# Patient Record
Sex: MALE | Race: WHITE | NOT HISPANIC OR LATINO | Employment: OTHER | URBAN - METROPOLITAN AREA
[De-identification: names, ages, dates, MRNs, and addresses within clinical notes are randomized per-mention and may not be internally consistent; named-entity substitution may affect disease eponyms.]

---

## 2017-01-02 ENCOUNTER — TELEPHONE (OUTPATIENT)
Dept: INTERNAL MEDICINE | Facility: CLINIC | Age: 82
End: 2017-01-02

## 2017-01-02 RX ORDER — PHENAZOPYRIDINE HYDROCHLORIDE 200 MG/1
200 TABLET, FILM COATED ORAL 3 TIMES DAILY PRN
Qty: 12 TABLET | Refills: 0 | Status: SHIPPED | OUTPATIENT
Start: 2017-01-02 | End: 2017-02-16

## 2017-01-02 RX ORDER — CEFDINIR 300 MG/1
300 CAPSULE ORAL 2 TIMES DAILY
Qty: 20 CAPSULE | Refills: 0 | Status: SHIPPED | OUTPATIENT
Start: 2017-01-02 | End: 2017-02-16

## 2017-01-02 NOTE — TELEPHONE ENCOUNTER
I called Mr. 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

## 2017-01-12 ENCOUNTER — OFFICE VISIT (OUTPATIENT)
Dept: ORTHOPEDIC SURGERY | Facility: CLINIC | Age: 82
End: 2017-01-12

## 2017-01-12 VITALS — HEIGHT: 72 IN | WEIGHT: 224 LBS | BODY MASS INDEX: 30.34 KG/M2 | TEMPERATURE: 98 F

## 2017-01-12 DIAGNOSIS — Z96.652 HISTORY OF TOTAL KNEE ARTHROPLASTY, LEFT: Primary | ICD-10-CM

## 2017-01-12 PROCEDURE — 73562 X-RAY EXAM OF KNEE 3: CPT | Performed by: NURSE PRACTITIONER

## 2017-01-12 PROCEDURE — 99024 POSTOP FOLLOW-UP VISIT: CPT | Performed by: NURSE PRACTITIONER

## 2017-01-12 NOTE — MR AVS SNAPSHOT
Osvaldo Lopez   1/12/2017 10:30 AM   Office Visit    Dept Phone:  [phone]   Encounter #:  [id number]    Provider:  LOU Yip   Department:  [facility]                Your Full Care Plan              Your Updated Medication List          This list is accurate as of: 1/12/17 11:41 AM.  Always use your most recent med list.                amiodarone 100 MG tablet   Commonly known as:  PACERONE   Take 1 tablet by mouth daily.       amLODIPine 5 MG tablet   Commonly known as:  NORVASC   Take 1 tablet by mouth Every Morning.       cefdinir 300 MG capsule   Commonly known as:  OMNICEF   Take 1 capsule by mouth 2 (Two) Times a Day.       hydrochlorothiazide 12.5 MG capsule   Commonly known as:  MICROZIDE   Take 1 capsule by mouth daily.       HYDROcodone-acetaminophen 7.5-325 MG per tablet   Commonly known as:  NORCO   1-2 Oral Q4H to Q6H PRN severe pain       lisinopril 40 MG tablet   Commonly known as:  PRINIVIL,ZESTRIL       metFORMIN 500 MG tablet   Commonly known as:  GLUCOPHAGE   Take 1 tablet by mouth 2 (Two) Times a Day With Meals.       metoprolol succinate XL 25 MG 24 hr tablet   Commonly known as:  TOPROL-XL   TAKE 1 TABLET DAILY       ondansetron 4 MG tablet   Commonly known as:  ZOFRAN   Take 1 tablet by mouth Every 6 (Six) Hours As Needed for nausea or vomiting.       pantoprazole 40 MG EC tablet   Commonly known as:  PROTONIX   Take 1 tablet by mouth Daily.       phenazopyridine 200 MG tablet   Commonly known as:  PYRIDIUM   Take 1 tablet by mouth 3 (Three) Times a Day As Needed for bladder spasms.       rivaroxaban 20 MG tablet   Commonly known as:  XARELTO       tamsulosin 0.4 MG capsule 24 hr capsule   Commonly known as:  FLOMAX               We Performed the Following     XR Knee 1 or 2 View Bilateral     XR Knee 1 or 2 View Left       You Were Diagnosed With        Codes Comments    History of total knee  "arthroplasty, left    -  Primary ICD-10-CM: Z96.652  ICD-9-CM: V43.65       Instructions     None    Patient Instructions History      Upcoming Appointments     Visit Type Date Time Department    FOLLOW UP 1/12/2017 10:30 AM [facility]    OFFICE VISIT 2/16/2017  9:45 AM [facility] [id number]      [facility] Signup     Our records indicate that you have an active [facility] [employer] account.    You can view your After Visit Summary by going to [employer] and logging in with your [employer] username and password.  If you don't have a [employer] username and password but a parent or guardian has access to your record, the parent or guardian should login with their own [employer] username and password and access your record to view the After Visit Summary.    If you have questions, you can email [email or web address] or call [phone] to talk to our [employer] staff.  Remember, [employer] is NOT to be used for urgent needs.  For medical emergencies, dial 911.               Other Info from Your Visit           Your Appointments     Feb 16, 2017  9:45 AM EST   Office Visit with Caio Rodríguez Jr., MD   [facility] (--)    [address] [facility] [address] [address] [phone]           Arrive 15 minutes prior to appointment.              Allergies     No Known Allergies      Reason for Visit     Left Knee - Follow-up           Vital Signs     Temperature Height Weight Body Mass Index Smoking Status       98 °F (36.7 °C) 72\" (182.9 cm) 224 lb (102 kg) 30.38 kg/m2 Former Smoker       Problems and Diagnoses Noted     History of total knee replacement    -  Primary        "

## 2017-01-12 NOTE — PROGRESS NOTES
Osvaldo Lopez : 1935 MRN: [id number] DATE: 2017    DIAGNOSIS: 8 week follow up left total knee      SUBJECTIVE:Patient returns today for 8 week follow up of left total knee replacement. Patient reports doing well with no unusual complaints. Appears to be progressing appropriately.    OBJECTIVE:   Exam:. The incision is well healed. No sign of infection. Range of motion is measured at 0 to 115. The calf is soft and nontender with a negative Homans sign. Strength is progressing and the patient is ambulating appropriately.    DIAGNOSTIC STUDIES  Xrays: 3 views of the left knee (AP, lateral, and sunrise) were ordered and reviewed for evaluation of recent knee replacement. They demonstrate a well positioned, well aligned knee replacement without complicating factors noted. In comparison with previous films there has been no change.    ASSESSMENT: 8 week status post left knee replacement.    PLAN: 1) Continue with PT exercises as prescribed   2) Follow up in 10 months    3) in addition the patient continues to have swelling bilateral lower extremities which he reports is a chronic condition.  Dr. Padron advised that he follow-up with his primary care physician and resume support stockings.    Pricila العلي, APRN  2017

## 2017-02-16 ENCOUNTER — OFFICE VISIT (OUTPATIENT)
Dept: INTERNAL MEDICINE | Facility: CLINIC | Age: 82
End: 2017-02-16

## 2017-02-16 VITALS
DIASTOLIC BLOOD PRESSURE: 76 MMHG | BODY MASS INDEX: 30.92 KG/M2 | HEART RATE: 84 BPM | SYSTOLIC BLOOD PRESSURE: 154 MMHG | TEMPERATURE: 96.1 F | RESPIRATION RATE: 16 BRPM | WEIGHT: 228 LBS

## 2017-02-16 DIAGNOSIS — N40.1 BENIGN NON-NODULAR PROSTATIC HYPERPLASIA WITH LOWER URINARY TRACT SYMPTOMS: ICD-10-CM

## 2017-02-16 DIAGNOSIS — D50.0 IRON DEFICIENCY ANEMIA DUE TO CHRONIC BLOOD LOSS: Primary | ICD-10-CM

## 2017-02-16 DIAGNOSIS — N39.0 URINARY TRACT INFECTION WITHOUT HEMATURIA, SITE UNSPECIFIED: ICD-10-CM

## 2017-02-16 PROCEDURE — 99214 OFFICE O/P EST MOD 30 MIN: CPT | Performed by: FAMILY MEDICINE

## 2017-02-16 RX ORDER — TAMSULOSIN HYDROCHLORIDE 0.4 MG/1
CAPSULE ORAL
Qty: 180 CAPSULE | Refills: 3 | Status: SHIPPED | OUTPATIENT
Start: 2017-02-16 | End: 2018-03-09 | Stop reason: SDUPTHER

## 2017-02-16 NOTE — PROGRESS NOTES
Subjective   Osvaldo Lopez is a 81 y.o. male.     Chief Complaint   Patient presents with   • Atrial Fibrillation   • Hypertension   • Hyperlipidemia   • Urinary Tract Infection         History of Present Illness   Patient is here with recheck of UTI with recovery.  He has had some BPH symptoms with nocturia at least for 5 times a night.  We discussed increasing Flomax generic.  He does not wish to go to the urologist right now.  He has lost quite strength after back surgery in left knee replacement is going to physical therapy.    Otherwise treatment of hypertension hyperlipidemia are reviewed.      The following portions of the patient's history were reviewed and updated as appropriate: allergies, current medications, past social history and problem list.    Review of Systems   Constitutional: Negative.    HENT: Negative.    Eyes: Negative.    Respiratory: Negative.    Cardiovascular: Negative.    Gastrointestinal: Negative.    Endocrine: Negative.    Genitourinary: Negative.    Musculoskeletal: Positive for back pain and gait problem.   Skin: Negative.    Allergic/Immunologic: Negative.    Hematological: Negative.    Psychiatric/Behavioral: Negative.        Objective   Vitals:    02/16/17 0945   BP: 154/76   Pulse: 84   Resp: 16   Temp: 96.1 °F (35.6 °C)     Physical Exam   Constitutional: He is oriented to person, place, and time. He appears well-developed and well-nourished.   HENT:   Head: Normocephalic and atraumatic.   Right Ear: Tympanic membrane and external ear normal.   Left Ear: Tympanic membrane and external ear normal.   Nose: Nose normal.   Mouth/Throat: Oropharynx is clear and moist.   Eyes: Conjunctivae and EOM are normal. Pupils are equal, round, and reactive to light.   Neck: Normal range of motion. Neck supple. No JVD present. No thyromegaly present.   Cardiovascular: Normal rate, normal heart sounds and intact distal pulses.  An irregularly irregular rhythm present.   Pulmonary/Chest: Effort  normal and breath sounds normal.   Abdominal: Soft. Bowel sounds are normal.   Musculoskeletal: Normal range of motion.        Legs:  Lymphadenopathy:     He has no cervical adenopathy.   Neurological: He is alert and oriented to person, place, and time. No cranial nerve deficit. He exhibits abnormal muscle tone. Gait abnormal. Coordination normal.   Skin: Skin is warm and dry. No rash noted.   Psychiatric: He has a normal mood and affect. His behavior is normal. Judgment and thought content normal.   Vitals reviewed.      Assessment/Plan   Problem List Items Addressed This Visit        Genitourinary    BPH (benign prostatic hyperplasia)    Relevant Orders    Urinalysis With / Culture If Indicated    CBC & Differential      Other Visit Diagnoses     Iron deficiency anemia due to chronic blood loss    -  Primary    Relevant Orders    Urinalysis With / Culture If Indicated    CBC & Differential    Urinary tract infection without hematuria, site unspecified        Relevant Orders    Urinalysis With / Culture If Indicated    CBC & Differential       plan: Urinalysis with culture CBC.  He's had anemia postop.  We'll see him back in 6 months with labs and give Prevnar 13.

## 2017-02-17 LAB
APPEARANCE UR: CLEAR
BACTERIA #/AREA URNS HPF: NORMAL /HPF
BASOPHILS # BLD AUTO: 0.01 10*3/MM3 (ref 0–0.2)
BASOPHILS NFR BLD AUTO: 0.2 % (ref 0–1.5)
BILIRUB UR QL STRIP: NEGATIVE
COLOR UR: YELLOW
EOSINOPHIL # BLD AUTO: 0.12 10*3/MM3 (ref 0–0.7)
EOSINOPHIL NFR BLD AUTO: 2.3 % (ref 0.3–6.2)
EPI CELLS #/AREA URNS HPF: NORMAL /HPF
ERYTHROCYTE [DISTWIDTH] IN BLOOD BY AUTOMATED COUNT: 13.8 % (ref 11.5–14.5)
GLUCOSE UR QL: NEGATIVE
HCT VFR BLD AUTO: 39.3 % (ref 40.4–52.2)
HGB BLD-MCNC: 12.4 G/DL (ref 13.7–17.6)
HGB UR QL STRIP: NEGATIVE
IMM GRANULOCYTES # BLD: 0 10*3/MM3 (ref 0–0.03)
IMM GRANULOCYTES NFR BLD: 0 % (ref 0–0.5)
KETONES UR QL STRIP: NEGATIVE
LEUKOCYTE ESTERASE UR QL STRIP: NEGATIVE
LYMPHOCYTES # BLD AUTO: 1.99 10*3/MM3 (ref 0.9–4.8)
LYMPHOCYTES NFR BLD AUTO: 38.7 % (ref 19.6–45.3)
MCH RBC QN AUTO: 29.9 PG (ref 27–32.7)
MCHC RBC AUTO-ENTMCNC: 31.6 G/DL (ref 32.6–36.4)
MCV RBC AUTO: 94.7 FL (ref 79.8–96.2)
MICRO URNS: NORMAL
MICRO URNS: NORMAL
MONOCYTES # BLD AUTO: 0.36 10*3/MM3 (ref 0.2–1.2)
MONOCYTES NFR BLD AUTO: 7 % (ref 5–12)
MUCOUS THREADS URNS QL MICRO: PRESENT /HPF
NEUTROPHILS # BLD AUTO: 2.66 10*3/MM3 (ref 1.9–8.1)
NEUTROPHILS NFR BLD AUTO: 51.8 % (ref 42.7–76)
NITRITE UR QL STRIP: NEGATIVE
PH UR STRIP: 6 [PH] (ref 5–7.5)
PLATELET # BLD AUTO: 169 10*3/MM3 (ref 140–500)
PROT UR QL STRIP: NEGATIVE
RBC # BLD AUTO: 4.15 10*6/MM3 (ref 4.6–6)
RBC #/AREA URNS HPF: NORMAL /HPF
SP GR UR: 1.02 (ref 1–1.03)
URINALYSIS REFLEX: NORMAL
UROBILINOGEN UR STRIP-MCNC: 0.2 MG/DL (ref 0.2–1)
WBC # BLD AUTO: 5.14 10*3/MM3 (ref 4.5–10.7)
WBC #/AREA URNS HPF: NORMAL /HPF

## 2017-02-21 ENCOUNTER — OFFICE VISIT (OUTPATIENT)
Dept: CARDIOLOGY | Facility: CLINIC | Age: 82
End: 2017-02-21

## 2017-02-21 VITALS
DIASTOLIC BLOOD PRESSURE: 76 MMHG | SYSTOLIC BLOOD PRESSURE: 130 MMHG | HEART RATE: 67 BPM | HEIGHT: 71 IN | WEIGHT: 228 LBS | BODY MASS INDEX: 31.92 KG/M2

## 2017-02-21 DIAGNOSIS — I10 ESSENTIAL HYPERTENSION: ICD-10-CM

## 2017-02-21 DIAGNOSIS — I48.0 PAF (PAROXYSMAL ATRIAL FIBRILLATION) (HCC): Primary | ICD-10-CM

## 2017-02-21 PROCEDURE — 99213 OFFICE O/P EST LOW 20 MIN: CPT | Performed by: INTERNAL MEDICINE

## 2017-02-21 PROCEDURE — 93000 ELECTROCARDIOGRAM COMPLETE: CPT | Performed by: INTERNAL MEDICINE

## 2017-02-28 NOTE — PROGRESS NOTES
Subjective:     Encounter Date:02/21/2017      Patient ID: Osvaldo Lopez is a 81 y.o. male.    Chief Complaint:  Atrial Fibrillation   Presents for follow-up visit. Symptoms include shortness of breath. Symptoms are negative for an AICD problem, bradycardia, chest pain, dizziness, hypertension, hypotension, syncope and tachycardia. The symptoms have been stable. Past medical history includes atrial fibrillation.   Hypertension   This is a chronic problem. The current episode started more than 1 year ago. The problem is controlled. Associated symptoms include malaise/fatigue and shortness of breath. Pertinent negatives include no anxiety or chest pain.       Patient resents today for reevaluation.  Since that seen him last he had occasional shortness breath occasional edema occasional fatigue all in all he pretty stable in nature.    Review of Systems   Constitution: Positive for malaise/fatigue.   Cardiovascular: Negative for chest pain and syncope.   Respiratory: Positive for shortness of breath and sleep disturbances due to breathing.    Musculoskeletal: Positive for joint pain and myalgias.   Neurological: Negative for dizziness.   All other systems reviewed and are negative.        ECG 12 Lead  Date/Time: 2/21/2017 4:33 PM  Performed by: ESTEBAN RICH  Authorized by: ESTEBAN RICH   Comparison: compared with previous ECG from 2/24/2016  Similar to previous ECG  Rhythm: sinus rhythm  Conduction: complete RBBB  Clinical impression: abnormal ECG               Objective:     Physical Exam   Constitutional: He is oriented to person, place, and time. He appears well-developed.   HENT:   Head: Normocephalic.   Eyes: Conjunctivae are normal.   Neck: Normal range of motion.   Cardiovascular: Normal rate, regular rhythm and normal heart sounds.    Pulmonary/Chest: Breath sounds normal.   Abdominal: Soft. Bowel sounds are normal.   Musculoskeletal: Normal range of motion. He exhibits no edema.    Neurological: He is alert and oriented to person, place, and time.   Skin: Skin is warm and dry.   Psychiatric: He has a normal mood and affect. His behavior is normal.   Vitals reviewed.      Lab Review:       Assessment:          Diagnosis Plan   1. PAF (paroxysmal atrial fibrillation)  ECG 12 Lead   2. Essential hypertension            Plan:       1. History of paroxysmal atrial fibrillation. The patient presents today for  reevaluation. He remains in a normal sinus rhythm.   2. Hypertension. His blood pressure is good.   3.  Patient with a history of diabetes.    4. Patient told to follow-up in one year    Atrial Fibrillation and Atrial Flutter  Assessment  • The patient has paroxysmal atrial fibrillation  • This is non-valvular in etiology  • The patient's CHADS2-VASc score is 3  • A [id number]-MXOh score of 2 or more is considered a high risk for a thromboembolic event  • Rivaroxaban prescribed    Plan  • Attempt to maintain sinus rhythm  • Continue rivaroxaban for antithrombotic therapy, bleeding issues discussed  • Continue amiodarone for rhythm control

## 2017-03-07 RX ORDER — LISINOPRIL 40 MG/1
40 TABLET ORAL DAILY
Qty: 90 TABLET | Refills: 1 | Status: SHIPPED | OUTPATIENT
Start: 2017-03-07 | End: 2017-08-16 | Stop reason: SDUPTHER

## 2017-03-17 ENCOUNTER — OFFICE VISIT (OUTPATIENT)
Dept: ORTHOPEDIC SURGERY | Facility: CLINIC | Age: 82
End: 2017-03-17

## 2017-03-17 VITALS — WEIGHT: 228 LBS | HEIGHT: 71 IN | BODY MASS INDEX: 31.92 KG/M2

## 2017-03-17 DIAGNOSIS — M19.90 OSTEOARTHRITIS, UNSPECIFIED OSTEOARTHRITIS TYPE, UNSPECIFIED SITE: ICD-10-CM

## 2017-03-17 DIAGNOSIS — M75.122 COMPLETE TEAR OF LEFT ROTATOR CUFF: ICD-10-CM

## 2017-03-17 DIAGNOSIS — M25.511 CHRONIC PAIN OF BOTH SHOULDERS: Primary | ICD-10-CM

## 2017-03-17 DIAGNOSIS — M75.101 TEAR OF RIGHT ROTATOR CUFF, UNSPECIFIED TEAR EXTENT: ICD-10-CM

## 2017-03-17 DIAGNOSIS — M25.512 CHRONIC PAIN OF BOTH SHOULDERS: Primary | ICD-10-CM

## 2017-03-17 DIAGNOSIS — G89.29 CHRONIC PAIN OF BOTH SHOULDERS: Primary | ICD-10-CM

## 2017-03-17 PROCEDURE — 73030 X-RAY EXAM OF SHOULDER: CPT | Performed by: ORTHOPAEDIC SURGERY

## 2017-03-17 PROCEDURE — 99214 OFFICE O/P EST MOD 30 MIN: CPT | Performed by: ORTHOPAEDIC SURGERY

## 2017-03-17 NOTE — PROGRESS NOTES
New Right Shoulder      Patient: Osvaldo Lopez        YOB: 1935    Medical Record Number: [id number]        Chief Complaints: Right Shoulder Pain   Chief Complaint   Patient presents with   • Right Shoulder - Establish Care   • Left Shoulder - Follow-up           History of Present Illness: This is a  81 y.o. male who presents with complaints of right shoulder pain.  I've seen him in the past for left shoulder pain which he was found have an ear repairable rotator cuff tear.  His right shoulder began hurting him in November 2016.  He did not have one particular event however he did have a total knee replacement and he states he has a hard time getting out of chairs and has to push up with his arms and he thinks that's what irritated his right shoulder.  His left shoulder is still bothering him but not as bad as before his symptoms in his right are severe constant stabbing burning his past medical history is mild for cardiac disease          Allergies: No Known Allergies    Medications:   Home Medications:  Current Outpatient Prescriptions on File Prior to Visit   Medication Sig   • amiodarone (PACERONE) 100 MG tablet Take 1 tablet by mouth daily. (Patient taking differently: Take 100 mg by mouth Every Morning.)   • amLODIPine (NORVASC) 5 MG tablet Take 1 tablet by mouth Every Morning.   • lisinopril (PRINIVIL,ZESTRIL) 40 MG tablet Take 1 tablet by mouth Daily.   • metFORMIN (GLUCOPHAGE) 500 MG tablet Take 1 tablet by mouth 2 (Two) Times a Day With Meals.   • metoprolol succinate XL (TOPROL-XL) 25 MG 24 hr tablet TAKE 1 TABLET DAILY   • pantoprazole (PROTONIX) 40 MG EC tablet Take 1 tablet by mouth Daily.   • rivaroxaban (XARELTO) 20 MG tablet Take 20 mg by mouth Every Morning. To stop 3 days before surgery   • tamsulosin (FLOMAX) 0.4 MG capsule 24 hr capsule Two capsules daily for prostate   • hydrochlorothiazide (MICROZIDE) 12.5 MG capsule Take 1 capsule by mouth daily. (Patient taking  differently: Take 12.5 mg by mouth Every Morning.)   • HYDROcodone-acetaminophen (NORCO) 7.5-325 MG per tablet 1-2 Oral Q4H to Q6H PRN severe pain     No current facility-administered medications on file prior to visit.      Current Medications:  Scheduled Meds:  Continuous Infusions:  No current facility-administered medications for this visit.   PRN Meds:.    Past Medical History   Diagnosis Date   • Abnormal thyroid blood test    • Aneurysm of abdominal aorta    • Arthritis    • Atrial fibrillation    • BPH (benign prostatic hyperplasia)    • Coronary artery disease    • Diabetes mellitus      type 2   • Edema    • Enlarged prostate without lower urinary tract symptoms (luts)    • Essential hypertension    • Fatigue    • History of MI (myocardial infarction) 1989   • Hyperactivity of bladder    • Hyperglycemia    • Hyperlipidemia    • Hypertension    • Left shoulder pain    • Lumbar radiculopathy 2016     wears back brace at times following back surgery   • Muscle weakness (generalized)    • Osteoarthritis    • Personal history of arthritis    • Screening       stop bang scale 5   • Torn rotator cuff      left   • Type 2 diabetes mellitus    • Urinary frequency         Past Surgical History   Procedure Laterality Date   • Gallbladder surgery  1989   • Cardiac surgery       CABGX5 (1989)   • Lumbar discectomy fusion instrumentation N/A 4/11/2016     Procedure: lumbar laminectomy L4-5 and fusion with instrumentation;  Surgeon: Ran Kline MD;  Location: [facility] OR;  Service:    • Hernia repair Left      inquinal times 3  last one in 2003   • Knee cartilage surgery Left 1970's   • Pr total knee arthroplasty Left 11/14/2016     Procedure: TOTAL KNEE ARTHROPLASTY;  Surgeon: Dontrell Arellano MD;  Location: [facility];  Service: Orthopedics        Social History     Occupational History   • Not on file.     Social History Main Topics   • Smoking status: Former Smoker     Years: 36.00     Types: Cigarettes      "Quit date: 1989   • Smokeless tobacco: Never Used      Comment: states smoked 2-3 ppd   • Alcohol use Yes      Comment: 2-3 times weekly  //  caffeine use   • Drug use: No   • Sexual activity: Defer    Social History     Social History Narrative        Family History   Problem Relation Age of Onset   • Heart failure Mother              Review of Systems: 14 point review of systems are remarkable for the pertinent positives listed in the chart by the patient the remainder are negative    Review of Systems      Physical Exam: 81 y.o. male  General Appearance:    Alert, cooperative, in no acute distress                 Vitals:    03/17/17 1046   Weight: 228 lb (103 kg)   Height: 71\" (180.3 cm)      Patient is alert and read ×3 no acute distress appears her above-listed at height weight and age.  Affect is normal respiratory rate is normal unlabored. Heart rate regular rate rhythm, sclera, dentition and hearing are normal for the purpose of this exam.    Ortho Exam Physical exam of the right shoulder reveals no overlying skin changes no lymphedema no lymphadenopathy.  Patient has active flexion 186 with mod symptoms abduction is similar external rotation is to 40 and internal rotation to the upper lumbar spine with mild symptoms.  Patient has good rotator cuff strength 4+ over 5 with isometric strength testing with pain.  Patient has a positive impingement and a positive Anglin sign.  Patient has good cervical range of motion which is full and asymptomatic no radicular symptoms.  Patient has a normal elbow exam.  Good distal pulses are present  Patient has pain with overhead activity and a positive Neer sign and a positive empty can sign  Physical exam of the left shoulder reveals no overlying skin changes no lymphedema no lymphadenopathy.  Patient has active flexion 180 with mild symptoms abduction is similar external rotation is to 50 and internal rotation to the upper lumbar spine with mild symptoms.  Patient has " good rotator cuff strength 4-4+ over 5 with isometric strength testing with pain.  Patient has a positive impingement and a positive Anglin sign.  Patient has good cervical range of motion which is full and asymptomatic no radicular symptoms.  Patient has a normal elbow exam.  Good distal pulses are presentPatient has pain with overhead activity and a positive Neer sign and a positive empty can sign      Large Joint Arthrocentesis  Date/Time: 3/20/2017 9:28 AM  Consent given by: patient  Site marked: site marked  Timeout: Immediately prior to procedure a time out was called to verify the correct patient, procedure, equipment, support staff and site/side marked as required   Supporting Documentation  Indications: pain   Procedure Details  Location: shoulder - R subacromial bursa  Preparation: Patient was prepped and draped in the usual sterile fashion  Needle size: 25 G  Approach: posterior  Medications administered: 80 mg methylPREDNISolone acetate 80 MG/ML; 4 mL bupivacaine  Patient tolerance: patient tolerated the procedure well with no immediate complications    Large Joint Arthrocentesis  Date/Time: 3/20/2017 9:29 AM  Consent given by: patient  Site marked: site marked  Timeout: Immediately prior to procedure a time out was called to verify the correct patient, procedure, equipment, support staff and site/side marked as required   Supporting Documentation  Indications: pain   Procedure Details  Location: shoulder - L subacromial bursa  Preparation: Patient was prepped and draped in the usual sterile fashion  Needle size: 25 G  Approach: posterior  Medications administered: 80 mg methylPREDNISolone acetate 80 MG/ML; 4 mL bupivacaine  Patient tolerance: patient tolerated the procedure well with no immediate complications                Radiology:   AP, Scapular Y and Axillary Lateral of the right shoulder were ordered/reviewed to evauate shoulder pain.  I see no evidence of any acute bony pathology he does have  some degenerative changes seen at his acromioclavicular joint  AP scapular Y and axillary lateral of the left shoulder were also taken to evaluate his symptoms and these were compared to previous films.  He does have some sclerosis at the insertion the cuff and some acromioclavicular arthritis but no acute bony pathology    Assessment/Plan:  Known left rotator cuff tear, I suspect a right rotator cuff tear plan is to proceed with an injection of both having get back on some strengthening if he fails to improve we will pursue other means of testing

## 2017-03-20 PROBLEM — M75.122 COMPLETE TEAR OF LEFT ROTATOR CUFF: Status: ACTIVE | Noted: 2017-03-20

## 2017-03-20 PROBLEM — M75.101 TEAR OF RIGHT ROTATOR CUFF: Status: ACTIVE | Noted: 2017-03-20

## 2017-03-20 PROCEDURE — 20610 DRAIN/INJ JOINT/BURSA W/O US: CPT | Performed by: ORTHOPAEDIC SURGERY

## 2017-03-20 RX ORDER — METHYLPREDNISOLONE ACETATE 80 MG/ML
80 INJECTION, SUSPENSION INTRA-ARTICULAR; INTRALESIONAL; INTRAMUSCULAR; SOFT TISSUE
Status: COMPLETED | OUTPATIENT
Start: 2017-03-20 | End: 2017-03-20

## 2017-03-20 RX ORDER — BUPIVACAINE HYDROCHLORIDE 5 MG/ML
4 INJECTION, SOLUTION PERINEURAL
Status: COMPLETED | OUTPATIENT
Start: 2017-03-20 | End: 2017-03-20

## 2017-03-20 RX ADMIN — BUPIVACAINE HYDROCHLORIDE 4 ML: 5 INJECTION, SOLUTION PERINEURAL at 09:28

## 2017-03-20 RX ADMIN — METHYLPREDNISOLONE ACETATE 80 MG: 80 INJECTION, SUSPENSION INTRA-ARTICULAR; INTRALESIONAL; INTRAMUSCULAR; SOFT TISSUE at 09:29

## 2017-03-20 RX ADMIN — METHYLPREDNISOLONE ACETATE 80 MG: 80 INJECTION, SUSPENSION INTRA-ARTICULAR; INTRALESIONAL; INTRAMUSCULAR; SOFT TISSUE at 09:28

## 2017-03-20 RX ADMIN — BUPIVACAINE HYDROCHLORIDE 4 ML: 5 INJECTION, SOLUTION PERINEURAL at 09:29

## 2017-04-10 ENCOUNTER — TELEPHONE (OUTPATIENT)
Dept: CARDIOLOGY | Facility: CLINIC | Age: 82
End: 2017-04-10

## 2017-04-10 RX ORDER — AMIODARONE HYDROCHLORIDE 100 MG/1
100 TABLET ORAL DAILY
Qty: 90 TABLET | Refills: 0 | Status: SHIPPED | OUTPATIENT
Start: 2017-04-10 | End: 2017-04-10 | Stop reason: SDUPTHER

## 2017-04-10 RX ORDER — AMIODARONE HYDROCHLORIDE 100 MG/1
100 TABLET ORAL DAILY
Qty: 90 TABLET | Refills: 0 | Status: SHIPPED | OUTPATIENT
Start: 2017-04-10 | End: 2017-07-11 | Stop reason: ALTCHOICE

## 2017-04-10 RX ORDER — METOPROLOL SUCCINATE 25 MG/1
TABLET, EXTENDED RELEASE ORAL
Qty: 90 TABLET | Refills: 1 | Status: SHIPPED | OUTPATIENT
Start: 2017-04-10 | End: 2017-10-26 | Stop reason: SDUPTHER

## 2017-06-13 ENCOUNTER — TELEPHONE (OUTPATIENT)
Dept: CARDIOLOGY | Facility: CLINIC | Age: 82
End: 2017-06-13

## 2017-06-13 NOTE — TELEPHONE ENCOUNTER
Patient called to report he is in Afib.  His HR is 130. He is not SOA but has edema in his lower legs. BP is 120/90.  Please call patient at [phone]./ MONIK

## 2017-06-15 ENCOUNTER — TELEPHONE (OUTPATIENT)
Dept: CARDIOLOGY | Facility: CLINIC | Age: 82
End: 2017-06-15

## 2017-06-15 RX ORDER — AMIODARONE HYDROCHLORIDE 100 MG/1
TABLET ORAL
Qty: 90 TABLET | Refills: 0 | Status: SHIPPED | OUTPATIENT
Start: 2017-06-15 | End: 2017-07-11

## 2017-06-26 DIAGNOSIS — I48.0 PAF (PAROXYSMAL ATRIAL FIBRILLATION) (HCC): Primary | ICD-10-CM

## 2017-07-11 ENCOUNTER — OFFICE VISIT (OUTPATIENT)
Dept: CARDIOLOGY | Facility: CLINIC | Age: 82
End: 2017-07-11

## 2017-07-11 ENCOUNTER — HOSPITAL ENCOUNTER (OUTPATIENT)
Dept: GENERAL RADIOLOGY | Facility: HOSPITAL | Age: 82
Discharge: HOME | End: 2017-07-11
Attending: INTERNAL MEDICINE | Admitting: INTERNAL MEDICINE

## 2017-07-11 ENCOUNTER — LAB (OUTPATIENT)
Dept: LAB | Facility: HOSPITAL | Age: 82
End: 2017-07-11

## 2017-07-11 VITALS
BODY MASS INDEX: 32.48 KG/M2 | DIASTOLIC BLOOD PRESSURE: 68 MMHG | HEIGHT: 71 IN | SYSTOLIC BLOOD PRESSURE: 150 MMHG | WEIGHT: 232 LBS | HEART RATE: 62 BPM

## 2017-07-11 DIAGNOSIS — I48.0 PAF (PAROXYSMAL ATRIAL FIBRILLATION) (HCC): ICD-10-CM

## 2017-07-11 DIAGNOSIS — I48.0 PAF (PAROXYSMAL ATRIAL FIBRILLATION) (HCC): Primary | ICD-10-CM

## 2017-07-11 LAB
ALBUMIN SERPL-MCNC: 4.3 G/DL (ref 3.5–5.2)
ALBUMIN/GLOB SERPL: 1.6 G/DL
ALP SERPL-CCNC: 86 U/L (ref 39–117)
ALT SERPL W P-5'-P-CCNC: 22 U/L (ref 1–41)
ANION GAP SERPL CALCULATED.3IONS-SCNC: 14.9 MMOL/L
AST SERPL-CCNC: 22 U/L (ref 1–40)
BILIRUB SERPL-MCNC: 0.5 MG/DL (ref 0.1–1.2)
BUN BLD-MCNC: 21 MG/DL (ref 8–23)
BUN/CREAT SERPL: 16.9 (ref 7–25)
CALCIUM SPEC-SCNC: 9.5 MG/DL (ref 8.6–10.5)
CHLORIDE SERPL-SCNC: 101 MMOL/L (ref 98–107)
CO2 SERPL-SCNC: 23.1 MMOL/L (ref 22–29)
CREAT BLD-MCNC: 1.24 MG/DL (ref 0.76–1.27)
GFR SERPL CREATININE-BSD FRML MDRD: 56 ML/MIN/1.73
GLOBULIN UR ELPH-MCNC: 2.7 GM/DL
GLUCOSE BLD-MCNC: 166 MG/DL (ref 65–99)
POTASSIUM BLD-SCNC: 4.6 MMOL/L (ref 3.5–5.2)
PROT SERPL-MCNC: 7 G/DL (ref 6–8.5)
SODIUM BLD-SCNC: 139 MMOL/L (ref 136–145)
T-UPTAKE NFR SERPL: 0.95 TBI (ref 0.8–1.3)
T4 SERPL-MCNC: 5.47 MCG/DL (ref 4.5–11.7)
TSH SERPL DL<=0.05 MIU/L-ACNC: 4.74 MIU/ML (ref 0.27–4.2)

## 2017-07-11 PROCEDURE — 84479 ASSAY OF THYROID (T3 OR T4): CPT

## 2017-07-11 PROCEDURE — 99214 OFFICE O/P EST MOD 30 MIN: CPT | Performed by: INTERNAL MEDICINE

## 2017-07-11 PROCEDURE — 93000 ELECTROCARDIOGRAM COMPLETE: CPT | Performed by: INTERNAL MEDICINE

## 2017-07-11 PROCEDURE — 71020 HC CHEST PA AND LATERAL: CPT

## 2017-07-11 PROCEDURE — 36415 COLL VENOUS BLD VENIPUNCTURE: CPT

## 2017-07-11 PROCEDURE — 84443 ASSAY THYROID STIM HORMONE: CPT

## 2017-07-11 PROCEDURE — 80053 COMPREHEN METABOLIC PANEL: CPT

## 2017-07-11 PROCEDURE — 84436 ASSAY OF TOTAL THYROXINE: CPT

## 2017-07-11 RX ORDER — AMIODARONE HYDROCHLORIDE 200 MG/1
200 TABLET ORAL DAILY
Qty: 90 TABLET | Refills: 3 | Status: SHIPPED | OUTPATIENT
Start: 2017-07-11 | End: 2018-01-24 | Stop reason: HOSPADM

## 2017-07-14 NOTE — PROGRESS NOTES
Subjective:     Encounter Date:07/11/2017      Patient ID: Osvaldo Lopez is a 81 y.o. male.    Chief Complaint:  Atrial Fibrillation   Presents for follow-up visit. Symptoms include shortness of breath. Symptoms are negative for an AICD problem, bradycardia, chest pain, dizziness, hypertension, hypotension, syncope and tachycardia. The symptoms have been stable. Past medical history includes atrial fibrillation.   Hypertension   This is a chronic problem. The current episode started more than 1 year ago. The problem is controlled. Associated symptoms include malaise/fatigue, peripheral edema and shortness of breath. Pertinent negatives include no anxiety or chest pain.       72-year-old gentleman presents today for reevaluation.  Patient has a history of hypertension as well as atrial fibrillation had been doing relatively well.  Unfortunately he went back in atrial fibrillation.  He was on very low-dose of amiodarone as I explained to him I tried minimize the dose due to significant side effects.  Patient went up on his amiodarone to 200 mg day with that he converted back to normal sinus rhythm and all in all is starting to improve.    Review of Systems   Constitution: Positive for malaise/fatigue.   Cardiovascular: Negative for chest pain and syncope.   Respiratory: Positive for shortness of breath and wheezing.    Endocrine: Positive for polyuria.   Musculoskeletal: Positive for joint pain and myalgias.   Neurological: Negative for dizziness.   All other systems reviewed and are negative.        ECG 12 Lead  Date/Time: 7/11/2017 9:19 AM  Performed by: ESTEBAN RICH  Authorized by: ESTEBAN RICH   Comparison: compared with previous ECG from 2/21/2017  Similar to previous ECG  Rhythm: sinus rhythm  Conduction: right bundle branch block               Objective:     Physical Exam   Constitutional: He is oriented to person, place, and time. He appears well-developed.   HENT:   Head: Normocephalic.    Eyes: Conjunctivae are normal.   Neck: Normal range of motion.   Cardiovascular: Normal rate, regular rhythm and normal heart sounds.    Pulmonary/Chest: Breath sounds normal.   Abdominal: Soft. Bowel sounds are normal.   Musculoskeletal: Normal range of motion. He exhibits no edema.   Neurological: He is alert and oriented to person, place, and time.   Skin: Skin is warm and dry.   Psychiatric: He has a normal mood and affect. His behavior is normal.   Vitals reviewed.      Lab Review:       Assessment:         No diagnosis found.       Plan:     1.  Paroxysmal fibrillation. Patient went into each of relation as outpatient.  He had contacted my office I went up on his amiodarone.  His QTc interval is 485 and this is in light of a right bundle branch block.  He remains in sinus rhythm with that symptoms are improving.  I think everything will resolve and time.  2.  Hypertension blood pressures a little bit on the high side  3.  Will follow follow back up in 6 months sooner if his symptoms don't continue to improve      Atrial Fibrillation and Atrial Flutter  Assessment  • The patient has paroxysmal atrial fibrillation  • This is non-valvular in etiology  • The patient's CHADS2-VASc score is 3  • A [id number] score of 2 or more is considered a high risk for a thromboembolic event  • Rivaroxaban prescribed    Plan  • Attempt to maintain sinus rhythm  • Continue rivaroxaban for antithrombotic therapy, bleeding issues discussed  • Continue amiodarone for rhythm control

## 2017-08-16 ENCOUNTER — OFFICE VISIT (OUTPATIENT)
Dept: INTERNAL MEDICINE | Facility: CLINIC | Age: 82
End: 2017-08-16

## 2017-08-16 VITALS
TEMPERATURE: 96.4 F | OXYGEN SATURATION: 96 % | BODY MASS INDEX: 31.8 KG/M2 | WEIGHT: 228 LBS | SYSTOLIC BLOOD PRESSURE: 112 MMHG | HEART RATE: 72 BPM | DIASTOLIC BLOOD PRESSURE: 64 MMHG

## 2017-08-16 DIAGNOSIS — I10 ESSENTIAL HYPERTENSION: ICD-10-CM

## 2017-08-16 DIAGNOSIS — Z23 NEED FOR PNEUMOCOCCAL VACCINE: Primary | ICD-10-CM

## 2017-08-16 DIAGNOSIS — N40.1 BENIGN NON-NODULAR PROSTATIC HYPERPLASIA WITH LOWER URINARY TRACT SYMPTOMS: ICD-10-CM

## 2017-08-16 DIAGNOSIS — R26.2 DIFFICULTY WALKING: ICD-10-CM

## 2017-08-16 DIAGNOSIS — R29.898 LEG WEAKNESS, BILATERAL: ICD-10-CM

## 2017-08-16 DIAGNOSIS — E11.9 TYPE 2 DIABETES MELLITUS WITHOUT COMPLICATION, WITHOUT LONG-TERM CURRENT USE OF INSULIN (HCC): ICD-10-CM

## 2017-08-16 DIAGNOSIS — M54.16 LUMBAR RADICULOPATHY: ICD-10-CM

## 2017-08-16 DIAGNOSIS — I48.21 PERMANENT ATRIAL FIBRILLATION (HCC): ICD-10-CM

## 2017-08-16 PROCEDURE — 99214 OFFICE O/P EST MOD 30 MIN: CPT | Performed by: FAMILY MEDICINE

## 2017-08-16 PROCEDURE — G0009 ADMIN PNEUMOCOCCAL VACCINE: HCPCS | Performed by: FAMILY MEDICINE

## 2017-08-16 PROCEDURE — 90732 PPSV23 VACC 2 YRS+ SUBQ/IM: CPT | Performed by: FAMILY MEDICINE

## 2017-08-16 RX ORDER — LISINOPRIL 40 MG/1
TABLET ORAL
Qty: 90 TABLET | Refills: 1 | Status: SHIPPED | OUTPATIENT
Start: 2017-08-16 | End: 2018-01-02 | Stop reason: SDUPTHER

## 2017-08-16 NOTE — PROGRESS NOTES
Subjective   Osvaldo Lopez is a 81 y.o. male.     Chief Complaint   Patient presents with   • Extremity Weakness   • Polyuria   • Hypertension   • Atrial Fibrillation         History of Present Illness   Patient presents with a very troubling history of somewhat progressive leg weakness involving the quadriceps and large muscles of the legs and pelvis.  He has marked reduction in quad strengthening has difficulty going up stairs unless he has to hand rails.  Otherwise he has a hard time getting out of a chair and he cannot get off the floor without help.  He is going to the [facility] try to get stronger but this is not worked.  Previous history includes lumbar back surgery last year and left knee replacement.  He did not make any progress in physical therapy.  His brother  2 or 3 years ago with inclusion body myositis.    Otherwise he's on tamsulosin had a previous bout of prostatitis but has polyuria urinating frequently at night and several times during the day.      The following portions of the patient's history were reviewed and updated as appropriate: allergies, current medications, past social history and problem list.    Review of Systems   HENT: Negative.    Eyes: Negative.    Respiratory: Negative.    Cardiovascular: Negative.    Gastrointestinal: Negative.    Endocrine: Negative.    Genitourinary: Positive for difficulty urinating and urgency.   Musculoskeletal: Positive for back pain and gait problem.   Skin: Negative.    Allergic/Immunologic: Negative.    Neurological: Positive for weakness.   Hematological: Negative.    Psychiatric/Behavioral: Negative.        Objective   Vitals:    17 1151   BP: 112/64   Pulse: 72   Temp: 96.4 °F (35.8 °C)   SpO2: 96%     Physical Exam   Constitutional: He is oriented to person, place, and time. He appears well-developed and well-nourished.   HENT:   Head: Normocephalic and atraumatic.   Right Ear: Tympanic membrane and external ear normal.   Left Ear:  Tympanic membrane and external ear normal.   Nose: Nose normal.   Mouth/Throat: Oropharynx is clear and moist.   Eyes: Conjunctivae and EOM are normal. Pupils are equal, round, and reactive to light.   Neck: Normal range of motion. Neck supple. No JVD present. No thyromegaly present.   Cardiovascular: Normal rate, regular rhythm, normal heart sounds and intact distal pulses.    Pulmonary/Chest: Effort normal and breath sounds normal.   Abdominal: Soft. Bowel sounds are normal.   Musculoskeletal: Normal range of motion.   Lymphadenopathy:     He has no cervical adenopathy.   Neurological: He is alert and oriented to person, place, and time. No cranial nerve deficit or sensory deficit. He exhibits abnormal muscle tone. Coordination abnormal.   Reflex Scores:       Patellar reflexes are 0 on the right side and 0 on the left side.       Achilles reflexes are 0 on the right side and 0 on the left side.  Decreased sensation lower extremities.  He has marked decrease and quad strength bilaterally.   Skin: Skin is warm and dry. No rash noted.   Psychiatric: He has a normal mood and affect. His behavior is normal. Judgment and thought content normal.   Vitals reviewed.      Assessment/Plan   Problem List Items Addressed This Visit        Cardiovascular and Mediastinum    Atrial fibrillation    Relevant Orders    CBC & Differential    Comprehensive Metabolic Panel    Hemoglobin A1c    Lipid Panel With / Chol / HDL Ratio    Sedimentation Rate    TSH    T4, Free    T3, Free    PSA, Total & Free    Glutamic Acid Decarboxylase    CK    Striated Muscle Antibody    Essential hypertension    Relevant Orders    CBC & Differential    Comprehensive Metabolic Panel    Hemoglobin A1c    Lipid Panel With / Chol / HDL Ratio    Sedimentation Rate    TSH    T4, Free    T3, Free    PSA, Total & Free    Glutamic Acid Decarboxylase    CK    Striated Muscle Antibody       Endocrine    Type 2 diabetes mellitus without complication    Relevant  Orders    CBC & Differential    Comprehensive Metabolic Panel    Hemoglobin A1c    Lipid Panel With / Chol / HDL Ratio    Sedimentation Rate    TSH    T4, Free    T3, Free    PSA, Total & Free    Glutamic Acid Decarboxylase    CK    Striated Muscle Antibody       Nervous and Auditory    Lumbar radiculopathy    Relevant Orders    CBC & Differential    Comprehensive Metabolic Panel    Hemoglobin A1c    Lipid Panel With / Chol / HDL Ratio    Sedimentation Rate    TSH    T4, Free    T3, Free    PSA, Total & Free    Glutamic Acid Decarboxylase    CK    Striated Muscle Antibody       Genitourinary    Benign prostatic hyperplasia    Relevant Orders    Ambulatory Referral to Urology    CBC & Differential    Comprehensive Metabolic Panel    Hemoglobin A1c    Lipid Panel With / Chol / HDL Ratio    Sedimentation Rate    TSH    T4, Free    T3, Free    PSA, Total & Free    Glutamic Acid Decarboxylase    CK    Striated Muscle Antibody       Other    Difficulty walking    Relevant Orders    Ambulatory Referral to Neurology    CBC & Differential    Comprehensive Metabolic Panel    Hemoglobin A1c    Lipid Panel With / Chol / HDL Ratio    Sedimentation Rate    TSH    T4, Free    T3, Free    PSA, Total & Free    Glutamic Acid Decarboxylase    CK    Striated Muscle Antibody      Other Visit Diagnoses     Need for pneumococcal vaccine    -  Primary    Relevant Orders    Pneumococcal polysaccharide vaccine 23-valent >= 1yo subcutaneous/IM (PPSV23)    CBC & Differential    Comprehensive Metabolic Panel    Hemoglobin A1c    Lipid Panel With / Chol / HDL Ratio    Sedimentation Rate    TSH    T4, Free    T3, Free    PSA, Total & Free    Glutamic Acid Decarboxylase    CK    Striated Muscle Antibody    Leg weakness, bilateral        Relevant Orders    Ambulatory Referral to Neurology    CBC & Differential    Comprehensive Metabolic Panel    Hemoglobin A1c    Lipid Panel With / Chol / HDL Ratio    Sedimentation Rate    TSH    T4, Free    T3,  Free    PSA, Total & Free    Glutamic Acid Decarboxylase    CK    Striated Muscle Antibody      Plan: Muscle enzyme tests plus screening lab tests.  We'll try to get an appointment as soon as possible to neurology.  He may need an EMG.  Also referral to urology for symptomatically BPH.  We'll get a PSA total and free.  Recheck in 2 months.    Otherwise given Pneumovax 23 today.

## 2017-08-18 LAB
ALBUMIN SERPL-MCNC: 4.7 G/DL (ref 3.5–5.2)
ALBUMIN/GLOB SERPL: 1.9 G/DL
ALP SERPL-CCNC: 95 U/L (ref 39–117)
ALT SERPL-CCNC: 25 U/L (ref 1–41)
AST SERPL-CCNC: 22 U/L (ref 1–40)
BASOPHILS # BLD AUTO: 0.02 10*3/MM3 (ref 0–0.2)
BASOPHILS NFR BLD AUTO: 0.4 % (ref 0–1.5)
BILIRUB SERPL-MCNC: 0.4 MG/DL (ref 0.1–1.2)
BUN SERPL-MCNC: 23 MG/DL (ref 8–23)
BUN/CREAT SERPL: 18.7 (ref 7–25)
CALCIUM SERPL-MCNC: 9.6 MG/DL (ref 8.6–10.5)
CHLORIDE SERPL-SCNC: 100 MMOL/L (ref 98–107)
CHOLEST SERPL-MCNC: 209 MG/DL (ref 0–200)
CHOLEST/HDLC SERPL: 4.35 {RATIO}
CK SERPL-CCNC: 217 U/L (ref 20–200)
CO2 SERPL-SCNC: 23.9 MMOL/L (ref 22–29)
CREAT SERPL-MCNC: 1.23 MG/DL (ref 0.76–1.27)
EOSINOPHIL # BLD AUTO: 0.07 10*3/MM3 (ref 0–0.7)
EOSINOPHIL NFR BLD AUTO: 1.4 % (ref 0.3–6.2)
ERYTHROCYTE [DISTWIDTH] IN BLOOD BY AUTOMATED COUNT: 14.4 % (ref 11.5–14.5)
ERYTHROCYTE [SEDIMENTATION RATE] IN BLOOD BY WESTERGREN METHOD: 9 MM/HR (ref 0–20)
GAD65 AB SER IA-ACNC: <5 U/ML (ref 0–5)
GLOBULIN SER CALC-MCNC: 2.5 GM/DL
GLUCOSE SERPL-MCNC: 161 MG/DL (ref 65–99)
HBA1C MFR BLD: 7.8 % (ref 4.8–5.6)
HCT VFR BLD AUTO: 43.6 % (ref 40.4–52.2)
HDLC SERPL-MCNC: 48 MG/DL (ref 40–60)
HGB BLD-MCNC: 13.9 G/DL (ref 13.7–17.6)
IMM GRANULOCYTES # BLD: 0 10*3/MM3 (ref 0–0.03)
IMM GRANULOCYTES NFR BLD: 0 % (ref 0–0.5)
LDLC SERPL CALC-MCNC: 124 MG/DL (ref 0–100)
LYMPHOCYTES # BLD AUTO: 1.75 10*3/MM3 (ref 0.9–4.8)
LYMPHOCYTES NFR BLD AUTO: 34.9 % (ref 19.6–45.3)
MCH RBC QN AUTO: 30.9 PG (ref 27–32.7)
MCHC RBC AUTO-ENTMCNC: 31.9 G/DL (ref 32.6–36.4)
MCV RBC AUTO: 96.9 FL (ref 79.8–96.2)
MONOCYTES # BLD AUTO: 0.36 10*3/MM3 (ref 0.2–1.2)
MONOCYTES NFR BLD AUTO: 7.2 % (ref 5–12)
NEUTROPHILS # BLD AUTO: 2.82 10*3/MM3 (ref 1.9–8.1)
NEUTROPHILS NFR BLD AUTO: 56.1 % (ref 42.7–76)
PLATELET # BLD AUTO: 142 10*3/MM3 (ref 140–500)
POTASSIUM SERPL-SCNC: 4.6 MMOL/L (ref 3.5–5.2)
PROT SERPL-MCNC: 7.2 G/DL (ref 6–8.5)
PSA FREE MFR SERPL: 33.6 %
PSA FREE SERPL-MCNC: 0.84 NG/ML
PSA SERPL-MCNC: 2.5 NG/ML (ref 0–4)
RBC # BLD AUTO: 4.5 10*6/MM3 (ref 4.6–6)
SODIUM SERPL-SCNC: 138 MMOL/L (ref 136–145)
STRIA MUS AB TITR SER IF: NEGATIVE {TITER}
T3FREE SERPL-MCNC: 2.1 PG/ML (ref 2–4.4)
T4 FREE SERPL-MCNC: 1.23 NG/DL (ref 0.93–1.7)
TRIGL SERPL-MCNC: 183 MG/DL (ref 0–150)
TSH SERPL DL<=0.005 MIU/L-ACNC: 4.49 MIU/ML (ref 0.27–4.2)
VLDLC SERPL CALC-MCNC: 36.6 MG/DL (ref 5–40)
WBC # BLD AUTO: 5.02 10*3/MM3 (ref 4.5–10.7)

## 2017-10-17 ENCOUNTER — OFFICE VISIT (OUTPATIENT)
Dept: INTERNAL MEDICINE | Facility: CLINIC | Age: 82
End: 2017-10-17

## 2017-10-17 VITALS
HEART RATE: 77 BPM | TEMPERATURE: 97.2 F | DIASTOLIC BLOOD PRESSURE: 72 MMHG | WEIGHT: 232 LBS | BODY MASS INDEX: 32.36 KG/M2 | OXYGEN SATURATION: 97 % | SYSTOLIC BLOOD PRESSURE: 134 MMHG

## 2017-10-17 DIAGNOSIS — R35.0 INCREASED FREQUENCY OF URINATION: ICD-10-CM

## 2017-10-17 DIAGNOSIS — E78.2 MIXED HYPERLIPIDEMIA: ICD-10-CM

## 2017-10-17 DIAGNOSIS — N40.0 BENIGN PROSTATIC HYPERPLASIA WITHOUT LOWER URINARY TRACT SYMPTOMS: ICD-10-CM

## 2017-10-17 DIAGNOSIS — E11.9 TYPE 2 DIABETES MELLITUS WITHOUT COMPLICATION, WITHOUT LONG-TERM CURRENT USE OF INSULIN (HCC): ICD-10-CM

## 2017-10-17 DIAGNOSIS — I10 ESSENTIAL HYPERTENSION: ICD-10-CM

## 2017-10-17 DIAGNOSIS — M54.16 LUMBAR RADICULOPATHY: ICD-10-CM

## 2017-10-17 DIAGNOSIS — R29.898 BILATERAL LEG WEAKNESS: ICD-10-CM

## 2017-10-17 DIAGNOSIS — I48.21 PERMANENT ATRIAL FIBRILLATION (HCC): Primary | ICD-10-CM

## 2017-10-17 PROCEDURE — 99214 OFFICE O/P EST MOD 30 MIN: CPT | Performed by: FAMILY MEDICINE

## 2017-10-17 RX ORDER — FINASTERIDE 5 MG/1
5 TABLET, FILM COATED ORAL EVERY MORNING
COMMUNITY
Start: 2017-09-14 | End: 2019-01-22

## 2017-10-17 NOTE — PROGRESS NOTES
Subjective   Osvaldo Lopez is a 82 y.o. male.     Chief Complaint   Patient presents with   • Extremity Weakness   • Hyperlipidemia   • Hypertension   • Atrial Fibrillation         History of Present Illness   Mr. Lopez returns with history of progressive leg weakness and he is pending seeing the neurologist.  Otherwise he will have a follow-up with Dr. Kohler urology he has been on mirbetrick as well as Proscar and tamsulosin.  He feels like the newest medicine mirbetrick his eczema helped a bit.  He does have symptoms of overactive bladder and according to urology does not have any inhibition in fully emptying her bladder.    Treatment of hypertension and atrial fibrillation are reviewed and continued as well as history of hyperlipidemia.  He has evidence of type 2 diabetes is well.  He is continuing on treatment for all these things.      The following portions of the patient's history were reviewed and updated as appropriate: allergies, current medications, past social history and problem list.    Review of Systems   HENT: Negative.    Eyes: Negative.    Respiratory: Negative.    Cardiovascular: Negative.    Gastrointestinal: Negative.    Endocrine: Negative.    Genitourinary: Positive for urgency.   Musculoskeletal: Negative.    Skin: Negative.    Allergic/Immunologic: Negative.    Neurological: Positive for weakness.   Hematological: Negative.    Psychiatric/Behavioral: Negative.        Objective   Vitals:    10/17/17 1333   BP: 134/72   Pulse: 77   Temp: 97.2 °F (36.2 °C)   SpO2: 97%     Physical Exam   Constitutional: He is oriented to person, place, and time. He appears well-developed and well-nourished.   HENT:   Head: Normocephalic and atraumatic.   Right Ear: Tympanic membrane and external ear normal.   Left Ear: Tympanic membrane and external ear normal.   Nose: Nose normal.   Mouth/Throat: Oropharynx is clear and moist.   Eyes: Conjunctivae and EOM are normal. Pupils are equal, round, and reactive  to light.   Neck: Normal range of motion. Neck supple. No JVD present. No thyromegaly present.   Cardiovascular: Normal rate, normal heart sounds and intact distal pulses.  An irregularly irregular rhythm present.   Pulmonary/Chest: Effort normal and breath sounds normal.   Abdominal: Soft. Bowel sounds are normal.   Musculoskeletal: Normal range of motion.   Lymphadenopathy:     He has no cervical adenopathy.   Neurological: He is alert and oriented to person, place, and time. No cranial nerve deficit. He exhibits abnormal muscle tone. Coordination normal.   Skin: Skin is warm and dry. No rash noted.   Psychiatric: He has a normal mood and affect. His behavior is normal. Judgment and thought content normal.   Vitals reviewed.      Assessment/Plan   Problem List Items Addressed This Visit        Cardiovascular and Mediastinum    Atrial fibrillation - Primary    Essential hypertension    Hyperlipidemia       Endocrine    Type 2 diabetes mellitus without complication       Nervous and Auditory    Lumbar radiculopathy       Genitourinary    Benign prostatic hyperplasia    RESOLVED: Increased frequency of urination      Other Visit Diagnoses     Bilateral leg weakness          Plan: Medications remain the same.  Is given samples of mirbetrick a follow-up with urology next week.  This is 25 mg.  We'll see him back in 3 months for Medicare wellness visit.  Follow-up with neurology in the interim.  He is already had a flu shot.

## 2017-10-27 RX ORDER — METOPROLOL SUCCINATE 25 MG/1
TABLET, EXTENDED RELEASE ORAL
Qty: 90 TABLET | Refills: 1 | Status: SHIPPED | OUTPATIENT
Start: 2017-10-27 | End: 2018-01-02 | Stop reason: SDUPTHER

## 2017-12-05 ENCOUNTER — OFFICE VISIT (OUTPATIENT)
Dept: ORTHOPEDIC SURGERY | Facility: CLINIC | Age: 82
End: 2017-12-05

## 2017-12-05 VITALS — BODY MASS INDEX: 32.76 KG/M2 | HEIGHT: 71 IN | WEIGHT: 234 LBS

## 2017-12-05 DIAGNOSIS — M48.062 SPINAL STENOSIS OF LUMBAR REGION WITH NEUROGENIC CLAUDICATION: Primary | ICD-10-CM

## 2017-12-05 DIAGNOSIS — M41.9 ACQUIRED SCOLIOSIS: ICD-10-CM

## 2017-12-05 PROCEDURE — 99215 OFFICE O/P EST HI 40 MIN: CPT | Performed by: ORTHOPAEDIC SURGERY

## 2017-12-05 RX ORDER — AMOXICILLIN 500 MG
1 CAPSULE ORAL
COMMUNITY
End: 2018-01-24 | Stop reason: HOSPADM

## 2017-12-05 RX ORDER — SODIUM CHLORIDE 0.9 % (FLUSH) 0.9 %
1-10 SYRINGE (ML) INJECTION AS NEEDED
Status: CANCELLED | OUTPATIENT
Start: 2017-12-05

## 2017-12-05 RX ORDER — SODIUM CHLORIDE, SODIUM LACTATE, POTASSIUM CHLORIDE, CALCIUM CHLORIDE 600; 310; 30; 20 MG/100ML; MG/100ML; MG/100ML; MG/100ML
100 INJECTION, SOLUTION INTRAVENOUS CONTINUOUS
Status: CANCELLED | OUTPATIENT
Start: 2017-12-05

## 2017-12-05 RX ORDER — LANOLIN ALCOHOL/MO/W.PET/CERES
1000 CREAM (GRAM) TOPICAL DAILY
COMMUNITY

## 2017-12-05 RX ORDER — CALCIUM POLYCARBOPHIL 625 MG 625 MG/1
1250 TABLET ORAL EVERY MORNING
COMMUNITY
End: 2018-01-24 | Stop reason: HOSPADM

## 2017-12-05 NOTE — PROGRESS NOTES
New patient or new problem visit    CC: Bilateral thigh weakness    HPI: He describes a several month long history of bilateral thigh weakness which has caused him to fall twice in generally to lose his balance and have great difficulty arising from a seated position there is associated pain which is moderate.  He is failed to improve with physical therapy.  He denies bowel or bladder complaints and has only occasional numbness.  No symptoms above the waist.  He has some low back pain.  In early 2016 I performed an L4-5 laminectomy and fusion with instrumentation from which she seemingly did quite well.    PFSH: See attached    ROS: See attached    PE: Constitutional: Vital signs above-noted.  Awake, alert and oriented    Psychiatric: Affect and insight do not appear grossly disturbed.    Pulmonary: Breathing is unlabored, color is good.    Skin: Warm, dry and normal turgor    Cardiac:  pedal pulses intact.  No edema.    Eyesight and hearing appear adequate for examination purposes      Musculoskeletal:  There is mild tenderness to percussion and palpation of the spine. Motion appears undisturbed.  Posture is unremarkable to coronal and sagittal inspection.    The skin about the area is well-healed.  There is no palpable or visible deformity.  There is no local spasm.       Neurologic:   Reflexes are absent in the patellae and achilles.   Motor function is 4/5 in quadriceps, EHL, and gastrocnemius bilaterally      Sensation appears symmetrically intact to light touch   .  In the bilateral lower extremities there is no evidence of atrophy.   Clonus is absent..  Gait appears undisturbed.  SLR test negative    XRAY: Plain film x-rays from before were reviewed and he has a solid fusion at L4 5 and scoliosis above apex L2-3.  MRI scan of the lumbar spine is notable for L3 4 severe stenosis, L2-3 mild to moderate changes and some left foraminal narrowing at L5-S1.  I reviewed the radiologist's report with which I agree.   There is a T8 9 thoracic herniated disc on MRI which shows some cord compression without signal change.  I reviewed the radiologist's report with which I agree.  Cervical MRI shows degenerative changes some foraminal narrowing but no cord compression.  I reviewed the radiologist's report with which I agree.    Other: n/a    Impression: He has worsening lumbar spinal stenosis at the L3 4 and the early changes at L2-3 the apex of the scoliosis.  His L4 5 laminectomy and fusion healed well.  He is falling so that add significant extra risk to the nonoperative side.    Plan:  L2-3, L3-4 repeat laminectomy fusion instrumentation, removal implants at L45 and then re-instrumentation and fusion from L2 to 5.I discussed the risks and benefits of posterior spinal fusion, including where applicable, laminectomy.  Risks include adverse anesthetic events such as death, stroke and myocardial infarction.  More specific surgical risks include infection, nonunion, hardware failure, spinal fluid leakage, nerve root injury or paralysis, visceral or vascular injury, persistent pain, deep venous thrombosis, and pulmonary embolism among others. There is a 70 to 90 % chance of success.   Alternatives have been discussed.  After careful consideration the patient wishes to proceed with surgery.  I will send notes to Stiven Rodríguez and Americo and we'll shoot to perform his procedure in early January

## 2017-12-07 PROBLEM — M48.062 SPINAL STENOSIS OF LUMBAR REGION WITH NEUROGENIC CLAUDICATION: Status: ACTIVE | Noted: 2017-12-07

## 2017-12-11 DIAGNOSIS — M48.062 SPINAL STENOSIS OF LUMBAR REGION WITH NEUROGENIC CLAUDICATION: Primary | ICD-10-CM

## 2017-12-14 ENCOUNTER — OFFICE VISIT (OUTPATIENT)
Dept: INTERNAL MEDICINE | Facility: CLINIC | Age: 82
End: 2017-12-14

## 2017-12-14 VITALS
TEMPERATURE: 98.2 F | BODY MASS INDEX: 32.08 KG/M2 | OXYGEN SATURATION: 93 % | HEART RATE: 76 BPM | SYSTOLIC BLOOD PRESSURE: 134 MMHG | WEIGHT: 230 LBS | DIASTOLIC BLOOD PRESSURE: 68 MMHG

## 2017-12-14 DIAGNOSIS — J45.21 MILD INTERMITTENT ASTHMATIC BRONCHITIS WITH ACUTE EXACERBATION: ICD-10-CM

## 2017-12-14 DIAGNOSIS — J11.1 FLU SYNDROME: Primary | ICD-10-CM

## 2017-12-14 DIAGNOSIS — M54.16 LUMBAR RADICULOPATHY: ICD-10-CM

## 2017-12-14 LAB
EXPIRATION DATE: NORMAL
FLUAV AG NPH QL: NORMAL
FLUBV AG NPH QL: NORMAL
INTERNAL CONTROL: NORMAL
Lab: NORMAL

## 2017-12-14 PROCEDURE — 87804 INFLUENZA ASSAY W/OPTIC: CPT | Performed by: FAMILY MEDICINE

## 2017-12-14 PROCEDURE — 99213 OFFICE O/P EST LOW 20 MIN: CPT | Performed by: FAMILY MEDICINE

## 2017-12-14 RX ORDER — AMOXICILLIN AND CLAVULANATE POTASSIUM 875; 125 MG/1; MG/1
1 TABLET, FILM COATED ORAL 2 TIMES DAILY
Qty: 20 TABLET | Refills: 0 | Status: SHIPPED | OUTPATIENT
Start: 2017-12-14 | End: 2018-01-02

## 2017-12-14 RX ORDER — AMOXICILLIN AND CLAVULANATE POTASSIUM 875; 125 MG/1; MG/1
1 TABLET, FILM COATED ORAL 2 TIMES DAILY
Qty: 20 TABLET | Refills: 0 | Status: SHIPPED | OUTPATIENT
Start: 2017-12-14 | End: 2017-12-14 | Stop reason: SDUPTHER

## 2017-12-14 NOTE — PROGRESS NOTES
Subjective   Osvaldo Lopez is a 82 y.o. male.     Chief Complaint   Patient presents with   • Bronchitis         History of Present Illness   Patient is here with a one-week progression of cough and wheezing with sputum production and some fever last week.  He has a history of flu shot given.  He is up-to-date on pneumonia vaccine as well as Prevnar 13.    He has had full evaluation with neurology and is now back to Dr. Montalvo for redo lumbar surgery for bilateral spinal stenosis resulting leg weakness.      The following portions of the patient's history were reviewed and updated as appropriate: allergies, current medications, past social history and problem list.    Review of Systems   HENT: Negative.    Eyes: Negative.    Respiratory: Positive for cough.    Cardiovascular: Negative.    Gastrointestinal: Negative.    Endocrine: Negative.    Genitourinary: Negative.    Musculoskeletal: Negative.    Skin: Negative.    Allergic/Immunologic: Negative.    Neurological: Positive for weakness.   Hematological: Negative.    Psychiatric/Behavioral: Negative.        Objective   Vitals:    12/14/17 1340   BP: 134/68   Pulse: 76   Temp: 98.2 °F (36.8 °C)   SpO2: 93%     Physical Exam   Constitutional: He is oriented to person, place, and time. He appears well-developed and well-nourished.   HENT:   Head: Normocephalic and atraumatic.   Right Ear: Tympanic membrane and external ear normal.   Left Ear: Tympanic membrane and external ear normal.   Nose: Nose normal.   Mouth/Throat: Posterior oropharyngeal edema and posterior oropharyngeal erythema present.   Eyes: Conjunctivae and EOM are normal. Pupils are equal, round, and reactive to light.   Neck: Normal range of motion. Neck supple. No JVD present. No thyromegaly present.   Cardiovascular: Normal rate, normal heart sounds and intact distal pulses.  An irregularly irregular rhythm present.   Pulmonary/Chest: Effort normal. He has wheezes in the right middle field, the  right lower field, the left middle field and the left lower field. He has rhonchi in the right middle field, the right lower field, the left middle field and the left lower field.           Abdominal: Soft. Bowel sounds are normal.   Musculoskeletal: Normal range of motion.   Lymphadenopathy:     He has no cervical adenopathy.   Neurological: He is alert and oriented to person, place, and time. No cranial nerve deficit. Coordination normal.   Skin: Skin is warm and dry. No rash noted.   Psychiatric: He has a normal mood and affect. His behavior is normal. Judgment and thought content normal.   Vitals reviewed.      Assessment/Plan   Problem List Items Addressed This Visit        Nervous and Auditory    Lumbar radiculopathy      Other Visit Diagnoses     Flu syndrome    -  Primary    Relevant Orders    POC Influenza A / B (Completed)    Mild intermittent asthmatic bronchitis with acute exacerbation          Augmentin 875 twice a day #20.  Macrolides or not possible because he is on amiodarone.  Return visit in about 3 months and follow-up with Dr. Montalvo for back surgery.  He'll follow-up with cardiology also.

## 2017-12-28 RX ORDER — AMLODIPINE BESYLATE 5 MG/1
TABLET ORAL
Qty: 90 TABLET | Refills: 3 | Status: SHIPPED | OUTPATIENT
Start: 2017-12-28 | End: 2018-01-02 | Stop reason: SDUPTHER

## 2018-01-02 ENCOUNTER — APPOINTMENT (OUTPATIENT)
Dept: PREADMISSION TESTING | Facility: HOSPITAL | Age: 83
End: 2018-01-02

## 2018-01-02 VITALS
HEART RATE: 63 BPM | DIASTOLIC BLOOD PRESSURE: 70 MMHG | RESPIRATION RATE: 16 BRPM | TEMPERATURE: 98 F | OXYGEN SATURATION: 97 % | WEIGHT: 235 LBS | BODY MASS INDEX: 32.9 KG/M2 | HEIGHT: 71 IN | SYSTOLIC BLOOD PRESSURE: 157 MMHG

## 2018-01-02 LAB
ANION GAP SERPL CALCULATED.3IONS-SCNC: 11.1 MMOL/L
BUN BLD-MCNC: 22 MG/DL (ref 8–23)
BUN/CREAT SERPL: 21 (ref 7–25)
CALCIUM SPEC-SCNC: 9.1 MG/DL (ref 8.6–10.5)
CHLORIDE SERPL-SCNC: 99 MMOL/L (ref 98–107)
CO2 SERPL-SCNC: 27.9 MMOL/L (ref 22–29)
CREAT BLD-MCNC: 1.05 MG/DL (ref 0.76–1.27)
DEPRECATED RDW RBC AUTO: 50 FL (ref 37–54)
ERYTHROCYTE [DISTWIDTH] IN BLOOD BY AUTOMATED COUNT: 13.8 % (ref 11.5–14.5)
GFR SERPL CREATININE-BSD FRML MDRD: 68 ML/MIN/1.73
GLUCOSE BLD-MCNC: 237 MG/DL (ref 65–99)
HCT VFR BLD AUTO: 41.7 % (ref 40.4–52.2)
HGB BLD-MCNC: 13.2 G/DL (ref 13.7–17.6)
MCH RBC QN AUTO: 31.6 PG (ref 27–32.7)
MCHC RBC AUTO-ENTMCNC: 31.7 G/DL (ref 32.6–36.4)
MCV RBC AUTO: 99.8 FL (ref 79.8–96.2)
PLATELET # BLD AUTO: 135 10*3/MM3 (ref 140–500)
PMV BLD AUTO: 10 FL (ref 6–12)
POTASSIUM BLD-SCNC: 5.1 MMOL/L (ref 3.5–5.2)
RBC # BLD AUTO: 4.18 10*6/MM3 (ref 4.6–6)
SODIUM BLD-SCNC: 138 MMOL/L (ref 136–145)
WBC NRBC COR # BLD: 4.18 10*3/MM3 (ref 4.5–10.7)

## 2018-01-02 PROCEDURE — 80048 BASIC METABOLIC PNL TOTAL CA: CPT | Performed by: ORTHOPAEDIC SURGERY

## 2018-01-02 PROCEDURE — 85027 COMPLETE CBC AUTOMATED: CPT | Performed by: ORTHOPAEDIC SURGERY

## 2018-01-02 PROCEDURE — 36415 COLL VENOUS BLD VENIPUNCTURE: CPT

## 2018-01-02 RX ORDER — LISINOPRIL 40 MG/1
40 TABLET ORAL DAILY
COMMUNITY
End: 2018-04-24 | Stop reason: SDUPTHER

## 2018-01-02 RX ORDER — AMLODIPINE BESYLATE 5 MG/1
5 TABLET ORAL EVERY MORNING
COMMUNITY
End: 2018-02-21

## 2018-01-02 RX ORDER — METOPROLOL SUCCINATE 25 MG/1
25 TABLET, EXTENDED RELEASE ORAL EVERY MORNING
COMMUNITY
End: 2019-01-22 | Stop reason: SDUPTHER

## 2018-01-02 NOTE — DISCHARGE INSTRUCTIONS
Take the following medications the morning of surgery with a small sip of water:    TAKE METOPROLOL, AMLODIPINE , AMIODARONE      BRING IN LISINOPRIL BUT DO NOT TAKE    General Instructions:  • Do not eat solid food after midnight the night before surgery.  • You may drink clear liquids day of surgery but must stop at least one hour before your hospital arrival time.  ( 1000 AM )  • It is beneficial for you to have a clear drink that contains carbohydrates the day of surgery.  We suggest  a 12 to 20 ounce bottle of G2 or Powerade Zero for diabetic patients.     Clear liquids are liquids you can see through.  Nothing red in color.     Plain water                               Sports drinks  Sodas                                   Gelatin (Jell-O)  Fruit juices without pulp such as white grape juice and apple juice  Popsicles that contain no fruit or yogurt  Tea or coffee (no cream or milk added)  Gatorade / Powerade  G2 / Powerade Zero    • Patients who avoid smoking, chewing tobacco and alcohol for 4 weeks prior to surgery have a reduced risk of post-operative complications.  Quit smoking as many days before surgery as you can.  • Do not smoke, use chewing tobacco or drink alcohol the day of surgery.   • Bring any papers given to you in the doctor’s office.  • Wear clean comfortable clothes and socks.  • Do not wear contact lenses or make-up.  Bring a case for your glasses.   • Remove all piercings.  Leave jewelry and any other valuables at home.  • The Pre-Admission Testing nurse will instruct you to bring medications if unable to obtain an accurate list in Pre-Admission Testing.   • REPORT TO MAIN SURGERY ON 1-  AT 1100         Preventing a Surgical Site Infection:  • For 2 to 3 days before surgery, avoid shaving with a razor because the razor can irritate skin and make it easier to develop an infection.  • The night prior to surgery sleep in a clean bed with clean clothing.  Do not allow pets to sleep with  you.  • Shower on the morning of surgery using a fresh bar of anti-bacterial soap (such as Dial) and clean washcloth.  Dry with a clean towel and dress in clean clothing.  • Ask your surgeon if you will be receiving antibiotics prior to surgery.  • Make sure you, your family, and all healthcare providers clean their hands with soap and water or an alcohol based hand  before caring for you or your wound.    Day of surgery:  Upon arrival, a Pre-op nurse and Anesthesiologist will review your health history, obtain vital signs, and answer questions you may have.  The only belongings needed at this time will be your home medications and if applicable your C-PAP/BI-PAP machine.  If you are staying overnight your family can leave the rest of your belongings in the car and bring them to your room later.  A Pre-op nurse will start an IV and you may receive medication in preparation for surgery, including something to help you relax.  Your family will be able to see you in the Pre-op area.  While you are in surgery your family should notify the waiting room  if they leave the waiting room area and provide a contact phone number.    Please be aware that surgery does come with discomfort.  We want to make every effort to control your discomfort so please discuss any uncontrolled symptoms with your nurse.   Your doctor will most likely have prescribed pain medications.        If you are staying overnight following surgery, you will be transported to your hospital room following the recovery period.  [facility] has all private rooms.    If you have any questions please call Pre-Admission Testing at [phone].    Deductibles and co-payments are collected on the day of service. Please be prepared to pay the required co-pay, deductible or deposit on the day of service as defined by your plan.

## 2018-01-03 ENCOUNTER — TELEPHONE (OUTPATIENT)
Dept: CARDIOLOGY | Facility: CLINIC | Age: 83
End: 2018-01-03

## 2018-01-03 NOTE — TELEPHONE ENCOUNTER
Pt left msg saying he is scheduled for back surgery 01-15-18 with Dr. Kline and wants to know if her can stop his Xarelto 7 days prior to surgery?  The last time you saw him was 07-11-17, is he approved for surgery?  If so, I'll get you the form to sign.  They haven't requested it yet but they will.    Thanks,  Emily

## 2018-01-09 RX ORDER — RIVAROXABAN 20 MG/1
TABLET, FILM COATED ORAL
Qty: 90 TABLET | Refills: 2 | Status: ON HOLD | OUTPATIENT
Start: 2018-01-09 | End: 2018-01-15 | Stop reason: SDUPTHER

## 2018-01-15 ENCOUNTER — HOSPITAL ENCOUNTER (INPATIENT)
Facility: HOSPITAL | Age: 83
LOS: 9 days | Discharge: NURSING, REHAB OR LONG-TERM CARE | End: 2018-01-24
Attending: ORTHOPAEDIC SURGERY | Admitting: ORTHOPAEDIC SURGERY

## 2018-01-15 ENCOUNTER — ANESTHESIA (OUTPATIENT)
Dept: PERIOP | Facility: HOSPITAL | Age: 83
End: 2018-01-15

## 2018-01-15 ENCOUNTER — ANESTHESIA EVENT (OUTPATIENT)
Dept: PERIOP | Facility: HOSPITAL | Age: 83
End: 2018-01-15

## 2018-01-15 ENCOUNTER — APPOINTMENT (OUTPATIENT)
Dept: GENERAL RADIOLOGY | Facility: HOSPITAL | Age: 83
End: 2018-01-15

## 2018-01-15 DIAGNOSIS — R26.2 DIFFICULTY WALKING: Primary | ICD-10-CM

## 2018-01-15 DIAGNOSIS — M48.062 SPINAL STENOSIS OF LUMBAR REGION WITH NEUROGENIC CLAUDICATION: ICD-10-CM

## 2018-01-15 LAB
ABO GROUP BLD: NORMAL
BLD GP AB SCN SERPL QL: NEGATIVE
GLUCOSE BLDC GLUCOMTR-MCNC: 158 MG/DL (ref 70–130)
HCT VFR BLD AUTO: 41.6 % (ref 40.4–52.2)
HGB BLD-MCNC: 13.3 G/DL (ref 13.7–17.6)
RH BLD: POSITIVE

## 2018-01-15 PROCEDURE — 85018 HEMOGLOBIN: CPT | Performed by: ORTHOPAEDIC SURGERY

## 2018-01-15 PROCEDURE — 25010000002 FENTANYL CITRATE (PF) 100 MCG/2ML SOLUTION: Performed by: ANESTHESIOLOGY

## 2018-01-15 PROCEDURE — 86850 RBC ANTIBODY SCREEN: CPT | Performed by: ORTHOPAEDIC SURGERY

## 2018-01-15 PROCEDURE — 76000 FLUOROSCOPY <1 HR PHYS/QHP: CPT

## 2018-01-15 PROCEDURE — 0ST20ZZ RESECTION OF LUMBAR VERTEBRAL DISC, OPEN APPROACH: ICD-10-PCS | Performed by: ORTHOPAEDIC SURGERY

## 2018-01-15 PROCEDURE — S0260 H&P FOR SURGERY: HCPCS | Performed by: ORTHOPAEDIC SURGERY

## 2018-01-15 PROCEDURE — 25010000002 PROPOFOL 10 MG/ML EMULSION: Performed by: ANESTHESIOLOGY

## 2018-01-15 PROCEDURE — 63048 LAM FACETEC &FORAMOT EA ADDL: CPT | Performed by: ORTHOPAEDIC SURGERY

## 2018-01-15 PROCEDURE — 25010000002 ONDANSETRON PER 1 MG: Performed by: ANESTHESIOLOGY

## 2018-01-15 PROCEDURE — 0SP004Z REMOVAL OF INTERNAL FIXATION DEVICE FROM LUMBAR VERTEBRAL JOINT, OPEN APPROACH: ICD-10-PCS | Performed by: ORTHOPAEDIC SURGERY

## 2018-01-15 PROCEDURE — 25010000002 MIDAZOLAM PER 1 MG: Performed by: ANESTHESIOLOGY

## 2018-01-15 PROCEDURE — 86901 BLOOD TYPING SEROLOGIC RH(D): CPT | Performed by: ORTHOPAEDIC SURGERY

## 2018-01-15 PROCEDURE — 63047 LAM FACETEC & FORAMOT LUMBAR: CPT | Performed by: ORTHOPAEDIC SURGERY

## 2018-01-15 PROCEDURE — 25010000002 HYDROMORPHONE PER 4 MG: Performed by: NURSE ANESTHETIST, CERTIFIED REGISTERED

## 2018-01-15 PROCEDURE — 25010000003 CEFAZOLIN PER 500 MG: Performed by: ORTHOPAEDIC SURGERY

## 2018-01-15 PROCEDURE — 00QT0ZZ REPAIR SPINAL MENINGES, OPEN APPROACH: ICD-10-PCS | Performed by: ORTHOPAEDIC SURGERY

## 2018-01-15 PROCEDURE — 20939 BONE MARROW ASPIR BONE GRFG: CPT | Performed by: ORTHOPAEDIC SURGERY

## 2018-01-15 PROCEDURE — C1713 ANCHOR/SCREW BN/BN,TIS/BN: HCPCS | Performed by: ORTHOPAEDIC SURGERY

## 2018-01-15 PROCEDURE — 0SG107J FUSION OF 2 OR MORE LUMBAR VERTEBRAL JOINTS WITH AUTOLOGOUS TISSUE SUBSTITUTE, POSTERIOR APPROACH, ANTERIOR COLUMN, OPEN APPROACH: ICD-10-PCS | Performed by: ORTHOPAEDIC SURGERY

## 2018-01-15 PROCEDURE — 25010000002 NEOSTIGMINE PER 0.5 MG: Performed by: ANESTHESIOLOGY

## 2018-01-15 PROCEDURE — 72100 X-RAY EXAM L-S SPINE 2/3 VWS: CPT

## 2018-01-15 PROCEDURE — 22842 INSERT SPINE FIXATION DEVICE: CPT | Performed by: ORTHOPAEDIC SURGERY

## 2018-01-15 PROCEDURE — 22614 ARTHRD PST TQ 1NTRSPC EA ADD: CPT | Performed by: ORTHOPAEDIC SURGERY

## 2018-01-15 PROCEDURE — 22612 ARTHRD PST TQ 1NTRSPC LUMBAR: CPT | Performed by: ORTHOPAEDIC SURGERY

## 2018-01-15 PROCEDURE — 82962 GLUCOSE BLOOD TEST: CPT

## 2018-01-15 PROCEDURE — 07DR3ZZ EXTRACTION OF ILIAC BONE MARROW, PERCUTANEOUS APPROACH: ICD-10-PCS | Performed by: ORTHOPAEDIC SURGERY

## 2018-01-15 PROCEDURE — 85014 HEMATOCRIT: CPT | Performed by: ORTHOPAEDIC SURGERY

## 2018-01-15 PROCEDURE — 86900 BLOOD TYPING SEROLOGIC ABO: CPT | Performed by: ORTHOPAEDIC SURGERY

## 2018-01-15 PROCEDURE — 25010000002 FENTANYL CITRATE (PF) 100 MCG/2ML SOLUTION: Performed by: NURSE ANESTHETIST, CERTIFIED REGISTERED

## 2018-01-15 DEVICE — SEALANT FIBRIN TISSEEL FZ 4ML: Type: IMPLANTABLE DEVICE | Site: SPINE LUMBAR | Status: FUNCTIONAL

## 2018-01-15 DEVICE — ROD PREBNT SPINE EXPEDIUM TI 5.5X85MM: Type: IMPLANTABLE DEVICE | Site: SPINE LUMBAR | Status: FUNCTIONAL

## 2018-01-15 DEVICE — STRIP 7800310 MASTERGRAFT STRIP 10CM
Type: IMPLANTABLE DEVICE | Site: SPINE LUMBAR | Status: FUNCTIONAL
Brand: MASTERGRAFT® STRIP

## 2018-01-15 DEVICE — SCRW EXPEDIUM PA TI 6X45MM: Type: IMPLANTABLE DEVICE | Site: SPINE LUMBAR | Status: FUNCTIONAL

## 2018-01-15 DEVICE — SCRW EXPEDIUM PA TI 7X45MM: Type: IMPLANTABLE DEVICE | Site: SPINE LUMBAR | Status: FUNCTIONAL

## 2018-01-15 DEVICE — SCRW INNR EXPEDIUM: Type: IMPLANTABLE DEVICE | Site: SPINE LUMBAR | Status: FUNCTIONAL

## 2018-01-15 RX ORDER — OXYCODONE AND ACETAMINOPHEN 7.5; 325 MG/1; MG/1
1 TABLET ORAL ONCE AS NEEDED
Status: DISCONTINUED | OUTPATIENT
Start: 2018-01-15 | End: 2018-01-15 | Stop reason: HOSPADM

## 2018-01-15 RX ORDER — LIDOCAINE HYDROCHLORIDE 10 MG/ML
0.5 INJECTION, SOLUTION EPIDURAL; INFILTRATION; INTRACAUDAL; PERINEURAL ONCE AS NEEDED
Status: DISCONTINUED | OUTPATIENT
Start: 2018-01-15 | End: 2018-01-15 | Stop reason: HOSPADM

## 2018-01-15 RX ORDER — ONDANSETRON 2 MG/ML
INJECTION INTRAMUSCULAR; INTRAVENOUS AS NEEDED
Status: DISCONTINUED | OUTPATIENT
Start: 2018-01-15 | End: 2018-01-15 | Stop reason: SURG

## 2018-01-15 RX ORDER — EPHEDRINE SULFATE 50 MG/ML
5 INJECTION, SOLUTION INTRAVENOUS ONCE AS NEEDED
Status: DISCONTINUED | OUTPATIENT
Start: 2018-01-15 | End: 2018-01-15 | Stop reason: HOSPADM

## 2018-01-15 RX ORDER — ONDANSETRON 4 MG/1
4 TABLET, ORALLY DISINTEGRATING ORAL EVERY 6 HOURS PRN
Status: DISCONTINUED | OUTPATIENT
Start: 2018-01-15 | End: 2018-01-24 | Stop reason: HOSPADM

## 2018-01-15 RX ORDER — FENTANYL CITRATE 50 UG/ML
50 INJECTION, SOLUTION INTRAMUSCULAR; INTRAVENOUS
Status: DISCONTINUED | OUTPATIENT
Start: 2018-01-15 | End: 2018-01-15 | Stop reason: HOSPADM

## 2018-01-15 RX ORDER — MIDAZOLAM HYDROCHLORIDE 1 MG/ML
1 INJECTION INTRAMUSCULAR; INTRAVENOUS
Status: DISCONTINUED | OUTPATIENT
Start: 2018-01-15 | End: 2018-01-15 | Stop reason: HOSPADM

## 2018-01-15 RX ORDER — FAMOTIDINE 10 MG/ML
20 INJECTION, SOLUTION INTRAVENOUS ONCE
Status: DISCONTINUED | OUTPATIENT
Start: 2018-01-15 | End: 2018-01-15 | Stop reason: HOSPADM

## 2018-01-15 RX ORDER — ONDANSETRON 2 MG/ML
4 INJECTION INTRAMUSCULAR; INTRAVENOUS EVERY 6 HOURS PRN
Status: DISCONTINUED | OUTPATIENT
Start: 2018-01-15 | End: 2018-01-22

## 2018-01-15 RX ORDER — EPHEDRINE SULFATE 50 MG/ML
INJECTION, SOLUTION INTRAVENOUS AS NEEDED
Status: DISCONTINUED | OUTPATIENT
Start: 2018-01-15 | End: 2018-01-15 | Stop reason: SURG

## 2018-01-15 RX ORDER — METOPROLOL SUCCINATE 25 MG/1
TABLET, EXTENDED RELEASE ORAL
Qty: 90 TABLET | Refills: 1 | Status: SHIPPED | OUTPATIENT
Start: 2018-01-15 | End: 2018-01-15 | Stop reason: SDUPTHER

## 2018-01-15 RX ORDER — BISACODYL 10 MG
10 SUPPOSITORY, RECTAL RECTAL DAILY PRN
Status: DISCONTINUED | OUTPATIENT
Start: 2018-01-15 | End: 2018-01-19

## 2018-01-15 RX ORDER — MIDAZOLAM HYDROCHLORIDE 1 MG/ML
2 INJECTION INTRAMUSCULAR; INTRAVENOUS
Status: DISCONTINUED | OUTPATIENT
Start: 2018-01-15 | End: 2018-01-15 | Stop reason: HOSPADM

## 2018-01-15 RX ORDER — SODIUM CHLORIDE, SODIUM LACTATE, POTASSIUM CHLORIDE, CALCIUM CHLORIDE 600; 310; 30; 20 MG/100ML; MG/100ML; MG/100ML; MG/100ML
100 INJECTION, SOLUTION INTRAVENOUS CONTINUOUS
Status: DISCONTINUED | OUTPATIENT
Start: 2018-01-15 | End: 2018-01-16

## 2018-01-15 RX ORDER — LIDOCAINE HYDROCHLORIDE 20 MG/ML
INJECTION, SOLUTION INFILTRATION; PERINEURAL AS NEEDED
Status: DISCONTINUED | OUTPATIENT
Start: 2018-01-15 | End: 2018-01-15 | Stop reason: SURG

## 2018-01-15 RX ORDER — OXYCODONE HYDROCHLORIDE AND ACETAMINOPHEN 5; 325 MG/1; MG/1
2 TABLET ORAL EVERY 4 HOURS PRN
Status: DISCONTINUED | OUTPATIENT
Start: 2018-01-15 | End: 2018-01-17

## 2018-01-15 RX ORDER — PROMETHAZINE HYDROCHLORIDE 25 MG/1
25 TABLET ORAL ONCE AS NEEDED
Status: DISCONTINUED | OUTPATIENT
Start: 2018-01-15 | End: 2018-01-15 | Stop reason: HOSPADM

## 2018-01-15 RX ORDER — DIPHENHYDRAMINE HYDROCHLORIDE 50 MG/ML
12.5 INJECTION INTRAMUSCULAR; INTRAVENOUS
Status: DISCONTINUED | OUTPATIENT
Start: 2018-01-15 | End: 2018-01-15 | Stop reason: HOSPADM

## 2018-01-15 RX ORDER — CYCLOBENZAPRINE HCL 10 MG
10 TABLET ORAL 3 TIMES DAILY PRN
Status: DISCONTINUED | OUTPATIENT
Start: 2018-01-15 | End: 2018-01-24 | Stop reason: HOSPADM

## 2018-01-15 RX ORDER — SODIUM CHLORIDE AND POTASSIUM CHLORIDE 150; 450 MG/100ML; MG/100ML
100 INJECTION, SOLUTION INTRAVENOUS CONTINUOUS
Status: DISCONTINUED | OUTPATIENT
Start: 2018-01-15 | End: 2018-01-17

## 2018-01-15 RX ORDER — HYDROCODONE BITARTRATE AND ACETAMINOPHEN 7.5; 325 MG/1; MG/1
1 TABLET ORAL ONCE AS NEEDED
Status: DISCONTINUED | OUTPATIENT
Start: 2018-01-15 | End: 2018-01-15 | Stop reason: HOSPADM

## 2018-01-15 RX ORDER — HYDROMORPHONE HCL IN 0.9% NACL 10 MG/50ML
PATIENT CONTROLLED ANALGESIA SYRINGE INTRAVENOUS CONTINUOUS
Status: DISCONTINUED | OUTPATIENT
Start: 2018-01-15 | End: 2018-01-17

## 2018-01-15 RX ORDER — SODIUM CHLORIDE 0.9 % (FLUSH) 0.9 %
1-10 SYRINGE (ML) INJECTION AS NEEDED
Status: DISCONTINUED | OUTPATIENT
Start: 2018-01-15 | End: 2018-01-24 | Stop reason: HOSPADM

## 2018-01-15 RX ORDER — SODIUM CHLORIDE, SODIUM LACTATE, POTASSIUM CHLORIDE, CALCIUM CHLORIDE 600; 310; 30; 20 MG/100ML; MG/100ML; MG/100ML; MG/100ML
9 INJECTION, SOLUTION INTRAVENOUS CONTINUOUS
Status: DISCONTINUED | OUTPATIENT
Start: 2018-01-15 | End: 2018-01-16

## 2018-01-15 RX ORDER — HYDROMORPHONE HCL IN 0.9% NACL 10 MG/50ML
PATIENT CONTROLLED ANALGESIA SYRINGE INTRAVENOUS
Status: COMPLETED
Start: 2018-01-15 | End: 2018-01-15

## 2018-01-15 RX ORDER — PROPOFOL 10 MG/ML
VIAL (ML) INTRAVENOUS AS NEEDED
Status: DISCONTINUED | OUTPATIENT
Start: 2018-01-15 | End: 2018-01-15 | Stop reason: SURG

## 2018-01-15 RX ORDER — HYDROMORPHONE HCL 110MG/55ML
0.5 PATIENT CONTROLLED ANALGESIA SYRINGE INTRAVENOUS
Status: DISCONTINUED | OUTPATIENT
Start: 2018-01-15 | End: 2018-01-15 | Stop reason: HOSPADM

## 2018-01-15 RX ORDER — HYDRALAZINE HYDROCHLORIDE 20 MG/ML
5 INJECTION INTRAMUSCULAR; INTRAVENOUS
Status: DISCONTINUED | OUTPATIENT
Start: 2018-01-15 | End: 2018-01-15 | Stop reason: HOSPADM

## 2018-01-15 RX ORDER — PROMETHAZINE HYDROCHLORIDE 25 MG/1
12.5 TABLET ORAL ONCE AS NEEDED
Status: DISCONTINUED | OUTPATIENT
Start: 2018-01-15 | End: 2018-01-15 | Stop reason: HOSPADM

## 2018-01-15 RX ORDER — SODIUM CHLORIDE 0.9 % (FLUSH) 0.9 %
1-10 SYRINGE (ML) INJECTION AS NEEDED
Status: DISCONTINUED | OUTPATIENT
Start: 2018-01-15 | End: 2018-01-15 | Stop reason: HOSPADM

## 2018-01-15 RX ORDER — ONDANSETRON 2 MG/ML
4 INJECTION INTRAMUSCULAR; INTRAVENOUS ONCE AS NEEDED
Status: DISCONTINUED | OUTPATIENT
Start: 2018-01-15 | End: 2018-01-15 | Stop reason: HOSPADM

## 2018-01-15 RX ORDER — PROMETHAZINE HYDROCHLORIDE 25 MG/1
25 SUPPOSITORY RECTAL ONCE AS NEEDED
Status: DISCONTINUED | OUTPATIENT
Start: 2018-01-15 | End: 2018-01-15 | Stop reason: HOSPADM

## 2018-01-15 RX ORDER — ROCURONIUM BROMIDE 10 MG/ML
INJECTION, SOLUTION INTRAVENOUS AS NEEDED
Status: DISCONTINUED | OUTPATIENT
Start: 2018-01-15 | End: 2018-01-15 | Stop reason: SURG

## 2018-01-15 RX ORDER — GLYCOPYRROLATE 0.2 MG/ML
INJECTION INTRAMUSCULAR; INTRAVENOUS AS NEEDED
Status: DISCONTINUED | OUTPATIENT
Start: 2018-01-15 | End: 2018-01-15 | Stop reason: SURG

## 2018-01-15 RX ORDER — NALOXONE HCL 0.4 MG/ML
0.2 VIAL (ML) INJECTION AS NEEDED
Status: DISCONTINUED | OUTPATIENT
Start: 2018-01-15 | End: 2018-01-15 | Stop reason: HOSPADM

## 2018-01-15 RX ORDER — PROMETHAZINE HYDROCHLORIDE 25 MG/ML
12.5 INJECTION, SOLUTION INTRAMUSCULAR; INTRAVENOUS ONCE AS NEEDED
Status: DISCONTINUED | OUTPATIENT
Start: 2018-01-15 | End: 2018-01-15 | Stop reason: HOSPADM

## 2018-01-15 RX ORDER — TEMAZEPAM 15 MG/1
15 CAPSULE ORAL NIGHTLY PRN
Status: DISCONTINUED | OUTPATIENT
Start: 2018-01-15 | End: 2018-01-24 | Stop reason: HOSPADM

## 2018-01-15 RX ORDER — LABETALOL HYDROCHLORIDE 5 MG/ML
5 INJECTION, SOLUTION INTRAVENOUS
Status: DISCONTINUED | OUTPATIENT
Start: 2018-01-15 | End: 2018-01-15 | Stop reason: HOSPADM

## 2018-01-15 RX ORDER — ONDANSETRON 4 MG/1
4 TABLET, FILM COATED ORAL EVERY 6 HOURS PRN
Status: DISCONTINUED | OUTPATIENT
Start: 2018-01-15 | End: 2018-01-24 | Stop reason: HOSPADM

## 2018-01-15 RX ORDER — FLUMAZENIL 0.1 MG/ML
0.2 INJECTION INTRAVENOUS AS NEEDED
Status: DISCONTINUED | OUTPATIENT
Start: 2018-01-15 | End: 2018-01-15 | Stop reason: HOSPADM

## 2018-01-15 RX ORDER — NALOXONE HCL 0.4 MG/ML
0.1 VIAL (ML) INJECTION
Status: DISCONTINUED | OUTPATIENT
Start: 2018-01-15 | End: 2018-01-24 | Stop reason: HOSPADM

## 2018-01-15 RX ORDER — SENNA AND DOCUSATE SODIUM 50; 8.6 MG/1; MG/1
1 TABLET, FILM COATED ORAL NIGHTLY
Status: DISCONTINUED | OUTPATIENT
Start: 2018-01-15 | End: 2018-01-24 | Stop reason: HOSPADM

## 2018-01-15 RX ADMIN — FENTANYL CITRATE 50 MCG: 50 INJECTION INTRAMUSCULAR; INTRAVENOUS at 18:45

## 2018-01-15 RX ADMIN — PROPOFOL 200 MG: 10 INJECTION, EMULSION INTRAVENOUS at 16:03

## 2018-01-15 RX ADMIN — LIDOCAINE HYDROCHLORIDE 40 MG: 20 INJECTION, SOLUTION INFILTRATION; PERINEURAL at 16:03

## 2018-01-15 RX ADMIN — FENTANYL CITRATE 50 MCG: 50 INJECTION INTRAMUSCULAR; INTRAVENOUS at 18:15

## 2018-01-15 RX ADMIN — EPHEDRINE SULFATE 10 MG: 50 INJECTION INTRAMUSCULAR; INTRAVENOUS; SUBCUTANEOUS at 19:00

## 2018-01-15 RX ADMIN — FENTANYL CITRATE 50 MCG: 50 INJECTION INTRAMUSCULAR; INTRAVENOUS at 16:25

## 2018-01-15 RX ADMIN — GLYCOPYRROLATE 0.4 MG: 0.2 INJECTION INTRAMUSCULAR; INTRAVENOUS at 19:39

## 2018-01-15 RX ADMIN — EPHEDRINE SULFATE 10 MG: 50 INJECTION INTRAMUSCULAR; INTRAVENOUS; SUBCUTANEOUS at 17:29

## 2018-01-15 RX ADMIN — EPHEDRINE SULFATE 10 MG: 50 INJECTION INTRAMUSCULAR; INTRAVENOUS; SUBCUTANEOUS at 17:15

## 2018-01-15 RX ADMIN — FENTANYL CITRATE 50 MCG: 50 INJECTION INTRAMUSCULAR; INTRAVENOUS at 21:03

## 2018-01-15 RX ADMIN — HYDROMORPHONE HYDROCHLORIDE 0.5 MG: 2 INJECTION, SOLUTION INTRAMUSCULAR; INTRAVENOUS; SUBCUTANEOUS at 20:20

## 2018-01-15 RX ADMIN — Medication: at 20:16

## 2018-01-15 RX ADMIN — FENTANYL CITRATE 50 MCG: 50 INJECTION INTRAMUSCULAR; INTRAVENOUS at 19:45

## 2018-01-15 RX ADMIN — NEOSTIGMINE METHYLSULFATE 2 MG: 1 INJECTION INTRAMUSCULAR; INTRAVENOUS; SUBCUTANEOUS at 19:39

## 2018-01-15 RX ADMIN — HYDROMORPHONE HYDROCHLORIDE 0.5 MG: 2 INJECTION, SOLUTION INTRAMUSCULAR; INTRAVENOUS; SUBCUTANEOUS at 20:37

## 2018-01-15 RX ADMIN — FENTANYL CITRATE 50 MCG: 50 INJECTION INTRAMUSCULAR; INTRAVENOUS at 17:05

## 2018-01-15 RX ADMIN — FENTANYL CITRATE 50 MCG: 50 INJECTION INTRAMUSCULAR; INTRAVENOUS at 20:36

## 2018-01-15 RX ADMIN — EPHEDRINE SULFATE 10 MG: 50 INJECTION INTRAMUSCULAR; INTRAVENOUS; SUBCUTANEOUS at 18:00

## 2018-01-15 RX ADMIN — HYDROMORPHONE HYDROCHLORIDE 0.5 MG: 2 INJECTION, SOLUTION INTRAMUSCULAR; INTRAVENOUS; SUBCUTANEOUS at 21:03

## 2018-01-15 RX ADMIN — FENTANYL CITRATE 50 MCG: 50 INJECTION INTRAMUSCULAR; INTRAVENOUS at 20:20

## 2018-01-15 RX ADMIN — SODIUM CHLORIDE, POTASSIUM CHLORIDE, SODIUM LACTATE AND CALCIUM CHLORIDE 9 ML/HR: 600; 310; 30; 20 INJECTION, SOLUTION INTRAVENOUS at 13:13

## 2018-01-15 RX ADMIN — ONDANSETRON 4 MG: 2 INJECTION INTRAMUSCULAR; INTRAVENOUS at 19:30

## 2018-01-15 RX ADMIN — SODIUM CHLORIDE, POTASSIUM CHLORIDE, SODIUM LACTATE AND CALCIUM CHLORIDE: 600; 310; 30; 20 INJECTION, SOLUTION INTRAVENOUS at 16:03

## 2018-01-15 RX ADMIN — ROCURONIUM BROMIDE 50 MG: 10 INJECTION INTRAVENOUS at 16:03

## 2018-01-15 RX ADMIN — Medication 1 MG: at 13:14

## 2018-01-15 RX ADMIN — SODIUM CHLORIDE, POTASSIUM CHLORIDE, SODIUM LACTATE AND CALCIUM CHLORIDE: 600; 310; 30; 20 INJECTION, SOLUTION INTRAVENOUS at 17:15

## 2018-01-15 RX ADMIN — Medication: at 20:21

## 2018-01-15 RX ADMIN — SODIUM CHLORIDE, POTASSIUM CHLORIDE, SODIUM LACTATE AND CALCIUM CHLORIDE 9 ML/HR: 600; 310; 30; 20 INJECTION, SOLUTION INTRAVENOUS at 20:19

## 2018-01-15 NOTE — ANESTHESIA PROCEDURE NOTES
Airway  Date/Time: 1/15/2018 4:00 PM  End Time:1/15/2018 4:02 PM  Airway not difficult    General Information and Staff    Anesthesiologist: LUKASZ SUAREZ    Indications and Patient Condition  Indications for airway management: airway protection    Preoxygenated: yes  Mask difficulty assessment: 0 - not attempted    Final Airway Details  Final airway type: endotracheal airway      Successful airway: ETT  Cuffed: yes   Successful intubation technique: direct laryngoscopy  Blade: [place]  ETT size: 7.5 mm  Cormack-Lehane Classification: grade I - full view of glottis  Placement verified by: chest auscultation and capnometry   Measured from: gums  Number of attempts at approach: 1

## 2018-01-15 NOTE — PLAN OF CARE
Problem: Patient Care Overview (Adult)  Goal: Plan of Care Review  Outcome: Ongoing (interventions implemented as appropriate)   01/15/18 1225   Coping/Psychosocial Response Interventions   Plan Of Care Reviewed With patient   Patient Care Overview   Progress no change     Goal: Adult Individualization and Mutuality  Outcome: Ongoing (interventions implemented as appropriate)   01/15/18 1225   Individualization   Patient Specific Preferences Call pt Osvaldo   Patient Specific Goals No infection after surgery.   Patient Specific Interventions Teach good hand hygiene.     Goal: Discharge Needs Assessment  Outcome: Ongoing (interventions implemented as appropriate)   01/15/18 1225   Discharge Needs Assessment   Concerns To Be Addressed denies needs/concerns at this time   Current Health   Anticipated Changes Related to Illness none   Self-Care   Equipment Currently Used at Home none       Problem: Perioperative Period (Adult)  Goal: Signs and Symptoms of Listed Potential Problems Will be Absent or Manageable (Perioperative Period)  Outcome: Ongoing (interventions implemented as appropriate)   01/15/18 1225   Perioperative Period   Problems Present (Perioperative Period) none

## 2018-01-15 NOTE — H&P
CC: Bilateral thigh weakness     HPI: He describes a several month long history of bilateral thigh weakness which has caused him to fall twice in generally to lose his balance and have great difficulty arising from a seated position there is associated pain which is moderate.  He is failed to improve with physical therapy.  He denies bowel or bladder complaints and has only occasional numbness.  No symptoms above the waist.  He has some low back pain.  In early 2016 I performed an L4-5 laminectomy and fusion with instrumentation from which she seemingly did quite well.     PFSH: See attached     ROS: See attached     PE: Constitutional: Vital signs above-noted.  Awake, alert and oriented     Psychiatric: Affect and insight do not appear grossly disturbed.     Pulmonary: Breathing is unlabored, color is good.     Skin: Warm, dry and normal turgor     Cardiac:  pedal pulses intact.  No edema.     Eyesight and hearing appear adequate for examination purposes        Musculoskeletal:  There is mild tenderness to percussion and palpation of the spine. Motion appears undisturbed.  Posture is unremarkable to coronal and sagittal inspection.    The skin about the area is well-healed.  There is no palpable or visible deformity.  There is no local spasm.       Neurologic:   Reflexes are absent in the patellae and achilles.   Motor function is 4/5 in quadriceps, EHL, and gastrocnemius bilaterally      Sensation appears symmetrically intact to light touch   .  In the bilateral lower extremities there is no evidence of atrophy.   Clonus is absent..  Gait appears undisturbed.  SLR test negative     XRAY: Plain film x-rays from before were reviewed and he has a solid fusion at L4 5 and scoliosis above apex L2-3.  MRI scan of the lumbar spine is notable for L3 4 severe stenosis, L2-3 mild to moderate changes and some left foraminal narrowing at L5-S1.  I reviewed the radiologist's report with which I agree.  There is a T8 9 thoracic  herniated disc on MRI which shows some cord compression without signal change.  I reviewed the radiologist's report with which I agree.  Cervical MRI shows degenerative changes some foraminal narrowing but no cord compression.  I reviewed the radiologist's report with which I agree.     Other: n/a     Impression: He has worsening lumbar spinal stenosis at the L3 4 and the early changes at L2-3 the apex of the scoliosis.  His L4 5 laminectomy and fusion healed well.  He is falling so that add significant extra risk to the nonoperative side.     Plan:  L2-3, L3-4 repeat laminectomy fusion instrumentation, removal implants at L45 and then re-instrumentation and fusion from L2 to 5.I discussed the risks and benefits of posterior spinal fusion, including where applicable, laminectomy.  Risks include adverse anesthetic events such as death, stroke and myocardial infarction.  More specific surgical risks include infection, nonunion, hardware failure, spinal fluid leakage, nerve root injury or paralysis, visceral or vascular injury, persistent pain, deep venous thrombosis, and pulmonary embolism among others. There is a 70 to 90 % chance of success.   Alternatives have been discussed.  After careful consideration the patient wishes to proceed with surgery.  I will send notes to Stiven Rodríguez and Americo and we'll shoot to perform his procedure in early January

## 2018-01-15 NOTE — ANESTHESIA PREPROCEDURE EVALUATION
Anesthesia Evaluation     Patient summary reviewed and Nursing notes reviewed   no history of anesthetic complications:  NPO Solid Status: > 8 hours  NPO Liquid Status: > 2 hours     Airway   Mallampati: II  TM distance: >3 FB  Neck ROM: full  no difficulty expected  Dental - normal exam     Pulmonary - normal exam   (+) a smoker Former,   Cardiovascular - normal exam  Exercise tolerance: good (4-7 METS)    ECG reviewed  Rhythm: regular  Rate: normal    (+) hypertension, valvular problems/murmurs murmur, CAD, CABG > 6 Months, dysrhythmias Atrial Fib, PVD, hyperlipidemia    ROS comment: Known stable AAA.  S/p 5v CABG 1989.    ECHO 11/2016:  · Left ventricular function is normal. Calculated EF = 56.8%. Estimated EF was in agreement with the calculated EF. Estimated EF = 57%. Normal left ventricular cavity size and wall thickness noted. Left ventricular diastolic dysfunction is noted (grade I a w/high LAP) consistent with impaired relaxation.  · The following segments are hypokinetic: basal inferolateral and mid inferolateral. All other segments are normal.  · Trace-to-mild aortic valve regurgitation is present.  · Moderate mitral annular calcification is present.  · Trace tricuspid valve regurgitation is present. Estimated right ventricular systolic pressure from tricuspid regurgitation is normal (<35 mmHg    Neuro/Psych  (+) numbness,     GI/Hepatic/Renal/Endo    (+)  diabetes mellitus type 2 well controlled,     Musculoskeletal     Abdominal  - normal exam   Substance History - negative use     OB/GYN          Other   (+) arthritis                                             Anesthesia Plan    ASA 3     general     intravenous induction   Anesthetic plan and risks discussed with patient.    Plan discussed with CRNA and attending.

## 2018-01-16 ENCOUNTER — TELEPHONE (OUTPATIENT)
Dept: ORTHOPEDIC SURGERY | Facility: CLINIC | Age: 83
End: 2018-01-16

## 2018-01-16 PROBLEM — M48.062 SPINAL STENOSIS OF LUMBAR REGION WITH NEUROGENIC CLAUDICATION: Status: ACTIVE | Noted: 2017-12-07

## 2018-01-16 LAB
GLUCOSE BLDC GLUCOMTR-MCNC: 155 MG/DL (ref 70–130)
GLUCOSE BLDC GLUCOMTR-MCNC: 174 MG/DL (ref 70–130)
GLUCOSE BLDC GLUCOMTR-MCNC: 236 MG/DL (ref 70–130)
GLUCOSE BLDC GLUCOMTR-MCNC: 255 MG/DL (ref 70–130)
HCT VFR BLD AUTO: 39.5 % (ref 40.4–52.2)
HGB BLD-MCNC: 12.7 G/DL (ref 13.7–17.6)

## 2018-01-16 PROCEDURE — 99024 POSTOP FOLLOW-UP VISIT: CPT | Performed by: ORTHOPAEDIC SURGERY

## 2018-01-16 PROCEDURE — 63710000001 INSULIN ASPART PER 5 UNITS: Performed by: HOSPITALIST

## 2018-01-16 PROCEDURE — 82962 GLUCOSE BLOOD TEST: CPT

## 2018-01-16 PROCEDURE — 25810000003 POTASSIUM CHLORIDE PER 2 MEQ: Performed by: ORTHOPAEDIC SURGERY

## 2018-01-16 PROCEDURE — 85014 HEMATOCRIT: CPT | Performed by: ORTHOPAEDIC SURGERY

## 2018-01-16 PROCEDURE — 85018 HEMOGLOBIN: CPT | Performed by: ORTHOPAEDIC SURGERY

## 2018-01-16 RX ORDER — AMIODARONE HYDROCHLORIDE 200 MG/1
100 TABLET ORAL EVERY 12 HOURS SCHEDULED
Status: DISCONTINUED | OUTPATIENT
Start: 2018-01-16 | End: 2018-01-17

## 2018-01-16 RX ORDER — METOPROLOL SUCCINATE 25 MG/1
12.5 TABLET, EXTENDED RELEASE ORAL EVERY MORNING
Status: DISCONTINUED | OUTPATIENT
Start: 2018-01-17 | End: 2018-01-21

## 2018-01-16 RX ORDER — TAMSULOSIN HYDROCHLORIDE 0.4 MG/1
0.4 CAPSULE ORAL 2 TIMES DAILY
Status: DISCONTINUED | OUTPATIENT
Start: 2018-01-16 | End: 2018-01-24 | Stop reason: HOSPADM

## 2018-01-16 RX ORDER — FINASTERIDE 5 MG/1
5 TABLET, FILM COATED ORAL EVERY MORNING
Status: DISCONTINUED | OUTPATIENT
Start: 2018-01-16 | End: 2018-01-24 | Stop reason: HOSPADM

## 2018-01-16 RX ORDER — DEXTROSE MONOHYDRATE 25 G/50ML
25 INJECTION, SOLUTION INTRAVENOUS
Status: DISCONTINUED | OUTPATIENT
Start: 2018-01-16 | End: 2018-01-24 | Stop reason: HOSPADM

## 2018-01-16 RX ORDER — NICOTINE POLACRILEX 4 MG
15 LOZENGE BUCCAL
Status: DISCONTINUED | OUTPATIENT
Start: 2018-01-16 | End: 2018-01-24 | Stop reason: HOSPADM

## 2018-01-16 RX ORDER — LISINOPRIL 40 MG/1
40 TABLET ORAL DAILY
Status: DISCONTINUED | OUTPATIENT
Start: 2018-01-16 | End: 2018-01-16

## 2018-01-16 RX ORDER — METOPROLOL SUCCINATE 25 MG/1
25 TABLET, EXTENDED RELEASE ORAL EVERY MORNING
Status: DISCONTINUED | OUTPATIENT
Start: 2018-01-16 | End: 2018-01-16

## 2018-01-16 RX ORDER — AMLODIPINE BESYLATE 5 MG/1
5 TABLET ORAL EVERY MORNING
Status: DISCONTINUED | OUTPATIENT
Start: 2018-01-16 | End: 2018-01-16

## 2018-01-16 RX ORDER — AMIODARONE HYDROCHLORIDE 200 MG/1
100 TABLET ORAL DAILY
Status: DISCONTINUED | OUTPATIENT
Start: 2018-01-16 | End: 2018-01-16

## 2018-01-16 RX ADMIN — INSULIN ASPART 2 UNITS: 100 INJECTION, SOLUTION INTRAVENOUS; SUBCUTANEOUS at 18:02

## 2018-01-16 RX ADMIN — INSULIN ASPART 6 UNITS: 100 INJECTION, SOLUTION INTRAVENOUS; SUBCUTANEOUS at 08:03

## 2018-01-16 RX ADMIN — DOCUSATE SODIUM -SENNOSIDES 1 TABLET: 50; 8.6 TABLET, COATED ORAL at 20:34

## 2018-01-16 RX ADMIN — CEFAZOLIN SODIUM 2 G: 1 INJECTION, POWDER, FOR SOLUTION INTRAMUSCULAR; INTRAVENOUS at 00:13

## 2018-01-16 RX ADMIN — CEFAZOLIN SODIUM 2 G: 1 INJECTION, POWDER, FOR SOLUTION INTRAMUSCULAR; INTRAVENOUS at 09:42

## 2018-01-16 RX ADMIN — OXYCODONE HYDROCHLORIDE AND ACETAMINOPHEN 2 TABLET: 5; 325 TABLET ORAL at 23:23

## 2018-01-16 RX ADMIN — INSULIN ASPART 2 UNITS: 100 INJECTION, SOLUTION INTRAVENOUS; SUBCUTANEOUS at 22:32

## 2018-01-16 RX ADMIN — FINASTERIDE 5 MG: 5 TABLET, FILM COATED ORAL at 09:43

## 2018-01-16 RX ADMIN — AMIODARONE HYDROCHLORIDE 100 MG: 200 TABLET ORAL at 09:43

## 2018-01-16 RX ADMIN — TAMSULOSIN HYDROCHLORIDE 0.4 MG: 0.4 CAPSULE ORAL at 09:43

## 2018-01-16 RX ADMIN — CYCLOBENZAPRINE HYDROCHLORIDE 10 MG: 10 TABLET, FILM COATED ORAL at 07:29

## 2018-01-16 RX ADMIN — METOPROLOL SUCCINATE 25 MG: 25 TABLET, FILM COATED, EXTENDED RELEASE ORAL at 09:44

## 2018-01-16 RX ADMIN — LISINOPRIL 40 MG: 40 TABLET ORAL at 09:44

## 2018-01-16 RX ADMIN — POTASSIUM CHLORIDE 100 ML/HR: 2 INJECTION, SOLUTION, CONCENTRATE INTRAVENOUS at 09:52

## 2018-01-16 RX ADMIN — CYCLOBENZAPRINE HYDROCHLORIDE 10 MG: 10 TABLET, FILM COATED ORAL at 18:02

## 2018-01-16 RX ADMIN — TAMSULOSIN HYDROCHLORIDE 0.4 MG: 0.4 CAPSULE ORAL at 20:34

## 2018-01-16 RX ADMIN — OXYCODONE HYDROCHLORIDE AND ACETAMINOPHEN 2 TABLET: 5; 325 TABLET ORAL at 11:14

## 2018-01-16 RX ADMIN — OXYCODONE HYDROCHLORIDE AND ACETAMINOPHEN 2 TABLET: 5; 325 TABLET ORAL at 19:25

## 2018-01-16 RX ADMIN — OXYCODONE HYDROCHLORIDE AND ACETAMINOPHEN 2 TABLET: 5; 325 TABLET ORAL at 03:55

## 2018-01-16 RX ADMIN — POTASSIUM CHLORIDE 100 ML/HR: 2 INJECTION, SOLUTION, CONCENTRATE INTRAVENOUS at 00:35

## 2018-01-16 RX ADMIN — INSULIN ASPART 4 UNITS: 100 INJECTION, SOLUTION INTRAVENOUS; SUBCUTANEOUS at 12:45

## 2018-01-16 RX ADMIN — AMIODARONE HYDROCHLORIDE 100 MG: 200 TABLET ORAL at 20:34

## 2018-01-16 NOTE — OP NOTE
Operative note    Pre-op diagnosis: L2-3, L3-4 spinal stenosis scoliosis and disc degeneration status post L4 5 laminectomy and fusion with instrumentation    Postoperative diagnosis: Same    Procedure: L2-3, L3-4 laminectomy medial facetectomy foraminotomy with bilateral posterior lateral fusion with AcroMed expedient segmental instrumentation with local bone graft, right iliac marrow aspiration, and Mastergraft bone graft substitute.    Surgeon: Ran Kline Jr, M.D.    Asst.: Amena Garcia    EBL: 500 cc    Anesthetic: Gen.    Condition: Satisfactory        Description of procedure: Patient was anesthetized and positioned prone.  Bony prominences were well padded.  The back was prepped and draped in sterile fashion.  Incision was made down to lumbodorsal fascia.  The muscle was stripped subperiosteally to the tips of transverse processes of L2 and L3 and the hardware at L4-5.  The hardware was exposed and then removed without event.  The surrounding fusion was rock solid..  Curettes and rongeurs removed adherent soft tissue.  Packs were placed for hemostasis.  The graft was decorticated.  A Steffee probe was inserted at the intersection of the anatomic landmarks at L2 and L3 and appropriate size screws were placed.  Screws were replaced at L4.  A flexible probe was inserted to check the integrity of the pedicle.  Contoured rods were later added, nuts were tightened and torqued.  Biplanar image intensification showed appropriate screw position and anatomic level and satisfactory posture.  I performed a laminectomy for decompression at L2-3 and L3-4.   The interspinous ligament was excised.  The upper lamina was removed completely out to within 8-10 mm of the pars intra-articularis.  A small portion of the cephalad aspect of the caudal lamina was excised with a Kerrison rongeur.  Medial facetectomies were performed bilaterally using Kerrison rongeur and osteotomes.  A high-speed fer was used as well.   Foraminotomies were accomplished with a foraminotomy rongeur.   At L2-3 the left-sided disc was herniated and this was exposed the disc was incised and removed piecemeal as the nerve root was gently retracted medially and protected.  After removal there was no far lateral compression..  This was repeated at L3 4 but no discectomy was performed..  Upon completion of the decompression I could pass a ball probe through the affected foramina, and easily retract  the nerve roots  toward the midline.  Bleeding was controlled with bipolar cautery,and Gelfoam with thrombin and/ or FloSeal hemostatic matrix.  A small dural tear was sustained on the left dorsolateral side at about the level of the L3 nerve root and extending distally about a centimeter.  This was closed with running 4-0 Nurolon and Tisseel with an apparently hermetic effect..   Bone marrow was obtained from the right iliac crest.  The marrow was applied to the master graft sponges.  Laminectomy bone, and bone from decortication of the graft bed was cleaned at the back table throughout the case and available for bone graft.  The morcellized bone was placed in the decorticated lateral gutter bilaterally.  Master graft sponges were then placed.  Hemostasis was assured.  The wound was then closed with running and interrupted #1 Vicryl for the dorsal fascia, 2-0 Vicryl subcutaneously, then 4-0 Monocryl and Dermabond for the skin.  A sterile dressing was applied.  The patient is about to be recovered.

## 2018-01-16 NOTE — PLAN OF CARE
Problem: Patient Care Overview (Adult)  Goal: Plan of Care Review  Outcome: Ongoing (interventions implemented as appropriate)   01/15/18 2027 01/15/18 2145 01/16/18 0404   Coping/Psychosocial Response Interventions   Plan Of Care Reviewed With --  patient;spouse;daughter --    Patient Care Overview   Progress no change --  --    Outcome Evaluation   Outcome Summary/Follow up Plan --  --  pt. remains A&Ox4 throughout shift. HOB flat, no ambulation, no PT until cleared by Dr. Kline. A to see in am. Pain controlled with PCA and PRN pain medications     Goal: Adult Individualization and Mutuality  Outcome: Ongoing (interventions implemented as appropriate)    Goal: Discharge Needs Assessment  Outcome: Ongoing (interventions implemented as appropriate)      Problem: Perioperative Period (Adult)  Goal: Signs and Symptoms of Listed Potential Problems Will be Absent or Manageable (Perioperative Period)  Outcome: Ongoing (interventions implemented as appropriate)

## 2018-01-16 NOTE — TELEPHONE ENCOUNTER
----- Message from Angelo Driscoll MD sent at 1/16/2018  2:14 PM EST -----  As per our conversation in the hallway this morning I would hold his anticoagulation for as long as necessary.  He remains in a sinus rhythm and has so for a prolonged period of time.  In light of the current surgeries the issues that were unforeseen clearly the risk of bleeding is high and there is a minimal benefit in the short-term from preventing a neurologic event from being off his Xarelto.  Please DL Dr. Kline that if he wants to hold up to 2 weeks I am okay with that I think this patient represents a low risk.  If I need to speak to patient directly please contact me I will be more in happy to stop by tomorrow.  ----- Message -----     From: Margo Garcia RN     Sent: 1/16/2018   9:47 AM       To: Angelo Driscoll MD    Patient underwent lumbar spinal fusion yesterday and had intraoperative dural leak that required repair.    Dr. Kline usually restarts anticoagulation 72 hours after surgery, but because of dural leak, he would prefer 96 hours before starting Xarelto to prevent epidural hematoma.  He wanted me to check with you to make sure that was ok.  Margo

## 2018-01-16 NOTE — PLAN OF CARE
Problem: Patient Care Overview (Adult)  Goal: Plan of Care Review  Outcome: Ongoing (interventions implemented as appropriate)   01/15/18 2027   Coping/Psychosocial Response Interventions   Plan Of Care Reviewed With patient   Patient Care Overview   Progress no change   Outcome Evaluation   Outcome Summary/Follow up Plan assessment as per flowsheet, vital signs stable, medicated for pain, HOB remains flat     Goal: Adult Individualization and Mutuality  Outcome: Ongoing (interventions implemented as appropriate)    Goal: Discharge Needs Assessment  Outcome: Ongoing (interventions implemented as appropriate)      Problem: Perioperative Period (Adult)  Goal: Signs and Symptoms of Listed Potential Problems Will be Absent or Manageable (Perioperative Period)  Outcome: Ongoing (interventions implemented as appropriate)

## 2018-01-16 NOTE — PROGRESS NOTES
Discharge Planning Assessment  [facility]     Patient Name: Osvaldo Lopez  MRN: [id number]  Today's Date: 1/16/2018    Admit Date: 1/15/2018          Discharge Needs Assessment       01/16/18 1714    Living Environment    Living Arrangements house    Living Environment    Provides Primary Care For no one    Quality Of Family Relationships supportive    Able to Return to Prior Living Arrangements yes    Discharge Needs Assessment    Concerns To Be Addressed basic needs concerns    Equipment Currently Used at Home grab bar;cane, straight;walker, rolling    Equipment Needed After Discharge none    Discharge Facility/Level Of Care Needs nursing facility, skilled    Discharge Disposition skilled nursing facility            Discharge Plan       01/16/18 1718    Case Management/Social Work Plan    Plan Referral to [facility]    Patient/Family In Agreement With Plan yes    Additional Comments Met with patient and dgt at bedside .  Verified facesheet info.  Patient currently on bedrest for CSF leak.  Will follow for PT eval and rehab needs.  Patient requested referral to [facility].          Discharge Placement     Facility/Agency Request Status Selected? Address Phone Number Fax Number    [facility]     [address] [address] [phone]                Demographic Summary       01/16/18 1702    Referral Information    Admission Type inpatient    Arrived From admitted as an inpatient    Referral Source admission list    Reason For Consult discharge planning    Primary Care Physician Information    Name Dr. Caio Rodríguez            Functional Status       01/16/18 1711    Functional Status Current    Ambulation 4-->completely dependent    Transferring 4-->completely dependent    Toileting 4-->completely dependent    Bathing 4-->completely dependent    Dressing 2-->assistive person    Eating 2-->assistive person    Communication  0-->understands/communicates without difficulty    Swallowing (if score 2 or more for any item, consult Rehab Services) 0-->swallows foods/liquids without difficulty    Change in Functional Status Since Onset of Current Illness/Injury yes    Functional Status Prior    Ambulation 1-->assistive equipment    Transferring 1-->assistive equipment    Toileting 1-->assistive equipment    Bathing 1-->assistive equipment    Dressing 1-->assistive equipment    Eating 0-->independent    Communication 0-->understands/communicates without difficulty    Swallowing 0-->swallows foods/liquids without difficulty    IADL    Medications independent    Meal Preparation assistive person    Housekeeping assistive person    Laundry assistive person    Shopping assistive person    Oral Care independent    Activity Tolerance    Current Activity Limitations spinal precautions    Usual Activity Tolerance good    Current Activity Tolerance fair    Cognitive/Perceptual/Developmental    Current Mental Status/Cognitive Functioning unable to assess    Recent Changes in Mental Status/Cognitive Functioning unable to assess            Psychosocial     None            Abuse/Neglect     None            Legal     None            Substance Abuse     None            Patient Forms     None          China Torres RN

## 2018-01-16 NOTE — SIGNIFICANT NOTE
01/16/18 1044   Rehab Treatment   Discipline physical therapist   Rehab Evaluation   Evaluation Not Performed unable to evaluate, medical status change  (per Dr. Kline, NO PT until further notice.pt to be kept flat in bed.pt with CSF leak.)   Recommendation   PT - Next Appointment 01/17/18  (pt on hold until further orders from Dr. Kline)

## 2018-01-16 NOTE — ANESTHESIA POSTPROCEDURE EVALUATION
Patient: Osvaldo Lopez    Procedure Summary     Date Anesthesia Start Anesthesia Stop Room / Location    01/15/18 1551 1950  [facility]       Procedure Diagnosis Surgeon Provider    L2-3, L3-4 laminectomy and fusion with instrumentation and removal of implants L4 5. (N/A Spine Lumbar) Spinal stenosis of lumbar region with neurogenic claudication  (Spinal stenosis of lumbar region with neurogenic claudication [M48.062]) MD Hany Manzo MD          Anesthesia Type: general  Last vitals  BP   149/75 (01/15/18 1237)   Temp   36.8 °C (98.2 °F) (01/15/18 1237)   Pulse   66 (01/15/18 1315)   Resp   16 (01/15/18 1315)     SpO2   93 % (01/15/18 1322)     Post Anesthesia Care and Evaluation    Patient location during evaluation: bedside  Patient participation: complete - patient participated  Level of consciousness: awake  Pain management: adequate  Airway patency: patent  Anesthetic complications: No anesthetic complications    Cardiovascular status: acceptable  Respiratory status: acceptable  Hydration status: acceptable

## 2018-01-16 NOTE — DISCHARGE PLACEMENT REQUEST
"Gabe Lopez (82 y.o. Male)     Date of Birth Social Security Number Address Home Phone MRN    1935  [address] [facility] [address] [phone] [id number]    [employer] Mar[employer] Status          Amish        Admission Date Admission Type Admitting Provider Attending Provider Department, Room/Bed    1/15/18 Elective Ran Kline MD Werner, Joseph G, MD [address], P585/1    Discharge Date Discharge Disposition Discharge Destination                      Attending Provider: Ran Kline MD     Allergies:  No Known Allergies    Isolation:  None   Infection:  None   Code Status:  FULL    Ht:  180.3 cm (71\")   Wt:  103 kg (228 lb)    Admission Cmt:  None   Principal Problem:  Spinal stenosis of lumbar region with neurogenic claudication [M48.062] More...                 Active Insurance as of 1/15/2018     Primary Coverage     Payor Plan Insurance Group Employer/Plan Group    MEDICARE MEDICARE A & B      Payor Plan Address Payor Plan Phone Number Effective From Effective To    [address] [phone] 9/1/2002     [place] [address]       Subscriber Name Subscriber Birth Date Member ID       GABE LOPEZ 1935 [id number]           Secondary Coverage     Payor Plan Insurance Group Employer/Plan Group    [facility] SUPP [id number]     Payor Plan Address Payor Plan Phone Number Effective From Effective To    [address]  12/1/2016     [place] [address]       Subscriber Name Subscriber Birth Date Member ID       GABE LOPEZ 1935 [id number]                 Emergency Contacts      (Rel.) Home Phone Work Phone Mobile Phone    Julia Lopez (Spouse) [phone] -- [phone]              "

## 2018-01-16 NOTE — PROGRESS NOTES
Orthopedic Progress Note      Patient: Osvaldo Lopez    YOB: 1935    Medical Record Number: [id number]    Attending Physician: Ran Kline MD    Date of Admission: 1/15/2018 10:56 AM    Admitting Dx:  Spinal stenosis of lumbar region with neurogenic claudication [M48.062]  Spinal stenosis of lumbar region with neurogenic claudication [M48.062]    Status Post: L2-3, L3-4 laminectomy and fusion with instrumentation and removal of implants L4 5.    Post Operative Day Number: 1    Current Problem List:   Patient Active Problem List   Diagnosis   • Midline low back pain   • Complete rotator cuff tear of left shoulder   • Difficulty walking   • Abnormal finding on thyroid function test   • Abdominal aortic aneurysm   • Atrial fibrillation   • Benign prostatic hyperplasia   • Edema   • Essential hypertension   • Fatigue   • Hyperlipidemia   • Shoulder pain   • Lumbar radiculopathy   • Muscle weakness   • Osteoarthritis   • Type 2 diabetes mellitus without complication   • Primary osteoarthritis of left knee   • OA (osteoarthritis) of knee   • Toxic encephalopathy   • Paroxysmal a-fib   • HTN (hypertension)   • Type 2 diabetes mellitus   • BPH (benign prostatic hyperplasia)   • Postoperative anemia due to acute blood loss   • Cardiac murmur   • Complete tear of left rotator cuff   • Tear of right rotator cuff   • Spinal stenosis of lumbar region with neurogenic claudication         Past Medical History:   Diagnosis Date   • Abnormal thyroid blood test    • Aneurysm of abdominal aorta    • Arthritis    • Atrial fibrillation    • BPH (benign prostatic hyperplasia)    • Bruises easily    • Bulging of thoracic intervertebral disc    • Coronary artery disease    • Diabetes mellitus     type 2   • Diverticular disease    • Edema     LOWER LEGS   • Enlarged prostate without lower urinary tract symptoms (luts)    • Essential hypertension    • Fatigue    • History of MI (myocardial infarction) 1989   •  Hyperactivity of bladder    • Hyperlipidemia    • Hypertension    • Left shoulder pain    • Lumbar radiculopathy 2016    wears back brace at times following back surgery   • Muscle weakness (generalized)    • Osteoarthritis    • Personal history of arthritis    • Screening      stop bang scale 5   • Torn rotator cuff     left   • Type 2 diabetes mellitus    • Urinary frequency        SUBJECTIVE: 82 y.o.  male. Drowsy but arouses easily.  Oriented X3    OBJECTIVE:   Vitals:    01/15/18 2115 01/15/18 2140 01/16/18 0201 01/16/18 1103   BP: 134/79 139/75 121/69 130/69   BP Location:   Left arm Left arm   Patient Position:   Lying Lying   Pulse: 81 84 95 86   Resp: 16  16 26   Temp: 97.9 °F (36.6 °C)  98.6 °F (37 °C) 99.2 °F (37.3 °C)   TempSrc: Oral  Oral Oral   SpO2: 94% 94% 94% 93%   Weight:       Height:         I/O last 3 completed shifts:  In: 2460 [P.O.:360; I.V.:2100]  Out: 1575 [Urine:775; Blood:800]    PHYSICAL EXAM:    Current Medications:  Scheduled Meds:  amiodarone 100 mg Oral Q12H   finasteride 5 mg Oral QAM   insulin aspart 0-9 Units Subcutaneous 4x Daily With Meals & Nightly   lisinopril 40 mg Oral Daily   metoprolol succinate XL 25 mg Oral QAM   sennosides-docusate sodium 1 tablet Oral Nightly   tamsulosin 0.4 mg Oral BID     PRN Meds:.bisacodyl  •  cyclobenzaprine  •  dextrose  •  dextrose  •  glucagon (human recombinant)  •  naloxone  •  ondansetron **OR** ondansetron ODT **OR** ondansetron  •  oxyCODONE-acetaminophen  •  polyethylene glycol  •  sodium chloride  •  temazepam    Diagnostic Tests:   Lab Results (last 24 hours)     Procedure Component Value Units Date/Time    POC Glucose Once [[id number]]  (Abnormal) Collected:  01/15/18 1200    Specimen:  Blood Updated:  01/15/18 1204     Glucose 158 (H) mg/dL     Narrative:       Meter: [id number] : [id number] Dl RECIO    Hemoglobin & Hematocrit, Blood [[id number]]  (Abnormal) Collected:  01/15/18 2248    Specimen:  Blood Updated:  01/15/18  2320     Hemoglobin 13.3 (L) g/dL      Hematocrit 41.6 %     Hemoglobin & Hematocrit, Blood [[id number]]  (Abnormal) Collected:  01/16/18 0519    Specimen:  Blood Updated:  01/16/18 0536     Hemoglobin 12.7 (L) g/dL      Hematocrit 39.5 (L) %     POC Glucose Once [[id number]]  (Abnormal) Collected:  01/16/18 0723    Specimen:  Blood Updated:  01/16/18 0724     Glucose 255 (H) mg/dL     Narrative:       Meter: [id number] : [id number] [employer]    POC Glucose Once [[id number]]  (Abnormal) Collected:  01/16/18 1127    Specimen:  Blood Updated:  01/16/18 1129     Glucose 236 (H) mg/dL     Narrative:       Meter: [id number] : [id number] [employer]           ASSESSMENT & PLAN:    Back dressing dry and intact.  Moving legs well.  Complaints of back pain, but no leg pain.  Plan to keep flat today.  ABD distended but soft with BS.  Not passing flatus, but denies nausea.  Encouraged him to increase po fluid intake including caffeine.  Will discuss with Dr. Driscoll regarding his Xarelto.  Would prefer to hold it for 96 hours post-op.      Date: 1/16/2018    MARTY Blas MD

## 2018-01-16 NOTE — CONSULTS
Name: Osvaldo Lopez ADMIT: 1/15/2018   : 1935  PCP: Caio Rodríguez Jr., MD    MRN: [id number] LOS: 1 days   AGE/SEX: 82 y.o. male  ROOM: P585/1     Inpatient Consult to Hospitalist  Consult performed by: ILANA FISHMAN  Consult ordered by: ANA LAURA KOROMA  Reason for consult: CAD, HTN, AFib assessment postoperatively      Date of Admit: 1/15/2018  Date of Consult: 18    Subjective   History of Present Illness  Mr. Lopez is a 82 y.o. male that has been admitted to [facility] following elective L2-3, L3-4 laminectomy and fusion with instrumentation and removal of implants L4 5. He has been admitted to a neuro floor following surgery and we were asked to see and assist with his medical problems, specifically relating to his multiple cardiac issues. At the time of my visit he denies any chest pain, SOA, nausea, vomiting or diarrhea. He does complain of expected postoperative discomfort. He has tolerated a liquid diet and some crackers. He has been in a normal state of health leading up to surgery.      Past Medical History:   Diagnosis Date   • Abnormal thyroid blood test    • Aneurysm of abdominal aorta    • Arthritis    • Atrial fibrillation    • BPH (benign prostatic hyperplasia)    • Bruises easily    • Bulging of thoracic intervertebral disc    • Coronary artery disease    • Diabetes mellitus     type 2   • Diverticular disease    • Edema     LOWER LEGS   • Enlarged prostate without lower urinary tract symptoms (luts)    • Essential hypertension    • Fatigue    • History of MI (myocardial infarction)    • Hyperactivity of bladder    • Hyperlipidemia    • Hypertension    • Left shoulder pain    • Lumbar radiculopathy 2016    wears back brace at times following back surgery   • Muscle weakness (generalized)    • Osteoarthritis    • Personal history of arthritis    • Screening      stop bang scale 5   • Torn rotator cuff     left   • Type 2 diabetes mellitus    • Urinary  frequency      Past Surgical History:   Procedure Laterality Date   • CARDIAC SURGERY      CABGX5 (1989)   • CATARACT EXTRACTION Bilateral 1997   • CYST REMOVAL      FROM BACK   • GALLBLADDER SURGERY  1989   • HERNIA REPAIR Left     inquinal times 3  last one in 2003   • KNEE CARTILAGE SURGERY Left 1970's   • LUMBAR DISCECTOMY FUSION INSTRUMENTATION N/A 4/11/2016    Procedure: lumbar laminectomy L4-5 and fusion with instrumentation;  Surgeon: Ran Kline MD;  Location: [facility] OR;  Service:    • NM TOTAL KNEE ARTHROPLASTY Left 11/14/2016    Procedure: TOTAL KNEE ARTHROPLASTY;  Surgeon: Dontrell Arellano MD;  Location: [facility] OR;  Service: Orthopedics     Family History   Problem Relation Age of Onset   • Heart failure Mother      Social History   Substance Use Topics   • Smoking status: Former Smoker     Years: 36.00     Types: Cigarettes     Quit date: 1989   • Smokeless tobacco: Never Used      Comment: states smoked 2-3 ppd   • Alcohol use Yes      Comment: 2-3 times weekly  //  caffeine use     Prescriptions Prior to Admission   Medication Sig Dispense Refill Last Dose   • amiodarone (PACERONE) 200 MG tablet Take 1 tablet by mouth Daily. (Patient taking differently: Take 100 mg by mouth Daily.) 90 tablet 3 1/15/2018 at 0800   • amLODIPine (NORVASC) 5 MG tablet Take 5 mg by mouth Every Morning.   1/15/2018 at 0800   • calcium polycarbophil (FIBERCON) 625 MG tablet Take 1,250 mg by mouth Every Morning.   1/7/2018   • finasteride (PROSCAR) 5 MG tablet Take 5 mg by mouth Every Morning.   1/14/2018 at 0800   • lisinopril (PRINIVIL,ZESTRIL) 40 MG tablet Take 40 mg by mouth Daily.   1/14/2018 at 0800   • metFORMIN (GLUCOPHAGE) 500 MG tablet Take 1 tablet by mouth 2 (Two) Times a Day With Meals. 180 tablet 3 1/14/2018 at 1800   • metoprolol succinate XL (TOPROL-XL) 25 MG 24 hr tablet Take 25 mg by mouth Every Morning.   1/15/2018 at 0800   • Multiple Vitamin (MULTI VITAMIN PO) Take 1 tablet by mouth Every  Morning.   1/7/2018 at Unknown time   • Omega-3 Fatty Acids (FISH OIL) 1200 MG capsule capsule Take 1 tablet by mouth Daily With Breakfast.   1/7/2018   • rivaroxaban (XARELTO) 20 MG tablet Take 20 mg by mouth Daily.   1/7/2018   • tamsulosin (FLOMAX) 0.4 MG capsule 24 hr capsule Two capsules daily for prostate (Patient taking differently: Take 1 capsule by mouth 2 (Two) Times a Day.) 180 capsule 3 1/14/2018 at 1800   • vitamin B-12 (CYANOCOBALAMIN) 1000 MCG tablet Take 1,000 mcg by mouth Daily.   1/7/2018     Allergies:  Review of patient's allergies indicates no known allergies.    Review of Systems   Constitutional: Negative.    HENT: Negative.    Eyes: Negative.    Respiratory: Negative.    Gastrointestinal: Negative.    Endocrine: Negative.    Genitourinary: Negative.    Musculoskeletal: Positive for back pain.   Skin: Negative.    Neurological: Negative.    Hematological: Negative.    Psychiatric/Behavioral: Negative.        Objective      Vital Signs  Temp:  [97.6 °F (36.4 °C)-98.6 °F (37 °C)] 98.6 °F (37 °C)  Heart Rate:  [66-95] 95  Resp:  [14-20] 16  BP: (110-173)/(69-86) 121/69  Body mass index is 31.8 kg/(m^2).    Physical Exam   Constitutional: He is oriented to person, place, and time. He appears well-developed and well-nourished.   HENT:   Head: Normocephalic and atraumatic.   Eyes: Conjunctivae and EOM are normal. Pupils are equal, round, and reactive to light. No scleral icterus.   Neck: Normal range of motion. Neck supple. No JVD present.   Cardiovascular: Normal rate and regular rhythm.    Murmur heard.  Pulmonary/Chest: Effort normal and breath sounds normal. No respiratory distress.   Abdominal: Soft. Bowel sounds are normal. He exhibits no distension. There is no tenderness.   Musculoskeletal: Normal range of motion. He exhibits no edema.   Neurological: He is alert and oriented to person, place, and time. No cranial nerve deficit.   Skin: Skin is warm and dry.   Psychiatric: He has a normal  mood and affect. His behavior is normal.   Vitals reviewed.      Results Review:    I reviewed the patient's new clinical results.      Assessment/Plan     Active Hospital Problems (** Indicates Principal Problem)    Diagnosis Date Noted   • **Spinal stenosis of lumbar region with neurogenic claudication [M48.062] 12/07/2017   • BPH (benign prostatic hyperplasia) [N40.0] 11/17/2016   • Paroxysmal a-fib [I48.0] 11/17/2016   • Essential hypertension [I10] 06/13/2016   • Type 2 diabetes mellitus without complication [E11.9] 06/13/2016      Resolved Hospital Problems    Diagnosis Date Noted Date Resolved   No resolved problems to display.       Mr. Lopez is a 82 y.o. male who is POD#1 L2-3, L3-4 laminectomy and fusion with instrumentation and removal of implants L4 5..    · Hold METFORMIN  · NOVOLOG - moderate dose correctional factor as needed.  · Monitor glucose QAC and QHS. For any BG less than 70 mg/dL, treat per hospital protocol  · HgbA1C and fasting BMP ordered for in the morning. Will review.    · NCS diet  · Continue TOPROL and AMIODARONE. Currently in a NSR.  · Holding parameters have been written for LISINOPRIL. Will hold NORVASC for now.   · Continue IVFs as ordered.    · Monitor renal status closely.  · Resume XARELTO when okay w/ Dr. Kline.  · SCDs have been ordered for DVT prophylaxis per ortho.  · He was encouraged to use incentive spirometer as instructed.      Thank you very much for asking [facility] to be involved in this patient's care. We will follow along with you.      Rao Marshall MD  [facility]  01/16/18  6:39 AM

## 2018-01-17 ENCOUNTER — APPOINTMENT (OUTPATIENT)
Dept: CARDIOLOGY | Facility: HOSPITAL | Age: 83
End: 2018-01-17

## 2018-01-17 ENCOUNTER — APPOINTMENT (OUTPATIENT)
Dept: GENERAL RADIOLOGY | Facility: HOSPITAL | Age: 83
End: 2018-01-17

## 2018-01-17 ENCOUNTER — TELEPHONE (OUTPATIENT)
Dept: ORTHOPEDIC SURGERY | Facility: CLINIC | Age: 83
End: 2018-01-17

## 2018-01-17 ENCOUNTER — APPOINTMENT (OUTPATIENT)
Dept: GENERAL RADIOLOGY | Facility: HOSPITAL | Age: 83
End: 2018-01-17
Attending: INTERNAL MEDICINE

## 2018-01-17 PROBLEM — R00.0 TACHYCARDIA: Status: ACTIVE | Noted: 2018-01-17

## 2018-01-17 LAB
ANION GAP SERPL CALCULATED.3IONS-SCNC: 11 MMOL/L
ANION GAP SERPL CALCULATED.3IONS-SCNC: 9.2 MMOL/L
ARTERIAL PATENCY WRIST A: POSITIVE
ATMOSPHERIC PRESS: 768.8 MMHG
BACTERIA UR QL AUTO: ABNORMAL /HPF
BASE EXCESS BLDA CALC-SCNC: 0.2 MMOL/L (ref 0–2)
BASOPHILS # BLD AUTO: 0.01 10*3/MM3 (ref 0–0.2)
BASOPHILS NFR BLD AUTO: 0.1 % (ref 0–1.5)
BDY SITE: ABNORMAL
BILIRUB UR QL STRIP: NEGATIVE
BUN BLD-MCNC: 15 MG/DL (ref 8–23)
BUN BLD-MCNC: 22 MG/DL (ref 8–23)
BUN/CREAT SERPL: 15.2 (ref 7–25)
BUN/CREAT SERPL: 17.2 (ref 7–25)
CALCIUM SPEC-SCNC: 8 MG/DL (ref 8.6–10.5)
CALCIUM SPEC-SCNC: 8.2 MG/DL (ref 8.6–10.5)
CHLORIDE SERPL-SCNC: 96 MMOL/L (ref 98–107)
CHLORIDE SERPL-SCNC: 97 MMOL/L (ref 98–107)
CLARITY UR: CLEAR
CO2 SERPL-SCNC: 23 MMOL/L (ref 22–29)
CO2 SERPL-SCNC: 26.8 MMOL/L (ref 22–29)
COLOR UR: YELLOW
CREAT BLD-MCNC: 0.99 MG/DL (ref 0.76–1.27)
CREAT BLD-MCNC: 1.28 MG/DL (ref 0.76–1.27)
D-LACTATE SERPL-SCNC: 0.9 MMOL/L (ref 0.5–2)
DEPRECATED RDW RBC AUTO: 51.1 FL (ref 37–54)
EOSINOPHIL # BLD AUTO: 0.01 10*3/MM3 (ref 0–0.7)
EOSINOPHIL NFR BLD AUTO: 0.1 % (ref 0.3–6.2)
ERYTHROCYTE [DISTWIDTH] IN BLOOD BY AUTOMATED COUNT: 14 % (ref 11.5–14.5)
GAS FLOW AIRWAY: 10 LPM
GFR SERPL CREATININE-BSD FRML MDRD: 54 ML/MIN/1.73
GFR SERPL CREATININE-BSD FRML MDRD: 73 ML/MIN/1.73
GLUCOSE BLD-MCNC: 154 MG/DL (ref 65–99)
GLUCOSE BLD-MCNC: 182 MG/DL (ref 65–99)
GLUCOSE BLDC GLUCOMTR-MCNC: 155 MG/DL (ref 70–130)
GLUCOSE BLDC GLUCOMTR-MCNC: 165 MG/DL (ref 70–130)
GLUCOSE BLDC GLUCOMTR-MCNC: 189 MG/DL (ref 70–130)
GLUCOSE BLDC GLUCOMTR-MCNC: 194 MG/DL (ref 70–130)
GLUCOSE BLDC GLUCOMTR-MCNC: 205 MG/DL (ref 70–130)
GLUCOSE UR STRIP-MCNC: NEGATIVE MG/DL
HBA1C MFR BLD: 7 % (ref 4.8–5.6)
HCO3 BLDA-SCNC: 26.4 MMOL/L (ref 22–28)
HCT VFR BLD AUTO: 34.7 % (ref 40.4–52.2)
HCT VFR BLD AUTO: 35.2 % (ref 40.4–52.2)
HGB BLD-MCNC: 11.1 G/DL (ref 13.7–17.6)
HGB BLD-MCNC: 11.3 G/DL (ref 13.7–17.6)
HGB UR QL STRIP.AUTO: ABNORMAL
HYALINE CASTS UR QL AUTO: ABNORMAL /LPF
IMM GRANULOCYTES # BLD: 0 10*3/MM3 (ref 0–0.03)
IMM GRANULOCYTES NFR BLD: 0 % (ref 0–0.5)
KETONES UR QL STRIP: NEGATIVE
LEUKOCYTE ESTERASE UR QL STRIP.AUTO: NEGATIVE
LYMPHOCYTES # BLD AUTO: 0.92 10*3/MM3 (ref 0.9–4.8)
LYMPHOCYTES NFR BLD AUTO: 11.7 % (ref 19.6–45.3)
MAGNESIUM SERPL-MCNC: 2 MG/DL (ref 1.6–2.4)
MCH RBC QN AUTO: 32.1 PG (ref 27–32.7)
MCHC RBC AUTO-ENTMCNC: 32 G/DL (ref 32.6–36.4)
MCV RBC AUTO: 100.3 FL (ref 79.8–96.2)
MODALITY: ABNORMAL
MONOCYTES # BLD AUTO: 0.85 10*3/MM3 (ref 0.2–1.2)
MONOCYTES NFR BLD AUTO: 10.8 % (ref 5–12)
NEUTROPHILS # BLD AUTO: 6.1 10*3/MM3 (ref 1.9–8.1)
NEUTROPHILS NFR BLD AUTO: 77.3 % (ref 42.7–76)
NITRITE UR QL STRIP: NEGATIVE
NT-PROBNP SERPL-MCNC: 1020 PG/ML (ref 0–1800)
PCO2 BLDA: 47.4 MM HG (ref 35–45)
PH BLDA: 7.35 PH UNITS (ref 7.35–7.45)
PH UR STRIP.AUTO: <=5 [PH] (ref 5–8)
PLATELET # BLD AUTO: 122 10*3/MM3 (ref 140–500)
PMV BLD AUTO: 9.8 FL (ref 6–12)
PO2 BLDA: 79.6 MM HG (ref 80–100)
POTASSIUM BLD-SCNC: 4.3 MMOL/L (ref 3.5–5.2)
POTASSIUM BLD-SCNC: 4.9 MMOL/L (ref 3.5–5.2)
PROCALCITONIN SERPL-MCNC: 1.76 NG/ML (ref 0.1–0.25)
PROT UR QL STRIP: ABNORMAL
RBC # BLD AUTO: 3.46 10*6/MM3 (ref 4.6–6)
RBC # UR: ABNORMAL /HPF
REF LAB TEST METHOD: ABNORMAL
SAO2 % BLDCOA: 94.9 % (ref 92–99)
SET MECH RESP RATE: 16
SODIUM BLD-SCNC: 130 MMOL/L (ref 136–145)
SODIUM BLD-SCNC: 133 MMOL/L (ref 136–145)
SP GR UR STRIP: 1.01 (ref 1–1.03)
SQUAMOUS #/AREA URNS HPF: ABNORMAL /HPF
TROPONIN T SERPL-MCNC: 0.09 NG/ML (ref 0–0.03)
UROBILINOGEN UR QL STRIP: ABNORMAL
WBC NRBC COR # BLD: 7.89 10*3/MM3 (ref 4.5–10.7)
WBC UR QL AUTO: ABNORMAL /HPF

## 2018-01-17 PROCEDURE — 71275 CT ANGIOGRAPHY CHEST: CPT

## 2018-01-17 PROCEDURE — 82962 GLUCOSE BLOOD TEST: CPT

## 2018-01-17 PROCEDURE — 87086 URINE CULTURE/COLONY COUNT: CPT | Performed by: HOSPITALIST

## 2018-01-17 PROCEDURE — 74018 RADEX ABDOMEN 1 VIEW: CPT

## 2018-01-17 PROCEDURE — 84145 PROCALCITONIN (PCT): CPT | Performed by: INTERNAL MEDICINE

## 2018-01-17 PROCEDURE — 25810000003 POTASSIUM CHLORIDE PER 2 MEQ: Performed by: ORTHOPAEDIC SURGERY

## 2018-01-17 PROCEDURE — 93005 ELECTROCARDIOGRAM TRACING: CPT | Performed by: NURSE PRACTITIONER

## 2018-01-17 PROCEDURE — 97162 PT EVAL MOD COMPLEX 30 MIN: CPT

## 2018-01-17 PROCEDURE — 83605 ASSAY OF LACTIC ACID: CPT | Performed by: INTERNAL MEDICINE

## 2018-01-17 PROCEDURE — 93010 ELECTROCARDIOGRAM REPORT: CPT | Performed by: INTERNAL MEDICINE

## 2018-01-17 PROCEDURE — 81001 URINALYSIS AUTO W/SCOPE: CPT | Performed by: HOSPITALIST

## 2018-01-17 PROCEDURE — 84484 ASSAY OF TROPONIN QUANT: CPT | Performed by: HOSPITALIST

## 2018-01-17 PROCEDURE — 63710000001 INSULIN ASPART PER 5 UNITS: Performed by: HOSPITALIST

## 2018-01-17 PROCEDURE — 0 IOPAMIDOL PER 1 ML: Performed by: ORTHOPAEDIC SURGERY

## 2018-01-17 PROCEDURE — 71045 X-RAY EXAM CHEST 1 VIEW: CPT

## 2018-01-17 PROCEDURE — 83880 ASSAY OF NATRIURETIC PEPTIDE: CPT | Performed by: NURSE PRACTITIONER

## 2018-01-17 PROCEDURE — 80048 BASIC METABOLIC PNL TOTAL CA: CPT | Performed by: INTERNAL MEDICINE

## 2018-01-17 PROCEDURE — 87040 BLOOD CULTURE FOR BACTERIA: CPT | Performed by: HOSPITALIST

## 2018-01-17 PROCEDURE — 83735 ASSAY OF MAGNESIUM: CPT | Performed by: INTERNAL MEDICINE

## 2018-01-17 PROCEDURE — 82803 BLOOD GASES ANY COMBINATION: CPT

## 2018-01-17 PROCEDURE — 85025 COMPLETE CBC W/AUTO DIFF WBC: CPT | Performed by: HOSPITALIST

## 2018-01-17 PROCEDURE — 71046 X-RAY EXAM CHEST 2 VIEWS: CPT

## 2018-01-17 PROCEDURE — 83036 HEMOGLOBIN GLYCOSYLATED A1C: CPT | Performed by: HOSPITALIST

## 2018-01-17 PROCEDURE — 80048 BASIC METABOLIC PNL TOTAL CA: CPT | Performed by: HOSPITALIST

## 2018-01-17 PROCEDURE — 25010000002 POTASSIUM CHLORIDE PER 2 MEQ OF POTASSIUM: Performed by: ORTHOPAEDIC SURGERY

## 2018-01-17 PROCEDURE — 99024 POSTOP FOLLOW-UP VISIT: CPT | Performed by: ORTHOPAEDIC SURGERY

## 2018-01-17 PROCEDURE — 99221 1ST HOSP IP/OBS SF/LOW 40: CPT | Performed by: NURSE PRACTITIONER

## 2018-01-17 PROCEDURE — 85014 HEMATOCRIT: CPT | Performed by: ORTHOPAEDIC SURGERY

## 2018-01-17 PROCEDURE — 36600 WITHDRAWAL OF ARTERIAL BLOOD: CPT

## 2018-01-17 PROCEDURE — 85018 HEMOGLOBIN: CPT | Performed by: ORTHOPAEDIC SURGERY

## 2018-01-17 RX ORDER — HYDROCODONE BITARTRATE AND ACETAMINOPHEN 5; 325 MG/1; MG/1
1 TABLET ORAL EVERY 4 HOURS PRN
Status: DISCONTINUED | OUTPATIENT
Start: 2018-01-17 | End: 2018-01-24 | Stop reason: HOSPADM

## 2018-01-17 RX ORDER — CEFTRIAXONE SODIUM 1 G/50ML
1 INJECTION, SOLUTION INTRAVENOUS EVERY 24 HOURS
Status: DISCONTINUED | OUTPATIENT
Start: 2018-01-17 | End: 2018-01-18

## 2018-01-17 RX ORDER — SODIUM CHLORIDE 9 MG/ML
125 INJECTION, SOLUTION INTRAVENOUS CONTINUOUS
Status: DISCONTINUED | OUTPATIENT
Start: 2018-01-17 | End: 2018-01-22

## 2018-01-17 RX ORDER — AMIODARONE HYDROCHLORIDE 200 MG/1
400 TABLET ORAL EVERY 12 HOURS SCHEDULED
Status: DISCONTINUED | OUTPATIENT
Start: 2018-01-17 | End: 2018-01-18

## 2018-01-17 RX ORDER — HYDROCODONE BITARTRATE AND ACETAMINOPHEN 5; 325 MG/1; MG/1
2 TABLET ORAL EVERY 4 HOURS PRN
Status: DISCONTINUED | OUTPATIENT
Start: 2018-01-17 | End: 2018-01-24 | Stop reason: HOSPADM

## 2018-01-17 RX ADMIN — FINASTERIDE 5 MG: 5 TABLET, FILM COATED ORAL at 08:53

## 2018-01-17 RX ADMIN — POTASSIUM CHLORIDE 100 ML/HR: 2 INJECTION, SOLUTION, CONCENTRATE INTRAVENOUS at 21:18

## 2018-01-17 RX ADMIN — TAMSULOSIN HYDROCHLORIDE 0.4 MG: 0.4 CAPSULE ORAL at 08:53

## 2018-01-17 RX ADMIN — OXYCODONE HYDROCHLORIDE AND ACETAMINOPHEN 2 TABLET: 5; 325 TABLET ORAL at 04:57

## 2018-01-17 RX ADMIN — AMIODARONE HYDROCHLORIDE 400 MG: 200 TABLET ORAL at 21:19

## 2018-01-17 RX ADMIN — METOPROLOL SUCCINATE 12.5 MG: 25 TABLET, FILM COATED, EXTENDED RELEASE ORAL at 08:52

## 2018-01-17 RX ADMIN — INSULIN ASPART 2 UNITS: 100 INJECTION, SOLUTION INTRAVENOUS; SUBCUTANEOUS at 12:58

## 2018-01-17 RX ADMIN — POTASSIUM CHLORIDE 100 ML/HR: 2 INJECTION, SOLUTION, CONCENTRATE INTRAVENOUS at 07:18

## 2018-01-17 RX ADMIN — INSULIN ASPART 2 UNITS: 100 INJECTION, SOLUTION INTRAVENOUS; SUBCUTANEOUS at 19:10

## 2018-01-17 RX ADMIN — CYCLOBENZAPRINE HYDROCHLORIDE 10 MG: 10 TABLET, FILM COATED ORAL at 02:30

## 2018-01-17 RX ADMIN — INSULIN ASPART 2 UNITS: 100 INJECTION, SOLUTION INTRAVENOUS; SUBCUTANEOUS at 21:30

## 2018-01-17 RX ADMIN — IOPAMIDOL 95 ML: 755 INJECTION, SOLUTION INTRAVENOUS at 13:25

## 2018-01-17 RX ADMIN — INSULIN ASPART 2 UNITS: 100 INJECTION, SOLUTION INTRAVENOUS; SUBCUTANEOUS at 08:52

## 2018-01-17 RX ADMIN — SODIUM CHLORIDE 500 ML: 9 INJECTION, SOLUTION INTRAVENOUS at 23:01

## 2018-01-17 RX ADMIN — DOCUSATE SODIUM -SENNOSIDES 1 TABLET: 50; 8.6 TABLET, COATED ORAL at 21:19

## 2018-01-17 RX ADMIN — OXYCODONE HYDROCHLORIDE AND ACETAMINOPHEN 2 TABLET: 5; 325 TABLET ORAL at 08:56

## 2018-01-17 RX ADMIN — AMIODARONE HYDROCHLORIDE 100 MG: 200 TABLET ORAL at 08:53

## 2018-01-17 RX ADMIN — SODIUM CHLORIDE 500 ML: 9 INJECTION, SOLUTION INTRAVENOUS at 07:33

## 2018-01-17 RX ADMIN — TAMSULOSIN HYDROCHLORIDE 0.4 MG: 0.4 CAPSULE ORAL at 21:19

## 2018-01-17 NOTE — PLAN OF CARE
Problem: Patient Care Overview (Adult)  Goal: Plan of Care Review  Outcome: Ongoing (interventions implemented as appropriate)   01/17/18 1005   Coping/Psychosocial Response Interventions   Plan Of Care Reviewed With patient;spouse   Outcome Evaluation   Outcome Summary/Follow up Plan Pt presents with marked decline in all mobility following spinal surgery. Pt requiring max/depx2 for bed mobility. Pt uanble to achieve standing today due to BLE marked weakness and pain. HR elevated and O2 sat decreased during attempt. Pt will benefit from cont skilled PT for progression toward least asst prior to D/c to rehab facility.   dep   01/17/18 1005   Coping/Psychosocial Response Interventions   Plan Of Care Reviewed With patient;spouse   Outcome Evaluation   Outcome Summary/Follow up Plan Pt presents with marked decline in all mobility following spinal surgery. Pt requiring max/depx2 for bed mobility. Pt uanble to achieve standing today due to BLE marked weakness and pain. HR elevated and O2 sat decreased during attempt. Pt will benefit from cont skilled PT for progression toward least asst prior to D/c to rehab facility.

## 2018-01-17 NOTE — PLAN OF CARE
Problem: Inpatient Physical Therapy  Goal: Bed Mobility Goal STG- PT  Outcome: Ongoing (interventions implemented as appropriate)   01/17/18 1005   Bed Mobility PT STG   Bed Mobility PT STG, Date Established 01/17/18   Bed Mobility PT STG, Time to Achieve 2 wks   Bed Mobility PT STG, Activity Type sidelying to sit/sit to sidelying   Bed Mobility PT STG, [place]olls Level minimum assist (75% patient effort);contact guard assist     Goal: Transfer Training Goal 1 STG- PT  Outcome: Ongoing (interventions implemented as appropriate)   01/17/18 1005   Transfer Training PT STG   Transfer Training PT STG, Date Established 01/17/18   Transfer Training PT STG, Time to Achieve 2 wks   Transfer Training PT STG, Activity Type sit to stand/stand to sit;bed to chair /chair to bed   Transfer Training PT STG, [place]olls Level minimum assist (75% patient effort)   Transfer Training PT STG, Assist Device walker, rolling

## 2018-01-17 NOTE — PROGRESS NOTES
Continued Stay Note  [facility]     Patient Name: Osvaldo Lopez  MRN: [id number]  Today's Date: 1/17/2018    Admit Date: 1/15/2018          Discharge Plan       01/17/18 1344    Case Management/Social Work Plan    [facility]    Patient/Family In Agreement With Plan yes    Additional Comments Bed confirmed for [facility] tomorrow                   Expected Discharge Date and Time     Expected Discharge Date Expected Discharge Time    Jan 18, 2018             Jose Gross RN

## 2018-01-17 NOTE — PROGRESS NOTES
Postop day 2:100.5, VSS.  Hemoglobin stable.  No headache no leg pain.  Moves legs well.  Plan to DC restrictions on position and resume physical therapy.  He may still be able to go to rehabilitation tomorrow.

## 2018-01-17 NOTE — PROGRESS NOTES
Name: Osvaldo Lopez ADMIT: 1/15/2018   : 1935  PCP: Caio Rodríguez Jr., MD    MRN: [id number] LOS: 2 days   AGE/SEX: 82 y.o. male  ROOM: [facility]     Subjective   Subjective  He says he feels okay. Denies chest pain or shortness of breath. Feels congested. Uncomfortable lying flat on his back.    Objective   Vital Signs  Temp:  [99.2 °F (37.3 °C)-100.5 °F (38.1 °C)] 100.5 °F (38.1 °C)  Heart Rate:  [82-89] 89  Resp:  [18-26] 18  BP: ()/(52-69) 95/65  SpO2:  [92 %-94 %] 94 %  on  Flow (L/min):  [2-4] 2;   O2 Device: nasal cannula  Body mass index is 31.8 kg/(m^2).    Physical Exam   Constitutional: No distress.   Cardiovascular: Regular rhythm.  Tachycardia present.    Murmur heard.  Pulmonary/Chest: Effort normal and breath sounds normal.   Abdominal: Soft. Bowel sounds are normal. He exhibits no distension. There is no tenderness.   Musculoskeletal: Normal range of motion. He exhibits no edema.   Neurological: He is alert.   Seems slightly confused.   Skin: Skin is warm and dry. He is not diaphoretic.       Results Review:       I reviewed the patient's new clinical results.    Results from last 7 days  Lab Units 18  0507 18  0519 01/15/18  2248   HEMOGLOBIN g/dL 11.3* 12.7* 13.3*     Results from last 7 days  Lab Units 18  0507   SODIUM mmol/L 133*   POTASSIUM mmol/L 4.3   CHLORIDE mmol/L 97*   CO2 mmol/L 26.8   BUN mg/dL 15   CREATININE mg/dL 0.99   GLUCOSE mg/dL 154*   Estimated Creatinine Clearance: 72.7 mL/min (by C-G formula based on Cr of 0.99).  Results from last 7 days  Lab Units 18  0507   CALCIUM mg/dL 8.0*       Lab Results   Component Value Date    HGBA1C 7.00 (H) 2018    HGBA1C 7.80 (H) 2017     Glucose   Date/Time Value Ref Range Status   2018 2053 174 (H) 70 - 130 mg/dL Final   2018 1643 155 (H) 70 - 130 mg/dL Final   2018 1127 236 (H) 70 - 130 mg/dL Final   2018 0723 255 (H) 70 - 130 mg/dL Final   01/15/2018 1200 158 (H)  70 - 130 mg/dL Final         amiodarone 100 mg Oral Q12H   finasteride 5 mg Oral QAM   insulin aspart 0-9 Units Subcutaneous 4x Daily With Meals & Nightly   metoprolol succinate XL 12.5 mg Oral QAM   sennosides-docusate sodium 1 tablet Oral Nightly   tamsulosin 0.4 mg Oral BID       HYDROmorphone HCl-NaCl     sodium chloride 0.45 % with KCl 20 mEq 100 mL/hr Last Rate: Stopped (01/16/18 1110)         Assessment/Plan   Active Hospital Problems (** Indicates Principal Problem)    Diagnosis Date Noted   • **Spinal stenosis of lumbar region with neurogenic claudication [M48.062] 12/07/2017   • Tachycardia [R00.0] 01/17/2018   • BPH (benign prostatic hyperplasia) [N40.0] 11/17/2016   • Paroxysmal a-fib [I48.0] 11/17/2016   • Essential hypertension [I10] 06/13/2016   • Type 2 diabetes mellitus without complication [E11.9] 06/13/2016      Resolved Hospital Problems    Diagnosis Date Noted Date Resolved   No resolved problems to display.       Mr. Lopez is a 82 y.o. male who is POD#2 L2-3, L3-4 laminectomy and fusion with instrumentation and removal of implants L4 5..    · Continue NOVOLOG moderate dose correctional factor as needed.  · HgbA1C is at goal.   · Continue TOPROL and AMIODARONE. Currently in a sinus tachycardia. Blood pressure is somewhat low and will give a 500 cc normal saline bolus so hopefully he can tolerate his Toprol. We will resume cardiac monitoring and watching out for atrial fibrillation.  · Will hold NORVASC and LISINOPRIL for now. BP this am 95/65.  · Monitor renal status closely.  · Resume XARELTO when okabdi w/ Dr. Kline.  · SCDs have been ordered for DVT prophylaxis per ortho.  · He was encouraged to use incentive spirometer as instructed. It does not appear he's had incentive spirometry since the time of his operation. Spoke with nurse about bringing one to his room.      Rao Marshall MD  [facility]  01/17/18  7:12 AM      ADDENDUM  Called by nursing staff for intermittent  atrial fibrillation on monitor and worsening respiratory status. Now on 8 L/m oxygen. We will obtain stat chest x-ray and ABG. He has low-grade fever. Will obtain urine culture. If spikes temperature will get blood cultures.      Rao Marshall MD  [facility]  01/17/18  10:48 AM

## 2018-01-17 NOTE — PLAN OF CARE
Problem: Patient Care Overview (Adult)  Goal: Plan of Care Review  Outcome: Ongoing (interventions implemented as appropriate)   01/17/18 1721   Coping/Psychosocial Response Interventions   Plan Of Care Reviewed With patient;spouse   Patient Care Overview   Progress declining   Outcome Evaluation   Outcome Summary/Follow up Plan patient confused today. Able to state his name and birthday but unable to say where he is or why he is here. Intermittent diapheresis and runs of tachycardia w/ afib. Low grade T of 100.3. Dr. Marshall ordered Cardiology consult and dc'd dilaudid. Dr. Montalvo's office changed pain meds to norco. Patient too somnulent this afternoon to medicate. Troponin today 0.016. Cardiology LOU Gomez saw patient today. Sent for CTA to r/o PE. Results negative. Patient tachypnic and now on 8L 02 by NC to Sat 94%. Pulmonary consulted and waiting for them to see. Continue to progress towards goals as appropriate.      Goal: Adult Individualization and Mutuality  Outcome: Ongoing (interventions implemented as appropriate)      Problem: Perioperative Period (Adult)  Goal: Signs and Symptoms of Listed Potential Problems Will be Absent or Manageable (Perioperative Period)  Outcome: Ongoing (interventions implemented as appropriate)      Problem: Fall Risk (Adult)  Goal: Identify Related Risk Factors and Signs and Symptoms  Outcome: Ongoing (interventions implemented as appropriate)    Goal: Absence of Falls  Outcome: Ongoing (interventions implemented as appropriate)      Problem: Laminectomy/Foraminotomy/Discectomy (Adult)  Goal: Signs and Symptoms of Listed Potential Problems Will be Absent or Manageable (Laminectomy/Foraminotomy/Discectomy)  Outcome: Ongoing (interventions implemented as appropriate)      Problem: Pain, Acute (Adult)  Goal: Identify Related Risk Factors and Signs and Symptoms  Outcome: Ongoing (interventions implemented as appropriate)    Goal: Acceptable Pain Control/Comfort Level  Outcome:  Ongoing (interventions implemented as appropriate)      Problem: Skin Integrity Impairment, Risk/Actual (Adult)  Goal: Identify Related Risk Factors and Signs and Symptoms  Outcome: Ongoing (interventions implemented as appropriate)    Goal: Skin Integrity/Wound Healing  Outcome: Ongoing (interventions implemented as appropriate)      Problem: Infection, Risk/Actual (Adult)  Goal: Identify Related Risk Factors and Signs and Symptoms  Outcome: Ongoing (interventions implemented as appropriate)    Goal: Infection Prevention/Resolution  Outcome: Ongoing (interventions implemented as appropriate)

## 2018-01-17 NOTE — CONSULTS
Patient Name: Osvaldo Lopez  :1935  82 y.o.    Date of Admission: 1/15/2018  Date of Consultation:  18  Encounter Provider: LOU Wesley  Place of Service: [facility] CARDIOLOGY  Referring Provider: Ran Kline MD  Patient Care Team:  Caio Rodríguez Jr., MD as PCP - General (Family Medicine)  Caio Rodríguez Jr., MD as PCP - Claims Attributed  Dontrell Arellano MD as Surgeon (Orthopedic Surgery)  Angelo Driscoll MD as Consulting Physician (Cardiology)  Darvin Alvarez MD as Consulting Physician (Urology)      Chief complaint: spinal stenosis s/p laminectomy    Reason for consultation: AF, tachycardia    History of Present Illness: Mr. Lopez is an 82 year old male, followed by Dr. Driscoll for paroxysmal atrial fibrillation on Xarelto and low dose amiodarone. He went back into afib after his dose was previously decreased, however has maintained SR on current dose. Patient also has a history of HTN, DM, and HLD. He is followed by Dr. Kline for bilateral thigh weakness causing two falls. He has spinal stenosis with prior L4-5 laminectomy. He followed up with Dr. Kline on  for worsening stenosis and repeat laminectomy was recommended. He presented on 1/15 for L2-3, L3-4 laminectomy. A small dural tear was noted on the left dorsolateral side and this was closed in surgery. He is POD # 2 and has been on complete bed rest. Yesterday, patient was noted to be more hypotensive. His antihypertensives were adjusted. Today he was noted to be more tachycardic and his oxygen requirement is increasing. He has been noted to be diaphoretic and has some increased confusion earlier which has since improved. CXR demonstrated low lung volumes but no definitive acute process. He has been running a low grade fever. Troponin was checked and is indeterminate. ProBNP is pending.     Echo 16  · Left ventricular function is normal. Calculated EF = 56.8%. Estimated EF was  in agreement with the calculated EF. Estimated EF = 57%. Normal left ventricular cavity size and wall thickness noted. Left ventricular diastolic dysfunction is noted (grade I a w/high LAP) consistent with impaired relaxation.  · The following segments are hypokinetic: basal inferolateral and mid inferolateral. All other segments are normal.  · Trace-to-mild aortic valve regurgitation is present.  · Moderate mitral annular calcification is present.  · Trace tricuspid valve regurgitation is present. Estimated right ventricular systolic pressure from tricuspid regurgitation is normal (<35 mmHg        Past Medical History:   Diagnosis Date   • Abnormal thyroid blood test    • Aneurysm of abdominal aorta    • Arthritis    • Atrial fibrillation    • BPH (benign prostatic hyperplasia)    • Bruises easily    • Bulging of thoracic intervertebral disc    • Coronary artery disease    • Diabetes mellitus     type 2   • Diverticular disease    • Edema     LOWER LEGS   • Enlarged prostate without lower urinary tract symptoms (luts)    • Essential hypertension    • Fatigue    • History of MI (myocardial infarction) 1989   • Hyperactivity of bladder    • Hyperlipidemia    • Hypertension    • Left shoulder pain    • Lumbar radiculopathy 2016    wears back brace at times following back surgery   • Muscle weakness (generalized)    • Osteoarthritis    • Personal history of arthritis    • Screening      stop bang scale 5   • Torn rotator cuff     left   • Type 2 diabetes mellitus    • Urinary frequency        Past Surgical History:   Procedure Laterality Date   • CARDIAC SURGERY      CABGX5 (1989)   • CATARACT EXTRACTION Bilateral 1997   • CYST REMOVAL      FROM BACK   • GALLBLADDER SURGERY  1989   • HERNIA REPAIR Left     inquinal times 3  last one in 2003   • KNEE CARTILAGE SURGERY Left 1970's   • LUMBAR DISCECTOMY FUSION INSTRUMENTATION N/A 4/11/2016    Procedure: lumbar laminectomy L4-5 and fusion with instrumentation;  Surgeon:  Ran Kline MD;  Location: [facility] MAIN OR;  Service:    • LUMBAR DISCECTOMY FUSION INSTRUMENTATION N/A 1/15/2018    Procedure: L2-3, L3-4 laminectomy and fusion with instrumentation and removal of implants L4 5.;  Surgeon: Ran Kline MD;  Location: [facility] MAIN OR;  Service:    • IL TOTAL KNEE ARTHROPLASTY Left 11/14/2016    Procedure: TOTAL KNEE ARTHROPLASTY;  Surgeon: Dontrell Arellano MD;  Location: [facility] OR;  Service: Orthopedics         Prior to Admission medications    Medication Sig Start Date End Date Taking? Authorizing Provider   amiodarone (PACERONE) 200 MG tablet Take 1 tablet by mouth Daily.  Patient taking differently: Take 100 mg by mouth Daily. 7/11/17  Yes Angelo Driscoll MD   amLODIPine (NORVASC) 5 MG tablet Take 5 mg by mouth Every Morning.   Yes Historical Provider, MD   calcium polycarbophil (FIBERCON) 625 MG tablet Take 1,250 mg by mouth Every Morning.   Yes Historical Provider, MD   finasteride (PROSCAR) 5 MG tablet Take 5 mg by mouth Every Morning. 9/14/17  Yes Historical Provider, MD   lisinopril (PRINIVIL,ZESTRIL) 40 MG tablet Take 40 mg by mouth Daily.   Yes Historical Provider, MD   metFORMIN (GLUCOPHAGE) 500 MG tablet Take 1 tablet by mouth 2 (Two) Times a Day With Meals. 10/31/17  Yes Caio Rodríguez Jr., MD   metoprolol succinate XL (TOPROL-XL) 25 MG 24 hr tablet Take 25 mg by mouth Every Morning.   Yes Historical Provider, MD   Multiple Vitamin (MULTI VITAMIN PO) Take 1 tablet by mouth Every Morning.   Yes Historical Provider, MD   Omega-3 Fatty Acids (FISH OIL) 1200 MG capsule capsule Take 1 tablet by mouth Daily With Breakfast.   Yes Historical Provider, MD   rivaroxaban (XARELTO) 20 MG tablet Take 20 mg by mouth Daily.   Yes Historical Provider, MD   tamsulosin (FLOMAX) 0.4 MG capsule 24 hr capsule Two capsules daily for prostate  Patient taking differently: Take 1 capsule by mouth 2 (Two) Times a Day. 2/16/17  Yes Caio Rodríguez Jr., MD   vitamin B-12 (CYANOCOBALAMIN) 1000  MCG tablet Take 1,000 mcg by mouth Daily.   Yes Historical Provider, MD       No Known Allergies    Social History     Social History   • Marital status:      Spouse name: N/A   • Number of children: N/A   • Years of education: N/A     Social History Main Topics   • Smoking status: Former Smoker     Years: 36.00     Types: Cigarettes     Quit date: 1989   • Smokeless tobacco: Never Used      Comment: states smoked 2-3 ppd   • Alcohol use Yes      Comment: 2-3 times weekly  //  caffeine use   • Drug use: No   • Sexual activity: Defer     Other Topics Concern   • None     Social History Narrative       Family History   Problem Relation Age of Onset   • Heart failure Mother        REVIEW OF SYSTEMS:   All systems reviewed.  Pertinent positives identified in HPI.  All other systems are negative.      Objective:     Vitals:    01/16/18 2130 01/17/18 0541 01/17/18 0722 01/17/18 1005   BP: 141/67 95/65 118/65 (!) 74/51   BP Location: Left arm Left arm  Left arm   Patient Position: Lying Lying  Lying   Pulse: 88 89 118 115   Resp: 18 18 18   Temp: 99.8 °F (37.7 °C) 100.5 °F (38.1 °C)  100.1 °F (37.8 °C)   TempSrc: Oral Oral  Oral   SpO2: 92% 94%  (!) 89%   Weight:       Height:         Body mass index is 31.8 kg/(m^2).    General Appearance:    Alert, cooperative, in mild acute distress, diaphoretic   Head:    Normocephalic, without obvious abnormality, atraumatic   Eyes:            Lids and lashes normal, conjunctivae and sclerae normal, no   icterus, no pallor, corneas clear, PERRLA   Ears:    Ears appear intact with no abnormalities noted   Throat:   No oral lesions, no thrush, oral mucosa moist   Neck:   No adenopathy, supple, trachea midline, no thyromegaly, no   carotid bruit, no JVD   Back:     No kyphosis present, no scoliosis present, no skin lesions, erythema or scars, no tenderness to percussion or palpation, range of motion normal   Lungs:     Clear to auscultation,respirations regular, even and  unlabored    Heart:    Irregular rhythm and normal rate, normal S1 and S2, no murmur, no gallop, no rub, no click   Chest Wall:    No abnormalities observed   Abdomen:     Normal bowel sounds, no masses, no organomegaly, soft        non-tender, non-distended, no guarding, no rebound  tenderness   Extremities:   Moves all extremities well, no edema, no cyanosis, no redness   Pulses:   Pulses palpable and equal bilaterally. Normal radial, carotid, femoral, dorsalis pedis and posterior tibial pulses bilaterally. Normal abdominal aorta   Skin:  Psychiatric:   No bleeding, bruising or rash    Alert and oriented x 3, normal mood and affect   Lab Review:       Results from last 7 days  Lab Units 01/17/18  0507   SODIUM mmol/L 133*   POTASSIUM mmol/L 4.3   CHLORIDE mmol/L 97*   CO2 mmol/L 26.8   BUN mg/dL 15   CREATININE mg/dL 0.99   CALCIUM mg/dL 8.0*   GLUCOSE mg/dL 154*       Results from last 7 days  Lab Units 01/17/18  1118   TROPONIN T ng/mL 0.086*       Results from last 7 days  Lab Units 01/17/18  1118   WBC 10*3/mm3 7.89   HEMOGLOBIN g/dL 11.1*   HEMATOCRIT % 34.7*   PLATELETS 10*3/mm3 122*                           I personally viewed and interpreted the patient's EKG/Telemetry data.        Assessment and Plan:   1. Spinal stenosis s/p laminectomy (1/15) with dural tear and subsequent bed rest.   2. Paroxysmal atrial fibrillation - on amiodarone and rivaroxaban as an outpatient  3. Acute hypoxic respiratory failure - pulm has been consulted  4. HTN- now hypotensive. Hold meds.   5. DM - on oral meds as outpatient  6. Indeterminate troponin      EKG is pending. With increasing hypoxia + bed rest + recent surgery + tachy/hypotensive I think we need to rule out PE. Will order STAT CTA of chest. Further recommendations pending results.     Cindy Gomez, APRN  01/17/18  12:51 PM

## 2018-01-17 NOTE — SIGNIFICANT NOTE
CTA of the chest negative for PE. He is now in SR. Continue oral amiodarone and low dose beta blocker. Indeterminate troponin noted. Could be due to AF. He remains hypoxic and labored. Will get an echo. Pulmonary consult pending.

## 2018-01-17 NOTE — SIGNIFICANT NOTE
01/17/18 1618   Rehab Treatment   Discipline physical therapist   Treatment Not Performed unable to treat, medical status change  (pt being seen by Cardio & Pulmonary. CTA negative for PE but workups continue. will check on pt tomorrow)   Recommendation   PT - Next Appointment 01/18/18

## 2018-01-17 NOTE — PLAN OF CARE
Problem: Patient Care Overview (Adult)  Goal: Plan of Care Review  Outcome: Ongoing (interventions implemented as appropriate)   01/16/18 1857 01/17/18 0336   Coping/Psychosocial Response Interventions   Plan Of Care Reviewed With patient --    Patient Care Overview   Progress improving --    Outcome Evaluation   Outcome Summary/Follow up Plan --  Pt. remains A&Ox4 throughout shift. HOB flat, turning q2h.Dressing CDI. Pain controlled well with PCA as well as PRN medications. PT to possibly see in AM. VSS, will monitor.     Goal: Adult Individualization and Mutuality  Outcome: Ongoing (interventions implemented as appropriate)    Goal: Discharge Needs Assessment  Outcome: Ongoing (interventions implemented as appropriate)      Problem: Perioperative Period (Adult)  Goal: Signs and Symptoms of Listed Potential Problems Will be Absent or Manageable (Perioperative Period)  Outcome: Ongoing (interventions implemented as appropriate)      Problem: Fall Risk (Adult)  Goal: Identify Related Risk Factors and Signs and Symptoms  Outcome: Ongoing (interventions implemented as appropriate)    Goal: Absence of Falls  Outcome: Ongoing (interventions implemented as appropriate)      Problem: Laminectomy/Foraminotomy/Discectomy (Adult)  Goal: Signs and Symptoms of Listed Potential Problems Will be Absent or Manageable (Laminectomy/Foraminotomy/Discectomy)  Outcome: Ongoing (interventions implemented as appropriate)      Problem: Pain, Acute (Adult)  Goal: Identify Related Risk Factors and Signs and Symptoms  Outcome: Ongoing (interventions implemented as appropriate)    Goal: Acceptable Pain Control/Comfort Level  Outcome: Ongoing (interventions implemented as appropriate)

## 2018-01-17 NOTE — PLAN OF CARE
Problem: Patient Care Overview (Adult)  Goal: Plan of Care Review  Outcome: Ongoing (interventions implemented as appropriate)   01/16/18 1577   Coping/Psychosocial Response Interventions   Plan Of Care Reviewed With patient   Patient Care Overview   Progress improving   Outcome Evaluation   Outcome Summary/Follow up Plan patient a+ox4. HOB flat. BP meds adjusted by Dr. Marshall. Dressing CDI. PCA continued and patient requesting percocet and flexeril for breakthrough pain. Continue to monitor.      Goal: Adult Individualization and Mutuality  Outcome: Ongoing (interventions implemented as appropriate)      Problem: Perioperative Period (Adult)  Goal: Signs and Symptoms of Listed Potential Problems Will be Absent or Manageable (Perioperative Period)  Outcome: Ongoing (interventions implemented as appropriate)      Problem: Fall Risk (Adult)  Goal: Identify Related Risk Factors and Signs and Symptoms  Outcome: Ongoing (interventions implemented as appropriate)    Goal: Absence of Falls  Outcome: Ongoing (interventions implemented as appropriate)      Problem: Laminectomy/Foraminotomy/Discectomy (Adult)  Goal: Signs and Symptoms of Listed Potential Problems Will be Absent or Manageable (Laminectomy/Foraminotomy/Discectomy)  Outcome: Ongoing (interventions implemented as appropriate)      Problem: Pain, Acute (Adult)  Goal: Identify Related Risk Factors and Signs and Symptoms  Outcome: Ongoing (interventions implemented as appropriate)    Goal: Acceptable Pain Control/Comfort Level  Outcome: Ongoing (interventions implemented as appropriate)

## 2018-01-17 NOTE — THERAPY EVALUATION
Acute Care - Physical Therapy Initial Evaluation  [facility]     Patient Name: Osvaldo Lopez  : 1935  MRN: [id number]  Today's Date: 2018   Onset of Illness/Injury or Date of Surgery Date: 01/15/18  Date of Referral to PT: 17  Referring Physician: Eliud      Admit Date: 1/15/2018     Visit Dx:    ICD-10-CM ICD-9-CM   1. Difficulty walking R26.2 719.7   2. Spinal stenosis of lumbar region with neurogenic claudication M48.062 724.03     Patient Active Problem List   Diagnosis   • Midline low back pain   • Complete rotator cuff tear of left shoulder   • Difficulty walking   • Abnormal finding on thyroid function test   • Abdominal aortic aneurysm   • Atrial fibrillation   • Benign prostatic hyperplasia   • Edema   • Essential hypertension   • Fatigue   • Hyperlipidemia   • Shoulder pain   • Lumbar radiculopathy   • Muscle weakness   • Osteoarthritis   • Type 2 diabetes mellitus without complication   • Primary osteoarthritis of left knee   • OA (osteoarthritis) of knee   • Toxic encephalopathy   • Paroxysmal a-fib   • HTN (hypertension)   • Type 2 diabetes mellitus   • BPH (benign prostatic hyperplasia)   • Postoperative anemia due to acute blood loss   • Cardiac murmur   • Complete tear of left rotator cuff   • Tear of right rotator cuff   • Spinal stenosis of lumbar region with neurogenic claudication   • Tachycardia     Past Medical History:   Diagnosis Date   • Abnormal thyroid blood test    • Aneurysm of abdominal aorta    • Arthritis    • Atrial fibrillation    • BPH (benign prostatic hyperplasia)    • Bruises easily    • Bulging of thoracic intervertebral disc    • Coronary artery disease    • Diabetes mellitus     type 2   • Diverticular disease    • Edema     LOWER LEGS   • Enlarged prostate without lower urinary tract symptoms (luts)    • Essential hypertension    • Fatigue    • History of MI (myocardial infarction)    • Hyperactivity of bladder    • Hyperlipidemia    •  Hypertension    • Left shoulder pain    • Lumbar radiculopathy 2016    wears back brace at times following back surgery   • Muscle weakness (generalized)    • Osteoarthritis    • Personal history of arthritis    • Screening      stop bang scale 5   • Torn rotator cuff     left   • Type 2 diabetes mellitus    • Urinary frequency      Past Surgical History:   Procedure Laterality Date   • CARDIAC SURGERY      CABGX5 (1989)   • CATARACT EXTRACTION Bilateral 1997   • CYST REMOVAL      FROM BACK   • GALLBLADDER SURGERY  1989   • HERNIA REPAIR Left     inquinal times 3  last one in 2003   • KNEE CARTILAGE SURGERY Left 1970's   • LUMBAR DISCECTOMY FUSION INSTRUMENTATION N/A 4/11/2016    Procedure: lumbar laminectomy L4-5 and fusion with instrumentation;  Surgeon: Ran Kline MD;  Location: [facility];  Service:    • LUMBAR DISCECTOMY FUSION INSTRUMENTATION N/A 1/15/2018    Procedure: L2-3, L3-4 laminectomy and fusion with instrumentation and removal of implants L4 5.;  Surgeon: Ran Kline MD;  Location: [facility];  Service:    • [place] TOTAL KNEE ARTHROPLASTY Left 11/14/2016    Procedure: TOTAL KNEE ARTHROPLASTY;  Surgeon: Dontrell Arellano MD;  Location: [facility] OR;  Service: Orthopedics          PT ASSESSMENT (last 72 hours)      PT Evaluation       01/17/18 0835 01/17/18 0409    Rehab Evaluation    Document Type evaluation  -SV     Subjective Information agree to therapy;complains of;pain  -SV     Patient Effort, Rehab Treatment adequate  -SV     Symptoms Noted During/After Treatment fatigue;increased pain;significant change in vital signs   HR elevated to 132, O2 sat 87% on 3 L  -SV     General Information    Patient Profile Review yes  -SV     Onset of Illness/Injury or Date of Surgery Date 01/15/18  -CINDY     Referring Physician Eliud  -CINDY     General Observations monitors, IV, O2, cath  -SV     Pertinent History Of Current Problem Pt with hx L/S sx and ashley TKR:: per wife pt has had progressive  quadriceps weakness, Pt admit with spinal cord narrowing s/p L2-3, L3-4 laminectomy fusion and removal of hardware at L4-5.  -SV     Precautions/Limitations fall precautions;spinal precautions  -SV     Prior Level of Function --   per wife pt indep without rwx inside home , wx in community  -SV     Equipment Currently Used at Home --   able to perform steps with ashley railings  -SV     Plans/Goals Discussed With patient  -SV     Barriers to Rehab --   weak quadriceps per wife  -SV     Living Environment    Lives With spouse  -SV     Clinical Impression    Date of Referral to PT 01/16/17  -SV     PT Diagnosis difficulty  -SV     Functional Level At Time Of Evaluation max/dep x2 sidelying to sit  -SV     Patient/Family Goals Statement amb indep  -SV     Criteria for Skilled Therapeutic Interventions Met treatment indicated  -SV     Pathology/Pathophysiology Noted (Describe Specifically for Each System) musculoskeletal  -SV     Impairments Found (describe specific impairments) gait, locomotion, and balance  -SV     Functional Limitations in Following Categories (Describe Specific Limitations) self-care;home management;community/leisure  -SV     Rehab Potential good, to achieve stated therapy goals  -SV     Predicted Duration of Therapy Intervention (days/wks) 4-6 weeks  -SV     Vital Signs    Intratreatment Heart Rate (beats/min) 122  -SV     Posttreatment Heart Rate (beats/min) 132  -SV     Pre SpO2 (%) 89  -SV     O2 Delivery Pre Treatment supplemental O2  -SV     O2 Delivery Intra Treatment supplemental O2  -SV     Post SpO2 (%) 87  -SV     O2 Delivery Post Treatment supplemental O2   RN present  -SV     Pain Assessment    Pain Assessment 0-10  -SV     Pain Score 5  -SV     Pain Location Back  -SV     Pain Orientation Lower  -SV     Pain Intervention(s) Repositioned;Ambulation/increased activity;Medication (See MAR)  -SV     Vision Assessment/Intervention    Visual Impairment other (see comments)   NT pt [place] and in  severe pain  -SV     Cognitive Assessment/Intervention    Orientation Status oriented to;person;place;time  -SV     Follows Commands/Answers Questions 50% of the time;needs cueing;needs increased time;needs repetition  -SV     Personal Safety unable/difficult to assess  -SV     ROM (Range of Motion)    General ROM --   Pt ashley elbows, wrist, hands appear wfl, shoulders NT  -SV     General ROM Detail BLE appear wfl(ap, knee rom aa/prom appear wfl  -SV     MMT (Manual Muscle Testing)    General MMT Assessment upper extremity strength deficits identified;lower extremity strength deficits identified  -SV     General MMT Assessment Detail pt qs weak, LAQ sitting right 2-/5, left 1/5   -SV     Upper Extremity    Upper Ext Manual Muscle Testing Detail shoulders NT, ellbows, wrist, hands grossly>3/5  -SV     Muscle Tone Assessment    Muscle Tone Assessment  Bilateral Lower Extremities  -JG    Bilateral Lower Extremities Muscle Tone Assessment  mildly decreased tone  -JG    Bed Mobility, Assessment/Treatment    Bed Mobility, Assistive Device bed rails  -SV     Bed Mobility, Roll Right, [place] maximum assist (25% patient effort);2 person assist required  -SV     Bed Mob, Sidelying to Sit, [place] maximum assist (25% patient effort);dependent (less than 25% patient effort);2 person assist required  -SV     Bed Mob, Sit to Sidelying, [place] maximum assist (25% patient effort);dependent (less than 25% patient effort);2 person assist required  -SV     Transfer Assessment/Treatment    Transfers, Sit-Stand [place] maximum assist (25% patient effort);dependent (less than 25% patient effort);2 person assist required  -SV     Transfer, Comment attempted x 1 and pt unable to achieve standing today  -SV     Positioning and Restraints    Pre-Treatment Position in bed  -SV     Post Treatment Position bed  -SV     In Bed side lying right;call light within reach;encouraged to call for assist;with family/caregiver;with  nsg  -SV       01/17/18 0019 01/16/18 2034    Muscle Tone Assessment    Muscle Tone Assessment Bilateral Lower Extremities  -JG Bilateral Lower Extremities  -JG    Bilateral Lower Extremities Muscle Tone Assessment mildly decreased tone  -JG mildly decreased tone  -JG      01/16/18 1714 01/16/18 1044    Rehab Evaluation    Evaluation Not Performed  unable to evaluate, medical status change   per Dr. Kline, NO PT until further notice.pt to be kept flat in bed.pt with CSF leak.  -EF    General Information    Equipment Currently Used at Home grab bar;cane, straight;walker, rolling  -DR     Living Environment    Living Arrangements house  -       01/16/18 0930 01/16/18 0423    Muscle Tone Assessment    Muscle Tone Assessment Bilateral Lower Extremities  -JS Bilateral Lower Extremities  -JG    Bilateral Lower Extremities Muscle Tone Assessment mildly decreased tone  -JS mildly decreased tone  -JG      01/16/18 0404 01/16/18 0016    Living Environment    Transportation Available car;family or friend will provide  -JG     Muscle Tone Assessment    Muscle Tone Assessment  Bilateral Lower Extremities  -JG    Bilateral Lower Extremities Muscle Tone Assessment  mildly decreased tone  -JG      01/15/18 2145 01/15/18 1247    General Information    Equipment Currently Used at Home  grab bar;cane, straight;power chair, (recliner lift)   ELEVATED COMMODE WITH RAILS  -RH    Living Environment    Lives With  spouse  -RH    Living Arrangements  house  -RH    Home Accessibility  no concerns;bed and bath on same level  -    Muscle Tone Assessment    Muscle Tone Assessment Bilateral Lower Extremities  -JG       01/15/18 1225       General Information    Equipment Currently Used at Home none  -MP       User Key  (r) = Recorded By, (t) = Taken By, (c) = Cosigned By    Initials Name Provider Type    GALEN Valverde, PT Physical Therapist     Kassy Moore, MARTY Registered Nurse    MP Amber Briggs, RN Registered Nurse     DR China Torres, RN Case Manager    SV Sabina Middleton, PT Physical Therapist    GEORGI Butler, RN Registered Nurse    KAYLEE Isidro, RN Registered Nurse          Physical Therapy Education     Title: PT OT SLP Therapies (Done)     Topic: Physical Therapy (Done)     Point: Mobility training (Done)    Learning Progress Summary    Learner Readiness Method Response Comment Documented by Status   Patient Acceptance E,TB,D NR,VU   01/17/18 1004 Done   Family Acceptance E,TB,D NR,[facility] 01/17/18 1004 Done               Point: Home exercise program (Done)    Learning Progress Summary    Learner Readiness Method Response Comment Documented by Status   Patient Acceptance E,TB,D NR,VU   01/17/18 1004 Done   Family Acceptance E,TB,D NR,VU   01/17/18 1004 Done               Point: Body mechanics (Done)    Learning Progress Summary    Learner Readiness Method Response Comment Documented by Status   Patient Acceptance E,TB,D NR,VU   01/17/18 1004 Done   Family Acceptance E,TB,D NR,VU   01/17/18 1004 Done               Point: Precautions (Done)    Learning Progress Summary    Learner Readiness Method Response Comment Documented by Status   Patient Acceptance E,TB,D NR,VU   01/17/18 1004 Done   Family Acceptance E,TB,D NR,VU   01/17/18 1004 Done                      User Key     Initials Effective Dates Name Provider Type Discipline     01/17/16 -  Sabina Middleton, PT Physical Therapist PT                PT Recommendation and Plan  Anticipated Discharge Disposition: skilled nursing facility  PT Frequency: 2 times/day  Plan of Care Review  Plan Of Care Reviewed With: patient, spouse  Outcome Summary/Follow up Plan: Pt presents with marked decline in all mobility following spinal surgery.  Pt requiring max/depx2 for bed mobility. Pt uanble to achieve standing today due to BLE marked weakness and pain. HR elevated and O2 sat decreased during attempt. Pt will benefit from cont skilled PT for progression  toward least asst prior to D/c to rehab facility.          IP PT Goals       01/17/18 1005          Bed Mobility PT STG    Bed Mobility PT STG, Date Established 01/17/18  -SV      Bed Mobility PT STG, Time to Achieve 2 wks  -SV      Bed Mobility PT STG, Activity Type sidelying to sit/sit to sidelying  -SV      Bed Mobility PT STG, [place] Level minimum assist (75% patient effort);contact guard assist  -SV      Transfer Training PT STG    Transfer Training PT STG, Date Established 01/17/18  -SV      Transfer Training PT STG, Time to Achieve 2 wks  -SV      Transfer Training PT STG, Activity Type sit to stand/stand to sit;bed to chair /chair to bed  -SV      Transfer Training PT STG, [place] Level minimum assist (75% patient effort)  -SV      Transfer Training PT STG, Assist Device walker, rolling  -SV        User Key  (r) = Recorded By, (t) = Taken By, (c) = Cosigned By    Initials Name Provider Type    CINDY Middleton PT Physical Therapist                Outcome Measures       01/17/18 1000          How much help from another person do you currently need...    Turning from your back to your side while in flat bed without using bedrails? 2  -SV      Moving from lying on back to sitting on the side of a flat bed without bedrails? 1  -SV      Moving to and from a bed to a chair (including a wheelchair)? 1  -SV      Standing up from a chair using your arms (e.g., wheelchair, bedside chair)? 1  -SV      Climbing 3-5 steps with a railing? 1  -SV      To walk in hospital room? 1  -SV      AM-PAC 6 Clicks Score 7  -SV      Functional Assessment    Outcome Measure Options AM-PAC 6 Clicks Basic Mobility (PT)  -SV        User Key  (r) = Recorded By, (t) = Taken By, (c) = Cosigned By    Initials Name Provider Type    CINDY Middleton PT Physical Therapist           Time Calculation:         PT Charges       01/17/18 1009          Time Calculation    Start Time 0840  -SV      Stop Time 0915  -SV      Time  Calculation (min) 35 min  -SV      PT Received On 01/17/18  -SV      PT - Next Appointment 01/17/18  -SV      PT Goal Re-Cert Due Date 01/31/18 -SV        User Key  (r) = Recorded By, (t) = Taken By, (c) = Cosigned By    Initials Name Provider Type    SV Sabina Middleton, PT Physical Therapist          Therapy Charges for Today     Code Description Service Date Service Provider Modifiers Qty    00486194171 HC PT EVAL MOD COMPLEXITY 2 1/17/2018 Sabina Middleton, PT GP 1    88407502678 HC PT THER SUPP EA 15 MIN 1/17/2018 Sabina Middleton, PT GP 1          PT G-Codes  Outcome Measure Options: AM-PAC 6 Clicks Basic Mobility (PT)      Sabina Middleton, PT  1/17/2018

## 2018-01-18 ENCOUNTER — APPOINTMENT (OUTPATIENT)
Dept: CT IMAGING | Facility: HOSPITAL | Age: 83
End: 2018-01-18

## 2018-01-18 ENCOUNTER — APPOINTMENT (OUTPATIENT)
Dept: CARDIOLOGY | Facility: HOSPITAL | Age: 83
End: 2018-01-18

## 2018-01-18 PROBLEM — K56.7 POSTOPERATIVE ILEUS (HCC): Status: ACTIVE | Noted: 2018-01-18

## 2018-01-18 PROBLEM — K91.89 POSTOPERATIVE ILEUS (HCC): Status: ACTIVE | Noted: 2018-01-18

## 2018-01-18 PROBLEM — J96.01 ACUTE RESPIRATORY FAILURE WITH HYPOXIA: Status: ACTIVE | Noted: 2018-01-18

## 2018-01-18 PROBLEM — E87.1 HYPONATREMIA: Status: ACTIVE | Noted: 2018-01-18

## 2018-01-18 LAB
ANION GAP SERPL CALCULATED.3IONS-SCNC: 7.8 MMOL/L
AORTIC DIMENSIONLESS INDEX: 0.5 (DI)
ARTERIAL PATENCY WRIST A: POSITIVE
ATMOSPHERIC PRESS: 758.1 MMHG
BASE EXCESS BLDA CALC-SCNC: -1.8 MMOL/L (ref 0–2)
BASOPHILS # BLD AUTO: 0 10*3/MM3 (ref 0–0.2)
BASOPHILS NFR BLD AUTO: 0 % (ref 0–1.5)
BDY SITE: ABNORMAL
BH CV ECHO MEAS - AO MAX PG: 17 MMHG
BH CV ECHO MEAS - AO MEAN PG (FULL): 7 MMHG
BH CV ECHO MEAS - AO MEAN PG: 10 MMHG
BH CV ECHO MEAS - AO ROOT AREA (BSA CORRECTED): 1.7
BH CV ECHO MEAS - AO ROOT AREA: 11.3 CM^2
BH CV ECHO MEAS - AO ROOT DIAM: 3.8 CM
BH CV ECHO MEAS - AO V2 MAX: 207 CM/SEC
BH CV ECHO MEAS - AO V2 MEAN: 148 CM/SEC
BH CV ECHO MEAS - AO V2 VTI: 30.1 CM
BH CV ECHO MEAS - AVA(I,A): 1.7 CM^2
BH CV ECHO MEAS - AVA(I,D): 1.7 CM^2
BH CV ECHO MEAS - BSA(HAYCOCK): 2.3 M^2
BH CV ECHO MEAS - BSA: 2.2 M^2
BH CV ECHO MEAS - BZI_BMI: 31.8 KILOGRAMS/M^2
BH CV ECHO MEAS - BZI_METRIC_HEIGHT: 180.3 CM
BH CV ECHO MEAS - BZI_METRIC_WEIGHT: 103.4 KG
BH CV ECHO MEAS - CONTRAST EF (2CH): 60.5 ML/M^2
BH CV ECHO MEAS - CONTRAST EF 4CH: 62.3 ML/M^2
BH CV ECHO MEAS - EDV(CUBED): 29.8 ML
BH CV ECHO MEAS - EDV(MOD-SP2): 119 ML
BH CV ECHO MEAS - EDV(MOD-SP4): 122 ML
BH CV ECHO MEAS - EDV(TEICH): 37.9 ML
BH CV ECHO MEAS - EF(CUBED): 83.5 %
BH CV ECHO MEAS - EF(MOD-SP2): 60.5 %
BH CV ECHO MEAS - EF(MOD-SP4): 62.3 %
BH CV ECHO MEAS - EF(TEICH): 77.9 %
BH CV ECHO MEAS - ESV(CUBED): 4.9 ML
BH CV ECHO MEAS - ESV(MOD-SP2): 47 ML
BH CV ECHO MEAS - ESV(MOD-SP4): 46 ML
BH CV ECHO MEAS - ESV(TEICH): 8.4 ML
BH CV ECHO MEAS - FS: 45.2 %
BH CV ECHO MEAS - IVS/LVPW: 1
BH CV ECHO MEAS - IVSD: 1.1 CM
BH CV ECHO MEAS - LAT PEAK E' VEL: 11 CM/SEC
BH CV ECHO MEAS - LV DIASTOLIC VOL/BSA (35-75): 54.7 ML/M^2
BH CV ECHO MEAS - LV MASS(C)D: 99.7 GRAMS
BH CV ECHO MEAS - LV MASS(C)DI: 44.7 GRAMS/M^2
BH CV ECHO MEAS - LV MEAN PG: 3 MMHG
BH CV ECHO MEAS - LV SYSTOLIC VOL/BSA (12-30): 20.6 ML/M^2
BH CV ECHO MEAS - LV V1 MAX: 106 CM/SEC
BH CV ECHO MEAS - LV V1 MEAN: 81.2 CM/SEC
BH CV ECHO MEAS - LV V1 VTI: 15.1 CM
BH CV ECHO MEAS - LVIDD: 3.1 CM
BH CV ECHO MEAS - LVIDS: 1.7 CM
BH CV ECHO MEAS - LVLD AP2: 8.3 CM
BH CV ECHO MEAS - LVLD AP4: 8.8 CM
BH CV ECHO MEAS - LVLS AP2: 7 CM
BH CV ECHO MEAS - LVLS AP4: 6.6 CM
BH CV ECHO MEAS - LVOT AREA (M): 3.5 CM^2
BH CV ECHO MEAS - LVOT AREA: 3.5 CM^2
BH CV ECHO MEAS - LVOT DIAM: 2.1 CM
BH CV ECHO MEAS - LVPWD: 1.1 CM
BH CV ECHO MEAS - MED PEAK E' VEL: 10 CM/SEC
BH CV ECHO MEAS - MV DEC SLOPE: 846 CM/SEC^2
BH CV ECHO MEAS - MV DEC TIME: 0.13 SEC
BH CV ECHO MEAS - MV E MAX VEL: 132 CM/SEC
BH CV ECHO MEAS - MV MAX PG: 8 MMHG
BH CV ECHO MEAS - MV MEAN PG: 3 MMHG
BH CV ECHO MEAS - MV P1/2T MAX VEL: 152 CM/SEC
BH CV ECHO MEAS - MV P1/2T: 52.6 MSEC
BH CV ECHO MEAS - MV V2 MEAN: 75.4 CM/SEC
BH CV ECHO MEAS - MV V2 VTI: 17.9 CM
BH CV ECHO MEAS - MVA P1/2T LCG: 1.4 CM^2
BH CV ECHO MEAS - MVA(P1/2T): 4.2 CM^2
BH CV ECHO MEAS - MVA(VTI): 2.9 CM^2
BH CV ECHO MEAS - PA ACC SLOPE: 1308 CM/SEC^2
BH CV ECHO MEAS - PA ACC TIME: 0.1 SEC
BH CV ECHO MEAS - PA MAX PG (FULL): 4.3 MMHG
BH CV ECHO MEAS - PA MAX PG: 7.1 MMHG
BH CV ECHO MEAS - PA PR(ACCEL): 34.5 MMHG
BH CV ECHO MEAS - PA V2 MAX: 133 CM/SEC
BH CV ECHO MEAS - PULM DIAS VEL: 40.5 CM/SEC
BH CV ECHO MEAS - PULM S/D: 1.2
BH CV ECHO MEAS - PULM SYS VEL: 49.7 CM/SEC
BH CV ECHO MEAS - RV MAX PG: 2.7 MMHG
BH CV ECHO MEAS - RV MEAN PG: 2 MMHG
BH CV ECHO MEAS - RV V1 MAX: 82.7 CM/SEC
BH CV ECHO MEAS - RV V1 MEAN: 58.7 CM/SEC
BH CV ECHO MEAS - RV V1 VTI: 11.3 CM
BH CV ECHO MEAS - SI(AO): 153.1 ML/M^2
BH CV ECHO MEAS - SI(CUBED): 11.2 ML/M^2
BH CV ECHO MEAS - SI(LVOT): 23.5 ML/M^2
BH CV ECHO MEAS - SI(MOD-SP2): 32.3 ML/M^2
BH CV ECHO MEAS - SI(MOD-SP4): 34.1 ML/M^2
BH CV ECHO MEAS - SI(TEICH): 13.2 ML/M^2
BH CV ECHO MEAS - SV(AO): 341.4 ML
BH CV ECHO MEAS - SV(CUBED): 24.9 ML
BH CV ECHO MEAS - SV(LVOT): 52.3 ML
BH CV ECHO MEAS - SV(MOD-SP2): 72 ML
BH CV ECHO MEAS - SV(MOD-SP4): 76 ML
BH CV ECHO MEAS - SV(TEICH): 29.5 ML
BH CV ECHO MEAS - TAPSE (>1.6): 0.6 CM2
BH CV ECHO MEAS - TR MAX VEL: 249 CM/SEC
BH CV VAS BP RIGHT ARM: NORMAL MMHG
BH CV XLRA - RV BASE: 3.5 CM
BH CV XLRA - TDI S': 12 CM/SEC
BUN BLD-MCNC: 22 MG/DL (ref 8–23)
BUN/CREAT SERPL: 17.6 (ref 7–25)
CALCIUM SPEC-SCNC: 7.9 MG/DL (ref 8.6–10.5)
CHLORIDE SERPL-SCNC: 95 MMOL/L (ref 98–107)
CO2 SERPL-SCNC: 25.2 MMOL/L (ref 22–29)
CREAT BLD-MCNC: 1.25 MG/DL (ref 0.76–1.27)
DEPRECATED RDW RBC AUTO: 50.2 FL (ref 37–54)
E/E' RATIO: 13
EOSINOPHIL # BLD AUTO: 0.01 10*3/MM3 (ref 0–0.7)
EOSINOPHIL NFR BLD AUTO: 0.2 % (ref 0.3–6.2)
ERYTHROCYTE [DISTWIDTH] IN BLOOD BY AUTOMATED COUNT: 13.9 % (ref 11.5–14.5)
GAS FLOW AIRWAY: 8 LPM
GFR SERPL CREATININE-BSD FRML MDRD: 56 ML/MIN/1.73
GLUCOSE BLD-MCNC: 160 MG/DL (ref 65–99)
GLUCOSE BLDC GLUCOMTR-MCNC: 156 MG/DL (ref 70–130)
GLUCOSE BLDC GLUCOMTR-MCNC: 168 MG/DL (ref 70–130)
GLUCOSE BLDC GLUCOMTR-MCNC: 170 MG/DL (ref 70–130)
GLUCOSE BLDC GLUCOMTR-MCNC: 188 MG/DL (ref 70–130)
HCO3 BLDA-SCNC: 24.5 MMOL/L (ref 22–28)
HCT VFR BLD AUTO: 32.6 % (ref 40.4–52.2)
HGB BLD-MCNC: 10.5 G/DL (ref 13.7–17.6)
IMM GRANULOCYTES # BLD: 0 10*3/MM3 (ref 0–0.03)
IMM GRANULOCYTES NFR BLD: 0 % (ref 0–0.5)
LEFT ATRIUM VOLUME INDEX: 18 ML/M2
LYMPHOCYTES # BLD AUTO: 1.23 10*3/MM3 (ref 0.9–4.8)
LYMPHOCYTES NFR BLD AUTO: 19.8 % (ref 19.6–45.3)
MAXIMAL PREDICTED HEART RATE: 140 BPM
MCH RBC QN AUTO: 32.2 PG (ref 27–32.7)
MCHC RBC AUTO-ENTMCNC: 32.2 G/DL (ref 32.6–36.4)
MCV RBC AUTO: 100 FL (ref 79.8–96.2)
MODALITY: ABNORMAL
MONOCYTES # BLD AUTO: 0.58 10*3/MM3 (ref 0.2–1.2)
MONOCYTES NFR BLD AUTO: 9.3 % (ref 5–12)
NEUTROPHILS # BLD AUTO: 4.39 10*3/MM3 (ref 1.9–8.1)
NEUTROPHILS NFR BLD AUTO: 70.7 % (ref 42.7–76)
PCO2 BLDA: 46.6 MM HG (ref 35–45)
PH BLDA: 7.33 PH UNITS (ref 7.35–7.45)
PLATELET # BLD AUTO: 113 10*3/MM3 (ref 140–500)
PMV BLD AUTO: 9.7 FL (ref 6–12)
PO2 BLDA: 90.5 MM HG (ref 80–100)
POTASSIUM BLD-SCNC: 4.3 MMOL/L (ref 3.5–5.2)
RBC # BLD AUTO: 3.26 10*6/MM3 (ref 4.6–6)
SAO2 % BLDCOA: 96.2 % (ref 92–99)
SET MECH RESP RATE: 16
SODIUM BLD-SCNC: 128 MMOL/L (ref 136–145)
STRESS TARGET HR: 119 BPM
TROPONIN T SERPL-MCNC: 0.05 NG/ML (ref 0–0.03)
WBC NRBC COR # BLD: 6.21 10*3/MM3 (ref 4.5–10.7)

## 2018-01-18 PROCEDURE — 93306 TTE W/DOPPLER COMPLETE: CPT | Performed by: INTERNAL MEDICINE

## 2018-01-18 PROCEDURE — 84484 ASSAY OF TROPONIN QUANT: CPT | Performed by: NURSE PRACTITIONER

## 2018-01-18 PROCEDURE — 80048 BASIC METABOLIC PNL TOTAL CA: CPT | Performed by: HOSPITALIST

## 2018-01-18 PROCEDURE — 74176 CT ABD & PELVIS W/O CONTRAST: CPT

## 2018-01-18 PROCEDURE — 63710000001 INSULIN ASPART PER 5 UNITS: Performed by: HOSPITALIST

## 2018-01-18 PROCEDURE — 85025 COMPLETE CBC W/AUTO DIFF WBC: CPT | Performed by: INTERNAL MEDICINE

## 2018-01-18 PROCEDURE — 94799 UNLISTED PULMONARY SVC/PX: CPT

## 2018-01-18 PROCEDURE — 25010000002 PIPERACILLIN SOD-TAZOBACTAM PER 1 G: Performed by: INTERNAL MEDICINE

## 2018-01-18 PROCEDURE — 82962 GLUCOSE BLOOD TEST: CPT

## 2018-01-18 PROCEDURE — 82803 BLOOD GASES ANY COMBINATION: CPT

## 2018-01-18 PROCEDURE — 25010000002 PERFLUTREN (DEFINITY) 8.476 MG IN SODIUM CHLORIDE 0.9 % 10 ML INJECTION: Performed by: ORTHOPAEDIC SURGERY

## 2018-01-18 PROCEDURE — 93306 TTE W/DOPPLER COMPLETE: CPT

## 2018-01-18 PROCEDURE — 99233 SBSQ HOSP IP/OBS HIGH 50: CPT | Performed by: INTERNAL MEDICINE

## 2018-01-18 PROCEDURE — 97110 THERAPEUTIC EXERCISES: CPT

## 2018-01-18 PROCEDURE — 36600 WITHDRAWAL OF ARTERIAL BLOOD: CPT

## 2018-01-18 PROCEDURE — 25010000002 CEFTRIAXONE PER 250 MG: Performed by: INTERNAL MEDICINE

## 2018-01-18 RX ORDER — AMIODARONE HYDROCHLORIDE 200 MG/1
400 TABLET ORAL EVERY 12 HOURS SCHEDULED
Status: DISCONTINUED | OUTPATIENT
Start: 2018-01-18 | End: 2018-01-20

## 2018-01-18 RX ADMIN — CEFTRIAXONE SODIUM 1 G: 1 INJECTION, SOLUTION INTRAVENOUS at 00:06

## 2018-01-18 RX ADMIN — PERFLUTREN 2 ML: 6.52 INJECTION, SUSPENSION INTRAVENOUS at 14:15

## 2018-01-18 RX ADMIN — SODIUM CHLORIDE 125 ML/HR: 9 INJECTION, SOLUTION INTRAVENOUS at 00:04

## 2018-01-18 RX ADMIN — SODIUM CHLORIDE 125 ML/HR: 9 INJECTION, SOLUTION INTRAVENOUS at 08:48

## 2018-01-18 RX ADMIN — INSULIN ASPART 2 UNITS: 100 INJECTION, SOLUTION INTRAVENOUS; SUBCUTANEOUS at 21:47

## 2018-01-18 RX ADMIN — TAMSULOSIN HYDROCHLORIDE 0.4 MG: 0.4 CAPSULE ORAL at 15:16

## 2018-01-18 RX ADMIN — FINASTERIDE 5 MG: 5 TABLET, FILM COATED ORAL at 07:31

## 2018-01-18 RX ADMIN — BISACODYL 10 MG: 10 SUPPOSITORY RECTAL at 11:49

## 2018-01-18 RX ADMIN — METOPROLOL SUCCINATE 12.5 MG: 25 TABLET, FILM COATED, EXTENDED RELEASE ORAL at 07:31

## 2018-01-18 RX ADMIN — TAMSULOSIN HYDROCHLORIDE 0.4 MG: 0.4 CAPSULE ORAL at 15:49

## 2018-01-18 RX ADMIN — DOCUSATE SODIUM -SENNOSIDES 1 TABLET: 50; 8.6 TABLET, COATED ORAL at 21:46

## 2018-01-18 RX ADMIN — POLYETHYLENE GLYCOL 3350 17 G: 17 POWDER, FOR SOLUTION ORAL at 20:01

## 2018-01-18 RX ADMIN — HYDROCODONE BITARTRATE AND ACETAMINOPHEN 2 TABLET: 5; 325 TABLET ORAL at 16:40

## 2018-01-18 RX ADMIN — TAMSULOSIN HYDROCHLORIDE 0.4 MG: 0.4 CAPSULE ORAL at 21:46

## 2018-01-18 RX ADMIN — HYDROCODONE BITARTRATE AND ACETAMINOPHEN 1 TABLET: 5; 325 TABLET ORAL at 02:29

## 2018-01-18 RX ADMIN — INSULIN ASPART 2 UNITS: 100 INJECTION, SOLUTION INTRAVENOUS; SUBCUTANEOUS at 08:58

## 2018-01-18 RX ADMIN — HYDROCODONE BITARTRATE AND ACETAMINOPHEN 2 TABLET: 5; 325 TABLET ORAL at 21:46

## 2018-01-18 RX ADMIN — CYCLOBENZAPRINE HYDROCHLORIDE 10 MG: 10 TABLET, FILM COATED ORAL at 04:17

## 2018-01-18 RX ADMIN — AMIODARONE HYDROCHLORIDE 400 MG: 200 TABLET ORAL at 15:46

## 2018-01-18 RX ADMIN — TAZOBACTAM SODIUM AND PIPERACILLIN SODIUM 3.38 G: 375; 3 INJECTION, SOLUTION INTRAVENOUS at 21:46

## 2018-01-18 RX ADMIN — HYDROCODONE BITARTRATE AND ACETAMINOPHEN 1 TABLET: 5; 325 TABLET ORAL at 06:41

## 2018-01-18 RX ADMIN — TAZOBACTAM SODIUM AND PIPERACILLIN SODIUM 3.38 G: 375; 3 INJECTION, SOLUTION INTRAVENOUS at 15:49

## 2018-01-18 NOTE — PROGRESS NOTES
Postop day 3: He is confused to place and time, but denies headache or leg pain.  He has some back discomfort and complains of abdominal bloating and a time shortness of breath.  Indeed the has rotund abdomen and has had no gas or bowel movements.  He does move the legs well.  Reviewed notes.  Multiple lab abnormalities including creatinine which is elevating and sodium which is 128.  From my standpoint we can help his ileus by mobilization and minimization of narcotics.  An NG tube will be placed for now and echocardiogram is planned this morning.  When he is discharged she will go to rehabilitation.

## 2018-01-18 NOTE — PROGRESS NOTES
Hospital Follow Up    LOS:  LOS: 3 days   Patient Name: Osvaldo Lopez  Age/Sex: 82 y.o. male  : 1935  MRN: [id number]    Day of Service: 18   Length of Stay: 3  Encounter Provider: Angelo Driscoll MD  Place of Service: [facility] CARDIOLOGY  Patient Care Team:  Caio Rodríguez Jr., MD as PCP - General (Family Medicine)  Caio Rodríguez Jr., MD as PCP - Claims Attributed  Dontrell Arellano MD as Surgeon (Orthopedic Surgery)  Angelo Driscoll MD as Consulting Physician (Cardiology)  Darvin Alvarez MD as Consulting Physician (Urology)    Subjective:     Chief Complaint: Paroxysmal atrial fibrillation.    Interval History: Status post back surgery with repair of dura.  Patient had a history of paroxysmal atrial fibrillation preop went into atrial fibrillation.  He is now developed an ileus as an NG tube in.  He went back into sinus rhythm yesterday afternoon but has reverted back to atrial fibrillation this morning.    Objective:     Objective:  Temp:  [98.2 °F (36.8 °C)-100 °F (37.8 °C)] 98.4 °F (36.9 °C)  Heart Rate:  [] 122  Resp:  [18-22] 18  BP: (104-117)/(61-71) 112/61     Intake/Output Summary (Last 24 hours) at 18 1419  Last data filed at 18 0848   Gross per 24 hour   Intake             3554 ml   Output              950 ml   Net             2604 ml     Body mass index is 31.8 kg/(m^2).  Last 3 weights    01/15/18  1237   Weight: 103 kg (228 lb)     Weight change:       Physical Exam:   General : Alert, ngt in place  Neuro: alert,cooperative and oriented  Lungs: CTAB. Normal respiratory effort and rate.  CV:: irregular rate and rhythm, normal S1 and S2, no murmurs, gallops or rubs.  ABD: Soft, nontender, non-distended. positive bowel sounds  Extr: No edema or cyanosis, moves all extremities    Lab Review:     Results from last 7 days  Lab Units 18  0533 18  6906   SODIUM mmol/L 128* 130*   POTASSIUM mmol/L 4.3 4.9   CHLORIDE mmol/L 95*  96*   CO2 mmol/L 25.2 23.0   BUN mg/dL 22 22   CREATININE mg/dL 1.25 1.28*   GLUCOSE mg/dL 160* 182*   CALCIUM mg/dL 7.9* 8.2*       Results from last 7 days  Lab Units 01/18/18  0533 01/17/18  1118   TROPONIN T ng/mL 0.050* 0.086*       Results from last 7 days  Lab Units 01/18/18  0435 01/17/18  1118   WBC 10*3/mm3 6.21 7.89   HEMOGLOBIN g/dL 10.5* 11.1*   HEMATOCRIT % 32.6* 34.7*   PLATELETS 10*3/mm3 113* 122*           Results from last 7 days  Lab Units 01/17/18  1929   MAGNESIUM mg/dL 2.0           Results from last 7 days  Lab Units 01/17/18  1118   PROBNP pg/mL 1020.0         I reviewed the patient's new clinical results.  I personally viewed and interpreted the patient's EKG  Current Medications:   Scheduled Meds:  amiodarone 150 mg Intravenous Once   finasteride 5 mg Oral QAM   insulin aspart 0-9 Units Subcutaneous 4x Daily With Meals & Nightly   metoprolol succinate XL 12.5 mg Oral QAM   piperacillin-tazobactam 3.375 g Intravenous Once   piperacillin-tazobactam 3.375 g Intravenous Q8H   sennosides-docusate sodium 1 tablet Oral Nightly   tamsulosin 0.4 mg Oral BID     Continuous Infusions:  amiodarone 1 mg/min    sodium chloride 125 mL/hr Last Rate: 125 mL/hr (01/18/18 0848)       Allergies:  Allergies   Allergen Reactions   • Percocet [Oxycodone-Acetaminophen]      Causes confusion       Assessment:     Principal Problem:    Spinal stenosis of lumbar region with neurogenic claudication  Active Problems:    Essential hypertension    Type 2 diabetes mellitus without complication    Paroxysmal a-fib    BPH (benign prostatic hyperplasia)    Tachycardia    Acute respiratory failure with hypoxia        Plan:     1.  Paroxysmal atrial fibrillation.  2.  Status post spinal surgery with dural repair.  Okay to hold off anticoagulation for 96 hours per request of surgery.  3.  Ileus now with NG tube and nothing by mouth.  Will place on IV amiodarone.  4.  CT scan negative for a PE  5.  Will follow    Angelo LOZANO  MD [place]  01/18/18  2:19 PM

## 2018-01-18 NOTE — PROGRESS NOTES
"      [facility]         Dr Urias Sayied   LOS: 3 days   Patient Care Team:  Caio Rodríguez Jr., MD as PCP - General (Family Medicine)  Caio Rodríguez Jr., MD as PCP - Claims Attributed  Dontrell Arellano MD as Surgeon (Orthopedic Surgery)  Angelo Driscoll MD as Consulting Physician (Cardiology)  Darvin Alvarez MD as Consulting Physician (Urology)    Chief Complaint: Acute hypoxemic respiratory failure in the setting off abdominal distention with secondary atelectasis status post laminectomy for spinal stenosis    Interval History: Feels less distended.  Oxygen requirements have decreased post NG tube placement.  Still remains somewhat confused.    REVIEW OF SYSTEMS:   Confused and unreliable    Ventilator/Non-Invasive Ventilation Settings     None            Vital Signs  Temp:  [98.2 °F (36.8 °C)-100 °F (37.8 °C)] 98.4 °F (36.9 °C)  Heart Rate:  [] 122  Resp:  [18-22] 18  BP: (104-117)/(61-71) 112/61    Intake/Output Summary (Last 24 hours) at 01/18/18 1345  Last data filed at 01/18/18 0848   Gross per 24 hour   Intake             3554 ml   Output              950 ml   Net             2604 ml     Flowsheet Rows         First Filed Value    Admission Height  180.3 cm (71\") Documented at 01/15/2018 1237    Admission Weight  103 kg (228 lb) Documented at 01/15/2018 1237          Physical Exam:   General Appearance:    Confused but following simple commands.     Lungs:     Diminished breath sounds.      Heart:    Regular rhythm and normal rate, normal S1 and S2, no            murmur, no gallop, no rub, no click   Chest Wall:    No abnormalities observed   Abdomen:    Mild distention.  Hyperactive bowel sounds noted.     Extremities:   Moves all extremities well, no edema, no cyanosis, no             redness     Results Review:          Results from last 7 days  Lab Units 01/18/18  0533 01/17/18  2056 01/17/18  0507   SODIUM mmol/L 128* 130* 133*   POTASSIUM mmol/L 4.3 4.9 4.3   CHLORIDE mmol/L 95* " 96* 97*   CO2 mmol/L 25.2 23.0 26.8   BUN mg/dL 22 22 15   CREATININE mg/dL 1.25 1.28* 0.99   GLUCOSE mg/dL 160* 182* 154*   CALCIUM mg/dL 7.9* 8.2* 8.0*       Results from last 7 days  Lab Units 01/18/18  0533 01/17/18  1118   TROPONIN T ng/mL 0.050* 0.086*       Results from last 7 days  Lab Units 01/18/18  0435 01/17/18  1118 01/17/18  0507   WBC 10*3/mm3 6.21 7.89  --    HEMOGLOBIN g/dL 10.5* 11.1* 11.3*   HEMATOCRIT % 32.6* 34.7* 35.2*   PLATELETS 10*3/mm3 113* 122*  --                Results from last 7 days  Lab Units 01/17/18  1929   MAGNESIUM mg/dL 2.0           Results from last 7 days  Lab Units 01/18/18  0542   PH, ARTERIAL pH units 7.328*   PO2 ART mm Hg 90.5   PCO2, ARTERIAL mm Hg 46.6*   HCO3 ART mmol/L 24.5       I reviewed the patient's new clinical results.  I personally viewed and interpreted the patient's CXR        Medication Review:     amiodarone 150 mg Intravenous Once   finasteride 5 mg Oral QAM   insulin aspart 0-9 Units Subcutaneous 4x Daily With Meals & Nightly   metoprolol succinate XL 12.5 mg Oral QAM   piperacillin-tazobactam 3.375 g Intravenous Once   piperacillin-tazobactam 3.375 g Intravenous Q8H   sennosides-docusate sodium 1 tablet Oral Nightly   tamsulosin 0.4 mg Oral BID         amiodarone 1 mg/min    sodium chloride 125 mL/hr Last Rate: 125 mL/hr (01/18/18 0848)       ASSESSMENT:   Acute hypoxemic respiratory failure  Altered mental status  Suspect atelectasis due to abdominal distention  Suspect ileus  Status post laminectomy for spinal stenosis  Paroxysmal atrial fibrillation  Diabetes mellitus  Hyponatremia    PLAN:  Respiratory failure likely multifactorial with atelectasis and questionable pneumonia.  Physical calcitonin came back high we'll empirically, with antibiotics until cultures are back.  CT abdomen reviewed colonic distention noted.  We will try an MRI and see if there is any improvement.  Possibly discontinue NG tube in the next 24 hours  Aggressive pulmonate  toilet with physical therapy  Discussed plan of care with patient's wife in orthopedics  Hyponatremia likely from IV fluids.  IV fluids have been switched to normal saline now.    Adonay Crowley MD  01/18/18  1:45 PM

## 2018-01-18 NOTE — CONSULTS
Group: [facility]         CONSULT NOTE    Patient Identification:  Osvaldo Lopez  82 y.o.  male  1935  [id number]            Requesting physician: Orthopedic    Reason for Consultation:  Worsening hypoxemic respiratory failure    CC:     History of Present Illness:  Very pleasant 82-year-old gentleman no previous history of lung problems admitted for back surgery.  Patient underwent L2-3, L3 4 laminectomy.  Postop patient with increased oxygen requirement.  Per nursing staff no significant pain medications given.  Also reported to be more confused with increased oxygen requirement.  Patient currently little confused but wakes up follow simple commands.  He denies any significant cough or purulent sputum.  He does have significant abdominal distention which according to the family has been ongoing worse in the last 24 hours.  No nausea vomiting diarrhea or aspiration was reported.  Currently on high flow oxygen 7 L.      Review of Systems  As above and limited with the patient's present confusion.  Past Medical History:  Past Medical History:   Diagnosis Date   • Abnormal thyroid blood test    • Aneurysm of abdominal aorta    • Arthritis    • Atrial fibrillation    • BPH (benign prostatic hyperplasia)    • Bruises easily    • Bulging of thoracic intervertebral disc    • Coronary artery disease    • Diabetes mellitus     type 2   • Diverticular disease    • Edema     LOWER LEGS   • Enlarged prostate without lower urinary tract symptoms (luts)    • Essential hypertension    • Fatigue    • History of MI (myocardial infarction) 1989   • Hyperactivity of bladder    • Hyperlipidemia    • Hypertension    • Left shoulder pain    • Lumbar radiculopathy 2016    wears back brace at times following back surgery   • Muscle weakness (generalized)    • Osteoarthritis    • Personal history of arthritis    • Screening      stop bang scale 5   • Torn rotator cuff     left   • Type 2 diabetes mellitus    •  Urinary frequency        Past Surgical History:  Past Surgical History:   Procedure Laterality Date   • CARDIAC SURGERY      CABGX5 (1989)   • CATARACT EXTRACTION Bilateral 1997   • CYST REMOVAL      FROM BACK   • GALLBLADDER SURGERY  1989   • HERNIA REPAIR Left     inquinal times 3  last one in 2003   • KNEE CARTILAGE SURGERY Left 1970's   • LUMBAR DISCECTOMY FUSION INSTRUMENTATION N/A 4/11/2016    Procedure: lumbar laminectomy L4-5 and fusion with instrumentation;  Surgeon: Ran Kline MD;  Location: [facility];  Service:    • LUMBAR DISCECTOMY FUSION INSTRUMENTATION N/A 1/15/2018    Procedure: L2-3, L3-4 laminectomy and fusion with instrumentation and removal of implants L4 5.;  Surgeon: Ran Kline MD;  Location: [facility] OR;  Service:    • LA TOTAL KNEE ARTHROPLASTY Left 11/14/2016    Procedure: TOTAL KNEE ARTHROPLASTY;  Surgeon: Dontrell Arellano MD;  Location: [facility];  Service: Orthopedics        Home Meds:  Prescriptions Prior to Admission   Medication Sig Dispense Refill Last Dose   • amiodarone (PACERONE) 200 MG tablet Take 1 tablet by mouth Daily. (Patient taking differently: Take 100 mg by mouth Daily.) 90 tablet 3 1/15/2018 at 0800   • amLODIPine (NORVASC) 5 MG tablet Take 5 mg by mouth Every Morning.   1/15/2018 at 0800   • calcium polycarbophil (FIBERCON) 625 MG tablet Take 1,250 mg by mouth Every Morning.   1/7/2018   • finasteride (PROSCAR) 5 MG tablet Take 5 mg by mouth Every Morning.   1/14/2018 at 0800   • lisinopril (PRINIVIL,ZESTRIL) 40 MG tablet Take 40 mg by mouth Daily.   1/14/2018 at 0800   • metFORMIN (GLUCOPHAGE) 500 MG tablet Take 1 tablet by mouth 2 (Two) Times a Day With Meals. 180 tablet 3 1/14/2018 at 1800   • metoprolol succinate XL (TOPROL-XL) 25 MG 24 hr tablet Take 25 mg by mouth Every Morning.   1/15/2018 at 0800   • Multiple Vitamin (MULTI VITAMIN PO) Take 1 tablet by mouth Every Morning.   1/7/2018 at Unknown time   • Omega-3 Fatty Acids (FISH OIL) 1200 MG  "capsule capsule Take 1 tablet by mouth Daily With Breakfast.   1/7/2018   • rivaroxaban (XARELTO) 20 MG tablet Take 20 mg by mouth Daily.   1/7/2018   • tamsulosin (FLOMAX) 0.4 MG capsule 24 hr capsule Two capsules daily for prostate (Patient taking differently: Take 1 capsule by mouth 2 (Two) Times a Day.) 180 capsule 3 1/14/2018 at 1800   • vitamin B-12 (CYANOCOBALAMIN) 1000 MCG tablet Take 1,000 mcg by mouth Daily.   1/7/2018       Allergies:  Allergies   Allergen Reactions   • Percocet [Oxycodone-Acetaminophen]      Causes confusion       Social History:   Social History     Social History   • Marital status:      Spouse name: N/A   • Number of children: N/A   • Years of education: N/A     Occupational History   • Not on file.     Social History Main Topics   • Smoking status: Former Smoker     Years: 36.00     Types: Cigarettes     Quit date: 1989   • Smokeless tobacco: Never Used      Comment: states smoked 2-3 ppd   • Alcohol use Yes      Comment: 2-3 times weekly  //  caffeine use   • Drug use: No   • Sexual activity: Defer     Other Topics Concern   • Not on file     Social History Narrative       Family History:  Family History   Problem Relation Age of Onset   • Heart failure Mother        Physical Exam:  /62 (BP Location: Left arm, Patient Position: Lying)  Pulse 101  Temp 100 °F (37.8 °C) (Oral)   Resp 19  Ht 180.3 cm (71\")  Wt 103 kg (228 lb)  SpO2 94%  BMI 31.8 kg/m2 Body mass index is 31.8 kg/(m^2). 94% 103 kg (228 lb)  Physical Exam  Elderly gentleman resting comfortably no distress no labored breathing  Neck is supple no bruit no adenopathy  Chest diminished breath sound no wheezing or rales  CVS regular rate and rhythm no murmurs or gallop tachycardic with sinus tach on the monitor  Abdomen distended and firm.  Tympanic on percussion.  No masses felt.  Diffuse generalized tenderness.  Extremities no edema the sinuses no clubbing  CNS no deficits  No hallucination and delusional " joint deformities or skin rashes    LABS:  Lab Results   Component Value Date    CALCIUM 8.0 (L) 01/17/2018     Results from last 7 days  Lab Units 01/17/18  1118 01/17/18  0507 01/16/18  0519   SODIUM mmol/L  --  133*  --    POTASSIUM mmol/L  --  4.3  --    CHLORIDE mmol/L  --  97*  --    CO2 mmol/L  --  26.8  --    BUN mg/dL  --  15  --    CREATININE mg/dL  --  0.99  --    GLUCOSE mg/dL  --  154*  --    CALCIUM mg/dL  --  8.0*  --    WBC 10*3/mm3 7.89  --   --    HEMOGLOBIN g/dL 11.1* 11.3* 12.7*   PLATELETS 10*3/mm3 122*  --   --    PROBNP pg/mL 1020.0  --   --      Lab Results   Component Value Date    CKTOTAL 217 (H) 08/16/2017    TROPONINT 0.086 (H) 01/17/2018       Results from last 7 days  Lab Units 01/17/18  1118   TROPONIN T ng/mL 0.086*               Results from last 7 days  Lab Units 01/17/18  1200   PH, ARTERIAL pH units 7.354   PCO2, ARTERIAL mm Hg 47.4*   PO2 ART mm Hg 79.6*   FLOW RATE lpm 10   MODALITY  Cannula   O2 SATURATION CALC % 94.9                 Lab Results   Component Value Date    TSH 4.490 (H) 08/16/2017     Estimated Creatinine Clearance: 72.7 mL/min (by C-G formula based on Cr of 0.99).    Results from last 7 days  Lab Units 01/17/18  1751   NITRITE UA  Negative   WBC UA /HPF 0-2   BACTERIA UA /HPF None Seen   SQUAM EPITHEL UA /HPF None Seen        Imaging: I personally visualized the images of scans/x-rays performed within last 3 days.      Assessment:  Acute hypoxemic respiratory failure  Suspect atelectasis due to abdominal distention  Suspect ileus  Status post laminectomy for spinal stenosis  Paroxysmal atrial fibrillation  Diabetes mellitus      Recommendations:  At this point I believe etiology for hypoxemic respiratory failure likely due to abdominal distention causing atelectasis.  Will check KUB and electrolytes  Decompress abdomen if okay with surgery  Wean oxygen down as tolerated  Check stat pro-calcitonin.  There is no clear evidence of pneumonia such.  His pro  calcitonin comes back higher may consider starting antibiotics.  Discussed with family in updated situation          Adonay Crowley MD  1/17/2018  7:35 PM      Much of this encounter note is an electronic transcription/translation of spoken language to printed text using Dragon Software.

## 2018-01-18 NOTE — PLAN OF CARE
Problem: Patient Care Overview (Adult)  Goal: Plan of Care Review  Outcome: Ongoing (interventions implemented as appropriate)   01/18/18 1324   Coping/Psychosocial Response Interventions   Plan Of Care Reviewed With patient   Patient Care Overview   Progress unable to show any progress toward functional goals   Outcome Evaluation   Outcome Summary/Follow up Plan pt with multiple medical issues at this time, he was very lethargic with few verbalizations, pt requ max a x 2 for bed mobility and was unable to stand with assist of 2, will follow and progress as able

## 2018-01-18 NOTE — THERAPY TREATMENT NOTE
Acute Care - Physical Therapy Treatment Note  [facility]     Patient Name: Osvaldo Lopze  : 1935  MRN: [id number]  Today's Date: 2018  Onset of Illness/Injury or Date of Surgery Date: 01/15/18  Date of Referral to PT: 17  Referring Physician: Eliud    Admit Date: 1/15/2018    Visit Dx:    ICD-10-CM ICD-9-CM   1. Difficulty walking R26.2 719.7   2. Spinal stenosis of lumbar region with neurogenic claudication M48.062 724.03     Patient Active Problem List   Diagnosis   • Midline low back pain   • Complete rotator cuff tear of left shoulder   • Difficulty walking   • Abnormal finding on thyroid function test   • Abdominal aortic aneurysm   • Atrial fibrillation   • Benign prostatic hyperplasia   • Edema   • Essential hypertension   • Fatigue   • Hyperlipidemia   • Shoulder pain   • Lumbar radiculopathy   • Muscle weakness   • Osteoarthritis   • Type 2 diabetes mellitus without complication   • Primary osteoarthritis of left knee   • OA (osteoarthritis) of knee   • Toxic encephalopathy   • Paroxysmal a-fib   • HTN (hypertension)   • Type 2 diabetes mellitus   • BPH (benign prostatic hyperplasia)   • Postoperative anemia due to acute blood loss   • Cardiac murmur   • Complete tear of left rotator cuff   • Tear of right rotator cuff   • Spinal stenosis of lumbar region with neurogenic claudication   • Tachycardia   • Acute respiratory failure with hypoxia               Adult Rehabilitation Note       18 1615 18 1318       Rehab Assessment/Intervention    Discipline physical therapist  -PC physical therapist  -PC     Document Type therapy note (daily note)  -PC therapy note (daily note)  -PC     Subjective Information agree to therapy;complains of;weakness;fatigue;pain  -PC --   lethargic, decreased responsiveness  -PC     Patient Effort, Rehab Treatment fair  -PC fair  -PC     Patient Effort, Rehab Treatment Comment pt more alert this afternoon, following commands better and engaging  in conversation  -PC      Symptoms Noted During/After Treatment fatigue;increased pain  -PC fatigue;increased pain  -PC     Symptoms Noted Comment  pt now with ileus, has ng tube, just got a suppository, and will be going for a CT soon, also still on high flow o2  -PC     Precautions/Limitations fall precautions;brace on when up  -PC fall precautions;brace on when up  -PC     Recorded by [PC] Mildred Nolan PT [PC] Mildred Nolan PT     Vital Signs    Pre SpO2 (%)  96  -PC     O2 Delivery Pre Treatment  supplemental O2  -PC     Intra SpO2 (%)  93  -PC     O2 Delivery Intra Treatment  supplemental O2  -PC     Post SpO2 (%) 91  -PC      O2 Delivery Post Treatment supplemental O2  -PC      Recorded by [PC] Mildred Nolan PT [PC] Mildred Nolan PT     Pain Assessment    Pain Assessment FLACC  -PC FLACC  -PC     Pain Score 5  -PC 6  -PC     Pain Location Back  -PC Back  -PC     Pain Orientation Lower  -PC Lower  -PC     Pain Intervention(s)  Medication (See MAR);Repositioned  -PC     Recorded by [PC] Mildred Nolan PT [PC] Mildred Nolan PT     Cognitive Assessment/Intervention    Current Cognitive/Communication Assessment impaired  -PC impaired  -PC     Orientation Status oriented to;person  -PC unable/difficult to assess  -PC     Follows Commands/Answers Questions needs cueing;needs increased time;needs repetition;able to follow single-step instructions;75% of the time  -PC needs cueing;needs increased time;needs repetition;able to follow single-step instructions;50% of the time  -PC     Personal Safety decreased awareness, need for safety;severe impairment  -PC decreased awareness, need for assist  -PC     Personal Safety Interventions fall prevention program maintained;gait belt  -PC fall prevention program maintained;gait belt  -PC     Recorded by [PC] Mildred Nolan PT [PC] Mildred Nolan PT     Bed Mobility, Assessment/Treatment    Bed Mobility, Roll Left, [place] maximum assist (25% patient effort);2  person assist required  -PC maximum assist (25% patient effort);2 person assist required  -PC     Bed Mob, Sidelying to Sit, [place] maximum assist (25% patient effort);2 person assist required  -PC maximum assist (25% patient effort);2 person assist required  -PC     Bed Mob, Sit to Sidelying, [place] maximum assist (25% patient effort);2 person assist required  -PC maximum assist (25% patient effort);2 person assist required  -PC     Recorded by [PC] Mildred Nolan PT [PC] Mildred Nolan PT     Transfer Assessment/Treatment    Transfers, Sit-Stand [place] maximum assist (25% patient effort);2 person assist required  -PC maximum assist (25% patient effort);2 person assist required  -PC     Transfers, Stand-Sit [place] maximum assist (25% patient effort);2 person assist required  -PC      Transfers, Sit-Stand-Sit, Assist Device elevated surface;rolling walker  -PC      Transfer, Comment sit to stand x 2, able to clear buttocks and almost come to full stand but knees and hips flexed  -PC 2 standing attempts, pt unable to come to stand, unable to lift buttocks off bed  -PC     Recorded by [PC] Mildred Nolan PT [PC] Mildred Nolan PT     Motor Skills/Interventions    Additional Documentation Balance Skills Training (Group)  -PC      Recorded by [PC] Mildred Nolan PT      Balance Skills Training    Sitting-Level of Assistance Minimum assistance;Moderate assistance  -PC      Sitting-Balance Support Feet supported;Left upper extremity supported;Right upper extremity supported  -PC      Sitting-Balance Activities Trunk control activities   pt leaning posterior, continual verbal/tactile cues needed  -PC      Sitting # of Minutes 10  -PC      Standing-Level of Assistance Maximum assistance;x2  -PC      Static Standing Balance Support assistive device  -PC      Recorded by [PC] Mildred Nolan PT      Positioning and Restraints    Pre-Treatment Position in bed  -PC in bed  -PC     Post Treatment  Position bed  -PC bed  -PC     In Bed supine;call light within reach;encouraged to call for assist;with family/caregiver;notified nsg  -PC supine;call light within reach;encouraged to call for assist;with family/caregiver   transport here to take pt to a STAT CT of abdomen  -PC     Recorded by [PC] Mildred Nolan, PT [PC] Mildred Nolan, PT       User Key  (r) = Recorded By, (t) = Taken By, (c) = Cosigned By    Initials Name Effective Dates    PC Mildred Nolan, PT 12/01/15 -                 IP PT Goals       01/17/18 1005          Bed Mobility PT STG    Bed Mobility PT STG, Date Established 01/17/18  -SV      Bed Mobility PT STG, Time to Achieve 2 wks  -SV      Bed Mobility PT STG, Activity Type sidelying to sit/sit to sidelying  -SV      Bed Mobility PT STG, [place] Level minimum assist (75% patient effort);contact guard assist  -SV      Transfer Training PT STG    Transfer Training PT STG, Date Established 01/17/18  -SV      Transfer Training PT STG, Time to Achieve 2 wks  -SV      Transfer Training PT STG, Activity Type sit to stand/stand to sit;bed to chair /chair to bed  -SV      Transfer Training PT STG, [place] Level minimum assist (75% patient effort)  -SV      Transfer Training PT STG, Assist Device walker, rolling  -SV        User Key  (r) = Recorded By, (t) = Taken By, (c) = Cosigned By    Initials Name Provider Type    SV Sabina Middleton, PT Physical Therapist          Physical Therapy Education     Title: PT OT SLP Therapies (Active)     Topic: Physical Therapy (Active)     Point: Mobility training (Active)    Learning Progress Summary    Learner Readiness Method Response Comment Documented by Status   Patient Acceptance E,D NR  PC 01/18/18 1324 Active    Acceptance E,TB,D NR,VU  SV 01/17/18 1004 Done   Family Acceptance E,TB,D NR,VU  SV 01/17/18 1004 Done               Point: Home exercise program (Active)    Learning Progress Summary    Learner Readiness Method Response Comment Documented  by Status   Patient Acceptance E,D NR   01/18/18 1324 Active    Acceptance E,TB,D NR,VU  SV 01/17/18 1004 Done   Family Acceptance E,TB,D NR,VU  SV 01/17/18 1004 Done               Point: Body mechanics (Active)    Learning Progress Summary    Learner Readiness Method Response Comment Documented by Status   Patient Acceptance E,D NR  PC 01/18/18 1324 Active    Acceptance E,TB,D NR,VU  SV 01/17/18 1004 Done   Family Acceptance E,TB,D NR,VU  SV 01/17/18 1004 Done               Point: Precautions (Active)    Learning Progress Summary    Learner Readiness Method Response Comment Documented by Status   Patient Acceptance E,D NR   01/18/18 1324 Active    Acceptance E,TB,D NR,VU  SV 01/17/18 1004 Done   Family Acceptance E,TB,D NR,VU  SV 01/17/18 1004 Done                      User Key     Initials Effective Dates Name Provider Type Discipline     12/01/15 -  Mildred Nolan, PT Physical Therapist PT     01/17/16 -  Sabina Middleton, PT Physical Therapist PT                    PT Recommendation and Plan  Anticipated Discharge Disposition: skilled nursing facility  PT Frequency: 2 times/day  Plan of Care Review  Plan Of Care Reviewed With: patient  Progress: progress toward functional goals is gradual  Outcome Summary/Follow up Plan: pt more alert this afternoon, following directions and engaged in therapy session.  He was still very weak, and had difficulty maintaining sitting balance on edge of bed, he stood 2 times with max a x 2 , he was able to clear buttocks but did not come to a complete stand, not safe to attempt bed to chair transfer at this time          Outcome Measures       01/18/18 1600 01/18/18 1300 01/17/18 1000    How much help from another person do you currently need...    Turning from your back to your side while in flat bed without using bedrails? 2  -PC 1  -PC 2  -SV    Moving from lying on back to sitting on the side of a flat bed without bedrails? 2  -PC 1  -PC 1  -SV    Moving to and from a  bed to a chair (including a wheelchair)? 1  -PC 1  -PC 1  -SV    Standing up from a chair using your arms (e.g., wheelchair, bedside chair)? 1  -PC 1  -PC 1  -SV    Climbing 3-5 steps with a railing? 1  -PC 1  -PC 1  -SV    To walk in hospital room? 1  -PC 1  -PC 1  -SV    AM-PAC 6 Clicks Score 8  -PC 6  -PC 7  -SV    Functional Assessment    Outcome Measure Options   AM-PAC 6 Clicks Basic Mobility (PT)  -SV      User Key  (r) = Recorded By, (t) = Taken By, (c) = Cosigned By    Initials Name Provider Type    PC Mildred Nolan, PT Physical Therapist    SV Sabina Middleton, PT Physical Therapist           Time Calculation:         PT Charges       01/18/18 1629 01/18/18 1326       Time Calculation    Start Time 1547  -PC 1149  -PC     Stop Time 1610  -PC 1202  -PC     Time Calculation (min) 23 min  -PC 13 min  -PC     PT Received On 01/18/18  -PC 01/18/18  -PC     PT - Next Appointment 01/19/18  -PC 01/18/18  -PC       User Key  (r) = Recorded By, (t) = Taken By, (c) = Cosigned By    Initials Name Provider Type    PC Mildred Nolan, PT Physical Therapist          Therapy Charges for Today     Code Description Service Date Service Provider Modifiers Qty    07969323421 HC PT THER PROC EA 15 MIN 1/18/2018 Mildred Nolan, PT GP 1    76324434242 HC PT THER SUPP EA 15 MIN 1/18/2018 Mildreddalton Nolan, PT GP 1    88001626561 HC PT THER PROC EA 15 MIN 1/18/2018 Mildred G Ovidio, PT GP 2    97420330660 HC PT THER SUPP EA 15 MIN 1/18/2018 Mildreddalton Nolan, PT GP 1          PT G-Codes  Outcome Measure Options: AM-PAC 6 Clicks Basic Mobility (PT)    Mildred Nolan, PT  1/18/2018

## 2018-01-18 NOTE — PLAN OF CARE
Problem: Patient Care Overview (Adult)  Goal: Plan of Care Review  Outcome: Ongoing (interventions implemented as appropriate)   01/18/18 0536   Coping/Psychosocial Response Interventions   Plan Of Care Reviewed With patient;daughter   Patient Care Overview   Progress improving   Outcome Evaluation   Outcome Summary/Follow up Plan vitals stable. pt expressed pain. meds given per orders. Dr Mendosa notified of critical lab value, pt status, orders, and Dr Crowley's note. orders received. blood and urine cultures are pending from earlier today. RN attempted to contact Dr Cannon regarding pt status, KUB, and possible NGT placement, but he never called back. will continue to monitor.        Problem: Fall Risk (Adult)  Goal: Identify Related Risk Factors and Signs and Symptoms  Outcome: Ongoing (interventions implemented as appropriate)    Goal: Absence of Falls  Outcome: Ongoing (interventions implemented as appropriate)      Problem: Pain, Acute (Adult)  Goal: Identify Related Risk Factors and Signs and Symptoms  Outcome: Ongoing (interventions implemented as appropriate)    Goal: Acceptable Pain Control/Comfort Level  Outcome: Ongoing (interventions implemented as appropriate)      Problem: Skin Integrity Impairment, Risk/Actual (Adult)  Goal: Identify Related Risk Factors and Signs and Symptoms  Outcome: Ongoing (interventions implemented as appropriate)    Goal: Skin Integrity/Wound Healing  Outcome: Ongoing (interventions implemented as appropriate)

## 2018-01-18 NOTE — THERAPY TREATMENT NOTE
Acute Care - Physical Therapy Treatment Note  [facility]     Patient Name: Osvaldo Lopez  : 1935  MRN: [id number]  Today's Date: 2018  Onset of Illness/Injury or Date of Surgery Date: 01/15/18  Date of Referral to PT: 17  Referring Physician: Eliud    Admit Date: 1/15/2018    Visit Dx:    ICD-10-CM ICD-9-CM   1. Difficulty walking R26.2 719.7   2. Spinal stenosis of lumbar region with neurogenic claudication M48.062 724.03     Patient Active Problem List   Diagnosis   • Midline low back pain   • Complete rotator cuff tear of left shoulder   • Difficulty walking   • Abnormal finding on thyroid function test   • Abdominal aortic aneurysm   • Atrial fibrillation   • Benign prostatic hyperplasia   • Edema   • Essential hypertension   • Fatigue   • Hyperlipidemia   • Shoulder pain   • Lumbar radiculopathy   • Muscle weakness   • Osteoarthritis   • Type 2 diabetes mellitus without complication   • Primary osteoarthritis of left knee   • OA (osteoarthritis) of knee   • Toxic encephalopathy   • Paroxysmal a-fib   • HTN (hypertension)   • Type 2 diabetes mellitus   • BPH (benign prostatic hyperplasia)   • Postoperative anemia due to acute blood loss   • Cardiac murmur   • Complete tear of left rotator cuff   • Tear of right rotator cuff   • Spinal stenosis of lumbar region with neurogenic claudication   • Tachycardia   • Acute respiratory failure with hypoxia               Adult Rehabilitation Note       18 1318          Rehab Assessment/Intervention    Discipline physical therapist  -PC      Document Type therapy note (daily note)  -PC      Subjective Information --   lethargic, decreased responsiveness  -PC      Patient Effort, Rehab Treatment fair  -PC      Symptoms Noted During/After Treatment fatigue;increased pain  -PC      Symptoms Noted Comment pt now with ileus, has ng tube, just got a suppository, and will be going for a CT soon, also still on high flow o2  -PC       Precautions/Limitations fall precautions;brace on when up  -PC      Recorded by [PC] Mildred Nolan PT      Vital Signs    Pre SpO2 (%) 96  -PC      O2 Delivery Pre Treatment supplemental O2  -PC      Intra SpO2 (%) 93  -PC      O2 Delivery Intra Treatment supplemental O2  -PC      Recorded by [PC] Mildred Nolan PT      Pain Assessment    Pain Assessment FLACC  -PC      Pain Score 6  -PC      Pain Location Back  -PC      Pain Orientation Lower  -PC      Pain Intervention(s) Medication (See MAR);Repositioned  -PC      Recorded by [PC] Mildred Nolan PT      Cognitive Assessment/Intervention    Current Cognitive/Communication Assessment impaired  -PC      Orientation Status unable/difficult to assess  -PC      Follows Commands/Answers Questions needs cueing;needs increased time;needs repetition;able to follow single-step instructions;50% of the time  -PC      Personal Safety decreased awareness, need for assist  -PC      Personal Safety Interventions fall prevention program maintained;gait belt  -PC      Recorded by [PC] Mildred Nolan PT      Bed Mobility, Assessment/Treatment    Bed Mobility, Roll Left, [place] maximum assist (25% patient effort);2 person assist required  -PC      Bed Mob, Sidelying to Sit, [place] maximum assist (25% patient effort);2 person assist required  -PC      Bed Mob, Sit to Sidelying, [place] maximum assist (25% patient effort);2 person assist required  -PC      Recorded by [PC] Mildred Nolan PT      Transfer Assessment/Treatment    Transfers, Sit-Stand [place] maximum assist (25% patient effort);2 person assist required  -PC      Transfer, Comment 2 standing attempts, pt unable to come to stand, unable to lift buttocks off bed  -PC      Recorded by [PC] Mildred Nolan PT      Positioning and Restraints    Pre-Treatment Position in bed  -PC      Post Treatment Position bed  -PC      In Bed supine;call light within reach;encouraged to call for assist;with  family/caregiver   transport here to take pt to a STAT CT of abdomen  -PC      Recorded by [PC] Mildred Nolan, PT        User Key  (r) = Recorded By, (t) = Taken By, (c) = Cosigned By    Initials Name Effective Dates    PC Mildred Nolan, PT 12/01/15 -                 IP PT Goals       01/17/18 1005          Bed Mobility PT STG    Bed Mobility PT STG, Date Established 01/17/18  -SV      Bed Mobility PT STG, Time to Achieve 2 wks  -SV      Bed Mobility PT STG, Activity Type sidelying to sit/sit to sidelying  -SV      Bed Mobility PT STG, [place] Level minimum assist (75% patient effort);contact guard assist  -SV      Transfer Training PT STG    Transfer Training PT STG, Date Established 01/17/18  -SV      Transfer Training PT STG, Time to Achieve 2 wks  -SV      Transfer Training PT STG, Activity Type sit to stand/stand to sit;bed to chair /chair to bed  -SV      Transfer Training PT STG, [place] Level minimum assist (75% patient effort)  -SV      Transfer Training PT STG, Assist Device walker, rolling  -SV        User Key  (r) = Recorded By, (t) = Taken By, (c) = Cosigned By    Initials Name Provider Type     Sabina Middleton, PT Physical Therapist          Physical Therapy Education     Title: PT OT SLP Therapies (Active)     Topic: Physical Therapy (Active)     Point: Mobility training (Active)    Learning Progress Summary    Learner Readiness Method Response Comment Documented by Status   Patient Acceptance E,D NR  PC 01/18/18 1324 Active    Acceptance E,TB,D NR,VU  SV 01/17/18 1004 Done   Family Acceptance E,TB,D NR,VU  SV 01/17/18 1004 Done               Point: Home exercise program (Active)    Learning Progress Summary    Learner Readiness Method Response Comment Documented by Status   Patient Acceptance E,D NR  PC 01/18/18 1324 Active    Acceptance E,TB,D NR,VU  SV 01/17/18 1004 Done   Family Acceptance E,TB,D NR,VU  SV 01/17/18 1004 Done               Point: Body mechanics (Active)    Learning  Progress Summary    Learner Readiness Method Response Comment Documented by Status   Patient Acceptance E,D NR   01/18/18 1324 Active    Acceptance E,TB,D NR,VU  SV 01/17/18 1004 Done   Family Acceptance E,TB,D NR,VU  SV 01/17/18 1004 Done               Point: Precautions (Active)    Learning Progress Summary    Learner Readiness Method Response Comment Documented by Status   Patient Acceptance E,D NR  PC 01/18/18 1324 Active    Acceptance E,TB,D NR,VU  SV 01/17/18 1004 Done   Family Acceptance E,TB,D NR,VU  SV 01/17/18 1004 Done                      User Key     Initials Effective Dates Name Provider Type Discipline     12/01/15 -  Mildred Nolan, PT Physical Therapist PT     01/17/16 -  Sabina Middleton, PT Physical Therapist PT                    PT Recommendation and Plan  Anticipated Discharge Disposition: skilled nursing facility  PT Frequency: 2 times/day  Plan of Care Review  Plan Of Care Reviewed With: patient  Progress: unable to show any progress toward functional goals  Outcome Summary/Follow up Plan: pt with multiple medical issues at this time, he was very lethargic with few verbalizations, pt requ max a x 2 for bed mobility and was unable to stand with assist of 2, will follow and progress as able          Outcome Measures       01/18/18 1300 01/17/18 1000       How much help from another person do you currently need...    Turning from your back to your side while in flat bed without using bedrails? 1  -PC 2  -SV     Moving from lying on back to sitting on the side of a flat bed without bedrails? 1  -PC 1  -SV     Moving to and from a bed to a chair (including a wheelchair)? 1  -PC 1  -SV     Standing up from a chair using your arms (e.g., wheelchair, bedside chair)? 1  -PC 1  -SV     Climbing 3-5 steps with a railing? 1  -PC 1  -SV     To walk in hospital room? 1  -PC 1  -SV     AM-PAC 6 Clicks Score 6  -PC 7  -SV     Functional Assessment    Outcome Measure Options  AM-PAC 6 Clicks Basic  Mobility (PT)  -SV       User Key  (r) = Recorded By, (t) = Taken By, (c) = Cosigned By    Initials Name Provider Type    PC Mildred Nolan, PT Physical Therapist    SV Sabina Middleton, PT Physical Therapist           Time Calculation:         PT Charges       01/18/18 1326          Time Calculation    Start Time 1149  -PC      Stop Time 1202  -PC      Time Calculation (min) 13 min  -PC      PT Received On 01/18/18  -PC      PT - Next Appointment 01/18/18  -PC        User Key  (r) = Recorded By, (t) = Taken By, (c) = Cosigned By    Initials Name Provider Type    PC Mildred Nolan, PT Physical Therapist          Therapy Charges for Today     Code Description Service Date Service Provider Modifiers Qty    39236589819 HC PT THER PROC EA 15 MIN 1/18/2018 Mildred Nolan, PT GP 1    90567122010 HC PT THER SUPP EA 15 MIN 1/18/2018 Mildred Nolan, PT GP 1          PT G-Codes  Outcome Measure Options: AM-PAC 6 Clicks Basic Mobility (PT)    Mildred Nolan PT  1/18/2018

## 2018-01-18 NOTE — PLAN OF CARE
Problem: Patient Care Overview (Adult)  Goal: Plan of Care Review  Outcome: Ongoing (interventions implemented as appropriate)   01/18/18 0003   Coping/Psychosocial Response Interventions   Plan Of Care Reviewed With patient   Patient Care Overview   Progress progress toward functional goals is gradual   Outcome Evaluation   Outcome Summary/Follow up Plan pt more alert this afternoon, following directions and engaged in therapy session. He was still very weak, and had difficulty maintaining sitting balance on edge of bed, he stood 2 times with max a x 2 , he was able to clear buttocks but did not come to a complete stand, not safe to attempt bed to chair transfer at this time

## 2018-01-18 NOTE — PROGRESS NOTES
Name: Osvaldo Lopez ADMIT: 1/15/2018   : 1935  PCP: Caio Rodríguez Jr., MD    MRN: [id number] LOS: 3 days   AGE/SEX: 82 y.o. male  ROOM: [facility]     Subjective   Subjective  Pleasantly confused. Ate he had a good bowel movement today. Feels better with NGT in place.    Objective   Vital Signs  Temp:  [98.2 °F (36.8 °C)-100.1 °F (37.8 °C)] 98.2 °F (36.8 °C)  Heart Rate:  [] 123  Resp:  [18-22] 20  BP: ()/(51-71) 117/71  SpO2:  [89 %-97 %] 97 %  on  Flow (L/min):  [4-8] 4;   O2 Device: nasal cannula  Body mass index is 31.8 kg/(m^2).    Physical Exam   Constitutional: No distress.   HENT:   +NGT   Cardiovascular: Regular rhythm.  Tachycardia present.    Murmur heard.  Pulmonary/Chest: Effort normal and breath sounds normal.   Abdominal: Soft. Bowel sounds are normal. He exhibits distension. There is no tenderness.   Musculoskeletal: Normal range of motion. He exhibits no edema.   Neurological: He is alert.   Seems slightly confused.   Skin: Skin is warm and dry. He is not diaphoretic.       Results Review:       I reviewed the patient's new clinical results.    Results from last 7 days  Lab Units 18  0435 18  1118 18  0507 18  0519 01/15/18  2248   WBC 10*3/mm3 6.21 7.89  --   --   --    HEMOGLOBIN g/dL 10.5* 11.1* 11.3* 12.7* 13.3*   PLATELETS 10*3/mm3 113* 122*  --   --   --        Results from last 7 days  Lab Units 18  0533 18  2056 18  0507   SODIUM mmol/L 128* 130* 133*   POTASSIUM mmol/L 4.3 4.9 4.3   CHLORIDE mmol/L 95* 96* 97*   CO2 mmol/L 25.2 23.0 26.8   BUN mg/dL 22 22 15   CREATININE mg/dL 1.25 1.28* 0.99   GLUCOSE mg/dL 160* 182* 154*   Estimated Creatinine Clearance: 57.6 mL/min (by C-G formula based on Cr of 1.25).    Results from last 7 days  Lab Units 18  0533 186 18  19218  0507   CALCIUM mg/dL 7.9* 8.2*  --  8.0*   MAGNESIUM mg/dL  --   --  2.0  --        Lab Results   Component Value Date    HGBA1C  7.00 (H) 01/17/2018    HGBA1C 7.80 (H) 08/16/2017     Glucose   Date/Time Value Ref Range Status   01/18/2018 0741 170 (H) 70 - 130 mg/dL Final   01/17/2018 2123 194 (H) 70 - 130 mg/dL Final   01/17/2018 1703 165 (H) 70 - 130 mg/dL Final   01/17/2018 1106 189 (H) 70 - 130 mg/dL Final   01/17/2018 1002 205 (H) 70 - 130 mg/dL Final   01/17/2018 0729 155 (H) 70 - 130 mg/dL Final   01/16/2018 2053 174 (H) 70 - 130 mg/dL Final   01/16/2018 1643 155 (H) 70 - 130 mg/dL Final         amiodarone 400 mg Oral Q12H   ceftriaxone 1 g Intravenous Q24H   finasteride 5 mg Oral QAM   insulin aspart 0-9 Units Subcutaneous 4x Daily With Meals & Nightly   metoprolol succinate XL 12.5 mg Oral QAM   sennosides-docusate sodium 1 tablet Oral Nightly   tamsulosin 0.4 mg Oral BID       sodium chloride 125 mL/hr Last Rate: 125 mL/hr (01/18/18 0004)         Assessment/Plan   Active Hospital Problems (** Indicates Principal Problem)    Diagnosis Date Noted   • **Spinal stenosis of lumbar region with neurogenic claudication [M48.062] 12/07/2017   • Acute respiratory failure with hypoxia [J96.01] 01/18/2018   • Postoperative ileus [K91.89, K56.7] 01/18/2018   • Hyponatremia [E87.1] 01/18/2018   • Tachycardia [R00.0] 01/17/2018   • BPH (benign prostatic hyperplasia) [N40.0] 11/17/2016   • Paroxysmal a-fib [I48.0] 11/17/2016   • Essential hypertension [I10] 06/13/2016   • Type 2 diabetes mellitus without complication [E11.9] 06/13/2016      Resolved Hospital Problems    Diagnosis Date Noted Date Resolved   No resolved problems to display.       Mr. Lopez is a 82 y.o. male who is POD#3 L2-3, L3-4 laminectomy and fusion with instrumentation and removal of implants L4 5..    · Cardiology now following for intermittent nature fibrillation. Currently in sinus tachycardia.  · NGT is in place for postoperative ileus. Felt to be contributing to his hypoxia. Pulmonology is following. Continue incentive spirometry  · Now on empiric antibiotics for possible  pneumonia.  · Continue NOVOLOG moderate dose correctional factor as needed.  · Continue TOPROL and AMIODARONE. Taking meds with intermittent clamping of NGT.  · Continue to hold NORVASC and LISINOPRIL for now.  · Monitor renal status. Cr up slightly. On IV fluids.      Rao Marshall MD  [facility]  01/18/18  8:17 AM

## 2018-01-19 ENCOUNTER — APPOINTMENT (OUTPATIENT)
Dept: GENERAL RADIOLOGY | Facility: HOSPITAL | Age: 83
End: 2018-01-19
Attending: SURGERY

## 2018-01-19 LAB
ANION GAP SERPL CALCULATED.3IONS-SCNC: 12 MMOL/L
BACTERIA SPEC AEROBE CULT: NO GROWTH
BUN BLD-MCNC: 30 MG/DL (ref 8–23)
BUN/CREAT SERPL: 24.8 (ref 7–25)
CALCIUM SPEC-SCNC: 8 MG/DL (ref 8.6–10.5)
CHLORIDE SERPL-SCNC: 97 MMOL/L (ref 98–107)
CO2 SERPL-SCNC: 21 MMOL/L (ref 22–29)
CREAT BLD-MCNC: 1.21 MG/DL (ref 0.76–1.27)
DEPRECATED RDW RBC AUTO: 49.8 FL (ref 37–54)
ERYTHROCYTE [DISTWIDTH] IN BLOOD BY AUTOMATED COUNT: 13.8 % (ref 11.5–14.5)
GFR SERPL CREATININE-BSD FRML MDRD: 58 ML/MIN/1.73
GLUCOSE BLD-MCNC: 228 MG/DL (ref 65–99)
GLUCOSE BLDC GLUCOMTR-MCNC: 190 MG/DL (ref 70–130)
GLUCOSE BLDC GLUCOMTR-MCNC: 198 MG/DL (ref 70–130)
GLUCOSE BLDC GLUCOMTR-MCNC: 223 MG/DL (ref 70–130)
GLUCOSE BLDC GLUCOMTR-MCNC: 224 MG/DL (ref 70–130)
HCT VFR BLD AUTO: 29.5 % (ref 40.4–52.2)
HGB BLD-MCNC: 9.5 G/DL (ref 13.7–17.6)
MCH RBC QN AUTO: 31.9 PG (ref 27–32.7)
MCHC RBC AUTO-ENTMCNC: 32.2 G/DL (ref 32.6–36.4)
MCV RBC AUTO: 99 FL (ref 79.8–96.2)
PLATELET # BLD AUTO: 118 10*3/MM3 (ref 140–500)
PMV BLD AUTO: 10 FL (ref 6–12)
POTASSIUM BLD-SCNC: 4.2 MMOL/L (ref 3.5–5.2)
RBC # BLD AUTO: 2.98 10*6/MM3 (ref 4.6–6)
SODIUM BLD-SCNC: 130 MMOL/L (ref 136–145)
WBC NRBC COR # BLD: 5.77 10*3/MM3 (ref 4.5–10.7)

## 2018-01-19 PROCEDURE — 99233 SBSQ HOSP IP/OBS HIGH 50: CPT | Performed by: INTERNAL MEDICINE

## 2018-01-19 PROCEDURE — 97110 THERAPEUTIC EXERCISES: CPT

## 2018-01-19 PROCEDURE — 74018 RADEX ABDOMEN 1 VIEW: CPT

## 2018-01-19 PROCEDURE — 80048 BASIC METABOLIC PNL TOTAL CA: CPT | Performed by: INTERNAL MEDICINE

## 2018-01-19 PROCEDURE — 99024 POSTOP FOLLOW-UP VISIT: CPT | Performed by: ORTHOPAEDIC SURGERY

## 2018-01-19 PROCEDURE — 82962 GLUCOSE BLOOD TEST: CPT

## 2018-01-19 PROCEDURE — 25010000002 PIPERACILLIN SOD-TAZOBACTAM PER 1 G: Performed by: INTERNAL MEDICINE

## 2018-01-19 PROCEDURE — 85027 COMPLETE CBC AUTOMATED: CPT | Performed by: INTERNAL MEDICINE

## 2018-01-19 PROCEDURE — 63710000001 INSULIN ASPART PER 5 UNITS: Performed by: HOSPITALIST

## 2018-01-19 PROCEDURE — 99221 1ST HOSP IP/OBS SF/LOW 40: CPT | Performed by: SURGERY

## 2018-01-19 RX ORDER — BISACODYL 10 MG
10 SUPPOSITORY, RECTAL RECTAL 2 TIMES DAILY
Status: DISCONTINUED | OUTPATIENT
Start: 2018-01-20 | End: 2018-01-22

## 2018-01-19 RX ADMIN — TAMSULOSIN HYDROCHLORIDE 0.4 MG: 0.4 CAPSULE ORAL at 09:30

## 2018-01-19 RX ADMIN — TAMSULOSIN HYDROCHLORIDE 0.4 MG: 0.4 CAPSULE ORAL at 21:18

## 2018-01-19 RX ADMIN — BISACODYL 10 MG: 10 SUPPOSITORY RECTAL at 18:41

## 2018-01-19 RX ADMIN — POLYETHYLENE GLYCOL 3350 17 G: 17 POWDER, FOR SOLUTION ORAL at 15:03

## 2018-01-19 RX ADMIN — DOCUSATE SODIUM -SENNOSIDES 1 TABLET: 50; 8.6 TABLET, COATED ORAL at 21:18

## 2018-01-19 RX ADMIN — TAZOBACTAM SODIUM AND PIPERACILLIN SODIUM 3.38 G: 375; 3 INJECTION, SOLUTION INTRAVENOUS at 21:18

## 2018-01-19 RX ADMIN — TAZOBACTAM SODIUM AND PIPERACILLIN SODIUM 3.38 G: 375; 3 INJECTION, SOLUTION INTRAVENOUS at 14:38

## 2018-01-19 RX ADMIN — METOPROLOL SUCCINATE 12.5 MG: 25 TABLET, FILM COATED, EXTENDED RELEASE ORAL at 09:30

## 2018-01-19 RX ADMIN — HYDROCODONE BITARTRATE AND ACETAMINOPHEN 2 TABLET: 5; 325 TABLET ORAL at 14:31

## 2018-01-19 RX ADMIN — INSULIN ASPART 2 UNITS: 100 INJECTION, SOLUTION INTRAVENOUS; SUBCUTANEOUS at 21:18

## 2018-01-19 RX ADMIN — INSULIN ASPART 4 UNITS: 100 INJECTION, SOLUTION INTRAVENOUS; SUBCUTANEOUS at 18:22

## 2018-01-19 RX ADMIN — AMIODARONE HYDROCHLORIDE 400 MG: 200 TABLET ORAL at 14:37

## 2018-01-19 RX ADMIN — INSULIN ASPART 4 UNITS: 100 INJECTION, SOLUTION INTRAVENOUS; SUBCUTANEOUS at 14:30

## 2018-01-19 RX ADMIN — INSULIN ASPART 2 UNITS: 100 INJECTION, SOLUTION INTRAVENOUS; SUBCUTANEOUS at 09:31

## 2018-01-19 RX ADMIN — HYDROCODONE BITARTRATE AND ACETAMINOPHEN 2 TABLET: 5; 325 TABLET ORAL at 22:44

## 2018-01-19 RX ADMIN — FINASTERIDE 5 MG: 5 TABLET, FILM COATED ORAL at 09:30

## 2018-01-19 RX ADMIN — TAZOBACTAM SODIUM AND PIPERACILLIN SODIUM 3.38 G: 375; 3 INJECTION, SOLUTION INTRAVENOUS at 06:43

## 2018-01-19 NOTE — PLAN OF CARE
Problem: Patient Care Overview (Adult)  Goal: Plan of Care Review  Outcome: Ongoing (interventions implemented as appropriate)   01/18/18 9901   Coping/Psychosocial Response Interventions   Plan Of Care Reviewed With patient;spouse;family   Patient Care Overview   Progress improving   Outcome Evaluation   Outcome Summary/Follow up Plan Patient more alert today and oriented to person and place. NG tube placed this morning per Dr. Crowley and cleared by Dr. Kline. Suctioned 400mL of dark, thick brownish reddish gastric contents. Administered biscodyl suppository and soap suds enema w/ limited results. Patient pulled NG tube late this afternoon and Dr. Loja said ok to D/C and that patient can continue regular diet. Cardiology consulted because patient was ordered NPO until abdomen decompressed by NG tube and Amiodarone due that was a PO med. Patient transfer to a floor where amiodarone IV could be hung was discussed and initiated per Cardio and Pulmonary Group. Dr. Crowley then ordered patient go to Stat CT Abdomen to determine if NG could be pulled and PO meds resumed w/o tx of patient. After return from testing, Dr. Crowley ordered he stay on floor and NG tube be clamped and PO meds administered. Cardiology, Surgery, Pulmonology and [facility] all notified of plans. Continue to monitor and progress towards goals as tolerated.

## 2018-01-19 NOTE — PROGRESS NOTES
Hospital Follow Up    LOS:  LOS: 4 days   Patient Name: Osvaldo Lopez  Age/Sex: 82 y.o. male  : 1935  MRN: [id number]    Day of Service: 18   Length of Stay: 4  Encounter Provider: Angelo Driscoll MD  Place of Service: [facility] CARDIOLOGY  Patient Care Team:  Caio Rodríguez Jr., MD as PCP - General (Family Medicine)  Caio Rodríguez Jr., MD as PCP - Claims Attributed  Dontrell Arellano MD as Surgeon (Orthopedic Surgery)  Angelo Driscoll MD as Consulting Physician (Cardiology)  Darvin Alvarez MD as Consulting Physician (Urology)    Subjective:     Chief Complaint: Atrial fibrillation.    Interval History: Clinically patient looks significantly better today.  He has more color to his face.  He continues to go in and out of atrial fibrillation.  I placed him back on by mouth amiodarone.  He did have an ileus that initially resolved but his abdomen is quite distended again this morning I do hear bowel sounds however and he did have a bowel movement last night.    Objective:     Objective:  Temp:  [98 °F (36.7 °C)-99.5 °F (37.5 °C)] 98.7 °F (37.1 °C)  Heart Rate:  [100-114] 105  Resp:  [18-20] 20  BP: (113-141)/(65-69) 135/69     Intake/Output Summary (Last 24 hours) at 18 1206  Last data filed at 18 2230   Gross per 24 hour   Intake                0 ml   Output              350 ml   Net             -350 ml     Body mass index is 31.8 kg/(m^2).  Last 3 weights    01/15/18  1237   Weight: 103 kg (228 lb)     Weight change:       Physical Exam:   General : Alert, cooperative, in no acute distress.  Neuro: alert,cooperative and oriented  Lungs: CTAB. Normal respiratory effort and rate.  CV:: irregular rate and rhythm, normal S1 and S2, no murmurs, gallops or rubs.  ABD: Soft, nontender, non-distended. positive bowel sounds  Extr: No edema or cyanosis, moves all extremities    Lab Review:     Results from last 7 days  Lab Units 18  0510 18  2694    SODIUM mmol/L 130* 128*   POTASSIUM mmol/L 4.2 4.3   CHLORIDE mmol/L 97* 95*   CO2 mmol/L 21.0* 25.2   BUN mg/dL 30* 22   CREATININE mg/dL 1.21 1.25   GLUCOSE mg/dL 228* 160*   CALCIUM mg/dL 8.0* 7.9*       Results from last 7 days  Lab Units 01/18/18  0533 01/17/18  1118   TROPONIN T ng/mL 0.050* 0.086*       Results from last 7 days  Lab Units 01/19/18  0510 01/18/18  0435   WBC 10*3/mm3 5.77 6.21   HEMOGLOBIN g/dL 9.5* 10.5*   HEMATOCRIT % 29.5* 32.6*   PLATELETS 10*3/mm3 118* 113*           Results from last 7 days  Lab Units 01/17/18  1929   MAGNESIUM mg/dL 2.0           Results from last 7 days  Lab Units 01/17/18  1118   PROBNP pg/mL 1020.0       Current Medications:   Scheduled Meds:  amiodarone 400 mg Oral Q12H   finasteride 5 mg Oral QAM   insulin aspart 0-9 Units Subcutaneous 4x Daily With Meals & Nightly   metoprolol succinate XL 12.5 mg Oral QAM   piperacillin-tazobactam 3.375 g Intravenous Q8H   sennosides-docusate sodium 1 tablet Oral Nightly   tamsulosin 0.4 mg Oral BID     Continuous Infusions:  sodium chloride 125 mL/hr Last Rate: 125 mL/hr (01/18/18 0848)       Allergies:  Allergies   Allergen Reactions   • Percocet [Oxycodone-Acetaminophen]      Causes confusion       Assessment:     Principal Problem:    Spinal stenosis of lumbar region with neurogenic claudication  Active Problems:    Essential hypertension    Type 2 diabetes mellitus without complication    Paroxysmal a-fib    BPH (benign prostatic hyperplasia)    Tachycardia    Acute respiratory failure with hypoxia    Postoperative ileus    Hyponatremia        Plan:   1.  Paroxysmal atrial fibrillation.  He is back in atrial fibrillation.  Discussed with orthopedic surgery today Dr. Montalvo.  Will start Lovenox.  2.  Status post back surgery with repair.  3.  Ileus  4.  We'll continue to follow.  Even though he is back in atrial fibrillation currently heart rate is relatively controlled.        Angelo Driscoll MD  01/19/18  12:06 PM

## 2018-01-19 NOTE — THERAPY TREATMENT NOTE
Acute Care - Physical Therapy Treatment Note  [facility]     Patient Name: Osvaldo Lopez  : 1935  MRN: [id number]  Today's Date: 2018  Onset of Illness/Injury or Date of Surgery Date: 01/15/18  Date of Referral to PT: 17  Referring Physician: Eliud    Admit Date: 1/15/2018    Visit Dx:    ICD-10-CM ICD-9-CM   1. Difficulty walking R26.2 719.7   2. Spinal stenosis of lumbar region with neurogenic claudication M48.062 724.03     Patient Active Problem List   Diagnosis   • Midline low back pain   • Complete rotator cuff tear of left shoulder   • Difficulty walking   • Abnormal finding on thyroid function test   • Abdominal aortic aneurysm   • Atrial fibrillation   • Benign prostatic hyperplasia   • Edema   • Essential hypertension   • Fatigue   • Hyperlipidemia   • Shoulder pain   • Lumbar radiculopathy   • Muscle weakness   • Osteoarthritis   • Type 2 diabetes mellitus without complication   • Primary osteoarthritis of left knee   • OA (osteoarthritis) of knee   • Toxic encephalopathy   • Paroxysmal a-fib   • HTN (hypertension)   • Type 2 diabetes mellitus   • BPH (benign prostatic hyperplasia)   • Postoperative anemia due to acute blood loss   • Cardiac murmur   • Complete tear of left rotator cuff   • Tear of right rotator cuff   • Spinal stenosis of lumbar region with neurogenic claudication   • Tachycardia   • Acute respiratory failure with hypoxia   • Postoperative ileus   • Hyponatremia               Adult Rehabilitation Note       18 1656 18 1005 18 1615    Rehab Assessment/Intervention    Discipline physical therapist  -PC physical therapist  -PC physical therapist  -PC    Document Type therapy note (daily note)  -PC therapy note (daily note)  -PC therapy note (daily note)  -PC    Subjective Information agree to therapy  -PC agree to therapy  -PC agree to therapy;complains of;weakness;fatigue;pain  -PC    Patient Effort, Rehab Treatment good  -PC good  -PC fair  -PC     Patient Effort, Rehab Treatment Comment   pt more alert this afternoon, following commands better and engaging in conversation  -PC    Symptoms Noted During/After Treatment fatigue  -PC fatigue  -PC fatigue;increased pain  -PC    Symptoms Noted Comment  ng is out, on 4L o2  -PC     Precautions/Limitations brace on when up;spinal precautions  -PC brace on when up;spinal precautions  -PC fall precautions;brace on when up  -PC    Recorded by [PC] Mildred Nolan PT [PC] Mildred Nolan, PT [PC] Mildred Nolan, PT    Vital Signs    Post SpO2 (%)   91  -PC    O2 Delivery Post Treatment   supplemental O2  -PC    Recorded by   [PC] Mildred Nolan, PT    Pain Assessment    Pain Assessment 0-10  -PC 0-10  -PC FLACC  -PC    Pain Score 5  -PC 5  -PC 5  -PC    Pain Location Back  -PC Back  -PC Back  -PC    Pain Orientation Lower  -PC Lower  -PC Lower  -PC    Recorded by [PC] Mildred Nolan PT [PC] Mildred Nolan PT [PC] Mildred Nolan, PT    Cognitive Assessment/Intervention    Current Cognitive/Communication Assessment functional  -PC impaired  -PC impaired  -PC    Orientation Status oriented to;person;place;situation  -PC oriented to;person;place  -PC oriented to;person  -PC    Follows Commands/Answers Questions needs cueing;able to follow single-step instructions;100% of the time  -PC needs cueing;needs increased time;able to follow single-step instructions;100% of the time  -PC needs cueing;needs increased time;needs repetition;able to follow single-step instructions;75% of the time  -PC    Personal Safety decreased insight to deficits  -PC decreased awareness, need for safety  -PC decreased awareness, need for safety;severe impairment  -PC    Personal Safety Interventions fall prevention program maintained;gait belt  -PC fall prevention program maintained;muscle strengthening facilitated;gait belt  -PC fall prevention program maintained;gait belt  -PC    Recorded by [PC] Mildred Nolan, PT [PC] Mildred Nolan PT [PC]  Mildred Nolan, PT    Bed Mobility, Assessment/Treatment    Bed Mobility, Roll Left, [place] 2 person assist required;moderate assist (50% patient effort)  -PC 2 person assist required;moderate assist (50% patient effort)  -PC maximum assist (25% patient effort);2 person assist required  -PC    Bed Mob, Sidelying to Sit, [place] 2 person assist required;moderate assist (50% patient effort)  -PC 2 person assist required;moderate assist (50% patient effort)  -PC maximum assist (25% patient effort);2 person assist required  -PC    Bed Mob, Sit to Sidelying, [place] 2 person assist required;moderate assist (50% patient effort)  -PC 2 person assist required;moderate assist (50% patient effort)  -PC maximum assist (25% patient effort);2 person assist required  -PC    Recorded by [PC] Mildred Nolan, PT [PC] Mildred Nolan, PT [PC] Mildred Nolan, PT    Transfer Assessment/Treatment    Transfers, Bed-Chair [place]  moderate assist (50% patient effort);maximum assist (25% patient effort);2 person assist required  -PC     Transfers, Sit-Stand [place] 2 person assist required;moderate assist (50% patient effort)  -PC 2 person assist required;moderate assist (50% patient effort)  -PC maximum assist (25% patient effort);2 person assist required  -PC    Transfers, Stand-Sit [place] 2 person assist required;moderate assist (50% patient effort)  -PC 2 person assist required;moderate assist (50% patient effort)  -PC maximum assist (25% patient effort);2 person assist required  -PC    Transfers, Sit-Stand-Sit, Assist Device elevated surface;rolling walker  -PC elevated surface;rolling walker  -PC elevated surface;rolling walker  -PC    Transfer, Comment sit to stand x 4 working on coming to full stand, weight shifting, pt unable to take a step  -PC stood with rwx once, able to come to stand but not take a step, then stood again without walker to pivot to chair with assist of 2  -PC sit to stand x 2,  able to clear buttocks and almost come to full stand but knees and hips flexed  -PC    Recorded by [PC] Mildred Nolan PT [PC] Mildred Nolan PT [PC] Mildred Nolan PT    Motor Skills/Interventions    Additional Documentation   Balance Skills Training (Group)  -PC    Recorded by   [PC] Mildred Nolan PT    Balance Skills Training    Sitting-Level of Assistance Minimum assistance;Moderate assistance  -PC Minimum assistance;Moderate assistance  -PC Minimum assistance;Moderate assistance  -PC    Sitting-Balance Support Feet supported;Left upper extremity supported;Right upper extremity supported  -PC Feet supported;Left upper extremity supported;Right upper extremity supported  -PC Feet supported;Left upper extremity supported;Right upper extremity supported  -PC    Sitting-Balance Activities Trunk control activities   pt leaning posterior, continual verbal/tactile cues needed  -PC Trunk control activities   pt leaning posterior, continual verbal/tactile cues needed  -PC Trunk control activities   pt leaning posterior, continual verbal/tactile cues needed  -PC    Sitting # of Minutes 8  -PC 6  -PC 10  -PC    Standing-Level of Assistance Maximum assistance;x2  -PC Maximum assistance;x2  -PC Maximum assistance;x2  -PC    Static Standing Balance Support assistive device  -PC assistive device  -PC assistive device  -PC    Recorded by [PC] Mildred Nolan PT [PC] Mildred Nolan PT [PC] Mildred Nolan PT    Therapy Exercises    Bilateral Lower Extremities 10 reps;AROM:;ankle pumps/circles;sitting;LAQ;hip flexion  -PC 10 reps;AROM:;supine;ankle pumps/circles;heel slides;SAQ  -PC     Recorded by [PC] Mildred Nolan PT [PC] Mildred Nolan PT     Positioning and Restraints    Pre-Treatment Position in bed  -PC in bed  -PC in bed  -PC    Post Treatment Position bed  -PC chair  -PC bed  -PC    In Bed supine;call light within reach;encouraged to call for assist;with family/caregiver  -PC  supine;call light within  reach;encouraged to call for assist;with family/caregiver;notified nsg  -PC    In Chair  sitting;call light within reach;encouraged to call for assist;with family/caregiver;notified nsg  -PC     Recorded by [PC] Mildred Nolan PT [PC] Mildred Nolan, PT [PC] Mildred Nolan, PT      01/18/18 1318          Rehab Assessment/Intervention    Discipline physical therapist  -PC      Document Type therapy note (daily note)  -PC      Subjective Information --   lethargic, decreased responsiveness  -PC      Patient Effort, Rehab Treatment fair  -PC      Symptoms Noted During/After Treatment fatigue;increased pain  -PC      Symptoms Noted Comment pt now with ileus, has ng tube, just got a suppository, and will be going for a CT soon, also still on high flow o2  -PC      Precautions/Limitations fall precautions;brace on when up  -PC      Recorded by [PC] Mildred Nolan PT      Vital Signs    Pre SpO2 (%) 96  -PC      O2 Delivery Pre Treatment supplemental O2  -PC      Intra SpO2 (%) 93  -PC      O2 Delivery Intra Treatment supplemental O2  -PC      Recorded by [PC] Mildred Nolan PT      Pain Assessment    Pain Assessment FLACC  -PC      Pain Score 6  -PC      Pain Location Back  -PC      Pain Orientation Lower  -PC      Pain Intervention(s) Medication (See MAR);Repositioned  -PC      Recorded by [PC] Mildred Nolan PT      Cognitive Assessment/Intervention    Current Cognitive/Communication Assessment impaired  -PC      Orientation Status unable/difficult to assess  -PC      Follows Commands/Answers Questions needs cueing;needs increased time;needs repetition;able to follow single-step instructions;50% of the time  -PC      Personal Safety decreased awareness, need for assist  -PC      Personal Safety Interventions fall prevention program maintained;gait belt  -PC      Recorded by [PC] Mildred Nolan, PT      Bed Mobility, Assessment/Treatment    Bed Mobility, Roll Left, [place] maximum assist (25% patient effort);2  person assist required  -PC      Bed Mob, Sidelying to Sit, [place] maximum assist (25% patient effort);2 person assist required  -PC      Bed Mob, Sit to Sidelying, [place] maximum assist (25% patient effort);2 person assist required  -PC      Recorded by [PC] Mildred Nolan, PT      Transfer Assessment/Treatment    Transfers, Sit-Stand [place] maximum assist (25% patient effort);2 person assist required  -PC      Transfer, Comment 2 standing attempts, pt unable to come to stand, unable to lift buttocks off bed  -PC      Recorded by [PC] Mildred Nolan, PT      Positioning and Restraints    Pre-Treatment Position in bed  -PC      Post Treatment Position bed  -PC      In Bed supine;call light within reach;encouraged to call for assist;with family/caregiver   transport here to take pt to a STAT CT of abdomen  -PC      Recorded by [PC] Mildred Nolan, PT        User Key  (r) = Recorded By, (t) = Taken By, (c) = Cosigned By    Initials Name Effective Dates    PC Mildred Nolan, PT 12/01/15 -                 IP PT Goals       01/17/18 1005          Bed Mobility PT STG    Bed Mobility PT STG, Date Established 01/17/18  -SV      Bed Mobility PT STG, Time to Achieve 2 wks  -SV      Bed Mobility PT STG, Activity Type sidelying to sit/sit to sidelying  -SV      Bed Mobility PT STG, [place] Level minimum assist (75% patient effort);contact guard assist  -SV      Transfer Training PT STG    Transfer Training PT STG, Date Established 01/17/18  -SV      Transfer Training PT STG, Time to Achieve 2 wks  -SV      Transfer Training PT STG, Activity Type sit to stand/stand to sit;bed to chair /chair to bed  -SV      Transfer Training PT STG, [place] Level minimum assist (75% patient effort)  -SV      Transfer Training PT STG, Assist Device walker, rolling  -SV        User Key  (r) = Recorded By, (t) = Taken By, (c) = Cosigned By    Initials Name Provider Type     Sabina Middleton, PT Physical Therapist           Physical Therapy Education     Title: PT OT SLP Therapies (Active)     Topic: Physical Therapy (Active)     Point: Mobility training (Active)    Learning Progress Summary    Learner Readiness Method Response Comment Documented by Status   Patient Acceptance E,D NR   01/19/18 1012 Active    Nonacceptance E NR   01/19/18 0311 Active    Acceptance E,D NR   01/18/18 1324 Active    Acceptance E,TB,D NR,VU   01/17/18 1004 Done   Family Acceptance E,TB,D NR,VU   01/17/18 1004 Done               Point: Home exercise program (Active)    Learning Progress Summary    Learner Readiness Method Response Comment Documented by Status   Patient Acceptance E,D NR   01/19/18 1012 Active    Nonacceptance E NR   01/19/18 0311 Active    Acceptance E,D NR   01/18/18 1324 Active    Acceptance E,TB,D NR,VU   01/17/18 1004 Done   Family Acceptance E,TB,D NR,VU   01/17/18 1004 Done               Point: Body mechanics (Active)    Learning Progress Summary    Learner Readiness Method Response Comment Documented by Status   Patient Acceptance E,D NR   01/19/18 1012 Active    Nonacceptance E NR   01/19/18 0311 Active    Acceptance E,D NR   01/18/18 1324 Active    Acceptance E,TB,D NR,VU   01/17/18 1004 Done   Family Acceptance E,TB,D NR,VU  SV 01/17/18 1004 Done               Point: Precautions (Active)    Learning Progress Summary    Learner Readiness Method Response Comment Documented by Status   Patient Acceptance E,D NR   01/19/18 1012 Active    Nonacceptance E NR   01/19/18 0311 Active    Acceptance E,D NR   01/18/18 1324 Active    Acceptance E,TB,D NR,VU   01/17/18 1004 Done   Family Acceptance E,TB,D NR,VU   01/17/18 1004 Done                      User Key     Initials Effective Dates Name Provider Type Discipline     12/01/15 -  Mildred Nolan, PT Physical Therapist PT     01/17/16 -  Sabina Middleton, PT Physical Therapist PT     04/28/17 -  Ewa August, RN Registered Nurse Nurse                     PT Recommendation and Plan  Anticipated Discharge Disposition: skilled nursing facility  PT Frequency: 2 times/day  Plan of Care Review  Plan Of Care Reviewed With: patient  Progress: improving  Outcome Summary/Follow up Plan: pt more alert this am, inc participation with PT, assisted with activity, able to come to full stand, and then pivot to chair, while pivoting, pt able to take a few steps to turn, requ mod/max a x 2 to perform          Outcome Measures       01/19/18 1000 01/18/18 1600 01/18/18 1300    How much help from another person do you currently need...    Turning from your back to your side while in flat bed without using bedrails? 2  -PC 2  -PC 1  -PC    Moving from lying on back to sitting on the side of a flat bed without bedrails? 2  -PC 2  -PC 1  -PC    Moving to and from a bed to a chair (including a wheelchair)? 2  -PC 1  -PC 1  -PC    Standing up from a chair using your arms (e.g., wheelchair, bedside chair)? 2  -PC 1  -PC 1  -PC    Climbing 3-5 steps with a railing? 1  -PC 1  -PC 1  -PC    To walk in hospital room? 2  -PC 1  -PC 1  -PC    AM-PAC 6 Clicks Score 11  -PC 8  -PC 6  -PC      01/17/18 1000          How much help from another person do you currently need...    Turning from your back to your side while in flat bed without using bedrails? 2  -SV      Moving from lying on back to sitting on the side of a flat bed without bedrails? 1  -SV      Moving to and from a bed to a chair (including a wheelchair)? 1  -SV      Standing up from a chair using your arms (e.g., wheelchair, bedside chair)? 1  -SV      Climbing 3-5 steps with a railing? 1  -SV      To walk in hospital room? 1  -SV      AM-PAC 6 Clicks Score 7  -SV      Functional Assessment    Outcome Measure Options AM-PAC 6 Clicks Basic Mobility (PT)  -SV        User Key  (r) = Recorded By, (t) = Taken By, (c) = Cosigned By    Initials Name Provider Type    LAVERNE Nolan, PT Physical Therapist    SV Sabina Middleton, PT  Physical Therapist           Time Calculation:         PT Charges       01/19/18 1658 01/19/18 1015       Time Calculation    Start Time 1605  -PC 0923  -PC     Stop Time 1623  -PC 0955  -PC     Time Calculation (min) 18 min  -PC 32 min  -PC     PT Received On 01/19/18  -PC 01/19/18  -PC     PT - Next Appointment 01/20/18  -PC 01/19/18  -PC       User Key  (r) = Recorded By, (t) = Taken By, (c) = Cosigned By    Initials Name Provider Type    PC Mildred Nolan, PT Physical Therapist          Therapy Charges for Today     Code Description Service Date Service Provider Modifiers Qty    78359933522 HC PT THER PROC EA 15 MIN 1/18/2018 Mildred G Ovidio, PT GP 1    22337080285 HC PT THER SUPP EA 15 MIN 1/18/2018 Mildred G [place]vidio, PT GP 1    75374280663 HC PT THER PROC EA 15 MIN 1/18/2018 Mildred G Ovidio, PT GP 2    92040535660 HC PT THER SUPP EA 15 MIN 1/18/2018 Mildred G Ovidio, PT GP 1    15150364562 HC PT THER PROC EA 15 MIN 1/19/2018 Mildred G Ovidio, PT GP 2    12802177839 HC PT THER SUPP EA 15 MIN 1/19/2018 Mildred G Ovidio, PT GP 2    44416089063 HC PT THER PROC EA 15 MIN 1/19/2018 Mildred G [place], PT GP 1    08046696937 HC PT THER SUPP EA 15 MIN 1/19/2018 Mildred G Ovidio, PT GP 1          PT G-Codes  Outcome Measure Options: AM-PAC 6 Clicks Basic Mobility (PT)    Mildred Nolan, PT  1/19/2018

## 2018-01-19 NOTE — THERAPY TREATMENT NOTE
Acute Care - Physical Therapy Treatment Note  [facility]     Patient Name: Osvaldo Lopez  : 1935  MRN: [id number]  Today's Date: 2018  Onset of Illness/Injury or Date of Surgery Date: 01/15/18  Date of Referral to PT: 17  Referring Physician: Eliud    Admit Date: 1/15/2018    Visit Dx:    ICD-10-CM ICD-9-CM   1. Difficulty walking R26.2 719.7   2. Spinal stenosis of lumbar region with neurogenic claudication M48.062 724.03     Patient Active Problem List   Diagnosis   • Midline low back pain   • Complete rotator cuff tear of left shoulder   • Difficulty walking   • Abnormal finding on thyroid function test   • Abdominal aortic aneurysm   • Atrial fibrillation   • Benign prostatic hyperplasia   • Edema   • Essential hypertension   • Fatigue   • Hyperlipidemia   • Shoulder pain   • Lumbar radiculopathy   • Muscle weakness   • Osteoarthritis   • Type 2 diabetes mellitus without complication   • Primary osteoarthritis of left knee   • OA (osteoarthritis) of knee   • Toxic encephalopathy   • Paroxysmal a-fib   • HTN (hypertension)   • Type 2 diabetes mellitus   • BPH (benign prostatic hyperplasia)   • Postoperative anemia due to acute blood loss   • Cardiac murmur   • Complete tear of left rotator cuff   • Tear of right rotator cuff   • Spinal stenosis of lumbar region with neurogenic claudication   • Tachycardia   • Acute respiratory failure with hypoxia   • Postoperative ileus   • Hyponatremia               Adult Rehabilitation Note       18 1005 18 1615 18 1318    Rehab Assessment/Intervention    Discipline physical therapist  -PC physical therapist  -PC physical therapist  -PC    Document Type therapy note (daily note)  -PC therapy note (daily note)  -PC therapy note (daily note)  -PC    Subjective Information agree to therapy  -PC agree to therapy;complains of;weakness;fatigue;pain  -PC --   lethargic, decreased responsiveness  -PC    Patient Effort, Rehab Treatment good   -PC fair  -PC fair  -PC    Patient Effort, Rehab Treatment Comment  pt more alert this afternoon, following commands better and engaging in conversation  -PC     Symptoms Noted During/After Treatment fatigue  -PC fatigue;increased pain  -PC fatigue;increased pain  -PC    Symptoms Noted Comment ng is out, on 4L o2  -PC  pt now with ileus, has ng tube, just got a suppository, and will be going for a CT soon, also still on high flow o2  -PC    Precautions/Limitations brace on when up;spinal precautions  -PC fall precautions;brace on when up  -PC fall precautions;brace on when up  -PC    Recorded by [PC] Mildred Nolan, PT [PC] Mildred Nolan, PT [PC] Mildred Nolan, PT    Vital Signs    Pre SpO2 (%)   96  -PC    O2 Delivery Pre Treatment   supplemental O2  -PC    Intra SpO2 (%)   93  -PC    O2 Delivery Intra Treatment   supplemental O2  -PC    Post SpO2 (%)  91  -PC     O2 Delivery Post Treatment  supplemental O2  -PC     Recorded by  [PC] Mildred Nolan, PT [PC] Mildred Nolan, PT    Pain Assessment    Pain Assessment 0-10  -PC FLACC  -PC FLACC  -PC    Pain Score 5  -PC 5  -PC 6  -PC    Pain Location Back  -PC Back  -PC Back  -PC    Pain Orientation Lower  -PC Lower  -PC Lower  -PC    Pain Intervention(s)   Medication (See MAR);Repositioned  -PC    Recorded by [PC] Mildred Nolan, PT [PC] Mildred Nolan, PT [PC] Mildred Nolan, PT    Cognitive Assessment/Intervention    Current Cognitive/Communication Assessment impaired  -PC impaired  -PC impaired  -PC    Orientation Status oriented to;person;place  -PC oriented to;person  -PC unable/difficult to assess  -PC    Follows Commands/Answers Questions needs cueing;needs increased time;able to follow single-step instructions;100% of the time  -PC needs cueing;needs increased time;needs repetition;able to follow single-step instructions;75% of the time  -PC needs cueing;needs increased time;needs repetition;able to follow single-step instructions;50% of the time  -PC     Personal Safety decreased awareness, need for safety  -PC decreased awareness, need for safety;severe impairment  -PC decreased awareness, need for assist  -PC    Personal Safety Interventions fall prevention program maintained;muscle strengthening facilitated;gait belt  -PC fall prevention program maintained;gait belt  -PC fall prevention program maintained;gait belt  -PC    Recorded by [PC] Mildred Nolan PT [PC] Mildred Nolan, PT [PC] Mildred Nolan, PT    Bed Mobility, Assessment/Treatment    Bed Mobility, Roll Left, [place] 2 person assist required;moderate assist (50% patient effort)  -PC maximum assist (25% patient effort);2 person assist required  -PC maximum assist (25% patient effort);2 person assist required  -PC    Bed Mob, Sidelying to Sit, [place] 2 person assist required;moderate assist (50% patient effort)  -PC maximum assist (25% patient effort);2 person assist required  -PC maximum assist (25% patient effort);2 person assist required  -PC    Bed Mob, Sit to Sidelying, [place] 2 person assist required;moderate assist (50% patient effort)  -PC maximum assist (25% patient effort);2 person assist required  -PC maximum assist (25% patient effort);2 person assist required  -PC    Recorded by [PC] Mildred Nolan, PT [PC] Mildred Nolan, PT [PC] Mildred Nolan, PT    Transfer Assessment/Treatment    Transfers, Bed-Chair [place] moderate assist (50% patient effort);maximum assist (25% patient effort);2 person assist required  -PC      Transfers, Sit-Stand [place] 2 person assist required;moderate assist (50% patient effort)  -PC maximum assist (25% patient effort);2 person assist required  -PC maximum assist (25% patient effort);2 person assist required  -PC    Transfers, Stand-Sit [place] 2 person assist required;moderate assist (50% patient effort)  -PC maximum assist (25% patient effort);2 person assist required  -PC     Transfers, Sit-Stand-Sit, Assist Device elevated  surface;rolling walker  -PC elevated surface;rolling walker  -PC     Transfer, Comment stood with rwx once, able to come to stand but not take a step, then stood again without walker to pivot to chair with assist of 2  -PC sit to stand x 2, able to clear buttocks and almost come to full stand but knees and hips flexed  -PC 2 standing attempts, pt unable to come to stand, unable to lift buttocks off bed  -PC    Recorded by [PC] Mildred Nolan, PT [PC] Mildred Nolan PT [PC] Mildred Nolan PT    Motor Skills/Interventions    Additional Documentation  Balance Skills Training (Group)  -PC     Recorded by  [PC] Mildred Nolan PT     Balance Skills Training    Sitting-Level of Assistance Minimum assistance;Moderate assistance  -PC Minimum assistance;Moderate assistance  -PC     Sitting-Balance Support Feet supported;Left upper extremity supported;Right upper extremity supported  -PC Feet supported;Left upper extremity supported;Right upper extremity supported  -PC     Sitting-Balance Activities Trunk control activities   pt leaning posterior, continual verbal/tactile cues needed  -PC Trunk control activities   pt leaning posterior, continual verbal/tactile cues needed  -PC     Sitting # of Minutes 6  -PC 10  -PC     Standing-Level of Assistance Maximum assistance;x2  -PC Maximum assistance;x2  -PC     Static Standing Balance Support assistive device  -PC assistive device  -PC     Recorded by [PC] Mildred Nolan PT [PC] Mildred Nolan PT     Therapy Exercises    Bilateral Lower Extremities 10 reps;AROM:;supine;ankle pumps/circles;heel slides;SAQ  -PC      Recorded by [PC] Mildred Nolan PT      Positioning and Restraints    Pre-Treatment Position in bed  -PC in bed  -PC in bed  -PC    Post Treatment Position chair  -PC bed  -PC bed  -PC    In Bed  supine;call light within reach;encouraged to call for assist;with family/caregiver;notified nsg  -PC supine;call light within reach;encouraged to call for assist;with  family/caregiver   transport here to take pt to a STAT CT of abdomen  -PC    In Chair sitting;call light within reach;encouraged to call for assist;with family/caregiver;notified nsg  -PC      Recorded by [PC] Mildred Nolan, PT [PC] Mildred Nolan, PT [PC] Mildred Nolan, PT      User Key  (r) = Recorded By, (t) = Taken By, (c) = Cosigned By    Initials Name Effective Dates    PC Mildred Nolan, PT 12/01/15 -                 IP PT Goals       01/17/18 1005          Bed Mobility PT STG    Bed Mobility PT STG, Date Established 01/17/18  -SV      Bed Mobility PT STG, Time to Achieve 2 wks  -SV      Bed Mobility PT STG, Activity Type sidelying to sit/sit to sidelying  -SV      Bed Mobility PT STG, [place] Level minimum assist (75% patient effort);contact guard assist  -SV      Transfer Training PT STG    Transfer Training PT STG, Date Established 01/17/18  -SV      Transfer Training PT STG, Time to Achieve 2 wks  -SV      Transfer Training PT STG, Activity Type sit to stand/stand to sit;bed to chair /chair to bed  -SV      Transfer Training PT STG, [place] Level minimum assist (75% patient effort)  -SV      Transfer Training PT STG, Assist Device walker, rolling  -SV        User Key  (r) = Recorded By, (t) = Taken By, (c) = Cosigned By    Initials Name Provider Type     Sabina Middleton, PT Physical Therapist          Physical Therapy Education     Title: PT OT SLP Therapies (Active)     Topic: Physical Therapy (Active)     Point: Mobility training (Active)    Learning Progress Summary    Learner Readiness Method Response Comment Documented by Status   Patient Acceptance E,D NR  PC 01/19/18 1012 Active    Nonacceptance E NR  JL 01/19/18 0311 Active    Acceptance E,D NR  PC 01/18/18 1324 Active    Acceptance E,TB,D NR,VU  SV 01/17/18 1004 Done   Family Acceptance E,TB,D NR,VU  SV 01/17/18 1004 Done               Point: Home exercise program (Active)    Learning Progress Summary    Learner Readiness Method  Response Comment Documented by Status   Patient Acceptance E,D NR   01/19/18 1012 Active    Nonacceptance E NR   01/19/18 0311 Active    Acceptance E,D NR   01/18/18 1324 Active    Acceptance E,TB,D NR,VU   01/17/18 1004 Done   Family Acceptance E,TB,D NR,VU   01/17/18 1004 Done               Point: Body mechanics (Active)    Learning Progress Summary    Learner Readiness Method Response Comment Documented by Status   Patient Acceptance E,D NR   01/19/18 1012 Active    Nonacceptance E NR   01/19/18 0311 Active    Acceptance E,D NR   01/18/18 1324 Active    Acceptance E,TB,D NR,VU   01/17/18 1004 Done   Family Acceptance E,TB,D NR,VU   01/17/18 1004 Done               Point: Precautions (Active)    Learning Progress Summary    Learner Readiness Method Response Comment Documented by Status   Patient Acceptance E,D NR   01/19/18 1012 Active    Nonacceptance E NR   01/19/18 0311 Active    Acceptance E,D NR   01/18/18 1324 Active    Acceptance E,TB,D NR,VU   01/17/18 1004 Done   Family Acceptance E,TB,D NR,VU   01/17/18 1004 Done                      User Key     Initials Effective Dates Name Provider Type Discipline     12/01/15 -  Mildred Nolan, PT Physical Therapist PT     01/17/16 -  Sabina Middleton, PT Physical Therapist PT     04/28/17 -  Ewa August RN Registered Nurse Nurse                    PT Recommendation and Plan  Anticipated Discharge Disposition: skilled nursing facility  PT Frequency: 2 times/day  Plan of Care Review  Plan Of Care Reviewed With: patient  Progress: improving  Outcome Summary/Follow up Plan: pt more alert this am, inc participation with PT, assisted with activity, able to come to full stand, and then pivot to chair, while pivoting, pt able to take a few steps to turn, requ mod/max a x 2 to perform          Outcome Measures       01/19/18 1000 01/18/18 1600 01/18/18 1300    How much help from another person do you currently need...    Turning from  your back to your side while in flat bed without using bedrails? 2  -PC 2  -PC 1  -PC    Moving from lying on back to sitting on the side of a flat bed without bedrails? 2  -PC 2  -PC 1  -PC    Moving to and from a bed to a chair (including a wheelchair)? 2  -PC 1  -PC 1  -PC    Standing up from a chair using your arms (e.g., wheelchair, bedside chair)? 2  -PC 1  -PC 1  -PC    Climbing 3-5 steps with a railing? 1  -PC 1  -PC 1  -PC    To walk in hospital room? 2  -PC 1  -PC 1  -PC    AM-PAC 6 Clicks Score 11  -PC 8  -PC 6  -PC      01/17/18 1000          How much help from another person do you currently need...    Turning from your back to your side while in flat bed without using bedrails? 2  -SV      Moving from lying on back to sitting on the side of a flat bed without bedrails? 1  -SV      Moving to and from a bed to a chair (including a wheelchair)? 1  -SV      Standing up from a chair using your arms (e.g., wheelchair, bedside chair)? 1  -SV      Climbing 3-5 steps with a railing? 1  -SV      To walk in hospital room? 1  -SV      AM-PAC 6 Clicks Score 7  -SV      Functional Assessment    Outcome Measure Options AM-PAC 6 Clicks Basic Mobility (PT)  -SV        User Key  (r) = Recorded By, (t) = Taken By, (c) = Cosigned By    Initials Name Provider Type    PC Mildred Nolan, PT Physical Therapist    SV Sabina Middleton, PT Physical Therapist           Time Calculation:         PT Charges       01/19/18 1015          Time Calculation    Start Time 0923  -PC      Stop Time 0955  -PC      Time Calculation (min) 32 min  -PC      PT Received On 01/19/18  -PC      PT - Next Appointment 01/19/18  -PC        User Key  (r) = Recorded By, (t) = Taken By, (c) = Cosigned By    Initials Name Provider Type    PC Mildred Nolan, PT Physical Therapist          Therapy Charges for Today     Code Description Service Date Service Provider Modifiers Qty    91048879359 HC PT THER PROC EA 15 MIN 1/18/2018 Mildred Nolan, PT GP 1     17046387725 HC PT THER SUPP EA 15 MIN 1/18/2018 Mildreddatlon Nolan, PT GP 1    05406721080 HC PT THER PROC EA 15 MIN 1/18/2018 Mildred MADYSON Nolan, PT GP 2    88902387933 HC PT THER SUPP EA 15 MIN 1/18/2018 Mildreddalton Nolan, PT GP 1    48414539552 HC PT THER PROC EA 15 MIN 1/19/2018 Mildreddalton Nolan, PT GP 2    34997227064 HC PT THER SUPP EA 15 MIN 1/19/2018 Mildreddalton Nolan, PT GP 2          PT G-Codes  Outcome Measure Options: AM-PAC 6 Clicks Basic Mobility (PT)    Mildred Nolan, PT  1/19/2018

## 2018-01-19 NOTE — PROGRESS NOTES
"      [facility]         Dr Urias Sayied   LOS: 4 days   Patient Care Team:  Caio Rodríguez Jr., MD as PCP - General (Family Medicine)  Caio Rodríguez Jr., MD as PCP - Claims Attributed  Dontrell Arellano MD as Surgeon (Orthopedic Surgery)  Angelo Driscoll MD as Consulting Physician (Cardiology)  Darvin Alvarez MD as Consulting Physician (Urology)    Chief Complaint: Acute hypoxemic respiratory failure in the setting off abdominal distention with secondary atelectasis status post laminectomy for spinal stenosis    Interval History: Sitting up in a chair.  Pulled out his NG tube last night.  Denies nausea vomiting or shortness of breath.    REVIEW OF SYSTEMS:   Shortness of breath no nausea or vomiting    Ventilator/Non-Invasive Ventilation Settings     None            Vital Signs  Temp:  [98 °F (36.7 °C)-99.5 °F (37.5 °C)] 98.7 °F (37.1 °C)  Heart Rate:  [100-114] 105  Resp:  [18-20] 20  BP: (113-141)/(65-69) 135/69    Intake/Output Summary (Last 24 hours) at 01/19/18 1145  Last data filed at 01/18/18 2230   Gross per 24 hour   Intake                0 ml   Output              350 ml   Net             -350 ml     Flowsheet Rows         First Filed Value    Admission Height  180.3 cm (71\") Documented at 01/15/2018 1237    Admission Weight  103 kg (228 lb) Documented at 01/15/2018 1237          Physical Exam:   General Appearance:    Awake alert.  No acute distress    Lungs:     Diminished breath sounds.      Heart:    Regular rhythm and normal rate, normal S1 and S2, no            murmur, no gallop, no rub, no click   Chest Wall:    No abnormalities observed   Abdomen:    Mild distention improved.  Hypoactive bowel sounds    Extremities:   Moves all extremities well, no edema, no cyanosis, no             redness     Results Review:          Results from last 7 days  Lab Units 01/19/18  0510 01/18/18  0533 01/17/18 2056   SODIUM mmol/L 130* 128* 130*   POTASSIUM mmol/L 4.2 4.3 4.9   CHLORIDE mmol/L 97* " 95* 96*   CO2 mmol/L 21.0* 25.2 23.0   BUN mg/dL 30* 22 22   CREATININE mg/dL 1.21 1.25 1.28*   GLUCOSE mg/dL 228* 160* 182*   CALCIUM mg/dL 8.0* 7.9* 8.2*       Results from last 7 days  Lab Units 01/18/18  0533 01/17/18  1118   TROPONIN T ng/mL 0.050* 0.086*       Results from last 7 days  Lab Units 01/19/18  0510 01/18/18  0435 01/17/18  1118   WBC 10*3/mm3 5.77 6.21 7.89   HEMOGLOBIN g/dL 9.5* 10.5* 11.1*   HEMATOCRIT % 29.5* 32.6* 34.7*   PLATELETS 10*3/mm3 118* 113* 122*               Results from last 7 days  Lab Units 01/17/18  1929   MAGNESIUM mg/dL 2.0           Results from last 7 days  Lab Units 01/18/18  0542   PH, ARTERIAL pH units 7.328*   PO2 ART mm Hg 90.5   PCO2, ARTERIAL mm Hg 46.6*   HCO3 ART mmol/L 24.5       I reviewed the patient's new clinical results.  I personally viewed and interpreted the patient's CXR        Medication Review:     amiodarone 400 mg Oral Q12H   finasteride 5 mg Oral QAM   insulin aspart 0-9 Units Subcutaneous 4x Daily With Meals & Nightly   metoprolol succinate XL 12.5 mg Oral QAM   piperacillin-tazobactam 3.375 g Intravenous Q8H   sennosides-docusate sodium 1 tablet Oral Nightly   tamsulosin 0.4 mg Oral BID         sodium chloride 125 mL/hr Last Rate: 125 mL/hr (01/18/18 0848)       ASSESSMENT:   Acute hypoxemic respiratory failure  Altered mental status  Suspect atelectasis due to abdominal distention  Suspect ileus  Status post laminectomy for spinal stenosis  Paroxysmal atrial fibrillation  Diabetes mellitus  Hyponatremia    PLAN:  Respiratory failure likely multifactorial with atelectasis and questionable pneumonia.  procalcitonin came back high and patient is empirically being treated with antibiotics until cultures are back.  CT abdomen reviewed colonic distention noted.  Abdominal distention clinically appears to be improving.  However if no significant improvement may consider GI or general surgery consult for colonic distention.  Aggressive pulmonate toilet with  physical therapy  Discussed plan of care with patient's wife   Hyponatremia likely from IV fluids.  IV fluids have been switched to normal saline now.  Hypernatremia is improving    Adonay Crowley MD  01/19/18  11:45 AM

## 2018-01-19 NOTE — PROGRESS NOTES
Postop day 4: Repeat laminectomy and fusion L2-3, L3-4.  He complains of some abdominal bloating which is improved, and has no nausea and vomiting.  He pulled out his IV and NG tube just a few minutes ago.  Presently he is a, fairly alert and oriented ×3, but not back to his mental baseline.  He feels like he is breathing better and part of the reason the ileus was treated with NG tube suction is because it was interfering with breathing.  He has expected postoperative back pain and moves the legs well and has minimal leg pain.  Wound is clean.  Echocardiogram performed yesterday showed ejection fraction of 65 percent and some valvular abnormalities which if I read the interpretation correctly are mild to moderate and require no immediate intervention.  It should also be noted that the abdominal CT scan showed that his thoracic and abdominal aortic aneurysms are very slightly increased in size from 2015 image.  Creatinine remains mildly elevated but today he does appear better today than yesterday.  Overall problems include the some mild postoperative confusion, postoperative ileus and he needs to go back on his anticoagulants as soon as possible, but according to Dr. Driscoll it's okay to wait even a few more days if needed.  That being the case I like to wait until tomorrow and see how he does with the NG tube out and make sure no interventions, either minor or significant for the ileus are required.  If he improves over the weekend I think he and go to rehabilitation on Monday.

## 2018-01-19 NOTE — PLAN OF CARE
Problem: Patient Care Overview (Adult)  Goal: Plan of Care Review  Outcome: Ongoing (interventions implemented as appropriate)   01/19/18 0659   Coping/Psychosocial Response Interventions   Plan Of Care Reviewed With patient   Patient Care Overview   Progress improving   Outcome Evaluation   Outcome Summary/Follow up Plan VSS. Confused during the night and restless at times. Attempts to climb OOB. Dressing dry and intact. No distress noted. Will continue to monitor.

## 2018-01-19 NOTE — PROGRESS NOTES
LOS: 4 days     Name: Osvaldo Lopez  Age/Sex: 82 y.o. male  :  1935        PCP: Caio Rodríguez Jr., MD    Subjective   No BM today no significant flattus.  Small BM yesterday.  Abdomen is more swollen compared to yesterday.  No worse pain wise.  Still too weak to get up  General: No Fever or Chills, Cardiac: No Chest Pain or Palpitations, Resp: No Cough or SOA, GI: No Nausea, Vomiting, or Diarrhea and Other: No bleeding      amiodarone 400 mg Oral Q12H   finasteride 5 mg Oral QAM   insulin aspart 0-9 Units Subcutaneous 4x Daily With Meals & Nightly   metoprolol succinate XL 12.5 mg Oral QAM   piperacillin-tazobactam 3.375 g Intravenous Q8H   sennosides-docusate sodium 1 tablet Oral Nightly   tamsulosin 0.4 mg Oral BID       sodium chloride 125 mL/hr Last Rate: 125 mL/hr (18 0848)       Objective   Vital Signs  Temp:  [98 °F (36.7 °C)-99.5 °F (37.5 °C)] 98.7 °F (37.1 °C)  Heart Rate:  [100-122] 105  Resp:  [18-20] 20  BP: (112-141)/(61-69) 135/69  Body mass index is 31.8 kg/(m^2).    Intake/Output Summary (Last 24 hours) at 18 0901  Last data filed at 18 2230   Gross per 24 hour   Intake                0 ml   Output              350 ml   Net             -350 ml       Physical Exam   Constitutional: He is oriented to person, place, and time. He appears well-developed and well-nourished.   HENT:   Head: Normocephalic and atraumatic.   Eyes: Conjunctivae and EOM are normal.   Neck: Neck supple. No JVD present.   Cardiovascular: Normal rate.    Pulmonary/Chest: Effort normal and breath sounds normal.   Abdominal: Bowel sounds are normal. He exhibits distension. There is no tenderness. There is no rebound and no guarding.   Musculoskeletal: Normal range of motion. He exhibits no edema.   Neurological: He is alert and oriented to person, place, and time.   Skin: Skin is warm and dry. No rash noted. No pallor.   Psychiatric: He has a normal mood and affect. His behavior is normal. Thought  content normal.   Vitals reviewed.        Results Review:       I reviewed the patient's new clinical results.    Results from last 7 days  Lab Units 01/19/18  0510 01/18/18  0435 01/17/18  1118 01/17/18  0507 01/16/18  0519 01/15/18  2248   WBC 10*3/mm3 5.77 6.21 7.89  --   --   --    HEMOGLOBIN g/dL 9.5* 10.5* 11.1* 11.3* 12.7* 13.3*   PLATELETS 10*3/mm3 118* 113* 122*  --   --   --      Results from last 7 days  Lab Units 01/19/18  0510 01/18/18  0533 01/17/18  2056 01/17/18  1929 01/17/18  0507   SODIUM mmol/L 130* 128* 130*  --  133*   POTASSIUM mmol/L 4.2 4.3 4.9  --  4.3   CHLORIDE mmol/L 97* 95* 96*  --  97*   CO2 mmol/L 21.0* 25.2 23.0  --  26.8   BUN mg/dL 30* 22 22  --  15   CREATININE mg/dL 1.21 1.25 1.28*  --  0.99   CALCIUM mg/dL 8.0* 7.9* 8.2*  --  8.0*   MAGNESIUM mg/dL  --   --   --  2.0  --    Estimated Creatinine Clearance: 59.5 mL/min (by C-G formula based on Cr of 1.21).    Lab Results   Component Value Date    HGBA1C 7.00 (H) 01/17/2018    HGBA1C 7.80 (H) 08/16/2017    HGBA1C 7.39 (H) 11/18/2016     Glucose   Date/Time Value Ref Range Status   01/19/2018 0655 198 (H) 70 - 130 mg/dL Final   01/18/2018 2129 188 (H) 70 - 130 mg/dL Final   01/18/2018 1736 168 (H) 70 - 130 mg/dL Final   01/18/2018 1149 156 (H) 70 - 130 mg/dL Final   01/18/2018 0741 170 (H) 70 - 130 mg/dL Final   01/17/2018 2123 194 (H) 70 - 130 mg/dL Final   01/17/2018 1703 165 (H) 70 - 130 mg/dL Final   01/17/2018 1106 189 (H) 70 - 130 mg/dL Final       Assessment/Plan   Principal Problem:    Spinal stenosis of lumbar region with neurogenic claudication  Active Problems:    Essential hypertension    Type 2 diabetes mellitus without complication    Paroxysmal a-fib    BPH (benign prostatic hyperplasia)    Tachycardia    Acute respiratory failure with hypoxia    Postoperative ileus    Hyponatremia      PLAN  - diabetes is acceptable presently allowing some hyperglycemia given ongoing post operative abdominal issues  - per wife ab is  more distended this evening than this AM.  Aguirre is non tender but very distended and tympanic.  Will consult gen surgery if ok with Dr Eliud Tarango per cardiology for AC with Afib    Disposition        Kirk Kent MD  [facility]  01/19/18  9:01 AM

## 2018-01-19 NOTE — PLAN OF CARE
Problem: Patient Care Overview (Adult)  Goal: Plan of Care Review  Outcome: Ongoing (interventions implemented as appropriate)   01/19/18 1013   Coping/Psychosocial Response Interventions   Plan Of Care Reviewed With patient   Patient Care Overview   Progress improving   Outcome Evaluation   Outcome Summary/Follow up Plan pt more alert this am, inc participation with PT, assisted with activity, able to come to full stand, and then pivot to chair, while pivoting, pt able to take a few steps to turn, requ mod/max a x 2 to perform

## 2018-01-20 LAB
ALBUMIN SERPL-MCNC: 2.8 G/DL (ref 3.5–5.2)
ALBUMIN/GLOB SERPL: 0.9 G/DL
ALP SERPL-CCNC: 90 U/L (ref 39–117)
ALT SERPL W P-5'-P-CCNC: 18 U/L (ref 1–41)
ANION GAP SERPL CALCULATED.3IONS-SCNC: 9.3 MMOL/L
AST SERPL-CCNC: 21 U/L (ref 1–40)
BASOPHILS # BLD AUTO: 0.01 10*3/MM3 (ref 0–0.2)
BASOPHILS NFR BLD AUTO: 0.2 % (ref 0–1.5)
BILIRUB SERPL-MCNC: 0.5 MG/DL (ref 0.1–1.2)
BUN BLD-MCNC: 22 MG/DL (ref 8–23)
BUN/CREAT SERPL: 21 (ref 7–25)
CALCIUM SPEC-SCNC: 8.2 MG/DL (ref 8.6–10.5)
CHLORIDE SERPL-SCNC: 98 MMOL/L (ref 98–107)
CO2 SERPL-SCNC: 25.7 MMOL/L (ref 22–29)
CREAT BLD-MCNC: 1.05 MG/DL (ref 0.76–1.27)
DEPRECATED RDW RBC AUTO: 49.6 FL (ref 37–54)
EOSINOPHIL # BLD AUTO: 0.25 10*3/MM3 (ref 0–0.7)
EOSINOPHIL NFR BLD AUTO: 5.2 % (ref 0.3–6.2)
ERYTHROCYTE [DISTWIDTH] IN BLOOD BY AUTOMATED COUNT: 13.6 % (ref 11.5–14.5)
GFR SERPL CREATININE-BSD FRML MDRD: 68 ML/MIN/1.73
GLOBULIN UR ELPH-MCNC: 3 GM/DL
GLUCOSE BLD-MCNC: 164 MG/DL (ref 65–99)
GLUCOSE BLDC GLUCOMTR-MCNC: 171 MG/DL (ref 70–130)
GLUCOSE BLDC GLUCOMTR-MCNC: 194 MG/DL (ref 70–130)
GLUCOSE BLDC GLUCOMTR-MCNC: 200 MG/DL (ref 70–130)
GLUCOSE BLDC GLUCOMTR-MCNC: 217 MG/DL (ref 70–130)
HCT VFR BLD AUTO: 28 % (ref 40.4–52.2)
HGB BLD-MCNC: 9 G/DL (ref 13.7–17.6)
IMM GRANULOCYTES # BLD: 0 10*3/MM3 (ref 0–0.03)
IMM GRANULOCYTES NFR BLD: 0 % (ref 0–0.5)
LYMPHOCYTES # BLD AUTO: 1.04 10*3/MM3 (ref 0.9–4.8)
LYMPHOCYTES NFR BLD AUTO: 21.7 % (ref 19.6–45.3)
MCH RBC QN AUTO: 31.6 PG (ref 27–32.7)
MCHC RBC AUTO-ENTMCNC: 32.1 G/DL (ref 32.6–36.4)
MCV RBC AUTO: 98.2 FL (ref 79.8–96.2)
MONOCYTES # BLD AUTO: 0.52 10*3/MM3 (ref 0.2–1.2)
MONOCYTES NFR BLD AUTO: 10.8 % (ref 5–12)
NEUTROPHILS # BLD AUTO: 2.98 10*3/MM3 (ref 1.9–8.1)
NEUTROPHILS NFR BLD AUTO: 62.1 % (ref 42.7–76)
PLATELET # BLD AUTO: 152 10*3/MM3 (ref 140–500)
PMV BLD AUTO: 9.1 FL (ref 6–12)
POTASSIUM BLD-SCNC: 4.5 MMOL/L (ref 3.5–5.2)
PROT SERPL-MCNC: 5.8 G/DL (ref 6–8.5)
RBC # BLD AUTO: 2.85 10*6/MM3 (ref 4.6–6)
SODIUM BLD-SCNC: 133 MMOL/L (ref 136–145)
WBC NRBC COR # BLD: 4.8 10*3/MM3 (ref 4.5–10.7)

## 2018-01-20 PROCEDURE — 25010000002 ENOXAPARIN PER 10 MG: Performed by: ORTHOPAEDIC SURGERY

## 2018-01-20 PROCEDURE — 94799 UNLISTED PULMONARY SVC/PX: CPT

## 2018-01-20 PROCEDURE — 63710000001 INSULIN ASPART PER 5 UNITS: Performed by: HOSPITALIST

## 2018-01-20 PROCEDURE — 99232 SBSQ HOSP IP/OBS MODERATE 35: CPT | Performed by: INTERNAL MEDICINE

## 2018-01-20 PROCEDURE — 94640 AIRWAY INHALATION TREATMENT: CPT

## 2018-01-20 PROCEDURE — 99231 SBSQ HOSP IP/OBS SF/LOW 25: CPT | Performed by: SURGERY

## 2018-01-20 PROCEDURE — 82962 GLUCOSE BLOOD TEST: CPT

## 2018-01-20 PROCEDURE — 85025 COMPLETE CBC W/AUTO DIFF WBC: CPT | Performed by: HOSPITALIST

## 2018-01-20 PROCEDURE — 25010000002 PIPERACILLIN SOD-TAZOBACTAM PER 1 G: Performed by: INTERNAL MEDICINE

## 2018-01-20 PROCEDURE — 97110 THERAPEUTIC EXERCISES: CPT | Performed by: PHYSICAL THERAPIST

## 2018-01-20 PROCEDURE — 80053 COMPREHEN METABOLIC PANEL: CPT | Performed by: HOSPITALIST

## 2018-01-20 RX ORDER — AMIODARONE HYDROCHLORIDE 200 MG/1
400 TABLET ORAL
Status: DISCONTINUED | OUTPATIENT
Start: 2018-01-21 | End: 2018-01-22

## 2018-01-20 RX ADMIN — INSULIN ASPART 4 UNITS: 100 INJECTION, SOLUTION INTRAVENOUS; SUBCUTANEOUS at 17:21

## 2018-01-20 RX ADMIN — HYDROCODONE BITARTRATE AND ACETAMINOPHEN 2 TABLET: 5; 325 TABLET ORAL at 21:28

## 2018-01-20 RX ADMIN — FINASTERIDE 5 MG: 5 TABLET, FILM COATED ORAL at 08:48

## 2018-01-20 RX ADMIN — TAZOBACTAM SODIUM AND PIPERACILLIN SODIUM 3.38 G: 375; 3 INJECTION, SOLUTION INTRAVENOUS at 06:16

## 2018-01-20 RX ADMIN — BISACODYL 10 MG: 10 SUPPOSITORY RECTAL at 21:40

## 2018-01-20 RX ADMIN — BISACODYL 10 MG: 10 SUPPOSITORY RECTAL at 08:47

## 2018-01-20 RX ADMIN — HYDROCODONE BITARTRATE AND ACETAMINOPHEN 2 TABLET: 5; 325 TABLET ORAL at 16:08

## 2018-01-20 RX ADMIN — ENOXAPARIN SODIUM 100 MG: 100 INJECTION SUBCUTANEOUS at 21:28

## 2018-01-20 RX ADMIN — METOPROLOL SUCCINATE 12.5 MG: 25 TABLET, FILM COATED, EXTENDED RELEASE ORAL at 08:49

## 2018-01-20 RX ADMIN — AMIODARONE HYDROCHLORIDE 400 MG: 200 TABLET ORAL at 06:16

## 2018-01-20 RX ADMIN — TAMSULOSIN HYDROCHLORIDE 0.4 MG: 0.4 CAPSULE ORAL at 08:49

## 2018-01-20 RX ADMIN — INSULIN ASPART 2 UNITS: 100 INJECTION, SOLUTION INTRAVENOUS; SUBCUTANEOUS at 21:39

## 2018-01-20 RX ADMIN — TAMSULOSIN HYDROCHLORIDE 0.4 MG: 0.4 CAPSULE ORAL at 21:29

## 2018-01-20 RX ADMIN — ENOXAPARIN SODIUM 100 MG: 100 INJECTION SUBCUTANEOUS at 08:48

## 2018-01-20 RX ADMIN — TAZOBACTAM SODIUM AND PIPERACILLIN SODIUM 3.38 G: 375; 3 INJECTION, SOLUTION INTRAVENOUS at 21:29

## 2018-01-20 RX ADMIN — INSULIN ASPART 4 UNITS: 100 INJECTION, SOLUTION INTRAVENOUS; SUBCUTANEOUS at 12:28

## 2018-01-20 RX ADMIN — IPRATROPIUM BROMIDE 0.5 MG: 0.5 SOLUTION RESPIRATORY (INHALATION) at 20:48

## 2018-01-20 RX ADMIN — TAZOBACTAM SODIUM AND PIPERACILLIN SODIUM 3.38 G: 375; 3 INJECTION, SOLUTION INTRAVENOUS at 14:01

## 2018-01-20 RX ADMIN — SODIUM CHLORIDE 125 ML/HR: 9 INJECTION, SOLUTION INTRAVENOUS at 04:22

## 2018-01-20 RX ADMIN — DOCUSATE SODIUM -SENNOSIDES 1 TABLET: 50; 8.6 TABLET, COATED ORAL at 21:29

## 2018-01-20 RX ADMIN — INSULIN ASPART 2 UNITS: 100 INJECTION, SOLUTION INTRAVENOUS; SUBCUTANEOUS at 08:48

## 2018-01-20 NOTE — CONSULTS
REASON FOR CONSULT:    Abdominal distention    REQUESTING PHYSICIAN:    Kirk Kent MD    HISTORY OF PRESENT ILLNESS:    Osvaldo Lopez is a 82 y.o. male who underwent an L2-L4 spine procedure involving bone grafting and instrumentation for spinal stenosis, scoliosis, and disc degeneration on 1/15/2018.  Since his surgeries developed abdominal distention.  He has gotten fairly deconditioned-- he required two physical therapists to get him back in bed from a seated position in the bedside chair.  He denies significant episodes of flatus, and has not had a bowel movement since prior to surgery.  There is no vomiting.  He had a colonoscopy several years ago at [facility].  He thinks there were some polyps removed.    Past Medical History:   Diagnosis Date   • Abnormal thyroid blood test    • Aneurysm of abdominal aorta    • Arthritis    • Atrial fibrillation    • BPH (benign prostatic hyperplasia)    • Bruises easily    • Bulging of thoracic intervertebral disc    • Coronary artery disease    • Diabetes mellitus     type 2   • Diverticular disease    • Edema     LOWER LEGS   • Enlarged prostate without lower urinary tract symptoms (luts)    • Essential hypertension    • Fatigue    • History of MI (myocardial infarction) 1989   • Hyperactivity of bladder    • Hyperlipidemia    • Hypertension    • Left shoulder pain    • Lumbar radiculopathy 2016    wears back brace at times following back surgery   • Muscle weakness (generalized)    • Osteoarthritis    • Personal history of arthritis    • Screening      stop bang scale 5   • Torn rotator cuff     left   • Type 2 diabetes mellitus    • Urinary frequency        Past Surgical History:   Procedure Laterality Date   • CARDIAC SURGERY      CABGX5 (1989)   • CATARACT EXTRACTION Bilateral 1997   • CYST REMOVAL      FROM BACK   • GALLBLADDER SURGERY  1989   • HERNIA REPAIR Left     inquinal times 3  last one in 2003   • KNEE CARTILAGE SURGERY Left 1970's   •  LUMBAR DISCECTOMY FUSION INSTRUMENTATION N/A 4/11/2016    Procedure: lumbar laminectomy L4-5 and fusion with instrumentation;  Surgeon: Ran Kline MD;  Location: [facility] OR;  Service:    • LUMBAR DISCECTOMY FUSION INSTRUMENTATION N/A 1/15/2018    Procedure: L2-3, L3-4 laminectomy and fusion with instrumentation and removal of implants L4 5.;  Surgeon: Ran Kline MD;  Location: [facility] OR;  Service:    • ME TOTAL KNEE ARTHROPLASTY Left 11/14/2016    Procedure: TOTAL KNEE ARTHROPLASTY;  Surgeon: Dontrell Arellano MD;  Location: [facility] OR;  Service: Orthopedics         Current Facility-Administered Medications:   •  amiodarone (PACERONE) tablet 400 mg, 400 mg, Oral, Q12H, Angelo Driscoll MD, 400 mg at 01/19/18 1437  •  bisacodyl (DULCOLAX) suppository 10 mg, 10 mg, Rectal, Daily PRN, Ran Kline MD, 10 mg at 01/19/18 1841  •  cyclobenzaprine (FLEXERIL) tablet 10 mg, 10 mg, Oral, TID PRN, Ran Kline MD, 10 mg at 01/18/18 0417  •  dextrose (D50W) solution 25 g, 25 g, Intravenous, Q15 Min PRN, Rao Marshall MD  •  dextrose (GLUTOSE) oral gel 15 g, 15 g, Oral, Q15 Min PRN, Rao Marshall MD  •  [START ON 1/20/2018] enoxaparin (LOVENOX) syringe 100 mg, 1 mg/kg, Subcutaneous, Q12H, Ran Kline MD  •  finasteride (PROSCAR) tablet 5 mg, 5 mg, Oral, QAM, Rao Marhsall MD, 5 mg at 01/19/18 0930  •  glucagon (human recombinant) (GLUCAGEN DIAGNOSTIC) injection 1 mg, 1 mg, Subcutaneous, Q15 Min PRN, Rao Marshall MD  •  HYDROcodone-acetaminophen (NORCO) 5-325 MG per tablet 1 tablet, 1 tablet, Oral, Q4H PRN, 1 tablet at 01/18/18 0641 **OR** HYDROcodone-acetaminophen (NORCO) 5-325 MG per tablet 2 tablet, 2 tablet, Oral, Q4H PRN, Ran Kline MD, 2 tablet at 01/19/18 1431  •  insulin aspart (novoLOG) injection 0-9 Units, 0-9 Units, Subcutaneous, 4x Daily With Meals & Nightly, Rao Marshall MD, 4 Units at 01/19/18 1822  •  metoprolol succinate XL (TOPROL-XL) 24 hr tablet 12.5 mg, 12.5 mg,  Oral, QAM, Rao Marshall MD, 12.5 mg at 01/19/18 0930  •  naloxone (NARCAN) injection 0.1 mg, 0.1 mg, Intravenous, Q5 Min PRN, Ran Kline MD  •  ondansetron (ZOFRAN) tablet 4 mg, 4 mg, Oral, Q6H PRN **OR** ondansetron ODT (ZOFRAN-ODT) disintegrating tablet 4 mg, 4 mg, Oral, Q6H PRN **OR** ondansetron (ZOFRAN) injection 4 mg, 4 mg, Intravenous, Q6H PRN, Ran Kline MD  •  piperacillin-tazobactam (ZOSYN) 3.375 g in iso-osmotic dextrose 50 ml (premix), 3.375 g, Intravenous, Q8H, Adonay Crowley MD, 3.375 g at 01/19/18 1438  •  polyethylene glycol 3350 powder (packet), 17 g, Oral, Daily PRN, Ran Kline MD, 17 g at 01/19/18 1503  •  sennosides-docusate sodium (SENOKOT-S) 8.6-50 MG tablet 1 tablet, 1 tablet, Oral, Nightly, Ran Kline MD, 1 tablet at 01/18/18 2146  •  sodium chloride 0.9 % flush 1-10 mL, 1-10 mL, Intravenous, PRN, Ran Kline MD  •  sodium chloride 0.9 % infusion, 125 mL/hr, Intravenous, Continuous, Miguelina Mendosa MD, Last Rate: 125 mL/hr at 01/18/18 0848, 125 mL/hr at 01/18/18 0848  •  tamsulosin (FLOMAX) 24 hr capsule 0.4 mg, 0.4 mg, Oral, BID, Rao Marshall MD, 0.4 mg at 01/19/18 0930  •  temazepam (RESTORIL) capsule 15 mg, 15 mg, Oral, Nightly PRN, Ran Kline MD    Allergies   Allergen Reactions   • Percocet [Oxycodone-Acetaminophen]      Causes confusion       Family History   Problem Relation Age of Onset   • Heart failure Mother        Social History     Social History   • Marital status:      Spouse name: N/A   • Number of children: N/A   • Years of education: N/A     Occupational History   • Not on file.     Social History Main Topics   • Smoking status: Former Smoker     Years: 36.00     Types: Cigarettes     Quit date: 1989   • Smokeless tobacco: Never Used      Comment: states smoked 2-3 ppd   • Alcohol use Yes      Comment: 2-3 times weekly  //  caffeine use   • Drug use: No   • Sexual activity: Defer     Other Topics Concern   • Not on file  "    Social History Narrative       Review of Systems   Constitutional: Negative for fever.   Gastrointestinal: Positive for abdominal distention. Negative for anal bleeding, blood in stool and vomiting.   Genitourinary: Negative for dysuria.   Psychiatric/Behavioral: Positive for confusion.       Objective     /67 (BP Location: Right arm, Patient Position: Lying)  Pulse 96  Temp 98.5 °F (36.9 °C) (Oral)   Resp 20  Ht 180.3 cm (71\")  Wt 103 kg (228 lb)  SpO2 94%  BMI 31.8 kg/m2    Physical Exam   Constitutional: He is oriented to person, place, and time. He appears well-developed and well-nourished. No distress.   HENT:   Head: Normocephalic and atraumatic.   Eyes: Conjunctivae are normal. No scleral icterus.   Cardiovascular: Normal rate, regular rhythm, normal heart sounds and intact distal pulses.    Pulmonary/Chest: Effort normal and breath sounds normal. No respiratory distress. He has no wheezes. He has no rales.   Abdominal: Soft. Bowel sounds are normal. He exhibits distension. He exhibits no mass. There is tenderness (minimal diffuse). There is no rebound and no guarding. No hernia.   Musculoskeletal: He exhibits no edema or deformity.   Neurological: He is alert and oriented to person, place, and time.   Skin: Skin is warm and dry.   Psychiatric: He has a normal mood and affect.   Vitals reviewed.      DIAGNOSTIC DATA:    I reviewed the images and the report of CT scan of the abdomen and pelvis performed yesterday.  It shows decompressed segments of small bowel.  Distally it shows distention of the colon with stool and gas.  I do not see evidence of an obstructing lesion in the colon.    I reviewed the images of KUB performed today.  It shows distended colon.     ASSESSMENT:    Colonic ileus following spine surgery    PLAN:    Stimulate from below with Dulcolax suppositories.  Continue IV fluids.  Encourage movement out of bed.  "

## 2018-01-20 NOTE — PLAN OF CARE
Problem: Patient Care Overview (Adult)  Goal: Plan of Care Review   01/20/18 2133   Outcome Evaluation   Outcome Summary/Follow up Plan Patient progressing with mobility, able to ambulate a few steps with rwx and min A x 2 to get from bed to chair today

## 2018-01-20 NOTE — PROGRESS NOTES
LOS: 5 days     Name: Osvaldo Lopez  Age/Sex: 82 y.o. male  :  1935        PCP: Caio Rodríguez Jr., MD    Subjective   No BM today no significant flattus.  Small BM yesterday.  Abdomen remains distended.  No worse pain wise.  Still too weak to get up  General: No Fever or Chills, Cardiac: No Chest Pain or Palpitations, Resp: No Cough or SOA, GI: No Nausea, Vomiting, or Diarrhea and Other: No bleeding      [START ON 2018] amiodarone 400 mg Oral Q24H   bisacodyl 10 mg Rectal BID   enoxaparin 1 mg/kg Subcutaneous Q12H   finasteride 5 mg Oral QAM   insulin aspart 0-9 Units Subcutaneous 4x Daily With Meals & Nightly   metoprolol succinate XL 12.5 mg Oral QAM   piperacillin-tazobactam 3.375 g Intravenous Q8H   sennosides-docusate sodium 1 tablet Oral Nightly   tamsulosin 0.4 mg Oral BID       sodium chloride 125 mL/hr Last Rate: 125 mL/hr (18 0422)       Objective   Vital Signs  Temp:  [97.9 °F (36.6 °C)-98.7 °F (37.1 °C)] 98.6 °F (37 °C)  Heart Rate:  [88-95] 93  Resp:  [18] 18  BP: (107-145)/(56-82) 141/82  Body mass index is 31.8 kg/(m^2).    Intake/Output Summary (Last 24 hours) at 18 1638  Last data filed at 18 1505   Gross per 24 hour   Intake               50 ml   Output             1565 ml   Net            -1515 ml       Physical Exam   Constitutional: He is oriented to person, place, and time. He appears well-developed and well-nourished.   HENT:   Head: Normocephalic and atraumatic.   Eyes: Conjunctivae and EOM are normal.   Neck: Neck supple. No JVD present.   Cardiovascular: Normal rate.    Pulmonary/Chest: Effort normal and breath sounds normal.   Abdominal: Bowel sounds are normal. He exhibits distension. There is no tenderness. There is no rebound and no guarding.   Musculoskeletal: Normal range of motion. He exhibits no edema.   Neurological: He is alert and oriented to person, place, and time.   Skin: Skin is warm and dry. No rash noted. No pallor.   Psychiatric: He  has a normal mood and affect. His behavior is normal. Thought content normal.   Vitals reviewed.        Results Review:       I reviewed the patient's new clinical results.    Results from last 7 days  Lab Units 01/20/18  0515 01/19/18  0510 01/18/18  0435 01/17/18  1118 01/17/18  0507 01/16/18  0519 01/15/18  2248   WBC 10*3/mm3 4.80 5.77 6.21 7.89  --   --   --    HEMOGLOBIN g/dL 9.0* 9.5* 10.5* 11.1* 11.3* 12.7* 13.3*   PLATELETS 10*3/mm3 152 118* 113* 122*  --   --   --        Results from last 7 days  Lab Units 01/20/18  0515 01/19/18  0510 01/18/18  0533 01/17/18  2056 01/17/18  1929 01/17/18  0507   SODIUM mmol/L 133* 130* 128* 130*  --  133*   POTASSIUM mmol/L 4.5 4.2 4.3 4.9  --  4.3   CHLORIDE mmol/L 98 97* 95* 96*  --  97*   CO2 mmol/L 25.7 21.0* 25.2 23.0  --  26.8   BUN mg/dL 22 30* 22 22  --  15   CREATININE mg/dL 1.05 1.21 1.25 1.28*  --  0.99   CALCIUM mg/dL 8.2* 8.0* 7.9* 8.2*  --  8.0*   MAGNESIUM mg/dL  --   --   --   --  2.0  --    Estimated Creatinine Clearance: 68.6 mL/min (by C-G formula based on Cr of 1.05).    Lab Results   Component Value Date    HGBA1C 7.00 (H) 01/17/2018    HGBA1C 7.80 (H) 08/16/2017    HGBA1C 7.39 (H) 11/18/2016     Glucose   Date/Time Value Ref Range Status   01/20/2018 1554 217 (H) 70 - 130 mg/dL Final   01/20/2018 1118 200 (H) 70 - 130 mg/dL Final   01/20/2018 0619 171 (H) 70 - 130 mg/dL Final   01/19/2018 2022 190 (H) 70 - 130 mg/dL Final   01/19/2018 1626 224 (H) 70 - 130 mg/dL Final   01/19/2018 1237 223 (H) 70 - 130 mg/dL Final   01/19/2018 0655 198 (H) 70 - 130 mg/dL Final   01/18/2018 2129 188 (H) 70 - 130 mg/dL Final       Assessment/Plan   Principal Problem:    Spinal stenosis of lumbar region with neurogenic claudication  Active Problems:    Essential hypertension    Type 2 diabetes mellitus without complication    Paroxysmal a-fib    BPH (benign prostatic hyperplasia)    Tachycardia    Acute respiratory failure with hypoxia    Postoperative ileus     Hyponatremia      PLAN  - diabetes is acceptable presently allowing some hyperglycemia given ongoing post operative abdominal issues, as he eats more will titrate meds, add metformin in AM  - Discussed with Dr Orlando this AM and plan to follow imaging and adjust bowel regiment  - Na stable  - Lovenox per cardiology for AC with Afib    Disposition        Kirk Kent MD  [facility]  01/20/18  4:38 PM

## 2018-01-20 NOTE — PROGRESS NOTES
I have seen and evaluated the patient for Dr. Kline.  His wife is at the bedside.  He reports that he is having some discomfort but the pain medicine helps.  Denies any new complaints or issues.  His operative ileus remains an issue.    He was lying in bed and I could not get a good look at his dressing due to his positioning.  Nurse tells me that the dressing was changed this morning and that the wound looks fine.  The patient is able to move both feet well with good strength with plantar flexion and dorsiflexion.  Sensation is intact in both feet.    Plan is to continue therapy and pain control.  Appreciate all the assistant provided by the medical and general surgical teams.  Discharge disposition is for him to go to rehabilitation Monday if his medical condition is improved.    Lino Mckinney MD

## 2018-01-20 NOTE — PROGRESS NOTES
[facility]           LOS: 5 days   Patient Care Team:  Caio Rodríguez Jr., MD as PCP - General (Family Medicine)  Caio Rodríguez Jr., MD as PCP - Claims Attributed  Dontrell Arellano MD as Surgeon (Orthopedic Surgery)  Angelo Driscoll MD as Consulting Physician (Cardiology)  Darvin Alvarez MD as Consulting Physician (Urology)    Chief Complaint: Acute hypoxemic respiratory failure in the setting off abdominal distention with secondary atelectasis status post laminectomy for spinal stenosis    Interval History:   Improving  Still with cough.  Expressed frustration that he isnt walking yet  Up in chair in no distress on 4l with sats at 97      Vital Signs  Temp:  [97.9 °F (36.6 °C)-98.7 °F (37.1 °C)] 98.6 °F (37 °C)  Heart Rate:  [88-95] 93  Resp:  [18] 18  BP: (107-145)/(56-82) 141/82    Intake/Output Summary (Last 24 hours) at 01/20/18 1523  Last data filed at 01/20/18 1505   Gross per 24 hour   Intake               50 ml   Output             1565 ml   Net            -1515 ml       Physical Exam:   General Appearance:    Awake alert.  No acute distress    Lungs:     Diminished breath sounds.      Heart:    Regular rhythm and normal rate, normal S1 and S2, no            murmur, no gallop, no rub, no click   Chest Wall:    No abnormalities observed   Abdomen:    Mild distention improved.  Hypoactive bowel sounds    Extremities:   Moves all extremities well, no edema, no cyanosis, no             redness     Results Review:          Results from last 7 days  Lab Units 01/20/18  0515 01/19/18  0510 01/18/18  0533   SODIUM mmol/L 133* 130* 128*   POTASSIUM mmol/L 4.5 4.2 4.3   CHLORIDE mmol/L 98 97* 95*   CO2 mmol/L 25.7 21.0* 25.2   BUN mg/dL 22 30* 22   CREATININE mg/dL 1.05 1.21 1.25   GLUCOSE mg/dL 164* 228* 160*   CALCIUM mg/dL 8.2* 8.0* 7.9*       Results from last 7 days  Lab Units 01/18/18  0533 01/17/18  1118   TROPONIN T ng/mL 0.050* 0.086*       Results from last 7 days  Lab Units  01/20/18  0515 01/19/18  0510 01/18/18  0435   WBC 10*3/mm3 4.80 5.77 6.21   HEMOGLOBIN g/dL 9.0* 9.5* 10.5*   HEMATOCRIT % 28.0* 29.5* 32.6*   PLATELETS 10*3/mm3 152 118* 113*               Results from last 7 days  Lab Units 01/17/18  1929   MAGNESIUM mg/dL 2.0           Results from last 7 days  Lab Units 01/18/18  0542   PH, ARTERIAL pH units 7.328*   PO2 ART mm Hg 90.5   PCO2, ARTERIAL mm Hg 46.6*   HCO3 ART mmol/L 24.5       I reviewed the patient's new clinical results.  I personally viewed and interpreted the patient's CXR        Medication Review:     [START ON 1/21/2018] amiodarone 400 mg Oral Q24H   bisacodyl 10 mg Rectal BID   enoxaparin 1 mg/kg Subcutaneous Q12H   finasteride 5 mg Oral QAM   insulin aspart 0-9 Units Subcutaneous 4x Daily With Meals & Nightly   metoprolol succinate XL 12.5 mg Oral QAM   piperacillin-tazobactam 3.375 g Intravenous Q8H   sennosides-docusate sodium 1 tablet Oral Nightly   tamsulosin 0.4 mg Oral BID         sodium chloride 125 mL/hr Last Rate: 125 mL/hr (01/20/18 0422)       ASSESSMENT:   Acute hypoxemic respiratory failure  Altered mental status  Suspect atelectasis due to abdominal distention  Suspect ileus  Status post laminectomy for spinal stenosis  Paroxysmal atrial fibrillation  Diabetes mellitus  Hyponatremia    PLAN:  Respiratory failure likely multifactorial with atelectasis and questionable pneumonia-cont abx  Nebs, IS, flutter   Aggressive pulmonate toilet with physical therapy  Discussed plan of care with patient's wife and pt    Hosea Choi MD  01/20/18  3:23 PM

## 2018-01-20 NOTE — PROGRESS NOTES
[facility]  Progress note: 2018    Patient Identification:  Name:Osvaldo Lopez  Age:82 y.o.  Sex: male  :  1935  MRN: [id number]           CC:  Follow-up of paroxysmal atrial fibrillation with postoperative ileus    Interval history:  Heart rate controlled.  Noted GI note with ileus.  Still taking liquids and pills.  Was able to keep them down last night.  Has had mild abdominal fullness and has had flatus but not bowel movements.  He denies any chest pain shortness of breath. He is back in sinus rhythm    Vital Signs:   Temp:  [97.9 °F (36.6 °C)-98.6 °F (37 °C)] 97.9 °F (36.6 °C)  Heart Rate:  [88-96] 88  Resp:  [18-20] 18  BP: (127-145)/(67-74) 140/74    Intake/Output Summary (Last 24 hours) at 18 0655  Last data filed at 18 0618   Gross per 24 hour   Intake                0 ml   Output              650 ml   Net             -650 ml       Physical Examination:    General Appearance No acute distress   Neck No adenopathy, supple, trachea midline, no thyromegaly, no carotid bruit, no JVD   Lungs Clear to auscultation,respirations regular, even and unlabored   Heart Regular rhythm and normal rate, normal S1 and S2, no murmur, no gallop, no rub, no click   Chest wall No abnormalities observed   Abdomen No masses, no hepatomegaly, Modest distention with minimal bowel sounds    Extremities Moves all extremities well, no edema, no cyanosis, no redness   Neurological Alert and oriented x 3     Lab Review:  Personally reviewed the labs, radiology imaging and other cardiac procedures.   Results from last 7 days  Lab Units 18  0515   SODIUM mmol/L 133*   POTASSIUM mmol/L 4.5   CHLORIDE mmol/L 98   CO2 mmol/L 25.7   BUN mg/dL 22   CREATININE mg/dL 1.05   CALCIUM mg/dL 8.2*   BILIRUBIN mg/dL 0.5   ALK PHOS U/L 90   ALT (SGPT) U/L 18   AST (SGOT) U/L 21   GLUCOSE mg/dL 164*       Results from last 7 days  Lab Units 18  0533 18  1118   TROPONIN T ng/mL 0.050* 0.086*      )  Results from last 7 days  Lab Units 01/20/18  0515 01/19/18  0510 01/18/18  0435   WBC 10*3/mm3 4.80 5.77 6.21   HEMOGLOBIN g/dL 9.0* 9.5* 10.5*   HEMATOCRIT % 28.0* 29.5* 32.6*   PLATELETS 10*3/mm3 152 118* 113*           Medication Review:   Meds reviewed  Scheduled Meds:  amiodarone 400 mg Oral Q12H   bisacodyl 10 mg Rectal BID   enoxaparin 1 mg/kg Subcutaneous Q12H   finasteride 5 mg Oral QAM   insulin aspart 0-9 Units Subcutaneous 4x Daily With Meals & Nightly   metoprolol succinate XL 12.5 mg Oral QAM   piperacillin-tazobactam 3.375 g Intravenous Q8H   sennosides-docusate sodium 1 tablet Oral Nightly   tamsulosin 0.4 mg Oral BID     Continuous Infusions:  sodium chloride 125 mL/hr Last Rate: 125 mL/hr (01/20/18 0422)     I personally viewed and interpreted the patient's EKG/Telemetry data    Assessment and Plan  1.  Paroxysmal atrial fibrillation.  Continue with the current regimen but decrease dose of amiodarone to 400 once a day for 2 days and then 200 today.  Continue with metoprolol  2.  Ileus, hopefully will have more movement today.  If unable to tolerate meds orally, we'll need to switch to IV metoprolol  3.  Status post back surgery with repair  4.  Diabetes  5.  Hypertension      Mini Russ  1/20/20186:55 AM  25min spent in reviewing records, discussion and examination of the patient and discussion with other members of the patient's medical team.     Dictated utilizing Dragon dictation

## 2018-01-20 NOTE — PLAN OF CARE
Problem: Patient Care Overview (Adult)  Goal: Plan of Care Review  Outcome: Ongoing (interventions implemented as appropriate)   01/20/18 0446   Coping/Psychosocial Response Interventions   Plan Of Care Reviewed With patient   Patient Care Overview   Progress improving   Outcome Evaluation   Outcome Summary/Follow up Plan pt A&O, VSS. Still c/o pain and constipation. Gen surg consulted with regard to possible ileus. KUB ordered but didnt show any signif . Will continue to monitor closely       Problem: Fall Risk (Adult)  Goal: Identify Related Risk Factors and Signs and Symptoms  Outcome: Ongoing (interventions implemented as appropriate)    Goal: Absence of Falls  Outcome: Ongoing (interventions implemented as appropriate)      Problem: Laminectomy/Foraminotomy/Discectomy (Adult)  Goal: Signs and Symptoms of Listed Potential Problems Will be Absent or Manageable (Laminectomy/Foraminotomy/Discectomy)  Outcome: Ongoing (interventions implemented as appropriate)      Problem: Pain, Acute (Adult)  Goal: Identify Related Risk Factors and Signs and Symptoms  Outcome: Ongoing (interventions implemented as appropriate)    Goal: Acceptable Pain Control/Comfort Level  Outcome: Ongoing (interventions implemented as appropriate)      Problem: Infection, Risk/Actual (Adult)  Goal: Identify Related Risk Factors and Signs and Symptoms  Outcome: Ongoing (interventions implemented as appropriate)    Goal: Infection Prevention/Resolution  Outcome: Ongoing (interventions implemented as appropriate)      Problem: Pressure Ulcer Risk (Rafael Scale) (Adult,Obstetrics,Pediatric)  Goal: Identify Related Risk Factors and Signs and Symptoms  Outcome: Ongoing (interventions implemented as appropriate)    Goal: Skin Integrity  Outcome: Ongoing (interventions implemented as appropriate)

## 2018-01-20 NOTE — THERAPY TREATMENT NOTE
Acute Care - Physical Therapy Initial Evaluation  [facility]     Patient Name: Osvaldo Lopez  : 1935  MRN: [id number]  Today's Date: 2018   Onset of Illness/Injury or Date of Surgery Date: 01/15/18  Date of Referral to PT: 17  Referring Physician: Eliud      Admit Date: 1/15/2018     Visit Dx:    ICD-10-CM ICD-9-CM   1. Difficulty walking R26.2 719.7   2. Spinal stenosis of lumbar region with neurogenic claudication M48.062 724.03     Patient Active Problem List   Diagnosis   • Midline low back pain   • Complete rotator cuff tear of left shoulder   • Difficulty walking   • Abnormal finding on thyroid function test   • Abdominal aortic aneurysm   • Atrial fibrillation   • Benign prostatic hyperplasia   • Edema   • Essential hypertension   • Fatigue   • Hyperlipidemia   • Shoulder pain   • Lumbar radiculopathy   • Muscle weakness   • Osteoarthritis   • Type 2 diabetes mellitus without complication   • Primary osteoarthritis of left knee   • OA (osteoarthritis) of knee   • Toxic encephalopathy   • Paroxysmal a-fib   • HTN (hypertension)   • Type 2 diabetes mellitus   • BPH (benign prostatic hyperplasia)   • Postoperative anemia due to acute blood loss   • Cardiac murmur   • Complete tear of left rotator cuff   • Tear of right rotator cuff   • Spinal stenosis of lumbar region with neurogenic claudication   • Tachycardia   • Acute respiratory failure with hypoxia   • Postoperative ileus   • Hyponatremia     Past Medical History:   Diagnosis Date   • Abnormal thyroid blood test    • Aneurysm of abdominal aorta    • Arthritis    • Atrial fibrillation    • BPH (benign prostatic hyperplasia)    • Bruises easily    • Bulging of thoracic intervertebral disc    • Coronary artery disease    • Diabetes mellitus     type 2   • Diverticular disease    • Edema     LOWER LEGS   • Enlarged prostate without lower urinary tract symptoms (luts)    • Essential hypertension    • Fatigue    • History of MI  (myocardial infarction) 1989   • Hyperactivity of bladder    • Hyperlipidemia    • Hypertension    • Left shoulder pain    • Lumbar radiculopathy 2016    wears back brace at times following back surgery   • Muscle weakness (generalized)    • Osteoarthritis    • Personal history of arthritis    • Screening      stop bang scale 5   • Torn rotator cuff     left   • Type 2 diabetes mellitus    • Urinary frequency      Past Surgical History:   Procedure Laterality Date   • CARDIAC SURGERY      CABGX5 (1989)   • CATARACT EXTRACTION Bilateral 1997   • CYST REMOVAL      FROM BACK   • GALLBLADDER SURGERY  1989   • HERNIA REPAIR Left     inquinal times 3  last one in 2003   • KNEE CARTILAGE SURGERY Left 1970's   • LUMBAR DISCECTOMY FUSION INSTRUMENTATION N/A 4/11/2016    Procedure: lumbar laminectomy L4-5 and fusion with instrumentation;  Surgeon: Ran Kline MD;  Location: [facility];  Service:    • LUMBAR DISCECTOMY FUSION INSTRUMENTATION N/A 1/15/2018    Procedure: L2-3, L3-4 laminectomy and fusion with instrumentation and removal of implants L4 5.;  Surgeon: Ran Kline MD;  Location: [facility];  Service:    • ME TOTAL KNEE ARTHROPLASTY Left 11/14/2016    Procedure: TOTAL KNEE ARTHROPLASTY;  Surgeon: Dontrell Arellano MD;  Location: [facility];  Service: Orthopedics          PT ASSESSMENT (last 72 hours)      PT Evaluation       01/20/18 1400 01/19/18 1656    Rehab Evaluation    Document Type therapy note (daily note)  -RA therapy note (daily note)  -PC    Subjective Information agree to therapy  -RA agree to therapy  -PC    Patient Effort, Rehab Treatment good  -RA good  -PC    Symptoms Noted During/After Treatment fatigue  -RA fatigue  -PC    General Information    Precautions/Limitations brace on when up  -RA brace on when up;spinal precautions  -PC    Pain Assessment    Pain Assessment  0-10  -PC    Pain Score 4  -RA 5  -PC    Post Pain Score 5  -RA     Pain Type Surgical pain  -RA     Pain Location  Back  -RA Back  -PC    Pain Orientation  Lower  -PC    Pain Intervention(s) Medication (See MAR);Repositioned  -RA     Response to Interventions tolerated  -RA     Cognitive Assessment/Intervention    Current Cognitive/Communication Assessment functional  -RA functional  -PC    Orientation Status oriented to;person;place;situation  -RA oriented to;person;place;situation  -PC    Follows Commands/Answers Questions needs cueing;able to follow single-step instructions;100% of the time  -RA needs cueing;able to follow single-step instructions;100% of the time  -PC    Personal Safety decreased insight to deficits;decreased awareness, need for safety  -RA decreased insight to deficits  -PC    Personal Safety Interventions fall prevention program maintained  -RA fall prevention program maintained;gait belt  -PC    Bed Mobility, Assessment/Treatment    Bed Mobility, Assistive Device bed rails  -RA     Bed Mobility, Roll Left, [place]  2 person assist required;moderate assist (50% patient effort)  -PC    Bed Mobility, Roll Right, [place] moderate assist (50% patient effort);2 person assist required;verbal cues required  -RA     Bed Mob, Sidelying to Sit, [place] moderate assist (50% patient effort);2 person assist required  -RA 2 person assist required;moderate assist (50% patient effort)  -PC    Bed Mob, Sit to Sidelying, [place]  2 person assist required;moderate assist (50% patient effort)  -PC    Transfer Assessment/Treatment    Transfers, Bed-Chair [place] moderate assist (50% patient effort);2 person assist required  -RA     Transfers, Sit-Stand [place] moderate assist (50% patient effort);2 person assist required  -RA 2 person assist required;moderate assist (50% patient effort)  -PC    Transfers, Stand-Sit [place] 2 person assist required;moderate assist (50% patient effort)  -RA 2 person assist required;moderate assist (50% patient effort)  -PC    Transfers, Sit-Stand-Sit, Assist  Device rolling walker;elevated surface  -RA elevated surface;rolling walker  -PC    Transfer, Comment  sit to stand x 4 working on coming to full stand, weight shifting, pt unable to take a step  -PC    Gait Assessment/Treatment    Gait, [place] Level moderate assist (50% patient effort);2 person assist required  -RA     Gait, Assistive Device rolling walker  -RA     Gait, Distance (Feet) 3  -RA     Gait, Gait [place] decreased;decreased heel strike;step length decreased;toe-to-floor clearance decreased;limb motion velocity decreased;forward flexed posture  -RA     Gait, Comment ambulated from bed to chair w/ rwx  -RA     Balance Skills Training    Sitting-Level of Assistance Close supervision  -RA Minimum assistance;Moderate assistance  -PC    Sitting-Balance Support Feet supported;Left upper extremity supported;Right upper extremity supported  -RA Feet supported;Left upper extremity supported;Right upper extremity supported  -PC    Sitting-Balance Activities Trunk control activities  -RA Trunk control activities   pt leaning posterior, continual verbal/tactile cues needed  -PC    Sitting # of Minutes 5  -RA 8  -PC    Standing-Level of Assistance Minimum assistance;x2  -RA Maximum assistance;x2  -PC    Static Standing Balance Support assistive device  -RA assistive device  -PC    Therapy Exercises    Bilateral Lower Extremities 10 reps;AROM:;ankle pumps/circles;sitting;LAQ;hip flexion  -RA 10 reps;AROM:;ankle pumps/circles;sitting;LAQ;hip flexion  -PC    Positioning and Restraints    Pre-Treatment Position in bed  -RA in bed  -PC    Post Treatment Position chair  -RA bed  -PC    In Bed  supine;call light within reach;encouraged to call for assist;with family/caregiver  -PC    In Chair sitting;call light within reach;notified nsg;encouraged to call for assist;with family/caregiver  -RA       01/19/18 1005 01/19/18 0930    Rehab Evaluation    Document Type therapy note (daily note)  -PC     Subjective  Information agree to therapy  -PC     Patient Effort, Rehab Treatment good  -PC     Symptoms Noted During/After Treatment fatigue  -PC     Symptoms Noted Comment ng is out, on 4L o2  -PC     General Information    Precautions/Limitations brace on when up;spinal precautions  -PC     Pain Assessment    Pain Assessment 0-10  -PC     Pain Score 5  -PC     Pain Location Back  -PC     Pain Orientation Lower  -PC     Cognitive Assessment/Intervention    Current Cognitive/Communication Assessment impaired  -PC     Orientation Status oriented to;person;place  -PC     Follows Commands/Answers Questions needs cueing;needs increased time;able to follow single-step instructions;100% of the time  -PC     Personal Safety decreased awareness, need for safety  -PC     Personal Safety Interventions fall prevention program maintained;muscle strengthening facilitated;gait belt  -PC     Muscle Tone Assessment    Muscle Tone Assessment  Bilateral Lower Extremities  -TB    Bilateral Lower Extremities Muscle Tone Assessment  moderately decreased tone  -TB    Bed Mobility, Assessment/Treatment    Bed Mobility, Roll Left, [place] 2 person assist required;moderate assist (50% patient effort)  -PC     Bed Mob, Sidelying to Sit, [place] 2 person assist required;moderate assist (50% patient effort)  -PC     Bed Mob, Sit to Sidelying, [place] 2 person assist required;moderate assist (50% patient effort)  -PC     Transfer Assessment/Treatment    Transfers, Bed-Chair [place] moderate assist (50% patient effort);maximum assist (25% patient effort);2 person assist required  -PC     Transfers, Sit-Stand [place] 2 person assist required;moderate assist (50% patient effort)  -PC     Transfers, Stand-Sit [place] 2 person assist required;moderate assist (50% patient effort)  -PC     Transfers, Sit-Stand-Sit, Assist Device elevated surface;rolling walker  -PC     Transfer, Comment stood with rwx once, able to come to stand but  not take a step, then stood again without walker to pivot to chair with assist of 2  -PC     Balance Skills Training    Sitting-Level of Assistance Minimum assistance;Moderate assistance  -PC     Sitting-Balance Support Feet supported;Left upper extremity supported;Right upper extremity supported  -PC     Sitting-Balance Activities Trunk control activities   pt leaning posterior, continual verbal/tactile cues needed  -PC     Sitting # of Minutes 6  -PC     Standing-Level of Assistance Maximum assistance;x2  -PC     Static Standing Balance Support assistive device  -PC     Therapy Exercises    Bilateral Lower Extremities 10 reps;AROM:;supine;ankle pumps/circles;heel slides;SAQ  -PC     Positioning and Restraints    Pre-Treatment Position in bed  -PC     Post Treatment Position chair  -PC     In Chair sitting;call light within reach;encouraged to call for assist;with family/caregiver;notified nsg  -PC       01/19/18 0400 01/19/18 0000    Muscle Tone Assessment    Muscle Tone Assessment Bilateral Lower Extremities  -JL Bilateral Lower Extremities  -JL    Bilateral Lower Extremities Muscle Tone Assessment moderately decreased tone  -JL moderately decreased tone  -JL      01/18/18 2000 01/18/18 1615    Rehab Evaluation    Document Type  therapy note (daily note)  -PC    Subjective Information  agree to therapy;complains of;weakness;fatigue;pain  -PC    Patient Effort, Rehab Treatment  fair  -PC    Patient Effort, Rehab Treatment Comment  pt more alert this afternoon, following commands better and engaging in conversation  -PC    Symptoms Noted During/After Treatment  fatigue;increased pain  -PC    General Information    Precautions/Limitations  fall precautions;brace on when up  -PC    Vital Signs    Post SpO2 (%)  91  -PC    O2 Delivery Post Treatment  supplemental O2  -PC    Pain Assessment    Pain Assessment  FLACC  -PC    Pain Score  5  -PC    Pain Location  Back  -PC    Pain Orientation  Lower  -PC    Cognitive  Assessment/Intervention    Current Cognitive/Communication Assessment  impaired  -PC    Orientation Status  oriented to;person  -PC    Follows Commands/Answers Questions  needs cueing;needs increased time;needs repetition;able to follow single-step instructions;75% of the time  -    Personal Safety  decreased awareness, need for safety;severe impairment  -PC    Personal Safety Interventions  fall prevention program maintained;gait belt  -PC    Muscle Tone Assessment    Muscle Tone Assessment Bilateral Lower Extremities  -JL     Bilateral Lower Extremities Muscle Tone Assessment moderately decreased tone  -JL     Bed Mobility, Assessment/Treatment    Bed Mobility, Roll Left, [place]  maximum assist (25% patient effort);2 person assist required  -PC    Bed Mob, Sidelying to Sit, [place]  maximum assist (25% patient effort);2 person assist required  -PC    Bed Mob, Sit to Sidelying, [place]  maximum assist (25% patient effort);2 person assist required  -PC    Transfer Assessment/Treatment    Transfers, Sit-Stand [place]  maximum assist (25% patient effort);2 person assist required  -PC    Transfers, Stand-Sit [place]  maximum assist (25% patient effort);2 person assist required  -PC    Transfers, Sit-Stand-Sit, Assist Device  elevated surface;rolling walker  -PC    Transfer, Comment  sit to stand x 2, able to clear buttocks and almost come to full stand but knees and hips flexed  -    Motor Skills/Interventions    Additional Documentation  Balance Skills Training (Group)  -    Balance Skills Training    Sitting-Level of Assistance  Minimum assistance;Moderate assistance  -    Sitting-Balance Support  Feet supported;Left upper extremity supported;Right upper extremity supported  -    Sitting-Balance Activities  Trunk control activities   pt leaning posterior, continual verbal/tactile cues needed  -PC    Sitting # of Minutes  10  -PC    Standing-Level of Assistance  Maximum  assistance;x2  -PC    Static Standing Balance Support  assistive device  -PC    Positioning and Restraints    Pre-Treatment Position  in bed  -PC    Post Treatment Position  bed  -PC    In Bed  supine;call light within reach;encouraged to call for assist;with family/caregiver;notified nsg  -PC      01/18/18 1318 01/18/18 0818    Rehab Evaluation    Document Type therapy note (daily note)  -PC     Subjective Information --   lethargic, decreased responsiveness  -PC     Patient Effort, Rehab Treatment fair  -PC     Symptoms Noted During/After Treatment fatigue;increased pain  -PC     Symptoms Noted Comment pt now with ileus, has ng tube, just got a suppository, and will be going for a CT soon, also still on high flow o2  -PC     General Information    Precautions/Limitations fall precautions;brace on when up  -PC     Vital Signs    Pre SpO2 (%) 96  -PC     O2 Delivery Pre Treatment supplemental O2  -PC     Intra SpO2 (%) 93  -PC     O2 Delivery Intra Treatment supplemental O2  -PC     Pain Assessment    Pain Assessment FLACC  -PC     Pain Score 6  -PC     Pain Location Back  -PC     Pain Orientation Lower  -PC     Pain Intervention(s) Medication (See MAR);Repositioned  -PC     Cognitive Assessment/Intervention    Current Cognitive/Communication Assessment impaired  -PC     Orientation Status unable/difficult to assess  -PC     Follows Commands/Answers Questions needs cueing;needs increased time;needs repetition;able to follow single-step instructions;50% of the time  -PC     Personal Safety decreased awareness, need for assist  -PC     Personal Safety Interventions fall prevention program maintained;gait belt  -PC     Muscle Tone Assessment    Muscle Tone Assessment  Bilateral Lower Extremities  -JS    Bilateral Lower Extremities Muscle Tone Assessment  moderately decreased tone  -JS    Bed Mobility, Assessment/Treatment    Bed Mobility, Roll Left, [place] maximum assist (25% patient effort);2 person assist  required  -PC     Bed Mob, Sidelying to Sit, [place] maximum assist (25% patient effort);2 person assist required  -PC     Bed Mob, Sit to Sidelying, [place] maximum assist (25% patient effort);2 person assist required  -PC     Transfer Assessment/Treatment    Transfers, Sit-Stand [place] maximum assist (25% patient effort);2 person assist required  -PC     Transfer, Comment 2 standing attempts, pt unable to come to stand, unable to lift buttocks off bed  -PC     Positioning and Restraints    Pre-Treatment Position in bed  -PC     Post Treatment Position bed  -PC     In Bed supine;call light within reach;encouraged to call for assist;with family/caregiver   transport here to take pt to a STAT CT of abdomen  -PC       01/17/18 1601       Muscle Tone Assessment    Muscle Tone Assessment Bilateral Lower Extremities  -JS     Bilateral Lower Extremities Muscle Tone Assessment mildly decreased tone  -JS       User Key  (r) = Recorded By, (t) = Taken By, (c) = Cosigned By    Initials Name Provider Type    MIGUEL Vo, PT Physical Therapist    PC Mildred Nolan, PT Physical Therapist    JL Ewa August, RN Registered Nurse    TB Brown Kim, RN Registered Nurse    KAYLEE Isidro, RN Registered Nurse          Physical Therapy Education     Title: PT OT SLP Therapies (Done)     Topic: Physical Therapy (Done)     Point: Mobility training (Done)    Learning Progress Summary    Learner Readiness Method Response Comment Documented by Status   Patient Acceptance E VIKTORIA  RA 01/20/18 1452 Done    Acceptance E,D NR  PC 01/19/18 1012 Active    Nonacceptance E NR  JL 01/19/18 0311 Active    Acceptance E,D NR  PC 01/18/18 1324 Active    Acceptance E,TB,D NR,VIKTORIA  SV 01/17/18 1004 Done   Family Acceptance E,TB,D NR,VU  SV 01/17/18 1004 Done               Point: Home exercise program (Done)    Learning Progress Summary    Learner Readiness Method Response Comment Documented by Status   Patient Acceptance E VIKTORIA RIVERA  01/20/18 1452 Done    Acceptance E,D NR   01/19/18 1012 Active    Nonacceptance E NR   01/19/18 0311 Active    Acceptance E,D NR   01/18/18 1324 Active    Acceptance E,TB,D NR,VU   01/17/18 1004 Done   Family Acceptance E,TB,D NR,VU  SV 01/17/18 1004 Done               Point: Body mechanics (Done)    Learning Progress Summary    Learner Readiness Method Response Comment Documented by Status   Patient Acceptance E VU  RA 01/20/18 1452 Done    Acceptance E,D NR   01/19/18 1012 Active    Nonacceptance E NR   01/19/18 0311 Active    Acceptance E,D NR   01/18/18 1324 Active    Acceptance E,TB,D NR,VU   01/17/18 1004 Done   Family Acceptance E,TB,D NR,VU   01/17/18 1004 Done               Point: Precautions (Done)    Learning Progress Summary    Learner Readiness Method Response Comment Documented by Status   Patient Acceptance E VU  RA 01/20/18 1452 Done    Acceptance E,D NR   01/19/18 1012 Active    Nonacceptance E NR   01/19/18 0311 Active    Acceptance E,D NR   01/18/18 1324 Active    Acceptance E,TB,D NR,VU   01/17/18 1004 Done   Family Acceptance E,TB,D NR,VU   01/17/18 1004 Done                      User Key     Initials Effective Dates Name Provider Type Discipline     04/06/17 -  Cathy Vo, PT Physical Therapist PT    PC 12/01/15 -  Mildred Nolan, PT Physical Therapist PT     01/17/16 -  Sabina Middleton, PT Physical Therapist PT     04/28/17 -  Ewa August RN Registered Nurse Nurse                PT Recommendation and Plan  Anticipated Discharge Disposition: skilled nursing facility  PT Frequency: 2 times/day  Plan of Care Review  Outcome Summary/Follow up Plan: Patient progressing with mobility, able to ambulate a few steps with rwx and min A x 2 to get from bed to chair today           IP PT Goals       01/17/18 1005          Bed Mobility PT STG    Bed Mobility PT STG, Date Established 01/17/18  -SV      Bed Mobility PT STG, Time to Achieve 2 wks  -SV      Bed  Mobility PT STG, Activity Type sidelying to sit/sit to sidelying  -SV      Bed Mobility PT STG, [place] minimum assist (75% patient effort);contact guard assist  -SV      Transfer Training PT STG    Transfer Training PT STG, Date Established 01/17/18  -SV      Transfer Training PT STG, Time to Achieve 2 wks  -SV      Transfer Training PT STG, Activity Type sit to stand/stand to sit;bed to chair /chair to bed  -SV      Transfer Training PT STG, [place] minimum assist (75% patient effort)  -SV      Transfer Training PT STG, Assist Device walker, rolling  -SV        User Key  (r) = Recorded By, (t) = Taken By, (c) = Cosigned By    Initials Name Provider Type    SV Sabina Middleton, PT Physical Therapist                Outcome Measures       01/20/18 1500 01/19/18 1000 01/18/18 1600    How much help from another person do you currently need...    Turning from your back to your side while in flat bed without using bedrails? 2  -RA 2  -PC 2  -PC    Moving from lying on back to sitting on the side of a flat bed without bedrails? 2  -RA 2  -PC 2  -PC    Moving to and from a bed to a chair (including a wheelchair)? 3  -RA 2  -PC 1  -PC    Standing up from a chair using your arms (e.g., wheelchair, bedside chair)? 2  -RA 2  -PC 1  -PC    Climbing 3-5 steps with a railing? 1  -RA 1  -PC 1  -PC    To walk in hospital room? 2  -RA 2  -PC 1  -PC    AM-PAC 6 Clicks Score 12  -RA 11  -PC 8  -PC      01/18/18 1300          How much help from another person do you currently need...    Turning from your back to your side while in flat bed without using bedrails? 1  -PC      Moving from lying on back to sitting on the side of a flat bed without bedrails? 1  -PC      Moving to and from a bed to a chair (including a wheelchair)? 1  -PC      Standing up from a chair using your arms (e.g., wheelchair, bedside chair)? 1  -PC      Climbing 3-5 steps with a railing? 1  -PC      To walk in hospital room? 1  -PC      AM-PAC  6 Clicks Score 6  -PC        User Key  (r) = Recorded By, (t) = Taken By, (c) = Cosigned By    Initials Name Provider Type    RA Cathy Vo PT Physical Therapist    PC Mildred Nolan PT Physical Therapist           Time Calculation:         PT Charges       01/20/18 1504          Time Calculation    Start Time 1424  -RA      Stop Time 1445  -RA      Time Calculation (min) 21 min  -RA      PT Received On 01/20/18  -RA      PT - Next Appointment 01/21/18  -RA        User Key  (r) = Recorded By, (t) = Taken By, (c) = Cosigned By    Initials Name Provider Type    MIGUEL Vo, PT Physical Therapist          Therapy Charges for Today     Code Description Service Date Service Provider Modifiers Qty    37796482095 HC PT THER PROC EA 15 MIN 1/20/2018 Cathy Vo, PT GP 1    76282860666 HC PT THER SUPP EA 15 MIN 1/20/2018 Cathy Vo, PT GP 1          PT G-Codes  Outcome Measure Options: AM-PAC 6 Clicks Basic Mobility (PT)      Cathy Vo PT  1/20/2018

## 2018-01-21 ENCOUNTER — APPOINTMENT (OUTPATIENT)
Dept: GENERAL RADIOLOGY | Facility: HOSPITAL | Age: 83
End: 2018-01-21

## 2018-01-21 LAB
ANION GAP SERPL CALCULATED.3IONS-SCNC: 8.7 MMOL/L
BASOPHILS # BLD AUTO: 0.01 10*3/MM3 (ref 0–0.2)
BASOPHILS NFR BLD AUTO: 0.2 % (ref 0–1.5)
BUN BLD-MCNC: 17 MG/DL (ref 8–23)
BUN/CREAT SERPL: 17.9 (ref 7–25)
CALCIUM SPEC-SCNC: 8.1 MG/DL (ref 8.6–10.5)
CHLORIDE SERPL-SCNC: 98 MMOL/L (ref 98–107)
CO2 SERPL-SCNC: 27.3 MMOL/L (ref 22–29)
CREAT BLD-MCNC: 0.95 MG/DL (ref 0.76–1.27)
DEPRECATED RDW RBC AUTO: 48.1 FL (ref 37–54)
EOSINOPHIL # BLD AUTO: 0.16 10*3/MM3 (ref 0–0.7)
EOSINOPHIL NFR BLD AUTO: 3.9 % (ref 0.3–6.2)
ERYTHROCYTE [DISTWIDTH] IN BLOOD BY AUTOMATED COUNT: 13.2 % (ref 11.5–14.5)
GFR SERPL CREATININE-BSD FRML MDRD: 76 ML/MIN/1.73
GLUCOSE BLD-MCNC: 146 MG/DL (ref 65–99)
GLUCOSE BLDC GLUCOMTR-MCNC: 143 MG/DL (ref 70–130)
GLUCOSE BLDC GLUCOMTR-MCNC: 164 MG/DL (ref 70–130)
GLUCOSE BLDC GLUCOMTR-MCNC: 212 MG/DL (ref 70–130)
GLUCOSE BLDC GLUCOMTR-MCNC: 224 MG/DL (ref 70–130)
HCT VFR BLD AUTO: 28.4 % (ref 40.4–52.2)
HGB BLD-MCNC: 9.1 G/DL (ref 13.7–17.6)
IMM GRANULOCYTES # BLD: 0 10*3/MM3 (ref 0–0.03)
IMM GRANULOCYTES NFR BLD: 0 % (ref 0–0.5)
LYMPHOCYTES # BLD AUTO: 0.94 10*3/MM3 (ref 0.9–4.8)
LYMPHOCYTES NFR BLD AUTO: 23 % (ref 19.6–45.3)
MCH RBC QN AUTO: 31.7 PG (ref 27–32.7)
MCHC RBC AUTO-ENTMCNC: 32 G/DL (ref 32.6–36.4)
MCV RBC AUTO: 99 FL (ref 79.8–96.2)
MONOCYTES # BLD AUTO: 0.46 10*3/MM3 (ref 0.2–1.2)
MONOCYTES NFR BLD AUTO: 11.2 % (ref 5–12)
NEUTROPHILS # BLD AUTO: 2.52 10*3/MM3 (ref 1.9–8.1)
NEUTROPHILS NFR BLD AUTO: 61.7 % (ref 42.7–76)
PLATELET # BLD AUTO: 134 10*3/MM3 (ref 140–500)
PMV BLD AUTO: 9.1 FL (ref 6–12)
POTASSIUM BLD-SCNC: 4 MMOL/L (ref 3.5–5.2)
RBC # BLD AUTO: 2.87 10*6/MM3 (ref 4.6–6)
SODIUM BLD-SCNC: 134 MMOL/L (ref 136–145)
WBC NRBC COR # BLD: 4.09 10*3/MM3 (ref 4.5–10.7)

## 2018-01-21 PROCEDURE — 99232 SBSQ HOSP IP/OBS MODERATE 35: CPT | Performed by: INTERNAL MEDICINE

## 2018-01-21 PROCEDURE — 94799 UNLISTED PULMONARY SVC/PX: CPT

## 2018-01-21 PROCEDURE — 99231 SBSQ HOSP IP/OBS SF/LOW 25: CPT | Performed by: SURGERY

## 2018-01-21 PROCEDURE — 85025 COMPLETE CBC W/AUTO DIFF WBC: CPT | Performed by: HOSPITALIST

## 2018-01-21 PROCEDURE — 93005 ELECTROCARDIOGRAM TRACING: CPT | Performed by: INTERNAL MEDICINE

## 2018-01-21 PROCEDURE — 97110 THERAPEUTIC EXERCISES: CPT | Performed by: PHYSICAL THERAPIST

## 2018-01-21 PROCEDURE — 82962 GLUCOSE BLOOD TEST: CPT

## 2018-01-21 PROCEDURE — 63710000001 INSULIN ASPART PER 5 UNITS: Performed by: HOSPITALIST

## 2018-01-21 PROCEDURE — 93010 ELECTROCARDIOGRAM REPORT: CPT | Performed by: INTERNAL MEDICINE

## 2018-01-21 PROCEDURE — 25010000002 PIPERACILLIN SOD-TAZOBACTAM PER 1 G: Performed by: INTERNAL MEDICINE

## 2018-01-21 PROCEDURE — 71046 X-RAY EXAM CHEST 2 VIEWS: CPT

## 2018-01-21 PROCEDURE — 80048 BASIC METABOLIC PNL TOTAL CA: CPT | Performed by: HOSPITALIST

## 2018-01-21 PROCEDURE — 25010000002 ENOXAPARIN PER 10 MG: Performed by: ORTHOPAEDIC SURGERY

## 2018-01-21 RX ORDER — METOPROLOL SUCCINATE 25 MG/1
25 TABLET, EXTENDED RELEASE ORAL EVERY MORNING
Status: DISCONTINUED | OUTPATIENT
Start: 2018-01-22 | End: 2018-01-24 | Stop reason: HOSPADM

## 2018-01-21 RX ADMIN — TAMSULOSIN HYDROCHLORIDE 0.4 MG: 0.4 CAPSULE ORAL at 09:09

## 2018-01-21 RX ADMIN — INSULIN ASPART 2 UNITS: 100 INJECTION, SOLUTION INTRAVENOUS; SUBCUTANEOUS at 09:10

## 2018-01-21 RX ADMIN — SODIUM CHLORIDE 125 ML/HR: 9 INJECTION, SOLUTION INTRAVENOUS at 13:56

## 2018-01-21 RX ADMIN — TAZOBACTAM SODIUM AND PIPERACILLIN SODIUM 3.38 G: 375; 3 INJECTION, SOLUTION INTRAVENOUS at 23:42

## 2018-01-21 RX ADMIN — FINASTERIDE 5 MG: 5 TABLET, FILM COATED ORAL at 09:11

## 2018-01-21 RX ADMIN — TEMAZEPAM 15 MG: 15 CAPSULE ORAL at 22:23

## 2018-01-21 RX ADMIN — INSULIN ASPART 4 UNITS: 100 INJECTION, SOLUTION INTRAVENOUS; SUBCUTANEOUS at 17:31

## 2018-01-21 RX ADMIN — METFORMIN HYDROCHLORIDE 500 MG: 500 TABLET ORAL at 09:09

## 2018-01-21 RX ADMIN — DOCUSATE SODIUM -SENNOSIDES 1 TABLET: 50; 8.6 TABLET, COATED ORAL at 22:23

## 2018-01-21 RX ADMIN — ENOXAPARIN SODIUM 100 MG: 100 INJECTION SUBCUTANEOUS at 09:12

## 2018-01-21 RX ADMIN — AMIODARONE HYDROCHLORIDE 400 MG: 200 TABLET ORAL at 09:08

## 2018-01-21 RX ADMIN — METOPROLOL SUCCINATE 25 MG: 25 TABLET, FILM COATED, EXTENDED RELEASE ORAL at 09:14

## 2018-01-21 RX ADMIN — TAMSULOSIN HYDROCHLORIDE 0.4 MG: 0.4 CAPSULE ORAL at 22:23

## 2018-01-21 RX ADMIN — ENOXAPARIN SODIUM 100 MG: 100 INJECTION SUBCUTANEOUS at 22:23

## 2018-01-21 RX ADMIN — BISACODYL 10 MG: 10 SUPPOSITORY RECTAL at 22:23

## 2018-01-21 RX ADMIN — HYDROCODONE BITARTRATE AND ACETAMINOPHEN 2 TABLET: 5; 325 TABLET ORAL at 17:57

## 2018-01-21 RX ADMIN — IPRATROPIUM BROMIDE 0.5 MG: 0.5 SOLUTION RESPIRATORY (INHALATION) at 14:58

## 2018-01-21 RX ADMIN — TAZOBACTAM SODIUM AND PIPERACILLIN SODIUM 3.38 G: 375; 3 INJECTION, SOLUTION INTRAVENOUS at 09:19

## 2018-01-21 RX ADMIN — INSULIN ASPART 4 UNITS: 100 INJECTION, SOLUTION INTRAVENOUS; SUBCUTANEOUS at 11:29

## 2018-01-21 RX ADMIN — BISACODYL 10 MG: 10 SUPPOSITORY RECTAL at 09:12

## 2018-01-21 RX ADMIN — TAZOBACTAM SODIUM AND PIPERACILLIN SODIUM 3.38 G: 375; 3 INJECTION, SOLUTION INTRAVENOUS at 13:57

## 2018-01-21 NOTE — PROGRESS NOTES
LOS: 6 days     Name: Osvaldo Lopez  Age/Sex: 82 y.o. male  :  1935        PCP: Caio Rodríguez Jr., MD    Subjective   No BM but some flatus.  Small BM yesterday.  Abdomen remains distended but improving.  No worse pain wise.  Still too weak to get up  General: No Fever or Chills, Cardiac: No Chest Pain or Palpitations, Resp: No Cough or SOA, GI: No Nausea, Vomiting, or Diarrhea and Other: No bleeding      amiodarone 400 mg Oral Q24H   bisacodyl 10 mg Rectal BID   enoxaparin 1 mg/kg Subcutaneous Q12H   finasteride 5 mg Oral QAM   insulin aspart 0-9 Units Subcutaneous 4x Daily With Meals & Nightly   ipratropium 0.5 mg Nebulization 4x Daily - RT   metFORMIN 500 mg Oral Daily With Breakfast   metoprolol succinate XL 12.5 mg Oral QAM   piperacillin-tazobactam 3.375 g Intravenous Q8H   sennosides-docusate sodium 1 tablet Oral Nightly   tamsulosin 0.4 mg Oral BID       sodium chloride 125 mL/hr Last Rate: 125 mL/hr (18 0422)       Objective   Vital Signs  Temp:  [98.4 °F (36.9 °C)-98.7 °F (37.1 °C)] 98.5 °F (36.9 °C)  Heart Rate:  [85-93] 88  Resp:  [18] 18  BP: (107-149)/(56-82) 149/78  Body mass index is 31.8 kg/(m^2).    Intake/Output Summary (Last 24 hours) at 18 0748  Last data filed at 18 0426   Gross per 24 hour   Intake              790 ml   Output             1965 ml   Net            -1175 ml       Physical Exam   Constitutional: He is oriented to person, place, and time. He appears well-developed and well-nourished.   HENT:   Head: Normocephalic and atraumatic.   Eyes: Conjunctivae and EOM are normal.   Neck: Neck supple. No JVD present.   Cardiovascular: Normal rate.    Pulmonary/Chest: Effort normal and breath sounds normal.   Abdominal: Bowel sounds are normal. He exhibits distension. There is no tenderness. There is no rebound and no guarding.   Musculoskeletal: Normal range of motion. He exhibits no edema.   Neurological: He is alert and oriented to person, place, and time.    Skin: Skin is warm and dry. No rash noted. No pallor.   Psychiatric: He has a normal mood and affect. His behavior is normal. Thought content normal.   Vitals reviewed.        Results Review:       I reviewed the patient's new clinical results.    Results from last 7 days  Lab Units 01/21/18  0502 01/20/18  0515 01/19/18  0510 01/18/18  0435 01/17/18  1118 01/17/18  0507 01/16/18  0519   WBC 10*3/mm3 4.09* 4.80 5.77 6.21 7.89  --   --    HEMOGLOBIN g/dL 9.1* 9.0* 9.5* 10.5* 11.1* 11.3* 12.7*   PLATELETS 10*3/mm3 134* 152 118* 113* 122*  --   --        Results from last 7 days  Lab Units 01/21/18  0502 01/20/18  0515 01/19/18  0510 01/18/18  0533 01/17/18  2056 01/17/18  1929 01/17/18  0507   SODIUM mmol/L 134* 133* 130* 128* 130*  --  133*   POTASSIUM mmol/L 4.0 4.5 4.2 4.3 4.9  --  4.3   CHLORIDE mmol/L 98 98 97* 95* 96*  --  97*   CO2 mmol/L 27.3 25.7 21.0* 25.2 23.0  --  26.8   BUN mg/dL 17 22 30* 22 22  --  15   CREATININE mg/dL 0.95 1.05 1.21 1.25 1.28*  --  0.99   CALCIUM mg/dL 8.1* 8.2* 8.0* 7.9* 8.2*  --  8.0*   MAGNESIUM mg/dL  --   --   --   --   --  2.0  --    Estimated Creatinine Clearance: 75.8 mL/min (by C-G formula based on Cr of 0.95).    Lab Results   Component Value Date    HGBA1C 7.00 (H) 01/17/2018    HGBA1C 7.80 (H) 08/16/2017    HGBA1C 7.39 (H) 11/18/2016     Glucose   Date/Time Value Ref Range Status   01/21/2018 0734 164 (H) 70 - 130 mg/dL Final   01/20/2018 2022 194 (H) 70 - 130 mg/dL Final   01/20/2018 1554 217 (H) 70 - 130 mg/dL Final   01/20/2018 1118 200 (H) 70 - 130 mg/dL Final   01/20/2018 0619 171 (H) 70 - 130 mg/dL Final   01/19/2018 2022 190 (H) 70 - 130 mg/dL Final   01/19/2018 1626 224 (H) 70 - 130 mg/dL Final   01/19/2018 1237 223 (H) 70 - 130 mg/dL Final       Assessment/Plan   Principal Problem:    Spinal stenosis of lumbar region with neurogenic claudication  Active Problems:    Essential hypertension    Type 2 diabetes mellitus without complication    Paroxysmal a-fib     BPH (benign prostatic hyperplasia)    Tachycardia    Acute respiratory failure with hypoxia    Postoperative ileus    Hyponatremia      PLAN  - diabetes is acceptable presently allowing some hyperglycemia given ongoing post operative abdominal issues, as he eats more will titrate meds, add metformin in AM  - Discussed with Dr Orlando this AM and plan to follow imaging and adjust bowel regiment  - Na stable  - Lovenox per cardiology for AC with Afib    Disposition        Kirk Kent MD  [facility]  01/21/18  7:48 AM

## 2018-01-21 NOTE — PLAN OF CARE
Problem: Patient Care Overview (Adult)  Goal: Plan of Care Review  Outcome: Ongoing (interventions implemented as appropriate)   01/21/18 0546   Coping/Psychosocial Response Interventions   Plan Of Care Reviewed With patient   Patient Care Overview   Progress improving   Outcome Evaluation   Outcome Summary/Follow up Plan VSS, pt A & Ox4. Participating more in care, interested in helping turn and pull up in bed. Pain well controlled with minimal narcotics. Definitely will benefit from SNF at discharge to help build strength. Will continue to monitor.       Problem: Perioperative Period (Adult)  Goal: Signs and Symptoms of Listed Potential Problems Will be Absent or Manageable (Perioperative Period)  Outcome: Ongoing (interventions implemented as appropriate)      Problem: Fall Risk (Adult)  Goal: Identify Related Risk Factors and Signs and Symptoms  Outcome: Ongoing (interventions implemented as appropriate)    Goal: Absence of Falls  Outcome: Ongoing (interventions implemented as appropriate)      Problem: Laminectomy/Foraminotomy/Discectomy (Adult)  Goal: Signs and Symptoms of Listed Potential Problems Will be Absent or Manageable (Laminectomy/Foraminotomy/Discectomy)  Outcome: Ongoing (interventions implemented as appropriate)      Problem: Pain, Acute (Adult)  Goal: Identify Related Risk Factors and Signs and Symptoms  Outcome: Ongoing (interventions implemented as appropriate)    Goal: Acceptable Pain Control/Comfort Level  Outcome: Ongoing (interventions implemented as appropriate)      Problem: Infection, Risk/Actual (Adult)  Goal: Identify Related Risk Factors and Signs and Symptoms  Outcome: Ongoing (interventions implemented as appropriate)    Goal: Infection Prevention/Resolution  Outcome: Ongoing (interventions implemented as appropriate)      Problem: Pressure Ulcer Risk (Rafael Scale) (Adult,Obstetrics,Pediatric)  Goal: Identify Related Risk Factors and Signs and Symptoms  Outcome: Ongoing  (interventions implemented as appropriate)    Goal: Skin Integrity  Outcome: Ongoing (interventions implemented as appropriate)

## 2018-01-21 NOTE — PROGRESS NOTES
Chief Complaint:    Colonic ileus    Subjective:    Feels better today. Less distended. No BM. Perhaps a little flatus.    Objective:    Vitals:    01/20/18 0919 01/20/18 1139 01/20/18 1349 01/20/18 1747   BP: 107/56  141/82 141/78   BP Location: Right arm  Right arm Right arm   Patient Position: Lying  Lying Lying   Pulse: 93  93 88   Resp: 18 18 18   Temp: 98.7 °F (37.1 °C) 98.7 °F (37.1 °C) 98.6 °F (37 °C) 98.4 °F (36.9 °C)   TempSrc: Oral Oral Oral Oral   SpO2: 95%  94% 97%   Weight:       Height:           Lungs: Clear  Heart: Regular  Abdomen: Softer. Less distended.   Extremities: Warm    Assessment:    Colonic ileus following spine surgery     Plan:     Continue stimulation from below with Dulcolax suppositories.  Continue IV fluids.  Encourage movement out of bed.

## 2018-01-21 NOTE — PROGRESS NOTES
[facility]  Progress note: 2018    Patient Identification:  Name:Osvaldo Lopez  Age:82 y.o.  Sex: male  :  1935  MRN: [id number]           CC:  Follow-up of paroxysmal atrial fibrillation with postoperative ileus    Interval history:  No chest pain or shortness of breath.  Still with abdominal pain and no bowel movement or flatus.  The pressure and heart rate elevated slightly this morning    Vital Signs:   Temp:  [98.4 °F (36.9 °C)-98.7 °F (37.1 °C)] 98.5 °F (36.9 °C)  Heart Rate:  [85-93] 88  Resp:  [18] 18  BP: (107-149)/(56-82) 149/78    Intake/Output Summary (Last 24 hours) at 18 0827  Last data filed at 18 0426   Gross per 24 hour   Intake              790 ml   Output             1965 ml   Net            -1175 ml       Physical Examination:    General Appearance No acute distress   Neck No adenopathy, supple, trachea midline, no thyromegaly, no carotid bruit, no JVD   Lungs Clear to auscultation,respirations regular, even and unlabored   Heart Regular rhythm and normal rate, normal S1 and S2, no murmur, no gallop, no rub, no click   Chest wall No abnormalities observed   Abdomen No bowel sounds, no masses, no hepatomegaly, significant distention without tenderness    Extremities Moves all extremities well, no edema, no cyanosis, no redness   Neurological Alert and oriented x 3     Lab Review:  Personally reviewed the labs, radiology imaging and other cardiac procedures.   Results from last 7 days  Lab Units 18  0502 18  0515   SODIUM mmol/L 134* 133*   POTASSIUM mmol/L 4.0 4.5   CHLORIDE mmol/L 98 98   CO2 mmol/L 27.3 25.7   BUN mg/dL 17 22   CREATININE mg/dL 0.95 1.05   CALCIUM mg/dL 8.1* 8.2*   BILIRUBIN mg/dL  --  0.5   ALK PHOS U/L  --  90   ALT (SGPT) U/L  --  18   AST (SGOT) U/L  --  21   GLUCOSE mg/dL 146* 164*       Results from last 7 days  Lab Units 18  0533 18  1118   TROPONIN T ng/mL 0.050* 0.086*     )  Results from last 7 days  Lab  Units 01/21/18  0502 01/20/18  0515 01/19/18  0510   WBC 10*3/mm3 4.09* 4.80 5.77   HEMOGLOBIN g/dL 9.1* 9.0* 9.5*   HEMATOCRIT % 28.4* 28.0* 29.5*   PLATELETS 10*3/mm3 134* 152 118*           Medication Review:   Meds reviewed  Scheduled Meds:  amiodarone 400 mg Oral Q24H   bisacodyl 10 mg Rectal BID   enoxaparin 1 mg/kg Subcutaneous Q12H   finasteride 5 mg Oral QAM   insulin aspart 0-9 Units Subcutaneous 4x Daily With Meals & Nightly   ipratropium 0.5 mg Nebulization 4x Daily - RT   metFORMIN 500 mg Oral Daily With Breakfast   metoprolol succinate XL 12.5 mg Oral QAM   piperacillin-tazobactam 3.375 g Intravenous Q8H   sennosides-docusate sodium 1 tablet Oral Nightly   tamsulosin 0.4 mg Oral BID     Continuous Infusions:  sodium chloride 125 mL/hr Last Rate: 125 mL/hr (01/20/18 0422)     I personally viewed and interpreted the patient's EKG/Telemetry data    Assessment and Plan  1.  Paroxysmal atrial fibrillation.  He can switch back to Xarelto in the next 48 hours if okay with GI surgery and neurosurgery.  Plan to decrease amiodarone to 200 mg daily tomorrow.  2.  Ileus, still quite distended  3.  Status post back surgery with repair  4.  Diabetes  5.  Hypertension, will increase metoprolol today      Mini Russ  1/21/20188:27 AM  25min spent in reviewing records, discussion and examination of the patient and discussion with other members of the patient's medical team.     Dictated utilizing Dragon dictation

## 2018-01-21 NOTE — THERAPY TREATMENT NOTE
Acute Care - Physical Therapy Treatment Note  [facility]     Patient Name: Osvaldo Lopez  : 1935  MRN: [id number]  Today's Date: 2018  Onset of Illness/Injury or Date of Surgery Date: 01/15/18  Date of Referral to PT: 17  Referring Physician: Eliud    Admit Date: 1/15/2018    Visit Dx:    ICD-10-CM ICD-9-CM   1. Difficulty walking R26.2 719.7   2. Spinal stenosis of lumbar region with neurogenic claudication M48.062 724.03     Patient Active Problem List   Diagnosis   • Midline low back pain   • Complete rotator cuff tear of left shoulder   • Difficulty walking   • Abnormal finding on thyroid function test   • Abdominal aortic aneurysm   • Atrial fibrillation   • Benign prostatic hyperplasia   • Edema   • Essential hypertension   • Fatigue   • Hyperlipidemia   • Shoulder pain   • Lumbar radiculopathy   • Muscle weakness   • Osteoarthritis   • Type 2 diabetes mellitus without complication   • Primary osteoarthritis of left knee   • OA (osteoarthritis) of knee   • Toxic encephalopathy   • Paroxysmal a-fib   • HTN (hypertension)   • Type 2 diabetes mellitus   • BPH (benign prostatic hyperplasia)   • Postoperative anemia due to acute blood loss   • Cardiac murmur   • Complete tear of left rotator cuff   • Tear of right rotator cuff   • Spinal stenosis of lumbar region with neurogenic claudication   • Tachycardia   • Acute respiratory failure with hypoxia   • Postoperative ileus   • Hyponatremia               Adult Rehabilitation Note       18 1500 18 1400 18 1656    Rehab Assessment/Intervention    Discipline physical therapist  -RA physical therapist  -RA physical therapist  -PC    Document Type therapy note (daily note)  -RA therapy note (daily note)  -RA therapy note (daily note)  -PC    Subjective Information agree to therapy;complains of;pain  -RA agree to therapy  -RA agree to therapy  -PC    Patient Effort, Rehab Treatment good  -RA good  -RA good  -PC    Symptoms Noted  During/After Treatment  fatigue  -RA fatigue  -PC    Precautions/Limitations brace on when up  -RA brace on when up  -RA brace on when up;spinal precautions  -PC    Recorded by [RA] Cathy Vo PT [RA] Cathy Vo, PT [PC] Mildred Nolan, PT    Pain Assessment    Pain Assessment 0-10  -RA  0-10  -PC    Pain Score 4  -RA 4  -RA 5  -PC    Post Pain Score 4  -RA 5  -RA     Pain Type Surgical pain  -RA Surgical pain  -RA     Pain Location Back  -RA Back  -RA Back  -PC    Pain Orientation   Lower  -PC    Pain Intervention(s) Medication (See MAR);Repositioned;Ambulation/increased activity  -RA Medication (See MAR);Repositioned  -RA     Response to Interventions tolerated  -RA tolerated  -RA     Recorded by [RA] Cathy Vo PT [RA] Cathy Vo PT [PC] Mildred Nolan, PT    Cognitive Assessment/Intervention    Current Cognitive/Communication Assessment  functional  -RA functional  -PC    Orientation Status  oriented to;person;place;situation  -RA oriented to;person;place;situation  -PC    Follows Commands/Answers Questions  needs cueing;able to follow single-step instructions;100% of the time  -RA needs cueing;able to follow single-step instructions;100% of the time  -PC    Personal Safety  decreased insight to deficits;decreased awareness, need for safety  -RA decreased insight to deficits  -PC    Personal Safety Interventions  fall prevention program maintained  -RA fall prevention program maintained;gait belt  -PC    Recorded by  [RA] Cathy Vo, PT [PC] Mildred Nolan, PT    Bed Mobility, Assessment/Treatment    Bed Mobility, Assistive Device bed rails  -RA bed rails  -RA     Bed Mobility, Roll Left, [place]   2 person assist required;moderate assist (50% patient effort)  -PC    Bed Mobility, Roll Right, [place] minimum assist (75% patient effort);moderate assist (50% patient effort);verbal cues required  -RA moderate assist (50% patient effort);2 person assist required;verbal cues  required  -RA     Bed Mob, Sidelying to Sit, [place] minimum assist (75% patient effort);moderate assist (50% patient effort)  -RA moderate assist (50% patient effort);2 person assist required  -RA 2 person assist required;moderate assist (50% patient effort)  -PC    Bed Mob, Sit to Sidelying, [place]   2 person assist required;moderate assist (50% patient effort)  -PC    Recorded by [RA] Cathy Vo, PT [RA] Cathy Vo, PT [PC] Mildred Nolan, PT    Transfer Assessment/Treatment    Transfers, Bed-Chair [place] minimum assist (75% patient effort);moderate assist (50% patient effort);1 person + 1 person to manage equipment  -RA moderate assist (50% patient effort);2 person assist required  -RA     Transfers, Sit-Stand [place] minimum assist (75% patient effort);2 person assist required  -RA moderate assist (50% patient effort);2 person assist required  -RA 2 person assist required;moderate assist (50% patient effort)  -PC    Transfers, Stand-Sit [place] minimum assist (75% patient effort);2 person assist required  -RA 2 person assist required;moderate assist (50% patient effort)  -RA 2 person assist required;moderate assist (50% patient effort)  -PC    Transfers, Sit-Stand-Sit, Assist Device rolling walker  -RA rolling walker;elevated surface  -RA elevated surface;rolling walker  -PC    Transfer, Comment   sit to stand x 4 working on coming to full stand, weight shifting, pt unable to take a step  -PC    Recorded by [RA] Cathy Vo, PT [RA] Cathy Vo, PT [PC] Mildred Nolan, PT    Gait Assessment/Treatment    Gait, [place] Level minimum assist (75% patient effort);1 person + 1 person to manage equipment  -RA moderate assist (50% patient effort);2 person assist required  -RA     Gait, Assistive Device rolling walker  -RA rolling walker  -RA     Gait, Distance (Feet) 3  -RA 3  -RA     Gait, Gait Deviations [place] decreased;decreased heel strike;forward flexed  posture;limb motion velocity decreased;step length decreased;stride length decreased  -RA [place] decreased;decreased heel strike;step length decreased;toe-to-floor clearance decreased;limb motion velocity decreased;forward flexed posture  -RA     Gait, Comment bed to chair w/ rwx  -RA ambulated from bed to chair w/ rwx  -RA     Recorded by [RA] Cathy Vo PT [RA] Cathy Vo PT     Balance Skills Training    Sitting-Level of Assistance  Close supervision  -RA Minimum assistance;Moderate assistance  -PC    Sitting-Balance Support  Feet supported;Left upper extremity supported;Right upper extremity supported  -RA Feet supported;Left upper extremity supported;Right upper extremity supported  -PC    Sitting-Balance Activities  Trunk control activities  -RA Trunk control activities   pt leaning posterior, continual verbal/tactile cues needed  -PC    Sitting # of Minutes  5  -RA 8  -PC    Standing-Level of Assistance  Minimum assistance;x2  -RA Maximum assistance;x2  -PC    Static Standing Balance Support  assistive device  -RA assistive device  -PC    Recorded by  [RA] Cathy Vo PT [PC] Mildred Nolan PT    Therapy Exercises    Bilateral Lower Extremities 10 reps;AROM:;ankle pumps/circles;sitting;LAQ;hip flexion;supine;5 reps;heel slides;hip abduction/adduction;quad sets  -RA 10 reps;AROM:;ankle pumps/circles;sitting;LAQ;hip flexion  -RA 10 reps;AROM:;ankle pumps/circles;sitting;LAQ;hip flexion  -PC    Recorded by [RA] Cathy Vo PT [RA] Cathy oV PT [PC] Mildred Nolan PT    Positioning and Restraints    Pre-Treatment Position in bed  -RA in bed  -RA in bed  -PC    Post Treatment Position chair  -RA chair  -RA bed  -PC    In Bed   supine;call light within reach;encouraged to call for assist;with family/caregiver  -PC    In Chair sitting;call light within reach;encouraged to call for assist;with family/caregiver  -RA sitting;call light within reach;notified nsg;encouraged to call for  assist;with family/caregiver  -RA     Recorded by [RA] Cathy Vo, PT [RA] Cathy Vo, PT [PC] Mildred Nolan, PT      01/19/18 1005 01/18/18 1615       Rehab Assessment/Intervention    Discipline physical therapist  -PC physical therapist  -PC     Document Type therapy note (daily note)  -PC therapy note (daily note)  -PC     Subjective Information agree to therapy  -PC agree to therapy;complains of;weakness;fatigue;pain  -PC     Patient Effort, Rehab Treatment good  -PC fair  -PC     Patient Effort, Rehab Treatment Comment  pt more alert this afternoon, following commands better and engaging in conversation  -PC     Symptoms Noted During/After Treatment fatigue  -PC fatigue;increased pain  -PC     Symptoms Noted Comment ng is out, on 4L o2  -PC      Precautions/Limitations brace on when up;spinal precautions  -PC fall precautions;brace on when up  -PC     Recorded by [PC] Mildred Nolan, PT [PC] Mildred Nolan, PT     Vital Signs    Post SpO2 (%)  91  -PC     O2 Delivery Post Treatment  supplemental O2  -PC     Recorded by  [PC] Mildred Nolan PT     Pain Assessment    Pain Assessment 0-10  -PC FLACC  -PC     Pain Score 5  -PC 5  -PC     Pain Location Back  -PC Back  -PC     Pain Orientation Lower  -PC Lower  -PC     Recorded by [PC] Mildred Nolan, PT [PC] Mildred Nolan, PT     Cognitive Assessment/Intervention    Current Cognitive/Communication Assessment impaired  -PC impaired  -PC     Orientation Status oriented to;person;place  -PC oriented to;person  -PC     Follows Commands/Answers Questions needs cueing;needs increased time;able to follow single-step instructions;100% of the time  -PC needs cueing;needs increased time;needs repetition;able to follow single-step instructions;75% of the time  -PC     Personal Safety decreased awareness, need for safety  -PC decreased awareness, need for safety;severe impairment  -PC     Personal Safety Interventions fall prevention program maintained;muscle  strengthening facilitated;gait belt  -PC fall prevention program maintained;gait belt  -PC     Recorded by [PC] Mildred Nolan PT [PC] Mildred Nolan PT     Bed Mobility, Assessment/Treatment    Bed Mobility, Roll Left, [place] 2 person assist required;moderate assist (50% patient effort)  -PC maximum assist (25% patient effort);2 person assist required  -PC     Bed Mob, Sidelying to Sit, [place] 2 person assist required;moderate assist (50% patient effort)  -PC maximum assist (25% patient effort);2 person assist required  -PC     Bed Mob, Sit to Sidelying, [place] 2 person assist required;moderate assist (50% patient effort)  -PC maximum assist (25% patient effort);2 person assist required  -PC     Recorded by [PC] Mildred Nolan, PT [PC] Mildred Nolan PT     Transfer Assessment/Treatment    Transfers, Bed-Chair [place] moderate assist (50% patient effort);maximum assist (25% patient effort);2 person assist required  -PC      Transfers, Sit-Stand [place] 2 person assist required;moderate assist (50% patient effort)  -PC maximum assist (25% patient effort);2 person assist required  -PC     Transfers, Stand-Sit [place] 2 person assist required;moderate assist (50% patient effort)  -PC maximum assist (25% patient effort);2 person assist required  -PC     Transfers, Sit-Stand-Sit, Assist Device elevated surface;rolling walker  -PC elevated surface;rolling walker  -PC     Transfer, Comment stood with rwx once, able to come to stand but not take a step, then stood again without walker to pivot to chair with assist of 2  -PC sit to stand x 2, able to clear buttocks and almost come to full stand but knees and hips flexed  -PC     Recorded by [PC] Mildred Nolan PT [PC] Mildred Nolan PT     Motor Skills/Interventions    Additional Documentation  Balance Skills Training (Group)  -PC     Recorded by  [PC] Mildred Nolan PT     Balance Skills Training    Sitting-Level of Assistance Minimum  assistance;Moderate assistance  -PC Minimum assistance;Moderate assistance  -PC     Sitting-Balance Support Feet supported;Left upper extremity supported;Right upper extremity supported  -PC Feet supported;Left upper extremity supported;Right upper extremity supported  -PC     Sitting-Balance Activities Trunk control activities   pt leaning posterior, continual verbal/tactile cues needed  -PC Trunk control activities   pt leaning posterior, continual verbal/tactile cues needed  -PC     Sitting # of Minutes 6  -PC 10  -PC     Standing-Level of Assistance Maximum assistance;x2  -PC Maximum assistance;x2  -PC     Static Standing Balance Support assistive device  -PC assistive device  -PC     Recorded by [PC] Mildred Nolan, PT [PC] Mildred Nolan PT     Therapy Exercises    Bilateral Lower Extremities 10 reps;AROM:;supine;ankle pumps/circles;heel slides;SAQ  -PC      Recorded by [PC] Mildred Nolan PT      Positioning and Restraints    Pre-Treatment Position in bed  -PC in bed  -PC     Post Treatment Position chair  -PC bed  -PC     In Bed  supine;call light within reach;encouraged to call for assist;with family/caregiver;notified nsg  -PC     In Chair sitting;call light within reach;encouraged to call for assist;with family/caregiver;notified nsg  -PC      Recorded by [PC] Mildred Nolan, PT [PC] Mildred Nolan, PT       User Key  (r) = Recorded By, (t) = Taken By, (c) = Cosigned By    Initials Name Effective Dates    MIGUEL Vo, PT 04/06/17 -     PC Mildred Nolan, PT 12/01/15 -                 IP PT Goals       01/17/18 1005          Bed Mobility PT STG    Bed Mobility PT STG, Date Established 01/17/18  -SV      Bed Mobility PT STG, Time to Achieve 2 wks  -SV      Bed Mobility PT STG, Activity Type sidelying to sit/sit to sidelying  -SV      Bed Mobility PT STG, [place] Level minimum assist (75% patient effort);contact guard assist  -SV      Transfer Training PT STG    Transfer Training PT STG, Date  Established 01/17/18  -SV      Transfer Training PT STG, Time to Achieve 2 wks  -SV      Transfer Training PT STG, Activity Type sit to stand/stand to sit;bed to chair /chair to bed  -SV      Transfer Training PT STG, [place] Level minimum assist (75% patient effort)  -SV      Transfer Training PT STG, Assist Device walker, rolling  -SV        User Key  (r) = Recorded By, (t) = Taken By, (c) = Cosigned By    Initials Name Provider Type     Sabina Middleton, PT Physical Therapist          Physical Therapy Education     Title: PT OT SLP Therapies (Done)     Topic: Physical Therapy (Done)     Point: Mobility training (Done)    Learning Progress Summary    Learner Readiness Method Response Comment Documented by Status   Patient Acceptance E,D,TB VU,NR Education on LE bed exercises supine and sitting  01/21/18 1525 Done    Acceptance E [facility] 01/20/18 1452 Done    Acceptance E,D NR   01/19/18 1012 Active    Nonacceptance E NR   01/19/18 0311 Active    Acceptance E,D NR   01/18/18 1324 Active    Acceptance E,TB,D NR,VU   01/17/18 1004 Done   Family Acceptance E,TB,D NR,VU   01/17/18 1004 Done               Point: Home exercise program (Done)    Learning Progress Summary    Learner Readiness Method Response Comment Documented by Status   Patient Acceptance E,D,TB VU,NR Education on LE bed exercises supine and sitting  01/21/18 1525 Done    Acceptance E VU   01/20/18 1452 Done    Acceptance E,D NR   01/19/18 1012 Active    Nonacceptance E NR   01/19/18 0311 Active    Acceptance E,D NR   01/18/18 1324 Active    Acceptance E,TB,D NR,VU   01/17/18 1004 Done   Family Acceptance E,TB,D NR,VU   01/17/18 1004 Done               Point: Body mechanics (Done)    Learning Progress Summary    Learner Readiness Method Response Comment Documented by Status   Patient Acceptance E,D,TB VU,NR Education on LE bed exercises supine and sitting  01/21/18 1525 Done    Acceptance E [facility] 01/20/18 1452 Done     Acceptance E,D NR   01/19/18 1012 Active    Nonacceptance E NR   01/19/18 0311 Active    Acceptance E,D NR   01/18/18 1324 Active    Acceptance E,TB,D NR,VU   01/17/18 1004 Done   Family Acceptance E,TB,D NR,VU   01/17/18 1004 Done               Point: Precautions (Done)    Learning Progress Summary    Learner Readiness Method Response Comment Documented by Status   Patient Acceptance E,D,TB VU,NR Education on LE bed exercises supine and sitting  01/21/18 1525 Done    Acceptance E VU  RA 01/20/18 1452 Done    Acceptance E,D NR   01/19/18 1012 Active    Nonacceptance E NR   01/19/18 0311 Active    Acceptance E,D NR   01/18/18 1324 Active    Acceptance E,TB,D NR,VU   01/17/18 1004 Done   Family Acceptance E,TB,D NR,VU   01/17/18 1004 Done                      User Key     Initials Effective Dates Name Provider Type Discipline     04/06/17 -  Cathy Vo, PT Physical Therapist PT     12/01/15 -  Mildred Nolan, PT Physical Therapist PT     01/17/16 -  Sabina Middleton, PT Physical Therapist PT     04/28/17 -  Ewa August RN Registered Nurse Nurse                    PT Recommendation and Plan  Anticipated Discharge Disposition: skilled nursing facility  PT Frequency: 2 times/day  Plan of Care Review  Outcome Summary/Follow up Plan: Increasing ease with transfers and gait, however, patient still apprehensive to try and do more than get from bed to chair.  Will progress mobility as patient allows/tolerates.  He indicates pain limiting with transitional movements, not as bad once up in standing.           Outcome Measures       01/21/18 1500 01/20/18 1500 01/19/18 1000    How much help from another person do you currently need...    Turning from your back to your side while in flat bed without using bedrails? 2  -RA 2  -RA 2  -PC    Moving from lying on back to sitting on the side of a flat bed without bedrails? 2  -RA 2  -RA 2  -PC    Moving to and from a bed to a chair (including a  wheelchair)? 3  -RA 3  -RA 2  -PC    Standing up from a chair using your arms (e.g., wheelchair, bedside chair)? 2  -RA 2  -RA 2  -PC    Climbing 3-5 steps with a railing? 1  -RA 1  -RA 1  -PC    To walk in hospital room? 3  -RA 2  -RA 2  -PC    AM-PAC 6 Clicks Score 13  -RA 12  -RA 11  -PC    Functional Assessment    Outcome Measure Options AM-PAC 6 Clicks Basic Mobility (PT)  -RA        01/18/18 1600          How much help from another person do you currently need...    Turning from your back to your side while in flat bed without using bedrails? 2  -PC      Moving from lying on back to sitting on the side of a flat bed without bedrails? 2  -PC      Moving to and from a bed to a chair (including a wheelchair)? 1  -PC      Standing up from a chair using your arms (e.g., wheelchair, bedside chair)? 1  -PC      Climbing 3-5 steps with a railing? 1  -PC      To walk in hospital room? 1  -PC      AM-PAC 6 Clicks Score 8  -PC        User Key  (r) = Recorded By, (t) = Taken By, (c) = Cosigned By    Initials Name Provider Type    RA Cathy Vo PT Physical Therapist    PC Mildred Nolan PT Physical Therapist           Time Calculation:         PT Charges       01/21/18 1532          Time Calculation    Start Time 1449  -RA      Stop Time 1510  -RA      Time Calculation (min) 21 min  -RA      PT Received On 01/21/18  -RA      PT - Next Appointment 01/22/18  -RA        User Key  (r) = Recorded By, (t) = Taken By, (c) = Cosigned By    Initials Name Provider Type    RA Cathy Vo PT Physical Therapist          Therapy Charges for Today     Code Description Service Date Service Provider Modifiers Qty    58343453449 HC PT THER PROC EA 15 MIN 1/20/2018 Cathy Vo, PT GP 1    50910148356 HC PT THER SUPP EA 15 MIN 1/20/2018 Cathy Vo, PT GP 1    26536018022 HC PT THER PROC EA 15 MIN 1/21/2018 Cathy Vo, PT GP 1    13087353538 HC PT THER SUPP EA 15 MIN 1/21/2018 Cathy Vo, PT GP 1          PT  G-Codes  Outcome Measure Options: AM-PAC 6 Clicks Basic Mobility (PT)    Cathy Vo, PT  1/21/2018

## 2018-01-21 NOTE — PLAN OF CARE
Problem: Patient Care Overview (Adult)  Goal: Plan of Care Review   01/21/18 1526   Outcome Evaluation   Outcome Summary/Follow up Plan Increasing ease with transfers and gait, however, patient still apprehensive to try and do more than get from bed to chair. Will progress mobility as patient allows/tolerates. He indicates pain limiting with transitional movements, not as bad once up in standing.

## 2018-01-21 NOTE — PROGRESS NOTES
"[facility] PULMONARY CARE           LOS: 6 days   Patient Care Team:  Caio Rodríguez Jr., MD as PCP - General (Family Medicine)  Caio Rodríguez Jr., MD as PCP - Claims Attributed  Dontrell Arellano MD as Surgeon (Orthopedic Surgery)  Angelo Driscoll MD as Consulting Physician (Cardiology)  Darvin Alvarez MD as Consulting Physician (Urology)    Chief Complaint: Acute hypoxemic respiratory failure in the setting off abdominal distention with secondary atelectasis status post laminectomy for spinal stenosis    Interval History:   No acute events.  Feels that he is doing \"okay\".  No real change in resp status.      Vital Signs  Temp:  [98.4 °F (36.9 °C)-99.7 °F (37.6 °C)] 99.7 °F (37.6 °C)  Heart Rate:  [84-90] 84  Resp:  [18-20] 20  BP: (136-151)/(70-93) 148/71    Intake/Output Summary (Last 24 hours) at 01/21/18 1607  Last data filed at 01/21/18 1357   Gross per 24 hour   Intake             1765 ml   Output             1750 ml   Net               15 ml       Physical Exam:   General Appearance:    Awake alert.  No acute distress    Lungs:     Diminished breath sounds at bases, poor air flow bilateral no wheeze    Heart:    Regular rhythm and normal rate, normal S1 and S2, no            murmur, no gallop, no rub, no click   Chest Wall:    Normal resp effort   Abdomen:    Mild distention improved.  Hypoactive bowel sounds    Extremities:   Minimal movement in le bilaterally no edema, no cyanosis,      Results Review:          Results from last 7 days  Lab Units 01/21/18  0502 01/20/18  0515 01/19/18  0510   SODIUM mmol/L 134* 133* 130*   POTASSIUM mmol/L 4.0 4.5 4.2   CHLORIDE mmol/L 98 98 97*   CO2 mmol/L 27.3 25.7 21.0*   BUN mg/dL 17 22 30*   CREATININE mg/dL 0.95 1.05 1.21   GLUCOSE mg/dL 146* 164* 228*   CALCIUM mg/dL 8.1* 8.2* 8.0*       Results from last 7 days  Lab Units 01/18/18  0533 01/17/18  1118   TROPONIN T ng/mL 0.050* 0.086*       Results from last 7 days  Lab Units 01/21/18  0502 01/20/18  0515 " 01/19/18  0510   WBC 10*3/mm3 4.09* 4.80 5.77   HEMOGLOBIN g/dL 9.1* 9.0* 9.5*   HEMATOCRIT % 28.4* 28.0* 29.5*   PLATELETS 10*3/mm3 134* 152 118*               Results from last 7 days  Lab Units 01/17/18  1929   MAGNESIUM mg/dL 2.0           Results from last 7 days  Lab Units 01/18/18  0542   PH, ARTERIAL pH units 7.328*   PO2 ART mm Hg 90.5   PCO2, ARTERIAL mm Hg 46.6*   HCO3 ART mmol/L 24.5       I reviewed the patient's new clinical results.  I personally viewed and interpreted the patient's CXR        Medication Review:     amiodarone 400 mg Oral Q24H   bisacodyl 10 mg Rectal BID   enoxaparin 1 mg/kg Subcutaneous Q12H   finasteride 5 mg Oral QAM   insulin aspart 0-9 Units Subcutaneous 4x Daily With Meals & Nightly   ipratropium 0.5 mg Nebulization 4x Daily - RT   metFORMIN 500 mg Oral Daily With Breakfast   [START ON 1/22/2018] metoprolol succinate XL 25 mg Oral QAM   piperacillin-tazobactam 3.375 g Intravenous Q8H   sennosides-docusate sodium 1 tablet Oral Nightly   tamsulosin 0.4 mg Oral BID         sodium chloride 125 mL/hr Last Rate: 125 mL/hr (01/21/18 1356)       ASSESSMENT:   Acute hypoxemic respiratory failure  Altered mental status  Suspect atelectasis due to abdominal distention  Suspect ileus  Status post laminectomy for spinal stenosis  Paroxysmal atrial fibrillation  Diabetes mellitus  Hyponatremia    PLAN:  Respiratory failure likely multifactorial with atelectasis and questionable pneumonia-cont abx  Nebs, IS, flutter   Aggressive pulmonate toilet with physical therapy  Discussed plan of care with patient.    Hosea Choi MD  01/21/18  4:07 PM

## 2018-01-21 NOTE — PROGRESS NOTES
I have seen and evaluated the patient for Dr. Montalvo.  Patient reports feeling more comfortable today.  He is having flatus and tolerating a diet.  Still no bowel movements but he says that his abdomen is much more comfortable today.    Vitals:    01/20/18 2026 01/20/18 2048 01/21/18 0506 01/21/18 0923   BP: 136/70  149/78 151/93   BP Location: Left arm  Left arm Left arm   Patient Position: Lying  Lying Lying   Pulse: 89 85 88 90   Resp: 18 18 18 18   Temp: 98.7 °F (37.1 °C)  98.5 °F (36.9 °C) 99.5 °F (37.5 °C)   TempSrc: Oral  Oral Oral   SpO2: 91% 95% 95% 92%   Weight:       Height:         Neurovascular exam is unchanged.  He can plantar flexion dorsally his ankle and toes with good strength bilaterally.  Intact sensation in both feet.    Appreciate medical team and general surgery team assistance.  His ileus is hopefully resolving.  He is scheduled to get a repeat KUB today, per report.  We will continue to monitor him.      Lino Mckinney MD

## 2018-01-22 LAB
BACTERIA SPEC AEROBE CULT: NORMAL
BACTERIA SPEC AEROBE CULT: NORMAL
GLUCOSE BLDC GLUCOMTR-MCNC: 132 MG/DL (ref 70–130)
GLUCOSE BLDC GLUCOMTR-MCNC: 157 MG/DL (ref 70–130)
GLUCOSE BLDC GLUCOMTR-MCNC: 182 MG/DL (ref 70–130)
GLUCOSE BLDC GLUCOMTR-MCNC: 257 MG/DL (ref 70–130)

## 2018-01-22 PROCEDURE — 25010000002 PIPERACILLIN SOD-TAZOBACTAM PER 1 G: Performed by: INTERNAL MEDICINE

## 2018-01-22 PROCEDURE — 97110 THERAPEUTIC EXERCISES: CPT

## 2018-01-22 PROCEDURE — 94799 UNLISTED PULMONARY SVC/PX: CPT

## 2018-01-22 PROCEDURE — 82962 GLUCOSE BLOOD TEST: CPT

## 2018-01-22 PROCEDURE — 25010000002 ENOXAPARIN PER 10 MG: Performed by: ORTHOPAEDIC SURGERY

## 2018-01-22 PROCEDURE — 63710000001 INSULIN ASPART PER 5 UNITS: Performed by: HOSPITALIST

## 2018-01-22 PROCEDURE — 99024 POSTOP FOLLOW-UP VISIT: CPT | Performed by: ORTHOPAEDIC SURGERY

## 2018-01-22 PROCEDURE — 99231 SBSQ HOSP IP/OBS SF/LOW 25: CPT | Performed by: SURGERY

## 2018-01-22 PROCEDURE — 99233 SBSQ HOSP IP/OBS HIGH 50: CPT | Performed by: INTERNAL MEDICINE

## 2018-01-22 RX ORDER — AMIODARONE HYDROCHLORIDE 200 MG/1
200 TABLET ORAL
Status: DISCONTINUED | OUTPATIENT
Start: 2018-01-23 | End: 2018-01-24 | Stop reason: HOSPADM

## 2018-01-22 RX ADMIN — METFORMIN HYDROCHLORIDE 500 MG: 500 TABLET ORAL at 09:15

## 2018-01-22 RX ADMIN — AMIODARONE HYDROCHLORIDE 400 MG: 200 TABLET ORAL at 09:15

## 2018-01-22 RX ADMIN — TAZOBACTAM SODIUM AND PIPERACILLIN SODIUM 3.38 G: 375; 3 INJECTION, SOLUTION INTRAVENOUS at 06:24

## 2018-01-22 RX ADMIN — TAZOBACTAM SODIUM AND PIPERACILLIN SODIUM 3.38 G: 375; 3 INJECTION, SOLUTION INTRAVENOUS at 21:26

## 2018-01-22 RX ADMIN — HYDROCODONE BITARTRATE AND ACETAMINOPHEN 2 TABLET: 5; 325 TABLET ORAL at 12:27

## 2018-01-22 RX ADMIN — TAMSULOSIN HYDROCHLORIDE 0.4 MG: 0.4 CAPSULE ORAL at 20:16

## 2018-01-22 RX ADMIN — INSULIN ASPART 6 UNITS: 100 INJECTION, SOLUTION INTRAVENOUS; SUBCUTANEOUS at 12:27

## 2018-01-22 RX ADMIN — METFORMIN HYDROCHLORIDE 500 MG: 500 TABLET ORAL at 18:16

## 2018-01-22 RX ADMIN — TAMSULOSIN HYDROCHLORIDE 0.4 MG: 0.4 CAPSULE ORAL at 09:15

## 2018-01-22 RX ADMIN — SODIUM CHLORIDE 125 ML/HR: 9 INJECTION, SOLUTION INTRAVENOUS at 05:29

## 2018-01-22 RX ADMIN — POLYETHYLENE GLYCOL 3350 17 G: 17 POWDER, FOR SOLUTION ORAL at 09:15

## 2018-01-22 RX ADMIN — HYDROCODONE BITARTRATE AND ACETAMINOPHEN 2 TABLET: 5; 325 TABLET ORAL at 21:27

## 2018-01-22 RX ADMIN — ENOXAPARIN SODIUM 100 MG: 100 INJECTION SUBCUTANEOUS at 20:16

## 2018-01-22 RX ADMIN — IPRATROPIUM BROMIDE 0.5 MG: 0.5 SOLUTION RESPIRATORY (INHALATION) at 16:43

## 2018-01-22 RX ADMIN — ENOXAPARIN SODIUM 100 MG: 100 INJECTION SUBCUTANEOUS at 09:16

## 2018-01-22 RX ADMIN — METOPROLOL SUCCINATE 25 MG: 25 TABLET, FILM COATED, EXTENDED RELEASE ORAL at 06:50

## 2018-01-22 RX ADMIN — DOCUSATE SODIUM -SENNOSIDES 1 TABLET: 50; 8.6 TABLET, COATED ORAL at 20:16

## 2018-01-22 RX ADMIN — INSULIN ASPART 2 UNITS: 100 INJECTION, SOLUTION INTRAVENOUS; SUBCUTANEOUS at 09:16

## 2018-01-22 RX ADMIN — INSULIN ASPART 2 UNITS: 100 INJECTION, SOLUTION INTRAVENOUS; SUBCUTANEOUS at 21:27

## 2018-01-22 RX ADMIN — IPRATROPIUM BROMIDE 0.5 MG: 0.5 SOLUTION RESPIRATORY (INHALATION) at 07:58

## 2018-01-22 RX ADMIN — BISACODYL 10 MG: 10 SUPPOSITORY RECTAL at 09:17

## 2018-01-22 RX ADMIN — FINASTERIDE 5 MG: 5 TABLET, FILM COATED ORAL at 06:50

## 2018-01-22 RX ADMIN — IPRATROPIUM BROMIDE 0.5 MG: 0.5 SOLUTION RESPIRATORY (INHALATION) at 21:31

## 2018-01-22 RX ADMIN — TAZOBACTAM SODIUM AND PIPERACILLIN SODIUM 3.38 G: 375; 3 INJECTION, SOLUTION INTRAVENOUS at 15:01

## 2018-01-22 NOTE — PROGRESS NOTES
Hospital Follow Up    LOS:  LOS: 7 days   Patient Name: Osvaldo Lopez  Age/Sex: 82 y.o. male  : 1935  MRN: [id number]    Day of Service: 18   Length of Stay: 7  Encounter Provider: Angelo Driscoll MD  Place of Service: [facility] CARDIOLOGY  Patient Care Team:  Caio Rodríguez Jr., MD as PCP - General (Family Medicine)  Caio Rodríguez Jr., MD as PCP - Claims Attributed  Dontrell Arellano MD as Surgeon (Orthopedic Surgery)  Angelo Driscoll MD as Consulting Physician (Cardiology)  Darvin Alvarez MD as Consulting Physician (Urology)    Subjective:     Chief Complaint: Atrial fibrillation.    Interval History: Patient looks significantly better than he did Friday.  He is in a sinus rhythm and has remained there.    Objective:     Objective:  Temp:  [98.2 °F (36.8 °C)-99.8 °F (37.7 °C)] 98.2 °F (36.8 °C)  Heart Rate:  [77-86] 78  Resp:  [16-20] 18  BP: (121-154)/(64-74) 154/74     Intake/Output Summary (Last 24 hours) at 18 1028  Last data filed at 18 0624   Gross per 24 hour   Intake             2170 ml   Output             2300 ml   Net             -130 ml     Body mass index is 31.8 kg/(m^2).  Last 3 weights    01/15/18  1237   Weight: 103 kg (228 lb)     Weight change:       Physical Exam:   General : Alert, cooperative, in no acute distress.  Neuro: alert,cooperative and oriented  Lungs: CTAB. Normal respiratory effort and rate.  CV:: Regular rate and rhythm, normal S1 and S2, no murmurs, gallops or rubs.  ABD: Soft, nontender, non-distended. positive bowel sounds  Extr: No edema or cyanosis, moves all extremities    Lab Review:     Results from last 7 days  Lab Units 18  0502 18  0515   SODIUM mmol/L 134* 133*   POTASSIUM mmol/L 4.0 4.5   CHLORIDE mmol/L 98 98   CO2 mmol/L 27.3 25.7   BUN mg/dL 17 22   CREATININE mg/dL 0.95 1.05   GLUCOSE mg/dL 146* 164*   CALCIUM mg/dL 8.1* 8.2*   AST (SGOT) U/L  --  21   ALT (SGPT) U/L  --  18        Results from last 7 days  Lab Units 01/18/18  0533 01/17/18  1118   TROPONIN T ng/mL 0.050* 0.086*       Results from last 7 days  Lab Units 01/21/18  0502 01/20/18  0515   WBC 10*3/mm3 4.09* 4.80   HEMOGLOBIN g/dL 9.1* 9.0*   HEMATOCRIT % 28.4* 28.0*   PLATELETS 10*3/mm3 134* 152           Results from last 7 days  Lab Units 01/17/18  1929   MAGNESIUM mg/dL 2.0           Results from last 7 days  Lab Units 01/17/18  1118   PROBNP pg/mL 1020.0           Current Medications:   Scheduled Meds:  amiodarone 400 mg Oral Q24H   bisacodyl 10 mg Rectal BID   enoxaparin 1 mg/kg Subcutaneous Q12H   finasteride 5 mg Oral QAM   insulin aspart 0-9 Units Subcutaneous 4x Daily With Meals & Nightly   ipratropium 0.5 mg Nebulization 4x Daily - RT   metFORMIN 500 mg Oral Daily With Breakfast   metoprolol succinate XL 25 mg Oral QAM   piperacillin-tazobactam 3.375 g Intravenous Q8H   polyethylene glycol 17 g Oral Daily   sennosides-docusate sodium 1 tablet Oral Nightly   tamsulosin 0.4 mg Oral BID     Continuous Infusions:  sodium chloride 125 mL/hr Last Rate: 125 mL/hr (01/22/18 0529)       Allergies:  Allergies   Allergen Reactions   • Percocet [Oxycodone-Acetaminophen]      Causes confusion       Assessment:     Principal Problem:    Spinal stenosis of lumbar region with neurogenic claudication  Active Problems:    Essential hypertension    Type 2 diabetes mellitus without complication    Paroxysmal a-fib    BPH (benign prostatic hyperplasia)    Tachycardia    Acute respiratory failure with hypoxia    Postoperative ileus    Hyponatremia        Plan:      1.  Paroxysmal fibrillation.  We'll decrease amiodarone to 200 today.  Transition from Lovenox to Xarelto when okay with surgery.  2.  Status post spine surgery  3.  Ileus  4.  Encourage exercise     Angelo Driscoll MD  01/22/18  10:28 AM

## 2018-01-22 NOTE — PLAN OF CARE
Problem: Patient Care Overview (Adult)  Goal: Plan of Care Review  Outcome: Ongoing (interventions implemented as appropriate)   01/22/18 0534   Coping/Psychosocial Response Interventions   Plan Of Care Reviewed With patient   Patient Care Overview   Progress improving   Outcome Evaluation   Outcome Summary/Follow up Plan Patient had small BM during shift. Some fecal matter was stuck to dressing so was changed. very little independence with care. Turn q 2. will continue to monitor.        Problem: Fall Risk (Adult)  Goal: Identify Related Risk Factors and Signs and Symptoms  Outcome: Ongoing (interventions implemented as appropriate)   01/21/18 0546   Fall Risk   Fall Risk: Related Risk Factors age-related changes;bladder function altered;fatigue/slow reaction;sensory deficits   Fall Risk: Signs and Symptoms presence of risk factors     Goal: Absence of Falls  Outcome: Ongoing (interventions implemented as appropriate)   01/22/18 0534   Fall Risk (Adult)   Absence of Falls making progress toward outcome       Problem: Laminectomy/Foraminotomy/Discectomy (Adult)  Goal: Signs and Symptoms of Listed Potential Problems Will be Absent or Manageable (Laminectomy/Foraminotomy/Discectomy)  Outcome: Ongoing (interventions implemented as appropriate)   01/21/18 0546 01/22/18 0534   Laminectomy/Foraminotomy/Discectomy   Problems Assessed (Laminectomy/Laminotomy/Discectomy) all --    Problems Present (Laminectomy/Laminotomy/Discectomy) --  constipation       Problem: Pain, Acute (Adult)  Goal: Identify Related Risk Factors and Signs and Symptoms  Outcome: Ongoing (interventions implemented as appropriate)    Goal: Acceptable Pain Control/Comfort Level  Outcome: Ongoing (interventions implemented as appropriate)      Problem: Infection, Risk/Actual (Adult)  Goal: Identify Related Risk Factors and Signs and Symptoms  Outcome: Ongoing (interventions implemented as appropriate)    Goal: Infection Prevention/Resolution  Outcome:  Ongoing (interventions implemented as appropriate)      Problem: Pressure Ulcer Risk (Rafael Scale) (Adult,Obstetrics,Pediatric)  Goal: Identify Related Risk Factors and Signs and Symptoms  Outcome: Ongoing (interventions implemented as appropriate)   01/21/18 0546   Pressure Ulcer Risk (Rafael Scale)   Related Risk Factors (Pressure Ulcer Risk (Rafael Scale)) age extremes;cognitive impairment;hospitalization prolonged;mobility impaired;medication;body weight extremes     Goal: Skin Integrity  Outcome: Ongoing (interventions implemented as appropriate)   01/22/18 0534   Pressure Ulcer Risk (Rafael Scale) (Adult,Obstetrics,Pediatric)   Skin Integrity making progress toward outcome

## 2018-01-22 NOTE — PLAN OF CARE
Problem: Patient Care Overview (Adult)  Goal: Plan of Care Review  Outcome: Ongoing (interventions implemented as appropriate)   01/22/18 1804   Coping/Psychosocial Response Interventions   Plan Of Care Reviewed With patient;spouse   Patient Care Overview   Progress improving   Outcome Evaluation   Outcome Summary/Follow up Plan Patient had a large BM today. Up with PT and walker ambulating and able to stand and pivot from chair to bed with assist x 2 with walker. Pain controlled with PO meds. continues with 2 lpm 02, and has stayed in NSR on monitor. Will continue with bowel regimine and monitor.     Goal: Adult Individualization and Mutuality   01/17/18 1721   Individualization   Patient Specific Preferences patient goes by Osvaldo   Patient Specific Goals manage pain and increase mobility   Patient Specific Interventions medicate as appropriate, monitor vitals.      Goal: Discharge Needs Assessment  Outcome: Ongoing (interventions implemented as appropriate)   01/15/18 1225 01/16/18 0404 01/16/18 1714   Discharge Needs Assessment   Concerns To Be Addressed --  --  basic needs concerns   Readmission Within The Last 30 Days --  no previous admission in last 30 days --    Discharge Facility/Level Of Care Needs --  --  nursing facility, skilled   Discharge Disposition --  --  skilled nursing facility   Current Health   Anticipated Changes Related to Illness none --  --        Problem: Perioperative Period (Adult)  Goal: Signs and Symptoms of Listed Potential Problems Will be Absent or Manageable (Perioperative Period)  Outcome: Ongoing (interventions implemented as appropriate)   01/22/18 1804   Perioperative Period   Problems Assessed (Perioperative Period) all   Problems Present (Perioperative Period) pain       Problem: Fall Risk (Adult)  Goal: Identify Related Risk Factors and Signs and Symptoms  Outcome: Ongoing (interventions implemented as appropriate)   01/22/18 1804   Fall Risk   Fall Risk: Related Risk  Factors age-related changes;bladder function altered;fatigue/slow reaction;gait/mobility problems;slipper/uneven surfaces;environment unfamiliar     Goal: Absence of Falls  Outcome: Ongoing (interventions implemented as appropriate)   01/22/18 1804   Fall Risk (Adult)   Absence of Falls making progress toward outcome       Problem: Laminectomy/Foraminotomy/Discectomy (Adult)  Goal: Signs and Symptoms of Listed Potential Problems Will be Absent or Manageable (Laminectomy/Foraminotomy/Discectomy)  Outcome: Ongoing (interventions implemented as appropriate)   01/22/18 1804   Laminectomy/Foraminotomy/Discectomy   Problems Assessed (Laminectomy/Laminotomy/Discectomy) all   Problems Present (Laminectomy/Laminotomy/Discectomy) pain;constipation       Problem: Pain, Acute (Adult)  Goal: Identify Related Risk Factors and Signs and Symptoms  Outcome: Ongoing (interventions implemented as appropriate)   01/22/18 1804   Pain, Acute   Related Risk Factors (Acute Pain) disease process;persistent pain   Signs and Symptoms (Acute Pain) alteration in muscle tone;constipation/diarrhea;fatigue/weakness;verbalization of pain descriptors     Goal: Acceptable Pain Control/Comfort Level  Outcome: Ongoing (interventions implemented as appropriate)      Problem: Infection, Risk/Actual (Adult)  Goal: Identify Related Risk Factors and Signs and Symptoms  Outcome: Ongoing (interventions implemented as appropriate)   01/21/18 0546 01/22/18 1804   Infection, Risk/Actual   Infection, Risk/Actual: Related Risk Factors age extremes;prolonged hospitalization;medication effects;sleep disturbance --    Signs and Symptoms (Infection, Risk/Actual) --  blood glucose changes;weakness     Goal: Infection Prevention/Resolution  Outcome: Ongoing (interventions implemented as appropriate)      Problem: Pressure Ulcer Risk (Rafael Scale) (Adult,Obstetrics,Pediatric)  Goal: Identify Related Risk Factors and Signs and Symptoms  Outcome: Ongoing (interventions  implemented as appropriate)    Goal: Skin Integrity  Outcome: Ongoing (interventions implemented as appropriate)

## 2018-01-22 NOTE — PROGRESS NOTES
[facility]           LOS: 7 days   Patient Care Team:  Caio Rodríguez Jr., MD as PCP - General (Family Medicine)  Caio Rodríguez Jr., MD as PCP - Claims Attributed  Dontrell Arellano MD as Surgeon (Orthopedic Surgery)  Angelo Driscoll MD as Consulting Physician (Cardiology)  Darvin Alvarez MD as Consulting Physician (Urology)    Chief Complaint: Acute hypoxemic respiratory failure in the setting off abdominal distention with secondary atelectasis status post laminectomy for spinal stenosis    Interval History:   No acute events.  No fevers no cough per patient and wife    Vital Signs  Temp:  [98 °F (36.7 °C)-99.8 °F (37.7 °C)] 98 °F (36.7 °C)  Heart Rate:  [77-86] 80  Resp:  [16-19] 17  BP: (121-162)/(64-81) 162/81    Intake/Output Summary (Last 24 hours) at 01/22/18 1604  Last data filed at 01/22/18 1318   Gross per 24 hour   Intake             1145 ml   Output             2000 ml   Net             -855 ml       Physical Exam:   General Appearance:    Awake alert.  No acute distress    Lungs:     Diminished breath sounds at bases, poor air flow bilateral no wheeze    Heart:    Regular rhythm and normal rate, normal S1 and S2, no            murmur, no gallop, no rub, no click   Chest Wall:    Normal resp effort   Abdomen:    Mild distention improved.  Hypoactive bowel sounds    Extremities:   Minimal movement in le bilaterally no edema, no cyanosis,      Results Review:          Results from last 7 days  Lab Units 01/21/18  0502 01/20/18  0515 01/19/18  0510   SODIUM mmol/L 134* 133* 130*   POTASSIUM mmol/L 4.0 4.5 4.2   CHLORIDE mmol/L 98 98 97*   CO2 mmol/L 27.3 25.7 21.0*   BUN mg/dL 17 22 30*   CREATININE mg/dL 0.95 1.05 1.21   GLUCOSE mg/dL 146* 164* 228*   CALCIUM mg/dL 8.1* 8.2* 8.0*       Results from last 7 days  Lab Units 01/18/18  0533 01/17/18  1118   TROPONIN T ng/mL 0.050* 0.086*       Results from last 7 days  Lab Units 01/21/18  0502 01/20/18  0515 01/19/18  0510   WBC 10*3/mm3  4.09* 4.80 5.77   HEMOGLOBIN g/dL 9.1* 9.0* 9.5*   HEMATOCRIT % 28.4* 28.0* 29.5*   PLATELETS 10*3/mm3 134* 152 118*               Results from last 7 days  Lab Units 01/17/18  1929   MAGNESIUM mg/dL 2.0           Results from last 7 days  Lab Units 01/18/18  0542   PH, ARTERIAL pH units 7.328*   PO2 ART mm Hg 90.5   PCO2, ARTERIAL mm Hg 46.6*   HCO3 ART mmol/L 24.5       I reviewed the patient's new clinical results.  I personally viewed and interpreted the patient's CXR        Medication Review:     [START ON 1/23/2018] amiodarone 200 mg Oral Q24H   bisacodyl 10 mg Rectal BID   enoxaparin 1 mg/kg Subcutaneous Q12H   finasteride 5 mg Oral QAM   insulin aspart 0-9 Units Subcutaneous 4x Daily With Meals & Nightly   ipratropium 0.5 mg Nebulization 4x Daily - RT   metFORMIN 500 mg Oral BID With Meals   metoprolol succinate XL 25 mg Oral QAM   piperacillin-tazobactam 3.375 g Intravenous Q8H   polyethylene glycol 17 g Oral Daily   sennosides-docusate sodium 1 tablet Oral Nightly   tamsulosin 0.4 mg Oral BID            ASSESSMENT:   Acute hypoxemic respiratory failure  Altered mental status  Suspect atelectasis due to abdominal distention  Suspect ileus  Status post laminectomy for spinal stenosis  Paroxysmal atrial fibrillation  Diabetes mellitus  Hyponatremia    PLAN:  Respiratory failure likely multifactorial with atelectasis and questionable pneumonia-complete course of abx  Nebs, IS, flutter   Aggressive pulmonate toilet with physical therapy  Discussed plan of care with patient.  Will sign off, please call with questions.    Hosea Choi MD  01/22/18  4:04 PM

## 2018-01-22 NOTE — THERAPY TREATMENT NOTE
Acute Care - Physical Therapy Treatment Note  [facility]     Patient Name: Osvaldo Lopez  : 1935  MRN: [id number]  Today's Date: 2018  Onset of Illness/Injury or Date of Surgery Date: 01/15/18  Date of Referral to PT: 17  Referring Physician: Eliud    Admit Date: 1/15/2018    Visit Dx:    ICD-10-CM ICD-9-CM   1. Difficulty walking R26.2 719.7   2. Spinal stenosis of lumbar region with neurogenic claudication M48.062 724.03     Patient Active Problem List   Diagnosis   • Midline low back pain   • Complete rotator cuff tear of left shoulder   • Difficulty walking   • Abnormal finding on thyroid function test   • Abdominal aortic aneurysm   • Atrial fibrillation   • Benign prostatic hyperplasia   • Edema   • Essential hypertension   • Fatigue   • Hyperlipidemia   • Shoulder pain   • Lumbar radiculopathy   • Muscle weakness   • Osteoarthritis   • Type 2 diabetes mellitus without complication   • Primary osteoarthritis of left knee   • OA (osteoarthritis) of knee   • Toxic encephalopathy   • Paroxysmal a-fib   • HTN (hypertension)   • Type 2 diabetes mellitus   • BPH (benign prostatic hyperplasia)   • Postoperative anemia due to acute blood loss   • Cardiac murmur   • Complete tear of left rotator cuff   • Tear of right rotator cuff   • Spinal stenosis of lumbar region with neurogenic claudication   • Tachycardia   • Acute respiratory failure with hypoxia   • Postoperative ileus   • Hyponatremia               Adult Rehabilitation Note       18 1000 18 1500 18 1400    Rehab Assessment/Intervention    Discipline physical therapy assistant  -CW physical therapist  -RA physical therapist  -RA    Document Type therapy note (daily note)  -CW therapy note (daily note)  -RA therapy note (daily note)  -RA    Subjective Information agree to therapy;complains of;fatigue;pain  -CW agree to therapy;complains of;pain  -RA agree to therapy  -RA    Patient Effort, Rehab Treatment good  -CW  good  -RA good  -RA    Symptoms Noted During/After Treatment   fatigue  -RA    Precautions/Limitations brace on when up;fall precautions  -CW brace on when up  -RA brace on when up  -RA    Recorded by [CW] Edgardo Valladares PTA [RA] Cathy Vo, PT [RA] Cathy Vo PT    Vital Signs    O2 Delivery Pre Treatment room air  -CW      Recorded by [CW] Edgardo Valladares PTA      Pain Assessment    Pain Assessment 0-10  -CW 0-10  -RA     Pain Score 3  -CW 4  -RA 4  -RA    Post Pain Score 4  -CW 4  -RA 5  -RA    Pain Type Surgical pain  -CW Surgical pain  -RA Surgical pain  -RA    Pain Location Back  -CW Back  -RA Back  -RA    Pain Intervention(s) Repositioned;Ambulation/increased activity  -CW Medication (See MAR);Repositioned;Ambulation/increased activity  -RA Medication (See MAR);Repositioned  -RA    Response to Interventions donta  -CW tolerated  -RA tolerated  -RA    Recorded by [CW] Edgardo Valladares PTA [RA] Cathy Vo, PT [RA] Cathy Vo, PT    Cognitive Assessment/Intervention    Current Cognitive/Communication Assessment functional  -CW  functional  -RA    Orientation Status oriented to;person;place;situation  -CW  oriented to;person;place;situation  -RA    Follows Commands/Answers Questions 100% of the time;able to follow single-step instructions;needs cueing  -CW  needs cueing;able to follow single-step instructions;100% of the time  -RA    Personal Safety mild impairment;decreased awareness, need for assist;decreased awareness, need for safety;decreased insight to deficits  -CW  decreased insight to deficits;decreased awareness, need for safety  -RA    Personal Safety Interventions fall prevention program maintained;gait belt;muscle strengthening facilitated;nonskid shoes/slippers when out of bed  -CW  fall prevention program maintained  -RA    Recorded by [CW] Edgardo Valladares, OWEN  [RA] Cathy Vo PT    Bed Mobility, Assessment/Treatment    Bed Mobility, Assistive Device  bed  rails  -RA bed rails  -RA    Bed Mobility, Roll Right, [place]  minimum assist (75% patient effort);moderate assist (50% patient effort);verbal cues required  -RA moderate assist (50% patient effort);2 person assist required;verbal cues required  -RA    Bed Mob, Supine to Sit, [place] minimum assist (75% patient effort);2 person assist required  -CW      Bed Mob, Sit to Supine, [place] minimum assist (75% patient effort);2 person assist required  -CW      Bed Mob, Sidelying to Sit, [place]  minimum assist (75% patient effort);moderate assist (50% patient effort)  -RA moderate assist (50% patient effort);2 person assist required  -RA    Recorded by [CW] Edgardo Valladares, PTA [RA] Cathyleon Vo, PT [RA] Cathy Vo PT    Transfer Assessment/Treatment    Transfers, Bed-Chair [place]  minimum assist (75% patient effort);moderate assist (50% patient effort);1 person + 1 person to manage equipment  -RA moderate assist (50% patient effort);2 person assist required  -RA    Transfers, Sit-Stand [place] minimum assist (75% patient effort);2 person assist required  -CW minimum assist (75% patient effort);2 person assist required  -RA moderate assist (50% patient effort);2 person assist required  -RA    Transfers, Stand-Sit [place] minimum assist (75% patient effort);2 person assist required  -CW minimum assist (75% patient effort);2 person assist required  -RA 2 person assist required;moderate assist (50% patient effort)  -RA    Transfers, Sit-Stand-Sit, Assist Device rolling walker  -CW rolling walker  -RA rolling walker;elevated surface  -RA    Recorded by [CW] Edgardo Valladares, PTA [RA] Cathy L Tavo, PT [RA] Cathy Vo, PT    Gait Assessment/Treatment    Gait, [place] Level minimum assist (75% patient effort);2 person assist required  -CW minimum assist (75% patient effort);1 person + 1 person to manage equipment  -RA moderate assist (50% patient effort);2 person  assist required  -RA    Gait, Assistive Device rolling walker  -CW rolling walker  -RA rolling walker  -RA    Gait, Distance (Feet) 40  -CW 3  -RA 3  -RA    Gait, Gait Deviations [place] decreased;step length decreased;stride length decreased  -CW [place] decreased;decreased heel strike;forward flexed posture;limb motion velocity decreased;step length decreased;stride length decreased  -RA [place] decreased;decreased heel strike;step length decreased;toe-to-floor clearance decreased;limb motion velocity decreased;forward flexed posture  -RA    Gait, Comment  bed to chair w/ rwx  -RA ambulated from bed to chair w/ rwx  -RA    Recorded by [CW] Edgardo Valladares, PTA [RA] Cathy Vo PT [RA] Cathy Vo PT    Balance Skills Training    Sitting-Level of Assistance   Close supervision  -RA    Sitting-Balance Support   Feet supported;Left upper extremity supported;Right upper extremity supported  -RA    Sitting-Balance Activities   Trunk control activities  -RA    Sitting # of Minutes   5  -RA    Standing-Level of Assistance   Minimum assistance;x2  -RA    Static Standing Balance Support   assistive device  -RA    Recorded by   [RA] Cathy Vo PT    Therapy Exercises    Bilateral Lower Extremities AROM:;10 reps;sitting;ankle pumps/circles;glut sets;hip flexion;LAQ  -CW 10 reps;AROM:;ankle pumps/circles;sitting;LAQ;hip flexion;supine;5 reps;heel slides;hip abduction/adduction;quad sets  -RA 10 reps;AROM:;ankle pumps/circles;sitting;LAQ;hip flexion  -RA    Recorded by [CW] Edgardo Valladares, PTA [RA] Cathy Vo, PT [RA] Cathy Vo PT    Positioning and Restraints    Pre-Treatment Position in bed  -CW in bed  -RA in bed  -RA    Post Treatment Position chair  -CW chair  -RA chair  -RA    In Chair notified nsg;reclined;call light within reach;encouraged to call for assist  -CW sitting;call light within reach;encouraged to call for assist;with family/caregiver  -RA sitting;call light within  reach;notified nsg;encouraged to call for assist;with family/caregiver  -RA    Recorded by [CW] Edgardo Valladares, PTA [RA] Cathy Vo, PT [RA] Cathy Vo, PT      01/19/18 5766          Rehab Assessment/Intervention    Discipline physical therapist  -PC      Document Type therapy note (daily note)  -PC      Subjective Information agree to therapy  -PC      Patient Effort, Rehab Treatment good  -PC      Symptoms Noted During/After Treatment fatigue  -PC      Precautions/Limitations brace on when up;spinal precautions  -PC      Recorded by [PC] Mildred Nolan, PT      Pain Assessment    Pain Assessment 0-10  -PC      Pain Score 5  -PC      Pain Location Back  -PC      Pain Orientation Lower  -PC      Recorded by [PC] Mildred Nolan PT      Cognitive Assessment/Intervention    Current Cognitive/Communication Assessment functional  -PC      Orientation Status oriented to;person;place;situation  -PC      Follows Commands/Answers Questions needs cueing;able to follow single-step instructions;100% of the time  -PC      Personal Safety decreased insight to deficits  -PC      Personal Safety Interventions fall prevention program maintained;gait belt  -PC      Recorded by [PC] Mildred Nolan, PT      Bed Mobility, Assessment/Treatment    Bed Mobility, Roll Left, [place] 2 person assist required;moderate assist (50% patient effort)  -PC      Bed Mob, Sidelying to Sit, [place] 2 person assist required;moderate assist (50% patient effort)  -PC      Bed Mob, Sit to Sidelying, [place] 2 person assist required;moderate assist (50% patient effort)  -PC      Recorded by [PC] Mildred Nolan, PT      Transfer Assessment/Treatment    Transfers, Sit-Stand [place] 2 person assist required;moderate assist (50% patient effort)  -PC      Transfers, Stand-Sit [place] 2 person assist required;moderate assist (50% patient effort)  -PC      Transfers, Sit-Stand-Sit, Assist Device elevated surface;rolling  walker  -PC      Transfer, Comment sit to stand x 4 working on coming to full stand, weight shifting, pt unable to take a step  -PC      Recorded by [PC] Mildred Nolan, GIOVANNA      Balance Skills Training    Sitting-Level of Assistance Minimum assistance;Moderate assistance  -PC      Sitting-Balance Support Feet supported;Left upper extremity supported;Right upper extremity supported  -PC      Sitting-Balance Activities Trunk control activities   pt leaning posterior, continual verbal/tactile cues needed  -PC      Sitting # of Minutes 8  -PC      Standing-Level of Assistance Maximum assistance;x2  -PC      Static Standing Balance Support assistive device  -PC      Recorded by [PC] Mildred Nolan, PT      Therapy Exercises    Bilateral Lower Extremities 10 reps;AROM:;ankle pumps/circles;sitting;LAQ;hip flexion  -PC      Recorded by [PC] Mildred Nolan PT      Positioning and Restraints    Pre-Treatment Position in bed  -PC      Post Treatment Position bed  -PC      In Bed supine;call light within reach;encouraged to call for assist;with family/caregiver  -PC      Recorded by [PC] Mildred Nolan PT        User Key  (r) = Recorded By, (t) = Taken By, (c) = Cosigned By    Initials Name Effective Dates    RA Cathy Vo, PT 04/06/17 -     PC Mildred Nolan, PT 12/01/15 -     CW Edgardo Valladares, PTA 12/13/16 -                 IP PT Goals       01/17/18 1005          Bed Mobility PT STG    Bed Mobility PT STG, Date Established 01/17/18  -SV      Bed Mobility PT STG, Time to Achieve 2 wks  -SV      Bed Mobility PT STG, Activity Type sidelying to sit/sit to sidelying  -SV      Bed Mobility PT STG, [place] Level minimum assist (75% patient effort);contact guard assist  -SV      Transfer Training PT STG    Transfer Training PT STG, Date Established 01/17/18  -SV      Transfer Training PT STG, Time to Achieve 2 wks  -SV      Transfer Training PT STG, Activity Type sit to stand/stand to sit;bed to chair /chair to bed   -SV      Transfer Training PT STG, [place] Level minimum assist (75% patient effort)  -SV      Transfer Training PT STG, Assist Device walker, rolling  -SV        User Key  (r) = Recorded By, (t) = Taken By, (c) = Cosigned By    Initials Name Provider Type    CINDY Middleton, PT Physical Therapist          Physical Therapy Education     Title: PT OT SLP Therapies (Done)     Topic: Physical Therapy (Done)     Point: Mobility training (Done)    Learning Progress Summary    Learner Readiness Method Response Comment Documented by Status   Patient Acceptance E,TB DU,VU   01/22/18 1035 Done    Acceptance E,D,TB VU,NR Education on LE bed exercises supine and sitting RA 01/21/18 1525 Done    Acceptance E VU   01/20/18 1452 Done    Acceptance E,D NR   01/19/18 1012 Active    Nonacceptance E NR   01/19/18 0311 Active    Acceptance E,D NR   01/18/18 1324 Active    Acceptance E,TB,D NR,VU   01/17/18 1004 Done   Family Acceptance E,TB,D NR,VU   01/17/18 1004 Done               Point: Home exercise program (Done)    Learning Progress Summary    Learner Readiness Method Response Comment Documented by Status   Patient Acceptance E,TB DU,VU   01/22/18 1035 Done    Acceptance E,D,TB VU,NR Education on LE bed exercises supine and sitting  01/21/18 1525 Done    Acceptance E VU  RA 01/20/18 1452 Done    Acceptance E,D NR   01/19/18 1012 Active    Nonacceptance E NR   01/19/18 0311 Active    Acceptance E,D NR   01/18/18 1324 Active    Acceptance E,TB,D NR,VU   01/17/18 1004 Done   Family Acceptance E,TB,D NR,VU   01/17/18 1004 Done               Point: Body mechanics (Done)    Learning Progress Summary    Learner Readiness Method Response Comment Documented by Status   Patient Acceptance E,TB DU,VU   01/22/18 1035 Done    Acceptance E,D,TB VU,NR Education on LE bed exercises supine and sitting RA 01/21/18 1525 Done    Acceptance E VU  RA 01/20/18 1452 Done    Acceptance E,D NR   01/19/18 1012  Active    Nonacceptance E NR   01/19/18 0311 Active    Acceptance E,D NR   01/18/18 1324 Active    Acceptance E,TB,D NR,VU  SV 01/17/18 1004 Done   Family Acceptance E,TB,D NR,VU  SV 01/17/18 1004 Done               Point: Precautions (Done)    Learning Progress Summary    Learner Readiness Method Response Comment Documented by Status   Patient Acceptance E,TB DU,VU   01/22/18 1035 Done    Acceptance E,D,TB VU,NR Education on LE bed exercises supine and sitting RA 01/21/18 1525 Done    Acceptance E VU  RA 01/20/18 1452 Done    Acceptance E,D NR   01/19/18 1012 Active    Nonacceptance E NR   01/19/18 0311 Active    Acceptance E,D NR   01/18/18 1324 Active    Acceptance E,TB,D NR,VU   01/17/18 1004 Done   Family Acceptance E,TB,D NR,VU   01/17/18 1004 Done                      User Key     Initials Effective Dates Name Provider Type Discipline     04/06/17 -  Cathy Vo, PT Physical Therapist PT     12/01/15 -  Mildred Nolan, PT Physical Therapist PT     01/17/16 -  Sabina Middleton, PT Physical Therapist PT     12/13/16 -  Edgardo Valladares, PTA Physical Therapy Assistant PT     04/28/17 -  Ewa August RN Registered Nurse Nurse                    PT Recommendation and Plan  Anticipated Discharge Disposition: skilled nursing facility  PT Frequency: 2 times/day  Plan of Care Review  Plan Of Care Reviewed With: patient  Progress: progress toward functional goals as expected  Outcome Summary/Follow up Plan: Pt increased with amb distance and safety with RWX          Outcome Measures       01/22/18 1000 01/21/18 1500 01/20/18 1500    How much help from another person do you currently need...    Turning from your back to your side while in flat bed without using bedrails? 3  -CW 2  -RA 2  -RA    Moving from lying on back to sitting on the side of a flat bed without bedrails? 3  -CW 2  -RA 2  -RA    Moving to and from a bed to a chair (including a wheelchair)? 3  -CW 3  -RA 3  -RA     Standing up from a chair using your arms (e.g., wheelchair, bedside chair)? 3  -CW 2  -RA 2  -RA    Climbing 3-5 steps with a railing? 1  -CW 1  -RA 1  -RA    To walk in hospital room? 3  -CW 3  -RA 2  -RA    AM-PAC 6 Clicks Score 16  -CW 13  -RA 12  -RA    Functional Assessment    Outcome Measure Options AM-PAC 6 Clicks Basic Mobility (PT)  -CW AM-PAC 6 Clicks Basic Mobility (PT)  -RA       User Key  (r) = Recorded By, (t) = Taken By, (c) = Cosigned By    Initials Name Provider Type    MIGUEL Vo, PT Physical Therapist    CW Edgardo Valladares PTA Physical Therapy Assistant           Time Calculation:         PT Charges       01/22/18 1036          Time Calculation    Start Time 1006  -CW      Stop Time 1022  -CW      Time Calculation (min) 16 min  -CW      PT Received On 01/22/18  -CW      PT - Next Appointment 01/22/18  -CW        User Key  (r) = Recorded By, (t) = Taken By, (c) = Cosigned By    Initials Name Provider Type    PIPER Valladares PTA Physical Therapy Assistant          Therapy Charges for Today     Code Description Service Date Service Provider Modifiers Qty    76616164450 HC PT THER PROC EA 15 MIN 1/22/2018 Edgardo Valladares PTA GP 1    97327129603 HC PT THER SUPP EA 15 MIN 1/22/2018 Edgardo Valladares PTA GP 1          PT G-Codes  Outcome Measure Options: AM-PAC 6 Clicks Basic Mobility (PT)    Edgardo Valladares PTA  1/22/2018

## 2018-01-22 NOTE — PLAN OF CARE
Problem: Patient Care Overview (Adult)  Goal: Plan of Care Review  Outcome: Ongoing (interventions implemented as appropriate)   01/22/18 1035   Coping/Psychosocial Response Interventions   Plan Of Care Reviewed With patient   Patient Care Overview   Progress progress toward functional goals as expected   Outcome Evaluation   Outcome Summary/Follow up Plan Pt increased with amb distance and safety with RWX

## 2018-01-22 NOTE — PROGRESS NOTES
LOS: 7 days     Name: Osvaldo Lopez  Age/Sex: 82 y.o. male  :  1935        PCP: Caio Rodríguez Jr., MD    Subjective   BM this am.  Abdomen remains distended but improving. Pain is improved.   Feels SOA, on 3.5L NC  General: No Fever or Chills, Cardiac: No Chest Pain or Palpitations,     [START ON 2018] amiodarone 200 mg Oral Q24H   bisacodyl 10 mg Rectal BID   enoxaparin 1 mg/kg Subcutaneous Q12H   finasteride 5 mg Oral QAM   insulin aspart 0-9 Units Subcutaneous 4x Daily With Meals & Nightly   ipratropium 0.5 mg Nebulization 4x Daily - RT   metFORMIN 500 mg Oral Daily With Breakfast   metoprolol succinate XL 25 mg Oral QAM   piperacillin-tazobactam 3.375 g Intravenous Q8H   polyethylene glycol 17 g Oral Daily   sennosides-docusate sodium 1 tablet Oral Nightly   tamsulosin 0.4 mg Oral BID       sodium chloride 125 mL/hr Last Rate: 125 mL/hr (18 0529)       Objective   Vital Signs  Temp:  [98 °F (36.7 °C)-99.8 °F (37.7 °C)] 98 °F (36.7 °C)  Heart Rate:  [77-86] 80  Resp:  [16-20] 17  BP: (121-162)/(64-81) 162/81  Body mass index is 31.8 kg/(m^2).    Intake/Output Summary (Last 24 hours) at 18 1420  Last data filed at 18 1318   Gross per 24 hour   Intake             1145 ml   Output             2000 ml   Net             -855 ml       Physical Exam   Constitutional: He is oriented to person, place, and time. He appears well-developed and well-nourished.   HENT:   Head: Normocephalic and atraumatic.   Eyes: Conjunctivae and EOM are normal.   Neck: Neck supple. No JVD present.   Cardiovascular: Normal rate.    Pulmonary/Chest: Effort normal and breath sounds normal.   Abdominal: Bowel sounds are normal. He exhibits distension. There is no tenderness. There is no rebound and no guarding.   Musculoskeletal: Normal range of motion. He exhibits no edema.   Neurological: He is alert and oriented to person, place, and time.   Skin: Skin is warm and dry. No rash noted. No pallor.    Psychiatric: He has a normal mood and affect. His behavior is normal. Thought content normal.   Vitals reviewed.        Results Review:       I reviewed the patient's new clinical results.    Results from last 7 days  Lab Units 01/21/18  0502 01/20/18  0515 01/19/18  0510 01/18/18  0435 01/17/18  1118 01/17/18  0507 01/16/18  0519   WBC 10*3/mm3 4.09* 4.80 5.77 6.21 7.89  --   --    HEMOGLOBIN g/dL 9.1* 9.0* 9.5* 10.5* 11.1* 11.3* 12.7*   PLATELETS 10*3/mm3 134* 152 118* 113* 122*  --   --        Results from last 7 days  Lab Units 01/21/18  0502 01/20/18  0515 01/19/18  0510 01/18/18  0533 01/17/18  2056 01/17/18  1929 01/17/18  0507   SODIUM mmol/L 134* 133* 130* 128* 130*  --  133*   POTASSIUM mmol/L 4.0 4.5 4.2 4.3 4.9  --  4.3   CHLORIDE mmol/L 98 98 97* 95* 96*  --  97*   CO2 mmol/L 27.3 25.7 21.0* 25.2 23.0  --  26.8   BUN mg/dL 17 22 30* 22 22  --  15   CREATININE mg/dL 0.95 1.05 1.21 1.25 1.28*  --  0.99   CALCIUM mg/dL 8.1* 8.2* 8.0* 7.9* 8.2*  --  8.0*   MAGNESIUM mg/dL  --   --   --   --   --  2.0  --    Estimated Creatinine Clearance: 75.8 mL/min (by C-G formula based on Cr of 0.95).    Lab Results   Component Value Date    HGBA1C 7.00 (H) 01/17/2018    HGBA1C 7.80 (H) 08/16/2017    HGBA1C 7.39 (H) 11/18/2016     Glucose   Date/Time Value Ref Range Status   01/22/2018 1100 257 (H) 70 - 130 mg/dL Final   01/22/2018 0715 157 (H) 70 - 130 mg/dL Final   01/21/2018 2136 143 (H) 70 - 130 mg/dL Final   01/21/2018 1654 212 (H) 70 - 130 mg/dL Final   01/21/2018 1105 224 (H) 70 - 130 mg/dL Final   01/21/2018 0734 164 (H) 70 - 130 mg/dL Final   01/20/2018 2022 194 (H) 70 - 130 mg/dL Final   01/20/2018 1554 217 (H) 70 - 130 mg/dL Final       Assessment/Plan   Principal Problem:    Spinal stenosis of lumbar region with neurogenic claudication  Active Problems:    Essential hypertension    Type 2 diabetes mellitus without complication    Paroxysmal a-fib    BPH (benign prostatic hyperplasia)    Tachycardia    Acute  respiratory failure with hypoxia    Postoperative ileus    Hyponatremia      PLAN  - diabetes is mostly acceptable presently allowing some hyperglycemia given ongoing post operative abdominal issues, as he eats more will titrate meds, Continue metformin but add additional dose to BID  -d/w chewing sugar free gum, has been chewing Juicy Fruit  - Post op ileus: Dr. Orlando following, mobilize, bowel regimen--BM today  -feeling SOA and is on 3.5L NC, likely combo of atelectasis PNA, on abx for PNA per pulmonology, will stop IV fluids today  - Na stable  - Lovenox per cardiology for AC with Afib, change to Xarelto when ok with surgery    Disposition        Bharat Nix MD  [facility]  01/22/18  2:20 PM

## 2018-01-22 NOTE — PROGRESS NOTES
Orthopedic Progress Note      Patient: Osvaldo Lopez    YOB: 1935    Medical Record Number: [id number]    Attending Physician: Ran Kline MD    Date of Admission: 1/15/2018 10:56 AM    Admitting Dx:  Spinal stenosis of lumbar region with neurogenic claudication [M48.062]  Spinal stenosis of lumbar region with neurogenic claudication [M48.062]    Status Post: L2-3, L3-4 laminectomy and fusion with instrumentation and removal of implants L4 5.    Post Operative Day Number: 7    Current Problem List:   Patient Active Problem List   Diagnosis   • Midline low back pain   • Complete rotator cuff tear of left shoulder   • Difficulty walking   • Abnormal finding on thyroid function test   • Abdominal aortic aneurysm   • Atrial fibrillation   • Benign prostatic hyperplasia   • Edema   • Essential hypertension   • Fatigue   • Hyperlipidemia   • Shoulder pain   • Lumbar radiculopathy   • Muscle weakness   • Osteoarthritis   • Type 2 diabetes mellitus without complication   • Primary osteoarthritis of left knee   • OA (osteoarthritis) of knee   • Toxic encephalopathy   • Paroxysmal a-fib   • HTN (hypertension)   • Type 2 diabetes mellitus   • BPH (benign prostatic hyperplasia)   • Postoperative anemia due to acute blood loss   • Cardiac murmur   • Complete tear of left rotator cuff   • Tear of right rotator cuff   • Spinal stenosis of lumbar region with neurogenic claudication   • Tachycardia   • Acute respiratory failure with hypoxia   • Postoperative ileus   • Hyponatremia         Past Medical History:   Diagnosis Date   • Abnormal thyroid blood test    • Aneurysm of abdominal aorta    • Arthritis    • Atrial fibrillation    • BPH (benign prostatic hyperplasia)    • Bruises easily    • Bulging of thoracic intervertebral disc    • Coronary artery disease    • Diabetes mellitus     type 2   • Diverticular disease    • Edema     LOWER LEGS   • Enlarged prostate without lower urinary tract symptoms  (luts)    • Essential hypertension    • Fatigue    • History of MI (myocardial infarction) 1989   • Hyperactivity of bladder    • Hyperlipidemia    • Hypertension    • Left shoulder pain    • Lumbar radiculopathy 2016    wears back brace at times following back surgery   • Muscle weakness (generalized)    • Osteoarthritis    • Personal history of arthritis    • Screening      stop bang scale 5   • Torn rotator cuff     left   • Type 2 diabetes mellitus    • Urinary frequency        SUBJECTIVE: 82 y.o.  male. Awake and alert.  Presently sitting on BSC.  Good BM    OBJECTIVE:   Vitals:    01/22/18 0643 01/22/18 0759 01/22/18 0925 01/22/18 1318   BP: 142/66  154/74 162/81   BP Location:   Right arm Right arm   Patient Position:   Lying Lying   Pulse: 77 79 78 80   Resp: 16 18 18 17   Temp: 99.2 °F (37.3 °C)  98.2 °F (36.8 °C) 98 °F (36.7 °C)   TempSrc: Oral  Oral Oral   SpO2: 95% 95% 91% 96%   Weight:       Height:         I/O last 3 completed shifts:  In: 2170 [I.V.:2020; IV Piggyback:150]  Out: 3350 [Urine:3350]    PHYSICAL EXAM:    Current Medications:  Scheduled Meds:  [START ON 1/23/2018] amiodarone 200 mg Oral Q24H   bisacodyl 10 mg Rectal BID   enoxaparin 1 mg/kg Subcutaneous Q12H   finasteride 5 mg Oral QAM   insulin aspart 0-9 Units Subcutaneous 4x Daily With Meals & Nightly   ipratropium 0.5 mg Nebulization 4x Daily - RT   metFORMIN 500 mg Oral BID With Meals   metoprolol succinate XL 25 mg Oral QAM   piperacillin-tazobactam 3.375 g Intravenous Q8H   polyethylene glycol 17 g Oral Daily   sennosides-docusate sodium 1 tablet Oral Nightly   tamsulosin 0.4 mg Oral BID     PRN Meds:.cyclobenzaprine  •  dextrose  •  dextrose  •  glucagon (human recombinant)  •  HYDROcodone-acetaminophen **OR** HYDROcodone-acetaminophen  •  naloxone  •  ondansetron **OR** ondansetron ODT **OR** [DISCONTINUED] ondansetron  •  sodium chloride  •  temazepam    Diagnostic Tests:   Lab Results (last 24 hours)     Procedure Component  Value Units Date/Time    POC Glucose Once [[id number]]  (Abnormal) Collected:  01/21/18 1654    Specimen:  Blood Updated:  01/21/18 1657     Glucose 212 (H) mg/dL     Narrative:       Meter: [id number] : [id number] Yung Tolliver RN    POC Glucose Once [[id number]]  (Abnormal) Collected:  01/21/18 2136    Specimen:  Blood Updated:  01/21/18 2143     Glucose 143 (H) mg/dL     Narrative:       Meter: [id number] : [id number] Yovani HOLCOMB    POC Glucose Once [[id number]]  (Abnormal) Collected:  01/22/18 0715    Specimen:  Blood Updated:  01/22/18 0716     Glucose 157 (H) mg/dL     Narrative:       Meter: [id number] : [id number] Marin Toussaint CNA    POC Glucose Once [[id number]]  (Abnormal) Collected:  01/22/18 1100    Specimen:  Blood Updated:  01/22/18 1103     Glucose 257 (H) mg/dL     Narrative:       Meter: [id number] : [id number] Marin Toussaint CNA    Blood Culture - Blood, [[id number]]  (Normal) Collected:  01/17/18 1118    Specimen:  Blood from Hand, Right Updated:  01/22/18 1131     Blood Culture No growth at 5 days    Blood Culture - Blood, [[id number]]  (Normal) Collected:  01/17/18 1250    Specimen:  Blood from Arm, Left Updated:  01/22/18 1346     Blood Culture No growth at 5 days           ASSESSMENT & PLAN:  Back incision intact.  Moving legs well.  ABD remains large and distended but soft.  No abdominal tenderness.  Good BM today.  Progressing slowly with PT.      OK to transition from Lovenox to Xarelto.  To rehab when medically stable.          Date: 1/22/2018    MARTY Blas MD

## 2018-01-22 NOTE — THERAPY TREATMENT NOTE
Acute Care - Physical Therapy Treatment Note  [facility]     Patient Name: Osvaldo Lopez  : 1935  MRN: [id number]  Today's Date: 2018  Onset of Illness/Injury or Date of Surgery Date: 01/15/18  Date of Referral to PT: 17  Referring Physician: Eliud    Admit Date: 1/15/2018    Visit Dx:    ICD-10-CM ICD-9-CM   1. Difficulty walking R26.2 719.7   2. Spinal stenosis of lumbar region with neurogenic claudication M48.062 724.03     Patient Active Problem List   Diagnosis   • Midline low back pain   • Complete rotator cuff tear of left shoulder   • Difficulty walking   • Abnormal finding on thyroid function test   • Abdominal aortic aneurysm   • Atrial fibrillation   • Benign prostatic hyperplasia   • Edema   • Essential hypertension   • Fatigue   • Hyperlipidemia   • Shoulder pain   • Lumbar radiculopathy   • Muscle weakness   • Osteoarthritis   • Type 2 diabetes mellitus without complication   • Primary osteoarthritis of left knee   • OA (osteoarthritis) of knee   • Toxic encephalopathy   • Paroxysmal a-fib   • HTN (hypertension)   • Type 2 diabetes mellitus   • BPH (benign prostatic hyperplasia)   • Postoperative anemia due to acute blood loss   • Cardiac murmur   • Complete tear of left rotator cuff   • Tear of right rotator cuff   • Spinal stenosis of lumbar region with neurogenic claudication   • Tachycardia   • Acute respiratory failure with hypoxia   • Postoperative ileus   • Hyponatremia               Adult Rehabilitation Note       18 1400 18 1000 18 1500    Rehab Assessment/Intervention    Discipline physical therapy assistant  -CW physical therapy assistant  -CW physical therapist  -RA    Document Type therapy note (daily note)  -CW therapy note (daily note)  -CW therapy note (daily note)  -RA    Subjective Information agree to therapy;complains of;weakness;pain  -CW agree to therapy;complains of;fatigue;pain  -CW agree to therapy;complains of;pain  -RA    Patient  Effort, Rehab Treatment good  -CW good  -CW good  -RA    Precautions/Limitations brace on when up;fall precautions;oxygen therapy device and L/min  -CW brace on when up;fall precautions  -CW brace on when up  -RA    Recorded by [CW] Edgardo Valladares PTA [CW] Edgardo Valladares PTA [RA] Cathy Vo, PT    Vital Signs    O2 Delivery Pre Treatment room air  -CW room air  -CW     Recorded by [CW] Edgardo Valladares PTA [CW] Edgardo Valladares PTA     Pain Assessment    Pain Assessment 0-10  -CW 0-10  -CW 0-10  -RA    Pain Score 3  -CW 3  -CW 4  -RA    Post Pain Score 3  -CW 4  -CW 4  -RA    Pain Type Surgical pain  -CW Surgical pain  -CW Surgical pain  -RA    Pain Location Back  -CW Back  -CW Back  -RA    Pain Intervention(s) Repositioned;Ambulation/increased activity  -CW Repositioned;Ambulation/increased activity  -CW Medication (See MAR);Repositioned;Ambulation/increased activity  -RA    Response to Interventions donta  -CW donta  -CW tolerated  -RA    Recorded by [CW] Edgardo Valladares PTA [CW] Edgardo Valladares PTA [RA] Cathy Vo, GIOVANNA    Cognitive Assessment/Intervention    Current Cognitive/Communication Assessment functional  -CW functional  -CW     Orientation Status oriented x 4  -CW oriented to;person;place;situation  -CW     Follows Commands/Answers Questions 100% of the time;able to follow single-step instructions;needs cueing  -% of the time;able to follow single-step instructions;needs cueing  -CW     Personal Safety WNL/WFL  -CW mild impairment;decreased awareness, need for assist;decreased awareness, need for safety;decreased insight to deficits  -CW     Personal Safety Interventions fall prevention program maintained;gait belt;muscle strengthening facilitated;nonskid shoes/slippers when out of bed  -CW fall prevention program maintained;gait belt;muscle strengthening facilitated;nonskid shoes/slippers when out of bed  -CW     Recorded by [CW] Edgardo Valladares PTA [CW] Edgardo STARK  OWEN Valladares     Bed Mobility, Assessment/Treatment    Bed Mobility, Assistive Device   bed rails  -RA    Bed Mobility, Roll Right, [place]   minimum assist (75% patient effort);moderate assist (50% patient effort);verbal cues required  -RA    Bed Mob, Supine to Sit, [place] minimum assist (75% patient effort);2 person assist required  -CW minimum assist (75% patient effort);2 person assist required  -CW     Bed Mob, Sit to Supine, [place] minimum assist (75% patient effort);2 person assist required  -CW minimum assist (75% patient effort);2 person assist required  -CW     Bed Mob, Sidelying to Sit, [place]   minimum assist (75% patient effort);moderate assist (50% patient effort)  -RA    Recorded by [CW] Edgardo Valladares PTA [CW] Edgardo Valladares PTA [RA] Cathy Vo, PT    Transfer Assessment/Treatment    Transfers, Bed-Chair [place]   minimum assist (75% patient effort);moderate assist (50% patient effort);1 person + 1 person to manage equipment  -RA    Transfers, Sit-Stand [place] minimum assist (75% patient effort);2 person assist required  -CW minimum assist (75% patient effort);2 person assist required  -CW minimum assist (75% patient effort);2 person assist required  -RA    Transfers, Stand-Sit [place] minimum assist (75% patient effort);2 person assist required  -CW minimum assist (75% patient effort);2 person assist required  -CW minimum assist (75% patient effort);2 person assist required  -RA    Transfers, Sit-Stand-Sit, Assist Device rolling walker  -CW rolling walker  -CW rolling walker  -RA    Recorded by [CW] Edgardo Valladares PTA [CW] Edgardo Valladares, PTA [RA] Cathy Vo, PT    Gait Assessment/Treatment    Gait, [place] Level minimum assist (75% patient effort);2 person assist required  -CW minimum assist (75% patient effort);2 person assist required  -CW minimum assist (75% patient effort);1 person + 1 person to manage equipment  -RA     Gait, Assistive Device rolling walker  -CW rolling walker  -CW rolling walker  -RA    Gait, Distance (Feet) 80  -CW 40  -CW 3  -RA    Gait, Gait Deviations [place] decreased;step length decreased;stride length decreased  -CW [place] decreased;step length decreased;stride length decreased  -CW [place] decreased;decreased heel strike;forward flexed posture;limb motion velocity decreased;step length decreased;stride length decreased  -RA    Gait, Safety Issues supplemental O2  -CW      Gait, Comment   bed to chair w/ rwx  -RA    Recorded by [CW] Edgardo Valladares PTA [CW] Edgardo Valladares PTA [RA] Cathy Vo PT    Therapy Exercises    Bilateral Lower Extremities AROM:;10 reps;sitting;ankle pumps/circles;glut sets;hip flexion;LAQ  -CW AROM:;10 reps;sitting;ankle pumps/circles;glut sets;hip flexion;LAQ  -CW 10 reps;AROM:;ankle pumps/circles;sitting;LAQ;hip flexion;supine;5 reps;heel slides;hip abduction/adduction;quad sets  -RA    Recorded by [CW] Edgardo Valladares PTA [CW] Edgardo Valladares PTA [RA] Cathy Vo PT    Positioning and Restraints    Pre-Treatment Position in bed  -CW in bed  -CW in bed  -RA    Post Treatment Position bed  -CW chair  -CW chair  -RA    In Bed notified nsg;supine;call light within reach;encouraged to call for assist;with family/caregiver  -CW      In Chair  notified nsg;reclined;call light within reach;encouraged to call for assist  -CW sitting;call light within reach;encouraged to call for assist;with family/caregiver  -RA    Recorded by [CW] Edgardo Valladares PTA [CW] Edgardo Valladares PTA [RA] Cathy Vo, PT      01/20/18 1400 01/19/18 0802       Rehab Assessment/Intervention    Discipline physical therapist  -RA physical therapist  -PC     Document Type therapy note (daily note)  -RA therapy note (daily note)  -PC     Subjective Information agree to therapy  -RA agree to therapy  -PC     Patient Effort, Rehab Treatment good  -RA good  -PC     Symptoms Noted  During/After Treatment fatigue  -RA fatigue  -PC     Precautions/Limitations brace on when up  -RA brace on when up;spinal precautions  -PC     Recorded by [RA] Cathy Vo, PT [PC] Mildred Nolan PT     Pain Assessment    Pain Assessment  0-10  -PC     Pain Score 4  -RA 5  -PC     Post Pain Score 5  -RA      Pain Type Surgical pain  -RA      Pain Location Back  -RA Back  -PC     Pain Orientation  Lower  -PC     Pain Intervention(s) Medication (See MAR);Repositioned  -RA      Response to Interventions tolerated  -RA      Recorded by [RA] Cathy Vo, PT [PC] Mildred Nolan PT     Cognitive Assessment/Intervention    Current Cognitive/Communication Assessment functional  -RA functional  -PC     Orientation Status oriented to;person;place;situation  -RA oriented to;person;place;situation  -PC     Follows Commands/Answers Questions needs cueing;able to follow single-step instructions;100% of the time  -RA needs cueing;able to follow single-step instructions;100% of the time  -PC     Personal Safety decreased insight to deficits;decreased awareness, need for safety  -RA decreased insight to deficits  -PC     Personal Safety Interventions fall prevention program maintained  -RA fall prevention program maintained;gait belt  -PC     Recorded by [RA] Cathy Vo, PT [PC] Mildred Nolan PT     Bed Mobility, Assessment/Treatment    Bed Mobility, Assistive Device bed rails  -RA      Bed Mobility, Roll Left, [place]  2 person assist required;moderate assist (50% patient effort)  -PC     Bed Mobility, Roll Right, [place] moderate assist (50% patient effort);2 person assist required;verbal cues required  -RA      Bed Mob, Sidelying to Sit, [place] moderate assist (50% patient effort);2 person assist required  -RA 2 person assist required;moderate assist (50% patient effort)  -PC     Bed Mob, Sit to Sidelying, [place]  2 person assist required;moderate assist (50% patient effort)  -PC      Recorded by [RA] Cathy Vo, PT [PC] Mildred Nolan, PT     Transfer Assessment/Treatment    Transfers, Bed-Chair [place] moderate assist (50% patient effort);2 person assist required  -RA      Transfers, Sit-Stand [place] moderate assist (50% patient effort);2 person assist required  -RA 2 person assist required;moderate assist (50% patient effort)  -PC     Transfers, Stand-Sit [place] 2 person assist required;moderate assist (50% patient effort)  -RA 2 person assist required;moderate assist (50% patient effort)  -PC     Transfers, Sit-Stand-Sit, Assist Device rolling walker;elevated surface  -RA elevated surface;rolling walker  -PC     Transfer, Comment  sit to stand x 4 working on coming to full stand, weight shifting, pt unable to take a step  -PC     Recorded by [RA] Cathy Vo, PT [PC] Mildred Nolan, PT     Gait Assessment/Treatment    Gait, [place] Level moderate assist (50% patient effort);2 person assist required  -RA      Gait, Assistive Device rolling walker  -RA      Gait, Distance (Feet) 3  -RA      Gait, Gait Deviations [place] decreased;decreased heel strike;step length decreased;toe-to-floor clearance decreased;limb motion velocity decreased;forward flexed posture  -RA      Gait, Comment ambulated from bed to chair w/ rwx  -RA      Recorded by [RA] Cathy Vo, PT      Balance Skills Training    Sitting-Level of Assistance Close supervision  -RA Minimum assistance;Moderate assistance  -PC     Sitting-Balance Support Feet supported;Left upper extremity supported;Right upper extremity supported  -RA Feet supported;Left upper extremity supported;Right upper extremity supported  -PC     Sitting-Balance Activities Trunk control activities  -RA Trunk control activities   pt leaning posterior, continual verbal/tactile cues needed  -PC     Sitting # of Minutes 5  -RA 8  -PC     Standing-Level of Assistance Minimum assistance;x2  -RA Maximum assistance;x2  -PC     Static  Standing Balance Support assistive device  -RA assistive device  -PC     Recorded by [RA] Cathy Vo PT [PC] Mildred Nolan PT     Therapy Exercises    Bilateral Lower Extremities 10 reps;AROM:;ankle pumps/circles;sitting;LAQ;hip flexion  -RA 10 reps;AROM:;ankle pumps/circles;sitting;LAQ;hip flexion  -PC     Recorded by [RA] Cathy Vo PT [PC] Mildred Nolan PT     Positioning and Restraints    Pre-Treatment Position in bed  -RA in bed  -PC     Post Treatment Position chair  -RA bed  -PC     In Bed  supine;call light within reach;encouraged to call for assist;with family/caregiver  -PC     In Chair sitting;call light within reach;notified nsg;encouraged to call for assist;with family/caregiver  -RA      Recorded by [RA] Cathy Vo, PT [PC] Mildred Nolan PT       User Key  (r) = Recorded By, (t) = Taken By, (c) = Cosigned By    Initials Name Effective Dates    RA Cathy Vo, PT 04/06/17 -     PC Mildred Nolan, PT 12/01/15 -     CW Edgardo Valladares, PTA 12/13/16 -                 IP PT Goals       01/17/18 1005          Bed Mobility PT STG    Bed Mobility PT STG, Date Established 01/17/18  -SV      Bed Mobility PT STG, Time to Achieve 2 wks  -SV      Bed Mobility PT STG, Activity Type sidelying to sit/sit to sidelying  -SV      Bed Mobility PT STG, [place] minimum assist (75% patient effort);contact guard assist  -SV      Transfer Training PT STG    Transfer Training PT STG, Date Established 01/17/18  -SV      Transfer Training PT STG, Time to Achieve 2 wks  -SV      Transfer Training PT STG, Activity Type sit to stand/stand to sit;bed to chair /chair to bed  -SV      Transfer Training PT STG, [place] minimum assist (75% patient effort)  -SV      Transfer Training PT STG, Assist Device walker, rolling  -SV        User Key  (r) = Recorded By, (t) = Taken By, (c) = Cosigned By    Initials Name Provider Type    CINDY Middleton, PT Physical Therapist          Physical  Therapy Education     Title: PT OT SLP Therapies (Done)     Topic: Physical Therapy (Done)     Point: Mobility training (Done)    Learning Progress Summary    Learner Readiness Method Response Comment Documented by Status   Patient Acceptance E,TB DU,VU   01/22/18 1035 Done    Acceptance E,D,TB VU,NR Education on LE bed exercises supine and sitting  01/21/18 1525 Done    Acceptance E VU  RA 01/20/18 1452 Done    Acceptance E,D NR   01/19/18 1012 Active    Nonacceptance E NR   01/19/18 0311 Active    Acceptance E,D NR   01/18/18 1324 Active    Acceptance E,TB,D NR,VU   01/17/18 1004 Done   Family Acceptance E,TB,D NR,VU   01/17/18 1004 Done               Point: Home exercise program (Done)    Learning Progress Summary    Learner Readiness Method Response Comment Documented by Status   Patient Acceptance E,TB DU,VU   01/22/18 1035 Done    Acceptance E,D,TB VU,NR Education on LE bed exercises supine and sitting  01/21/18 1525 Done    Acceptance E VU  RA 01/20/18 1452 Done    Acceptance E,D NR   01/19/18 1012 Active    Nonacceptance E NR   01/19/18 0311 Active    Acceptance E,D NR   01/18/18 1324 Active    Acceptance E,TB,D NR,VU   01/17/18 1004 Done   Family Acceptance E,TB,D NR,VU   01/17/18 1004 Done               Point: Body mechanics (Done)    Learning Progress Summary    Learner Readiness Method Response Comment Documented by Status   Patient Acceptance E,TB DU,VU   01/22/18 1035 Done    Acceptance E,D,TB VU,NR Education on LE bed exercises supine and sitting  01/21/18 1525 Done    Acceptance E VU  RA 01/20/18 1452 Done    Acceptance E,D NR   01/19/18 1012 Active    Nonacceptance E NR   01/19/18 0311 Active    Acceptance E,D NR   01/18/18 1324 Active    Acceptance E,TB,D NR,VU   01/17/18 1004 Done   Family Acceptance E,TB,D NR,VU   01/17/18 1004 Done               Point: Precautions (Done)    Learning Progress Summary    Learner Readiness Method Response Comment Documented  by Status   Patient Acceptance E,TB DU,VU  CW 01/22/18 1035 Done    Acceptance E,D,TB VU,NR Education on LE bed exercises supine and sitting RA 01/21/18 1525 Done    Acceptance E VU  RA 01/20/18 1452 Done    Acceptance E,D NR   01/19/18 1012 Active    Nonacceptance E NR  JL 01/19/18 0311 Active    Acceptance E,D NR  PC 01/18/18 1324 Active    Acceptance E,TB,D NR,VU  SV 01/17/18 1004 Done   Family Acceptance E,TB,D NR,VU  SV 01/17/18 1004 Done                      User Key     Initials Effective Dates Name Provider Type Discipline    RA 04/06/17 -  Cathy Vo, PT Physical Therapist PT    PC 12/01/15 -  Mildred Nolan, PT Physical Therapist PT     01/17/16 -  Sabina Middleton, PT Physical Therapist PT     12/13/16 -  Edgardo Valladares, PTA Physical Therapy Assistant PT     04/28/17 -  Ewa August RN Registered Nurse Nurse                    PT Recommendation and Plan  Anticipated Discharge Disposition: skilled nursing facility  PT Frequency: 2 times/day  Plan of Care Review  Plan Of Care Reviewed With: patient  Progress: progress toward functional goals as expected  Outcome Summary/Follow up Plan: Pt increased with amb distance and safety with RWX          Outcome Measures       01/22/18 1000 01/21/18 1500 01/20/18 1500    How much help from another person do you currently need...    Turning from your back to your side while in flat bed without using bedrails? 3  -CW 2  -RA 2  -RA    Moving from lying on back to sitting on the side of a flat bed without bedrails? 3  -CW 2  -RA 2  -RA    Moving to and from a bed to a chair (including a wheelchair)? 3  -CW 3  -RA 3  -RA    Standing up from a chair using your arms (e.g., wheelchair, bedside chair)? 3  -CW 2  -RA 2  -RA    Climbing 3-5 steps with a railing? 1  -CW 1  -RA 1  -RA    To walk in hospital room? 3  -CW 3  -RA 2  -RA    AM-PAC 6 Clicks Score 16  -CW 13  -RA 12  -RA    Functional Assessment    Outcome Measure Options AM-PAC 6 Clicks Basic  Mobility (PT)  -CW AM-PAC 6 Clicks Basic Mobility (PT)  -RA       User Key  (r) = Recorded By, (t) = Taken By, (c) = Cosigned By    Initials Name Provider Type    MIGUEL Vo, PT Physical Therapist    CW Edgardo Valladares, OWEN Physical Therapy Assistant           Time Calculation:         PT Charges       01/22/18 1408 01/22/18 1036       Time Calculation    Start Time 1351  -CW 1006  -CW     Stop Time 1408  -CW 1022  -CW     Time Calculation (min) 17 min  -CW 16 min  -CW     PT Received On 01/22/18  -CW 01/22/18  -CW     PT - Next Appointment 01/23/18  -CW 01/22/18  -CW       User Key  (r) = Recorded By, (t) = Taken By, (c) = Cosigned By    Initials Name Provider Type    CW Edgardo Valladares, PTA Physical Therapy Assistant          Therapy Charges for Today     Code Description Service Date Service Provider Modifiers Qty    61759493409 HC PT THER PROC EA 15 MIN 1/22/2018 Edgardo Valladares, PTA GP 1    10498684739 HC PT THER SUPP EA 15 MIN 1/22/2018 Edgardo Valladares, OWEN GP 1    10984563889 HC PT THER PROC EA 15 MIN 1/22/2018 Edgardo Valladares, OWEN GP 1    00270834659 HC PT THER SUPP EA 15 MIN 1/22/2018 Edgardo Valladares, OWEN GP 1          PT G-Codes  Outcome Measure Options: AM-PAC 6 Clicks Basic Mobility (PT)    Edgardo Valladares PTA  1/22/2018

## 2018-01-22 NOTE — PROGRESS NOTES
Chief Complaint:    Colonic ileus    Subjective:    Feels better today. Less distended. No BM. Passing flatus.    Objective:    Vitals:    01/21/18 0923 01/21/18 1302 01/21/18 1458 01/21/18 1755   BP: 151/93 148/71  121/64   BP Location: Left arm Left arm  Left arm   Patient Position: Lying Lying  Lying   Pulse: 90 85 84 86   Resp: 18 18 20 19   Temp: 99.5 °F (37.5 °C) 99.7 °F (37.6 °C)  99.8 °F (37.7 °C)   TempSrc: Oral Oral  Oral   SpO2: 92% 95% 94% 93%   Weight:       Height:           Lungs: Clear  Heart: Regular  Abdomen: Softer. Less distended.   Extremities: Warm    Assessment:    Colonic ileus following spine surgery     Plan:     Continue stimulation from below with Dulcolax suppositories.    Continue IV fluids.    Encourage movement out of bed.  I will change Miralax to a daily dose

## 2018-01-23 LAB
GLUCOSE BLDC GLUCOMTR-MCNC: 146 MG/DL (ref 70–130)
GLUCOSE BLDC GLUCOMTR-MCNC: 146 MG/DL (ref 70–130)
GLUCOSE BLDC GLUCOMTR-MCNC: 158 MG/DL (ref 70–130)

## 2018-01-23 PROCEDURE — 25010000002 ENOXAPARIN PER 10 MG: Performed by: ORTHOPAEDIC SURGERY

## 2018-01-23 PROCEDURE — 63710000001 INSULIN ASPART PER 5 UNITS: Performed by: HOSPITALIST

## 2018-01-23 PROCEDURE — 99024 POSTOP FOLLOW-UP VISIT: CPT | Performed by: ORTHOPAEDIC SURGERY

## 2018-01-23 PROCEDURE — 99233 SBSQ HOSP IP/OBS HIGH 50: CPT | Performed by: INTERNAL MEDICINE

## 2018-01-23 PROCEDURE — 94799 UNLISTED PULMONARY SVC/PX: CPT

## 2018-01-23 PROCEDURE — 99231 SBSQ HOSP IP/OBS SF/LOW 25: CPT | Performed by: SURGERY

## 2018-01-23 PROCEDURE — 82962 GLUCOSE BLOOD TEST: CPT

## 2018-01-23 PROCEDURE — 25010000002 PIPERACILLIN SOD-TAZOBACTAM PER 1 G: Performed by: INTERNAL MEDICINE

## 2018-01-23 PROCEDURE — 97110 THERAPEUTIC EXERCISES: CPT

## 2018-01-23 RX ADMIN — ENOXAPARIN SODIUM 100 MG: 100 INJECTION SUBCUTANEOUS at 07:44

## 2018-01-23 RX ADMIN — POLYETHYLENE GLYCOL 3350 17 G: 17 POWDER, FOR SOLUTION ORAL at 08:52

## 2018-01-23 RX ADMIN — METFORMIN HYDROCHLORIDE 500 MG: 500 TABLET ORAL at 07:39

## 2018-01-23 RX ADMIN — IPRATROPIUM BROMIDE 0.5 MG: 0.5 SOLUTION RESPIRATORY (INHALATION) at 20:20

## 2018-01-23 RX ADMIN — TAMSULOSIN HYDROCHLORIDE 0.4 MG: 0.4 CAPSULE ORAL at 07:40

## 2018-01-23 RX ADMIN — TAZOBACTAM SODIUM AND PIPERACILLIN SODIUM 3.38 G: 375; 3 INJECTION, SOLUTION INTRAVENOUS at 06:13

## 2018-01-23 RX ADMIN — METFORMIN HYDROCHLORIDE 500 MG: 500 TABLET ORAL at 18:22

## 2018-01-23 RX ADMIN — RIVAROXABAN 20 MG: 20 TABLET, FILM COATED ORAL at 18:25

## 2018-01-23 RX ADMIN — TAZOBACTAM SODIUM AND PIPERACILLIN SODIUM 3.38 G: 375; 3 INJECTION, SOLUTION INTRAVENOUS at 14:25

## 2018-01-23 RX ADMIN — HYDROCODONE BITARTRATE AND ACETAMINOPHEN 2 TABLET: 5; 325 TABLET ORAL at 16:14

## 2018-01-23 RX ADMIN — CYCLOBENZAPRINE HYDROCHLORIDE 10 MG: 10 TABLET, FILM COATED ORAL at 21:25

## 2018-01-23 RX ADMIN — AMIODARONE HYDROCHLORIDE 200 MG: 200 TABLET ORAL at 07:40

## 2018-01-23 RX ADMIN — METOPROLOL SUCCINATE 25 MG: 25 TABLET, FILM COATED, EXTENDED RELEASE ORAL at 07:40

## 2018-01-23 RX ADMIN — IPRATROPIUM BROMIDE 0.5 MG: 0.5 SOLUTION RESPIRATORY (INHALATION) at 07:35

## 2018-01-23 RX ADMIN — FINASTERIDE 5 MG: 5 TABLET, FILM COATED ORAL at 07:41

## 2018-01-23 RX ADMIN — IPRATROPIUM BROMIDE 0.5 MG: 0.5 SOLUTION RESPIRATORY (INHALATION) at 16:05

## 2018-01-23 RX ADMIN — DOCUSATE SODIUM -SENNOSIDES 1 TABLET: 50; 8.6 TABLET, COATED ORAL at 21:25

## 2018-01-23 RX ADMIN — HYDROCODONE BITARTRATE AND ACETAMINOPHEN 1 TABLET: 5; 325 TABLET ORAL at 06:16

## 2018-01-23 RX ADMIN — INSULIN ASPART 2 UNITS: 100 INJECTION, SOLUTION INTRAVENOUS; SUBCUTANEOUS at 18:22

## 2018-01-23 RX ADMIN — CYCLOBENZAPRINE HYDROCHLORIDE 10 MG: 10 TABLET, FILM COATED ORAL at 00:43

## 2018-01-23 RX ADMIN — TAMSULOSIN HYDROCHLORIDE 0.4 MG: 0.4 CAPSULE ORAL at 21:25

## 2018-01-23 NOTE — SIGNIFICANT NOTE
01/23/18 1323   Rehab Treatment   Discipline physical therapy assistant   Treatment Not Performed other (see comments)  (Pt states that he is having bleeding from suture site in back.  Does not want to do therapy this PM.  PT to check back tomorrow)   Recommendation   PT - Next Appointment 01/24/18

## 2018-01-23 NOTE — PROGRESS NOTES
Chief Complaint:    Colonic ileus    Subjective:    Had a BM. Passing flatus.    Objective:    Vitals:    01/22/18 0925 01/22/18 1318 01/22/18 1643 01/22/18 1900   BP: 154/74 162/81  149/82   BP Location: Right arm Right arm  Right arm   Patient Position: Lying Lying  Lying   Pulse: 78 80 78 86   Resp: 18 17 16 16   Temp: 98.2 °F (36.8 °C) 98 °F (36.7 °C)  97.9 °F (36.6 °C)   TempSrc: Oral Oral  Oral   SpO2: 91% 96% 95% 96%   Weight:       Height:           Lungs: Clear  Heart: Regular  Abdomen: Softer. Less distended. BS present  Extremities: Warm    Assessment:    Colonic ileus following spine surgery     Plan:     Stop Dulcolax   Encourage movement out of bed.  Continue daily miralax

## 2018-01-23 NOTE — PLAN OF CARE
Problem: Patient Care Overview (Adult)  Goal: Plan of Care Review  Outcome: Ongoing (interventions implemented as appropriate)   01/23/18 0423   Coping/Psychosocial Response Interventions   Plan Of Care Reviewed With patient   Patient Care Overview   Progress progress toward functional goals as expected   Outcome Evaluation   Outcome Summary/Follow up Plan meds per order, pain meds per request, no falls, no skin breakdown noted, skin care per protocol, see vs and labs     Goal: Adult Individualization and Mutuality  Outcome: Ongoing (interventions implemented as appropriate)    Goal: Discharge Needs Assessment  Outcome: Ongoing (interventions implemented as appropriate)      Problem: Fall Risk (Adult)  Goal: Identify Related Risk Factors and Signs and Symptoms  Outcome: Outcome(s) achieved Date Met: 01/23/18    Goal: Absence of Falls  Outcome: Ongoing (interventions implemented as appropriate)      Problem: Laminectomy/Foraminotomy/Discectomy (Adult)  Goal: Signs and Symptoms of Listed Potential Problems Will be Absent or Manageable (Laminectomy/Foraminotomy/Discectomy)  Outcome: Ongoing (interventions implemented as appropriate)      Problem: Pain, Acute (Adult)  Goal: Identify Related Risk Factors and Signs and Symptoms  Outcome: Outcome(s) achieved Date Met: 01/23/18    Goal: Acceptable Pain Control/Comfort Level  Outcome: Ongoing (interventions implemented as appropriate)      Problem: Infection, Risk/Actual (Adult)  Goal: Identify Related Risk Factors and Signs and Symptoms  Outcome: Outcome(s) achieved Date Met: 01/23/18    Goal: Infection Prevention/Resolution  Outcome: Ongoing (interventions implemented as appropriate)      Problem: Pressure Ulcer Risk (Rafael Scale) (Adult,Obstetrics,Pediatric)  Goal: Identify Related Risk Factors and Signs and Symptoms  Outcome: Outcome(s) achieved Date Met: 01/23/18    Goal: Skin Integrity  Outcome: Ongoing (interventions implemented as appropriate)

## 2018-01-23 NOTE — PROGRESS NOTES
Hospital Follow Up    LOS:  LOS: 8 days   Patient Name: Osvaldo Lopez  Age/Sex: 82 y.o. male  : 1935  MRN: [id number]    Day of Service: 18   Length of Stay: 8  Encounter Provider: Angelo Driscoll MD  Place of Service: [facility] CARDIOLOGY  Patient Care Team:  Caio Rodríguez Jr., MD as PCP - General (Family Medicine)  Caio Rodríguez Jr., MD as PCP - Claims Attributed  Dontrell Arellano MD as Surgeon (Orthopedic Surgery)  Angelo Driscoll MD as Consulting Physician (Cardiology)  Darvin Alvarez MD as Consulting Physician (Urology)    Subjective:     Chief Complaint: Paroxysmal atrial fibrillation.    Interval History: Patient continues to improve.  He is looking back to normal i.e. significantly better.    Objective:     Objective:  Temp:  [97.6 °F (36.4 °C)-98.2 °F (36.8 °C)] 97.6 °F (36.4 °C)  Heart Rate:  [78-90] 90  Resp:  [16-20] 20  BP: (137-162)/(74-85) 137/85     Intake/Output Summary (Last 24 hours) at 18 0846  Last data filed at 18 0617   Gross per 24 hour   Intake              790 ml   Output             1300 ml   Net             -510 ml     Body mass index is 31.8 kg/(m^2).  Last 3 weights    01/15/18  1237   Weight: 103 kg (228 lb)     Weight change:       Physical Exam:   General : Alert, cooperative, in no acute distress.  Neuro: alert,cooperative and oriented  Lungs: CTAB. Normal respiratory effort and rate.  CV:: Regular rate and rhythm, normal S1 and S2, no murmurs, gallops or rubs.  ABD: Soft, nontender, non-distended. positive bowel sounds  Extr: No edema or cyanosis, moves all extremities    Lab Review:     Results from last 7 days  Lab Units 18  0502 18  0515   SODIUM mmol/L 134* 133*   POTASSIUM mmol/L 4.0 4.5   CHLORIDE mmol/L 98 98   CO2 mmol/L 27.3 25.7   BUN mg/dL 17 22   CREATININE mg/dL 0.95 1.05   GLUCOSE mg/dL 146* 164*   CALCIUM mg/dL 8.1* 8.2*   AST (SGOT) U/L  --  21   ALT (SGPT) U/L  --  18       Results from  last 7 days  Lab Units 01/18/18  0533 01/17/18  1118   TROPONIN T ng/mL 0.050* 0.086*       Results from last 7 days  Lab Units 01/21/18  0502 01/20/18  0515   WBC 10*3/mm3 4.09* 4.80   HEMOGLOBIN g/dL 9.1* 9.0*   HEMATOCRIT % 28.4* 28.0*   PLATELETS 10*3/mm3 134* 152           Results from last 7 days  Lab Units 01/17/18  1929   MAGNESIUM mg/dL 2.0           Results from last 7 days  Lab Units 01/17/18  1118   PROBNP pg/mL 1020.0         I reviewed the patient's new clinical results.  I personally viewed and interpreted the patient's EKG  Current Medications:   Scheduled Meds:  amiodarone 200 mg Oral Q24H   enoxaparin 1 mg/kg Subcutaneous Q12H   finasteride 5 mg Oral QAM   insulin aspart 0-9 Units Subcutaneous 4x Daily With Meals & Nightly   ipratropium 0.5 mg Nebulization 4x Daily - RT   metFORMIN 500 mg Oral BID With Meals   metoprolol succinate XL 25 mg Oral QAM   piperacillin-tazobactam 3.375 g Intravenous Q8H   polyethylene glycol 17 g Oral Daily   sennosides-docusate sodium 1 tablet Oral Nightly   tamsulosin 0.4 mg Oral BID     Continuous Infusions:     Allergies:  Allergies   Allergen Reactions   • Percocet [Oxycodone-Acetaminophen]      Causes confusion       Assessment:     Principal Problem:    Spinal stenosis of lumbar region with neurogenic claudication  Active Problems:    Essential hypertension    Type 2 diabetes mellitus without complication    Paroxysmal a-fib    BPH (benign prostatic hyperplasia)    Tachycardia    Acute respiratory failure with hypoxia    Postoperative ileus    Hyponatremia        Plan:   1.  Status post spine surgery  2.  Paroxysmal atrial fibrillation back in sinus rhythm I'm going to resume his Xarelto tonight.  3 ileus resolved  4.  Okay to discharge when ever orthopedic surgery would like.  He has appointment in and of February with my office have him follow-up then.     Angelo Driscoll MD  01/23/18  8:46 AM

## 2018-01-23 NOTE — PLAN OF CARE
Problem: Patient Care Overview (Adult)  Goal: Plan of Care Review  Outcome: Ongoing (interventions implemented as appropriate)   01/23/18 1015   Coping/Psychosocial Response Interventions   Plan Of Care Reviewed With patient   Patient Care Overview   Progress progress towards functional goals is fair   Outcome Evaluation   Outcome Summary/Follow up Plan Pt is improving with transfer safety and I with use of RWX

## 2018-01-23 NOTE — THERAPY TREATMENT NOTE
Acute Care - Physical Therapy Treatment Note  [facility]     Patient Name: Osvaldo Lopez  : 1935  MRN: [id number]  Today's Date: 2018  Onset of Illness/Injury or Date of Surgery Date: 01/15/18  Date of Referral to PT: 17  Referring Physician: Eliud    Admit Date: 1/15/2018    Visit Dx:    ICD-10-CM ICD-9-CM   1. Difficulty walking R26.2 719.7   2. Spinal stenosis of lumbar region with neurogenic claudication M48.062 724.03     Patient Active Problem List   Diagnosis   • Midline low back pain   • Complete rotator cuff tear of left shoulder   • Difficulty walking   • Abnormal finding on thyroid function test   • Abdominal aortic aneurysm   • Atrial fibrillation   • Benign prostatic hyperplasia   • Edema   • Essential hypertension   • Fatigue   • Hyperlipidemia   • Shoulder pain   • Lumbar radiculopathy   • Muscle weakness   • Osteoarthritis   • Type 2 diabetes mellitus without complication   • Primary osteoarthritis of left knee   • OA (osteoarthritis) of knee   • Toxic encephalopathy   • Paroxysmal a-fib   • HTN (hypertension)   • Type 2 diabetes mellitus   • BPH (benign prostatic hyperplasia)   • Postoperative anemia due to acute blood loss   • Cardiac murmur   • Complete tear of left rotator cuff   • Tear of right rotator cuff   • Spinal stenosis of lumbar region with neurogenic claudication   • Tachycardia   • Acute respiratory failure with hypoxia   • Postoperative ileus   • Hyponatremia               Adult Rehabilitation Note       18 1000 18 1400 18 1000    Rehab Assessment/Intervention    Discipline physical therapy assistant  -CW physical therapy assistant  -CW physical therapy assistant  -CW    Document Type therapy note (daily note)  -CW therapy note (daily note)  -CW therapy note (daily note)  -CW    Subjective Information agree to therapy;complains of;weakness;fatigue  -CW agree to therapy;complains of;weakness;pain  -CW agree to therapy;complains  of;fatigue;pain  -CW    Patient Effort, Rehab Treatment good  -CW good  -CW good  -CW    Precautions/Limitations brace on when up;fall precautions;spinal precautions;oxygen therapy device and L/min  -CW brace on when up;fall precautions;oxygen therapy device and L/min  -CW brace on when up;fall precautions  -CW    Recorded by [CW] Edgardo Valladares PTA [CW] Edgardo Valladares PTA [CW] Edgardo Valladares PTA    Vital Signs    O2 Delivery Pre Treatment supplemental O2  -CW room air  -CW room air  -CW    O2 Delivery Intra Treatment supplemental O2  -CW      O2 Delivery Post Treatment supplemental O2  -CW      Recorded by [CW] Edgardo Valladares PTA [CW] Edgardo Valladares PTA [CW] Edgardo Valladares PTA    Pain Assessment    Pain Assessment 0-10  -CW 0-10  -CW 0-10  -CW    Pain Score 3  -CW 3  -CW 3  -CW    Post Pain Score 3  -CW 3  -CW 4  -CW    Pain Type Surgical pain  -CW Surgical pain  -CW Surgical pain  -CW    Pain Location Back  -CW Back  -CW Back  -CW    Pain Intervention(s) Repositioned;Ambulation/increased activity  -CW Repositioned;Ambulation/increased activity  -CW Repositioned;Ambulation/increased activity  -CW    Response to Interventions donta  -CW donta  -CW donta  -CW    Recorded by [CW] Edgardo Valladares, OWEN [CW] Edgardo Valladares, PTA [CW] Edgardo Valladares PTA    Cognitive Assessment/Intervention    Current Cognitive/Communication Assessment functional  -CW functional  -CW functional  -CW    Orientation Status oriented to;person;place;situation  -CW oriented x 4  -CW oriented to;person;place;situation  -CW    Follows Commands/Answers Questions 100% of the time;able to follow single-step instructions;needs cueing  -% of the time;able to follow single-step instructions;needs cueing  -% of the time;able to follow single-step instructions;needs cueing  -CW    Personal Safety WNL/WFL  -CW WNL/WFL  -CW mild impairment;decreased awareness, need for assist;decreased awareness, need for  safety;decreased insight to deficits  -CW    Personal Safety Interventions fall prevention program maintained;gait belt;muscle strengthening facilitated;nonskid shoes/slippers when out of bed  -CW fall prevention program maintained;gait belt;muscle strengthening facilitated;nonskid shoes/slippers when out of bed  -CW fall prevention program maintained;gait belt;muscle strengthening facilitated;nonskid shoes/slippers when out of bed  -CW    Recorded by [CW] Edgardo Valladares PTA [CW] Edgardo Valladares, PTA [CW] Edgardo Valladares PTA    Bed Mobility, Assessment/Treatment    Bed Mob, Supine to Sit, [place] minimum assist (75% patient effort);2 person assist required  -CW minimum assist (75% patient effort);2 person assist required  -CW minimum assist (75% patient effort);2 person assist required  -CW    Bed Mob, Sit to Supine, [place]  minimum assist (75% patient effort);2 person assist required  -CW minimum assist (75% patient effort);2 person assist required  -CW    Recorded by [CW] Edgardo Valladares, PTA [CW] Edgardo Valladares, PTA [CW] Edgardo Valladares, PTA    Transfer Assessment/Treatment    Transfers, Sit-Stand [place] minimum assist (75% patient effort);2 person assist required  -CW minimum assist (75% patient effort);2 person assist required  -CW minimum assist (75% patient effort);2 person assist required  -CW    Transfers, Stand-Sit [place] minimum assist (75% patient effort);2 person assist required  -CW minimum assist (75% patient effort);2 person assist required  -CW minimum assist (75% patient effort);2 person assist required  -CW    Transfers, Sit-Stand-Sit, Assist Device rolling walker  -CW rolling walker  -CW rolling walker  -CW    Recorded by [CW] Edgardo Valladares, PTA [CW] Edgardo Valladares, PTA [CW] Edgardo Valladares, PTA    Gait Assessment/Treatment    Gait, [place] Level minimum assist (75% patient effort);2 person assist required  -CW minimum assist (75%  patient effort);2 person assist required  -CW minimum assist (75% patient effort);2 person assist required  -CW    Gait, Assistive Device rolling walker  -CW rolling walker  -CW rolling walker  -CW    Gait, Distance (Feet) 60  -CW 80  -CW 40  -CW    Gait, Gait Deviations [place] decreased;step length decreased;stride length decreased  -CW [place] decreased;step length decreased;stride length decreased  -CW [place] decreased;step length decreased;stride length decreased  -CW    Gait, Safety Issues  supplemental O2  -CW     Recorded by [CW] Edgardo Valladares PTA [CW] Edgardo Valladares PTA [CW] Edgardo Valladares PTA    Therapy Exercises    Bilateral Lower Extremities AROM:;10 reps;sitting;ankle pumps/circles;glut sets;hip flexion;LAQ  -CW AROM:;10 reps;sitting;ankle pumps/circles;glut sets;hip flexion;LAQ  -CW AROM:;10 reps;sitting;ankle pumps/circles;glut sets;hip flexion;LAQ  -CW    Recorded by [CW] Edgardo aVlladares PTA [CW] Edgardo Valladares PTA [CW] Edgardo Valladares PTA    Positioning and Restraints    Pre-Treatment Position in bed  -CW in bed  -CW in bed  -CW    Post Treatment Position chair  -CW bed  -CW chair  -CW    In Bed  notified nsg;supine;call light within reach;encouraged to call for assist;with family/caregiver  -CW     In Chair notified nsg;sitting;call light within reach;encouraged to call for assist  -CW  notified nsg;reclined;call light within reach;encouraged to call for assist  -CW    Recorded by [CW] Edgardo Valladares PTA [CW] Edgardo Valladares PTA [CW] Edgardo Valladares PTA      01/21/18 1500 01/20/18 1400       Rehab Assessment/Intervention    Discipline physical therapist  -RA physical therapist  -RA     Document Type therapy note (daily note)  -RA therapy note (daily note)  -RA     Subjective Information agree to therapy;complains of;pain  -RA agree to therapy  -RA     Patient Effort, Rehab Treatment good  -RA good  -RA     Symptoms Noted During/After Treatment  fatigue   -RA     Precautions/Limitations brace on when up  -RA brace on when up  -RA     Recorded by [RA] Cathy Vo, PT [RA] Cathy Vo PT     Pain Assessment    Pain Assessment 0-10  -RA      Pain Score 4  -RA 4  -RA     Post Pain Score 4  -RA 5  -RA     Pain Type Surgical pain  -RA Surgical pain  -RA     Pain Location Back  -RA Back  -RA     Pain Intervention(s) Medication (See MAR);Repositioned;Ambulation/increased activity  -RA Medication (See MAR);Repositioned  -RA     Response to Interventions tolerated  -RA tolerated  -RA     Recorded by [RA] Cathy Vo, PT [RA] Cathy Vo PT     Cognitive Assessment/Intervention    Current Cognitive/Communication Assessment  functional  -RA     Orientation Status  oriented to;person;place;situation  -RA     Follows Commands/Answers Questions  needs cueing;able to follow single-step instructions;100% of the time  -RA     Personal Safety  decreased insight to deficits;decreased awareness, need for safety  -RA     Personal Safety Interventions  fall prevention program maintained  -RA     Recorded by  [RA] Cathy Vo PT     Bed Mobility, Assessment/Treatment    Bed Mobility, Assistive Device bed rails  -RA bed rails  -RA     Bed Mobility, Roll Right, [place] minimum assist (75% patient effort);moderate assist (50% patient effort);verbal cues required  -RA moderate assist (50% patient effort);2 person assist required;verbal cues required  -RA     Bed Mob, Sidelying to Sit, [place] minimum assist (75% patient effort);moderate assist (50% patient effort)  -RA moderate assist (50% patient effort);2 person assist required  -RA     Recorded by [RA] Cathy Vo, PT [RA] Cathy Vo PT     Transfer Assessment/Treatment    Transfers, Bed-Chair [place] minimum assist (75% patient effort);moderate assist (50% patient effort);1 person + 1 person to manage equipment  -RA moderate assist (50% patient effort);2 person assist required  -RA      Transfers, Sit-Stand [place] minimum assist (75% patient effort);2 person assist required  -RA moderate assist (50% patient effort);2 person assist required  -RA     Transfers, Stand-Sit [place] minimum assist (75% patient effort);2 person assist required  -RA 2 person assist required;moderate assist (50% patient effort)  -RA     Transfers, Sit-Stand-Sit, Assist Device rolling walker  -RA rolling walker;elevated surface  -RA     Recorded by [RA] Cathy Vo PT [RA] Cathy Vo PT     Gait Assessment/Treatment    Gait, [place] Level minimum assist (75% patient effort);1 person + 1 person to manage equipment  -RA moderate assist (50% patient effort);2 person assist required  -RA     Gait, Assistive Device rolling walker  -RA rolling walker  -RA     Gait, Distance (Feet) 3  -RA 3  -RA     Gait, Gait Deviations [place] decreased;decreased heel strike;forward flexed posture;limb motion velocity decreased;step length decreased;stride length decreased  -RA [place] decreased;decreased heel strike;step length decreased;toe-to-floor clearance decreased;limb motion velocity decreased;forward flexed posture  -RA     Gait, Comment bed to chair w/ rwx  -RA ambulated from bed to chair w/ rwx  -RA     Recorded by [RA] Cathy Vo PT [RA] Cathy Vo PT     Balance Skills Training    Sitting-Level of Assistance  Close supervision  -RA     Sitting-Balance Support  Feet supported;Left upper extremity supported;Right upper extremity supported  -RA     Sitting-Balance Activities  Trunk control activities  -RA     Sitting # of Minutes  5  -RA     Standing-Level of Assistance  Minimum assistance;x2  -RA     Static Standing Balance Support  assistive device  -RA     Recorded by  [RA] Cathy Vo PT     Therapy Exercises    Bilateral Lower Extremities 10 reps;AROM:;ankle pumps/circles;sitting;LAQ;hip flexion;supine;5 reps;heel slides;hip abduction/adduction;quad sets  -RA 10 reps;AROM:;ankle  pumps/circles;sitting;LAQ;hip flexion  -RA     Recorded by [RA] Cathy Vo, PT [RA] Cathy Vo PT     Positioning and Restraints    Pre-Treatment Position in bed  -RA in bed  -RA     Post Treatment Position chair  -RA chair  -RA     In Chair sitting;call light within reach;encouraged to call for assist;with family/caregiver  -RA sitting;call light within reach;notified nsg;encouraged to call for assist;with family/caregiver  -RA     Recorded by [RA] Cathy Vo, PT [RA] Cathy Vo PT       User Key  (r) = Recorded By, (t) = Taken By, (c) = Cosigned By    Initials Name Effective Dates    RA Cathy Vo, PT 04/06/17 -     CW Edgardo Valladares, PTA 12/13/16 -                 IP PT Goals       01/17/18 1005          Bed Mobility PT STG    Bed Mobility PT STG, Date Established 01/17/18  -SV      Bed Mobility PT STG, Time to Achieve 2 wks  -SV      Bed Mobility PT STG, Activity Type sidelying to sit/sit to sidelying  -SV      Bed Mobility PT STG, [place] Level minimum assist (75% patient effort);contact guard assist  -SV      Transfer Training PT STG    Transfer Training PT STG, Date Established 01/17/18  -SV      Transfer Training PT STG, Time to Achieve 2 wks  -SV      Transfer Training PT STG, Activity Type sit to stand/stand to sit;bed to chair /chair to bed  -SV      Transfer Training PT STG, [place] Level minimum assist (75% patient effort)  -SV      Transfer Training PT STG, Assist Device walker, rolling  -SV        User Key  (r) = Recorded By, (t) = Taken By, (c) = Cosigned By    Initials Name Provider Type     Sabina Middleton PT Physical Therapist          Physical Therapy Education     Title: PT OT SLP Therapies (Done)     Topic: Physical Therapy (Done)     Point: Mobility training (Done)    Learning Progress Summary    Learner Readiness Method Response Comment Documented by Status   Patient Acceptance CARLIE TOBIAS DU  CW 01/23/18 1015 Done    Acceptance CARLIE TOBIAS VU  CW 01/22/18  1035 Done    Acceptance E,D,TB VU,NR Education on LE bed exercises supine and sitting  01/21/18 1525 Done    Acceptance E VU  RA 01/20/18 1452 Done    Acceptance E,D NR   01/19/18 1012 Active    Nonacceptance E NR   01/19/18 0311 Active    Acceptance E,D NR   01/18/18 1324 Active    Acceptance E,TB,D NR,VU   01/17/18 1004 Done   Family Acceptance E,TB,D NR,VU   01/17/18 1004 Done               Point: Home exercise program (Done)    Learning Progress Summary    Learner Readiness Method Response Comment Documented by Status   Patient Acceptance E,TB VU,DU   01/23/18 1015 Done    Acceptance E,TB DU,VU   01/22/18 1035 Done    Acceptance E,D,TB VU,NR Education on LE bed exercises supine and sitting  01/21/18 1525 Done    Acceptance E VU  RA 01/20/18 1452 Done    Acceptance E,D NR   01/19/18 1012 Active    Nonacceptance E NR   01/19/18 0311 Active    Acceptance E,D NR   01/18/18 1324 Active    Acceptance E,TB,D NR,VU   01/17/18 1004 Done   Family Acceptance E,TB,D NR,VU   01/17/18 1004 Done               Point: Body mechanics (Done)    Learning Progress Summary    Learner Readiness Method Response Comment Documented by Status   Patient Acceptance E,TB VU,DU   01/23/18 1015 Done    Acceptance E,TB DU,VU   01/22/18 1035 Done    Acceptance E,D,TB VU,NR Education on LE bed exercises supine and sitting  01/21/18 1525 Done    Acceptance E VU  RA 01/20/18 1452 Done    Acceptance E,D NR   01/19/18 1012 Active    Nonacceptance E NR   01/19/18 0311 Active    Acceptance E,D NR   01/18/18 1324 Active    Acceptance E,TB,D NR,VU   01/17/18 1004 Done   Family Acceptance E,TB,D NR,VU   01/17/18 1004 Done               Point: Precautions (Done)    Learning Progress Summary    Learner Readiness Method Response Comment Documented by Status   Patient Acceptance E,TB VU,DU   01/23/18 1015 Done    Acceptance E,TB DU,VU   01/22/18 1035 Done    Acceptance E,D,TB VU,NR Education on LE bed exercises  supine and sitting RA 01/21/18 1525 Done    Acceptance E VU  RA 01/20/18 1452 Done    Acceptance E,D NR  PC 01/19/18 1012 Active    Nonacceptance E NR  JL 01/19/18 0311 Active    Acceptance E,D NR  PC 01/18/18 1324 Active    Acceptance E,TB,D NR,VU  SV 01/17/18 1004 Done   Family Acceptance E,TB,D NR,VU  SV 01/17/18 1004 Done                      User Key     Initials Effective Dates Name Provider Type Discipline     04/06/17 -  Cathy Vo, PT Physical Therapist PT    PC 12/01/15 -  Mildred Nolan, PT Physical Therapist PT     01/17/16 -  Sabina Middleton, PT Physical Therapist PT     12/13/16 -  Edgardo Valladares, OWEN Physical Therapy Assistant PT     04/28/17 -  Ewa August RN Registered Nurse Nurse                    PT Recommendation and Plan  Anticipated Discharge Disposition: skilled nursing facility  PT Frequency: 2 times/day  Plan of Care Review  Plan Of Care Reviewed With: patient  Progress: progress towards functional goals is fair  Outcome Summary/Follow up Plan: Pt is improving with transfer safety and I with use of RWX           Outcome Measures       01/23/18 1000 01/22/18 1000 01/21/18 1500    How much help from another person do you currently need...    Turning from your back to your side while in flat bed without using bedrails? 3  -CW 3  -CW 2  -RA    Moving from lying on back to sitting on the side of a flat bed without bedrails? 3  -CW 3  -CW 2  -RA    Moving to and from a bed to a chair (including a wheelchair)? 3  -CW 3  -CW 3  -RA    Standing up from a chair using your arms (e.g., wheelchair, bedside chair)? 3  -CW 3  -CW 2  -RA    Climbing 3-5 steps with a railing? 1  -CW 1  -CW 1  -RA    To walk in hospital room? 3  -CW 3  -CW 3  -RA    AM-PAC 6 Clicks Score 16  -CW 16  -CW 13  -RA    Functional Assessment    Outcome Measure Options AM-PAC 6 Clicks Basic Mobility (PT)  -CW AM-PAC 6 Clicks Basic Mobility (PT)  -CW AM-PAC 6 Clicks Basic Mobility (PT)  -RA      01/20/18 1500           How much help from another person do you currently need...    Turning from your back to your side while in flat bed without using bedrails? 2  -RA      Moving from lying on back to sitting on the side of a flat bed without bedrails? 2  -RA      Moving to and from a bed to a chair (including a wheelchair)? 3  -RA      Standing up from a chair using your arms (e.g., wheelchair, bedside chair)? 2  -RA      Climbing 3-5 steps with a railing? 1  -RA      To walk in hospital room? 2  -RA      AM-PAC 6 Clicks Score 12  -RA        User Key  (r) = Recorded By, (t) = Taken By, (c) = Cosigned By    Initials Name Provider Type    RA Cathy Vo, PT Physical Therapist    CW Edgardo Valladares PTA Physical Therapy Assistant           Time Calculation:         PT Charges       01/23/18 1016          Time Calculation    Start Time 1000  -CW      Stop Time 1016  -CW      Time Calculation (min) 16 min  -CW      PT Received On 01/23/18  -CW      PT - Next Appointment 01/23/18  -CW        User Key  (r) = Recorded By, (t) = Taken By, (c) = Cosigned By    Initials Name Provider Type    CW Edgardo Valladares PTA Physical Therapy Assistant          Therapy Charges for Today     Code Description Service Date Service Provider Modifiers Qty    52209658601 HC PT THER PROC EA 15 MIN 1/22/2018 Edgardo Valladares PTA GP 1    21189162386 HC PT THER SUPP EA 15 MIN 1/22/2018 Edgardo Valladares, PTA GP 1    09018125263 HC PT THER PROC EA 15 MIN 1/22/2018 Edgardo Valladares PTA GP 1    20123318756 HC PT THER SUPP EA 15 MIN 1/22/2018 Edgardo Valladares PTA GP 1    14854223349 HC PT THER PROC EA 15 MIN 1/23/2018 Edgardo Valladares PTA GP 1    02473434151 HC PT THER SUPP EA 15 MIN 1/23/2018 Edgardo Valladares PTA GP 1          PT G-Codes  Outcome Measure Options: AM-PAC 6 Clicks Basic Mobility (PT)    Edgardo Valladares PTA  1/23/2018

## 2018-01-23 NOTE — PROGRESS NOTES
Continued Stay Note  [facility]     Patient Name: Osvaldo Lopez  MRN: [id number]  Today's Date: 1/23/2018    Admit Date: 1/15/2018          Discharge Plan       01/23/18 1452    Case Management/Social Work Plan    Plan [facility] SN[facility]    Patient/Family In Agreement With Plan yes    Additional Comments Followed up with patient and wife and they plan DC to SNF at [facility].   Packet started and in the 5PT CCP office              Discharge Codes     None        Expected Discharge Date and Time     Expected Discharge Date Expected Discharge Time    Jan 20, 2018             Gissel Mcgregor RN

## 2018-01-23 NOTE — PROGRESS NOTES
LOS: 8 days     Name: Osvaldo Lopez  Age/Sex: 82 y.o. male  :  1935        PCP: Caio Rodríguez Jr., MD    Subjective   BM yesterday, still passing flatus.  Abdomen remains distended but improving. Pain is improved.  SOA at baseline today  General: No Fever or Chills, Cardiac: No Chest Pain or Palpitations,     amiodarone 200 mg Oral Q24H   finasteride 5 mg Oral QAM   insulin aspart 0-9 Units Subcutaneous 4x Daily With Meals & Nightly   ipratropium 0.5 mg Nebulization 4x Daily - RT   metFORMIN 500 mg Oral BID With Meals   metoprolol succinate XL 25 mg Oral QAM   piperacillin-tazobactam 3.375 g Intravenous Q8H   polyethylene glycol 17 g Oral Daily   rivaroxaban 20 mg Oral Daily With Dinner   sennosides-docusate sodium 1 tablet Oral Nightly   tamsulosin 0.4 mg Oral BID          Objective   Vital Signs  Temp:  [97.6 °F (36.4 °C)-99.1 °F (37.3 °C)] 99.1 °F (37.3 °C)  Heart Rate:  [78-90] 82  Resp:  [16-20] 16  BP: (137-174)/(75-85) 174/75  Body mass index is 31.8 kg/(m^2).    Intake/Output Summary (Last 24 hours) at 18 1514  Last data filed at 18 0617   Gross per 24 hour   Intake              790 ml   Output              900 ml   Net             -110 ml       Physical Exam   Constitutional: He is oriented to person, place, and time. He appears well-developed and well-nourished.   HENT:   Head: Normocephalic and atraumatic.   Eyes: Conjunctivae and EOM are normal.   Neck: Neck supple. No JVD present.   Cardiovascular: Normal rate.    Pulmonary/Chest: Effort normal and breath sounds normal.   Abdominal: Bowel sounds are normal. He exhibits distension. There is no tenderness. There is no rebound and no guarding.   Musculoskeletal: Normal range of motion. He exhibits no edema.   Neurological: He is alert and oriented to person, place, and time.   Skin: Skin is warm and dry. No rash noted. No pallor.   Psychiatric: He has a normal mood and affect. His behavior is normal. Thought content normal.    Vitals reviewed.        Results Review:       I reviewed the patient's new clinical results.    Results from last 7 days  Lab Units 01/21/18  0502 01/20/18  0515 01/19/18  0510 01/18/18  0435 01/17/18  1118 01/17/18  0507   WBC 10*3/mm3 4.09* 4.80 5.77 6.21 7.89  --    HEMOGLOBIN g/dL 9.1* 9.0* 9.5* 10.5* 11.1* 11.3*   PLATELETS 10*3/mm3 134* 152 118* 113* 122*  --        Results from last 7 days  Lab Units 01/21/18  0502 01/20/18  0515 01/19/18  0510 01/18/18  0533 01/17/18  2056 01/17/18  1929 01/17/18  0507   SODIUM mmol/L 134* 133* 130* 128* 130*  --  133*   POTASSIUM mmol/L 4.0 4.5 4.2 4.3 4.9  --  4.3   CHLORIDE mmol/L 98 98 97* 95* 96*  --  97*   CO2 mmol/L 27.3 25.7 21.0* 25.2 23.0  --  26.8   BUN mg/dL 17 22 30* 22 22  --  15   CREATININE mg/dL 0.95 1.05 1.21 1.25 1.28*  --  0.99   CALCIUM mg/dL 8.1* 8.2* 8.0* 7.9* 8.2*  --  8.0*   MAGNESIUM mg/dL  --   --   --   --   --  2.0  --    Estimated Creatinine Clearance: 75.8 mL/min (by C-G formula based on Cr of 0.95).    Lab Results   Component Value Date    HGBA1C 7.00 (H) 01/17/2018    HGBA1C 7.80 (H) 08/16/2017    HGBA1C 7.39 (H) 11/18/2016     Glucose   Date/Time Value Ref Range Status   01/23/2018 0717 146 (H) 70 - 130 mg/dL Final   01/22/2018 2056 182 (H) 70 - 130 mg/dL Final   01/22/2018 1628 132 (H) 70 - 130 mg/dL Final   01/22/2018 1100 257 (H) 70 - 130 mg/dL Final   01/22/2018 0715 157 (H) 70 - 130 mg/dL Final   01/21/2018 2136 143 (H) 70 - 130 mg/dL Final   01/21/2018 1654 212 (H) 70 - 130 mg/dL Final   01/21/2018 1105 224 (H) 70 - 130 mg/dL Final       Assessment/Plan   Principal Problem:    Spinal stenosis of lumbar region with neurogenic claudication  Active Problems:    Essential hypertension    Type 2 diabetes mellitus without complication    Paroxysmal a-fib    BPH (benign prostatic hyperplasia)    Tachycardia    Acute respiratory failure with hypoxia    Postoperative ileus    Hyponatremia      PLAN  - diabetes is mostly acceptable today  allowing some hyperglycemia given ongoing post operative abdominal issues, as he eats more we may need to titrate meds, Continue metformin BID  -d/w chewing sugar free gum   - Post op ileus: Dr. Orlando following, mobilize, bowel regimen--BM 1/22  -feeling SOA and is on 2L NC, likely combo of atelectasis PNA, on abx for PNA per pulmonology (last dose today),  -off IV fluids today  - Na stable  - Xarelto per cardiology for AC with Afib     Disposition    Per primary but pt is medically stable to discharge to rehab.      Bharat Nix MD  [facility]  01/23/18  3:14 PM

## 2018-01-23 NOTE — PROGRESS NOTES
Chief Complaint:    Colonic ileus    Subjective:    Had another BM. Passing flatus.    Objective:    Vitals:    01/23/18 0500 01/23/18 0735 01/23/18 0919 01/23/18 1310   BP: 137/85  163/84 174/75   BP Location: Right arm  Right arm Right arm   Patient Position: Lying  Lying Lying   Pulse: 88 90 89 82   Resp: 18 20 17 16   Temp: 97.6 °F (36.4 °C)  99 °F (37.2 °C) 99.1 °F (37.3 °C)   TempSrc: Oral  Oral Oral   SpO2: 95% 94% 93% 93%   Weight:       Height:           Lungs: Clear  Heart: Regular  Abdomen: Soft. Not distended. BS present  Extremities: Warm    Assessment:    Colonic ileus following spine surgery     Plan:     Stop Dulcolax   Encourage movement out of bed.  Continue daily miralax  Will see PRN

## 2018-01-24 VITALS
RESPIRATION RATE: 18 BRPM | WEIGHT: 228 LBS | HEART RATE: 109 BPM | OXYGEN SATURATION: 96 % | BODY MASS INDEX: 31.92 KG/M2 | TEMPERATURE: 99.1 F | DIASTOLIC BLOOD PRESSURE: 91 MMHG | HEIGHT: 71 IN | SYSTOLIC BLOOD PRESSURE: 156 MMHG

## 2018-01-24 LAB
GLUCOSE BLDC GLUCOMTR-MCNC: 144 MG/DL (ref 70–130)
GLUCOSE BLDC GLUCOMTR-MCNC: 192 MG/DL (ref 70–130)
GLUCOSE BLDC GLUCOMTR-MCNC: 238 MG/DL (ref 70–130)

## 2018-01-24 PROCEDURE — 99232 SBSQ HOSP IP/OBS MODERATE 35: CPT | Performed by: INTERNAL MEDICINE

## 2018-01-24 PROCEDURE — 94799 UNLISTED PULMONARY SVC/PX: CPT

## 2018-01-24 PROCEDURE — 99024 POSTOP FOLLOW-UP VISIT: CPT | Performed by: ORTHOPAEDIC SURGERY

## 2018-01-24 PROCEDURE — 82962 GLUCOSE BLOOD TEST: CPT

## 2018-01-24 PROCEDURE — 97110 THERAPEUTIC EXERCISES: CPT

## 2018-01-24 PROCEDURE — 63710000001 INSULIN ASPART PER 5 UNITS: Performed by: HOSPITALIST

## 2018-01-24 RX ORDER — HYDROCODONE BITARTRATE AND ACETAMINOPHEN 5; 325 MG/1; MG/1
TABLET ORAL
Qty: 60 TABLET | Refills: 0 | Status: SHIPPED | OUTPATIENT
Start: 2018-01-24 | End: 2018-01-30

## 2018-01-24 RX ORDER — SENNA AND DOCUSATE SODIUM 50; 8.6 MG/1; MG/1
1 TABLET, FILM COATED ORAL 2 TIMES DAILY
Start: 2018-01-24 | End: 2018-05-15

## 2018-01-24 RX ORDER — AMLODIPINE BESYLATE 5 MG/1
5 TABLET ORAL
Status: DISCONTINUED | OUTPATIENT
Start: 2018-01-24 | End: 2018-01-24 | Stop reason: HOSPADM

## 2018-01-24 RX ORDER — ONDANSETRON 4 MG/1
4 TABLET, FILM COATED ORAL EVERY 6 HOURS PRN
Start: 2018-01-24 | End: 2018-01-30

## 2018-01-24 RX ORDER — AMIODARONE HYDROCHLORIDE 200 MG/1
200 TABLET ORAL
Start: 2018-01-25 | End: 2018-10-01 | Stop reason: SDUPTHER

## 2018-01-24 RX ORDER — LISINOPRIL 40 MG/1
40 TABLET ORAL
Status: DISCONTINUED | OUTPATIENT
Start: 2018-01-24 | End: 2018-01-24 | Stop reason: HOSPADM

## 2018-01-24 RX ADMIN — DOCUSATE SODIUM -SENNOSIDES 1 TABLET: 50; 8.6 TABLET, COATED ORAL at 19:44

## 2018-01-24 RX ADMIN — INSULIN ASPART 4 UNITS: 100 INJECTION, SOLUTION INTRAVENOUS; SUBCUTANEOUS at 11:54

## 2018-01-24 RX ADMIN — METFORMIN HYDROCHLORIDE 500 MG: 500 TABLET ORAL at 08:14

## 2018-01-24 RX ADMIN — METOPROLOL SUCCINATE 25 MG: 25 TABLET, FILM COATED, EXTENDED RELEASE ORAL at 06:40

## 2018-01-24 RX ADMIN — AMLODIPINE BESYLATE 5 MG: 5 TABLET ORAL at 11:53

## 2018-01-24 RX ADMIN — POLYETHYLENE GLYCOL 3350 17 G: 17 POWDER, FOR SOLUTION ORAL at 08:14

## 2018-01-24 RX ADMIN — IPRATROPIUM BROMIDE 0.5 MG: 0.5 SOLUTION RESPIRATORY (INHALATION) at 20:08

## 2018-01-24 RX ADMIN — AMIODARONE HYDROCHLORIDE 200 MG: 200 TABLET ORAL at 08:14

## 2018-01-24 RX ADMIN — HYDROCODONE BITARTRATE AND ACETAMINOPHEN 2 TABLET: 5; 325 TABLET ORAL at 19:44

## 2018-01-24 RX ADMIN — HYDROCODONE BITARTRATE AND ACETAMINOPHEN 2 TABLET: 5; 325 TABLET ORAL at 06:44

## 2018-01-24 RX ADMIN — IPRATROPIUM BROMIDE 0.5 MG: 0.5 SOLUTION RESPIRATORY (INHALATION) at 17:02

## 2018-01-24 RX ADMIN — METFORMIN HYDROCHLORIDE 500 MG: 500 TABLET ORAL at 18:00

## 2018-01-24 RX ADMIN — RIVAROXABAN 20 MG: 20 TABLET, FILM COATED ORAL at 18:00

## 2018-01-24 RX ADMIN — TAMSULOSIN HYDROCHLORIDE 0.4 MG: 0.4 CAPSULE ORAL at 19:44

## 2018-01-24 RX ADMIN — INSULIN ASPART 2 UNITS: 100 INJECTION, SOLUTION INTRAVENOUS; SUBCUTANEOUS at 18:00

## 2018-01-24 RX ADMIN — LISINOPRIL 40 MG: 40 TABLET ORAL at 11:53

## 2018-01-24 RX ADMIN — FINASTERIDE 5 MG: 5 TABLET, FILM COATED ORAL at 06:40

## 2018-01-24 RX ADMIN — IPRATROPIUM BROMIDE 0.5 MG: 0.5 SOLUTION RESPIRATORY (INHALATION) at 13:34

## 2018-01-24 RX ADMIN — TAMSULOSIN HYDROCHLORIDE 0.4 MG: 0.4 CAPSULE ORAL at 08:14

## 2018-01-24 NOTE — PROGRESS NOTES
[facility]    Physicians Statement of Medical Necessity for Ambulance Transportation    It is medically necessary for:    Patient Name: Osvaldo Lopez    Insurance Information:      To be transported by ambulance:    From (if nursing facility, specify level of care: skilled, care home, etc): E    To (specify level of care if nursing facility): [facility]     Date of Service: D/C 1/24/2018    For dialysis patients state date dialysis began: N/A    Diagnosis: L2-3 L3-4 Laminectomy and fusion.    Past Medical/Surgical History:  Past Medical History:   Diagnosis Date   • Abnormal thyroid blood test    • Aneurysm of abdominal aorta    • Arthritis    • Atrial fibrillation    • BPH (benign prostatic hyperplasia)    • Bruises easily    • Bulging of thoracic intervertebral disc    • Coronary artery disease    • Diabetes mellitus     type 2   • Diverticular disease    • Edema     LOWER LEGS   • Enlarged prostate without lower urinary tract symptoms (luts)    • Essential hypertension    • Fatigue    • History of MI (myocardial infarction) 1989   • Hyperactivity of bladder    • Hyperlipidemia    • Hypertension    • Left shoulder pain    • Lumbar radiculopathy 2016    wears back brace at times following back surgery   • Muscle weakness (generalized)    • Osteoarthritis    • Personal history of arthritis    • Screening      stop bang scale 5   • Torn rotator cuff     left   • Type 2 diabetes mellitus    • Urinary frequency       Past Surgical History:   Procedure Laterality Date   • CARDIAC SURGERY      CABGX5 (1989)   • CATARACT EXTRACTION Bilateral 1997   • CYST REMOVAL      FROM BACK   • GALLBLADDER SURGERY  1989   • HERNIA REPAIR Left     inquinal times 3  last one in 2003   • KNEE CARTILAGE SURGERY Left 1970's   • LUMBAR DISCECTOMY FUSION INSTRUMENTATION N/A 4/11/2016    Procedure: lumbar laminectomy L4-5 and fusion with instrumentation;  Surgeon: Ran Kline MD;  Location: [facility] OR;   Service:    • LUMBAR DISCECTOMY FUSION INSTRUMENTATION N/A 1/15/2018    Procedure: L2-3, L3-4 laminectomy and fusion with instrumentation and removal of implants L4 5.;  Surgeon: Ran Kline MD;  Location: [facility] OR;  Service:    • [place] TOTAL KNEE ARTHROPLASTY Left 11/14/2016    Procedure: TOTAL KNEE ARTHROPLASTY;  Surgeon: Dontrell Arellano MD;  Location: [facility] OR;  Service: Orthopedics        Current Objective Medical Evidence(including physical exam finding to support reason for limitations):    Bedridden    Other:     Physician Signature:           (RN,NP,PA,CAN, Discharge Planner) Date/Time: 1/24/2018     Printed Name:  Mary Peoples RN   __________________________________    [facility]   Phone: [phone] Phone: [phone] Phone: [phone]   Fax: [phone] Fax: [phone] Fax: [phone]

## 2018-01-24 NOTE — PROGRESS NOTES
Continued Stay Note  [facility]     Patient Name: Osvaldo Lopez  MRN: [id number]  Today's Date: 1/24/2018    Admit Date: 1/15/2018          Discharge Plan       01/24/18 1540    Case Management/Social Work Plan    [facility]     Patient/Family In Agreement With Plan yes    Additional Comments Met with patient and wife at bedside.  Requested ambulance to transport to facility, informed family that [facility] [place] does not guarantee coverage of ambulance services.  Scheduled [facility], 9 PM scheduled .               Discharge Codes     None        Expected Discharge Date and Time     Expected Discharge Date Expected Discharge Time    Jan 24, 2018             Mary Peoples RN

## 2018-01-24 NOTE — PLAN OF CARE
Problem: Patient Care Overview (Adult)  Goal: Plan of Care Review  Outcome: Ongoing (interventions implemented as appropriate)   01/24/18 0413   Coping/Psychosocial Response Interventions   Plan Of Care Reviewed With patient   Patient Care Overview   Progress improving   Outcome Evaluation   Outcome Summary/Follow up Plan no falls; vital signs stable; dressing remains clean, dry and intact; flexeril given x1; will continue to monitor       Problem: Fall Risk (Adult)  Goal: Absence of Falls  Outcome: Ongoing (interventions implemented as appropriate)      Problem: Laminectomy/Foraminotomy/Discectomy (Adult)  Goal: Signs and Symptoms of Listed Potential Problems Will be Absent or Manageable (Laminectomy/Foraminotomy/Discectomy)  Outcome: Ongoing (interventions implemented as appropriate)      Problem: Pain, Acute (Adult)  Goal: Acceptable Pain Control/Comfort Level  Outcome: Ongoing (interventions implemented as appropriate)      Problem: Pressure Ulcer Risk (Rafael Scale) (Adult,Obstetrics,Pediatric)  Goal: Skin Integrity  Outcome: Ongoing (interventions implemented as appropriate)

## 2018-01-24 NOTE — PROGRESS NOTES
Hospital Follow Up    LOS:  LOS: 9 days   Patient Name: Osvaldo Lopez  Age/Sex: 82 y.o. male  : 1935  MRN: [id number]    Day of Service: 18   Length of Stay: 9  Encounter Provider: Angelo Driscoll MD  Place of Service: [facility] CARDIOLOGY  Patient Care Team:  Caio Rodríguez Jr., MD as PCP - General (Family Medicine)  Caio Rodríguez Jr., MD as PCP - Claims Attributed  Dontrell Arellano MD as Surgeon (Orthopedic Surgery)  Angelo Driscoll MD as Consulting Physician (Cardiology)  Darvin Alvarez MD as Consulting Physician (Urology)    Subjective:     Chief Complaint: Paroxysmal atrial fibrillation.    Interval History: Patient did have some bleeding from his surgical wound site.  Also following    Objective:     Objective:  Temp:  [98.5 °F (36.9 °C)-99.7 °F (37.6 °C)] 99.1 °F (37.3 °C)  Heart Rate:  [79-96] 96  Resp:  [18-20] 20  BP: (155-177)/(70-96) 160/96     Intake/Output Summary (Last 24 hours) at 18 1310  Last data filed at 18 0540   Gross per 24 hour   Intake                0 ml   Output             1275 ml   Net            -1275 ml     Body mass index is 31.8 kg/(m^2).  Last 3 weights    01/15/18  1237   Weight: 103 kg (228 lb)     Weight change:       Physical Exam:   General : Alert, cooperative, in no acute distress.  Neuro: alert,cooperative and oriented  Lungs: CTAB. Normal respiratory effort and rate.  CV:: Regular rate and rhythm, normal S1 and S2, no murmurs, gallops or rubs.  ABD: Soft, nontender, non-distended. positive bowel sounds  Extr: No edema or cyanosis, moves all extremities    Lab Review:     Results from last 7 days  Lab Units 18  0502 18  0515   SODIUM mmol/L 134* 133*   POTASSIUM mmol/L 4.0 4.5   CHLORIDE mmol/L 98 98   CO2 mmol/L 27.3 25.7   BUN mg/dL 17 22   CREATININE mg/dL 0.95 1.05   GLUCOSE mg/dL 146* 164*   CALCIUM mg/dL 8.1* 8.2*   AST (SGOT) U/L  --  21   ALT (SGPT) U/L  --  18       Results from last 7  days  Lab Units 01/18/18  0533   TROPONIN T ng/mL 0.050*       Results from last 7 days  Lab Units 01/21/18  0502 01/20/18  0515   WBC 10*3/mm3 4.09* 4.80   HEMOGLOBIN g/dL 9.1* 9.0*   HEMATOCRIT % 28.4* 28.0*   PLATELETS 10*3/mm3 134* 152           Results from last 7 days  Lab Units 01/17/18  1929   MAGNESIUM mg/dL 2.0               Current Medications:   Scheduled Meds:  amiodarone 200 mg Oral Q24H   amLODIPine 5 mg Oral Q24H   finasteride 5 mg Oral QAM   insulin aspart 0-9 Units Subcutaneous 4x Daily With Meals & Nightly   ipratropium 0.5 mg Nebulization 4x Daily - RT   lisinopril 40 mg Oral Q24H   metFORMIN 500 mg Oral BID With Meals   metoprolol succinate XL 25 mg Oral QAM   polyethylene glycol 17 g Oral Daily   rivaroxaban 20 mg Oral Daily With Dinner   sennosides-docusate sodium 1 tablet Oral Nightly   tamsulosin 0.4 mg Oral BID     Continuous Infusions:     Allergies:  Allergies   Allergen Reactions   • Percocet [Oxycodone-Acetaminophen]      Causes confusion       Assessment:     Principal Problem:    Spinal stenosis of lumbar region with neurogenic claudication  Active Problems:    Essential hypertension    Type 2 diabetes mellitus without complication    Paroxysmal a-fib    BPH (benign prostatic hyperplasia)    Tachycardia    Acute respiratory failure with hypoxia    Postoperative ileus    Hyponatremia        Plan:   1.  Status post spine surgery.  2.  Paroxysmal atrial fibrillation  3.  Blood pressures elevated will resume home medications and follow.  4.  Okay to discharge when ever from a cardiovascular standpoint.        Angelo Driscoll MD  01/24/18  1:10 PM

## 2018-01-24 NOTE — PLAN OF CARE
Problem: Patient Care Overview (Adult)  Goal: Plan of Care Review   01/24/18 1142   Outcome Evaluation   Outcome Summary/Follow up Plan Pt displayed increased ambulation this visit, though fatigued after walking. Will continue to see through acute stay to progress strength.

## 2018-01-24 NOTE — DISCHARGE SUMMARY
Orthopedic Discharge Summary      Patient: Osvaldo Lopez  YOB: 1935  Medical Record Number: [id number]    Attending Physician: Ran Kline MD  Consulting Physician(s):  MD Rohan Kovacs MD Jonathan Ray, MD Tausif Sayied, MD                                      Date of Admission: 1/15/2018 10:56 AM  Date of Discharge: 1/24/18    Discharge Diagnosis: L2-3, L3-4 laminectomy and fusion with instrumentation and removal of implants L4 5.,   Acute Blood Loss Anemia  Post-op Ileus  Toxic Metabolic Encephalopathy - resolved    Presenting Problem/History of Present Illness: Spinal stenosis of lumbar region with neurogenic claudication [M48.062]  Spinal stenosis of lumbar region with neurogenic claudication [M48.062]        Allergies:   Allergies   Allergen Reactions   • Percocet [Oxycodone-Acetaminophen]      Causes confusion       Discharge Medications   Osvaldo Lopez   Home Medication Instructions BAILEE:[id number]    Printed on:01/24/18 7658   Medication Information                      amiodarone (PACERONE) 200 MG tablet  Take 1 tablet by mouth Daily.             amLODIPine (NORVASC) 5 MG tablet  Take 5 mg by mouth Every Morning.             finasteride (PROSCAR) 5 MG tablet  Take 5 mg by mouth Every Morning.             HYDROcodone-acetaminophen (NORCO) 5-325 MG per tablet  Take 1-2 tabs po q 4 hours prn pain             ipratropium (ATROVENT) 0.02 % nebulizer solution  Take 2.5 mL by nebulization 4 (Four) Times a Day.             lisinopril (PRINIVIL,ZESTRIL) 40 MG tablet  Take 40 mg by mouth Daily.             metFORMIN (GLUCOPHAGE) 500 MG tablet  Take 1 tablet by mouth 2 (Two) Times a Day With Meals.             metoprolol succinate XL (TOPROL-XL) 25 MG 24 hr tablet  Take 25 mg by mouth Every Morning.             Multiple Vitamin  (MULTI VITAMIN PO)  Take 1 tablet by mouth Every Morning.             ondansetron (ZOFRAN) 4 MG tablet  Take 1 tablet by mouth Every 6 (Six) Hours As Needed for Nausea or Vomiting.             polyethylene glycol (MIRALAX) pack packet  Take 17 g by mouth Daily.             rivaroxaban (XARELTO) 20 MG tablet  Take 20 mg by mouth Daily.             sennosides-docusate sodium (SENOKOT-S) 8.6-50 MG tablet  Take 1 tablet by mouth 2 (Two) Times a Day.             tamsulosin (FLOMAX) 0.4 MG capsule 24 hr capsule  Two capsules daily for prostate             vitamin B-12 (CYANOCOBALAMIN) 1000 MCG tablet  Take 1,000 mcg by mouth Daily.                   Past Medical History:   Diagnosis Date   • Abnormal thyroid blood test    • Aneurysm of abdominal aorta    • Arthritis    • Atrial fibrillation    • BPH (benign prostatic hyperplasia)    • Bruises easily    • Bulging of thoracic intervertebral disc    • Coronary artery disease    • Diabetes mellitus     type 2   • Diverticular disease    • Edema     LOWER LEGS   • Enlarged prostate without lower urinary tract symptoms (luts)    • Essential hypertension    • Fatigue    • History of MI (myocardial infarction) 1989   • Hyperactivity of bladder    • Hyperlipidemia    • Hypertension    • Left shoulder pain    • Lumbar radiculopathy 2016    wears back brace at times following back surgery   • Muscle weakness (generalized)    • Osteoarthritis    • Personal history of arthritis    • Screening      stop bang scale 5   • Torn rotator cuff     left   • Type 2 diabetes mellitus    • Urinary frequency         Past Surgical History:   Procedure Laterality Date   • CARDIAC SURGERY      CABGX5 (1989)   • CATARACT EXTRACTION Bilateral 1997   • CYST REMOVAL      FROM BACK   • GALLBLADDER SURGERY  1989   • HERNIA REPAIR Left     inquinal times 3  last one in 2003   • KNEE CARTILAGE SURGERY Left 1970's   • LUMBAR DISCECTOMY FUSION INSTRUMENTATION N/A 4/11/2016    Procedure: lumbar laminectomy  L4-5 and fusion with instrumentation;  Surgeon: Ran Kline MD;  Location: [facility] OR;  Service:    • LUMBAR DISCECTOMY FUSION INSTRUMENTATION N/A 1/15/2018    Procedure: L2-3, L3-4 laminectomy and fusion with instrumentation and removal of implants L4 5.;  Surgeon: Ran Kline MD;  Location: [facility] OR;  Service:    • RI TOTAL KNEE ARTHROPLASTY Left 11/14/2016    Procedure: TOTAL KNEE ARTHROPLASTY;  Surgeon: Dontrell Arellano MD;  Location: [facility] OR;  Service: Orthopedics        Social History     Occupational History   • Not on file.     Social History Main Topics   • Smoking status: Former Smoker     Years: 36.00     Types: Cigarettes     Quit date: 1989   • Smokeless tobacco: Never Used      Comment: states smoked 2-3 ppd   • Alcohol use Yes      Comment: 2-3 times weekly  //  caffeine use   • Drug use: No   • Sexual activity: Defer    Social History     Social History Narrative        Family History   Problem Relation Age of Onset   • Heart failure Mother          Physical Exam: 82 y.o. male  General Appearance:    Alert, cooperative, in no acute distress                    Vitals:    01/24/18 0640 01/24/18 0948 01/24/18 1318 01/24/18 1334   BP: 177/84 160/96 166/81    BP Location:  Left arm Left arm    Patient Position:  Lying Sitting    Pulse: 94 96 87 87   Resp:  20 20 20   Temp:  99.1 °F (37.3 °C) 98.4 °F (36.9 °C)    TempSrc:  Oral Oral    SpO2:  90% 95% 95%   Weight:       Height:            DIAGNOSTIC TESTS:   Admission on 01/15/2018   No results displayed because visit has over 200 results.          Ct Abdomen Pelvis Without Contrast    Result Date: 1/18/2018  Narrative: CT ABDOMEN PELVIS WO CONTRAST-  INDICATIONS: Abdominal distention  TECHNIQUE: Radiation dose reduction techniques were utilized, including automated exposure control and exposure modulation based on body size. Unenhanced ABDOMEN AND PELVIS CT  COMPARISON: 06/16/2015  FINDINGS:  Gallbladder is surgically absent.   Several calcifications at the renal tavo appear predominantly vascular in location. Assessment of the renal collecting system for calcifications is limited by residual contrast material in the bilateral renal collecting system. Right renal cyst is noted. Mild bilateral perinephric fat stranding suggests chronic change, and gas is present in the urinary bladder that may be related to catheterization, correlate clinically to exclude any possibility of urinary infection.  The spleen is mildly enlarged, 13.8 cm, previously 13.4 cm.  Pancreas is fatty infiltrated.  Otherwise unremarkable unenhanced appearance of the liver, spleen, adrenal glands, pancreas, kidneys, bladder.  Much of the colon is tortuous and gas distended without any obstructive lesions identified, although some portions of the proximal sigmoid colon are not well evaluated owing to lack of distention, direct visualization could be obtained if indicated to exclude the possibility of a lesion contributing to gaseous distention of the colon. Numerous colonic diverticula are seen that do not appear inflamed. No evidence for small bowel obstruction. An NG tube extends to the left aspect of the stomach.  No free intraperitoneal gas. Minimal pelvic free fluid.  6 mm short axis lymph node between the aorta and inferior vena cava on image 101 of series 1 appears stable. Scattered small mesenteric and para-aortic lymph nodes are seen that are not significant by size criteria.  Abdominal aorta is aneurysmal, 3.6 cm on image 104 of series 1, previously measured at 3.4 cm, in the lower thoracic aorta is aneurysmal, 4.7 cm on image 24, previously 4.5 cm, suggesting slight increase. Aortic and other arterial calcifications are present.  The lung bases show small pleural effusions and atelectasis, there may be small infiltrate in the right lower lung, follow-up recommended. Coronary arterial calcifications are present. Ascending aorta is aneurysmal, 5.3 cm.   Degenerative and postsurgical changes are seen in the spine. No acute fracture is identified. Diffuse subcutaneous edema suggesting mild anasarca.        Impression:   1. Gaseous distention of much the colon, without a specific cause identified, further evaluation is suggested as described. No evidence of small bowel obstruction. Colonic diverticulosis without acute diverticulitis identified. Minimal free fluid in the pelvis. 2. No obstructive uropathy. Perinephric fat stranding and gas in the urinary bladder, correlate clinically to exclude any possibility of urinary infection. 3. Mild splenomegaly. 4. Slight increase of abdominal and thoracic aortic aneurysms. Aneurysmal ascending aorta. 5. Small bilateral pleural effusions and pulmonary atelectasis/infiltrate, follow-up recommended.  This report was finalized on 1/18/2018 12:50 PM by Dr. Vimal Mayorga MD.      Xr Chest 2 View    Result Date: 1/21/2018  Narrative: AP AND LATERAL CHEST  HISTORY: Shortness of air. On oxygen.  COMPARISON: PA and lateral chest 01/17/2018, CT angiogram chest 01/17/2018.  FINDINGS: Lung volumes are diminished and there are small to moderate bilateral pleural effusions. There is increased patchy left basilar opacity consistent with infiltrates. Heart size is enlarged and sternotomy wires, coronary markers, and mediastinal clips are present. There is pulmonary vascular engorgement without pulmonary edema. Bilateral AC joint arthritis is present.      Impression: Diminished lung volumes with cardiomegaly, vascular engorgement, and small to moderate bilateral pleural effusions. When compared to exam 4 days prior, there has developed increased patchy left basilar opacity consistent with infiltrates.  This report was finalized on 1/21/2018 3:06 PM by Dr. Heron Bliss MD.      Xr Spine Lumbar 2 Or 3 View    Result Date: 1/15/2018  Narrative: XR SPINE LUMBAR 2 OR 3 VW-  INDICATIONS: Lumbar fusion  TECHNIQUE: FLUOROSCOPIC ASSISTANCE IN  THE OPERATING ROOM.  FINDINGS:  2 intraoperative fluoroscopic spot views were obtained and recorded the PACS for review, revealing lumbar fusion hardware at L3-L5. Please see operative report for full details.  Fluoroscopy time: 3 seconds      Impression:  As described.  This report was finalized on 1/15/2018 7:12 PM by Dr. Vimal Mayorga MD.      Ct Angiogram Chest With & Without Contrast    Result Date: 1/17/2018  Narrative: CT ANGIOGRAM OF THE CHEST WITH CONTRAST INCLUDING RECONSTRUCTION IMAGES 01/17/2018  HISTORY: Chest pain. Possible pulmonary embolus.  TECHNIQUE: Following the intravenous contrast injection, CT angiography was performed through the chest.  Sagittal, coronal and 3D reconstruction images were reviewed.  FINDINGS:  There is no evidence of pulmonary embolus. Shotty mediastinal nodes are seen. There is evidence of previous CABG procedure. There are small bilateral pleural effusions with some mild bibasilar probable atelectasis.  No pneumothorax is seen.      Impression: 1. No evidence of pulmonary embolus. 2. Small bilateral pleural effusions and bilateral lower lobe atelectasis.  Radiation dose reduction techniques were utilized, including automated exposure control and exposure modulation based on body size.  This report was finalized on 1/17/2018 4:26 PM by Dr. Kingsley Galindo MD.      Xr Chest 1 View    Result Date: 1/17/2018  Narrative: PORTABLE CHEST X-RAY  HISTORY: Shortness of breath.  Portable frontal chest x-ray is provided.  FINDINGS: There are sternal wires. Lung volumes are low. The stomach is distended with air. Vascular volume is normal. The lungs appear clear. There is no pneumothorax or effusion.      Impression: There is gaseous distention of the stomach. Lung volumes are low. No cardiopulmonary abnormality is identified.  This report was finalized on 1/17/2018 11:21 AM by Dr. Hany Aldrich MD.      Fl C Arm During Surgery    Result Date: 1/15/2018  Narrative: This  procedure was auto-finalized with no dictation required.    Xr Abdomen Kub    Result Date: 1/19/2018  Narrative: KUB  HISTORY: Abdominal distention.  COMPARISON: CT abdomen and pelvis without contrast 01/18/2018, KUB 01/17/2018  FINDINGS: There is generalized gaseous distention of the colon without evidence for significant change when compared with CT one day prior.  Compared with KUB 2 days prior the amount of stool within the right colon has decreased though the degree of colonic distention with gas has progressed.  Cholecystectomy clips, median sternotomy wires, postsurgical changes of the lumbar spine and cardiac monitoring leads are evident.  This report was finalized on 1/19/2018 9:02 PM by Dr. Heron Bliss MD.      Xr Abdomen Kub    Result Date: 1/17/2018  Narrative: ABDOMEN KUB  CLINICAL HISTORY: Recent lumbar spine surgery. Evaluate for possible ileus.  3 supine views were obtained. Gas is present scattered throughout nondistended segments of small and large bowel in a nonspecific pattern. There is no definite evidence of ileus or obstruction. Multiple pedicle rods and screws are noted in the lumbar spine.  This report was finalized on 1/17/2018 8:46 PM by Dr. Bharat Albright MD.      Xr Chest Pa & Lateral    Result Date: 1/19/2018  Narrative: PA AND LATERAL CHEST X-RAY  HISTORY: possible infection; R26.2-Difficulty in walking, not elsewhere classified; M48.062-Spinal stenosis, lumbar region with neurogenic claudication  COMPARISON: 01/17/2018, 10:55 AM.  FINDINGS: PA and lateral views of the chest were obtained. The lungs are again under aerated although the degree of inspiration is slightly improved. There is bibasilar atelectasis and small pleural effusions posteriorly. Stable marked cardiomegaly. There is pulmonary vascular congestion likely accentuated by under aeration. There are dense vascular calculi.        Impression: Slight improved inspiratory effort with some improvement in bibasilar  atelectasis.  This report was finalized on 1/19/2018 5:45 AM by Josiah Ramon MD.        Hospital Course:  82 y.o. male admitted to [facility] to services of Ran Kline MD with Spinal stenosis of lumbar region with neurogenic claudication [M48.062]  Spinal stenosis of lumbar region with neurogenic claudication [M48.062] on 1/15/2018 and underwent L2-3, L3-4 laminectomy and fusion with instrumentation and removal of implants L4 5.  Per Ran Kline MD. Antibiotic and VTE prophylaxis were per [facility]IP protocols.  The patient was admitted to the floor where IV and/or oral pain medications were administered for postoperative pain.    On POD #2 he did develop Hypoxic respiratory failure possibly due abdominal distention and toxic metabolic encephalopathy. Patient was seen by Pulmonary.  Did develop PO ileus and was seen by general surgery.  He is eating well and having BM. He is back on Xarelto for Atrial Fibrillation.  He also had an episode of bleeding from the distal end of incision which has now resolved.   At discharge the incisional pain was tolerable and preop neurologic function was intact.  The dressing was dry and the wound was clean.    Condition on Discharge:  Stable    Discharge Instructions: . Patient may weight bear as tolerated unless otherwise specified. Continue HENOK hose daily (for two weeks) and ice regularly. Patient also instructed on incentive spirometer during hospitalization and encouraged to continue to use at home regularly. Patient may shower when all wound drainage has stopped. The back brace should be wornanytime when OOB.  It need not be worn to the bathroom and certainly not in the shower.     Will continue to paint the incision with Betadine and apply bulky dressing until there is no further drainage.       A detailed list of instructions specific to the operation was given to the patient at the time of discharge.    Follow up Instructions:  Follow up in the office with   Ran Kline Jr. in 2-3 weeks - patient to call the office at [phone] to schedule. Prescriptions were given for pain medication.    Follow-up Appointments  Future Appointments  Date Time Provider Department Center   2/21/2018 1:00 PM MD JUNITO ChandK [id number] None   3/23/2018 10:30 AM MD ARABELLA Marques Jr. [id number] None     Additional Instructions for the Follow-ups that You Need to Schedule     Discharge Follow-up with Specialty: Orthopedics; 2 Weeks    As directed    Specialty:  Orthopedics    Follow Up:  2 Weeks    Follow Up Details:  Return to the office to see Dr. Ran Kline           Discharge Follow-up with Specified Provider: Cardiology; 6 Weeks    As directed    To:  Cardiology    Follow Up:  6 Weeks    Follow Up Details:  Return to the office to see Dr. Angelo Driscoll                     Discharge Disposition Plan:today [facility]    Date: 1/24/2018    MARTY Blas MD

## 2018-01-24 NOTE — PLAN OF CARE
Problem: Patient Care Overview (Adult)  Goal: Plan of Care Review  Outcome: Ongoing (interventions implemented as appropriate)   01/23/18 2009   Coping/Psychosocial Response Interventions   Plan Of Care Reviewed With patient;spouse   Patient Care Overview   Progress progress towards functional goals is fair   Outcome Evaluation   Outcome Summary/Follow up Plan Pt continues to work with PT in ambulation, able to get up and transfer and walk short distances with walker. Surgical incision was bleeding earlier. Pressure dressing applied , Nurse for surgeon Margo notified and dressing replaced as ordered after bleeding had stopped. Pt pain controlled with PO meds. Will continue to monitor.     Goal: Adult Individualization and Mutuality  Outcome: Ongoing (interventions implemented as appropriate)   01/17/18 1721 01/23/18 0423   Individualization   Patient Specific Preferences patient goes by Osvaldo --    Patient Specific Goals --  pt will remain free from injury this shift   Patient Specific Interventions --  falls protocol     Goal: Discharge Needs Assessment  Outcome: Ongoing (interventions implemented as appropriate)   01/23/18 2009   Discharge Needs Assessment   Concerns To Be Addressed discharge planning concerns   Readmission Within The Last 30 Days no previous admission in last 30 days   Discharge Facility/Level Of Care Needs nursing facility, skilled   Current Discharge Risk physical impairment   Discharge Disposition still a patient   Current Health   Anticipated Changes Related to Illness none   Living Environment   Transportation Available car;family or friend will provide       Problem: Perioperative Period (Adult)  Goal: Signs and Symptoms of Listed Potential Problems Will be Absent or Manageable (Perioperative Period)  Outcome: Ongoing (interventions implemented as appropriate)   01/23/18 2009   Perioperative Period   Problems Assessed (Perioperative Period) all   Problems Present (Perioperative Period)  pain;wound complications;situational response;hemorrhage       Problem: Fall Risk (Adult)  Goal: Absence of Falls  Outcome: Ongoing (interventions implemented as appropriate)   01/23/18 2009   Fall Risk (Adult)   Absence of Falls making progress toward outcome       Problem: Laminectomy/Foraminotomy/Discectomy (Adult)  Goal: Signs and Symptoms of Listed Potential Problems Will be Absent or Manageable (Laminectomy/Foraminotomy/Discectomy)  Outcome: Ongoing (interventions implemented as appropriate)   01/23/18 2009   Laminectomy/Foraminotomy/Discectomy   Problems Assessed (Laminectomy/Laminotomy/Discectomy) all   Problems Present (Laminectomy/Laminotomy/Discectomy) pain;bleeding;situational response       Problem: Pain, Acute (Adult)  Goal: Acceptable Pain Control/Comfort Level  Outcome: Ongoing (interventions implemented as appropriate)   01/23/18 2009   Pain, Acute (Adult)   Acceptable Pain Control/Comfort Level making progress toward outcome       Problem: Infection, Risk/Actual (Adult)  Goal: Infection Prevention/Resolution  Outcome: Ongoing (interventions implemented as appropriate)   01/23/18 2009   Infection, Risk/Actual (Adult)   Infection Prevention/Resolution making progress toward outcome       Problem: Pressure Ulcer Risk (Rafael Scale) (Adult,Obstetrics,Pediatric)  Goal: Skin Integrity  Outcome: Ongoing (interventions implemented as appropriate)   01/23/18 2009   Pressure Ulcer Risk (Rafael Scale) (Adult,Obstetrics,Pediatric)   Skin Integrity making progress toward outcome

## 2018-01-24 NOTE — THERAPY TREATMENT NOTE
Acute Care - Physical Therapy Treatment Note  [facility]     Patient Name: Osvaldo Lopez  : 1935  MRN: [id number]  Today's Date: 2018  Onset of Illness/Injury or Date of Surgery Date: 01/15/18  Date of Referral to PT: 17  Referring Physician: Eliud    Admit Date: 1/15/2018    Visit Dx:    ICD-10-CM ICD-9-CM   1. Difficulty walking R26.2 719.7   2. Spinal stenosis of lumbar region with neurogenic claudication M48.062 724.03     Patient Active Problem List   Diagnosis   • Midline low back pain   • Complete rotator cuff tear of left shoulder   • Difficulty walking   • Abnormal finding on thyroid function test   • Abdominal aortic aneurysm   • Atrial fibrillation   • Benign prostatic hyperplasia   • Edema   • Essential hypertension   • Fatigue   • Hyperlipidemia   • Shoulder pain   • Lumbar radiculopathy   • Muscle weakness   • Osteoarthritis   • Type 2 diabetes mellitus without complication   • Primary osteoarthritis of left knee   • OA (osteoarthritis) of knee   • Toxic encephalopathy   • Paroxysmal a-fib   • HTN (hypertension)   • Type 2 diabetes mellitus   • BPH (benign prostatic hyperplasia)   • Postoperative anemia due to acute blood loss   • Cardiac murmur   • Complete tear of left rotator cuff   • Tear of right rotator cuff   • Spinal stenosis of lumbar region with neurogenic claudication   • Tachycardia   • Acute respiratory failure with hypoxia   • Postoperative ileus   • Hyponatremia               Adult Rehabilitation Note       18 1137 18 1000 18 1400    Rehab Assessment/Intervention    Discipline physical therapist  -JOSH LINO KH2 physical therapy assistant  -CW physical therapy assistant  -CW    Document Type therapy note (daily note)  -JOSH LINO KH2 therapy note (daily note)  -CW therapy note (daily note)  -CW    Subjective Information agree to therapy;complains of;weakness  -JOSH LINO KH2 agree to therapy;complains of;weakness;fatigue  -CW agree to therapy;complains  of;weakness;pain  -CW    Patient Effort, Rehab Treatment good  -KH,BJ,KH2 good  -CW good  -CW    Precautions/Limitations brace on when up;fall precautions;spinal precautions  -KH,BJ,KH2 brace on when up;fall precautions;spinal precautions;oxygen therapy device and L/min  -CW brace on when up;fall precautions;oxygen therapy device and L/min  -CW    Recorded by [KH,BJ,KH2] Kaya Orosco, PT (r) Kei Ibrahim, PT Student (t) Kaya Orosco, PT (c) [CW] Edgardo P Blaine, PTA [CW] Edgardo [place], PTA    Vital Signs    Pre SpO2 (%) 94  -KH,BJ,KH2      O2 Delivery Pre Treatment supplemental O2  -KH,BJ,KH2 supplemental O2  -CW room air  -CW    Intra SpO2 (%) 87  -KH,BJ,KH2      O2 Delivery Intra Treatment room air  -KH,BJ,KH2 supplemental O2  -CW     Post SpO2 (%) 92  -KH,BJ,KH2      O2 Delivery Post Treatment supplemental O2  -KH,BJ,KH2 supplemental O2  -CW     Recorded by [KH,BJ,KH2] Kaya Orosco, PT (r) Kei Ibrahim, PT Student (t) Kaya Orosco, PT (c) [CW] Edgardo P Blaine, PTA [CW] Chickasha P Blaine, PTA    Pain Assessment    Pain Assessment 0-10  -KH,BJ,KH2 0-10  -CW 0-10  -CW    Pain Score 3  -KH,BJ,KH2 3  -CW 3  -CW    Post Pain Score 3  -KH,BJ,KH2 3  -CW 3  -CW    Pain Type Surgical pain  -KH,BJ,KH2 Surgical pain  -CW Surgical pain  -CW    Pain Location Back  -KH,BJ,KH2 Back  -CW Back  -CW    Pain Intervention(s) Repositioned;Ambulation/increased activity  -KH,BJ,KH2 Repositioned;Ambulation/increased activity  -CW Repositioned;Ambulation/increased activity  -CW    Response to Interventions Tolerated tx  -KH,BJ,KH2 donta  -CW donta  -CW    Recorded by [KH,BJ,KH2] Kaya Orosco, PT (r) Kei Ibrahim, PT Student (t) Kaya Orosco, PT (c) [CW] Edgardo Valladares, PTA [CW] Edgardo Valladares, PTA    Cognitive Assessment/Intervention    Current Cognitive/Communication Assessment  functional  -CW functional  -CW    Orientation Status  oriented  to;person;place;situation  -CW oriented x 4  -CW    Follows Commands/Answers Questions  100% of the time;able to follow single-step instructions;needs cueing  -% of the time;able to follow single-step instructions;needs cueing  -CW    Personal Safety  WNL/WFL  -CW WNL/WFL  -CW    Personal Safety Interventions  fall prevention program maintained;gait belt;muscle strengthening facilitated;nonskid shoes/slippers when out of bed  -CW fall prevention program maintained;gait belt;muscle strengthening facilitated;nonskid shoes/slippers when out of bed  -CW    Recorded by  [CW] Edgardo Valladares PTA [CW] Edgardo Valladares PTA    Bed Mobility, Assessment/Treatment    Bed Mob, Supine to Sit, [place]  minimum assist (75% patient effort);2 person assist required  -CW minimum assist (75% patient effort);2 person assist required  -CW    Bed Mob, Sit to Supine, [place]   minimum assist (75% patient effort);2 person assist required  -CW    Recorded by  [CW] Edgardo Valladares PTA [CW] Edgardo Valladares PTA    Transfer Assessment/Treatment    Transfers, Sit-Stand [place] minimum assist (75% patient effort);2 person assist required  -KH,BJ,KH2 minimum assist (75% patient effort);2 person assist required  -CW minimum assist (75% patient effort);2 person assist required  -CW    Transfers, Stand-Sit [place] minimum assist (75% patient effort);2 person assist required  -KH,BJ,KH2 minimum assist (75% patient effort);2 person assist required  -CW minimum assist (75% patient effort);2 person assist required  -CW    Transfers, Sit-Stand-Sit, Assist Device rolling walker  -KH,BJ,KH2 rolling walker  -CW rolling walker  -CW    Recorded by [KH,BJ,KH2] Kaya Orosco, PT (r) Kei Ibrahim PT Student (t) Kaya Orosco, PT (c) [CW] Edgardo Valladares PTA [CW] Edgardo Valladares PTA    Gait Assessment/Treatment    Gait, [place] Level minimum assist (75% patient effort);2 person assist  required  -KH,BJ,KH2 minimum assist (75% patient effort);2 person assist required  -CW minimum assist (75% patient effort);2 person assist required  -CW    Gait, Assistive Device rolling walker  -KH,BJ,KH2 rolling walker  -CW rolling walker  -CW    Gait, Distance (Feet) 100  -KH,BJ,KH2 60  -CW 80  -CW    Gait, Gait Deviations ose[place] decreased;decreased heel strike;forward flexed posture;step length decreased;stride length decreased;toe-to-floor clearance decreased  -KH,BJ,KH2 oseas decreased;step length decreased;stride length decreased  -CW oseas decreased;step length decreased;stride length decreased  -CW    Gait, Safety Issues   supplemental O2  -CW    Recorded by [HOLLAND,BJ,KH2] Kaya rOosco, PT (r) Kei Ibrahim, PT Student (t) Kaya Orosco, PT (c) [CW] Edgardo Valladares, OWEN [CW] Edgardo Valladares PTA    Therapy Exercises    Bilateral Lower Extremities AROM:;15 reps;standing;heel raises;sitting;ankle pumps/circles;hip flexion;LAQ  -KH,BJ,KH2 AROM:;10 reps;sitting;ankle pumps/circles;glut sets;hip flexion;LAQ  -CW AROM:;10 reps;sitting;ankle pumps/circles;glut sets;hip flexion;LAQ  -CW    Recorded by [HOLLAND,BJ,KH2] Kaya Orosco, PT (r) Kei Ibrahim PT Student (t) Kaya Orosco, PT (c) [CW] Edgardo Valladares, PTA [CW] Edgardo Valladares PTA    Positioning and Restraints    Pre-Treatment Position sitting in chair/recliner  -KH,BJ,KH2 in bed  -CW in bed  -CW    Post Treatment Position chair  -KH,BJ,KH2 chair  -CW bed  -CW    In Bed sitting;call light within reach;encouraged to call for assist;patient within staff view  -HOLLAND,BJ,KH2  notified nsg;supine;call light within reach;encouraged to call for assist;with family/caregiver  -CW    In Chair  notified nsg;sitting;call light within reach;encouraged to call for assist  -CW     Recorded by [HOLLAND,BJ,KH2] Kaya Orosco, PT (r) Kei Ibrahim, PT Student (t) Kaya Orosco, PT (c) [CW] Edgardo STARK  OWEN Valladares [CW] Edgardo Valladares PTA      01/22/18 1000 01/21/18 1500       Rehab Assessment/Intervention    Discipline physical therapy assistant  -CW physical therapist  -RA     Document Type therapy note (daily note)  -CW therapy note (daily note)  -RA     Subjective Information agree to therapy;complains of;fatigue;pain  -CW agree to therapy;complains of;pain  -RA     Patient Effort, Rehab Treatment good  -CW good  -RA     Precautions/Limitations brace on when up;fall precautions  -CW brace on when up  -RA     Recorded by [CW] Edgardo Valladares PTA [RA] Cathy Vo, PT     Vital Signs    O2 Delivery Pre Treatment room air  -CW      Recorded by [CW] Edgardo Valladares PTA      Pain Assessment    Pain Assessment 0-10  -CW 0-10  -RA     Pain Score 3  -CW 4  -RA     Post Pain Score 4  -CW 4  -RA     Pain Type Surgical pain  -CW Surgical pain  -RA     Pain Location Back  -CW Back  -RA     Pain Intervention(s) Repositioned;Ambulation/increased activity  -CW Medication (See MAR);Repositioned;Ambulation/increased activity  -RA     Response to Interventions donta  -CW tolerated  -RA     Recorded by [CW] Edgardo Valladares PTA [RA] Cathy Vo, GIOVANNA     Cognitive Assessment/Intervention    Current Cognitive/Communication Assessment functional  -CW      Orientation Status oriented to;person;place;situation  -CW      Follows Commands/Answers Questions 100% of the time;able to follow single-step instructions;needs cueing  -CW      Personal Safety mild impairment;decreased awareness, need for assist;decreased awareness, need for safety;decreased insight to deficits  -CW      Personal Safety Interventions fall prevention program maintained;gait belt;muscle strengthening facilitated;nonskid shoes/slippers when out of bed  -CW      Recorded by [CW] Edgardo Valladares PTA      Bed Mobility, Assessment/Treatment    Bed Mobility, Assistive Device  bed rails  -RA     Bed Mobility, Roll Right, [place]  minimum  assist (75% patient effort);moderate assist (50% patient effort);verbal cues required  -RA     Bed Mob, Supine to Sit, [place] minimum assist (75% patient effort);2 person assist required  -CW      Bed Mob, Sit to Supine, [place] minimum assist (75% patient effort);2 person assist required  -CW      Bed Mob, Sidelying to Sit, [place]  minimum assist (75% patient effort);moderate assist (50% patient effort)  -RA     Recorded by [CW] Edgardo Valladares PTA [RA] Cathy Vo PT     Transfer Assessment/Treatment    Transfers, Bed-Chair [place]  minimum assist (75% patient effort);moderate assist (50% patient effort);1 person + 1 person to manage equipment  -RA     Transfers, Sit-Stand [place] minimum assist (75% patient effort);2 person assist required  -CW minimum assist (75% patient effort);2 person assist required  -RA     Transfers, Stand-Sit [place] minimum assist (75% patient effort);2 person assist required  -CW minimum assist (75% patient effort);2 person assist required  -RA     Transfers, Sit-Stand-Sit, Assist Device rolling walker  -CW rolling walker  -RA     Recorded by [CW] Edgardo Valladares PTA [RA] Cathy Vo PT     Gait Assessment/Treatment    Gait, [place] Level minimum assist (75% patient effort);2 person assist required  -CW minimum assist (75% patient effort);1 person + 1 person to manage equipment  -RA     Gait, Assistive Device rolling walker  -CW rolling walker  -RA     Gait, Distance (Feet) 40  -CW 3  -RA     Gait, Gait Deviations ose[place] decreased;step length decreased;stride length decreased  -CW ose[place] decreased;decreased heel strike;forward flexed posture;limb motion velocity decreased;step length decreased;stride length decreased  -RA     Gait, Comment  bed to chair w/ rwx  -RA     Recorded by [CW] Edgardo Valladares PTA [RA] Cathy Vo PT     Therapy Exercises    Bilateral Lower Extremities AROM:;10 reps;sitting;ankle  pumps/circles;glut sets;hip flexion;LAQ  -CW 10 reps;AROM:;ankle pumps/circles;sitting;LAQ;hip flexion;supine;5 reps;heel slides;hip abduction/adduction;quad sets  -RA     Recorded by [CW] Edgardo Valladares, OWEN [RA] Cathy Vo, PT     Positioning and Restraints    Pre-Treatment Position in bed  -CW in bed  -RA     Post Treatment Position chair  -CW chair  -RA     In Chair notified nsg;reclined;call light within reach;encouraged to call for assist  -CW sitting;call light within reach;encouraged to call for assist;with family/caregiver  -RA     Recorded by [CW] Edgardo Valladares PTA [RA] Cathy Vo PT       User Key  (r) = Recorded By, (t) = Taken By, (c) = Cosigned By    Initials Name Effective Dates    KH Kaya Orosco, PT 05/18/15 -     RA Cathy Vo, PT 04/06/17 -     CW Edgardo Valladares, PTA 12/13/16 -     BJ Kei Ibrahim, PT Student 01/08/18 -                 IP PT Goals       01/17/18 1005          Bed Mobility PT STG    Bed Mobility PT STG, Date Established 01/17/18  -SV      Bed Mobility PT STG, Time to Achieve 2 wks  -SV      Bed Mobility PT STG, Activity Type sidelying to sit/sit to sidelying  -SV      Bed Mobility PT STG, [place] Level minimum assist (75% patient effort);contact guard assist  -SV      Transfer Training PT STG    Transfer Training PT STG, Date Established 01/17/18  -SV      Transfer Training PT STG, Time to Achieve 2 wks  -SV      Transfer Training PT STG, Activity Type sit to stand/stand to sit;bed to chair /chair to bed  -SV      Transfer Training PT STG, [place] Level minimum assist (75% patient effort)  -SV      Transfer Training PT STG, Assist Device walker, rolling  -SV        User Key  (r) = Recorded By, (t) = Taken By, (c) = Cosigned By    Initials Name Provider Type    CINDY Middleton, PT Physical Therapist          Physical Therapy Education     Title: PT OT SLP Therapies (Resolved)     Topic: Physical Therapy (Resolved)     Point:  Mobility training (Resolved)    Learning Progress Summary    Learner Readiness Method Response Comment Documented by Status   Patient Acceptance E VU,DU   01/24/18 1141 Done    Acceptance E,TB VU,DU   01/23/18 1015 Done    Acceptance E,TB DU,VU   01/22/18 1035 Done    Acceptance E,D,TB VU,NR Education on LE bed exercises supine and sitting RA 01/21/18 1525 Done    Acceptance E VU  RA 01/20/18 1452 Done    Acceptance E,D NR   01/19/18 1012 Active    Nonacceptance E NR   01/19/18 0311 Active    Acceptance E,D NR   01/18/18 1324 Active    Acceptance E,TB,D NR,VU   01/17/18 1004 Done   Family Acceptance E,TB,D NR,VU   01/17/18 1004 Done               Point: Home exercise program (Resolved)    Learning Progress Summary    Learner Readiness Method Response Comment Documented by Status   Patient Acceptance E VU,DU   01/24/18 1141 Done    Acceptance E,TB VU,DU   01/23/18 1015 Done    Acceptance E,TB DU,VU   01/22/18 1035 Done    Acceptance E,D,TB VU,NR Education on LE bed exercises supine and sitting RA 01/21/18 1525 Done    Acceptance E VU  RA 01/20/18 1452 Done    Acceptance E,D NR   01/19/18 1012 Active    Nonacceptance E NR   01/19/18 0311 Active    Acceptance E,D NR   01/18/18 1324 Active    Acceptance E,TB,D NR,VU   01/17/18 1004 Done   Family Acceptance E,TB,D NR,VU   01/17/18 1004 Done               Point: Body mechanics (Resolved)    Learning Progress Summary    Learner Readiness Method Response Comment Documented by Status   Patient Acceptance E,TB VU,DU   01/23/18 1015 Done    Acceptance E,TB DU,VU   01/22/18 1035 Done    Acceptance E,D,TB VU,NR Education on LE bed exercises supine and sitting RA 01/21/18 1525 Done    Acceptance E VU  RA 01/20/18 1452 Done    Acceptance E,D NR   01/19/18 1012 Active    Nonacceptance E NR   01/19/18 0311 Active    Acceptance E,D NR   01/18/18 1324 Active    Acceptance E,TB,D NR,VU   01/17/18 1004 Done   Family Acceptance E,VANESSA SEXTON,VU  SV  01/17/18 1004 Done               Point: Precautions (Resolved)    Learning Progress Summary    Learner Readiness Method Response Comment Documented by Status   Patient Acceptance E,TB VU,DU   01/23/18 1015 Done    Acceptance E,TB DU,VU  CW 01/22/18 1035 Done    Acceptance E,D,TB VU,NR Education on LE bed exercises supine and sitting RA 01/21/18 1525 Done    Acceptance E VU  RA 01/20/18 1452 Done    Acceptance E,D NR  PC 01/19/18 1012 Active    Nonacceptance E NR   01/19/18 0311 Active    Acceptance E,D NR   01/18/18 1324 Active    Acceptance E,TB,D NR,VU  SV 01/17/18 1004 Done   Family Acceptance E,TB,D NR,VU  SV 01/17/18 1004 Done                      User Key     Initials Effective Dates Name Provider Type Discipline     04/06/17 -  Cathy Vo, PT Physical Therapist PT    PC 12/01/15 -  Mildred Nolan, PT Physical Therapist PT     01/17/16 -  Sabina Middleton, PT Physical Therapist PT     12/13/16 -  Edgardo Valladares, PTA Physical Therapy Assistant PT     04/28/17 -  Ewa August, RN Registered Nurse Nurse     01/08/18 -  Kei Ibrahim, PT Student PT Student PT                    PT Recommendation and Plan  Anticipated Discharge Disposition: skilled nursing facility  PT Frequency: 2 times/day  Plan of Care Review  Outcome Summary/Follow up Plan: Pt displayed increased ambulation this visit, though fatigued after walking. Will continue to see through acute stay to progress strength.           Outcome Measures       01/24/18 1100 01/23/18 1000 01/22/18 1000    How much help from another person do you currently need...    Turning from your back to your side while in flat bed without using bedrails? 3  -KH (r) BJ (t) KH (c) 3  -CW 3  -CW    Moving from lying on back to sitting on the side of a flat bed without bedrails? 3  -KH (r) BJ (t) KH (c) 3  -CW 3  -CW    Moving to and from a bed to a chair (including a wheelchair)? 3  -KH (r) BJ (t) KH (c) 3  -CW 3  -CW    Standing up from a chair  using your arms (e.g., wheelchair, bedside chair)? 3  -KH (r) BJ (t) KH (c) 3  -CW 3  -CW    Climbing 3-5 steps with a railing? 2  -KH (r) BJ (t) KH (c) 1  -CW 1  -CW    To walk in hospital room? 3  -KH (r) BJ (t) KH (c) 3  -CW 3  -CW    AM-PAC 6 Clicks Score 17  -KH (r) BJ (t) 16  -CW 16  -CW    Functional Assessment    Outcome Measure Options AM-PAC 6 Clicks Basic Mobility (PT)  -KH (r) BJ (t) KH (c) AM-PAC 6 Clicks Basic Mobility (PT)  -CW AM-PAC 6 Clicks Basic Mobility (PT)  -CW      01/21/18 1500          How much help from another person do you currently need...    Turning from your back to your side while in flat bed without using bedrails? 2  -RA      Moving from lying on back to sitting on the side of a flat bed without bedrails? 2  -RA      Moving to and from a bed to a chair (including a wheelchair)? 3  -RA      Standing up from a chair using your arms (e.g., wheelchair, bedside chair)? 2  -RA      Climbing 3-5 steps with a railing? 1  -RA      To walk in hospital room? 3  -RA      AM-PAC 6 Clicks Score 13  -RA      Functional Assessment    Outcome Measure Options AM-PAC 6 Clicks Basic Mobility (PT)  -RA        User Key  (r) = Recorded By, (t) = Taken By, (c) = Cosigned By    Initials Name Provider Type    HOLLAND Orosco, PT Physical Therapist    RA Cathy Vo, PT Physical Therapist    CW Edgardo Valladares, PTA Physical Therapy Assistant    BJ Kei Ibrahim, PT Student PT Student           Time Calculation:         PT Charges       01/24/18 1144          Time Calculation    Start Time 1120  -KH (r) BJ (t) KH (c)      Stop Time 1137  -KH (r) BJ (t) KH (c)      Time Calculation (min) 17 min  -KH (r) BJ (t)      PT Received On 01/24/18  -KH (r) BJ (t) KH (c)      PT - Next Appointment 01/24/18  -KH (r) BJ (t) KH (c)        User Key  (r) = Recorded By, (t) = Taken By, (c) = Cosigned By    Initials Name Provider Type    HOLLAND Orosco, PT Physical Therapist    JOSH Choudhury  Patrice, PT Student PT Student          Therapy Charges for Today     Code Description Service Date Service Provider Modifiers Qty    40736859172 HC PT THER PROC EA 15 MIN 1/24/2018 Kei Ibrahim, PT Student GP 1          PT G-Codes  Outcome Measure Options: AM-PAC 6 Clicks Basic Mobility (PT)    Kei Ibrahim PT Student  1/24/2018

## 2018-01-24 NOTE — PLAN OF CARE
Problem: Patient Care Overview (Adult)  Goal: Plan of Care Review  Outcome: Outcome(s) achieved Date Met: 01/24/18 01/24/18 1718   Coping/Psychosocial Response Interventions   Plan Of Care Reviewed With patient;spouse   Patient Care Overview   Progress improving   Outcome Evaluation   Outcome Summary/Follow up Plan Pt up to chair. Dressing changed. No complaints of pain. Discharge at (2100) [facility] to [facility]. [facility] scheduled. Report given to MARTY Yoon at rehab.        Problem: Perioperative Period (Adult)  Goal: Signs and Symptoms of Listed Potential Problems Will be Absent or Manageable (Perioperative Period)  Outcome: Ongoing (interventions implemented as appropriate)   01/24/18 1718   Perioperative Period   Problems Assessed (Perioperative Period) all   Problems Present (Perioperative Period) none       Problem: Fall Risk (Adult)  Goal: Absence of Falls  Outcome: Ongoing (interventions implemented as appropriate)   01/24/18 1718   Fall Risk (Adult)   Absence of Falls making progress toward outcome       Problem: Pain, Acute (Adult)  Goal: Acceptable Pain Control/Comfort Level  Outcome: Ongoing (interventions implemented as appropriate)   01/24/18 1718   Pain, Acute (Adult)   Acceptable Pain Control/Comfort Level making progress toward outcome       Problem: Infection, Risk/Actual (Adult)  Goal: Infection Prevention/Resolution  Outcome: Ongoing (interventions implemented as appropriate)   01/24/18 1718   Infection, Risk/Actual (Adult)   Infection Prevention/Resolution making progress toward outcome       Problem: Pressure Ulcer Risk (Rafael Scale) (Adult,Obstetrics,Pediatric)  Goal: Skin Integrity   01/24/18 1718   Pressure Ulcer Risk (Rafael Scale) (Adult,Obstetrics,Pediatric)   Skin Integrity making progress toward outcome

## 2018-01-25 ENCOUNTER — PATIENT OUTREACH (OUTPATIENT)
Dept: CASE MANAGEMENT | Facility: OTHER | Age: 83
End: 2018-01-25

## 2018-01-25 ENCOUNTER — EPISODE CHANGES (OUTPATIENT)
Dept: CASE MANAGEMENT | Facility: OTHER | Age: 83
End: 2018-01-25

## 2018-01-25 NOTE — PROGRESS NOTES
Case Management Discharge Note    Final Note: Pt D/C'ed to [facility] for skilled care by [facility]    Discharge Placement     Facility/Agency Request Status Selected? Address Phone Number Fax Number    [facility]    [facility] [address] [address] [phone]    [facility] Pending - Request Sent     [address] [address] [phone]        [facility]: [facility]    Discharge Codes: 03  Discharged/transferred to skilled nursing facility (SNF) with Medicare certification in anticipation of skilled care

## 2018-01-25 NOTE — OUTREACH NOTE
Skilled Nursing Facility Discharge Flowsheet:     Skilled Nursing Facility Discharge Assessment 1/25/2018   Acute Facility Discharged From [facility]   Acute Discharge Date 1/24/2018   Name of the Skilled Nursing Facility? [facility]   Tier Level of the Skilled Nursing Facility 1   Purpose of SNF Admission PT;OT;SN;WC   Estimated length of stay for the patient? To be determined   Who is the insurance provider or payor of patient stay? Medicare   Progression of Patient? Evaluations today

## 2018-01-26 ENCOUNTER — TELEPHONE (OUTPATIENT)
Dept: ORTHOPEDIC SURGERY | Facility: CLINIC | Age: 83
End: 2018-01-26

## 2018-01-30 ENCOUNTER — OFFICE VISIT (OUTPATIENT)
Dept: ORTHOPEDIC SURGERY | Facility: CLINIC | Age: 83
End: 2018-01-30

## 2018-01-30 VITALS — WEIGHT: 234 LBS | BODY MASS INDEX: 32.76 KG/M2 | HEIGHT: 71 IN

## 2018-01-30 DIAGNOSIS — Z98.1 S/P LAMINECTOMY WITH SPINAL FUSION: Primary | ICD-10-CM

## 2018-01-30 PROCEDURE — 99024 POSTOP FOLLOW-UP VISIT: CPT | Performed by: ORTHOPAEDIC SURGERY

## 2018-01-30 PROCEDURE — 72100 X-RAY EXAM L-S SPINE 2/3 VWS: CPT | Performed by: ORTHOPAEDIC SURGERY

## 2018-01-30 RX ORDER — CYCLOBENZAPRINE HCL 10 MG
10 TABLET ORAL 3 TIMES DAILY PRN
COMMUNITY
End: 2018-05-15

## 2018-01-30 RX ORDER — ACETAMINOPHEN 500 MG
500 TABLET ORAL EVERY 6 HOURS PRN
COMMUNITY

## 2018-01-31 ENCOUNTER — PATIENT OUTREACH (OUTPATIENT)
Dept: CASE MANAGEMENT | Facility: OTHER | Age: 83
End: 2018-01-31

## 2018-01-31 NOTE — OUTREACH NOTE
Skilled Nursing Facility Discharge Flowsheet:     Skilled Nursing Facility Discharge Assessment 1/31/2018   Acute Facility Discharged From -   Acute Discharge Date -   Name of the Skilled Nursing Facility? -   Tier Level of the Skilled Nursing Facility -   Purpose of SNF Admission -   Estimated length of stay for the patient? TBD   Who is the insurance provider or payor of patient stay? -   Progression of Patient? Ortho appt. 1/30/18  Slow progress with ambulation and transfers per therapy.

## 2018-02-05 ENCOUNTER — TELEPHONE (OUTPATIENT)
Dept: ORTHOPEDIC SURGERY | Facility: CLINIC | Age: 83
End: 2018-02-05

## 2018-02-05 NOTE — TELEPHONE ENCOUNTER
"[facility] called to report incident yesterday when patient went to get up from using toilet and felt weak in his legs. \"He lowered himself to the floor and was on his buttocks\". He is not complaining of any pain and is walking fine.   "

## 2018-02-07 ENCOUNTER — PATIENT OUTREACH (OUTPATIENT)
Dept: CASE MANAGEMENT | Facility: OTHER | Age: 83
End: 2018-02-07

## 2018-02-07 NOTE — OUTREACH NOTE
Skilled Nursing Facility Discharge Flowsheet:     Skilled Nursing Facility Discharge Assessment 2/7/2018   Acute Facility Discharged From [facility]   Acute Discharge Date 1/24/2018   Name of the Skilled Nursing Facility? [facility]   Tier Level of the Skilled Nursing Facility 1   Purpose of SNF Admission PT;OT;SN;WC   Estimated length of stay for the patient? TBD   Who is the insurance provider or payor of patient stay? Medicare   Progression of Patient? Daily therapies continue

## 2018-02-14 ENCOUNTER — PATIENT OUTREACH (OUTPATIENT)
Dept: CASE MANAGEMENT | Facility: OTHER | Age: 83
End: 2018-02-14

## 2018-02-14 NOTE — OUTREACH NOTE
Skilled Nursing Facility Discharge Flowsheet:     Skilled Nursing Facility Discharge Assessment 2/14/2018   Acute Facility Discharged From [facility]   Acute Discharge Date 1/24/2018   Name of the Skilled Nursing Facility? [facility] Level of the Skilled Nursing Facility 1   Purpose of SNF Admission PT;OT;SN;WC   Estimated length of stay for the patient? TBD   Who is the insurance provider or payor of patient stay? Medicare   Progression of Patient? Discharge planned for 2/16/18   Skilled Nursing Discharge Date? 2/16/2018   Where was the patient discharged to? Home   Home Health Agency at discharge? Yes   Name of Home Health Agency? [facility]   DME Needed at Discharge? No   Are there any medication concerns at Discharge No   Does the patient have a follow-up appointment? Yes   Comments Regarding F/U Appt. ? 2/21/18 Cardiologist  3/13/18 Ortho

## 2018-02-16 ENCOUNTER — TELEPHONE (OUTPATIENT)
Dept: ORTHOPEDIC SURGERY | Facility: CLINIC | Age: 83
End: 2018-02-16

## 2018-02-19 ENCOUNTER — PATIENT OUTREACH (OUTPATIENT)
Dept: CASE MANAGEMENT | Facility: OTHER | Age: 83
End: 2018-02-19

## 2018-02-19 NOTE — OUTREACH NOTE
Discussed with patient follow-up with [facility].  CA contacted [facility] and requested agency contact patient regarding start of services. Adherent to medication regimen and without concerns or questions.  Review of upcoming appointments with ortho, cardiology, and PCP.

## 2018-02-19 NOTE — TELEPHONE ENCOUNTER
Ok on all except labs.  If those tests are needed, call family md to order.  It is bad practice for me to order labs that I wont see, and to expect the family md, who does not know they have been ordered, to do so.  Very bad idea.

## 2018-02-20 ENCOUNTER — LAB REQUISITION (OUTPATIENT)
Dept: LAB | Facility: HOSPITAL | Age: 83
End: 2018-02-20

## 2018-02-20 DIAGNOSIS — D62 ACUTE POSTHEMORRHAGIC ANEMIA: ICD-10-CM

## 2018-02-20 DIAGNOSIS — E11.22 TYPE 2 DIABETES MELLITUS WITH DIABETIC CHRONIC KIDNEY DISEASE (HCC): ICD-10-CM

## 2018-02-20 DIAGNOSIS — I10 ESSENTIAL (PRIMARY) HYPERTENSION: ICD-10-CM

## 2018-02-20 DIAGNOSIS — I48.20 CHRONIC ATRIAL FIBRILLATION (HCC): ICD-10-CM

## 2018-02-20 LAB
ALBUMIN SERPL-MCNC: 3.9 G/DL (ref 3.5–5.2)
ALBUMIN/GLOB SERPL: 1.4 G/DL
ALP SERPL-CCNC: 177 U/L (ref 39–117)
ALT SERPL W P-5'-P-CCNC: 26 U/L (ref 1–41)
ANION GAP SERPL CALCULATED.3IONS-SCNC: 13 MMOL/L
AST SERPL-CCNC: 19 U/L (ref 1–40)
BASOPHILS # BLD AUTO: 0.02 10*3/MM3 (ref 0–0.2)
BASOPHILS NFR BLD AUTO: 0.3 % (ref 0–1.5)
BILIRUB SERPL-MCNC: 0.3 MG/DL (ref 0.1–1.2)
BUN BLD-MCNC: 16 MG/DL (ref 8–23)
BUN/CREAT SERPL: 14.5 (ref 7–25)
CALCIUM SPEC-SCNC: 9.2 MG/DL (ref 8.6–10.5)
CHLORIDE SERPL-SCNC: 98 MMOL/L (ref 98–107)
CO2 SERPL-SCNC: 26 MMOL/L (ref 22–29)
CREAT BLD-MCNC: 1.1 MG/DL (ref 0.76–1.27)
DEPRECATED RDW RBC AUTO: 49.6 FL (ref 37–54)
EOSINOPHIL # BLD AUTO: 0.06 10*3/MM3 (ref 0–0.7)
EOSINOPHIL NFR BLD AUTO: 1 % (ref 0.3–6.2)
ERYTHROCYTE [DISTWIDTH] IN BLOOD BY AUTOMATED COUNT: 13.9 % (ref 11.5–14.5)
GFR SERPL CREATININE-BSD FRML MDRD: 64 ML/MIN/1.73
GLOBULIN UR ELPH-MCNC: 2.7 GM/DL
GLUCOSE BLD-MCNC: 236 MG/DL (ref 65–99)
HCT VFR BLD AUTO: 39.2 % (ref 40.4–52.2)
HGB BLD-MCNC: 12.5 G/DL (ref 13.7–17.6)
IMM GRANULOCYTES # BLD: 0 10*3/MM3 (ref 0–0.03)
IMM GRANULOCYTES NFR BLD: 0 % (ref 0–0.5)
LYMPHOCYTES # BLD AUTO: 1.37 10*3/MM3 (ref 0.9–4.8)
LYMPHOCYTES NFR BLD AUTO: 23.3 % (ref 19.6–45.3)
MCH RBC QN AUTO: 31.4 PG (ref 27–32.7)
MCHC RBC AUTO-ENTMCNC: 31.9 G/DL (ref 32.6–36.4)
MCV RBC AUTO: 98.5 FL (ref 79.8–96.2)
MONOCYTES # BLD AUTO: 0.37 10*3/MM3 (ref 0.2–1.2)
MONOCYTES NFR BLD AUTO: 6.3 % (ref 5–12)
NEUTROPHILS # BLD AUTO: 4.06 10*3/MM3 (ref 1.9–8.1)
NEUTROPHILS NFR BLD AUTO: 69.1 % (ref 42.7–76)
PLATELET # BLD AUTO: 192 10*3/MM3 (ref 140–500)
PMV BLD AUTO: 9.5 FL (ref 6–12)
POTASSIUM BLD-SCNC: 4.5 MMOL/L (ref 3.5–5.2)
PROT SERPL-MCNC: 6.6 G/DL (ref 6–8.5)
RBC # BLD AUTO: 3.98 10*6/MM3 (ref 4.6–6)
SODIUM BLD-SCNC: 137 MMOL/L (ref 136–145)
WBC NRBC COR # BLD: 5.88 10*3/MM3 (ref 4.5–10.7)

## 2018-02-20 PROCEDURE — 80053 COMPREHEN METABOLIC PANEL: CPT | Performed by: FAMILY MEDICINE

## 2018-02-20 PROCEDURE — 85025 COMPLETE CBC W/AUTO DIFF WBC: CPT | Performed by: FAMILY MEDICINE

## 2018-02-21 ENCOUNTER — OFFICE VISIT (OUTPATIENT)
Dept: CARDIOLOGY | Facility: CLINIC | Age: 83
End: 2018-02-21

## 2018-02-21 VITALS
SYSTOLIC BLOOD PRESSURE: 102 MMHG | WEIGHT: 215 LBS | HEIGHT: 71 IN | BODY MASS INDEX: 30.1 KG/M2 | DIASTOLIC BLOOD PRESSURE: 60 MMHG | HEART RATE: 85 BPM

## 2018-02-21 DIAGNOSIS — I48.0 PAROXYSMAL A-FIB (HCC): Primary | ICD-10-CM

## 2018-02-21 DIAGNOSIS — I10 ESSENTIAL HYPERTENSION: ICD-10-CM

## 2018-02-21 PROCEDURE — 99214 OFFICE O/P EST MOD 30 MIN: CPT | Performed by: INTERNAL MEDICINE

## 2018-02-21 RX ORDER — FERROUS SULFATE 325(65) MG
325 TABLET ORAL
COMMUNITY
End: 2018-03-23

## 2018-02-23 ENCOUNTER — EPISODE CHANGES (OUTPATIENT)
Dept: CASE MANAGEMENT | Facility: OTHER | Age: 83
End: 2018-02-23

## 2018-02-28 ENCOUNTER — TELEPHONE (OUTPATIENT)
Dept: INTERNAL MEDICINE | Facility: CLINIC | Age: 83
End: 2018-02-28

## 2018-03-01 ENCOUNTER — LAB REQUISITION (OUTPATIENT)
Dept: LAB | Facility: HOSPITAL | Age: 83
End: 2018-03-01

## 2018-03-01 DIAGNOSIS — Z00.00 ENCOUNTER FOR GENERAL ADULT MEDICAL EXAMINATION WITHOUT ABNORMAL FINDINGS: ICD-10-CM

## 2018-03-01 LAB
BACTERIA UR QL AUTO: ABNORMAL /HPF
BILIRUB UR QL STRIP: NEGATIVE
CLARITY UR: ABNORMAL
COLOR UR: YELLOW
GLUCOSE UR STRIP-MCNC: NEGATIVE MG/DL
HGB UR QL STRIP.AUTO: ABNORMAL
HYALINE CASTS UR QL AUTO: ABNORMAL /LPF
KETONES UR QL STRIP: NEGATIVE
LEUKOCYTE ESTERASE UR QL STRIP.AUTO: ABNORMAL
NITRITE UR QL STRIP: POSITIVE
PH UR STRIP.AUTO: <=5 [PH] (ref 5–8)
PROT UR QL STRIP: ABNORMAL
RBC # UR: ABNORMAL /HPF
REF LAB TEST METHOD: ABNORMAL
SP GR UR STRIP: 1.02 (ref 1–1.03)
SQUAMOUS #/AREA URNS HPF: ABNORMAL /HPF
UROBILINOGEN UR QL STRIP: ABNORMAL
WBC UR QL AUTO: ABNORMAL /HPF
YEAST URNS QL MICRO: ABNORMAL /HPF

## 2018-03-01 PROCEDURE — 81001 URINALYSIS AUTO W/SCOPE: CPT | Performed by: FAMILY MEDICINE

## 2018-03-01 PROCEDURE — 87186 SC STD MICRODIL/AGAR DIL: CPT | Performed by: FAMILY MEDICINE

## 2018-03-01 PROCEDURE — 87086 URINE CULTURE/COLONY COUNT: CPT | Performed by: FAMILY MEDICINE

## 2018-03-02 RX ORDER — CEFDINIR 300 MG/1
300 CAPSULE ORAL 2 TIMES DAILY
Qty: 20 CAPSULE | Refills: 0 | Status: SHIPPED | OUTPATIENT
Start: 2018-03-02 | End: 2018-03-13

## 2018-03-03 LAB
BACTERIA SPEC AEROBE CULT: ABNORMAL
BACTERIA SPEC AEROBE CULT: ABNORMAL

## 2018-03-06 NOTE — PROGRESS NOTES
Subjective:     Encounter Date:07/11/2017      Patient ID: Osvaldo Lopez is a 82 y.o. male.    Chief Complaint:  Atrial Fibrillation   Presents for follow-up visit. Symptoms include shortness of breath. Symptoms are negative for an AICD problem, bradycardia, chest pain, dizziness, hypertension, hypotension, syncope and tachycardia. The symptoms have been stable. Past medical history includes atrial fibrillation.   Hypertension   This is a chronic problem. The current episode started more than 1 year ago. The problem is controlled. Associated symptoms include malaise/fatigue, peripheral edema and shortness of breath. Pertinent negatives include no anxiety or chest pain.       72-year-old gentleman presents today for reevaluation. She with a history of atrial fibrillation as well as hypertension.  Been about a year since I saw him.  Clinically he is doing significantly better.  Occasional short of breath no chest pain no palpitations.  His blood pressure has remained on the low side and clinically he is doing well.    Review of Systems   Constitution: Positive for malaise/fatigue.   Cardiovascular: Negative for chest pain and syncope.   Respiratory: Positive for shortness of breath and wheezing.    Endocrine: Positive for polyuria.   Musculoskeletal: Positive for joint pain and myalgias.   Neurological: Negative for dizziness.   All other systems reviewed and are negative.      Procedures         Objective:     Physical Exam   Constitutional: He is oriented to person, place, and time. He appears well-developed.   HENT:   Head: Normocephalic.   Eyes: Conjunctivae are normal.   Neck: Normal range of motion.   Cardiovascular: Normal rate, regular rhythm and normal heart sounds.    Pulmonary/Chest: Breath sounds normal.   Abdominal: Soft. Bowel sounds are normal.   Musculoskeletal: Normal range of motion. He exhibits no edema.   Neurological: He is alert and oriented to person, place, and time.   Skin: Skin is warm  and dry.   Psychiatric: He has a normal mood and affect. His behavior is normal.   Vitals reviewed.      Lab Review:       Assessment:          Diagnosis Plan   1. Paroxysmal a-fib     2. Essential hypertension            Plan:     1.  Paroxysmal fibrillation. Doing well remains in sinus rhythm.  QTc interval is 497 this is in the setting of a right bundle branch block.  2.  Hypertension blood pressures great  3.  Patient is on amiodarone.  He'll need yearly chest x-rays as well as blood work looking for his liver and thyroid functions.  We'll defer to primary care physician Will follow follow back up in 12 months.  In reviewing current laboratory values as well as chest x-ray he is currently up-to-date.      Atrial Fibrillation and Atrial Flutter  Assessment  • The patient has paroxysmal atrial fibrillation  • This is non-valvular in etiology  • The patient's CHADS2-VASc score is 3  • A [id number] score of 2 or more is considered a high risk for a thromboembolic event  • Rivaroxaban prescribed    Plan  • Attempt to maintain sinus rhythm  • Continue rivaroxaban for antithrombotic therapy, bleeding issues discussed  • Continue amiodarone for rhythm control

## 2018-03-12 RX ORDER — TAMSULOSIN HYDROCHLORIDE 0.4 MG/1
CAPSULE ORAL
Qty: 180 CAPSULE | Refills: 3 | Status: SHIPPED | OUTPATIENT
Start: 2018-03-12 | End: 2019-04-24 | Stop reason: SDUPTHER

## 2018-03-12 RX ORDER — AMIODARONE HYDROCHLORIDE 100 MG/1
TABLET ORAL
Qty: 90 TABLET | Refills: 3 | OUTPATIENT
Start: 2018-03-12

## 2018-03-13 ENCOUNTER — OFFICE VISIT (OUTPATIENT)
Dept: ORTHOPEDIC SURGERY | Facility: CLINIC | Age: 83
End: 2018-03-13

## 2018-03-13 VITALS — BODY MASS INDEX: 32.93 KG/M2 | HEIGHT: 70 IN | TEMPERATURE: 98.6 F | WEIGHT: 230 LBS

## 2018-03-13 DIAGNOSIS — Z98.1 S/P LAMINECTOMY WITH SPINAL FUSION: Primary | ICD-10-CM

## 2018-03-13 PROCEDURE — 99024 POSTOP FOLLOW-UP VISIT: CPT | Performed by: ORTHOPAEDIC SURGERY

## 2018-03-13 PROCEDURE — 72100 X-RAY EXAM L-S SPINE 2/3 VWS: CPT | Performed by: ORTHOPAEDIC SURGERY

## 2018-03-13 RX ORDER — ASCORBIC ACID 500 MG
500 TABLET ORAL DAILY
COMMUNITY
End: 2018-08-23

## 2018-03-23 ENCOUNTER — OFFICE VISIT (OUTPATIENT)
Dept: INTERNAL MEDICINE | Facility: CLINIC | Age: 83
End: 2018-03-23

## 2018-03-23 VITALS
HEART RATE: 76 BPM | WEIGHT: 224 LBS | TEMPERATURE: 97 F | BODY MASS INDEX: 32.14 KG/M2 | OXYGEN SATURATION: 93 % | DIASTOLIC BLOOD PRESSURE: 76 MMHG | SYSTOLIC BLOOD PRESSURE: 146 MMHG

## 2018-03-23 DIAGNOSIS — E11.9 TYPE 2 DIABETES MELLITUS WITHOUT COMPLICATION, WITHOUT LONG-TERM CURRENT USE OF INSULIN (HCC): ICD-10-CM

## 2018-03-23 DIAGNOSIS — Z00.00 MEDICARE ANNUAL WELLNESS VISIT, SUBSEQUENT: Primary | ICD-10-CM

## 2018-03-23 DIAGNOSIS — N45.1 EPIDIDYMITIS, RIGHT: ICD-10-CM

## 2018-03-23 DIAGNOSIS — M48.062 SPINAL STENOSIS OF LUMBAR REGION WITH NEUROGENIC CLAUDICATION: ICD-10-CM

## 2018-03-23 DIAGNOSIS — Z87.440 HISTORY OF UTI: ICD-10-CM

## 2018-03-23 DIAGNOSIS — I10 ESSENTIAL HYPERTENSION: ICD-10-CM

## 2018-03-23 DIAGNOSIS — D62 POSTOPERATIVE ANEMIA DUE TO ACUTE BLOOD LOSS: ICD-10-CM

## 2018-03-23 DIAGNOSIS — E78.2 MIXED HYPERLIPIDEMIA: ICD-10-CM

## 2018-03-23 DIAGNOSIS — I48.0 PAROXYSMAL A-FIB (HCC): ICD-10-CM

## 2018-03-23 PROCEDURE — G0439 PPPS, SUBSEQ VISIT: HCPCS | Performed by: FAMILY MEDICINE

## 2018-03-23 PROCEDURE — 99214 OFFICE O/P EST MOD 30 MIN: CPT | Performed by: FAMILY MEDICINE

## 2018-03-23 RX ORDER — DOXYCYCLINE HYCLATE 100 MG/1
100 CAPSULE ORAL DAILY
Qty: 10 CAPSULE | Refills: 0 | Status: SHIPPED | OUTPATIENT
Start: 2018-03-23 | End: 2018-05-18 | Stop reason: ALTCHOICE

## 2018-03-23 RX ORDER — DOXYCYCLINE HYCLATE 100 MG/1
100 CAPSULE ORAL DAILY
Qty: 10 CAPSULE | Refills: 0 | Status: SHIPPED | OUTPATIENT
Start: 2018-03-23 | End: 2018-03-23 | Stop reason: SDUPTHER

## 2018-03-23 NOTE — PROGRESS NOTES
Subjective   sOvaldo Lopez is a 82 y.o. male.     Chief Complaint   Patient presents with   • Back Pain   • Leg Pain   • Hypertension   • Hyperlipidemia   • Diabetes         History of Present Illness   Mr. Lopez returns for Medicare wellness visit and postop recovery following history of mammogram appointment laminectomy and hardware placement for severe lumbar spinal stenosis neurogenic claudication.  He is still walking regularly after finishing rehabilitation try to get his strength back.  He has had a little tenderness in the right testicle home health is followed up on this.  He appears to have some slight epididymitis.  He had a UTI treated earlier this month and appears to be of resolution.  He has had some postop anemia and a repeat labs today.  Also treatment of hypertension hyperlipidemia is reviewed.  As well as diabetes type 2.    The following portions of the patient's history were reviewed and updated as appropriate: allergies, current medications, past social history and problem list.    Review of Systems   HENT: Negative.    Eyes: Negative.    Respiratory: Negative.    Cardiovascular: Negative.    Gastrointestinal: Negative.    Endocrine: Negative.    Genitourinary: Positive for testicular pain.   Musculoskeletal: Positive for arthralgias, back pain and gait problem.   Allergic/Immunologic: Negative.    Neurological: Positive for weakness.   Psychiatric/Behavioral: Negative.        Objective   Vitals:    03/23/18 1108   BP: 146/76   Pulse: 76   Temp: 97 °F (36.1 °C)   SpO2: 93%     Physical Exam   Constitutional: He is oriented to person, place, and time. He appears well-developed.   Very pleasant gentleman who is using a rolling walker postoperatively from significant back surgery.   HENT:   Head: Normocephalic.   Right Ear: External ear normal.   Left Ear: External ear normal.   Mouth/Throat: Oropharynx is clear and moist.   Eyes: Conjunctivae are normal. Pupils are equal, round, and reactive  to light.   Neck: Normal range of motion. Neck supple.   Cardiovascular: Normal rate, regular rhythm and normal heart sounds.    No murmur heard.  Pulmonary/Chest: Effort normal and breath sounds normal.   Abdominal: Soft. Bowel sounds are normal.   Musculoskeletal:        Thoracic back: He exhibits decreased range of motion.        Lumbar back: He exhibits decreased range of motion.   Neurological: He is alert and oriented to person, place, and time. No sensory deficit. He exhibits abnormal muscle tone. Gait abnormal.   Bilateral leg weakness but with the ability to ambulate.  Some difficulty getting out of a chair.       Assessment/Plan   Problem List Items Addressed This Visit        Cardiovascular and Mediastinum    Paroxysmal a-fib    Relevant Orders    CBC & Differential    Iron and TIBC    Ferritin    Comprehensive Metabolic Panel    Hemoglobin A1c    Lipid Panel With / Chol / HDL Ratio    TSH    T4, Free    Urinalysis With / Culture If Indicated - Urine, Clean Catch    HTN (hypertension)    Relevant Orders    CBC & Differential    Iron and TIBC    Ferritin    Comprehensive Metabolic Panel    Hemoglobin A1c    Lipid Panel With / Chol / HDL Ratio    TSH    T4, Free    Urinalysis With / Culture If Indicated - Urine, Clean Catch    Hyperlipidemia    Relevant Orders    CBC & Differential    Iron and TIBC    Ferritin    Comprehensive Metabolic Panel    Hemoglobin A1c    Lipid Panel With / Chol / HDL Ratio    TSH    T4, Free    Urinalysis With / Culture If Indicated - Urine, Clean Catch       Endocrine    Type 2 diabetes mellitus without complication    Relevant Orders    CBC & Differential    Iron and TIBC    Ferritin    Comprehensive Metabolic Panel    Hemoglobin A1c    Lipid Panel With / Chol / HDL Ratio    TSH    T4, Free    Urinalysis With / Culture If Indicated - Urine, Clean Catch       Other    Spinal stenosis of lumbar region with neurogenic claudication    Relevant Orders    CBC & Differential    Iron and  TIBC    Ferritin    Comprehensive Metabolic Panel    Hemoglobin A1c    Lipid Panel With / Chol / HDL Ratio    TSH    T4, Free    Urinalysis With / Culture If Indicated - Urine, Clean Catch    Postoperative anemia due to acute blood loss    Relevant Orders    CBC & Differential    Iron and TIBC    Ferritin    Comprehensive Metabolic Panel    Hemoglobin A1c    Lipid Panel With / Chol / HDL Ratio    TSH    T4, Free    Urinalysis With / Culture If Indicated - Urine, Clean Catch      Other Visit Diagnoses     Medicare annual wellness visit, subsequent    -  Primary    Epididymitis, right        Relevant Orders    CBC & Differential    Iron and TIBC    Ferritin    Comprehensive Metabolic Panel    Hemoglobin A1c    Lipid Panel With / Chol / HDL Ratio    TSH    T4, Free    Urinalysis With / Culture If Indicated - Urine, Clean Catch    History of UTI        Relevant Medications    doxycycline (VIBRAMYCIN) 100 MG capsule    Other Relevant Orders    CBC & Differential    Iron and TIBC    Ferritin    Comprehensive Metabolic Panel    Hemoglobin A1c    Lipid Panel With / Chol / HDL Ratio    TSH    T4, Free    Urinalysis With / Culture If Indicated - Urine, Clean Catch      Plan: Screening labs.  Hold off on ferrous sulfate supplementation pending labs.  Doxycycline 100 mg daily #10 for epididymitis.  Recheck in 6 months.  Meds remain the same.  Continue exercise regimen.  Medicare wellness performed today.

## 2018-03-23 NOTE — PATIENT INSTRUCTIONS
Medicare Wellness  Personal Prevention Plan of Service     Date of Office Visit:  2018  Encounter Provider:  Caio Rodríguez Jr., MD  Place of Service:  [facility]  Patient Name: Osvaldo Lopez  :  1935    As part of the Medicare Wellness portion of your visit today, we are providing you with this personalized preventive plan of services (PPPS). This plan is based upon recommendations of the [place] Preventive Services Task Force (USPSTF) and the Advisory Committee on Immunization Practices (ACIP).    This lists the preventive care services that should be considered, and provides dates of when you are due. Items listed as completed are up-to-date and do not require any further intervention.    Health Maintenance   Topic Date Due   • DIABETIC EYE EXAM  1935   • URINE MICROALBUMIN  1935   • TDAP/TD VACCINES (1 - Tdap) 1956   • MEDICARE ANNUAL WELLNESS  2016   • ZOSTER VACCINE  2016   • DIABETIC FOOT EXAM  2017   • HEMOGLOBIN A1C  2018   • LIPID PANEL  2018   • INFLUENZA VACCINE  Completed   • PNEUMOCOCCAL VACCINES (65+ LOW/MEDIUM RISK)  Completed       No orders of the defined types were placed in this encounter.      No Follow-up on file.

## 2018-03-24 LAB
ALBUMIN SERPL-MCNC: 4.2 G/DL (ref 3.5–5.2)
ALBUMIN/GLOB SERPL: 1.7 G/DL
ALP SERPL-CCNC: 137 U/L (ref 39–117)
ALT SERPL-CCNC: 27 U/L (ref 1–41)
APPEARANCE UR: CLEAR
AST SERPL-CCNC: 26 U/L (ref 1–40)
BACTERIA #/AREA URNS HPF: ABNORMAL /HPF
BASOPHILS # BLD AUTO: 0.02 10*3/MM3 (ref 0–0.2)
BASOPHILS NFR BLD AUTO: 0.4 % (ref 0–1.5)
BILIRUB SERPL-MCNC: 0.4 MG/DL (ref 0.1–1.2)
BILIRUB UR QL STRIP: NEGATIVE
BUN SERPL-MCNC: 17 MG/DL (ref 8–23)
BUN/CREAT SERPL: 17 (ref 7–25)
CALCIUM SERPL-MCNC: 9.1 MG/DL (ref 8.6–10.5)
CHLORIDE SERPL-SCNC: 101 MMOL/L (ref 98–107)
CHOLEST SERPL-MCNC: 178 MG/DL (ref 0–200)
CHOLEST/HDLC SERPL: 3.87 {RATIO}
CO2 SERPL-SCNC: 27.3 MMOL/L (ref 22–29)
COLOR UR: YELLOW
CREAT SERPL-MCNC: 1 MG/DL (ref 0.76–1.27)
EOSINOPHIL # BLD AUTO: 0.06 10*3/MM3 (ref 0–0.7)
EOSINOPHIL NFR BLD AUTO: 1.3 % (ref 0.3–6.2)
EPI CELLS #/AREA URNS HPF: ABNORMAL /HPF
ERYTHROCYTE [DISTWIDTH] IN BLOOD BY AUTOMATED COUNT: 14.4 % (ref 11.5–14.5)
FERRITIN SERPL-MCNC: 60.07 NG/ML (ref 30–400)
GFR SERPLBLD CREATININE-BSD FMLA CKD-EPI: 72 ML/MIN/1.73
GFR SERPLBLD CREATININE-BSD FMLA CKD-EPI: 87 ML/MIN/1.73
GLOBULIN SER CALC-MCNC: 2.5 GM/DL
GLUCOSE SERPL-MCNC: 159 MG/DL (ref 65–99)
GLUCOSE UR QL: NEGATIVE
HBA1C MFR BLD: 7.15 % (ref 4.8–5.6)
HCT VFR BLD AUTO: 42.8 % (ref 40.4–52.2)
HDLC SERPL-MCNC: 46 MG/DL (ref 40–60)
HGB BLD-MCNC: 13 G/DL (ref 13.7–17.6)
HGB UR QL STRIP: NEGATIVE
IMM GRANULOCYTES # BLD: 0 10*3/MM3 (ref 0–0.03)
IMM GRANULOCYTES NFR BLD: 0 % (ref 0–0.5)
IRON SATN MFR SERPL: 12 % (ref 20–50)
IRON SERPL-MCNC: 50 MCG/DL (ref 59–158)
KETONES UR QL STRIP: NEGATIVE
LDLC SERPL CALC-MCNC: 102 MG/DL (ref 0–100)
LEUKOCYTE ESTERASE UR QL STRIP: NEGATIVE
LYMPHOCYTES # BLD AUTO: 1.25 10*3/MM3 (ref 0.9–4.8)
LYMPHOCYTES NFR BLD AUTO: 27.8 % (ref 19.6–45.3)
MCH RBC QN AUTO: 30.9 PG (ref 27–32.7)
MCHC RBC AUTO-ENTMCNC: 30.4 G/DL (ref 32.6–36.4)
MCV RBC AUTO: 101.7 FL (ref 79.8–96.2)
MICRO URNS: NORMAL
MICRO URNS: NORMAL
MONOCYTES # BLD AUTO: 0.24 10*3/MM3 (ref 0.2–1.2)
MONOCYTES NFR BLD AUTO: 5.3 % (ref 5–12)
MUCOUS THREADS URNS QL MICRO: PRESENT /HPF
NEUTROPHILS # BLD AUTO: 2.92 10*3/MM3 (ref 1.9–8.1)
NEUTROPHILS NFR BLD AUTO: 65.2 % (ref 42.7–76)
NITRITE UR QL STRIP: NEGATIVE
PH UR STRIP: 5.5 [PH] (ref 5–7.5)
PLATELET # BLD AUTO: 156 10*3/MM3 (ref 140–500)
POTASSIUM SERPL-SCNC: 4.4 MMOL/L (ref 3.5–5.2)
PROT SERPL-MCNC: 6.7 G/DL (ref 6–8.5)
PROT UR QL STRIP: NEGATIVE
RBC # BLD AUTO: 4.21 10*6/MM3 (ref 4.6–6)
RBC #/AREA URNS HPF: ABNORMAL /HPF
SODIUM SERPL-SCNC: 141 MMOL/L (ref 136–145)
SP GR UR: 1.02 (ref 1–1.03)
T4 FREE SERPL-MCNC: 1.23 NG/DL (ref 0.93–1.7)
TIBC SERPL-MCNC: 434 MCG/DL
TRIGL SERPL-MCNC: 148 MG/DL (ref 0–150)
TSH SERPL DL<=0.005 MIU/L-ACNC: 4.75 MIU/ML (ref 0.27–4.2)
UIBC SERPL-MCNC: 384 MCG/DL
URINALYSIS REFLEX: NORMAL
UROBILINOGEN UR STRIP-MCNC: 0.2 MG/DL (ref 0.2–1)
VLDLC SERPL CALC-MCNC: 29.6 MG/DL (ref 5–40)
WBC # BLD AUTO: 4.49 10*3/MM3 (ref 4.5–10.7)
WBC #/AREA URNS HPF: ABNORMAL /HPF

## 2018-03-28 ENCOUNTER — TELEPHONE (OUTPATIENT)
Dept: INTERNAL MEDICINE | Facility: CLINIC | Age: 83
End: 2018-03-28

## 2018-04-03 ENCOUNTER — OFFICE VISIT (OUTPATIENT)
Dept: INTERNAL MEDICINE | Facility: CLINIC | Age: 83
End: 2018-04-03

## 2018-04-03 VITALS
HEIGHT: 70 IN | SYSTOLIC BLOOD PRESSURE: 150 MMHG | HEART RATE: 75 BPM | WEIGHT: 224 LBS | OXYGEN SATURATION: 96 % | RESPIRATION RATE: 16 BRPM | DIASTOLIC BLOOD PRESSURE: 84 MMHG | BODY MASS INDEX: 32.07 KG/M2 | TEMPERATURE: 98 F

## 2018-04-03 DIAGNOSIS — N50.89 TESTICULAR SWELLING, RIGHT: Primary | ICD-10-CM

## 2018-04-03 PROCEDURE — 99213 OFFICE O/P EST LOW 20 MIN: CPT | Performed by: NURSE PRACTITIONER

## 2018-04-03 NOTE — PROGRESS NOTES
Subjective   Osvaldo Lopez is a 82 y.o. male.     Chief Complaint   Patient presents with   • Other     Right Testicle has been swollen for quite awhile, saw Dr. Rodríguez about two weeks ago.    • Groin Swelling         Mr. Lopez is a known patient to us here today for evaluation of right testicular swelling.  He saw Dr. rodríguez 2 weeks ago for the same condition and was placed on a 10 day course of doxycycline.  He has since finished the medicine and has not seen any improvement in his testicle.  He reports no burning during urination.  His testicle is tender but not extremely painful.  He reports no symptoms in his left testicle.  Other exacerbating or aggravating symptoms.             The following portions of the patient's history were reviewed and updated as appropriate: allergies, current medications, past social history and problem list.    Review of Systems   Constitutional: Negative.    Eyes: Negative.    Respiratory: Negative.    Cardiovascular: Negative.    Gastrointestinal: Negative.    Genitourinary: Positive for scrotal swelling and testicular pain.   Musculoskeletal: Negative.    Skin: Negative.        Objective   Vitals:    04/03/18 1210   BP: 150/84   Pulse: 75   Resp: 16   Temp: 98 °F (36.7 °C)   SpO2: 96%     Physical Exam   Constitutional: He is oriented to person, place, and time. He appears well-developed and well-nourished.   HENT:   Head: Normocephalic.   Right Ear: External ear normal.   Left Ear: External ear normal.   Nose: Nose normal.   Mouth/Throat: Oropharynx is clear and moist.   Eyes: Conjunctivae and EOM are normal. Pupils are equal, round, and reactive to light.   Neck: Normal range of motion.   Cardiovascular: Normal rate, regular rhythm and normal heart sounds.    Pulmonary/Chest: Effort normal and breath sounds normal.   Genitourinary: Right testis shows swelling and tenderness.   Musculoskeletal:   Weakness in BLE at baseline following back sx in Jan.    Neurological: He is alert  and oriented to person, place, and time.   Skin: Skin is warm and dry. Capillary refill takes more than 3 seconds.   Psychiatric: He has a normal mood and affect. His behavior is normal. Judgment and thought content normal.       Assessment/Plan   Problem List Items Addressed This Visit     Testicular swelling, right - Primary    Relevant Orders    Ambulatory Referral to Urology      Other Visit Diagnoses    None.            We'll refer to urology for further evaluation.

## 2018-04-05 ENCOUNTER — TELEPHONE (OUTPATIENT)
Dept: INTERNAL MEDICINE | Facility: CLINIC | Age: 83
End: 2018-04-05

## 2018-04-05 NOTE — TELEPHONE ENCOUNTER
Pt called and said that he got a call from his [facility] but we did not order it to go there only to the local pharmacy. Pt was advised that if received he should be sure to send it back and let them know we did not authorize the refill.

## 2018-04-24 RX ORDER — LISINOPRIL 40 MG/1
TABLET ORAL
Qty: 90 TABLET | Refills: 1 | Status: SHIPPED | OUTPATIENT
Start: 2018-04-24 | End: 2018-07-30 | Stop reason: SDUPTHER

## 2018-05-15 ENCOUNTER — OFFICE VISIT (OUTPATIENT)
Dept: ORTHOPEDIC SURGERY | Facility: CLINIC | Age: 83
End: 2018-05-15

## 2018-05-15 VITALS — TEMPERATURE: 98 F | WEIGHT: 234 LBS | HEIGHT: 71 IN | BODY MASS INDEX: 32.76 KG/M2

## 2018-05-15 DIAGNOSIS — M48.062 SPINAL STENOSIS OF LUMBAR REGION WITH NEUROGENIC CLAUDICATION: Primary | ICD-10-CM

## 2018-05-15 PROCEDURE — 99213 OFFICE O/P EST LOW 20 MIN: CPT | Performed by: ORTHOPAEDIC SURGERY

## 2018-05-15 NOTE — PROGRESS NOTES
He is 4 months out from multilevel decompression and fusion still complains of lower extremity weakness but states it's better indeed he has good strength in the ankle flexion extension knee flexion extension and hip flexion in the lower extremities.  This is states this is significantly better than preop so we are making progress.  He has an appointment with a neurologist and I will come their opinion at next visit in 2 months less get x-rays of the lumbar spine meantime I want to him to go to therapy.  He prefers the [facility] and that should be fine.  I will see him in 2 months with repeat x-rays.

## 2018-05-18 ENCOUNTER — OFFICE VISIT (OUTPATIENT)
Dept: CARDIOLOGY | Facility: CLINIC | Age: 83
End: 2018-05-18

## 2018-05-18 VITALS
HEIGHT: 71 IN | BODY MASS INDEX: 30.94 KG/M2 | SYSTOLIC BLOOD PRESSURE: 150 MMHG | DIASTOLIC BLOOD PRESSURE: 80 MMHG | WEIGHT: 221 LBS | HEART RATE: 67 BPM

## 2018-05-18 DIAGNOSIS — I10 ESSENTIAL HYPERTENSION: ICD-10-CM

## 2018-05-18 DIAGNOSIS — I48.0 PAF (PAROXYSMAL ATRIAL FIBRILLATION) (HCC): Primary | ICD-10-CM

## 2018-05-18 DIAGNOSIS — I35.0 NONRHEUMATIC AORTIC VALVE STENOSIS: ICD-10-CM

## 2018-05-18 PROCEDURE — 93000 ELECTROCARDIOGRAM COMPLETE: CPT | Performed by: INTERNAL MEDICINE

## 2018-05-18 PROCEDURE — 99214 OFFICE O/P EST MOD 30 MIN: CPT | Performed by: INTERNAL MEDICINE

## 2018-05-18 RX ORDER — AMLODIPINE BESYLATE 5 MG/1
5 TABLET ORAL DAILY
Qty: 90 TABLET | Refills: 3 | Status: SHIPPED | OUTPATIENT
Start: 2018-05-18 | End: 2019-01-22

## 2018-05-18 NOTE — PROGRESS NOTES
Subjective:     Encounter Date:07/11/2017      Patient ID: Osvaldo Lopez is a 82 y.o. male.    Chief Complaint:  Atrial Fibrillation   Presents for follow-up visit. Symptoms include shortness of breath. Symptoms are negative for an AICD problem, bradycardia, chest pain, dizziness, hypertension, hypotension, syncope and tachycardia. The symptoms have been stable. Past medical history includes atrial fibrillation.   Hypertension   This is a chronic problem. The current episode started more than 1 year ago. The problem is controlled. Associated symptoms include malaise/fatigue, peripheral edema and shortness of breath. Pertinent negatives include no anxiety or chest pain.       72-year-old gentleman presents today for reevaluation. She with a history of atrial fibrillation as well as hypertension.  Patient was recently hospitalized.  We had to cut back his blood pressure medicines but now he is doing better and his blood pressure starting to go back up.  He still short of breath hasn't changed he has a little bit of edema in his legs and his fatigue is definitely there but improving.    Review of Systems   Constitution: Positive for malaise/fatigue.   Cardiovascular: Negative for chest pain and syncope.   Respiratory: Positive for shortness of breath and wheezing.    Endocrine: Positive for polyuria.   Musculoskeletal: Positive for joint pain and myalgias.   Neurological: Negative for dizziness.   All other systems reviewed and are negative.        ECG 12 Lead  Date/Time: 5/18/2018 4:26 PM  Performed by: ESTEBAN RICH  Authorized by: ESTEBAN RICH   Comparison: compared with previous ECG from 2/21/2018  Rhythm: sinus rhythm  Conduction: complete RBBB  Clinical impression: abnormal ECG               Objective:     Physical Exam   Constitutional: He is oriented to person, place, and time. He appears well-developed.   HENT:   Head: Normocephalic.   Eyes: Conjunctivae are normal.   Neck: Normal range of  motion.   Cardiovascular: Normal rate and regular rhythm.    Murmur heard.   Harsh midsystolic murmur is present with a grade of 1/6  at the upper right sternal border radiating to the neck  Pulmonary/Chest: Breath sounds normal.   Abdominal: Soft. Bowel sounds are normal.   Musculoskeletal: Normal range of motion. He exhibits no edema.   Neurological: He is alert and oriented to person, place, and time.   Skin: Skin is warm and dry.   Psychiatric: He has a normal mood and affect. His behavior is normal.   Vitals reviewed.      Lab Review:       Assessment:          Diagnosis Plan   1. PAF (paroxysmal atrial fibrillation)  ECG 12 Lead   2. Paroxysmal a-fib     3. Essential hypertension     4. Nonrheumatic aortic valve stenosis            Plan:     1.  Paroxysmal fibrillation. Doing well remains in sinus rhythm.  QTc interval is 497 this is in the setting of a right bundle branch block.  2.  Hypertension blood pressures Were low after surgery so his Norvasc was discontinued.  They are now started just back up so I placed him back on his Norvasc 5 mg a day told to continue follows blood pressure.  3.  Patient is on amiodarone.  He'll need yearly chest x-rays as well as blood work looking for his liver and thyroid functions.  We'll defer to primary care physician Will follow follow back up in 12 months.  4.  Dizziness patient says that when he turned over in bed last night he got dizzy when he sat up from his EKG today he also developed dizzy but his blood pressure actually went up to 170/80.  Again were treating his blood pressure this could easily be in her ear he did complain of some sinus congestion.  I told her take some over-the-counter antihistamines but doesn't improve to follow-up with his primary care doctor.  5.  Mild aortic stenosis by a echocardiogram done on 1/18/18.  Valve area estimated at 1.7 cm        Atrial Fibrillation and Atrial Flutter  Assessment  • The patient has paroxysmal atrial  fibrillation  • This is non-valvular in etiology  • The patient's CHADS2-VASc score is 3  • A [id number] score of 2 or more is considered a high risk for a thromboembolic event  • Rivaroxaban prescribed    Plan  • Attempt to maintain sinus rhythm  • Continue rivaroxaban for antithrombotic therapy, bleeding issues discussed  • Continue amiodarone for rhythm control

## 2018-05-29 ENCOUNTER — OFFICE VISIT (OUTPATIENT)
Dept: INTERNAL MEDICINE | Facility: CLINIC | Age: 83
End: 2018-05-29

## 2018-05-29 VITALS
TEMPERATURE: 96.5 F | BODY MASS INDEX: 31.24 KG/M2 | HEART RATE: 69 BPM | DIASTOLIC BLOOD PRESSURE: 62 MMHG | WEIGHT: 224 LBS | SYSTOLIC BLOOD PRESSURE: 148 MMHG | OXYGEN SATURATION: 96 %

## 2018-05-29 DIAGNOSIS — M72.2 PLANTAR FASCIITIS OF LEFT FOOT: ICD-10-CM

## 2018-05-29 DIAGNOSIS — M79.672 HEEL PAIN, CHRONIC, LEFT: Primary | ICD-10-CM

## 2018-05-29 DIAGNOSIS — G89.29 HEEL PAIN, CHRONIC, LEFT: Primary | ICD-10-CM

## 2018-05-29 PROCEDURE — 99212 OFFICE O/P EST SF 10 MIN: CPT | Performed by: FAMILY MEDICINE

## 2018-05-29 NOTE — PROGRESS NOTES
Subjective   Osvaldo Lopez is a 82 y.o. male.     Chief Complaint   Patient presents with   • Foot Pain         History of Present Illness patient is been walking trying to rehabilitation from his back surgery which was extensive.  2 weeks ago he developed the sudden onset of left heel pain making it hard to walk.  He requires wearing a shoe for walking.    The following portions of the patient's history were reviewed and updated as appropriate: allergies, current medications, past social history and problem list.    Review of Systems   Constitutional: Negative.    Eyes: Negative.    Respiratory: Negative.    Cardiovascular: Negative.    Gastrointestinal: Negative.    All other systems reviewed and are negative.      Objective   Vitals:    05/29/18 1622   BP: 148/62   Pulse: 69   Temp: 96.5 °F (35.8 °C)   SpO2: 96%     Physical Exam   HENT:   Head: Normocephalic.   Right Ear: External ear normal.   Left Ear: External ear normal.   Eyes: Pupils are equal, round, and reactive to light.   Neck: Normal range of motion.   Cardiovascular: Normal rate and regular rhythm.    Murmur heard.   Systolic murmur is present with a grade of 2/6   Pulmonary/Chest: Effort normal and breath sounds normal.        Vitals reviewed.      Assessment/Plan   Problem List Items Addressed This Visit     None      Visit Diagnoses     Heel pain, chronic, left    -  Primary    Relevant Orders    Ambulatory Referral to Podiatry    Plantar fasciitis of left foot        Relevant Orders    Ambulatory Referral to Podiatry      The patient has evidence of plantar fasciitis and heel pain on the left.  We'll give him Voltaren gel pea-sized amount maximum up to 4 times a day with instructions about not exceeding this amount based on the fact that he is on Xarelto.  An appointment with podiatry.  Stretching exercises for plantar fasciitis are described.

## 2018-06-26 RX ORDER — AMIODARONE HYDROCHLORIDE 200 MG/1
TABLET ORAL
Qty: 90 TABLET | Refills: 0 | Status: SHIPPED | OUTPATIENT
Start: 2018-06-26 | End: 2018-07-18 | Stop reason: HOSPADM

## 2018-07-16 ENCOUNTER — HOSPITAL ENCOUNTER (OUTPATIENT)
Facility: HOSPITAL | Age: 83
Setting detail: OBSERVATION
Discharge: HOME | End: 2018-07-18
Attending: EMERGENCY MEDICINE | Admitting: PHYSICIAN ASSISTANT

## 2018-07-16 ENCOUNTER — APPOINTMENT (OUTPATIENT)
Dept: CT IMAGING | Facility: HOSPITAL | Age: 83
End: 2018-07-16

## 2018-07-16 ENCOUNTER — APPOINTMENT (OUTPATIENT)
Dept: GENERAL RADIOLOGY | Facility: HOSPITAL | Age: 83
End: 2018-07-16

## 2018-07-16 DIAGNOSIS — M62.81 PROXIMAL MUSCLE WEAKNESS: ICD-10-CM

## 2018-07-16 DIAGNOSIS — E11.44 CONTROLLED TYPE 2 DIABETES MELLITUS WITH DIABETIC AMYOTROPHY, WITH LONG-TERM CURRENT USE OF INSULIN (HCC): ICD-10-CM

## 2018-07-16 DIAGNOSIS — R55 SYNCOPE, UNSPECIFIED SYNCOPE TYPE: Primary | ICD-10-CM

## 2018-07-16 DIAGNOSIS — R53.1 GENERALIZED WEAKNESS: ICD-10-CM

## 2018-07-16 DIAGNOSIS — Z79.4 CONTROLLED TYPE 2 DIABETES MELLITUS WITH DIABETIC AMYOTROPHY, WITH LONG-TERM CURRENT USE OF INSULIN (HCC): ICD-10-CM

## 2018-07-16 LAB
ALBUMIN SERPL-MCNC: 4.4 G/DL (ref 3.5–5.2)
ALBUMIN/GLOB SERPL: 1.7 G/DL
ALP SERPL-CCNC: 105 U/L (ref 39–117)
ALT SERPL W P-5'-P-CCNC: 26 U/L (ref 1–41)
ANION GAP SERPL CALCULATED.3IONS-SCNC: 12.7 MMOL/L
AST SERPL-CCNC: 23 U/L (ref 1–40)
BASOPHILS # BLD AUTO: 0.01 10*3/MM3 (ref 0–0.2)
BASOPHILS NFR BLD AUTO: 0.2 % (ref 0–1.5)
BILIRUB SERPL-MCNC: 0.4 MG/DL (ref 0.1–1.2)
BILIRUB UR QL STRIP: NEGATIVE
BUN BLD-MCNC: 19 MG/DL (ref 8–23)
BUN/CREAT SERPL: 17.9 (ref 7–25)
CALCIUM SPEC-SCNC: 9.6 MG/DL (ref 8.6–10.5)
CHLORIDE SERPL-SCNC: 102 MMOL/L (ref 98–107)
CLARITY UR: CLEAR
CO2 SERPL-SCNC: 25.3 MMOL/L (ref 22–29)
COLOR UR: YELLOW
CREAT BLD-MCNC: 1.06 MG/DL (ref 0.76–1.27)
DEPRECATED RDW RBC AUTO: 51.2 FL (ref 37–54)
EOSINOPHIL # BLD AUTO: 0.04 10*3/MM3 (ref 0–0.7)
EOSINOPHIL NFR BLD AUTO: 0.7 % (ref 0.3–6.2)
ERYTHROCYTE [DISTWIDTH] IN BLOOD BY AUTOMATED COUNT: 14.7 % (ref 11.5–14.5)
GFR SERPL CREATININE-BSD FRML MDRD: 67 ML/MIN/1.73
GLOBULIN UR ELPH-MCNC: 2.6 GM/DL
GLUCOSE BLD-MCNC: 169 MG/DL (ref 65–99)
GLUCOSE BLDC GLUCOMTR-MCNC: 195 MG/DL (ref 70–130)
GLUCOSE UR STRIP-MCNC: NEGATIVE MG/DL
HBA1C MFR BLD: 7.2 % (ref 4.8–5.6)
HCT VFR BLD AUTO: 43 % (ref 40.4–52.2)
HGB BLD-MCNC: 13.8 G/DL (ref 13.7–17.6)
HGB UR QL STRIP.AUTO: NEGATIVE
HOLD SPECIMEN: NORMAL
IMM GRANULOCYTES # BLD: 0.01 10*3/MM3 (ref 0–0.03)
IMM GRANULOCYTES NFR BLD: 0.2 % (ref 0–0.5)
KETONES UR QL STRIP: NEGATIVE
LEUKOCYTE ESTERASE UR QL STRIP.AUTO: NEGATIVE
LYMPHOCYTES # BLD AUTO: 0.92 10*3/MM3 (ref 0.9–4.8)
LYMPHOCYTES NFR BLD AUTO: 17 % (ref 19.6–45.3)
MCH RBC QN AUTO: 30.7 PG (ref 27–32.7)
MCHC RBC AUTO-ENTMCNC: 32.1 G/DL (ref 32.6–36.4)
MCV RBC AUTO: 95.8 FL (ref 79.8–96.2)
MONOCYTES # BLD AUTO: 0.23 10*3/MM3 (ref 0.2–1.2)
MONOCYTES NFR BLD AUTO: 4.3 % (ref 5–12)
NEUTROPHILS # BLD AUTO: 4.21 10*3/MM3 (ref 1.9–8.1)
NEUTROPHILS NFR BLD AUTO: 77.8 % (ref 42.7–76)
NITRITE UR QL STRIP: NEGATIVE
PH UR STRIP.AUTO: <=5 [PH] (ref 5–8)
PLATELET # BLD AUTO: 124 10*3/MM3 (ref 140–500)
PMV BLD AUTO: 9.9 FL (ref 6–12)
POTASSIUM BLD-SCNC: 4.8 MMOL/L (ref 3.5–5.2)
PROT SERPL-MCNC: 7 G/DL (ref 6–8.5)
PROT UR QL STRIP: NEGATIVE
RBC # BLD AUTO: 4.49 10*6/MM3 (ref 4.6–6)
SODIUM BLD-SCNC: 140 MMOL/L (ref 136–145)
SP GR UR STRIP: 1.01 (ref 1–1.03)
TROPONIN T SERPL-MCNC: 0.06 NG/ML (ref 0–0.03)
UROBILINOGEN UR QL STRIP: NORMAL
WBC NRBC COR # BLD: 5.41 10*3/MM3 (ref 4.5–10.7)
WHOLE BLOOD HOLD SPECIMEN: NORMAL

## 2018-07-16 PROCEDURE — 25010000002 TDAP 5-2.5-18.5 LF-MCG/0.5 SUSPENSION: Performed by: EMERGENCY MEDICINE

## 2018-07-16 PROCEDURE — 90715 TDAP VACCINE 7 YRS/> IM: CPT | Performed by: EMERGENCY MEDICINE

## 2018-07-16 PROCEDURE — 99219 PR INITIAL OBSERVATION CARE/DAY 50 MINUTES: CPT | Performed by: NURSE PRACTITIONER

## 2018-07-16 PROCEDURE — 84484 ASSAY OF TROPONIN QUANT: CPT | Performed by: PHYSICIAN ASSISTANT

## 2018-07-16 PROCEDURE — 85025 COMPLETE CBC W/AUTO DIFF WBC: CPT | Performed by: PHYSICIAN ASSISTANT

## 2018-07-16 PROCEDURE — G0378 HOSPITAL OBSERVATION PER HR: HCPCS

## 2018-07-16 PROCEDURE — 70450 CT HEAD/BRAIN W/O DYE: CPT

## 2018-07-16 PROCEDURE — 81003 URINALYSIS AUTO W/O SCOPE: CPT | Performed by: EMERGENCY MEDICINE

## 2018-07-16 PROCEDURE — 83036 HEMOGLOBIN GLYCOSYLATED A1C: CPT | Performed by: INTERNAL MEDICINE

## 2018-07-16 PROCEDURE — 99284 EMERGENCY DEPT VISIT MOD MDM: CPT

## 2018-07-16 PROCEDURE — 63710000001 INSULIN ASPART PER 5 UNITS: Performed by: INTERNAL MEDICINE

## 2018-07-16 PROCEDURE — 90471 IMMUNIZATION ADMIN: CPT | Performed by: EMERGENCY MEDICINE

## 2018-07-16 PROCEDURE — 82962 GLUCOSE BLOOD TEST: CPT

## 2018-07-16 PROCEDURE — 93010 ELECTROCARDIOGRAM REPORT: CPT | Performed by: INTERNAL MEDICINE

## 2018-07-16 PROCEDURE — 93005 ELECTROCARDIOGRAM TRACING: CPT | Performed by: PHYSICIAN ASSISTANT

## 2018-07-16 PROCEDURE — 73560 X-RAY EXAM OF KNEE 1 OR 2: CPT

## 2018-07-16 PROCEDURE — 80053 COMPREHEN METABOLIC PANEL: CPT | Performed by: PHYSICIAN ASSISTANT

## 2018-07-16 PROCEDURE — 36415 COLL VENOUS BLD VENIPUNCTURE: CPT

## 2018-07-16 RX ORDER — LISINOPRIL 40 MG/1
40 TABLET ORAL DAILY
Status: DISCONTINUED | OUTPATIENT
Start: 2018-07-16 | End: 2018-07-18 | Stop reason: HOSPADM

## 2018-07-16 RX ORDER — SODIUM CHLORIDE 0.9 % (FLUSH) 0.9 %
1-10 SYRINGE (ML) INJECTION AS NEEDED
Status: DISCONTINUED | OUTPATIENT
Start: 2018-07-16 | End: 2018-07-18 | Stop reason: HOSPADM

## 2018-07-16 RX ORDER — ACETAMINOPHEN 500 MG
500 TABLET ORAL EVERY 4 HOURS PRN
Status: DISCONTINUED | OUTPATIENT
Start: 2018-07-16 | End: 2018-07-18 | Stop reason: HOSPADM

## 2018-07-16 RX ORDER — SODIUM CHLORIDE 0.9 % (FLUSH) 0.9 %
10 SYRINGE (ML) INJECTION AS NEEDED
Status: DISCONTINUED | OUTPATIENT
Start: 2018-07-16 | End: 2018-07-18 | Stop reason: HOSPADM

## 2018-07-16 RX ORDER — ONDANSETRON 2 MG/ML
4 INJECTION INTRAMUSCULAR; INTRAVENOUS EVERY 6 HOURS PRN
Status: DISCONTINUED | OUTPATIENT
Start: 2018-07-16 | End: 2018-07-18 | Stop reason: HOSPADM

## 2018-07-16 RX ORDER — METOPROLOL SUCCINATE 25 MG/1
25 TABLET, EXTENDED RELEASE ORAL EVERY MORNING
Status: DISCONTINUED | OUTPATIENT
Start: 2018-07-17 | End: 2018-07-18 | Stop reason: HOSPADM

## 2018-07-16 RX ORDER — NICOTINE POLACRILEX 4 MG
15 LOZENGE BUCCAL
Status: DISCONTINUED | OUTPATIENT
Start: 2018-07-16 | End: 2018-07-18 | Stop reason: HOSPADM

## 2018-07-16 RX ORDER — ONDANSETRON 4 MG/1
4 TABLET, ORALLY DISINTEGRATING ORAL EVERY 6 HOURS PRN
Status: DISCONTINUED | OUTPATIENT
Start: 2018-07-16 | End: 2018-07-18 | Stop reason: HOSPADM

## 2018-07-16 RX ORDER — CHOLECALCIFEROL (VITAMIN D3) 125 MCG
1000 CAPSULE ORAL DAILY
Status: DISCONTINUED | OUTPATIENT
Start: 2018-07-16 | End: 2018-07-18 | Stop reason: HOSPADM

## 2018-07-16 RX ORDER — LIDOCAINE HYDROCHLORIDE AND EPINEPHRINE 10; 10 MG/ML; UG/ML
20 INJECTION, SOLUTION INFILTRATION; PERINEURAL ONCE
Status: COMPLETED | OUTPATIENT
Start: 2018-07-16 | End: 2018-07-16

## 2018-07-16 RX ORDER — ASCORBIC ACID 500 MG
500 TABLET ORAL DAILY
Status: DISCONTINUED | OUTPATIENT
Start: 2018-07-16 | End: 2018-07-18 | Stop reason: HOSPADM

## 2018-07-16 RX ORDER — ONDANSETRON 4 MG/1
4 TABLET, FILM COATED ORAL EVERY 6 HOURS PRN
Status: DISCONTINUED | OUTPATIENT
Start: 2018-07-16 | End: 2018-07-18 | Stop reason: HOSPADM

## 2018-07-16 RX ORDER — AMLODIPINE BESYLATE 5 MG/1
5 TABLET ORAL DAILY
Status: DISCONTINUED | OUTPATIENT
Start: 2018-07-16 | End: 2018-07-18 | Stop reason: HOSPADM

## 2018-07-16 RX ORDER — FINASTERIDE 5 MG/1
5 TABLET, FILM COATED ORAL EVERY MORNING
Status: DISCONTINUED | OUTPATIENT
Start: 2018-07-17 | End: 2018-07-18 | Stop reason: HOSPADM

## 2018-07-16 RX ORDER — DEXTROSE MONOHYDRATE 25 G/50ML
25 INJECTION, SOLUTION INTRAVENOUS
Status: DISCONTINUED | OUTPATIENT
Start: 2018-07-16 | End: 2018-07-18 | Stop reason: HOSPADM

## 2018-07-16 RX ORDER — AMIODARONE HYDROCHLORIDE 200 MG/1
200 TABLET ORAL
Status: DISCONTINUED | OUTPATIENT
Start: 2018-07-16 | End: 2018-07-18 | Stop reason: HOSPADM

## 2018-07-16 RX ORDER — DIPHENOXYLATE HYDROCHLORIDE AND ATROPINE SULFATE 2.5; .025 MG/1; MG/1
1 TABLET ORAL DAILY
Status: DISCONTINUED | OUTPATIENT
Start: 2018-07-16 | End: 2018-07-18 | Stop reason: HOSPADM

## 2018-07-16 RX ORDER — TAMSULOSIN HYDROCHLORIDE 0.4 MG/1
0.8 CAPSULE ORAL DAILY
Status: DISCONTINUED | OUTPATIENT
Start: 2018-07-16 | End: 2018-07-18 | Stop reason: HOSPADM

## 2018-07-16 RX ADMIN — METFORMIN HYDROCHLORIDE 500 MG: 500 TABLET ORAL at 18:07

## 2018-07-16 RX ADMIN — RIVAROXABAN 20 MG: 20 TABLET, FILM COATED ORAL at 18:06

## 2018-07-16 RX ADMIN — INSULIN ASPART 2 UNITS: 100 INJECTION, SOLUTION INTRAVENOUS; SUBCUTANEOUS at 21:32

## 2018-07-16 RX ADMIN — LIDOCAINE HYDROCHLORIDE AND EPINEPHRINE 20 ML: 10; 10 INJECTION, SOLUTION INFILTRATION; PERINEURAL at 12:36

## 2018-07-16 RX ADMIN — ACETAMINOPHEN 500 MG: 500 TABLET, FILM COATED ORAL at 18:04

## 2018-07-16 RX ADMIN — TETANUS TOXOID, REDUCED DIPHTHERIA TOXOID AND ACELLULAR PERTUSSIS VACCINE, ADSORBED 0.5 ML: 5; 2.5; 8; 8; 2.5 SUSPENSION INTRAMUSCULAR at 12:04

## 2018-07-16 NOTE — ED PROVIDER NOTES
The TIMMY and I have discussed this patient's history, physical exam, and treatment plan. I have reviewed the documentation and personally had a face to face interaction with the patient  I affirm the documentation and agree with the treatment and plan.  The following describes my personal findings.    The pt presents s/p fall that occurred prior to arrival while the pt was stretching his feet for planar fasciitis when he fell.  The pt states he does not know why he fell nor remember the fall.  The pt states that he has problems with leg muscles a lot. The pt's wife stated that after the fall the pt was conscious but confused while on the ground. The pt takes Xarelto and his last dose was yesterday morning. The pt confirms elbow abrasions and head pain. The pt denies neck pain, abd pain, chest pain, fever, new incontinence, black or bloody stool, SOA, or palpitations. The pt states that he has had falls in the last month, he reports bc his muscles were not holding him up.        Progress:    1154 Discussed that I am worried since the pt does not completely remember the fall, that the pt should have further workup in the hospital, as well as due to his elevated troponin. The pt states that he is additionally worried about his muscles. Discussed the plan to admit the pt. The pt agrees with the plan and all questions were addressed.     1200 Placed call to [facility] for admission without answer.     1213 Ordered orthostatic vitals. .    1216 Discussed the pt with Dr. Finch ([facility]) who agrees to admit the pt, and stated that I should hold the Xarelto.     1219 Ordered initiate observation status.     Limited physical exam:  Patient is nontoxic appearing with  R posterior parietal 3cm laceration with staples in place, clean, dry, and intact.  MAYELA, pt awake, alert, oriented, moves extremities well  cspine nonTTpalp  Lungs/cardiovascular: Clear to auscultation, regular rate and rhythm without murmur, Posterior tibial pulses  intact  Abdomen: Nontender  Back/extremities:  1+ pedal edema BLE, R medial upper knee tenderness to palpation, superficial abrasion over R elbow, superficial abrasion over L elbow with good rOM, pulse sens x 4    EKG           EKG time: 1033  Rhythm/Rate: Sinus, 60s  P waves and RI: Normal  QRS, axis: RBB pattern, LVH   ST and T waves: Normal     Interpreted Contemporaneously by me, independently viewed  Unchanged compared to prior 5/18/18    Disposition: ADMISSION  ADMISSION    Discussed treatment plan and reason for admission with pt/family and admitting physician.  Pt/family voiced understanding of the plan for admission for further testing/treatment as needed.     Documentation assistance provided by mag Cabrera.  Information recorded by the scribe was done at my direction and has been verified and validated by me.                   Flory Cabrera  07/16/18 6118       Bernie Dukes MD  07/18/18 8260

## 2018-07-16 NOTE — ED PROVIDER NOTES
EMERGENCY DEPARTMENT ENCOUNTER    Room Number:  19/19  Date seen:  7/16/2018  Time seen: 10:25 AM  PCP: Caio Rodríguez Jr., MD  Historian: Patient/spouse      HPI:  Chief complaint: Fall  Context: Osvaldo Lopez is a 82 y.o. male who presents to the ED c/o fall.  Pt states he was standing with his left knee up on a step to stretch it, and then remembers waking up on the floor.  Pt has no memory of the fall.  He denies any dizziness, chest pain, or feeling faint prior to the fall.  He did hit his head on the floor.  He has been ambulating well since the fall without difficulty or dizziness.     MEDICAL RECORD REVIEW   No significant prior medical history.     ALLERGIES  Percocet [oxycodone-acetaminophen]    PAST MEDICAL HISTORY  Active Ambulatory Problems     Diagnosis Date Noted   • Midline low back pain 03/17/2016   • Complete rotator cuff tear of left shoulder 03/17/2016   • Difficulty walking 04/12/2016   • Abnormal finding on thyroid function test 06/13/2016   • Abdominal aortic aneurysm (CMS/[facility]) 06/13/2016   • Atrial fibrillation (CMS/[facility]) 06/13/2016   • Benign prostatic hyperplasia 06/13/2016   • Edema 06/13/2016   • Essential hypertension 06/13/2016   • Fatigue 06/13/2016   • Hyperlipidemia 06/13/2016   • Shoulder pain 06/13/2016   • Lumbar radiculopathy 06/13/2016   • Muscle weakness 06/13/2016   • Osteoarthritis 06/13/2016   • Type 2 diabetes mellitus without complication (CMS/[facility]) 06/13/2016   • Primary osteoarthritis of left knee 08/15/2016   • OA (osteoarthritis) of knee 11/11/2016   • Toxic encephalopathy 11/17/2016   • Paroxysmal a-fib (CMS/[facility]) 11/17/2016   • Hypertension 11/17/2016   • Type 2 diabetes mellitus (CMS/[facility]) 11/17/2016   • BPH (benign prostatic hyperplasia) 11/17/2016   • Postoperative anemia due to acute blood loss 11/17/2016   • Cardiac murmur 11/17/2016   • Complete tear of left rotator cuff 03/20/2017   • Tear of right rotator cuff 03/20/2017   • Spinal stenosis of lumbar region  with neurogenic claudication 12/07/2017   • Tachycardia 01/17/2018   • Acute respiratory failure with hypoxia (CMS/[facility]) 01/18/2018   • Postoperative ileus (CMS/[facility]) 01/18/2018   • Hyponatremia 01/18/2018   • Testicular swelling, right 04/03/2018   • Blepharoptosis 11/09/2013   • Eyebrow ptosis 11/09/2013   • Laxity of eyelid 11/09/2013   • Pseudophakia 11/09/2013   • Nonrheumatic aortic valve stenosis 05/18/2018     Resolved Ambulatory Problems     Diagnosis Date Noted   • PAF (paroxysmal atrial fibrillation) (CMS/[facility]) 02/24/2016   • Enlarged prostate 06/13/2016   • Hypertonicity of bladder 06/13/2016   • Hyperglycemia 06/13/2016   • Increased frequency of urination 06/13/2016     Past Medical History:   Diagnosis Date   • Abnormal thyroid blood test    • Aneurysm of abdominal aorta (CMS/[facility])    • Arthritis    • Atrial fibrillation (CMS/[facility])    • BPH (benign prostatic hyperplasia)    • Bruises easily    • Bulging of thoracic intervertebral disc    • Coronary artery disease    • Diabetes mellitus (CMS/[facility])    • Diverticular disease    • Edema    • Enlarged prostate without lower urinary tract symptoms (luts)    • Essential hypertension    • Fatigue    • History of MI (myocardial infarction) 1989   • Hyperactivity of bladder    • Hyperlipidemia    • Hypertension    • Joint pain    • Left shoulder pain    • Lumbar radiculopathy 2016   • Malaise and fatigue    • Muscle weakness (generalized)    • Myalgia    • Osteoarthritis    • PAF (paroxysmal atrial fibrillation) (CMS/[facility])    • Personal history of arthritis    • Polyuria    • Screening    • Torn rotator cuff    • Type 2 diabetes mellitus (CMS/[facility])    • Urinary frequency        PAST SURGICAL HISTORY  Past Surgical History:   Procedure Laterality Date   • CARDIAC SURGERY      CABGX5 (1989)   • CATARACT EXTRACTION Bilateral 1997   • CYST REMOVAL      FROM BACK   • GALLBLADDER SURGERY  1989   • HERNIA REPAIR Left     inquinal times 3  last one in 2003   • KNEE CARTILAGE  SURGERY Left 1970's   • LUMBAR DISCECTOMY FUSION INSTRUMENTATION N/A 4/11/2016    Procedure: lumbar laminectomy L4-5 and fusion with instrumentation;  Surgeon: Ran Kline MD;  Location: [facility] OR;  Service:    • LUMBAR DISCECTOMY FUSION INSTRUMENTATION N/A 1/15/2018    Procedure: L2-3, L3-4 laminectomy and fusion with instrumentation and removal of implants L4 5.;  Surgeon: Ran Kline MD;  Location: [facility] OR;  Service:    • PA TOTAL KNEE ARTHROPLASTY Left 11/14/2016    Procedure: TOTAL KNEE ARTHROPLASTY;  Surgeon: Dontrell Arellano MD;  Location: [facility] OR;  Service: Orthopedics       FAMILY HISTORY  Family History   Problem Relation Age of Onset   • Heart failure Mother        SOCIAL HISTORY  Social History     Social History   • Marital status:      Spouse name: N/A   • Number of children: N/A   • Years of education: N/A     Occupational History   • Not on file.     Social History Main Topics   • Smoking status: Former Smoker     Years: 36.00     Types: Cigarettes     Quit date: 1989   • Smokeless tobacco: Never Used      Comment: states smoked 2-3 ppd   • Alcohol use Yes      Comment: 2-3 times weekly  //  caffeine use   • Drug use: No   • Sexual activity: Defer     Other Topics Concern   • Not on file     Social History Narrative   • No narrative on file           REVIEW OF SYSTEMS  Review of Systems   Constitutional: Negative for chills and fever.   HENT: Negative for congestion.    Respiratory: Negative for chest tightness and shortness of breath.    Cardiovascular: Negative for chest pain.   Gastrointestinal: Negative for abdominal distention and abdominal pain.   Genitourinary: Negative for difficulty urinating.   Musculoskeletal: Positive for gait problem (chronic).   Skin:        Scalp lac   Neurological: Negative for dizziness, weakness and numbness.   Psychiatric/Behavioral: Negative.    All other systems reviewed and are negative.          PHYSICAL EXAM  ED Triage Vitals    Temp Heart Rate Resp BP SpO2   07/16/18 0922 07/16/18 0922 07/16/18 1017 07/16/18 1017 07/16/18 0922   96.9 °F (36.1 °C) 66 16 154/68 95 %      Temp src Heart Rate Source Patient Position BP Location FiO2 (%)   07/16/18 0922 07/16/18 0922 07/16/18 1017 07/16/18 1017 --   Tympanic Monitor Sitting Right arm      Physical Exam   Constitutional: He is oriented to person, place, and time and well-developed, well-nourished, and in no distress.   HENT:   Right sided occipital scalp laceration   Eyes: EOM are normal. Pupils are equal, round, and reactive to light.   Neck: Neck supple.   Cardiovascular: Normal rate and regular rhythm.    Pulmonary/Chest: No respiratory distress.   Abdominal: He exhibits no distension. There is no tenderness.   Musculoskeletal: Normal range of motion.   Neurological: He is alert and oriented to person, place, and time.   Skin: Skin is warm and dry.   Psychiatric: Affect and judgment normal.   Nursing note and vitals reviewed.        LAB RESULTS  Recent Results (from the past 24 hour(s))   Comprehensive Metabolic Panel    Collection Time: 07/16/18 10:30 AM   Result Value Ref Range    Glucose 169 (H) 65 - 99 mg/dL    BUN 19 8 - 23 mg/dL    Creatinine 1.06 0.76 - 1.27 mg/dL    Sodium 140 136 - 145 mmol/L    Potassium 4.8 3.5 - 5.2 mmol/L    Chloride 102 98 - 107 mmol/L    CO2 25.3 22.0 - 29.0 mmol/L    Calcium 9.6 8.6 - 10.5 mg/dL    Total Protein 7.0 6.0 - 8.5 g/dL    Albumin 4.40 3.50 - 5.20 g/dL    ALT (SGPT) 26 1 - 41 U/L    AST (SGOT) 23 1 - 40 U/L    Alkaline Phosphatase 105 39 - 117 U/L    Total Bilirubin 0.4 0.1 - 1.2 mg/dL    eGFR Non African Amer 67 >60 mL/min/1.73    Globulin 2.6 gm/dL    A/G Ratio 1.7 g/dL    BUN/Creatinine Ratio 17.9 7.0 - 25.0    Anion Gap 12.7 mmol/L   Troponin    Collection Time: 07/16/18 10:30 AM   Result Value Ref Range    Troponin T 0.061 (H) 0.000 - 0.030 ng/mL   CBC Auto Differential    Collection Time: 07/16/18 10:30 AM   Result Value Ref Range    WBC  5.41 4.50 - 10.70 10*3/mm3    RBC 4.49 (L) 4.60 - 6.00 10*6/mm3    Hemoglobin 13.8 13.7 - 17.6 g/dL    Hematocrit 43.0 40.4 - 52.2 %    MCV 95.8 79.8 - 96.2 fL    MCH 30.7 27.0 - 32.7 pg    MCHC 32.1 (L) 32.6 - 36.4 g/dL    RDW 14.7 (H) 11.5 - 14.5 %    RDW-SD 51.2 37.0 - 54.0 fl    MPV 9.9 6.0 - 12.0 fL    Platelets 124 (L) 140 - 500 10*3/mm3    Neutrophil % 77.8 (H) 42.7 - 76.0 %    Lymphocyte % 17.0 (L) 19.6 - 45.3 %    Monocyte % 4.3 (L) 5.0 - 12.0 %    Eosinophil % 0.7 0.3 - 6.2 %    Basophil % 0.2 0.0 - 1.5 %    Immature Grans % 0.2 0.0 - 0.5 %    Neutrophils, Absolute 4.21 1.90 - 8.10 10*3/mm3    Lymphocytes, Absolute 0.92 0.90 - 4.80 10*3/mm3    Monocytes, Absolute 0.23 0.20 - 1.20 10*3/mm3    Eosinophils, Absolute 0.04 0.00 - 0.70 10*3/mm3    Basophils, Absolute 0.01 0.00 - 0.20 10*3/mm3    Immature Grans, Absolute 0.01 0.00 - 0.03 10*3/mm3   Light Blue Top    Collection Time: 07/16/18 10:31 AM   Result Value Ref Range    Extra Tube hold for add-on    Gold Top - SST    Collection Time: 07/16/18 10:31 AM   Result Value Ref Range    Extra Tube Hold for add-ons.    Urinalysis With Microscopic If Indicated (No Culture) - Urine, Clean Catch    Collection Time: 07/16/18 12:49 PM   Result Value Ref Range    Color, UA Yellow Yellow, Straw    Appearance, UA Clear Clear    pH, UA <=5.0 5.0 - 8.0    Specific Gravity, UA 1.013 1.005 - 1.030    Glucose, UA Negative Negative    Ketones, UA Negative Negative    Bilirubin, UA Negative Negative    Blood, UA Negative Negative    Protein, UA Negative Negative    Leuk Esterase, UA Negative Negative    Nitrite, UA Negative Negative    Urobilinogen, UA 0.2 E.U./dL 0.2 - 1.0 E.U./dL       I ordered the above labs and reviewed the results        RADIOLOGY  XR Knee 1 or 2 View Left   Final Result   Left total knee arthroplasty. Knee joint effusion without   evidence for fracture.       This report was finalized on 7/16/2018 12:31 PM by Dr. Heron Bliss M.D.          CT Head  Without Contrast   Preliminary Result   No acute process identified.       Radiation dose reduction techniques were utilized, including automated   exposure control and exposure modulation based on body size.                    MEDICATIONS GIVEN IN ER  Medications   sodium chloride 0.9 % flush 10 mL (not administered)   lidocaine-EPINEPHrine (XYLOCAINE W/EPI) 1 %-1:549313 injection 20 mL (20 mL Injection Given by Other 7/16/18 1236)   Tdap (BOOSTRIX) injection 0.5 mL (0.5 mL Intramuscular Given 7/16/18 1204)           EKG  Interpreted by ED Physician. Please see their document for interpretation.         PROCEDURES  Laceration Repair  Date/Time: 7/16/2018 11:45 AM  Performed by: DAVID LEVY  Authorized by: OKSANA LANDON     Consent:     Consent obtained:  Verbal    Consent given by:  Patient  Anesthesia (see MAR for exact dosages):     Anesthesia method:  Local infiltration    Local anesthetic:  Lidocaine 1% WITH epi  Laceration details:     Location:  Scalp    Scalp location:  Occipital    Length (cm):  3.3  Repair type:     Repair type:  Simple  Pre-procedure details:     Preparation:  Patient was prepped and draped in usual sterile fashion  Exploration:     Hemostasis achieved with:  Epinephrine    Wound exploration: entire depth of wound probed and visualized      Contaminated: no    Treatment:     Area cleansed with:  Hibiclens and saline    Amount of cleaning:  Standard    Irrigation solution:  Sterile saline    Irrigation method:  Syringe    Visualized foreign bodies/material removed: no    Skin repair:     Repair method:  Staples    Number of staples:  6  Approximation:     Approximation:  Close  Post-procedure details:     Dressing:  Antibiotic ointment    Patient tolerance of procedure:  Tolerated well, no immediate complications              COURSE & MEDICAL DECISION MAKING  Pertinent Labs and Imaging studies that were ordered and reviewed are noted above.  Results were reviewed/discussed with the  "patient and they were also made aware of online assess.  Pt also made aware that some labs, such as cultures, will not be resulted during ER visit and follow up with PMD is necessary.         PROGRESS AND CONSULTS    Progress Notes:  1100  Care turned over to Dr Dukes, pending work-up.           Disposition vitals:  /71 (Patient Position: Standing)   Pulse 81   Temp 96.9 °F (36.1 °C) (Tympanic)   Resp 16   Ht 182.9 cm (72\")   Wt 106 kg (234 lb)   SpO2 96%   BMI 31.74 kg/m²         DIAGNOSIS  Final diagnoses:   Syncope, unspecified syncope type         DISPOSITION  Admitted      FOLLOW UP   No follow-up provider specified.        RX     Medication List      No changes were made to your prescriptions during this visit.                             NESTOR Michael  07/16/18 1311    "

## 2018-07-16 NOTE — ED NOTES
Pt c/o left knee pain and head pain after falling this AM. Pt did hit head and does not remember the fall. Pt is on Xarelto. Pt states he was exercising his left foot on the top step and the went to do the other foot but does not remember anything after that. Pt did not fall down the stairs. Pt has a laceration on the back of his head.     Adrianna Concepcion RN  07/16/18 7477

## 2018-07-16 NOTE — PLAN OF CARE
Problem: Patient Care Overview  Goal: Plan of Care Review  Outcome: Ongoing (interventions implemented as appropriate)   07/16/18 1444   OTHER   Outcome Summary patient arrived to unit in stable condition   Coping/Psychosocial   Plan of Care Reviewed With patient;spouse;daughter   Plan of Care Review   Progress no change     Goal: Interprofessional Rounds/Family Conf  Outcome: Ongoing (interventions implemented as appropriate)      Problem: Fall Risk (Adult)  Goal: Identify Related Risk Factors and Signs and Symptoms  Outcome: Outcome(s) achieved Date Met: 07/16/18    Goal: Absence of Fall  Outcome: Ongoing (interventions implemented as appropriate)      Problem: Syncope (Adult)  Goal: Identify Related Risk Factors and Signs and Symptoms  Outcome: Outcome(s) achieved Date Met: 07/16/18    Goal: Physical Safety/Health Maintenance  Outcome: Ongoing (interventions implemented as appropriate)    Goal: Optimal Emotional/Functional [place]  Outcome: Ongoing (interventions implemented as appropriate)      Problem: Pain, Acute (Adult)  Goal: Identify Related Risk Factors and Signs and Symptoms  Outcome: Outcome(s) achieved Date Met: 07/16/18    Goal: Acceptable Pain Control/Comfort Level  Outcome: Ongoing (interventions implemented as appropriate)

## 2018-07-16 NOTE — H&P
Name: Osvaldo Lopez ADMIT: 2018   : 1935  PCP: Caio Rodríguez Jr., MD    MRN: [id number] LOS: 0 days   AGE/SEX: 82 y.o. male  ROOM: [facility]/     Chief Complaint   Patient presents with   • Fall     unknown mechanism of fall - pt las lac on head and c/o knee pain       History of present illness  Mr. Lopez is a 82 y.o. male has a history of Multiple problems mainly atrial fibrillation and recurrent falls prostate hypertrophy and hypertension and diabetes mellitus and presents to [facility] complaining of fall.  This morning he woke up and was trying to do some exercises and he feels that his left leg gave out and fell.  He was previously unconscious and confused.  Patient reports that he did not had any symptoms before the fall.  He takes Xarelto for his atrial fibrillation.  There is a small right-sided occipital laceration.  The CT did not show any evidence of head bleed.  He also had a x-ray of the knee which shows small pleural effusion but no fractures.    Patient also reports that he has plantar fascitis and he was doing exercises this morning    Past Medical History:   Diagnosis Date   • Abnormal thyroid blood test    • Aneurysm of abdominal aorta (CMS/HCC)    • Arthritis    • Atrial fibrillation (CMS/HCC)    • BPH (benign prostatic hyperplasia)    • Bruises easily    • Bulging of thoracic intervertebral disc    • Coronary artery disease    • Diabetes mellitus (CMS/HCC)     type 2   • Diverticular disease    • Edema     LOWER LEGS   • Enlarged prostate without lower urinary tract symptoms (luts)    • Essential hypertension    • Fatigue    • History of MI (myocardial infarction)    • Hyperactivity of bladder    • Hyperlipidemia    • Hypertension    • Joint pain    • Left shoulder pain    • Lumbar radiculopathy 2016    wears back brace at times following back surgery   • Malaise and fatigue    • Muscle weakness (generalized)    • Myalgia    • Osteoarthritis    • PAF  (paroxysmal atrial fibrillation) (CMS/HCC)    • Personal history of arthritis    • Polyuria    • Screening      stop bang scale 5   • Torn rotator cuff     left   • Type 2 diabetes mellitus (CMS/HCC)    • Urinary frequency      Past Surgical History:   Procedure Laterality Date   • CARDIAC SURGERY      CABGX5 (1989)   • CATARACT EXTRACTION Bilateral 1997   • CYST REMOVAL      FROM BACK   • GALLBLADDER SURGERY  1989   • HERNIA REPAIR Left     inquinal times 3  last one in 2003   • KNEE CARTILAGE SURGERY Left 1970's   • LUMBAR DISCECTOMY FUSION INSTRUMENTATION N/A 4/11/2016    Procedure: lumbar laminectomy L4-5 and fusion with instrumentation;  Surgeon: Ran Kline MD;  Location: [facility];  Service:    • LUMBAR DISCECTOMY FUSION INSTRUMENTATION N/A 1/15/2018    Procedure: L2-3, L3-4 laminectomy and fusion with instrumentation and removal of implants L4 5.;  Surgeon: Ran Kline MD;  Location: [facility];  Service:    • AZ TOTAL KNEE ARTHROPLASTY Left 11/14/2016    Procedure: TOTAL KNEE ARTHROPLASTY;  Surgeon: Dontrell Arellano MD;  Location: [facility];  Service: Orthopedics     Family History   Problem Relation Age of Onset   • Heart failure Mother      Social History   Substance Use Topics   • Smoking status: Former Smoker     Years: 36.00     Types: Cigarettes     Quit date: 1989   • Smokeless tobacco: Never Used      Comment: states smoked 2-3 ppd   • Alcohol use Yes      Comment: 2-3 times monthly  //  caffeine use     Prescriptions Prior to Admission   Medication Sig Dispense Refill Last Dose   • amiodarone (PACERONE) 200 MG tablet Take 1 tablet by mouth Daily. (Patient taking differently: Take 100 mg by mouth Daily.)   7/16/2018 at Unknown time   • amLODIPine (NORVASC) 5 MG tablet Take 1 tablet by mouth Daily. 90 tablet 3 7/16/2018 at Unknown time   • finasteride (PROSCAR) 5 MG tablet Take 5 mg by mouth Every Morning.   7/16/2018 at Unknown time   • lisinopril (PRINIVIL,ZESTRIL) 40 MG tablet  TAKE 1 TABLET DAILY 90 tablet 1 7/16/2018 at Unknown time   • metFORMIN (GLUCOPHAGE) 500 MG tablet Take 1 tablet by mouth 2 (Two) Times a Day With Meals. 180 tablet 3 7/15/2018 at Unknown time   • metoprolol succinate XL (TOPROL-XL) 25 MG 24 hr tablet Take 25 mg by mouth Every Morning.   7/16/2018 at Unknown time   • Multiple Vitamin (MULTI VITAMIN PO) Take 1 tablet by mouth Every Morning.   7/16/2018 at Unknown time   • rivaroxaban (XARELTO) 20 MG tablet Take 20 mg by mouth Daily.   7/16/2018 at Unknown time   • tamsulosin (FLOMAX) 0.4 MG capsule 24 hr capsule TAKE 2 CAPSULES DAILY FOR  PROSTATE (Patient taking differently: BID) 180 capsule 3 7/15/2018 at Unknown time   • vitamin B-12 (CYANOCOBALAMIN) 1000 MCG tablet Take 1,000 mcg by mouth Daily.   7/16/2018 at Unknown time   • vitamin C (ASCORBIC ACID) 500 MG tablet Take 500 mg by mouth Daily.   7/16/2018 at Unknown time   • acetaminophen (TYLENOL) 500 MG tablet Take 500 mg by mouth Every 6 (Six) Hours As Needed for Mild Pain .   Unknown at Unknown time   • amiodarone (PACERONE) 200 MG tablet TAKE 1 TABLET DAILY 90 tablet 0 Unknown at Unknown time   • diclofenac (VOLTAREN) 1 % gel gel Pea sized amount and apply to left heel up to 4 times daily 100 g 0 Unknown at Unknown time     Allergies:    Allergies   Allergen Reactions   • Percocet [Oxycodone-Acetaminophen] Mental Status Change     Causes confusion/       Review of Systems   Constitutional: Negative for activity change, appetite change, chills, fatigue and fever.   HENT: Negative for congestion, ear discharge, mouth sores, nosebleeds, sneezing, sore throat and trouble swallowing.    Eyes: Negative for discharge, itching and visual disturbance.   Respiratory: Negative for apnea, cough, chest tightness, shortness of breath, wheezing and stridor.    Cardiovascular: Negative for chest pain, palpitations and leg swelling.   Gastrointestinal: Negative for abdominal distention, abdominal pain, blood in stool,  constipation, diarrhea, nausea and vomiting.   Endocrine: Negative for cold intolerance, heat intolerance and polydipsia.   Genitourinary: Negative for difficulty urinating and frequency.   Musculoskeletal: Negative for arthralgias, back pain, gait problem, joint swelling and neck stiffness.   Skin: Negative for color change, pallor and rash.   Neurological: Negative for dizziness, seizures, syncope, facial asymmetry, weakness, numbness and headaches.   Hematological: Negative for adenopathy. Does not bruise/bleed easily.   Psychiatric/Behavioral: Negative for agitation, behavioral problems and hallucinations.        Objective    Vital Signs  Temp:  [96.9 °F (36.1 °C)] 96.9 °F (36.1 °C)  Heart Rate:  [66-82] 81  Resp:  [16] 16  BP: (136-158)/(67-76) 149/71  SpO2:  [95 %-96 %] 96 %  on   ;   Device (Oxygen Therapy): room air  Body mass index is 31.74 kg/m².    Physical Exam   Constitutional: He is oriented to person, place, and time. He appears well-developed and well-nourished. No distress.   HENT:   Head: Normocephalic and atraumatic.   Nose: No epistaxis.   Mouth/Throat: Oropharynx is clear and moist.   Eyes: Conjunctivae and EOM are normal. Pupils are equal, round, and reactive to light.   Neck: Normal range of motion. Neck supple. No JVD present. No tracheal deviation and no edema present. No thyroid mass and no thyromegaly present.   Cardiovascular: Normal rate, regular rhythm and normal heart sounds.    No murmur heard.  Pulmonary/Chest: Breath sounds normal. He has no decreased breath sounds. He has no wheezes.   Abdominal: Soft. Normal appearance and bowel sounds are normal. He exhibits no ascites. There is no hepatosplenomegaly.   Musculoskeletal: Normal range of motion. He exhibits no edema.   Neurological: He is alert and oriented to person, place, and time.   Skin: Skin is warm, dry and intact. No rash noted. No cyanosis or erythema. No pallor. Nails show no clubbing.   Psychiatric: He has a normal mood  and affect. His behavior is normal.   Vitals reviewed.      Results Review:   I reviewed the patient's new clinical results.    Lab Results (last 24 hours)     Procedure Component Value Units Date/Time    CBC & Differential [[id number]] Collected:  07/16/18 1030    Specimen:  Blood Updated:  07/16/18 1048    Narrative:       The following orders were created for panel order CBC & Differential.  Procedure                               Abnormality         Status                     ---------                               -----------         ------                     CBC Auto Differential[[id number]]        Abnormal            Final result                 Please view results for these tests on the individual orders.    Comprehensive Metabolic Panel [[id number]]  (Abnormal) Collected:  07/16/18 1030    Specimen:  Blood Updated:  07/16/18 1105     Glucose 169 (H) mg/dL      BUN 19 mg/dL      Creatinine 1.06 mg/dL      Sodium 140 mmol/L      Potassium 4.8 mmol/L      Chloride 102 mmol/L      CO2 25.3 mmol/L      Calcium 9.6 mg/dL      Total Protein 7.0 g/dL      Albumin 4.40 g/dL      ALT (SGPT) 26 U/L      AST (SGOT) 23 U/L      Alkaline Phosphatase 105 U/L      Total Bilirubin 0.4 mg/dL      eGFR Non African Amer 67 mL/min/1.73      Globulin 2.6 gm/dL      A/G Ratio 1.7 g/dL      BUN/Creatinine Ratio 17.9     Anion Gap 12.7 mmol/L     Narrative:       The MDRD GFR formula is only valid for adults with stable renal function between ages 18 and 70.    Troponin [[id number]]  (Abnormal) Collected:  07/16/18 1030    Specimen:  Blood Updated:  07/16/18 1105     Troponin T 0.061 (H) ng/mL     Narrative:       Troponin T Reference Ranges:  Less than 0.03 ng/mL:    Negative for AMI  0.03 to 0.09 ng/mL:      Indeterminant for AMI  Greater than 0.09 ng/mL: Positive for AMI    CBC Auto Differential [[id number]]  (Abnormal) Collected:  07/16/18 1030    Specimen:  Blood Updated:  07/16/18 1048     WBC 5.41 10*3/mm3      RBC 4.49 (L)  10*6/mm3      Hemoglobin 13.8 g/dL      Hematocrit 43.0 %      MCV 95.8 fL      MCH 30.7 pg      MCHC 32.1 (L) g/dL      RDW 14.7 (H) %      RDW-SD 51.2 fl      MPV 9.9 fL      Platelets 124 (L) 10*3/mm3      Neutrophil % 77.8 (H) %      Lymphocyte % 17.0 (L) %      Monocyte % 4.3 (L) %      Eosinophil % 0.7 %      Basophil % 0.2 %      Immature Grans % 0.2 %      Neutrophils, Absolute 4.21 10*3/mm3      Lymphocytes, Absolute 0.92 10*3/mm3      Monocytes, Absolute 0.23 10*3/mm3      Eosinophils, Absolute 0.04 10*3/mm3      Basophils, Absolute 0.01 10*3/mm3      Immature Grans, Absolute 0.01 10*3/mm3     Urinalysis With Microscopic If Indicated (No Culture) - Urine, Clean Catch [[id number]]  (Normal) Collected:  07/16/18 1249    Specimen:  Urine from Urine, Clean Catch Updated:  07/16/18 1258     Color, UA Yellow     Appearance, UA Clear     pH, UA <=5.0     Specific Gravity, UA 1.013     Glucose, UA Negative     Ketones, UA Negative     Bilirubin, UA Negative     Blood, UA Negative     Protein, UA Negative     Leuk Esterase, UA Negative     Nitrite, UA Negative     Urobilinogen, UA 0.2 E.U./dL    Narrative:       Urine microscopic not indicated.                    XR Knee 1 or 2 View Left   Final Result   Left total knee arthroplasty. Knee joint effusion without   evidence for fracture.       This report was finalized on 7/16/2018 12:31 PM by Dr. Heron Bliss M.D.          CT Head Without Contrast   Preliminary Result   No acute process identified.       Radiation dose reduction techniques were utilized, including automated   exposure control and exposure modulation based on body size.                Assessment/Plan      Active Hospital Problems    Diagnosis Date Noted   • Syncope [R55] 07/16/2018   • Type 2 diabetes mellitus (CMS/HCC) [E11.9] 11/17/2016   • Atrial fibrillation (CMS/HCC) [I48.91] 06/13/2016   • Benign prostatic hyperplasia [N40.0] 06/13/2016   • Essential hypertension [I10] 06/13/2016       Resolved Hospital Problems    Diagnosis Date Noted Date Resolved   No resolved problems to display.         Mr. Lopez is a 82 y.o. male who Had a fall and has a small laceration on the scalp.  The CT of the head did not show any evidence of bleed.     He is being admitted for observation.  We will get orthostatic blood pressures.  He has multiple reasons for fall especially being on Flomax and has diabetes on also has muscle weakness.  His troponins chronically elevated and  is not new.  Patient denies nausea vomiting and symptoms of dizziness and chest pain.    He passed a bedside swallow test    Repeat labs in the morning and get a cardiology consultation.  Also monitor his orthostatic blood pressure.    Possible home on Tuesday      I discussed the patients findings and my recommendations with patient, family and nursing staff.      Saurabh Finch MD  [facility]  07/16/18

## 2018-07-16 NOTE — PROGRESS NOTES
Discharge Planning Assessment  [facility]     Patient Name: Osvaldo Lopez  MRN: [id number]  Today's Date: 7/16/2018    Admit Date: 7/16/2018          Discharge Needs Assessment     Row Name 07/16/18 1623       Living Environment    Lives With spouse    Current Living Arrangements home/apartment/condo    Primary Care Provided by self;spouse/significant other    Quality of Family Relationships supportive;helpful    Able to Return to Prior Arrangements yes       Transition Planning    Patient/Family Anticipates Transition to home with family    Transportation Anticipated family or friend will provide       Discharge Needs Assessment    Equipment Currently Used at Home walker, rolling;rollator;cane, straight;commode;bath bench            Discharge Plan     Row Name 07/16/18 8726       Plan    Plan Plan is to return home with his family upon DC.  CCP will continue to follow and assist as needed.     Plan Comments Spoke with pt, his wife, Julia and his dtr, Sisi at bedside. Facesheet and pharmacy info confirmed.  Pt lives in a house with his wife, is IADLs and can drive.  Home DME includes a rollator and rolling walker which he uses when he is out of the house, cane, BSC and shower chair.  He has had [facility] in the past and has been to [facility] for rehab earlier this year.  He has a living will on file.  Discussed DC options including HH.  Pt states he does not think he will need HH upon DC.          Destination     No service coordination in this encounter.      Durable Medical Equipment     No service coordination in this encounter.      Dialysis/Infusion     No service coordination in this encounter.      Home Medical Care     No service coordination in this encounter.      Social Care     No service coordination in this encounter.                Demographic Summary     Row Name 07/16/18 0932       General Information    Admission Type observation    Arrived From home    Referral Source admission list     Reason for Consult discharge planning    Preferred Language English            Functional Status     Row Name 07/16/18 1624       Functional Status, IADL    Medications independent    Meal Preparation assistive person    Housekeeping assistive person    Laundry assistive person    Shopping assistive person       Mental Status    General Appearance WDL WDL            Psychosocial    No documentation.           Abuse/Neglect    No documentation.           Legal    No documentation.           Substance Abuse    No documentation.           Patient Forms    No documentation.         Juliana Castaneda RN

## 2018-07-16 NOTE — CONSULTS
Patient Name: Osvaldo Lopez  :1935  82 y.o.    Date of Admission: 2018  Encounter Provider: LOU Wesley  Date of Encounter Visit: 18  Place of Service: [facility]  Referring Provider: Saurabh Finch MD  Patient Care Team:  Caio Rodríguez Jr., MD as PCP - General (Family Medicine)  Caio Rodríguez Jr., MD as PCP - Claims Attributed  Dontrell Arellano MD as Surgeon (Orthopedic Surgery)  Angelo Driscoll MD as Consulting Physician (Cardiology)  Darvin Alvarez MD as Consulting Physician (Urology)  Brittany Orosco RN as Care Coordinator (Population Health)      Chief complaint: Syncope, fall    History of Present Illness: Osvaldo Lopez is an 82 year old male, followed by Dr. Driscoll with a history of HTN and paroxysmal atrial fibrillation on Xarelto. He has had some low blood pressure in the past and amlodipine was stopped, however at his last office visit, in 2018, his blood pressure was elevated and this was restarted. He did complain of dizziness at that time (in the setting of high blood pressure) and it was felt this was due to possible inner ear trouble. He has a history of mild aortic valve stenosis that was stable on echo in January. Over the last several months, patient has fallen several times. 2 times in which he hit is head.     He presented to the emergency room today after a fall while doing exercises on his stairs. He has significant lower extremity weakness due to muscle loss and this has resulted in several falls. He states when he bends his knees to a certain point, he lose all control and ends up going down. This is what happened today. He isn't really sure if he loses consciousness. He states once he starts to go down, things get a little fuzzy. He thinks he might space out some, because he doesn't remember trying to grab for anything as he was falling. His wife heard him fall from another room and when she found him, he  seemed disoriented.     Patient did sustain a scalp laceration on the bad of his head requiring stitches. A troponin was checked and found to be indeterminate (0.06). This appears to be chronic (0.08 and 0.05 in January). U/A was negative. CT of the head was negative. Orthostatics were performed in the emergency room and were somewhat positive with a 20 point drop in systolic blood pressure, however he had no symptoms of orthostasis when these were done (Lying 158/67  75, standing 136/71  82). He was admitted for further evaluation.     Patient denies any recent cardiac symptoms of angina or heart failure. He has not felt his heart racing or palpitations. He has had no chest pain or shortness of breath. He has not had any more dizziness.      CT head on 7/16/18-  IMPRESSION:  No acute process identified.     XR knee on 7/16/18-  IMPRESSION:  Left total knee arthroplasty. Knee joint effusion without  evidence for fracture.     Previous Testing-  Echocardiogram on 1/18/18-  Interpretation Summary      · Calculated EF = 62.3%  · Left ventricular systolic function is normal.  · The left ventricular cavity is small.  · Left ventricular wall thickness is consistent with borderline concentric hypertrophy.  · There is calcification of the aortic valve.  · Mild aortic valve stenosis is present.  · Aortic valve dimensionless index is 0.5.  · Aortic valve maximum pressure gradient is 17 mmHg. Aortic valve mean pressure gradient is 10 mmHg. Aortic valve area is 1.7 cm2  · Mild mitral valve regurgitation is present      Echocardiogram on 11/17/16-  Interpretation Summary      · Left ventricular function is normal. Calculated EF = 56.8%. Estimated EF was in agreement with the calculated EF. Estimated EF = 57%. Normal left ventricular cavity size and wall thickness noted. Left ventricular diastolic dysfunction is noted (grade I a w/high LAP) consistent with impaired relaxation.  · The following segments are hypokinetic: basal  inferolateral and mid inferolateral. All other segments are normal.  · Trace-to-mild aortic valve regurgitation is present.  · Moderate mitral annular calcification is present.  · Trace tricuspid valve regurgitation is present. Estimated right ventricular systolic pressure from tricuspid regurgitation is normal (<35 mmHg      Echocardiogram on 2/10/14-           Past Medical History:   Diagnosis Date   • Abnormal thyroid blood test    • Aneurysm of abdominal aorta (CMS/HCC)    • Arthritis    • Atrial fibrillation (CMS/HCC)    • BPH (benign prostatic hyperplasia)    • Bruises easily    • Bulging of thoracic intervertebral disc    • Coronary artery disease    • Diabetes mellitus (CMS/HCC)     type 2   • Diverticular disease    • Edema     LOWER LEGS   • Enlarged prostate without lower urinary tract symptoms (luts)    • Essential hypertension    • Fatigue    • History of MI (myocardial infarction) 1989   • Hyperactivity of bladder    • Hyperlipidemia    • Hypertension    • Joint pain    • Left shoulder pain    • Lumbar radiculopathy 2016    wears back brace at times following back surgery   • Malaise and fatigue    • Muscle weakness (generalized)    • Myalgia    • Osteoarthritis    • PAF (paroxysmal atrial fibrillation) (CMS/[facility])    • Personal history of arthritis    • Polyuria    • Screening      stop bang scale 5   • Torn rotator cuff     left   • Type 2 diabetes mellitus (CMS/HCC)    • Urinary frequency        Past Surgical History:   Procedure Laterality Date   • CARDIAC SURGERY      CABGX5 (1989)   • CATARACT EXTRACTION Bilateral 1997   • CYST REMOVAL      FROM BACK   • GALLBLADDER SURGERY  1989   • HERNIA REPAIR Left     inquinal times 3  last one in 2003   • KNEE CARTILAGE SURGERY Left 1970's   • LUMBAR DISCECTOMY FUSION INSTRUMENTATION N/A 4/11/2016    Procedure: lumbar laminectomy L4-5 and fusion with instrumentation;  Surgeon: Ran Kline MD;  Location: [facility];  Service:    • LUMBAR DISCECTOMY  FUSION INSTRUMENTATION N/A 1/15/2018    Procedure: L2-3, L3-4 laminectomy and fusion with instrumentation and removal of implants L4 5.;  Surgeon: Ran Kline MD;  Location: [facility] OR;  Service:    • MS TOTAL KNEE ARTHROPLASTY Left 11/14/2016    Procedure: TOTAL KNEE ARTHROPLASTY;  Surgeon: Dontrell Arellano MD;  Location: [facility];  Service: Orthopedics         Prior to Admission medications    Medication Sig Start Date End Date Taking? Authorizing Provider   amiodarone (PACERONE) 200 MG tablet Take 1 tablet by mouth Daily.  Patient taking differently: Take 100 mg by mouth Daily. 1/25/18  Yes Ran Kline MD   amLODIPine (NORVASC) 5 MG tablet Take 1 tablet by mouth Daily. 5/18/18  Yes Angelo Driscoll MD   finasteride (PROSCAR) 5 MG tablet Take 5 mg by mouth Every Morning. 9/14/17  Yes Historical Provider, MD   lisinopril (PRINIVIL,ZESTRIL) 40 MG tablet TAKE 1 TABLET DAILY 4/24/18  Yes Caio Rodríguez Jr., MD   metFORMIN (GLUCOPHAGE) 500 MG tablet Take 1 tablet by mouth 2 (Two) Times a Day With Meals. 10/31/17  Yes Caio Rodríguez Jr., MD   metoprolol succinate XL (TOPROL-XL) 25 MG 24 hr tablet Take 25 mg by mouth Every Morning.   Yes Historical Provider, MD   Multiple Vitamin (MULTI VITAMIN PO) Take 1 tablet by mouth Every Morning.   Yes Historical Provider, MD   rivaroxaban (XARELTO) 20 MG tablet Take 20 mg by mouth Daily.   Yes Historical Provider, MD   tamsulosin (FLOMAX) 0.4 MG capsule 24 hr capsule TAKE 2 CAPSULES DAILY FOR  PROSTATE  Patient taking differently: BID 3/12/18  Yes Caio Rodríguez Jr., MD   vitamin B-12 (CYANOCOBALAMIN) 1000 MCG tablet Take 1,000 mcg by mouth Daily.   Yes Historical Provider, MD   vitamin C (ASCORBIC ACID) 500 MG tablet Take 500 mg by mouth Daily.   Yes Historical Provider, MD   acetaminophen (TYLENOL) 500 MG tablet Take 500 mg by mouth Every 6 (Six) Hours As Needed for Mild Pain .    Historical Provider, MD   amiodarone (PACERONE) 200 MG tablet TAKE 1 TABLET DAILY 6/26/18    Angelo Driscoll MD   diclofenac (VOLTAREN) 1 % gel gel Pea sized amount and apply to left heel up to 4 times daily 5/29/18   Caio Rodríguez Jr., MD       Allergies   Allergen Reactions   • Percocet [Oxycodone-Acetaminophen] Mental Status Change     Causes confusion/       Social History     Social History   • Marital status:      Social History Main Topics   • Smoking status: Former Smoker     Years: 36.00     Types: Cigarettes     Quit date: 1989   • Smokeless tobacco: Never Used      Comment: states smoked 2-3 ppd   • Alcohol use Yes      Comment: 2-3 times monthly  //  caffeine use   • Drug use: No   • Sexual activity: Defer     Other Topics Concern   • Not on file       Family History   Problem Relation Age of Onset   • Heart failure Mother        REVIEW OF SYSTEMS:   All other systems reviewed and negative.     Constitutional: He is oriented to person, place, and time. He appears well-developed. He does not appear ill.   HENT:   Head: posterior laceration with staples  Neck: No JVD present. Carotid bruit is not present. No tracheal deviation present. Cardiovascular: Normal rate, regular rhythm and normal heart sounds.    Pulses:       Posterior tibial pulses are 2+ on the right side, and 2+ on the left side.   Pulmonary/Chest: Effort normal and breath sounds normal.   Abdominal: Soft. Normal appearance and bowel sounds are normal. There is no tenderness.   Musculoskeletal: Normal range of motion.     Neurological: He is alert and oriented to person, place, and time. He has normal strength.   Skin: Skin is warm, dry and intact. No rash noted.   Psychiatric: He has a normal mood and affect. His behavior is normal. Thought content normal.   Vitals reviewed         Objective:     Vitals:    07/16/18 1241 07/16/18 1243 07/16/18 1245 07/16/18 1247   BP: 145/76 158/67 136/71 149/71   BP Location:       Patient Position:  Lying Standing Standing   Pulse: 74 74 82 81   Resp:       Temp:       TempSrc:        SpO2: 96%      Weight:       Height:         Body mass index is 31.74 kg/m².  No intake or output data in the 24 hours ending 07/16/18 1713        Lab Review:       Results from last 7 days  Lab Units 07/16/18  1030   SODIUM mmol/L 140   POTASSIUM mmol/L 4.8   CHLORIDE mmol/L 102   CO2 mmol/L 25.3   BUN mg/dL 19   CREATININE mg/dL 1.06   CALCIUM mg/dL 9.6   BILIRUBIN mg/dL 0.4   ALK PHOS U/L 105   ALT (SGPT) U/L 26   AST (SGOT) U/L 23   GLUCOSE mg/dL 169*       Results from last 7 days  Lab Units 07/16/18  1030   TROPONIN T ng/mL 0.061*       Results from last 7 days  Lab Units 07/16/18  1030   WBC 10*3/mm3 5.41   HEMOGLOBIN g/dL 13.8   HEMATOCRIT % 43.0   PLATELETS 10*3/mm3 124*                  Telemetry-       EKG on 7/16/18-      I personally viewed and interpreted the patient's EKG/Telemetry data.        Assessment and Plan:   1. Recurrent falls and questionable syncope - he has had multiple falls recently due to muscle loss in his lower extremities. It is not really clear if he loses consciousness. He has not had any light headed or dizziness. He has a RBBB at baseline. Could consider discharging with a monitor to look for high degree heart block.   He had a recent echo that was fairly unremarkable. I don't think it needs to be repeated.     2. Atrial fibrillation - paroxysmal - he is currently in SR. I discussed the safety of anticoagulation given his recent falls and head trauma. Will defer any decision making to Dr. Driscoll in the AM.     3. HTN - blood pressure here has been stable.     4. Mild aortic stenosis  - outpatient surveillance     5. Equivocal troponin - appears chronic. He has absolutely no symptoms of angina. EKG without ischemic changes.    6. DM -  Per internal medicine.     Cindy Gomez, LOU  07/16/18  5:13 PM

## 2018-07-17 PROBLEM — R55 SYNCOPE: Status: RESOLVED | Noted: 2018-07-16 | Resolved: 2018-07-17

## 2018-07-17 LAB
ANION GAP SERPL CALCULATED.3IONS-SCNC: 13.4 MMOL/L
BUN BLD-MCNC: 17 MG/DL (ref 8–23)
BUN/CREAT SERPL: 18.1 (ref 7–25)
CALCIUM SPEC-SCNC: 8.4 MG/DL (ref 8.6–10.5)
CHLORIDE SERPL-SCNC: 103 MMOL/L (ref 98–107)
CK SERPL-CCNC: 111 U/L (ref 20–200)
CO2 SERPL-SCNC: 24.6 MMOL/L (ref 22–29)
CREAT BLD-MCNC: 0.94 MG/DL (ref 0.76–1.27)
GFR SERPL CREATININE-BSD FRML MDRD: 77 ML/MIN/1.73
GLUCOSE BLD-MCNC: 123 MG/DL (ref 65–99)
GLUCOSE BLDC GLUCOMTR-MCNC: 132 MG/DL (ref 70–130)
GLUCOSE BLDC GLUCOMTR-MCNC: 137 MG/DL (ref 70–130)
GLUCOSE BLDC GLUCOMTR-MCNC: 154 MG/DL (ref 70–130)
GLUCOSE BLDC GLUCOMTR-MCNC: 174 MG/DL (ref 70–130)
POTASSIUM BLD-SCNC: 4 MMOL/L (ref 3.5–5.2)
SODIUM BLD-SCNC: 141 MMOL/L (ref 136–145)
TROPONIN T SERPL-MCNC: 0.06 NG/ML (ref 0–0.03)

## 2018-07-17 PROCEDURE — 80048 BASIC METABOLIC PNL TOTAL CA: CPT | Performed by: INTERNAL MEDICINE

## 2018-07-17 PROCEDURE — G0378 HOSPITAL OBSERVATION PER HR: HCPCS

## 2018-07-17 PROCEDURE — 99213 OFFICE O/P EST LOW 20 MIN: CPT | Performed by: INTERNAL MEDICINE

## 2018-07-17 PROCEDURE — 97162 PT EVAL MOD COMPLEX 30 MIN: CPT

## 2018-07-17 PROCEDURE — 97110 THERAPEUTIC EXERCISES: CPT

## 2018-07-17 PROCEDURE — 63710000001 INSULIN ASPART PER 5 UNITS: Performed by: INTERNAL MEDICINE

## 2018-07-17 PROCEDURE — 84484 ASSAY OF TROPONIN QUANT: CPT | Performed by: INTERNAL MEDICINE

## 2018-07-17 PROCEDURE — G8979 MOBILITY GOAL STATUS: HCPCS

## 2018-07-17 PROCEDURE — 82550 ASSAY OF CK (CPK): CPT | Performed by: NURSE PRACTITIONER

## 2018-07-17 PROCEDURE — G8978 MOBILITY CURRENT STATUS: HCPCS

## 2018-07-17 PROCEDURE — 82962 GLUCOSE BLOOD TEST: CPT

## 2018-07-17 RX ADMIN — RIVAROXABAN 20 MG: 20 TABLET, FILM COATED ORAL at 09:24

## 2018-07-17 RX ADMIN — LISINOPRIL 40 MG: 40 TABLET ORAL at 09:24

## 2018-07-17 RX ADMIN — METFORMIN HYDROCHLORIDE 500 MG: 500 TABLET ORAL at 09:24

## 2018-07-17 RX ADMIN — INSULIN ASPART 2 UNITS: 100 INJECTION, SOLUTION INTRAVENOUS; SUBCUTANEOUS at 12:47

## 2018-07-17 RX ADMIN — ACETAMINOPHEN 500 MG: 500 TABLET, FILM COATED ORAL at 03:20

## 2018-07-17 RX ADMIN — Medication 1 TABLET: at 09:24

## 2018-07-17 RX ADMIN — AMIODARONE HYDROCHLORIDE 200 MG: 200 TABLET ORAL at 09:24

## 2018-07-17 RX ADMIN — METOPROLOL SUCCINATE 25 MG: 25 TABLET, FILM COATED, EXTENDED RELEASE ORAL at 06:09

## 2018-07-17 RX ADMIN — ACETAMINOPHEN 500 MG: 500 TABLET, FILM COATED ORAL at 14:46

## 2018-07-17 RX ADMIN — METFORMIN HYDROCHLORIDE 500 MG: 500 TABLET ORAL at 17:17

## 2018-07-17 RX ADMIN — OXYCODONE HYDROCHLORIDE AND ACETAMINOPHEN 500 MG: 500 TABLET ORAL at 09:24

## 2018-07-17 RX ADMIN — Medication 1000 MCG: at 09:24

## 2018-07-17 RX ADMIN — FINASTERIDE 5 MG: 5 TABLET, FILM COATED ORAL at 06:09

## 2018-07-17 RX ADMIN — TAMSULOSIN HYDROCHLORIDE 0.8 MG: 0.4 CAPSULE ORAL at 09:24

## 2018-07-17 RX ADMIN — AMLODIPINE BESYLATE 5 MG: 5 TABLET ORAL at 09:24

## 2018-07-17 NOTE — PLAN OF CARE
Problem: Patient Care Overview  Goal: Plan of Care Review  Outcome: Ongoing (interventions implemented as appropriate)   07/17/18 3460   OTHER   Outcome Summary Pt presents with some impaired functional mobility and gait seocndary to LE weakness and decreased activity tolerance s/p frequent falls. Pt and wife heavily educated on seeking further therapy services to decreased risk of falls and to improve function. Pt may benefit from skilled PT to address strength, transfer, and gait.   Coping/Psychosocial   Plan of Care Reviewed With patient

## 2018-07-17 NOTE — THERAPY EVALUATION
Acute Care - Physical Therapy Initial Evaluation  [facility]     Patient Name: Osvaldo Lopez  : 1935  MRN: [id number]  Today's Date: 2018   Onset of Illness/Injury or Date of Surgery: 18            Admit Date: 2018    Visit Dx:     ICD-10-CM ICD-9-CM   1. Syncope, unspecified syncope type R55 780.2   2. Generalized weakness R53.1 780.79     Patient Active Problem List   Diagnosis   • Midline low back pain   • Complete rotator cuff tear of left shoulder   • Difficulty walking   • Abnormal finding on thyroid function test   • Abdominal aortic aneurysm (CMS/HCC)   • Atrial fibrillation (CMS/HCC)   • Benign prostatic hyperplasia   • Edema   • Essential hypertension   • Fatigue   • Hyperlipidemia   • Shoulder pain   • Lumbar radiculopathy   • Muscle weakness   • Osteoarthritis   • Type 2 diabetes mellitus without complication (CMS/HCC)   • Primary osteoarthritis of left knee   • OA (osteoarthritis) of knee   • Toxic encephalopathy   • Paroxysmal a-fib (CMS/HCC)   • Hypertension   • Type 2 diabetes mellitus (CMS/HCC)   • BPH (benign prostatic hyperplasia)   • Postoperative anemia due to acute blood loss   • Cardiac murmur   • Complete tear of left rotator cuff   • Tear of right rotator cuff   • Spinal stenosis of lumbar region with neurogenic claudication   • Tachycardia   • Acute respiratory failure with hypoxia (CMS/HCC)   • Postoperative ileus (CMS/HCC)   • Hyponatremia   • Testicular swelling, right   • Blepharoptosis   • Eyebrow ptosis   • Laxity of eyelid   • Pseudophakia   • Nonrheumatic aortic valve stenosis   • Syncope     Past Medical History:   Diagnosis Date   • Abnormal thyroid blood test    • Aneurysm of abdominal aorta (CMS/HCC)    • Arthritis    • Atrial fibrillation (CMS/HCC)    • BPH (benign prostatic hyperplasia)    • Bruises easily    • Bulging of thoracic intervertebral disc    • Coronary artery disease    • Diabetes mellitus (CMS/HCC)     type 2   • Diverticular disease     • Edema     LOWER LEGS   • Enlarged prostate without lower urinary tract symptoms (luts)    • Essential hypertension    • Fatigue    • History of MI (myocardial infarction) 1989   • Hyperactivity of bladder    • Hyperlipidemia    • Hypertension    • Joint pain    • Left shoulder pain    • Lumbar radiculopathy 2016    wears back brace at times following back surgery   • Malaise and fatigue    • Muscle weakness (generalized)    • Myalgia    • Osteoarthritis    • PAF (paroxysmal atrial fibrillation) (CMS/HCC)    • Personal history of arthritis    • Polyuria    • Screening      stop bang scale 5   • Torn rotator cuff     left   • Type 2 diabetes mellitus (CMS/HCC)    • Urinary frequency      Past Surgical History:   Procedure Laterality Date   • CARDIAC SURGERY      CABGX5 (1989)   • CATARACT EXTRACTION Bilateral 1997   • CYST REMOVAL      FROM BACK   • GALLBLADDER SURGERY  1989   • HERNIA REPAIR Left     inquinal times 3  last one in 2003   • KNEE CARTILAGE SURGERY Left 1970's   • LUMBAR DISCECTOMY FUSION INSTRUMENTATION N/A 4/11/2016    Procedure: lumbar laminectomy L4-5 and fusion with instrumentation;  Surgeon: Ran Kline MD;  Location: [facility];  Service:    • LUMBAR DISCECTOMY FUSION INSTRUMENTATION N/A 1/15/2018    Procedure: L2-3, L3-4 laminectomy and fusion with instrumentation and removal of implants L4 5.;  Surgeon: Ran Kline MD;  Location: [facility];  Service:    • KS TOTAL KNEE ARTHROPLASTY Left 11/14/2016    Procedure: TOTAL KNEE ARTHROPLASTY;  Surgeon: Dontrell Arellano MD;  Location: [facility];  Service: Orthopedics        PT ASSESSMENT (last 12 hours)      Physical Therapy Evaluation     Row Name 07/17/18 1256          PT Evaluation Time/Intention    Subjective Information complains of;weakness  -CH     Document Type evaluation  -CH     Mode of Treatment physical therapy  -CH     Patient Effort good  -CH     Symptoms Noted During/After Treatment none  -CH     Comment pt with  h/o back surgeries and LE weakness  -     Row Name 07/17/18 1617          General Information    Onset of Illness/Injury or Date of Surgery 07/16/18  -     Patient Observations alert;cooperative;agree to therapy  -     Patient/Family Observations pt sitting in chair, wife present  -     Prior Level of Function independent:;gait;transfer;bed mobility;ADL's   walks with walker/cane  -     Equipment Currently Used at Home cane, straight;walker, rolling  -     Pertinent History of Current Functional Problem pt admitted with multiple falls and syncopal episodes  -     Existing Precautions/Restrictions fall  -     Barriers to Rehab medically complex;previous functional deficit  -     Row Name 07/17/18 1617          Relationship/Environment    Lives With spouse  -     Row Name 07/17/18 1617          Resource/Environmental Concerns    Current Living Arrangements home/apartment/condo  -     Row Name 07/17/18 1617          Cognitive Assessment/Interventions    Additional Documentation Cognitive Assessment/Intervention (Group)  -     Row Name 07/17/18 1617          Cognitive Assessment/Intervention- PT/OT    Orientation Status (Cognition) oriented x 4  -     Follows Commands (Cognition) WFL  -     Cognitive Function (Cognitive) WFL  -     Personal Safety Interventions fall prevention program maintained;gait belt;nonskid shoes/slippers when out of bed  -     Row Name 07/17/18 1617          Bed Mobility Assessment/Treatment    Bed Mobility Assessment/Treatment supine-sit;sit-supine  -     Supine-Sit [place] (Bed Mobility) not tested  -     Sit-Supine [place] (Bed Mobility) not tested  -     Comment (Bed Mobility) sitting in chair  -     Row Name 07/17/18 1617          Transfer Assessment/Treatment    Transfer Assessment/Treatment sit-stand transfer;stand-sit transfer  -     Comment (Transfers) increased effort and use of UEs required for sit to stand  -     Sit-Stand  [place] (Transfers) verbal cues;nonverbal cues (demo/gesture);contact guard  -     Stand-Sit [place] (Transfers) verbal cues;nonverbal cues (demo/gesture);contact guard  -[facility] Name 07/17/18 1617          Sit-Stand Transfer    Assistive Device (Sit-Stand Transfers) walker, 4-wheeled  -[facility] Name 07/17/18 1617          Stand-Sit Transfer    Assistive Device (Stand-Sit Transfers) walker, 4-wheeled  -CH     Row Name 07/17/18 1617          Gait/Stairs Assessment/Training    Gait/Stairs Assessment/Training gait/ambulation independence  -     [place] Level (Gait) verbal cues;nonverbal cues (demo/gesture);contact guard  -     Assistive Device (Gait) walker, 4-wheeled  -     Distance in Feet (Gait) 150  -     Deviations/Abnormal Patterns (Gait) --   fast pace  -[facility] Name 07/17/18 1617          General ROM    GENERAL ROM COMMENTS AROM WFL for age, L knee appears to lack some extension  -CH     Row Name 07/17/18 1617          General Assessment (Manual Muscle Testing)    Comment, General Manual Muscle Testing (MMT) Assessment R LE grossly 4+/5, L LE grossly 4/5, pt reports functional weakness especially with sit to stand  -[facility] Name 07/17/18 1617          Motor Assessment/Intervention    Additional Documentation Balance (Group)  -CH     Row Name 07/17/18 1617          Balance    Balance static standing balance;dynamic standing balance  -CH     Row Name 07/17/18 1617          Static Sitting Balance    Level of [place] (Unsupported Sitting, Static Balance) --  -CH     Row Name 07/17/18 1617          Static Standing Balance    Level of [place] (Supported Standing, Static Balance) contact guard assist  -     Assistive Device Utilized (Supported Standing, Static Balance) rolling walker  -[facility] Name 07/17/18 1617          Dynamic Standing Balance    Level of [place], Reaches Outside Midline (Standing, Dynamic Balance) contact guard assist   rolling walker  -      [facility] Name 07/17/18 1617          Pain Assessment    Additional Documentation Pain Scale: Numbers Pre/Post-Treatment (Group)  -[facility] Name 07/17/18 1617          Pain Scale: Numbers Pre/Post-Treatment    Pain Scale: Numbers, Pretreatment 0/10 - no pain  -[facility] Name 07/17/18 1617          Plan of Care Review    Plan of Care Reviewed With patient  -[facility] Name 07/17/18 1617          Physical Therapy Clinical Impression    Patient/Family Goals Statement (PT Clinical Impression) to increase LE strength  -     Criteria for Skilled Interventions Met (PT Clinical Impression) treatment indicated  -     Impairments Found (describe specific impairments) gait, locomotion, and balance;muscle performance  -     Rehab Potential (PT Clinical Summary) good, to achieve stated therapy goals  -[facility] Name 07/17/18 1617          Physical Therapy Goals    Bed Mobility Goal Selection (PT) bed mobility, PT goal 1  -     Transfer Goal Selection (PT) transfer, PT goal 1  -CH     Gait Training Goal Selection (PT) gait training, PT goal 1  -[facility] Name 07/17/18 1617          Bed Mobility Goal 1 (PT)    Activity/Assistive Device (Bed Mobility Goal 1, PT) bed mobility activities, all  -CH     [place] Level/Cues Needed (Bed Mobility Goal 1, PT) supervision required  -CH     Time Frame (Bed Mobility Goal 1, PT) 1 week  -[facility] Name 07/17/18 1617          Transfer Goal 1 (PT)    Activity/Assistive Device (Transfer Goal 1, PT) transfers, all;walker, rolling  -CH     [place] Level/Cues Needed (Transfer Goal 1, PT) supervision required  -CH     Time Frame (Transfer Goal 1, PT) 1 week  -[facility] Name 07/17/18 1617          Gait Training Goal 1 (PT)    Activity/Assistive Device (Gait Training Goal 1, PT) gait (walking locomotion);walker, rolling  -CH     [place] Level (Gait Training Goal 1, PT) supervision required  -CH     Distance (Gait Goal 1, PT) 150  -CH     Time Frame (Gait Training Goal 1, PT) 1  week  -     Row Name 07/17/18 1617          Positioning and Restraints    Pre-Treatment Position sitting in chair/recliner  -     Post Treatment Position chair  -     In Chair sitting;call light within reach;encouraged to call for assist;with family/caregiver  -       User Key  (r) = Recorded By, (t) = Taken By, (c) = Cosigned By    Initials Name Provider Type     Renetta Godinez, PT Physical Therapist          Physical Therapy Education     Title: PT OT SLP Therapies (Active)     Topic: Physical Therapy (Active)     Point: Mobility training (Done)    Learning Progress Summary     Learner Status Readiness Method Response Comment Documented by    Patient Done Acceptance E,TB,D VU,NR   07/17/18 1631          Point: Body mechanics (Done)    Learning Progress Summary     Learner Status Readiness Method Response Comment Documented by    Patient Done Acceptance E,TB,D VU,NR   07/17/18 1631          Point: Precautions (Done)    Learning Progress Summary     Learner Status Readiness Method Response Comment Documented by    Patient Done Acceptance E,TB,D VU,NR   07/17/18 1631                      User Key     Initials Effective Dates Name Provider Type [facility] 04/03/18 -  Renetta Godinez, PT Physical Therapist PT                PT Recommendation and Plan  Anticipated Discharge Disposition (PT): home with OP services  Planned Therapy Interventions (PT Eval): balance training, bed mobility training, gait training, home exercise program, patient/family education, transfer training  Therapy Frequency (PT Clinical Impression): daily  Outcome Summary/Treatment Plan (PT)  Anticipated Discharge Disposition (PT): home with OP services  Plan of Care Reviewed With: patient  Outcome Summary: Pt presents with some impaired functional mobility and gait seocndary to LE weakness and decreased activity tolerance s/p frequent falls. Pt and wife heavily educated on seeking further therapy services to decreased risk  of falls and to improve function. Pt may benefit from skilled PT to address strength, transfer, and gait.          Outcome Measures     Row Name 07/17/18 1600             How much help from another person do you currently need...    Turning from your back to your side while in flat bed without using bedrails? 3  -CH      Moving from lying on back to sitting on the side of a flat bed without bedrails? 3  -CH      Moving to and from a bed to a chair (including a wheelchair)? 3  -CH      Standing up from a chair using your arms (e.g., wheelchair, bedside chair)? 3  -CH      Climbing 3-5 steps with a railing? 3  -CH      To walk in hospital room? 3  -CH      AM-PAC 6 Clicks Score 18  -CH         Functional Assessment    Outcome Measure Options AM-PAC 6 Clicks Basic Mobility (PT)  -        User Key  (r) = Recorded By, (t) = Taken By, (c) = Cosigned By    Initials Name Provider Type     Renetta Godinez, PT Physical Therapist           Time Calculation:         PT Charges     Row Name 07/17/18 1633             Time Calculation    Start Time 1548  -      Stop Time 1617  -      Time Calculation (min) 29 min  -      PT Received On 07/17/18  -      PT - Next Appointment 07/18/18  -      PT Goal Re-Cert Due Date 07/24/18  -         Time Calculation- PT    Total Timed Code Minutes- PT 23 minute(s)  -        User Key  (r) = Recorded By, (t) = Taken By, (c) = Cosigned By    Initials Name Provider Type     Renetta Godinez, PT Physical Therapist        Therapy Suggested Charges     Code   Minutes Charges    None           Therapy Charges for Today     Code Description Service Date Service Provider Modifiers Qty    52107578430 HC PT EVAL MOD COMPLEXITY 2 7/17/2018 Renetta Godinez, PT GP 1    91494899627 HC PT THER PROC EA 15 MIN 7/17/2018 Renetta Godinez, PT GP 1    92185824957 HC PT MOBILITY CURRENT 7/17/2018 Renetta Godinez, PT GP, CK 1    53090001390 HC PT MOBILITY PROJECTED 7/17/2018 Renetta RODRIGUEZ  Herbert, PT GP, CJ 1          PT G-Codes  Outcome Measure Options: AM-PAC 6 Clicks Basic Mobility (PT)  Functional Limitation: Mobility: Walking and moving around  Mobility: Walking and Moving Around Current Status (): At least 40 percent but less than 60 percent impaired, limited or restricted  Mobility: Walking and Moving Around Goal Status (): At least 20 percent but less than 40 percent impaired, limited or restricted      Renetta Godinez, PT  7/17/2018

## 2018-07-17 NOTE — PROGRESS NOTES
Hospital Follow Up    LOS:  LOS: 0 days   Patient Name: Osvaldo Lopez  Age/Sex: 82 y.o. male  : 1935  MRN: [id number]    Day of Service: 18   Length of Stay: 0  Encounter Provider: Angelo Driscoll MD  Place of Service: [facility] CARDIOLOGY  Patient Care Team:  Caio Rodríguez Jr., MD as PCP - General (Family Medicine)  Caio Rodríguez Jr., MD as PCP - Claims Attributed  Dontrell Arellano MD as Surgeon (Orthopedic Surgery)  Angelo Driscoll MD as Consulting Physician (Cardiology)  Darvin Alvarez MD as Consulting Physician (Urology)  Brittany Orosco RN as Care Coordinator (Population Health)    Subjective:     Chief Complaint: Fall/atrial fibrillation    Interval History: Patient is back to baseline.  He's not really quite sure he passed out.  He says his legs were started getting extremely weak and shaky and is usually when he falls.  He was trying to do his exercise on the staircase for his plantar fasciitis.  He knew he was on the ground but he knew he was going to fall his legs are getting weak.    Objective:     Objective:  Temp:  [98.6 °F (37 °C)-99 °F (37.2 °C)] 99 °F (37.2 °C)  Heart Rate:  [64-82] 64  Resp:  [16] 16  BP: (132-158)/(65-76) 139/65     Intake/Output Summary (Last 24 hours) at 18 1151  Last data filed at 18 0323   Gross per 24 hour   Intake                0 ml   Output             1575 ml   Net            -1575 ml     Body mass index is 29.3 kg/m².  1    18  1135 18  0500   Weight: 106 kg (234 lb) 98 kg (216 lb 0.8 oz)     Weight change:       Physical Exam:   General : Alert, cooperative, in no acute distress.  Neuro: alert,cooperative and oriented  Lungs: CTAB. Normal respiratory effort and rate.  CV:: Regular rate and rhythm, normal S1 and S2, 2/6 sys murmurs, gallops or rubs.  ABD: Soft, nontender, non-distended. positive bowel sounds  Extr: No edema or cyanosis, moves all extremities    Lab Review:     Results from  last 7 days  Lab Units 07/17/18  0457 07/16/18  1030   SODIUM mmol/L 141 140   POTASSIUM mmol/L 4.0 4.8   CHLORIDE mmol/L 103 102   CO2 mmol/L 24.6 25.3   BUN mg/dL 17 19   CREATININE mg/dL 0.94 1.06   GLUCOSE mg/dL 123* 169*   CALCIUM mg/dL 8.4* 9.6   AST (SGOT) U/L  --  23   ALT (SGPT) U/L  --  26       Results from last 7 days  Lab Units 07/17/18  0457 07/16/18  1030   CK TOTAL U/L 111  --    TROPONIN T ng/mL 0.055* 0.061*       Results from last 7 days  Lab Units 07/16/18  1030   WBC 10*3/mm3 5.41   HEMOGLOBIN g/dL 13.8   HEMATOCRIT % 43.0   PLATELETS 10*3/mm3 124*               Current Medications:   Scheduled Meds:  amiodarone 200 mg Oral Q24H   amLODIPine 5 mg Oral Daily   finasteride 5 mg Oral QAM   insulin aspart 0-7 Units Subcutaneous 4x Daily With Meals & Nightly   lisinopril 40 mg Oral Daily   metFORMIN 500 mg Oral BID With Meals   metoprolol succinate XL 25 mg Oral QAM   multivitamin 1 tablet Oral Daily   rivaroxaban 20 mg Oral Daily   tamsulosin 0.8 mg Oral Daily   vitamin B-12 1,000 mcg Oral Daily   vitamin C 500 mg Oral Daily     Continuous Infusions:     Allergies:  Allergies   Allergen Reactions   • Percocet [Oxycodone-Acetaminophen] Mental Status Change     Causes confusion/       Assessment:     Active Problems:    Atrial fibrillation (CMS/HCC)    Benign prostatic hyperplasia    Essential hypertension    Type 2 diabetes mellitus (CMS/HCC)    Syncope        Plan:   1.  Recurrent falls.  Again this does not appear to be cardiac in origin he has seen a neurologist which is believed to be a nerve innervation to his muscles with subsequent muscle wasting.  He's had multiple low back surgeries and issues associated with that.  2.  Atrial fibrillation paroxysmal in nature he remains in sinus rhythm.  I had about 25 minutes face-to-face discussion with him and his wife this morning about the risk and benefits of anticoagulation.  At this point I would not discontinue it but I think we need to consider  which the going to think about.  3.  Hypertension blood pressures good  4.  Troponin unchanged do not think clinically significant  5.  Mild aortic stenosis  6.  From a cardiovascular standpoint okay to discharge follow-up with my office in 4-6 weeks.        Angelo Driscoll MD  07/17/18  11:51 AM

## 2018-07-17 NOTE — PLAN OF CARE
Problem: Patient Care Overview  Goal: Plan of Care Review  Outcome: Ongoing (interventions implemented as appropriate)   07/17/18 6831   OTHER   Outcome Summary VSS. Complained of head pain where his staples are. PRN Tylenol given with relief. Possible d/c soon. Will continue to monitor.    Coping/Psychosocial   Plan of Care Reviewed With patient   Plan of Care Review   Progress improving       Problem: Fall Risk (Adult)  Goal: Absence of Fall  Outcome: Ongoing (interventions implemented as appropriate)      Problem: Syncope (Adult)  Goal: Physical Safety/Health Maintenance  Outcome: Ongoing (interventions implemented as appropriate)      Problem: Pain, Acute (Adult)  Goal: Acceptable Pain Control/Comfort Level  Outcome: Ongoing (interventions implemented as appropriate)

## 2018-07-17 NOTE — PLAN OF CARE
Problem: Patient Care Overview  Goal: Plan of Care Review   07/17/18 1612   OTHER   Outcome Summary PT A&Ox4. VSS. PRN tylenol. Neuro consult. Probable DC in am. Will monitor.   Coping/Psychosocial   Plan of Care Reviewed With patient   Plan of Care Review   Progress improving       Problem: Fall Risk (Adult)  Goal: Absence of Fall  Outcome: Ongoing (interventions implemented as appropriate)      Problem: Syncope (Adult)  Goal: Physical Safety/Health Maintenance  Outcome: Ongoing (interventions implemented as appropriate)    Goal: Optimal Emotional/Functional [place]  Outcome: Ongoing (interventions implemented as appropriate)      Problem: Pain, Acute (Adult)  Goal: Acceptable Pain Control/Comfort Level  Outcome: Ongoing (interventions implemented as appropriate)

## 2018-07-17 NOTE — PROGRESS NOTES
Name: Osvaldo Lopez ADMIT: 2018   : 1935  PCP: Caio Rodríguez Jr., MD    MRN: [id number] LOS: 0 days   AGE/SEX: 82 y.o. male    ROOM: [facility]/   Subjective   History of fall    Brief hospital course since admission:  Atrial fibrillation  Status post multilevel decompression and fusion for lower extremity weakness by Dr. Ran Montalvo  Diabetes mellitus      I have reviewed past medical history, social hisotory, family history, allergies.  No changes from admission note.      Review of Systems   Constitutional: Negative for fatigue and fever.   Respiratory: Negative for shortness of breath.    Cardiovascular: Negative for chest pain and leg swelling.        Objective   Vital Signs  Temp:  [98.6 °F (37 °C)-99 °F (37.2 °C)] 99 °F (37.2 °C)  Heart Rate:  [64-71] 70  Resp:  [16] 16  BP: (132-155)/(63-71) 146/63  SpO2:  [93 %-96 %] 96 %  on  Flow (L/min):  [2] 2;   Device (Oxygen Therapy): room air  Body mass index is 29.3 kg/m².    Intake/Output Summary (Last 24 hours) at 18 1836  Last data filed at 18 1422   Gross per 24 hour   Intake                0 ml   Output              825 ml   Net             -825 ml       Physical Exam   Constitutional: He is oriented to person, place, and time. He appears well-developed and well-nourished. No distress.   HENT:   Head: Normocephalic and atraumatic.   Eyes: Pupils are equal, round, and reactive to light. No scleral icterus.   Cardiovascular: Normal rate and regular rhythm.    Pulmonary/Chest: Effort normal. No respiratory distress. He has no decreased breath sounds. He has no wheezes.   Abdominal: Soft. Bowel sounds are normal. There is no hepatosplenomegaly.   Neurological: He is alert and oriented to person, place, and time.   Skin: No cyanosis. Nails show no clubbing.   Psychiatric: He has a normal mood and affect. His behavior is normal.       Results Review:      Results from last 7 days  Lab Units 18  1030   WBC 10*3/mm3 5.41   HEMOGLOBIN  g/dL 13.8   HEMATOCRIT % 43.0   PLATELETS 10*3/mm3 124*       Results from last 7 days  Lab Units 07/17/18  0457 07/16/18  1030   SODIUM mmol/L 141 140   POTASSIUM mmol/L 4.0 4.8   CHLORIDE mmol/L 103 102   CO2 mmol/L 24.6 25.3   BUN mg/dL 17 19   CREATININE mg/dL 0.94 1.06   GLUCOSE mg/dL 123* 169*   CALCIUM mg/dL 8.4* 9.6         Hemoglobin A1C:  Lab Results   Component Value Date    HGBA1C 7.20 (H) 07/16/2018     Glucose Range:  Glucose   Date/Time Value Ref Range Status   07/17/2018 1631 132 (H) 70 - 130 mg/dL Final   07/17/2018 1117 174 (H) 70 - 130 mg/dL Final   07/17/2018 0617 137 (H) 70 - 130 mg/dL Final   07/16/2018 2128 195 (H) 70 - 130 mg/dL Final         amiodarone 200 mg Oral Q24H   amLODIPine 5 mg Oral Daily   finasteride 5 mg Oral QAM   insulin aspart 0-7 Units Subcutaneous 4x Daily With Meals & Nightly   lisinopril 40 mg Oral Daily   metFORMIN 500 mg Oral BID With Meals   metoprolol succinate XL 25 mg Oral QAM   multivitamin 1 tablet Oral Daily   rivaroxaban 20 mg Oral Daily   tamsulosin 0.8 mg Oral Daily   vitamin B-12 1,000 mcg Oral Daily   vitamin C 500 mg Oral Daily      Diet Regular; Consistent Carbohydrate  Assessment/Plan       Assessment/Plan      Active Hospital Problems    Diagnosis Date Noted   • Syncope [R55] 07/16/2018   • Type 2 diabetes mellitus (CMS/[facility]) [E11.9] 11/17/2016   • Atrial fibrillation (CMS/[facility]) [I48.91] 06/13/2016   • Benign prostatic hyperplasia [N40.0] 06/13/2016   • Essential hypertension [I10] 06/13/2016      Resolved Hospital Problems    Diagnosis Date Noted Date Resolved   No resolved problems to display.       I do not think the patient had true syncope  A lot of back issues and had no damage and had seen an urologist at [facility] who referred him to Dr. Ran Montalvo who did a decompression and fusion.  I was asked Dr. Leon to see but I think patient can be discharged home tomorrow to resume his physical therapy        Saurabh Finch MD  [facility]  07/17/18

## 2018-07-18 VITALS
SYSTOLIC BLOOD PRESSURE: 95 MMHG | HEART RATE: 84 BPM | OXYGEN SATURATION: 92 % | TEMPERATURE: 98.6 F | BODY MASS INDEX: 29.26 KG/M2 | HEIGHT: 72 IN | RESPIRATION RATE: 18 BRPM | WEIGHT: 216.05 LBS | DIASTOLIC BLOOD PRESSURE: 55 MMHG

## 2018-07-18 LAB
GLUCOSE BLDC GLUCOMTR-MCNC: 138 MG/DL (ref 70–130)
GLUCOSE BLDC GLUCOMTR-MCNC: 203 MG/DL (ref 70–130)

## 2018-07-18 PROCEDURE — 63710000001 INSULIN ASPART PER 5 UNITS: Performed by: INTERNAL MEDICINE

## 2018-07-18 PROCEDURE — G0378 HOSPITAL OBSERVATION PER HR: HCPCS

## 2018-07-18 PROCEDURE — 82962 GLUCOSE BLOOD TEST: CPT

## 2018-07-18 PROCEDURE — 99203 OFFICE O/P NEW LOW 30 MIN: CPT | Performed by: PSYCHIATRY & NEUROLOGY

## 2018-07-18 RX ORDER — NITROGLYCERIN 0.4 MG/1
0.4 TABLET SUBLINGUAL
Status: DISCONTINUED | OUTPATIENT
Start: 2018-07-18 | End: 2018-07-18 | Stop reason: HOSPADM

## 2018-07-18 RX ADMIN — TAMSULOSIN HYDROCHLORIDE 0.8 MG: 0.4 CAPSULE ORAL at 08:24

## 2018-07-18 RX ADMIN — METFORMIN HYDROCHLORIDE 500 MG: 500 TABLET ORAL at 08:24

## 2018-07-18 RX ADMIN — AMIODARONE HYDROCHLORIDE 200 MG: 200 TABLET ORAL at 08:23

## 2018-07-18 RX ADMIN — INSULIN ASPART 3 UNITS: 100 INJECTION, SOLUTION INTRAVENOUS; SUBCUTANEOUS at 12:18

## 2018-07-18 RX ADMIN — FINASTERIDE 5 MG: 5 TABLET, FILM COATED ORAL at 06:41

## 2018-07-18 RX ADMIN — ACETAMINOPHEN 500 MG: 500 TABLET, FILM COATED ORAL at 10:01

## 2018-07-18 RX ADMIN — RIVAROXABAN 20 MG: 20 TABLET, FILM COATED ORAL at 08:24

## 2018-07-18 RX ADMIN — OXYCODONE HYDROCHLORIDE AND ACETAMINOPHEN 500 MG: 500 TABLET ORAL at 08:23

## 2018-07-18 RX ADMIN — METOPROLOL SUCCINATE 25 MG: 25 TABLET, FILM COATED, EXTENDED RELEASE ORAL at 06:41

## 2018-07-18 RX ADMIN — AMLODIPINE BESYLATE 5 MG: 5 TABLET ORAL at 08:23

## 2018-07-18 RX ADMIN — Medication 1000 MCG: at 08:24

## 2018-07-18 RX ADMIN — Medication 1 TABLET: at 08:24

## 2018-07-18 RX ADMIN — LISINOPRIL 40 MG: 40 TABLET ORAL at 08:23

## 2018-07-18 NOTE — PLAN OF CARE
Problem: Patient Care Overview  Goal: Plan of Care Review  Outcome: Ongoing (interventions implemented as appropriate)   07/18/18 0453   OTHER   Outcome Summary Pt's oxygen saturation level dropped to 85% during sleep. Ox @ 2LPM via NC placed. Would without s/s infection. Pt anxious to go home.    Coping/Psychosocial   Plan of Care Reviewed With patient   Plan of Care Review   Progress no change     Goal: Individualization and Mutuality  Outcome: Ongoing (interventions implemented as appropriate)      Problem: Fall Risk (Adult)  Goal: Absence of Fall  Outcome: Ongoing (interventions implemented as appropriate)      Problem: Syncope (Adult)  Goal: Physical Safety/Health Maintenance  Outcome: Ongoing (interventions implemented as appropriate)    Goal: Optimal Emotional/Functional [place]  Outcome: Ongoing (interventions implemented as appropriate)      Problem: Pain, Acute (Adult)  Goal: Acceptable Pain Control/Comfort Level  Outcome: Ongoing (interventions implemented as appropriate)

## 2018-07-18 NOTE — DISCHARGE SUMMARY
Name: Osvaldo Lopez  Age: 82 y.o.  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 (CMS/HCC) [E11.44] 2016   • Atrial fibrillation (CMS/HCC) [I48.91] 2016   • Benign prostatic hyperplasia [N40.0] 2016   • Essential hypertension [I10] 2016   • Proximal muscle weakness [M62.81] 2016      Resolved Hospital Problems    Diagnosis Date Noted Date Resolved   • Syncope [R55] 2018       SECONDARY DIAGNOSES  Past Medical History:   Diagnosis Date   • Abnormal thyroid blood test    • Aneurysm of abdominal aorta (CMS/[facility])    • Arthritis    • Atrial fibrillation (CMS/HCC)    • BPH (benign prostatic hyperplasia)    • Bruises easily    • Bulging of thoracic intervertebral disc    • Coronary artery disease    • Diabetes mellitus (CMS/[facility])     type 2   • Diverticular disease    • Edema     LOWER LEGS   • Enlarged prostate without lower urinary tract symptoms (luts)    • Essential hypertension    • Fatigue    • History of MI (myocardial infarction)    • Hyperactivity of bladder    • Hyperlipidemia    • Hypertension    • Joint pain    • Left shoulder pain    • Lumbar radiculopathy 2016    wears back brace at times following back surgery   • Malaise and fatigue    • Muscle weakness (generalized)    • Myalgia    • Osteoarthritis    • PAF (paroxysmal atrial fibrillation) (CMS/[facility])    • Personal history of arthritis    • Polyuria    • Screening      stop bang scale 5   • Torn rotator cuff     left   • Type 2 diabetes mellitus (CMS/HCC)    • Urinary frequency        CONSULTS   Hany Leon MD    Neurology  Angelo Driscoll MD.    cardiology      HOSPITAL COURSE  This is a 82-year-old male with a long-standing history of diabetes mellitus, atrial fibrillation and back problems had multiple falls recently.  This time he fell while exercising states that he left leg gave out.  He had a small laceration on his Which was sutured.  He was admitted to the hospital for further evaluation.  Cardiology saw the patient and ruled out cardiac cause of his syncope.  Because of his proximal muscle weakness and difficulty getting up from sitting position I consulted neurology to see him.  Previously he had neurological evaluation at [facility] and had a note conduction studies and underwent back surgery because of that.  However patient seems to have more of diabetic complications especially with Roxanol muscle weakness.  His A1c is 7.2 and is only on oral hypoglycemic agents.  I have increased his Glucophage to 8 and 50 mg twice a day from 500 mg and told him to continue to monitor his A1c and also referred him to outpatient diabetic clinic for diabetic education.  Both patient and wife expresses interest and will follow in the diabetic clinic.  Patient also needs to have a follow-up with his PCP for suture removal in about 5 days      PHYSICAL EXAM  Temp:  [98.5 °F (36.9 °C)-98.6 °F (37 °C)] 98.6 °F (37 °C)  Heart Rate:  [64-84] 84  Resp:  [16-20] 18  BP: ()/(55-78) 95/55  Body mass index is 29.3 kg/m².  Physical Exam  HEENT: Unremarkable, pupils are round equal and reacting to light   NECK: No lymphadenopathy, throat is clear,   RESPRATORY SYSTEM: Breath sounds are equal on both sides and are normal, no wheezes or crackles  CARDIOVASULAR SYSTEM: Heart rate is regular without murmur  ABDOMEN: Soft, no ascites, no hepatosplenomegaly.  EXTREMITIES: No cyanosis, clubbing or edema.  Weakness in the thigh muscles    CONDITION ON DISCHARGE  Stable.      DISCHARGE DISPOSITION   Home      ALLERGIES  Allergies   Allergen Reactions   • Percocet  [Oxycodone-Acetaminophen] Mental Status Change     Causes confusion/       RECENT LABS    Results from last 7 days  Lab Units 07/16/18  1030   WBC 10*3/mm3 5.41   HEMOGLOBIN g/dL 13.8   HEMATOCRIT % 43.0   PLATELETS 10*3/mm3 124*       Results from last 7 days  Lab Units 07/17/18  0457 07/16/18  1030   SODIUM mmol/L 141 140   POTASSIUM mmol/L 4.0 4.8   CHLORIDE mmol/L 103 102   CO2 mmol/L 24.6 25.3   BUN mg/dL 17 19   CREATININE mg/dL 0.94 1.06   GLUCOSE mg/dL 123* 169*   CALCIUM mg/dL 8.4* 9.6           DIET;  Diet Order   Procedures   • Diet Regular; Consistent Carbohydrate       DISCHARGE MEDICATIONS     Your medication list      CHANGE how you take these medications      Instructions Last Dose Given Next Dose Due   amiodarone 200 MG tablet  Commonly known as:  PACERONE  What changed:  · how much to take  · Another medication with the same name was removed. Continue taking this medication, and follow the directions you see here.      Take 1 tablet by mouth Daily.       metFORMIN 850 MG tablet  Commonly known as:  GLUCOPHAGE  What changed:  · medication strength  · how much to take      Take 1 tablet by mouth 2 (Two) Times a Day With Meals.       tamsulosin 0.4 MG capsule 24 hr capsule  Commonly known as:  FLOMAX  What changed:  See the new instructions.      TAKE 2 CAPSULES DAILY FOR  PROSTATE          CONTINUE taking these medications      Instructions Last Dose Given Next Dose Due   acetaminophen 500 MG tablet  Commonly known as:  TYLENOL      Take 500 mg by mouth Every 6 (Six) Hours As Needed for Mild Pain .       amLODIPine 5 MG tablet  Commonly known as:  NORVASC      Take 1 tablet by mouth Daily.       diclofenac 1 % gel gel  Commonly known as:  VOLTAREN      Pea sized amount and apply to left heel up to 4 times daily       finasteride 5 MG tablet  Commonly known as:  PROSCAR      Take 5 mg by mouth Every Morning.       lisinopril 40 MG tablet  Commonly known as:  PRINIVIL,ZESTRIL      TAKE 1 TABLET DAILY        metoprolol succinate XL 25 MG 24 hr tablet  Commonly known as:  TOPROL-XL      Take 25 mg by mouth Every Morning.       MULTI VITAMIN PO      Take 1 tablet by mouth Every Morning.       rivaroxaban 20 MG tablet  Commonly known as:  XARELTO      Take 20 mg by mouth Daily.       vitamin B-12 1000 MCG tablet  Commonly known as:  CYANOCOBALAMIN      Take 1,000 mcg by mouth Daily.       vitamin C 500 MG tablet  Commonly known as:  ASCORBIC ACID      Take 500 mg by mouth Daily.             Where to Get Your Medications      These medications were sent to [facility]/pharmacy #[id number] - [place], [place] - [address] - [phone]  - [phone]   [address] [facility] [address]    Phone:  [phone]   · metFORMIN 850 MG tablet          Future Appointments  Date Time Provider Department Center   8/14/2018 1:20 PM Ran Kline MD [facility] None   9/26/2018 12:30 PM Caio Rodríguez Jr., MD [id number] None   12/11/2018 1:40 PM Angelo Driscoll MD [id number] None     Additional Instructions for the Follow-ups that You Need to Schedule     Ambulatory Referral to Diabetic Education    As directed        Follow-up Information     Caio Rodríguez Jr., MD Follow up in 5 day(s).    Specialty:  Family Medicine  Why:  to remove sutures  Contact information:  Kaitlynn Dorman  [place] [id number] [phone]                      CODE STATUS  Code Status and Medical Interventions:   Ordered at: 07/16/18 1614     Limited Support to NOT Include:    Artificial Nutrition    Cardioversion/Defibrillation    Dialysis    Intubation    NIPPV (Non-Invasive Positive Pressure Support)    Vasopressors     Level Of Support Discussed With:    Patient     Code Status:    No CPR     Medical Interventions (Level of Support Prior to Arrest):    Limited           Saurabh Finch MD  [facility]  07/18/18      Time: greater than 35 minutes.

## 2018-07-18 NOTE — PROGRESS NOTES
Continued Stay Note  [facility]     Patient Name: Osvaldo Lopez  MRN: [id number]  Today's Date: 7/18/2018    Admit Date: 7/16/2018          Discharge Plan     Row Name 07/18/18 1130       Plan    Plan Plan is to return home with his family upon DC.  Pt denies any DC needs at this time.    Plan Comments Spoke with pt and his wife at bedside who confirm plan.              Discharge Codes    No documentation.           Juliana Castaneda RN

## 2018-07-18 NOTE — SIGNIFICANT NOTE
07/18/18 1341   Rehab Treatment   Discipline physical therapy assistant   Reason Treatment Not Performed (Pt to d/c home today)

## 2018-07-18 NOTE — PROGRESS NOTES
Case Management Discharge Note    Final Note: Pt DC'd home    Destination     No service has been selected for the patient.      Durable Medical Equipment     No service has been selected for the patient.      Dialysis/Infusion     No service has been selected for the patient.      Home Medical Care     No service has been selected for the patient.      Social Care     No service has been selected for the patient.             Final Discharge Disposition Code: 01 - home or self-care

## 2018-07-18 NOTE — CONSULTS
Patient Identification:  NAME:  Osvaldo Lopez  Age:  82 y.o.   Sex:  male   :  1935   MRN:  [id number]       Chief complaint: I had a fall, reason for consult falls    History of present illness:  This patient is an 82-year-old right-handed white male who has had hyperlipidemia hypertension diabetes for many years and has had low back surgery for leg weakness and back pain he is done very well he comes in now after he fell backwards and hit his head did not pass out he states his legs are very weak proximally in location quality difficulty getting out of a seated position without pushing up no associated symptoms no modifying factors context patient is had long-standing diabetes      Past medical history:  Past Medical History:   Diagnosis Date   • Abnormal thyroid blood test    • Aneurysm of abdominal aorta (CMS/[facility])    • Arthritis    • Atrial fibrillation (CMS/[facility])    • BPH (benign prostatic hyperplasia)    • Bruises easily    • Bulging of thoracic intervertebral disc    • Coronary artery disease    • Diabetes mellitus (CMS/[facility])     type 2   • Diverticular disease    • Edema     LOWER LEGS   • Enlarged prostate without lower urinary tract symptoms (luts)    • Essential hypertension    • Fatigue    • History of MI (myocardial infarction)    • Hyperactivity of bladder    • Hyperlipidemia    • Hypertension    • Joint pain    • Left shoulder pain    • Lumbar radiculopathy 2016    wears back brace at times following back surgery   • Malaise and fatigue    • Muscle weakness (generalized)    • Myalgia    • Osteoarthritis    • PAF (paroxysmal atrial fibrillation) (CMS/[facility])    • Personal history of arthritis    • Polyuria    • Screening      stop bang scale 5   • Torn rotator cuff     left   • Type 2 diabetes mellitus (CMS/[facility])    • Urinary frequency        Past surgical history:  Past Surgical History:   Procedure Laterality Date   • CARDIAC SURGERY      CABGX5 ()   • CATARACT EXTRACTION Bilateral  1997   • CYST REMOVAL      FROM BACK   • GALLBLADDER SURGERY  1989   • HERNIA REPAIR Left     inquinal times 3  last one in 2003   • KNEE CARTILAGE SURGERY Left 1970's   • LUMBAR DISCECTOMY FUSION INSTRUMENTATION N/A 4/11/2016    Procedure: lumbar laminectomy L4-5 and fusion with instrumentation;  Surgeon: Ran Kline MD;  Location: [facility] OR;  Service:    • LUMBAR DISCECTOMY FUSION INSTRUMENTATION N/A 1/15/2018    Procedure: L2-3, L3-4 laminectomy and fusion with instrumentation and removal of implants L4 5.;  Surgeon: Ran Kline MD;  Location: [facility] OR;  Service:    • CA TOTAL KNEE ARTHROPLASTY Left 11/14/2016    Procedure: TOTAL KNEE ARTHROPLASTY;  Surgeon: Dontrell Arellano MD;  Location: [facility] OR;  Service: Orthopedics       Allergies:  Percocet [oxycodone-acetaminophen]    Home medications:  Prescriptions Prior to Admission   Medication Sig Dispense Refill Last Dose   • amiodarone (PACERONE) 200 MG tablet Take 1 tablet by mouth Daily. (Patient taking differently: Take 100 mg by mouth Daily.)   7/16/2018 at Unknown time   • amLODIPine (NORVASC) 5 MG tablet Take 1 tablet by mouth Daily. 90 tablet 3 7/16/2018 at Unknown time   • finasteride (PROSCAR) 5 MG tablet Take 5 mg by mouth Every Morning.   7/16/2018 at Unknown time   • lisinopril (PRINIVIL,ZESTRIL) 40 MG tablet TAKE 1 TABLET DAILY 90 tablet 1 7/16/2018 at Unknown time   • metFORMIN (GLUCOPHAGE) 500 MG tablet Take 1 tablet by mouth 2 (Two) Times a Day With Meals. 180 tablet 3 7/15/2018 at Unknown time   • metoprolol succinate XL (TOPROL-XL) 25 MG 24 hr tablet Take 25 mg by mouth Every Morning.   7/16/2018 at Unknown time   • Multiple Vitamin (MULTI VITAMIN PO) Take 1 tablet by mouth Every Morning.   7/16/2018 at Unknown time   • rivaroxaban (XARELTO) 20 MG tablet Take 20 mg by mouth Daily.   7/16/2018 at Unknown time   • tamsulosin (FLOMAX) 0.4 MG capsule 24 hr capsule TAKE 2 CAPSULES DAILY FOR  PROSTATE (Patient taking differently:  BID) 180 capsule 3 7/15/2018 at Unknown time   • vitamin B-12 (CYANOCOBALAMIN) 1000 MCG tablet Take 1,000 mcg by mouth Daily.   7/16/2018 at Unknown time   • vitamin C (ASCORBIC ACID) 500 MG tablet Take 500 mg by mouth Daily.   7/16/2018 at Unknown time   • acetaminophen (TYLENOL) 500 MG tablet Take 500 mg by mouth Every 6 (Six) Hours As Needed for Mild Pain .   Unknown at Unknown time   • amiodarone (PACERONE) 200 MG tablet TAKE 1 TABLET DAILY 90 tablet 0 Unknown at Unknown time   • diclofenac (VOLTAREN) 1 % gel gel Pea sized amount and apply to left heel up to 4 times daily 100 g 0 Unknown at Unknown time        Hospital medications:    amiodarone 200 mg Oral Q24H   amLODIPine 5 mg Oral Daily   finasteride 5 mg Oral QAM   insulin aspart 0-7 Units Subcutaneous 4x Daily With Meals & Nightly   lisinopril 40 mg Oral Daily   metFORMIN 500 mg Oral BID With Meals   metoprolol succinate XL 25 mg Oral QAM   multivitamin 1 tablet Oral Daily   rivaroxaban 20 mg Oral Daily   tamsulosin 0.8 mg Oral Daily   vitamin B-12 1,000 mcg Oral Daily   vitamin C 500 mg Oral Daily        •  acetaminophen  •  dextrose  •  dextrose  •  glucagon (human recombinant)  •  nitroglycerin  •  ondansetron **OR** ondansetron ODT **OR** ondansetron  •  sodium chloride  •  Insert peripheral IV **AND** sodium chloride    Family history:  Family History   Problem Relation Age of Onset   • Heart failure Mother        Social history:  Social History   Substance Use Topics   • Smoking status: Former Smoker     Years: 36.00     Types: Cigarettes     Quit date: 1989   • Smokeless tobacco: Never Used      Comment: states smoked 2-3 ppd   • Alcohol use Yes      Comment: 2-3 times monthly  //  caffeine use       Review of systems:    He denies paresthesias or paralysis or loss of sensation no hair eyes ears nose throat skin bone joint  lymphatic hematologic or oncologic complaints no neck pain chest pain abdominal pain bowel bladder incontinence fever chills  or rash    Objective:  Vitals Ranges:   Temp:  [98.5 °F (36.9 °C)-98.6 °F (37 °C)] 98.6 °F (37 °C)  Heart Rate:  [64-84] 84  Resp:  [16-20] 20  BP: (120-146)/(63-78) 137/78      Physical Exam:  He is awake alert oriented ×3 in no distress fund of knowledge attention span and concentration recent remote memory and language is normal well-developed well-nourished in no distress pupils 1-1/2 constricting to 1 normal disc retinas visual fields extraocular movements full without nystagmus nasolabial folds palate tongue symmetrical normal hearing facial sensation head turning shoulder shrug motor 5 out of 5 distally definitely in the proximal upper extremities he might be slightly weak with slowing light muscle wasting but is significantly weak 4 out of 5 strength in the bilateral quadriceps which are severely atrophied.  No fasciculations or twitching noted anywhere reflexes absent throughout toes downgoing bilaterally sensation normal light touch face both arms and both legs coordination normal in the upper externally station and gait was not tested for fear of would let him fall he has much difficulty trying to get up from a seated position heart regular without murmur neck is supple without bruits extremities no clubbing cyanosis or edema visual acuity normal at 3 feet    Results review:   I reviewed the patient's new clinical results.    Data review:  Lab Results (last 24 hours)     Procedure Component Value Units Date/Time    POC Glucose Once [[id number]]  (Abnormal) Collected:  07/18/18 1059    Specimen:  Blood Updated:  07/18/18 1100     Glucose 203 (H) mg/dL     Narrative:       Meter: [id number] : [id number] Cathy SIERRA    POC Glucose Once [[id number]]  (Abnormal) Collected:  07/18/18 0558    Specimen:  Blood Updated:  07/18/18 0628     Glucose 138 (H) mg/dL     Narrative:       Meter: [id number] : [id number] Edward Georges NA    POC Glucose Once [[id number]]  (Abnormal) Collected:  07/17/18 2120     Specimen:  Blood Updated:  07/17/18 2124     Glucose 154 (H) mg/dL     Narrative:       Meter: [id number] : [id number] Edward SIERRA    POC Glucose Once [[id number]]  (Abnormal) Collected:  07/17/18 1631    Specimen:  Blood Updated:  07/17/18 1633     Glucose 132 (H) mg/dL     Narrative:       Meter: [id number] : [id number] Jerman Hester RN           Imaging:  Imaging Results (last 24 hours)     ** No results found for the last 24 hours. **             Assessment and Plan:     Active Problems:    Atrial fibrillation (CMS/HCC)    Benign prostatic hyperplasia    Essential hypertension    Type 2 diabetes mellitus (CMS/HCC)    This patient has proximal quadriceps muscle wasting consistent with diabetic amyotrophy, a long-standing proximal myopathy related to his long-standing diabetes.  This would cause his legs to buckle and have significant weakness getting up from a seated position or if he ever ended up on the floor would have much difficulty getting up.  This is not an acutely treatable condition.  Physical therapy and taking care going up and or downstairs is of paramount importance.  Additionally he has had lumbosacral disease at the levels that would also affect the proximal legs and I agree with the surgery that he has had and do not see evidence of current radiculopathy or any myelopathy.  I will sign off in follow-up.  Reconsult thanks      Hany Leon MD  07/18/18  12:00 PM

## 2018-07-23 ENCOUNTER — OFFICE VISIT (OUTPATIENT)
Dept: INTERNAL MEDICINE | Facility: CLINIC | Age: 83
End: 2018-07-23

## 2018-07-23 VITALS
TEMPERATURE: 97.1 F | BODY MASS INDEX: 30.52 KG/M2 | HEART RATE: 66 BPM | SYSTOLIC BLOOD PRESSURE: 132 MMHG | WEIGHT: 225 LBS | OXYGEN SATURATION: 94 % | DIASTOLIC BLOOD PRESSURE: 72 MMHG

## 2018-07-23 DIAGNOSIS — E11.44 DIABETIC AMYOTROPHY ASSOCIATED WITH TYPE 2 DIABETES MELLITUS (HCC): Primary | ICD-10-CM

## 2018-07-23 DIAGNOSIS — M48.062 SPINAL STENOSIS OF LUMBAR REGION WITH NEUROGENIC CLAUDICATION: ICD-10-CM

## 2018-07-23 DIAGNOSIS — S01.01XS LACERATION OF SCALP, SEQUELA: ICD-10-CM

## 2018-07-23 DIAGNOSIS — E11.44: ICD-10-CM

## 2018-07-23 DIAGNOSIS — I48.0 PAROXYSMAL A-FIB (HCC): ICD-10-CM

## 2018-07-23 DIAGNOSIS — S09.90XS CLOSED HEAD INJURY, SEQUELA: ICD-10-CM

## 2018-07-23 PROCEDURE — 99214 OFFICE O/P EST MOD 30 MIN: CPT | Performed by: FAMILY MEDICINE

## 2018-07-23 NOTE — PROGRESS NOTES
Subjective   Osvaldo Lopez is a 82 y.o. male.     Chief Complaint   Patient presents with   • Suture / Staple Removal   Discussion about diabetes type 2, atrial fibrillation, anticoagulation, closed head injury, chronic leg weakness.      History of Present Illness   Current outpatient and discharge medications have been reconciled for the patient.  Reviewed by: Caio Rodríguez Jr., MD  Patient fell last week with a closed head injury to the back of his head.  He had negative workup for intracranial bleeding.  He is on Xarelto on his his head twice.  He has evidence of diabetic amyotrophy has had back surgery with weakness of the legs with continuing rehabilitation.  He is able walk with a walker.  We discussed pros and cons of staying on Xarelto for A. fib.  He would rather stay on the chart also given the risk of stroke.    We discussed getting a glucometer he is given a sample glucometer and also will get him set up with diabetic education.      The following portions of the patient's history were reviewed and updated as appropriate: allergies, current medications, past social history and problem list.    Review of Systems   HENT: Negative.    Eyes: Negative.    Respiratory: Negative.    Cardiovascular: Negative.    Gastrointestinal: Negative.    Endocrine: Negative.    Genitourinary: Negative.    Musculoskeletal: Positive for gait problem.   Skin: Negative.    Allergic/Immunologic: Negative.    Neurological: Positive for weakness.   Hematological: Negative.    Psychiatric/Behavioral: Negative.        Objective   Vitals:    07/23/18 1052   BP: 132/72   Pulse: 66   Temp: 97.1 °F (36.2 °C)   SpO2: 94%     Physical Exam   Constitutional: He is oriented to person, place, and time. He appears well-developed.   HENT:   Head: Normocephalic.       Right Ear: External ear normal.   Left Ear: External ear normal.   Mouth/Throat: Oropharynx is clear and moist.   Eyes: Pupils are equal, round, and reactive to light.   Neck:  Normal range of motion.   Cardiovascular: Normal rate, regular rhythm and normal heart sounds.    Pulmonary/Chest: Effort normal and breath sounds normal.   Abdominal: Soft. Bowel sounds are normal.   Musculoskeletal: Normal range of motion.   Neurological: He is alert and oriented to person, place, and time. He displays atrophy. He exhibits abnormal muscle tone. Coordination and gait abnormal.   Skin: Skin is warm and dry.   Psychiatric: He has a normal mood and affect.   Vitals reviewed.      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  Referral to diabetic education with glucometer.  Recheck in one month.  Discussion about continuing anticoagulation for A. fib.  Precautions about head injury.

## 2018-07-30 RX ORDER — LISINOPRIL 40 MG/1
TABLET ORAL
Qty: 90 TABLET | Refills: 1 | Status: SHIPPED | OUTPATIENT
Start: 2018-07-30 | End: 2019-04-23 | Stop reason: SDUPTHER

## 2018-08-07 ENCOUNTER — HOSPITAL ENCOUNTER (OUTPATIENT)
Dept: DIABETES SERVICES | Facility: HOSPITAL | Age: 83
Setting detail: RECURRING SERIES
Discharge: HOME | End: 2018-08-07
Attending: INTERNAL MEDICINE

## 2018-08-07 ENCOUNTER — OFFICE VISIT (OUTPATIENT)
Dept: ORTHOPEDIC SURGERY | Facility: CLINIC | Age: 83
End: 2018-08-07

## 2018-08-07 VITALS — HEIGHT: 71 IN | TEMPERATURE: 98 F | BODY MASS INDEX: 31.36 KG/M2 | WEIGHT: 224 LBS

## 2018-08-07 DIAGNOSIS — M48.062 SPINAL STENOSIS OF LUMBAR REGION WITH NEUROGENIC CLAUDICATION: Primary | ICD-10-CM

## 2018-08-07 PROCEDURE — 99213 OFFICE O/P EST LOW 20 MIN: CPT | Performed by: ORTHOPAEDIC SURGERY

## 2018-08-07 PROCEDURE — G0109 DIAB MANAGE TRN IND/GROUP: HCPCS

## 2018-08-07 PROCEDURE — 72100 X-RAY EXAM L-S SPINE 2/3 VWS: CPT | Performed by: ORTHOPAEDIC SURGERY

## 2018-08-07 NOTE — PROGRESS NOTES
Doing well from a postop standpoint but worsening weakness proximally.  He saw Dr. Leon in the hospital following a recent fall, and was diagnosed with diabetic amyotrophy is continuing exercises the [facility].  He is requesting a sterile left which I think is a good idea and he'll continue to use the walker.  Two-view x-rays of lumbar spine obtained today show good position of graft and implants and appear healed compared to prior films.  Not standpoint I think is good I will see him as needed

## 2018-08-09 RX ORDER — LANCETS
1 EACH MISCELLANEOUS 2 TIMES DAILY
Qty: 100 EACH | Refills: 5 | Status: SHIPPED | OUTPATIENT
Start: 2018-08-09 | End: 2019-07-31 | Stop reason: SDUPTHER

## 2018-08-14 ENCOUNTER — HOSPITAL ENCOUNTER (OUTPATIENT)
Dept: DIABETES SERVICES | Facility: HOSPITAL | Age: 83
Setting detail: RECURRING SERIES
Discharge: HOME | End: 2018-08-14
Attending: INTERNAL MEDICINE

## 2018-08-14 PROCEDURE — G0109 DIAB MANAGE TRN IND/GROUP: HCPCS

## 2018-08-21 ENCOUNTER — HOSPITAL ENCOUNTER (OUTPATIENT)
Dept: DIABETES SERVICES | Facility: HOSPITAL | Age: 83
Setting detail: RECURRING SERIES
Discharge: HOME | End: 2018-08-21
Attending: INTERNAL MEDICINE

## 2018-08-21 PROCEDURE — G0109 DIAB MANAGE TRN IND/GROUP: HCPCS

## 2018-08-23 ENCOUNTER — OFFICE VISIT (OUTPATIENT)
Dept: CARDIOLOGY | Facility: CLINIC | Age: 83
End: 2018-08-23

## 2018-08-23 VITALS
DIASTOLIC BLOOD PRESSURE: 58 MMHG | HEART RATE: 62 BPM | WEIGHT: 221 LBS | SYSTOLIC BLOOD PRESSURE: 128 MMHG | BODY MASS INDEX: 30.94 KG/M2 | HEIGHT: 71 IN

## 2018-08-23 DIAGNOSIS — I48.0 PAF (PAROXYSMAL ATRIAL FIBRILLATION) (HCC): Primary | ICD-10-CM

## 2018-08-23 DIAGNOSIS — I10 ESSENTIAL HYPERTENSION: ICD-10-CM

## 2018-08-23 DIAGNOSIS — I45.10 RIGHT BUNDLE BRANCH BLOCK: ICD-10-CM

## 2018-08-23 DIAGNOSIS — I35.0 NONRHEUMATIC AORTIC VALVE STENOSIS: ICD-10-CM

## 2018-08-23 PROCEDURE — 93000 ELECTROCARDIOGRAM COMPLETE: CPT | Performed by: NURSE PRACTITIONER

## 2018-08-23 PROCEDURE — 99214 OFFICE O/P EST MOD 30 MIN: CPT | Performed by: NURSE PRACTITIONER

## 2018-08-23 NOTE — PROGRESS NOTES
Date of Office Visit: 2018  Encounter Provider: LOU Batista  Place of Service: [facility]  Patient Name: Osvaldo Lopez  :1935    Chief Complaint   Patient presents with   • Atrial Fibrillation   • Hypertension   • Edema   • Dizziness   • Fatigue   :     HPI: Osvaldo Lopez is a 82 y.o. male is a patient of Dr. Driscoll. I am seeing him today for the first time and have reviewed his record.     His past medical history is significant of atrial fibrillation, hypertension, hyperlipidemia, diabetes mellitus, recurrent falls, aortic stenosis, coronary artery disease, right bundle branch block, history of myocardial infarction.    Echocardiogram in 2016 several left ventricular function with an EF of 56.8%.  Diastolic dysfunction was noted as grade 1.  There were hypokinetic basal inferior lateral and inferolateral segments.  Trace to mild aortic regurgitation was present with moderate mitral annular calcification.    Follow-up echocardiogram in 2018 showed normal left ventricular systolic function with an EF 60-53%.  Left ventricle cavity was small with borderline hypertrophy.  There is calcification of the aortic valve with mild aortic valve stenosis.  Aortic valve area was 1.7 cm² the mean pressure gradient of 10 mmHg.  Mild mitral regurgitation is also present.    She was last seen in the office in May 2018 after recent hospitalization where he required lower blood pressure medication.  He had some postop hypotension after laminectomy with spinal fusion in 2018.  Blood pressure was trending back up.  He had some shortness of breath which was unchanged.  He was started back on Norvasc 5 mg.  He also complained of some dizziness and was instructed to take antihistamine and to follow with PCP if that did not help.    He was hospitalized in July after falling multiple times.  Had some small lacerations which required suturing.  Neurology  evaluated for proximal muscle weakness and diagnosed him with am.  His hypoglycemics were titrated up and he was referred to outpatient diabetic clinic for diabetic education.    Patient presents today for hospital follow-up.  He denies chest pain, shortness of breath, palpitations, near syncope, or syncope.  He does have some lightheadedness upon standing and occasional edema in the feet.  He has fatigue but is relatively unchanged.  He goes to the [facility] 3 times a week.  He is now ambulating with walker and occasionally using a cane.  He has not fallen since he has been discharged. There have been two falls this year total. He has had his posterior scalp sutures removed as well.      Allergies   Allergen Reactions   • Percocet [Oxycodone-Acetaminophen] Mental Status Change     Causes confusion/       Past Medical History:   Diagnosis Date   • Abnormal thyroid blood test    • Aneurysm of abdominal aorta (CMS/[facility])    • Arthritis    • Atrial fibrillation (CMS/[facility])    • BPH (benign prostatic hyperplasia)    • Bruises easily    • Bulging of thoracic intervertebral disc    • Coronary artery disease    • Diabetes mellitus (CMS/[facility])     type 2   • Diverticular disease    • Edema     LOWER LEGS   • Enlarged prostate without lower urinary tract symptoms (luts)    • Essential hypertension    • Fatigue    • History of MI (myocardial infarction) 1989   • Hyperactivity of bladder    • Hyperlipidemia    • Hypertension    • Joint pain    • Left shoulder pain    • Lumbar radiculopathy 2016    wears back brace at times following back surgery   • Malaise and fatigue    • Muscle weakness (generalized)    • Myalgia    • Nonrheumatic aortic valve stenosis    • Osteoarthritis    • PAF (paroxysmal atrial fibrillation) (CMS/[facility])    • Personal history of arthritis    • Polyuria    • Recurrent falls    • Screening      stop bang scale 5   • Syncope    • Torn rotator cuff     left   • Type 2 diabetes mellitus (CMS/H[facility])    • Urinary frequency   "      Past Surgical History:   Procedure Laterality Date   • CARDIAC SURGERY      CABGX5 (1989)   • CATARACT EXTRACTION Bilateral 1997   • CYST REMOVAL      FROM BACK   • GALLBLADDER SURGERY  1989   • HERNIA REPAIR Left     inquinal times 3  last one in 2003   • KNEE CARTILAGE SURGERY Left 1970's   • LUMBAR DISCECTOMY FUSION INSTRUMENTATION N/A 4/11/2016    Procedure: lumbar laminectomy L4-5 and fusion with instrumentation;  Surgeon: Ran Kline MD;  Location: [facility];  Service:    • LUMBAR DISCECTOMY FUSION INSTRUMENTATION N/A 1/15/2018    Procedure: L2-3, L3-4 laminectomy and fusion with instrumentation and removal of implants L4 5.;  Surgeon: Ran Kline MD;  Location: [facility];  Service:    • RI TOTAL KNEE ARTHROPLASTY Left 11/14/2016    Procedure: TOTAL KNEE ARTHROPLASTY;  Surgeon: Dontrell Arellano MD;  Location: [facility];  Service: Orthopedics         Family and social history reviewed.     Review of Systems   Constitution: Positive for malaise/fatigue.   Cardiovascular: Positive for leg swelling.   Musculoskeletal: Positive for falls.   Neurological: Positive for focal weakness, light-headedness and loss of balance.     All other systems were reviewed and are negative          Objective:     Vitals:    08/23/18 1020   BP: 128/58   BP Location: Left arm   Patient Position: Sitting   Pulse: 62   Weight: 100 kg (221 lb)   Height: 180.3 cm (71\")     Body mass index is 30.82 kg/m².    PHYSICAL EXAM:  Physical Exam   Constitutional: He is oriented to person, place, and time. He appears well-developed and well-nourished. No distress.   HENT:   Head: Normocephalic.   Eyes: Conjunctivae are normal.   Neck: Normal range of motion. No JVD present.   Cardiovascular: Normal rate, regular rhythm and intact distal pulses.    Murmur heard.   Systolic murmur is present with a grade of 2/6  at the upper right sternal border, upper left sternal border  Pulses:       Carotid pulses are 2+ on the right " side, and 2+ on the left side.       Radial pulses are 2+ on the right side, and 2+ on the left side.        Posterior tibial pulses are 2+ on the right side, and 2+ on the left side.   Pulmonary/Chest: Effort normal and breath sounds normal. No respiratory distress. He has no wheezes. He has no rhonchi. He has no rales. He exhibits no tenderness.   Abdominal: Soft. Bowel sounds are normal. He exhibits no distension.   Musculoskeletal: Normal range of motion. He exhibits edema (trace ankle bilateral).   Ambulating with walker for stability   Neurological: He is alert and oriented to person, place, and time.   Skin: Skin is warm, dry and intact. No rash noted. He is not diaphoretic. No cyanosis.   Psychiatric: He has a normal mood and affect. His behavior is normal. Judgment and thought content normal.         ECG 12 Lead  Date/Time: 8/23/2018 10:32 AM  Performed by: RODRIGUEZ SNEED  Authorized by: RODRIGUEZ SNEED   Comparison: compared with previous ECG from 7/16/2018  Similar to previous ECG  Rhythm: sinus rhythm  Rate: normal  BPM: 62  Conduction: right bundle branch block  Other findings: PRWP and prolonged QTc interval  Clinical impression: abnormal ECG  Comments: Nonspecific T wave abnormalities            Current Outpatient Prescriptions   Medication Sig Dispense Refill   • acetaminophen (TYLENOL) 500 MG tablet Take 500 mg by mouth Every 6 (Six) Hours As Needed for Mild Pain .     • amiodarone (PACERONE) 200 MG tablet Take 1 tablet by mouth Daily. (Patient taking differently: Take 100 mg by mouth Daily.)     • amLODIPine (NORVASC) 5 MG tablet Take 1 tablet by mouth Daily. 90 tablet 3   • diclofenac (VOLTAREN) 1 % gel gel Pea sized amount and apply to left heel up to 4 times daily 100 g 0   • finasteride (PROSCAR) 5 MG tablet Take 5 mg by mouth Every Morning.     • glucose blood test strip Twice daily 100 each 12   • lisinopril (PRINIVIL,ZESTRIL) 40 MG tablet TAKE 1 TABLET DAILY 90 tablet 1   • metFORMIN  (GLUCOPHAGE) 850 MG tablet Take 1 tablet by mouth 2 (Two) Times a Day With Meals. 180 tablet 1   • metoprolol succinate XL (TOPROL-XL) 25 MG 24 hr tablet Take 25 mg by mouth Every Morning.     • MICROLET LANCETS misc 1 each 2 (Two) Times a Day. 100 each 5   • Multiple Vitamin (MULTI VITAMIN PO) Take 1 tablet by mouth Every Morning.     • rivaroxaban (XARELTO) 20 MG tablet Take 20 mg by mouth Daily.     • tamsulosin (FLOMAX) 0.4 MG capsule 24 hr capsule TAKE 2 CAPSULES DAILY FOR  PROSTATE (Patient taking differently: BID) 180 capsule 3   • vitamin B-12 (CYANOCOBALAMIN) 1000 MCG tablet Take 1,000 mcg by mouth Daily.       No current facility-administered medications for this visit.      Assessment:       Diagnosis Plan   1. PAF (paroxysmal atrial fibrillation) (CMS/[facility])  ECG 12 Lead   2. Essential hypertension     3. Nonrheumatic aortic valve stenosis     4. Right bundle branch block          Orders Placed This Encounter   Procedures   • ECG 12 Lead     This order was created via procedure documentation         Plan:      1. Paroxysmal atrial fibrillation  Atrial Fibrillation and Atrial Flutter  Assessment  • The patient has paroxysmal atrial fibrillation  • This is non-valvular in etiology  • The patient's CHADS2-VASc score is 3  • A [id number]-MHUd score of 2 or more is considered a high risk for a thromboembolic event  • Rivaroxaban prescribed    Plan  • Attempt to maintain sinus rhythm  • Continue rivaroxaban for antithrombotic therapy, bleeding issues discussed  • Continue amiodarone for rhythm control    2. Hypertension- improved on amlodipine 5 mg at 128/58.  Continue the same  3. Aortic stenosis- mild on echo in 01/2018 Valve area 1.7 cm squared  4. Recent fall/ recurrent falls- two this year. None since discharge  5. Long term amiodarone therapy. Yearly CXR, liver and thyroid function. Last CXR 01/2018.  Normal ALTs and AST on CMP on 7/16/2018.  TSH on 3/23/2018 was 4.753 T4 was normal.  6.  Diabetes mellitus  hemoglobin A1c 7.21 month ago.    Follow up in 3-4 months with DR. rDiscoll    Patient was instructed to call the office if new symptoms develop or report to nearest ER if heart attack or stroke is suspected.        It has been a pleasure to participate in this patient's care.      Thank you,  LOU Batista      **Gretta Disclaimer:**  Much of this encounter note is an electronic transcription/translation of spoken language to printed text. The electronic translation of spoken language may permit erroneous, or at times, nonsensical words or phrases to be inadvertently transcribed. Although I have reviewed the note for such errors, some may still exist.

## 2018-09-26 ENCOUNTER — OFFICE VISIT (OUTPATIENT)
Dept: INTERNAL MEDICINE | Facility: CLINIC | Age: 83
End: 2018-09-26

## 2018-09-26 VITALS
HEART RATE: 75 BPM | SYSTOLIC BLOOD PRESSURE: 156 MMHG | OXYGEN SATURATION: 90 % | BODY MASS INDEX: 30.82 KG/M2 | DIASTOLIC BLOOD PRESSURE: 82 MMHG | TEMPERATURE: 96.7 F | WEIGHT: 221 LBS

## 2018-09-26 DIAGNOSIS — E11.44 DIABETIC AMYOTROPHY ASSOCIATED WITH TYPE 2 DIABETES MELLITUS (HCC): ICD-10-CM

## 2018-09-26 DIAGNOSIS — G62.9 NEUROPATHY: Primary | ICD-10-CM

## 2018-09-26 DIAGNOSIS — R29.898 WEAKNESS OF BOTH LOWER EXTREMITIES: ICD-10-CM

## 2018-09-26 DIAGNOSIS — Z23 NEED FOR IMMUNIZATION AGAINST INFLUENZA: ICD-10-CM

## 2018-09-26 DIAGNOSIS — R94.6 ABNORMAL FINDING ON THYROID FUNCTION TEST: ICD-10-CM

## 2018-09-26 DIAGNOSIS — D62 POSTOPERATIVE ANEMIA DUE TO ACUTE BLOOD LOSS: ICD-10-CM

## 2018-09-26 PROCEDURE — 90662 IIV NO PRSV INCREASED AG IM: CPT | Performed by: FAMILY MEDICINE

## 2018-09-26 PROCEDURE — G0008 ADMIN INFLUENZA VIRUS VAC: HCPCS | Performed by: FAMILY MEDICINE

## 2018-09-26 PROCEDURE — 99214 OFFICE O/P EST MOD 30 MIN: CPT | Performed by: FAMILY MEDICINE

## 2018-09-26 NOTE — PROGRESS NOTES
Subjective   Osvaldo Lopez is a 83 y.o. male.     Chief Complaint   Patient presents with   • Anemia   • Hypothyroidism   • Leg Pain         History of Present Illness   Patient is frustrated because of persistent hamstring and quadriceps leg weakness despite having back surgery.  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  I will send him back to the previous neurologist Dr. Verma for an opinion but also get a consultation with Dr. Hany Stuart about rehabilitation.    Review his type 2 diabetes shows that with glucometer and diabetic education is doing very well as far as blood sugars.  Recheck levels today as well as B12 folic acid methylmalonic acid.  The patient is been intolerant to statins many years ago with elevated CK.      The following portions of the patient's history were reviewed and updated as appropriate: allergies, current medications, past social history and problem list.    Review of Systems   HENT: Negative.    Eyes: Negative.    Respiratory: Negative.    Cardiovascular: Negative.    Gastrointestinal: Negative.    Endocrine: Negative.    Genitourinary: Negative.    Musculoskeletal: Positive for gait problem and myalgias.   Skin: Negative.    Allergic/Immunologic: Negative.    Neurological: Positive for weakness.   Hematological: Negative.    Psychiatric/Behavioral: Negative.        Objective   Vitals:    09/26/18 1255   BP: 156/82   Pulse: 75   Temp: 96.7 °F (35.9 °C)   SpO2: 90%     Physical Exam   Constitutional: He is oriented to person, place, and time. He appears well-developed.   HENT:   Head: Normocephalic.   Right Ear: External ear normal.   Left Ear: External ear normal.   Mouth/Throat: Oropharynx is clear and moist.   Eyes: Pupils are equal, round, and reactive to light.   Neck: Normal range of motion. Neck supple.   Cardiovascular: Normal rate, regular rhythm and normal heart sounds.    Pulmonary/Chest: Effort normal and breath sounds normal.   Abdominal: Soft. Bowel  sounds are normal.   Musculoskeletal:        Lumbar back: He exhibits decreased range of motion.   Neurological: He is alert and oriented to person, place, and time. He exhibits abnormal muscle tone.   Continue weakness of the quadriceps and hamstring muscles bilaterally of both legs with possible weakness the psoas majors as well   Psychiatric: He has a normal mood and affect.   Vitals reviewed.      Assessment/Plan   Problem List Items Addressed This Visit        Other    Postoperative anemia due to acute blood loss    Relevant Orders    Vitamin B12    CBC & Differential    Basic Metabolic Panel    Lipid Panel With / Chol / HDL Ratio    Hemoglobin A1c    Folate    Methylmalonic Acid, Serum    TSH    T4, Free    T3, Free    Abnormal finding on thyroid function test    Relevant Orders    Vitamin B12    CBC & Differential    Basic Metabolic Panel    Lipid Panel With / Chol / HDL Ratio    Hemoglobin A1c    Folate    Methylmalonic Acid, Serum    TSH    T4, Free    T3, Free      Other Visit Diagnoses     Neuropathy    -  Primary    Relevant Orders    Ambulatory Referral to Neurology    Ambulatory Referral to Physical Medicine Rehab    Vitamin B12    CBC & Differential    Basic Metabolic Panel    Lipid Panel With / Chol / HDL Ratio    Hemoglobin A1c    Folate    Methylmalonic Acid, Serum    TSH    T4, Free    T3, Free    Weakness of both lower extremities        Relevant Orders    Ambulatory Referral to Neurology    Ambulatory Referral to Physical Medicine Rehab    Vitamin B12    CBC & Differential    Basic Metabolic Panel    Lipid Panel With / Chol / HDL Ratio    Hemoglobin A1c    Folate    Methylmalonic Acid, Serum    TSH    T4, Free    T3, Free    Diabetic amyotrophy associated with type 2 diabetes mellitus (CMS/HCC)        Relevant Orders    Ambulatory Referral to Neurology    Ambulatory Referral to Physical Medicine Rehab    Vitamin B12    CBC & Differential    Basic Metabolic Panel    Lipid Panel With / Chol / HDL  Ratio    Hemoglobin A1c    Folate    Methylmalonic Acid, Serum    TSH    T4, Free    T3, Free    Need for immunization against influenza        Relevant Orders    Fluzone High Dose =>65Years      Referral to physical medicine rehabilitation doctor Sade and also neurology.  Screening labs and return visit in about 4 months.  Flu shots given today.  Discussion about getting shingles vaccine update.

## 2018-09-29 LAB
BASOPHILS # BLD AUTO: 0.01 10*3/MM3 (ref 0–0.2)
BASOPHILS NFR BLD AUTO: 0.2 % (ref 0–1.5)
BUN SERPL-MCNC: 23 MG/DL (ref 8–23)
BUN/CREAT SERPL: 20 (ref 7–25)
CALCIUM SERPL-MCNC: 9.2 MG/DL (ref 8.6–10.5)
CHLORIDE SERPL-SCNC: 105 MMOL/L (ref 98–107)
CHOLEST SERPL-MCNC: 183 MG/DL (ref 0–200)
CHOLEST/HDLC SERPL: 3.81 {RATIO}
CO2 SERPL-SCNC: 25.4 MMOL/L (ref 22–29)
CREAT SERPL-MCNC: 1.15 MG/DL (ref 0.76–1.27)
EOSINOPHIL # BLD AUTO: 0.05 10*3/MM3 (ref 0–0.7)
EOSINOPHIL NFR BLD AUTO: 1.2 % (ref 0.3–6.2)
ERYTHROCYTE [DISTWIDTH] IN BLOOD BY AUTOMATED COUNT: 14 % (ref 11.5–14.5)
FOLATE SERPL-MCNC: >20 NG/ML (ref 4.78–24.2)
GLUCOSE SERPL-MCNC: 113 MG/DL (ref 65–99)
HBA1C MFR BLD: 6.4 % (ref 4.8–5.6)
HCT VFR BLD AUTO: 38.7 % (ref 40.4–52.2)
HDLC SERPL-MCNC: 48 MG/DL (ref 40–60)
HGB BLD-MCNC: 12.3 G/DL (ref 13.7–17.6)
IMM GRANULOCYTES # BLD: 0.01 10*3/MM3 (ref 0–0.03)
IMM GRANULOCYTES NFR BLD: 0.2 % (ref 0–0.5)
LDLC SERPL CALC-MCNC: 106 MG/DL (ref 0–100)
LYMPHOCYTES # BLD AUTO: 1.4 10*3/MM3 (ref 0.9–4.8)
LYMPHOCYTES NFR BLD AUTO: 32.7 % (ref 19.6–45.3)
Lab: NORMAL
MCH RBC QN AUTO: 30.5 PG (ref 27–32.7)
MCHC RBC AUTO-ENTMCNC: 31.8 G/DL (ref 32.6–36.4)
MCV RBC AUTO: 96 FL (ref 79.8–96.2)
METHYLMALONATE SERPL-SCNC: 99 NMOL/L (ref 0–378)
MONOCYTES # BLD AUTO: 0.31 10*3/MM3 (ref 0.2–1.2)
MONOCYTES NFR BLD AUTO: 7.2 % (ref 5–12)
NEUTROPHILS # BLD AUTO: 2.51 10*3/MM3 (ref 1.9–8.1)
NEUTROPHILS NFR BLD AUTO: 58.7 % (ref 42.7–76)
PLATELET # BLD AUTO: 153 10*3/MM3 (ref 140–500)
POTASSIUM SERPL-SCNC: 4.4 MMOL/L (ref 3.5–5.2)
RBC # BLD AUTO: 4.03 10*6/MM3 (ref 4.6–6)
SODIUM SERPL-SCNC: 143 MMOL/L (ref 136–145)
T3FREE SERPL-MCNC: 2.1 PG/ML (ref 2–4.4)
T4 FREE SERPL-MCNC: 1.28 NG/DL (ref 0.93–1.7)
TRIGL SERPL-MCNC: 145 MG/DL (ref 0–150)
TSH SERPL DL<=0.005 MIU/L-ACNC: 5.79 MIU/ML (ref 0.27–4.2)
VIT B12 SERPL-MCNC: 926 PG/ML (ref 211–946)
VLDLC SERPL CALC-MCNC: 29 MG/DL (ref 5–40)
WBC # BLD AUTO: 4.28 10*3/MM3 (ref 4.5–10.7)

## 2018-10-01 RX ORDER — AMIODARONE HYDROCHLORIDE 200 MG/1
TABLET ORAL
Qty: 90 TABLET | Refills: 0 | Status: SHIPPED | OUTPATIENT
Start: 2018-10-01 | End: 2019-01-22

## 2018-10-09 ENCOUNTER — EPISODE CHANGES (OUTPATIENT)
Dept: CASE MANAGEMENT | Facility: OTHER | Age: 83
End: 2018-10-09

## 2018-10-31 ENCOUNTER — LAB (OUTPATIENT)
Dept: LAB | Facility: HOSPITAL | Age: 83
End: 2018-10-31

## 2018-10-31 DIAGNOSIS — R53.1 WEAKNESS: ICD-10-CM

## 2018-10-31 DIAGNOSIS — E55.9 VITAMIN D DEFICIENCY: ICD-10-CM

## 2018-10-31 DIAGNOSIS — R53.1 WEAKNESS: Primary | ICD-10-CM

## 2018-10-31 DIAGNOSIS — G62.9 NEUROPATHY: ICD-10-CM

## 2018-10-31 LAB — 25(OH)D3 SERPL-MCNC: 30.2 NG/ML (ref 30–100)

## 2018-10-31 PROCEDURE — 36415 COLL VENOUS BLD VENIPUNCTURE: CPT

## 2018-10-31 PROCEDURE — 82306 VITAMIN D 25 HYDROXY: CPT

## 2018-12-11 ENCOUNTER — OFFICE VISIT (OUTPATIENT)
Dept: CARDIOLOGY | Facility: CLINIC | Age: 83
End: 2018-12-11

## 2018-12-11 VITALS
WEIGHT: 218 LBS | HEIGHT: 72 IN | HEART RATE: 63 BPM | SYSTOLIC BLOOD PRESSURE: 150 MMHG | BODY MASS INDEX: 29.53 KG/M2 | DIASTOLIC BLOOD PRESSURE: 62 MMHG | RESPIRATION RATE: 18 BRPM

## 2018-12-11 DIAGNOSIS — I48.0 PAROXYSMAL ATRIAL FIBRILLATION (HCC): Primary | ICD-10-CM

## 2018-12-11 PROCEDURE — 99214 OFFICE O/P EST MOD 30 MIN: CPT | Performed by: INTERNAL MEDICINE

## 2018-12-11 RX ORDER — HYDRALAZINE HYDROCHLORIDE 25 MG/1
25 TABLET, FILM COATED ORAL 3 TIMES DAILY
Qty: 180 TABLET | Refills: 3 | Status: SHIPPED | OUTPATIENT
Start: 2018-12-11 | End: 2019-08-14 | Stop reason: SDUPTHER

## 2018-12-14 ENCOUNTER — TELEPHONE (OUTPATIENT)
Dept: CARDIOLOGY | Facility: CLINIC | Age: 83
End: 2018-12-14

## 2018-12-14 NOTE — TELEPHONE ENCOUNTER
Pt called you as you told him per the [employer] email he had sent you.........Samantha

## 2018-12-17 PROCEDURE — 93000 ELECTROCARDIOGRAM COMPLETE: CPT | Performed by: INTERNAL MEDICINE

## 2018-12-17 NOTE — PROGRESS NOTES
Subjective:     Encounter Date:12/11/18      Patient ID: Osvaldo Lopez is a 83 y.o. male.    Chief Complaint:  Atrial Fibrillation   Presents for follow-up visit. Symptoms include shortness of breath. Symptoms are negative for an AICD problem, bradycardia, chest pain, dizziness, hypertension, hypotension, syncope and tachycardia. The symptoms have been stable. Past medical history includes atrial fibrillation.   Hypertension   This is a chronic problem. The current episode started more than 1 year ago. The problem is controlled. Associated symptoms include malaise/fatigue, peripheral edema and shortness of breath. Pertinent negatives include no anxiety or chest pain.       72-year-old gentleman presents today for reevaluation. She with a history of atrial fibrillation as well as hypertension.  Patient was recently hospitalized.  We had to cut back his blood pressure medicines but now he is doing better and his blood pressure starting to go back up.  He still short of breath hasn't changed he has a little bit of edema in his legs and his fatigue is definitely there but improving.    Review of Systems   Constitution: Positive for malaise/fatigue.   Cardiovascular: Negative for chest pain and syncope.   Respiratory: Positive for shortness of breath and wheezing.    Endocrine: Positive for polyuria.   Musculoskeletal: Positive for joint pain and myalgias.   Neurological: Negative for dizziness.   All other systems reviewed and are negative.        ECG 12 Lead  Date/Time: 12/17/2018 8:28 AM  Performed by: Angelo Driscoll MD  Authorized by: Angelo Driscoll MD   Rhythm: sinus rhythm  Conduction: right bundle branch block and LAFB  Clinical impression: abnormal ECG               Objective:     Physical Exam   Constitutional: He is oriented to person, place, and time. He appears well-developed.   HENT:   Head: Normocephalic.   Eyes: Conjunctivae are normal.   Neck: Normal range of motion.   Cardiovascular:  Normal rate and regular rhythm.   Murmur heard.   Harsh midsystolic murmur is present with a grade of 1/6 at the upper right sternal border radiating to the neck.  Pulmonary/Chest: Breath sounds normal.   Abdominal: Soft. Bowel sounds are normal.   Musculoskeletal: Normal range of motion. He exhibits no edema.   Neurological: He is alert and oriented to person, place, and time.   Skin: Skin is warm and dry.   Psychiatric: He has a normal mood and affect. His behavior is normal.   Vitals reviewed.      Lab Review:       Assessment:         No diagnosis found.       Plan:     1.  Paroxysmal fibrillation. Doing well remains in sinus rhythm.  QTc interval is 529this is in the setting of a right bundle branch block.  2.  Hypertension blood pressures patient's blood pressures back on the rise.  At this point I'm going to initiate hydralazine 25 3 times a day this evening his blood pressure under control.  3.  Patient is on amiodarone.  He'll need yearly chest x-rays as well as blood work looking for his liver and thyroid functions.  We'll defer to primary care physician Will follow follow back up in 12 months.  4.  Mild aortic stenosis by a echocardiogram done on 1/18/18.  Valve area estimated at 1.7 cm  5.  Patient still has multiple complaints she still having some palpitations and lightheadedness and swelling.  We'll see how he responds to the initiation of hydralazine.  We'll reassess in 6 weeks        Atrial Fibrillation and Atrial Flutter  Assessment  • The patient has paroxysmal atrial fibrillation  • This is non-valvular in etiology  • The patient's CHADS2-VASc score is 3  • A [id number] score of 2 or more is considered a high risk for a thromboembolic event  • Rivaroxaban prescribed    Plan  • Attempt to maintain sinus rhythm  • Continue rivaroxaban for antithrombotic therapy, bleeding issues discussed  • Continue amiodarone for rhythm control

## 2019-01-02 RX ORDER — RIVAROXABAN 20 MG/1
TABLET, FILM COATED ORAL
Qty: 90 TABLET | Refills: 2 | OUTPATIENT
Start: 2019-01-02

## 2019-01-14 RX ORDER — METOPROLOL SUCCINATE 25 MG/1
TABLET, EXTENDED RELEASE ORAL
Qty: 90 TABLET | Refills: 1 | Status: SHIPPED | OUTPATIENT
Start: 2019-01-14 | End: 2019-07-03 | Stop reason: SDUPTHER

## 2019-01-22 ENCOUNTER — OFFICE VISIT (OUTPATIENT)
Dept: CARDIOLOGY | Facility: CLINIC | Age: 84
End: 2019-01-22

## 2019-01-22 VITALS
SYSTOLIC BLOOD PRESSURE: 124 MMHG | OXYGEN SATURATION: 95 % | HEIGHT: 72 IN | HEART RATE: 72 BPM | WEIGHT: 217 LBS | BODY MASS INDEX: 29.39 KG/M2 | DIASTOLIC BLOOD PRESSURE: 58 MMHG

## 2019-01-22 DIAGNOSIS — I10 ESSENTIAL HYPERTENSION: Primary | ICD-10-CM

## 2019-01-22 DIAGNOSIS — I35.0 NONRHEUMATIC AORTIC VALVE STENOSIS: ICD-10-CM

## 2019-01-22 DIAGNOSIS — I48.0 PAROXYSMAL ATRIAL FIBRILLATION (HCC): ICD-10-CM

## 2019-01-22 PROCEDURE — 99214 OFFICE O/P EST MOD 30 MIN: CPT | Performed by: NURSE PRACTITIONER

## 2019-01-22 NOTE — PROGRESS NOTES
Patient Name: Osvaldo Lopez  :1935  Age: 83 y.o.  Primary Cardiologist: Angelo Driscoll MD  Encounter Provider:  LOU Camarena      Chief Complaint:   Chief Complaint   Patient presents with   • Follow-up     6 weeks          HPI  Osvaldo Lopez is a 83 y.o. male with a history significant for atrial fibrillation, hypertension, aortic stenosis, type 2 diabetes, osteoarthritis, AAA.  Patient was seen by Dr. Driscoll on 18 where he was having difficulty with palpitations, lightheadedness, swelling and elevated blood pressure.  He was started on hydralazine 25 mg 3 times a day.  Patient presents today for reevaluation since starting hydralazine.  Patient is new to me but I have reviewed prior medical records.  Patient states that he has been taking the hydralazine 25 mg 3 times daily and states he is tolerating it well.  He brings with him a blood pressure diary.  Patient's blood pressure in the mornings has been averaging 140s/80s.  However the evenings it has been low in the 90s/60s.  Patient does state since stopping amiodarone he feels like he has more energy and more strength.  She denies any chest pain, heart palpitations, lightheadedness, swelling.    The following portions of the patient's history were reviewed and updated as appropriate: allergies, current medications, past family history, past medical history, past social history, past surgical history and problem list.    Current Outpatient Medications on File Prior to Visit   Medication Sig   • acetaminophen (TYLENOL) 500 MG tablet Take 500 mg by mouth Every 6 (Six) Hours As Needed for Mild Pain .   • diclofenac (VOLTAREN) 1 % gel gel Pea sized amount and apply to left heel up to 4 times daily   • glucose blood test strip Twice daily   • hydrALAZINE (APRESOLINE) 25 MG tablet Take 1 tablet by mouth 3 (Three) Times a Day.   • lisinopril (PRINIVIL,ZESTRIL) 40 MG tablet TAKE 1 TABLET DAILY   • metFORMIN (GLUCOPHAGE) 850 MG tablet  "Take 1 tablet by mouth 2 (Two) Times a Day With Meals.   • metoprolol succinate XL (TOPROL-XL) 25 MG 24 hr tablet TAKE 1 TABLET DAILY   • MICROLET LANCETS misc 1 each 2 (Two) Times a Day.   • Multiple Vitamin (MULTI VITAMIN PO) Take 1 tablet by mouth Every Morning.   • rivaroxaban (XARELTO) 20 MG tablet Take 1 tablet by mouth Daily.   • tamsulosin (FLOMAX) 0.4 MG capsule 24 hr capsule TAKE 2 CAPSULES DAILY FOR  PROSTATE (Patient taking differently: BID)   • vitamin B-12 (CYANOCOBALAMIN) 1000 MCG tablet Take 1,000 mcg by mouth Daily.   • [DISCONTINUED] amiodarone (PACERONE) 200 MG tablet TAKE 1 TABLET DAILY   • [DISCONTINUED] amLODIPine (NORVASC) 5 MG tablet Take 1 tablet by mouth Daily.   • [DISCONTINUED] finasteride (PROSCAR) 5 MG tablet Take 5 mg by mouth Every Morning.   • [DISCONTINUED] metoprolol succinate XL (TOPROL-XL) 25 MG 24 hr tablet Take 25 mg by mouth Every Morning.     No current facility-administered medications on file prior to visit.          Review of Systems   Constitution: Negative for malaise/fatigue.   HENT: Positive for hearing loss and sore throat.    Cardiovascular: Positive for claudication and leg swelling. Negative for chest pain.   Respiratory: Positive for cough and shortness of breath.    Endocrine: Positive for cold intolerance and polyuria.   Musculoskeletal: Positive for myalgias.   Genitourinary: Positive for decreased libido.   Neurological: Positive for excessive daytime sleepiness, numbness and paresthesias. Negative for light-headedness.   All other systems reviewed and are negative.      OBJECTIVE:   Vital Signs  Vitals:    01/22/19 1313   BP: 124/58   Pulse: 72   SpO2: 95%     Estimated body mass index is 29.43 kg/m² as calculated from the following:    Height as of this encounter: 182.9 cm (72\").    Weight as of this encounter: 98.4 kg (217 lb).    Physical Exam   Constitutional: He is oriented to person, place, and time. Vital signs are normal. He appears well-developed and " well-nourished.   Eyes: Conjunctivae are normal.   Neck: Carotid bruit is not present.   Cardiovascular: Normal rate, regular rhythm and normal heart sounds.   No murmur heard.  Pulmonary/Chest: Effort normal and breath sounds normal.   Abdominal: Normal appearance.   Musculoskeletal: Normal range of motion.   No pedal edema   Neurological: He is alert and oriented to person, place, and time. GCS eye subscore is 4. GCS verbal subscore is 5. GCS motor subscore is 6.   Skin: Skin is warm and dry.   Psychiatric: He has a normal mood and affect. His speech is normal and behavior is normal. Judgment and thought content normal. Cognition and memory are normal.       Procedures    Cardiac Procedures:  1. Echocardiogram 1/18/18: EF 62%.  Left ventricular cavity is small.  Borderline concentric LVH.  Calcification of the aortic valve.  Mild aortic valve stenosis.  Aortic valve dimensionless index is 0.5.  Aortic valve maximum pressure gradient is 17 mmHg.  Aortic valve mean pressure gradient is 10 mmHg.  Aortic valve area is 1.7 cm².  Mild mitral valve regurgitation present        ASSESSMENT:      Diagnosis Plan   1. Essential hypertension     2. Paroxysmal atrial fibrillation (CMS/[facility])     3. Nonrheumatic aortic valve stenosis           PLAN OF CARE:     1. Hypertension: Patient states that he has been tolerating hydralazine 25 mg 3 times daily.  He brings with him a blood pressure diary.  I reviewed the diary patient's blood pressure is averaging 140s to 80s in the morning however if he needs it is averaging 90s/60s.  I recommended to the patient that he hold the new dose of hydralazine and decrease to only taking the hydralazine twice daily.  He will continue to keep a blood pressure diary and will notify the office if he has any readings greater than 150s/90s.  2. Paroxysmal atrial fibrillation: Patient states since stopping amiodarone he feels like he has more strength and more energy.  He is anticoagulated with  Xarelto.  His rate is controlled.  Patient was informed to call the office if his heart rate is greater than 120s for longer than one to 2 hours.  3. Aortic valve stenosis: Mild on echocardiogram 1/18/18  4. Follow-up with Dr. Driscoll in 6 months.  Sooner with any problems or consultations.      Atrial Fibrillation and Atrial Flutter  Assessment  • The patient has paroxysmal atrial fibrillation  • The patient's CHADS2-VASc score is 4  • A [id number] score of 2 or more is considered a high risk for a thromboembolic event  • Rivaroxaban prescribed    Plan  • Attempt to maintain sinus rhythm  • Continue rivaroxaban for antithrombotic therapy, bleeding issues discussed        Thank you for allowing me to participate in the care of your patient,      Sincerely,   LOU Camarena  [facility]  01/22/19  1:28 PM    **Gretta Disclaimer:**  Much of this encounter note is an electronic transcription/translation of spoken language to printed text. The electronic translation of spoken language may permit erroneous, or at times, nonsensical words or phrases to be inadvertently transcribed. Although I have reviewed the note for such errors, some may still exist.

## 2019-01-29 ENCOUNTER — OFFICE VISIT (OUTPATIENT)
Dept: INTERNAL MEDICINE | Facility: CLINIC | Age: 84
End: 2019-01-29

## 2019-01-29 VITALS
DIASTOLIC BLOOD PRESSURE: 60 MMHG | HEART RATE: 92 BPM | TEMPERATURE: 98.3 F | BODY MASS INDEX: 28.89 KG/M2 | SYSTOLIC BLOOD PRESSURE: 132 MMHG | OXYGEN SATURATION: 94 % | WEIGHT: 213 LBS

## 2019-01-29 DIAGNOSIS — E11.44: Primary | ICD-10-CM

## 2019-01-29 DIAGNOSIS — M62.81 PROXIMAL MUSCLE WEAKNESS: ICD-10-CM

## 2019-01-29 DIAGNOSIS — I48.0 PAROXYSMAL A-FIB (HCC): ICD-10-CM

## 2019-01-29 DIAGNOSIS — M48.062 SPINAL STENOSIS OF LUMBAR REGION WITH NEUROGENIC CLAUDICATION: ICD-10-CM

## 2019-01-29 DIAGNOSIS — J96.01 ACUTE RESPIRATORY FAILURE WITH HYPOXIA (HCC): ICD-10-CM

## 2019-01-29 DIAGNOSIS — J01.10 ACUTE FRONTAL SINUSITIS, RECURRENCE NOT SPECIFIED: ICD-10-CM

## 2019-01-29 DIAGNOSIS — I10 ESSENTIAL HYPERTENSION: ICD-10-CM

## 2019-01-29 DIAGNOSIS — I71.40 ABDOMINAL AORTIC ANEURYSM (AAA) WITHOUT RUPTURE (HCC): ICD-10-CM

## 2019-01-29 PROCEDURE — 99214 OFFICE O/P EST MOD 30 MIN: CPT | Performed by: FAMILY MEDICINE

## 2019-01-29 RX ORDER — CLOTRIMAZOLE 1 G/ML
SOLUTION TOPICAL 2 TIMES DAILY
Qty: 30 ML | Refills: 3 | Status: SHIPPED | OUTPATIENT
Start: 2019-01-29 | End: 2019-01-29 | Stop reason: SDUPTHER

## 2019-01-29 RX ORDER — CLOTRIMAZOLE 1 G/ML
SOLUTION TOPICAL 2 TIMES DAILY
Qty: 30 ML | Refills: 3 | Status: SHIPPED | OUTPATIENT
Start: 2019-01-29 | End: 2019-06-04

## 2019-01-29 RX ORDER — AMOXICILLIN 500 MG/1
1000 CAPSULE ORAL 2 TIMES DAILY
Qty: 40 CAPSULE | Refills: 0 | Status: SHIPPED | OUTPATIENT
Start: 2019-01-29 | End: 2019-04-15 | Stop reason: HOSPADM

## 2019-01-29 NOTE — PROGRESS NOTES
Subjective   Osvaldo Lopez is a 83 y.o. male.     Chief Complaint   Patient presents with   • Leg Pain   • Atrial Fibrillation   • Sinusitis   Leg weakness, diabetes type 2, hypertension.      History of Present Illness   The patient is been sick for 2 to sinus pressure pain postnasal drainage and nasal congestion.  Slight cough.  No fever or chills recently.  He has had decompression laminectomy with neurogenic claudication.  He had per continue quad weakness now improved that he is off Cordarone.  He still has a fair amount of weakness is working on that.    Reviewed history of paroxysmal atrial fibrillation leg pain evidence of recent sinusitis for 2 weeks type 2 diabetes hypertension..    He has a history of respiratory failure with hypoxemia that is resolved.    Stable aortic abdominal aneurysm is reviewed.      The following portions of the patient's history were reviewed and updated as appropriate: allergies, current medications, past social history and problem list.    Review of Systems   Constitutional: Positive for fatigue.   HENT: Positive for congestion, sinus pressure and sinus pain.    Eyes: Negative.    Respiratory: Positive for cough.    Cardiovascular: Negative.    Gastrointestinal: Negative.    Endocrine: Negative.    Genitourinary: Negative.    Musculoskeletal: Negative.    Skin: Negative.    Allergic/Immunologic: Negative.    Neurological: Positive for weakness.   Hematological: Negative.    Psychiatric/Behavioral: Negative.        Objective   Vitals:    01/29/19 1146   BP: 132/60   Pulse: 92   Temp: 98.3 °F (36.8 °C)   SpO2: 94%     Physical Exam   Constitutional: He is oriented to person, place, and time. He appears well-developed and well-nourished.   HENT:   Head: Normocephalic and atraumatic.   Right Ear: Tympanic membrane and external ear normal.   Left Ear: Tympanic membrane and external ear normal.   Nose: Nose normal.   Mouth/Throat: Oropharynx is clear and moist.   Eyes: Conjunctivae  and EOM are normal. Pupils are equal, round, and reactive to light.   Neck: Normal range of motion. Neck supple. No JVD present. No thyromegaly present.   Cardiovascular: Normal rate, regular rhythm, normal heart sounds and intact distal pulses.   Pulmonary/Chest: Effort normal and breath sounds normal.   Abdominal: Soft. Bowel sounds are normal.   Musculoskeletal: Normal range of motion.        Legs:  Lymphadenopathy:     He has no cervical adenopathy.   Neurological: He is alert and oriented to person, place, and time. He displays atrophy. No cranial nerve deficit. He exhibits abnormal muscle tone. Coordination and gait abnormal.   Skin: Skin is warm and dry. No rash noted.   Psychiatric: He has a normal mood and affect. His behavior is normal. Judgment and thought content normal.   Vitals reviewed.      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  Stay off Cordarone.  Recheck in 3-4 months.  Labs at that time.

## 2019-04-14 ENCOUNTER — APPOINTMENT (OUTPATIENT)
Dept: GENERAL RADIOLOGY | Facility: HOSPITAL | Age: 84
End: 2019-04-14

## 2019-04-14 ENCOUNTER — HOSPITAL ENCOUNTER (OUTPATIENT)
Facility: HOSPITAL | Age: 84
Setting detail: OBSERVATION
Discharge: HOME | End: 2019-04-15
Attending: EMERGENCY MEDICINE | Admitting: INTERNAL MEDICINE

## 2019-04-14 ENCOUNTER — APPOINTMENT (OUTPATIENT)
Dept: CT IMAGING | Facility: HOSPITAL | Age: 84
End: 2019-04-14

## 2019-04-14 DIAGNOSIS — I48.92 ATRIAL FLUTTER WITH RAPID VENTRICULAR RESPONSE (HCC): Primary | ICD-10-CM

## 2019-04-14 DIAGNOSIS — R07.89 CHEST TIGHTNESS: ICD-10-CM

## 2019-04-14 PROBLEM — E87.1 HYPONATREMIA: Status: RESOLVED | Noted: 2018-01-18 | Resolved: 2019-04-14

## 2019-04-14 PROBLEM — K91.89 POSTOPERATIVE ILEUS (HCC): Status: RESOLVED | Noted: 2018-01-18 | Resolved: 2019-04-14

## 2019-04-14 PROBLEM — N50.89 TESTICULAR SWELLING, RIGHT: Status: RESOLVED | Noted: 2018-04-03 | Resolved: 2019-04-14

## 2019-04-14 PROBLEM — R07.2 PRECORDIAL PAIN: Status: ACTIVE | Noted: 2019-04-14

## 2019-04-14 PROBLEM — J96.01 ACUTE RESPIRATORY FAILURE WITH HYPOXIA: Status: RESOLVED | Noted: 2018-01-18 | Resolved: 2019-04-14

## 2019-04-14 PROBLEM — M79.601 RIGHT ARM PAIN: Status: ACTIVE | Noted: 2019-04-14

## 2019-04-14 PROBLEM — K56.7 POSTOPERATIVE ILEUS: Status: RESOLVED | Noted: 2018-01-18 | Resolved: 2019-04-14

## 2019-04-14 LAB
ALBUMIN SERPL-MCNC: 4.2 G/DL (ref 3.5–5.2)
ALBUMIN/GLOB SERPL: 1.6 G/DL
ALP SERPL-CCNC: 107 U/L (ref 39–117)
ALT SERPL W P-5'-P-CCNC: 20 U/L (ref 1–41)
ANION GAP SERPL CALCULATED.3IONS-SCNC: 12.5 MMOL/L
AST SERPL-CCNC: 23 U/L (ref 1–40)
BASOPHILS # BLD AUTO: 0.03 10*3/MM3 (ref 0–0.2)
BASOPHILS NFR BLD AUTO: 0.5 % (ref 0–1.5)
BILIRUB SERPL-MCNC: 0.3 MG/DL (ref 0.2–1.2)
BUN BLD-MCNC: 25 MG/DL (ref 8–23)
BUN/CREAT SERPL: 20.2 (ref 7–25)
CALCIUM SPEC-SCNC: 8.7 MG/DL (ref 8.6–10.5)
CHLORIDE SERPL-SCNC: 106 MMOL/L (ref 98–107)
CO2 SERPL-SCNC: 24.5 MMOL/L (ref 22–29)
CREAT BLD-MCNC: 1.24 MG/DL (ref 0.76–1.27)
DEPRECATED RDW RBC AUTO: 49.8 FL (ref 37–54)
EOSINOPHIL # BLD AUTO: 0.11 10*3/MM3 (ref 0–0.4)
EOSINOPHIL NFR BLD AUTO: 1.9 % (ref 0.3–6.2)
ERYTHROCYTE [DISTWIDTH] IN BLOOD BY AUTOMATED COUNT: 14.5 % (ref 12.3–15.4)
GFR SERPL CREATININE-BSD FRML MDRD: 56 ML/MIN/1.73
GLOBULIN UR ELPH-MCNC: 2.6 GM/DL
GLUCOSE BLD-MCNC: 150 MG/DL (ref 65–99)
GLUCOSE BLDC GLUCOMTR-MCNC: 154 MG/DL (ref 70–130)
HCT VFR BLD AUTO: 39.5 % (ref 37.5–51)
HGB BLD-MCNC: 12.1 G/DL (ref 13–17.7)
LIPASE SERPL-CCNC: 8 U/L (ref 13–60)
LYMPHOCYTES # BLD AUTO: 2.38 10*3/MM3 (ref 0.7–3.1)
LYMPHOCYTES NFR BLD AUTO: 40.8 % (ref 19.6–45.3)
MCH RBC QN AUTO: 29.1 PG (ref 26.6–33)
MCHC RBC AUTO-ENTMCNC: 30.6 G/DL (ref 31.5–35.7)
MCV RBC AUTO: 95 FL (ref 79–97)
MONOCYTES # BLD AUTO: 0.54 10*3/MM3 (ref 0.1–0.9)
MONOCYTES NFR BLD AUTO: 9.2 % (ref 5–12)
NEUTROPHILS # BLD AUTO: 2.76 10*3/MM3 (ref 1.4–7)
NEUTROPHILS NFR BLD AUTO: 47.3 % (ref 42.7–76)
PLATELET # BLD AUTO: 150 10*3/MM3 (ref 140–450)
PMV BLD AUTO: 9.9 FL (ref 6–12)
POTASSIUM BLD-SCNC: 4.7 MMOL/L (ref 3.5–5.2)
PROT SERPL-MCNC: 6.8 G/DL (ref 6–8.5)
RBC # BLD AUTO: 4.16 10*6/MM3 (ref 4.14–5.8)
SODIUM BLD-SCNC: 143 MMOL/L (ref 136–145)
T4 FREE SERPL-MCNC: 0.99 NG/DL (ref 0.93–1.7)
TROPONIN T SERPL-MCNC: 0.06 NG/ML (ref 0–0.03)
TSH SERPL DL<=0.05 MIU/L-ACNC: 8.55 MIU/ML (ref 0.27–4.2)
WBC NRBC COR # BLD: 5.84 10*3/MM3 (ref 3.4–10.8)

## 2019-04-14 PROCEDURE — G0378 HOSPITAL OBSERVATION PER HR: HCPCS

## 2019-04-14 PROCEDURE — 82962 GLUCOSE BLOOD TEST: CPT

## 2019-04-14 PROCEDURE — 74174 CTA ABD&PLVS W/CONTRAST: CPT

## 2019-04-14 PROCEDURE — 63710000001 INSULIN LISPRO (HUMAN) PER 5 UNITS: Performed by: INTERNAL MEDICINE

## 2019-04-14 PROCEDURE — 84443 ASSAY THYROID STIM HORMONE: CPT | Performed by: EMERGENCY MEDICINE

## 2019-04-14 PROCEDURE — 84484 ASSAY OF TROPONIN QUANT: CPT | Performed by: EMERGENCY MEDICINE

## 2019-04-14 PROCEDURE — 93010 ELECTROCARDIOGRAM REPORT: CPT | Performed by: INTERNAL MEDICINE

## 2019-04-14 PROCEDURE — 96361 HYDRATE IV INFUSION ADD-ON: CPT

## 2019-04-14 PROCEDURE — 84439 ASSAY OF FREE THYROXINE: CPT | Performed by: EMERGENCY MEDICINE

## 2019-04-14 PROCEDURE — 83690 ASSAY OF LIPASE: CPT | Performed by: EMERGENCY MEDICINE

## 2019-04-14 PROCEDURE — 71046 X-RAY EXAM CHEST 2 VIEWS: CPT

## 2019-04-14 PROCEDURE — 85025 COMPLETE CBC W/AUTO DIFF WBC: CPT | Performed by: EMERGENCY MEDICINE

## 2019-04-14 PROCEDURE — 80053 COMPREHEN METABOLIC PANEL: CPT | Performed by: EMERGENCY MEDICINE

## 2019-04-14 PROCEDURE — 99220 PR INITIAL OBSERVATION CARE/DAY 70 MINUTES: CPT | Performed by: INTERNAL MEDICINE

## 2019-04-14 PROCEDURE — 71275 CT ANGIOGRAPHY CHEST: CPT

## 2019-04-14 PROCEDURE — 96374 THER/PROPH/DIAG INJ IV PUSH: CPT

## 2019-04-14 PROCEDURE — 99284 EMERGENCY DEPT VISIT MOD MDM: CPT

## 2019-04-14 PROCEDURE — 0 IOPAMIDOL PER 1 ML: Performed by: EMERGENCY MEDICINE

## 2019-04-14 PROCEDURE — 93005 ELECTROCARDIOGRAM TRACING: CPT | Performed by: EMERGENCY MEDICINE

## 2019-04-14 RX ORDER — NICOTINE POLACRILEX 4 MG
15 LOZENGE BUCCAL
Status: DISCONTINUED | OUTPATIENT
Start: 2019-04-14 | End: 2019-04-15 | Stop reason: HOSPADM

## 2019-04-14 RX ORDER — LISINOPRIL 40 MG/1
40 TABLET ORAL DAILY
Status: DISCONTINUED | OUTPATIENT
Start: 2019-04-14 | End: 2019-04-15 | Stop reason: HOSPADM

## 2019-04-14 RX ORDER — DIPHENOXYLATE HYDROCHLORIDE AND ATROPINE SULFATE 2.5; .025 MG/1; MG/1
1 TABLET ORAL EVERY MORNING
Status: DISCONTINUED | OUTPATIENT
Start: 2019-04-15 | End: 2019-04-15 | Stop reason: HOSPADM

## 2019-04-14 RX ORDER — ACETAMINOPHEN 500 MG
500 TABLET ORAL EVERY 6 HOURS PRN
Status: DISCONTINUED | OUTPATIENT
Start: 2019-04-14 | End: 2019-04-15 | Stop reason: HOSPADM

## 2019-04-14 RX ORDER — HYDRALAZINE HYDROCHLORIDE 25 MG/1
25 TABLET, FILM COATED ORAL 3 TIMES DAILY
Status: DISCONTINUED | OUTPATIENT
Start: 2019-04-14 | End: 2019-04-14

## 2019-04-14 RX ORDER — CHOLECALCIFEROL (VITAMIN D3) 125 MCG
1000 CAPSULE ORAL DAILY
Status: DISCONTINUED | OUTPATIENT
Start: 2019-04-14 | End: 2019-04-15 | Stop reason: HOSPADM

## 2019-04-14 RX ORDER — HYDRALAZINE HYDROCHLORIDE 25 MG/1
25 TABLET, FILM COATED ORAL EVERY 12 HOURS SCHEDULED
Status: DISCONTINUED | OUTPATIENT
Start: 2019-04-14 | End: 2019-04-15 | Stop reason: HOSPADM

## 2019-04-14 RX ORDER — DILTIAZEM HYDROCHLORIDE 5 MG/ML
15 INJECTION INTRAVENOUS ONCE
Status: COMPLETED | OUTPATIENT
Start: 2019-04-14 | End: 2019-04-14

## 2019-04-14 RX ORDER — NITROGLYCERIN 0.4 MG/1
0.4 TABLET SUBLINGUAL
Status: DISCONTINUED | OUTPATIENT
Start: 2019-04-14 | End: 2019-04-15 | Stop reason: HOSPADM

## 2019-04-14 RX ORDER — TAMSULOSIN HYDROCHLORIDE 0.4 MG/1
0.4 CAPSULE ORAL NIGHTLY
Status: DISCONTINUED | OUTPATIENT
Start: 2019-04-14 | End: 2019-04-15 | Stop reason: HOSPADM

## 2019-04-14 RX ORDER — SODIUM CHLORIDE 0.9 % (FLUSH) 0.9 %
10 SYRINGE (ML) INJECTION AS NEEDED
Status: DISCONTINUED | OUTPATIENT
Start: 2019-04-14 | End: 2019-04-15 | Stop reason: HOSPADM

## 2019-04-14 RX ORDER — METOPROLOL SUCCINATE 25 MG/1
25 TABLET, EXTENDED RELEASE ORAL DAILY
Status: DISCONTINUED | OUTPATIENT
Start: 2019-04-14 | End: 2019-04-15 | Stop reason: HOSPADM

## 2019-04-14 RX ORDER — DEXTROSE MONOHYDRATE 25 G/50ML
25 INJECTION, SOLUTION INTRAVENOUS
Status: DISCONTINUED | OUTPATIENT
Start: 2019-04-14 | End: 2019-04-15 | Stop reason: HOSPADM

## 2019-04-14 RX ORDER — ACETAMINOPHEN 325 MG/1
650 TABLET ORAL EVERY 4 HOURS PRN
Status: DISCONTINUED | OUTPATIENT
Start: 2019-04-14 | End: 2019-04-15 | Stop reason: HOSPADM

## 2019-04-14 RX ORDER — SODIUM CHLORIDE 0.9 % (FLUSH) 0.9 %
3 SYRINGE (ML) INJECTION EVERY 12 HOURS SCHEDULED
Status: DISCONTINUED | OUTPATIENT
Start: 2019-04-14 | End: 2019-04-15 | Stop reason: HOSPADM

## 2019-04-14 RX ORDER — SODIUM CHLORIDE 0.9 % (FLUSH) 0.9 %
3-10 SYRINGE (ML) INJECTION AS NEEDED
Status: DISCONTINUED | OUTPATIENT
Start: 2019-04-14 | End: 2019-04-15 | Stop reason: HOSPADM

## 2019-04-14 RX ADMIN — SODIUM CHLORIDE, PRESERVATIVE FREE 3 ML: 5 INJECTION INTRAVENOUS at 19:45

## 2019-04-14 RX ADMIN — LISINOPRIL 40 MG: 40 TABLET ORAL at 21:58

## 2019-04-14 RX ADMIN — TAMSULOSIN HYDROCHLORIDE 0.4 MG: 0.4 CAPSULE ORAL at 21:56

## 2019-04-14 RX ADMIN — Medication 1000 MCG: at 21:58

## 2019-04-14 RX ADMIN — DILTIAZEM HYDROCHLORIDE 15 MG: 5 INJECTION INTRAVENOUS at 08:53

## 2019-04-14 RX ADMIN — HYDRALAZINE HYDROCHLORIDE 25 MG: 25 TABLET, FILM COATED ORAL at 21:56

## 2019-04-14 RX ADMIN — RIVAROXABAN 20 MG: 20 TABLET, FILM COATED ORAL at 21:58

## 2019-04-14 RX ADMIN — IOPAMIDOL 95 ML: 755 INJECTION, SOLUTION INTRAVENOUS at 10:08

## 2019-04-14 RX ADMIN — SODIUM CHLORIDE 1000 ML: 9 INJECTION, SOLUTION INTRAVENOUS at 08:53

## 2019-04-14 RX ADMIN — METOPROLOL SUCCINATE 25 MG: 25 TABLET, FILM COATED, EXTENDED RELEASE ORAL at 21:58

## 2019-04-14 RX ADMIN — INSULIN LISPRO 2 UNITS: 100 INJECTION, SOLUTION INTRAVENOUS; SUBCUTANEOUS at 21:56

## 2019-04-14 NOTE — PLAN OF CARE
Problem: Patient Care Overview  Goal: Plan of Care Review  Outcome: Ongoing (interventions implemented as appropriate)   04/14/19 2543   Coping/Psychosocial   Plan of Care Reviewed With patient   Plan of Care Review   Progress no change   OTHER   Outcome Summary Pt admitted from the ED with Atrial Flutter, HR 130s. No c/o's pain or SOA. Plan is for EP doctor to see tomm. NPO at midnight. Falls risk, Bed alarm in use. Will continue to monitor.      Goal: Individualization and Mutuality  Outcome: Ongoing (interventions implemented as appropriate)    Goal: Discharge Needs Assessment  Outcome: Ongoing (interventions implemented as appropriate)    Goal: Interprofessional Rounds/Family Conf  Outcome: Ongoing (interventions implemented as appropriate)      Problem: Fall Risk (Adult)  Goal: Identify Related Risk Factors and Signs and Symptoms  Outcome: Ongoing (interventions implemented as appropriate)    Goal: Absence of Fall  Outcome: Ongoing (interventions implemented as appropriate)      Problem: Arrhythmia/Dysrhythmia (Symptomatic) (Adult)  Goal: Signs and Symptoms of Listed Potential Problems Will be Absent, Minimized or Managed (Arrhythmia/Dysrhythmia)  Outcome: Ongoing (interventions implemented as appropriate)

## 2019-04-14 NOTE — H&P
Date of Hospital Visit: 19  Encounter Provider: Randy Garcia MD  Place of Service: [facility] CARDIOLOGY  Patient Name: Osvaldo Lopez  :1935  Referral Provider: ED    Chief complaint: Right arm pain and numbness    History of Present Illness:     Mr Osvaldo Lopez is a 83-year-old man who follows with Dr. Driscoll.  He has HTN, CAD s/p CABG in , paroxysmal atrial fibrillation (on rivaroxaban but had to stop amiodarone due to severe fatigue), mild AS (in 2018), and aortic aneurysms.      At his last visit in 2019, he was doing well.    He presented today with right arm pain and tachycardia.  He isn't really having chest pain.  He also has right arm numbness but that is not a new finding; he has rotator cuff tears bilaterally. He doesn't feel palpitations/tachycardia; he just noticed that his rate was fast when he checked his BP.    He was found to be in atrial flutter in the ED.  His rate did not vary with IV diltiazem.  His TSH is 8.6.  A CXR was normal.  A CTA showed no PE and stable aortic aneurysms.     He has not missed any doses of rivaroxaban in the last month.    Prior Cardiac Testing:  Echo 2018  · Calculated EF = 62.3%  · Left ventricular systolic function is normal.  · The left ventricular cavity is small.  · Left ventricular wall thickness is consistent with borderline concentric hypertrophy.  · There is calcification of the aortic valve.  · Mild aortic valve stenosis is present.  · Aortic valve dimensionless index is 0.5.  · Aortic valve maximum pressure gradient is 17 mmHg. Aortic valve mean pressure gradient is 10 mmHg. Aortic valve area is 1.7 cm2  · Mild mitral valve regurgitation is present      Past Medical History:   Diagnosis Date   • Abnormal thyroid blood test    • Aneurysm of abdominal aorta (CMS/HCC)    • Arthritis    • BPH (benign prostatic hyperplasia)    • Bruises easily    • Bulging of thoracic intervertebral disc     • Chronic edema    • Coronary artery disease    • Diverticular disease    • Enlarged prostate without lower urinary tract symptoms (luts)    • Essential hypertension    • Hyperactivity of bladder    • Hyperlipidemia    • Left shoulder pain    • Lumbar radiculopathy 2016    wears back brace at times following back surgery   • Muscle weakness (generalized)    • Nonrheumatic aortic valve stenosis     mild 1/2018    • Osteoarthritis    • PAF (paroxysmal atrial fibrillation) (CMS/[facility])    • Polyuria    • Recurrent falls    • Screening      stop bang scale 5   • Syncope    • Torn rotator cuff     left   • Type 2 diabetes mellitus (CMS/[facility])    • Urinary frequency        Past Surgical History:   Procedure Laterality Date   • CARDIAC SURGERY      CABGX5 (1989)   • CATARACT EXTRACTION Bilateral 1997   • CYST REMOVAL      FROM BACK   • GALLBLADDER SURGERY  1989   • HERNIA REPAIR Left     inquinal times 3  last one in 2003   • KNEE CARTILAGE SURGERY Left 1970's   • LUMBAR DISCECTOMY FUSION INSTRUMENTATION N/A 4/11/2016    Procedure: lumbar laminectomy L4-5 and fusion with instrumentation;  Surgeon: Ran Kline MD;  Location: [facility];  Service:    • LUMBAR DISCECTOMY FUSION INSTRUMENTATION N/A 1/15/2018    Procedure: L2-3, L3-4 laminectomy and fusion with instrumentation and removal of implants L4 5.;  Surgeon: Ran Kline MD;  Location: [facility];  Service:    • ME TOTAL KNEE ARTHROPLASTY Left 11/14/2016    Procedure: TOTAL KNEE ARTHROPLASTY;  Surgeon: Dontrell Arellano MD;  Location: [facility] OR;  Service: Orthopedics       Prior to Admission medications    Medication Sig Start Date End Date Taking? Authorizing Provider   acetaminophen (TYLENOL) 500 MG tablet Take 500 mg by mouth Every 6 (Six) Hours As Needed for Mild Pain .    Provider, MD Jessica   amoxicillin (AMOXIL) 500 MG capsule Take 2 capsules by mouth 2 (Two) Times a Day. 1/29/19   Caio Rodríguez Jr., MD   clotrimazole (LOTRIMIN) 1 % external  solution Apply  topically to the appropriate area as directed 2 (Two) Times a Day. 19   Caio Rodríguez Jr., MD   diclofenac (VOLTAREN) 1 % gel gel Pea sized amount and apply to left heel up to 4 times daily 18   Caio Rodríguez Jr., MD   glucose blood test strip Twice daily 18   Caio Rodríguez Jr., MD   hydrALAZINE (APRESOLINE) 25 MG tablet Take 1 tablet by mouth 3 (Three) Times a Day. 18   Angelo Driscoll MD   lisinopril (PRINIVIL,ZESTRIL) 40 MG tablet TAKE 1 TABLET DAILY 18   Caio Rodríguez Jr., MD   metFORMIN (GLUCOPHAGE) 850 MG tablet TAKE 1 TABLET TWICE DAILY  WITH MEALS 19   Caio Rodríguez Jr., MD   metoprolol succinate XL (TOPROL-XL) 25 MG 24 hr tablet TAKE 1 TABLET DAILY 19   Angelo Driscoll MD   MICROLET LANCETS misc 1 each 2 (Two) Times a Day. 18   Caio Rodríguez Jr., MD   Multiple Vitamin (MULTI VITAMIN PO) Take 1 tablet by mouth Every Morning.    Provider, MD Jessica   rivaroxaban (XARELTO) 20 MG tablet Take 1 tablet by mouth Daily. 19   Angelo Driscoll MD   tamsulosin (FLOMAX) 0.4 MG capsule 24 hr capsule TAKE 2 CAPSULES DAILY FOR  PROSTATE  Patient taking differently: BID 3/12/18   Caio Rodríguez Jr., MD   vitamin B-12 (CYANOCOBALAMIN) 1000 MCG tablet Take 1,000 mcg by mouth Daily.    Provider, MD Jessica       Social History     Socioeconomic History   • Marital status:      Spouse name: Not on file   • Number of children: Not on file   • Years of education: Not on file   • Highest education level: Not on file   Tobacco Use   • Smoking status: Former Smoker     Years: 36.00     Types: Cigarettes     Last attempt to quit:      Years since quittin.3   • Smokeless tobacco: Never Used   Substance and Sexual Activity   • Alcohol use: No     Frequency: Never   • Drug use: No   • Sexual activity: Defer       Family History   Problem Relation Age of Onset   • Heart failure Mother    • Diabetes Mother    • Cancer Sister    • Cancer Brother   "      Review of Systems   Musculoskeletal: Positive for arthralgias.   Neurological: Positive for numbness.   All other systems reviewed and are negative.       Objective:     Vitals:    04/14/19 0945 04/14/19 1045 04/14/19 1132 04/14/19 1315   BP:   118/70 130/92   BP Location:    Left arm   Patient Position:    Sitting   Pulse: (!) 131 (!) 131 (!) 121 (!) 133   Resp:   16 16   Temp:    97.8 °F (36.6 °C)   TempSrc:    Oral   SpO2: 95% 97% 97% 96%   Weight:    96.5 kg (212 lb 12.8 oz)   Height:    180.3 cm (71\")     Body mass index is 29.68 kg/m².  Flowsheet Rows      First Filed Value   Admission Height  180.3 cm (71\") Documented at 04/14/2019 0830   Admission Weight  96.6 kg (213 lb) Documented at 04/14/2019 0900          Physical Exam   Constitutional: He is oriented to person, place, and time.   obese   HENT:   Head: Normocephalic.   Nose: Nose normal.   Mouth/Throat: Oropharynx is clear and moist.   Eyes: Conjunctivae and EOM are normal. Pupils are equal, round, and reactive to light.   Neck: Normal range of motion.   Cannot assess JVD due to body habitus   Cardiovascular: Regular rhythm and intact distal pulses. Tachycardia present.   Murmur heard.   Systolic murmur is present with a grade of 1/6.  Pulmonary/Chest: Effort normal and breath sounds normal.   Abdominal: Soft.   Cannot feel organs or aorta   Musculoskeletal: Normal range of motion. He exhibits no edema.   Neurological: He is alert and oriented to person, place, and time. No cranial nerve deficit.   Skin: Skin is warm and dry. No erythema.   Psychiatric: He has a normal mood and affect. His behavior is normal. Judgment and thought content normal.   Vitals reviewed.              Lab Review:                Results from last 7 days   Lab Units 04/14/19  0840   SODIUM mmol/L 143   POTASSIUM mmol/L 4.7   CHLORIDE mmol/L 106   CO2 mmol/L 24.5   BUN mg/dL 25*   CREATININE mg/dL 1.24   GLUCOSE mg/dL 150*   CALCIUM mg/dL 8.7     Results from last 7 days "   Lab Units 04/14/19  0840   TROPONIN T ng/mL 0.060*     Results from last 7 days   Lab Units 04/14/19  0840   WBC 10*3/mm3 5.84   HEMOGLOBIN g/dL 12.1*   HEMATOCRIT % 39.5   PLATELETS 10*3/mm3 150                     CTA Chest and Abdomen 4/12/2019  COMPARISON: 01/18/2018, 01/17/2018     FINDINGS:  Vascular:     No aortic dissection. Ascending aorta is aneurysmal, 5.1 cm, not  significantly changed. The lower thoracic aorta is aneurysmal, 4.6 cm on  image 88 of series 1, not significantly changed. The abdominal aorta is  aneurysmal, 3.6 cm on image 209 of series 1, not significantly changed.  Aortic and other arterial calcifications are present.     Estimated 70 % narrowing at the origin of the right renal artery.  Estimated 50% narrowing at the origin of the left renal artery..  Calcifications at the origins of the celiac and superior and inferior  mesenteric arteries without high-grade stenosis (0-49% stenosis).     The left common iliac artery caliber is borderline, 2.0 cm.     No pulmonary embolism.     Chest CT:     The heart size is normal without pericardial effusion. Coronary arterial  calcification is present. A few small subcentimeter short axis  mediastinal lymph nodes are seen that are not significant by size  criteria.     The airways appear clear.     No pleural effusion or pneumothorax.     The lungs show no focal pulmonary consolidation or mass.       Abdomen pelvis CT:     The spleen is enlarged, 14.9 cm.     Right renal cyst.     The gallbladder is surgically absent.     The pancreas is thinned and fatty infiltrated. Caliber of the distal  common biliary duct is borderline prominent, 1.2 cm, but not  significantly changed.     Otherwise unremarkable appearance of the liver, spleen, adrenal glands,  pancreas, kidneys, bladder. Prostate is enlarged, 5.5 cm, suggest PSA  correlation if indicated     No bowel obstruction or abnormal bowel thickening is identified. Colonic  diverticula are seen that do  not appear inflamed. Mild colonic fecal  retention.     No free intraperitoneal gas or free fluid.     Scattered para-aortic lymph nodes are seen that do not appear  significantly changed.     Degenerative and surgical changes are seen in the spine. No acute  fracture is identified.     IMPRESSION:  1. The aorta is aneurysmal, as described. No aortic dissection.  Narrowing at the origins of the bilateral renal arteries, as described.  Scattered areas of arterial narrowing that are not hemodynamically  significant.  2. Splenomegaly. Enlarged prostate.      Xray Chest   FINDINGS: Heart size is normal. Aorta is tortuous, calcified. Sternotomy  wires are noted. Pulmonary vasculature is unremarkable. No focal  pulmonary consolidation, pleural effusion, or pneumothorax is seen, a  posterior costophrenic angle is excluded from the lateral view. No acute  osseous process.     IMPRESSION:  No focal pulmonary consolidation. Tortuous aorta. Follow-up  as clinically indicated.    EKG      Prior EKG        I personally viewed and interpreted the patient's EKG/Telemetry data    Assessment/Plan:     1.  Atrial flutter -- he has a known history of PAF but not flutter.  He's been anticoagulated.  Will plan on EP consult and DCCV in AM vs ablation at some point.  I will not try to aggressively slow his rate today as it's unlikely to be successful and as he's tolerating it well.    2.  Elevated TSH -- this is not a new problem for him.  He was on amiodarone in the past but stopped it due to fatigue.  His free T4 is still WNL.  Will defer this to his PCP as an outpatient.    3.  Right arm pain/history of CAD -- this is not his anginal equivalent.  His Tn is normal.      4.  TAA/AAA -- stable on CTA    5.  HTN -- fairly well controlled    6.  DM2 -- hold metformin due to CT dye that he received today, start SSI

## 2019-04-14 NOTE — ED PROVIDER NOTES
EMERGENCY DEPARTMENT ENCOUNTER    Room Number:  16/16  Date seen:  4/14/2019  Time seen: 8:42 AM  PCP: Caio Rodríguez Jr., MD  Historian: patient  Cardiologist: Dr. Driscoll     HPI:  Chief Complaint: radiating chest pain  A complete HPI/ROS/PMH/PSH/SH/FH are unobtainable due to: n/a  Context: Osvaldo Lopez is a 83 y.o. male with hx of a-fib who presents to the ED c/o CP that started yesterday morning and radiates into his R arm with associated numbness. Pt also c/o rapid heart palpitations that started yesterday and continued this morning. Pt reports that his HR has consistently been in the low 130s since onset of palpitations yesterday. Pt states that he has had similar CP and numbness before, but adds that his symptoms are worse than usual. Pt denies SOA, nausea, diaphoresis, and lightheadedness. Pt has hx of DM, HTN, multiple aneurysms, and CABG. Pt is currently taking xarelto for tx of his a-fib. Pt reportedly stopped taking amiodarone 3 months ago and is now taking metoprolol.     Pain Location: chest  Radiation: into R arm  Quality: radiating CP  Intensity/Severity: moderate  Duration: since yesterday  Onset quality: gradual  Timing: constant  Progression: unchanged  Aggravating Factors: none  Alleviating Factors: none  Previous Episodes: Pt states that he has had similar CP and numbness before, but adds that his symptoms are worse than usual.  Treatment before arrival:  Pt is currently taking xarelto for tx of his a-fib. Pt reportedly stopped taking amiodarone 3 months ago and is now taking metoprolol.   Associated Symptoms: R arm pain and numbness, rapid heart palpitations since yesterday    PAST MEDICAL HISTORY  Active Ambulatory Problems     Diagnosis Date Noted   • Midline low back pain 03/17/2016   • Complete rotator cuff tear of left shoulder 03/17/2016   • Difficulty walking 04/12/2016   • Abnormal finding on thyroid function test 06/13/2016   • Abdominal aortic aneurysm (CMS/HCC) 06/13/2016   •  Benign prostatic hyperplasia 06/13/2016   • Edema 06/13/2016   • Essential hypertension 06/13/2016   • Fatigue 06/13/2016   • Hyperlipidemia 06/13/2016   • Shoulder pain 06/13/2016   • Lumbar radiculopathy 06/13/2016   • Proximal muscle weakness 06/13/2016   • Primary osteoarthritis of left knee 08/15/2016   • OA (osteoarthritis) of knee 11/11/2016   • Toxic encephalopathy 11/17/2016   • Paroxysmal A-fib (CMS/[facility]) 11/17/2016   • 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 (CMS/[facility]) 11/17/2016   • BPH (benign prostatic hyperplasia) 11/17/2016   • Postoperative anemia due to acute blood loss 11/17/2016   • Cardiac murmur 11/17/2016   • Complete tear of left rotator cuff 03/20/2017   • Tear of right rotator cuff 03/20/2017   • Spinal stenosis of lumbar region with neurogenic claudication 12/07/2017   • Tachycardia 01/17/2018   • Acute respiratory failure with hypoxia (CMS/[facility]) 01/18/2018   • Postoperative ileus (CMS/[facility]) 01/18/2018   • Hyponatremia 01/18/2018   • Testicular swelling, right 04/03/2018   • Blepharoptosis 11/09/2013   • Eyebrow ptosis 11/09/2013   • Laxity of eyelid 11/09/2013   • Pseudophakia 11/09/2013   • Nonrheumatic aortic valve stenosis 05/18/2018     Resolved Ambulatory Problems     Diagnosis Date Noted   • PAF (paroxysmal atrial fibrillation) (CMS/[facility]) 02/24/2016   • Enlarged prostate 06/13/2016   • Hypertonicity of bladder 06/13/2016   • Hyperglycemia 06/13/2016   • Type 2 diabetes mellitus without complication (CMS/[facility]) 06/13/2016   • Increased frequency of urination 06/13/2016   • Syncope 07/16/2018     Past Medical History:   Diagnosis Date   • Abnormal thyroid blood test    • Aneurysm of abdominal aorta (CMS/[facility])    • Arthritis    • Atrial fibrillation (CMS/[facility])    • BPH (benign prostatic hyperplasia)    • Bruises easily    • Bulging of thoracic intervertebral disc    • Coronary artery disease    • Diabetes mellitus (CMS/[facility])    • Diverticular disease    • Edema    • Enlarged  prostate without lower urinary tract symptoms (luts)    • Essential hypertension    • Fatigue    • History of MI (myocardial infarction) 1989   • Hyperactivity of bladder    • Hyperlipidemia    • Hypertension    • Joint pain    • Left shoulder pain    • Lumbar radiculopathy 2016   • Malaise and fatigue    • Muscle weakness (generalized)    • Myalgia    • Nonrheumatic aortic valve stenosis    • Osteoarthritis    • PAF (paroxysmal atrial fibrillation) (CMS/[facility])    • Personal history of arthritis    • Polyuria    • Recurrent falls    • Screening    • Syncope    • Torn rotator cuff    • Type 2 diabetes mellitus (CMS/[facility])    • Urinary frequency          PAST SURGICAL HISTORY  Past Surgical History:   Procedure Laterality Date   • CARDIAC SURGERY      CABGX5 (1989)   • CATARACT EXTRACTION Bilateral 1997   • CYST REMOVAL      FROM BACK   • GALLBLADDER SURGERY  1989   • HERNIA REPAIR Left     inquinal times 3  last one in 2003   • KNEE CARTILAGE SURGERY Left 1970's   • LUMBAR DISCECTOMY FUSION INSTRUMENTATION N/A 4/11/2016    Procedure: lumbar laminectomy L4-5 and fusion with instrumentation;  Surgeon: Ran Kline MD;  Location: [facility];  Service:    • LUMBAR DISCECTOMY FUSION INSTRUMENTATION N/A 1/15/2018    Procedure: L2-3, L3-4 laminectomy and fusion with instrumentation and removal of implants L4 5.;  Surgeon: Ran Kline MD;  Location: [facility];  Service:    • MT TOTAL KNEE ARTHROPLASTY Left 11/14/2016    Procedure: TOTAL KNEE ARTHROPLASTY;  Surgeon: Dontrell Arellano MD;  Location: [facility];  Service: Orthopedics         FAMILY HISTORY  Family History   Problem Relation Age of Onset   • Heart failure Mother    • Diabetes Mother    • Cancer Sister    • Cancer Brother          SOCIAL HISTORY  Social History     Socioeconomic History   • Marital status:      Spouse name: Not on file   • Number of children: Not on file   • Years of education: Not on file   • Highest education level: Not on  file   Tobacco Use   • Smoking status: Former Smoker     Years: 36.00     Types: Cigarettes     Last attempt to quit:      Years since quittin.3   • Smokeless tobacco: Never Used   • Tobacco comment: states smoked 2-3 ppd   Substance and Sexual Activity   • Alcohol use: Yes     Comment: 2-3 times monthly  //  caffeine use   • Drug use: No   • Sexual activity: Defer         ALLERGIES  Percocet [oxycodone-acetaminophen]        REVIEW OF SYSTEMS  Review of Systems   Constitutional: Negative for diaphoresis and fever.   HENT: Negative for congestion.    Eyes: Negative for visual disturbance.   Respiratory: Negative for shortness of breath.    Cardiovascular: Positive for chest pain ( radiating into the R arm) and palpitations ( rapid heart palpitations).   Gastrointestinal: Negative for blood in stool, nausea and vomiting.   Endocrine: Negative for polyuria.   Genitourinary: Negative for flank pain.   Musculoskeletal: Positive for myalgias ( R arm pain). Negative for joint swelling.   Skin: Negative for wound.   Neurological: Positive for numbness ( of the R arm). Negative for seizures and light-headedness.   Hematological: Negative for adenopathy.   Psychiatric/Behavioral: Negative for sleep disturbance.            PHYSICAL EXAM  ED Triage Vitals [19 0830]   Temp Heart Rate Resp BP SpO2   97.1 °F (36.2 °C) (!) 135 16 -- 96 %      Temp src Heart Rate Source Patient Position BP Location FiO2 (%)   Tympanic Monitor -- -- --         GENERAL: no acute distress  HENT: nares patent  EYES: no scleral icterus  CV: regular rhythm, tachycardic rate  RESPIRATORY: normal effort, CTAB  ABDOMEN: soft, non-tender  MUSCULOSKELETAL: no deformity, trace BLE edema  NEURO: alert, moves all extremities, follows commands  SKIN: warm, dry    Vital signs and nursing notes reviewed.        LAB RESULTS  Recent Results (from the past 24 hour(s))   Comprehensive Metabolic Panel    Collection Time: 19  8:40 AM   Result Value  Ref Range    Glucose 150 (H) 65 - 99 mg/dL    BUN 25 (H) 8 - 23 mg/dL    Creatinine 1.24 0.76 - 1.27 mg/dL    Sodium 143 136 - 145 mmol/L    Potassium 4.7 3.5 - 5.2 mmol/L    Chloride 106 98 - 107 mmol/L    CO2 24.5 22.0 - 29.0 mmol/L    Calcium 8.7 8.6 - 10.5 mg/dL    Total Protein 6.8 6.0 - 8.5 g/dL    Albumin 4.20 3.50 - 5.20 g/dL    ALT (SGPT) 20 1 - 41 U/L    AST (SGOT) 23 1 - 40 U/L    Alkaline Phosphatase 107 39 - 117 U/L    Total Bilirubin 0.3 0.2 - 1.2 mg/dL    eGFR Non African Amer 56 (L) >60 mL/min/1.73    Globulin 2.6 gm/dL    A/G Ratio 1.6 g/dL    BUN/Creatinine Ratio 20.2 7.0 - 25.0    Anion Gap 12.5 mmol/L   Lipase    Collection Time: 04/14/19  8:40 AM   Result Value Ref Range    Lipase 8 (L) 13 - 60 U/L   Troponin    Collection Time: 04/14/19  8:40 AM   Result Value Ref Range    Troponin T 0.060 (C) 0.000 - 0.030 ng/mL   CBC Auto Differential    Collection Time: 04/14/19  8:40 AM   Result Value Ref Range    WBC 5.84 3.40 - 10.80 10*3/mm3    RBC 4.16 4.14 - 5.80 10*6/mm3    Hemoglobin 12.1 (L) 13.0 - 17.7 g/dL    Hematocrit 39.5 37.5 - 51.0 %    MCV 95.0 79.0 - 97.0 fL    MCH 29.1 26.6 - 33.0 pg    MCHC 30.6 (L) 31.5 - 35.7 g/dL    RDW 14.5 12.3 - 15.4 %    RDW-SD 49.8 37.0 - 54.0 fl    MPV 9.9 6.0 - 12.0 fL    Platelets 150 140 - 450 10*3/mm3    Neutrophil % 47.3 42.7 - 76.0 %    Lymphocyte % 40.8 19.6 - 45.3 %    Monocyte % 9.2 5.0 - 12.0 %    Eosinophil % 1.9 0.3 - 6.2 %    Basophil % 0.5 0.0 - 1.5 %    Neutrophils, Absolute 2.76 1.40 - 7.00 10*3/mm3    Lymphocytes, Absolute 2.38 0.70 - 3.10 10*3/mm3    Monocytes, Absolute 0.54 0.10 - 0.90 10*3/mm3    Eosinophils, Absolute 0.11 0.00 - 0.40 10*3/mm3    Basophils, Absolute 0.03 0.00 - 0.20 10*3/mm3   TSH    Collection Time: 04/14/19  8:40 AM   Result Value Ref Range    TSH 8.550 (H) 0.270 - 4.200 mIU/mL   T4, Free    Collection Time: 04/14/19  8:40 AM   Result Value Ref Range    Free T4 0.99 0.93 - 1.70 ng/dL       Ordered the above labs and reviewed  the results.        RADIOLOGY  Xr Chest 2 View    Result Date: 4/14/2019  XR CHEST 2 VW-  HISTORY: Male who is 83 years-old,  chest pain  TECHNIQUE: Frontal and lateral views of the chest  COMPARISON: 121/2018  FINDINGS: Heart size is normal. Aorta is tortuous, calcified. Sternotomy wires are noted. Pulmonary vasculature is unremarkable. No focal pulmonary consolidation, pleural effusion, or pneumothorax is seen, a posterior costophrenic angle is excluded from the lateral view. No acute osseous process.      No focal pulmonary consolidation. Tortuous aorta. Follow-up as clinically indicated.  This report was finalized on 4/14/2019 10:03 AM by Dr. Vimal Mayorga M.D.      Ct Angiogram Abdomen Pelvis With & Without Contrast    Result Date: 4/14/2019  CT ANGIOGRAM ABDOMEN PELVIS W WO CONTRAST-, CT ANGIOGRAM CHEST W CONTRAST-  INDICATIONS: Abdominal aortic aneurysm  Radiation dose reduction techniques were utilized, including automated exposure control and exposure modulation based on body size.  TECHNIQUE: CT ANGIOGRAPHY OF THE CHEST, ABDOMEN, PELVIS, WITH THREE-DIMENSIONAL RECONSTRUCTIONS. NASCET criteria.  COMPARISON: 01/18/2018, 01/17/2018  FINDINGS:   Vascular:  No aortic dissection. Ascending aorta is aneurysmal, 5.1 cm, not significantly changed. The lower thoracic aorta is aneurysmal, 4.6 cm on image 88 of series 1, not significantly changed. The abdominal aorta is aneurysmal, 3.6 cm on image 209 of series 1, not significantly changed. Aortic and other arterial calcifications are present.  Estimated 70 % narrowing at the origin of the right renal artery. Estimated 50% narrowing at the origin of the left renal artery.. Calcifications at the origins of the celiac and superior and inferior mesenteric arteries without high-grade stenosis (0-49% stenosis).  The left common iliac artery caliber is borderline, 2.0 cm.    No pulmonary embolism.      Chest CT:  The heart size is normal without pericardial effusion.  Coronary arterial calcification is present. A few small subcentimeter short axis mediastinal lymph nodes are seen that are not significant by size criteria.  The airways appear clear.  No pleural effusion or pneumothorax.  The lungs show no focal pulmonary consolidation or mass.    Abdomen pelvis CT:  The spleen is enlarged, 14.9 cm.  Right renal cyst.    The gallbladder is surgically absent.  The pancreas is thinned and fatty infiltrated. Caliber of the distal common biliary duct is borderline prominent, 1.2 cm, but not significantly changed.  Otherwise unremarkable appearance of the liver, spleen, adrenal glands, pancreas, kidneys, bladder. Prostate is enlarged, 5.5 cm, suggest PSA correlation if indicated  No bowel obstruction or abnormal bowel thickening is identified. Colonic diverticula are seen that do not appear inflamed. Mild colonic fecal retention.  No free intraperitoneal gas or free fluid.  Scattered para-aortic lymph nodes are seen that do not appear significantly changed.  Degenerative and surgical changes are seen in the spine. No acute fracture is identified.          1. The aorta is aneurysmal, as described. No aortic dissection. Narrowing at the origins of the bilateral renal arteries, as described. Scattered areas of arterial narrowing that are not hemodynamically significant. 2. Splenomegaly. Enlarged prostate.  Discussed by telephone with Bharat Moscoso at 1050, 04/14/2019  This report was finalized on 4/14/2019 10:51 AM by Dr. Vimal Mayorga M.D.      Ct Angiogram Chest With Contrast    Result Date: 4/14/2019  CT ANGIOGRAM ABDOMEN PELVIS W WO CONTRAST-, CT ANGIOGRAM CHEST W CONTRAST-  INDICATIONS: Abdominal aortic aneurysm  Radiation dose reduction techniques were utilized, including automated exposure control and exposure modulation based on body size.  TECHNIQUE: CT ANGIOGRAPHY OF THE CHEST, ABDOMEN, PELVIS, WITH THREE-DIMENSIONAL RECONSTRUCTIONS. NASCET criteria.  COMPARISON: 01/18/2018,  01/17/2018  FINDINGS:   Vascular:  No aortic dissection. Ascending aorta is aneurysmal, 5.1 cm, not significantly changed. The lower thoracic aorta is aneurysmal, 4.6 cm on image 88 of series 1, not significantly changed. The abdominal aorta is aneurysmal, 3.6 cm on image 209 of series 1, not significantly changed. Aortic and other arterial calcifications are present.  Estimated 70 % narrowing at the origin of the right renal artery. Estimated 50% narrowing at the origin of the left renal artery.. Calcifications at the origins of the celiac and superior and inferior mesenteric arteries without high-grade stenosis (0-49% stenosis).  The left common iliac artery caliber is borderline, 2.0 cm.    No pulmonary embolism.      Chest CT:  The heart size is normal without pericardial effusion. Coronary arterial calcification is present. A few small subcentimeter short axis mediastinal lymph nodes are seen that are not significant by size criteria.  The airways appear clear.  No pleural effusion or pneumothorax.  The lungs show no focal pulmonary consolidation or mass.    Abdomen pelvis CT:  The spleen is enlarged, 14.9 cm.  Right renal cyst.    The gallbladder is surgically absent.  The pancreas is thinned and fatty infiltrated. Caliber of the distal common biliary duct is borderline prominent, 1.2 cm, but not significantly changed.  Otherwise unremarkable appearance of the liver, spleen, adrenal glands, pancreas, kidneys, bladder. Prostate is enlarged, 5.5 cm, suggest PSA correlation if indicated  No bowel obstruction or abnormal bowel thickening is identified. Colonic diverticula are seen that do not appear inflamed. Mild colonic fecal retention.  No free intraperitoneal gas or free fluid.  Scattered para-aortic lymph nodes are seen that do not appear significantly changed.  Degenerative and surgical changes are seen in the spine. No acute fracture is identified.          1. The aorta is aneurysmal, as described. No  aortic dissection. Narrowing at the origins of the bilateral renal arteries, as described. Scattered areas of arterial narrowing that are not hemodynamically significant. 2. Splenomegaly. Enlarged prostate.  Discussed by telephone with Bharat oMscoso at 1050, 04/14/2019  This report was finalized on 4/14/2019 10:51 AM by Dr. Vimal Mayorga M.D.        Ordered the above noted radiological studies. Reviewed by me in PACS.  Discussed CTA A/P with radiologist Dr. Mayorga. Pt has a stable aneurysmal aorta, but no dissection or PE.       PROCEDURES  Procedures        EKG:           EKG time: 0835  Rhythm/Rate: sinus tachycardia versus atrial flutter with block, rate 132  P waves and ND: nl ND  QRS, axis: RBBB   ST and T waves: borderline ST elevation in lead 3, slight ST depression in leads 1, AVL, V2, V4 and V5    Interpreted Contemporaneously by me, independently viewed. EKG changed compared to previous tracing 7/16/18. Rate is faster and the ST depressions are new today.     MEDICATIONS GIVEN IN ER  Medications   sodium chloride 0.9 % flush 10 mL (not administered)   sodium chloride 0.9 % bolus 1,000 mL (0 mL Intravenous Stopped 4/14/19 1011)   diltiaZEM (CARDIZEM) injection 15 mg (15 mg Intravenous Given 4/14/19 0853)   iopamidol (ISOVUE-370) 76 % injection 100 mL (95 mL Intravenous Given by Other 4/14/19 1008)       PROGRESS AND CONSULTS     0833 EKG ordered for further evaluation.     0838 CXR, Troponin, and Labs ordered for further     0846 Cardizem ordered for tx of pt's rapid HR. IVF ordered for further tx.     0940 CTA Chest ordered for further evaluation.    1005 CTA A/P ordered for further evaluation.    1102 Call placed to [facility] [place].    1149 Rechecked pt. Pt is resting comfortably. Notified pt of CXR (stable aneurysmal aorta, but no dissection and no PE). Discussed the plan to speak with Cardiology. Pt understands and agrees with the plan, all questions answered.    1155 Received a call from Dr. Garcia  (Cardiolgist) and discussed patient's case. Dr. Garcia agrees with the plan to admit the pt.     1204 Rechecked pt. Pt is resting comfortably. Notified pt that I spoke with Cardiology and that they would like to admit the pt for further tx of pt's atrial flutter. Discussed the plan to admit the pt for further tx and evaluation. Pt and family agree with the plan and all questions were addressed.      MEDICAL DECISION MAKING    83-year-old male with history of diabetes, hypertension, CAD status post remote CABG, A. fib on Xarelto, presents today with a complaint of rapid heart rate since yesterday morning and also chest pressure and mild right arm tingling.  His EKG looks consistent with likely atrial flutter versus less likely sinus tach.  He was given a diltiazem bolus with no reduction of his heart rate.  He also did not respond to IV fluids.  He has been consistently in the 132-133 range.   troponin is mildly elevated which I suspect is related to his rapid rate for the last 24 hours.  CTA chest is negative for PE and the aorta has stable aneurysmal dilatation.  He will be admitted to cardiology for further management of his rapid heart rate, possible cardioversion.    MDM  Number of Diagnoses or Management Options  Atrial flutter with rapid ventricular response (CMS/HCC):   Chest tightness:      Amount and/or Complexity of Data Reviewed  Clinical lab tests: ordered and reviewed (Troponin - 0.60)  Tests in the radiology section of CPT®: ordered and reviewed (CTA A/P - stable aneurysmal aorta, but no dissection and no PE)  Tests in the medicine section of CPT®: ordered and reviewed (EKG - see procedure note)  Discussion of test results with the performing providers: yes (Dr. Mayorga (Radiologist))  Discuss the patient with other providers: yes (Dr. Garcia (Radiologist))  Independent visualization of images, tracings, or specimens: yes               DIAGNOSIS  Final diagnoses:   Atrial flutter with rapid ventricular  response (CMS/[facility])   Chest tightness         DISPOSITION  ADMISSION    Discussed treatment plan and reason for admission with pt/family and admitting physician.  Pt/family voiced understanding of the plan for admission for further testing/treatment as needed.       Latest Documented Vital Signs:  As of 12:44 PM  BP- 118/70 HR- (!) 121 Temp- 97.1 °F (36.2 °C) (Tympanic) O2 sat- 97%        --  Documentation assistance provided by mag Dejesus for Dr. DAXA Moscoso MD.  Information recorded by the mag was done at my direction and has been verified and validated by me.               Hemanth Dejesus  04/14/19 1243       Ran Moscoso MD  04/14/19 5160

## 2019-04-15 ENCOUNTER — APPOINTMENT (OUTPATIENT)
Dept: CARDIOLOGY | Facility: HOSPITAL | Age: 84
End: 2019-04-15

## 2019-04-15 VITALS
SYSTOLIC BLOOD PRESSURE: 117 MMHG | WEIGHT: 208.4 LBS | HEART RATE: 84 BPM | BODY MASS INDEX: 29.18 KG/M2 | RESPIRATION RATE: 18 BRPM | OXYGEN SATURATION: 95 % | TEMPERATURE: 97.6 F | HEIGHT: 71 IN | DIASTOLIC BLOOD PRESSURE: 68 MMHG

## 2019-04-15 LAB
GLUCOSE BLDC GLUCOMTR-MCNC: 137 MG/DL (ref 70–130)
GLUCOSE BLDC GLUCOMTR-MCNC: 150 MG/DL (ref 70–130)
GLUCOSE BLDC GLUCOMTR-MCNC: 184 MG/DL (ref 70–130)

## 2019-04-15 PROCEDURE — 82962 GLUCOSE BLOOD TEST: CPT

## 2019-04-15 PROCEDURE — G0378 HOSPITAL OBSERVATION PER HR: HCPCS

## 2019-04-15 PROCEDURE — 93010 ELECTROCARDIOGRAM REPORT: CPT | Performed by: INTERNAL MEDICINE

## 2019-04-15 PROCEDURE — 93005 ELECTROCARDIOGRAM TRACING: CPT | Performed by: INTERNAL MEDICINE

## 2019-04-15 PROCEDURE — 99217 PR OBSERVATION CARE DISCHARGE MANAGEMENT: CPT | Performed by: NURSE PRACTITIONER

## 2019-04-15 PROCEDURE — 92960 CARDIOVERSION ELECTRIC EXT: CPT | Performed by: INTERNAL MEDICINE

## 2019-04-15 PROCEDURE — 92960 CARDIOVERSION ELECTRIC EXT: CPT

## 2019-04-15 RX ORDER — SODIUM CHLORIDE 9 MG/ML
INJECTION, SOLUTION INTRAVENOUS
Status: COMPLETED | OUTPATIENT
Start: 2019-04-15 | End: 2019-04-15

## 2019-04-15 RX ADMIN — SODIUM CHLORIDE 25 ML/HR: 9 INJECTION, SOLUTION INTRAVENOUS at 11:36

## 2019-04-15 RX ADMIN — RIVAROXABAN 20 MG: 20 TABLET, FILM COATED ORAL at 18:26

## 2019-04-15 RX ADMIN — LISINOPRIL 40 MG: 40 TABLET ORAL at 09:52

## 2019-04-15 RX ADMIN — SODIUM CHLORIDE, PRESERVATIVE FREE 3 ML: 5 INJECTION INTRAVENOUS at 09:53

## 2019-04-15 RX ADMIN — METHOHEXITAL SODIUM 40 MG: 500 INJECTION, POWDER, LYOPHILIZED, FOR SOLUTION INTRAMUSCULAR; INTRAVENOUS; RECTAL at 11:40

## 2019-04-15 RX ADMIN — HYDRALAZINE HYDROCHLORIDE 25 MG: 25 TABLET, FILM COATED ORAL at 16:58

## 2019-04-15 RX ADMIN — Medication 1000 MCG: at 18:25

## 2019-04-15 RX ADMIN — Medication 1 TABLET: at 06:50

## 2019-04-15 RX ADMIN — METOPROLOL SUCCINATE 25 MG: 25 TABLET, FILM COATED, EXTENDED RELEASE ORAL at 09:52

## 2019-04-15 NOTE — DISCHARGE SUMMARY
DISCHARGE NOTE    Patient Name: Osvaldo Lopez  Age/Sex: 83 y.o. male  : 1935  MRN: [id number]    Date of Discharge:  4/15/2019   Date of Admit: 2019  Encounter Provider: LOU Nielsen  Place of Service: [facility] CARDIOLOGY  Patient Care Team:  Caio Rodríguez Jr., MD as PCP - General (Family Medicine)  Caio Rodríguez Jr., MD as PCP - Claims Attributed  Dontrell Arellano MD as Surgeon (Orthopedic Surgery)  Angelo Driscoll MD as Consulting Physician (Cardiology)  Darvin Alvarez MD as Consulting Physician (Urology)    Subjective:     Discharge Diagnosis:    Abnormal finding on thyroid function test    Abdominal aortic aneurysm (CMS/HCC)    Essential hypertension    Type 2 diabetes mellitus (CMS/HCC)    Nonrheumatic aortic valve stenosis    Atrial flutter with rapid ventricular response (CMS/HCC)    Right arm pain      Hospital Course:     83-year-old patient of Dr. Driscoll's with a history of hypertension, CAD, status post CABG and paroxysmal atrial fib.  He has been on anticoagulation with rivaroxaban and done well.  He was on amiodarone for his A. fib but developed problems with fatigue and it was discontinued January.  He presented to the ED on  with complaints of some right arm pain and noted that he had elevated heart rate.  He was noted to be in atrial flutter.  Dr. Talbot and I saw him and discussed treatment options which included cardioversion or ablation.  Since this is first episode of flutter we recommended going ahead and proceeding with cardioversion and if it recurs we can always consider ablation.  He underwent cardioversion earlier today which was successful and is felt stable for discharge home.  He will continue on his rivaroxaban and his current medications and will follow up with us in the office in 4 weeks.    Vital Signs  Temp:  [97.4 °F  (36.3 °C)-97.9 °F (36.6 °C)] 97.6 °F (36.4 °C)  Heart Rate:  [] 84  Resp:  [16-18] 18  BP: (104-157)/(66-87) 117/68    Intake/Output Summary (Last 24 hours) at 4/15/2019 1803  Last data filed at 4/15/2019 1144  Gross per 24 hour   Intake 75 ml   Output --   Net 75 ml       Physical Exam:    General Appearance: No acute distress, well developed and well nourished.   Eyes: Conjunctiva and lids: No erythema, swelling, or discharge. Sclera non-icteric.   HENT: Atraumatic, normocephalic. External eyes, ears, and nose normal.   Respiratory: No signs of respiratory distress. Respiration rhythm and depth normal.   Clear to auscultation. No rales, crackles, rhonchi, or wheezing auscultated.   Cardiovascular:  Jugular Venous Pressure: Normal  Heart Rate and Rhythm: Normal, Heart Sounds: Normal S1 and S2. No S3 or S4 noted.  Murmurs: No murmurs noted. No rubs, thrills, or gallops.   Arterial Pulses: Posterior tibialis and dorsalis pedis pulses normal.   Lower Extremities: No edema noted.  Gastrointestinal:  Abdomen soft, non-distended, non-tender.  Musculoskeletal: Normal movement of extremities  Skin: Warm and dry.   Psychiatric: Patient alert and oriented to person, place, and time. Speech and behavior appropriate. Normal mood and affect.    Labs:   Results from last 7 days   Lab Units 04/14/19  0840   SODIUM mmol/L 143   POTASSIUM mmol/L 4.7   CHLORIDE mmol/L 106   CO2 mmol/L 24.5   BUN mg/dL 25*   CREATININE mg/dL 1.24   GLUCOSE mg/dL 150*   CALCIUM mg/dL 8.7   AST (SGOT) U/L 23   ALT (SGPT) U/L 20     Results from last 7 days   Lab Units 04/14/19  0840   TROPONIN T ng/mL 0.060*     Results from last 7 days   Lab Units 04/14/19  0840   WBC 10*3/mm3 5.84   HEMOGLOBIN g/dL 12.1*   HEMATOCRIT % 39.5   PLATELETS 10*3/mm3 150                     Results from last 7 days   Lab Units 04/14/19  0840   TSH mIU/mL 8.550*       Discharge Diet:    Dietary Orders (From admission, onward)    Start     Ordered    04/15/19 4312   Diet Regular; Consistent Carbohydrate  Diet Effective Now     Question Answer Comment   Diet Texture / Consistency Regular    Common Modifiers Consistent Carbohydrate        04/15/19 1700            Activity at Discharge:  as tolerated    Discharge Medications     Discharge Medications      Changes to Medications      Instructions Start Date   clotrimazole 1 % external solution  Commonly known as:  LOTRIMIN  What changed:    · when to take this  · reasons to take this   Topical, 2 Times Daily      diclofenac 1 % gel gel  Commonly known as:  VOLTAREN  What changed:    · when to take this  · reasons to take this  · additional instructions   Pea sized amount and apply to left heel up to 4 times daily      tamsulosin 0.4 MG capsule 24 hr capsule  Commonly known as:  FLOMAX  What changed:  See the new instructions.   TAKE 2 CAPSULES DAILY FOR  PROSTATE         Continue These Medications      Instructions Start Date   acetaminophen 500 MG tablet  Commonly known as:  TYLENOL   500 mg, Oral, Every 6 Hours PRN      glucose blood test strip   Twice daily      hydrALAZINE 25 MG tablet  Commonly known as:  APRESOLINE   25 mg, Oral, 3 Times Daily      lisinopril 40 MG tablet  Commonly known as:  PRINIVIL,ZESTRIL   TAKE 1 TABLET DAILY      metFORMIN 850 MG tablet  Commonly known as:  GLUCOPHAGE   TAKE 1 TABLET TWICE DAILY  WITH MEALS      metoprolol succinate XL 25 MG 24 hr tablet  Commonly known as:  TOPROL-XL   TAKE 1 TABLET DAILY      MICROLET LANCETS misc   1 each, Does not apply, 2 Times Daily      MULTI VITAMIN PO   1 tablet, Oral, Every Morning      rivaroxaban 20 MG tablet  Commonly known as:  XARELTO   20 mg, Oral, Daily      vitamin B-12 1000 MCG tablet  Commonly known as:  CYANOCOBALAMIN   1,000 mcg, Oral, Daily         Stop These Medications    amoxicillin 500 MG capsule  Commonly known as:  AMOXIL            Discharge disposition: home    Follow-up Appointments  Follow-up Information     Caio Rodríguez Jr., MD Follow  up.    Specialty:  Family Medicine  Contact information:  [address]  [facility] [address]  [phone]             Denisha Tracy APRN Follow up in 4 week(s).    Specialty:  Cardiology  Why:  office will call with appt  Contact information:  [address]  [facility] [address]  [phone]                 Future Appointments   Date Time Provider Department Center   6/4/2019 11:45 AM Caio Rodríguez Jr., MD [id number] None   9/13/2019  2:20 PM Angelo Driscoll MD [id number] None         LOU Nielsen  04/15/19  6:03 PM

## 2019-04-15 NOTE — CONSULTS
Electrophysiology Hospital Consult            Patient Name: Osvaldo Lopez  Age/Sex: 83 y.o. male  : 1935  MRN: [id number]    Date of Admission: 2019  Date of Encounter Visit: 04/15/19  Encounter Provider: LOU Nielsen  Referring Provider: Randy Garcia MD  Place of Service: [facility] CARDIOLOGY  Patient Care Team:  Caio Rodríguez Jr., MD as PCP - General (Family Medicine)  Caio Rodríguez Jr., MD as PCP - Claims Attributed  Dontrell Arellano MD as Surgeon (Orthopedic Surgery)  Angelo Driscoll MD as Consulting Physician (Cardiology)  Darvin Alvarez MD as Consulting Physician (Urology)      Subjective:   EP Consultation for: Atrial flutter    Chief Complaint: Tachycardia and right arm pain    History of Present Illness:  Osvaldo Lopze is a 83 y.o. male patient of Dr. Driscoll's with a history of hypertension, CAD, status post CABG in  and paroxysmal atrial fib.  He has been on rivaroxaban for anticoagulation and was on amiodarone in the past but had severe fatigue and this was discontinued he thinks around January.  Says he was diagnosed with PAF in the early .    He noted on Saturday that and he was checking his blood pressure and heart rate as he normally does that his heart rate was elevated.  He says that he continue to check it intermittently and it remained elevated and then he developed some right arm pain so he came to the hospital on .  He was noted to be in atrial flutter with rapid rate.    It has remained rapid, EP has been ask to see regarding possible CV or ablation.    Currently feels okay.  He denies chest pain, right arm pain, shortness of breath, PND, orthopnea, dizziness or palpitations.    Past Medical History:  Past Medical History:   Diagnosis Date   • Abnormal thyroid blood test    • Aneurysm of abdominal aorta (CMS/HCC)    • Arthritis    • BPH (benign prostatic hyperplasia)    • Bruises easily    • Bulging of  thoracic intervertebral disc    • Chronic edema    • Coronary artery disease    • Diverticular disease    • Enlarged prostate without lower urinary tract symptoms (luts)    • Essential hypertension    • Hyperactivity of bladder    • Hyperlipidemia    • Left shoulder pain    • Lumbar radiculopathy 2016    wears back brace at times following back surgery   • Muscle weakness (generalized)    • Nonrheumatic aortic valve stenosis     mild 1/2018    • Osteoarthritis    • PAF (paroxysmal atrial fibrillation) (CMS/HCC)    • Polyuria    • Recurrent falls    • Screening      stop bang scale 5   • Syncope    • Torn rotator cuff     left   • Type 2 diabetes mellitus (CMS/HCC)    • Urinary frequency        Past Surgical History:   Procedure Laterality Date   • CARDIAC SURGERY      CABGX5 (1989)   • CATARACT EXTRACTION Bilateral 1997   • CYST REMOVAL      FROM BACK   • GALLBLADDER SURGERY  1989   • HERNIA REPAIR Left     inquinal times 3  last one in 2003   • KNEE CARTILAGE SURGERY Left 1970's   • LUMBAR DISCECTOMY FUSION INSTRUMENTATION N/A 4/11/2016    Procedure: lumbar laminectomy L4-5 and fusion with instrumentation;  Surgeon: Ran Kline MD;  Location: [facility];  Service:    • LUMBAR DISCECTOMY FUSION INSTRUMENTATION N/A 1/15/2018    Procedure: L2-3, L3-4 laminectomy and fusion with instrumentation and removal of implants L4 5.;  Surgeon: Ran Kline MD;  Location: [facility];  Service:    • OH TOTAL KNEE ARTHROPLASTY Left 11/14/2016    Procedure: TOTAL KNEE ARTHROPLASTY;  Surgeon: Dontrell Arellano MD;  Location: [facility];  Service: Orthopedics       Home Medications:   Medications Prior to Admission   Medication Sig Dispense Refill Last Dose   • acetaminophen (TYLENOL) 500 MG tablet Take 500 mg by mouth Every 6 (Six) Hours As Needed for Mild Pain .   Taking   • clotrimazole (LOTRIMIN) 1 % external solution Apply  topically to the appropriate area as directed 2 (Two) Times a Day. (Patient taking  differently: Apply  topically to the appropriate area as directed 2 (Two) Times a Day As Needed.) 30 mL 3    • diclofenac (VOLTAREN) 1 % gel gel Pea sized amount and apply to left heel up to 4 times daily (Patient taking differently: 4 (Four) Times a Day As Needed. Pea sized amount and apply to left heel up to 4 times daily) 100 g 0 Taking   • glucose blood test strip Twice daily 100 each 12 Taking   • hydrALAZINE (APRESOLINE) 25 MG tablet Take 1 tablet by mouth 3 (Three) Times a Day. 180 tablet 3 Taking   • lisinopril (PRINIVIL,ZESTRIL) 40 MG tablet TAKE 1 TABLET DAILY 90 tablet 1 Taking   • metFORMIN (GLUCOPHAGE) 850 MG tablet TAKE 1 TABLET TWICE DAILY  WITH MEALS 180 tablet 1    • metoprolol succinate XL (TOPROL-XL) 25 MG 24 hr tablet TAKE 1 TABLET DAILY 90 tablet 1 Taking   • MICROLET LANCETS misc 1 each 2 (Two) Times a Day. 100 each 5 Taking   • Multiple Vitamin (MULTI VITAMIN PO) Take 1 tablet by mouth Every Morning.   Taking   • rivaroxaban (XARELTO) 20 MG tablet Take 1 tablet by mouth Daily. 30 tablet 0 Taking   • tamsulosin (FLOMAX) 0.4 MG capsule 24 hr capsule TAKE 2 CAPSULES DAILY FOR  PROSTATE (Patient taking differently: No sig reported) 180 capsule 3 Taking   • vitamin B-12 (CYANOCOBALAMIN) 1000 MCG tablet Take 1,000 mcg by mouth Daily.   Taking   • amoxicillin (AMOXIL) 500 MG capsule Take 2 capsules by mouth 2 (Two) Times a Day. 40 capsule 0        Allergies:  Allergies   Allergen Reactions   • Percocet [Oxycodone-Acetaminophen] Mental Status Change     Causes confusion/       Past Social History:  Social History     Socioeconomic History   • Marital status:      Spouse name: Not on file   • Number of children: Not on file   • Years of education: Not on file   • Highest education level: Not on file   Tobacco Use   • Smoking status: Former Smoker     Years: 36.00     Types: Cigarettes     Last attempt to quit:      Years since quittin.3   • Smokeless tobacco: Never Used   Substance and  Sexual Activity   • Alcohol use: No     Frequency: Never   • Drug use: No   • Sexual activity: Defer       Past Family History:  Family History   Problem Relation Age of Onset   • Heart failure Mother    • Diabetes Mother    • Cancer Sister    • Cancer Brother        Review of Systems: All systems reviewed. Pertinent positives identified in HPI. All other systems are negative.     14 point ROS was performed and is negative except as outlined in HPI.     Objective:     Objective:  Vital Signs (last 24 hours)       04/14 0700  -  04/15 0659 04/15 0700  -  04/15 0843   Most Recent    Temp (°F) 97.1 -  99.4      97.4     97.4 (36.3)    Heart Rate (!)121 -  (!)140       (!) 140    Resp 16 -  18      18     18    /73 -  131/87      121/80     121/80    SpO2 (%) 95 -  98       97        Temp:  [97.4 °F (36.3 °C)-99.4 °F (37.4 °C)] 97.4 °F (36.3 °C)  Heart Rate:  [121-140] 140  Resp:  [16-18] 18  BP: (112-131)/(64-92) 121/80  Body mass index is 29.07 kg/m².        Physical Exam:     General Appearance: No acute distress, well developed and well nourished.   Eyes: Conjunctiva and lids: No erythema, swelling, or discharge. Sclera non-icteric.   HENT: Atraumatic, normocephalic. External eyes, ears, and nose normal.   Respiratory: No signs of respiratory distress. Respiration rhythm and depth normal.   Clear to auscultation. No rales, crackles, rhonchi, or wheezing auscultated.   Cardiovascular:  Jugular Venous Pressure: Normal  Heart Rate and Rhythm: Regular, tacky.  Heart Sounds: Normal S1 and S2.   Murmurs: No murmurs noted. No rubs, thrills, or gallops.   Arterial Pulses: Posterior tibialis and dorsalis pedis pulses normal.   Lower Extremities: No edema noted.  Gastrointestinal:  Abdomen soft, non-distended, non-tender.  Musculoskeletal: Normal movement of extremities  Skin: Warm and dry.   Psychiatric: Patient alert and oriented to person, place, and time. Speech and behavior appropriate. Normal mood and  affect.    Labs:   Lab Review:     Results from last 7 days   Lab Units 04/14/19  0840   SODIUM mmol/L 143   POTASSIUM mmol/L 4.7   CHLORIDE mmol/L 106   CO2 mmol/L 24.5   BUN mg/dL 25*   CREATININE mg/dL 1.24   GLUCOSE mg/dL 150*   CALCIUM mg/dL 8.7   AST (SGOT) U/L 23   ALT (SGPT) U/L 20     Results from last 7 days   Lab Units 04/14/19  0840   TROPONIN T ng/mL 0.060*     Results from last 7 days   Lab Units 04/14/19  0840   WBC 10*3/mm3 5.84   HEMOGLOBIN g/dL 12.1*   HEMATOCRIT % 39.5   PLATELETS 10*3/mm3 150                             Results from last 7 days   Lab Units 04/14/19  0840   TSH mIU/mL 8.550*           Imaging:     Echo 1/2018:    Interpretation Summary     · Calculated EF = 62.3%  · Left ventricular systolic function is normal.  · The left ventricular cavity is small.  · Left ventricular wall thickness is consistent with borderline concentric hypertrophy.  · There is calcification of the aortic valve.  · Mild aortic valve stenosis is present.  · Aortic valve dimensionless index is 0.5.  · Aortic valve maximum pressure gradient is 17 mmHg. Aortic valve mean pressure gradient is 10 mmHg. Aortic valve area is 1.7 cm2  · Mild mitral valve regurgitation is present          EKG:               I personally viewed and interpreted the patient's EKG/Telemetry tracings.    Assessment:       Abnormal finding on thyroid function test    Abdominal aortic aneurysm (CMS/HCC)    Essential hypertension    Type 2 diabetes mellitus (CMS/HCC)    Nonrheumatic aortic valve stenosis    Atrial flutter with rapid ventricular response (CMS/HCC)    Right arm pain        Plan:     Dr. Talbot and I saw Mr. Lopez. Persistent atrial flutter w/RVR. Discussed treatment options which include cardioversion versus ablation. Since this is his first episode of flutter, think it is reasonable to do cardioversion and see how he does, if recurs then can proceed with ablation. He has been on rivaroxaban and has not missed any doses in  the last month. He is agreeable, will plan for CV this afternoon.     Thank you for allowing me to participate in the care of Osvaldo Lopez. Feel free to contact me directly with any further questions or concerns.    LOU Nielsen  [facility]  04/15/19  8:43 AM

## 2019-04-15 NOTE — PROGRESS NOTES
Discharge Planning Assessment  [facility]     Patient Name: Osvaldo Lopez  MRN: [id number]  Today's Date: 4/15/2019    Admit Date: 4/14/2019    Discharge Needs Assessment     Row Name 04/15/19 1451       Living Environment    Lives With  spouse    Current Living Arrangements  home/apartment/condo    Primary Care Provided by  self    Provides Primary Care For  no one    Family Caregiver if Needed  spouse    Quality of Family Relationships  helpful;supportive;involved    Able to Return to Prior Arrangements  yes       Resource/Environmental Concerns    Resource/Environmental Concerns  none    Transportation Concerns  car, none       Transition Planning    Patient/Family Anticipates Transition to  home with family    Patient/Family Anticipated Services at Transition  none    Transportation Anticipated  family or friend will provide       Discharge Needs Assessment    Readmission Within the Last 30 Days  no previous admission in last 30 days    Concerns to be Addressed  no discharge needs identified    Equipment Currently Used at Home  rollator;cane, straight;shower chair;lift device;glucometer    Anticipated Changes Related to Illness  none    Equipment Needed After Discharge  none        Discharge Plan     Row Name 04/15/19 1452       Plan    Plan  Home with spouse no needs.     Patient/Family in Agreement with Plan  yes    Plan Comments  CCP role explained and face sheet verified. Emergency contact is spouse Julia Lopez [phone], she will provide transportation at discharge. Current pharmacy is [facility] on [address]. Patient has used [facility] , [facility] and [facility] in the past. DME: Rolling walker, cane, chair lift, glucometer and a shower chair. Patient has a POA/Advance Directive/Living will on file. Plan is home with spouse no needs. MARTY Flynn        Destination      No service coordination in this encounter.      Durable Medical Equipment      No service coordination in this encounter.       Dialysis/Infusion      No service coordination in this encounter.      Home Medical Care      No service coordination in this encounter.      Therapy      No service coordination in this encounter.      Community Resources      No service coordination in this encounter.          Demographic Summary     Row Name 04/15/19 1450       General Information    Admission Type  observation    Arrived From  home    Required Notices Provided  Observation Status Notice    Reason for Consult  discharge planning        Functional Status     Row Name 04/15/19 1450       Functional Status    Usual Activity Tolerance  moderate    Current Activity Tolerance  moderate       Functional Status, IADL    Medications  independent    Meal Preparation  independent    Housekeeping  independent    Laundry  independent    Shopping  independent        Psychosocial    No documentation.       Abuse/Neglect    No documentation.       Legal     Row Name 04/15/19 1450       Financial/Legal    Finance Comments  POA/Living Will/Advance Directive on file.         Substance Abuse    No documentation.       Patient Forms    No documentation.           Miguelina Spangler RN

## 2019-04-17 ENCOUNTER — TELEPHONE (OUTPATIENT)
Dept: CARDIOLOGY | Facility: CLINIC | Age: 84
End: 2019-04-17

## 2019-04-17 DIAGNOSIS — I48.0 AF (PAROXYSMAL ATRIAL FIBRILLATION) (HCC): Primary | ICD-10-CM

## 2019-04-17 NOTE — TELEPHONE ENCOUNTER
I Called patient he is converted back I reviewed that this can happen and nothing needs to be done at the current time.

## 2019-04-17 NOTE — TELEPHONE ENCOUNTER
04/17/19  8:27 AM  Osvaldo Lopez  1935  Home Phone [phone]   Mobile [phone]       Osvaldo Lopez is a patient of Dr. Driscoll, calling in post-discharge Monday, 4/15/19, saying this morning he was having some palpitations and he took his BP and HR: 92/71, 136; 93/71, 71. HR has been as high as 140 this morning. He is having no other s/s.    Current cardiac meds are:    Metoprolol succinate XL 25mg daily  Hydralazine 25mg BID (says TID, but pt taking BID)  Lisinopril 40mg daily  Rivaroxaban 20mg daily    Does he need to be seen?    Does he need a med adjustment?    Hx: AF, HTN, aortic stenosis, DM 2, AAA    Reshma Haley  Triage RN

## 2019-04-24 RX ORDER — LISINOPRIL 40 MG/1
TABLET ORAL
Qty: 90 TABLET | Refills: 1 | Status: SHIPPED | OUTPATIENT
Start: 2019-04-24 | End: 2019-09-25 | Stop reason: SDUPTHER

## 2019-04-24 RX ORDER — TAMSULOSIN HYDROCHLORIDE 0.4 MG/1
1 CAPSULE ORAL DAILY
Qty: 180 CAPSULE | Refills: 1 | Status: SHIPPED | OUTPATIENT
Start: 2019-04-24 | End: 2019-04-30 | Stop reason: SDUPTHER

## 2019-04-24 RX ORDER — RIVAROXABAN 20 MG/1
TABLET, FILM COATED ORAL
Qty: 90 TABLET | Refills: 3 | Status: SHIPPED | OUTPATIENT
Start: 2019-04-24 | End: 2019-06-04 | Stop reason: SDUPTHER

## 2019-04-30 RX ORDER — TAMSULOSIN HYDROCHLORIDE 0.4 MG/1
2 CAPSULE ORAL DAILY
Qty: 180 CAPSULE | Refills: 1 | Status: SHIPPED | OUTPATIENT
Start: 2019-04-30 | End: 2019-10-23 | Stop reason: SDUPTHER

## 2019-05-22 ENCOUNTER — TELEPHONE (OUTPATIENT)
Dept: CARDIOLOGY | Facility: CLINIC | Age: 84
End: 2019-05-22

## 2019-05-22 NOTE — TELEPHONE ENCOUNTER
----- Message from Miller Talbot MD sent at 5/21/2019  3:32 PM EDT -----  Can we get this patient a follow-up appointment to discuss ablation.  No urgency.  Next available.

## 2019-06-04 ENCOUNTER — TELEPHONE (OUTPATIENT)
Dept: CARDIOLOGY | Facility: CLINIC | Age: 84
End: 2019-06-04

## 2019-06-04 ENCOUNTER — TRANSCRIBE ORDERS (OUTPATIENT)
Dept: CARDIOLOGY | Facility: CLINIC | Age: 84
End: 2019-06-04

## 2019-06-04 ENCOUNTER — OFFICE VISIT (OUTPATIENT)
Dept: INTERNAL MEDICINE | Facility: CLINIC | Age: 84
End: 2019-06-04

## 2019-06-04 VITALS
HEART RATE: 141 BPM | WEIGHT: 216 LBS | SYSTOLIC BLOOD PRESSURE: 118 MMHG | BODY MASS INDEX: 30.13 KG/M2 | TEMPERATURE: 96.7 F | OXYGEN SATURATION: 94 % | DIASTOLIC BLOOD PRESSURE: 76 MMHG

## 2019-06-04 DIAGNOSIS — Z01.810 PREPROCEDURAL CARDIOVASCULAR EXAMINATION: ICD-10-CM

## 2019-06-04 DIAGNOSIS — E11.9 CONTROLLED TYPE 2 DIABETES MELLITUS WITHOUT COMPLICATION, WITHOUT LONG-TERM CURRENT USE OF INSULIN (HCC): ICD-10-CM

## 2019-06-04 DIAGNOSIS — I48.3 TYPICAL ATRIAL FLUTTER (HCC): Primary | ICD-10-CM

## 2019-06-04 DIAGNOSIS — I10 ESSENTIAL HYPERTENSION: ICD-10-CM

## 2019-06-04 DIAGNOSIS — I48.92 ATRIAL FLUTTER WITH RAPID VENTRICULAR RESPONSE (HCC): ICD-10-CM

## 2019-06-04 DIAGNOSIS — Z13.6 SCREENING FOR CARDIOVASCULAR CONDITION: Primary | ICD-10-CM

## 2019-06-04 DIAGNOSIS — I35.0 NONRHEUMATIC AORTIC VALVE STENOSIS: Primary | ICD-10-CM

## 2019-06-04 DIAGNOSIS — I48.3 TYPICAL ATRIAL FLUTTER (HCC): ICD-10-CM

## 2019-06-04 DIAGNOSIS — M48.062 SPINAL STENOSIS OF LUMBAR REGION WITH NEUROGENIC CLAUDICATION: ICD-10-CM

## 2019-06-04 DIAGNOSIS — R29.898 LEG WEAKNESS, BILATERAL: ICD-10-CM

## 2019-06-04 DIAGNOSIS — E08.44 DIABETIC AMYOTROPHY ASSOCIATED WITH DIABETES MELLITUS DUE TO UNDERLYING CONDITION (HCC): ICD-10-CM

## 2019-06-04 PROCEDURE — 93000 ELECTROCARDIOGRAM COMPLETE: CPT | Performed by: FAMILY MEDICINE

## 2019-06-04 PROCEDURE — 99214 OFFICE O/P EST MOD 30 MIN: CPT | Performed by: FAMILY MEDICINE

## 2019-06-04 RX ORDER — CLOTRIMAZOLE 1 G/ML
SOLUTION TOPICAL 2 TIMES DAILY PRN
Qty: 60 ML | Refills: 2 | Status: SHIPPED | OUTPATIENT
Start: 2019-06-04 | End: 2020-07-23 | Stop reason: ALTCHOICE

## 2019-06-04 NOTE — PROGRESS NOTES
Subjective   Osvaldo Lopez is a 83 y.o. male.     Chief Complaint   Patient presents with   • Palpitations   Atrial flutter with rapid ventricular rhythm hypertension, diabetes type 2, bilateral leg weakness.        History of Present Illness   Patient has developed atrial flutter with rapid ventricular rhythm since last night with a rate between 130 140+.  He is stable.  I discussed this with Dr. Mcfarland electrophysiology.  They will schedule him for another ablation this week.  They will call him at his house.  Otherwise he is on Xarelto and has not missed any doses this month.    He has persistent quad weakness despite working out and work-up with neurology at [facility].  He has had repair of his spinal stenosis with sally placement.  He has seen rehab specialist Dr. Stuart.  Despite this he still has quad weakness.  We will get another opinion from neurology Dr. Ling.  While in the hospital was seen by Dr. 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      The following portions of the patient's history were reviewed and updated as appropriate: allergies, current medications, past social history and problem list.    Review of Systems   Constitutional: Positive for fatigue.   HENT: Negative.    Eyes: Negative.    Respiratory: Negative.    Cardiovascular: Positive for palpitations. Negative for chest pain.   Gastrointestinal: Negative.    Endocrine: Negative.    Genitourinary: Negative.    Musculoskeletal: Positive for gait problem and myalgias.   Skin: Negative.    Allergic/Immunologic: Negative.    Neurological: Positive for weakness.   Hematological: Negative.    Psychiatric/Behavioral: Negative.        Objective   Vitals:    06/04/19 1302   BP: 118/76   Pulse: (!) 141   Temp: 96.7 °F (35.9 °C)   SpO2: 94%     Physical Exam   Constitutional: He is oriented to person, place, and time. He appears well-developed and well-nourished.   HENT:   Head: Normocephalic and atraumatic.   Right Ear: Tympanic  membrane and external ear normal.   Left Ear: Tympanic membrane and external ear normal.   Nose: Nose normal.   Mouth/Throat: Oropharynx is clear and moist.   Eyes: Conjunctivae and EOM are normal. Pupils are equal, round, and reactive to light.   Neck: Normal range of motion. Neck supple. No JVD present. No thyromegaly present.   Cardiovascular: Normal heart sounds and intact distal pulses. An irregularly irregular rhythm present. Tachycardia present.   Pulmonary/Chest: Effort normal and breath sounds normal.   Abdominal: Soft. Bowel sounds are normal.   Musculoskeletal:        Thoracic back: He exhibits decreased range of motion.        Lumbar back: He exhibits decreased range of motion.   Lymphadenopathy:     He has no cervical adenopathy.   Neurological: He is alert and oriented to person, place, and time. No cranial nerve deficit. He exhibits abnormal muscle tone. Coordination normal.   Bilateral quadricep weakness with ability to walk with standing.  Difficulty getting out of a chair.   Skin: Skin is warm and dry. No rash noted.   Psychiatric: He has a normal mood and affect. His behavior is normal. Judgment and thought content normal.   Vitals reviewed.      Assessment/Plan   Problem List Items Addressed This Visit        Cardiovascular and Mediastinum    Essential hypertension    Nonrheumatic aortic valve stenosis - Primary    Atrial flutter with rapid ventricular response (CMS/HCC)    Relevant Orders    ECG 12 Lead       Endocrine    Controlled type 2 diabetes mellitus without complication, without long-term current use of insulin (CMS/HCC)       Nervous and Auditory    Leg weakness, bilateral    Relevant Orders    Ambulatory Referral to Neurology    Spinal stenosis of lumbar region with neurogenic claudication      Other Visit Diagnoses     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  Follow-up with cardiology Dr. Mcfarland this week for ablation for atrial flutter rapid ventricular rhythm.  Continue Xarelto.  Recheck in about 3 months.

## 2019-06-04 NOTE — TELEPHONE ENCOUNTER
06/04/19  1:28 PM  Osvaldo Lopez  1935  Home Phone [phone]   Mobile [phone]       Osvaldo Lopez is a patient of Dr. Talbot. Dr. Rodríguez is wanting to s/w . C: [phone].    Reshma Reese RN

## 2019-06-04 NOTE — H&P (VIEW-ONLY)
Procedure     ECG 12 Lead  Date/Time: 6/4/2019 1:28 PM  Performed by: Caio Rodríguez Jr., MD  Authorized by: Caio Rodríguez Jr., MD   Comparison: compared with previous ECG from 4/15/2019  Comparison to previous ECG: ECG now with atrial flutter with rapid ventricular rhythm.  Rhythm: atrial flutter  Rate: tachycardic  Conduction: right bundle branch block  ST Segments: ST segments normal  T Waves: T waves normal  QRS axis: normal  Other: no other findings    Clinical impression: abnormal EKG  Comments: Atrial flutter with rapid ventricular rhythm.

## 2019-06-05 ENCOUNTER — ANESTHESIA EVENT (OUTPATIENT)
Dept: CARDIOLOGY | Facility: HOSPITAL | Age: 84
End: 2019-06-05

## 2019-06-05 ENCOUNTER — LAB (OUTPATIENT)
Dept: LAB | Facility: HOSPITAL | Age: 84
End: 2019-06-05

## 2019-06-05 DIAGNOSIS — I48.3 TYPICAL ATRIAL FLUTTER (HCC): ICD-10-CM

## 2019-06-05 DIAGNOSIS — Z01.810 PREPROCEDURAL CARDIOVASCULAR EXAMINATION: ICD-10-CM

## 2019-06-05 DIAGNOSIS — Z13.6 SCREENING FOR CARDIOVASCULAR CONDITION: ICD-10-CM

## 2019-06-05 LAB
ANION GAP SERPL CALCULATED.3IONS-SCNC: 13.3 MMOL/L
BASOPHILS # BLD AUTO: 0.02 10*3/MM3 (ref 0–0.2)
BASOPHILS NFR BLD AUTO: 0.4 % (ref 0–1.5)
BUN BLD-MCNC: 23 MG/DL (ref 8–23)
BUN/CREAT SERPL: 20 (ref 7–25)
CALCIUM SPEC-SCNC: 9.3 MG/DL (ref 8.6–10.5)
CHLORIDE SERPL-SCNC: 103 MMOL/L (ref 98–107)
CO2 SERPL-SCNC: 24.7 MMOL/L (ref 22–29)
CREAT BLD-MCNC: 1.15 MG/DL (ref 0.76–1.27)
DEPRECATED RDW RBC AUTO: 49.1 FL (ref 37–54)
EOSINOPHIL # BLD AUTO: 0.07 10*3/MM3 (ref 0–0.4)
EOSINOPHIL NFR BLD AUTO: 1.3 % (ref 0.3–6.2)
ERYTHROCYTE [DISTWIDTH] IN BLOOD BY AUTOMATED COUNT: 14 % (ref 12.3–15.4)
GFR SERPL CREATININE-BSD FRML MDRD: 61 ML/MIN/1.73
GLUCOSE BLD-MCNC: 141 MG/DL (ref 65–99)
HCT VFR BLD AUTO: 41.5 % (ref 37.5–51)
HGB BLD-MCNC: 12.6 G/DL (ref 13–17.7)
IMM GRANULOCYTES # BLD AUTO: 0.01 10*3/MM3 (ref 0–0.05)
IMM GRANULOCYTES NFR BLD AUTO: 0.2 % (ref 0–0.5)
LYMPHOCYTES # BLD AUTO: 1.97 10*3/MM3 (ref 0.7–3.1)
LYMPHOCYTES NFR BLD AUTO: 37.2 % (ref 19.6–45.3)
MCH RBC QN AUTO: 28.8 PG (ref 26.6–33)
MCHC RBC AUTO-ENTMCNC: 30.4 G/DL (ref 31.5–35.7)
MCV RBC AUTO: 95 FL (ref 79–97)
MONOCYTES # BLD AUTO: 0.42 10*3/MM3 (ref 0.1–0.9)
MONOCYTES NFR BLD AUTO: 7.9 % (ref 5–12)
NEUTROPHILS # BLD AUTO: 2.8 10*3/MM3 (ref 1.7–7)
NEUTROPHILS NFR BLD AUTO: 53 % (ref 42.7–76)
NRBC BLD AUTO-RTO: 0 /100 WBC (ref 0–0.2)
PLATELET # BLD AUTO: 141 10*3/MM3 (ref 140–450)
PMV BLD AUTO: 9.6 FL (ref 6–12)
POTASSIUM BLD-SCNC: 4.7 MMOL/L (ref 3.5–5.2)
RBC # BLD AUTO: 4.37 10*6/MM3 (ref 4.14–5.8)
SODIUM BLD-SCNC: 141 MMOL/L (ref 136–145)
WBC NRBC COR # BLD: 5.29 10*3/MM3 (ref 3.4–10.8)

## 2019-06-05 PROCEDURE — 85025 COMPLETE CBC W/AUTO DIFF WBC: CPT

## 2019-06-05 PROCEDURE — 36415 COLL VENOUS BLD VENIPUNCTURE: CPT

## 2019-06-05 PROCEDURE — 80048 BASIC METABOLIC PNL TOTAL CA: CPT

## 2019-06-06 ENCOUNTER — HOSPITAL ENCOUNTER (OUTPATIENT)
Facility: HOSPITAL | Age: 84
Setting detail: HOSPITAL OUTPATIENT SURGERY
Discharge: HOME | End: 2019-06-06
Attending: INTERNAL MEDICINE | Admitting: INTERNAL MEDICINE

## 2019-06-06 ENCOUNTER — ANESTHESIA (OUTPATIENT)
Dept: CARDIOLOGY | Facility: HOSPITAL | Age: 84
End: 2019-06-06

## 2019-06-06 VITALS
RESPIRATION RATE: 20 BRPM | WEIGHT: 218 LBS | OXYGEN SATURATION: 97 % | DIASTOLIC BLOOD PRESSURE: 65 MMHG | TEMPERATURE: 97.7 F | SYSTOLIC BLOOD PRESSURE: 127 MMHG | HEIGHT: 71 IN | BODY MASS INDEX: 30.52 KG/M2 | HEART RATE: 102 BPM

## 2019-06-06 DIAGNOSIS — I48.3 TYPICAL ATRIAL FLUTTER (HCC): ICD-10-CM

## 2019-06-06 LAB
GLUCOSE BLDC GLUCOMTR-MCNC: 123 MG/DL (ref 70–130)
GLUCOSE BLDC GLUCOMTR-MCNC: 123 MG/DL (ref 70–130)
GLUCOSE BLDC GLUCOMTR-MCNC: 148 MG/DL (ref 70–130)

## 2019-06-06 PROCEDURE — C1730 CATH, EP, 19 OR FEW ELECT: HCPCS | Performed by: INTERNAL MEDICINE

## 2019-06-06 PROCEDURE — 93010 ELECTROCARDIOGRAM REPORT: CPT | Performed by: INTERNAL MEDICINE

## 2019-06-06 PROCEDURE — 93621 COMP EP EVL L PAC&REC C SINS: CPT | Performed by: INTERNAL MEDICINE

## 2019-06-06 PROCEDURE — C1894 INTRO/SHEATH, NON-LASER: HCPCS | Performed by: INTERNAL MEDICINE

## 2019-06-06 PROCEDURE — 25010000002 MIDAZOLAM PER 1 MG: Performed by: ANESTHESIOLOGY

## 2019-06-06 PROCEDURE — 93653 COMPRE EP EVAL TX SVT: CPT | Performed by: INTERNAL MEDICINE

## 2019-06-06 PROCEDURE — 93005 ELECTROCARDIOGRAM TRACING: CPT | Performed by: INTERNAL MEDICINE

## 2019-06-06 PROCEDURE — C1731 CATH, EP, 20 OR MORE ELEC: HCPCS | Performed by: INTERNAL MEDICINE

## 2019-06-06 PROCEDURE — 93613 INTRACARDIAC EPHYS 3D MAPG: CPT | Performed by: INTERNAL MEDICINE

## 2019-06-06 PROCEDURE — 25010000002 ONDANSETRON PER 1 MG: Performed by: NURSE ANESTHETIST, CERTIFIED REGISTERED

## 2019-06-06 PROCEDURE — C1732 CATH, EP, DIAG/ABL, 3D/VECT: HCPCS | Performed by: INTERNAL MEDICINE

## 2019-06-06 PROCEDURE — 93662 INTRACARDIAC ECG (ICE): CPT | Performed by: INTERNAL MEDICINE

## 2019-06-06 PROCEDURE — 25010000002 PHENYLEPHRINE PER 1 ML: Performed by: NURSE ANESTHETIST, CERTIFIED REGISTERED

## 2019-06-06 PROCEDURE — C1759 CATH, INTRA ECHOCARDIOGRAPHY: HCPCS | Performed by: INTERNAL MEDICINE

## 2019-06-06 PROCEDURE — 25010000002 PROPOFOL 10 MG/ML EMULSION: Performed by: NURSE ANESTHETIST, CERTIFIED REGISTERED

## 2019-06-06 PROCEDURE — 25010000002 NEOSTIGMINE PER 0.5 MG: Performed by: NURSE ANESTHETIST, CERTIFIED REGISTERED

## 2019-06-06 PROCEDURE — 25010000002 FENTANYL CITRATE (PF) 100 MCG/2ML SOLUTION: Performed by: NURSE ANESTHETIST, CERTIFIED REGISTERED

## 2019-06-06 PROCEDURE — 82962 GLUCOSE BLOOD TEST: CPT

## 2019-06-06 RX ORDER — SODIUM CHLORIDE 0.9 % (FLUSH) 0.9 %
1-10 SYRINGE (ML) INJECTION AS NEEDED
Status: DISCONTINUED | OUTPATIENT
Start: 2019-06-06 | End: 2019-06-06 | Stop reason: HOSPADM

## 2019-06-06 RX ORDER — HYDROCODONE BITARTRATE AND ACETAMINOPHEN 7.5; 325 MG/1; MG/1
1 TABLET ORAL ONCE AS NEEDED
Status: DISCONTINUED | OUTPATIENT
Start: 2019-06-06 | End: 2019-06-06 | Stop reason: HOSPADM

## 2019-06-06 RX ORDER — ACETAMINOPHEN 160 MG/5ML
650 SOLUTION ORAL EVERY 4 HOURS PRN
Status: DISCONTINUED | OUTPATIENT
Start: 2019-06-06 | End: 2019-06-06 | Stop reason: HOSPADM

## 2019-06-06 RX ORDER — FAMOTIDINE 10 MG/ML
20 INJECTION, SOLUTION INTRAVENOUS ONCE
Status: COMPLETED | OUTPATIENT
Start: 2019-06-06 | End: 2019-06-06

## 2019-06-06 RX ORDER — GLYCOPYRROLATE 0.2 MG/ML
INJECTION INTRAMUSCULAR; INTRAVENOUS AS NEEDED
Status: DISCONTINUED | OUTPATIENT
Start: 2019-06-06 | End: 2019-06-06 | Stop reason: SURG

## 2019-06-06 RX ORDER — MIDAZOLAM HYDROCHLORIDE 1 MG/ML
2 INJECTION INTRAMUSCULAR; INTRAVENOUS
Status: DISCONTINUED | OUTPATIENT
Start: 2019-06-06 | End: 2019-06-06 | Stop reason: HOSPADM

## 2019-06-06 RX ORDER — NALOXONE HCL 0.4 MG/ML
0.2 VIAL (ML) INJECTION AS NEEDED
Status: DISCONTINUED | OUTPATIENT
Start: 2019-06-06 | End: 2019-06-06 | Stop reason: HOSPADM

## 2019-06-06 RX ORDER — LABETALOL HYDROCHLORIDE 5 MG/ML
5 INJECTION, SOLUTION INTRAVENOUS
Status: DISCONTINUED | OUTPATIENT
Start: 2019-06-06 | End: 2019-06-06 | Stop reason: HOSPADM

## 2019-06-06 RX ORDER — MIDAZOLAM HYDROCHLORIDE 1 MG/ML
1 INJECTION INTRAMUSCULAR; INTRAVENOUS
Status: DISCONTINUED | OUTPATIENT
Start: 2019-06-06 | End: 2019-06-06 | Stop reason: HOSPADM

## 2019-06-06 RX ORDER — SODIUM CHLORIDE 9 MG/ML
75 INJECTION, SOLUTION INTRAVENOUS CONTINUOUS
Status: DISCONTINUED | OUTPATIENT
Start: 2019-06-06 | End: 2019-06-06 | Stop reason: HOSPADM

## 2019-06-06 RX ORDER — FLUMAZENIL 0.1 MG/ML
0.2 INJECTION INTRAVENOUS AS NEEDED
Status: DISCONTINUED | OUTPATIENT
Start: 2019-06-06 | End: 2019-06-06 | Stop reason: HOSPADM

## 2019-06-06 RX ORDER — HYDRALAZINE HYDROCHLORIDE 20 MG/ML
5 INJECTION INTRAMUSCULAR; INTRAVENOUS
Status: DISCONTINUED | OUTPATIENT
Start: 2019-06-06 | End: 2019-06-06 | Stop reason: HOSPADM

## 2019-06-06 RX ORDER — PROPOFOL 10 MG/ML
VIAL (ML) INTRAVENOUS AS NEEDED
Status: DISCONTINUED | OUTPATIENT
Start: 2019-06-06 | End: 2019-06-06 | Stop reason: SURG

## 2019-06-06 RX ORDER — ROCURONIUM BROMIDE 10 MG/ML
INJECTION, SOLUTION INTRAVENOUS AS NEEDED
Status: DISCONTINUED | OUTPATIENT
Start: 2019-06-06 | End: 2019-06-06 | Stop reason: SURG

## 2019-06-06 RX ORDER — LIDOCAINE HYDROCHLORIDE 10 MG/ML
0.1 INJECTION, SOLUTION EPIDURAL; INFILTRATION; INTRACAUDAL; PERINEURAL ONCE AS NEEDED
Status: DISCONTINUED | OUTPATIENT
Start: 2019-06-06 | End: 2019-06-06 | Stop reason: HOSPADM

## 2019-06-06 RX ORDER — ACETAMINOPHEN 325 MG/1
650 TABLET ORAL EVERY 4 HOURS PRN
Status: DISCONTINUED | OUTPATIENT
Start: 2019-06-06 | End: 2019-06-06 | Stop reason: HOSPADM

## 2019-06-06 RX ORDER — FENTANYL CITRATE 50 UG/ML
50 INJECTION, SOLUTION INTRAMUSCULAR; INTRAVENOUS
Status: DISCONTINUED | OUTPATIENT
Start: 2019-06-06 | End: 2019-06-06 | Stop reason: HOSPADM

## 2019-06-06 RX ORDER — ACETAMINOPHEN 650 MG/1
650 SUPPOSITORY RECTAL EVERY 4 HOURS PRN
Status: DISCONTINUED | OUTPATIENT
Start: 2019-06-06 | End: 2019-06-06 | Stop reason: HOSPADM

## 2019-06-06 RX ORDER — SODIUM CHLORIDE, SODIUM LACTATE, POTASSIUM CHLORIDE, CALCIUM CHLORIDE 600; 310; 30; 20 MG/100ML; MG/100ML; MG/100ML; MG/100ML
9 INJECTION, SOLUTION INTRAVENOUS CONTINUOUS
Status: DISCONTINUED | OUTPATIENT
Start: 2019-06-06 | End: 2019-06-06 | Stop reason: HOSPADM

## 2019-06-06 RX ORDER — LIDOCAINE HYDROCHLORIDE 10 MG/ML
INJECTION, SOLUTION INFILTRATION; PERINEURAL AS NEEDED
Status: DISCONTINUED | OUTPATIENT
Start: 2019-06-06 | End: 2019-06-06 | Stop reason: HOSPADM

## 2019-06-06 RX ORDER — ONDANSETRON 2 MG/ML
INJECTION INTRAMUSCULAR; INTRAVENOUS AS NEEDED
Status: DISCONTINUED | OUTPATIENT
Start: 2019-06-06 | End: 2019-06-06 | Stop reason: SURG

## 2019-06-06 RX ORDER — EPHEDRINE SULFATE 50 MG/ML
INJECTION, SOLUTION INTRAVENOUS AS NEEDED
Status: DISCONTINUED | OUTPATIENT
Start: 2019-06-06 | End: 2019-06-06 | Stop reason: SURG

## 2019-06-06 RX ORDER — SODIUM CHLORIDE 0.9 % (FLUSH) 0.9 %
3-10 SYRINGE (ML) INJECTION AS NEEDED
Status: DISCONTINUED | OUTPATIENT
Start: 2019-06-06 | End: 2019-06-06 | Stop reason: HOSPADM

## 2019-06-06 RX ORDER — EPHEDRINE SULFATE 50 MG/ML
5 INJECTION, SOLUTION INTRAVENOUS ONCE AS NEEDED
Status: DISCONTINUED | OUTPATIENT
Start: 2019-06-06 | End: 2019-06-06 | Stop reason: HOSPADM

## 2019-06-06 RX ORDER — ACETAMINOPHEN 325 MG/1
650 TABLET ORAL ONCE AS NEEDED
Status: DISCONTINUED | OUTPATIENT
Start: 2019-06-06 | End: 2019-06-06 | Stop reason: HOSPADM

## 2019-06-06 RX ORDER — DIPHENHYDRAMINE HCL 25 MG
25 CAPSULE ORAL
Status: DISCONTINUED | OUTPATIENT
Start: 2019-06-06 | End: 2019-06-06 | Stop reason: HOSPADM

## 2019-06-06 RX ORDER — FENTANYL CITRATE 50 UG/ML
INJECTION, SOLUTION INTRAMUSCULAR; INTRAVENOUS AS NEEDED
Status: DISCONTINUED | OUTPATIENT
Start: 2019-06-06 | End: 2019-06-06 | Stop reason: SURG

## 2019-06-06 RX ORDER — SODIUM CHLORIDE 0.9 % (FLUSH) 0.9 %
3 SYRINGE (ML) INJECTION EVERY 12 HOURS SCHEDULED
Status: DISCONTINUED | OUTPATIENT
Start: 2019-06-06 | End: 2019-06-06 | Stop reason: HOSPADM

## 2019-06-06 RX ORDER — ONDANSETRON 2 MG/ML
4 INJECTION INTRAMUSCULAR; INTRAVENOUS ONCE AS NEEDED
Status: DISCONTINUED | OUTPATIENT
Start: 2019-06-06 | End: 2019-06-06 | Stop reason: HOSPADM

## 2019-06-06 RX ORDER — LIDOCAINE HYDROCHLORIDE 10 MG/ML
0.5 INJECTION, SOLUTION EPIDURAL; INFILTRATION; INTRACAUDAL; PERINEURAL ONCE AS NEEDED
Status: DISCONTINUED | OUTPATIENT
Start: 2019-06-06 | End: 2019-06-06 | Stop reason: HOSPADM

## 2019-06-06 RX ORDER — LIDOCAINE HYDROCHLORIDE 20 MG/ML
INJECTION, SOLUTION INFILTRATION; PERINEURAL AS NEEDED
Status: DISCONTINUED | OUTPATIENT
Start: 2019-06-06 | End: 2019-06-06 | Stop reason: SURG

## 2019-06-06 RX ADMIN — PROPOFOL 150 MG: 10 INJECTION, EMULSION INTRAVENOUS at 13:07

## 2019-06-06 RX ADMIN — PHENYLEPHRINE HYDROCHLORIDE 200 MCG: 10 INJECTION INTRAVENOUS at 13:21

## 2019-06-06 RX ADMIN — ONDANSETRON 4 MG: 2 INJECTION INTRAMUSCULAR; INTRAVENOUS at 14:52

## 2019-06-06 RX ADMIN — SODIUM CHLORIDE 75 ML/HR: 9 INJECTION, SOLUTION INTRAVENOUS at 10:54

## 2019-06-06 RX ADMIN — MIDAZOLAM 1 MG: 1 INJECTION INTRAMUSCULAR; INTRAVENOUS at 12:51

## 2019-06-06 RX ADMIN — PHENYLEPHRINE HYDROCHLORIDE 200 MCG: 10 INJECTION INTRAVENOUS at 13:33

## 2019-06-06 RX ADMIN — LIDOCAINE HYDROCHLORIDE 40 MG: 20 INJECTION, SOLUTION INFILTRATION; PERINEURAL at 13:07

## 2019-06-06 RX ADMIN — GLYCOPYRROLATE 0.4 MG: 0.2 INJECTION INTRAMUSCULAR; INTRAVENOUS at 14:53

## 2019-06-06 RX ADMIN — FENTANYL CITRATE 50 MCG: 50 INJECTION INTRAMUSCULAR; INTRAVENOUS at 15:00

## 2019-06-06 RX ADMIN — FENTANYL CITRATE 100 MCG: 50 INJECTION INTRAMUSCULAR; INTRAVENOUS at 13:07

## 2019-06-06 RX ADMIN — PHENYLEPHRINE HYDROCHLORIDE 200 MCG: 10 INJECTION INTRAVENOUS at 13:55

## 2019-06-06 RX ADMIN — PHENYLEPHRINE HYDROCHLORIDE 200 MCG: 10 INJECTION INTRAVENOUS at 13:17

## 2019-06-06 RX ADMIN — FAMOTIDINE 20 MG: 10 INJECTION INTRAVENOUS at 12:51

## 2019-06-06 RX ADMIN — EPHEDRINE SULFATE 10 MG: 50 INJECTION INTRAMUSCULAR; INTRAVENOUS; SUBCUTANEOUS at 13:55

## 2019-06-06 RX ADMIN — ROCURONIUM BROMIDE 40 MG: 10 INJECTION INTRAVENOUS at 13:07

## 2019-06-06 RX ADMIN — FENTANYL CITRATE 50 MCG: 50 INJECTION INTRAMUSCULAR; INTRAVENOUS at 14:17

## 2019-06-06 RX ADMIN — NEOSTIGMINE METHYLSULFATE 2 MG: 1 INJECTION INTRAMUSCULAR; INTRAVENOUS; SUBCUTANEOUS at 14:53

## 2019-06-06 RX ADMIN — EPHEDRINE SULFATE 10 MG: 50 INJECTION INTRAMUSCULAR; INTRAVENOUS; SUBCUTANEOUS at 13:25

## 2019-06-06 RX ADMIN — PHENYLEPHRINE HYDROCHLORIDE 100 MCG: 10 INJECTION INTRAVENOUS at 13:46

## 2019-06-06 NOTE — ANESTHESIA PREPROCEDURE EVALUATION
Anesthesia Evaluation     Patient summary reviewed and Nursing notes reviewed                Airway   Mallampati: III  No difficulty expected  Dental    (+) upper dentures and lower dentures    Pulmonary    (+) a smoker Former,   Cardiovascular     ECG reviewed  Rhythm: irregular    (+) hypertension, valvular problems/murmurs MR and AS, CAD, CABG, dysrhythmias Atrial Flutter, PVD, hyperlipidemia,       Neuro/Psych  (+) syncope, numbness,     GI/Hepatic/Renal/Endo    (+) obesity,   diabetes mellitus,     Musculoskeletal     Abdominal    Substance History - negative use     OB/GYN negative ob/gyn ROS         Other   (+) arthritis                     Anesthesia Plan    ASA 3     general     intravenous induction   Anesthetic plan, all risks, benefits, and alternatives have been provided, discussed and informed consent has been obtained with: patient.

## 2019-06-06 NOTE — ANESTHESIA POSTPROCEDURE EVALUATION
Patient: Osvaldo Lopez    Procedure Summary     Date:  06/06/19 Room / Location:  [facility] /  [facility] INV[facility]IVE [facility]    Anesthesia Start:  1303 Anesthesia Stop:  1518    Procedure:  Ablation atrial flutter (N/A ) Diagnosis:       Typical atrial flutter (CMS/HCC)      (atrial flutter)    Provider:  Miller Talbot MD Provider:  Jovany Beard MD    Anesthesia Type:  general ASA Status:  3          Anesthesia Type: general  Last vitals  BP   133/90 (06/06/19 1046)   Temp   36.5 °C (97.7 °F) (06/06/19 1046)   Pulse   (!) 144 (06/06/19 1046)   Resp   18 (06/06/19 1046)     SpO2   97 % (06/06/19 1046)     Post Anesthesia Care and Evaluation    Patient location during evaluation: PACU  Patient participation: complete - patient participated  Level of consciousness: awake and alert  Pain management: adequate  Airway patency: patent  Anesthetic complications: No anesthetic complications    Cardiovascular status: acceptable  Respiratory status: acceptable  Hydration status: acceptable    Comments: ---------------------------               06/06/19 1046         ---------------------------   BP:          133/90         Pulse:       (!) 144        Resp:          18           Temp:   36.5 °C (97.7 °F)   SpO2:          97%         ---------------------------

## 2019-06-06 NOTE — ANESTHESIA PROCEDURE NOTES
Airway  Urgency: elective    Date/Time: 6/6/2019 1:10 PM  Airway not difficult    General Information and Staff    Patient location during procedure: OR  Anesthesiologist: Jovany Beard MD  CRNA: Harvinder Roth CRNA    Indications and Patient Condition  Indications for airway management: airway protection    Preoxygenated: yes  Mask difficulty assessment: 1 - vent by mask    Final Airway Details  Final airway type: endotracheal airway      Successful airway: ETT  Cuffed: yes   Successful intubation technique: direct laryngoscopy  Endotracheal tube insertion site: oral  Blade: [place]  Blade size: 2  ETT size (mm): 8.0  Cormack-Lehane Classification: grade I - full view of glottis  Placement verified by: chest auscultation and capnometry   Measured from: lips  ETT to lips (cm): 22  Number of attempts at approach: 1    Additional Comments  Pre 02 100%, SIVI, DL x1, atraumatic intubation, BLBS, Positive ETC02.

## 2019-06-06 NOTE — DISCHARGE INSTRUCTIONS
[facility]  Cardiology  [address] [place] [address]  [phone]    Coronary Ablation After Care    Refer to this sheet in the next few weeks. These instructions provide you with information on caring for yourself after your procedure. Your health care provider may also give you more specific instructions. Your treatment has been planned according to current medical practices, but problems sometimes occur. Call your health care provider if you have any problems or questions after your procedure.      What to Expect After the Procedure:  After your procedure, it is typical to have the following sensations:  · Minor discomfort or tenderness and a small bump at the catheter insertion site(s). The bump(s) should usually decrease in size and tenderness within 1 to 2 weeks.  · Any bruising will usually fade within 2 to 4 weeks.  Home Care Instructions:  · Do not apply powder or lotion to the site.  · Do not take baths, swim, or use a hot tub until your health care provider approves and the site is completely healed.  · Do not bend, squat, or lift anything over 20 lb (9 kg) or as directed by your health care provider. However, we recommend lifting nothing heavier than a gallon of milk.    · You may shower 24 hours after the procedure. Remove the bandage (dressing) and gently wash the site with plain soap and water. Gently pat the site dry. You may apply a band aid daily for 2 days if desired.    · Inspect the site at least twice daily.  · Increase your fluid intake for the next 2 days.    · Limit your activity for the first 48 hours.   · Avoid strenuous activity for 1 week or as advised by your physician.    · Follow instructions about when you can drive or return to work as directed by your physician.    · Hold direct pressure over the site when you cough, sneeze, laugh or change positions.  Do this for the next 2 days.    Call Your Doctor If:  · You have drainage (other than a small amount of  blood on the dressing).  · You have chills or a fever > 101.  · You have redness, warmth, swelling (larger than a walnut), or pain at the insertion   · You develop palpitations, chest pain or shortness of breath, feel faint, or pass out.  · You develop pain, discoloration, coldness, numbness, tingling, or severe bruising in the leg that held the catheter.  · You develop bleeding from any other place, such as the bowels.  · You have heavy bleeding from the site.  If this happens, hold pressure on the site and call 911.        Make Sure You:  · Understand these instructions.  · Will watch your condition.  · Will get help right away if you are not doing well or get worse.

## 2019-06-06 NOTE — ANESTHESIA POSTPROCEDURE EVALUATION
Patient: Osvaldo Lopez    Procedure Summary     Date:  06/06/19 Room / Location:  [facility] /  [facility] INV[facility] LOCATION    Anesthesia Start:  1303 Anesthesia Stop:  1518    Procedure:  Ablation atrial flutter (N/A ) Diagnosis:       Typical atrial flutter (CMS/HCC)      (atrial flutter)    Provider:  Miller Talbot MD Provider:  Jovany Beard MD    Anesthesia Type:  general ASA Status:  3          Anesthesia Type: general  Last vitals  BP   133/90 (06/06/19 1046)   Temp   36.5 °C (97.7 °F) (06/06/19 1046)   Pulse   (!) 144 (06/06/19 1046)   Resp   18 (06/06/19 1046)     SpO2   97 % (06/06/19 1046)     Post Anesthesia Care and Evaluation    Patient location during evaluation: PACU  Patient participation: complete - patient participated  Level of consciousness: awake and alert  Pain management: adequate  Airway patency: patent  Anesthetic complications: No anesthetic complications    Cardiovascular status: acceptable  Respiratory status: acceptable  Hydration status: acceptable    Comments: ---------------------------               06/06/19 1046         ---------------------------   BP:          133/90         Pulse:       (!) 144        Resp:          18           Temp:   36.5 °C (97.7 °F)   SpO2:          97%                   The values to the left are pre op and post op values were stable after ablationb          --------------------------- Patient recovered without incident from procedure and anesthetic

## 2019-06-06 NOTE — INTERVAL H&P NOTE
H&P reviewed. The patient was examined and there are no changes to the H&P.      The patient has had recurrent atrial flutter after his recent cardioversion.  We discussed the efficacy of ablation and risk were discussed with the patient.

## 2019-07-03 RX ORDER — METOPROLOL SUCCINATE 25 MG/1
TABLET, EXTENDED RELEASE ORAL
Qty: 90 TABLET | Refills: 1 | Status: SHIPPED | OUTPATIENT
Start: 2019-07-03 | End: 2019-10-23 | Stop reason: SDUPTHER

## 2019-07-18 ENCOUNTER — OFFICE VISIT (OUTPATIENT)
Dept: CARDIOLOGY | Facility: CLINIC | Age: 84
End: 2019-07-18

## 2019-07-18 VITALS
SYSTOLIC BLOOD PRESSURE: 138 MMHG | HEIGHT: 71 IN | DIASTOLIC BLOOD PRESSURE: 72 MMHG | HEART RATE: 80 BPM | BODY MASS INDEX: 30.24 KG/M2 | WEIGHT: 216 LBS

## 2019-07-18 DIAGNOSIS — I48.92 ATRIAL FLUTTER WITH RAPID VENTRICULAR RESPONSE (HCC): Primary | ICD-10-CM

## 2019-07-18 DIAGNOSIS — I10 ESSENTIAL HYPERTENSION: ICD-10-CM

## 2019-07-18 DIAGNOSIS — E78.2 MIXED HYPERLIPIDEMIA: ICD-10-CM

## 2019-07-18 PROCEDURE — 99213 OFFICE O/P EST LOW 20 MIN: CPT | Performed by: INTERNAL MEDICINE

## 2019-07-18 PROCEDURE — 93000 ELECTROCARDIOGRAM COMPLETE: CPT | Performed by: INTERNAL MEDICINE

## 2019-07-18 NOTE — PROGRESS NOTES
Date of Office Visit: 2019  Encounter Provider: Miller Talbot MD  Place of Service: [facility] CARDIOLOGY  Patient Name: Osvaldo Lopez  :1935    Chief Complaint   Patient presents with   • Atrial Flutter     Follow up to discuss ablation   :     HPI: Osvaldo Lopez is a 83 y.o. male who presents today for follow-up from ablation for atrial flutter.  The patient reports that he is recovered well.  He is been going to the [facility] 3 times a week for approximately an hour and engaging in some light cardiovascular and weightlifting activities.  He reports no issues with his heart racing during these activities.  He reports he monitors his heart rate pretty much daily either through the [facility] or at home and has not noticed any tachycardia at all.  He is limited due to his diabetes and some musculoskeletal complaints, but overall feels improved.          Past Medical History:   Diagnosis Date   • Abnormal thyroid blood test    • Aneurysm of abdominal aorta (CMS/HCC)    • Arthritis    • BPH (benign prostatic hyperplasia)    • Bruises easily    • Bulging of thoracic intervertebral disc    • Chronic edema    • Coronary artery disease    • Diverticular disease    • Enlarged prostate without lower urinary tract symptoms (luts)    • Essential hypertension    • Hyperactivity of bladder    • Hyperlipidemia    • Left shoulder pain    • Lumbar radiculopathy 2016    wears back brace at times following back surgery   • Muscle weakness (generalized)    • Nonrheumatic aortic valve stenosis     mild 2018    • Osteoarthritis    • PAF (paroxysmal atrial fibrillation) (CMS/HCC)    • Polyuria    • Recurrent falls    • Screening      stop bang scale 5   • Syncope    • Torn rotator cuff     left   • Type 2 diabetes mellitus (CMS/HCC)    • Urinary frequency        Past Surgical History:   Procedure Laterality Date   • CARDIAC ELECTROPHYSIOLOGY PROCEDURE N/A 2019    Procedure: Ablation atrial  margot;  Surgeon: Miller Talbot MD;  Location: [facility] INVASIVE LOCATION;  Service: Cardiovascular   • CARDIAC SURGERY      CABGX5 ()   • CATARACT EXTRACTION Bilateral    • CORONARY ARTERY BYPASS GRAFT     • CYST REMOVAL      FROM BACK   • GALLBLADDER SURGERY     • HERNIA REPAIR Left     inquinal times 3  last one in    • KNEE CARTILAGE SURGERY Left 1970's   • LUMBAR DISCECTOMY FUSION INSTRUMENTATION N/A 2016    Procedure: lumbar laminectomy L4-5 and fusion with instrumentation;  Surgeon: Ran Kline MD;  Location: [facility] OR;  Service:    • LUMBAR DISCECTOMY FUSION INSTRUMENTATION N/A 1/15/2018    Procedure: L2-3, L3-4 laminectomy and fusion with instrumentation and removal of implants L4 5.;  Surgeon: Ran Kline MD;  Location: [facility] OR;  Service:    • NV TOTAL KNEE ARTHROPLASTY Left 2016    Procedure: TOTAL KNEE ARTHROPLASTY;  Surgeon: Dontrell Arellano MD;  Location: [facility] OR;  Service: Orthopedics       Social History     Socioeconomic History   • Marital status:      Spouse name: Not on file   • Number of children: Not on file   • Years of education: Not on file   • Highest education level: Not on file   Tobacco Use   • Smoking status: Former Smoker     Years: 36.00     Types: Cigarettes     Last attempt to quit:      Years since quittin.5   • Smokeless tobacco: Never Used   Substance and Sexual Activity   • Alcohol use: No     Frequency: Never   • Drug use: No   • Sexual activity: Defer       Family History   Problem Relation Age of Onset   • Heart failure Mother    • Diabetes Mother    • Cancer Sister    • Cancer Brother        Review of Systems   Constitution: Negative for weakness and malaise/fatigue.   HENT: Negative.    Eyes: Negative.    Cardiovascular: Negative for chest pain, dyspnea on exertion, leg swelling and near-syncope.   Respiratory: Negative for cough and shortness of breath.    Endocrine: Negative.     Hematologic/Lymphatic: Negative.    Skin: Negative.    Musculoskeletal: Positive for joint pain, muscle weakness and myalgias.   Gastrointestinal: Negative.    Genitourinary: Negative.    Neurological: Negative.    Psychiatric/Behavioral: Negative.    Allergic/Immunologic: Negative.        Allergies   Allergen Reactions   • Percocet [Oxycodone-Acetaminophen] Mental Status Change     Causes confusion/         Current Outpatient Medications:   •  acetaminophen (TYLENOL) 500 MG tablet, Take 500 mg by mouth Every 6 (Six) Hours As Needed for Mild Pain ., Disp: , Rfl:   •  clotrimazole (LOTRIMIN) 1 % external solution, Apply  topically to the appropriate area as directed 2 (Two) Times a Day As Needed., Disp: 60 mL, Rfl: 2  •  diclofenac (VOLTAREN) 1 % gel gel, Pea sized amount and apply to left heel up to 4 times daily, Disp: 100 g, Rfl: 0  •  glucose blood test strip, Twice daily, Disp: 100 each, Rfl: 12  •  hydrALAZINE (APRESOLINE) 25 MG tablet, Take 1 tablet by mouth 3 (Three) Times a Day., Disp: 180 tablet, Rfl: 3  •  lisinopril (PRINIVIL,ZESTRIL) 40 MG tablet, TAKE 1 TABLET DAILY, Disp: 90 tablet, Rfl: 1  •  metFORMIN (GLUCOPHAGE) 850 MG tablet, TAKE 1 TABLET TWICE DAILY  WITH MEALS, Disp: 180 tablet, Rfl: 1  •  metoprolol succinate XL (TOPROL-XL) 25 MG 24 hr tablet, TAKE 1 TABLET DAILY, Disp: 90 tablet, Rfl: 1  •  MICROLET LANCETS misc, 1 each 2 (Two) Times a Day., Disp: 100 each, Rfl: 5  •  Multiple Vitamin (MULTI VITAMIN PO), Take 1 tablet by mouth Every Morning., Disp: , Rfl:   •  rivaroxaban (XARELTO) 20 MG tablet, Take 1 tablet by mouth Daily., Disp: 30 tablet, Rfl: 0  •  tamsulosin (FLOMAX) 0.4 MG capsule 24 hr capsule, Take 2 capsules by mouth Daily., Disp: 180 capsule, Rfl: 1  •  vitamin B-12 (CYANOCOBALAMIN) 1000 MCG tablet, Take 1,000 mcg by mouth Daily., Disp: , Rfl:       Objective:     Vitals:    07/18/19 1327   BP: 138/72   BP Location: Right arm   Patient Position: Sitting   Cuff Size: Large Adult  "  Pulse: 80   Weight: 98 kg (216 lb)   Height: 180.3 cm (71\")     Body mass index is 30.13 kg/m².    PHYSICAL EXAM:    Physical Exam   Constitutional: He is oriented to person, place, and time. He appears well-nourished. No distress.   Elderly man who walks with the aid of a walker   HENT:   Head: Normocephalic.   Cardiovascular: Normal rate, regular rhythm and normal heart sounds.   Pulmonary/Chest: Effort normal.   Neurological: He is alert and oriented to person, place, and time.   Skin: Skin is warm and dry.   Psychiatric: He has a normal mood and affect. His behavior is normal. Thought content normal.           ECG 12 Lead  Date/Time: 7/18/2019 1:53 PM  Performed by: Miller Talbot MD  Authorized by: Miller Talbot MD   Comparison: compared with previous ECG from 12/11/2019  Similar to previous ECG  Rhythm: sinus rhythm  Ectopy: atrial premature contractions  Conduction: right bundle branch block    Clinical impression: abnormal EKG              Assessment:       Diagnosis Plan   1. Atrial flutter with rapid ventricular response (CMS/HCC)     2. Essential hypertension     3. Mixed hyperlipidemia            Plan:       No recurrence of tachycardia.  He is done well participating in regular activity.  We will release him to follow-up with his primary cardiologist Dr. Driscoll and Dr. Rodríguez.  He may return to follow-up if he has further symptoms.    As always, it has been a pleasure to participate in your patient's care.      Sincerely,         Miller Talbot MD  "

## 2019-08-15 RX ORDER — HYDRALAZINE HYDROCHLORIDE 25 MG/1
TABLET, FILM COATED ORAL
Qty: 180 TABLET | Refills: 3 | Status: SHIPPED | OUTPATIENT
Start: 2019-08-15 | End: 2020-06-18

## 2019-09-13 ENCOUNTER — OFFICE VISIT (OUTPATIENT)
Dept: CARDIOLOGY | Facility: CLINIC | Age: 84
End: 2019-09-13

## 2019-09-13 VITALS
DIASTOLIC BLOOD PRESSURE: 78 MMHG | WEIGHT: 219 LBS | HEIGHT: 71 IN | OXYGEN SATURATION: 98 % | HEART RATE: 69 BPM | BODY MASS INDEX: 30.66 KG/M2 | SYSTOLIC BLOOD PRESSURE: 134 MMHG

## 2019-09-13 DIAGNOSIS — I10 ESSENTIAL HYPERTENSION: ICD-10-CM

## 2019-09-13 DIAGNOSIS — I35.0 NONRHEUMATIC AORTIC VALVE STENOSIS: Primary | ICD-10-CM

## 2019-09-13 DIAGNOSIS — I48.0 PAF (PAROXYSMAL ATRIAL FIBRILLATION) (HCC): ICD-10-CM

## 2019-09-13 PROCEDURE — 99214 OFFICE O/P EST MOD 30 MIN: CPT | Performed by: INTERNAL MEDICINE

## 2019-09-13 PROCEDURE — 93000 ELECTROCARDIOGRAM COMPLETE: CPT | Performed by: INTERNAL MEDICINE

## 2019-09-13 NOTE — PROGRESS NOTES
Subjective:     Encounter Date:09/13/19      Patient ID: Osvaldo Lopez is a 83 y.o. male.    Chief Complaint:  Atrial Fibrillation   Presents for follow-up visit. Symptoms include shortness of breath. Symptoms are negative for an AICD problem, bradycardia, chest pain, dizziness, hypertension, hypotension, syncope and tachycardia. The symptoms have been stable. Past medical history includes atrial fibrillation.   Hypertension   This is a chronic problem. The current episode started more than 1 year ago. The problem is controlled. Associated symptoms include blurred vision, malaise/fatigue, peripheral edema and shortness of breath. Pertinent negatives include no anxiety or chest pain.     83-year-old gentleman who presents today for reevaluation.  Patient had atrial flutter and underwent a ablation.  He presents today for reevaluation.  He continues to have intermittent episodes of the fluttering sensation.  He said that Wednesday night he was in it for greater than 12 hours and then subsequent converted out.  He says his heart rate has been varying before it would go up to about 130 and now is going up to about 150 beats a minute.  He denies shortness of breath chest discomforts lightheadedness.  He has had some fatigue as well as some intermittent swelling in his legs.      Review of Systems   Constitution: Positive for malaise/fatigue.   Eyes: Positive for blurred vision.   Cardiovascular: Negative for chest pain and syncope.   Respiratory: Positive for shortness of breath and wheezing.    Endocrine: Positive for polyuria.   Musculoskeletal: Positive for joint pain and myalgias.   Neurological: Negative for dizziness.   All other systems reviewed and are negative.        ECG 12 Lead  Date/Time: 9/13/2019 4:40 PM  Performed by: Angelo Driscoll MD  Authorized by: Angelo Driscoll MD   Comparison: compared with previous ECG from 7/18/2019  Similar to previous ECG  Rhythm: sinus rhythm  Ectopy: atrial  premature contractions  Conduction: right bundle branch block    Clinical impression: abnormal EKG               Objective:     Physical Exam   Constitutional: He is oriented to person, place, and time. He appears well-developed.   HENT:   Head: Normocephalic.   Eyes: Conjunctivae are normal.   Neck: Normal range of motion.   Cardiovascular: Normal rate. An irregularly irregular rhythm present.   Murmur heard.   Harsh midsystolic murmur is present with a grade of 1/6 at the upper right sternal border radiating to the neck.  Pulmonary/Chest: Breath sounds normal.   Abdominal: Soft. Bowel sounds are normal.   Musculoskeletal: Normal range of motion. He exhibits no edema.   Neurological: He is alert and oriented to person, place, and time.   Skin: Skin is warm and dry.   Psychiatric: He has a normal mood and affect. His behavior is normal.   Vitals reviewed.      Lab Review:       Assessment:          Diagnosis Plan   1. Nonrheumatic aortic valve stenosis     2. PAF (paroxysmal atrial fibrillation) (CMS/HCC)     3. Essential hypertension            Plan:     1.  Atrial flutter status post ablation.  He is now having recurrent episodes what sounds like atrial fibrillation.  He did not have a follow-up with Dr. Mcfarland so I made a visit today for about a month.  I did tell him it was not unusual to have some episodes and hopefully that will calm down by the time he sees Dr. Mcfarland but if not will appreciate his input.  2.  Hypertension blood pressure was good today.  3.  Patient was on amiodarone in the past.  This has been discontinued and my concern now is he is having recurrent atrial fibrillation.  Again something to be reconsidered but will defer to the electrophysiology service.  4.  Mild aortic stenosis by a echocardiogram done on 1/18/18.  Valve area estimated at 1.7 cm.  Will consider repeating echo next year.  5.  With the exceptions of the recurrent arrhythmias clinically he is been doing well.  We will see him  back in 6 to 12 months sooner of course if anything else he is to be followed up from a general cardiology perspective.        Atrial Fibrillation and Atrial Flutter  Assessment  • The patient has paroxysmal atrial fibrillation  • This is non-valvular in etiology  • The patient's CHADS2-VASc score is 3  • A [id number] score of 2 or more is considered a high risk for a thromboembolic event  • Rivaroxaban prescribed    Plan  • Attempt to maintain sinus rhythm  • Continue rivaroxaban for antithrombotic therapy, bleeding issues discussed  • Continue amiodarone for rhythm control

## 2019-09-16 RX ORDER — PERPHENAZINE 16 MG/1
TABLET, FILM COATED ORAL
Qty: 100 EACH | Refills: 5 | Status: SHIPPED | OUTPATIENT
Start: 2019-09-16 | End: 2020-10-12

## 2019-09-23 ENCOUNTER — OFFICE VISIT (OUTPATIENT)
Dept: INTERNAL MEDICINE | Facility: CLINIC | Age: 84
End: 2019-09-23

## 2019-09-23 VITALS
WEIGHT: 221 LBS | OXYGEN SATURATION: 94 % | HEART RATE: 70 BPM | TEMPERATURE: 96 F | SYSTOLIC BLOOD PRESSURE: 142 MMHG | DIASTOLIC BLOOD PRESSURE: 70 MMHG | BODY MASS INDEX: 30.82 KG/M2

## 2019-09-23 DIAGNOSIS — L98.9 SKIN LESION OF SCALP: ICD-10-CM

## 2019-09-23 DIAGNOSIS — G56.01 CARPAL TUNNEL SYNDROME OF RIGHT WRIST: ICD-10-CM

## 2019-09-23 DIAGNOSIS — M62.81 QUADRICEPS WEAKNESS: ICD-10-CM

## 2019-09-23 DIAGNOSIS — R20.2 NUMBNESS AND TINGLING IN RIGHT HAND: ICD-10-CM

## 2019-09-23 DIAGNOSIS — E11.44 DIABETIC AMYOTROPHY ASSOCIATED WITH TYPE 2 DIABETES MELLITUS (HCC): Primary | ICD-10-CM

## 2019-09-23 DIAGNOSIS — R20.0 NUMBNESS AND TINGLING IN RIGHT HAND: ICD-10-CM

## 2019-09-23 PROCEDURE — 99214 OFFICE O/P EST MOD 30 MIN: CPT | Performed by: FAMILY MEDICINE

## 2019-09-23 NOTE — PROGRESS NOTES
Subjective   Osvaldo Lopez is a 84 y.o. male.     Chief Complaint   Patient presents with   • Diabetes   Bilateral leg weakness, quadriceps weakness, developing carpal tunnel syndrome right hand.  Skin lesion.      History of Present Illness   Patient has had a very frustrating course with now quad weakness which is not getting any better.  He has evidence of polyneuropathy and has had 2 back surgeries previously.  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  Also polyneuropathy.  I would like to try to get him another neurologic opinion and referral is been placed.    Otherwise he is developing carpal tunnel syndrome in the right wrist from having to push up out of chairs and the pressure on his right hand from ambulation with a rolling walker.  He is still trying to exercise with a quad strength is not coming back.    We reviewed prior treatment of diabetes type 2.  Also treatment of atrial fibrillation which appears stable and as he has close cardiac follow-up.  He continues on Xarelto.    He has an evolving skin lesion at the right scalp and temple.      The following portions of the patient's history were reviewed and updated as appropriate: allergies, current medications, past social history and problem list.    Review of Systems   HENT: Negative.    Eyes: Negative.    Respiratory: Negative.    Cardiovascular: Negative.    Gastrointestinal: Negative.    Endocrine: Negative.    Genitourinary: Negative.    Musculoskeletal: Positive for gait problem.   Skin: Negative.    Allergic/Immunologic: Negative.    Neurological: Positive for weakness.   Hematological: Negative.    Psychiatric/Behavioral: Negative.        Objective   Vitals:    09/23/19 1649   BP: 142/70   Pulse: 70   Temp: 96 °F (35.6 °C)   SpO2: 94%     Physical Exam   Constitutional: He is oriented to person, place, and time. He appears well-developed and well-nourished.   HENT:   Head: Normocephalic and atraumatic.       Right Ear: Tympanic  membrane and external ear normal.   Left Ear: Tympanic membrane and external ear normal.   Nose: Nose normal.   Mouth/Throat: Oropharynx is clear and moist.   Eyes: Conjunctivae and EOM are normal. Pupils are equal, round, and reactive to light.   Neck: Normal range of motion. Neck supple. No JVD present. No thyromegaly present.   Cardiovascular: Normal rate, regular rhythm, normal heart sounds and intact distal pulses.   Pulmonary/Chest: Effort normal and breath sounds normal.   Abdominal: Soft. Bowel sounds are normal.   Musculoskeletal: Normal range of motion.   Lymphadenopathy:     He has no cervical adenopathy.   Neurological: He is alert and oriented to person, place, and time. No cranial nerve deficit. He exhibits abnormal muscle tone. Coordination and gait abnormal.   Significant bilateral quad strength weakness.   Skin: Skin is warm and dry. No rash noted.   Psychiatric: He has a normal mood and affect. His behavior is normal. Judgment and thought content normal.   Vitals reviewed.      Assessment/Plan   Problem List Items Addressed This Visit     None      Visit Diagnoses     Diabetic amyotrophy associated with type 2 diabetes mellitus (CMS/[facility])    -  Primary    Relevant Orders    Ambulatory Referral to Neurology    Quadriceps weakness        Relevant Orders    Ambulatory Referral to Neurology    Numbness and tingling in right hand        Skin lesion of scalp        Relevant Orders    Ambulatory Referral to Dermatology    Carpal tunnel syndrome of right wrist        Relevant Orders    Ambulatory Referral to Hand Surgery      Plan referral to neurology referral to dermatology referral to hand surgery recheck in about 3 3 to 4 months.  Trial of alpha lipoic acid 600 mg daily for neuropathy.  Research on the use of alpha lipoic acid for diabetic neuropathy is reviewed with him and his wife.

## 2019-09-25 RX ORDER — LISINOPRIL 40 MG/1
TABLET ORAL
Qty: 90 TABLET | Refills: 1 | Status: SHIPPED | OUTPATIENT
Start: 2019-09-25 | End: 2020-01-06

## 2019-10-15 ENCOUNTER — OFFICE VISIT (OUTPATIENT)
Dept: CARDIOLOGY | Facility: CLINIC | Age: 84
End: 2019-10-15

## 2019-10-15 ENCOUNTER — LAB (OUTPATIENT)
Dept: LAB | Facility: HOSPITAL | Age: 84
End: 2019-10-15

## 2019-10-15 VITALS
WEIGHT: 221 LBS | SYSTOLIC BLOOD PRESSURE: 144 MMHG | BODY MASS INDEX: 30.94 KG/M2 | HEART RATE: 75 BPM | HEIGHT: 71 IN | DIASTOLIC BLOOD PRESSURE: 68 MMHG

## 2019-10-15 DIAGNOSIS — R94.6 ABNORMAL FINDING ON THYROID FUNCTION TEST: ICD-10-CM

## 2019-10-15 DIAGNOSIS — I48.0 AF (PAROXYSMAL ATRIAL FIBRILLATION) (HCC): ICD-10-CM

## 2019-10-15 DIAGNOSIS — I48.0 AF (PAROXYSMAL ATRIAL FIBRILLATION) (HCC): Primary | ICD-10-CM

## 2019-10-15 LAB
ALBUMIN SERPL-MCNC: 4.4 G/DL (ref 3.5–5.2)
ALBUMIN/GLOB SERPL: 1.6 G/DL
ALP SERPL-CCNC: 110 U/L (ref 39–117)
ALT SERPL W P-5'-P-CCNC: 24 U/L (ref 1–41)
ANION GAP SERPL CALCULATED.3IONS-SCNC: 11.5 MMOL/L (ref 5–15)
AST SERPL-CCNC: 23 U/L (ref 1–40)
BILIRUB SERPL-MCNC: 0.3 MG/DL (ref 0.2–1.2)
BUN BLD-MCNC: 19 MG/DL (ref 8–23)
BUN/CREAT SERPL: 16.5 (ref 7–25)
CALCIUM SPEC-SCNC: 8.9 MG/DL (ref 8.6–10.5)
CHLORIDE SERPL-SCNC: 103 MMOL/L (ref 98–107)
CO2 SERPL-SCNC: 25.5 MMOL/L (ref 22–29)
CREAT BLD-MCNC: 1.15 MG/DL (ref 0.76–1.27)
GFR SERPL CREATININE-BSD FRML MDRD: 61 ML/MIN/1.73
GLOBULIN UR ELPH-MCNC: 2.8 GM/DL
GLUCOSE BLD-MCNC: 93 MG/DL (ref 65–99)
POTASSIUM BLD-SCNC: 4.7 MMOL/L (ref 3.5–5.2)
PROT SERPL-MCNC: 7.2 G/DL (ref 6–8.5)
SODIUM BLD-SCNC: 140 MMOL/L (ref 136–145)
T4 FREE SERPL-MCNC: 1.17 NG/DL (ref 0.93–1.7)
TSH SERPL DL<=0.05 MIU/L-ACNC: 3.17 UIU/ML (ref 0.27–4.2)

## 2019-10-15 PROCEDURE — 80053 COMPREHEN METABOLIC PANEL: CPT

## 2019-10-15 PROCEDURE — 99214 OFFICE O/P EST MOD 30 MIN: CPT | Performed by: INTERNAL MEDICINE

## 2019-10-15 PROCEDURE — 84439 ASSAY OF FREE THYROXINE: CPT

## 2019-10-15 PROCEDURE — 84443 ASSAY THYROID STIM HORMONE: CPT

## 2019-10-15 PROCEDURE — 93000 ELECTROCARDIOGRAM COMPLETE: CPT | Performed by: INTERNAL MEDICINE

## 2019-10-15 PROCEDURE — 36415 COLL VENOUS BLD VENIPUNCTURE: CPT

## 2019-10-15 RX ORDER — INFLUENZA A VIRUS A/MICHIGAN/45/2015 X-275 (H1N1) ANTIGEN (FORMALDEHYDE INACTIVATED), INFLUENZA A VIRUS A/SINGAPORE/INFIMH-16-0019/2016 IVR-186 (H3N2) ANTIGEN (FORMALDEHYDE INACTIVATED), AND INFLUENZA B VIRUS B/MARYLAND/15/2016 BX-69A (A B/COLORADO/6/2017-LIKE VIRUS) ANTIGEN (FORMALDEHYDE INACTIVATED) 60; 60; 60 UG/.5ML; UG/.5ML; UG/.5ML
INJECTION, SUSPENSION INTRAMUSCULAR
Refills: 0 | COMMUNITY
Start: 2019-10-05 | End: 2020-01-23

## 2019-10-15 RX ORDER — AMIODARONE HYDROCHLORIDE 200 MG/1
200 TABLET ORAL DAILY
Qty: 90 TABLET | Refills: 6 | Status: SHIPPED | OUTPATIENT
Start: 2019-10-15 | End: 2020-09-04

## 2019-10-15 NOTE — PROGRESS NOTES
Date of Office Visit: 10/15/2019  Encounter Provider: Miller Talbot MD  Place of Service: [facility]  Patient Name: Osvaldo Lopez  :1935    Chief complaint  Follow-up of paroxysmal atrial fibrillation    HPI: Osvaldo Lopez is a 84 y.o. male who presents today for follow-up of paroxysmal atrial fibrillation.  Patient underwent ablation for typical atrial flutter on .  Patient had been observed to be previously been in typical atrial flutter, however upon induction of anesthesia, he converted to sinus rhythm.  CTI was completed without difficulty.  He also had a history of paroxysmal atrial fibrillation, for which she had previously been on amiodarone.  The amiodarone was stopped in December due to complaints of leg pain.  He says he noticed no improvement in leg pain following this.  He has a history of a slightly elevated TSH.  Patient complains of ongoing intermittent episodes of palpitations, with heart rates up to 160 bpm - 170 bpm.  The episodes occur intermittently, last up to hours, and can occur up to twice per week.        Past Medical History:   Diagnosis Date   • Abnormal thyroid blood test    • Aneurysm of abdominal aorta (CMS/HCC)    • Arthritis    • BPH (benign prostatic hyperplasia)    • Bruises easily    • Bulging of thoracic intervertebral disc    • Chronic edema    • Coronary artery disease    • Diverticular disease    • Enlarged prostate without lower urinary tract symptoms (luts)    • Essential hypertension    • Hyperactivity of bladder    • Hyperlipidemia    • Left shoulder pain    • Lumbar radiculopathy 2016    wears back brace at times following back surgery   • Muscle weakness (generalized)    • Nonrheumatic aortic valve stenosis     mild 2018    • Osteoarthritis    • PAF (paroxysmal atrial fibrillation) (CMS/HCC)    • Polyuria    • Recurrent falls    • Screening      stop bang scale 5   • Syncope    • Torn rotator cuff     left    • Type 2 diabetes mellitus (CMS/HCC)    • Urinary frequency        Past Surgical History:   Procedure Laterality Date   • CARDIAC ELECTROPHYSIOLOGY PROCEDURE N/A 2019    Procedure: Ablation atrial flutter;  Surgeon: Miller Talbot MD;  Location: [facility] INVASIVE LOCATION;  Service: Cardiovascular   • CARDIAC SURGERY      CABGX5 ()   • CATARACT EXTRACTION Bilateral    • CORONARY ARTERY BYPASS GRAFT     • CYST REMOVAL      FROM BACK   • GALLBLADDER SURGERY     • HERNIA REPAIR Left     inquinal times 3  last one in    • KNEE CARTILAGE SURGERY Left    • LUMBAR DISCECTOMY FUSION INSTRUMENTATION N/A 2016    Procedure: lumbar laminectomy L4-5 and fusion with instrumentation;  Surgeon: Ran Kline MD;  Location: [facility] OR;  Service:    • LUMBAR DISCECTOMY FUSION INSTRUMENTATION N/A 1/15/2018    Procedure: L2-3, L3-4 laminectomy and fusion with instrumentation and removal of implants L4 5.;  Surgeon: Ran Kline MD;  Location: [facility] OR;  Service:    • OH TOTAL KNEE ARTHROPLASTY Left 2016    Procedure: TOTAL KNEE ARTHROPLASTY;  Surgeon: Dontrell Arellano MD;  Location: [facility] OR;  Service: Orthopedics       Social History     Socioeconomic History   • Marital status:      Spouse name: Not on file   • Number of children: Not on file   • Years of education: Not on file   • Highest education level: Not on file   Tobacco Use   • Smoking status: Former Smoker     Years: 36.00     Types: Cigarettes     Last attempt to quit:      Years since quittin.8   • Smokeless tobacco: Never Used   Substance and Sexual Activity   • Alcohol use: No     Frequency: Never   • Drug use: No   • Sexual activity: Defer       Family History   Problem Relation Age of Onset   • Heart failure Mother    • Diabetes Mother    • Cancer Sister    • Cancer Brother        Review of Systems   Constitution: Positive for weakness and malaise/fatigue.   HENT: Negative.    Eyes:  Negative.    Cardiovascular: Positive for irregular heartbeat and palpitations. Negative for chest pain, dyspnea on exertion, leg swelling and near-syncope.   Respiratory: Negative for cough and shortness of breath.    Endocrine: Negative.    Hematologic/Lymphatic: Negative.    Skin: Negative.    Musculoskeletal: Positive for joint pain, muscle cramps and muscle weakness.   Gastrointestinal: Negative.    Genitourinary: Negative.    Psychiatric/Behavioral: Negative.    Allergic/Immunologic: Negative.        Allergies   Allergen Reactions   • Percocet [Oxycodone-Acetaminophen] Mental Status Change     Causes confusion/   • Amiodarone Other (See Comments)     Muscle problems in legs         Current Outpatient Medications:   •  acetaminophen (TYLENOL) 500 MG tablet, Take 500 mg by mouth Every 6 (Six) Hours As Needed for Mild Pain ., Disp: , Rfl:   •  Alpha-Lipoic Acid 600 MG tablet, Take 600 mg by mouth Daily. For neuropathy, Disp: 30 tablet, Rfl: 11  •  IIIMOBI MICROLET LANCETS lancets, USE TWICE A DAY, Disp: 100 each, Rfl: 5  •  clotrimazole (LOTRIMIN) 1 % external solution, Apply  topically to the appropriate area as directed 2 (Two) Times a Day As Needed., Disp: 60 mL, Rfl: 2  •  CONTOUR NEXT TEST test strip, TEST BLOOD SUGAR TWICE DAILY, Disp: 100 each, Rfl: 5  •  diclofenac (VOLTAREN) 1 % gel gel, Pea sized amount and apply to left heel up to 4 times daily, Disp: 100 g, Rfl: 0  •  hydrALAZINE (APRESOLINE) 25 MG tablet, TAKE 1 TABLET 3 TIMES A DAY (Patient taking differently: TAKE 1 TABLET 2 TIMES A DAY), Disp: 180 tablet, Rfl: 3  •  lisinopril (PRINIVIL,ZESTRIL) 40 MG tablet, TAKE 1 TABLET DAILY, Disp: 90 tablet, Rfl: 1  •  metFORMIN (GLUCOPHAGE) 850 MG tablet, TAKE 1 TABLET TWICE DAILY  WITH MEALS, Disp: 180 tablet, Rfl: 1  •  metoprolol succinate XL (TOPROL-XL) 25 MG 24 hr tablet, TAKE 1 TABLET DAILY, Disp: 90 tablet, Rfl: 1  •  Multiple Vitamin (MULTI VITAMIN PO), Take 1 tablet by mouth Every Morning., Disp: ,  "Rfl:   •  rivaroxaban (XARELTO) 20 MG tablet, Take 1 tablet by mouth Daily., Disp: 30 tablet, Rfl: 0  •  tamsulosin (FLOMAX) 0.4 MG capsule 24 hr capsule, Take 2 capsules by mouth Daily., Disp: 180 capsule, Rfl: 1  •  vitamin B-12 (CYANOCOBALAMIN) 1000 MCG tablet, Take 1,000 mcg by mouth Daily., Disp: , Rfl:   •  amiodarone (PACERONE) 200 MG tablet, Take 1 tablet by mouth Daily., Disp: 90 tablet, Rfl: 6  •  FLUZONE HIGH-DOSE 0.5 ML suspension prefilled syringe injection, TO BE ADMINISTERED BY PHARMACIST FOR IMMUNIZATION, Disp: , Rfl: 0      Objective:     Vitals:    10/15/19 1349   BP: 144/68   BP Location: Right arm   Patient Position: Sitting   Cuff Size: Large Adult   Pulse: 75   Weight: 100 kg (221 lb)   Height: 180.3 cm (71\")     Body mass index is 30.82 kg/m².    PHYSICAL EXAM:    Physical Exam   Constitutional: He is oriented to person, place, and time. He appears well-developed and well-nourished. No distress.   HENT:   Head: Normocephalic and atraumatic.   Eyes: Conjunctivae are normal. Right eye exhibits no discharge. Left eye exhibits no discharge.   Neck: Neck supple. No JVD present.   Cardiovascular: Normal rate, regular rhythm and normal heart sounds.   Pulmonary/Chest: Effort normal and breath sounds normal. No respiratory distress.   Abdominal: He exhibits no distension. There is no tenderness.   Musculoskeletal: Normal range of motion. He exhibits no edema or deformity.   Neurological: He is alert and oriented to person, place, and time. No cranial nerve deficit.   Skin: Skin is warm and dry. He is not diaphoretic.   Psychiatric: He has a normal mood and affect. His behavior is normal. Judgment and thought content normal.   Nursing note and vitals reviewed.          ECG 12 Lead  Date/Time: 10/15/2019 5:06 PM  Performed by: Miller Talbot MD  Authorized by: Miller Talbot MD   Comparison: compared with previous ECG from 7/18/2019  Similar to previous ECG  Rhythm: sinus " rhythm  Conduction: right bundle branch block    Clinical impression: abnormal EKG              Assessment:       Diagnosis Plan   1. AF (paroxysmal atrial fibrillation) (CMS/HCC)  TSH    T4, free    Comprehensive Metabolic Panel          Plan:       1.  Paroxysmal atrial fibrillation  Patient having symptoms consistent with paroxysmal atrial fibrillation.  Given his age and debility not a candidate for PVI.  We have 2 options to attempt a controlled medication, likely amiodarone, or consider pacemaker placement with AV node ablation.  I discussed both these options with the patient today.  At the current time he would like to try amiodarone, will start him on 200 mg daily.  He acknowledged the risk associated with amiodarone therapy.  We will check a TSH and comprehensive metabolic panel today.  Recent chest x-ray was unremarkable.  If symptoms still ongoing, consider AV node ablation and pacemaker.    2.  Elevated TSH  We will recheck TSH and T4 today.    As always, it has been a pleasure to participate in your patient's care.      Sincerely,         Miller Talbot MD

## 2019-10-23 RX ORDER — METOPROLOL SUCCINATE 25 MG/1
TABLET, EXTENDED RELEASE ORAL
Qty: 90 TABLET | Refills: 1 | Status: SHIPPED | OUTPATIENT
Start: 2019-10-23 | End: 2020-06-18

## 2019-10-24 RX ORDER — TAMSULOSIN HYDROCHLORIDE 0.4 MG/1
CAPSULE ORAL
Qty: 180 CAPSULE | Refills: 1 | Status: SHIPPED | OUTPATIENT
Start: 2019-10-24 | End: 2020-09-03

## 2019-12-13 ENCOUNTER — OFFICE VISIT (OUTPATIENT)
Dept: NEUROLOGY | Facility: CLINIC | Age: 84
End: 2019-12-13

## 2019-12-13 VITALS
HEART RATE: 75 BPM | SYSTOLIC BLOOD PRESSURE: 138 MMHG | DIASTOLIC BLOOD PRESSURE: 72 MMHG | OXYGEN SATURATION: 94 % | BODY MASS INDEX: 32.2 KG/M2 | WEIGHT: 230 LBS | HEIGHT: 71 IN

## 2019-12-13 DIAGNOSIS — Z74.09 IMPAIRED FUNCTIONAL MOBILITY, BALANCE, GAIT, AND ENDURANCE: Primary | ICD-10-CM

## 2019-12-13 DIAGNOSIS — E11.42 DIABETIC PERIPHERAL NEUROPATHY (HCC): ICD-10-CM

## 2019-12-13 DIAGNOSIS — M54.17 LUMBOSACRAL RADICULOPATHY: ICD-10-CM

## 2019-12-13 DIAGNOSIS — M62.569 ATROPHY OF MUSCLE OF LOWER LEG, UNSPECIFIED LATERALITY: ICD-10-CM

## 2019-12-13 PROCEDURE — 99215 OFFICE O/P EST HI 40 MIN: CPT | Performed by: PSYCHIATRY & NEUROLOGY

## 2019-12-13 NOTE — PROGRESS NOTES
Chief Complaint   Patient presents with   • Extremity Weakness   • Peripheral Neuropathy       Patient ID: Osvaldo Lopez is a 84 y.o. male.    HPI: Thank you for referring your patient to see us in the neurology clinic this afternoon.  As you may know Osvaldo is an 84-year-old gentleman with history of lower extremity weakness and peripheral neuropathy referred to us by Dr. Rodríguez.  The patient states that his symptoms have been going on for the past several years.  He states that he is noted a gradual progression of weakness in both lower extremities.  He has not had any specific numbness or tingling.  He does have a history of chronic lower back pain and spinal stenosis.  He has had 2 lumbar sacral spine surgical interventions.  Last one in 2018 for which she states rods were placed at that time.  He says that for the most part he has a lot of issues with balance and ambulation.  He has had a couple of falls.  On 1 of these falls he did hit his head suffering a severe laceration.  He has a lot of difficulties standing from a seated position.  This occurs particularly in chairs where there are no armrests.  He does note that occasionally his legs will give out on him.  He still does suffer from a lot of chronic lower back pain.  To my knowledge he is not in pain management currently.  He has been told in the past that he has significant neuropathy in his lower extremities.  He was not told as to whether this was primarily from diabetes or his lumbosacral spine issues.  He does have a history of atrial fibrillation and has taken amiodarone chronically.  He also has a history of diabetes.  No family history of similar symptoms.    The following portions of the patient's history were reviewed and updated as appropriate: allergies, current medications, past family history, past medical history, past social history, past surgical history and problem list.    Review of Systems   Constitutional: Positive for fatigue.  Negative for activity change and appetite change.   HENT: Positive for hearing loss, sinus pressure and tinnitus.    Eyes: Positive for photophobia and visual disturbance.   Respiratory: Positive for shortness of breath. Negative for chest tightness and wheezing.    Cardiovascular: Positive for leg swelling. Negative for chest pain and palpitations.   Gastrointestinal: Negative for abdominal pain, nausea and vomiting.   Endocrine: Negative for cold intolerance, heat intolerance and polydipsia.   Genitourinary: Positive for frequency.   Musculoskeletal: Positive for arthralgias, back pain and neck stiffness.   Skin: Negative for color change, rash and wound.   Allergic/Immunologic: Negative for environmental allergies, food allergies and immunocompromised state.   Neurological: Positive for dizziness and weakness. Negative for tremors, seizures, syncope, facial asymmetry, speech difficulty, light-headedness, numbness and headaches.   Hematological: Negative for adenopathy. Does not bruise/bleed easily.   Psychiatric/Behavioral: Positive for sleep disturbance. Negative for agitation, behavioral problems, confusion, decreased concentration, dysphoric mood, hallucinations, self-injury and suicidal ideas. The patient is not nervous/anxious and is not hyperactive.       I reviewed and agree with the above review of systems completed by the medical assistant.    Vitals:    12/13/19 1213   BP: 138/72   Pulse: 75   SpO2: 94%       Neurologic Exam     Mental Status   Oriented to person, place, and time.   Registration: recalls 3 of 3 objects. Follows 3 step commands.   Attention: normal. Concentration: normal.   Speech: speech is normal   Level of consciousness: alert  Knowledge: consistent with education (No deficits found.).   Normal comprehension.     Cranial Nerves     CN II   Visual fields full to confrontation.     CN III, IV, VI   Pupils are equal, round, and reactive to light.  Extraocular motions are normal.   CN  III: no CN III palsy  CN VI: no CN VI palsy  Nystagmus: none   Diplopia: none    CN V   Facial sensation intact.     CN VII   Facial expression full, symmetric.     CN VIII   CN VIII normal.     CN IX, X   CN IX normal.   CN X normal.     CN XI   CN XI normal.     CN XII   CN XII normal.     Motor Exam   Muscle bulk: decreased (He has significant atrophy in the quadriceps muscles bilaterally.)  Right arm tone: normal  Left arm tone: normal  Right leg tone: normal  Left leg tone: normal    Strength   Right neck flexion: 5/5  Left neck flexion: 5/5  Right neck extension: 5/5  Left neck extension: 5/5  Right deltoid: 5/5  Left deltoid: 5/5  Right biceps: 5/5  Left biceps: 5/5  Right triceps: 5/5  Left triceps: 5/5  Right wrist flexion: 5/5  Left wrist flexion: 5/5  Right wrist extension: 5/5  Left wrist extension: 5/5  Right interossei: 5/5  Left interossei: 5/5  Right abdominals: 5/5  Left abdominals: 5/5  Right iliopsoas: 5/5  Left iliopsoas: 5/5  Right quadriceps: 3/5  Left quadriceps: 3/5  Right hamstrin/5  Left hamstrin/5  Right glutei: 4/5  Left glutei: 4/5  Right anterior tibial: 4/5  Left anterior tibial: 4/5  Right posterior tibial: 4/5  Left posterior tibial: 4/5  Right peroneal: 4/5  Left peroneal: 4/5  Right gastroc: 4/5  Left gastroc: 4/5    Sensory Exam   Right leg light touch: decreased from ankle  Left leg light touch: decreased from ankle  Right leg vibration: decreased from ankle  Left leg vibration: decreased from ankle  Proprioception normal.   Right leg pinprick: decreased from ankle  Left leg pinprick: decreased from ankle    Gait, Coordination, and Reflexes     Gait  Gait: wide-based (He does use a walker to assist with ambulation.)    Coordination   Romberg: negative    Tremor   Resting tremor: absent  Intention tremor: absent    Reflexes   Right brachioradialis: 2+  Left brachioradialis: 2+  Right biceps: 2+  Left biceps: 2+  Right triceps: 2+  Left triceps: 2+  Right patellar: 0  Left  patellar: 0  Right achilles: 0  Left achilles: 0  Right : 2+  Left : 2+Station is normal.       Physical Exam   Constitutional: He is oriented to person, place, and time. He appears well-developed. No distress.   HENT:   Head: Normocephalic and atraumatic.   Eyes: Pupils are equal, round, and reactive to light. EOM are normal.   Neck: Normal range of motion.   Cardiovascular: Normal rate, regular rhythm and normal heart sounds.   Pulmonary/Chest: Effort normal and breath sounds normal. No respiratory distress.   Abdominal: Soft. Bowel sounds are normal. He exhibits no distension. There is no tenderness.   Musculoskeletal: He exhibits no edema or deformity.   Neurological: He is oriented to person, place, and time. He has a normal Romberg Test.   Reflex Scores:       Tricep reflexes are 2+ on the right side and 2+ on the left side.       Bicep reflexes are 2+ on the right side and 2+ on the left side.       Brachioradialis reflexes are 2+ on the right side and 2+ on the left side.       Patellar reflexes are 0 on the right side and 0 on the left side.       Achilles reflexes are 0 on the right side and 0 on the left side.  Skin: Skin is warm. No rash noted.   Psychiatric: He has a normal mood and affect. His speech is normal. Judgment normal.   Vitals reviewed.      Procedures    Assessment/Plan: I would like to schedule him for an EMG/nerve conduction study of both lower extremities.  I currently am questioning whether the majority of his issues may be related to the lumbosacral spine disease and stenosis he has had over the years causing severe lumbosacral radiculopathies however in combination with diabetic peripheral neuropathy.  The EMG will help to clarify this.  We will see him back after this evaluation.       Osvaldo was seen today for extremity weakness and peripheral neuropathy.    Diagnoses and all orders for this visit:    Impaired functional mobility, balance, gait, and endurance  -     EMG;  Future    Diabetic peripheral neuropathy (CMS/HCC)  -     EMG; Future    Lumbosacral radiculopathy  -     EMG; Future    Atrophy of muscle of lower leg, unspecified laterality  -     EMG; Future           Vimal Huynh II, MD

## 2020-01-06 RX ORDER — LISINOPRIL 40 MG/1
TABLET ORAL
Qty: 90 TABLET | Refills: 1 | Status: SHIPPED | OUTPATIENT
Start: 2020-01-06 | End: 2020-03-12 | Stop reason: SDUPTHER

## 2020-01-21 ENCOUNTER — PROCEDURE VISIT (OUTPATIENT)
Dept: NEUROLOGY | Facility: CLINIC | Age: 85
End: 2020-01-21

## 2020-01-21 VITALS — HEIGHT: 71 IN | BODY MASS INDEX: 32.2 KG/M2 | WEIGHT: 230 LBS

## 2020-01-21 DIAGNOSIS — Z74.09 IMPAIRED FUNCTIONAL MOBILITY, BALANCE, GAIT, AND ENDURANCE: ICD-10-CM

## 2020-01-21 DIAGNOSIS — M62.569 ATROPHY OF MUSCLE OF LOWER LEG, UNSPECIFIED LATERALITY: ICD-10-CM

## 2020-01-21 DIAGNOSIS — E11.42 DIABETIC PERIPHERAL NEUROPATHY (HCC): ICD-10-CM

## 2020-01-21 DIAGNOSIS — M54.17 LUMBOSACRAL RADICULOPATHY: ICD-10-CM

## 2020-01-21 PROCEDURE — 95886 MUSC TEST DONE W/N TEST COMP: CPT | Performed by: PSYCHIATRY & NEUROLOGY

## 2020-01-21 PROCEDURE — 95909 NRV CNDJ TST 5-6 STUDIES: CPT | Performed by: PSYCHIATRY & NEUROLOGY

## 2020-01-21 NOTE — PROGRESS NOTES
"EMG and Nerve Conduction Studies    I.      Instrument used: Neuromax 1002  II.     Please see data sheets for tabular summary of NCS and details on methods, temperatures and lab standards.   III.    EMG muscles tested for upper extremity studies include the deltoid, biceps, triceps, pronator teres, extensor digitorum communis, first dorsal interosseous and abductor pollicis brevis.    IV.   EMG muscles tested for lower extremity studies include the vastus lateralis, tibialis anterior, peroneus longus, medial gastrocnemius and extensor digitorum brevis.    V.    Additional muscles tested as needed.  Paraspinal muscles tested as needed.   VI.   Please see data sheets for tabular summary of EMG findings.   VII. The complete report includes the data sheets.      Indication: Peripheral neuropathy with history of multilevel lumbar operation with rods  History: 84-year-old male with diabetes who has history of a multilevel lumbar disc operation with fusion with hardware who has predominantly weakness in his legs which most notably affects strength at the knees.  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  The feet were cold therefore they were warmed prior to study.  The temperature dipped below 32 °C but temperature correction was not needed.  Needle examination was obtained on selected muscles in both legs.  Paraspinals were not obtained since he had previous surgery.    Results:  1.  Absent left sural sensory potential.  2.  Absent superficial peroneal sensory potentials bilaterally.  3.  Absent left peroneal motor potential from stimulation at proximal and distal sites recorded over the extensor digitorum brevis.  The study was " repeated recording over the tibialis anterior showing a slow conduction velocity across the fibular head at 37.5 m/s with low amplitude of 1.2 mV.  4.  Slow left tibial motor velocity at 34 m/s.  The distal latency was obtained at 60 mm due to edema at the ankle.  Very low amplitude of 0.767 mV from ankle stimulation.  5.  Needle examination of selected muscles in both legs showed widespread irritative changes including the vastus lateralis muscles tibialis anterior and medial gastrocnemius bilaterally.  There were also a few positive sharp waves in the right extensor digitorum brevis.  There was an increased number of large motor units in all muscles tested except the extensor digitorum brevis which showed no motor units.  The vastus lateralis muscles in particular showed wide polyphasics and all showed rapid firing rates and reduced interference patterns    Impression:  Abnormal study showing very severe peripheral neuropathy.  There is considerable evidence of denervation and reinnervation in the muscles of both legs including proximal muscles such as the vastus lateralis.  This could be due to proximal neuropathy although I cannot exclude superimposed multiple radiculopathies instead.  Study results were discussed with the patient.    Gus Kate M.D.              Dictated utilizing Dragon dictation.

## 2020-01-23 ENCOUNTER — OFFICE VISIT (OUTPATIENT)
Dept: INTERNAL MEDICINE | Facility: CLINIC | Age: 85
End: 2020-01-23

## 2020-01-23 VITALS
DIASTOLIC BLOOD PRESSURE: 72 MMHG | SYSTOLIC BLOOD PRESSURE: 152 MMHG | TEMPERATURE: 97.6 F | HEART RATE: 80 BPM | OXYGEN SATURATION: 88 % | BODY MASS INDEX: 32.08 KG/M2 | WEIGHT: 230 LBS

## 2020-01-23 DIAGNOSIS — E78.2 MIXED HYPERLIPIDEMIA: ICD-10-CM

## 2020-01-23 DIAGNOSIS — I48.0 AF (PAROXYSMAL ATRIAL FIBRILLATION) (HCC): ICD-10-CM

## 2020-01-23 DIAGNOSIS — E11.9 CONTROLLED TYPE 2 DIABETES MELLITUS WITHOUT COMPLICATION, WITHOUT LONG-TERM CURRENT USE OF INSULIN (HCC): ICD-10-CM

## 2020-01-23 DIAGNOSIS — I71.40 ABDOMINAL AORTIC ANEURYSM (AAA) WITHOUT RUPTURE (HCC): ICD-10-CM

## 2020-01-23 DIAGNOSIS — E11.42 DIABETIC PERIPHERAL NEUROPATHY (HCC): ICD-10-CM

## 2020-01-23 DIAGNOSIS — I10 ESSENTIAL HYPERTENSION: Primary | ICD-10-CM

## 2020-01-23 PROCEDURE — 99214 OFFICE O/P EST MOD 30 MIN: CPT | Performed by: FAMILY MEDICINE

## 2020-01-23 NOTE — PROGRESS NOTES
Subjective   Osvaldo Lopez is a 84 y.o. male.     Chief Complaint   Patient presents with   • Atrial Fibrillation   • Hyperlipidemia   • Hypertension   • Diabetes         History of Present Illness   Patient with history of atrial fibrillation hyperlipidemia hypertension diabetes type 2 with evidence of multifactorial leg weakness with what appears to be polyneuropathy on EMG.  I ordered for EMG report.  He has a follow-up with neurology Dr. Huynh as well as orthopedic back surgery.  Otherwise follow-up with electrophysiology Dr. Mcfarland.    Check of hyperlipidemia hypertension diabetes type 2 are reviewed.  A1c has not been checked in a while.  We will get A1c lipid panel and general labs CBC etc.      The following portions of the patient's history were reviewed and updated as appropriate: allergies, current medications, past social history and problem list.    Review of Systems   HENT: Negative.    Eyes: Negative.    Respiratory: Negative.    Cardiovascular: Negative.    Gastrointestinal: Negative.    Endocrine: Negative.    Genitourinary: Negative.    Musculoskeletal: Positive for gait problem.   Skin: Negative.    Allergic/Immunologic: Negative.    Neurological: Positive for weakness.   Hematological: Negative.    Psychiatric/Behavioral: Negative.        Objective   Vitals:    01/23/20 1426   BP: 152/72   Pulse: 80   Temp: 97.6 °F (36.4 °C)   SpO2: (!) 88%     Physical Exam   Constitutional: He is oriented to person, place, and time. He appears well-developed and well-nourished.   HENT:   Head: Normocephalic and atraumatic.   Right Ear: Tympanic membrane and external ear normal.   Left Ear: Tympanic membrane and external ear normal.   Nose: Nose normal.   Mouth/Throat: Oropharynx is clear and moist.   Eyes: Pupils are equal, round, and reactive to light. Conjunctivae and EOM are normal.   Neck: Normal range of motion. Neck supple. No JVD present. No thyromegaly present.   Cardiovascular: Normal rate, regular  rhythm, normal heart sounds and intact distal pulses.   Pulmonary/Chest: Effort normal and breath sounds normal.   Abdominal: Soft. Bowel sounds are normal.   Musculoskeletal: Normal range of motion.   Lymphadenopathy:     He has no cervical adenopathy.   Neurological: He is alert and oriented to person, place, and time. No cranial nerve deficit. He exhibits abnormal muscle tone. Coordination and gait abnormal.   Patient uses assistance of a rolling walker due to bilateral leg weakness with quad weakness.   Skin: Skin is warm and dry. No rash noted.   Psychiatric: He has a normal mood and affect. His behavior is normal. Judgment and thought content normal.   Vitals reviewed.      Assessment/Plan   Problem List Items Addressed This Visit        Cardiovascular and Mediastinum    Abdominal aortic aneurysm (CMS/HCC)    Relevant Orders    Comprehensive Metabolic Panel    CBC & Differential    Lipid Panel With / Chol / HDL Ratio    Hemoglobin A1c    TSH    T4, Free    Urinalysis With Microscopic If Indicated (No Culture) - Urine, Clean Catch    Essential hypertension - Primary    Relevant Orders    Comprehensive Metabolic Panel    CBC & Differential    Lipid Panel With / Chol / HDL Ratio    Hemoglobin A1c    TSH    T4, Free    Urinalysis With Microscopic If Indicated (No Culture) - Urine, Clean Catch    Hyperlipidemia    Relevant Orders    Comprehensive Metabolic Panel    CBC & Differential    Lipid Panel With / Chol / HDL Ratio    Hemoglobin A1c    TSH    T4, Free    Urinalysis With Microscopic If Indicated (No Culture) - Urine, Clean Catch       Endocrine    Diabetic peripheral neuropathy (CMS/HCC)    Relevant Orders    Comprehensive Metabolic Panel    CBC & Differential    Lipid Panel With / Chol / HDL Ratio    Hemoglobin A1c    TSH    T4, Free    Urinalysis With Microscopic If Indicated (No Culture) - Urine, Clean Catch    Controlled type 2 diabetes mellitus without complication, without long-term current use of  insulin (CMS/[facility], located within [facility])    Relevant Orders    Comprehensive Metabolic Panel    CBC & Differential    Lipid Panel With / Chol / HDL Ratio    Hemoglobin A1c    TSH    T4, Free    Urinalysis With Microscopic If Indicated (No Culture) - Urine, Clean Catch      Other Visit Diagnoses     AF (paroxysmal atrial fibrillation) (CMS/[facility], located within [facility])          Plan: Labs pending recheck in 6 months sooner if needed follow-up with orthopedic surgery follow-up with neurology.

## 2020-01-24 LAB
ALBUMIN SERPL-MCNC: 4.4 G/DL (ref 3.5–5.2)
ALBUMIN/GLOB SERPL: 2.2 G/DL
ALP SERPL-CCNC: 103 U/L (ref 39–117)
ALT SERPL-CCNC: 15 U/L (ref 1–41)
APPEARANCE UR: CLEAR
AST SERPL-CCNC: 19 U/L (ref 1–40)
BASOPHILS # BLD AUTO: 0.02 10*3/MM3 (ref 0–0.2)
BASOPHILS NFR BLD AUTO: 0.5 % (ref 0–1.5)
BILIRUB SERPL-MCNC: 0.3 MG/DL (ref 0.2–1.2)
BILIRUB UR QL STRIP: NEGATIVE
BUN SERPL-MCNC: 23 MG/DL (ref 8–23)
BUN/CREAT SERPL: 15.5 (ref 7–25)
CALCIUM SERPL-MCNC: 9.1 MG/DL (ref 8.6–10.5)
CHLORIDE SERPL-SCNC: 102 MMOL/L (ref 98–107)
CHOLEST SERPL-MCNC: 181 MG/DL (ref 0–200)
CHOLEST/HDLC SERPL: 4.02 {RATIO}
CO2 SERPL-SCNC: 26.5 MMOL/L (ref 22–29)
COLOR UR: YELLOW
CREAT SERPL-MCNC: 1.48 MG/DL (ref 0.76–1.27)
EOSINOPHIL # BLD AUTO: 0.07 10*3/MM3 (ref 0–0.4)
EOSINOPHIL NFR BLD AUTO: 1.7 % (ref 0.3–6.2)
ERYTHROCYTE [DISTWIDTH] IN BLOOD BY AUTOMATED COUNT: 13.8 % (ref 12.3–15.4)
GLOBULIN SER CALC-MCNC: 2 GM/DL
GLUCOSE SERPL-MCNC: 108 MG/DL (ref 65–99)
GLUCOSE UR QL: NEGATIVE
HBA1C MFR BLD: 6.3 % (ref 4.8–5.6)
HCT VFR BLD AUTO: 34.6 % (ref 37.5–51)
HDLC SERPL-MCNC: 45 MG/DL (ref 40–60)
HGB BLD-MCNC: 11.1 G/DL (ref 13–17.7)
HGB UR QL STRIP: NEGATIVE
IMM GRANULOCYTES # BLD AUTO: 0.01 10*3/MM3 (ref 0–0.05)
IMM GRANULOCYTES NFR BLD AUTO: 0.2 % (ref 0–0.5)
KETONES UR QL STRIP: NEGATIVE
LDLC SERPL CALC-MCNC: 95 MG/DL (ref 0–100)
LEUKOCYTE ESTERASE UR QL STRIP: NEGATIVE
LYMPHOCYTES # BLD AUTO: 1.56 10*3/MM3 (ref 0.7–3.1)
LYMPHOCYTES NFR BLD AUTO: 38.5 % (ref 19.6–45.3)
MCH RBC QN AUTO: 28.8 PG (ref 26.6–33)
MCHC RBC AUTO-ENTMCNC: 32.1 G/DL (ref 31.5–35.7)
MCV RBC AUTO: 89.9 FL (ref 79–97)
MONOCYTES # BLD AUTO: 0.42 10*3/MM3 (ref 0.1–0.9)
MONOCYTES NFR BLD AUTO: 10.4 % (ref 5–12)
NEUTROPHILS # BLD AUTO: 1.97 10*3/MM3 (ref 1.7–7)
NEUTROPHILS NFR BLD AUTO: 48.7 % (ref 42.7–76)
NITRITE UR QL STRIP: NEGATIVE
NRBC BLD AUTO-RTO: 0.2 /100 WBC (ref 0–0.2)
PH UR STRIP: 6 [PH] (ref 5–8)
PLATELET # BLD AUTO: 165 10*3/MM3 (ref 140–450)
POTASSIUM SERPL-SCNC: 4.5 MMOL/L (ref 3.5–5.2)
PROT SERPL-MCNC: 6.4 G/DL (ref 6–8.5)
PROT UR QL STRIP: NEGATIVE
RBC # BLD AUTO: 3.85 10*6/MM3 (ref 4.14–5.8)
SODIUM SERPL-SCNC: 142 MMOL/L (ref 136–145)
SP GR UR: 1.02 (ref 1–1.03)
T4 FREE SERPL-MCNC: 1.33 NG/DL (ref 0.93–1.7)
TRIGL SERPL-MCNC: 206 MG/DL (ref 0–150)
TSH SERPL DL<=0.005 MIU/L-ACNC: 4.68 UIU/ML (ref 0.27–4.2)
UROBILINOGEN UR STRIP-MCNC: NORMAL MG/DL
VLDLC SERPL CALC-MCNC: 41.2 MG/DL
WBC # BLD AUTO: 4.05 10*3/MM3 (ref 3.4–10.8)

## 2020-01-28 ENCOUNTER — OFFICE VISIT (OUTPATIENT)
Dept: CARDIOLOGY | Facility: CLINIC | Age: 85
End: 2020-01-28

## 2020-01-28 VITALS
BODY MASS INDEX: 32.2 KG/M2 | SYSTOLIC BLOOD PRESSURE: 126 MMHG | WEIGHT: 230 LBS | HEIGHT: 71 IN | HEART RATE: 69 BPM | DIASTOLIC BLOOD PRESSURE: 74 MMHG

## 2020-01-28 DIAGNOSIS — I48.0 PAF (PAROXYSMAL ATRIAL FIBRILLATION) (HCC): ICD-10-CM

## 2020-01-28 DIAGNOSIS — I48.3 TYPICAL ATRIAL FLUTTER (HCC): Primary | ICD-10-CM

## 2020-01-28 PROCEDURE — 99213 OFFICE O/P EST LOW 20 MIN: CPT | Performed by: INTERNAL MEDICINE

## 2020-01-28 PROCEDURE — 93000 ELECTROCARDIOGRAM COMPLETE: CPT | Performed by: INTERNAL MEDICINE

## 2020-02-04 ENCOUNTER — OFFICE VISIT (OUTPATIENT)
Dept: ORTHOPEDIC SURGERY | Facility: CLINIC | Age: 85
End: 2020-02-04

## 2020-02-04 VITALS — HEIGHT: 71 IN | WEIGHT: 230 LBS | BODY MASS INDEX: 32.2 KG/M2 | TEMPERATURE: 97.2 F

## 2020-02-04 DIAGNOSIS — Z98.1 S/P LUMBAR FUSION: Primary | ICD-10-CM

## 2020-02-04 PROCEDURE — 72100 X-RAY EXAM L-S SPINE 2/3 VWS: CPT | Performed by: ORTHOPAEDIC SURGERY

## 2020-02-04 PROCEDURE — 99213 OFFICE O/P EST LOW 20 MIN: CPT | Performed by: ORTHOPAEDIC SURGERY

## 2020-02-04 NOTE — PROGRESS NOTES
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  He is managed not to fall.  He uses a walker and good since.  Good but not great strength on examination of the knees today.  Good dorsiflexion strength in the feet.  X-rays of the lumbar spine show a rocksolid fusion compared to prior films.  No new fractures no other problems.  I have outfitted him with a warm-and-form corset and will see him as needed.

## 2020-02-17 ENCOUNTER — OFFICE VISIT (OUTPATIENT)
Dept: NEUROLOGY | Facility: CLINIC | Age: 85
End: 2020-02-17

## 2020-02-17 VITALS
DIASTOLIC BLOOD PRESSURE: 84 MMHG | WEIGHT: 230 LBS | SYSTOLIC BLOOD PRESSURE: 126 MMHG | OXYGEN SATURATION: 94 % | HEART RATE: 76 BPM | HEIGHT: 71 IN | BODY MASS INDEX: 32.2 KG/M2

## 2020-02-17 DIAGNOSIS — E11.42 DIABETIC PERIPHERAL NEUROPATHY (HCC): Primary | ICD-10-CM

## 2020-02-17 DIAGNOSIS — M54.17 LUMBOSACRAL RADICULOPATHY: ICD-10-CM

## 2020-02-17 DIAGNOSIS — Z74.09 IMPAIRED FUNCTIONAL MOBILITY, BALANCE, GAIT, AND ENDURANCE: ICD-10-CM

## 2020-02-17 PROCEDURE — 99214 OFFICE O/P EST MOD 30 MIN: CPT | Performed by: PSYCHIATRY & NEUROLOGY

## 2020-02-17 NOTE — PROGRESS NOTES
Chief Complaint   Patient presents with   • Peripheral Neuropathy   • Gait Problem       Patient ID: Osvaldo Lopez is a 84 y.o. male.    HPI: I had the pleasure of seeing your patient again today.  As you may know Mr. Lopez is an 84-year-old gentleman with a history of balance and gait issues.  We sent him for an EMG/nerve conduction study for further evaluation of this.  Turns out that he does have a very significant peripheral neuropathy.  He does have a history of diabetes.  Along with that the EMG/nerve conduction study showed evidence for possible multiple/multilevel radiculopathies.  He continues to have gait and balance issues.  He has a lot of difficulties standing up from a seated position.  He does not go down steps due to weakness in his legs proximally.  He does use a walker to assist with ambulation still.  He denies any significant neuropathic pain currently.  No double vision.  No difficulty swallowing.  He does have some numbness in his lower extremities.  In light of these    The following portions of the patient's history were reviewed and updated as appropriate: allergies, current medications, past family history, past medical history, past social history, past surgical history and problem list.    Review of Systems   Constitutional: Negative for activity change, appetite change and fatigue.   HENT: Negative for ear pain, facial swelling and tinnitus.    Eyes: Negative for photophobia, redness and visual disturbance.   Respiratory: Negative for chest tightness, shortness of breath and wheezing.    Cardiovascular: Negative for chest pain, palpitations and leg swelling.   Gastrointestinal: Negative for abdominal pain, nausea and vomiting.   Endocrine: Negative for cold intolerance, heat intolerance and polydipsia.   Musculoskeletal: Positive for gait problem. Negative for back pain and neck pain.   Skin: Negative for color change, rash and wound.   Allergic/Immunologic: Negative for  environmental allergies, food allergies and immunocompromised state.   Neurological: Positive for numbness. Negative for dizziness, tremors, seizures, syncope, facial asymmetry, speech difficulty, weakness, light-headedness and headaches.   Hematological: Negative for adenopathy. Does not bruise/bleed easily.   Psychiatric/Behavioral: Negative for agitation, behavioral problems, confusion, decreased concentration, dysphoric mood, hallucinations, self-injury, sleep disturbance and suicidal ideas. The patient is not nervous/anxious and is not hyperactive.       I reviewed and agree with the above review of systems completed by the medical assistant.    Vitals:    20 1303   BP: 126/84   Pulse: 76   SpO2: 94%       Neurologic Exam     Mental Status   Oriented to person, place, and time.   Concentration: normal.   Level of consciousness: alert  Knowledge: consistent with education (No deficits found.).     Cranial Nerves     CN II   Visual fields full to confrontation.     CN III, IV, VI   Pupils are equal, round, and reactive to light.  Extraocular motions are normal.   CN III: no CN III palsy  CN VI: no CN VI palsy    CN V   Facial sensation intact.     CN VII   Facial expression full, symmetric.     CN VIII   CN VIII normal.     CN IX, X   CN IX normal.   CN X normal.     CN XI   CN XI normal.     CN XII   CN XII normal.     Motor Exam     Strength   Right neck flexion: 5/5  Left neck flexion: 5/5  Right neck extension: 5/5  Left neck extension: 5/5  Right deltoid: 5/5  Left deltoid: 5/5  Right biceps: 5/5  Left biceps: 5/5  Right triceps: 5/5  Left triceps: 5/5  Right wrist flexion: 5/5  Left wrist flexion: 5/5  Right wrist extension: 5/5  Left wrist extension: 5/5  Right interossei: 5/5  Left interossei: 5/5  Right abdominals: 5/5  Left abdominals: 5/5  Right iliopsoas: 5/5  Left iliopsoas: 5/5  Right quadriceps: 5/5  Left quadriceps: 5/5  Right hamstrin/5  Left hamstrin/5  Right glutei: 5/5  Left  glutei: 5/5  Right anterior tibial: 5/5  Left anterior tibial: 5/5  Right posterior tibial: 5/5  Left posterior tibial: 5/5  Right peroneal: 5/5  Left peroneal: 5/5  Right gastroc: 5/5  Left gastroc: 5/5    Sensory Exam   Light touch normal.   Right leg vibration: decreased from knee  Left leg vibration: decreased from knee    Gait, Coordination, and Reflexes     Gait  Gait: wide-based    Coordination   Romberg: positive    Reflexes   Right brachioradialis: 2+  Left brachioradialis: 2+  Right biceps: 2+  Left biceps: 2+  Right triceps: 2+  Left triceps: 2+  Right patellar: 0  Left patellar: 0  Right achilles: 0  Left achilles: 0  Right : 2+  Left : 2+Station is normal.       Physical Exam   Constitutional: He is oriented to person, place, and time. He appears well-developed and well-nourished.   HENT:   Head: Normocephalic and atraumatic.   Eyes: Pupils are equal, round, and reactive to light. EOM are normal.   Cardiovascular: Normal rate and regular rhythm.   Pulmonary/Chest: Breath sounds normal.   Musculoskeletal: Normal range of motion.   Neurological: He is oriented to person, place, and time. He has an abnormal Romberg Test.   Reflex Scores:       Tricep reflexes are 2+ on the right side and 2+ on the left side.       Bicep reflexes are 2+ on the right side and 2+ on the left side.       Brachioradialis reflexes are 2+ on the right side and 2+ on the left side.       Patellar reflexes are 0 on the right side and 0 on the left side.       Achilles reflexes are 0 on the right side and 0 on the left side.  Skin: Skin is warm.   Vitals reviewed.      Procedures    Assessment/Plan: Results from the EMG/nerve conduction study it is very likely that he has both multilevel radiculopathies along with the peripheral neuropathy.  We talked about his gait issues and I did offer balance and gait physical therapy however he is declined that for now.  We did review the EMG results in their entirety.  He will continue  to use his walker to assist with ambulation.  We will see him back on an as-needed basis.  A total of 15 minutes was spent face-to-face with the patient today.  Of that greater than 50% of this time was spent discussing signs and symptoms of peripheral neuropathy, lumbosacral radiculopathy, plan of care and prognosis.        Osvaldo was seen today for peripheral neuropathy and gait problem.    Diagnoses and all orders for this visit:    Diabetic peripheral neuropathy (CMS/HCC)    Lumbosacral radiculopathy    Impaired functional mobility, balance, gait, and endurance           Vimal Huynh II, MD

## 2020-03-12 ENCOUNTER — TELEPHONE (OUTPATIENT)
Dept: INTERNAL MEDICINE | Facility: CLINIC | Age: 85
End: 2020-03-12

## 2020-03-12 RX ORDER — LISINOPRIL 40 MG/1
40 TABLET ORAL DAILY
Qty: 90 TABLET | Refills: 1 | Status: SHIPPED | OUTPATIENT
Start: 2020-03-12 | End: 2020-06-17

## 2020-03-12 NOTE — TELEPHONE ENCOUNTER
PATIENT CALLED TO  REQUEST A REFILL ON HIS LISINOPRIL 40 MG FOR A 90 DAY SUPPLY AND HE WOULD LIKE FOR THIS TO BE SENT TO HIS [facility] THAT IS IN HIS CHART. PLEASE ADVISE AND CALL PT BACK [phone]

## 2020-04-22 RX ORDER — RIVAROXABAN 20 MG/1
TABLET, FILM COATED ORAL
Qty: 90 TABLET | Refills: 0 | Status: SHIPPED | OUTPATIENT
Start: 2020-04-22 | End: 2020-07-16

## 2020-06-17 RX ORDER — LISINOPRIL 40 MG/1
TABLET ORAL
Qty: 90 TABLET | Refills: 1 | Status: SHIPPED | OUTPATIENT
Start: 2020-06-17 | End: 2020-11-19

## 2020-06-18 RX ORDER — METOPROLOL SUCCINATE 25 MG/1
TABLET, EXTENDED RELEASE ORAL
Qty: 90 TABLET | Refills: 3 | Status: SHIPPED | OUTPATIENT
Start: 2020-06-18 | End: 2021-09-09

## 2020-06-18 RX ORDER — HYDRALAZINE HYDROCHLORIDE 25 MG/1
25 TABLET, FILM COATED ORAL 3 TIMES DAILY
Qty: 180 TABLET | Refills: 3 | Status: SHIPPED | OUTPATIENT
Start: 2020-06-18 | End: 2020-09-25 | Stop reason: SDUPTHER

## 2020-07-16 RX ORDER — RIVAROXABAN 20 MG/1
TABLET, FILM COATED ORAL
Qty: 90 TABLET | Refills: 3 | Status: SHIPPED | OUTPATIENT
Start: 2020-07-16 | End: 2021-03-31 | Stop reason: ALTCHOICE

## 2020-07-23 ENCOUNTER — OFFICE VISIT (OUTPATIENT)
Dept: INTERNAL MEDICINE | Facility: CLINIC | Age: 85
End: 2020-07-23

## 2020-07-23 VITALS
HEART RATE: 77 BPM | BODY MASS INDEX: 30.52 KG/M2 | TEMPERATURE: 98.3 F | HEIGHT: 71 IN | DIASTOLIC BLOOD PRESSURE: 64 MMHG | OXYGEN SATURATION: 96 % | WEIGHT: 218 LBS | RESPIRATION RATE: 16 BRPM | SYSTOLIC BLOOD PRESSURE: 142 MMHG

## 2020-07-23 DIAGNOSIS — L89.321 PRESSURE INJURY OF LEFT BUTTOCK, STAGE 1: ICD-10-CM

## 2020-07-23 DIAGNOSIS — I48.0 PAF (PAROXYSMAL ATRIAL FIBRILLATION) (HCC): Primary | ICD-10-CM

## 2020-07-23 DIAGNOSIS — E11.9 CONTROLLED TYPE 2 DIABETES MELLITUS WITHOUT COMPLICATION, WITHOUT LONG-TERM CURRENT USE OF INSULIN (HCC): ICD-10-CM

## 2020-07-23 DIAGNOSIS — M62.81 MUSCLE WEAKNESS (GENERALIZED): ICD-10-CM

## 2020-07-23 DIAGNOSIS — F43.21 GRIEF: ICD-10-CM

## 2020-07-23 DIAGNOSIS — I10 ESSENTIAL HYPERTENSION: ICD-10-CM

## 2020-07-23 DIAGNOSIS — M48.062 SPINAL STENOSIS OF LUMBAR REGION WITH NEUROGENIC CLAUDICATION: ICD-10-CM

## 2020-07-23 PROCEDURE — 99214 OFFICE O/P EST MOD 30 MIN: CPT | Performed by: FAMILY MEDICINE

## 2020-07-23 RX ORDER — CLOTRIMAZOLE AND BETAMETHASONE DIPROPIONATE 10; .64 MG/G; MG/G
CREAM TOPICAL 2 TIMES DAILY
Qty: 135 G | Refills: 3 | Status: SHIPPED | OUTPATIENT
Start: 2020-07-23 | End: 2021-04-05

## 2020-07-23 NOTE — PROGRESS NOTES
Subjective   Osvaldo Lopez is a 84 y.o. male.     Chief Complaint   Patient presents with   • Follow-up     Pt presents here today for a 6 month follow up.         History of Present Illness    Very pleasant gentleman whose had a number of issues.  Unfortunately his wife  suddenly in February.  History includes paroxysmal atrial fibrillation now controlled ,Type 2 diabetes, spinal stenosis with neurogenic claudication and muscle weakness.    He has evidence of a slow healing of a stage I pressure injury to the left buttock with scab formation no drainage.      Advance Care Planning   ACP discussion was held with the patient during this visit. Patient has an advance directive in EMR which is still valid.         The following portions of the patient's history were reviewed and updated as appropriate: allergies, current medications, past social history and problem list.    Review of Systems   HENT: Negative.    Eyes: Negative.    Respiratory: Negative.    Cardiovascular: Negative.    Gastrointestinal: Negative.    Endocrine: Negative.    Genitourinary: Negative.    Musculoskeletal: Positive for gait problem.   Skin: Negative.    Allergic/Immunologic: Negative.    Neurological: Positive for weakness.   Hematological: Negative.    Psychiatric/Behavioral: Positive for dysphoric mood.       Objective   Vitals:    20 1346   BP: 142/64   Pulse: 77   Resp: 16   Temp: 98.3 °F (36.8 °C)   SpO2: 96%     Physical Exam   Constitutional: He is oriented to person, place, and time. He appears well-developed and well-nourished.   HENT:   Head: Normocephalic and atraumatic.   Right Ear: Tympanic membrane and external ear normal. Decreased hearing is noted.   Left Ear: Tympanic membrane and external ear normal. Decreased hearing is noted.   Nose: Nose normal.   Mouth/Throat: Oropharynx is clear and moist.   Eyes: Pupils are equal, round, and reactive to light. Conjunctivae and EOM are normal.   Neck: Normal range of  motion. Neck supple. No JVD present. No thyromegaly present.   Cardiovascular: Normal rate, regular rhythm, normal heart sounds and intact distal pulses.   Pulmonary/Chest: Effort normal and breath sounds normal.   Abdominal: Soft. Bowel sounds are normal.   Musculoskeletal:        Lumbar back: He exhibits decreased range of motion.   Lymphadenopathy:     He has no cervical adenopathy.   Neurological: He is alert and oriented to person, place, and time. No cranial nerve deficit. Coordination normal.   Skin: Skin is warm and dry. No rash noted.        Psychiatric: He has a normal mood and affect. His behavior is normal. Judgment and thought content normal.   Vitals reviewed.      Assessment/Plan   Problem List Items Addressed This Visit        Cardiovascular and Mediastinum    Essential hypertension    Relevant Orders    CBC & Differential    Comprehensive Metabolic Panel    TSH    T4, Free    Hemoglobin A1c    Lipid Panel With / Chol / HDL Ratio    Vitamin B12    Urinalysis With Microscopic If Indicated (No Culture) - Urine, Clean Catch    RESOLVED: PAF (paroxysmal atrial fibrillation) (CMS/[facility]) - Primary    Relevant Orders    CBC & Differential    Comprehensive Metabolic Panel    TSH    T4, Free    Hemoglobin A1c    Lipid Panel With / Chol / HDL Ratio    Vitamin B12    Urinalysis With Microscopic If Indicated (No Culture) - Urine, Clean Catch       Endocrine    Controlled type 2 diabetes mellitus without complication, without long-term current use of insulin (CMS/[facility])    Relevant Orders    CBC & Differential    Comprehensive Metabolic Panel    TSH    T4, Free    Hemoglobin A1c    Lipid Panel With / Chol / HDL Ratio    Vitamin B12    Urinalysis With Microscopic If Indicated (No Culture) - Urine, Clean Catch       Nervous and Auditory    Spinal stenosis of lumbar region with neurogenic claudication       Musculoskeletal and Integument    Muscle weakness (generalized)    Pressure injury of left buttock, stage 1     Relevant Medications    clotrimazole-betamethasone (Lotrisone) 1-0.05 % cream    silver sulfadiazine (Silvadene) 1 % cream      Other Visit Diagnoses     Grief          Plan: Discussion about current sadness.  Labs pending return in 6 months for Medicare wellness refill Lotrisone cream twice daily tinea type rash    Silvadene cream applied twice daily to scabbed area left buttock.  If no better left to get follow-up with wound care.

## 2020-07-24 LAB
ALBUMIN SERPL-MCNC: 4.5 G/DL (ref 3.5–5.2)
ALBUMIN/GLOB SERPL: 2 G/DL
ALP SERPL-CCNC: 79 U/L (ref 39–117)
ALT SERPL-CCNC: 14 U/L (ref 1–41)
APPEARANCE UR: CLEAR
AST SERPL-CCNC: 17 U/L (ref 1–40)
BASOPHILS # BLD AUTO: 0.02 10*3/MM3 (ref 0–0.2)
BASOPHILS NFR BLD AUTO: 0.4 % (ref 0–1.5)
BILIRUB SERPL-MCNC: 0.2 MG/DL (ref 0–1.2)
BILIRUB UR QL STRIP: NEGATIVE
BUN SERPL-MCNC: 34 MG/DL (ref 8–23)
BUN/CREAT SERPL: 20.7 (ref 7–25)
CALCIUM SERPL-MCNC: 8.9 MG/DL (ref 8.6–10.5)
CHLORIDE SERPL-SCNC: 105 MMOL/L (ref 98–107)
CHOLEST SERPL-MCNC: 179 MG/DL (ref 0–200)
CHOLEST/HDLC SERPL: 3.65 {RATIO}
CO2 SERPL-SCNC: 25.1 MMOL/L (ref 22–29)
COLOR UR: YELLOW
CREAT SERPL-MCNC: 1.64 MG/DL (ref 0.76–1.27)
EOSINOPHIL # BLD AUTO: 0.1 10*3/MM3 (ref 0–0.4)
EOSINOPHIL NFR BLD AUTO: 2.1 % (ref 0.3–6.2)
ERYTHROCYTE [DISTWIDTH] IN BLOOD BY AUTOMATED COUNT: 15.4 % (ref 12.3–15.4)
GLOBULIN SER CALC-MCNC: 2.2 GM/DL
GLUCOSE SERPL-MCNC: 184 MG/DL (ref 65–99)
GLUCOSE UR QL: NEGATIVE
HBA1C MFR BLD: 6.3 % (ref 4.8–5.6)
HCT VFR BLD AUTO: 31.4 % (ref 37.5–51)
HDLC SERPL-MCNC: 49 MG/DL (ref 40–60)
HGB BLD-MCNC: 10 G/DL (ref 13–17.7)
HGB UR QL STRIP: NEGATIVE
IMM GRANULOCYTES # BLD AUTO: 0.01 10*3/MM3 (ref 0–0.05)
IMM GRANULOCYTES NFR BLD AUTO: 0.2 % (ref 0–0.5)
KETONES UR QL STRIP: ABNORMAL
LDLC SERPL CALC-MCNC: 100 MG/DL (ref 0–100)
LEUKOCYTE ESTERASE UR QL STRIP: NEGATIVE
LYMPHOCYTES # BLD AUTO: 1.18 10*3/MM3 (ref 0.7–3.1)
LYMPHOCYTES NFR BLD AUTO: 24.6 % (ref 19.6–45.3)
MCH RBC QN AUTO: 26.6 PG (ref 26.6–33)
MCHC RBC AUTO-ENTMCNC: 31.8 G/DL (ref 31.5–35.7)
MCV RBC AUTO: 83.5 FL (ref 79–97)
MONOCYTES # BLD AUTO: 0.39 10*3/MM3 (ref 0.1–0.9)
MONOCYTES NFR BLD AUTO: 8.1 % (ref 5–12)
NEUTROPHILS # BLD AUTO: 3.1 10*3/MM3 (ref 1.7–7)
NEUTROPHILS NFR BLD AUTO: 64.6 % (ref 42.7–76)
NITRITE UR QL STRIP: NEGATIVE
NRBC BLD AUTO-RTO: 0 /100 WBC (ref 0–0.2)
PH UR STRIP: 5.5 [PH] (ref 5–8)
PLATELET # BLD AUTO: 183 10*3/MM3 (ref 140–450)
POTASSIUM SERPL-SCNC: 5.1 MMOL/L (ref 3.5–5.2)
PROT SERPL-MCNC: 6.7 G/DL (ref 6–8.5)
PROT UR QL STRIP: NEGATIVE
RBC # BLD AUTO: 3.76 10*6/MM3 (ref 4.14–5.8)
SODIUM SERPL-SCNC: 141 MMOL/L (ref 136–145)
SP GR UR: ABNORMAL (ref 1–1.03)
T4 FREE SERPL-MCNC: 1.25 NG/DL (ref 0.93–1.7)
TRIGL SERPL-MCNC: 148 MG/DL (ref 0–150)
TSH SERPL DL<=0.005 MIU/L-ACNC: 2.94 UIU/ML (ref 0.27–4.2)
UROBILINOGEN UR STRIP-MCNC: ABNORMAL MG/DL
VIT B12 SERPL-MCNC: 453 PG/ML (ref 211–946)
VLDLC SERPL CALC-MCNC: 29.6 MG/DL
WBC # BLD AUTO: 4.8 10*3/MM3 (ref 3.4–10.8)

## 2020-09-03 RX ORDER — TAMSULOSIN HYDROCHLORIDE 0.4 MG/1
CAPSULE ORAL
Qty: 180 CAPSULE | Refills: 1 | Status: SHIPPED | OUTPATIENT
Start: 2020-09-03 | End: 2021-06-10

## 2020-09-04 RX ORDER — AMIODARONE HYDROCHLORIDE 200 MG/1
TABLET ORAL
Qty: 90 TABLET | Refills: 3 | Status: SHIPPED | OUTPATIENT
Start: 2020-09-04 | End: 2021-06-18 | Stop reason: SDUPTHER

## 2020-09-25 ENCOUNTER — TELEPHONE (OUTPATIENT)
Dept: INTERNAL MEDICINE | Facility: CLINIC | Age: 85
End: 2020-09-25

## 2020-09-25 RX ORDER — HYDRALAZINE HYDROCHLORIDE 25 MG/1
25 TABLET, FILM COATED ORAL 3 TIMES DAILY
Qty: 180 TABLET | Refills: 3 | Status: SHIPPED | OUTPATIENT
Start: 2020-09-25 | End: 2021-09-08 | Stop reason: SDUPTHER

## 2020-09-25 NOTE — TELEPHONE ENCOUNTER
Caller: Osvaldo Lopez    Relationship: Self    Best call back number: [phone]    Medication needed:   Requested Prescriptions     Pending Prescriptions Disp Refills   • hydrALAZINE (APRESOLINE) 25 MG tablet 180 tablet 3     Sig: Take 1 tablet by mouth 3 (Three) Times a Day.       What is the patient's preferred pharmacy: [facility] [place] [place] - [address] AT [address]AL TO [facility] - [phone] [facility] [phone]

## 2020-10-06 ENCOUNTER — OFFICE VISIT (OUTPATIENT)
Dept: CARDIOLOGY | Facility: CLINIC | Age: 85
End: 2020-10-06

## 2020-10-06 VITALS
SYSTOLIC BLOOD PRESSURE: 158 MMHG | WEIGHT: 222 LBS | BODY MASS INDEX: 31.08 KG/M2 | HEIGHT: 71 IN | OXYGEN SATURATION: 98 % | HEART RATE: 67 BPM | DIASTOLIC BLOOD PRESSURE: 64 MMHG

## 2020-10-06 DIAGNOSIS — I10 ESSENTIAL HYPERTENSION: ICD-10-CM

## 2020-10-06 DIAGNOSIS — I35.0 NONRHEUMATIC AORTIC VALVE STENOSIS: Primary | ICD-10-CM

## 2020-10-06 DIAGNOSIS — I71.40 ABDOMINAL AORTIC ANEURYSM (AAA) WITHOUT RUPTURE (HCC): ICD-10-CM

## 2020-10-06 PROCEDURE — 99214 OFFICE O/P EST MOD 30 MIN: CPT | Performed by: INTERNAL MEDICINE

## 2020-10-06 NOTE — PROGRESS NOTES
Subjective:     Encounter Date:18      Patient ID: Osvaldo Lopez is a 85 y.o. male.    Chief Complaint:  Atrial Fibrillation  Presents for follow-up visit. Symptoms include shortness of breath. Symptoms are negative for an AICD problem, bradycardia, chest pain, dizziness, hypertension, hypotension, syncope and tachycardia. The symptoms have been stable. Past medical history includes atrial fibrillation.   Hypertension  This is a chronic problem. The current episode started more than 1 year ago. The problem is controlled. Associated symptoms include malaise/fatigue, peripheral edema and shortness of breath. Pertinent negatives include no anxiety or chest pain.       85-year-old gentleman who presents today for reevaluation.  Patient was feeling sad his wife of 57 years  in February of this year and he continues to recover from that.  He denies any types of chest pain shortness of breath lightheadedness swelling he has had the fatigue but some of this is obviously stress and grief.  Blood pressure little bit elevated today which is unusual for him.      Review of Systems   Constitution: Positive for malaise/fatigue.   Cardiovascular: Negative for chest pain and syncope.   Respiratory: Positive for shortness of breath and wheezing.    Endocrine: Positive for polyuria.   Musculoskeletal: Positive for joint pain and myalgias.   Neurological: Negative for dizziness.   All other systems reviewed and are negative.      Procedures         Objective:     Physical Exam   Constitutional: He is oriented to person, place, and time. He appears well-developed.   HENT:   Head: Normocephalic.   Eyes: Conjunctivae are normal.   Neck: Normal range of motion.   Cardiovascular: Normal rate and regular rhythm.   Murmur heard.   Harsh midsystolic murmur is present with a grade of 1/6 at the upper right sternal border radiating to the neck.  Pulmonary/Chest: Breath sounds normal.   Abdominal: Soft. Bowel sounds are normal.    Musculoskeletal: Normal range of motion.         General: No edema.   Neurological: He is alert and oriented to person, place, and time.   Skin: Skin is warm and dry.   Psychiatric: He has a normal mood and affect. His behavior is normal.   Vitals reviewed.      Lab Review:       Assessment:          Diagnosis Plan   1. Nonrheumatic aortic valve stenosis  Adult Transthoracic Echo Complete W/ Cont if Necessary Per Protocol    Ultrasound Abdominal Aorta Limited CAR   2. 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 Essential hypertension            Plan:    1.  Paroxysmal fibrillation. Doing well remains in sinus rhythm.   2.  Hypertension blood pressures elevated today which is unusual.  I told patient that he needs start checking his blood pressure because is elevated today.  Patient to report back in several weeks with that.  3.  Patient is on amiodarone.  He'll need yearly chest x-rays as well as blood work looking for his liver and thyroid functions.  We'll defer to primary care physician Will follow follow back up in 12 months.  4.  Mild aortic stenosis by a echocardiogram done on 1/18/18.  Valve area estimated at 1.7 cm.  Will repeat echo to reassess his is almost been 3 years.  5.  History of aortic abdominal aneurysm.  Going to recheck his ultrasound it has been many years since his been checked.        Atrial Fibrillation and Atrial Flutter  Assessment  • The patient has paroxysmal atrial fibrillation  • This is non-valvular in etiology  • The patient's CHADS2-VASc score is 3  • A [id number] score of 2 or more is considered a high risk for a thromboembolic event  • Rivaroxaban prescribed    Plan  • Attempt to maintain sinus rhythm  • Continue rivaroxaban for antithrombotic therapy, bleeding issues discussed  • Continue amiodarone for rhythm control

## 2020-10-12 RX ORDER — PERPHENAZINE 16 MG/1
TABLET, FILM COATED ORAL
Qty: 100 EACH | Refills: 5 | Status: SHIPPED | OUTPATIENT
Start: 2020-10-12 | End: 2020-10-20 | Stop reason: SDUPTHER

## 2020-10-20 ENCOUNTER — APPOINTMENT (OUTPATIENT)
Dept: CARDIOLOGY | Facility: HOSPITAL | Age: 85
End: 2020-10-20

## 2020-10-20 RX ORDER — PERPHENAZINE 16 MG/1
1 TABLET, FILM COATED ORAL 2 TIMES DAILY
Qty: 100 EACH | Refills: 5 | Status: SHIPPED | OUTPATIENT
Start: 2020-10-20 | End: 2020-11-03 | Stop reason: SDUPTHER

## 2020-10-30 ENCOUNTER — HOSPITAL ENCOUNTER (OUTPATIENT)
Dept: CARDIOLOGY | Facility: HOSPITAL | Age: 85
Discharge: HOME | End: 2020-10-30

## 2020-10-30 VITALS
DIASTOLIC BLOOD PRESSURE: 64 MMHG | WEIGHT: 222 LBS | SYSTOLIC BLOOD PRESSURE: 130 MMHG | BODY MASS INDEX: 31.78 KG/M2 | HEART RATE: 73 BPM | HEIGHT: 70 IN

## 2020-10-30 DIAGNOSIS — I35.0 NONRHEUMATIC AORTIC VALVE STENOSIS: ICD-10-CM

## 2020-10-30 DIAGNOSIS — I71.40 ABDOMINAL AORTIC ANEURYSM (AAA) WITHOUT RUPTURE (HCC): ICD-10-CM

## 2020-10-30 LAB
ABDOMINAL DIST AORTA AP: 3.3 CM
ABDOMINAL DIST AORTA TRANS: 3.1 CM
ABDOMINAL LT COM ILIAC AP: 1.4 CM
ABDOMINAL LT COM ILIAC TRANS: 1.2 CM
ABDOMINAL MID AORTA AP: 2.9 CM
ABDOMINAL MID AORTA TRANS: 2.8 CM
ABDOMINAL PROX AORTA AP: 2.1 CM
ABDOMINAL PROX AORTA TRANS: 2.6 CM
ABDOMINAL RT COM ILIAC AP: 1.2 CM
ABDOMINAL RT COM ILIAC TRANS: 1.3 CM
AORTIC ARCH: 2 CM
AORTIC DIMENSIONLESS INDEX: 0.6 (DI)
ASCENDING AORTA: 4 CM
BH CV ECHO MEAS - ACS: 2 CM
BH CV ECHO MEAS - AO ARCH DIAM (PROXIMAL TRANS.): 2 CM
BH CV ECHO MEAS - AO MAX PG (FULL): 13 MMHG
BH CV ECHO MEAS - AO MAX PG: 18.2 MMHG
BH CV ECHO MEAS - AO MEAN PG (FULL): 5.2 MMHG
BH CV ECHO MEAS - AO MEAN PG: 8.3 MMHG
BH CV ECHO MEAS - AO ROOT AREA (BSA CORRECTED): 1.8
BH CV ECHO MEAS - AO ROOT AREA: 12.3 CM^2
BH CV ECHO MEAS - AO ROOT DIAM: 4 CM
BH CV ECHO MEAS - AO V2 MAX: 213.3 CM/SEC
BH CV ECHO MEAS - AO V2 MEAN: 135.1 CM/SEC
BH CV ECHO MEAS - AO V2 VTI: 47 CM
BH CV ECHO MEAS - ASC AORTA: 4 CM
BH CV ECHO MEAS - AVA(I,A): 1.9 CM^2
BH CV ECHO MEAS - AVA(I,D): 1.9 CM^2
BH CV ECHO MEAS - AVA(V,A): 1.9 CM^2
BH CV ECHO MEAS - AVA(V,D): 1.9 CM^2
BH CV ECHO MEAS - BSA(HAYCOCK): 2.3 M^2
BH CV ECHO MEAS - BSA: 2.2 M^2
BH CV ECHO MEAS - BZI_BMI: 30.7 KILOGRAMS/M^2
BH CV ECHO MEAS - BZI_METRIC_HEIGHT: 180.3 CM
BH CV ECHO MEAS - BZI_METRIC_WEIGHT: 99.8 KG
BH CV ECHO MEAS - EDV(MOD-SP2): 143 ML
BH CV ECHO MEAS - EDV(MOD-SP4): 150 ML
BH CV ECHO MEAS - EDV(TEICH): 96.9 ML
BH CV ECHO MEAS - EF(CUBED): 61.6 %
BH CV ECHO MEAS - EF(MOD-BP): 62.1 %
BH CV ECHO MEAS - EF(MOD-SP2): 62.2 %
BH CV ECHO MEAS - EF(MOD-SP4): 62.7 %
BH CV ECHO MEAS - EF(TEICH): 53.2 %
BH CV ECHO MEAS - ESV(MOD-SP2): 54 ML
BH CV ECHO MEAS - ESV(MOD-SP4): 56 ML
BH CV ECHO MEAS - ESV(TEICH): 45.4 ML
BH CV ECHO MEAS - FS: 27.3 %
BH CV ECHO MEAS - IVS/LVPW: 1.5
BH CV ECHO MEAS - IVSD: 2 CM
BH CV ECHO MEAS - LAT PEAK E' VEL: 9.5 CM/SEC
BH CV ECHO MEAS - LV DIASTOLIC VOL/BSA (35-75): 68.3 ML/M^2
BH CV ECHO MEAS - LV MASS(C)D: 316.6 GRAMS
BH CV ECHO MEAS - LV MASS(C)DI: 144.2 GRAMS/M^2
BH CV ECHO MEAS - LV MAX PG: 5.2 MMHG
BH CV ECHO MEAS - LV MEAN PG: 3.1 MMHG
BH CV ECHO MEAS - LV SYSTOLIC VOL/BSA (12-30): 25.5 ML/M^2
BH CV ECHO MEAS - LV V1 MAX: 114.4 CM/SEC
BH CV ECHO MEAS - LV V1 MEAN: 84 CM/SEC
BH CV ECHO MEAS - LV V1 VTI: 26 CM
BH CV ECHO MEAS - LVIDD: 4.6 CM
BH CV ECHO MEAS - LVIDS: 3.3 CM
BH CV ECHO MEAS - LVLD AP2: 9.2 CM
BH CV ECHO MEAS - LVLD AP4: 8.4 CM
BH CV ECHO MEAS - LVLS AP2: 7.3 CM
BH CV ECHO MEAS - LVLS AP4: 6.5 CM
BH CV ECHO MEAS - LVOT AREA (M): 3.5 CM^2
BH CV ECHO MEAS - LVOT AREA: 3.5 CM^2
BH CV ECHO MEAS - LVOT DIAM: 2.1 CM
BH CV ECHO MEAS - LVPWD: 1.3 CM
BH CV ECHO MEAS - MED PEAK E' VEL: 8.1 CM/SEC
BH CV ECHO MEAS - MR MAX PG: 37.6 MMHG
BH CV ECHO MEAS - MR MAX VEL: 306.7 CM/SEC
BH CV ECHO MEAS - MV A DUR: 0.12 SEC
BH CV ECHO MEAS - MV A MAX VEL: 125.1 CM/SEC
BH CV ECHO MEAS - MV DEC SLOPE: 532.8 CM/SEC^2
BH CV ECHO MEAS - MV DEC TIME: 0.17 SEC
BH CV ECHO MEAS - MV E MAX VEL: 102 CM/SEC
BH CV ECHO MEAS - MV E/A: 0.82
BH CV ECHO MEAS - MV MAX PG: 6.8 MMHG
BH CV ECHO MEAS - MV MEAN PG: 3.2 MMHG
BH CV ECHO MEAS - MV P1/2T MAX VEL: 116.7 CM/SEC
BH CV ECHO MEAS - MV P1/2T: 64.2 MSEC
BH CV ECHO MEAS - MV V2 MAX: 130.3 CM/SEC
BH CV ECHO MEAS - MV V2 MEAN: 84 CM/SEC
BH CV ECHO MEAS - MV V2 VTI: 40.6 CM
BH CV ECHO MEAS - MVA P1/2T LCG: 1.9 CM^2
BH CV ECHO MEAS - MVA(P1/2T): 3.4 CM^2
BH CV ECHO MEAS - MVA(VTI): 2.2 CM^2
BH CV ECHO MEAS - PA ACC TIME: 0.09 SEC
BH CV ECHO MEAS - PA MAX PG (FULL): 1.5 MMHG
BH CV ECHO MEAS - PA MAX PG: 6.4 MMHG
BH CV ECHO MEAS - PA PR(ACCEL): 37.4 MMHG
BH CV ECHO MEAS - PA V2 MAX: 126.2 CM/SEC
BH CV ECHO MEAS - PULM A REVS DUR: 0.1 SEC
BH CV ECHO MEAS - PULM A REVS VEL: 19.1 CM/SEC
BH CV ECHO MEAS - PULM DIAS VEL: 54.5 CM/SEC
BH CV ECHO MEAS - PULM S/D: 0.7
BH CV ECHO MEAS - PULM SYS VEL: 38.1 CM/SEC
BH CV ECHO MEAS - PVA(V,A): 2.8 CM^2
BH CV ECHO MEAS - PVA(V,D): 2.8 CM^2
BH CV ECHO MEAS - QP/QS: 0.68
BH CV ECHO MEAS - RV MAX PG: 4.9 MMHG
BH CV ECHO MEAS - RV MEAN PG: 2.1 MMHG
BH CV ECHO MEAS - RV V1 MAX: 110.1 CM/SEC
BH CV ECHO MEAS - RV V1 MEAN: 68.7 CM/SEC
BH CV ECHO MEAS - RV V1 VTI: 18.9 CM
BH CV ECHO MEAS - RVOT AREA: 3.3 CM^2
BH CV ECHO MEAS - RVOT DIAM: 2 CM
BH CV ECHO MEAS - SI(AO): 263.1 ML/M^2
BH CV ECHO MEAS - SI(CUBED): 27.2 ML/M^2
BH CV ECHO MEAS - SI(LVOT): 41 ML/M^2
BH CV ECHO MEAS - SI(MOD-SP2): 40.5 ML/M^2
BH CV ECHO MEAS - SI(MOD-SP4): 42.8 ML/M^2
BH CV ECHO MEAS - SI(TEICH): 23.5 ML/M^2
BH CV ECHO MEAS - SV(AO): 577.8 ML
BH CV ECHO MEAS - SV(CUBED): 59.6 ML
BH CV ECHO MEAS - SV(LVOT): 90 ML
BH CV ECHO MEAS - SV(MOD-SP2): 89 ML
BH CV ECHO MEAS - SV(MOD-SP4): 94 ML
BH CV ECHO MEAS - SV(RVOT): 61.7 ML
BH CV ECHO MEAS - SV(TEICH): 51.6 ML
BH CV ECHO MEAS - TAPSE (>1.6): 1.7 CM
BH CV ECHO MEAS - TR MAX VEL: 295.9 CM/SEC
BH CV ECHO MEASUREMENTS AVERAGE E/E' RATIO: 11.59
BH CV VAS SMA 1ST PP TIME: 15 MIN
BH CV VAS SMA 2ND PP TIME: 30 MIN
BH CV VAS SMA 3RD PP TIME: 45 MIN
BH CV XLRA - RV BASE: 3.5 CM
BH CV XLRA - RV LENGTH: 6.7 CM
BH CV XLRA - RV MID: 2.8 CM
BH CV XLRA - TDI S': 9.8 CM/SEC
LEFT ATRIUM VOLUME INDEX: 43 ML/M2
LV EF 2D ECHO EST: 65 %
MAXIMAL PREDICTED HEART RATE: 137 BPM
SINUS: 4.2 CM
STJ: 3.6 CM
STRESS TARGET HR: 116 BPM

## 2020-10-30 PROCEDURE — 93356 MYOCRD STRAIN IMG SPCKL TRCK: CPT | Performed by: INTERNAL MEDICINE

## 2020-10-30 PROCEDURE — 93306 TTE W/DOPPLER COMPLETE: CPT

## 2020-10-30 PROCEDURE — 93979 VASCULAR STUDY: CPT

## 2020-10-30 PROCEDURE — 93306 TTE W/DOPPLER COMPLETE: CPT | Performed by: INTERNAL MEDICINE

## 2020-10-30 PROCEDURE — 93979 VASCULAR STUDY: CPT | Performed by: INTERNAL MEDICINE

## 2020-10-30 PROCEDURE — 93356 MYOCRD STRAIN IMG SPCKL TRCK: CPT

## 2020-11-03 RX ORDER — PERPHENAZINE 16 MG/1
1 TABLET, FILM COATED ORAL 2 TIMES DAILY
Qty: 100 EACH | Refills: 5 | Status: SHIPPED | OUTPATIENT
Start: 2020-11-03 | End: 2020-11-11 | Stop reason: SDUPTHER

## 2020-11-03 NOTE — TELEPHONE ENCOUNTER
Caller: Osvaldo Lopez    Relationship: Self    Best call back number: [phone]    Medication needed:   Requested Prescriptions     Pending Prescriptions Disp Refills   • glucose blood (Contour Next Test) test strip 100 each 5     Si each by Other route 2 (Two) Times a Day. test blood sugar       When do you need the refill by: W/IN A WEEK    What details did the patient provide when requesting the medication: PT NEEDS THIS CHANGED TO ONCE PER DAY, AS INSURANCE WILL NOT COVER TWICE PER DAY UNLESS PT IS ON INSULIN    Does the patient have less than a 3 day supply:  [] Yes  [x] No    What is the patient's preferred pharmacy: [facility] #[id number] - [place], [place] - [address] - [phone] [facility] [phone] FX

## 2020-11-10 ENCOUNTER — TELEPHONE (OUTPATIENT)
Dept: INTERNAL MEDICINE | Facility: CLINIC | Age: 85
End: 2020-11-10

## 2020-11-10 NOTE — TELEPHONE ENCOUNTER
PT CALLED STATING THERE ARE STILL ISSUES WITH THE PRESCRIPTION FOR    glucose blood (Contour Next Test) test strip     WITH THE FREQUENCY AND THE DIAGNOSTIC CODE.    HE IS REQUESTING THAT WE CALL THE PHARMACY TO FIND OUT EXACTLY WHAT NEEDS TO BE DONE TO FIX THIS, AS HE HAS BEEN TRYING TO GET THIS STRAIGHTENED FOR 3 WEEKS.    [facility]/pharmacy #[id number] - [place], [place] - [address] - [phone]  - [phone]  [phone]    PLEASE CALL ASAP.    CALLBACK NUMBER: [phone]

## 2020-11-11 RX ORDER — PERPHENAZINE 16 MG/1
1 TABLET, FILM COATED ORAL DAILY
Qty: 50 EACH | Refills: 5 | Status: SHIPPED | OUTPATIENT
Start: 2020-11-11

## 2020-11-11 NOTE — TELEPHONE ENCOUNTER
Pt was told by pharmacy insurance only covers once a day testing unless he takes insulin which he does not  New Rx sent to Dr Rodríguez to be approved for once daily  Pt advised

## 2020-11-12 ENCOUNTER — CLINICAL SUPPORT (OUTPATIENT)
Dept: CARDIOLOGY | Facility: CLINIC | Age: 85
End: 2020-11-12

## 2020-11-12 DIAGNOSIS — I10 ESSENTIAL HYPERTENSION: Primary | ICD-10-CM

## 2020-11-12 NOTE — PROGRESS NOTES
I would have him increase his hydralazine to 50 mg three times a day follow his blood pressure and report back in a month

## 2020-11-12 NOTE — PROGRESS NOTES
Pt was in the office today to check his blood pressure machine against a manual blood pressure check.    Manual: Left arm-130/64  Machine: Left arm-131/65    Pt states that he is concerned about the readings that he has been getting at home. He states that they are running a little bit higher with an average of 149/69

## 2020-11-19 RX ORDER — LISINOPRIL 40 MG/1
TABLET ORAL
Qty: 90 TABLET | Refills: 1 | Status: SHIPPED | OUTPATIENT
Start: 2020-11-19 | End: 2021-02-12 | Stop reason: HOSPADM

## 2021-01-26 ENCOUNTER — OFFICE VISIT (OUTPATIENT)
Dept: INTERNAL MEDICINE | Facility: CLINIC | Age: 86
End: 2021-01-26

## 2021-01-26 VITALS
RESPIRATION RATE: 17 BRPM | TEMPERATURE: 98.4 F | HEIGHT: 70 IN | WEIGHT: 228 LBS | DIASTOLIC BLOOD PRESSURE: 71 MMHG | BODY MASS INDEX: 32.64 KG/M2 | OXYGEN SATURATION: 97 % | SYSTOLIC BLOOD PRESSURE: 148 MMHG | HEART RATE: 75 BPM

## 2021-01-26 DIAGNOSIS — G89.29 CHRONIC LOW BACK PAIN WITH SCIATICA, SCIATICA LATERALITY UNSPECIFIED, UNSPECIFIED BACK PAIN LATERALITY: ICD-10-CM

## 2021-01-26 DIAGNOSIS — M54.16 LUMBAR RADICULOPATHY: ICD-10-CM

## 2021-01-26 DIAGNOSIS — M54.40 CHRONIC LOW BACK PAIN WITH SCIATICA, SCIATICA LATERALITY UNSPECIFIED, UNSPECIFIED BACK PAIN LATERALITY: ICD-10-CM

## 2021-01-26 DIAGNOSIS — L89.301 PRESSURE INJURY OF BUTTOCK, STAGE 1, UNSPECIFIED LATERALITY: ICD-10-CM

## 2021-01-26 DIAGNOSIS — R94.6 ABNORMAL FINDING ON THYROID FUNCTION TEST: ICD-10-CM

## 2021-01-26 DIAGNOSIS — G61.82 MULTIFOCAL MOTOR NEUROPATHY (HCC): ICD-10-CM

## 2021-01-26 DIAGNOSIS — E11.42 DIABETIC PERIPHERAL NEUROPATHY (HCC): ICD-10-CM

## 2021-01-26 DIAGNOSIS — I10 ESSENTIAL HYPERTENSION: ICD-10-CM

## 2021-01-26 DIAGNOSIS — I48.0 PAF (PAROXYSMAL ATRIAL FIBRILLATION) (HCC): Primary | ICD-10-CM

## 2021-01-26 DIAGNOSIS — E11.8 DM (DIABETES MELLITUS), TYPE 2 WITH COMPLICATIONS (HCC): ICD-10-CM

## 2021-01-26 DIAGNOSIS — M48.062 SPINAL STENOSIS OF LUMBAR REGION WITH NEUROGENIC CLAUDICATION: ICD-10-CM

## 2021-01-26 LAB — ERYTHROCYTE [SEDIMENTATION RATE] IN BLOOD: 10 MM/HR (ref 0–20)

## 2021-01-26 PROCEDURE — 36415 COLL VENOUS BLD VENIPUNCTURE: CPT | Performed by: FAMILY MEDICINE

## 2021-01-26 PROCEDURE — 85651 RBC SED RATE NONAUTOMATED: CPT | Performed by: FAMILY MEDICINE

## 2021-01-26 PROCEDURE — 99214 OFFICE O/P EST MOD 30 MIN: CPT | Performed by: FAMILY MEDICINE

## 2021-01-26 RX ORDER — TRAMADOL HYDROCHLORIDE 50 MG/1
50 TABLET ORAL EVERY 6 HOURS PRN
Qty: 30 TABLET | Refills: 1 | Status: SHIPPED | OUTPATIENT
Start: 2021-01-26 | End: 2021-02-12 | Stop reason: HOSPADM

## 2021-01-26 NOTE — PROGRESS NOTES
"Chief Complaint  Follow-up, Hypertension, and Diabetes    Subjective          Osvaldo Lopez presents to [facility] for   This is a complex case with progressive multifactorial neuropathies including bilateral neuropathy of the legs relating to prior back surgery and back problems also associated diabetic amyotrophy and diabetic neuropathy.  There may also be an idiopathic component of his neuropathy as he is getting progressive neuropathy of the upper extremities now.  Prior neurologic work-up was reviewed including EMG.  Medications are also reviewed and he is on hydralazine which has a low outside possibility of causing neuritis.  I discussed the about getting labs including VAMSI panel and he will see Dr. Mcfarland electrophysiology this Friday.  Will consider discontinuing hydralazine if that seems to have any causative effect on his neuropathy.  Will await results of VAMSI panel as well.    Otherwise he has stage I intermittent decubitus of the buttocks with clotrimazole betamethasone cream as needed.  We will try to switch to a silver impregnated gel if we can get that from the pharmacy.    We discussed pain control from his back and will give him a trial of tramadol 80 mg every 6 as needed #31 refill and see him back in 6 to 8 weeks.    Labs will also include routine labs including A1c CMP CBC.  He has a a lot of palpitations with exertion.    Hypertension    Diabetes        Objective   Vital Signs:   /71 (BP Location: Right arm, Patient Position: Sitting, Cuff Size: Adult)   Pulse 75   Temp 98.4 °F (36.9 °C)   Resp 17   Ht 177.8 cm (70\")   Wt 103 kg (228 lb)   SpO2 97%   BMI 32.71 kg/m²     Physical Exam  Vitals signs reviewed.   Constitutional:       Appearance: He is well-developed.   HENT:      Head: Normocephalic and atraumatic.      Right Ear: Tympanic membrane and external ear normal.      Left Ear: Tympanic membrane and external ear normal.   Eyes:      " Conjunctiva/sclera: Conjunctivae normal.      Pupils: Pupils are equal, round, and reactive to light.   Neck:      Musculoskeletal: Normal range of motion and neck supple.      Thyroid: No thyromegaly.      Vascular: No JVD.   Cardiovascular:      Rate and Rhythm: Normal rate and regular rhythm. Frequent extrasystoles are present.     Heart sounds: Normal heart sounds.   Pulmonary:      Effort: Pulmonary effort is normal.      Breath sounds: Normal breath sounds.   Abdominal:      General: Bowel sounds are normal.      Palpations: Abdomen is soft.   Musculoskeletal: Normal range of motion.   Lymphadenopathy:      Cervical: No cervical adenopathy.   Skin:     General: Skin is warm and dry.      Findings: No rash.          Neurological:      Mental Status: He is alert and oriented to person, place, and time.      Cranial Nerves: No cranial nerve deficit.      Motor: Weakness, atrophy and abnormal muscle tone present.      Coordination: Coordination normal.      Comments: Weakness atrophy of the legs reduced strength in the arms.   Psychiatric:         Behavior: Behavior normal.         Thought Content: Thought content normal.         Judgment: Judgment normal.        Result Review :                 Assessment and Plan    Problem List Items Addressed This Visit        Cardiac and Vasculature    Essential hypertension    Relevant Orders    Folate    CBC & Differential    Comprehensive Metabolic Panel    Vitamin B12    Hemoglobin A1c    Lipid Panel With / Chol / HDL Ratio    TSH    T4, Free    Urinalysis With Microscopic If Indicated (No Culture) - Urine, Clean Catch    Sedimentation Rate    C-reactive Protein    VAMSI Comprehensive Panel    RESOLVED: PAF (paroxysmal atrial fibrillation) (CMS/HCC) - Primary    Relevant Orders    Folate    CBC & Differential    Comprehensive Metabolic Panel    Vitamin B12    Hemoglobin A1c    Lipid Panel With / Chol / HDL Ratio    TSH    T4, Free    Urinalysis With Microscopic If Indicated  (No Culture) - Urine, Clean Catch    Sedimentation Rate    C-reactive Protein    VAMSI Comprehensive Panel       Endocrine and Metabolic    DM (diabetes mellitus), type 2 with complications (CMS/HCC)    Relevant Orders    Folate    CBC & Differential    Comprehensive Metabolic Panel    Vitamin B12    Hemoglobin A1c    Lipid Panel With / Chol / HDL Ratio    TSH    T4, Free    Urinalysis With Microscopic If Indicated (No Culture) - Urine, Clean Catch    Sedimentation Rate    C-reactive Protein    VAMSI Comprehensive Panel    Abnormal finding on thyroid function test    Relevant Orders    Folate    CBC & Differential    Comprehensive Metabolic Panel    Vitamin B12    Hemoglobin A1c    Lipid Panel With / Chol / HDL Ratio    TSH    T4, Free    Urinalysis With Microscopic If Indicated (No Culture) - Urine, Clean Catch    Sedimentation Rate    C-reactive Protein    VAMSI Comprehensive Panel       Musculoskeletal and Injuries    Chronic low back pain with sciatica    Relevant Medications    traMADol (ULTRAM) 50 MG tablet    Pressure injury of buttock, stage 1    Relevant Medications    Silver Hydrogel gel       Neuro    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CMS/HCC)    Relevant Medications    traMADol (ULTRAM) 50 MG tablet    Other Relevant Orders    Folate    CBC & Differential    Comprehensive Metabolic Panel    Vitamin B12    Hemoglobin A1c    Lipid Panel With / Chol / HDL Ratio    TSH    T4, Free    Urinalysis With Microscopic If Indicated (No Culture) - Urine, Clean Catch    Sedimentation Rate    C-reactive Protein    VAMSI Comprehensive Panel    Diabetic peripheral neuropathy (CMS/HCC)    Relevant Orders    Folate    CBC & Differential    Comprehensive Metabolic Panel    Vitamin B12    Hemoglobin A1c    Lipid Panel With / Chol / HDL Ratio    TSH    T4, Free    Urinalysis With Microscopic If Indicated (No Culture) - Urine, Clean Catch    Sedimentation Rate    C-reactive Protein    VAMSI Comprehensive Panel    Spinal stenosis of lumbar  region with neurogenic claudication    Overview     Added automatically from request for surgery [id number]         Lumbar radiculopathy      Plan: Labs pending check VAMSI panel along with other labs.  Follow-up with electrophysiology on Friday.  Consider discontinuing hydralazine if there is any possibility of neuritis related to hydralazine.  Return visit in 6 to 8 weeks.  Try to get silver impregnated gel use twice daily for stage 0-1 decubitus    Follow Up   Return in about 2 months (around 3/26/2021) for Recheck, Medicare Wellness.  Patient was given instructions and counseling regarding his condition or for health maintenance advice. Please see specific information pulled into the AVS if appropriate.

## 2021-01-27 ENCOUNTER — DOCUMENTATION (OUTPATIENT)
Dept: INTERNAL MEDICINE | Facility: CLINIC | Age: 86
End: 2021-01-27

## 2021-01-27 DIAGNOSIS — D50.0 BLOOD LOSS ANEMIA: Primary | ICD-10-CM

## 2021-01-27 LAB
ALBUMIN SERPL-MCNC: 4.2 G/DL (ref 3.5–5.2)
ALBUMIN/GLOB SERPL: 2 G/DL
ALP SERPL-CCNC: 77 U/L (ref 39–117)
ALT SERPL-CCNC: 21 U/L (ref 1–41)
APPEARANCE UR: CLEAR
AST SERPL-CCNC: 23 U/L (ref 1–40)
BASOPHILS # BLD AUTO: ABNORMAL 10*3/UL
BASOPHILS # BLD MANUAL: 0.04 10*3/MM3 (ref 0–0.2)
BASOPHILS NFR BLD MANUAL: 1 % (ref 0–1.5)
BILIRUB SERPL-MCNC: 0.3 MG/DL (ref 0–1.2)
BILIRUB UR QL STRIP: NEGATIVE
BUN SERPL-MCNC: 23 MG/DL (ref 8–23)
BUN/CREAT SERPL: 20 (ref 7–25)
CALCIUM SERPL-MCNC: 8.9 MG/DL (ref 8.6–10.5)
CENTROMERE B AB SER-ACNC: <0.2 AI (ref 0–0.9)
CHLORIDE SERPL-SCNC: 104 MMOL/L (ref 98–107)
CHOLEST SERPL-MCNC: 145 MG/DL (ref 0–200)
CHOLEST/HDLC SERPL: 2.79 {RATIO}
CHROMATIN AB SERPL-ACNC: <0.2 AI (ref 0–0.9)
CO2 SERPL-SCNC: 24.3 MMOL/L (ref 22–29)
COLOR UR: ABNORMAL
CREAT SERPL-MCNC: 1.15 MG/DL (ref 0.76–1.27)
CRP SERPL-MCNC: <0.3 MG/DL (ref 0–0.5)
DIFFERENTIAL COMMENT: ABNORMAL
DSDNA AB SER-ACNC: <1 IU/ML (ref 0–9)
ENA JO1 AB SER-ACNC: <0.2 AI (ref 0–0.9)
ENA RNP AB SER-ACNC: 0.5 AI (ref 0–0.9)
ENA SCL70 AB SER-ACNC: <0.2 AI (ref 0–0.9)
ENA SM AB SER-ACNC: <0.2 AI (ref 0–0.9)
ENA SS-A AB SER-ACNC: <0.2 AI (ref 0–0.9)
ENA SS-B AB SER-ACNC: <0.2 AI (ref 0–0.9)
EOSINOPHIL # BLD AUTO: ABNORMAL 10*3/UL
EOSINOPHIL NFR BLD AUTO: ABNORMAL %
ERYTHROCYTE [DISTWIDTH] IN BLOOD BY AUTOMATED COUNT: 15.8 % (ref 12.3–15.4)
FOLATE SERPL-MCNC: >20 NG/ML (ref 4.78–24.2)
GLOBULIN SER CALC-MCNC: 2.1 GM/DL
GLUCOSE SERPL-MCNC: 110 MG/DL (ref 65–99)
GLUCOSE UR QL: NEGATIVE
HBA1C MFR BLD: 5.8 % (ref 4.8–5.6)
HCT VFR BLD AUTO: 23.7 % (ref 37.5–51)
HDLC SERPL-MCNC: 52 MG/DL (ref 40–60)
HGB BLD-MCNC: 6.9 G/DL (ref 13–17.7)
HGB UR QL STRIP: NEGATIVE
KETONES UR QL STRIP: NEGATIVE
LDLC SERPL CALC-MCNC: 72 MG/DL (ref 0–100)
LEUKOCYTE ESTERASE UR QL STRIP: NEGATIVE
LYMPHOCYTES # BLD AUTO: ABNORMAL 10*3/UL
LYMPHOCYTES # BLD MANUAL: 0.67 10*3/MM3 (ref 0.7–3.1)
LYMPHOCYTES NFR BLD AUTO: ABNORMAL %
LYMPHOCYTES NFR BLD MANUAL: 16.2 % (ref 19.6–45.3)
Lab: NORMAL
MCH RBC QN AUTO: 21.4 PG (ref 26.6–33)
MCHC RBC AUTO-ENTMCNC: 29.1 G/DL (ref 31.5–35.7)
MCV RBC AUTO: 73.4 FL (ref 79–97)
MONOCYTES # BLD MANUAL: 0.08 10*3/MM3 (ref 0.1–0.9)
MONOCYTES NFR BLD AUTO: ABNORMAL %
MONOCYTES NFR BLD MANUAL: 2 % (ref 5–12)
NEUTROPHILS # BLD MANUAL: 3.35 10*3/MM3 (ref 1.7–7)
NEUTROPHILS NFR BLD AUTO: ABNORMAL %
NEUTROPHILS NFR BLD MANUAL: 80.8 % (ref 42.7–76)
NITRITE UR QL STRIP: NEGATIVE
PH UR STRIP: 6 [PH] (ref 5–8)
PLATELET # BLD AUTO: 155 10*3/MM3 (ref 140–450)
PLATELET BLD QL SMEAR: ABNORMAL
POTASSIUM SERPL-SCNC: 4.9 MMOL/L (ref 3.5–5.2)
PROT SERPL-MCNC: 6.3 G/DL (ref 6–8.5)
PROT UR QL STRIP: NEGATIVE
RBC # BLD AUTO: 3.23 10*6/MM3 (ref 4.14–5.8)
RBC MORPH BLD: ABNORMAL
SODIUM SERPL-SCNC: 137 MMOL/L (ref 136–145)
SP GR UR: 1.02 (ref 1–1.03)
T4 FREE SERPL-MCNC: 1.44 NG/DL (ref 0.93–1.7)
TRIGL SERPL-MCNC: 120 MG/DL (ref 0–150)
TSH SERPL DL<=0.005 MIU/L-ACNC: 4.71 UIU/ML (ref 0.27–4.2)
UROBILINOGEN UR STRIP-MCNC: ABNORMAL MG/DL
VIT B12 SERPL-MCNC: 385 PG/ML (ref 211–946)
VLDLC SERPL CALC-MCNC: 21 MG/DL (ref 5–40)
WBC # BLD AUTO: 4.15 10*3/MM3 (ref 3.4–10.8)

## 2021-01-27 NOTE — PROGRESS NOTES
I called the patient about his hemoglobin of 6.8 from lab yesterday.  He did not wish to go to emergency room and was feeling fairly good.  Uncertain if he has any symptoms go the emergency room otherwise he will come by tomorrow for repeat CBC and iron studies and will get urgent referral to hematology.  He will see electrophysiology tomorrow.  If he has any questions I will be on call tonight and will call me.

## 2021-01-28 ENCOUNTER — PATIENT MESSAGE (OUTPATIENT)
Dept: INTERNAL MEDICINE | Facility: CLINIC | Age: 86
End: 2021-01-28

## 2021-01-28 ENCOUNTER — OFFICE VISIT (OUTPATIENT)
Dept: CARDIOLOGY | Facility: CLINIC | Age: 86
End: 2021-01-28

## 2021-01-28 VITALS
HEIGHT: 72 IN | SYSTOLIC BLOOD PRESSURE: 136 MMHG | HEART RATE: 73 BPM | WEIGHT: 224 LBS | DIASTOLIC BLOOD PRESSURE: 64 MMHG | BODY MASS INDEX: 30.34 KG/M2

## 2021-01-28 DIAGNOSIS — D64.9 ANEMIA, UNSPECIFIED TYPE: ICD-10-CM

## 2021-01-28 DIAGNOSIS — I48.3 TYPICAL ATRIAL FLUTTER (HCC): Primary | ICD-10-CM

## 2021-01-28 LAB
BASOPHILS # BLD AUTO: 0.02 10*3/MM3 (ref 0–0.2)
BASOPHILS NFR BLD AUTO: 0.4 % (ref 0–1.5)
DEPRECATED RDW RBC AUTO: 45.5 FL (ref 37–54)
EOSINOPHIL # BLD AUTO: 0.03 10*3/MM3 (ref 0–0.4)
EOSINOPHIL NFR BLD AUTO: 0.6 % (ref 0.3–6.2)
ERYTHROCYTE [DISTWIDTH] IN BLOOD BY AUTOMATED COUNT: 17.2 % (ref 12.3–15.4)
HCT VFR BLD AUTO: 24.2 % (ref 37.5–51)
HGB BLD-MCNC: 6.8 G/DL (ref 13–17.7)
LYMPHOCYTES # BLD AUTO: 1.02 10*3/MM3 (ref 0.7–3.1)
LYMPHOCYTES NFR BLD AUTO: 21 % (ref 19.6–45.3)
MCH RBC QN AUTO: 21.3 PG (ref 26.6–33)
MCHC RBC AUTO-ENTMCNC: 28.1 G/DL (ref 31.5–35.7)
MCV RBC AUTO: 75.6 FL (ref 79–97)
MONOCYTES # BLD AUTO: 0.31 10*3/MM3 (ref 0.1–0.9)
MONOCYTES NFR BLD AUTO: 6.4 % (ref 5–12)
NEUTROPHILS NFR BLD AUTO: 3.48 10*3/MM3 (ref 1.7–7)
NEUTROPHILS NFR BLD AUTO: 71.6 % (ref 42.7–76)
PLATELET # BLD AUTO: 138 10*3/MM3 (ref 140–450)
PMV BLD AUTO: 8.6 FL (ref 6–12)
RBC # BLD AUTO: 3.2 10*6/MM3 (ref 4.14–5.8)
WBC # BLD AUTO: 4.86 10*3/MM3 (ref 3.4–10.8)

## 2021-01-28 PROCEDURE — 99214 OFFICE O/P EST MOD 30 MIN: CPT | Performed by: INTERNAL MEDICINE

## 2021-01-28 PROCEDURE — 93000 ELECTROCARDIOGRAM COMPLETE: CPT | Performed by: INTERNAL MEDICINE

## 2021-01-28 PROCEDURE — 85025 COMPLETE CBC W/AUTO DIFF WBC: CPT | Performed by: FAMILY MEDICINE

## 2021-01-28 NOTE — TELEPHONE ENCOUNTER
From: Osvaldo Lopez  To: Caio Rodríguez Jr., MD  Sent: 1/28/2021 8:33 AM EST  Subject: Visit Follow-Up Question    My daughter, Sisi and I noticed that I have been scheduled for an appointment with Dr Ramirez. What is this for?    Sisi Sanchez, my daughter, is my Power of  and my Medical Power of .

## 2021-01-28 NOTE — PROGRESS NOTES
Date of Office Visit: 2021  Encounter Provider: Miller Talbot MD  Place of Service: [facility] CARDIOLOGY  Patient Name: Osvaldo Lopez  :1935    Chief Complaint   Patient presents with   • Atrial Fibrillation     1 year follow up    • Atrial Flutter   :     HPI: Osvaldo Lopez is a 85 y.o. male who presents today for atrial fibrillation.    He reports that he is done well with atrial fibrillation, no tachycardia or palpitations.  According to his medication list, he is no longer on amiodarone    He reports increased fatigue and dyspnea with exertion.  He had labs yesterday which showed a hemoglobin of 6.8, and was advised to go to the emergency room but has not done so yet.    He reports back pain as well.     Past Medical History:   Diagnosis Date   • Abnormal thyroid blood test    • Aneurysm of abdominal aorta (CMS/HCC)    • Arthritis    • Bleeding disorder (CMS/HCC)    • BPH (benign prostatic hyperplasia)    • Bruises easily    • Bulging of thoracic intervertebral disc    • Chronic edema    • Coronary artery disease    • Diverticular disease    • Enlarged prostate without lower urinary tract symptoms (luts)    • Essential hypertension    • Heart attack (CMS/HCC)    • HL (hearing loss)    • Hyperactivity of bladder    • Hyperlipidemia    • Left shoulder pain    • Lumbar radiculopathy 2016    wears back brace at times following back surgery   • Muscle weakness (generalized)    • Nonrheumatic aortic valve stenosis     mild 2018    • Osteoarthritis    • PAF (paroxysmal atrial fibrillation) (CMS/HCC)    • Polyuria    • Recurrent falls    • Screening      stop bang scale 5   • Syncope    • Torn rotator cuff     left   • Type 2 diabetes mellitus (CMS/HCC)    • Urinary frequency        Past Surgical History:   Procedure Laterality Date   • CARDIAC ELECTROPHYSIOLOGY PROCEDURE N/A 2019    Procedure: Ablation atrial flutter;  Surgeon: Miller Talbot MD;   Location: [facility] INVASIVE LOCATION;  Service: Cardiovascular   • CARDIAC SURGERY      CABGX5 ()   • CARDIOVERSION  04/15/2019    Dr. Miller Talbot   • CATARACT EXTRACTION Bilateral    • CORONARY ARTERY BYPASS GRAFT     • CYST REMOVAL      FROM BACK   • GALLBLADDER SURGERY     • HERNIA REPAIR Left     inquinal times 3  last one in    • KNEE CARTILAGE SURGERY Left 's   • LUMBAR DISCECTOMY FUSION INSTRUMENTATION N/A 2016    Procedure: lumbar laminectomy L4-5 and fusion with instrumentation;  Surgeon: Ran Kline MD;  Location: [facility] OR;  Service:    • LUMBAR DISCECTOMY FUSION INSTRUMENTATION N/A 1/15/2018    Procedure: L2-3, L3-4 laminectomy and fusion with instrumentation and removal of implants L4 5.;  Surgeon: Ran Kline MD;  Location: [facility] OR;  Service:    • SC TOTAL KNEE ARTHROPLASTY Left 2016    Procedure: TOTAL KNEE ARTHROPLASTY;  Surgeon: Dontrell Arellano MD;  Location: [facility] OR;  Service: Orthopedics       Social History     Socioeconomic History   • Marital status:      Spouse name: Not on file   • Number of children: Not on file   • Years of education: Not on file   • Highest education level: Not on file   Tobacco Use   • Smoking status: Former Smoker     Packs/day: 2.00     Years: 36.00     Pack years: 72.00     Types: Cigarettes     Quit date:      Years since quittin.0   • Smokeless tobacco: Never Used   Substance and Sexual Activity   • Alcohol use: No     Frequency: Never     Comment: CAFFEINE COFFEE DAILY    • Drug use: No   • Sexual activity: Defer       Family History   Problem Relation Age of Onset   • Heart failure Mother    • Diabetes Mother    • Cancer Sister    • Diabetes Sister    • Cancer Brother    • Diabetes Brother    • Cancer Father        Review of Systems   Constitution: Positive for malaise/fatigue.   HENT: Negative.    Eyes: Negative.    Cardiovascular: Positive for dyspnea on exertion. Negative for chest  pain, leg swelling and near-syncope.   Respiratory: Positive for shortness of breath. Negative for cough.    Endocrine: Negative.    Hematologic/Lymphatic: Negative.    Skin: Negative.    Musculoskeletal: Negative.    Gastrointestinal: Negative.    Genitourinary: Negative.    Neurological: Negative.  Negative for weakness.   Psychiatric/Behavioral: Negative.    Allergic/Immunologic: Negative.        Allergies   Allergen Reactions   • Amiodarone Other (See Comments)     Muscle problems in legs   • Percocet [Oxycodone-Acetaminophen] Mental Status Change     Causes confusion/         Current Outpatient Medications:   •  acetaminophen (TYLENOL) 500 MG tablet, Take 500 mg by mouth Every 6 (Six) Hours As Needed for Mild Pain ., Disp: , Rfl:   •  Alpha-Lipoic Acid 600 MG tablet, Take 600 mg by mouth Daily. For neuropathy, Disp: 30 tablet, Rfl: 11  •  Subblime MICROLET LANCETS lancets, USE TWICE A DAY, Disp: 100 each, Rfl: 5  •  clotrimazole-betamethasone (Lotrisone) 1-0.05 % cream, Apply  topically to the appropriate area as directed 2 (Two) Times a Day., Disp: 135 g, Rfl: 3  •  glucose blood (Contour Next Test) test strip, 1 each by Other route Daily. test blood sugar, Disp: 50 each, Rfl: 5  •  hydrALAZINE (APRESOLINE) 25 MG tablet, Take 1 tablet by mouth 3 (Three) Times a Day., Disp: 180 tablet, Rfl: 3  •  lisinopril (PRINIVIL,ZESTRIL) 40 MG tablet, TAKE 1 TABLET DAILY, Disp: 90 tablet, Rfl: 1  •  metFORMIN (GLUCOPHAGE) 850 MG tablet, TAKE 1 TABLET TWICE DAILY  WITH MEALS, Disp: 180 tablet, Rfl: 1  •  metoprolol succinate XL (TOPROL-XL) 25 MG 24 hr tablet, TAKE 1 TABLET DAILY, Disp: 90 tablet, Rfl: 3  •  Multiple Vitamin (MULTI VITAMIN PO), Take 1 tablet by mouth Every Morning., Disp: , Rfl:   •  mupirocin (BACTROBAN) 2 % ointment, APPLY TWO TIMES PER DAY TO THE BIOPSY SITES., Disp: , Rfl: 3  •  Silver Hydrogel gel, Apply 1 application topically 2 (two) times a day., Disp: 45 mL, Rfl: 5  •  silver sulfadiazine (Silvadene) 1  "% cream, Apply  topically to the appropriate area as directed 2 (Two) Times a Day., Disp: 85 g, Rfl: 3  •  tamsulosin (FLOMAX) 0.4 MG capsule 24 hr capsule, TAKE 2 CAPSULES DAILY, Disp: 180 capsule, Rfl: 1  •  traMADol (ULTRAM) 50 MG tablet, Take 1 tablet by mouth Every 6 (Six) Hours As Needed for Moderate Pain  or Severe Pain ., Disp: 30 tablet, Rfl: 1  •  vitamin B-12 (CYANOCOBALAMIN) 1000 MCG tablet, Take 1,000 mcg by mouth Daily., Disp: , Rfl:   •  XARELTO 20 MG tablet, TAKE 1 TABLET DAILY, Disp: 90 tablet, Rfl: 3  •  amiodarone (PACERONE) 200 MG tablet, TAKE 1 TABLET DAILY, Disp: 90 tablet, Rfl: 3      Objective:     Vitals:    01/28/21 1213   BP: 136/64   Pulse: 73   Weight: 102 kg (224 lb)   Height: 182.9 cm (72\")     Body mass index is 30.38 kg/m².    PHYSICAL EXAM:    Vitals signs and nursing note reviewed.   Constitutional:       Appearance: Normal and healthy appearance.   HENT:      Head: Normocephalic and atraumatic.   Cardiovascular:      Normal rate. Regular rhythm.      Murmurs: There is no murmur.   Skin:     General: Skin is warm.   Neurological:      General: No focal deficit present.      Mental Status: Alert, oriented to person, place, and time and oriented to person, place and time.   Psychiatric:         Attention and Perception: Attention and perception normal.         Mood and Affect: Mood and affect normal.         Behavior: Behavior is cooperative.             ECG 12 Lead    Date/Time: 1/28/2021 6:36 PM  Performed by: Miller Talbot MD  Authorized by: Miller Talbot MD   Comparison: compared with previous ECG from 1/8/2020  Rhythm: sinus rhythm        I reviewed Dr. Rodríguez's note from 1/26.    I reviewed his comprehensive metabolic panel from 1/26, as well as a CBC from 26 which showed severe anemia.      Assessment:       Diagnosis Plan   1. Typical atrial flutter (CMS/HCC)     2. Anemia, unspecified type            Plan:       The patient's daughter was in attendance in clinic " today.  I discussed Dr. Tenorio recommendation that he present to the emergency room.  The daughter was not aware of this.  She asked for my opinion.  The patient has a new anemia, which is worsened considerably over the past year.  I am concerned he has occult GI bleeding.  We have not documented any atrial fibrillation, I recommended that he stop Xarelto, until source of bleeding identified.  I recommended that he consider admission to the emergency room, he was going to have CBC checked this afternoon, and follow-up with Dr. Rodríguez.    As always, it has been a pleasure to participate in your patient's care.      Sincerely,         Miller Talbot MD

## 2021-01-29 ENCOUNTER — TELEPHONE (OUTPATIENT)
Dept: INTERNAL MEDICINE | Facility: CLINIC | Age: 86
End: 2021-01-29

## 2021-01-29 ENCOUNTER — APPOINTMENT (OUTPATIENT)
Dept: GENERAL RADIOLOGY | Facility: HOSPITAL | Age: 86
End: 2021-01-29

## 2021-01-29 ENCOUNTER — HOSPITAL ENCOUNTER (INPATIENT)
Facility: HOSPITAL | Age: 86
LOS: 12 days | Discharge: NURSING, REHAB OR LONG-TERM CARE | End: 2021-02-12
Attending: EMERGENCY MEDICINE | Admitting: INTERNAL MEDICINE

## 2021-01-29 ENCOUNTER — DOCUMENTATION (OUTPATIENT)
Dept: INTERNAL MEDICINE | Facility: CLINIC | Age: 86
End: 2021-01-29

## 2021-01-29 DIAGNOSIS — R53.83 OTHER FATIGUE: ICD-10-CM

## 2021-01-29 DIAGNOSIS — D64.9 SYMPTOMATIC ANEMIA: Primary | ICD-10-CM

## 2021-01-29 DIAGNOSIS — R77.8 ELEVATED TROPONIN: ICD-10-CM

## 2021-01-29 DIAGNOSIS — K63.89 COLONIC MASS: ICD-10-CM

## 2021-01-29 PROBLEM — N17.9 AKI (ACUTE KIDNEY INJURY) (HCC): Status: ACTIVE | Noted: 2021-01-29

## 2021-01-29 PROBLEM — Z79.01 CHRONIC ANTICOAGULATION: Status: ACTIVE | Noted: 2021-01-29

## 2021-01-29 PROBLEM — N18.9 CKD (CHRONIC KIDNEY DISEASE): Status: ACTIVE | Noted: 2021-01-29

## 2021-01-29 PROBLEM — D50.9 IRON DEFICIENCY ANEMIA: Status: ACTIVE | Noted: 2021-01-29

## 2021-01-29 LAB
ABO GROUP BLD: NORMAL
ALBUMIN SERPL-MCNC: 4 G/DL (ref 3.5–5.2)
ALBUMIN/GLOB SERPL: 1.8 G/DL
ALP SERPL-CCNC: 71 U/L (ref 39–117)
ALT SERPL W P-5'-P-CCNC: 18 U/L (ref 1–41)
ANION GAP SERPL CALCULATED.3IONS-SCNC: 12.3 MMOL/L (ref 5–15)
ANISOCYTOSIS BLD QL: ABNORMAL
AST SERPL-CCNC: 17 U/L (ref 1–40)
BASOPHILS # BLD MANUAL: 0.07 10*3/MM3 (ref 0–0.2)
BASOPHILS NFR BLD AUTO: 2 % (ref 0–1.5)
BILIRUB SERPL-MCNC: 0.2 MG/DL (ref 0–1.2)
BLD GP AB SCN SERPL QL: NEGATIVE
BUN SERPL-MCNC: 23 MG/DL (ref 8–23)
BUN/CREAT SERPL: 17.3 (ref 7–25)
CALCIUM SPEC-SCNC: 8.8 MG/DL (ref 8.6–10.5)
CHLORIDE SERPL-SCNC: 106 MMOL/L (ref 98–107)
CO2 SERPL-SCNC: 20.7 MMOL/L (ref 22–29)
CREAT SERPL-MCNC: 1.33 MG/DL (ref 0.76–1.27)
DEPRECATED RDW RBC AUTO: 42.3 FL (ref 37–54)
EOSINOPHIL # BLD MANUAL: 0.04 10*3/MM3 (ref 0–0.4)
EOSINOPHIL NFR BLD MANUAL: 1 % (ref 0.3–6.2)
ERYTHROCYTE [DISTWIDTH] IN BLOOD BY AUTOMATED COUNT: 15.9 % (ref 12.3–15.4)
FERRITIN SERPL-MCNC: 11.7 NG/ML (ref 30–400)
GFR SERPL CREATININE-BSD FRML MDRD: 51 ML/MIN/1.73
GLOBULIN UR ELPH-MCNC: 2.2 GM/DL
GLUCOSE BLDC GLUCOMTR-MCNC: 103 MG/DL (ref 70–130)
GLUCOSE SERPL-MCNC: 102 MG/DL (ref 65–99)
HCT VFR BLD AUTO: 22.3 % (ref 37.5–51)
HGB BLD-MCNC: 6.1 G/DL (ref 13–17.7)
HYPOCHROMIA BLD QL: ABNORMAL
IRON SATN MFR SERPL: 3 % (ref 20–50)
IRON SERPL-MCNC: 16 MCG/DL (ref 59–158)
LYMPHOCYTES # BLD MANUAL: 1.02 10*3/MM3 (ref 0.7–3.1)
LYMPHOCYTES NFR BLD MANUAL: 28 % (ref 19.6–45.3)
LYMPHOCYTES NFR BLD MANUAL: 4 % (ref 5–12)
MCH RBC QN AUTO: 20.2 PG (ref 26.6–33)
MCHC RBC AUTO-ENTMCNC: 27.4 G/DL (ref 31.5–35.7)
MCV RBC AUTO: 73.8 FL (ref 79–97)
MICROCYTES BLD QL: ABNORMAL
MONOCYTES # BLD AUTO: 0.15 10*3/MM3 (ref 0.1–0.9)
NEUTROPHILS # BLD AUTO: 2.36 10*3/MM3 (ref 1.7–7)
NEUTROPHILS NFR BLD MANUAL: 65 % (ref 42.7–76)
NT-PROBNP SERPL-MCNC: 436.2 PG/ML (ref 0–1800)
PLAT MORPH BLD: NORMAL
PLATELET # BLD AUTO: 129 10*3/MM3 (ref 140–450)
PMV BLD AUTO: 9.5 FL (ref 6–12)
POTASSIUM SERPL-SCNC: 4.7 MMOL/L (ref 3.5–5.2)
PROT SERPL-MCNC: 6.2 G/DL (ref 6–8.5)
QT INTERVAL: 485 MS
RBC # BLD AUTO: 3.02 10*6/MM3 (ref 4.14–5.8)
RH BLD: POSITIVE
SARS-COV-2 ORF1AB RESP QL NAA+PROBE: NOT DETECTED
SODIUM SERPL-SCNC: 139 MMOL/L (ref 136–145)
T&S EXPIRATION DATE: NORMAL
TIBC SERPL-MCNC: 541 MCG/DL
TROPONIN T SERPL-MCNC: 0.06 NG/ML (ref 0–0.03)
TSH SERPL DL<=0.05 MIU/L-ACNC: 3.62 UIU/ML (ref 0.27–4.2)
UIBC SERPL-MCNC: 525 MCG/DL (ref 112–346)
WBC # BLD AUTO: 3.63 10*3/MM3 (ref 3.4–10.8)
WBC MORPH BLD: NORMAL

## 2021-01-29 PROCEDURE — 36430 TRANSFUSION BLD/BLD COMPNT: CPT

## 2021-01-29 PROCEDURE — 82962 GLUCOSE BLOOD TEST: CPT

## 2021-01-29 PROCEDURE — 99284 EMERGENCY DEPT VISIT MOD MDM: CPT

## 2021-01-29 PROCEDURE — P9016 RBC LEUKOCYTES REDUCED: HCPCS

## 2021-01-29 PROCEDURE — 86900 BLOOD TYPING SEROLOGIC ABO: CPT | Performed by: PHYSICIAN ASSISTANT

## 2021-01-29 PROCEDURE — 84484 ASSAY OF TROPONIN QUANT: CPT | Performed by: PHYSICIAN ASSISTANT

## 2021-01-29 PROCEDURE — G0378 HOSPITAL OBSERVATION PER HR: HCPCS

## 2021-01-29 PROCEDURE — 86923 COMPATIBILITY TEST ELECTRIC: CPT

## 2021-01-29 PROCEDURE — 86901 BLOOD TYPING SEROLOGIC RH(D): CPT | Performed by: PHYSICIAN ASSISTANT

## 2021-01-29 PROCEDURE — 93005 ELECTROCARDIOGRAM TRACING: CPT | Performed by: PHYSICIAN ASSISTANT

## 2021-01-29 PROCEDURE — 83880 ASSAY OF NATRIURETIC PEPTIDE: CPT | Performed by: PHYSICIAN ASSISTANT

## 2021-01-29 PROCEDURE — 86900 BLOOD TYPING SEROLOGIC ABO: CPT

## 2021-01-29 PROCEDURE — 85025 COMPLETE CBC W/AUTO DIFF WBC: CPT | Performed by: PHYSICIAN ASSISTANT

## 2021-01-29 PROCEDURE — 86850 RBC ANTIBODY SCREEN: CPT | Performed by: PHYSICIAN ASSISTANT

## 2021-01-29 PROCEDURE — U0004 COV-19 TEST NON-CDC HGH THRU: HCPCS | Performed by: PHYSICIAN ASSISTANT

## 2021-01-29 PROCEDURE — 71045 X-RAY EXAM CHEST 1 VIEW: CPT

## 2021-01-29 PROCEDURE — 80053 COMPREHEN METABOLIC PANEL: CPT | Performed by: PHYSICIAN ASSISTANT

## 2021-01-29 PROCEDURE — 84443 ASSAY THYROID STIM HORMONE: CPT | Performed by: PHYSICIAN ASSISTANT

## 2021-01-29 PROCEDURE — 93010 ELECTROCARDIOGRAM REPORT: CPT | Performed by: INTERNAL MEDICINE

## 2021-01-29 PROCEDURE — 85007 BL SMEAR W/DIFF WBC COUNT: CPT | Performed by: PHYSICIAN ASSISTANT

## 2021-01-29 RX ORDER — LISINOPRIL 40 MG/1
40 TABLET ORAL DAILY
Status: DISCONTINUED | OUTPATIENT
Start: 2021-01-29 | End: 2021-02-04

## 2021-01-29 RX ORDER — METOPROLOL SUCCINATE 25 MG/1
25 TABLET, EXTENDED RELEASE ORAL DAILY
Status: DISCONTINUED | OUTPATIENT
Start: 2021-01-29 | End: 2021-02-04

## 2021-01-29 RX ORDER — TRAMADOL HYDROCHLORIDE 50 MG/1
50 TABLET ORAL EVERY 6 HOURS PRN
Status: DISCONTINUED | OUTPATIENT
Start: 2021-01-29 | End: 2021-02-03

## 2021-01-29 RX ORDER — HYDRALAZINE HYDROCHLORIDE 25 MG/1
25 TABLET, FILM COATED ORAL 3 TIMES DAILY
Status: DISCONTINUED | OUTPATIENT
Start: 2021-01-29 | End: 2021-01-31

## 2021-01-29 RX ORDER — CHOLECALCIFEROL (VITAMIN D3) 125 MCG
1000 CAPSULE ORAL DAILY
Status: DISCONTINUED | OUTPATIENT
Start: 2021-01-29 | End: 2021-02-04

## 2021-01-29 RX ORDER — INSULIN LISPRO 100 [IU]/ML
0-9 INJECTION, SOLUTION INTRAVENOUS; SUBCUTANEOUS
Status: DISCONTINUED | OUTPATIENT
Start: 2021-01-30 | End: 2021-02-12 | Stop reason: HOSPADM

## 2021-01-29 RX ORDER — NITROGLYCERIN 0.4 MG/1
0.4 TABLET SUBLINGUAL
Status: DISCONTINUED | OUTPATIENT
Start: 2021-01-29 | End: 2021-02-12 | Stop reason: HOSPADM

## 2021-01-29 RX ORDER — FAMOTIDINE 10 MG/ML
20 INJECTION, SOLUTION INTRAVENOUS EVERY 12 HOURS SCHEDULED
Status: DISCONTINUED | OUTPATIENT
Start: 2021-01-29 | End: 2021-02-12 | Stop reason: HOSPADM

## 2021-01-29 RX ORDER — NICOTINE POLACRILEX 4 MG
15 LOZENGE BUCCAL
Status: DISCONTINUED | OUTPATIENT
Start: 2021-01-29 | End: 2021-02-12 | Stop reason: HOSPADM

## 2021-01-29 RX ORDER — TAMSULOSIN HYDROCHLORIDE 0.4 MG/1
0.8 CAPSULE ORAL DAILY
Status: DISCONTINUED | OUTPATIENT
Start: 2021-01-29 | End: 2021-02-04

## 2021-01-29 RX ORDER — AMIODARONE HYDROCHLORIDE 200 MG/1
200 TABLET ORAL DAILY
Status: DISCONTINUED | OUTPATIENT
Start: 2021-01-29 | End: 2021-02-12 | Stop reason: HOSPADM

## 2021-01-29 RX ORDER — ONDANSETRON 2 MG/ML
4 INJECTION INTRAMUSCULAR; INTRAVENOUS EVERY 6 HOURS PRN
Status: DISCONTINUED | OUTPATIENT
Start: 2021-01-29 | End: 2021-02-04

## 2021-01-29 RX ORDER — SODIUM CHLORIDE 0.9 % (FLUSH) 0.9 %
10 SYRINGE (ML) INJECTION EVERY 12 HOURS SCHEDULED
Status: DISCONTINUED | OUTPATIENT
Start: 2021-01-29 | End: 2021-02-04

## 2021-01-29 RX ORDER — SODIUM CHLORIDE 0.9 % (FLUSH) 0.9 %
10 SYRINGE (ML) INJECTION AS NEEDED
Status: DISCONTINUED | OUTPATIENT
Start: 2021-01-29 | End: 2021-02-04

## 2021-01-29 RX ORDER — DEXTROSE MONOHYDRATE 25 G/50ML
25 INJECTION, SOLUTION INTRAVENOUS
Status: DISCONTINUED | OUTPATIENT
Start: 2021-01-29 | End: 2021-02-12 | Stop reason: HOSPADM

## 2021-01-29 RX ADMIN — TAMSULOSIN HYDROCHLORIDE 0.8 MG: 0.4 CAPSULE ORAL at 21:33

## 2021-01-29 RX ADMIN — HYDRALAZINE HYDROCHLORIDE 25 MG: 25 TABLET, FILM COATED ORAL at 21:33

## 2021-01-29 RX ADMIN — SODIUM CHLORIDE, PRESERVATIVE FREE 10 ML: 5 INJECTION INTRAVENOUS at 21:33

## 2021-01-29 RX ADMIN — FAMOTIDINE 20 MG: 10 INJECTION INTRAVENOUS at 21:33

## 2021-01-29 NOTE — TELEPHONE ENCOUNTER
PT IS CALLING IN TO GET HIS LAB RESULTS FROM YESTERDAY.  SAYS THAT HIS HEMOGLOBIN WAS LOW.    PT CALL BACK  [phone]

## 2021-01-29 NOTE — PROGRESS NOTES
Talk to the patient and also the patient's daughter Sisi Sanchez.  He still has a lot of shortness of breath with exertion.  Iron studies show microcytic hypochromic iron deficient anemia with hemoglobin 6.8 hematocrit 24.2.  Given his degree of symptoms have advised that he be taken to the emergency room for further evaluation and possible admission.  His daughter is agreeable to take him there.

## 2021-01-29 NOTE — TELEPHONE ENCOUNTER
I talked to the patient and his daughter and she took him to the emergency room based on low hemoglobin.  I instructed her to take him to the emergency room.

## 2021-01-30 LAB
BH BB BLOOD EXPIRATION DATE: NORMAL
BH BB BLOOD EXPIRATION DATE: NORMAL
BH BB BLOOD TYPE BARCODE: 6200
BH BB BLOOD TYPE BARCODE: 6200
BH BB DISPENSE STATUS: NORMAL
BH BB DISPENSE STATUS: NORMAL
BH BB PRODUCT CODE: NORMAL
BH BB PRODUCT CODE: NORMAL
BH BB UNIT NUMBER: NORMAL
BH BB UNIT NUMBER: NORMAL
CROSSMATCH INTERPRETATION: NORMAL
CROSSMATCH INTERPRETATION: NORMAL
GLUCOSE BLDC GLUCOMTR-MCNC: 112 MG/DL (ref 70–130)
GLUCOSE BLDC GLUCOMTR-MCNC: 115 MG/DL (ref 70–130)
GLUCOSE BLDC GLUCOMTR-MCNC: 119 MG/DL (ref 70–130)
GLUCOSE BLDC GLUCOMTR-MCNC: 139 MG/DL (ref 70–130)
HCT VFR BLD AUTO: 25.6 % (ref 37.5–51)
HCT VFR BLD AUTO: 26.7 % (ref 37.5–51)
HEMOCCULT STL QL: POSITIVE
HGB BLD-MCNC: 7.6 G/DL (ref 13–17.7)
HGB BLD-MCNC: 7.8 G/DL (ref 13–17.7)
TROPONIN T SERPL-MCNC: 0.06 NG/ML (ref 0–0.03)
UNIT  ABO: NORMAL
UNIT  ABO: NORMAL
UNIT  RH: NORMAL
UNIT  RH: NORMAL

## 2021-01-30 PROCEDURE — 82962 GLUCOSE BLOOD TEST: CPT

## 2021-01-30 PROCEDURE — 99214 OFFICE O/P EST MOD 30 MIN: CPT | Performed by: INTERNAL MEDICINE

## 2021-01-30 PROCEDURE — G0378 HOSPITAL OBSERVATION PER HR: HCPCS

## 2021-01-30 PROCEDURE — 97110 THERAPEUTIC EXERCISES: CPT

## 2021-01-30 PROCEDURE — 85014 HEMATOCRIT: CPT | Performed by: INTERNAL MEDICINE

## 2021-01-30 PROCEDURE — 82272 OCCULT BLD FECES 1-3 TESTS: CPT | Performed by: INTERNAL MEDICINE

## 2021-01-30 PROCEDURE — 85018 HEMOGLOBIN: CPT | Performed by: INTERNAL MEDICINE

## 2021-01-30 PROCEDURE — 97161 PT EVAL LOW COMPLEX 20 MIN: CPT

## 2021-01-30 PROCEDURE — 84484 ASSAY OF TROPONIN QUANT: CPT | Performed by: INTERNAL MEDICINE

## 2021-01-30 PROCEDURE — 99204 OFFICE O/P NEW MOD 45 MIN: CPT | Performed by: INTERNAL MEDICINE

## 2021-01-30 RX ORDER — ATORVASTATIN CALCIUM 20 MG/1
20 TABLET, FILM COATED ORAL NIGHTLY
Status: CANCELLED | OUTPATIENT
Start: 2021-01-30

## 2021-01-30 RX ADMIN — METOPROLOL SUCCINATE 25 MG: 25 TABLET, EXTENDED RELEASE ORAL at 10:07

## 2021-01-30 RX ADMIN — SODIUM CHLORIDE, PRESERVATIVE FREE 10 ML: 5 INJECTION INTRAVENOUS at 10:08

## 2021-01-30 RX ADMIN — HYDRALAZINE HYDROCHLORIDE 25 MG: 25 TABLET, FILM COATED ORAL at 21:54

## 2021-01-30 RX ADMIN — HYDRALAZINE HYDROCHLORIDE 25 MG: 25 TABLET, FILM COATED ORAL at 10:07

## 2021-01-30 RX ADMIN — HYDRALAZINE HYDROCHLORIDE 25 MG: 25 TABLET, FILM COATED ORAL at 16:48

## 2021-01-30 RX ADMIN — LISINOPRIL 40 MG: 40 TABLET ORAL at 10:06

## 2021-01-30 RX ADMIN — Medication 1000 MCG: at 10:07

## 2021-01-30 RX ADMIN — TAMSULOSIN HYDROCHLORIDE 0.8 MG: 0.4 CAPSULE ORAL at 10:07

## 2021-01-30 RX ADMIN — POLYETHYLENE GLYCOL 3350, SODIUM SULFATE ANHYDROUS, SODIUM BICARBONATE, SODIUM CHLORIDE, POTASSIUM CHLORIDE 2000 ML: 236; 22.74; 6.74; 5.86; 2.97 POWDER, FOR SOLUTION ORAL at 18:35

## 2021-01-30 RX ADMIN — SODIUM CHLORIDE, PRESERVATIVE FREE 10 ML: 5 INJECTION INTRAVENOUS at 21:54

## 2021-01-30 RX ADMIN — FAMOTIDINE 20 MG: 10 INJECTION INTRAVENOUS at 10:07

## 2021-01-30 RX ADMIN — AMIODARONE HYDROCHLORIDE 200 MG: 200 TABLET ORAL at 10:07

## 2021-01-30 RX ADMIN — FAMOTIDINE 20 MG: 10 INJECTION INTRAVENOUS at 21:54

## 2021-01-31 ENCOUNTER — ANESTHESIA (OUTPATIENT)
Dept: GASTROENTEROLOGY | Facility: HOSPITAL | Age: 86
End: 2021-01-31

## 2021-01-31 ENCOUNTER — ANESTHESIA EVENT (OUTPATIENT)
Dept: GASTROENTEROLOGY | Facility: HOSPITAL | Age: 86
End: 2021-01-31

## 2021-01-31 LAB
ALBUMIN SERPL-MCNC: 4.3 G/DL (ref 3.5–5.2)
ALBUMIN/GLOB SERPL: 2.3 G/DL
ALP SERPL-CCNC: 80 U/L (ref 39–117)
ALT SERPL W P-5'-P-CCNC: 20 U/L (ref 1–41)
ANION GAP SERPL CALCULATED.3IONS-SCNC: 12.7 MMOL/L (ref 5–15)
AST SERPL-CCNC: 24 U/L (ref 1–40)
BASOPHILS # BLD AUTO: 0.03 10*3/MM3 (ref 0–0.2)
BASOPHILS NFR BLD AUTO: 0.9 % (ref 0–1.5)
BILIRUB SERPL-MCNC: 0.5 MG/DL (ref 0–1.2)
BUN SERPL-MCNC: 14 MG/DL (ref 8–23)
BUN/CREAT SERPL: 12.7 (ref 7–25)
CALCIUM SPEC-SCNC: 8.9 MG/DL (ref 8.6–10.5)
CHLORIDE SERPL-SCNC: 108 MMOL/L (ref 98–107)
CO2 SERPL-SCNC: 22.3 MMOL/L (ref 22–29)
CREAT SERPL-MCNC: 1.1 MG/DL (ref 0.76–1.27)
DEPRECATED RDW RBC AUTO: 48.6 FL (ref 37–54)
EOSINOPHIL # BLD AUTO: 0.07 10*3/MM3 (ref 0–0.4)
EOSINOPHIL NFR BLD AUTO: 2.2 % (ref 0.3–6.2)
ERYTHROCYTE [DISTWIDTH] IN BLOOD BY AUTOMATED COUNT: 17.7 % (ref 12.3–15.4)
GFR SERPL CREATININE-BSD FRML MDRD: 64 ML/MIN/1.73
GLOBULIN UR ELPH-MCNC: 1.9 GM/DL
GLUCOSE BLDC GLUCOMTR-MCNC: 128 MG/DL (ref 70–130)
GLUCOSE BLDC GLUCOMTR-MCNC: 150 MG/DL (ref 70–130)
GLUCOSE BLDC GLUCOMTR-MCNC: 153 MG/DL (ref 70–130)
GLUCOSE BLDC GLUCOMTR-MCNC: 166 MG/DL (ref 70–130)
GLUCOSE SERPL-MCNC: 113 MG/DL (ref 65–99)
HCT VFR BLD AUTO: 27 % (ref 37.5–51)
HCT VFR BLD AUTO: 27.4 % (ref 37.5–51)
HCT VFR BLD AUTO: 30.1 % (ref 37.5–51)
HGB BLD-MCNC: 7.9 G/DL (ref 13–17.7)
HGB BLD-MCNC: 8 G/DL (ref 13–17.7)
HGB BLD-MCNC: 8.7 G/DL (ref 13–17.7)
IMM GRANULOCYTES # BLD AUTO: 0.01 10*3/MM3 (ref 0–0.05)
IMM GRANULOCYTES NFR BLD AUTO: 0.3 % (ref 0–0.5)
INR PPP: 1.11 (ref 0.9–1.1)
LYMPHOCYTES # BLD AUTO: 0.77 10*3/MM3 (ref 0.7–3.1)
LYMPHOCYTES NFR BLD AUTO: 24.1 % (ref 19.6–45.3)
MCH RBC QN AUTO: 22.4 PG (ref 26.6–33)
MCHC RBC AUTO-ENTMCNC: 28.9 G/DL (ref 31.5–35.7)
MCV RBC AUTO: 77.6 FL (ref 79–97)
MONOCYTES # BLD AUTO: 0.32 10*3/MM3 (ref 0.1–0.9)
MONOCYTES NFR BLD AUTO: 10 % (ref 5–12)
NEUTROPHILS NFR BLD AUTO: 2 10*3/MM3 (ref 1.7–7)
NEUTROPHILS NFR BLD AUTO: 62.5 % (ref 42.7–76)
NRBC BLD AUTO-RTO: 0 /100 WBC (ref 0–0.2)
PLATELET # BLD AUTO: 137 10*3/MM3 (ref 140–450)
PMV BLD AUTO: 9.6 FL (ref 6–12)
POTASSIUM SERPL-SCNC: 4.3 MMOL/L (ref 3.5–5.2)
PROT SERPL-MCNC: 6.2 G/DL (ref 6–8.5)
PROTHROMBIN TIME: 14.1 SECONDS (ref 11.7–14.2)
RBC # BLD AUTO: 3.88 10*6/MM3 (ref 4.14–5.8)
SODIUM SERPL-SCNC: 143 MMOL/L (ref 136–145)
WBC # BLD AUTO: 3.2 10*3/MM3 (ref 3.4–10.8)

## 2021-01-31 PROCEDURE — 63710000001 INSULIN LISPRO (HUMAN) PER 5 UNITS: Performed by: INTERNAL MEDICINE

## 2021-01-31 PROCEDURE — 0DB68ZX EXCISION OF STOMACH, VIA NATURAL OR ARTIFICIAL OPENING ENDOSCOPIC, DIAGNOSTIC: ICD-10-PCS | Performed by: INTERNAL MEDICINE

## 2021-01-31 PROCEDURE — 45385 COLONOSCOPY W/LESION REMOVAL: CPT | Performed by: INTERNAL MEDICINE

## 2021-01-31 PROCEDURE — 85018 HEMOGLOBIN: CPT | Performed by: INTERNAL MEDICINE

## 2021-01-31 PROCEDURE — 82962 GLUCOSE BLOOD TEST: CPT

## 2021-01-31 PROCEDURE — 45380 COLONOSCOPY AND BIOPSY: CPT | Performed by: INTERNAL MEDICINE

## 2021-01-31 PROCEDURE — 88305 TISSUE EXAM BY PATHOLOGIST: CPT | Performed by: INTERNAL MEDICINE

## 2021-01-31 PROCEDURE — 45381 COLONOSCOPY SUBMUCOUS NJX: CPT | Performed by: INTERNAL MEDICINE

## 2021-01-31 PROCEDURE — 0DBL8ZX EXCISION OF TRANSVERSE COLON, VIA NATURAL OR ARTIFICIAL OPENING ENDOSCOPIC, DIAGNOSTIC: ICD-10-PCS | Performed by: INTERNAL MEDICINE

## 2021-01-31 PROCEDURE — 25010000002 PROPOFOL 10 MG/ML EMULSION: Performed by: ANESTHESIOLOGY

## 2021-01-31 PROCEDURE — 43239 EGD BIOPSY SINGLE/MULTIPLE: CPT | Performed by: INTERNAL MEDICINE

## 2021-01-31 PROCEDURE — 85610 PROTHROMBIN TIME: CPT | Performed by: INTERNAL MEDICINE

## 2021-01-31 PROCEDURE — 85025 COMPLETE CBC W/AUTO DIFF WBC: CPT | Performed by: INTERNAL MEDICINE

## 2021-01-31 PROCEDURE — 99232 SBSQ HOSP IP/OBS MODERATE 35: CPT | Performed by: INTERNAL MEDICINE

## 2021-01-31 PROCEDURE — 0DB98ZX EXCISION OF DUODENUM, VIA NATURAL OR ARTIFICIAL OPENING ENDOSCOPIC, DIAGNOSTIC: ICD-10-PCS | Performed by: INTERNAL MEDICINE

## 2021-01-31 PROCEDURE — 25010000002 ENOXAPARIN PER 10 MG: Performed by: INTERNAL MEDICINE

## 2021-01-31 PROCEDURE — 0DBH8ZZ EXCISION OF CECUM, VIA NATURAL OR ARTIFICIAL OPENING ENDOSCOPIC: ICD-10-PCS | Performed by: INTERNAL MEDICINE

## 2021-01-31 PROCEDURE — 80053 COMPREHEN METABOLIC PANEL: CPT | Performed by: INTERNAL MEDICINE

## 2021-01-31 PROCEDURE — 0DBK8ZZ EXCISION OF ASCENDING COLON, VIA NATURAL OR ARTIFICIAL OPENING ENDOSCOPIC: ICD-10-PCS | Performed by: INTERNAL MEDICINE

## 2021-01-31 PROCEDURE — 85014 HEMATOCRIT: CPT | Performed by: INTERNAL MEDICINE

## 2021-01-31 PROCEDURE — 97110 THERAPEUTIC EXERCISES: CPT

## 2021-01-31 DEVICE — WORKING LENGTH 235CM, WORKING CHANNEL 2.8MM
Type: IMPLANTABLE DEVICE | Site: ASCENDING COLON | Status: FUNCTIONAL
Brand: RESOLUTION 360 CLIP

## 2021-01-31 RX ORDER — LIDOCAINE HYDROCHLORIDE 20 MG/ML
INJECTION, SOLUTION INFILTRATION; PERINEURAL AS NEEDED
Status: DISCONTINUED | OUTPATIENT
Start: 2021-01-31 | End: 2021-01-31 | Stop reason: SURG

## 2021-01-31 RX ORDER — SODIUM CHLORIDE 9 MG/ML
INJECTION, SOLUTION INTRAVENOUS CONTINUOUS PRN
Status: DISCONTINUED | OUTPATIENT
Start: 2021-01-31 | End: 2021-01-31 | Stop reason: SURG

## 2021-01-31 RX ORDER — PROPOFOL 10 MG/ML
VIAL (ML) INTRAVENOUS AS NEEDED
Status: DISCONTINUED | OUTPATIENT
Start: 2021-01-31 | End: 2021-01-31 | Stop reason: SURG

## 2021-01-31 RX ORDER — AMLODIPINE BESYLATE 10 MG/1
10 TABLET ORAL
Status: DISCONTINUED | OUTPATIENT
Start: 2021-01-31 | End: 2021-02-04

## 2021-01-31 RX ORDER — SODIUM CHLORIDE 9 MG/ML
50 INJECTION, SOLUTION INTRAVENOUS ONCE
Status: COMPLETED | OUTPATIENT
Start: 2021-01-31 | End: 2021-01-31

## 2021-01-31 RX ADMIN — METOPROLOL SUCCINATE 25 MG: 25 TABLET, EXTENDED RELEASE ORAL at 08:00

## 2021-01-31 RX ADMIN — TAMSULOSIN HYDROCHLORIDE 0.8 MG: 0.4 CAPSULE ORAL at 08:00

## 2021-01-31 RX ADMIN — FAMOTIDINE 20 MG: 10 INJECTION INTRAVENOUS at 08:00

## 2021-01-31 RX ADMIN — SODIUM CHLORIDE: 9 INJECTION, SOLUTION INTRAVENOUS at 10:30

## 2021-01-31 RX ADMIN — HYDRALAZINE HYDROCHLORIDE 25 MG: 25 TABLET, FILM COATED ORAL at 08:00

## 2021-01-31 RX ADMIN — LISINOPRIL 40 MG: 40 TABLET ORAL at 08:00

## 2021-01-31 RX ADMIN — SODIUM CHLORIDE 50 ML/HR: 9 INJECTION, SOLUTION INTRAVENOUS at 09:44

## 2021-01-31 RX ADMIN — INSULIN LISPRO 2 UNITS: 100 INJECTION, SOLUTION INTRAVENOUS; SUBCUTANEOUS at 12:48

## 2021-01-31 RX ADMIN — Medication 1000 MCG: at 08:00

## 2021-01-31 RX ADMIN — AMIODARONE HYDROCHLORIDE 200 MG: 200 TABLET ORAL at 08:00

## 2021-01-31 RX ADMIN — FAMOTIDINE 20 MG: 10 INJECTION INTRAVENOUS at 20:27

## 2021-01-31 RX ADMIN — SODIUM CHLORIDE, PRESERVATIVE FREE 10 ML: 5 INJECTION INTRAVENOUS at 08:00

## 2021-01-31 RX ADMIN — ENOXAPARIN SODIUM 100 MG: 100 INJECTION SUBCUTANEOUS at 16:40

## 2021-01-31 RX ADMIN — PROPOFOL 450 MG: 10 INJECTION, EMULSION INTRAVENOUS at 10:32

## 2021-01-31 RX ADMIN — LIDOCAINE HYDROCHLORIDE 100 MG: 20 INJECTION, SOLUTION INFILTRATION; PERINEURAL at 10:32

## 2021-01-31 RX ADMIN — AMLODIPINE BESYLATE 10 MG: 10 TABLET ORAL at 18:33

## 2021-01-31 RX ADMIN — SODIUM CHLORIDE, PRESERVATIVE FREE 10 ML: 5 INJECTION INTRAVENOUS at 20:27

## 2021-01-31 RX ADMIN — INSULIN LISPRO 2 UNITS: 100 INJECTION, SOLUTION INTRAVENOUS; SUBCUTANEOUS at 16:40

## 2021-01-31 RX ADMIN — POLYETHYLENE GLYCOL 3350, SODIUM SULFATE ANHYDROUS, SODIUM BICARBONATE, SODIUM CHLORIDE, POTASSIUM CHLORIDE 2000 ML: 236; 22.74; 6.74; 5.86; 2.97 POWDER, FOR SOLUTION ORAL at 05:56

## 2021-01-31 NOTE — ANESTHESIA POSTPROCEDURE EVALUATION
"Patient: Osvaldo Lopez    Procedure Summary     Date: 01/31/21 Room / Location:  [facility] / [facility] ENDOSCOPY    Anesthesia Start: 1029 Anesthesia Stop: 1118    Procedures:       ESOPHAGOGASTRODUODENOSCOPY WITH BIOPSIES (N/A Esophagus)      COLONOSCOPY TO CECUM  WITH ORISE INJECTION, BIOPSIES, COLD BIOPSY POLYPECTOMY, COLD AND HOT SNARE POLYPECTOMIES, TATTOO, AND CLIP X3 (N/A ) Diagnosis:       Symptomatic anemia      (Symptomatic anemia [D64.9])    Surgeon: Jey Barrios MD Provider: Hany Lindsay MD    Anesthesia Type: MAC ASA Status: 3          Anesthesia Type: MAC    Vitals  Vitals Value Taken Time   /59 01/31/21 1138   Temp     Pulse 66 01/31/21 1138   Resp 16 01/31/21 1138   SpO2 95 % 01/31/21 1138           Post Anesthesia Care and Evaluation    Patient location during evaluation: bedside  Patient participation: complete - patient participated  Level of consciousness: awake and alert  Pain management: adequate  Airway patency: patent  Anesthetic complications: No anesthetic complications    Cardiovascular status: acceptable  Respiratory status: acceptable  Hydration status: acceptable    Comments: /59   Pulse 66   Temp 36.6 °C (97.9 °F) (Oral)   Resp 16   Ht 182.9 cm (72\")   Wt 103 kg (226 lb 6.4 oz)   SpO2 95%   BMI 30.71 kg/m²       "

## 2021-01-31 NOTE — ANESTHESIA PREPROCEDURE EVALUATION
Anesthesia Evaluation     Patient summary reviewed and Nursing notes reviewed                Airway   Mallampati: III  No difficulty expected  Dental    (+) upper dentures and lower dentures    Pulmonary    (+) a smoker Former,   Cardiovascular     ECG reviewed  Rhythm: irregular    (+) hypertension, valvular problems/murmurs MR and AS, CAD, CABG, dysrhythmias Atrial Flutter, PVD, hyperlipidemia,       Neuro/Psych  (+) syncope, numbness,     GI/Hepatic/Renal/Endo    (+) obesity,   diabetes mellitus,     Musculoskeletal     Abdominal    Substance History - negative use     OB/GYN negative ob/gyn ROS         Other   arthritis,                        Anesthesia Plan    ASA 3     MAC     intravenous induction     Anesthetic plan, all risks, benefits, and alternatives have been provided, discussed and informed consent has been obtained with: patient.

## 2021-02-01 ENCOUNTER — PREP FOR SURGERY (OUTPATIENT)
Dept: OTHER | Facility: HOSPITAL | Age: 86
End: 2021-02-01

## 2021-02-01 DIAGNOSIS — K63.89 COLONIC MASS: Primary | ICD-10-CM

## 2021-02-01 LAB
ALBUMIN SERPL-MCNC: 3.9 G/DL (ref 3.5–5.2)
ALBUMIN/GLOB SERPL: 2.4 G/DL
ALP SERPL-CCNC: 74 U/L (ref 39–117)
ALT SERPL W P-5'-P-CCNC: 17 U/L (ref 1–41)
ANION GAP SERPL CALCULATED.3IONS-SCNC: 9.4 MMOL/L (ref 5–15)
AST SERPL-CCNC: 20 U/L (ref 1–40)
BASOPHILS # BLD AUTO: 0.01 10*3/MM3 (ref 0–0.2)
BASOPHILS NFR BLD AUTO: 0.3 % (ref 0–1.5)
BILIRUB SERPL-MCNC: 0.4 MG/DL (ref 0–1.2)
BUN SERPL-MCNC: 10 MG/DL (ref 8–23)
BUN/CREAT SERPL: 8.2 (ref 7–25)
CALCIUM SPEC-SCNC: 8.5 MG/DL (ref 8.6–10.5)
CEA SERPL-MCNC: 1.28 NG/ML
CHLORIDE SERPL-SCNC: 108 MMOL/L (ref 98–107)
CO2 SERPL-SCNC: 25.6 MMOL/L (ref 22–29)
CREAT SERPL-MCNC: 1.22 MG/DL (ref 0.76–1.27)
DEPRECATED RDW RBC AUTO: 48.1 FL (ref 37–54)
EOSINOPHIL # BLD AUTO: 0.07 10*3/MM3 (ref 0–0.4)
EOSINOPHIL NFR BLD AUTO: 1.8 % (ref 0.3–6.2)
ERYTHROCYTE [DISTWIDTH] IN BLOOD BY AUTOMATED COUNT: 17.5 % (ref 12.3–15.4)
GFR SERPL CREATININE-BSD FRML MDRD: 57 ML/MIN/1.73
GLOBULIN UR ELPH-MCNC: 1.6 GM/DL
GLUCOSE BLDC GLUCOMTR-MCNC: 119 MG/DL (ref 70–130)
GLUCOSE BLDC GLUCOMTR-MCNC: 119 MG/DL (ref 70–130)
GLUCOSE BLDC GLUCOMTR-MCNC: 139 MG/DL (ref 70–130)
GLUCOSE BLDC GLUCOMTR-MCNC: 145 MG/DL (ref 70–130)
GLUCOSE SERPL-MCNC: 102 MG/DL (ref 65–99)
HCT VFR BLD AUTO: 26.2 % (ref 37.5–51)
HCT VFR BLD AUTO: 26.9 % (ref 37.5–51)
HGB BLD-MCNC: 7.5 G/DL (ref 13–17.7)
HGB BLD-MCNC: 7.5 G/DL (ref 13–17.7)
HGB BLD-MCNC: 7.9 G/DL (ref 13–17.7)
HGB BLD-MCNC: 8 G/DL (ref 13–17.7)
IMM GRANULOCYTES # BLD AUTO: 0.01 10*3/MM3 (ref 0–0.05)
IMM GRANULOCYTES NFR BLD AUTO: 0.3 % (ref 0–0.5)
INR PPP: 1.17 (ref 0.9–1.1)
LYMPHOCYTES # BLD AUTO: 0.9 10*3/MM3 (ref 0.7–3.1)
LYMPHOCYTES NFR BLD AUTO: 23 % (ref 19.6–45.3)
MCH RBC QN AUTO: 21.9 PG (ref 26.6–33)
MCHC RBC AUTO-ENTMCNC: 28.6 G/DL (ref 31.5–35.7)
MCV RBC AUTO: 76.6 FL (ref 79–97)
MONOCYTES # BLD AUTO: 0.36 10*3/MM3 (ref 0.1–0.9)
MONOCYTES NFR BLD AUTO: 9.2 % (ref 5–12)
NEUTROPHILS NFR BLD AUTO: 2.56 10*3/MM3 (ref 1.7–7)
NEUTROPHILS NFR BLD AUTO: 65.4 % (ref 42.7–76)
NRBC BLD AUTO-RTO: 0 /100 WBC (ref 0–0.2)
PLATELET # BLD AUTO: 122 10*3/MM3 (ref 140–450)
PMV BLD AUTO: 9.9 FL (ref 6–12)
POTASSIUM SERPL-SCNC: 4 MMOL/L (ref 3.5–5.2)
PROT SERPL-MCNC: 5.5 G/DL (ref 6–8.5)
PROTHROMBIN TIME: 14.7 SECONDS (ref 11.7–14.2)
RBC # BLD AUTO: 3.42 10*6/MM3 (ref 4.14–5.8)
SODIUM SERPL-SCNC: 143 MMOL/L (ref 136–145)
WBC # BLD AUTO: 3.91 10*3/MM3 (ref 3.4–10.8)

## 2021-02-01 PROCEDURE — 82378 CARCINOEMBRYONIC ANTIGEN: CPT | Performed by: INTERNAL MEDICINE

## 2021-02-01 PROCEDURE — 85018 HEMOGLOBIN: CPT | Performed by: INTERNAL MEDICINE

## 2021-02-01 PROCEDURE — 99231 SBSQ HOSP IP/OBS SF/LOW 25: CPT | Performed by: INTERNAL MEDICINE

## 2021-02-01 PROCEDURE — 99222 1ST HOSP IP/OBS MODERATE 55: CPT | Performed by: COLON & RECTAL SURGERY

## 2021-02-01 PROCEDURE — 85610 PROTHROMBIN TIME: CPT | Performed by: INTERNAL MEDICINE

## 2021-02-01 PROCEDURE — 82962 GLUCOSE BLOOD TEST: CPT

## 2021-02-01 PROCEDURE — 97110 THERAPEUTIC EXERCISES: CPT

## 2021-02-01 PROCEDURE — 80053 COMPREHEN METABOLIC PANEL: CPT | Performed by: INTERNAL MEDICINE

## 2021-02-01 PROCEDURE — 85014 HEMATOCRIT: CPT | Performed by: INTERNAL MEDICINE

## 2021-02-01 PROCEDURE — 25010000002 ENOXAPARIN PER 10 MG: Performed by: INTERNAL MEDICINE

## 2021-02-01 PROCEDURE — 99232 SBSQ HOSP IP/OBS MODERATE 35: CPT | Performed by: NURSE PRACTITIONER

## 2021-02-01 PROCEDURE — 85025 COMPLETE CBC W/AUTO DIFF WBC: CPT | Performed by: INTERNAL MEDICINE

## 2021-02-01 RX ORDER — ACETAMINOPHEN 500 MG
1000 TABLET ORAL EVERY 6 HOURS
Status: CANCELLED | OUTPATIENT
Start: 2021-02-03

## 2021-02-01 RX ORDER — CHLORHEXIDINE GLUCONATE 4 G/100ML
SOLUTION TOPICAL 2 TIMES DAILY
Qty: 236 ML | Refills: 0 | Status: SHIPPED | OUTPATIENT
Start: 2021-02-01 | End: 2021-02-03

## 2021-02-01 RX ORDER — SCOLOPAMINE TRANSDERMAL SYSTEM 1 MG/1
1 PATCH, EXTENDED RELEASE TRANSDERMAL CONTINUOUS
Status: CANCELLED | OUTPATIENT
Start: 2021-02-03 | End: 2021-02-06

## 2021-02-01 RX ORDER — CEFAZOLIN SODIUM 2 G/100ML
2 INJECTION, SOLUTION INTRAVENOUS ONCE
Status: CANCELLED | OUTPATIENT
Start: 2021-02-03 | End: 2021-02-01

## 2021-02-01 RX ORDER — ALVIMOPAN 12 MG/1
12 CAPSULE ORAL ONCE
Status: CANCELLED | OUTPATIENT
Start: 2021-02-03 | End: 2021-02-01

## 2021-02-01 RX ORDER — GABAPENTIN 300 MG/1
600 CAPSULE ORAL 3 TIMES DAILY
Status: CANCELLED | OUTPATIENT
Start: 2021-02-03

## 2021-02-01 RX ADMIN — METOPROLOL SUCCINATE 25 MG: 25 TABLET, EXTENDED RELEASE ORAL at 09:46

## 2021-02-01 RX ADMIN — FAMOTIDINE 20 MG: 10 INJECTION INTRAVENOUS at 21:18

## 2021-02-01 RX ADMIN — LISINOPRIL 40 MG: 40 TABLET ORAL at 09:45

## 2021-02-01 RX ADMIN — TAMSULOSIN HYDROCHLORIDE 0.8 MG: 0.4 CAPSULE ORAL at 09:42

## 2021-02-01 RX ADMIN — ENOXAPARIN SODIUM 100 MG: 100 INJECTION SUBCUTANEOUS at 05:09

## 2021-02-01 RX ADMIN — AMLODIPINE BESYLATE 10 MG: 10 TABLET ORAL at 09:43

## 2021-02-01 RX ADMIN — SODIUM CHLORIDE, PRESERVATIVE FREE 10 ML: 5 INJECTION INTRAVENOUS at 21:18

## 2021-02-01 RX ADMIN — Medication 1000 MCG: at 09:43

## 2021-02-01 RX ADMIN — AMIODARONE HYDROCHLORIDE 200 MG: 200 TABLET ORAL at 09:45

## 2021-02-01 RX ADMIN — FAMOTIDINE 20 MG: 10 INJECTION INTRAVENOUS at 09:41

## 2021-02-01 RX ADMIN — SODIUM CHLORIDE, PRESERVATIVE FREE 10 ML: 5 INJECTION INTRAVENOUS at 09:43

## 2021-02-01 NOTE — PROGRESS NOTES
[facility], [place], operated by [facility]  Inpatient Progress Note    Reason for Follow Up: Anemia    Subjective     Interval History:   Colonoscopy yesterday with large flat cecal polyp noted as well as unresectable transverse colon polyp which failed to lift with orise.  Patient denies any overnight issues.  He has not had a bowel movement.  He has no current abdominal pain.he reports that sometimes prior to a bowel movement he has left-sided pain.  Colonoscopy results discussed with he and his daughter.    Current Facility-Administered Medications:   •  amiodarone (PACERONE) tablet 200 mg, 200 mg, Oral, Daily, Jey Barrios MD, 200 mg at 02/01/21 0945  •  amLODIPine (NORVASC) tablet 10 mg, 10 mg, Oral, Q24H, Jose Becerra MD, 10 mg at 02/01/21 0943  •  dextrose (D50W) 25 g/ 50mL Intravenous Solution 25 g, 25 g, Intravenous, Q15 Min PRN, Jey Barrios MD  •  dextrose (GLUTOSE) oral gel 15 g, 15 g, Oral, Q15 Min PRN, Jey Barrios MD  •  enoxaparin (LOVENOX) syringe 100 mg, 1 mg/kg, Subcutaneous, Q12H, Kodak Santana MD, 100 mg at 02/01/21 0509  •  famotidine (PEPCID) injection 20 mg, 20 mg, Intravenous, Q12H, Jey Barrios MD, 20 mg at 02/01/21 0941  •  glucagon (human recombinant) (GLUCAGEN DIAGNOSTIC) injection 1 mg, 1 mg, Subcutaneous, Q15 Min PRN, Jey Barrios MD  •  insulin lispro (humaLOG, ADMELOG) injection 0-9 Units, 0-9 Units, Subcutaneous, TID AC, Jey Barrios MD, 2 Units at 01/31/21 1640  •  lisinopril (PRINIVIL,ZESTRIL) tablet 40 mg, 40 mg, Oral, Daily, Jey Barrios MD, 40 mg at 02/01/21 0945  •  metoprolol succinate XL (TOPROL-XL) 24 hr tablet 25 mg, 25 mg, Oral, Daily, Jey Barrios MD, 25 mg at 02/01/21 0946  •  nitroglycerin (NITROSTAT) SL tablet 0.4 mg, 0.4 mg, Sublingual, Q5 Min PRN, Jey Barrios MD  •  ondansetron (ZOFRAN) injection 4 mg, 4 mg, Intravenous, Q6H PRN, Jey Barrios MD  •  sodium chloride 0.9 % flush 10 mL, 10 mL, Intravenous, Q12H, Denis  Jey CALIXTO MD, 10 mL at 02/01/21 0943  •  sodium chloride 0.9 % flush 10 mL, 10 mL, Intravenous, PRN, Jey Barrios MD  •  tamsulosin (FLOMAX) 24 hr capsule 0.8 mg, 0.8 mg, Oral, Daily, Jey Barrios MD, 0.8 mg at 02/01/21 0942  •  traMADol (ULTRAM) tablet 50 mg, 50 mg, Oral, Q6H PRN, Jey Barrios MD  •  vitamin B-12 (CYANOCOBALAMIN) tablet 1,000 mcg, 1,000 mcg, Oral, Daily, Jey Barrios MD, 1,000 mcg at 02/01/21 0943  Review of Systems:    Negative for fevers or chills, negative for abdominal pain or nausea    Objective     Vital Signs  Temp:  [97.5 °F (36.4 °C)-98.1 °F (36.7 °C)] 98 °F (36.7 °C)  Heart Rate:  [65-71] 69  Resp:  [16] 16  BP: (120-168)/(59-74) 168/74  Body mass index is 30.71 kg/m².    Intake/Output Summary (Last 24 hours) at 2/1/2021 0947  Last data filed at 1/31/2021 1839  Gross per 24 hour   Intake 1120 ml   Output 275 ml   Net 845 ml     No intake/output data recorded.     Physical Exam:   General: patient awake, alert and cooperative   Eyes: Normal lids and lashes, no scleral icterus   Neck: supple, normal ROM   Skin: warm and dry, not jaundiced   Pulm:  regular and unlabored   Abdomen: soft, nontender, nondistended    Psychiatric: Normal mood and behavior; memory intact     Results Review:     I reviewed the patient's new clinical results.    Results from last 7 days   Lab Units 02/01/21  0452 02/01/21  0008 01/31/21  1622 01/31/21  0754  01/29/21  1601   WBC 10*3/mm3 3.91  --   --  3.20*  --  3.63   HEMOGLOBIN g/dL 7.5*  7.5* 7.9* 8.0* 8.7*   < > 6.1*   HEMATOCRIT % 26.2*  26.2* 26.2* 27.4* 30.1*   < > 22.3*   PLATELETS 10*3/mm3 122*  --   --  137*  --  129*    < > = values in this interval not displayed.     Results from last 7 days   Lab Units 02/01/21  0452 01/31/21  0754 01/29/21  1601   SODIUM mmol/L 143 143 139   POTASSIUM mmol/L 4.0 4.3 4.7   CHLORIDE mmol/L 108* 108* 106   CO2 mmol/L 25.6 22.3 20.7*   BUN mg/dL 10 14 23   CREATININE mg/dL 1.22 1.10 1.33*   CALCIUM mg/dL  8.5* 8.9 8.8   BILIRUBIN mg/dL 0.4 0.5 0.2   ALK PHOS U/L 74 80 71   ALT (SGPT) U/L 17 20 18   AST (SGOT) U/L 20 24 17   GLUCOSE mg/dL 102* 113* 102*     Results from last 7 days   Lab Units 02/01/21  0452 01/31/21  0754   INR  1.17* 1.11*     Lab Results   Lab Value Date/Time    LIPASE 8 (L) 04/14/2019 0840       Radiology:  XR Chest 1 View   Final Result   No focal pulmonary consolidation. Borderline heart size.   Tortuous aorta. Follow-up as clinically indicated.       This report was finalized on 1/29/2021 4:10 PM by Dr. Vimal Mayorga M.D.              Assessment/Plan     Patient Active Problem List   Diagnosis   • Complete rotator cuff tear of left shoulder   • Abnormal finding on thyroid function test   • Abdominal aortic aneurysm (CMS/HCC)   • Benign prostatic hyperplasia   • Essential hypertension   • Hyperlipidemia   • Shoulder pain   • Lumbar radiculopathy   • DM (diabetes mellitus), type 2 with complications (CMS/HCC)   • OA (osteoarthritis) of knee   • Tear of right rotator cuff   • Spinal stenosis of lumbar region with neurogenic claudication   • Eyebrow ptosis   • Pseudophakia   • Nonrheumatic aortic valve stenosis   • Atrial flutter with rapid ventricular response (CMS/HCC)   • Right arm pain   • Leg weakness, bilateral   • Typical atrial flutter (CMS/HCC)   • Diabetic peripheral neuropathy (CMS/HCC)   • Muscle weakness (generalized)   • Pressure injury of left buttock, stage 1   • Chronic low back pain with sciatica   • 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CMS/HCC)   • Pressure injury of buttock, stage 1   • Anemia   • Symptomatic anemia   • Iron deficiency anemia   • Chronic anticoagulation   • CKD (chronic kidney disease)   • CRISTOBAL (acute kidney injury) (CMS/HCC)       Assessment:  1. Microcytic anemia secondary to iron deficiency  2. A. fib on Xarelto  3. Diabetes  4. Acute on chronic kidney injury  5. Thrombocytopenia      Plan:  · Colonoscopy findings reviewed with patient and his daughter-Path is  pending  · CEA was normal  · Surgery has been consulted to see the patient regarding colonoscopy findings  · Continue to follow H&H-transfuse as needed    I discussed the patients findings and my recommendations with patient and family.    Aditi Cerrato MD

## 2021-02-01 NOTE — PROGRESS NOTES
Discharge Planning Assessment  [facility]     Patient Name: Osvaldo Lopez  MRN: [id number]  Today's Date: 2/1/2021    Admit Date: 1/29/2021    Discharge Needs Assessment     Row Name 02/01/21 1539       Living Environment    Lives With  alone    Current Living Arrangements  home/apartment/condo    Primary Care Provided by  self    Provides Primary Care For  no one    Family Caregiver if Needed  child(nabila), adult    Family Caregiver Names  daughter, Sisi Sanchez, [phone]    Quality of Family Relationships  helpful;involved;supportive    Able to Return to Prior Arrangements  yes       Resource/Environmental Concerns    Resource/Environmental Concerns  none       Transition Planning    Patient/Family Anticipates Transition to  home    Patient/Family Anticipated Services at Transition  none    Transportation Anticipated  family or friend will provide       Discharge Needs Assessment    Equipment Currently Used at Home  walker, rolling;rollator;bath bench    Concerns to be Addressed  discharge planning    Provided Post Acute Provider List?  Refused    Refused Provider List Comment  Declines HH    Discharge Coordination/Progress  Home        Discharge Plan     Row Name 02/01/21 1547       Plan    Plan  Home    Patient/Family in Agreement with Plan  yes    Plan Comments  IMM letter checked. CCP met with pt at bedside to verify information and discuss d/c planning. Pt resides alone in a two level home equipped with stair lift between levels, and uses rollator or wheeled walker for mobility. Pt's bathroom is equipped with walk-in shower/bath bench. Pt unable to recall HH agency used in the past, and declines HH referral at this time. Pt has been to [facility] for past sub-acute rehab. Pt uses [facility] pharmacy [facility]. Pt uncertain of needs pending treatment course. Pt states his daughter will transport him home at d/c. CCP to follow to assist should needs arise. Erinn Santos LCSW        Continued Care  and Services - Admitted Since 1/29/2021    Coordination has not been started for this encounter.         Demographic Summary     Row Name 02/01/21 1538       General Information    Admission Type  inpatient    Arrived From  home    Required Notices Provided  Important Message from Medicare    Referral Source  admission list    Reason for Consult  discharge planning    Preferred Language  English        Functional Status     Row Name 02/01/21 1538       Functional Status    Usual Activity Tolerance  good    Current Activity Tolerance  good       Functional Status, IADL    Medications  independent    Meal Preparation  independent    Housekeeping  independent    Laundry  independent    Shopping  independent       Mental Status Summary    Recent Changes in Mental Status/Cognitive Functioning  no changes        Psychosocial    No documentation.       Abuse/Neglect    No documentation.       Legal    No documentation.       Substance Abuse    No documentation.       Patient Forms    No documentation.           Katie Santos LCSW

## 2021-02-01 NOTE — CONSULTS
Osvaldo Lopez is a 85 y.o. male who is seen as a consult at the request of Dr. Santana for Colonic mass    HPI:  Patient was not admitted with anemia.  His hemoglobin was 6.8.  He has been transfused.  He is on chronic anticoagulation for a fib /a flutter.  This is on hold because of anemia and a newly found colonic mass.  Patient was having increasing fatigue and had labs checked at his primary.  This is where he was found to be anemic.    Patient has issues with mobility and has a difficult time getting up from sitting.  He states this is from his back surgeries.    Past Medical History:   Diagnosis Date   • Abnormal thyroid blood test    • Aneurysm of abdominal aorta (CMS/HCC)    • Arthritis    • Bleeding disorder (CMS/HCC)    • BPH (benign prostatic hyperplasia)    • Bruises easily    • Bulging of thoracic intervertebral disc    • Chronic edema    • Coronary artery disease    • Diverticular disease    • Enlarged prostate without lower urinary tract symptoms (luts)    • Essential hypertension    • Heart attack (CMS/HCC)    • HL (hearing loss)    • Hyperactivity of bladder    • Hyperlipidemia    • Left shoulder pain    • Lumbar radiculopathy 2016    wears back brace at times following back surgery   • Muscle weakness (generalized)    • Nonrheumatic aortic valve stenosis     mild 1/2018    • Osteoarthritis    • PAF (paroxysmal atrial fibrillation) (CMS/HCC)    • Polyuria    • Recurrent falls    • Screening      stop bang scale 5   • Syncope    • Torn rotator cuff     left   • Type 2 diabetes mellitus (CMS/HCC)    • Urinary frequency        Past Surgical History:   Procedure Laterality Date   • CARDIAC ELECTROPHYSIOLOGY PROCEDURE N/A 6/6/2019    Procedure: Ablation atrial flutter;  Surgeon: Miller Talbot MD;  Location: [facility] INVASIVE LOCATION;  Service: Cardiovascular   • CARDIAC SURGERY      CABGX5 (1989)   • CARDIOVERSION  04/15/2019    Dr. Miller Talbot   • CATARACT EXTRACTION Bilateral 1997   •  COLONOSCOPY N/A 1/31/2021    Procedure: COLONOSCOPY TO CECUM  WITH ORISE INJECTION, BIOPSIES, COLD BIOPSY POLYPECTOMY, COLD AND HOT SNARE POLYPECTOMIES, TATTOO, AND CLIP X3;  Surgeon: Jey Barrios MD;  Location: [facility] ENDOSCOPY;  Service: Gastroenterology;  Laterality: N/A;  PRE- IRON DEFICIENCY ANEMIA  POST- COLON MASS, COLON POLYPS, DIVERTICULOSIS, HEMORRHOIDS   • CORONARY ARTERY BYPASS GRAFT     • CYST REMOVAL      FROM BACK   • ENDOSCOPY N/A 1/31/2021    Procedure: ESOPHAGOGASTRODUODENOSCOPY WITH BIOPSIES;  Surgeon: Jey Barrios MD;  Location: [facility] ENDOSCOPY;  Service: Gastroenterology;  Laterality: N/A;  PRE- IRON DEFICIENCY ANEMIA  POST- NORMAL   • GALLBLADDER SURGERY  1989   • HERNIA REPAIR Left     inquinal times 3  last one in 2003   • KNEE CARTILAGE SURGERY Left 1970's   • LUMBAR DISCECTOMY FUSION INSTRUMENTATION N/A 4/11/2016    Procedure: lumbar laminectomy L4-5 and fusion with instrumentation;  Surgeon: Ran Kline MD;  Location: [facility] OR;  Service:    • LUMBAR DISCECTOMY FUSION INSTRUMENTATION N/A 1/15/2018    Procedure: L2-3, L3-4 laminectomy and fusion with instrumentation and removal of implants L4 5.;  Surgeon: Ran Kline MD;  Location: [facility] OR;  Service:    • TN TOTAL KNEE ARTHROPLASTY Left 11/14/2016    Procedure: TOTAL KNEE ARTHROPLASTY;  Surgeon: Dontrell Arellano MD;  Location: [facility] OR;  Service: Orthopedics       Social History:   reports that he quit smoking about 32 years ago. His smoking use included cigarettes. He has a 72.00 pack-year smoking history. He has never used smokeless tobacco. He reports current alcohol use. He reports that he does not use drugs.      Marriage status:     Family History   Problem Relation Age of Onset   • Heart failure Mother    • Diabetes Mother    • Cancer Sister    • Diabetes Sister    • Cancer Brother    • Diabetes Brother    • Cancer Father        No current facility-administered medications on file prior to  encounter.      Current Outpatient Medications on File Prior to Encounter   Medication Sig Dispense Refill   • acetaminophen (TYLENOL) 500 MG tablet Take 500 mg by mouth Every 6 (Six) Hours As Needed for Mild Pain .     • Alpha-Lipoic Acid 600 MG tablet Take 600 mg by mouth Daily. For neuropathy 30 tablet 11   • amiodarone (PACERONE) 200 MG tablet TAKE 1 TABLET DAILY 90 tablet 3   • KYRA MICROLET LANCETS lancets USE TWICE A  each 5   • clotrimazole-betamethasone (Lotrisone) 1-0.05 % cream Apply  topically to the appropriate area as directed 2 (Two) Times a Day. 135 g 3   • glucose blood (Contour Next Test) test strip 1 each by Other route Daily. test blood sugar 50 each 5   • hydrALAZINE (APRESOLINE) 25 MG tablet Take 1 tablet by mouth 3 (Three) Times a Day. 180 tablet 3   • lisinopril (PRINIVIL,ZESTRIL) 40 MG tablet TAKE 1 TABLET DAILY 90 tablet 1   • metFORMIN (GLUCOPHAGE) 850 MG tablet TAKE 1 TABLET TWICE DAILY  WITH MEALS 180 tablet 1   • metoprolol succinate XL (TOPROL-XL) 25 MG 24 hr tablet TAKE 1 TABLET DAILY 90 tablet 3   • Multiple Vitamin (MULTI VITAMIN PO) Take 1 tablet by mouth Every Morning.     • silver sulfadiazine (Silvadene) 1 % cream Apply  topically to the appropriate area as directed 2 (Two) Times a Day. 85 g 3   • tamsulosin (FLOMAX) 0.4 MG capsule 24 hr capsule TAKE 2 CAPSULES DAILY 180 capsule 1   • traMADol (ULTRAM) 50 MG tablet Take 1 tablet by mouth Every 6 (Six) Hours As Needed for Moderate Pain  or Severe Pain . 30 tablet 1   • vitamin B-12 (CYANOCOBALAMIN) 1000 MCG tablet Take 1,000 mcg by mouth Daily.     • XARELTO 20 MG tablet TAKE 1 TABLET DAILY 90 tablet 3   • mupirocin (BACTROBAN) 2 % ointment APPLY TWO TIMES PER DAY TO THE BIOPSY SITES.  3   • Silver Hydrogel gel Apply 1 application topically 2 (two) times a day. 45 mL 5       Current Facility-Administered Medications:   •  amiodarone (PACERONE) tablet 200 mg, 200 mg, Oral, Daily, Jey Barrios MD, 200 mg at 02/01/21  0945  •  amLODIPine (NORVASC) tablet 10 mg, 10 mg, Oral, Q24H, Jose Becerra MD, 10 mg at 02/01/21 0943  •  dextrose (D50W) 25 g/ 50mL Intravenous Solution 25 g, 25 g, Intravenous, Q15 Min PRN, Jey Barrios MD  •  dextrose (GLUTOSE) oral gel 15 g, 15 g, Oral, Q15 Min PRN, Jey Brarios MD  •  famotidine (PEPCID) injection 20 mg, 20 mg, Intravenous, Q12H, Jey Barrios MD, 20 mg at 02/01/21 0941  •  glucagon (human recombinant) (GLUCAGEN DIAGNOSTIC) injection 1 mg, 1 mg, Subcutaneous, Q15 Min PRN, Jey Barrios MD  •  insulin lispro (humaLOG, ADMELOG) injection 0-9 Units, 0-9 Units, Subcutaneous, TID AC, Jey Barrios MD, 2 Units at 01/31/21 1640  •  lisinopril (PRINIVIL,ZESTRIL) tablet 40 mg, 40 mg, Oral, Daily, Jey Barrios MD, 40 mg at 02/01/21 0945  •  metoprolol succinate XL (TOPROL-XL) 24 hr tablet 25 mg, 25 mg, Oral, Daily, Jey Barrios MD, 25 mg at 02/01/21 0946  •  nitroglycerin (NITROSTAT) SL tablet 0.4 mg, 0.4 mg, Sublingual, Q5 Min PRN, Jey Barrios MD  •  ondansetron (ZOFRAN) injection 4 mg, 4 mg, Intravenous, Q6H PRN, Jey Barrios MD  •  sodium chloride 0.9 % flush 10 mL, 10 mL, Intravenous, Q12H, Jey Barrios MD, 10 mL at 02/01/21 0943  •  sodium chloride 0.9 % flush 10 mL, 10 mL, Intravenous, PRN, Jey Barrios MD  •  tamsulosin (FLOMAX) 24 hr capsule 0.8 mg, 0.8 mg, Oral, Daily, Jey Barrios MD, 0.8 mg at 02/01/21 0942  •  traMADol (ULTRAM) tablet 50 mg, 50 mg, Oral, Q6H PRN, Jey Barrios MD  •  vitamin B-12 (CYANOCOBALAMIN) tablet 1,000 mcg, 1,000 mcg, Oral, Daily, Jey Barrios MD, 1,000 mcg at 02/01/21 0943    Allergy  Amiodarone and Percocet [oxycodone-acetaminophen]    Review of Systems   Constitution: Negative for decreased appetite and weight gain.   HENT: Negative for congestion, hearing loss and hoarse voice.    Eyes: Negative for blurred vision, discharge and visual disturbance.   Cardiovascular: Negative for chest pain, cyanosis and  leg swelling.   Respiratory: Negative for cough, shortness of breath, sleep disturbances due to breathing and snoring.    Endocrine: Negative for cold intolerance and heat intolerance.   Hematologic/Lymphatic: Bruises/bleeds easily.   Skin: Negative for itching, poor wound healing and skin cancer.   Musculoskeletal: Positive for back pain and joint pain. Negative for arthritis and joint swelling.   Gastrointestinal: Negative for abdominal pain, change in bowel habit, bowel incontinence and constipation.   Genitourinary: Negative for bladder incontinence, dysuria and hematuria.   Neurological: Positive for light-headedness and weakness. Negative for brief paralysis, excessive daytime sleepiness, dizziness, focal weakness and headaches.   Psychiatric/Behavioral: Negative for altered mental status and hallucinations. The patient does not have insomnia.    Allergic/Immunologic: Negative for HIV exposure and persistent infections.       Vitals:    02/01/21 1317   BP: 128/57   Pulse: 74   Resp: 16   Temp: 98 °F (36.7 °C)   SpO2:      Body mass index is 30.71 kg/m².    Physical Exam  Exam conducted with a chaperone present.   Constitutional:       General: He is not in acute distress.     Appearance: He is well-developed.   HENT:      Head: Normocephalic and atraumatic.      Nose: Nose normal.   Eyes:      Conjunctiva/sclera: Conjunctivae normal.      Pupils: Pupils are equal, round, and reactive to light.   Neck:      Musculoskeletal: Normal range of motion.      Trachea: No tracheal deviation.   Pulmonary:      Effort: Pulmonary effort is normal. No respiratory distress.      Breath sounds: Normal breath sounds.   Abdominal:      General: Bowel sounds are normal. There is no distension.      Palpations: Abdomen is soft.   Musculoskeletal: Normal range of motion.         General: No deformity.   Skin:     General: Skin is warm and dry.   Neurological:      Mental Status: He is alert and oriented to person, place, and  time.      Cranial Nerves: No cranial nerve deficit.      Coordination: Coordination normal.      Gait: Gait normal.   Psychiatric:         Behavior: Behavior normal.         Judgment: Judgment normal.         Review of Medical Record:  I reviewed colonoscopy report and images.  Shows a hepatic flexure mass and ascending colon polyps that are unable to be removed because of how large they are.  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  Awaiting pathology   Cardiac risk assessment  Discussed potential surgery with patient and daughter.  Need further above information to make final determination.

## 2021-02-01 NOTE — PLAN OF CARE
Goal Outcome Evaluation:  Plan of Care Reviewed With: patient, daughter  Progress: improving  Outcome Summary: Pt doing well this morning and agreeable to PT. He has no complaints at this time. Pt appears to be improving with mobility. He ambulated approx 375 ft with his rollator without difficulty. Pt is ambulating on room air with O2 sats at 96% after activity. Pt plans home at [place]. Encouraged pt to continue ambulating w [facility] [place] several times a day. Acute PT no longer indicated at this time. Will sign off.  Patient was intermittently wearing a face mask during this therapy encounter. Therapist used appropriate personal protective equipment including eye protection, mask, and gloves.  Mask used was standard procedure mask. Appropriate PPE was worn during the entire therapy session. Hand hygiene was completed before and after therapy session. Patient is not in enhanced droplet precautions.

## 2021-02-01 NOTE — PROGRESS NOTES
Name: Osvaldo Lopez ADMIT: 2021   : 1935  PCP: Caio Rodríguez Jr., MD    MRN: [id number] LOS: 1 days   AGE/SEX: 85 y.o. male  ROOM: [facility]     Subjective   Subjective   No events overnight.  Denies episodes of hematochezia.  Holding AC in setting of GI bleed. Oozing mass found on C-scope    Review of Systems   Constitutional: Positive for fatigue. Negative for chills and fever.   Respiratory: Negative for cough and shortness of breath.    Cardiovascular: Negative for chest pain and palpitations.   Gastrointestinal: Negative for abdominal pain and blood in stool.        Objective   Objective   Vital Signs  Temp:  [97.8 °F (36.6 °C)-98.1 °F (36.7 °C)] 98 °F (36.7 °C)  Heart Rate:  [65-74] 74  Resp:  [16] 16  BP: (128-168)/(57-74) 128/57  SpO2:  [93 %-98 %] 98 %  on   ;   Device (Oxygen Therapy): room air  Body mass index is 30.71 kg/m².  Physical Exam  Constitutional:       General: He is not in acute distress.     Appearance: Normal appearance.   Eyes:      Extraocular Movements: Extraocular movements intact.      Pupils: Pupils are equal, round, and reactive to light.   Cardiovascular:      Rate and Rhythm: Normal rate and regular rhythm.   Pulmonary:      Effort: Pulmonary effort is normal.      Breath sounds: Normal breath sounds.   Abdominal:      General: There is no distension.      Palpations: Abdomen is soft.      Tenderness: There is no abdominal tenderness.   Neurological:      Mental Status: He is alert.         Results Review     I reviewed the patient's new clinical results.  Results from last 7 days   Lab Units 21  0452 21  0008 21  1622 21  0754  21  1601 21  1322   WBC 10*3/mm3 3.91  --   --  3.20*  --  3.63 4.86   HEMOGLOBIN g/dL 7.5*  7.5* 7.9* 8.0* 8.7*   < > 6.1* 6.8*   PLATELETS 10*3/mm3 122*  --   --  137*  --  129* 138*    < > = values in this interval not displayed.     Results from last 7 days   Lab Units 21  0452 21  0753  01/29/21  1601 01/26/21  1445   SODIUM mmol/L 143 143 139 137   POTASSIUM mmol/L 4.0 4.3 4.7 4.9   CHLORIDE mmol/L 108* 108* 106 104   TOTAL CO2 mmol/L  --   --   --  24.3   CO2 mmol/L 25.6 22.3 20.7*  --    BUN mg/dL 10 14 23 23   CREATININE mg/dL 1.22 1.10 1.33* 1.15   GLUCOSE mg/dL 102* 113* 102*  --    Estimated Creatinine Clearance: 57 mL/min (by C-G formula based on SCr of 1.22 mg/dL).  Results from last 7 days   Lab Units 02/01/21  0452 01/31/21  0754 01/29/21  1601 01/26/21  1445   ALBUMIN g/dL 3.90 4.30 4.00 4.20   BILIRUBIN mg/dL 0.4 0.5 0.2 0.3   ALK PHOS U/L 74 80 71 77   AST (SGOT) U/L 20 24 17 23   ALT (SGPT) U/L 17 20 18 21     Results from last 7 days   Lab Units 02/01/21  0452 01/31/21  0754 01/29/21  1601 01/26/21  1445   CALCIUM mg/dL 8.5* 8.9 8.8 8.9   ALBUMIN g/dL 3.90 4.30 4.00 4.20       COVID19   Date Value Ref Range Status   01/29/2021 Not Detected Not Detected - Ref. Range Final     Glucose   Date/Time Value Ref Range Status   02/01/2021 1104 139 (H) 70 - 130 mg/dL Final   02/01/2021 0617 119 70 - 130 mg/dL Final   01/31/2021 1954 166 (H) 70 - 130 mg/dL Final   01/31/2021 1615 153 (H) 70 - 130 mg/dL Final   01/31/2021 1236 150 (H) 70 - 130 mg/dL Final   01/31/2021 0611 128 70 - 130 mg/dL Final   01/30/2021 2043 139 (H) 70 - 130 mg/dL Final       XR Chest 1 View  Narrative: XR CHEST 1 VW-     HISTORY: Male who is 85 years-old,  short of breath     TECHNIQUE: Frontal view of the chest     COMPARISON: 04/14/2019     FINDINGS: The heart size is borderline. Aorta is tortuous, calcified.  Pulmonary vasculature is unremarkable. Sternotomy wires are seen. No  focal pulmonary consolidation, pleural effusion, or pneumothorax. No  acute osseous process.     Impression: No focal pulmonary consolidation. Borderline heart size.  Tortuous aorta. Follow-up as clinically indicated.     This report was finalized on 1/29/2021 4:10 PM by Dr. Vimal Mayorga M.D.       Scheduled Medications  amiodarone, 200  mg, Oral, Daily  amLODIPine, 10 mg, Oral, Q24H  enoxaparin, 1 mg/kg, Subcutaneous, Q12H  famotidine, 20 mg, Intravenous, Q12H  insulin lispro, 0-9 Units, Subcutaneous, TID AC  lisinopril, 40 mg, Oral, Daily  metoprolol succinate XL, 25 mg, Oral, Daily  sodium chloride, 10 mL, Intravenous, Q12H  tamsulosin, 0.8 mg, Oral, Daily  vitamin B-12, 1,000 mcg, Oral, Daily    Infusions   Diet  Diet Clear Liquid       Assessment/Plan     Active Hospital Problems    Diagnosis  POA   • **Symptomatic anemia [D64.9]  Yes   • Iron deficiency anemia [D50.9]  Unknown   • Chronic anticoagulation [Z79.01]  Not Applicable   • CKD (chronic kidney disease) [N18.9]  Unknown   • CRISTOBAL (acute kidney injury) (CMS/HCC) [N17.9]  Unknown   • 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       85 y.o. male admitted with Symptomatic anemia.  Iron deficiency anemia  Colonoscopy showed a proximal colon mass as well as a cecal polyp.  The proximal colon mass was oozing blood.  Colorectal surgery has been consulted, appreciate recommendations    Typical atrial flutter status post ablation on chronic anticoagulation therapy.    Currently in sinus rhythm  Xarelto is currently being held.  Not any pharmacological anticoagulation at this time secondary to actively using proximal colon mass.    Currently on amiodarone and metoprolol, continue per cardiology.    Troponin elevation  Patient has a history of chronic troponin elevation, no chest pain, EKG without any significant changes.    CKD 2  Monitor renal function    Type 2 diabetes  Sliding scale    Hypertension  Continue amlodipine 10, lisinopril 40-creatinine appears to be at baseline.          · SCDs for DVT prophylaxis.  · Full code.  · Discussed with  patient  · Anticipate discharge TBD.  timing yet to be determined.      Michael Austin MD  [facility]  02/01/21  13:39 EST    Patient was wearing facemask when I entered the room and throughout our encounter.  I wore protective equipment throughout this patient encounter including a face mask, gloves and protective eyewear.  Hand hygiene was performed before donning protective equipment and after removal when leaving the room.

## 2021-02-01 NOTE — PLAN OF CARE
Problem: Adult Inpatient Plan of Care  Goal: Patient-Specific Goal (Individualized)  2/1/2021 0521 by Hiral Cash, RN  Outcome: Ongoing, Progressing  Flowsheets (Taken 2/1/2021 0521)  Anxieties, Fears or Concerns: biopsy results.     Problem: Adult Inpatient Plan of Care  Goal: Absence of Hospital-Acquired Illness or Injury  2/1/2021 0521 by Hiral Cash, RN  Outcome: Ongoing, Progressing   Goal Outcome Evaluation:

## 2021-02-01 NOTE — PLAN OF CARE
Goal Outcome Evaluation:   VSS. Surgery scheduled Wednesday with Dr. Napoles. In in chair most of day. Monitor labs and vital signs.

## 2021-02-01 NOTE — THERAPY DISCHARGE NOTE
Patient Name: Osvaldo Lopez  : 1935    MRN: [id number]                              Today's Date: 2021       Admit Date: 2021    Visit Dx:     ICD-10-CM ICD-9-CM   1. Symptomatic anemia  D64.9 285.9   2. Other fatigue  R53.83 780.79   3. Elevated troponin  R77.8 790.6     Patient Active Problem List   Diagnosis   • Complete rotator cuff tear of left shoulder   • Abnormal finding on thyroid function test   • Abdominal aortic aneurysm (CMS/HCC)   • Benign prostatic hyperplasia   • Essential hypertension   • Hyperlipidemia   • Shoulder pain   • Lumbar radiculopathy   • DM (diabetes mellitus), type 2 with complications (CMS/HCC)   • OA (osteoarthritis) of knee   • Tear of right rotator cuff   • Spinal stenosis of lumbar region with neurogenic claudication   • Eyebrow ptosis   • Pseudophakia   • Nonrheumatic aortic valve stenosis   • Atrial flutter with rapid ventricular response (CMS/HCC)   • Right arm pain   • Leg weakness, bilateral   • Typical atrial flutter (CMS/HCC)   • Diabetic peripheral neuropathy (CMS/HCC)   • Muscle weakness (generalized)   • Pressure injury of left buttock, stage 1   • Chronic low back pain with sciatica   • 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CMS/HCC)   • Pressure injury of buttock, stage 1   • Anemia   • Symptomatic anemia   • Iron deficiency anemia   • Chronic anticoagulation   • CKD (chronic kidney disease)   • CRISTOBAL (acute kidney injury) (CMS/HCC)     Past Medical History:   Diagnosis Date   • Abnormal thyroid blood test    • Aneurysm of abdominal aorta (CMS/HCC)    • Arthritis    • Bleeding disorder (CMS/HCC)    • BPH (benign prostatic hyperplasia)    • Bruises easily    • Bulging of thoracic intervertebral disc    • Chronic edema    • Coronary artery disease    • Diverticular disease    • Enlarged prostate without lower urinary tract symptoms (luts)    • Essential hypertension    • Heart attack (CMS/HCC)    • HL (hearing loss)    • Hyperactivity of bladder    •  Hyperlipidemia    • Left shoulder pain    • Lumbar radiculopathy 2016    wears back brace at times following back surgery   • Muscle weakness (generalized)    • Nonrheumatic aortic valve stenosis     mild 1/2018    • Osteoarthritis    • PAF (paroxysmal atrial fibrillation) (CMS/HCC)    • Polyuria    • Recurrent falls    • Screening      stop bang scale 5   • Syncope    • Torn rotator cuff     left   • Type 2 diabetes mellitus (CMS/HCC)    • Urinary frequency      Past Surgical History:   Procedure Laterality Date   • CARDIAC ELECTROPHYSIOLOGY PROCEDURE N/A 6/6/2019    Procedure: Ablation atrial flutter;  Surgeon: Miller Talbot MD;  Location: [facility] CATH INVASIVE LOCATION;  Service: Cardiovascular   • CARDIAC SURGERY      CABGX5 (1989)   • CARDIOVERSION  04/15/2019    Dr. Miller Talbot   • CATARACT EXTRACTION Bilateral 1997   • COLONOSCOPY N/A 1/31/2021    Procedure: COLONOSCOPY TO CECUM  WITH ORISE INJECTION, BIOPSIES, COLD BIOPSY POLYPECTOMY, COLD AND HOT SNARE POLYPECTOMIES, TATTOO, AND CLIP X3;  Surgeon: Jey Barrios MD;  Location: [facility] ENDOSCOPY;  Service: Gastroenterology;  Laterality: N/A;  PRE- IRON DEFICIENCY ANEMIA  POST- COLON MASS, COLON POLYPS, DIVERTICULOSIS, HEMORRHOIDS   • CORONARY ARTERY BYPASS GRAFT     • CYST REMOVAL      FROM BACK   • ENDOSCOPY N/A 1/31/2021    Procedure: ESOPHAGOGASTRODUODENOSCOPY WITH BIOPSIES;  Surgeon: Jey Barrios MD;  Location: [facility] ENDOSCOPY;  Service: Gastroenterology;  Laterality: N/A;  PRE- IRON DEFICIENCY ANEMIA  POST- NORMAL   • GALLBLADDER SURGERY  1989   • HERNIA REPAIR Left     inquinal times 3  last one in 2003   • KNEE CARTILAGE SURGERY Left 1970's   • LUMBAR DISCECTOMY FUSION INSTRUMENTATION N/A 4/11/2016    Procedure: lumbar laminectomy L4-5 and fusion with instrumentation;  Surgeon: Ran Kline MD;  Location: [facility] OR;  Service:    • LUMBAR DISCECTOMY FUSION INSTRUMENTATION N/A 1/15/2018    Procedure: L2-3, L3-4 laminectomy and  fusion with instrumentation and removal of implants L4 5.;  Surgeon: Ran Kline MD;  Location: [facility];  Service:    • WI TOTAL KNEE ARTHROPLASTY Left 11/14/2016    Procedure: TOTAL KNEE ARTHROPLASTY;  Surgeon: Dontrell Arellano MD;  Location: [facility];  Service: Orthopedics     General Information     Row Name 02/01/21 0905          Physical Therapy Time and Intention    Document Type  discharge treatment;therapy note (daily note)  -EJ     Mode of Treatment  physical therapy  -EJ       User Key  (r) = Recorded By, (t) = Taken By, (c) = Cosigned By    Initials Name Provider Type    EJ Debra Crabtree, PT Physical Therapist        Mobility     Row Name 02/01/21 0907          Bed Mobility    All Activities, [place] (Bed Mobility)  supervision  -EJ     Assistive Device (Bed Mobility)  head of bed elevated;bed rails  -EJ     Row Name 02/01/21 0907          Bed-Chair Transfer    Assistive Device (Bed-Chair Transfers)  walker, 4-wheeled  -EJ     Row Name 02/01/21 0907          Sit-Stand Transfer    Sit-Stand [place] (Transfers)  standby assist  -EJ     Assistive Device (Sit-Stand Transfers)  walker, 4-wheeled  -EJ     Row Name 02/01/21 0907          Gait/Stairs (Locomotion)    [place] Level (Gait)  standby assist;supervision  -EJ     Assistive Device (Gait)  walker, 4-wheeled  -EJ     Distance in Feet (Gait)  375  -EJ     Bilateral Gait Deviations  forward flexed posture  -EJ     Comment (Gait/Stairs)  no LOB or unsteadiness noted  -EJ       User Key  (r) = Recorded By, (t) = Taken By, (c) = Cosigned By    Initials Name Provider Type    EJ Debra Crabtree, PT Physical Therapist        Obj/Interventions    No documentation.       Goals/Plan    No documentation.       Clinical Impression     Row Name 02/01/21 0909          Pain Scale: Numbers Pre/Post-Treatment    Pretreatment Pain Rating  0/10 - no pain  -EJ     Posttreatment Pain Rating  0/10 - no pain  -EJ     Row Name 02/01/21 0909           Plan of Care Review    Plan of Care Reviewed With  patient;daughter  -EJ     Progress  improving  -EJ     Outcome Summary  Pt doing well this morning and agreeable to PT. He has no complaints at this time. Pt appears to be improving with mobility. He ambulated approx 375 ft with his rollator without difficulty. Pt is ambulating on room air with O2 sats at 96% after activity. Pt plans home at [place]. Encouraged pt to continue ambulating w ns staff several times a day. Acute PT no longer indicated at this time. Will sign off.  -EJ     Row Name 02/01/21 0909          Positioning and Restraints    Pre-Treatment Position  in bed  -EJ     Post Treatment Position  chair  -EJ     In Chair  notified nsg;sitting;call light within reach;encouraged to call for assist;exit alarm on;with family/caregiver  -EJ       User Key  (r) = Recorded By, (t) = Taken By, (c) = Cosigned By    Initials Name Provider Type    Debra Chirinos, PT Physical Therapist        Outcome Measures     Row Name 02/01/21 0911          How much help from another person do you currently need...    Turning from your back to your side while in flat bed without using bedrails?  4  -EJ     Moving from lying on back to sitting on the side of a flat bed without bedrails?  4  -EJ     Moving to and from a bed to a chair (including a wheelchair)?  3  -EJ     Standing up from a chair using your arms (e.g., wheelchair, bedside chair)?  3  -EJ     Climbing 3-5 steps with a railing?  3  -EJ     To walk in hospital room?  3  -EJ     AM-PAC 6 Clicks Score (PT)  20  -EJ       User Key  (r) = Recorded By, (t) = Taken By, (c) = Cosigned By    Initials Name Provider Type    Debra Chirinos, PT Physical Therapist        Physical Therapy Education                 Title: PT OT SLP Therapies (Resolved)     Topic: Physical Therapy (Resolved)     Point: Mobility training (Resolved)     Learning Progress Summary           Patient Acceptance, E,TB, VU,DU by PIYUSH at  1/31/2021 1338    Acceptance, E,TB,D, VU,DU by LB at 1/30/2021 0955                   Point: Home exercise program (Resolved)     Learning Progress Summary           Patient Acceptance, E,TB, VU,DU by LB at 1/31/2021 1338    Acceptance, E,TB,D, VU,DU by LB at 1/30/2021 0955                   Point: Body mechanics (Resolved)     Learning Progress Summary           Patient Acceptance, E,TB, VU,DU by LB at 1/31/2021 1338    Acceptance, E,TB,D, VU,DU by LB at 1/30/2021 0955                   Point: Precautions (Resolved)     Learning Progress Summary           Patient Acceptance, E,TB, VU,DU by LB at 1/31/2021 1338    Acceptance, E,TB,D, VU,DU by LB at 1/30/2021 0955                               User Key     Initials Effective Dates Name Provider Type Discipline     08/09/20 -  Aditi Chaudhary, PT Physical Therapist PT              PT Recommendation and Plan     Plan of Care Reviewed With: patient, daughter  Progress: improving  Outcome Summary: Pt doing well this morning and agreeable to PT. He has no complaints at this time. Pt appears to be improving with mobility. He ambulated approx 375 ft with his rollator without difficulty. Pt is ambulating on room air with O2 sats at 96% after activity. Pt plans home at [place]. Encouraged pt to continue ambulating w [facility] staff several times a day. Acute PT no longer indicated at this time. Will sign off.     Time Calculation:   PT Charges     Row Name 02/01/21 0912             Time Calculation    Start Time  0840  -EJ      Stop Time  0858  -EJ      Time Calculation (min)  18 min  -EJ      PT Received On  02/01/21  -EJ        User Key  (r) = Recorded By, (t) = Taken By, (c) = Cosigned By    Initials Name Provider Type    EJ Debra Crabtree, PT Physical Therapist        Therapy Charges for Today     Code Description Service Date Service Provider Modifiers Qty    70192336343 HC PT THER PROC EA 15 MIN 2/1/2021 Debra Crabtree, PT GP 1          PT G-Codes  Outcome Measure Options:  AM-PAC 6 Clicks Basic Mobility (PT)  AM-PAC 6 Clicks Score (PT): 20    PT Discharge Summary  Reason for Discharge: Independent, At baseline function    Debra Crabtree, PT  2/1/2021

## 2021-02-01 NOTE — PROGRESS NOTES
"    Patient Name: Osvaldo Lopez  :1935  85 y.o.      Patient Care Team:  Caio Rodríguez Jr., MD as PCP - General (Family Medicine)  Dontrell Arellano MD as Surgeon (Orthopedic Surgery)  Angelo Driscoll MD as Consulting Physician (Cardiology)  Darvin Alvarez MD as Consulting Physician (Urology)  Caio Rodríguez Jr., MD as Referring Physician (Family Medicine)    Chief Complaint: follow up pAF, anemia, colon mass    Interval History: remains in SR. Hgb 7.5       Objective   Vital Signs  Temp:  [97.8 °F (36.6 °C)-98.1 °F (36.7 °C)] 98 °F (36.7 °C)  Heart Rate:  [65-74] 74  Resp:  [16] 16  BP: (128-168)/(57-74) 128/57    Intake/Output Summary (Last 24 hours) at 2021 1452  Last data filed at 2021 1839  Gross per 24 hour   Intake 240 ml   Output 275 ml   Net -35 ml     Flowsheet Rows      First Filed Value   Admission Height  182.9 cm (72\") Documented at 2021 1934   Admission Weight  100 kg (220 lb 7.4 oz) Documented at 2021 1626          Physical Exam:   General Appearance:    Alert, cooperative, in no acute distress   Lungs:     Clear to auscultation.  Normal respiratory effort and rate.      Heart:    Regular rhythm and normal rate, normal S1 and S2, no murmurs, gallops or rubs.     Chest Wall:    No abnormalities observed   Abdomen:     Soft, nontender, positive bowel sounds.     Extremities:   no cyanosis, clubbing or edema.  No marked joint deformities.  Adequate musculoskeletal strength.       Results Review:    Results from last 7 days   Lab Units 21  0452   SODIUM mmol/L 143   POTASSIUM mmol/L 4.0   CHLORIDE mmol/L 108*   CO2 mmol/L 25.6   BUN mg/dL 10   CREATININE mg/dL 1.22   GLUCOSE mg/dL 102*   CALCIUM mg/dL 8.5*     Results from last 7 days   Lab Units 21  0344 21  1601   TROPONIN T ng/mL 0.056* 0.058*     Results from last 7 days   Lab Units 21  0452   WBC 10*3/mm3 3.91   HEMOGLOBIN g/dL 7.5*  7.5*   HEMATOCRIT % 26.2*  26.2*   PLATELETS 10*3/mm3 " 122*     Results from last 7 days   Lab Units 02/01/21  0452 01/31/21  0754   INR  1.17* 1.11*         Results from last 7 days   Lab Units 01/26/21  1445   TRIGLYCERIDES mg/dL 120   HDL CHOL mg/dL 52   LDL CHOL mg/dL 72               Medication Review:   amiodarone, 200 mg, Oral, Daily  amLODIPine, 10 mg, Oral, Q24H  famotidine, 20 mg, Intravenous, Q12H  insulin lispro, 0-9 Units, Subcutaneous, TID AC  lisinopril, 40 mg, Oral, Daily  metoprolol succinate XL, 25 mg, Oral, Daily  sodium chloride, 10 mL, Intravenous, Q12H  tamsulosin, 0.8 mg, Oral, Daily  vitamin B-12, 1,000 mcg, Oral, Daily            Assessment/Plan      1.  Paroxysmal atrial fibrillation-history of atrial flutter ablation in June 2019-currently in sinus rhythm. He follows with Dr. Talbot.   -Anticoagulation held because of severe anemia.  -Continue amiodarone and beta-blocker.  Dr. Talbot mentioned he was not on amiodarone as outpatient. Will continue for now but needs to be reconciled prior to dc.  2.  Severe symptomatic anemia with hemoglobin of 6.8 while on chronic anticoagulation  -Colonoscopy concerning for proximal colon cancer.  Surgery consult is pending.   - Patient states surgery planned for Wednesday. Overall he is a moderate risk for cardiovascular events in the perioperative period for nonvascular surgeries under general anesthesia.  Stress testing would not .  3.  Coronary artery disease status post CABG x5 in 1989   -Stable  -Patient started on statin.  4.  Hypertension-intermittent episodes of high BP. Currently stable.   5.  Chronic anticoagulation-held because of bleeding   6.  Diabetes with neuropathy and leg pain  7.  Elevated troponin-patient known to have chronically elevated troponin since 2018  -No significant EKG changes  -Last echocardiogram was done in October 2020 which was unremarkable  8.  CKD stage III    Surgery planned for Wednesday. He is moderate, non modifiable risk. No further cardiac testing  recommended.   Will see post op. Call in the interim for urgent cardiac needs.        LOU Wesley  [facility]  02/01/21  14:52 EST

## 2021-02-02 ENCOUNTER — TELEPHONE (OUTPATIENT)
Dept: ONCOLOGY | Facility: CLINIC | Age: 86
End: 2021-02-02

## 2021-02-02 ENCOUNTER — APPOINTMENT (OUTPATIENT)
Dept: CT IMAGING | Facility: HOSPITAL | Age: 86
End: 2021-02-02

## 2021-02-02 PROBLEM — K63.89 COLONIC MASS: Status: ACTIVE | Noted: 2021-01-29

## 2021-02-02 LAB
ABO GROUP BLD: NORMAL
ANION GAP SERPL CALCULATED.3IONS-SCNC: 6.5 MMOL/L (ref 5–15)
BLD GP AB SCN SERPL QL: NEGATIVE
BUN SERPL-MCNC: 9 MG/DL (ref 8–23)
BUN/CREAT SERPL: 7 (ref 7–25)
CALCIUM SPEC-SCNC: 8.7 MG/DL (ref 8.6–10.5)
CHLORIDE SERPL-SCNC: 108 MMOL/L (ref 98–107)
CO2 SERPL-SCNC: 28.5 MMOL/L (ref 22–29)
CREAT SERPL-MCNC: 1.29 MG/DL (ref 0.76–1.27)
GFR SERPL CREATININE-BSD FRML MDRD: 53 ML/MIN/1.73
GLUCOSE BLDC GLUCOMTR-MCNC: 126 MG/DL (ref 70–130)
GLUCOSE BLDC GLUCOMTR-MCNC: 129 MG/DL (ref 70–130)
GLUCOSE BLDC GLUCOMTR-MCNC: 136 MG/DL (ref 70–130)
GLUCOSE BLDC GLUCOMTR-MCNC: 140 MG/DL (ref 70–130)
GLUCOSE SERPL-MCNC: 113 MG/DL (ref 65–99)
HCT VFR BLD AUTO: 26.1 % (ref 37.5–51)
HCT VFR BLD AUTO: 28.1 % (ref 37.5–51)
HCT VFR BLD AUTO: 28.2 % (ref 37.5–51)
HGB BLD-MCNC: 7.5 G/DL (ref 13–17.7)
HGB BLD-MCNC: 8 G/DL (ref 13–17.7)
HGB BLD-MCNC: 8 G/DL (ref 13–17.7)
LAB AP CASE REPORT: NORMAL
LAB AP DIAGNOSIS COMMENT: NORMAL
PATH REPORT.FINAL DX SPEC: NORMAL
PATH REPORT.GROSS SPEC: NORMAL
POTASSIUM SERPL-SCNC: 4.1 MMOL/L (ref 3.5–5.2)
RH BLD: POSITIVE
SARS-COV-2 RNA RESP QL NAA+PROBE: NOT DETECTED
SODIUM SERPL-SCNC: 143 MMOL/L (ref 136–145)
T&S EXPIRATION DATE: NORMAL

## 2021-02-02 PROCEDURE — 85018 HEMOGLOBIN: CPT | Performed by: INTERNAL MEDICINE

## 2021-02-02 PROCEDURE — 85014 HEMATOCRIT: CPT | Performed by: INTERNAL MEDICINE

## 2021-02-02 PROCEDURE — 99232 SBSQ HOSP IP/OBS MODERATE 35: CPT | Performed by: INTERNAL MEDICINE

## 2021-02-02 PROCEDURE — 25010000002 IOPAMIDOL 61 % SOLUTION: Performed by: STUDENT IN AN ORGANIZED HEALTH CARE EDUCATION/TRAINING PROGRAM

## 2021-02-02 PROCEDURE — 86923 COMPATIBILITY TEST ELECTRIC: CPT

## 2021-02-02 PROCEDURE — 99232 SBSQ HOSP IP/OBS MODERATE 35: CPT | Performed by: COLON & RECTAL SURGERY

## 2021-02-02 PROCEDURE — 74177 CT ABD & PELVIS W/CONTRAST: CPT

## 2021-02-02 PROCEDURE — 86901 BLOOD TYPING SEROLOGIC RH(D): CPT | Performed by: COLON & RECTAL SURGERY

## 2021-02-02 PROCEDURE — 82962 GLUCOSE BLOOD TEST: CPT

## 2021-02-02 PROCEDURE — 0 DIATRIZOATE MEGLUMINE & SODIUM PER 1 ML: Performed by: COLON & RECTAL SURGERY

## 2021-02-02 PROCEDURE — 80048 BASIC METABOLIC PNL TOTAL CA: CPT | Performed by: STUDENT IN AN ORGANIZED HEALTH CARE EDUCATION/TRAINING PROGRAM

## 2021-02-02 PROCEDURE — 86850 RBC ANTIBODY SCREEN: CPT | Performed by: COLON & RECTAL SURGERY

## 2021-02-02 PROCEDURE — U0003 INFECTIOUS AGENT DETECTION BY NUCLEIC ACID (DNA OR RNA); SEVERE ACUTE RESPIRATORY SYNDROME CORONAVIRUS 2 (SARS-COV-2) (CORONAVIRUS DISEASE [COVID-19]), AMPLIFIED PROBE TECHNIQUE, MAKING USE OF HIGH THROUGHPUT TECHNOLOGIES AS DESCRIBED BY CMS-2020-01-R: HCPCS | Performed by: COLON & RECTAL SURGERY

## 2021-02-02 PROCEDURE — 86900 BLOOD TYPING SEROLOGIC ABO: CPT | Performed by: COLON & RECTAL SURGERY

## 2021-02-02 RX ADMIN — SODIUM CHLORIDE, PRESERVATIVE FREE 10 ML: 5 INJECTION INTRAVENOUS at 21:13

## 2021-02-02 RX ADMIN — FAMOTIDINE 20 MG: 10 INJECTION INTRAVENOUS at 09:38

## 2021-02-02 RX ADMIN — AMIODARONE HYDROCHLORIDE 200 MG: 200 TABLET ORAL at 09:33

## 2021-02-02 RX ADMIN — DIATRIZOATE MEGLUMINE AND DIATRIZOATE SODIUM 30 ML: 600; 100 SOLUTION ORAL; RECTAL at 07:51

## 2021-02-02 RX ADMIN — FAMOTIDINE 20 MG: 10 INJECTION INTRAVENOUS at 21:13

## 2021-02-02 RX ADMIN — Medication 1000 MCG: at 09:33

## 2021-02-02 RX ADMIN — LISINOPRIL 40 MG: 40 TABLET ORAL at 09:33

## 2021-02-02 RX ADMIN — METOPROLOL SUCCINATE 25 MG: 25 TABLET, EXTENDED RELEASE ORAL at 09:33

## 2021-02-02 RX ADMIN — TAMSULOSIN HYDROCHLORIDE 0.8 MG: 0.4 CAPSULE ORAL at 09:33

## 2021-02-02 RX ADMIN — SODIUM CHLORIDE, PRESERVATIVE FREE 10 ML: 5 INJECTION INTRAVENOUS at 09:34

## 2021-02-02 RX ADMIN — AMLODIPINE BESYLATE 10 MG: 10 TABLET ORAL at 09:33

## 2021-02-02 RX ADMIN — IOPAMIDOL 85 ML: 612 INJECTION, SOLUTION INTRAVENOUS at 08:55

## 2021-02-02 NOTE — TELEPHONE ENCOUNTER
Caller: GABE CORONADO    Relationship to patient: SELF    Best call back number: [phone]     PT IS CURRENTLY IN THE HOSPITAL AND NEEDS TO CANCEL HIS NEW PT APPT ON 02/05. PT WILL CALL TO RESCHEDULE AT A LATER DATE

## 2021-02-02 NOTE — PROGRESS NOTES
Name: Osvaldo Lopez ADMIT: 2021   : 1935  PCP: Caio Rodríguez Jr., MD    MRN: [id number] LOS: 2 days   AGE/SEX: 85 y.o. male  ROOM: [facility]     Subjective   Subjective   No acute events overnight.  Denied episodes of hematochezia.    Review of Systems   Constitutional: Negative.    Respiratory: Negative.    Cardiovascular: Negative.    Gastrointestinal: Negative.         Objective   Objective   Vital Signs  Temp:  [97.9 °F (36.6 °C)-98.2 °F (36.8 °C)] 98.2 °F (36.8 °C)  Heart Rate:  [66-69] 67  Resp:  [16-18] 18  BP: (121-177)/(64-75) 150/66     on   ;   Device (Oxygen Therapy): room air  Body mass index is 30.71 kg/m².  Physical Exam  Constitutional:       Appearance: Normal appearance.   Cardiovascular:      Rate and Rhythm: Normal rate and regular rhythm.   Pulmonary:      Effort: Pulmonary effort is normal.      Breath sounds: Normal breath sounds.   Abdominal:      General: There is no distension.      Palpations: Abdomen is soft.      Tenderness: There is no abdominal tenderness.   Neurological:      General: No focal deficit present.      Mental Status: He is alert and oriented to person, place, and time.         Results Review     I reviewed the patient's new clinical results.  Results from last 7 days   Lab Units 21  0747 21  0000 21  1607 21  0452  21  0754  21  1601 21  1322   WBC 10*3/mm3  --   --   --  3.91  --  3.20*  --  3.63 4.86   HEMOGLOBIN g/dL 7.5* 8.0* 8.0* 7.5*  7.5*   < > 8.7*   < > 6.1* 6.8*   PLATELETS 10*3/mm3  --   --   --  122*  --  137*  --  129* 138*    < > = values in this interval not displayed.     Results from last 7 days   Lab Units 21  0747 21  0452 21  0754 21  1601   SODIUM mmol/L 143 143 143 139   POTASSIUM mmol/L 4.1 4.0 4.3 4.7   CHLORIDE mmol/L 108* 108* 108* 106   CO2 mmol/L 28.5 25.6 22.3 20.7*   BUN mg/dL 9 10 14 23   CREATININE mg/dL 1.29* 1.22 1.10 1.33*   GLUCOSE mg/dL 113* 102* 113*  102*   Estimated Creatinine Clearance: 53.9 mL/min (A) (by C-G formula based on SCr of 1.29 mg/dL (H)).  Results from last 7 days   Lab Units 02/01/21  0452 01/31/21  0754 01/29/21  1601 01/26/21  1445   ALBUMIN g/dL 3.90 4.30 4.00 4.20   BILIRUBIN mg/dL 0.4 0.5 0.2 0.3   ALK PHOS U/L 74 80 71 77   AST (SGOT) U/L 20 24 17 23   ALT (SGPT) U/L 17 20 18 21     Results from last 7 days   Lab Units 02/02/21  0747 02/01/21  0452 01/31/21  0754 01/29/21  1601 01/26/21  1445   CALCIUM mg/dL 8.7 8.5* 8.9 8.8 8.9   ALBUMIN g/dL  --  3.90 4.30 4.00 4.20       COVID19   Date Value Ref Range Status   01/29/2021 Not Detected Not Detected - Ref. Range Final     Glucose   Date/Time Value Ref Range Status   02/02/2021 1121 129 70 - 130 mg/dL Final   02/02/2021 0632 126 70 - 130 mg/dL Final   02/01/2021 2003 145 (H) 70 - 130 mg/dL Final   02/01/2021 1626 119 70 - 130 mg/dL Final   02/01/2021 1104 139 (H) 70 - 130 mg/dL Final   02/01/2021 0617 119 70 - 130 mg/dL Final   01/31/2021 1954 166 (H) 70 - 130 mg/dL Final       CT Abdomen Pelvis With Contrast  Narrative: CT ABDOMEN AND PELVIS WITH IV CONTRAST     HISTORY: Colon mass in the hepatic flexure, anemia, abdominal pain     TECHNIQUE: Radiation dose reduction techniques were utilized, including  automated exposure control and exposure modulation based on body size.   3 mm images were obtained through the abdomen and pelvis after the  administration of IV contrast.      COMPARISON: 04/14/2019     FINDINGS:      ABDOMEN:  Mild fibrotic changes in the lung bases. Stable dilation of the thoracic  aorta. The liver is unremarkable. Cholecystectomy. Spleen is  unremarkable. Bilateral renal cysts. Adrenal glands are unremarkable.  Stable multiple pancreatic cysts/cystic lesions with the largest located  in the region of the pancreatic head measuring 1.9 cm. Stable mildly  dilated infrarenal abdominal aorta. No ascites or lymphadenopathy.     PELVIS:  Prostatomegaly. The bladder is  unremarkable. No free fluid. Sigmoid  diverticulosis without evidence of diverticulitis. There is an area of  narrowing in the hepatic flexure of the colon best seen on coronal  images 39 through 48 may reflect the mass found on colonoscopy. Bone  windows show postsurgical changes of the lumbar spine.     Impression: Area of narrowing within the hepatic flexure of the colon may reflect  the mass found on colonoscopy. There is no convincing evidence of  metastatic disease. Additional findings as described.     Radiation dose reduction techniques were utilized, including automated  exposure control and exposure modulation based on body size.     This report was finalized on 2/2/2021 10:35 AM by Dr. Harvinder Kate M.D.       Scheduled Medications  amiodarone, 200 mg, Oral, Daily  amLODIPine, 10 mg, Oral, Q24H  famotidine, 20 mg, Intravenous, Q12H  insulin lispro, 0-9 Units, Subcutaneous, TID AC  lisinopril, 40 mg, Oral, Daily  metoprolol succinate XL, 25 mg, Oral, Daily  sodium chloride, 10 mL, Intravenous, Q12H  tamsulosin, 0.8 mg, Oral, Daily  vitamin B-12, 1,000 mcg, Oral, Daily    Infusions   Diet  Diet Clear Liquid       Assessment/Plan     Active Hospital Problems    Diagnosis  POA   • **Symptomatic anemia [D64.9]  Yes   • Iron deficiency anemia [D50.9]  Unknown   • Chronic anticoagulation [Z79.01]  Not Applicable   • CKD (chronic kidney disease) [N18.9]  Unknown   • CRISTOBAL (acute kidney injury) (CMS/HCC) [N17.9]  Unknown   • Colonic mass [K63.89]  Unknown   • 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       85 y.o. male admitted with Symptomatic anemia.    Iron deficiency anemia  Colonoscopy showed a proximal colon mass as well as a cecal polyp.  The proximal colon mass  was oozing blood.  Colorectal surgery has been consulted, patient is to undergo partial colectomy tomorrow.     Typical atrial flutter status post ablation on chronic anticoagulation therapy.    Currently in sinus rhythm  Xarelto is currently being held.  Not any pharmacological anticoagulation at this time secondary to actively oozing proximal colon mass.    Currently on amiodarone and metoprolol, continue per cardiology.     Troponin elevation  Patient has a history of chronic troponin elevation, no chest pain, EKG without any significant changes.     CKD 3  Monitor renal function     Type 2 diabetes  Sliding scale     Hypertension  Continue amlodipine 10, lisinopril 40-creatinine appears to be at baseline.  · SCDs for DVT prophylaxis.  · Full code.  · Discussed with patient.  · Anticipate discharge TBD.  when cleared by consultants.      Michael Austin MD  [facility]  02/02/21  14:11 EST    Patient was wearing facemask when I entered the room and throughout our encounter.  I wore protective equipment throughout this patient encounter including a face mask, gloves and protective eyewear.  Hand hygiene was performed before donning protective equipment and after removal when leaving the room.

## 2021-02-02 NOTE — PROGRESS NOTES
Progress Note    S: Patient doing well  No bleeding  On clear liquids    O:  Temp:  [97.9 °F (36.6 °C)-98.2 °F (36.8 °C)] 98.2 °F (36.8 °C)  Heart Rate:  [66-69] 67  Resp:  [16-18] 18  BP: (121-177)/(64-75) 150/66      Intake/Output Summary (Last 24 hours) at 2/2/2021 1831  Last data filed at 2/2/2021 1255  Gross per 24 hour   Intake 340 ml   Output 700 ml   Net -360 ml       Abd:   soft, non-distended      Results from last 7 days   Lab Units 02/02/21  1541  02/01/21  0452   WBC 10*3/mm3  --   --  3.91   HEMOGLOBIN g/dL 8.0*   < > 7.5*  7.5*   HEMATOCRIT % 28.1*   < > 26.2*  26.2*   PLATELETS 10*3/mm3  --   --  122*    < > = values in this interval not displayed.     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 I described with patient typical surgical time, postop recovery including pain management, and restrictions. I discussed with patient risks, benefits, and alternatives.  The patient had opportunity to ask questions.  I answered all questions.  Patient understands and wishes to proceed with procedure.

## 2021-02-02 NOTE — PROGRESS NOTES
[facility]  Inpatient Progress Note    Reason for Follow Up:  Anemia, colon mass    Subjective     Interval History:   No overnight events-procedure this morning.    Current Facility-Administered Medications:   •  amiodarone (PACERONE) tablet 200 mg, 200 mg, Oral, Daily, Jey Barrios MD, 200 mg at 02/01/21 0945  •  amLODIPine (NORVASC) tablet 10 mg, 10 mg, Oral, Q24H, Jose Becerra MD, 10 mg at 02/01/21 0943  •  dextrose (D50W) 25 g/ 50mL Intravenous Solution 25 g, 25 g, Intravenous, Q15 Min PRN, Jey Barrios MD  •  dextrose (GLUTOSE) oral gel 15 g, 15 g, Oral, Q15 Min PRN, Jey Barrios MD  •  famotidine (PEPCID) injection 20 mg, 20 mg, Intravenous, Q12H, Jey Barrios MD, 20 mg at 02/01/21 2118  •  glucagon (human recombinant) (GLUCAGEN DIAGNOSTIC) injection 1 mg, 1 mg, Subcutaneous, Q15 Min PRN, Jey Barrios MD  •  insulin lispro (humaLOG, ADMELOG) injection 0-9 Units, 0-9 Units, Subcutaneous, TID AC, Jey Barrios MD, 2 Units at 01/31/21 1640  •  lisinopril (PRINIVIL,ZESTRIL) tablet 40 mg, 40 mg, Oral, Daily, Jey Barrios MD, 40 mg at 02/01/21 0945  •  metoprolol succinate XL (TOPROL-XL) 24 hr tablet 25 mg, 25 mg, Oral, Daily, Jey Barrios MD, 25 mg at 02/01/21 0946  •  nitroglycerin (NITROSTAT) SL tablet 0.4 mg, 0.4 mg, Sublingual, Q5 Min PRN, Jey Barrios MD  •  ondansetron (ZOFRAN) injection 4 mg, 4 mg, Intravenous, Q6H PRN, Jey Barrios MD  •  sodium chloride 0.9 % flush 10 mL, 10 mL, Intravenous, Q12H, Jey Barrios MD, 10 mL at 02/01/21 2118  •  sodium chloride 0.9 % flush 10 mL, 10 mL, Intravenous, PRN, Jey Barrios MD  •  tamsulosin (FLOMAX) 24 hr capsule 0.8 mg, 0.8 mg, Oral, Daily, Jey Barrios MD, 0.8 mg at 02/01/21 0942  •  traMADol (ULTRAM) tablet 50 mg, 50 mg, Oral, Q6H PRN, Jey Barrios MD  •  vitamin B-12 (CYANOCOBALAMIN) tablet 1,000 mcg, 1,000 mcg, Oral, Daily, Jey Barrios MD, 1,000 mcg at 02/01/21  0943  Review of Systems:    Negative for fevers or chills, negative for nausea or vomiting    Objective     Vital Signs  Temp:  [97.9 °F (36.6 °C)-98.2 °F (36.8 °C)] 98.2 °F (36.8 °C)  Heart Rate:  [66-74] 69  Resp:  [16-18] 18  BP: (121-177)/(57-75) 177/75  Body mass index is 30.71 kg/m².  No intake or output data in the 24 hours ending 02/02/21 0837  No intake/output data recorded.     Physical Exam:   General: patient awake, alert and cooperative   Eyes: Normal lids and lashes, no scleral icterus   Neck: supple, normal ROM   Skin: warm and dry, not jaundiced   Pulm:   regular and unlabored   Abdomen: soft, nontender, nondistended    Psychiatric: Normal mood and behavior; memory intact     Results Review:     I reviewed the patient's new clinical results.    Results from last 7 days   Lab Units 02/02/21  0747 02/02/21  0000 02/01/21  1607 02/01/21  0452 01/31/21  0754 01/29/21  1601   WBC 10*3/mm3  --   --   --  3.91  --  3.20*  --  3.63   HEMOGLOBIN g/dL 7.5* 8.0* 8.0* 7.5*  7.5*   < > 8.7*   < > 6.1*   HEMATOCRIT % 26.1* 28.2* 26.9* 26.2*  26.2*   < > 30.1*   < > 22.3*   PLATELETS 10*3/mm3  --   --   --  122*  --  137*  --  129*    < > = values in this interval not displayed.     Results from last 7 days   Lab Units 02/01/21  0452 01/31/21  0754 01/29/21  1601   SODIUM mmol/L 143 143 139   POTASSIUM mmol/L 4.0 4.3 4.7   CHLORIDE mmol/L 108* 108* 106   CO2 mmol/L 25.6 22.3 20.7*   BUN mg/dL 10 14 23   CREATININE mg/dL 1.22 1.10 1.33*   CALCIUM mg/dL 8.5* 8.9 8.8   BILIRUBIN mg/dL 0.4 0.5 0.2   ALK PHOS U/L 74 80 71   ALT (SGPT) U/L 17 20 18   AST (SGOT) U/L 20 24 17   GLUCOSE mg/dL 102* 113* 102*     Results from last 7 days   Lab Units 02/01/21  0452 01/31/21  0754   INR  1.17* 1.11*     Lab Results   Lab Value Date/Time    LIPASE 8 (L) 04/14/2019 0840       Radiology:  XR Chest 1 View   Final Result   No focal pulmonary consolidation. Borderline heart size.   Tortuous aorta. Follow-up as clinically  indicated.       This report was finalized on 1/29/2021 4:10 PM by Dr. Vimal Mayorga M.D.          CT Abdomen Pelvis With Contrast    (Results Pending)       Assessment/Plan     Patient Active Problem List   Diagnosis   • Complete rotator cuff tear of left shoulder   • Abnormal finding on thyroid function test   • Abdominal aortic aneurysm (CMS/HCC)   • Benign prostatic hyperplasia   • Essential hypertension   • Hyperlipidemia   • Shoulder pain   • Lumbar radiculopathy   • DM (diabetes mellitus), type 2 with complications (CMS/HCC)   • OA (osteoarthritis) of knee   • Tear of right rotator cuff   • Spinal stenosis of lumbar region with neurogenic claudication   • Eyebrow ptosis   • Pseudophakia   • Nonrheumatic aortic valve stenosis   • Atrial flutter with rapid ventricular response (CMS/HCC)   • Right arm pain   • Leg weakness, bilateral   • Typical atrial flutter (CMS/HCC)   • Diabetic peripheral neuropathy (CMS/HCC)   • Muscle weakness (generalized)   • Pressure injury of left buttock, stage 1   • Chronic low back pain with sciatica   • 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CMS/HCC)   • Pressure injury of buttock, stage 1   • Anemia   • Symptomatic anemia   • Iron deficiency anemia   • Chronic anticoagulation   • CKD (chronic kidney disease)   • CRISTOBAL (acute kidney injury) (CMS/HCC)   • Colonic mass       Assessment:  1. Microcytic anemia secondary to iron deficiency-likely due to #2  2. Endoscopically unresectable polyp and colon mass found on colonoscopy  3. A. fib on Xarelto-currently on hold  4. Diabetes  5. Acute on chronic kidney injury  6. Thrombocytopenia      Plan:  · Colorectal recommendations noted  · Pathology from colonoscopy is still pending  · CT scan this morning  · Hemoglobin relatively stable-continue to follow and transfuse if needed    I discussed the patients findings and my recommendations with patient.    Aditi Cerrato MD

## 2021-02-02 NOTE — PLAN OF CARE
Goal Outcome Evaluation:  Plan of Care Reviewed With: patient, daughter  Progress: no change  Outcome Summary: covid test pending -plan for surgery tomorrow-NPO after midnight-meds with sips-hibicens shower this pm-pt cooperative with care and verbalizes understanding of plan of care-monitoring VS , Labs

## 2021-02-03 ENCOUNTER — ANESTHESIA (OUTPATIENT)
Dept: PERIOP | Facility: HOSPITAL | Age: 86
End: 2021-02-03

## 2021-02-03 ENCOUNTER — ANESTHESIA EVENT (OUTPATIENT)
Dept: PERIOP | Facility: HOSPITAL | Age: 86
End: 2021-02-03

## 2021-02-03 ENCOUNTER — TELEPHONE (OUTPATIENT)
Dept: GASTROENTEROLOGY | Facility: CLINIC | Age: 86
End: 2021-02-03

## 2021-02-03 LAB
ANION GAP SERPL CALCULATED.3IONS-SCNC: 8.7 MMOL/L (ref 5–15)
ARTERIAL PATENCY WRIST A: POSITIVE
ATMOSPHERIC PRESS: 754.2 MMHG
BASE EXCESS BLDA CALC-SCNC: -2.2 MMOL/L (ref 0–2)
BASOPHILS # BLD AUTO: 0.03 10*3/MM3 (ref 0–0.2)
BASOPHILS NFR BLD AUTO: 0.9 % (ref 0–1.5)
BDY SITE: ABNORMAL
BUN SERPL-MCNC: 8 MG/DL (ref 8–23)
BUN/CREAT SERPL: 6.8 (ref 7–25)
CALCIUM SPEC-SCNC: 8.5 MG/DL (ref 8.6–10.5)
CHLORIDE SERPL-SCNC: 108 MMOL/L (ref 98–107)
CO2 SERPL-SCNC: 26.3 MMOL/L (ref 22–29)
CREAT SERPL-MCNC: 1.17 MG/DL (ref 0.76–1.27)
DEPRECATED RDW RBC AUTO: 48.7 FL (ref 37–54)
EOSINOPHIL # BLD AUTO: 0.09 10*3/MM3 (ref 0–0.4)
EOSINOPHIL NFR BLD AUTO: 2.7 % (ref 0.3–6.2)
ERYTHROCYTE [DISTWIDTH] IN BLOOD BY AUTOMATED COUNT: 17.9 % (ref 12.3–15.4)
GFR SERPL CREATININE-BSD FRML MDRD: 60 ML/MIN/1.73
GLUCOSE BLDC GLUCOMTR-MCNC: 127 MG/DL (ref 70–130)
GLUCOSE BLDC GLUCOMTR-MCNC: 128 MG/DL (ref 70–130)
GLUCOSE BLDC GLUCOMTR-MCNC: 135 MG/DL (ref 70–130)
GLUCOSE BLDC GLUCOMTR-MCNC: 235 MG/DL (ref 70–130)
GLUCOSE SERPL-MCNC: 111 MG/DL (ref 65–99)
HCO3 BLDA-SCNC: 24.7 MMOL/L (ref 22–28)
HCT VFR BLD AUTO: 25.7 % (ref 37.5–51)
HGB BLD-MCNC: 7.6 G/DL (ref 13–17.7)
IMM GRANULOCYTES # BLD AUTO: 0.01 10*3/MM3 (ref 0–0.05)
IMM GRANULOCYTES NFR BLD AUTO: 0.3 % (ref 0–0.5)
INHALED O2 CONCENTRATION: 40 %
LYMPHOCYTES # BLD AUTO: 0.87 10*3/MM3 (ref 0.7–3.1)
LYMPHOCYTES NFR BLD AUTO: 26.1 % (ref 19.6–45.3)
MAGNESIUM SERPL-MCNC: 2.2 MG/DL (ref 1.6–2.4)
MCH RBC QN AUTO: 22.5 PG (ref 26.6–33)
MCHC RBC AUTO-ENTMCNC: 29.6 G/DL (ref 31.5–35.7)
MCV RBC AUTO: 76 FL (ref 79–97)
MODALITY: ABNORMAL
MONOCYTES # BLD AUTO: 0.29 10*3/MM3 (ref 0.1–0.9)
MONOCYTES NFR BLD AUTO: 8.7 % (ref 5–12)
NEUTROPHILS NFR BLD AUTO: 2.04 10*3/MM3 (ref 1.7–7)
NEUTROPHILS NFR BLD AUTO: 61.3 % (ref 42.7–76)
NRBC BLD AUTO-RTO: 0 /100 WBC (ref 0–0.2)
O2 A-A PPRESDIFF RESPIRATORY: 0.3 MMHG
PCO2 BLDA: 51.7 MM HG (ref 35–45)
PEEP RESPIRATORY: 5 CM[H2O]
PH BLDA: 7.29 PH UNITS (ref 7.35–7.45)
PHOSPHATE SERPL-MCNC: 3.6 MG/DL (ref 2.5–4.5)
PLATELET # BLD AUTO: 122 10*3/MM3 (ref 140–450)
PMV BLD AUTO: 9.7 FL (ref 6–12)
PO2 BLDA: 72.4 MM HG (ref 80–100)
POTASSIUM SERPL-SCNC: 3.9 MMOL/L (ref 3.5–5.2)
PSV: 8 CMH2O
RBC # BLD AUTO: 3.38 10*6/MM3 (ref 4.14–5.8)
SAO2 % BLDCOA: 92 % (ref 92–99)
SODIUM SERPL-SCNC: 143 MMOL/L (ref 136–145)
TOTAL RATE: 19 BREATHS/MINUTE
VENTILATOR MODE: ABNORMAL
WBC # BLD AUTO: 3.33 10*3/MM3 (ref 3.4–10.8)

## 2021-02-03 PROCEDURE — 25010000003 CEFAZOLIN IN DEXTROSE 2-4 GM/100ML-% SOLUTION: Performed by: COLON & RECTAL SURGERY

## 2021-02-03 PROCEDURE — 25010000003 BUPIVACAINE LIPOSOME 1.3 % SUSPENSION: Performed by: ANESTHESIOLOGY

## 2021-02-03 PROCEDURE — 25010000002 LIDOCAINE PER 10 MG: Performed by: NURSE ANESTHETIST, CERTIFIED REGISTERED

## 2021-02-03 PROCEDURE — P9016 RBC LEUKOCYTES REDUCED: HCPCS

## 2021-02-03 PROCEDURE — 94002 VENT MGMT INPAT INIT DAY: CPT

## 2021-02-03 PROCEDURE — 25010000002 NEOSTIGMINE PER 0.5 MG: Performed by: ANESTHESIOLOGY

## 2021-02-03 PROCEDURE — 83735 ASSAY OF MAGNESIUM: CPT | Performed by: STUDENT IN AN ORGANIZED HEALTH CARE EDUCATION/TRAINING PROGRAM

## 2021-02-03 PROCEDURE — 81301 MICROSATELLITE INSTABILITY: CPT

## 2021-02-03 PROCEDURE — 84100 ASSAY OF PHOSPHORUS: CPT | Performed by: STUDENT IN AN ORGANIZED HEALTH CARE EDUCATION/TRAINING PROGRAM

## 2021-02-03 PROCEDURE — 25010000002 PHENYLEPHRINE PER 1 ML: Performed by: NURSE ANESTHETIST, CERTIFIED REGISTERED

## 2021-02-03 PROCEDURE — 94799 UNLISTED PULMONARY SVC/PX: CPT

## 2021-02-03 PROCEDURE — 0DTF4ZZ RESECTION OF RIGHT LARGE INTESTINE, PERCUTANEOUS ENDOSCOPIC APPROACH: ICD-10-PCS | Performed by: COLON & RECTAL SURGERY

## 2021-02-03 PROCEDURE — 88342 IMHCHEM/IMCYTCHM 1ST ANTB: CPT

## 2021-02-03 PROCEDURE — 80048 BASIC METABOLIC PNL TOTAL CA: CPT | Performed by: STUDENT IN AN ORGANIZED HEALTH CARE EDUCATION/TRAINING PROGRAM

## 2021-02-03 PROCEDURE — 0DNU4ZZ RELEASE OMENTUM, PERCUTANEOUS ENDOSCOPIC APPROACH: ICD-10-PCS | Performed by: COLON & RECTAL SURGERY

## 2021-02-03 PROCEDURE — 85025 COMPLETE CBC W/AUTO DIFF WBC: CPT | Performed by: INTERNAL MEDICINE

## 2021-02-03 PROCEDURE — 99232 SBSQ HOSP IP/OBS MODERATE 35: CPT | Performed by: INTERNAL MEDICINE

## 2021-02-03 PROCEDURE — 88341 IMHCHEM/IMCYTCHM EA ADD ANTB: CPT

## 2021-02-03 PROCEDURE — 88309 TISSUE EXAM BY PATHOLOGIST: CPT | Performed by: COLON & RECTAL SURGERY

## 2021-02-03 PROCEDURE — 8E0W4CZ ROBOTIC ASSISTED PROCEDURE OF TRUNK REGION, PERCUTANEOUS ENDOSCOPIC APPROACH: ICD-10-PCS | Performed by: COLON & RECTAL SURGERY

## 2021-02-03 PROCEDURE — 82803 BLOOD GASES ANY COMBINATION: CPT

## 2021-02-03 PROCEDURE — C9290 INJ, BUPIVACAINE LIPOSOME: HCPCS | Performed by: ANESTHESIOLOGY

## 2021-02-03 PROCEDURE — 81210 BRAF GENE: CPT

## 2021-02-03 PROCEDURE — 88381 MICRODISSECTION MANUAL: CPT

## 2021-02-03 PROCEDURE — 25010000002 DEXAMETHASONE PER 1 MG: Performed by: NURSE ANESTHETIST, CERTIFIED REGISTERED

## 2021-02-03 PROCEDURE — 36600 WITHDRAWAL OF ARTERIAL BLOOD: CPT

## 2021-02-03 PROCEDURE — 44204 LAPARO PARTIAL COLECTOMY: CPT | Performed by: COLON & RECTAL SURGERY

## 2021-02-03 PROCEDURE — 86900 BLOOD TYPING SEROLOGIC ABO: CPT

## 2021-02-03 PROCEDURE — 82962 GLUCOSE BLOOD TEST: CPT

## 2021-02-03 PROCEDURE — 36430 TRANSFUSION BLD/BLD COMPNT: CPT

## 2021-02-03 PROCEDURE — 25010000002 ONDANSETRON PER 1 MG: Performed by: ANESTHESIOLOGY

## 2021-02-03 PROCEDURE — 25010000002 MAGNESIUM SULFATE PER 500 MG OF MAGNESIUM: Performed by: NURSE ANESTHETIST, CERTIFIED REGISTERED

## 2021-02-03 PROCEDURE — 25010000002 PROPOFOL 10 MG/ML EMULSION: Performed by: NURSE ANESTHETIST, CERTIFIED REGISTERED

## 2021-02-03 DEVICE — STAPLER 60 RELOAD BLUE
Type: IMPLANTABLE DEVICE | Site: ASCENDING COLON | Status: FUNCTIONAL
Brand: SUREFORM

## 2021-02-03 DEVICE — DEV WND/CLS CONTRL TISS STRATAFIX SPIRAL PD SH 2-0 1/2 15CM: Type: IMPLANTABLE DEVICE | Site: ASCENDING COLON | Status: FUNCTIONAL

## 2021-02-03 DEVICE — KNOTLESS TISSUE CONTROL DEVICE, VIOLET UNIDIRECTIONAL (ANTIBACTERIAL) SYNTHETIC ABSORBABLE DEVICE
Type: IMPLANTABLE DEVICE | Site: ASCENDING COLON | Status: FUNCTIONAL
Brand: STRATAFIX

## 2021-02-03 DEVICE — SEALANT WND FIBRIN TISSEEL PREFIL/SYR/PRIMAFZ 10ML: Type: IMPLANTABLE DEVICE | Site: ASCENDING COLON | Status: FUNCTIONAL

## 2021-02-03 RX ORDER — SODIUM CHLORIDE, SODIUM LACTATE, POTASSIUM CHLORIDE, CALCIUM CHLORIDE 600; 310; 30; 20 MG/100ML; MG/100ML; MG/100ML; MG/100ML
9 INJECTION, SOLUTION INTRAVENOUS CONTINUOUS
Status: DISCONTINUED | OUTPATIENT
Start: 2021-02-03 | End: 2021-02-04

## 2021-02-03 RX ORDER — BUPIVACAINE HYDROCHLORIDE 2.5 MG/ML
INJECTION, SOLUTION EPIDURAL; INFILTRATION; INTRACAUDAL
Status: COMPLETED | OUTPATIENT
Start: 2021-02-03 | End: 2021-02-03

## 2021-02-03 RX ORDER — NALOXONE HCL 0.4 MG/ML
0.4 VIAL (ML) INJECTION
Status: DISCONTINUED | OUTPATIENT
Start: 2021-02-03 | End: 2021-02-04 | Stop reason: HOSPADM

## 2021-02-03 RX ORDER — GABAPENTIN 300 MG/1
600 CAPSULE ORAL 3 TIMES DAILY
Status: DISCONTINUED | OUTPATIENT
Start: 2021-02-03 | End: 2021-02-03 | Stop reason: HOSPADM

## 2021-02-03 RX ORDER — GLYCOPYRROLATE 0.2 MG/ML
INJECTION INTRAMUSCULAR; INTRAVENOUS AS NEEDED
Status: DISCONTINUED | OUTPATIENT
Start: 2021-02-03 | End: 2021-02-03 | Stop reason: SURG

## 2021-02-03 RX ORDER — ONDANSETRON 2 MG/ML
INJECTION INTRAMUSCULAR; INTRAVENOUS AS NEEDED
Status: DISCONTINUED | OUTPATIENT
Start: 2021-02-03 | End: 2021-02-03 | Stop reason: SURG

## 2021-02-03 RX ORDER — BUPIVACAINE HYDROCHLORIDE AND EPINEPHRINE 2.5; 5 MG/ML; UG/ML
INJECTION, SOLUTION INFILTRATION; PERINEURAL AS NEEDED
Status: DISCONTINUED | OUTPATIENT
Start: 2021-02-03 | End: 2021-02-03 | Stop reason: HOSPADM

## 2021-02-03 RX ORDER — LIDOCAINE HYDROCHLORIDE 20 MG/ML
INJECTION, SOLUTION INFILTRATION; PERINEURAL AS NEEDED
Status: DISCONTINUED | OUTPATIENT
Start: 2021-02-03 | End: 2021-02-03 | Stop reason: SURG

## 2021-02-03 RX ORDER — EPHEDRINE SULFATE 50 MG/ML
INJECTION, SOLUTION INTRAVENOUS AS NEEDED
Status: DISCONTINUED | OUTPATIENT
Start: 2021-02-03 | End: 2021-02-03 | Stop reason: SURG

## 2021-02-03 RX ORDER — MAGNESIUM SULFATE HEPTAHYDRATE 500 MG/ML
INJECTION, SOLUTION INTRAMUSCULAR; INTRAVENOUS AS NEEDED
Status: DISCONTINUED | OUTPATIENT
Start: 2021-02-03 | End: 2021-02-03 | Stop reason: SURG

## 2021-02-03 RX ORDER — SODIUM CHLORIDE 9 MG/ML
INJECTION, SOLUTION INTRAVENOUS CONTINUOUS PRN
Status: DISCONTINUED | OUTPATIENT
Start: 2021-02-03 | End: 2021-02-03 | Stop reason: SURG

## 2021-02-03 RX ORDER — CEFAZOLIN SODIUM 2 G/100ML
2 INJECTION, SOLUTION INTRAVENOUS ONCE
Status: COMPLETED | OUTPATIENT
Start: 2021-02-03 | End: 2021-02-03

## 2021-02-03 RX ORDER — SODIUM CHLORIDE 9 MG/ML
INJECTION, SOLUTION INTRAVENOUS AS NEEDED
Status: DISCONTINUED | OUTPATIENT
Start: 2021-02-03 | End: 2021-02-03 | Stop reason: HOSPADM

## 2021-02-03 RX ORDER — FENTANYL CITRATE 50 UG/ML
50 INJECTION, SOLUTION INTRAMUSCULAR; INTRAVENOUS
Status: DISCONTINUED | OUTPATIENT
Start: 2021-02-03 | End: 2021-02-03 | Stop reason: HOSPADM

## 2021-02-03 RX ORDER — KETAMINE HYDROCHLORIDE 50 MG/ML
INJECTION, SOLUTION, CONCENTRATE INTRAMUSCULAR; INTRAVENOUS AS NEEDED
Status: DISCONTINUED | OUTPATIENT
Start: 2021-02-03 | End: 2021-02-03 | Stop reason: SURG

## 2021-02-03 RX ORDER — ALVIMOPAN 12 MG/1
12 CAPSULE ORAL ONCE
Status: COMPLETED | OUTPATIENT
Start: 2021-02-03 | End: 2021-02-03

## 2021-02-03 RX ORDER — MIDAZOLAM HYDROCHLORIDE 1 MG/ML
1 INJECTION INTRAMUSCULAR; INTRAVENOUS
Status: DISCONTINUED | OUTPATIENT
Start: 2021-02-03 | End: 2021-02-03 | Stop reason: HOSPADM

## 2021-02-03 RX ORDER — LIDOCAINE HYDROCHLORIDE ANHYDROUS AND DEXTROSE MONOHYDRATE 5; 400 G/100ML; MG/100ML
INJECTION, SOLUTION INTRAVENOUS CONTINUOUS PRN
Status: DISCONTINUED | OUTPATIENT
Start: 2021-02-03 | End: 2021-02-03 | Stop reason: SURG

## 2021-02-03 RX ORDER — HYDROMORPHONE HYDROCHLORIDE 1 MG/ML
0.25 INJECTION, SOLUTION INTRAMUSCULAR; INTRAVENOUS; SUBCUTANEOUS
Status: DISCONTINUED | OUTPATIENT
Start: 2021-02-03 | End: 2021-02-04 | Stop reason: HOSPADM

## 2021-02-03 RX ORDER — MIDAZOLAM HYDROCHLORIDE 1 MG/ML
0.5 INJECTION INTRAMUSCULAR; INTRAVENOUS
Status: DISCONTINUED | OUTPATIENT
Start: 2021-02-03 | End: 2021-02-03 | Stop reason: HOSPADM

## 2021-02-03 RX ORDER — ACETAMINOPHEN 500 MG
1000 TABLET ORAL EVERY 6 HOURS
Status: DISCONTINUED | OUTPATIENT
Start: 2021-02-03 | End: 2021-02-03 | Stop reason: HOSPADM

## 2021-02-03 RX ORDER — LIDOCAINE HYDROCHLORIDE 10 MG/ML
0.5 INJECTION, SOLUTION EPIDURAL; INFILTRATION; INTRACAUDAL; PERINEURAL ONCE AS NEEDED
Status: DISCONTINUED | OUTPATIENT
Start: 2021-02-03 | End: 2021-02-03 | Stop reason: HOSPADM

## 2021-02-03 RX ORDER — PROPOFOL 10 MG/ML
VIAL (ML) INTRAVENOUS AS NEEDED
Status: DISCONTINUED | OUTPATIENT
Start: 2021-02-03 | End: 2021-02-03 | Stop reason: SURG

## 2021-02-03 RX ORDER — FAMOTIDINE 10 MG/ML
20 INJECTION, SOLUTION INTRAVENOUS ONCE
Status: COMPLETED | OUTPATIENT
Start: 2021-02-03 | End: 2021-02-03

## 2021-02-03 RX ORDER — DEXAMETHASONE SODIUM PHOSPHATE 4 MG/ML
INJECTION, SOLUTION INTRA-ARTICULAR; INTRALESIONAL; INTRAMUSCULAR; INTRAVENOUS; SOFT TISSUE AS NEEDED
Status: DISCONTINUED | OUTPATIENT
Start: 2021-02-03 | End: 2021-02-03 | Stop reason: SURG

## 2021-02-03 RX ORDER — SCOLOPAMINE TRANSDERMAL SYSTEM 1 MG/1
1 PATCH, EXTENDED RELEASE TRANSDERMAL CONTINUOUS
Status: ACTIVE | OUTPATIENT
Start: 2021-02-03 | End: 2021-02-06

## 2021-02-03 RX ORDER — INDOCYANINE GREEN AND WATER 25 MG
KIT INJECTION AS NEEDED
Status: DISCONTINUED | OUTPATIENT
Start: 2021-02-03 | End: 2021-02-03 | Stop reason: SURG

## 2021-02-03 RX ORDER — ROCURONIUM BROMIDE 10 MG/ML
INJECTION, SOLUTION INTRAVENOUS AS NEEDED
Status: DISCONTINUED | OUTPATIENT
Start: 2021-02-03 | End: 2021-02-03 | Stop reason: SURG

## 2021-02-03 RX ORDER — SODIUM CHLORIDE 0.9 % (FLUSH) 0.9 %
3 SYRINGE (ML) INJECTION EVERY 12 HOURS SCHEDULED
Status: DISCONTINUED | OUTPATIENT
Start: 2021-02-03 | End: 2021-02-03 | Stop reason: HOSPADM

## 2021-02-03 RX ORDER — ACETAMINOPHEN 10 MG/ML
1000 INJECTION, SOLUTION INTRAVENOUS ONCE
Status: COMPLETED | OUTPATIENT
Start: 2021-02-03 | End: 2021-02-03

## 2021-02-03 RX ORDER — SODIUM CHLORIDE, SODIUM LACTATE, POTASSIUM CHLORIDE, CALCIUM CHLORIDE 600; 310; 30; 20 MG/100ML; MG/100ML; MG/100ML; MG/100ML
50 INJECTION, SOLUTION INTRAVENOUS CONTINUOUS
Status: DISCONTINUED | OUTPATIENT
Start: 2021-02-03 | End: 2021-02-04

## 2021-02-03 RX ORDER — SODIUM CHLORIDE 0.9 % (FLUSH) 0.9 %
3-10 SYRINGE (ML) INJECTION AS NEEDED
Status: DISCONTINUED | OUTPATIENT
Start: 2021-02-03 | End: 2021-02-03 | Stop reason: HOSPADM

## 2021-02-03 RX ADMIN — PROPOFOL 110 MG: 10 INJECTION, EMULSION INTRAVENOUS at 16:13

## 2021-02-03 RX ADMIN — PHENYLEPHRINE HYDROCHLORIDE 100 MCG: 10 INJECTION INTRAVENOUS at 16:44

## 2021-02-03 RX ADMIN — PHENYLEPHRINE HYDROCHLORIDE 100 MCG: 10 INJECTION INTRAVENOUS at 17:48

## 2021-02-03 RX ADMIN — TAMSULOSIN HYDROCHLORIDE 0.8 MG: 0.4 CAPSULE ORAL at 09:53

## 2021-02-03 RX ADMIN — GLYCOPYRROLATE 0.5 MG: 0.2 INJECTION INTRAMUSCULAR; INTRAVENOUS at 20:14

## 2021-02-03 RX ADMIN — INDOCYANINE GREEN AND WATER 7.5 MG: KIT at 19:09

## 2021-02-03 RX ADMIN — ROCURONIUM BROMIDE 50 MG: 10 INJECTION INTRAVENOUS at 16:13

## 2021-02-03 RX ADMIN — PHENYLEPHRINE HYDROCHLORIDE 150 MCG: 10 INJECTION INTRAVENOUS at 18:55

## 2021-02-03 RX ADMIN — FAMOTIDINE 20 MG: 10 INJECTION INTRAVENOUS at 15:17

## 2021-02-03 RX ADMIN — PHENYLEPHRINE HYDROCHLORIDE 100 MCG: 10 INJECTION INTRAVENOUS at 18:07

## 2021-02-03 RX ADMIN — LISINOPRIL 40 MG: 40 TABLET ORAL at 09:53

## 2021-02-03 RX ADMIN — ROCURONIUM BROMIDE 10 MG: 10 INJECTION INTRAVENOUS at 17:10

## 2021-02-03 RX ADMIN — PHENYLEPHRINE HYDROCHLORIDE 0.5 MCG/KG/MIN: 10 INJECTION, SOLUTION INTRAMUSCULAR; INTRAVENOUS; SUBCUTANEOUS at 18:37

## 2021-02-03 RX ADMIN — SUGAMMADEX 200 MG: 100 INJECTION, SOLUTION INTRAVENOUS at 20:30

## 2021-02-03 RX ADMIN — BUPIVACAINE 20 ML: 13.3 INJECTION, SUSPENSION, LIPOSOMAL INFILTRATION at 16:25

## 2021-02-03 RX ADMIN — KETAMINE HYDROCHLORIDE 10 MG: 50 INJECTION, SOLUTION INTRAMUSCULAR; INTRAVENOUS at 19:20

## 2021-02-03 RX ADMIN — INDOCYANINE GREEN AND WATER 7.5 MG: KIT at 18:24

## 2021-02-03 RX ADMIN — SODIUM CHLORIDE: 9 INJECTION, SOLUTION INTRAVENOUS at 18:21

## 2021-02-03 RX ADMIN — PHENYLEPHRINE HYDROCHLORIDE 100 MCG: 10 INJECTION INTRAVENOUS at 17:10

## 2021-02-03 RX ADMIN — ACETAMINOPHEN 1000 MG: 500 TABLET, FILM COATED ORAL at 14:42

## 2021-02-03 RX ADMIN — PHENYLEPHRINE HYDROCHLORIDE 150 MCG: 10 INJECTION INTRAVENOUS at 19:10

## 2021-02-03 RX ADMIN — PHENYLEPHRINE HYDROCHLORIDE 100 MCG: 10 INJECTION INTRAVENOUS at 18:03

## 2021-02-03 RX ADMIN — PHENYLEPHRINE HYDROCHLORIDE 150 MCG: 10 INJECTION INTRAVENOUS at 18:32

## 2021-02-03 RX ADMIN — PHENYLEPHRINE HYDROCHLORIDE 100 MCG: 10 INJECTION INTRAVENOUS at 19:22

## 2021-02-03 RX ADMIN — KETAMINE HYDROCHLORIDE 20 MG: 50 INJECTION, SOLUTION INTRAMUSCULAR; INTRAVENOUS at 16:37

## 2021-02-03 RX ADMIN — GABAPENTIN 600 MG: 300 CAPSULE ORAL at 14:43

## 2021-02-03 RX ADMIN — SODIUM CHLORIDE, POTASSIUM CHLORIDE, SODIUM LACTATE AND CALCIUM CHLORIDE: 600; 310; 30; 20 INJECTION, SOLUTION INTRAVENOUS at 16:05

## 2021-02-03 RX ADMIN — EPHEDRINE SULFATE 15 MG: 50 INJECTION INTRAVENOUS at 16:37

## 2021-02-03 RX ADMIN — PHENYLEPHRINE HYDROCHLORIDE 100 MCG: 10 INJECTION INTRAVENOUS at 17:20

## 2021-02-03 RX ADMIN — ACETAMINOPHEN 1000 MG: 10 INJECTION, SOLUTION INTRAVENOUS at 22:19

## 2021-02-03 RX ADMIN — EPHEDRINE SULFATE 10 MG: 50 INJECTION INTRAVENOUS at 18:07

## 2021-02-03 RX ADMIN — INDOCYANINE GREEN AND WATER 10 MG: KIT at 19:19

## 2021-02-03 RX ADMIN — METOPROLOL SUCCINATE 25 MG: 25 TABLET, EXTENDED RELEASE ORAL at 09:53

## 2021-02-03 RX ADMIN — FAMOTIDINE 20 MG: 10 INJECTION INTRAVENOUS at 09:53

## 2021-02-03 RX ADMIN — EPHEDRINE SULFATE 5 MG: 50 INJECTION INTRAVENOUS at 17:48

## 2021-02-03 RX ADMIN — KETAMINE HYDROCHLORIDE 10 MG: 50 INJECTION, SOLUTION INTRAMUSCULAR; INTRAVENOUS at 17:29

## 2021-02-03 RX ADMIN — ROCURONIUM BROMIDE 5 MG: 10 INJECTION INTRAVENOUS at 17:56

## 2021-02-03 RX ADMIN — NEOSTIGMINE METHYLSULFATE 3 MG: 1 INJECTION INTRAMUSCULAR; INTRAVENOUS; SUBCUTANEOUS at 20:14

## 2021-02-03 RX ADMIN — AMLODIPINE BESYLATE 10 MG: 10 TABLET ORAL at 09:53

## 2021-02-03 RX ADMIN — AMIODARONE HYDROCHLORIDE 200 MG: 200 TABLET ORAL at 09:53

## 2021-02-03 RX ADMIN — SCOPALAMINE 1 PATCH: 1 PATCH, EXTENDED RELEASE TRANSDERMAL at 14:41

## 2021-02-03 RX ADMIN — LIDOCAINE HYDROCHLORIDE 80 MG: 20 INJECTION, SOLUTION INFILTRATION; PERINEURAL at 16:13

## 2021-02-03 RX ADMIN — ALVIMOPAN 12 MG: 12 CAPSULE ORAL at 14:43

## 2021-02-03 RX ADMIN — EPHEDRINE SULFATE 10 MG: 50 INJECTION INTRAVENOUS at 18:50

## 2021-02-03 RX ADMIN — Medication 1000 MCG: at 09:52

## 2021-02-03 RX ADMIN — METRONIDAZOLE 500 MG: 500 INJECTION, SOLUTION INTRAVENOUS at 16:00

## 2021-02-03 RX ADMIN — SODIUM CHLORIDE, POTASSIUM CHLORIDE, SODIUM LACTATE AND CALCIUM CHLORIDE 50 ML/HR: 600; 310; 30; 20 INJECTION, SOLUTION INTRAVENOUS at 23:02

## 2021-02-03 RX ADMIN — MAGNESIUM SULFATE HEPTAHYDRATE 2 G: 500 INJECTION, SOLUTION INTRAMUSCULAR; INTRAVENOUS at 16:25

## 2021-02-03 RX ADMIN — BUPIVACAINE HYDROCHLORIDE 20 ML: 2.5 INJECTION, SOLUTION EPIDURAL; INFILTRATION; INTRACAUDAL; PERINEURAL at 16:25

## 2021-02-03 RX ADMIN — SODIUM CHLORIDE, PRESERVATIVE FREE 10 ML: 5 INJECTION INTRAVENOUS at 09:55

## 2021-02-03 RX ADMIN — PHENYLEPHRINE HYDROCHLORIDE 100 MCG: 10 INJECTION INTRAVENOUS at 18:13

## 2021-02-03 RX ADMIN — DEXAMETHASONE SODIUM PHOSPHATE 10 MG: 4 INJECTION INTRA-ARTICULAR; INTRALESIONAL; INTRAMUSCULAR; INTRAVENOUS; SOFT TISSUE at 16:39

## 2021-02-03 RX ADMIN — EPHEDRINE SULFATE 10 MG: 50 INJECTION INTRAVENOUS at 16:31

## 2021-02-03 RX ADMIN — ROCURONIUM BROMIDE 5 MG: 10 INJECTION INTRAVENOUS at 18:44

## 2021-02-03 RX ADMIN — LIDOCAINE HYDROCHLORIDE ANHYDROUS AND DEXTROSE MONOHYDRATE 2 MG/MIN: .4; 5 INJECTION, SOLUTION INTRAVENOUS at 16:20

## 2021-02-03 RX ADMIN — KETAMINE HYDROCHLORIDE 10 MG: 50 INJECTION, SOLUTION INTRAMUSCULAR; INTRAVENOUS at 18:26

## 2021-02-03 RX ADMIN — PHENYLEPHRINE HYDROCHLORIDE 100 MCG: 10 INJECTION INTRAVENOUS at 17:41

## 2021-02-03 RX ADMIN — ONDANSETRON HYDROCHLORIDE 4 MG: 2 SOLUTION INTRAMUSCULAR; INTRAVENOUS at 20:14

## 2021-02-03 RX ADMIN — CEFAZOLIN SODIUM 2 G: 2 INJECTION, SOLUTION INTRAVENOUS at 15:59

## 2021-02-03 NOTE — TELEPHONE ENCOUNTER
----- Message from Chantal Bustos MD sent at 2/3/2021 10:53 AM EST -----  Hosp f/u with Maria C 8-10 weeks, [facility]

## 2021-02-03 NOTE — ANESTHESIA PREPROCEDURE EVALUATION
Anesthesia Evaluation     Patient summary reviewed and Nursing notes reviewed                Airway   Mallampati: III  No difficulty expected  Dental    (+) upper dentures and lower dentures    Pulmonary    (+) a smoker Former,   Cardiovascular     ECG reviewed  Rhythm: irregular    (+) hypertension, valvular problems/murmurs MR and AS, past MI , CAD, CABG, dysrhythmias Atrial Flutter, PVD, hyperlipidemia,       Neuro/Psych  (+) syncope, numbness,     GI/Hepatic/Renal/Endo    (+) obesity,   renal disease CRI, diabetes mellitus,     Musculoskeletal     Abdominal    Substance History - negative use     OB/GYN negative ob/gyn ROS         Other   arthritis,                        Anesthesia Plan    ASA 4     general with block   (I discussed with the patient the relevant risks of general anesthesia including, but not limited to, nausea, vomiting, disorientation, post-op delirium, nerve injury, oral/dental injury, awareness, stroke, and death.  I also reviewed any clinically relevant lab and imaging results.)  intravenous induction     Anesthetic plan, all risks, benefits, and alternatives have been provided, discussed and informed consent has been obtained with: patient.    Plan discussed with CRNA.

## 2021-02-03 NOTE — PROGRESS NOTES
Name: Osvaldo Lopez ADMIT: 2021   : 1935  PCP: Caio Rodríguez Jr., MD    MRN: [id number] LOS: 3 days   AGE/SEX: 85 y.o. male  ROOM: [facility]     Subjective   Subjective   NAEO. No further bleeding.     Review of Systems   Constitutional: Negative for chills and fever.   Respiratory: Negative for chest tightness and shortness of breath.    Cardiovascular: Negative for chest pain and palpitations.   Gastrointestinal: Negative for abdominal pain and constipation.        Objective   Objective   Vital Signs  Temp:  [97.4 °F (36.3 °C)-98.2 °F (36.8 °C)] 98 °F (36.7 °C)  Heart Rate:  [60-68] 68  Resp:  [18] 18  BP: (123-156)/(60-68) 153/68     on   ;   Device (Oxygen Therapy): room air  Body mass index is 30.71 kg/m².  Physical Exam  Constitutional:       Appearance: Normal appearance.   HENT:      Head: Normocephalic and atraumatic.   Cardiovascular:      Rate and Rhythm: Normal rate and regular rhythm.   Pulmonary:      Effort: Pulmonary effort is normal.      Breath sounds: Normal breath sounds.   Abdominal:      General: There is no distension.      Palpations: Abdomen is soft.      Tenderness: There is no abdominal tenderness.   Neurological:      Mental Status: He is alert.         Results Review     I reviewed the patient's new clinical results.  Results from last 7 days   Lab Units 21  0502 21  1541 21  0747 21  0000  21  0452  21  0754  21  1601   WBC 10*3/mm3 3.33*  --   --   --   --  3.91  --  3.20*  --  3.63   HEMOGLOBIN g/dL 7.6* 8.0* 7.5* 8.0*   < > 7.5*  7.5*   < > 8.7*   < > 6.1*   PLATELETS 10*3/mm3 122*  --   --   --   --  122*  --  137*  --  129*    < > = values in this interval not displayed.     Results from last 7 days   Lab Units 21  0502 21  0747 21  0452 21  0754   SODIUM mmol/L 143 143 143 143   POTASSIUM mmol/L 3.9 4.1 4.0 4.3   CHLORIDE mmol/L 108* 108* 108* 108*   CO2 mmol/L 26.3 28.5 25.6 22.3   BUN mg/dL 8 9 10  14   CREATININE mg/dL 1.17 1.29* 1.22 1.10   GLUCOSE mg/dL 111* 113* 102* 113*   Estimated Creatinine Clearance: 59.4 mL/min (by C-G formula based on SCr of 1.17 mg/dL).  Results from last 7 days   Lab Units 02/01/21  0452 01/31/21  0754 01/29/21  1601   ALBUMIN g/dL 3.90 4.30 4.00   BILIRUBIN mg/dL 0.4 0.5 0.2   ALK PHOS U/L 74 80 71   AST (SGOT) U/L 20 24 17   ALT (SGPT) U/L 17 20 18     Results from last 7 days   Lab Units 02/03/21  0502 02/02/21  0747 02/01/21  0452 01/31/21  0754 01/29/21  1601   CALCIUM mg/dL 8.5* 8.7 8.5* 8.9 8.8   ALBUMIN g/dL  --   --  3.90 4.30 4.00   MAGNESIUM mg/dL 2.2  --   --   --   --    PHOSPHORUS mg/dL 3.6  --   --   --   --        COVID19   Date Value Ref Range Status   02/02/2021 Not Detected Not Detected - Ref. Range Final   01/29/2021 Not Detected Not Detected - Ref. Range Final     Glucose   Date/Time Value Ref Range Status   02/03/2021 1130 135 (H) 70 - 130 mg/dL Final   02/03/2021 0550 127 70 - 130 mg/dL Final   02/02/2021 2035 140 (H) 70 - 130 mg/dL Final   02/02/2021 1630 136 (H) 70 - 130 mg/dL Final   02/02/2021 1121 129 70 - 130 mg/dL Final   02/02/2021 0632 126 70 - 130 mg/dL Final   02/01/2021 2003 145 (H) 70 - 130 mg/dL Final       CT Abdomen Pelvis With Contrast  Narrative: CT ABDOMEN AND PELVIS WITH IV CONTRAST     HISTORY: Colon mass in the hepatic flexure, anemia, abdominal pain     TECHNIQUE: Radiation dose reduction techniques were utilized, including  automated exposure control and exposure modulation based on body size.   3 mm images were obtained through the abdomen and pelvis after the  administration of IV contrast.      COMPARISON: 04/14/2019     FINDINGS:      ABDOMEN:  Mild fibrotic changes in the lung bases. Stable dilation of the thoracic  aorta. The liver is unremarkable. Cholecystectomy. Spleen is  unremarkable. Bilateral renal cysts. Adrenal glands are unremarkable.  Stable multiple pancreatic cysts/cystic lesions with the largest located  in the  region of the pancreatic head measuring 1.9 cm. Stable mildly  dilated infrarenal abdominal aorta. No ascites or lymphadenopathy.     PELVIS:  Prostatomegaly. The bladder is unremarkable. No free fluid. Sigmoid  diverticulosis without evidence of diverticulitis. There is an area of  narrowing in the hepatic flexure of the colon best seen on coronal  images 39 through 48 may reflect the mass found on colonoscopy. Bone  windows show postsurgical changes of the lumbar spine.     Impression: Area of narrowing within the hepatic flexure of the colon may reflect  the mass found on colonoscopy. There is no convincing evidence of  metastatic disease. Additional findings as described.     Radiation dose reduction techniques were utilized, including automated  exposure control and exposure modulation based on body size.     This report was finalized on 2/2/2021 10:35 AM by Dr. Harvinder Kate M.D.       Scheduled Medications  amiodarone, 200 mg, Oral, Daily  amLODIPine, 10 mg, Oral, Q24H  famotidine, 20 mg, Intravenous, Q12H  insulin lispro, 0-9 Units, Subcutaneous, TID AC  lisinopril, 40 mg, Oral, Daily  metoprolol succinate XL, 25 mg, Oral, Daily  sodium chloride, 10 mL, Intravenous, Q12H  tamsulosin, 0.8 mg, Oral, Daily  vitamin B-12, 1,000 mcg, Oral, Daily    Infusions   Diet  NPO Diet NPO Except: Sips With Meds       Assessment/Plan     Active Hospital Problems    Diagnosis  POA   • **Symptomatic anemia [D64.9]  Yes   • Iron deficiency anemia [D50.9]  Unknown   • Chronic anticoagulation [Z79.01]  Not Applicable   • CKD (chronic kidney disease) [N18.9]  Unknown   • CRISTOBAL (acute kidney injury) (CMS/HCC) [N17.9]  Unknown   • Colonic mass [K63.89]  Unknown   • Chronic low back pain with sciatica [M54.40, G89.29]  Yes   • Typical atrial flutter (CMS/HCC) [I48.3]  Yes   • DM (diabetes mellitus), type 2 with complications (CMS/HCC) [E11.8]  Yes   • Essential hypertension [I10]  Yes      Resolved Hospital Problems   No resolved  problems to display.       85 y.o. male admitted with Symptomatic anemia.  Iron deficiency anemia secondary to invasive adenocarcinoma of the colon  Colonoscopy showed a proximal colon mass as well as a cecal polyp.  Pathology showed invasive adenocarcinoma the proximal colon mass was oozing blood.  Colorectal surgery has been consulted, colectomy today.    Typical atrial flutter s/p ablation on chronic anticoagulation therapy.    Currently in sinus rhythm  Xarelto is currently being held.  Not any pharmacological anticoagulation at this time secondary to actively oozing proximal colon mass.    Currently on amiodarone and metoprolol, continue per cardiology.     Troponin elevation  Patient has a history of chronic troponin elevation, no chest pain, EKG without any significant changes.     CKD 3  Monitor renal function     Type 2 diabetes  Sliding scale     Hypertension  Continue amlodipine 10, lisinopril 40-creatinine appears to be at baseline.    · SCDs for DVT prophylaxis.  · Full code.  · Discussed with patient.  · Anticipate discharge TBD.  when cleared by consultants.    Michael Austin MD  [facility]  02/03/21  14:03 EST    Patient was wearing facemask when I entered the room and throughout our encounter.  I wore protective equipment throughout this patient encounter including a face mask, gloves and protective eyewear.  Hand hygiene was performed before donning protective equipment and after removal when leaving the room.

## 2021-02-03 NOTE — PROGRESS NOTES
[facility]  Inpatient Progress Note    Reason for Follow Up: Anemia, colon mass    Subjective     Interval History:   Hemoglobin low but stable.  No further bleeding.  No bowel movements.  Discussed pathology results showing invasive adenocarcinoma within a tubulovillous adenoma.    Current Facility-Administered Medications:   •  amiodarone (PACERONE) tablet 200 mg, 200 mg, Oral, Daily, Jey Barrios MD, 200 mg at 02/03/21 0953  •  amLODIPine (NORVASC) tablet 10 mg, 10 mg, Oral, Q24H, Jose Becerra MD, 10 mg at 02/03/21 0953  •  dextrose (D50W) 25 g/ 50mL Intravenous Solution 25 g, 25 g, Intravenous, Q15 Min PRN, Jey Barrios MD  •  dextrose (GLUTOSE) oral gel 15 g, 15 g, Oral, Q15 Min PRN, Jey Barrios MD  •  famotidine (PEPCID) injection 20 mg, 20 mg, Intravenous, Q12H, Jey Barrios MD, 20 mg at 02/03/21 0953  •  glucagon (human recombinant) (GLUCAGEN DIAGNOSTIC) injection 1 mg, 1 mg, Subcutaneous, Q15 Min PRN, Jey Barrios MD  •  insulin lispro (humaLOG, ADMELOG) injection 0-9 Units, 0-9 Units, Subcutaneous, TID AC, Jey Barrios MD, 2 Units at 01/31/21 1640  •  lisinopril (PRINIVIL,ZESTRIL) tablet 40 mg, 40 mg, Oral, Daily, Jey Barrios MD, 40 mg at 02/03/21 0953  •  metoprolol succinate XL (TOPROL-XL) 24 hr tablet 25 mg, 25 mg, Oral, Daily, Jey Barrios MD, 25 mg at 02/03/21 0953  •  nitroglycerin (NITROSTAT) SL tablet 0.4 mg, 0.4 mg, Sublingual, Q5 Min PRN, Jey Barrios MD  •  ondansetron (ZOFRAN) injection 4 mg, 4 mg, Intravenous, Q6H PRN, Jey Barrios MD  •  sodium chloride 0.9 % flush 10 mL, 10 mL, Intravenous, Q12H, Jey Barrios MD, 10 mL at 02/03/21 0955  •  sodium chloride 0.9 % flush 10 mL, 10 mL, Intravenous, PRN, Jey Barrios MD  •  tamsulosin (FLOMAX) 24 hr capsule 0.8 mg, 0.8 mg, Oral, Daily, Jey Barrios MD, 0.8 mg at 02/03/21 0953  •  vitamin B-12 (CYANOCOBALAMIN) tablet 1,000 mcg, 1,000 mcg, Oral, Daily, Denis,  Jey CALIXTO MD, 1,000 mcg at 02/03/21 0987  Review of Systems:     The following systems were reviewed and negative: Constitution, pulmonary, cardiac, gastrointestinal, genitourinary    Objective     Vital Signs  Temp:  [97.4 °F (36.3 °C)-98.2 °F (36.8 °C)] 98.2 °F (36.8 °C)  Heart Rate:  [60-68] 67  Resp:  [18] 18  BP: (123-156)/(60-66) 156/61  Body mass index is 30.71 kg/m².    Intake/Output Summary (Last 24 hours) at 2/3/2021 1048  Last data filed at 2/3/2021 0921  Gross per 24 hour   Intake 920 ml   Output 1050 ml   Net -130 ml     I/O this shift:  In: 0   Out: 300 [Urine:300]     Physical Exam:   General: patient awake, alert and cooperative   Eyes: Normal lids and lashes, no scleral icterus   Neck: supple, normal ROM   Skin: warm and dry, not jaundiced   Cardiovascular: regular rhythm and rate, no murmurs auscultated   Pulm: clear to auscultation bilaterally, regular and unlabored   Abdomen: soft, obese, nontender, nondistended; normal bowel sounds   Rectal: deferred   Extremities: no rash or edema   Psychiatric: Normal mood and behavior; memory intact     Results Review:     I reviewed the patient's new clinical results.    Results from last 7 days   Lab Units 02/03/21  0502 02/02/21  1541 02/02/21  0747  02/01/21  0452  01/31/21  0754   WBC 10*3/mm3 3.33*  --   --   --  3.91  --  3.20*   HEMOGLOBIN g/dL 7.6* 8.0* 7.5*   < > 7.5*  7.5*   < > 8.7*   HEMATOCRIT % 25.7* 28.1* 26.1*   < > 26.2*  26.2*   < > 30.1*   PLATELETS 10*3/mm3 122*  --   --   --  122*  --  137*    < > = values in this interval not displayed.     Results from last 7 days   Lab Units 02/03/21  0502 02/02/21  0747 02/01/21  0452 01/31/21  0754 01/29/21  1601   SODIUM mmol/L 143 143 143 143 139   POTASSIUM mmol/L 3.9 4.1 4.0 4.3 4.7   CHLORIDE mmol/L 108* 108* 108* 108* 106   CO2 mmol/L 26.3 28.5 25.6 22.3 20.7*   BUN mg/dL 8 9 10 14 23   CREATININE mg/dL 1.17 1.29* 1.22 1.10 1.33*   CALCIUM mg/dL 8.5* 8.7 8.5* 8.9 8.8   BILIRUBIN mg/dL  --    --  0.4 0.5 0.2   ALK PHOS U/L  --   --  74 80 71   ALT (SGPT) U/L  --   --  17 20 18   AST (SGOT) U/L  --   --  20 24 17   GLUCOSE mg/dL 111* 113* 102* 113* 102*     Results from last 7 days   Lab Units 02/01/21  0452 01/31/21  0754   INR  1.17* 1.11*     Lab Results   Lab Value Date/Time    LIPASE 8 (L) 04/14/2019 0840       Radiology:  CT Abdomen Pelvis With Contrast   Final Result   Area of narrowing within the hepatic flexure of the colon may reflect   the mass found on colonoscopy. There is no convincing evidence of   metastatic disease. Additional findings as described.       Radiation dose reduction techniques were utilized, including automated   exposure control and exposure modulation based on body size.       This report was finalized on 2/2/2021 10:35 AM by Dr. Harvinder Kate M.D.          XR Chest 1 View   Final Result   No focal pulmonary consolidation. Borderline heart size.   Tortuous aorta. Follow-up as clinically indicated.       This report was finalized on 1/29/2021 4:10 PM by Dr. Vimal Mayorga M.D.              Assessment/Plan     Patient Active Problem List   Diagnosis   • Complete rotator cuff tear of left shoulder   • Abnormal finding on thyroid function test   • Abdominal aortic aneurysm (CMS/HCC)   • Benign prostatic hyperplasia   • Essential hypertension   • Hyperlipidemia   • Shoulder pain   • Lumbar radiculopathy   • DM (diabetes mellitus), type 2 with complications (CMS/HCC)   • OA (osteoarthritis) of knee   • Tear of right rotator cuff   • Spinal stenosis of lumbar region with neurogenic claudication   • Eyebrow ptosis   • Pseudophakia   • Nonrheumatic aortic valve stenosis   • Atrial flutter with rapid ventricular response (CMS/HCC)   • Right arm pain   • Leg weakness, bilateral   • Typical atrial flutter (CMS/HCC)   • Diabetic peripheral neuropathy (CMS/HCC)   • Muscle weakness (generalized)   • Pressure injury of left buttock, stage 1   • Chronic low back pain with  sciatica   • 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CMS/HCC)   • Pressure injury of buttock, stage 1   • Anemia   • Symptomatic anemia   • Iron deficiency anemia   • Chronic anticoagulation   • CKD (chronic kidney disease)   • CRISTOBAL (acute kidney injury) (CMS/HCC)   • Colonic mass       Impression  1.  Iron deficiency anemia: Remains low but stable, secondary to colon mass    2.  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  A. fib on Xarelto: held    4.  Thrombocytopenia    5.  Pancreatic cysts: Identified on CT scan, reportedly stable to prior imaging.  He is not a candidate for surgery so no further surveillance is recommended    Plan  Plans for right hemicolectomy with Dr. Napoles    Message sent to my office to arrange for outpatient follow-up    GI will sign off but remain available as needed in this patient's care.  Thank you.      .    I discussed the patients findings and my recommendations with patient.    All necessary PPE, including face mask and eye protection, were worn during this encounter.  Hand sanitization was performed both before and after the patient interaction.    Over 25 minutes was spent reviewing the patient's chart and records, in discussion with the patient, in examination of the patient, and in discussion with members of the patient's medical team.    Chantal Bustos MD

## 2021-02-03 NOTE — PLAN OF CARE
Pt maintaining or progressing on all goals.  Goal Outcome Evaluation:        Problem: Adult Inpatient Plan of Care  Goal: Patient-Specific Goal (Individualized)  Outcome: Ongoing, Progressing  Goal: Absence of Hospital-Acquired Illness or Injury  Outcome: Ongoing, Progressing  Goal: Optimal Comfort and Wellbeing  Outcome: Ongoing, Progressing  Goal: Readiness for Transition of Care  Outcome: Ongoing, Progressing  Goal: Plan of Care Review  Outcome: Ongoing, Progressing     Problem: Anemia  Goal: Anemia Symptom Improvement  Outcome: Ongoing, Progressing     Problem: Fall Injury Risk  Goal: Absence of Fall and Fall-Related Injury  Outcome: Ongoing, Progressing

## 2021-02-03 NOTE — PLAN OF CARE
Goal Outcome Evaluation:  Plan of Care Reviewed With: patient  Progress: improving  Outcome Summary: CL then NPO for OR, VSS, up to BSC with standby asst, safety maintained

## 2021-02-03 NOTE — ANESTHESIA PROCEDURE NOTES
Airway  Urgency: elective    Date/Time: 2/3/2021 4:17 PM  Airway not difficult    General Information and Staff    Patient location during procedure: OR  CRNA: Kecia Downey CRNA    Indications and Patient Condition  Indications for airway management: airway protection    Preoxygenated: yes  Mask difficulty assessment: 2 - vent by mask + OA or adjuvant +/- NMBA    Final Airway Details  Final airway type: endotracheal airway      Successful airway: ETT  Cuffed: yes   Successful intubation technique: direct laryngoscopy  Endotracheal tube insertion site: oral  Blade: Ezio  Blade size: 4  ETT size (mm): 7.5  Cormack-Lehane Classification: grade I - full view of glottis  Placement verified by: chest auscultation and capnometry   Measured from: lips  ETT/EBT  to lips (cm): 22  Number of attempts at approach: 1  Assessment: lips, teeth, and gum same as pre-op and atraumatic intubation

## 2021-02-04 ENCOUNTER — TELEPHONE (OUTPATIENT)
Dept: SURGERY | Facility: CLINIC | Age: 86
End: 2021-02-04

## 2021-02-04 LAB
ANION GAP SERPL CALCULATED.3IONS-SCNC: 10.7 MMOL/L (ref 5–15)
ANION GAP SERPL CALCULATED.3IONS-SCNC: 10.9 MMOL/L (ref 5–15)
ANISOCYTOSIS BLD QL: ABNORMAL
APTT PPP: 31.6 SECONDS (ref 22.7–35.4)
BH BB BLOOD EXPIRATION DATE: NORMAL
BH BB BLOOD EXPIRATION DATE: NORMAL
BH BB BLOOD TYPE BARCODE: 6200
BH BB BLOOD TYPE BARCODE: 6200
BH BB DISPENSE STATUS: NORMAL
BH BB DISPENSE STATUS: NORMAL
BH BB PRODUCT CODE: NORMAL
BH BB PRODUCT CODE: NORMAL
BH BB UNIT NUMBER: NORMAL
BH BB UNIT NUMBER: NORMAL
BUN SERPL-MCNC: 12 MG/DL (ref 8–23)
BUN SERPL-MCNC: 16 MG/DL (ref 8–23)
BUN/CREAT SERPL: 11.3 (ref 7–25)
BUN/CREAT SERPL: 11.8 (ref 7–25)
CALCIUM SPEC-SCNC: 7.7 MG/DL (ref 8.6–10.5)
CALCIUM SPEC-SCNC: 8.3 MG/DL (ref 8.6–10.5)
CHLORIDE SERPL-SCNC: 106 MMOL/L (ref 98–107)
CHLORIDE SERPL-SCNC: 107 MMOL/L (ref 98–107)
CO2 SERPL-SCNC: 23.1 MMOL/L (ref 22–29)
CO2 SERPL-SCNC: 24.3 MMOL/L (ref 22–29)
CREAT SERPL-MCNC: 1.06 MG/DL (ref 0.76–1.27)
CREAT SERPL-MCNC: 1.36 MG/DL (ref 0.76–1.27)
CROSSMATCH INTERPRETATION: NORMAL
CROSSMATCH INTERPRETATION: NORMAL
DEPRECATED RDW RBC AUTO: 52.9 FL (ref 37–54)
ERYTHROCYTE [DISTWIDTH] IN BLOOD BY AUTOMATED COUNT: 18.8 % (ref 12.3–15.4)
GFR SERPL CREATININE-BSD FRML MDRD: 50 ML/MIN/1.73
GFR SERPL CREATININE-BSD FRML MDRD: 67 ML/MIN/1.73
GLUCOSE BLDC GLUCOMTR-MCNC: 116 MG/DL (ref 70–130)
GLUCOSE BLDC GLUCOMTR-MCNC: 133 MG/DL (ref 70–130)
GLUCOSE BLDC GLUCOMTR-MCNC: 183 MG/DL (ref 70–130)
GLUCOSE BLDC GLUCOMTR-MCNC: 198 MG/DL (ref 70–130)
GLUCOSE BLDC GLUCOMTR-MCNC: 199 MG/DL (ref 70–130)
GLUCOSE BLDC GLUCOMTR-MCNC: 235 MG/DL (ref 70–130)
GLUCOSE SERPL-MCNC: 133 MG/DL (ref 65–99)
GLUCOSE SERPL-MCNC: 204 MG/DL (ref 65–99)
HCT VFR BLD AUTO: 31.6 % (ref 37.5–51)
HGB BLD-MCNC: 9.5 G/DL (ref 13–17.7)
INR PPP: 1.13 (ref 0.9–1.1)
LYMPHOCYTES # BLD MANUAL: 0.07 10*3/MM3 (ref 0.7–3.1)
LYMPHOCYTES NFR BLD MANUAL: 2 % (ref 19.6–45.3)
LYMPHOCYTES NFR BLD MANUAL: 2 % (ref 5–12)
MAGNESIUM SERPL-MCNC: 2.2 MG/DL (ref 1.6–2.4)
MCH RBC QN AUTO: 23.8 PG (ref 26.6–33)
MCHC RBC AUTO-ENTMCNC: 30.1 G/DL (ref 31.5–35.7)
MCV RBC AUTO: 79 FL (ref 79–97)
MICROCYTES BLD QL: ABNORMAL
MONOCYTES # BLD AUTO: 0.07 10*3/MM3 (ref 0.1–0.9)
NEUTROPHILS # BLD AUTO: 3.53 10*3/MM3 (ref 1.7–7)
NEUTROPHILS NFR BLD MANUAL: 95.9 % (ref 42.7–76)
PLAT MORPH BLD: NORMAL
PLATELET # BLD AUTO: 119 10*3/MM3 (ref 140–450)
PMV BLD AUTO: 9.9 FL (ref 6–12)
POTASSIUM SERPL-SCNC: 4 MMOL/L (ref 3.5–5.2)
POTASSIUM SERPL-SCNC: 4.2 MMOL/L (ref 3.5–5.2)
PROTHROMBIN TIME: 14.3 SECONDS (ref 11.7–14.2)
RBC # BLD AUTO: 4 10*6/MM3 (ref 4.14–5.8)
SODIUM SERPL-SCNC: 141 MMOL/L (ref 136–145)
SODIUM SERPL-SCNC: 141 MMOL/L (ref 136–145)
UNIT  ABO: NORMAL
UNIT  ABO: NORMAL
UNIT  RH: NORMAL
UNIT  RH: NORMAL
WBC # BLD AUTO: 3.68 10*3/MM3 (ref 3.4–10.8)
WBC MORPH BLD: NORMAL

## 2021-02-04 PROCEDURE — 94003 VENT MGMT INPAT SUBQ DAY: CPT

## 2021-02-04 PROCEDURE — 83735 ASSAY OF MAGNESIUM: CPT | Performed by: COLON & RECTAL SURGERY

## 2021-02-04 PROCEDURE — 80048 BASIC METABOLIC PNL TOTAL CA: CPT | Performed by: COLON & RECTAL SURGERY

## 2021-02-04 PROCEDURE — 94799 UNLISTED PULMONARY SVC/PX: CPT

## 2021-02-04 PROCEDURE — 85730 THROMBOPLASTIN TIME PARTIAL: CPT | Performed by: COLON & RECTAL SURGERY

## 2021-02-04 PROCEDURE — 85025 COMPLETE CBC W/AUTO DIFF WBC: CPT | Performed by: COLON & RECTAL SURGERY

## 2021-02-04 PROCEDURE — 99232 SBSQ HOSP IP/OBS MODERATE 35: CPT | Performed by: INTERNAL MEDICINE

## 2021-02-04 PROCEDURE — 80048 BASIC METABOLIC PNL TOTAL CA: CPT | Performed by: INTERNAL MEDICINE

## 2021-02-04 PROCEDURE — 63710000001 INSULIN LISPRO (HUMAN) PER 5 UNITS: Performed by: COLON & RECTAL SURGERY

## 2021-02-04 PROCEDURE — 25010000002 PROPOFOL 10 MG/ML EMULSION

## 2021-02-04 PROCEDURE — 25010000002 ALBUMIN HUMAN 5% PER 50 ML: Performed by: COLON & RECTAL SURGERY

## 2021-02-04 PROCEDURE — 82962 GLUCOSE BLOOD TEST: CPT

## 2021-02-04 PROCEDURE — P9041 ALBUMIN (HUMAN),5%, 50ML: HCPCS | Performed by: COLON & RECTAL SURGERY

## 2021-02-04 PROCEDURE — 25010000002 PROPOFOL 10 MG/ML EMULSION: Performed by: INTERNAL MEDICINE

## 2021-02-04 PROCEDURE — 85610 PROTHROMBIN TIME: CPT | Performed by: COLON & RECTAL SURGERY

## 2021-02-04 PROCEDURE — 85007 BL SMEAR W/DIFF WBC COUNT: CPT | Performed by: COLON & RECTAL SURGERY

## 2021-02-04 RX ORDER — PROPOFOL 10 MG/ML
VIAL (ML) INTRAVENOUS
Status: COMPLETED
Start: 2021-02-04 | End: 2021-02-04

## 2021-02-04 RX ORDER — HYDROMORPHONE HYDROCHLORIDE 1 MG/ML
0.25 INJECTION, SOLUTION INTRAMUSCULAR; INTRAVENOUS; SUBCUTANEOUS
Status: DISCONTINUED | OUTPATIENT
Start: 2021-02-04 | End: 2021-02-12 | Stop reason: HOSPADM

## 2021-02-04 RX ORDER — SODIUM CHLORIDE 9 MG/ML
50 INJECTION, SOLUTION INTRAVENOUS CONTINUOUS
Status: DISCONTINUED | OUTPATIENT
Start: 2021-02-04 | End: 2021-02-06

## 2021-02-04 RX ORDER — ALVIMOPAN 12 MG/1
12 CAPSULE ORAL 2 TIMES DAILY
Status: DISCONTINUED | OUTPATIENT
Start: 2021-02-04 | End: 2021-02-07

## 2021-02-04 RX ORDER — NALOXONE HCL 0.4 MG/ML
0.4 VIAL (ML) INJECTION
Status: DISCONTINUED | OUTPATIENT
Start: 2021-02-04 | End: 2021-02-12 | Stop reason: HOSPADM

## 2021-02-04 RX ORDER — ACETAMINOPHEN 500 MG
1000 TABLET ORAL EVERY 6 HOURS
Status: DISCONTINUED | OUTPATIENT
Start: 2021-02-04 | End: 2021-02-04

## 2021-02-04 RX ORDER — CELECOXIB 200 MG/1
200 CAPSULE ORAL EVERY 12 HOURS SCHEDULED
Status: DISCONTINUED | OUTPATIENT
Start: 2021-02-04 | End: 2021-02-04

## 2021-02-04 RX ORDER — GABAPENTIN 100 MG/1
200 CAPSULE ORAL 2 TIMES DAILY
Status: DISCONTINUED | OUTPATIENT
Start: 2021-02-04 | End: 2021-02-04

## 2021-02-04 RX ORDER — ALBUMIN, HUMAN INJ 5% 5 %
500 SOLUTION INTRAVENOUS ONCE
Status: COMPLETED | OUTPATIENT
Start: 2021-02-04 | End: 2021-02-04

## 2021-02-04 RX ORDER — ONDANSETRON 2 MG/ML
4 INJECTION INTRAMUSCULAR; INTRAVENOUS EVERY 6 HOURS PRN
Status: DISCONTINUED | OUTPATIENT
Start: 2021-02-04 | End: 2021-02-12 | Stop reason: HOSPADM

## 2021-02-04 RX ORDER — ACETAMINOPHEN 10 MG/ML
1000 INJECTION, SOLUTION INTRAVENOUS EVERY 6 HOURS
Status: DISCONTINUED | OUTPATIENT
Start: 2021-02-04 | End: 2021-02-07

## 2021-02-04 RX ADMIN — ALBUMIN HUMAN 500 ML: 0.05 INJECTION, SOLUTION INTRAVENOUS at 12:07

## 2021-02-04 RX ADMIN — ALVIMOPAN 12 MG: 12 CAPSULE ORAL at 14:09

## 2021-02-04 RX ADMIN — PROPOFOL 20 MCG/KG/MIN: 10 INJECTION, EMULSION INTRAVENOUS at 01:15

## 2021-02-04 RX ADMIN — CELECOXIB 200 MG: 200 CAPSULE ORAL at 14:07

## 2021-02-04 RX ADMIN — ALVIMOPAN 12 MG: 12 CAPSULE ORAL at 20:30

## 2021-02-04 RX ADMIN — INSULIN LISPRO 2 UNITS: 100 INJECTION, SOLUTION INTRAVENOUS; SUBCUTANEOUS at 06:58

## 2021-02-04 RX ADMIN — METOROPROLOL TARTRATE 5 MG: 5 INJECTION, SOLUTION INTRAVENOUS at 03:42

## 2021-02-04 RX ADMIN — PROPOFOL 20 MCG/KG/MIN: 10 INJECTION, EMULSION INTRAVENOUS at 06:58

## 2021-02-04 RX ADMIN — ACETAMINOPHEN 1000 MG: 10 INJECTION, SOLUTION INTRAVENOUS at 09:57

## 2021-02-04 RX ADMIN — SODIUM CHLORIDE 75 ML/HR: 9 INJECTION, SOLUTION INTRAVENOUS at 07:58

## 2021-02-04 RX ADMIN — FAMOTIDINE 20 MG: 10 INJECTION INTRAVENOUS at 20:30

## 2021-02-04 RX ADMIN — INSULIN LISPRO 2 UNITS: 100 INJECTION, SOLUTION INTRAVENOUS; SUBCUTANEOUS at 13:00

## 2021-02-04 RX ADMIN — ACETAMINOPHEN 1000 MG: 10 INJECTION, SOLUTION INTRAVENOUS at 15:28

## 2021-02-04 RX ADMIN — FAMOTIDINE 20 MG: 10 INJECTION INTRAVENOUS at 08:18

## 2021-02-04 RX ADMIN — SODIUM CHLORIDE, POTASSIUM CHLORIDE, SODIUM LACTATE AND CALCIUM CHLORIDE 500 ML: 600; 310; 30; 20 INJECTION, SOLUTION INTRAVENOUS at 15:27

## 2021-02-04 RX ADMIN — ACETAMINOPHEN 1000 MG: 10 INJECTION, SOLUTION INTRAVENOUS at 22:50

## 2021-02-04 NOTE — PROGRESS NOTES
"Patient Name: Osvaldo Lopez  :1935  85 y.o.      Patient Care Team:  Caio Rodríguez Jr., MD as PCP - General (Family Medicine)  Dontrell Arellano MD as Surgeon (Orthopedic Surgery)  Angelo Driscoll MD as Consulting Physician (Cardiology)  Darvin Alvarez MD as Consulting Physician (Urology)  Caio Rodríguez Jr., MD as Referring Physician (Family Medicine)    Interval History:   Extubated.    Subjective:  Following for history of atrial arrhythmia and coronary artery disease    Objective   Vital Signs  Temp:  [97.5 °F (36.4 °C)-98.1 °F (36.7 °C)] 98.1 °F (36.7 °C)  Heart Rate:  [56-72] 72  Resp:  [14-24] 21  BP: ()/(43-77) 108/59  FiO2 (%):  [30 %-40 %] 30 %    Intake/Output Summary (Last 24 hours) at 2021 1138  Last data filed at 2021 0957  Gross per 24 hour   Intake 2413 ml   Output 580 ml   Net 1833 ml     Flowsheet Rows      First Filed Value   Admission Height  182.9 cm (72\") Documented at 2021 1934   Admission Weight  100 kg (220 lb 7.4 oz) Documented at 2021 1626          Physical Exam:   General Appearance:   Awake.  Groggy.   Lungs:     Clear to auscultation.  Normal respiratory effort and rate.      Heart:    Regular rhythm and normal rate, normal S1 and S2, no murmurs, gallops or rubs.     Chest Wall:    No abnormalities observed   Abdomen:    Postoperative.     Extremities:   no cyanosis, clubbing or edema.  No marked joint deformities.       Results Review:    Results from last 7 days   Lab Units 21  0512   SODIUM mmol/L 141   POTASSIUM mmol/L 4.0   CHLORIDE mmol/L 107   CO2 mmol/L 23.1   BUN mg/dL 12   CREATININE mg/dL 1.06   GLUCOSE mg/dL 204*   CALCIUM mg/dL 7.7*     Results from last 7 days   Lab Units 21  0344 21  1601   TROPONIN T ng/mL 0.056* 0.058*     Results from last 7 days   Lab Units 21  0512   WBC 10*3/mm3 3.68   HEMOGLOBIN g/dL 9.5*   HEMATOCRIT % 31.6*   PLATELETS 10*3/mm3 119*     Results from last 7 days   Lab Units " 02/04/21  0512 02/01/21  0452 01/31/21  0754   INR  1.13* 1.17* 1.11*   APTT seconds 31.6  --   --          Results from last 7 days   Lab Units 02/04/21  0512   MAGNESIUM mg/dL 2.2             Medication Review:   acetaminophen, 1,000 mg, Intravenous, Q6H  albumin human, 500 mL, Intravenous, Once  alvimopan, 12 mg, Oral, BID  amiodarone, 200 mg, Oral, Daily  celecoxib, 200 mg, Oral, Q12H  famotidine, 20 mg, Intravenous, Q12H  gabapentin, 200 mg, Oral, BID  insulin lispro, 0-9 Units, Subcutaneous, TID AC  metoprolol tartrate, 5 mg, Intravenous, Q6H         Scopolamine, 1 patch  sodium chloride, 75 mL/hr, Last Rate: 75 mL/hr (02/04/21 0758)        Assessment/Plan     1.  Paroxysmal atrial fibrillation.  History of atrial flutter status post ablation in June 2019.  Continue amiodarone.  2.  Anemia.  Secondary to #8  3.  Coronary artery disease status post CABG x5 in 1989.  4.  Hypertension  5.  Diabetes with neuropathy  6.  Chronically elevated troponin.  7.  Chronic kidney disease stage III.  8.  Adenocarcinoma of the colon.  Status post laparoscopic hemicolectomy on February 3.  9.  Acute respiratory failure.  In PACU he was somnolent and they could not extubate.  He was transferred to the ICU.  10.  Hypertension.  Now with hypotension.  I agree with stopping amlodipine and lisinopril for now.  IV metoprolol has also been discontinued.  Monitor blood pressure and add back medications as his blood pressure improves    Ewa Whaley MD, [facility]  02/04/21  11:38 EST

## 2021-02-04 NOTE — ANESTHESIA PROCEDURE NOTES
Peripheral Block      Patient location during procedure: OR  Start time: 2/3/2021 4:17 PM  Stop time: 2/3/2021 4:25 PM  Reason for block: at surgeon's request and post-op pain management  Performed by  Anesthesiologist: Nelli Cox MD  Preanesthetic Checklist  Completed: patient identified, site marked, surgical consent, pre-op evaluation, timeout performed, IV checked, risks and benefits discussed and monitors and equipment checked  Prep:  Pt Position: supine  Sterile barriers:cap, gloves, mask and washed/disinfected hands  Prep: ChloraPrep  Patient monitoring: blood pressure monitoring, continuous pulse oximetry and EKG  Procedure  Sedation:yes  Performed under: general  Guidance:ultrasound guided  ULTRASOUND INTERPRETATION. Using ultrasound guidance a 20 G gauge needle was placed in close proximity to the nerve, at which point, under ultrasound guidance anesthetic was injected in the area of the nerve and spread of the anesthesia was seen on ultrasound in close proximity thereto.  There were no abnormalities seen on ultrasound; a digital image was taken; and the patient tolerated the procedure with no complications.   Laterality:Bilateral  Block Type:TAP  Injection Technique:single-shot  Needle Type:echogenic  Needle Gauge:20 G      Medications Used: bupivacaine PF (MARCAINE) 0.25 % injection, 20 mL  bupivacaine liposome (EXPAREL) 1.3 % injection, 20 mL  Med admintered at 2/3/2021 4:25 PM      Post Assessment  Injection Assessment: negative aspiration for heme  Complications:no

## 2021-02-04 NOTE — PERIOPERATIVE NURSING NOTE
Bedside report received. Patient is s/p colectomy by Jalyn. Currently orally intubated on vent on Pressure Support with spontaneous respirations 16-18,respirations even and unlabored. Somewhat sedated, slow to respond to stimuli. Awaiting abgs and patient a bit more awake before attempting to extubate

## 2021-02-04 NOTE — ANESTHESIA POSTPROCEDURE EVALUATION
Patient: Osvaldo Lopez    Procedure Summary     Date: 02/03/21 Room / Location: [facility] OR 08 / [facility] MAIN OR    Anesthesia Start: 1606 Anesthesia Stop: 2059    Procedure: LAPAROSCOPIC EXTENDED  RIGHT COLECTOMY WITH DAVINCI ROBOT (N/A Abdomen) Diagnosis:       Colonic mass      (Colonic mass [K63.89])    Surgeon: Xavi Napoles MD Provider: Reshma Smith MD    Anesthesia Type: general with block ASA Status: 4          Anesthesia Type: general with block    Vitals  Vitals Value Taken Time   BP 95/46 02/03/21 2300   Temp 36.4 °C (97.6 °F) 02/03/21 2300   Pulse 63 02/03/21 2313   Resp 18 02/03/21 2300   SpO2 97 % 02/03/21 2313   Vitals shown include unvalidated device data.        Post Anesthesia Care and Evaluation    Patient location during evaluation: PACU  Patient participation: waiting for patient participation  Level of consciousness: lethargic  Pain score: 0  Pain management: adequate  Airway patency: patent  Anesthetic complications: No anesthetic complications  PONV Status: controlled  Cardiovascular status: acceptable  Respiratory status: intubated and ventilator  Hydration status: acceptable

## 2021-02-04 NOTE — TELEPHONE ENCOUNTER
Nurse Ewa [phone] would like for you to call her regarding pt has low urine output.    Thank you.

## 2021-02-04 NOTE — CONSULTS
Referring Provider: Dr. Austin  Reason for Consultation: ICU care    Patient Care Team:  Caio Rodríguez Jr., MD as PCP - General (Family Medicine)  Dontrell Arellano MD as Surgeon (Orthopedic Surgery)  Angelo Driscoll MD as Consulting Physician (Cardiology)  Darvin Alvarez MD as Consulting Physician (Urology)  Caio Rodríguez Jr., MD as Referring Physician (Family Medicine)    Chief complaint:   Postop management    History of present illness:    Subjective   This is an 85-year-old male patient who was admitted on 1/29/2021, referred by his PCP due to fatigue and profound anemia with hemoglobin of 6.8 on recent blood work-up on 1/26/2021.  Hemoccult blood in stool was positive.    He ultimately underwent colonoscopy on 1/31/2021 with finding of multiple polyps and mass in the proximal transverse colon.  Biopsies of the transverse colon mass were consistent with invasive adenocarcinoma and biopsies of the rest of the colon polyp in the cecum and the rest of the colon were consistent with tubular adenomas..  No metastasis noted on CT abdomen and pelvis.    He underwent laparoscopic hemicolectomy on 2/3/2021.  He was transferred to PACU postop.  He was noted to be significantly somnolent and lethargic and could not come off the ventilator and therefore he was transferred to the unit for weaning.    Reportedly, he received a unit of blood in OR and another unit in PACU.  Operative report is not available at the present time.    On my evaluation, patient is on AC VC.  He is not overbreathing the ventilator.  He is sedated with propofol 20 mics/KG/minute.  He opens his eyes and follows simple commands when sedation was held briefly but he was noted to be lethargic by ICU staff.    Labs reviewed:    Review of Systems  Cannot obtain.  Patient is on the ventilator.    History  Past Medical History:   Diagnosis Date   • Abnormal thyroid blood test    • Aneurysm of abdominal aorta (CMS/HCC)    • Arthritis    •  Bleeding disorder (CMS/HCC)    • BPH (benign prostatic hyperplasia)    • Bruises easily    • Bulging of thoracic intervertebral disc    • Chronic edema    • Coronary artery disease    • Diverticular disease    • Enlarged prostate without lower urinary tract symptoms (luts)    • Essential hypertension    • Heart attack (CMS/HCC)    • HL (hearing loss)    • Hyperactivity of bladder    • Hyperlipidemia    • Left shoulder pain    • Lumbar radiculopathy 2016    wears back brace at times following back surgery   • Muscle weakness (generalized)    • Nonrheumatic aortic valve stenosis     mild 1/2018    • Osteoarthritis    • PAF (paroxysmal atrial fibrillation) (CMS/HCC)    • Polyuria    • Recurrent falls    • Screening      stop bang scale 5   • Syncope    • Torn rotator cuff     left   • Type 2 diabetes mellitus (CMS/HCC)    • Urinary frequency    ,   Past Surgical History:   Procedure Laterality Date   • CARDIAC ELECTROPHYSIOLOGY PROCEDURE N/A 6/6/2019    Procedure: Ablation atrial flutter;  Surgeon: Miller Talbot MD;  Location: [facility] CATH INVASIVE LOCATION;  Service: Cardiovascular   • CARDIAC SURGERY      CABGX5 (1989)   • CARDIOVERSION  04/15/2019    Dr. Miller Talbot   • CATARACT EXTRACTION Bilateral 1997   • COLONOSCOPY N/A 1/31/2021    Procedure: COLONOSCOPY TO CECUM  WITH ORISE INJECTION, BIOPSIES, COLD BIOPSY POLYPECTOMY, COLD AND HOT SNARE POLYPECTOMIES, TATTOO, AND CLIP X3;  Surgeon: Jey Barrios MD;  Location: [facility] ENDOSCOPY;  Service: Gastroenterology;  Laterality: N/A;  PRE- IRON DEFICIENCY ANEMIA  POST- COLON MASS, COLON POLYPS, DIVERTICULOSIS, HEMORRHOIDS   • CORONARY ARTERY BYPASS GRAFT     • CYST REMOVAL      FROM BACK   • ENDOSCOPY N/A 1/31/2021    Procedure: ESOPHAGOGASTRODUODENOSCOPY WITH BIOPSIES;  Surgeon: Jey Barrios MD;  Location: [facility] ENDOSCOPY;  Service: Gastroenterology;  Laterality: N/A;  PRE- IRON DEFICIENCY ANEMIA  POST- NORMAL   • GALLBLADDER SURGERY  1989   • HERNIA  REPAIR Left     inquinal times 3  last one in    • KNEE CARTILAGE SURGERY Left 1970's   • LUMBAR DISCECTOMY FUSION INSTRUMENTATION N/A 2016    Procedure: lumbar laminectomy L4-5 and fusion with instrumentation;  Surgeon: Ran Kline MD;  Location: [facility];  Service:    • LUMBAR DISCECTOMY FUSION INSTRUMENTATION N/A 1/15/2018    Procedure: L2-3, L3-4 laminectomy and fusion with instrumentation and removal of implants L4 5.;  Surgeon: Ran Kline MD;  Location: [facility] OR;  Service:    • OK TOTAL KNEE ARTHROPLASTY Left 2016    Procedure: TOTAL KNEE ARTHROPLASTY;  Surgeon: Dontrell Arellano MD;  Location: [facility] OR;  Service: Orthopedics   ,   Family History   Problem Relation Age of Onset   • Heart failure Mother    • Diabetes Mother    • Cancer Sister    • Diabetes Sister    • Cancer Brother    • Diabetes Brother    • Cancer Father    ,   Social History     Tobacco Use   • Smoking status: Former Smoker     Packs/day: 2.00     Years: 36.00     Pack years: 72.00     Types: Cigarettes     Quit date:      Years since quittin.1   • Smokeless tobacco: Never Used   Substance Use Topics   • Alcohol use: Yes     Frequency: Never     Comment: 1 shot of whiskey in the morning and 1 shot of whiskey in the evening   • Drug use: No     ,   Medications Prior to Admission   Medication Sig Dispense Refill Last Dose   • acetaminophen (TYLENOL) 500 MG tablet Take 500 mg by mouth Every 6 (Six) Hours As Needed for Mild Pain .      • Alpha-Lipoic Acid 600 MG tablet Take 600 mg by mouth Daily. For neuropathy 30 tablet 11    • amiodarone (PACERONE) 200 MG tablet TAKE 1 TABLET DAILY 90 tablet 3    • KYRA MICROLET LANCETS lancets USE TWICE A  each 5    • clotrimazole-betamethasone (Lotrisone) 1-0.05 % cream Apply  topically to the appropriate area as directed 2 (Two) Times a Day. 135 g 3    • glucose blood (Contour Next Test) test strip 1 each by Other route Daily. test blood sugar 50 each  5    • hydrALAZINE (APRESOLINE) 25 MG tablet Take 1 tablet by mouth 3 (Three) Times a Day. 180 tablet 3    • lisinopril (PRINIVIL,ZESTRIL) 40 MG tablet TAKE 1 TABLET DAILY 90 tablet 1    • metFORMIN (GLUCOPHAGE) 850 MG tablet TAKE 1 TABLET TWICE DAILY  WITH MEALS 180 tablet 1    • metoprolol succinate XL (TOPROL-XL) 25 MG 24 hr tablet TAKE 1 TABLET DAILY 90 tablet 3    • Multiple Vitamin (MULTI VITAMIN PO) Take 1 tablet by mouth Every Morning.      • silver sulfadiazine (Silvadene) 1 % cream Apply  topically to the appropriate area as directed 2 (Two) Times a Day. 85 g 3    • tamsulosin (FLOMAX) 0.4 MG capsule 24 hr capsule TAKE 2 CAPSULES DAILY 180 capsule 1    • traMADol (ULTRAM) 50 MG tablet Take 1 tablet by mouth Every 6 (Six) Hours As Needed for Moderate Pain  or Severe Pain . 30 tablet 1    • vitamin B-12 (CYANOCOBALAMIN) 1000 MCG tablet Take 1,000 mcg by mouth Daily.      • XARELTO 20 MG tablet TAKE 1 TABLET DAILY 90 tablet 3    • mupirocin (BACTROBAN) 2 % ointment APPLY TWO TIMES PER DAY TO THE BIOPSY SITES.  3    • Silver Hydrogel gel Apply 1 application topically 2 (two) times a day. 45 mL 5    , Scheduled Meds:  acetaminophen, 1,000 mg, Oral, Q6H  alvimopan, 12 mg, Oral, BID  amiodarone, 200 mg, Oral, Daily  celecoxib, 200 mg, Oral, Q12H  famotidine, 20 mg, Intravenous, Q12H  gabapentin, 200 mg, Oral, BID  insulin lispro, 0-9 Units, Subcutaneous, TID AC  metoprolol tartrate, 5 mg, Intravenous, Q6H     and Allergies:  Amiodarone and Percocet [oxycodone-acetaminophen]    Objective     Vital Signs   Temp:  [97.5 °F (36.4 °C)-98.2 °F (36.8 °C)] 97.6 °F (36.4 °C)  Heart Rate:  [61-68] 63  Resp:  [14-24] 16  BP: ()/(43-77) 105/51  FiO2 (%):  [30 %-40 %] 30 %    Physical Exam:  Constitutional: Elderly white male patient, on mechanical ventilation, not in acute distress.  Eyes: Injected conjunctiva. Pupils equal reactive to light.  ENMT: ET tube noted.  External ears normal.  Moist tongue.  Neck: Large.  Trachea midline. No thyromegaly  Heart: RRR, no murmur  Lungs: Good and equal air entry bilaterally.    No crackles or wheezing.  Non labored breathing.   Abdomen: Obese. Soft. No tenderness or dullness.  Small abdominal incisions noted.  Extremities: No cyanosis, clubbing or pitting edema.  Warm extremities and well-perfused.  Neuro: Sedated on the ventilator.  Currently not following commands but slightly withdraw to painful stimuli while on propofol 20 mics/KG/min.  Psych: Cannot assess.  Patient is on the ventilator.  Integumentary: No rash.  Normal skin turgor  Lymphatic: No palpable cervical or supraclavicular lymph nodes.      Diagnostic imaging:  I personally and independently reviewed the following images:   CT abdomen 2/2/2021: Narrowing of the hepatic flexure of the colon.  Lung portion showed clear lung fields.        Assessment   1. Postop management of current evaluation  2. Invasive adenocarcinoma of the transverse colon s/p hemicolectomy 2/3/2021  3. Multiple colonic tubular adenoma  4. Atrial flutter s/p ablation, on Xarelto  5. Iron deficiency anemia  6. Pancreatic cyst identified on CT: Not a candidate for surgery per GI    Pertinent medical problems:  · CKD stage III  · Type II DM  · HTN  · Elevated troponin, chronic and stable    Recommendations:    · Mechanical ventilation management: Settings adjusted.  AC VC, RR 14, , FiO2 40% and PEEP 5.  · Sedation with propofol.  Titrate for RASS 0--1.  · Sedation vacation in the morning and weaning trial.  Believe can be extubated but may need longer time off sedation to wake up, perhaps due to his age.  · Dilaudid every 3 hours as needed for pain  · Follow-up hemoglobin  · Continue his amiodarone and metoprolol for a flutter  · Pepcid prophylaxis  · Maintenance IV fluid while n.p.o.  · SCD.  Consider pharmacological prophylaxis for DVT when cleared by surgery.          Discussed with ICU staff.    Gui Sahu MD  02/04/21  03:36 EST    Time:  Critical care 41 min

## 2021-02-04 NOTE — SIGNIFICANT NOTE
Patient daughter Sisi updated at length on patient status, very sleepy post op, currently remains on vent but he is breathing on his own. Also discussed abnormal abg results, awaiting evaluation by Pulmonary medicine to assess how to proceed. Questions encouraged   Pt seen and examined, agree with above.    1. Sigmoid diverticulitis s/p extended L hemicolectomy and abdominal closure with ileostomy, POD2:  - S/p extubation yesterday, doing well  - Refused BiPAP overnight  - Multimodal pain control  - Continue home po methadone dose  - Will need to contact methadone clinic tomorrow to verify dose  - OOBTC, PT  - Finish 4 day course of Zosyn for peritonitis (11/29-12/3)  - Awaiting ostomy function  - If not able to start enteral nutrition by Tuesday, will start TPN    2. COPD:  - On Breo Ellipta at home, here on duonebs and budesonide    3. Mild hypotension and oliguria this morning:  - POC US as bedside with mild hypovolemia  - Albumin bolus given    4. Hypophosphatemic:  - Repleted, will recheck in AM    - IV access: patient had extremely difficult IV access, so has L IJ TLC, will leave in place for now. If needs TPN, will rewire new line.

## 2021-02-04 NOTE — PROGRESS NOTES
Progress Note    Pod 1      S: negative flatus,  negative BM. negative ambulating. Pain controlled with minimal meds. extubated    O:  Temp:  [97.5 °F (36.4 °C)-98.1 °F (36.7 °C)] 98.1 °F (36.7 °C)  Heart Rate:  [56-72] 72  Resp:  [14-24] 21  BP: ()/(43-77) 108/59  FiO2 (%):  [30 %-40 %] 30 %      Intake/Output Summary (Last 24 hours) at 2/4/2021 1208  Last data filed at 2/4/2021 0957  Gross per 24 hour   Intake 2413 ml   Output 580 ml   Net 1833 ml       Abd:   soft, non distended  Incision: clean, dry      Results from last 7 days   Lab Units 02/04/21  0512   WBC 10*3/mm3 3.68   HEMOGLOBIN g/dL 9.5*   HEMATOCRIT % 31.6*   PLATELETS 10*3/mm3 119*     Results from last 7 days   Lab Units 02/04/21  0512 02/03/21  0502   SODIUM mmol/L 141 143   POTASSIUM mmol/L 4.0 3.9   CHLORIDE mmol/L 107 108*   CO2 mmol/L 23.1 26.3   BUN mg/dL 12 8   CREATININE mg/dL 1.06 1.17   GLUCOSE mg/dL 204* 111*   CALCIUM mg/dL 7.7* 8.5*   PHOSPHORUS mg/dL  --  3.6       Results from last 7 days   Lab Units 02/04/21  0512   MAGNESIUM mg/dL 2.2         A/P: 85 y.o. male with s/p lap/robot right colon  Keep npo till more awake  Bolus for low uop and bp

## 2021-02-04 NOTE — PERIOPERATIVE NURSING NOTE
Abg results on pressure support as resulted. Dr Del Rio of anesthesia informed. Verbal order received for Pulmonary medicine consult, place patient on AC with set rate on vent

## 2021-02-04 NOTE — PLAN OF CARE
Goal Outcome Evaluation:         Pt arrived to ICU from surgery on vent. Currently on 20 of prop and 50 of lactated ringers. Pt came to unit with 1 unit of PRBC running; finished at 0145. Pt following commands. VSS. Pt daughter updated.

## 2021-02-04 NOTE — PLAN OF CARE
Goal Outcome Evaluation:        Patient remains in ICU post hemicolectomy w/ Dr. Napoles yesterday. Came up from PACU last night d/t inability to wean from ventilator post-op. Propofol weaned off start of shift. Extubated around 0930 this AM. Now on 3L n/c w/ sats low 90s. Other vitals stable. Pretty confused from anesthesia drugs - knows name/year, but requiring frequent reorientation. Aneuric since 1200 today - Dr. Canseco and Dr. Napoles aware. Bolused w/ Albumin and 500mL LR. IVF rate increased. Recheck BMP okay. No flatus yet. Passed nursing bedside swallow study. Tolerated small amounts of water/ice chips. Bottom very red, frequent turns. Daughter to bedside, updated on plan of care. Continue to monitor carefully in unit.

## 2021-02-04 NOTE — PERIOPERATIVE NURSING NOTE
Dr Napoles returned page. Updated her on patient status including patient admitted to PACU orally intubated on vent, abg results on pressure support settings, now on AC 14 on vent, anesthesia request for pulmonary medicine consult to evaluate patient on vent. Discussed patient still very sleepy, slow to respond to stimuli, current uop approximately 100cc, SBP in the 90s. Order received to give x1 unit PRBC

## 2021-02-04 NOTE — PROGRESS NOTES
Continued Stay Note  [facility]     Patient Name: Osvaldo Lopez  MRN: [id number]  Today's Date: 2/4/2021    Admit Date: 1/29/2021    Discharge Plan     Row Name 02/04/21 1455       Plan    Plan  Home    Plan Comments  CCP following for discharge needs Pt is off vent after surgery  No immediate needs        Discharge Codes    No documentation.       Expected Discharge Date and Time     Expected Discharge Date Expected Discharge Time    Feb 4, 2021             Geno Rodriguez RN

## 2021-02-05 ENCOUNTER — APPOINTMENT (OUTPATIENT)
Dept: OTHER | Facility: HOSPITAL | Age: 86
End: 2021-02-05

## 2021-02-05 LAB
ALBUMIN SERPL-MCNC: 3.3 G/DL (ref 3.5–5.2)
ALBUMIN/GLOB SERPL: 1.7 G/DL
ALP SERPL-CCNC: 70 U/L (ref 39–117)
ALT SERPL W P-5'-P-CCNC: 10 U/L (ref 1–41)
ANION GAP SERPL CALCULATED.3IONS-SCNC: 10.3 MMOL/L (ref 5–15)
AST SERPL-CCNC: 18 U/L (ref 1–40)
BILIRUB SERPL-MCNC: 0.5 MG/DL (ref 0–1.2)
BUN SERPL-MCNC: 19 MG/DL (ref 8–23)
BUN/CREAT SERPL: 13.6 (ref 7–25)
CALCIUM SPEC-SCNC: 8 MG/DL (ref 8.6–10.5)
CHLORIDE SERPL-SCNC: 108 MMOL/L (ref 98–107)
CO2 SERPL-SCNC: 22.7 MMOL/L (ref 22–29)
CREAT SERPL-MCNC: 1.4 MG/DL (ref 0.76–1.27)
DEPRECATED RDW RBC AUTO: 54.5 FL (ref 37–54)
ERYTHROCYTE [DISTWIDTH] IN BLOOD BY AUTOMATED COUNT: 19.3 % (ref 12.3–15.4)
GFR SERPL CREATININE-BSD FRML MDRD: 48 ML/MIN/1.73
GLOBULIN UR ELPH-MCNC: 1.9 GM/DL
GLUCOSE BLDC GLUCOMTR-MCNC: 105 MG/DL (ref 70–130)
GLUCOSE BLDC GLUCOMTR-MCNC: 115 MG/DL (ref 70–130)
GLUCOSE BLDC GLUCOMTR-MCNC: 126 MG/DL (ref 70–130)
GLUCOSE BLDC GLUCOMTR-MCNC: 97 MG/DL (ref 70–130)
GLUCOSE SERPL-MCNC: 111 MG/DL (ref 65–99)
HCT VFR BLD AUTO: 29.5 % (ref 37.5–51)
HGB BLD-MCNC: 9 G/DL (ref 13–17.7)
LYMPHOCYTES # BLD MANUAL: 0.65 10*3/MM3 (ref 0.7–3.1)
LYMPHOCYTES NFR BLD MANUAL: 12 % (ref 19.6–45.3)
LYMPHOCYTES NFR BLD MANUAL: 2 % (ref 5–12)
MCH RBC QN AUTO: 24 PG (ref 26.6–33)
MCHC RBC AUTO-ENTMCNC: 30.5 G/DL (ref 31.5–35.7)
MCV RBC AUTO: 78.7 FL (ref 79–97)
MONOCYTES # BLD AUTO: 0.11 10*3/MM3 (ref 0.1–0.9)
NEUTROPHILS # BLD AUTO: 4.65 10*3/MM3 (ref 1.7–7)
NEUTROPHILS NFR BLD MANUAL: 86 % (ref 42.7–76)
NRBC BLD AUTO-RTO: 0 /100 WBC (ref 0–0.2)
PLAT MORPH BLD: NORMAL
PLATELET # BLD AUTO: 107 10*3/MM3 (ref 140–450)
PMV BLD AUTO: 9.6 FL (ref 6–12)
POIKILOCYTOSIS BLD QL SMEAR: ABNORMAL
POTASSIUM SERPL-SCNC: 3.9 MMOL/L (ref 3.5–5.2)
PROT SERPL-MCNC: 5.2 G/DL (ref 6–8.5)
RBC # BLD AUTO: 3.75 10*6/MM3 (ref 4.14–5.8)
SODIUM SERPL-SCNC: 141 MMOL/L (ref 136–145)
WBC # BLD AUTO: 5.41 10*3/MM3 (ref 3.4–10.8)
WBC MORPH BLD: NORMAL

## 2021-02-05 PROCEDURE — 25010000002 FUROSEMIDE PER 20 MG: Performed by: INTERNAL MEDICINE

## 2021-02-05 PROCEDURE — 97110 THERAPEUTIC EXERCISES: CPT | Performed by: PHYSICAL THERAPIST

## 2021-02-05 PROCEDURE — 93005 ELECTROCARDIOGRAM TRACING: CPT | Performed by: INTERNAL MEDICINE

## 2021-02-05 PROCEDURE — 93005 ELECTROCARDIOGRAM TRACING: CPT | Performed by: STUDENT IN AN ORGANIZED HEALTH CARE EDUCATION/TRAINING PROGRAM

## 2021-02-05 PROCEDURE — 93010 ELECTROCARDIOGRAM REPORT: CPT | Performed by: INTERNAL MEDICINE

## 2021-02-05 PROCEDURE — 80053 COMPREHEN METABOLIC PANEL: CPT | Performed by: INTERNAL MEDICINE

## 2021-02-05 PROCEDURE — 82962 GLUCOSE BLOOD TEST: CPT

## 2021-02-05 PROCEDURE — 85007 BL SMEAR W/DIFF WBC COUNT: CPT | Performed by: INTERNAL MEDICINE

## 2021-02-05 PROCEDURE — 99232 SBSQ HOSP IP/OBS MODERATE 35: CPT | Performed by: INTERNAL MEDICINE

## 2021-02-05 PROCEDURE — 85025 COMPLETE CBC W/AUTO DIFF WBC: CPT | Performed by: INTERNAL MEDICINE

## 2021-02-05 RX ORDER — FUROSEMIDE 10 MG/ML
20 INJECTION INTRAMUSCULAR; INTRAVENOUS ONCE
Status: COMPLETED | OUTPATIENT
Start: 2021-02-05 | End: 2021-02-05

## 2021-02-05 RX ORDER — METOPROLOL SUCCINATE 25 MG/1
25 TABLET, EXTENDED RELEASE ORAL
Status: DISCONTINUED | OUTPATIENT
Start: 2021-02-05 | End: 2021-02-12 | Stop reason: HOSPADM

## 2021-02-05 RX ADMIN — SODIUM CHLORIDE 125 ML/HR: 9 INJECTION, SOLUTION INTRAVENOUS at 19:56

## 2021-02-05 RX ADMIN — ACETAMINOPHEN 1000 MG: 10 INJECTION, SOLUTION INTRAVENOUS at 03:46

## 2021-02-05 RX ADMIN — ACETAMINOPHEN 1000 MG: 10 INJECTION, SOLUTION INTRAVENOUS at 17:20

## 2021-02-05 RX ADMIN — AMIODARONE HYDROCHLORIDE 200 MG: 200 TABLET ORAL at 15:52

## 2021-02-05 RX ADMIN — ALVIMOPAN 12 MG: 12 CAPSULE ORAL at 21:02

## 2021-02-05 RX ADMIN — SODIUM CHLORIDE 125 ML/HR: 9 INJECTION, SOLUTION INTRAVENOUS at 03:52

## 2021-02-05 RX ADMIN — SODIUM CHLORIDE 125 ML/HR: 9 INJECTION, SOLUTION INTRAVENOUS at 11:46

## 2021-02-05 RX ADMIN — SODIUM CHLORIDE 125 ML/HR: 9 INJECTION, SOLUTION INTRAVENOUS at 06:08

## 2021-02-05 RX ADMIN — SODIUM CHLORIDE 1000 ML: 9 INJECTION, SOLUTION INTRAVENOUS at 04:09

## 2021-02-05 RX ADMIN — METOPROLOL SUCCINATE 25 MG: 25 TABLET, EXTENDED RELEASE ORAL at 13:31

## 2021-02-05 RX ADMIN — ACETAMINOPHEN 1000 MG: 10 INJECTION, SOLUTION INTRAVENOUS at 21:03

## 2021-02-05 RX ADMIN — FUROSEMIDE 20 MG: 10 INJECTION, SOLUTION INTRAMUSCULAR; INTRAVENOUS at 09:06

## 2021-02-05 RX ADMIN — ALVIMOPAN 12 MG: 12 CAPSULE ORAL at 09:06

## 2021-02-05 RX ADMIN — ACETAMINOPHEN 1000 MG: 10 INJECTION, SOLUTION INTRAVENOUS at 09:58

## 2021-02-05 RX ADMIN — FAMOTIDINE 20 MG: 10 INJECTION INTRAVENOUS at 09:06

## 2021-02-05 RX ADMIN — FAMOTIDINE 20 MG: 10 INJECTION INTRAVENOUS at 21:03

## 2021-02-05 NOTE — THERAPY RE-EVALUATION
Patient Name: Osvaldo Lopez  : 1935    MRN: [id number]                              Today's Date: 2021       Admit Date: 2021    Visit Dx:     ICD-10-CM ICD-9-CM   1. Symptomatic anemia  D64.9 285.9   2. Other fatigue  R53.83 780.79   3. Elevated troponin  R77.8 790.6   4. Colonic mass  K63.89 569.89     Patient Active Problem List   Diagnosis   • Complete rotator cuff tear of left shoulder   • Abnormal finding on thyroid function test   • Abdominal aortic aneurysm (CMS/HCC)   • Benign prostatic hyperplasia   • Essential hypertension   • Hyperlipidemia   • Shoulder pain   • Lumbar radiculopathy   • DM (diabetes mellitus), type 2 with complications (CMS/HCC)   • OA (osteoarthritis) of knee   • Tear of right rotator cuff   • Spinal stenosis of lumbar region with neurogenic claudication   • Eyebrow ptosis   • Pseudophakia   • Nonrheumatic aortic valve stenosis   • Atrial flutter with rapid ventricular response (CMS/HCC)   • Right arm pain   • Leg weakness, bilateral   • Typical atrial flutter (CMS/HCC)   • Diabetic peripheral neuropathy (CMS/HCC)   • Muscle weakness (generalized)   • Pressure injury of left buttock, stage 1   • Chronic low back pain with sciatica   • 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CMS/HCC)   • Pressure injury of buttock, stage 1   • Anemia   • Symptomatic anemia   • Iron deficiency anemia   • Chronic anticoagulation   • CKD (chronic kidney disease)   • CRISTOBAL (acute kidney injury) (CMS/HCC)   • Colonic mass     Past Medical History:   Diagnosis Date   • Abnormal thyroid blood test    • Aneurysm of abdominal aorta (CMS/HCC)    • Arthritis    • Bleeding disorder (CMS/HCC)    • BPH (benign prostatic hyperplasia)    • Bruises easily    • Bulging of thoracic intervertebral disc    • Chronic edema    • Coronary artery disease    • Diverticular disease    • Enlarged prostate without lower urinary tract symptoms (luts)    • Essential hypertension    • Heart attack (CMS/HCC)    • HL (hearing  loss)    • Hyperactivity of bladder    • Hyperlipidemia    • Left shoulder pain    • Lumbar radiculopathy 2016    wears back brace at times following back surgery   • Muscle weakness (generalized)    • Nonrheumatic aortic valve stenosis     mild 1/2018    • Osteoarthritis    • PAF (paroxysmal atrial fibrillation) (CMS/HCC)    • Polyuria    • Recurrent falls    • Screening      stop bang scale 5   • Syncope    • Torn rotator cuff     left   • Type 2 diabetes mellitus (CMS/HCC)    • Urinary frequency      Past Surgical History:   Procedure Laterality Date   • CARDIAC ELECTROPHYSIOLOGY PROCEDURE N/A 6/6/2019    Procedure: Ablation atrial flutter;  Surgeon: Miller Talbot MD;  Location: [facility] CATH INVASIVE LOCATION;  Service: Cardiovascular   • CARDIAC SURGERY      CABGX5 (1989)   • CARDIOVERSION  04/15/2019    Dr. Miller Talbot   • CATARACT EXTRACTION Bilateral 1997   • COLON RESECTION N/A 2/3/2021    Procedure: LAPAROSCOPIC EXTENDED  RIGHT COLECTOMY WITH DAVINCI ROBOT;  Surgeon: Xavi Napoles MD;  Location: [facility] MAIN OR;  Service: DaVinci;  Laterality: N/A;   • COLONOSCOPY N/A 1/31/2021    Procedure: COLONOSCOPY TO CECUM  WITH ORISE INJECTION, BIOPSIES, COLD BIOPSY POLYPECTOMY, COLD AND HOT SNARE POLYPECTOMIES, TATTOO, AND CLIP X3;  Surgeon: Jey Barrios MD;  Location: [facility] ENDOSCOPY;  Service: Gastroenterology;  Laterality: N/A;  PRE- IRON DEFICIENCY ANEMIA  POST- COLON MASS, COLON POLYPS, DIVERTICULOSIS, HEMORRHOIDS   • CORONARY ARTERY BYPASS GRAFT     • CYST REMOVAL      FROM BACK   • ENDOSCOPY N/A 1/31/2021    Procedure: ESOPHAGOGASTRODUODENOSCOPY WITH BIOPSIES;  Surgeon: Jey Barrios MD;  Location: [facility] ENDOSCOPY;  Service: Gastroenterology;  Laterality: N/A;  PRE- IRON DEFICIENCY ANEMIA  POST- NORMAL   • GALLBLADDER SURGERY  1989   • HERNIA REPAIR Left     inquinal times 3  last one in 2003   • KNEE CARTILAGE SURGERY Left 1970's   • LUMBAR DISCECTOMY FUSION INSTRUMENTATION N/A 4/11/2016     Procedure: lumbar laminectomy L4-5 and fusion with instrumentation;  Surgeon: Ran Kline MD;  Location: [facility] OR;  Service:    • LUMBAR DISCECTOMY FUSION INSTRUMENTATION N/A 1/15/2018    Procedure: L2-3, L3-4 laminectomy and fusion with instrumentation and removal of implants L4 5.;  Surgeon: Ran Kline MD;  Location: [facility] MAIN OR;  Service:    • AL TOTAL KNEE ARTHROPLASTY Left 11/14/2016    Procedure: TOTAL KNEE ARTHROPLASTY;  Surgeon: Dontrell Arellano MD;  Location: [facility] OR;  Service: Orthopedics     General Information     [facility] Name 02/05/21 1023          Physical Therapy Time and Intention    Document Type  re-evaluation  -     Mode of Treatment  physical therapy  -[facility] Name 02/05/21 1023          General Information    Prior Level of Function  independent:  -     Existing Precautions/Restrictions  fall  -[facility] Name 02/05/21 1023          Living Environment    Lives With  alone  -[facility] Name 02/05/21 1023          Home Main Entrance    Number of Stairs, Main Entrance  one  -[facility] Name 02/05/21 1023          Cognition    Orientation Status (Cognition)  oriented x 4  -[facility] Name 02/05/21 1023          Safety Issues, Functional Mobility    Impairments Affecting Function (Mobility)  balance;strength;endurance/activity tolerance;pain;cognition  -       User Key  (r) = Recorded By, (t) = Taken By, (c) = Cosigned By    Initials Name Provider Type     Kaya Orosco, PT Physical Therapist        Mobility     [facility] Name 02/05/21 1024          Bed Mobility    Bed Mobility  supine-sit  -     Supine-Sit [place] (Bed Mobility)  moderate assist (50% patient effort)  -     Assistive Device (Bed Mobility)  head of bed elevated;bed rails  -[facility] Name 02/05/21 1024          Transfers    Comment (Transfers)  pt stood mod 2 with Rwx. squat pivot to the chair with no AD on second stand. Unable to full extend hips and knees  -[facility] Name 02/05/21 1024           Bed-Chair Transfer    Bed-Chair [place] (Transfers)  maximum assist (25% patient effort);2 person assist  -[facility] Name 02/05/21 1024          Sit-Stand Transfer    Sit-Stand [place] (Transfers)  moderate assist (50% patient effort);2 person assist  -     Assistive Device (Sit-Stand Transfers)  walker, front-wheeled  -       User Key  (r) = Recorded By, (t) = Taken By, (c) = Cosigned By    Initials Name Provider Type    Kaya Triana PT Physical Therapist        Obj/Interventions     [facility] Name 02/05/21 1029          Range of Motion Comprehensive    Comment, General Range of Motion  BLE WFL  -[facility] Name 02/05/21 1029          Strength Comprehensive (MMT)    Comment, General Manual Muscle Testing (MMT) Assessment  [facility] 3-/5  -[facility] Name 02/05/21 1029          Motor Skills    Therapeutic Exercise  -- BLE AP, LAQ, seated marching 2x5 reps  -[facility] Name 02/05/21 1029          Balance    Balance Assessment  sitting static balance;sitting dynamic balance;standing static balance;standing dynamic balance  -     Static Sitting Balance  mild impairment  -     Dynamic Sitting Balance  mild impairment  -     Static Standing Balance  moderate impairment  -     Dynamic Standing Balance  moderate impairment  -       User Key  (r) = Recorded By, (t) = Taken By, (c) = Cosigned By    Initials Name Provider Type    Kaya Triana, PT Physical Therapist        Goals/Plan     [facility] Name 02/05/21 1035          Bed Mobility Goal 1 (PT)    Activity/Assistive Device (Bed Mobility Goal 1, PT)  bed mobility activities, all  -     [place] Level/Cues Needed (Bed Mobility Goal 1, PT)  contact guard assist  -     Time Frame (Bed Mobility Goal 1, PT)  1 week  -[facility] Name 02/05/21 1035          Transfer Goal 1 (PT)    Activity/Assistive Device (Transfer Goal 1, PT)  transfers, all;walker, rolling  -     [place] Level/Cues Needed (Transfer Goal 1, PT)   minimum assist (75% or more patient effort);2 person assist  -KH     Time Frame (Transfer Goal 1, PT)  1 week  -     Row Name 02/05/21 1035          Gait Training Goal 1 (PT)    Activity/Assistive Device (Gait Training Goal 1, PT)  gait (walking locomotion)  -     [place] Level (Gait Training Goal 1, PT)  minimum assist (75% or more patient effort);2 person assist  -KH     Distance (Gait Training Goal 1, PT)  15  -KH     Time Frame (Gait Training Goal 1, PT)  1 week  -     Row Name 02/05/21 1035          Patient Education Goal (PT)    Activity (Patient Education Goal, PT)  HEP  -     [place]/Cues/Accuracy (Memory Goal 2, PT)  demonstrates adequately  -KH     Time Frame (Patient Education Goal, PT)  1 week  -       User Key  (r) = Recorded By, (t) = Taken By, (c) = Cosigned By    Initials Name Provider Type    Kaya Triana, PT Physical Therapist        Clinical Impression     Row Name 02/05/21 1030          Pain    Additional Documentation  Pain Scale: FACES Pre/Post-Treatment (Group)  -[facility] Name 02/05/21 1030          Pain Scale: FACES Pre/Post-Treatment    Pain: FACES Scale, Pretreatment  2-->hurts little bit  -     Posttreatment Pain Rating  4-->hurts little more  -     Pain Location  abdomen  -[facility] Name 02/05/21 1030          Plan of Care Review    Plan of Care Reviewed With  patient;daughter  -     Outcome Summary  Pt was reevaluated in ICU. He is now s/p hemicolectomy and was sent to ICU on the vent with with acute respiratory failure. He is off the vent and on O2. He is slightly confused, but wants to get up OOB. Pt presents with BLE weakness (R slightly weaker than L), decreased endurance, impaired balance and inability to ambulate. Pt would benefit from PT to address these impairments and work towards the outlined goals.  -     Row Name 02/05/21 1030          Therapy Assessment/Plan (PT)    Patient/Family Therapy Goals Statement (PT)  Pt is unsure at this  time  -     Rehab Potential (PT)  good, to achieve stated therapy goals  -     Criteria for Skilled Interventions Met (PT)  meets criteria  -     Row Name 02/05/21 1030          Positioning and Restraints    Pre-Treatment Position  in bed  -     Post Treatment Position  chair  -     In Chair  notified nsg;reclined;call light within reach;encouraged to call for assist;exit alarm on  -       User Key  (r) = Recorded By, (t) = Taken By, (c) = Cosigned By    Initials Name Provider Type    Kaya Triana, PT Physical Therapist        Outcome Measures     Row Name 02/05/21 1036          How much help from another person do you currently need...    Turning from your back to your side while in flat bed without using bedrails?  3  -KH     Moving from lying on back to sitting on the side of a flat bed without bedrails?  2  -KH     Moving to and from a bed to a chair (including a wheelchair)?  2  -KH     Standing up from a chair using your arms (e.g., wheelchair, bedside chair)?  2  -KH     Climbing 3-5 steps with a railing?  1  -KH     To walk in hospital room?  1  -KH     AM-PAC 6 Clicks Score (PT)  11  -     Row Name 02/05/21 1036          Functional Assessment    Outcome Measure Options  AM-PAC 6 Clicks Basic Mobility (PT)  -       User Key  (r) = Recorded By, (t) = Taken By, (c) = Cosigned By    Initials Name Provider Type    Kaya Triana, PT Physical Therapist        Physical Therapy Education                 Title: PT OT SLP Therapies (Done)     Topic: Physical Therapy (Done)     Point: Mobility training (Done)     Learning Progress Summary           Patient Acceptance, E, VU,NR by  at 2/5/2021 1037    Acceptance, E,TB, VU,DU by PIYUSH at 1/31/2021 1338    Acceptance, E,TB,D, VU,DU by LB at 1/30/2021 0955                   Point: Home exercise program (Done)     Learning Progress Summary           Patient Acceptance, E, VU,NR by  at 2/5/2021 1037    Acceptance, E,TB, VU,DU by LB  at 1/31/2021 1338    Acceptance, E,TB,D, VU,DU by LB at 1/30/2021 0955                   Point: Body mechanics (Done)     Learning Progress Summary           Patient Acceptance, E, VU,NR by  at 2/5/2021 1037    Acceptance, E,TB, VU,DU by LB at 1/31/2021 1338    Acceptance, E,TB,D, VU,DU by LB at 1/30/2021 0955                   Point: Precautions (Done)     Learning Progress Summary           Patient Acceptance, E, VU,NR by  at 2/5/2021 1037    Acceptance, E,TB, VU,DU by LB at 1/31/2021 1338    Acceptance, E,TB,D, VU,DU by LB at 1/30/2021 0955                               User Key     Initials Effective Dates Name Provider Type Discipline     02/05/19 -  Kaya Orosco, PT Physical Therapist PT    PIYUSH 08/09/20 -  Aditi Chaudhary, PT Physical Therapist PT              PT Recommendation and Plan  Planned Therapy Interventions (PT): balance training, bed mobility training, gait training, home exercise program, patient/family education, strengthening, transfer training  Plan of Care Reviewed With: patient, daughter  Outcome Summary: Pt was reevaluated in ICU. He is now s/p hemicolectomy and was sent to ICU on the vent with with acute respiratory failure. He is off the vent and on O2. He is slightly confused, but wants to get up OOB. Pt presents with BLE weakness (R slightly weaker than L), decreased endurance, impaired balance and inability to ambulate. Pt would benefit from PT to address these impairments and work towards the outlined goals.     Time Calculation:   PT Charges     Row Name 02/05/21 1037             Time Calculation    Start Time  0840  -      Stop Time  0903  -      Time Calculation (min)  23 min  -      PT Received On  02/05/21  -      PT - Next Appointment  02/06/21  -      PT Goal Re-Cert Due Date  02/13/21  -         Time Calculation- PT    Total Timed Code Minutes- PT  23 minute(s)  -        User Key  (r) = Recorded By, (t) = Taken By, (c) = Cosigned By    Initials Name  Provider Type     Kaya Orosco, PT Physical Therapist        Therapy Charges for Today     Code Description Service Date Service Provider Modifiers Qty    54942065608 HC PT THER PROC EA 15 MIN 2/5/2021 Kaya Orosco, PT GP 2          PT G-Codes  Outcome Measure Options: AM-PAC 6 Clicks Basic Mobility (PT)  AM-PAC 6 Clicks Score (PT): 11    Kaya Orosco, PT  2/5/2021

## 2021-02-05 NOTE — PROGRESS NOTES
"Adult Nutrition  Assessment/PES    Patient Name:  Osvadlo Lopez  YOB: 1935  MRN: [id number]  Admit Date:  1/29/2021    Assessment Date:  2/5/2021    Comments:  Nutrition assessment complete due to prolonged NPO/Clear liquid status.  S/P hemicolectomy for invasive colon cancer. Off the vent since yesterday.  Still awaiting a BM to initiate a diet. Will cont to follow clinical course, nutrition needs    Reason for Assessment     Row Name 02/05/21 1409          Reason for Assessment    Reason For Assessment  identified at risk by screening criteria     Diagnosis  gastrointestinal disease;cancer diagnosis/related complications Anemia, invasive Colon Ca, S/P hemicolectomy on 2/3/2021,  posat opresp failure, -extubated 2/4 Hx DM, CKD         Nutrition/Diet History     Row Name 02/05/21 1412          Nutrition/Diet History    Factors Affecting Nutritional Intake  altered gastrointestinal function;impaired cognitive status/motor control         Anthropometrics     Row Name 02/05/21 1412          Anthropometrics    Height  182.9 cm (72\")        Admit Weight    Admit Weight  102 kg (225 lb 15.5 oz)        Ideal Body Weight (IBW)    Ideal Body Weight (IBW) (kg)  82.07     % of Ideal Body Weight Assessment  -- 125% IBW        Body Mass Index (BMI)    BMI Assessment  BMI 30-34.9: obesity grade I BMI 30.6         Labs/Tests/Procedures/Meds     Row Name 02/05/21 1413          Labs/Procedures/Meds    Lab Results Reviewed  reviewed, pertinent     Lab Results Comments  Cr, h/h        Diagnostic Tests/Procedures    Diagnostic Test/Procedure Reviewed  reviewed, pertinent        Medications    Pertinent Medications Reviewed  reviewed, pertinent     Pertinent Medications Comments  pepcid, humalog, scopalamine, ivf         Physical Findings     Row Name 02/05/21 1414          Physical Findings    Overall Physical Appearance  overweight     Oral/Mouth Cavity  edentulous     Skin  other (see comments);surgical incision " B=15, MASD, incision         Nutrition Prescription Ordered     Row Name 02/05/21 1415          Nutrition Prescription PO    Current PO Diet  NPO         Evaluation of Received Nutrient/Fluid Intake     Row Name 02/05/21 1415          Fluid Intake Evaluation    IV Fluid (mL)  3000         Problem/Interventions:  Problem 1     Row Name 02/05/21 1415          Nutrition Diagnoses Problem 1    Problem 1  Altered GI Function     Etiology (related to)  Medical Diagnosis     Gastrointestinal  Hemicolectomy     Oncology  Colon cancer         Intervention Goal     Row Name 02/05/21 1416          Intervention Goal    General  Maintain nutrition;Reduce/improve symptoms;Improved nutrition related lab(s);Meet nutritional needs for age/condition;Disease management/therapy     PO  Initiate feeding;Tolerate PO     Weight  Maintain weight         Nutrition Intervention     Row Name 02/05/21 1416          Nutrition Intervention    RD/Tech Action  Follow Tx progress;Await begin PO;Care plan reviewd           Education/Evaluation     Row Name 02/05/21 1416          Monitor/Evaluation    Monitor  Per protocol;Skin status;Symptoms;I&O;PO intake;Pertinent labs;Weight           Electronically signed by:  Erin Falk RD  02/05/21 14:16 EST

## 2021-02-05 NOTE — PLAN OF CARE
Goal Outcome Evaluation:  Plan of Care Reviewed With: patient, daughter     Outcome Summary: Pt was reevaluated in ICU. He is now s/p hemicolectomy and was sent to ICU on the vent with with acute respiratory failure. He is off the vent and on O2. He is slightly confused, but wants to get up OOB. Pt presents with BLE weakness (R slightly weaker than L), decreased endurance, impaired balance and inability to ambulate. Pt would benefit from PT to address these impairments and work towards the outlined goals.  Patient was intermittently wearing a face mask during this therapy encounter. Therapist used appropriate personal protective equipment including eye protection, mask, and gloves.  Mask used was standard procedure mask. Appropriate PPE was worn during the entire therapy session. Hand hygiene was completed before and after therapy session. Patient is not in enhanced droplet precautions.

## 2021-02-05 NOTE — PLAN OF CARE
Goal Outcome Evaluation:   Pt remains in ICU is alert and oriented to questions but remains very confused and trying to get up and go home frequently, is easily reoriented. 5cc UOP this shift MD aware 1L bolus ordered. See am labs.

## 2021-02-05 NOTE — PROGRESS NOTES
Clinical Pharmacy Services: Medication History    Osvaldo Lopez is a 85 y.o. male presenting to [facility] for Elevated troponin [R77.8]  Symptomatic anemia [D64.9]  Other fatigue [R53.83]  Symptomatic anemia [D64.9]    He  has a past medical history of Abnormal thyroid blood test, Aneurysm of abdominal aorta (CMS/HCC), Arthritis, Bleeding disorder (CMS/HCC), BPH (benign prostatic hyperplasia), Bruises easily, Bulging of thoracic intervertebral disc, Chronic edema, Coronary artery disease, Diverticular disease, Enlarged prostate without lower urinary tract symptoms (luts), Essential hypertension, Heart attack (CMS/HCC), HL (hearing loss), Hyperactivity of bladder, Hyperlipidemia, Left shoulder pain, Lumbar radiculopathy (2016), Muscle weakness (generalized), Nonrheumatic aortic valve stenosis, Osteoarthritis, PAF (paroxysmal atrial fibrillation) (CMS/HCC), Polyuria, Recurrent falls, Screening, Syncope, Torn rotator cuff, Type 2 diabetes mellitus (CMS/[facility]), and Urinary frequency.    Allergies as of 01/29/2021 - Reviewed 01/29/2021   Allergen Reaction Noted   • Amiodarone Other (See Comments) 10/15/2019   • Percocet [oxycodone-acetaminophen] Mental Status Change 01/17/2018     Medication information was obtained from: Pharmacies   Pharmacy and Phone Number:   [facility] mail order: [phone]  [facility] #[id number]: [phone]    Prior to Admission Medications     Prescriptions Last Dose Informant Patient Reported? Taking?    acetaminophen (TYLENOL) 500 MG tablet  Self Yes Yes    Take 500 mg by mouth Every 6 (Six) Hours As Needed for Mild Pain .    amiodarone (PACERONE) 200 MG tablet  Pharmacy No Yes    TAKE 1 TABLET DAILY    Patient taking differently:  Last filled by [facility] mail order on 11/20/20 for 90 day supply    KYRA MICROLET LANCETS lancets   No Yes    USE TWICE A DAY    glucose blood (Contour Next Test) test strip   No Yes    1 each by Other route Daily. test blood sugar    hydrALAZINE (APRESOLINE) 25 MG  tablet  Pharmacy No Yes    Take 1 tablet by mouth 3 (Three) Times a Day.    Patient taking differently:  Take 25 mg by mouth 3 (Three) Times a Day. Last filled by [facility] mail order on 11/14/20 for 60 day supply.    lisinopril (PRINIVIL,ZESTRIL) 40 MG tablet  Pharmacy No Yes    TAKE 1 TABLET DAILY    Patient taking differently:  Take 40 mg by mouth Daily. Last filled by [facility] mail order on 11/14/20 for a 90 day supply.    metFORMIN (GLUCOPHAGE) 850 MG tablet  Pharmacy No Yes    TAKE 1 TABLET TWICE DAILY  WITH MEALS    Patient taking differently:  Last filled by [facility] mail order on 11/16/20 for a 90 day supply.    metoprolol succinate XL (TOPROL-XL) 25 MG 24 hr tablet  Pharmacy No Yes    TAKE 1 TABLET DAILY    Patient taking differently:  Last filled by [facility] mail order on 11/19/20 for a 90 day supply.    Multiple Vitamin (MULTI VITAMIN PO)  Self Yes Yes    Take 1 tablet by mouth Every Morning.    silver sulfadiazine (Silvadene) 1 % cream  Pharmacy No Yes    Apply  topically to the appropriate area as directed 2 (Two) Times a Day.    Patient taking differently:  Apply 1 application topically to the appropriate area as directed 2 (Two) Times a Day. Filled at [facility] #[id number].    tamsulosin (FLOMAX) 0.4 MG capsule 24 hr capsule  Pharmacy No Yes    TAKE 2 CAPSULES DAILY    Patient taking differently:  Last filled by [facility] mail order on 11/20/20 for a 90 day supply.    traMADol (ULTRAM) 50 MG tablet  Pharmacy No Yes    Take 1 tablet by mouth Every 6 (Six) Hours As Needed for Moderate Pain  or Severe Pain .    Patient taking differently:  Take 50 mg by mouth Every 6 (Six) Hours As Needed for Moderate Pain  or Severe Pain . Last filled by [facility] #[id number] on 01/26/21.    vitamin B-12 (CYANOCOBALAMIN) 1000 MCG tablet  Self Yes Yes    Take 1,000 mcg by mouth Daily.    XARELTO 20 MG tablet   No Yes    TAKE 1 TABLET DAILY    Patient taking differently:  Last filled by [facility] mail order on 01/23/21 for a 90 day supply.    Alpha-Lipoic Acid 600 MG tablet    No No    Take 600 mg by mouth Daily. For neuropathy    clotrimazole-betamethasone (Lotrisone) 1-0.05 % cream   No No    Apply  topically to the appropriate area as directed 2 (Two) Times a Day.    mupirocin (BACTROBAN) 2 % ointment   Yes No    APPLY TWO TIMES PER DAY TO THE BIOPSY SITES.        Medication notes:   - Confirmed most prescriptions meds and fill dates, most filled at mail order pharmacy. Neither pharmacy had record of alpha-lipoic acid, Bactroban or Lotrisone.   - Was not able to confirm OTC meds (Tylenol, vitamin B-12 tabs, MVI tabs).  - Both [facility] pharmacies did not have allergy history on profile.     This medication list is complete to the best of my knowledge as of 2/5/2021    Please call if questions.    Gordon Napoles, PharmD, RYAN, BCPS  02/05/21 14:55 EST

## 2021-02-05 NOTE — PROGRESS NOTES
PROGRESS NOTE  Patient Name: Osvaldo Lopez  Age/Sex: 85 y.o. 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  Renal function is showing some mild increase in the creatinine that seems to be plateauing.  Urine output is still very low, patient has a Chavis catheter, his blood pressure did drop transiently but has been sustained in the normal range most of the time.       REVIEW OF SYSTEMS:   CONSTITUTIONAL: no fever or chills  CARDIOVASCULAR: No chest pain, chest pressure or chest discomfort. No palpitations or edema.   RESPIRATORY: No shortness of breath, cough or sputum.   GASTROINTESTINAL: No anorexia, nausea, vomiting or diarrhea.  Abdominal pain is reasonably controlled given his previous surgery.   HEMATOLOGIC: No bleeding or bruising.     Ventilator/Non-Invasive Ventilation Settings (From admission, onward)   Patient is currently on room air      Vital Signs  Temp:  [97.6 °F (36.4 °C)-99.4 °F (37.4 °C)] 97.6 °F (36.4 °C)  Heart Rate:  [62-97] 88  Resp:  [16] 16  BP: ()/(44-96) 116/63  FiO2 (%):  [30 %] 30 %  SpO2:  [89 %-94 %] 92 %  on  Flow (L/min):  [2-4] 3 Device (Oxygen Therapy): nasal cannula    Intake/Output Summary  "(Last 24 hours) at 2/5/2021 0753  Last data filed at 2/5/2021 0352  Gross per 24 hour   Intake 3851 ml   Output 107 ml   Net 3744 ml     Flowsheet Rows      First Filed Value   Admission Height  182.9 cm (72\") Documented at 01/29/2021 1934   Admission Weight  100 kg (220 lb 7.4 oz) Documented at 01/29/2021 1626        Body mass index is 30.65 kg/m².      01/29/21  1934 02/04/21  0100 02/05/21  0352   Weight: 103 kg (226 lb 6.4 oz) 102 kg (225 lb 5 oz) 102 kg (225 lb 15.5 oz)       Physical Exam:  GEN:  No acute distress, alert, cooperative, well developed, on room air  EYES:   Sclerae clear. No icterus. PERRL. Normal EOM  ENT:   External ears/nose normal, no oral lesions, no thrush, mucous membranes moist  NECK:  Supple, midline trachea, patient does have noticeable JVD on the right side mainly  LUNGS: Normal chest on inspection, CTAB, no wheezes. No rhonchi. No crackles. Respirations regular, even and unlabored.   CV:  Regular rhythm and rate. Normal S1/S2. No murmurs, gallops, or rubs noted.  ABD:  Soft, minimal tenderness, surgical incision are clean.  Audible bowel sounds. No guarding  EXT:  Moves all extremities well. No cyanosis. No redness.  Trace lower extremities edema  Skin: Dry, intact, no bleeding    Results Review:    Results From Last 14 Days   Lab Units 01/28/21  0122 01/26/21  1445   FERRITIN ng/mL 11.70*  --    SED RATE mm/hr  --  10   TRIGLYCERIDES mg/dL  --  120     Results from last 7 days   Lab Units 02/05/21  0356 02/04/21  1624 02/04/21  0512 02/03/21  0502 02/02/21  0747 02/01/21  0452 01/31/21  0754 01/29/21  1601   SODIUM mmol/L 141 141 141 143 143 143 143 139   POTASSIUM mmol/L 3.9 4.2 4.0 3.9 4.1 4.0 4.3 4.7   CHLORIDE mmol/L 108* 106 107 108* 108* 108* 108* 106   CO2 mmol/L 22.7 24.3 23.1 26.3 28.5 25.6 22.3 20.7*   BUN mg/dL 19 16 12 8 9 10 14 23   CREATININE mg/dL 1.40* 1.36* 1.06 1.17 1.29* 1.22 1.10 1.33*   CALCIUM mg/dL 8.0* 8.3* 7.7* 8.5* 8.7 8.5* 8.9 8.8   AST (SGOT) U/L 18  --   " --   --   --  20 24 17   ALT (SGPT) U/L 10  --   --   --   --  17 20 18   ANION GAP mmol/L 10.3 10.7 10.9 8.7 6.5 9.4 12.7 12.3   ALBUMIN g/dL 3.30*  --   --   --   --  3.90 4.30 4.00     Results from last 7 days   Lab Units 01/30/21  0344 01/29/21  1601   TROPONIN T ng/mL 0.056* 0.058*     Results from last 7 days   Lab Units 01/29/21  1601   TSH uIU/mL 3.620     Results from last 7 days   Lab Units 01/29/21  1601   PROBNP pg/mL 436.2     Results from last 7 days   Lab Units 02/05/21  0356 02/04/21  0512 02/03/21  0502 02/02/21  1541 02/02/21  0747 02/02/21  0000 02/01/21  1607 02/01/21  0452  01/31/21  0754  01/29/21  1601   WBC 10*3/mm3 5.41 3.68 3.33*  --   --   --   --  3.91  --  3.20*  --  3.63   HEMOGLOBIN g/dL 9.0* 9.5* 7.6* 8.0* 7.5* 8.0* 8.0* 7.5*  7.5*   < > 8.7*   < > 6.1*   HEMATOCRIT % 29.5* 31.6* 25.7* 28.1* 26.1* 28.2* 26.9* 26.2*  26.2*   < > 30.1*   < > 22.3*   PLATELETS 10*3/mm3 107* 119* 122*  --   --   --   --  122*  --  137*  --  129*   MCV fL 78.7* 79.0 76.0*  --   --   --   --  76.6*  --  77.6*  --  73.8*   NEUTROPHIL % %  --   --  61.3  --   --   --   --  65.4  --  62.5  --   --    LYMPHOCYTE % %  --   --  26.1  --   --   --   --  23.0  --  24.1  --   --    MONOCYTES % %  --   --  8.7  --   --   --   --  9.2  --  10.0  --   --    EOSINOPHIL % %  --   --  2.7  --   --   --   --  1.8  --  2.2  --   --    BASOPHIL % %  --   --  0.9  --   --   --   --  0.3  --  0.9  --   --    IMM GRAN % %  --   --  0.3  --   --   --   --  0.3  --  0.3  --   --     < > = values in this interval not displayed.     Results from last 7 days   Lab Units 02/04/21  0512 02/01/21  0452 01/31/21  0754   INR  1.13* 1.17* 1.11*   APTT seconds 31.6  --   --      Results from last 7 days   Lab Units 02/04/21 0512 02/03/21  0502   MAGNESIUM mg/dL 2.2 2.2           Invalid input(s): LDLCALC  Results from last 7 days   Lab Units 02/03/21  2140   PH, ARTERIAL pH units 7.287*   PCO2, ARTERIAL mm Hg 51.7*   PO2 ART mm Hg 72.4*    HCO3 ART mmol/L 24.7         Glucose   Date/Time Value Ref Range Status   02/05/2021 0720 115 70 - 130 mg/dL Final   02/04/2021 1934 116 70 - 130 mg/dL Final   02/04/2021 1648 133 (H) 70 - 130 mg/dL Final   02/04/2021 1147 183 (H) 70 - 130 mg/dL Final   02/04/2021 0740 199 (H) 70 - 130 mg/dL Final   02/04/2021 0600 198 (H) 70 - 130 mg/dL Final   02/04/2021 0009 235 (H) 70 - 130 mg/dL Final   02/03/2021 2147 235 (H) 70 - 130 mg/dL Final                 Results from last 7 days   Lab Units 02/02/21  1450   COVID19  Not Detected               Imaging:   Imaging Results (All)      I reviewed the patient's new clinical results.  I personally viewed and interpreted the patient's imaging results:        Medication Review:   acetaminophen, 1,000 mg, Intravenous, Q6H  alvimopan, 12 mg, Oral, BID  amiodarone, 200 mg, Oral, Daily  famotidine, 20 mg, Intravenous, Q12H  insulin lispro, 0-9 Units, Subcutaneous, TID AC  metoprolol tartrate, 5 mg, Intravenous, Q6H        Scopolamine, 1 patch  sodium chloride, 125 mL/hr, Last Rate: 125 mL/hr (02/05/21 0608)        ASSESSMENT:   1. 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 Postop hypotension, responded to fluid resuscitation  3. Postop respiratory failure, liberated from the ventilator on 2/4/2021  4. Paroxysmal atrial fibrillation and history of atrial flutter, status post ablation was on anticoagulation preoperatively  5. Chronic kidney disease type III  6. Type 2 diabetes mellitus  7. Essential hypertension  8. Chronically elevated troponin, no suspected acute MI history of coronary artery disease status post CABG x5 in 1989    PLAN:  Despite the significant oliguria the patient has no significant increase in the waste product over the last 24 hours, he is on IV fluid and had crystalloids and colloids and support of the blood pressure and has been maintained in a decent range throughout.  We will continue with IV fluid to maintain  adequate blood pressure will give him just a 1 low-dose of Lasix IV and follow-up on his urine output.  Continue with the DVT mechanical prophylaxis and with the stress ulcer prophylaxis  Patient is hemodynamically stable at this point and he should be cleared to transfer out of the ICU from the pulmonary standpoint awaiting further clearance from the surgical team.  Continue to follow-up on the renal function  Consider consulting internal medicine team for further follow-up on his hemodynamic issues, given the lack of any critical or respiratory issues patient would not be followed after transfer out of the ICU    Discussed with patient    Disposition: Stepdown unit if okay with the other consultants    Oc Canseco MD  02/05/21  07:53 EST          Dictated utilizing Dragon dictation

## 2021-02-05 NOTE — ADDENDUM NOTE
Addendum  created 02/04/21 2031 by Nelli Cox MD    Clinical Note Signed, Diagnosis association updated, Intraprocedure Blocks edited

## 2021-02-05 NOTE — PROGRESS NOTES
Name: Osvaldo Lopez ADMIT: 2021   : 1935  PCP: Caio Rodríguez Jr., MD    MRN: [id number] LOS: 5 days   AGE/SEX: 85 y.o. male  ROOM: [facility]     Subjective   Subjective   Patient underwent laparoscopic and colectomy on February 3.  He is being transferred to the ICU today.  During the interview, he was pleasant, and conversive.  He endorsed some abdominal pain, but denied any difficulty breathing, or chest discomfort.    Review of Systems   Constitutional: Positive for fatigue. Negative for fever.   Respiratory: Negative for cough and shortness of breath.    Cardiovascular: Negative for chest pain and palpitations.   Gastrointestinal: Positive for abdominal distention and constipation. Negative for abdominal pain.   Genitourinary: Positive for decreased urine volume.        Objective   Objective   Vital Signs  Temp:  [97.6 °F (36.4 °C)-99.4 °F (37.4 °C)] 98.1 °F (36.7 °C)  Heart Rate:  [] 99  BP: ()/(44-96) 129/69  SpO2:  [89 %-94 %] 91 %  on  Flow (L/min):  [3-5] 5;   Device (Oxygen Therapy): nasal cannula  Body mass index is 30.65 kg/m².  Physical Exam  Constitutional:       Appearance: Normal appearance.   HENT:      Head: Normocephalic and atraumatic.   Pulmonary:      Effort: No respiratory distress.      Comments: Diminished lung sounds bilaterally  Abdominal:      Comments: Laparoscopic incision sites noted to be well-healing.  Hypoactive bowel sounds   Musculoskeletal:         General: No swelling or tenderness.   Skin:     General: Skin is dry.      Coloration: Skin is pale.   Neurological:      Mental Status: He is alert and oriented to person, place, and time.         Results Review     I reviewed the patient's new clinical results.  Results from last 7 days   Lab Units 21  0356 21  0512 21  0502 21  1541  21  0452   WBC 10*3/mm3 5.41 3.68 3.33*  --   --  3.91   HEMOGLOBIN g/dL 9.0* 9.5* 7.6* 8.0*   < > 7.5*  7.5*   PLATELETS 10*3/mm3 107* 119*  122*  --   --  122*    < > = values in this interval not displayed.     Results from last 7 days   Lab Units 02/05/21  0356 02/04/21  1624 02/04/21  0512 02/03/21  0502   SODIUM mmol/L 141 141 141 143   POTASSIUM mmol/L 3.9 4.2 4.0 3.9   CHLORIDE mmol/L 108* 106 107 108*   CO2 mmol/L 22.7 24.3 23.1 26.3   BUN mg/dL 19 16 12 8   CREATININE mg/dL 1.40* 1.36* 1.06 1.17   GLUCOSE mg/dL 111* 133* 204* 111*   Estimated Creatinine Clearance: 49.6 mL/min (A) (by C-G formula based on SCr of 1.4 mg/dL (H)).  Results from last 7 days   Lab Units 02/05/21  0356 02/01/21 0452 01/31/21  0754   ALBUMIN g/dL 3.30* 3.90 4.30   BILIRUBIN mg/dL 0.5 0.4 0.5   ALK PHOS U/L 70 74 80   AST (SGOT) U/L 18 20 24   ALT (SGPT) U/L 10 17 20     Results from last 7 days   Lab Units 02/05/21  0356 02/04/21  1624 02/04/21  0512 02/03/21  0502 02/01/21 0452 01/31/21  0754   CALCIUM mg/dL 8.0* 8.3* 7.7* 8.5*   < > 8.5* 8.9   ALBUMIN g/dL 3.30*  --   --   --   --  3.90 4.30   MAGNESIUM mg/dL  --   --  2.2 2.2  --   --   --    PHOSPHORUS mg/dL  --   --   --  3.6  --   --   --     < > = values in this interval not displayed.       COVID19   Date Value Ref Range Status   02/02/2021 Not Detected Not Detected - Ref. Range Final   01/29/2021 Not Detected Not Detected - Ref. Range Final     Glucose   Date/Time Value Ref Range Status   02/05/2021 1541 105 70 - 130 mg/dL Final   02/05/2021 1136 126 70 - 130 mg/dL Final   02/05/2021 0720 115 70 - 130 mg/dL Final   02/04/2021 1934 116 70 - 130 mg/dL Final   02/04/2021 1648 133 (H) 70 - 130 mg/dL Final   02/04/2021 1147 183 (H) 70 - 130 mg/dL Final   02/04/2021 0740 199 (H) 70 - 130 mg/dL Final       CT Abdomen Pelvis With Contrast  Narrative: CT ABDOMEN AND PELVIS WITH IV CONTRAST     HISTORY: Colon mass in the hepatic flexure, anemia, abdominal pain     TECHNIQUE: Radiation dose reduction techniques were utilized, including  automated exposure control and exposure modulation based on body size.   3 mm  images were obtained through the abdomen and pelvis after the  administration of IV contrast.      COMPARISON: 04/14/2019     FINDINGS:      ABDOMEN:  Mild fibrotic changes in the lung bases. Stable dilation of the thoracic  aorta. The liver is unremarkable. Cholecystectomy. Spleen is  unremarkable. Bilateral renal cysts. Adrenal glands are unremarkable.  Stable multiple pancreatic cysts/cystic lesions with the largest located  in the region of the pancreatic head measuring 1.9 cm. Stable mildly  dilated infrarenal abdominal aorta. No ascites or lymphadenopathy.     PELVIS:  Prostatomegaly. The bladder is unremarkable. No free fluid. Sigmoid  diverticulosis without evidence of diverticulitis. There is an area of  narrowing in the hepatic flexure of the colon best seen on coronal  images 39 through 48 may reflect the mass found on colonoscopy. Bone  windows show postsurgical changes of the lumbar spine.     Impression: Area of narrowing within the hepatic flexure of the colon may reflect  the mass found on colonoscopy. There is no convincing evidence of  metastatic disease. Additional findings as described.     Radiation dose reduction techniques were utilized, including automated  exposure control and exposure modulation based on body size.     This report was finalized on 2/2/2021 10:35 AM by Dr. Harvinder Kate M.D.       Scheduled Medications  acetaminophen, 1,000 mg, Intravenous, Q6H  alvimopan, 12 mg, Oral, BID  amiodarone, 200 mg, Oral, Daily  famotidine, 20 mg, Intravenous, Q12H  insulin lispro, 0-9 Units, Subcutaneous, TID AC  metoprolol succinate XL, 25 mg, Oral, Q24H    Infusions  Scopolamine, 1 patch  sodium chloride, 125 mL/hr, Last Rate: 125 mL/hr (02/05/21 1146)    Diet  NPO Diet       Assessment/Plan     Active Hospital Problems    Diagnosis  POA   • **Symptomatic anemia [D64.9]  Yes   • Iron deficiency anemia [D50.9]  Unknown   • Chronic anticoagulation [Z79.01]  Not Applicable   • CKD (chronic  kidney disease) [N18.9]  Unknown   • CRISTOBAL (acute kidney injury) (CMS/HCC) [N17.9]  Unknown   • Colonic mass [K63.89]  Unknown   • 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       85 y.o. male admitted with Symptomatic anemia.    Adenocarcinoma of the colon  Status post laparoscopic hemicolectomy on February 3, was in the ICU postoperatively for difficulty weaning off the ventilator.  Is currently extubated, with appropriate oxygen saturations on 2 L via nasal cannula.  Okay to transfer to the floor.    Oliguric renal failure    Intake/Output Summary (Last 24 hours) at 2/5/2021 1741  Last data filed at 2/5/2021 1400  Gross per 24 hour   Intake 2873 ml   Output 405 ml   Net 2468 ml   Patient is currently receiving normal saline at 125 cc an hour, will continue for now.  Will monitor overnight for improvement.     Postoperative hypotension  Amlodipine, lisinopril, metoprolol were held yesterday metoprolol 25 was added back on his medication list on 5 February.  Resume medications as blood pressure allows.    Paroxysmal atrial fibrillation  Currently on metoprolol XL as well as amiodarone.  Apparently patient was on amiodarone prior to admission.  Only to reconcile this medication at discharge.  Currently not anticoagulated in the postoperative setting.  Was previously on Xarelto.  Will resume once okay by surgery.      · SCDs for DVT prophylaxis.  · Full code.  · Discussed with patient.  · Anticipate discharge TBD.  when cleared by consultants.      Michael Austin MD  [facility]  02/05/21  16:54 EST    Patient was wearing facemask when I entered the room and throughout our encounter.  I wore protective equipment throughout this patient encounter including a  face mask, gloves and protective eyewear.  Hand hygiene was performed before donning protective equipment and after removal when leaving the room.

## 2021-02-05 NOTE — PROGRESS NOTES
Progress Note    Pod 2      S: negative flatus,  negative BM. negative ambulating. Pain controlled with iv tylenol    O:  Temp:  [97.6 °F (36.4 °C)-99.4 °F (37.4 °C)] 98.1 °F (36.7 °C)  Heart Rate:  [] 99  BP: ()/(44-96) 129/69      Intake/Output Summary (Last 24 hours) at 2/5/2021 1708  Last data filed at 2/5/2021 1400  Gross per 24 hour   Intake 2873 ml   Output 405 ml   Net 2468 ml       Abd:   soft, non distended  Incision: clean, dry      Results from last 7 days   Lab Units 02/05/21  0356   WBC 10*3/mm3 5.41   HEMOGLOBIN g/dL 9.0*   HEMATOCRIT % 29.5*   PLATELETS 10*3/mm3 107*     Results from last 7 days   Lab Units 02/05/21  0356  02/03/21  0502   SODIUM mmol/L 141   < > 143   POTASSIUM mmol/L 3.9   < > 3.9   CHLORIDE mmol/L 108*   < > 108*   CO2 mmol/L 22.7   < > 26.3   BUN mg/dL 19   < > 8   CREATININE mg/dL 1.40*   < > 1.17   GLUCOSE mg/dL 111*   < > 111*   CALCIUM mg/dL 8.0*   < > 8.5*   PHOSPHORUS mg/dL  --   --  3.6    < > = values in this interval not displayed.       Results from last 7 days   Lab Units 02/04/21  0512   MAGNESIUM mg/dL 2.2         A/P: 85 y.o. male with robotic right colon  Ok to transfer out to   Tylenol for pain  Start clears

## 2021-02-05 NOTE — PLAN OF CARE
Goal Outcome Evaluation:    Pt transferring out of ICU this evening.  Very confused, delirious. Not a great candidate for incentive spirometer teaching at this time.  Deep breathing encouraged.  Reoriented frquently.  VSS.  PO Metoprolol restarted; Amiodarone continued.  There was some confusion because the medication is listed on the home med list as well as the allergies list.  Pharmacy followed up with pt's regular pharmacy.  Pt refilled Amiodarone prescription recently.  Discussed with patient and daughter at bedside; okay to continue taking here at hospital.  No change in lap sites.  No BM.  Okay to start liquid diet per surgery.  Lasix started; UOP improved.  SCDs on.  Daughter updated at bedside twice this shift.  She is concerned about her father becoming more confused today compared to yesterday.  Trending labs; Hgb stable.  Pt got up to chair with PT assist x 2.  Very unstable.  When patient became disoriented (adamant he was at his home) and wanting to move, RN began moving patient from recliner to bed with help.  Pt not strong enough to help; ended up using 5 staff members to get patient back to bed.  Pt very fatigued with drop in oxygen saturation during activity.  Bedrest for the remainder of shift; focused on leg exercises in bed.

## 2021-02-05 NOTE — PROGRESS NOTES
"Patient Name: Osvaldo Lopez  :1935  85 y.o.      Patient Care Team:  Caio Rodríguez Jr., MD as PCP - General (Family Medicine)  Dontrell Arellano MD as Surgeon (Orthopedic Surgery)  Angelo Driscoll MD as Consulting Physician (Cardiology)  Darvin Alvarez MD as Consulting Physician (Urology)  Caio Rodríguez Jr., MD as Referring Physician (Family Medicine)    Interval History:   Still confused.  Blood pressure is improving.    Subjective:  Following for paroxysmal atrial fibrillation    Objective   Vital Signs  Temp:  [97.6 °F (36.4 °C)-99.4 °F (37.4 °C)] 97.6 °F (36.4 °C)  Heart Rate:  [71-97] 96  Resp:  [16] 16  BP: ()/(44-96) 122/61    Intake/Output Summary (Last 24 hours) at 2021 1020  Last data filed at 2021 0352  Gross per 24 hour   Intake 3751 ml   Output 32 ml   Net 3719 ml     Flowsheet Rows      First Filed Value   Admission Height  182.9 cm (72\") Documented at 2021 1934   Admission Weight  100 kg (220 lb 7.4 oz) Documented at 2021 1626          Physical Exam:   General Appearance:   Confused and mumbling.  Hard to understand.   Lungs:     Clear to auscultation.  Normal respiratory effort and rate.      Heart:    Regular rhythm and normal rate, normal S1 and S2, no murmurs, gallops or rubs.     Chest Wall:    No abnormalities observed   Abdomen:    Postoperative.   Extremities:   no cyanosis, clubbing or edema.  No marked joint deformities.      Results Review:    Results from last 7 days   Lab Units 21  0356   SODIUM mmol/L 141   POTASSIUM mmol/L 3.9   CHLORIDE mmol/L 108*   CO2 mmol/L 22.7   BUN mg/dL 19   CREATININE mg/dL 1.40*   GLUCOSE mg/dL 111*   CALCIUM mg/dL 8.0*     Results from last 7 days   Lab Units 21  0344 21  1601   TROPONIN T ng/mL 0.056* 0.058*     Results from last 7 days   Lab Units 21  0356   WBC 10*3/mm3 5.41   HEMOGLOBIN g/dL 9.0*   HEMATOCRIT % 29.5*   PLATELETS 10*3/mm3 107*     Results from last 7 days   Lab Units " 02/04/21  0512 02/01/21  0452 01/31/21  0754   INR  1.13* 1.17* 1.11*   APTT seconds 31.6  --   --          Results from last 7 days   Lab Units 02/04/21  0512   MAGNESIUM mg/dL 2.2             Medication Review:   acetaminophen, 1,000 mg, Intravenous, Q6H  alvimopan, 12 mg, Oral, BID  amiodarone, 200 mg, Oral, Daily  famotidine, 20 mg, Intravenous, Q12H  insulin lispro, 0-9 Units, Subcutaneous, TID AC  metoprolol tartrate, 5 mg, Intravenous, Q6H         Scopolamine, 1 patch  sodium chloride, 125 mL/hr, Last Rate: 125 mL/hr (02/05/21 0608)        Assessment/Plan     1.  Paroxysmal atrial fibrillation.  History of atrial flutter status post ablation in June 2019.  Continue amiodarone.  2.  Anemia.  Secondary to #8  3.  Coronary artery disease status post CABG x5 in 1989.  4.  Hypertension  5.  Diabetes with neuropathy  6.  Chronically elevated troponin.  7.  Chronic kidney disease stage III.  8.  Adenocarcinoma of the colon.  Status post laparoscopic hemicolectomy on February 3.  9.  Acute respiratory failure.  In PACU he was somnolent and they could not extubate.  He was transferred to the ICU.  He was extubated yesterday.  10.  Hypertension.  Had hypotension yesterday but blood pressure is improving.  His amlodipine, lisinopril and metoprolol were all held.  I would like to add him back onto Toprol-XL 25 mg a day.  The other medications can be added back as his blood pressure increases.    -Please call if he has any other cardiac issues.  -Okay to transfer to the floor from my standpoint.    Ewa Whaley MD, [facility]  02/05/21  10:20 EST

## 2021-02-06 ENCOUNTER — APPOINTMENT (OUTPATIENT)
Dept: GENERAL RADIOLOGY | Facility: HOSPITAL | Age: 86
End: 2021-02-06

## 2021-02-06 LAB
ANION GAP SERPL CALCULATED.3IONS-SCNC: 9.5 MMOL/L (ref 5–15)
BUN SERPL-MCNC: 25 MG/DL (ref 8–23)
BUN/CREAT SERPL: 19.2 (ref 7–25)
CALCIUM SPEC-SCNC: 7.9 MG/DL (ref 8.6–10.5)
CHLORIDE SERPL-SCNC: 112 MMOL/L (ref 98–107)
CO2 SERPL-SCNC: 21.5 MMOL/L (ref 22–29)
CREAT SERPL-MCNC: 1.3 MG/DL (ref 0.76–1.27)
DEPRECATED RDW RBC AUTO: 53.1 FL (ref 37–54)
ERYTHROCYTE [DISTWIDTH] IN BLOOD BY AUTOMATED COUNT: 18.8 % (ref 12.3–15.4)
GFR SERPL CREATININE-BSD FRML MDRD: 53 ML/MIN/1.73
GLUCOSE BLDC GLUCOMTR-MCNC: 105 MG/DL (ref 70–130)
GLUCOSE BLDC GLUCOMTR-MCNC: 109 MG/DL (ref 70–130)
GLUCOSE BLDC GLUCOMTR-MCNC: 121 MG/DL (ref 70–130)
GLUCOSE BLDC GLUCOMTR-MCNC: 139 MG/DL (ref 70–130)
GLUCOSE SERPL-MCNC: 83 MG/DL (ref 65–99)
HCT VFR BLD AUTO: 28.6 % (ref 37.5–51)
HGB BLD-MCNC: 8.8 G/DL (ref 13–17.7)
MCH RBC QN AUTO: 24.3 PG (ref 26.6–33)
MCHC RBC AUTO-ENTMCNC: 30.8 G/DL (ref 31.5–35.7)
MCV RBC AUTO: 79 FL (ref 79–97)
NT-PROBNP SERPL-MCNC: 2483 PG/ML (ref 0–1800)
PLATELET # BLD AUTO: 121 10*3/MM3 (ref 140–450)
PMV BLD AUTO: 9.4 FL (ref 6–12)
POTASSIUM SERPL-SCNC: 3.7 MMOL/L (ref 3.5–5.2)
QT INTERVAL: 382 MS
RBC # BLD AUTO: 3.62 10*6/MM3 (ref 4.14–5.8)
SODIUM SERPL-SCNC: 143 MMOL/L (ref 136–145)
WBC # BLD AUTO: 6.85 10*3/MM3 (ref 3.4–10.8)

## 2021-02-06 PROCEDURE — 82962 GLUCOSE BLOOD TEST: CPT

## 2021-02-06 PROCEDURE — 85027 COMPLETE CBC AUTOMATED: CPT | Performed by: STUDENT IN AN ORGANIZED HEALTH CARE EDUCATION/TRAINING PROGRAM

## 2021-02-06 PROCEDURE — 97530 THERAPEUTIC ACTIVITIES: CPT

## 2021-02-06 PROCEDURE — 93010 ELECTROCARDIOGRAM REPORT: CPT | Performed by: INTERNAL MEDICINE

## 2021-02-06 PROCEDURE — 25010000002 HYDROMORPHONE PER 4 MG: Performed by: COLON & RECTAL SURGERY

## 2021-02-06 PROCEDURE — 25010000002 FUROSEMIDE PER 20 MG: Performed by: COLON & RECTAL SURGERY

## 2021-02-06 PROCEDURE — 83880 ASSAY OF NATRIURETIC PEPTIDE: CPT | Performed by: STUDENT IN AN ORGANIZED HEALTH CARE EDUCATION/TRAINING PROGRAM

## 2021-02-06 PROCEDURE — 80048 BASIC METABOLIC PNL TOTAL CA: CPT | Performed by: INTERNAL MEDICINE

## 2021-02-06 PROCEDURE — 71045 X-RAY EXAM CHEST 1 VIEW: CPT

## 2021-02-06 RX ORDER — TAMSULOSIN HYDROCHLORIDE 0.4 MG/1
0.8 CAPSULE ORAL DAILY
Status: DISCONTINUED | OUTPATIENT
Start: 2021-02-06 | End: 2021-02-12 | Stop reason: HOSPADM

## 2021-02-06 RX ORDER — FUROSEMIDE 10 MG/ML
20 INJECTION INTRAMUSCULAR; INTRAVENOUS ONCE
Status: COMPLETED | OUTPATIENT
Start: 2021-02-06 | End: 2021-02-06

## 2021-02-06 RX ADMIN — ACETAMINOPHEN 1000 MG: 10 INJECTION, SOLUTION INTRAVENOUS at 17:27

## 2021-02-06 RX ADMIN — HYDROMORPHONE HYDROCHLORIDE 0.25 MG: 1 INJECTION, SOLUTION INTRAMUSCULAR; INTRAVENOUS; SUBCUTANEOUS at 15:12

## 2021-02-06 RX ADMIN — METOPROLOL SUCCINATE 25 MG: 25 TABLET, EXTENDED RELEASE ORAL at 08:00

## 2021-02-06 RX ADMIN — ACETAMINOPHEN 1000 MG: 10 INJECTION, SOLUTION INTRAVENOUS at 22:11

## 2021-02-06 RX ADMIN — ALVIMOPAN 12 MG: 12 CAPSULE ORAL at 20:25

## 2021-02-06 RX ADMIN — ACETAMINOPHEN 1000 MG: 10 INJECTION, SOLUTION INTRAVENOUS at 04:04

## 2021-02-06 RX ADMIN — ALVIMOPAN 12 MG: 12 CAPSULE ORAL at 08:00

## 2021-02-06 RX ADMIN — FAMOTIDINE 20 MG: 10 INJECTION INTRAVENOUS at 20:26

## 2021-02-06 RX ADMIN — FUROSEMIDE 20 MG: 10 INJECTION, SOLUTION INTRAMUSCULAR; INTRAVENOUS at 09:43

## 2021-02-06 RX ADMIN — TAMSULOSIN HYDROCHLORIDE 0.8 MG: 0.4 CAPSULE ORAL at 15:11

## 2021-02-06 RX ADMIN — ACETAMINOPHEN 1000 MG: 10 INJECTION, SOLUTION INTRAVENOUS at 10:05

## 2021-02-06 RX ADMIN — FAMOTIDINE 20 MG: 10 INJECTION INTRAVENOUS at 08:00

## 2021-02-06 RX ADMIN — AMIODARONE HYDROCHLORIDE 200 MG: 200 TABLET ORAL at 08:00

## 2021-02-06 NOTE — PLAN OF CARE
Goal Outcome Evaluation:   Patient still confused. Sitter per Dr. Napoles so we could DC the restraints. Lasix X1 dose for increased work of breathing this AM, DC's fluids. Weaned O2 as tolerated back to 4L NC.

## 2021-02-06 NOTE — THERAPY TREATMENT NOTE
Patient Name: Osvaldo Lopez  : 1935    MRN: [id number]                              Today's Date: 2021       Admit Date: 2021    Visit Dx:     ICD-10-CM ICD-9-CM   1. Symptomatic anemia  D64.9 285.9   2. Other fatigue  R53.83 780.79   3. Elevated troponin  R77.8 790.6   4. Colonic mass  K63.89 569.89     Patient Active Problem List   Diagnosis   • Complete rotator cuff tear of left shoulder   • Abnormal finding on thyroid function test   • Abdominal aortic aneurysm (CMS/HCC)   • Benign prostatic hyperplasia   • Essential hypertension   • Hyperlipidemia   • Shoulder pain   • Lumbar radiculopathy   • DM (diabetes mellitus), type 2 with complications (CMS/HCC)   • OA (osteoarthritis) of knee   • Tear of right rotator cuff   • Spinal stenosis of lumbar region with neurogenic claudication   • Eyebrow ptosis   • Pseudophakia   • Nonrheumatic aortic valve stenosis   • Atrial flutter with rapid ventricular response (CMS/HCC)   • Right arm pain   • Leg weakness, bilateral   • Typical atrial flutter (CMS/HCC)   • Diabetic peripheral neuropathy (CMS/HCC)   • Muscle weakness (generalized)   • Pressure injury of left buttock, stage 1   • Chronic low back pain with sciatica   • 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CMS/HCC)   • Pressure injury of buttock, stage 1   • Anemia   • Symptomatic anemia   • Iron deficiency anemia   • Chronic anticoagulation   • CKD (chronic kidney disease)   • CRISTOBAL (acute kidney injury) (CMS/HCC)   • Colonic mass     Past Medical History:   Diagnosis Date   • Abnormal thyroid blood test    • Aneurysm of abdominal aorta (CMS/HCC)    • Arthritis    • Bleeding disorder (CMS/HCC)    • BPH (benign prostatic hyperplasia)    • Bruises easily    • Bulging of thoracic intervertebral disc    • Chronic edema    • Coronary artery disease    • Diverticular disease    • Enlarged prostate without lower urinary tract symptoms (luts)    • Essential hypertension    • Heart attack (CMS/HCC)    • HL (hearing  loss)    • Hyperactivity of bladder    • Hyperlipidemia    • Left shoulder pain    • Lumbar radiculopathy 2016    wears back brace at times following back surgery   • Muscle weakness (generalized)    • Nonrheumatic aortic valve stenosis     mild 1/2018    • Osteoarthritis    • PAF (paroxysmal atrial fibrillation) (CMS/HCC)    • Polyuria    • Recurrent falls    • Screening      stop bang scale 5   • Syncope    • Torn rotator cuff     left   • Type 2 diabetes mellitus (CMS/HCC)    • Urinary frequency      Past Surgical History:   Procedure Laterality Date   • CARDIAC ELECTROPHYSIOLOGY PROCEDURE N/A 6/6/2019    Procedure: Ablation atrial flutter;  Surgeon: Miller Talbot MD;  Location: [facility] CATH INVASIVE LOCATION;  Service: Cardiovascular   • CARDIAC SURGERY      CABGX5 (1989)   • CARDIOVERSION  04/15/2019    Dr. Miller Talbot   • CATARACT EXTRACTION Bilateral 1997   • COLON RESECTION N/A 2/3/2021    Procedure: LAPAROSCOPIC EXTENDED  RIGHT COLECTOMY WITH DAVINCI ROBOT;  Surgeon: Xavi Napoles MD;  Location: [facility] MAIN OR;  Service: DaVinci;  Laterality: N/A;   • COLONOSCOPY N/A 1/31/2021    Procedure: COLONOSCOPY TO CECUM  WITH ORISE INJECTION, BIOPSIES, COLD BIOPSY POLYPECTOMY, COLD AND HOT SNARE POLYPECTOMIES, TATTOO, AND CLIP X3;  Surgeon: Jey Barrios MD;  Location: [facility] ENDOSCOPY;  Service: Gastroenterology;  Laterality: N/A;  PRE- IRON DEFICIENCY ANEMIA  POST- COLON MASS, COLON POLYPS, DIVERTICULOSIS, HEMORRHOIDS   • CORONARY ARTERY BYPASS GRAFT     • CYST REMOVAL      FROM BACK   • ENDOSCOPY N/A 1/31/2021    Procedure: ESOPHAGOGASTRODUODENOSCOPY WITH BIOPSIES;  Surgeon: Jey Barrios MD;  Location: [facility] ENDOSCOPY;  Service: Gastroenterology;  Laterality: N/A;  PRE- IRON DEFICIENCY ANEMIA  POST- NORMAL   • GALLBLADDER SURGERY  1989   • HERNIA REPAIR Left     inquinal times 3  last one in 2003   • KNEE CARTILAGE SURGERY Left 1970's   • LUMBAR DISCECTOMY FUSION INSTRUMENTATION N/A 4/11/2016     Procedure: lumbar laminectomy L4-5 and fusion with instrumentation;  Surgeon: Ran Kline MD;  Location: [facility] OR;  Service:    • LUMBAR DISCECTOMY FUSION INSTRUMENTATION N/A 1/15/2018    Procedure: L2-3, L3-4 laminectomy and fusion with instrumentation and removal of implants L4 5.;  Surgeon: Ran Kline MD;  Location: [facility] MAIN OR;  Service:    • OR TOTAL KNEE ARTHROPLASTY Left 11/14/2016    Procedure: TOTAL KNEE ARTHROPLASTY;  Surgeon: Dontrell Arellano MD;  Location: [facility] OR;  Service: Orthopedics     General Information     Row Name 02/06/21 1037          Physical Therapy Time and Intention    Document Type  therapy note (daily note)  -AP     Mode of Treatment  physical therapy  -AP     Row Name 02/06/21 1037          General Information    Patient Profile Reviewed  yes  -AP     Existing Precautions/Restrictions  fall  -AP     Row Name 02/06/21 1037          Cognition    Orientation Status (Cognition)  oriented x 4  -AP     Row Name 02/06/21 1037          Safety Issues, Functional Mobility    Impairments Affecting Function (Mobility)  balance;strength;endurance/activity tolerance;pain;cognition  -AP       User Key  (r) = Recorded By, (t) = Taken By, (c) = Cosigned By    Initials Name Provider Type    AP Karli Montgomery, PT Physical Therapist        Mobility     Row Name 02/06/21 1037          Bed Mobility    Bed Mobility  supine-sit  -AP     Supine-Sit [place] (Bed Mobility)  moderate assist (50% patient effort);2 person assist  -AP     Assistive Device (Bed Mobility)  head of bed elevated;bed rails  -AP     Row Name 02/06/21 1037          Sit-Stand Transfer    Sit-Stand [place] (Transfers)  moderate assist (50% patient effort);2 person assist  -AP     Assistive Device (Sit-Stand Transfers)  walker, front-wheeled  -AP     Row Name 02/06/21 1037          Gait/Stairs (Locomotion)    [place] Level (Gait)  unable to assess pt became very tachycardic in standing this visit, not  feelign well and returned to lying in bed  -AP     Comment (Gait/Stairs)  STS completed x 3 at bedside and pt was exhibiting some SOA, stating he was getting tired, upon third stand pt became tachycardic in low 140's and was returned to supine position  -AP       User Key  (r) = Recorded By, (t) = Taken By, (c) = Cosigned By    Initials Name Provider Type    Karli Hays PT Physical Therapist        Obj/Interventions     Row Name 02/06/21 1043          Motor Skills    Therapeutic Exercise  other (see comments) BLE william, JAMAL, LAQ, SLR in supine, all x 10  -AP     Row Name 02/06/21 1043          Balance    Balance Assessment  sitting static balance;sitting dynamic balance;standing static balance;standing dynamic balance  -AP     Static Sitting Balance  mild impairment;supported;sitting, edge of bed  -AP     Dynamic Sitting Balance  mild impairment;supported;sitting, edge of bed  -AP     Static Standing Balance  moderate impairment;supported;standing  -AP     Dynamic Standing Balance  moderate impairment;supported;standing  -AP       User Key  (r) = Recorded By, (t) = Taken By, (c) = Cosigned By    Initials Name Provider Type    Karli Hays PT Physical Therapist        Goals/Plan    No documentation.       Clinical Impression     Row Name 02/06/21 1044          Pain    Additional Documentation  Pain Scale: Numbers Pre/Post-Treatment (Group)  -AP     Row Name 02/06/21 1044          Pain Scale: Numbers Pre/Post-Treatment    Pretreatment Pain Rating  0/10 - no pain  -AP     Posttreatment Pain Rating  0/10 - no pain  -AP     Pain Intervention(s)  Ambulation/increased activity;Repositioned  -AP     Row Name 02/06/21 1044          Plan of Care Review    Plan of Care Reviewed With  patient  -AP     Progress  no change  -AP     Outcome Summary  Pt having more fatigue this morning than in previous therapy sessions, was agreeable to getting up and OOB and is motivated to do so, but upon 3rd standing attempt he  became tachycardic in the low 140's breifly, for 2 small periods, reporting some fatigue and requesting to lie back down. Requiring same levels of assist for bed mobilty and transfers into standing as previous visit, will continue to benefit from skilled PT to further address defcits in mobility, balance, strength, activity tolerance, and transfer skills. Anticipate d/c to SNF.  -AP     Row Name 02/06/21 1044          Vital Signs    Intratreatment Heart Rate (beats/min)  141  -AP     Posttreatment Heart Rate (beats/min)  107  -AP     Row Name 02/06/21 1044          Positioning and Restraints    Pre-Treatment Position  in bed  -AP     Post Treatment Position  bed  -AP     In Bed  notified nsg;call light within reach;encouraged to call for assist;exit alarm on;fowlers  -AP       User Key  (r) = Recorded By, (t) = Taken By, (c) = Cosigned By    Initials Name Provider Type    Karli Hays PT Physical Therapist        Outcome Measures     Row Name 02/06/21 1047          How much help from another person do you currently need...    Turning from your back to your side while in flat bed without using bedrails?  3  -AP     Moving from lying on back to sitting on the side of a flat bed without bedrails?  2  -AP     Moving to and from a bed to a chair (including a wheelchair)?  2  -AP     Standing up from a chair using your arms (e.g., wheelchair, bedside chair)?  2  -AP     Climbing 3-5 steps with a railing?  1  -AP     To walk in hospital room?  1  -AP     AM-PAC 6 Clicks Score (PT)  11  -AP     Row Name 02/06/21 1047          Functional Assessment    Outcome Measure Options  AM-PAC 6 Clicks Basic Mobility (PT)  -AP       User Key  (r) = Recorded By, (t) = Taken By, (c) = Cosigned By    Initials Name Provider Type    Karli Hays PT Physical Therapist        Physical Therapy Education                 Title: PT OT SLP Therapies (Done)     Topic: Physical Therapy (Done)     Point: Mobility training (Done)      Learning Progress Summary           Patient Acceptance, E, VU,NR by AP at 2/6/2021 1048    Acceptance, E, VU,NR by KH at 2/5/2021 1037    Acceptance, E,TB, VU,DU by LB at 1/31/2021 1338    Acceptance, E,TB,D, VU,DU by LB at 1/30/2021 0955                   Point: Home exercise program (Done)     Learning Progress Summary           Patient Acceptance, E, VU,NR by AP at 2/6/2021 1048    Acceptance, E, VU,NR by KH at 2/5/2021 1037    Acceptance, E,TB, VU,DU by LB at 1/31/2021 1338    Acceptance, E,TB,D, VU,DU by LB at 1/30/2021 0955                   Point: Body mechanics (Done)     Learning Progress Summary           Patient Acceptance, E, VU,NR by AP at 2/6/2021 1048    Acceptance, E, VU,NR by  at 2/5/2021 1037    Acceptance, E,TB, VU,DU by LB at 1/31/2021 1338    Acceptance, E,TB,D, VU,DU by LB at 1/30/2021 0955                   Point: Precautions (Done)     Learning Progress Summary           Patient Acceptance, E, VU,NR by AP at 2/6/2021 1048    Acceptance, E, VU,NR by  at 2/5/2021 1037    Acceptance, E,TB, VU,DU by LB at 1/31/2021 1338    Acceptance, E,TB,D, VU,DU by LB at 1/30/2021 0955                               User Key     Initials Effective Dates Name Provider Type Discipline     02/05/19 -  Kaya Orosco, PT Physical Therapist PT    LB 08/09/20 -  Aditi Chaudhary, PT Physical Therapist PT    AP 09/24/20 -  Karli Montgomery, PT Physical Therapist PT              PT Recommendation and Plan     Plan of Care Reviewed With: patient  Progress: no change  Outcome Summary: Pt having more fatigue this morning than in previous therapy sessions, was agreeable to getting up and OOB and is motivated to do so, but upon 3rd standing attempt he became tachycardic in the low 140's breifly, for 2 small periods, reporting some fatigue and requesting to lie back down. Requiring same levels of assist for bed mobilty and transfers into standing as previous visit, will continue to benefit from skilled PT to further  address defcits in mobility, balance, strength, activity tolerance, and transfer skills. Anticipate d/c to SNF.     Time Calculation:   PT Charges     Row Name 02/06/21 1048             Time Calculation    Start Time  0838  -AP      Stop Time  0851  -AP      Time Calculation (min)  13 min  -AP      PT Received On  02/06/21  -AP      PT - Next Appointment  02/07/21  -AP        User Key  (r) = Recorded By, (t) = Taken By, (c) = Cosigned By    Initials Name Provider Type    AP Karli Montgomery PT Physical Therapist        Therapy Charges for Today     Code Description Service Date Service Provider Modifiers Qty    56986703768  PT THERAPEUTIC ACT EA 15 MIN 2/6/2021 Karli Montgomery, PT GP 1    25230642976 HC PT THER SUPP EA 15 MIN 2/6/2021 Karli Montgomery, PT GP 1          PT G-Codes  Outcome Measure Options: AM-PAC 6 Clicks Basic Mobility (PT)  AM-PAC 6 Clicks Score (PT): 11    Karli Montgomery PT  2/6/2021

## 2021-02-06 NOTE — PLAN OF CARE
Problem: Restraint, Nonbehavioral (Nonviolent)  Goal: Discontinuation Criteria Achieved  Outcome: Ongoing, Progressing  Intervention: Implement Least-restrictive Safety Strategies  Recent Flowsheet Documentation  Taken 2/5/2021 2112 by Fredo Pinto RN  Diversional Activities: television   Goal Outcome Evaluation:     Progress: declining  Outcome Summary: Pt was a transfer from ICU last night. Pt has been severely confused, was combative last night, kicking at nursing staff and unable to keep pt from climbing out of bed. Bilateral wrist restraints placed. Remains on 3-4L NC. IVF's continuing. C/o some pain to abdomen. Continuing IV Tylenol for ERAS. Chavis cath in place. Turned q2 hrs in bed. HR tachycardic and sinus arrhythmia per EKG. Other VSS. Continuing to monitor.

## 2021-02-06 NOTE — PROGRESS NOTES
Progress Note    Pod 3    S: negative flatus,  negative BM. negative ambulating. Pain controlled with tylenol  Having confusion and lashing out at times with staff physically  O:  Temp:  [97.1 °F (36.2 °C)-98.2 °F (36.8 °C)] 97.9 °F (36.6 °C)  Heart Rate:  [] 91  Resp:  [22-24] 24  BP: (105-150)/(52-98) 136/74      Intake/Output Summary (Last 24 hours) at 2/6/2021 1255  Last data filed at 2/6/2021 0415  Gross per 24 hour   Intake 4432 ml   Output 1250 ml   Net 3182 ml       Abd:   soft, sl distended  Incision: clean, dry      Results from last 7 days   Lab Units 02/06/21  0659   WBC 10*3/mm3 6.85   HEMOGLOBIN g/dL 8.8*   HEMATOCRIT % 28.6*   PLATELETS 10*3/mm3 121*     Results from last 7 days   Lab Units 02/06/21  0659  02/03/21  0502   SODIUM mmol/L 143   < > 143   POTASSIUM mmol/L 3.7   < > 3.9   CHLORIDE mmol/L 112*   < > 108*   CO2 mmol/L 21.5*   < > 26.3   BUN mg/dL 25*   < > 8   CREATININE mg/dL 1.30*   < > 1.17   GLUCOSE mg/dL 83   < > 111*   CALCIUM mg/dL 7.9*   < > 8.5*   PHOSPHORUS mg/dL  --   --  3.6    < > = values in this interval not displayed.       Results from last 7 days   Lab Units 02/04/21  0512   MAGNESIUM mg/dL 2.2         A/P: 85 y.o. male s/p lap right colon    Advance diet  Gave patient Lasix for increase in oxygen requirement and more labored breathing and hep-locked  Patient may not absorb p.o. since he is so recent postop intestinal surgery  Given his confusion it seems like the more he is tied down with restraints the more he is violent.  I have ordered a sitter.

## 2021-02-06 NOTE — PROGRESS NOTES
Name: Osvaldo Lopez ADMIT: 2021   : 1935  PCP: Caio Rodríguez Jr., MD    MRN: [id number] LOS: 6 days   AGE/SEX: 85 y.o. male  ROOM: [facility]     Subjective   Subjective      Patient very confused and combative overnight.  Kicking at nursing staff and trying to climb out of bed.  Placed in bilateral wrist restraints.  Since then, he appears to have mellowed out and is no longer in restraints.  He is mostly oriented today, but does not remember his hospital course.  He has no complaints.       Objective   Objective   Vital Signs  Temp:  [97.1 °F (36.2 °C)-98.2 °F (36.8 °C)] 97.9 °F (36.6 °C)  Heart Rate:  [] 91  Resp:  [22-24] 24  BP: (105-150)/(52-98) 136/74  SpO2:  [90 %-94 %] 93 %  on  Flow (L/min):  [3-5] 5;   Device (Oxygen Therapy): nasal cannula  Body mass index is 30.65 kg/m².  Physical Exam  Constitutional:       Appearance: Normal appearance.   HENT:      Head: Normocephalic and atraumatic.   Cardiovascular:      Rate and Rhythm: Normal rate and regular rhythm.      Heart sounds: Normal heart sounds.   Pulmonary:      Effort: Pulmonary effort is normal. No respiratory distress.      Breath sounds: Normal breath sounds.      Comments: Diminished lung sounds bilaterally  Abdominal:      General: Bowel sounds are normal. There is distension.      Tenderness: There is abdominal tenderness. There is no guarding.      Comments: Laparoscopic incision sites noted to be well-healing.  Hypoactive bowel sounds   Musculoskeletal:         General: No swelling or tenderness.   Skin:     General: Skin is dry.      Coloration: Skin is pale.   Neurological:      Mental Status: He is alert and oriented to person, place, and time.   Psychiatric:         Mood and Affect: Mood normal.         Behavior: Behavior normal.         Results Review     I reviewed the patient's new clinical results.  Results from last 7 days   Lab Units 21  0659 21  0356 21  0512 21  0502   WBC 10*3/mm3 6.85  5.41 3.68 3.33*   HEMOGLOBIN g/dL 8.8* 9.0* 9.5* 7.6*   PLATELETS 10*3/mm3 121* 107* 119* 122*     Results from last 7 days   Lab Units 02/06/21  0659 02/05/21  0356 02/04/21  1624 02/04/21  0512   SODIUM mmol/L 143 141 141 141   POTASSIUM mmol/L 3.7 3.9 4.2 4.0   CHLORIDE mmol/L 112* 108* 106 107   CO2 mmol/L 21.5* 22.7 24.3 23.1   BUN mg/dL 25* 19 16 12   CREATININE mg/dL 1.30* 1.40* 1.36* 1.06   GLUCOSE mg/dL 83 111* 133* 204*   Estimated Creatinine Clearance: 53.5 mL/min (A) (by C-G formula based on SCr of 1.3 mg/dL (H)).  Results from last 7 days   Lab Units 02/05/21  0356 02/01/21  0452 01/31/21  0754   ALBUMIN g/dL 3.30* 3.90 4.30   BILIRUBIN mg/dL 0.5 0.4 0.5   ALK PHOS U/L 70 74 80   AST (SGOT) U/L 18 20 24   ALT (SGPT) U/L 10 17 20     Results from last 7 days   Lab Units 02/06/21  0659 02/05/21  0356 02/04/21  1624 02/04/21  0512 02/03/21  0502  02/01/21  0452 01/31/21  0754   CALCIUM mg/dL 7.9* 8.0* 8.3* 7.7* 8.5*   < > 8.5* 8.9   ALBUMIN g/dL  --  3.30*  --   --   --   --  3.90 4.30   MAGNESIUM mg/dL  --   --   --  2.2 2.2  --   --   --    PHOSPHORUS mg/dL  --   --   --   --  3.6  --   --   --     < > = values in this interval not displayed.       COVID19   Date Value Ref Range Status   02/02/2021 Not Detected Not Detected - Ref. Range Final   01/29/2021 Not Detected Not Detected - Ref. Range Final     Glucose   Date/Time Value Ref Range Status   02/06/2021 1157 109 70 - 130 mg/dL Final   02/06/2021 0553 105 70 - 130 mg/dL Final   02/05/2021 2050 97 70 - 130 mg/dL Final   02/05/2021 1541 105 70 - 130 mg/dL Final   02/05/2021 1136 126 70 - 130 mg/dL Final   02/05/2021 0720 115 70 - 130 mg/dL Final   02/04/2021 1934 116 70 - 130 mg/dL Final       CT Abdomen Pelvis With Contrast  Narrative: CT ABDOMEN AND PELVIS WITH IV CONTRAST     HISTORY: Colon mass in the hepatic flexure, anemia, abdominal pain     TECHNIQUE: Radiation dose reduction techniques were utilized, including  automated exposure control and  exposure modulation based on body size.   3 mm images were obtained through the abdomen and pelvis after the  administration of IV contrast.      COMPARISON: 04/14/2019     FINDINGS:      ABDOMEN:  Mild fibrotic changes in the lung bases. Stable dilation of the thoracic  aorta. The liver is unremarkable. Cholecystectomy. Spleen is  unremarkable. Bilateral renal cysts. Adrenal glands are unremarkable.  Stable multiple pancreatic cysts/cystic lesions with the largest located  in the region of the pancreatic head measuring 1.9 cm. Stable mildly  dilated infrarenal abdominal aorta. No ascites or lymphadenopathy.     PELVIS:  Prostatomegaly. The bladder is unremarkable. No free fluid. Sigmoid  diverticulosis without evidence of diverticulitis. There is an area of  narrowing in the hepatic flexure of the colon best seen on coronal  images 39 through 48 may reflect the mass found on colonoscopy. Bone  windows show postsurgical changes of the lumbar spine.     Impression: Area of narrowing within the hepatic flexure of the colon may reflect  the mass found on colonoscopy. There is no convincing evidence of  metastatic disease. Additional findings as described.     Radiation dose reduction techniques were utilized, including automated  exposure control and exposure modulation based on body size.     This report was finalized on 2/2/2021 10:35 AM by Dr. Harvinder Kate M.D.       Scheduled Medications  acetaminophen, 1,000 mg, Intravenous, Q6H  alvimopan, 12 mg, Oral, BID  amiodarone, 200 mg, Oral, Daily  famotidine, 20 mg, Intravenous, Q12H  insulin lispro, 0-9 Units, Subcutaneous, TID AC  metoprolol succinate XL, 25 mg, Oral, Q24H    Infusions  Scopolamine, 1 patch    Diet  Diet Regular       Assessment/Plan     Active Hospital Problems    Diagnosis  POA   • **Symptomatic anemia [D64.9]  Yes   • Iron deficiency anemia [D50.9]  Unknown   • Chronic anticoagulation [Z79.01]  Not Applicable   • CKD (chronic kidney disease)  [N18.9]  Unknown   • CRISTOBAL (acute kidney injury) (CMS/HCC) [N17.9]  Unknown   • Colonic mass [K63.89]  Unknown   • 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       85 y.o. male admitted with Symptomatic anemia.    Adenocarcinoma of the colon  Status post laparoscopic hemicolectomy on February 3, was in the ICU postoperatively for difficulty weaning off the ventilator. Advancing diet as tolerated. Appreciate surgery assistance.    Acute renal failure  Initially appeared to be oliguric, but now patient is having 1200 cc out since yesterday.  He has a urinary catheter in.  His creatinine is improved from 1.4-1.3.  Baseline appears to be around 1.  He received some Lasix yesterday as well as today, and I am going to repeat a chest x-ray and check a BNP today to see if he has got any additional fluid on his lungs given that his oxygen requirements increased.  He appears euvolemic on exam.    Postoperative hypotension/essential hypertension  This appears resolved. Amlodipine, hydralazine and lisinopril are being held as patient's BP controlled without.     Paroxysmal atrial fibrillation  Currently on metoprolol XL as well as amiodarone. Currently not anticoagulated in the postoperative setting.  Was previously on Xarelto.  Will resume once okay by surgery.    BPH  Restart tamsulosin    DM  Metformin held      · SCDs for DVT prophylaxis.  · Full code.  · Discussed with patient.  · Anticipate discharge anticipate d/c to snf when cleared by consultants      Clark Silva MD  [facility]  02/06/21  12:37 EST    Patient was wearing facemask when I entered the room and throughout our encounter.  I wore protective equipment throughout this patient encounter including  a face mask, gloves and protective eyewear.  Hand hygiene was performed before donning protective equipment and after removal when leaving the room.

## 2021-02-07 ENCOUNTER — APPOINTMENT (OUTPATIENT)
Dept: CARDIOLOGY | Facility: HOSPITAL | Age: 86
End: 2021-02-07

## 2021-02-07 LAB
ANION GAP SERPL CALCULATED.3IONS-SCNC: 13.4 MMOL/L (ref 5–15)
AORTIC ARCH: 2.7 CM
AORTIC DIMENSIONLESS INDEX: 0.6 (DI)
ASCENDING AORTA: 4.1 CM
BH CV ECHO MEAS - ACS: 1.5 CM
BH CV ECHO MEAS - AO ACC TIME: 0.1 SEC
BH CV ECHO MEAS - AO MAX PG (FULL): 14.9 MMHG
BH CV ECHO MEAS - AO MAX PG: 18.3 MMHG
BH CV ECHO MEAS - AO MEAN PG (FULL): 6 MMHG
BH CV ECHO MEAS - AO MEAN PG: 8 MMHG
BH CV ECHO MEAS - AO ROOT AREA (BSA CORRECTED): 1.8
BH CV ECHO MEAS - AO ROOT AREA: 12.6 CM^2
BH CV ECHO MEAS - AO ROOT DIAM: 4 CM
BH CV ECHO MEAS - AO V2 MAX: 214 CM/SEC
BH CV ECHO MEAS - AO V2 MEAN: 128 CM/SEC
BH CV ECHO MEAS - AO V2 VTI: 37.7 CM
BH CV ECHO MEAS - ASC AORTA: 4.1 CM
BH CV ECHO MEAS - AVA(I,A): 1.7 CM^2
BH CV ECHO MEAS - AVA(I,D): 1.7 CM^2
BH CV ECHO MEAS - AVA(V,A): 1.6 CM^2
BH CV ECHO MEAS - AVA(V,D): 1.6 CM^2
BH CV ECHO MEAS - BSA(HAYCOCK): 2.4 M^2
BH CV ECHO MEAS - BSA: 2.3 M^2
BH CV ECHO MEAS - BZI_BMI: 32.3 KILOGRAMS/M^2
BH CV ECHO MEAS - BZI_METRIC_HEIGHT: 182 CM
BH CV ECHO MEAS - BZI_METRIC_WEIGHT: 107 KG
BH CV ECHO MEAS - CONTRAST EF (2CH): 70.2 CM2
BH CV ECHO MEAS - CONTRAST EF 4CH: 64.6 CM2
BH CV ECHO MEAS - EDV(CUBED): 79.5 ML
BH CV ECHO MEAS - EDV(MOD-SP2): 131 ML
BH CV ECHO MEAS - EDV(MOD-SP4): 164 ML
BH CV ECHO MEAS - EDV(TEICH): 83.1 ML
BH CV ECHO MEAS - EF(CUBED): 66 %
BH CV ECHO MEAS - EF(MOD-BP): 68.9 %
BH CV ECHO MEAS - EF(TEICH): 57.9 %
BH CV ECHO MEAS - ESV(CUBED): 27 ML
BH CV ECHO MEAS - ESV(MOD-SP2): 39 ML
BH CV ECHO MEAS - ESV(MOD-SP4): 58 ML
BH CV ECHO MEAS - ESV(TEICH): 35 ML
BH CV ECHO MEAS - FS: 30.2 %
BH CV ECHO MEAS - IVS/LVPW: 1.2
BH CV ECHO MEAS - IVSD: 1.3 CM
BH CV ECHO MEAS - LAT PEAK E' VEL: 10 CM/SEC
BH CV ECHO MEAS - LV DIASTOLIC VOL/BSA (35-75): 72 ML/M^2
BH CV ECHO MEAS - LV MASS(C)D: 184.7 GRAMS
BH CV ECHO MEAS - LV MASS(C)DI: 81.1 GRAMS/M^2
BH CV ECHO MEAS - LV MAX PG: 3.4 MMHG
BH CV ECHO MEAS - LV MEAN PG: 2 MMHG
BH CV ECHO MEAS - LV SYSTOLIC VOL/BSA (12-30): 25.5 ML/M^2
BH CV ECHO MEAS - LV V1 MAX: 92.4 CM/SEC
BH CV ECHO MEAS - LV V1 MEAN: 60.1 CM/SEC
BH CV ECHO MEAS - LV V1 VTI: 16.7 CM
BH CV ECHO MEAS - LVIDD: 4.3 CM
BH CV ECHO MEAS - LVIDS: 3 CM
BH CV ECHO MEAS - LVLD AP2: 8.7 CM
BH CV ECHO MEAS - LVLD AP4: 9.4 CM
BH CV ECHO MEAS - LVLS AP2: 7.5 CM
BH CV ECHO MEAS - LVLS AP4: 7.4 CM
BH CV ECHO MEAS - LVOT AREA (M): 3.8 CM^2
BH CV ECHO MEAS - LVOT AREA: 3.8 CM^2
BH CV ECHO MEAS - LVOT DIAM: 2.2 CM
BH CV ECHO MEAS - LVPWD: 1.1 CM
BH CV ECHO MEAS - MED PEAK E' VEL: 7.1 CM/SEC
BH CV ECHO MEAS - MV A DUR: 0.12 SEC
BH CV ECHO MEAS - MV A MAX VEL: 117 CM/SEC
BH CV ECHO MEAS - MV DEC SLOPE: 333 CM/SEC^2
BH CV ECHO MEAS - MV DEC TIME: 246 SEC
BH CV ECHO MEAS - MV E MAX VEL: 86.2 CM/SEC
BH CV ECHO MEAS - MV E/A: 0.74
BH CV ECHO MEAS - MV MAX PG: 5.9 MMHG
BH CV ECHO MEAS - MV MEAN PG: 2 MMHG
BH CV ECHO MEAS - MV P1/2T MAX VEL: 102 CM/SEC
BH CV ECHO MEAS - MV P1/2T: 89.7 MSEC
BH CV ECHO MEAS - MV V2 MAX: 121 CM/SEC
BH CV ECHO MEAS - MV V2 MEAN: 72.6 CM/SEC
BH CV ECHO MEAS - MV V2 VTI: 32.5 CM
BH CV ECHO MEAS - MVA P1/2T LCG: 2.2 CM^2
BH CV ECHO MEAS - MVA(P1/2T): 2.5 CM^2
BH CV ECHO MEAS - MVA(VTI): 2 CM^2
BH CV ECHO MEAS - PA ACC TIME: 0.11 SEC
BH CV ECHO MEAS - PA MAX PG (FULL): 3.2 MMHG
BH CV ECHO MEAS - PA MAX PG: 3.5 MMHG
BH CV ECHO MEAS - PA PR(ACCEL): 27.7 MMHG
BH CV ECHO MEAS - PA V2 MAX: 93.7 CM/SEC
BH CV ECHO MEAS - PULM DIAS VEL: 50.2 CM/SEC
BH CV ECHO MEAS - PULM S/D: 0.98
BH CV ECHO MEAS - PULM SYS VEL: 49 CM/SEC
BH CV ECHO MEAS - RAP SYSTOLE: 8 MMHG
BH CV ECHO MEAS - RV MAX PG: 0.36 MMHG
BH CV ECHO MEAS - RV MEAN PG: 0 MMHG
BH CV ECHO MEAS - RV V1 MAX: 30 CM/SEC
BH CV ECHO MEAS - RV V1 MEAN: 20.3 CM/SEC
BH CV ECHO MEAS - RV V1 VTI: 3.2 CM
BH CV ECHO MEAS - RVSP: 34.2 MMHG
BH CV ECHO MEAS - SI(AO): 208 ML/M^2
BH CV ECHO MEAS - SI(CUBED): 23.1 ML/M^2
BH CV ECHO MEAS - SI(LVOT): 27.9 ML/M^2
BH CV ECHO MEAS - SI(MOD-SP2): 40.4 ML/M^2
BH CV ECHO MEAS - SI(MOD-SP4): 46.5 ML/M^2
BH CV ECHO MEAS - SI(TEICH): 21.1 ML/M^2
BH CV ECHO MEAS - SV(AO): 473.8 ML
BH CV ECHO MEAS - SV(CUBED): 52.5 ML
BH CV ECHO MEAS - SV(LVOT): 63.5 ML
BH CV ECHO MEAS - SV(MOD-SP2): 92 ML
BH CV ECHO MEAS - SV(MOD-SP4): 106 ML
BH CV ECHO MEAS - SV(TEICH): 48.1 ML
BH CV ECHO MEAS - TAPSE (>1.6): 1.1 CM
BH CV ECHO MEAS - TR MAX VEL: 256 CM/SEC
BH CV ECHO MEASUREMENTS AVERAGE E/E' RATIO: 10.08
BH CV XLRA - RV BASE: 4 CM
BH CV XLRA - RV LENGTH: 7.8 CM
BH CV XLRA - RV MID: 3.4 CM
BH CV XLRA - TDI S': 8.8 CM/SEC
BUN SERPL-MCNC: 34 MG/DL (ref 8–23)
BUN/CREAT SERPL: 25.6 (ref 7–25)
CALCIUM SPEC-SCNC: 8.4 MG/DL (ref 8.6–10.5)
CHLORIDE SERPL-SCNC: 107 MMOL/L (ref 98–107)
CO2 SERPL-SCNC: 20.6 MMOL/L (ref 22–29)
CREAT SERPL-MCNC: 1.33 MG/DL (ref 0.76–1.27)
DEPRECATED RDW RBC AUTO: 53 FL (ref 37–54)
ERYTHROCYTE [DISTWIDTH] IN BLOOD BY AUTOMATED COUNT: 18.8 % (ref 12.3–15.4)
GFR SERPL CREATININE-BSD FRML MDRD: 51 ML/MIN/1.73
GLUCOSE BLDC GLUCOMTR-MCNC: 110 MG/DL (ref 70–130)
GLUCOSE BLDC GLUCOMTR-MCNC: 133 MG/DL (ref 70–130)
GLUCOSE BLDC GLUCOMTR-MCNC: 134 MG/DL (ref 70–130)
GLUCOSE BLDC GLUCOMTR-MCNC: 173 MG/DL (ref 70–130)
GLUCOSE SERPL-MCNC: 121 MG/DL (ref 65–99)
HCT VFR BLD AUTO: 31.8 % (ref 37.5–51)
HGB BLD-MCNC: 9.3 G/DL (ref 13–17.7)
LEFT ATRIUM VOLUME INDEX: 24.1 ML/M2
LV EF 2D ECHO EST: 68 %
MCH RBC QN AUTO: 23 PG (ref 26.6–33)
MCHC RBC AUTO-ENTMCNC: 29.2 G/DL (ref 31.5–35.7)
MCV RBC AUTO: 78.7 FL (ref 79–97)
PLATELET # BLD AUTO: 142 10*3/MM3 (ref 140–450)
PMV BLD AUTO: 9.4 FL (ref 6–12)
POTASSIUM SERPL-SCNC: 4 MMOL/L (ref 3.5–5.2)
RBC # BLD AUTO: 4.04 10*6/MM3 (ref 4.14–5.8)
SINUS: 3.7 CM
SODIUM SERPL-SCNC: 141 MMOL/L (ref 136–145)
STJ: 3.4 CM
WBC # BLD AUTO: 6.93 10*3/MM3 (ref 3.4–10.8)

## 2021-02-07 PROCEDURE — 97530 THERAPEUTIC ACTIVITIES: CPT

## 2021-02-07 PROCEDURE — 25010000002 PERFLUTREN (DEFINITY) 8.476 MG IN SODIUM CHLORIDE (PF) 0.9 % 10 ML INJECTION: Performed by: STUDENT IN AN ORGANIZED HEALTH CARE EDUCATION/TRAINING PROGRAM

## 2021-02-07 PROCEDURE — 25010000002 HYDROMORPHONE PER 4 MG: Performed by: COLON & RECTAL SURGERY

## 2021-02-07 PROCEDURE — 93306 TTE W/DOPPLER COMPLETE: CPT | Performed by: INTERNAL MEDICINE

## 2021-02-07 PROCEDURE — 85027 COMPLETE CBC AUTOMATED: CPT | Performed by: STUDENT IN AN ORGANIZED HEALTH CARE EDUCATION/TRAINING PROGRAM

## 2021-02-07 PROCEDURE — 93306 TTE W/DOPPLER COMPLETE: CPT

## 2021-02-07 PROCEDURE — 82962 GLUCOSE BLOOD TEST: CPT

## 2021-02-07 PROCEDURE — 63710000001 INSULIN LISPRO (HUMAN) PER 5 UNITS: Performed by: COLON & RECTAL SURGERY

## 2021-02-07 PROCEDURE — 97110 THERAPEUTIC EXERCISES: CPT

## 2021-02-07 PROCEDURE — 80048 BASIC METABOLIC PNL TOTAL CA: CPT | Performed by: STUDENT IN AN ORGANIZED HEALTH CARE EDUCATION/TRAINING PROGRAM

## 2021-02-07 PROCEDURE — 25010000002 FUROSEMIDE PER 20 MG: Performed by: STUDENT IN AN ORGANIZED HEALTH CARE EDUCATION/TRAINING PROGRAM

## 2021-02-07 RX ORDER — FUROSEMIDE 10 MG/ML
40 INJECTION INTRAMUSCULAR; INTRAVENOUS ONCE
Status: COMPLETED | OUTPATIENT
Start: 2021-02-07 | End: 2021-02-07

## 2021-02-07 RX ORDER — ACETAMINOPHEN 325 MG/1
650 TABLET ORAL EVERY 6 HOURS
Status: DISCONTINUED | OUTPATIENT
Start: 2021-02-07 | End: 2021-02-12 | Stop reason: HOSPADM

## 2021-02-07 RX ADMIN — ACETAMINOPHEN 1000 MG: 10 INJECTION, SOLUTION INTRAVENOUS at 09:47

## 2021-02-07 RX ADMIN — ALVIMOPAN 12 MG: 12 CAPSULE ORAL at 09:44

## 2021-02-07 RX ADMIN — AMIODARONE HYDROCHLORIDE 200 MG: 200 TABLET ORAL at 09:44

## 2021-02-07 RX ADMIN — INSULIN LISPRO 2 UNITS: 100 INJECTION, SOLUTION INTRAVENOUS; SUBCUTANEOUS at 17:47

## 2021-02-07 RX ADMIN — ACETAMINOPHEN 650 MG: 325 TABLET, FILM COATED ORAL at 17:47

## 2021-02-07 RX ADMIN — FAMOTIDINE 20 MG: 10 INJECTION INTRAVENOUS at 09:44

## 2021-02-07 RX ADMIN — FAMOTIDINE 20 MG: 10 INJECTION INTRAVENOUS at 20:44

## 2021-02-07 RX ADMIN — METOPROLOL SUCCINATE 25 MG: 25 TABLET, EXTENDED RELEASE ORAL at 09:44

## 2021-02-07 RX ADMIN — HYDROMORPHONE HYDROCHLORIDE 0.25 MG: 1 INJECTION, SOLUTION INTRAMUSCULAR; INTRAVENOUS; SUBCUTANEOUS at 06:55

## 2021-02-07 RX ADMIN — HYDROMORPHONE HYDROCHLORIDE 0.25 MG: 1 INJECTION, SOLUTION INTRAMUSCULAR; INTRAVENOUS; SUBCUTANEOUS at 21:14

## 2021-02-07 RX ADMIN — PERFLUTREN 3 ML: 6.52 INJECTION, SUSPENSION INTRAVENOUS at 20:30

## 2021-02-07 RX ADMIN — ACETAMINOPHEN 1000 MG: 10 INJECTION, SOLUTION INTRAVENOUS at 04:09

## 2021-02-07 RX ADMIN — TAMSULOSIN HYDROCHLORIDE 0.8 MG: 0.4 CAPSULE ORAL at 09:44

## 2021-02-07 RX ADMIN — FUROSEMIDE 40 MG: 10 INJECTION, SOLUTION INTRAMUSCULAR; INTRAVENOUS at 11:59

## 2021-02-07 NOTE — OP NOTE
02/07/21    Surgeon: Xavi Napoles MD    Surgical  Assistant: Harvinder Giles PA     Preoperative diagnosis: Colonic mass [K63.89]    Post-Op Diagnosis Codes:     * Colonic mass [K63.89]    Procedure: Robotic assisted extensive lysis of adhesions for 90 minutes and extended right hemicolectomy  Estimated Blood Loss: 50 mL    Specimens:   Specimens     ID Source Type Tests Collected By Collected At [facility]?      A Large Intestine, Right / Ascending Colon Tissue · TISSUE PATHOLOGY EXAM   Xavi Napoles MD 2/3/21 1956 No     Description: EXTENDED RIGHT COLON         Order Name Source Comment Collection Info Order Time   COVID PRE-OP / PRE-PROCEDURE SCREENING ORDER (NO ISOLATION) Nasopharynx   2/2/2021  6:32 PM     Please select your location:   [facility]          COVID Screening Order:   EMERGENT/URGENT:  LUIS IN-HOUSE CEPHEID, NP SWAB IN TRANSPORT MEDIA, 8-12 HR TAT [[id number]]          Previously tested for COVID-19?   Yes          Employed in healthcare setting?   Unknown          Symptomatic for COVID-19 as defined by CDC?   Unknown          Hospitalized for COVID-19?   No          Admitted to ICU for COVID-19?   No          Resident in a congregate (group) care setting?   Unknown        PREPARE RBC Other   2/3/2021  4:56 PM     Indication for Transfusion   Intraop/Postop Blood Loss          When to Transfuse?   Hold        PREPARE RBC Other   2/3/2021 11:05 PM     Indication for Transfusion   Other - Comment Required          Other indication   mildly hypotensive, decreased UOP, known anemia          When to Transfuse?   Today        TISSUE PATHOLOGY EXAM Large Intestine, Right / Ascending Colon  Collected By: Xavi Napoles MD 2/3/2021  7:57 PM       Indication:  Osvaldo Lopez is a 85 y.o. male who comes in with large polyps in the cecum and ascending colon with a hepatic flexure colon cancer.  Patient understands risks, benefits,and alternatives wishes to proceed.      Procedure Details:  This  patient was brought to the operating room.  SCDs were placed.  Antibiotics were infused.  After general anesthesia was achieved, tap block was done by the anesthesiologist.  The patient was then prepped and draped in the sterile fashion.  Then, 0.25% Marcaine with epinephrine was used at the trocar sites.  The patient had previous open cholecystectomy.  We went in the left upper quadrant and made a stab incision and carried it down to the fascia with a Veress needle and then insufflated up to 15 mmHg.  I placed four trocars at an angle from left upper quadrant to suprapubic approximately 8-9 cm apart and they were 8 mm trocars and then traded the right upper quadrant one for a 12 and then docked the robot and placed all of the instruments in under direct visualization.  I started taking down the adhesions in the right upper quadrant of the omentum off the abdominal wall using sharp dissection and cautery.  I was able to get the omentum off and then put it above the liver.  I lifted the transverse colon up and there was tattoo in the proximal transverse colon.  I needed to get at least 5-7 cm from this known cancer.  I started taking the omentum off the transverse colon.  Using tedious dissection getting the adhesions off the gallbladder fossa, I used electrocautery and the vessel sealer and all this extra dissection took 90 minutes.  Once that was completed, at the white line of Toldt, at the appendix, I started taking the colon off of its attachments and started making the midline structure.  I found the duodenum and started taking the mesentery using the vessel sealer.  It was along the ileocolic on the right.  I tried to preserve the middle and was able to mobilize the base of the cecum and the terminal ileum to make along midline structures.  Once I had given the ICG, I used a 60 blue load to go across the terminal ileum, about 7 cm from the base of the cecum and then used two loads to go across the transverse  colon, about 7-10 cm from the tattoo, which put this in the distal descending colon, making this an extended right hemicolectomy.  I then put the specimen to the side.  I had to mobilize the transverse colon a little bit more and the terminal ileum to bring them to come together in order to do a peristaltic side to side anastomosis.  I made an enterotomy in the transverse colon and in the ileum and then used a 60 blue load stapler to make the common channel and then closed the enterotomy in two layers with the StrataFix 2-0 PDS first layer as a Tinel, second layer as Lembert.  I gave the patient another dose of ICG.  The small intestine area was not as well perfused as the small intestine, but it was green, and after giving it an extra 2-3 minutes to let the ICG come through, it appeared well perfused.  I put 10 of Tisseel on the staple lines, undocked the robot, and made a larger incision in the left upper quadrant and carried it down to the fascia, put an Hayden wound retractor to protect the abdominal wall.  I then brought the specimen out.  I then closed the fascia in two layers with #1 PDS.  I did the posterior fascia and anterior fascia.  I then irrigated up the soft tissue to close the skin and all the trocars with 4-0 Monocryl.  I used glue as dressing.  All instruments, lap counts, needle counts were correct.  The patient was stable throughout the entire case.  The patient was taken to recovery.

## 2021-02-07 NOTE — PLAN OF CARE
Goal Outcome Evaluation:  VSS. Removed reyes. Changed IV tylenol to PO. Worked with PT. Will continue to monitor.

## 2021-02-07 NOTE — THERAPY TREATMENT NOTE
Patient Name: Osvaldo Lopez  : 1935    MRN: [id number]                              Today's Date: 2021       Admit Date: 2021    Visit Dx:     ICD-10-CM ICD-9-CM   1. Symptomatic anemia  D64.9 285.9   2. Other fatigue  R53.83 780.79   3. Elevated troponin  R77.8 790.6   4. Colonic mass  K63.89 569.89     Patient Active Problem List   Diagnosis   • Complete rotator cuff tear of left shoulder   • Abnormal finding on thyroid function test   • Abdominal aortic aneurysm (CMS/HCC)   • Benign prostatic hyperplasia   • Essential hypertension   • Hyperlipidemia   • Shoulder pain   • Lumbar radiculopathy   • DM (diabetes mellitus), type 2 with complications (CMS/HCC)   • OA (osteoarthritis) of knee   • Tear of right rotator cuff   • Spinal stenosis of lumbar region with neurogenic claudication   • Eyebrow ptosis   • Pseudophakia   • Nonrheumatic aortic valve stenosis   • Atrial flutter with rapid ventricular response (CMS/HCC)   • Right arm pain   • Leg weakness, bilateral   • Typical atrial flutter (CMS/HCC)   • Diabetic peripheral neuropathy (CMS/HCC)   • Muscle weakness (generalized)   • Pressure injury of left buttock, stage 1   • Chronic low back pain with sciatica   • 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CMS/HCC)   • Pressure injury of buttock, stage 1   • Anemia   • Symptomatic anemia   • Iron deficiency anemia   • Chronic anticoagulation   • CKD (chronic kidney disease)   • CRISTOBAL (acute kidney injury) (CMS/HCC)   • Colonic mass     Past Medical History:   Diagnosis Date   • Abnormal thyroid blood test    • Aneurysm of abdominal aorta (CMS/HCC)    • Arthritis    • Bleeding disorder (CMS/HCC)    • BPH (benign prostatic hyperplasia)    • Bruises easily    • Bulging of thoracic intervertebral disc    • Chronic edema    • Coronary artery disease    • Diverticular disease    • Enlarged prostate without lower urinary tract symptoms (luts)    • Essential hypertension    • Heart attack (CMS/HCC)    • HL (hearing  loss)    • Hyperactivity of bladder    • Hyperlipidemia    • Left shoulder pain    • Lumbar radiculopathy 2016    wears back brace at times following back surgery   • Muscle weakness (generalized)    • Nonrheumatic aortic valve stenosis     mild 1/2018    • Osteoarthritis    • PAF (paroxysmal atrial fibrillation) (CMS/HCC)    • Polyuria    • Recurrent falls    • Screening      stop bang scale 5   • Syncope    • Torn rotator cuff     left   • Type 2 diabetes mellitus (CMS/HCC)    • Urinary frequency      Past Surgical History:   Procedure Laterality Date   • CARDIAC ELECTROPHYSIOLOGY PROCEDURE N/A 6/6/2019    Procedure: Ablation atrial flutter;  Surgeon: Miller Talobt MD;  Location: [facility] CATH INVASIVE LOCATION;  Service: Cardiovascular   • CARDIAC SURGERY      CABGX5 (1989)   • CARDIOVERSION  04/15/2019    Dr. Miller Talbot   • CATARACT EXTRACTION Bilateral 1997   • COLON RESECTION N/A 2/3/2021    Procedure: LAPAROSCOPIC EXTENDED  RIGHT COLECTOMY WITH DAVINCI ROBOT;  Surgeon: Xavi Napoles MD;  Location: [facility] MAIN OR;  Service: DaVinci;  Laterality: N/A;   • COLONOSCOPY N/A 1/31/2021    Procedure: COLONOSCOPY TO CECUM  WITH ORISE INJECTION, BIOPSIES, COLD BIOPSY POLYPECTOMY, COLD AND HOT SNARE POLYPECTOMIES, TATTOO, AND CLIP X3;  Surgeon: Jey Barrios MD;  Location: [facility] ENDOSCOPY;  Service: Gastroenterology;  Laterality: N/A;  PRE- IRON DEFICIENCY ANEMIA  POST- COLON MASS, COLON POLYPS, DIVERTICULOSIS, HEMORRHOIDS   • CORONARY ARTERY BYPASS GRAFT     • CYST REMOVAL      FROM BACK   • ENDOSCOPY N/A 1/31/2021    Procedure: ESOPHAGOGASTRODUODENOSCOPY WITH BIOPSIES;  Surgeon: Jey Barrios MD;  Location: [facility] ENDOSCOPY;  Service: Gastroenterology;  Laterality: N/A;  PRE- IRON DEFICIENCY ANEMIA  POST- NORMAL   • GALLBLADDER SURGERY  1989   • HERNIA REPAIR Left     inquinal times 3  last one in 2003   • KNEE CARTILAGE SURGERY Left 1970's   • LUMBAR DISCECTOMY FUSION INSTRUMENTATION N/A 4/11/2016     Procedure: lumbar laminectomy L4-5 and fusion with instrumentation;  Surgeon: Ran Kline MD;  Location: [facility] OR;  Service:    • LUMBAR DISCECTOMY FUSION INSTRUMENTATION N/A 1/15/2018    Procedure: L2-3, L3-4 laminectomy and fusion with instrumentation and removal of implants L4 5.;  Surgeon: Ran Kline MD;  Location: [facility] MAIN OR;  Service:    • MO TOTAL KNEE ARTHROPLASTY Left 11/14/2016    Procedure: TOTAL KNEE ARTHROPLASTY;  Surgeon: Dontrell Arellano MD;  Location: [facility] OR;  Service: Orthopedics     General Information     Row Name 02/07/21 1211          Physical Therapy Time and Intention    Document Type  therapy note (daily note)  -AP     Mode of Treatment  physical therapy  -AP     Row Name 02/07/21 1211          General Information    Patient Profile Reviewed  yes  -AP     Existing Precautions/Restrictions  fall  -AP     Row Name 02/07/21 1211          Cognition    Orientation Status (Cognition)  oriented x 4  -AP     Row Name 02/07/21 1211          Safety Issues, Functional Mobility    Impairments Affecting Function (Mobility)  balance;strength;endurance/activity tolerance;pain;cognition  -AP       User Key  (r) = Recorded By, (t) = Taken By, (c) = Cosigned By    Initials Name Provider Type    AP Karli Montgomery PT Physical Therapist        Mobility     Row Name 02/07/21 1212          Bed Mobility    Bed Mobility  supine-sit;rolling left;rolling right  -AP     Rolling Left [place] (Bed Mobility)  minimum assist (75% patient effort);1 person assist  -AP     Rolling Right [place] (Bed Mobility)  minimum assist (75% patient effort);1 person assist  -AP     Supine-Sit [place] (Bed Mobility)  minimum assist (75% patient effort);2 person assist  -AP     Assistive Device (Bed Mobility)  head of bed elevated;bed rails  -AP     Comment (Bed Mobility)  able to contribute much more effort today, reaching and pulling with BUE to complete rolling and other bed mob skills  -AP      Row Name 02/07/21 1212          Transfers    Comment (Transfers)  6 x STS trials successful on total of 3, having more difficulty with standing at beginning of todays treatment but improving greatly after more trials and vc/demo for shifting weight forward and raising hand rails for pt to use to push up from. Bed height slightly elevated to accomodate for pts height  -AP     Row Name 02/07/21 1212          Bed-Chair Transfer    Bed-Chair [place] (Transfers)  unable to assess  -AP     Row Name 02/07/21 1212          Sit-Stand Transfer    Sit-Stand [place] (Transfers)  moderate assist (50% patient effort);2 person assist  -AP     Assistive Device (Sit-Stand Transfers)  walker, front-wheeled  -AP     Row Name 02/07/21 1212          Gait/Stairs (Locomotion)    [place] Level (Gait)  minimum assist (75% patient effort);2 person assist  -AP     Assistive Device (Gait)  walker, 4-wheeled  -AP     Distance in Feet (Gait)  4 steps at EOB to reposition, needing assist moving walker and tactile cueing for weight shift.  -AP     Bilateral Gait Deviations  forward flexed posture;weight shift ability decreased  -AP     Comment (Gait/Stairs)  Improved tolerance to activity including gait this visit, still having difficulty advancing limbs in any direction. Mod A x 2 to mobilize 4 steps at bedside.  -AP       User Key  (r) = Recorded By, (t) = Taken By, (c) = Cosigned By    Initials Name Provider Type    AP Karli Montgomery PT Physical Therapist        Obj/Interventions     Row Name 02/07/21 1221          Motor Skills    Therapeutic Exercise  other (see comments) seated LAQ, AP, weight shifting, seated marchNEVILLE gomez in bed 5-15 each  -AP     Row Name 02/07/21 1221          Balance    Balance Assessment  sitting static balance;sitting dynamic balance;standing static balance;standing dynamic balance  -AP     Static Sitting Balance  mild impairment;sitting, edge of bed  -AP     Dynamic Sitting Balance  mild  impairment;sitting, edge of bed  -AP     Static Standing Balance  mild impairment;supported;standing  -AP     Dynamic Standing Balance  moderate impairment;supported;standing  -AP     Comment, Balance  slight improvement in standing balance this visit, able to maintain center of gravity and was appropriate for attempting gait today. Becomes fatigued very quickly however  -AP       User Key  (r) = Recorded By, (t) = Taken By, (c) = Cosigned By    Initials Name Provider Type    AP Karli Montgomery, PT Physical Therapist        Goals/Plan    No documentation.       Clinical Impression     Row Name 02/07/21 1223          Pain    Additional Documentation  Pain Scale: Numbers Pre/Post-Treatment (Group)  -AP     Row Name 02/07/21 1223          Pain Scale: Numbers Pre/Post-Treatment    Pretreatment Pain Rating  0/10 - no pain  -AP     Posttreatment Pain Rating  0/10 - no pain  -AP     Pain Intervention(s)  Ambulation/increased activity;Repositioned  -AP     Row Name 02/07/21 1223          Plan of Care Review    Plan of Care Reviewed With  patient  -AP     Progress  improving  -AP     Outcome Summary  Pt demonstrated slight improvement in static standing balance and activity tolerance this visit, as well as independence with functional transfers- completing 3 successful STS transfers with mod A x 2 and mobilizing 4 side steps at bedside with min A x 2 and use of rolling walker. He tolerated longer bout of therapy today and was more engaged in each activity, completing BLE therex at bedside with slightly improved balance and more endurance from BLE. He will continue to benefit from skilled PT to further address remaining deficits in balance, mobiltiy, BLE strength, and safety with ambulation. Anticipate d/c to SNF.  -AP     Row Name 02/07/21 1223          Positioning and Restraints    Pre-Treatment Position  in bed  -AP     Post Treatment Position  bed  -AP     In Bed  notified nsg;call light within reach;encouraged to call for  assist;exit alarm on;fowevita semi steve  -AP       User Key  (r) = Recorded By, (t) = Taken By, (c) = Cosigned By    Initials Name Provider Type    Karli Hays PT Physical Therapist        Outcome Measures     Row Name 02/07/21 1226          How much help from another person do you currently need...    Turning from your back to your side while in flat bed without using bedrails?  3  -AP     Moving from lying on back to sitting on the side of a flat bed without bedrails?  2  -AP     Moving to and from a bed to a chair (including a wheelchair)?  2  -AP     Standing up from a chair using your arms (e.g., wheelchair, bedside chair)?  2  -AP     Climbing 3-5 steps with a railing?  1  -AP     To walk in hospital room?  2  -AP     AM-PAC 6 Clicks Score (PT)  12  -AP       User Key  (r) = Recorded By, (t) = Taken By, (c) = Cosigned By    Initials Name Provider Type    Karli Hays PT Physical Therapist        Physical Therapy Education                 Title: PT OT SLP Therapies (Done)     Topic: Physical Therapy (Done)     Point: Mobility training (Done)     Learning Progress Summary           Patient Acceptance, E, VU by JAMAL at 2/7/2021 1226    Acceptance, E, VU,NR by JAMAL at 2/6/2021 1048    Acceptance, E, VU,NR by  at 2/5/2021 1037    Acceptance, E,TB, VU,DU by LB at 1/31/2021 1338    Acceptance, E,TB,D, VU,DU by LB at 1/30/2021 0955                   Point: Home exercise program (Done)     Learning Progress Summary           Patient Acceptance, E, VU by JAMAL at 2/7/2021 1226    Acceptance, E, VU,NR by AP at 2/6/2021 1048    Acceptance, E, VU,NR by HOLLAND at 2/5/2021 1037    Acceptance, E,TB, VU,DU by PIYUSH at 1/31/2021 1338    Acceptance, E,TB,D, VU,DU by LB at 1/30/2021 0955                   Point: Body mechanics (Done)     Learning Progress Summary           Patient Acceptance, E, VU by AP at 2/7/2021 1226    Acceptance, E, VU,NR by AP at 2/6/2021 1048    Acceptance, E, VU,NR by HOLLAND at 2/5/2021 1037    Acceptance,  E,TB, VU,DU by LB at 1/31/2021 1338    Acceptance, E,TB,D, VU,DU by LB at 1/30/2021 0955                   Point: Precautions (Done)     Learning Progress Summary           Patient Acceptance, E, VU by AP at 2/7/2021 1226    Acceptance, E, VU,NR by AP at 2/6/2021 1048    Acceptance, E, VU,NR by KH at 2/5/2021 1037    Acceptance, E,TB, VU,DU by LB at 1/31/2021 1338    Acceptance, E,TB,D, VU,DU by LB at 1/30/2021 0955                               User Key     Initials Effective Dates Name Provider Type Discipline     02/05/19 -  Kaya Orosco, PT Physical Therapist PT    LB 08/09/20 -  Aditi Chaudhary, PT Physical Therapist PT    AP 09/24/20 -  Karli Montgomery PT Physical Therapist PT              PT Recommendation and Plan     Plan of Care Reviewed With: patient  Progress: improving  Outcome Summary: Pt demonstrated slight improvement in static standing balance and activity tolerance this visit, as well as independence with functional transfers- completing 3 successful STS transfers with mod A x 2 and mobilizing 4 side steps at bedside with min A x 2 and use of rolling walker. He tolerated longer bout of therapy today and was more engaged in each activity, completing BLE therex at bedside with slightly improved balance and more endurance from BLE. He will continue to benefit from skilled PT to further address remaining deficits in balance, mobiltiy, BLE strength, and safety with ambulation. Anticipate d/c to SNF.     Time Calculation:   PT Charges     Row Name 02/07/21 1227             Time Calculation    Start Time  0835  -AP      Stop Time  0900  -AP      Time Calculation (min)  25 min  -AP      PT Received On  02/07/21  -AP      PT - Next Appointment  02/08/21  -AP        User Key  (r) = Recorded By, (t) = Taken By, (c) = Cosigned By    Initials Name Provider Type    AP Karli Montgomery PT Physical Therapist        Therapy Charges for Today     Code Description Service Date Service Provider Modifiers Qty     30035851037 HC PT THERAPEUTIC ACT EA 15 MIN 2/6/2021 Montgomery, Karli, PT GP 1    10077058076 HC PT THER SUPP EA 15 MIN 2/6/2021 Montgomery, Karli, PT GP 1    18370761148 HC PT THER PROC EA 15 MIN 2/7/2021 Montgomery, Karli, PT GP 1    94227453238 HC PT THERAPEUTIC ACT EA 15 MIN 2/7/2021 Montgomery, Karli, PT GP 1    71689252928 HC PT THER SUPP EA 15 MIN 2/7/2021 Montgomery, Karli, PT GP 1          PT G-Codes  Outcome Measure Options: AM-PAC 6 Clicks Basic Mobility (PT)  AM-PAC 6 Clicks Score (PT): 12    Karli Montgomery, PT  2/7/2021

## 2021-02-07 NOTE — PROGRESS NOTES
Progress Note    Pod 4      S: positive flatus,  positive BM.Stood with PT. much more clear and thought.  Having some sundowning episodes    O:  Temp:  [96.3 °F (35.7 °C)-98.7 °F (37.1 °C)] 98.7 °F (37.1 °C)  Heart Rate:  [83-91] 83  Resp:  [18-24] 20  BP: (136-163)/(62-82) 147/62      Intake/Output Summary (Last 24 hours) at 2/7/2021 1034  Last data filed at 2/7/2021 1006  Gross per 24 hour   Intake 600 ml   Output 1545 ml   Net -945 ml       Abd:   soft, sl distended  Incision: clean, small amount of hematoma drainage from wound        A/P: 85 y.o. male with s/p robotic right colectomy  Change to p.o. Tylenol  CARLOS Chavis  Looking towards discharge to SNF in a few days.

## 2021-02-07 NOTE — PROGRESS NOTES
Name: Osvaldo Lopez ADMIT: 2021   : 1935  PCP: Caio Rodríguez Jr., MD    MRN: [id number] LOS: 7 days   AGE/SEX: 85 y.o. male  ROOM: [facility]     Subjective   Subjective      Difficulty less confused today.  Out of restraints.  Answering questions appropriately and is oriented.  Patient's wife is at bedside.  Case discussed with Dr. Napoles.       Objective   Objective   Vital Signs  Temp:  [96.3 °F (35.7 °C)-98.7 °F (37.1 °C)] 98.7 °F (37.1 °C)  Heart Rate:  [83-88] 83  Resp:  [18-24] 20  BP: (144-163)/(62-82) 147/62  SpO2:  [95 %-98 %] 98 %  on  Flow (L/min):  [3-5] 3;   Device (Oxygen Therapy): nasal cannula  Body mass index is 30.65 kg/m².  Physical Exam  Constitutional:       Appearance: Normal appearance.   HENT:      Head: Normocephalic and atraumatic.   Neck:      Comments: JVD about 60% up the neck  Cardiovascular:      Rate and Rhythm: Normal rate and regular rhythm.      Heart sounds: Normal heart sounds.   Pulmonary:      Effort: Pulmonary effort is normal. No respiratory distress.      Breath sounds: Rales present.      Comments: Diminished lung sounds bilaterally  Abdominal:      General: Bowel sounds are normal. There is distension.      Tenderness: There is abdominal tenderness. There is no guarding.      Comments: Laparoscopic incision sites noted to be well-healing.  Hypoactive bowel sounds   Musculoskeletal:         General: No swelling or tenderness.   Skin:     General: Skin is dry.      Coloration: Skin is pale.   Neurological:      Mental Status: He is alert and oriented to person, place, and time.   Psychiatric:         Mood and Affect: Mood normal.         Behavior: Behavior normal.         Results Review     I reviewed the patient's new clinical results.  Results from last 7 days   Lab Units 21  0659 21  0356 21  0512 21  0502   WBC 10*3/mm3 6.85 5.41 3.68 3.33*   HEMOGLOBIN g/dL 8.8* 9.0* 9.5* 7.6*   PLATELETS 10*3/mm3 121* 107* 119* 122*     Results  from last 7 days   Lab Units 02/06/21  0659 02/05/21  0356 02/04/21  1624 02/04/21  0512   SODIUM mmol/L 143 141 141 141   POTASSIUM mmol/L 3.7 3.9 4.2 4.0   CHLORIDE mmol/L 112* 108* 106 107   CO2 mmol/L 21.5* 22.7 24.3 23.1   BUN mg/dL 25* 19 16 12   CREATININE mg/dL 1.30* 1.40* 1.36* 1.06   GLUCOSE mg/dL 83 111* 133* 204*   Estimated Creatinine Clearance: 53.5 mL/min (A) (by C-G formula based on SCr of 1.3 mg/dL (H)).  Results from last 7 days   Lab Units 02/05/21  0356 02/01/21  0452   ALBUMIN g/dL 3.30* 3.90   BILIRUBIN mg/dL 0.5 0.4   ALK PHOS U/L 70 74   AST (SGOT) U/L 18 20   ALT (SGPT) U/L 10 17     Results from last 7 days   Lab Units 02/06/21  0659 02/05/21  0356 02/04/21  1624 02/04/21  0512 02/03/21  0502  02/01/21  0452   CALCIUM mg/dL 7.9* 8.0* 8.3* 7.7* 8.5*   < > 8.5*   ALBUMIN g/dL  --  3.30*  --   --   --   --  3.90   MAGNESIUM mg/dL  --   --   --  2.2 2.2  --   --    PHOSPHORUS mg/dL  --   --   --   --  3.6  --   --     < > = values in this interval not displayed.       COVID19   Date Value Ref Range Status   02/02/2021 Not Detected Not Detected - Ref. Range Final   01/29/2021 Not Detected Not Detected - Ref. Range Final     Glucose   Date/Time Value Ref Range Status   02/07/2021 0609 110 70 - 130 mg/dL Final   02/06/2021 2014 121 70 - 130 mg/dL Final   02/06/2021 1652 139 (H) 70 - 130 mg/dL Final   02/06/2021 1157 109 70 - 130 mg/dL Final   02/06/2021 0553 105 70 - 130 mg/dL Final   02/05/2021 2050 97 70 - 130 mg/dL Final   02/05/2021 1541 105 70 - 130 mg/dL Final       XR Chest 1 View  Narrative: XR CHEST 1 VW-  2/6/2021     HISTORY: Hypoxia. Evaluate for pulmonary edema.     Heart size is mildly enlarged. Lungs are underinflated with no focal  infiltrates. Moderate amount of air is seen in the stomach. Sternotomy  wires are seen.     No pneumothorax is seen.     There is no evidence of pulmonary edema.     Impression: Mild cardiomegaly.  2. Underinflation of the lungs.  3. No evidence of acute  infiltrate or pulmonary edema  4. Moderate gaseous distention of the stomach.     This report was finalized on 2/6/2021 4:03 PM by Dr. Kingsley Galindo M.D.       Scheduled Medications  acetaminophen, 1,000 mg, Intravenous, Q6H  alvimopan, 12 mg, Oral, BID  amiodarone, 200 mg, Oral, Daily  famotidine, 20 mg, Intravenous, Q12H  furosemide, 40 mg, Intravenous, Once  insulin lispro, 0-9 Units, Subcutaneous, TID AC  metoprolol succinate XL, 25 mg, Oral, Q24H  tamsulosin, 0.8 mg, Oral, Daily    Infusions   Diet  Diet Regular       Assessment/Plan     Active Hospital Problems    Diagnosis  POA   • **Symptomatic anemia [D64.9]  Yes   • Iron deficiency anemia [D50.9]  Unknown   • Chronic anticoagulation [Z79.01]  Not Applicable   • CKD (chronic kidney disease) [N18.9]  Unknown   • CRISTOBAL (acute kidney injury) (CMS/HCC) [N17.9]  Unknown   • Colonic mass [K63.89]  Unknown   • 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       85 y.o. male admitted with Symptomatic anemia.    Adenocarcinoma of the colon  Status post laparoscopic hemicolectomy on February 3, was in the ICU postoperatively for difficulty weaning off the ventilator. Advancing diet as tolerated. Appreciate surgery assistance.    Acute renal failure  Labs are pending this morning, but I think this is probably cardiorenal since he has the elevated JVD, crackles, elevated BNP.  However, no edema was seen on chest x-ray.  We will plan to continue spot diuresing for his hypoxia, and I expect this to improve.    Acute diastolic heart failure with hypoxic respiratory failure  As above, has some JVD and crackles as well as elevated BNP.  Chest x-ray did not show pulmonary edema, but I think he is probably got clinical heart failure.   We will go ahead and repeat an echo.  Last echo was in October and was normal.  Spot diuretics with IV Lasix.  Plan to remove Chavis catheter 6 hours after next dose of Lasix.    Postoperative hypotension/essential hypertension  This appears resolved. Amlodipine, hydralazine and lisinopril are being held as patient's BP controlled without.     Paroxysmal atrial fibrillation  Currently on metoprolol XL as well as amiodarone. Currently not anticoagulated in the postoperative setting.  Was previously on Xarelto.  Will resume once okay by surgery.    BPH  Tamsulosin    DM  Metformin held      · SCDs for DVT prophylaxis.  · Full code.  · Discussed with patient.  · Anticipate discharge anticipate d/c to snf when cleared by consultants and off oxygen      Clark Silva MD  [facility]  02/07/21  11:09 EST    Patient was wearing facemask when I entered the room and throughout our encounter.  I wore protective equipment throughout this patient encounter including a face mask, gloves and protective eyewear.  Hand hygiene was performed before donning protective equipment and after removal when leaving the room.

## 2021-02-07 NOTE — PLAN OF CARE
Goal Outcome Evaluation:  Plan of Care Reviewed With: patient  Progress: improving  Outcome Summary: Pt demonstrated slight improvement in static standing balance and activity tolerance this visit, as well as independence with functional transfers- completing 3 successful STS transfers with mod A x 2 and mobilizing 4 side steps at bedside with min A x 2 and use of rolling walker. He tolerated longer bout of therapy today and was more engaged in each activity, completing BLE therex at bedside with slightly improved balance and more endurance from BLE. He will continue to benefit from skilled PT to further address remaining deficits in balance, mobiltiy, BLE strength, and safety with ambulation. Anticipate d/c to SNF.  Patient was intermittently wearing a face mask during this therapy encounter. Therapist used appropriate personal protective equipment including eye protection, mask, and gloves.  Mask used was standard procedure mask. Appropriate PPE was worn during the entire therapy session. Hand hygiene was completed before and after therapy session. Patient is not in enhanced droplet precautions.

## 2021-02-08 LAB
ANION GAP SERPL CALCULATED.3IONS-SCNC: 9.9 MMOL/L (ref 5–15)
BUN SERPL-MCNC: 30 MG/DL (ref 8–23)
BUN/CREAT SERPL: 25.9 (ref 7–25)
CALCIUM SPEC-SCNC: 8.5 MG/DL (ref 8.6–10.5)
CHLORIDE SERPL-SCNC: 106 MMOL/L (ref 98–107)
CO2 SERPL-SCNC: 26.1 MMOL/L (ref 22–29)
CREAT SERPL-MCNC: 1.16 MG/DL (ref 0.76–1.27)
DEPRECATED RDW RBC AUTO: 53.2 FL (ref 37–54)
ERYTHROCYTE [DISTWIDTH] IN BLOOD BY AUTOMATED COUNT: 19 % (ref 12.3–15.4)
GFR SERPL CREATININE-BSD FRML MDRD: 60 ML/MIN/1.73
GLUCOSE BLDC GLUCOMTR-MCNC: 151 MG/DL (ref 70–130)
GLUCOSE BLDC GLUCOMTR-MCNC: 155 MG/DL (ref 70–130)
GLUCOSE BLDC GLUCOMTR-MCNC: 161 MG/DL (ref 70–130)
GLUCOSE BLDC GLUCOMTR-MCNC: 162 MG/DL (ref 70–130)
GLUCOSE BLDC GLUCOMTR-MCNC: 168 MG/DL (ref 70–130)
GLUCOSE SERPL-MCNC: 157 MG/DL (ref 65–99)
HCT VFR BLD AUTO: 31.7 % (ref 37.5–51)
HGB BLD-MCNC: 9.7 G/DL (ref 13–17.7)
MCH RBC QN AUTO: 24.1 PG (ref 26.6–33)
MCHC RBC AUTO-ENTMCNC: 30.6 G/DL (ref 31.5–35.7)
MCV RBC AUTO: 78.9 FL (ref 79–97)
PLATELET # BLD AUTO: 140 10*3/MM3 (ref 140–450)
PMV BLD AUTO: 9.3 FL (ref 6–12)
POTASSIUM SERPL-SCNC: 3.7 MMOL/L (ref 3.5–5.2)
QT INTERVAL: 431 MS
RBC # BLD AUTO: 4.02 10*6/MM3 (ref 4.14–5.8)
SODIUM SERPL-SCNC: 142 MMOL/L (ref 136–145)
WBC # BLD AUTO: 6.43 10*3/MM3 (ref 3.4–10.8)

## 2021-02-08 PROCEDURE — 94799 UNLISTED PULMONARY SVC/PX: CPT

## 2021-02-08 PROCEDURE — 80048 BASIC METABOLIC PNL TOTAL CA: CPT | Performed by: STUDENT IN AN ORGANIZED HEALTH CARE EDUCATION/TRAINING PROGRAM

## 2021-02-08 PROCEDURE — 82962 GLUCOSE BLOOD TEST: CPT

## 2021-02-08 PROCEDURE — 63710000001 INSULIN LISPRO (HUMAN) PER 5 UNITS: Performed by: COLON & RECTAL SURGERY

## 2021-02-08 PROCEDURE — 85027 COMPLETE CBC AUTOMATED: CPT | Performed by: STUDENT IN AN ORGANIZED HEALTH CARE EDUCATION/TRAINING PROGRAM

## 2021-02-08 PROCEDURE — 25010000002 FUROSEMIDE PER 20 MG: Performed by: STUDENT IN AN ORGANIZED HEALTH CARE EDUCATION/TRAINING PROGRAM

## 2021-02-08 PROCEDURE — 97530 THERAPEUTIC ACTIVITIES: CPT

## 2021-02-08 PROCEDURE — 25010000002 HYDROMORPHONE PER 4 MG: Performed by: COLON & RECTAL SURGERY

## 2021-02-08 RX ORDER — FUROSEMIDE 10 MG/ML
40 INJECTION INTRAMUSCULAR; INTRAVENOUS ONCE
Status: COMPLETED | OUTPATIENT
Start: 2021-02-08 | End: 2021-02-08

## 2021-02-08 RX ADMIN — ACETAMINOPHEN 650 MG: 325 TABLET, FILM COATED ORAL at 12:42

## 2021-02-08 RX ADMIN — INSULIN LISPRO 2 UNITS: 100 INJECTION, SOLUTION INTRAVENOUS; SUBCUTANEOUS at 17:43

## 2021-02-08 RX ADMIN — ACETAMINOPHEN 650 MG: 325 TABLET, FILM COATED ORAL at 22:32

## 2021-02-08 RX ADMIN — METOPROLOL SUCCINATE 25 MG: 25 TABLET, EXTENDED RELEASE ORAL at 08:48

## 2021-02-08 RX ADMIN — FAMOTIDINE 20 MG: 10 INJECTION INTRAVENOUS at 08:47

## 2021-02-08 RX ADMIN — INSULIN LISPRO 2 UNITS: 100 INJECTION, SOLUTION INTRAVENOUS; SUBCUTANEOUS at 08:48

## 2021-02-08 RX ADMIN — TAMSULOSIN HYDROCHLORIDE 0.8 MG: 0.4 CAPSULE ORAL at 08:48

## 2021-02-08 RX ADMIN — ACETAMINOPHEN 650 MG: 325 TABLET, FILM COATED ORAL at 01:29

## 2021-02-08 RX ADMIN — AMIODARONE HYDROCHLORIDE 200 MG: 200 TABLET ORAL at 08:48

## 2021-02-08 RX ADMIN — HYDROMORPHONE HYDROCHLORIDE 0.25 MG: 1 INJECTION, SOLUTION INTRAMUSCULAR; INTRAVENOUS; SUBCUTANEOUS at 17:43

## 2021-02-08 RX ADMIN — INSULIN LISPRO 2 UNITS: 100 INJECTION, SOLUTION INTRAVENOUS; SUBCUTANEOUS at 12:42

## 2021-02-08 RX ADMIN — FAMOTIDINE 20 MG: 10 INJECTION INTRAVENOUS at 22:32

## 2021-02-08 RX ADMIN — ACETAMINOPHEN 650 MG: 325 TABLET, FILM COATED ORAL at 05:45

## 2021-02-08 RX ADMIN — FUROSEMIDE 40 MG: 10 INJECTION, SOLUTION INTRAMUSCULAR; INTRAVENOUS at 12:42

## 2021-02-08 NOTE — PLAN OF CARE
Goal Outcome Evaluation:         Pt already uic today. Pt modmax asst sit to stand to rwx today. Once vertical pt able to bear weight BLE with flexed knees and flexed posture at around one minute. 4 periods on his feet today: static standing to rwx with weight shifting right left, MIP, 2 steps forward and back, 3-4 small steps bed to chair all with cga/minx2 once on his feet. STS from recliner today which is a lower surface than the bed. Pt performed seated ex prior to standing and was encouraged to perform qs while supine as he has hx of TKA. Anticipate need for rehab or snf rehab.

## 2021-02-08 NOTE — NURSING NOTE
Bladder scan to check PVR= 118mL urine, pt asymptomatic, voiding frequently in small increments, see I/Os.

## 2021-02-08 NOTE — PROGRESS NOTES
Name: Osvaldo Lopez ADMIT: 2021   : 1935  PCP: Caio Rodríguez Jr., MD    MRN: [id number] LOS: 8 days   AGE/SEX: 85 y.o. male  ROOM: [facility]     Subjective   Subjective      Awake and oriented. Pt is concerned that he isn't progressing. I reassured him he is. He's down to 1L O2 and it looks like he had 2L out yesterday with a single dose of lasix. Creatinine is improved too. He's worried about weakness and asks to get up to the chair       Objective   Objective   Vital Signs  Temp:  [98.2 °F (36.8 °C)-98.8 °F (37.1 °C)] 98.8 °F (37.1 °C)  Heart Rate:  [] 86  Resp:  [20] 20  BP: (127-160)/(72-87) 153/74  SpO2:  [95 %-99 %] 95 %  on  Flow (L/min):  [3] 3;   Device (Oxygen Therapy): nasal cannula  Body mass index is 30.79 kg/m².  Physical Exam  Constitutional:       Appearance: Normal appearance.   HENT:      Head: Normocephalic and atraumatic.   Neck:      Comments: JVD about 50% up the neck  Cardiovascular:      Rate and Rhythm: Normal rate and regular rhythm.      Heart sounds: Normal heart sounds.   Pulmonary:      Effort: Pulmonary effort is normal. No respiratory distress.      Breath sounds: Rales present.      Comments: Diminished lung sounds bilaterally  Abdominal:      General: Bowel sounds are normal. There is distension.      Tenderness: There is no guarding.      Comments: Laparoscopic incision sites noted to be well-healing.  Hypoactive bowel sounds   Musculoskeletal:         General: No swelling or tenderness.   Skin:     General: Skin is warm and dry.   Neurological:      Mental Status: He is alert and oriented to person, place, and time.   Psychiatric:         Mood and Affect: Mood normal.         Behavior: Behavior normal.         Results Review     I reviewed the patient's new clinical results.  Results from last 7 days   Lab Units 21  0547 21  1116 21  0659 21  0356   WBC 10*3/mm3 6.43 6.93 6.85 5.41   HEMOGLOBIN g/dL 9.7* 9.3* 8.8* 9.0*   PLATELETS  10*3/mm3 140 142 121* 107*     Results from last 7 days   Lab Units 02/08/21  0547 02/07/21  1116 02/06/21  0659 02/05/21  0356   SODIUM mmol/L 142 141 143 141   POTASSIUM mmol/L 3.7 4.0 3.7 3.9   CHLORIDE mmol/L 106 107 112* 108*   CO2 mmol/L 26.1 20.6* 21.5* 22.7   BUN mg/dL 30* 34* 25* 19   CREATININE mg/dL 1.16 1.33* 1.30* 1.40*   GLUCOSE mg/dL 157* 121* 83 111*   Estimated Creatinine Clearance: 59.3 mL/min (by C-G formula based on SCr of 1.16 mg/dL).  Results from last 7 days   Lab Units 02/05/21  0356   ALBUMIN g/dL 3.30*   BILIRUBIN mg/dL 0.5   ALK PHOS U/L 70   AST (SGOT) U/L 18   ALT (SGPT) U/L 10     Results from last 7 days   Lab Units 02/08/21  0547 02/07/21  1116 02/06/21  0659 02/05/21  0356  02/04/21  0512 02/03/21  0502   CALCIUM mg/dL 8.5* 8.4* 7.9* 8.0*   < > 7.7* 8.5*   ALBUMIN g/dL  --   --   --  3.30*  --   --   --    MAGNESIUM mg/dL  --   --   --   --   --  2.2 2.2   PHOSPHORUS mg/dL  --   --   --   --   --   --  3.6    < > = values in this interval not displayed.       COVID19   Date Value Ref Range Status   02/02/2021 Not Detected Not Detected - Ref. Range Final   01/29/2021 Not Detected Not Detected - Ref. Range Final     Glucose   Date/Time Value Ref Range Status   02/08/2021 0609 161 (H) 70 - 130 mg/dL Final   02/08/2021 0039 151 (H) 70 - 130 mg/dL Final   02/07/2021 2011 134 (H) 70 - 130 mg/dL Final   02/07/2021 1738 173 (H) 70 - 130 mg/dL Final   02/07/2021 1200 133 (H) 70 - 130 mg/dL Final   02/07/2021 0609 110 70 - 130 mg/dL Final   02/06/2021 2014 121 70 - 130 mg/dL Final       Adult Transthoracic Echo Complete W/ Cont if Necessary Per Protocol  · Left ventricular wall thickness is consistent with mild concentric   hypertrophy.  · Estimated left ventricular EF = 68% Left ventricular systolic function   is normal.  · Left ventricular diastolic function was normal.  · Estimated right ventricular systolic pressure from tricuspid   regurgitation is normal (<35 mmHg).  · Mild dilation of  the aortic root is present.       Scheduled Medications  acetaminophen, 650 mg, Oral, Q6H  amiodarone, 200 mg, Oral, Daily  famotidine, 20 mg, Intravenous, Q12H  furosemide, 40 mg, Intravenous, Once  insulin lispro, 0-9 Units, Subcutaneous, TID AC  metoprolol succinate XL, 25 mg, Oral, Q24H  tamsulosin, 0.8 mg, Oral, Daily    Infusions   Diet  Diet Regular       Assessment/Plan     Active Hospital Problems    Diagnosis  POA   • **Symptomatic anemia [D64.9]  Yes   • Iron deficiency anemia [D50.9]  Unknown   • Chronic anticoagulation [Z79.01]  Not Applicable   • CKD (chronic kidney disease) [N18.9]  Unknown   • CRISTOBAL (acute kidney injury) (CMS/HCC) [N17.9]  Unknown   • Colonic mass [K63.89]  Unknown   • 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       85 y.o. male admitted with Symptomatic anemia.    Adenocarcinoma of the colon  Status post laparoscopic hemicolectomy on February 3, was in the ICU postoperatively for difficulty weaning off the ventilator. Advancing diet as tolerated. Appreciate surgery assistance.    Acute renal failure  Improving with diuretics    Acute diastolic heart failure with hypoxic respiratory failure  Echo largely normal, but he's coming off oxygen and creatinine is improving with diuretics, so I will administer another dose of iv lasix today.    Postoperative hypotension/essential hypertension  This appears resolved. Amlodipine, hydralazine and lisinopril are being held as patient is adequately BP controlled without.     Paroxysmal atrial fibrillation  Currently on metoprolol XL as well as amiodarone. Currently not anticoagulated in the postoperative setting.  Was previously on Xarelto.  Will resume once okay by  surgery.    BPH  Tamsulosin    DM  Metformin held      · SCDs for DVT prophylaxis.  · Full code.  · Discussed with patient.  · Anticipate discharge anticipate d/c to snf when cleared by consultants and off oxygen      Clark Silva MD  [facility]  02/08/21  10:49 EST    Patient was wearing facemask when I entered the room and throughout our encounter.  I wore protective equipment throughout this patient encounter including a face mask, gloves and protective eyewear.  Hand hygiene was performed before donning protective equipment and after removal when leaving the room.

## 2021-02-08 NOTE — THERAPY TREATMENT NOTE
Patient Name: Osvaldo Lopez  : 1935    MRN: [id number]                              Today's Date: 2021       Admit Date: 2021    Visit Dx:     ICD-10-CM ICD-9-CM   1. Symptomatic anemia  D64.9 285.9   2. Other fatigue  R53.83 780.79   3. Elevated troponin  R77.8 790.6   4. Colonic mass  K63.89 569.89     Patient Active Problem List   Diagnosis   • Complete rotator cuff tear of left shoulder   • Abnormal finding on thyroid function test   • Abdominal aortic aneurysm (CMS/HCC)   • Benign prostatic hyperplasia   • Essential hypertension   • Hyperlipidemia   • Shoulder pain   • Lumbar radiculopathy   • DM (diabetes mellitus), type 2 with complications (CMS/HCC)   • OA (osteoarthritis) of knee   • Tear of right rotator cuff   • Spinal stenosis of lumbar region with neurogenic claudication   • Eyebrow ptosis   • Pseudophakia   • Nonrheumatic aortic valve stenosis   • Atrial flutter with rapid ventricular response (CMS/HCC)   • Right arm pain   • Leg weakness, bilateral   • Typical atrial flutter (CMS/HCC)   • Diabetic peripheral neuropathy (CMS/HCC)   • Muscle weakness (generalized)   • Pressure injury of left buttock, stage 1   • Chronic low back pain with sciatica   • 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CMS/HCC)   • Pressure injury of buttock, stage 1   • Anemia   • Symptomatic anemia   • Iron deficiency anemia   • Chronic anticoagulation   • CKD (chronic kidney disease)   • CRISTOBAL (acute kidney injury) (CMS/HCC)   • Colonic mass     Past Medical History:   Diagnosis Date   • Abnormal thyroid blood test    • Aneurysm of abdominal aorta (CMS/HCC)    • Arthritis    • Bleeding disorder (CMS/HCC)    • BPH (benign prostatic hyperplasia)    • Bruises easily    • Bulging of thoracic intervertebral disc    • Chronic edema    • Coronary artery disease    • Diverticular disease    • Enlarged prostate without lower urinary tract symptoms (luts)    • Essential hypertension    • Heart attack (CMS/HCC)    • HL (hearing  loss)    • Hyperactivity of bladder    • Hyperlipidemia    • Left shoulder pain    • Lumbar radiculopathy 2016    wears back brace at times following back surgery   • Muscle weakness (generalized)    • Nonrheumatic aortic valve stenosis     mild 1/2018    • Osteoarthritis    • PAF (paroxysmal atrial fibrillation) (CMS/HCC)    • Polyuria    • Recurrent falls    • Screening      stop bang scale 5   • Syncope    • Torn rotator cuff     left   • Type 2 diabetes mellitus (CMS/HCC)    • Urinary frequency      Past Surgical History:   Procedure Laterality Date   • CARDIAC ELECTROPHYSIOLOGY PROCEDURE N/A 6/6/2019    Procedure: Ablation atrial flutter;  Surgeon: Miller Talbot MD;  Location: [facility] CATH INVASIVE LOCATION;  Service: Cardiovascular   • CARDIAC SURGERY      CABGX5 (1989)   • CARDIOVERSION  04/15/2019    Dr. Miller Talbot   • CATARACT EXTRACTION Bilateral 1997   • COLON RESECTION N/A 2/3/2021    Procedure: LAPAROSCOPIC EXTENDED  RIGHT COLECTOMY WITH DAVINCI ROBOT;  Surgeon: Xavi Napoles MD;  Location: [facility] MAIN OR;  Service: DaVinci;  Laterality: N/A;   • COLONOSCOPY N/A 1/31/2021    Procedure: COLONOSCOPY TO CECUM  WITH ORISE INJECTION, BIOPSIES, COLD BIOPSY POLYPECTOMY, COLD AND HOT SNARE POLYPECTOMIES, TATTOO, AND CLIP X3;  Surgeon: Jey Barrios MD;  Location: [facility] ENDOSCOPY;  Service: Gastroenterology;  Laterality: N/A;  PRE- IRON DEFICIENCY ANEMIA  POST- COLON MASS, COLON POLYPS, DIVERTICULOSIS, HEMORRHOIDS   • CORONARY ARTERY BYPASS GRAFT     • CYST REMOVAL      FROM BACK   • ENDOSCOPY N/A 1/31/2021    Procedure: ESOPHAGOGASTRODUODENOSCOPY WITH BIOPSIES;  Surgeon: Jey Barrios MD;  Location: [facility] ENDOSCOPY;  Service: Gastroenterology;  Laterality: N/A;  PRE- IRON DEFICIENCY ANEMIA  POST- NORMAL   • GALLBLADDER SURGERY  1989   • HERNIA REPAIR Left     inquinal times 3  last one in 2003   • KNEE CARTILAGE SURGERY Left 1970's   • LUMBAR DISCECTOMY FUSION INSTRUMENTATION N/A 4/11/2016     Procedure: lumbar laminectomy L4-5 and fusion with instrumentation;  Surgeon: Ran Kline MD;  Location: [facility] OR;  Service:    • LUMBAR DISCECTOMY FUSION INSTRUMENTATION N/A 1/15/2018    Procedure: L2-3, L3-4 laminectomy and fusion with instrumentation and removal of implants L4 5.;  Surgeon: Ran Kline MD;  Location: [facility] OR;  Service:    • [place] TOTAL KNEE ARTHROPLASTY Left 11/14/2016    Procedure: TOTAL KNEE ARTHROPLASTY;  Surgeon: Dontrell Arellano MD;  Location: [facility] OR;  Service: Orthopedics     General Information     Row Name 02/08/21 1430          Physical Therapy Time and Intention    Document Type  therapy note (daily note)  -SV     Mode of Treatment  individual therapy;physical therapy  -SV     Row Name 02/08/21 1430          General Information    Patient Profile Reviewed  yes  -SV       User Key  (r) = Recorded By, (t) = Taken By, (c) = Cosigned By    Initials Name Provider Type    SV Sabina Middleton, PT Physical Therapist        Mobility     Row Name 02/08/21 1511          Bed Mobility    Bed Mobility  sit-supine  -SV     Sit-Supine [place] (Bed Mobility)  moderate assist (50% patient effort);maximum assist (25% patient effort);2 person assist  -SV     Row Name 02/08/21 1511          Sit-Stand Transfer    Sit-Stand [place] (Transfers)  moderate assist (50% patient effort);maximum assist (25% patient effort);2 person assist  -SV     Assistive Device (Sit-Stand Transfers)  walker, front-wheeled  -SV     Row Name 02/08/21 1511          Gait/Stairs (Locomotion)    [place] Level (Gait)  minimum assist (75% patient effort);moderate assist (50% patient effort);2 person assist  -SV     Assistive Device (Gait)  walker, front-wheeled  -SV     Distance in Feet (Gait)  2 steps forward and 2 steps back, seated rest, bed to chair 3' with flexed posture and shuffle steps  -SV     Comment (Gait/Stairs)  pt stood with rwx: static standing with flexed posture/knees, weight  shift left/right, MIP all for approx 1 minute each cga/min x2  -SV       User Key  (r) = Recorded By, (t) = Taken By, (c) = Cosigned By    Initials Name Provider Type    Sabina Reece, PT Physical Therapist        Obj/Interventions     Row Name 02/08/21 1513          Motor Skills    Therapeutic Exercise  -- laq , df, PF all 10 reps seated prior to amb  -SV     Row Name 02/08/21 1513          Balance    Comment, Balance  cga/min x2 standing with rwx flexed posture/knees  -SV       User Key  (r) = Recorded By, (t) = Taken By, (c) = Cosigned By    Initials Name Provider Type    Sabina Reece, PT Physical Therapist        Goals/Plan    No documentation.       Clinical Impression     [facility] Name 02/08/21 1514          Pain    Additional Documentation  Pain Scale: FACES Pre/Post-Treatment (Group)  -SV     Row Name 02/08/21 1514          Pain Scale: Numbers Pre/Post-Treatment    Pain Intervention(s)  Repositioned  -SV     Row Name 02/08/21 1514          Pain Scale: FACES Pre/Post-Treatment    Pain: FACES Scale, Pretreatment  2-->hurts little bit  -SV     Posttreatment Pain Rating  2-->hurts little bit  -SV     Row Name 02/08/21 1514          Vital Signs    Pre SpO2 (%)  99  -SV     O2 Delivery Pre Treatment  supplemental O2  -SV     Intra SpO2 (%)  95  -SV     O2 Delivery Intra Treatment  supplemental O2  -SV     Post SpO2 (%)  95  -SV     O2 Delivery Post Treatment  supplemental O2  -SV     Pre Patient Position  Sitting  -SV     Intra Patient Position  Standing  -SV     Post Patient Position  Supine  -SV     Row Name 02/08/21 1514          Positioning and Restraints    Pre-Treatment Position  sitting in chair/recliner  -SV     Post Treatment Position  bed  -SV     In Bed  fowlers;notified nsg;call light within reach;encouraged to call for assist;exit alarm on;with nsg  -SV       User Key  (r) = Recorded By, (t) = Taken By, (c) = Cosigned By    Initials Name Provider Type    Sabina Reece, PT Physical  Therapist        Outcome Measures     Row Name 02/08/21 1515          How much help from another person do you currently need...    Turning from your back to your side while in flat bed without using bedrails?  2  -SV     Moving from lying on back to sitting on the side of a flat bed without bedrails?  2  -SV     Moving to and from a bed to a chair (including a wheelchair)?  2  -SV     Standing up from a chair using your arms (e.g., wheelchair, bedside chair)?  2  -SV     Climbing 3-5 steps with a railing?  1  -SV     To walk in hospital room?  1  -SV     AM-PAC 6 Clicks Score (PT)  10  -SV     Row Name 02/08/21 1515          Functional Assessment    Outcome Measure Options  AM-PAC 6 Clicks Basic Mobility (PT)  -SV       User Key  (r) = Recorded By, (t) = Taken By, (c) = Cosigned By    Initials Name Provider Type    Sabina Reece, PT Physical Therapist        Physical Therapy Education                 Title: PT OT SLP Therapies (Done)     Topic: Physical Therapy (Done)     Point: Mobility training (Done)     Learning Progress Summary           Patient Acceptance, E,TB,D, VU,NR by SV at 2/8/2021 1516    Acceptance, E,TB,D, VU,NR by SV at 2/8/2021 1515    Acceptance, E, VU by AP at 2/7/2021 1226    Acceptance, E, VU,NR by AP at 2/6/2021 1048    Acceptance, E, VU,NR by  at 2/5/2021 1037    Acceptance, E,TB, VU,DU by LB at 1/31/2021 1338    Acceptance, E,TB,D, VU,DU by LB at 1/30/2021 0955                   Point: Home exercise program (Done)     Learning Progress Summary           Patient Acceptance, E,TB,D, VU,NR by SV at 2/8/2021 1516    Acceptance, E,TB,D, VU,NR by SV at 2/8/2021 1515    Acceptance, E, VU by AP at 2/7/2021 1226    Acceptance, E, VU,NR by AP at 2/6/2021 1048    Acceptance, E, VU,NR by KH at 2/5/2021 1037    Acceptance, E,TB, VU,DU by LB at 1/31/2021 1338    Acceptance, E,TB,D, VU,DU by LB at 1/30/2021 0955                   Point: Body mechanics (Done)     Learning Progress Summary            Patient Acceptance, E,TB,D, VU,NR by SV at 2/8/2021 1516    Acceptance, E,TB,D, VU,NR by SV at 2/8/2021 1515    Acceptance, E, VU by AP at 2/7/2021 1226    Acceptance, E, VU,NR by AP at 2/6/2021 1048    Acceptance, E, VU,NR by KH at 2/5/2021 1037    Acceptance, E,TB, VU,DU by LB at 1/31/2021 1338    Acceptance, E,TB,D, VU,DU by LB at 1/30/2021 0955                   Point: Precautions (Done)     Learning Progress Summary           Patient Acceptance, E,TB,D, VU,NR by SV at 2/8/2021 1516    Acceptance, E,TB,D, VU,NR by SV at 2/8/2021 1515    Acceptance, E, VU by AP at 2/7/2021 1226    Acceptance, E, VU,NR by AP at 2/6/2021 1048    Acceptance, E, VU,NR by KH at 2/5/2021 1037    Acceptance, E,TB, VU,DU by LB at 1/31/2021 1338    Acceptance, E,TB,D, VU,DU by LB at 1/30/2021 0955                               User Key     Initials Effective Dates Name Provider Type [facility] 02/05/19 -  Kaya Orosco, PT Physical Therapist PT    SV 04/03/18 -  Sabina Middleton, PT Physical Therapist PT    LB 08/09/20 -  Aditi Chaudhary, PT Physical Therapist PT    AP 09/24/20 -  Karli Montgomery, PT Physical Therapist PT              PT Recommendation and Plan           Time Calculation:   PT Charges     Row Name 02/08/21 1521             Time Calculation    Start Time  1430  -      Stop Time  1500  -SV      Time Calculation (min)  30 min  -      PT Received On  02/08/21  -      PT - Next Appointment  02/09/21  -        User Key  (r) = Recorded By, (t) = Taken By, (c) = Cosigned By    Initials Name Provider Type     Sabina Middleton, PT Physical Therapist        Therapy Charges for Today     Code Description Service Date Service Provider Modifiers Qty    24965466839 HC PT THERAPEUTIC ACT EA 15 MIN 2/8/2021 Sabina Middleton, PT GP 2    12916600159 HC PT THER SUPP EA 15 MIN 2/8/2021 Sabina Middleton, PT GP 2          PT G-Codes  Outcome Measure Options: AM-PAC 6 Clicks Basic Mobility (PT)  AM-PAC 6 Clicks Score  (PT): 10    Sabina Middleton, PT  2/8/2021

## 2021-02-08 NOTE — PROGRESS NOTES
Continued Stay Note  [facility]     Patient Name: Osvaldo Lopez  MRN: [id number]  Today's Date: 2/8/2021    Admit Date: 1/29/2021    Discharge Plan     Row Name 02/08/21 0297       Plan    Plan  Home with 24/7 caregivers and HH vs SNF. Daughter to call with choices 2/9    Patient/Family in Agreement with Plan  yes    Plan Comments  Called and spoke to daughter, Sisi Sanchez, [phone] about D/C plan. Daughter states she wants to take pt home and get PT at home. Informed pt took 2 steps forward and back, 3-4 small steps back today with PT. Daughter states pt will not do well in rehab as he will get depressed. Discussed that pt would require 24/7 care at home. Daughter stated she would discuss D/C plan with family and pt tonight and let me know her decision 2/8. Byron Stockton RN-BC        Discharge Codes    No documentation.       Expected Discharge Date and Time     Expected Discharge Date Expected Discharge Time    Feb 4, 2021             Byron Stockton RN

## 2021-02-08 NOTE — PLAN OF CARE
Goal Outcome Evaluation:  Plan of Care Reviewed With: patient  Progress: improving  Outcome Summary: VSS. Pain mostly controlled with ERAS, one dose of Dilaudid given for breakthrough pain. No n/v, tolerating diet. Pt more alert and oriented, pleasant and complaint with care. UOP 995mL plus x2 unmeasurable occurrences. No distress noted, continue to monitor.

## 2021-02-09 LAB
ANION GAP SERPL CALCULATED.3IONS-SCNC: 9.7 MMOL/L (ref 5–15)
BUN SERPL-MCNC: 27 MG/DL (ref 8–23)
BUN/CREAT SERPL: 25.7 (ref 7–25)
CALCIUM SPEC-SCNC: 8.7 MG/DL (ref 8.6–10.5)
CHLORIDE SERPL-SCNC: 102 MMOL/L (ref 98–107)
CO2 SERPL-SCNC: 29.3 MMOL/L (ref 22–29)
CREAT SERPL-MCNC: 1.05 MG/DL (ref 0.76–1.27)
DEPRECATED RDW RBC AUTO: 55.6 FL (ref 37–54)
ERYTHROCYTE [DISTWIDTH] IN BLOOD BY AUTOMATED COUNT: 19.4 % (ref 12.3–15.4)
GFR SERPL CREATININE-BSD FRML MDRD: 67 ML/MIN/1.73
GLUCOSE BLDC GLUCOMTR-MCNC: 150 MG/DL (ref 70–130)
GLUCOSE BLDC GLUCOMTR-MCNC: 163 MG/DL (ref 70–130)
GLUCOSE BLDC GLUCOMTR-MCNC: 170 MG/DL (ref 70–130)
GLUCOSE BLDC GLUCOMTR-MCNC: 207 MG/DL (ref 70–130)
GLUCOSE SERPL-MCNC: 170 MG/DL (ref 65–99)
HCT VFR BLD AUTO: 32.1 % (ref 37.5–51)
HGB BLD-MCNC: 9.4 G/DL (ref 13–17.7)
MAGNESIUM SERPL-MCNC: 1.7 MG/DL (ref 1.6–2.4)
MCH RBC QN AUTO: 23.4 PG (ref 26.6–33)
MCHC RBC AUTO-ENTMCNC: 29.3 G/DL (ref 31.5–35.7)
MCV RBC AUTO: 80 FL (ref 79–97)
PLATELET # BLD AUTO: 130 10*3/MM3 (ref 140–450)
PMV BLD AUTO: 9.6 FL (ref 6–12)
POTASSIUM SERPL-SCNC: 3.3 MMOL/L (ref 3.5–5.2)
RBC # BLD AUTO: 4.01 10*6/MM3 (ref 4.14–5.8)
SODIUM SERPL-SCNC: 141 MMOL/L (ref 136–145)
WBC # BLD AUTO: 6.8 10*3/MM3 (ref 3.4–10.8)

## 2021-02-09 PROCEDURE — 97535 SELF CARE MNGMENT TRAINING: CPT

## 2021-02-09 PROCEDURE — 63710000001 INSULIN LISPRO (HUMAN) PER 5 UNITS: Performed by: COLON & RECTAL SURGERY

## 2021-02-09 PROCEDURE — 83735 ASSAY OF MAGNESIUM: CPT | Performed by: STUDENT IN AN ORGANIZED HEALTH CARE EDUCATION/TRAINING PROGRAM

## 2021-02-09 PROCEDURE — 82962 GLUCOSE BLOOD TEST: CPT

## 2021-02-09 PROCEDURE — 85027 COMPLETE CBC AUTOMATED: CPT | Performed by: STUDENT IN AN ORGANIZED HEALTH CARE EDUCATION/TRAINING PROGRAM

## 2021-02-09 PROCEDURE — 94799 UNLISTED PULMONARY SVC/PX: CPT

## 2021-02-09 PROCEDURE — 25010000002 FUROSEMIDE PER 20 MG: Performed by: STUDENT IN AN ORGANIZED HEALTH CARE EDUCATION/TRAINING PROGRAM

## 2021-02-09 PROCEDURE — 97110 THERAPEUTIC EXERCISES: CPT

## 2021-02-09 PROCEDURE — 97166 OT EVAL MOD COMPLEX 45 MIN: CPT

## 2021-02-09 PROCEDURE — 25010000002 MAGNESIUM SULFATE 2 GM/50ML SOLUTION: Performed by: STUDENT IN AN ORGANIZED HEALTH CARE EDUCATION/TRAINING PROGRAM

## 2021-02-09 PROCEDURE — 80048 BASIC METABOLIC PNL TOTAL CA: CPT | Performed by: STUDENT IN AN ORGANIZED HEALTH CARE EDUCATION/TRAINING PROGRAM

## 2021-02-09 RX ORDER — POTASSIUM CHLORIDE 750 MG/1
40 TABLET, FILM COATED, EXTENDED RELEASE ORAL AS NEEDED
Status: DISCONTINUED | OUTPATIENT
Start: 2021-02-09 | End: 2021-02-12 | Stop reason: HOSPADM

## 2021-02-09 RX ORDER — POTASSIUM CHLORIDE 7.45 MG/ML
10 INJECTION INTRAVENOUS
Status: DISCONTINUED | OUTPATIENT
Start: 2021-02-09 | End: 2021-02-12 | Stop reason: HOSPADM

## 2021-02-09 RX ORDER — MAGNESIUM SULFATE HEPTAHYDRATE 40 MG/ML
2 INJECTION, SOLUTION INTRAVENOUS ONCE
Status: COMPLETED | OUTPATIENT
Start: 2021-02-09 | End: 2021-02-09

## 2021-02-09 RX ORDER — LISINOPRIL 20 MG/1
20 TABLET ORAL
Status: DISCONTINUED | OUTPATIENT
Start: 2021-02-09 | End: 2021-02-12 | Stop reason: HOSPADM

## 2021-02-09 RX ORDER — POTASSIUM CHLORIDE 1.5 G/1.77G
40 POWDER, FOR SOLUTION ORAL AS NEEDED
Status: DISCONTINUED | OUTPATIENT
Start: 2021-02-09 | End: 2021-02-12 | Stop reason: HOSPADM

## 2021-02-09 RX ORDER — FUROSEMIDE 10 MG/ML
40 INJECTION INTRAMUSCULAR; INTRAVENOUS ONCE
Status: COMPLETED | OUTPATIENT
Start: 2021-02-09 | End: 2021-02-09

## 2021-02-09 RX ADMIN — AMIODARONE HYDROCHLORIDE 200 MG: 200 TABLET ORAL at 08:11

## 2021-02-09 RX ADMIN — FUROSEMIDE 40 MG: 10 INJECTION, SOLUTION INTRAMUSCULAR; INTRAVENOUS at 08:11

## 2021-02-09 RX ADMIN — ACETAMINOPHEN 650 MG: 325 TABLET, FILM COATED ORAL at 18:17

## 2021-02-09 RX ADMIN — LISINOPRIL 20 MG: 20 TABLET ORAL at 18:18

## 2021-02-09 RX ADMIN — INSULIN LISPRO 2 UNITS: 100 INJECTION, SOLUTION INTRAVENOUS; SUBCUTANEOUS at 12:18

## 2021-02-09 RX ADMIN — INSULIN LISPRO 4 UNITS: 100 INJECTION, SOLUTION INTRAVENOUS; SUBCUTANEOUS at 08:11

## 2021-02-09 RX ADMIN — RIVAROXABAN 20 MG: 20 TABLET, FILM COATED ORAL at 18:18

## 2021-02-09 RX ADMIN — POTASSIUM CHLORIDE 40 MEQ: 750 TABLET, EXTENDED RELEASE ORAL at 12:19

## 2021-02-09 RX ADMIN — ACETAMINOPHEN 650 MG: 325 TABLET, FILM COATED ORAL at 06:28

## 2021-02-09 RX ADMIN — FAMOTIDINE 20 MG: 10 INJECTION INTRAVENOUS at 08:11

## 2021-02-09 RX ADMIN — POTASSIUM CHLORIDE 40 MEQ: 750 TABLET, EXTENDED RELEASE ORAL at 08:10

## 2021-02-09 RX ADMIN — FAMOTIDINE 20 MG: 10 INJECTION INTRAVENOUS at 20:40

## 2021-02-09 RX ADMIN — MAGNESIUM SULFATE HEPTAHYDRATE 2 G: 2 INJECTION, SOLUTION INTRAVENOUS at 08:11

## 2021-02-09 RX ADMIN — METOPROLOL SUCCINATE 25 MG: 25 TABLET, EXTENDED RELEASE ORAL at 08:11

## 2021-02-09 RX ADMIN — ACETAMINOPHEN 650 MG: 325 TABLET, FILM COATED ORAL at 12:19

## 2021-02-09 RX ADMIN — TAMSULOSIN HYDROCHLORIDE 0.8 MG: 0.4 CAPSULE ORAL at 08:11

## 2021-02-09 RX ADMIN — INSULIN LISPRO 2 UNITS: 100 INJECTION, SOLUTION INTRAVENOUS; SUBCUTANEOUS at 18:17

## 2021-02-09 NOTE — PLAN OF CARE
Goal Outcome Evaluation:  Plan of Care Reviewed With: patient     Outcome Summary: Pt is an 85 y.o. male admitted with progressive weakness and increasing fatigue over the course of last few months. Pt lives alone and is independent with ADLs at baseline. Upon assessment pt requires mod assist of two for sit to stand, takes 2 steps away from the bed with min to mod assist of 2. Pt noted with impaired activity tolerance, impaired self care skills, impaired functional transfers. Recommend skilled OT services to increase safety and independence with performance of ADLs.  Patient was intermittently wearing a face mask during this therapy encounter. Therapist used appropriate personal protective equipment including mask, gloves, and eye protection.  Mask used was standard procedure mask. Appropriate PPE was worn during the entire therapy session. Hand hygiene was completed before and after therapy session. Patient is not in enhanced droplet precautions.

## 2021-02-09 NOTE — PROGRESS NOTES
[facility] Acute Inpt Rehab Note     Received consult request for acute inpt rehab.  Eval in progress.  Unfortunately the rehab unit is at capacity and does not anticipate any available beds until next week.      At this time, family in agreement for evaluation at [facility].  Per ABRAHAM Matthews, [facility] anticipates available bed in 1-2 days and family in agreement with this.     We will sign off at this time.  Thank you for the referral.    Maria G Mendosa RN  Rehab Admission Nurse   [phone]

## 2021-02-09 NOTE — PROGRESS NOTES
Continued Stay Note  [facility]     Patient Name: Osvaldo Lopez  MRN: [id number]  Today's Date: 2/9/2021    Admit Date: 1/29/2021    Discharge Plan     Row Name 02/09/21 0951       Plan    Plan  2/9 Home with 24/7 caregivers and HH vs Acute Rehab (referrals pending) vs SNF.     Patient/Family in Agreement with Plan  yes    Plan Comments  Received call from daughter, Sisi Sanchez, [phone], stating she would like referrals to [facility] as [facility] are allowing visitation. Spoke to [facility] and they are unable to accept due to having no bed availability. Called [facility] and she will review case. Discussed with daughter that she needs to have a back up plan as pt may not be able to go to acute rehab. Byron Stockton RN-BC        Discharge Codes    No documentation.       Expected Discharge Date and Time     Expected Discharge Date Expected Discharge Time    Feb 4, 2021             Byron Stockton RN

## 2021-02-09 NOTE — PROGRESS NOTES
Name: Osvaldo Lopez ADMIT: 2021   : 1935  PCP: Caio Rodríguez Jr., MD    MRN: [id number] LOS: 9 days   AGE/SEX: 85 y.o. male  ROOM: [facility]     Subjective   Subjective      Doing well today. Still on 2L O2. Sitting comfortably in the chair. No complaints. Has had a bowel movement.       Objective   Objective   Vital Signs  Temp:  [97.7 °F (36.5 °C)-99.3 °F (37.4 °C)] 99.3 °F (37.4 °C)  Heart Rate:  [81-93] 88  Resp:  [20] 20  BP: (130-156)/(68-80) 152/70  SpO2:  [94 %-97 %] 96 %  on  Flow (L/min):  [2] 2;   Device (Oxygen Therapy): nasal cannula  Body mass index is 30.79 kg/m².  Physical Exam  Constitutional:       Appearance: Normal appearance.   HENT:      Head: Normocephalic and atraumatic.   Neck:      Comments: No jvd  Cardiovascular:      Rate and Rhythm: Normal rate and regular rhythm.      Heart sounds: Normal heart sounds.   Pulmonary:      Effort: Pulmonary effort is normal. No respiratory distress.      Breath sounds: Rales present.      Comments: Diminished lung sounds bilaterally  Abdominal:      General: Bowel sounds are normal.      Palpations: Abdomen is soft.      Comments: Laparoscopic incision sites noted to be well-healing.  Hypoactive bowel sounds   Musculoskeletal:         General: No swelling or tenderness.   Skin:     General: Skin is warm and dry.   Neurological:      Mental Status: He is alert and oriented to person, place, and time.   Psychiatric:         Mood and Affect: Mood normal.         Behavior: Behavior normal.         Results Review     I reviewed the patient's new clinical results.  Results from last 7 days   Lab Units 21  04321  0547 21  1116 21  0659   WBC 10*3/mm3 6.80 6.43 6.93 6.85   HEMOGLOBIN g/dL 9.4* 9.7* 9.3* 8.8*   PLATELETS 10*3/mm3 130* 140 142 121*     Results from last 7 days   Lab Units 21  0437 21  0547 21  1116 21  0659   SODIUM mmol/L 141 142 141 143   POTASSIUM mmol/L 3.3* 3.7 4.0 3.7    CHLORIDE mmol/L 102 106 107 112*   CO2 mmol/L 29.3* 26.1 20.6* 21.5*   BUN mg/dL 27* 30* 34* 25*   CREATININE mg/dL 1.05 1.16 1.33* 1.30*   GLUCOSE mg/dL 170* 157* 121* 83   Estimated Creatinine Clearance: 65.5 mL/min (by C-G formula based on SCr of 1.05 mg/dL).  Results from last 7 days   Lab Units 02/05/21  0356   ALBUMIN g/dL 3.30*   BILIRUBIN mg/dL 0.5   ALK PHOS U/L 70   AST (SGOT) U/L 18   ALT (SGPT) U/L 10     Results from last 7 days   Lab Units 02/09/21  0437 02/08/21  0547 02/07/21  1116 02/06/21  0659 02/05/21  0356  02/04/21  0512 02/03/21  0502   CALCIUM mg/dL 8.7 8.5* 8.4* 7.9* 8.0*   < > 7.7* 8.5*   ALBUMIN g/dL  --   --   --   --  3.30*  --   --   --    MAGNESIUM mg/dL 1.7  --   --   --   --   --  2.2 2.2   PHOSPHORUS mg/dL  --   --   --   --   --   --   --  3.6    < > = values in this interval not displayed.       COVID19   Date Value Ref Range Status   02/02/2021 Not Detected Not Detected - Ref. Range Final   01/29/2021 Not Detected Not Detected - Ref. Range Final     Glucose   Date/Time Value Ref Range Status   02/09/2021 1109 170 (H) 70 - 130 mg/dL Final   02/09/2021 0614 207 (H) 70 - 130 mg/dL Final   02/08/2021 2034 155 (H) 70 - 130 mg/dL Final   02/08/2021 1654 162 (H) 70 - 130 mg/dL Final   02/08/2021 1129 168 (H) 70 - 130 mg/dL Final   02/08/2021 0609 161 (H) 70 - 130 mg/dL Final   02/08/2021 0039 151 (H) 70 - 130 mg/dL Final       Adult Transthoracic Echo Complete W/ Cont if Necessary Per Protocol  · Left ventricular wall thickness is consistent with mild concentric   hypertrophy.  · Estimated left ventricular EF = 68% Left ventricular systolic function   is normal.  · Left ventricular diastolic function was normal.  · Estimated right ventricular systolic pressure from tricuspid   regurgitation is normal (<35 mmHg).  · Mild dilation of the aortic root is present.       Scheduled Medications  acetaminophen, 650 mg, Oral, Q6H  amiodarone, 200 mg, Oral, Daily  famotidine, 20 mg, Intravenous,  Q12H  insulin lispro, 0-9 Units, Subcutaneous, TID AC  metoprolol succinate XL, 25 mg, Oral, Q24H  tamsulosin, 0.8 mg, Oral, Daily    Infusions   Diet  Diet Regular       Assessment/Plan     Active Hospital Problems    Diagnosis  POA   • **Symptomatic anemia [D64.9]  Yes   • Iron deficiency anemia [D50.9]  Unknown   • Chronic anticoagulation [Z79.01]  Not Applicable   • CKD (chronic kidney disease) [N18.9]  Unknown   • CRISTOBAL (acute kidney injury) (CMS/HCC) [N17.9]  Unknown   • Colonic mass [K63.89]  Unknown   • 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       85 y.o. male admitted with Symptomatic anemia.    Adenocarcinoma of the colon  Status post laparoscopic hemicolectomy on February 3, was in the ICU postoperatively for difficulty weaning off the ventilator. This is resolved.     Acute renal failure  Improving with diuretics    Acute diastolic heart failure with hypoxic respiratory failure  Giving another dose of lasix today to try to get him off oxygen    Postoperative hypotension/essential hypertension  This appears resolved. Restart lisinopril at half home dose today. Amlodipine and hydralazine are held    Paroxysmal atrial fibrillation  Currently on metoprolol XL as well as amiodarone. Currently not anticoagulated in the postoperative setting.  Was previously on Xarelto.  Resume today.    BPH  Tamsulosin    DM  Metformin held      · SCDs for DVT prophylaxis.  · Full code.  · Discussed with patient.  · Anticipate discharge anticipate d/c to snf either Wednesday or Thursday      Clark Silva MD  [facility]  02/09/21  14:17 EST    Patient was wearing facemask when I entered the room and throughout our encounter.  I wore protective equipment  throughout this patient encounter including a face mask, gloves and protective eyewear.  Hand hygiene was performed before donning protective equipment and after removal when leaving the room.

## 2021-02-09 NOTE — PROGRESS NOTES
Continued Stay Note  [facility]     Patient Name: Osvaldo Lopez  MRN: [id number]  Today's Date: 2/9/2021    Admit Date: 1/29/2021    Discharge Plan     Row Name 02/09/21 1324       Plan    Plan  [facility] (Anticipate bed opening on Weds or Thurs) vs Home with 24/7 caregivers and HH.    Patient/Family in Agreement with Plan  yes    Plan Comments  [facility] called stating pt looked good for acute rehab but does not anticipate a bed opening until Weds or Thurs. Stated she would call tomorrow after 1400 when she finds out bed situation. Called daughter, Sisi Sanchez, [phone] and informed. Byron Stockton RN-BC        Discharge Codes    No documentation.       Expected Discharge Date and Time     Expected Discharge Date Expected Discharge Time    Feb 4, 2021             Byron Stockton RN

## 2021-02-09 NOTE — PLAN OF CARE
Goal Outcome Evaluation:  Plan of Care Reviewed With: patient  Progress: improving  Outcome Summary: VSS, O2 titrated during sleep 1-2L to keep sats >90%, no SOA reported, IS at beside and pt encouraged to use. Pain controlled with ERAS. Pt has had x5 medium to large incontinent episodes. With assist x2 up to chair this am, stand and pivot, pt still very weak but eager to work on mobility, safety maintained. Pt continues with improved orientation. No distress noted, continue to monitor.

## 2021-02-09 NOTE — THERAPY TREATMENT NOTE
Patient Name: Osvaldo Lopez  : 1935    MRN: [id number]                              Today's Date: 2021       Admit Date: 2021    Visit Dx:     ICD-10-CM ICD-9-CM   1. Symptomatic anemia  D64.9 285.9   2. Other fatigue  R53.83 780.79   3. Elevated troponin  R77.8 790.6   4. Colonic mass  K63.89 569.89     Patient Active Problem List   Diagnosis   • Complete rotator cuff tear of left shoulder   • Abnormal finding on thyroid function test   • Abdominal aortic aneurysm (CMS/HCC)   • Benign prostatic hyperplasia   • Essential hypertension   • Hyperlipidemia   • Shoulder pain   • Lumbar radiculopathy   • DM (diabetes mellitus), type 2 with complications (CMS/HCC)   • OA (osteoarthritis) of knee   • Tear of right rotator cuff   • Spinal stenosis of lumbar region with neurogenic claudication   • Eyebrow ptosis   • Pseudophakia   • Nonrheumatic aortic valve stenosis   • Atrial flutter with rapid ventricular response (CMS/HCC)   • Right arm pain   • Leg weakness, bilateral   • Typical atrial flutter (CMS/HCC)   • Diabetic peripheral neuropathy (CMS/HCC)   • Muscle weakness (generalized)   • Pressure injury of left buttock, stage 1   • Chronic low back pain with sciatica   • 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CMS/HCC)   • Pressure injury of buttock, stage 1   • Anemia   • Symptomatic anemia   • Iron deficiency anemia   • Chronic anticoagulation   • CKD (chronic kidney disease)   • CRISTOBAL (acute kidney injury) (CMS/HCC)   • Colonic mass     Past Medical History:   Diagnosis Date   • Abnormal thyroid blood test    • Aneurysm of abdominal aorta (CMS/HCC)    • Arthritis    • Bleeding disorder (CMS/HCC)    • BPH (benign prostatic hyperplasia)    • Bruises easily    • Bulging of thoracic intervertebral disc    • Chronic edema    • Coronary artery disease    • Diverticular disease    • Enlarged prostate without lower urinary tract symptoms (luts)    • Essential hypertension    • Heart attack (CMS/HCC)    • HL (hearing  loss)    • Hyperactivity of bladder    • Hyperlipidemia    • Left shoulder pain    • Lumbar radiculopathy 2016    wears back brace at times following back surgery   • Muscle weakness (generalized)    • Nonrheumatic aortic valve stenosis     mild 1/2018    • Osteoarthritis    • PAF (paroxysmal atrial fibrillation) (CMS/HCC)    • Polyuria    • Recurrent falls    • Screening      stop bang scale 5   • Syncope    • Torn rotator cuff     left   • Type 2 diabetes mellitus (CMS/HCC)    • Urinary frequency      Past Surgical History:   Procedure Laterality Date   • CARDIAC ELECTROPHYSIOLOGY PROCEDURE N/A 6/6/2019    Procedure: Ablation atrial flutter;  Surgeon: Miller Talbot MD;  Location: [facility] CATH INVASIVE LOCATION;  Service: Cardiovascular   • CARDIAC SURGERY      CABGX5 (1989)   • CARDIOVERSION  04/15/2019    Dr. Miller Talbot   • CATARACT EXTRACTION Bilateral 1997   • COLON RESECTION N/A 2/3/2021    Procedure: LAPAROSCOPIC EXTENDED  RIGHT COLECTOMY WITH DAVINCI ROBOT;  Surgeon: Xavi Napoles MD;  Location: [facility] MAIN OR;  Service: DaVinci;  Laterality: N/A;   • COLONOSCOPY N/A 1/31/2021    Procedure: COLONOSCOPY TO CECUM  WITH ORISE INJECTION, BIOPSIES, COLD BIOPSY POLYPECTOMY, COLD AND HOT SNARE POLYPECTOMIES, TATTOO, AND CLIP X3;  Surgeon: Jey Barrios MD;  Location: [facility] ENDOSCOPY;  Service: Gastroenterology;  Laterality: N/A;  PRE- IRON DEFICIENCY ANEMIA  POST- COLON MASS, COLON POLYPS, DIVERTICULOSIS, HEMORRHOIDS   • CORONARY ARTERY BYPASS GRAFT     • CYST REMOVAL      FROM BACK   • ENDOSCOPY N/A 1/31/2021    Procedure: ESOPHAGOGASTRODUODENOSCOPY WITH BIOPSIES;  Surgeon: Jey Barrios MD;  Location: [facility] ENDOSCOPY;  Service: Gastroenterology;  Laterality: N/A;  PRE- IRON DEFICIENCY ANEMIA  POST- NORMAL   • GALLBLADDER SURGERY  1989   • HERNIA REPAIR Left     inquinal times 3  last one in 2003   • KNEE CARTILAGE SURGERY Left 1970's   • LUMBAR DISCECTOMY FUSION INSTRUMENTATION N/A 4/11/2016     Procedure: lumbar laminectomy L4-5 and fusion with instrumentation;  Surgeon: Ran Kline MD;  Location: [facility];  Service:    • LUMBAR DISCECTOMY FUSION INSTRUMENTATION N/A 1/15/2018    Procedure: L2-3, L3-4 laminectomy and fusion with instrumentation and removal of implants L4 5.;  Surgeon: Ran Kline MD;  Location: [facility] OR;  Service:    • ND TOTAL KNEE ARTHROPLASTY Left 11/14/2016    Procedure: TOTAL KNEE ARTHROPLASTY;  Surgeon: Dontrell Arellano MD;  Location: [facility] OR;  Service: Orthopedics     General Information     Row Name 02/09/21 1236          Physical Therapy Time and Intention    Document Type  therapy note (daily note)  -     Mode of Treatment  physical therapy  -[facility] Name 02/09/21 1236          General Information    Existing Precautions/Restrictions  fall  -       User Key  (r) = Recorded By, (t) = Taken By, (c) = Cosigned By    Initials Name Provider Type     Debra Crabtree, PT Physical Therapist        Mobility     Row Name 02/09/21 1236          Bed Mobility    Supine-Sit [place] (Bed Mobility)  not tested  -     Sit-Supine [place] (Bed Mobility)  verbal cues;moderate assist (50% patient effort);2 person assist  -     Assistive Device (Bed Mobility)  bed rails;head of bed elevated  -[facility] Name 02/09/21 1236          Bed-Chair Transfer    Bed-Chair [place] (Transfers)  verbal cues;nonverbal cues (demo/gesture);moderate assist (50% patient effort);2 person assist;minimum assist (75% patient effort)  -     Assistive Device (Bed-Chair Transfers)  walker, 4-wheeled  -     Row Name 02/09/21 1236          Sit-Stand Transfer    Sit-Stand [place] (Transfers)  verbal cues;nonverbal cues (demo/gesture);moderate assist (50% patient effort);2 person assist;minimum assist (75% patient effort)  -[facility] Name 02/09/21 1236          Gait/Stairs (Locomotion)    [place] Level (Gait)  verbal cues;nonverbal cues  (demo/gesture);minimum assist (75% patient effort);2 person assist  -EJ     Assistive Device (Gait)  walker, 4-wheeled  -EJ     Distance in Feet (Gait)  3  -EJ     Deviations/Abnormal Patterns (Gait)  oseas decreased;weight shifting decreased;stride length decreased  -EJ     Bilateral Gait Deviations  forward flexed posture;heel strike decreased;weight shift ability decreased  -EJ     Comment (Gait/Stairs)  cues for upright posture  -EJ       User Key  (r) = Recorded By, (t) = Taken By, (c) = Cosigned By    Initials Name Provider Type    Debra Chirinos, PT Physical Therapist        Obj/Interventions     Row Name 02/09/21 1239          Motor Skills    Therapeutic Exercise  -- seated BLE ther ex: AP, LAQ, and MIP x 10 reps  -EJ       User Key  (r) = Recorded By, (t) = Taken By, (c) = Cosigned By    Initials Name Provider Type    Debra Chirinos PT Physical Therapist        Goals/Plan    No documentation.       Clinical Impression     Row Name 02/09/21 1240          Pain Scale: Numbers Pre/Post-Treatment    Pretreatment Pain Rating  0/10 - no pain  -EJ     Posttreatment Pain Rating  0/10 - no pain  -EJ     Row Name 02/09/21 1240          Plan of Care Review    Progress  improving  -EJ     Outcome Summary  Pt agreeable to PT this am. He is eager to get back to bed after sitting up in chair for most of the morning. Pt seems to be moving with greater ease today and requiring less assist with mobiltiy. He was able to stand up from chair with min/mod A x 2 and then ambulate several steps over to bed with min A x 2 using his rollator. Pt also tolerated BLE exercises while sitting EOB. Will continue to progress activity as tolerated.  -EJ     Row Name 02/09/21 1240          Positioning and Restraints    Pre-Treatment Position  sitting in chair/recliner  -EJ     Post Treatment Position  bed  -EJ     In Bed  notified nsg;supine;call light within reach;encouraged to call for assist;exit alarm on  -EJ       User  Key  (r) = Recorded By, (t) = Taken By, (c) = Cosigned By    Initials Name Provider Type    Debra Chirinos, PT Physical Therapist        Outcome Measures     Row Name 02/09/21 1242          How much help from another person do you currently need...    Turning from your back to your side while in flat bed without using bedrails?  3  -EJ     Moving from lying on back to sitting on the side of a flat bed without bedrails?  2  -EJ     Moving to and from a bed to a chair (including a wheelchair)?  3  -EJ     Standing up from a chair using your arms (e.g., wheelchair, bedside chair)?  2  -EJ     Climbing 3-5 steps with a railing?  1  -EJ     To walk in hospital room?  2  -EJ     AM-PAC 6 Clicks Score (PT)  13  -EJ       User Key  (r) = Recorded By, (t) = Taken By, (c) = Cosigned By    Initials Name Provider Type    Debra Chirinos, PT Physical Therapist        Physical Therapy Education                 Title: PT OT SLP Therapies (Done)     Topic: Physical Therapy (Done)     Point: Mobility training (Done)     Learning Progress Summary           Patient Acceptance, E,TB,D, VU,NR by SCOTT at 2/9/2021 1242    Acceptance, E,TB,D, VU,NR by SV at 2/8/2021 1516    Acceptance, E,TB,D, VU,NR by SV at 2/8/2021 1515    Acceptance, E, VU by AP at 2/7/2021 1226    Acceptance, E, VU,NR by AP at 2/6/2021 1048    Acceptance, E, VU,NR by KH at 2/5/2021 1037    Acceptance, E,TB, VU,DU by LB at 1/31/2021 1338    Acceptance, E,TB,D, VU,DU by LB at 1/30/2021 0955                   Point: Home exercise program (Done)     Learning Progress Summary           Patient Acceptance, E,TB,D, VU,NR by SCOTT at 2/9/2021 1242    Acceptance, E,TB,D, VU,NR by SV at 2/8/2021 1516    Acceptance, E,TB,D, VU,NR by SV at 2/8/2021 1515    Acceptance, E, VU by AP at 2/7/2021 1226    Acceptance, E, VU,NR by AP at 2/6/2021 1048    Acceptance, E, VU,NR by HOLLAND at 2/5/2021 1037    Acceptance, E,TB, VU,DU by LB at 1/31/2021 1338    Acceptance, E,TB,D, VU,DU by LB  at 1/30/2021 0955                   Point: Body mechanics (Done)     Learning Progress Summary           Patient Acceptance, E,TB,D, VU,NR by EJ at 2/9/2021 1242    Acceptance, E,TB,D, VU,NR by SV at 2/8/2021 1516    Acceptance, E,TB,D, VU,NR by SV at 2/8/2021 1515    Acceptance, E, VU by AP at 2/7/2021 1226    Acceptance, E, VU,NR by AP at 2/6/2021 1048    Acceptance, E, VU,NR by KH at 2/5/2021 1037    Acceptance, E,TB, VU,DU by LB at 1/31/2021 1338    Acceptance, E,TB,D, VU,DU by LB at 1/30/2021 0955                   Point: Precautions (Done)     Learning Progress Summary           Patient Acceptance, E,TB,D, VU,NR by EJ at 2/9/2021 1242    Acceptance, E,TB,D, VU,NR by SV at 2/8/2021 1516    Acceptance, E,TB,D, VU,NR by SV at 2/8/2021 1515    Acceptance, E, VU by AP at 2/7/2021 1226    Acceptance, E, VU,NR by AP at 2/6/2021 1048    Acceptance, E, VU,NR by KH at 2/5/2021 1037    Acceptance, E,TB, VU,DU by LB at 1/31/2021 1338    Acceptance, E,TB,D, VU,DU by LB at 1/30/2021 0955                               User Key     Initials Effective Dates Name Provider Type Discipline     02/05/19 -  Kaya Orosco, PT Physical Therapist PT    EJ 04/03/18 -  Debra Crabtree, PT Physical Therapist PT    SV 04/03/18 -  Sabina Middleton, PT Physical Therapist PT    LB 08/09/20 -  Aditi Chaudhary, PT Physical Therapist PT    AP 09/24/20 -  Karli Montgomery, PT Physical Therapist PT              PT Recommendation and Plan     Plan of Care Reviewed With: patient, daughter  Progress: improving  Outcome Summary: Pt agreeable to PT this am. He is eager to get back to bed after sitting up in chair for most of the morning. Pt seems to be moving with greater ease today and requiring less assist with mobiltiy. He was able to stand up from chair with min/mod A x 2 and then ambulate several steps over to bed with min A x 2 using his rollator. Pt also tolerated BLE exercises while sitting EOB. Will continue to progress activity as  tolerated.     Time Calculation:   PT Charges     Row Name 02/09/21 1243             Time Calculation    Start Time  1130  -EJ      Stop Time  1145  -EJ      Time Calculation (min)  15 min  -EJ      PT Received On  02/09/21  -EJ      PT - Next Appointment  02/10/21  -EJ        User Key  (r) = Recorded By, (t) = Taken By, (c) = Cosigned By    Initials Name Provider Type    EJ Debar Crabtree, PT Physical Therapist        Therapy Charges for Today     Code Description Service Date Service Provider Modifiers Qty    44209047981 HC PT THER PROC EA 15 MIN 2/9/2021 Debra Crabtree, PT GP 1    13788294984 HC PT THER SUPP EA 15 MIN 2/9/2021 Debra Crabtree, PT GP 1          PT G-Codes  Outcome Measure Options: AM-PAC 6 Clicks Basic Mobility (PT)  AM-PAC 6 Clicks Score (PT): 13    Debra Crabtree, PT  2/9/2021

## 2021-02-09 NOTE — PLAN OF CARE
Goal Outcome Evaluation:  Plan of Care Reviewed With: patient, daughter  Progress: improving  Outcome Summary: Pt agreeable to PT this am. He is eager to get back to bed after sitting up in chair for most of the morning. Pt seems to be moving with greater ease today and requiring less assist with mobiltiy. He was able to stand up from chair with min/mod A x 2 and then ambulate several steps over to bed with min A x 2 using his rollator. Pt also tolerated BLE exercises while sitting EOB. Will continue to progress activity as tolerated.  Patient was intermittently wearing a face mask during this therapy encounter. Therapist used appropriate personal protective equipment including eye protection, mask, and gloves.  Mask used was standard procedure mask. Appropriate PPE was worn during the entire therapy session. Hand hygiene was completed before and after therapy session. Patient is not in enhanced droplet precautions.

## 2021-02-09 NOTE — PLAN OF CARE
Goal Outcome Evaluation:  Plan of Care Reviewed With: patient  Progress: improving  Outcome Summary: Pt VSS, c/o abdo pain x1, controlled with dose of prn pain med, up to chair for part of shift, able to work with PT, weaned to 1L of O2, IS use encouraged, daughter at bedside for part of shift, IV lasix given x1, will continue to monitor.

## 2021-02-09 NOTE — PROGRESS NOTES
Progress Note    Pod 6      S: positive flatus,  positive BM. Min ambulating. Pain controlled with po    O:  Temp:  [97.7 °F (36.5 °C)-99.3 °F (37.4 °C)] 99.3 °F (37.4 °C)  Heart Rate:  [81-93] 88  Resp:  [20] 20  BP: (130-156)/(68-80) 152/70      Intake/Output Summary (Last 24 hours) at 2/9/2021 1509  Last data filed at 2/9/2021 1339  Gross per 24 hour   Intake 720 ml   Output 450 ml   Net 270 ml       Abd:   soft, non distended  Incision: clean, hematoma drainage from lateral edge.  Pressure dressing placed      Results from last 7 days   Lab Units 02/09/21  0437   WBC 10*3/mm3 6.80   HEMOGLOBIN g/dL 9.4*   HEMATOCRIT % 32.1*   PLATELETS 10*3/mm3 130*     Results from last 7 days   Lab Units 02/09/21  0437  02/03/21  0502   SODIUM mmol/L 141   < > 143   POTASSIUM mmol/L 3.3*   < > 3.9   CHLORIDE mmol/L 102   < > 108*   CO2 mmol/L 29.3*   < > 26.3   BUN mg/dL 27*   < > 8   CREATININE mg/dL 1.05   < > 1.17   GLUCOSE mg/dL 170*   < > 111*   CALCIUM mg/dL 8.7   < > 8.5*   PHOSPHORUS mg/dL  --   --  3.6    < > = values in this interval not displayed.       Results from last 7 days   Lab Units 02/09/21  0437   MAGNESIUM mg/dL 1.7         A/P: 85 y.o. male status post robotic right colectomy   Tolerating a regular diet  Okay to DC from my standpoint

## 2021-02-09 NOTE — THERAPY EVALUATION
Patient Name: Osvaldo Lopez  : 1935    MRN: [id number]                              Today's Date: 2021       Admit Date: 2021    Visit Dx:     ICD-10-CM ICD-9-CM   1. Symptomatic anemia  D64.9 285.9   2. Other fatigue  R53.83 780.79   3. Elevated troponin  R77.8 790.6   4. Colonic mass  K63.89 569.89     Patient Active Problem List   Diagnosis   • Complete rotator cuff tear of left shoulder   • Abnormal finding on thyroid function test   • Abdominal aortic aneurysm (CMS/HCC)   • Benign prostatic hyperplasia   • Essential hypertension   • Hyperlipidemia   • Shoulder pain   • Lumbar radiculopathy   • DM (diabetes mellitus), type 2 with complications (CMS/HCC)   • OA (osteoarthritis) of knee   • Tear of right rotator cuff   • Spinal stenosis of lumbar region with neurogenic claudication   • Eyebrow ptosis   • Pseudophakia   • Nonrheumatic aortic valve stenosis   • Atrial flutter with rapid ventricular response (CMS/HCC)   • Right arm pain   • Leg weakness, bilateral   • Typical atrial flutter (CMS/HCC)   • Diabetic peripheral neuropathy (CMS/HCC)   • Muscle weakness (generalized)   • Pressure injury of left buttock, stage 1   • Chronic low back pain with sciatica   • 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CMS/HCC)   • Pressure injury of buttock, stage 1   • Anemia   • Symptomatic anemia   • Iron deficiency anemia   • Chronic anticoagulation   • CKD (chronic kidney disease)   • CRISTOBAL (acute kidney injury) (CMS/HCC)   • Colonic mass     Past Medical History:   Diagnosis Date   • Abnormal thyroid blood test    • Aneurysm of abdominal aorta (CMS/HCC)    • Arthritis    • Bleeding disorder (CMS/HCC)    • BPH (benign prostatic hyperplasia)    • Bruises easily    • Bulging of thoracic intervertebral disc    • Chronic edema    • Coronary artery disease    • Diverticular disease    • Enlarged prostate without lower urinary tract symptoms (luts)    • Essential hypertension    • Heart attack (CMS/HCC)    • HL (hearing  loss)    • Hyperactivity of bladder    • Hyperlipidemia    • Left shoulder pain    • Lumbar radiculopathy 2016    wears back brace at times following back surgery   • Muscle weakness (generalized)    • Nonrheumatic aortic valve stenosis     mild 1/2018    • Osteoarthritis    • PAF (paroxysmal atrial fibrillation) (CMS/HCC)    • Polyuria    • Recurrent falls    • Screening      stop bang scale 5   • Syncope    • Torn rotator cuff     left   • Type 2 diabetes mellitus (CMS/HCC)    • Urinary frequency      Past Surgical History:   Procedure Laterality Date   • CARDIAC ELECTROPHYSIOLOGY PROCEDURE N/A 6/6/2019    Procedure: Ablation atrial flutter;  Surgeon: Miller Talbot MD;  Location: [facility] CATH INVASIVE LOCATION;  Service: Cardiovascular   • CARDIAC SURGERY      CABGX5 (1989)   • CARDIOVERSION  04/15/2019    Dr. Miller Talbot   • CATARACT EXTRACTION Bilateral 1997   • COLON RESECTION N/A 2/3/2021    Procedure: LAPAROSCOPIC EXTENDED  RIGHT COLECTOMY WITH DAVINCI ROBOT;  Surgeon: Xavi Napoles MD;  Location: [facility] MAIN OR;  Service: DaVinci;  Laterality: N/A;   • COLONOSCOPY N/A 1/31/2021    Procedure: COLONOSCOPY TO CECUM  WITH ORISE INJECTION, BIOPSIES, COLD BIOPSY POLYPECTOMY, COLD AND HOT SNARE POLYPECTOMIES, TATTOO, AND CLIP X3;  Surgeon: Jey Barrios MD;  Location: [facility] ENDOSCOPY;  Service: Gastroenterology;  Laterality: N/A;  PRE- IRON DEFICIENCY ANEMIA  POST- COLON MASS, COLON POLYPS, DIVERTICULOSIS, HEMORRHOIDS   • CORONARY ARTERY BYPASS GRAFT     • CYST REMOVAL      FROM BACK   • ENDOSCOPY N/A 1/31/2021    Procedure: ESOPHAGOGASTRODUODENOSCOPY WITH BIOPSIES;  Surgeon: Jey Barrios MD;  Location: [facility] ENDOSCOPY;  Service: Gastroenterology;  Laterality: N/A;  PRE- IRON DEFICIENCY ANEMIA  POST- NORMAL   • GALLBLADDER SURGERY  1989   • HERNIA REPAIR Left     inquinal times 3  last one in 2003   • KNEE CARTILAGE SURGERY Left 1970's   • LUMBAR DISCECTOMY FUSION INSTRUMENTATION N/A 4/11/2016     Procedure: lumbar laminectomy L4-5 and fusion with instrumentation;  Surgeon: Ran Kline MD;  Location: [facility] OR;  Service:    • LUMBAR DISCECTOMY FUSION INSTRUMENTATION N/A 1/15/2018    Procedure: L2-3, L3-4 laminectomy and fusion with instrumentation and removal of implants L4 5.;  Surgeon: Ran Kline MD;  Location: [facility] MAIN OR;  Service:    • DC TOTAL KNEE ARTHROPLASTY Left 11/14/2016    Procedure: TOTAL KNEE ARTHROPLASTY;  Surgeon: Dontrell Arellano MD;  Location: [facility] OR;  Service: Orthopedics     General Information     Row Name 02/09/21 1259          OT Time and Intention    Document Type  evaluation  -MR     Mode of Treatment  physical therapy;occupational therapy  -     Row Name 02/09/21 1259          General Information    Patient Profile Reviewed  yes  -MR     Prior Level of Function  independent:;ADL's  -MR     Existing Precautions/Restrictions  fall  -MR     Row Name 02/09/21 1259          Living Environment    Lives With  alone  -     Row Name 02/09/21 1259          Cognition    Orientation Status (Cognition)  oriented to;person;place;situation  -     Row Name 02/09/21 1259          Safety Issues, Functional Mobility    Safety Issues Affecting Function (Mobility)  insight into deficits/self-awareness;judgment  -MR     Impairments Affecting Function (Mobility)  balance;strength;endurance/activity tolerance;pain;cognition  -MR       User Key  (r) = Recorded By, (t) = Taken By, (c) = Cosigned By    Initials Name Provider Type    Julia Vincent, OT Occupational Therapist          Mobility/ADL's     Row Name 02/09/21 1302          Bed Mobility    Bed Mobility  supine-sit;sit-supine  -MR     Supine-Sit [place] (Bed Mobility)  minimum assist (75% patient effort);verbal cues  -MR     Sit-Supine [place] (Bed Mobility)  moderate assist (50% patient effort);2 person assist;verbal cues  -MR     Row Name 02/09/21 1302          Transfers    Transfers  sit-stand  transfer  -MR     Sit-Stand [place] (Transfers)  moderate assist (50% patient effort);2 person assist;verbal cues  -MR     Row Name 02/09/21 1302          Sit-Stand Transfer    Assistive Device (Sit-Stand Transfers)  walker, 4-wheeled  -MR     Row Name 02/09/21 1302          Functional Mobility    Functional Mobility- Ind. Level  minimum assist (75% patient effort);moderate assist (50% patient effort);2 person assist required pt takes two steps away from and back to the bed  -MR     Functional Mobility- Device  rollator  -MR     Row Name 02/09/21 1302          Activities of Daily Living    BADL Assessment/Intervention  grooming;lower body dressing  -MR     Row Name 02/09/21 1302          Grooming Assessment/Training    [place] Level (Grooming)  grooming skills;set up;supervision;verbal cues  -MR     Row Name 02/09/21 1302          Lower Body Dressing Assessment/Training    [place] Level (Lower Body Dressing)  lower body dressing skills;doff;don;socks;maximum assist (25% patient effort)  -MR     Position (Lower Body Dressing)  edge of bed sitting  -MR       User Key  (r) = Recorded By, (t) = Taken By, (c) = Cosigned By    Initials Name Provider Type    Julia Vincent, OT Occupational Therapist        Obj/Interventions     Row Name 02/09/21 1306          Range of Motion Comprehensive    Comment, General Range of Motion  AROM with bilateral shoulders limited by approx. 50%; Bilateral elbows, wrists and hands WFL  -     Row Name 02/09/21 1306          Strength Comprehensive (MMT)    Comment, General Manual Muscle Testing (MMT) Assessment  BUE >/= 3/5  -MR     Row Name 02/09/21 1306          Shoulder (Therapeutic Exercise)    Shoulder (Therapeutic Exercise)  AROM (active range of motion)  -MR     Shoulder AROM (Therapeutic Exercise)  bilateral;flexion;horizontal aBduction/aDduction;scapular protraction;scapular retraction;sitting  -MR     Row Name 02/09/21 1306          Elbow/Forearm (Therapeutic  Exercise)    Elbow/Forearm (Therapeutic Exercise)  AROM (active range of motion)  -MR     Elbow/Forearm AROM (Therapeutic Exercise)  bilateral;flexion;extension;supination;pronation;sitting  -MR     Row Name 02/09/21 1306          Hand (Therapeutic Exercise)    Hand (Therapeutic Exercise)  AROM (active range of motion)  -MR     Hand AROM/AAROM (Therapeutic Exercise)  bilateral;finger flexion;finger extension  -MR     Row Name 02/09/21 1306          Balance    Balance Assessment  sitting static balance  -MR     Static Sitting Balance  -- supervision sitting EOB  -MR     Static Standing Balance  mild impairment;supported;standing  -MR     Row Name 02/09/21 1306          Therapeutic Exercise    Therapeutic Exercise  shoulder;elbow/forearm;hand  -MR       User Key  (r) = Recorded By, (t) = Taken By, (c) = Cosigned By    Initials Name Provider Type    MR Coto Mei, OT Occupational Therapist        Goals/Plan     Row Name 02/09/21 1517          Transfer Goal 1 (OT)    Activity/Assistive Device (Transfer Goal 1, OT)  toilet;bed-to-chair/chair-to-bed  -MR     [place] Level/Cues Needed (Transfer Goal 1, OT)  minimum assist (75% or more patient effort)  -MR     Time Frame (Transfer Goal 1, OT)  long term goal (LTG);by discharge  -     Row Name 02/09/21 1517          Dressing Goal 1 (OT)    Activity/Device (Dressing Goal 1, OT)  dressing skills, all  -MR     [place]/Cues Needed (Dressing Goal 1, OT)  minimum assist (75% or more patient effort);moderate assist (50-74% patient effort)  -MR     Time Frame (Dressing Goal 1, OT)  long term goal (LTG);by discharge  -     Row Name 02/09/21 1517          Toileting Goal 1 (OT)    Activity/Device (Toileting Goal 1, OT)  toileting skills, all  -MR     [place] Level/Cues Needed (Toileting Goal 1, OT)  minimum assist (75% or more patient effort)  -MR     Time Frame (Toileting Goal 1, OT)  long term goal (LTG);by discharge  -     Row Name 02/09/21 1517           Therapy Assessment/Plan (OT)    Planned Therapy Interventions (OT)  activity tolerance training;BADL retraining;functional balance retraining;occupation/activity based interventions;patient/caregiver education/training;ROM/therapeutic exercise;strengthening exercise;transfer/mobility retraining  -MR       User Key  (r) = Recorded By, (t) = Taken By, (c) = Cosigned By    Initials Name Provider Type    Julia Vincent, OT Occupational Therapist        Clinical Impression     Row Name 02/09/21 4975          Pain Scale: FACES Pre/Post-Treatment    Pain: FACES Scale, Pretreatment  0-->no hurt  -MR     Posttreatment Pain Rating  0-->no hurt  -MR     Row Name 02/09/21 1303          Plan of Care Review    Plan of Care Reviewed With  patient  -MR     Outcome Summary  Pt is an 85 y.o. male admitted with progressive weakness and increasing fatigue over the course of last few months. Pt lives alone and is independent with ADLs at baseline. Upon assessment pt requires mod assist of two for sit to stand, takes 2 steps away from the bed with min to mod assist of 2. Pt noted with impaired activity tolerance, impaired self care skills, impaired functional transfers. Recommend skilled OT services to increase safety and independence with performance of ADLs.  -MR     Row Name 02/09/21 3314          Therapy Assessment/Plan (OT)    Rehab Potential (OT)  good, to achieve stated therapy goals  -MR     Criteria for Skilled Therapeutic Interventions Met (OT)  yes;meets criteria;skilled treatment is necessary  -MR     Therapy Frequency (OT)  5 times/wk  -MR     Row Name 02/09/21 1300          Therapy Plan Review/Discharge Plan (OT)    Anticipated Discharge Disposition (OT)  skilled nursing facility;inpatient rehabilitation facility  -MR     Row Name 02/09/21 1251          Positioning and Restraints    Pre-Treatment Position  in bed  -MR     Post Treatment Position  bed  -MR     In Bed  supine;notified nsg;call light within  reach;encouraged to call for assist;exit alarm on  -MR       User Key  (r) = Recorded By, (t) = Taken By, (c) = Cosigned By    Initials Name Provider Type    Julia Vincent, OT Occupational Therapist        Outcome Measures     Row Name 02/09/21 1519          How much help from another is currently needed...    Putting on and taking off regular lower body clothing?  2  -MR     Bathing (including washing, rinsing, and drying)  2  -MR     Toileting (which includes using toilet bed pan or urinal)  2  -MR     Putting on and taking off regular upper body clothing  2  -MR     Taking care of personal grooming (such as brushing teeth)  3  -MR     Eating meals  3  -MR     AM-PAC 6 Clicks Score (OT)  14  -MR     Row Name 02/09/21 1519          Functional Assessment    Outcome Measure Options  AM-PAC 6 Clicks Daily Activity (OT)  -MR       User Key  (r) = Recorded By, (t) = Taken By, (c) = Cosigned By    Initials Name Provider Type    Julia Vincetn, OT Occupational Therapist        Occupational Therapy Education                 Title: PT OT SLP Therapies (In Progress)     Topic: Occupational Therapy (In Progress)     Point: ADL training (Done)     Description:   Instruct learner(s) on proper safety adaptation and remediation techniques during self care or transfers.   Instruct in proper use of assistive devices.              Learning Progress Summary           Patient Acceptance, E,TB, VU by MR at 2/9/2021 1519    Comment: Educated on role of OT; OT POC.                   Point: Home exercise program (Not Started)     Description:   Instruct learner(s) on appropriate technique for monitoring, assisting and/or progressing therapeutic exercises/activities.              Learner Progress:  Not documented in this visit.          Point: Precautions (Not Started)     Description:   Instruct learner(s) on prescribed precautions during self-care and functional transfers.              Learner Progress:  Not documented in  this visit.          Point: Body mechanics (Not Started)     Description:   Instruct learner(s) on proper positioning and spine alignment during self-care, functional mobility activities and/or exercises.              Learner Progress:  Not documented in this visit.                      User Key     Initials Effective Dates Name Provider Type Discipline    MR 06/22/16 -  SallyJulia puckettMei, OT Occupational Therapist OT              OT Recommendation and Plan  Planned Therapy Interventions (OT): activity tolerance training, BADL retraining, functional balance retraining, occupation/activity based interventions, patient/caregiver education/training, ROM/therapeutic exercise, strengthening exercise, transfer/mobility retraining  Therapy Frequency (OT): 5 times/wk  Plan of Care Review  Plan of Care Reviewed With: patient  Outcome Summary: Pt is an 85 y.o. male admitted with progressive weakness and increasing fatigue over the course of last few months. Pt lives alone and is independent with ADLs at baseline. Upon assessment pt requires mod assist of two for sit to stand, takes 2 steps away from the bed with min to mod assist of 2. Pt noted with impaired activity tolerance, impaired self care skills, impaired functional transfers. Recommend skilled OT services to increase safety and independence with performance of ADLs.     Time Calculation:   Time Calculation- OT     Row Name 02/09/21 1520             Time Calculation- OT    OT Start Time  1240  -MR      OT Stop Time  1310  -MR      OT Time Calculation (min)  30 min  -MR      Total Timed Code Minutes- OT  23 minute(s)  -MR      OT Received On  02/09/21  -MR      OT - Next Appointment  02/10/21  -MR      OT Goal Re-Cert Due Date  02/23/21  -        User Key  (r) = Recorded By, (t) = Taken By, (c) = Cosigned By    Initials Name Provider Type    Julia Vincent Frances, OT Occupational Therapist        Therapy Charges for Today     Code Description Service Date Service  Provider Modifiers Qty    24365382066 HC OT EVAL MOD COMPLEXITY 2 2/9/2021 Julia Coto, OT GO 1    39091599515 HC OT THER PROC EA 15 MIN 2/9/2021 Julia Coto OT GO 1    25443352589 HC OT SELF CARE/MGMT/TRAIN EA 15 MIN 2/9/2021 Julia Coto OT GO 1               Julia Coto OT  2/9/2021

## 2021-02-09 NOTE — DISCHARGE PLACEMENT REQUEST
"Gabe Lopez (85 y.o. Male)     Date of Birth Social Security Number Address Home Phone MRN    1935  [id number] [facility] [address] [phone] [id number]    Catholic Marital Status          Muslim        Admission Date Admission Type Admitting Provider Attending Provider Department, Room/Bed    1/29/21 Emergency Kodak Santana MD Snyder, Perry, MD [address], [address]    Discharge Date Discharge Disposition Discharge Destination                       Attending Provider: Clark Silva MD    Allergies: Amiodarone, Percocet [Oxycodone-acetaminophen]    Isolation: None   Infection: None   Code Status: CPR    Ht: 182 cm (71.65\")   Wt: 102 kg (224 lb 13.9 oz)    Admission Cmt: None   Principal Problem: Symptomatic anemia [D64.9]                 Active Insurance as of 1/29/2021     Primary Coverage     Payor Plan Insurance Group Employer/Plan Group    MEDICARE MEDICARE A & B      Payor Plan Address Payor Plan Phone Number Payor Plan Fax Number Effective Dates    [address] [phone]  9/1/2002 - None Entered    [facility] [id number]       Subscriber Name Subscriber Birth Date Member ID       GABE LOPEZ 1935 [id number]           Secondary Coverage     Payor Plan Insurance Group Employer/Plan Group    [facility] SUPP [id number]     Payor Plan Address Payor Plan Phone Number Payor Plan Fax Number Effective Dates    [address]   12/1/2016 - None Entered    [facility] [id number]       Subscriber Name Subscriber Birth Date Member ID       GABE LOPEZ 1935 [id number]                 Emergency Contacts      (Rel.) Home Phone Work Phone Mobile Phone    Sisi Sanchez (Daughter) -- [phone]              "

## 2021-02-09 NOTE — PROGRESS NOTES
Pod 5  Nauseated with sips  Af vss  Abd soft  Ostomy ppp    A/p  Stay clears  Keep [place] bc non ambulatory

## 2021-02-10 LAB
ANION GAP SERPL CALCULATED.3IONS-SCNC: 7.2 MMOL/L (ref 5–15)
BUN SERPL-MCNC: 22 MG/DL (ref 8–23)
BUN/CREAT SERPL: 22.7 (ref 7–25)
CALCIUM SPEC-SCNC: 8.4 MG/DL (ref 8.6–10.5)
CHLORIDE SERPL-SCNC: 102 MMOL/L (ref 98–107)
CO2 SERPL-SCNC: 31.8 MMOL/L (ref 22–29)
CREAT SERPL-MCNC: 0.97 MG/DL (ref 0.76–1.27)
DEPRECATED RDW RBC AUTO: 54.7 FL (ref 37–54)
ERYTHROCYTE [DISTWIDTH] IN BLOOD BY AUTOMATED COUNT: 19.6 % (ref 12.3–15.4)
GFR SERPL CREATININE-BSD FRML MDRD: 74 ML/MIN/1.73
GLUCOSE BLDC GLUCOMTR-MCNC: 153 MG/DL (ref 70–130)
GLUCOSE BLDC GLUCOMTR-MCNC: 154 MG/DL (ref 70–130)
GLUCOSE BLDC GLUCOMTR-MCNC: 172 MG/DL (ref 70–130)
GLUCOSE BLDC GLUCOMTR-MCNC: 189 MG/DL (ref 70–130)
GLUCOSE SERPL-MCNC: 156 MG/DL (ref 65–99)
HCT VFR BLD AUTO: 30.5 % (ref 37.5–51)
HGB BLD-MCNC: 9.2 G/DL (ref 13–17.7)
MCH RBC QN AUTO: 23.8 PG (ref 26.6–33)
MCHC RBC AUTO-ENTMCNC: 30.2 G/DL (ref 31.5–35.7)
MCV RBC AUTO: 79 FL (ref 79–97)
NT-PROBNP SERPL-MCNC: 3345 PG/ML (ref 0–1800)
PLATELET # BLD AUTO: 138 10*3/MM3 (ref 140–450)
PMV BLD AUTO: 9.3 FL (ref 6–12)
POTASSIUM SERPL-SCNC: 3.6 MMOL/L (ref 3.5–5.2)
RBC # BLD AUTO: 3.86 10*6/MM3 (ref 4.14–5.8)
SODIUM SERPL-SCNC: 141 MMOL/L (ref 136–145)
WBC # BLD AUTO: 6.93 10*3/MM3 (ref 3.4–10.8)

## 2021-02-10 PROCEDURE — 85027 COMPLETE CBC AUTOMATED: CPT | Performed by: STUDENT IN AN ORGANIZED HEALTH CARE EDUCATION/TRAINING PROGRAM

## 2021-02-10 PROCEDURE — 97110 THERAPEUTIC EXERCISES: CPT

## 2021-02-10 PROCEDURE — 83880 ASSAY OF NATRIURETIC PEPTIDE: CPT | Performed by: STUDENT IN AN ORGANIZED HEALTH CARE EDUCATION/TRAINING PROGRAM

## 2021-02-10 PROCEDURE — 63710000001 INSULIN LISPRO (HUMAN) PER 5 UNITS: Performed by: COLON & RECTAL SURGERY

## 2021-02-10 PROCEDURE — 25010000002 FUROSEMIDE PER 20 MG: Performed by: STUDENT IN AN ORGANIZED HEALTH CARE EDUCATION/TRAINING PROGRAM

## 2021-02-10 PROCEDURE — 94799 UNLISTED PULMONARY SVC/PX: CPT

## 2021-02-10 PROCEDURE — 82962 GLUCOSE BLOOD TEST: CPT

## 2021-02-10 PROCEDURE — 80048 BASIC METABOLIC PNL TOTAL CA: CPT | Performed by: STUDENT IN AN ORGANIZED HEALTH CARE EDUCATION/TRAINING PROGRAM

## 2021-02-10 RX ORDER — HYDRALAZINE HYDROCHLORIDE 25 MG/1
25 TABLET, FILM COATED ORAL 3 TIMES DAILY
Status: DISCONTINUED | OUTPATIENT
Start: 2021-02-10 | End: 2021-02-12 | Stop reason: HOSPADM

## 2021-02-10 RX ORDER — FUROSEMIDE 10 MG/ML
40 INJECTION INTRAMUSCULAR; INTRAVENOUS EVERY 12 HOURS
Status: DISCONTINUED | OUTPATIENT
Start: 2021-02-10 | End: 2021-02-11

## 2021-02-10 RX ADMIN — INSULIN LISPRO 2 UNITS: 100 INJECTION, SOLUTION INTRAVENOUS; SUBCUTANEOUS at 12:21

## 2021-02-10 RX ADMIN — HYDRALAZINE HYDROCHLORIDE 25 MG: 25 TABLET, FILM COATED ORAL at 16:25

## 2021-02-10 RX ADMIN — AMIODARONE HYDROCHLORIDE 200 MG: 200 TABLET ORAL at 08:36

## 2021-02-10 RX ADMIN — TAMSULOSIN HYDROCHLORIDE 0.8 MG: 0.4 CAPSULE ORAL at 08:36

## 2021-02-10 RX ADMIN — FAMOTIDINE 20 MG: 10 INJECTION INTRAVENOUS at 21:52

## 2021-02-10 RX ADMIN — INSULIN LISPRO 2 UNITS: 100 INJECTION, SOLUTION INTRAVENOUS; SUBCUTANEOUS at 08:37

## 2021-02-10 RX ADMIN — ACETAMINOPHEN 650 MG: 325 TABLET, FILM COATED ORAL at 23:35

## 2021-02-10 RX ADMIN — LISINOPRIL 20 MG: 20 TABLET ORAL at 08:36

## 2021-02-10 RX ADMIN — RIVAROXABAN 20 MG: 20 TABLET, FILM COATED ORAL at 08:36

## 2021-02-10 RX ADMIN — METOPROLOL SUCCINATE 25 MG: 25 TABLET, EXTENDED RELEASE ORAL at 08:36

## 2021-02-10 RX ADMIN — FUROSEMIDE 40 MG: 10 INJECTION, SOLUTION INTRAMUSCULAR; INTRAVENOUS at 11:11

## 2021-02-10 RX ADMIN — FAMOTIDINE 20 MG: 10 INJECTION INTRAVENOUS at 11:18

## 2021-02-10 RX ADMIN — INSULIN LISPRO 2 UNITS: 100 INJECTION, SOLUTION INTRAVENOUS; SUBCUTANEOUS at 18:04

## 2021-02-10 RX ADMIN — HYDRALAZINE HYDROCHLORIDE 25 MG: 25 TABLET, FILM COATED ORAL at 21:52

## 2021-02-10 RX ADMIN — FUROSEMIDE 40 MG: 10 INJECTION, SOLUTION INTRAMUSCULAR; INTRAVENOUS at 23:35

## 2021-02-10 RX ADMIN — ACETAMINOPHEN 650 MG: 325 TABLET, FILM COATED ORAL at 18:04

## 2021-02-10 RX ADMIN — ACETAMINOPHEN 650 MG: 325 TABLET, FILM COATED ORAL at 06:10

## 2021-02-10 RX ADMIN — ACETAMINOPHEN 650 MG: 325 TABLET, FILM COATED ORAL at 12:21

## 2021-02-10 RX ADMIN — ACETAMINOPHEN 650 MG: 325 TABLET, FILM COATED ORAL at 01:53

## 2021-02-10 RX ADMIN — HYDRALAZINE HYDROCHLORIDE 25 MG: 25 TABLET, FILM COATED ORAL at 12:41

## 2021-02-10 NOTE — THERAPY TREATMENT NOTE
Patient Name: Osvaldo Lopez  : 1935    MRN: [id number]                              Today's Date: 2/10/2021       Admit Date: 2021    Visit Dx:     ICD-10-CM ICD-9-CM   1. Symptomatic anemia  D64.9 285.9   2. Other fatigue  R53.83 780.79   3. Elevated troponin  R77.8 790.6   4. Colonic mass  K63.89 569.89     Patient Active Problem List   Diagnosis   • Complete rotator cuff tear of left shoulder   • Abnormal finding on thyroid function test   • Abdominal aortic aneurysm (CMS/HCC)   • Benign prostatic hyperplasia   • Essential hypertension   • Hyperlipidemia   • Shoulder pain   • Lumbar radiculopathy   • DM (diabetes mellitus), type 2 with complications (CMS/HCC)   • OA (osteoarthritis) of knee   • Tear of right rotator cuff   • Spinal stenosis of lumbar region with neurogenic claudication   • Eyebrow ptosis   • Pseudophakia   • Nonrheumatic aortic valve stenosis   • Atrial flutter with rapid ventricular response (CMS/HCC)   • Right arm pain   • Leg weakness, bilateral   • Typical atrial flutter (CMS/HCC)   • Diabetic peripheral neuropathy (CMS/HCC)   • Muscle weakness (generalized)   • Pressure injury of left buttock, stage 1   • Chronic low back pain with sciatica   • 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CMS/HCC)   • Pressure injury of buttock, stage 1   • Anemia   • Symptomatic anemia   • Iron deficiency anemia   • Chronic anticoagulation   • CKD (chronic kidney disease)   • CRISTOBAL (acute kidney injury) (CMS/HCC)   • Colonic mass     Past Medical History:   Diagnosis Date   • Abnormal thyroid blood test    • Aneurysm of abdominal aorta (CMS/HCC)    • Arthritis    • Bleeding disorder (CMS/HCC)    • BPH (benign prostatic hyperplasia)    • Bruises easily    • Bulging of thoracic intervertebral disc    • Chronic edema    • Coronary artery disease    • Diverticular disease    • Enlarged prostate without lower urinary tract symptoms (luts)    • Essential hypertension    • Heart attack (CMS/HCC)    • HL (hearing  loss)    • Hyperactivity of bladder    • Hyperlipidemia    • Left shoulder pain    • Lumbar radiculopathy 2016    wears back brace at times following back surgery   • Muscle weakness (generalized)    • Nonrheumatic aortic valve stenosis     mild 1/2018    • Osteoarthritis    • PAF (paroxysmal atrial fibrillation) (CMS/HCC)    • Polyuria    • Recurrent falls    • Screening      stop bang scale 5   • Syncope    • Torn rotator cuff     left   • Type 2 diabetes mellitus (CMS/HCC)    • Urinary frequency      Past Surgical History:   Procedure Laterality Date   • CARDIAC ELECTROPHYSIOLOGY PROCEDURE N/A 6/6/2019    Procedure: Ablation atrial flutter;  Surgeon: Miller Talbot MD;  Location: [facility] CATH INVASIVE LOCATION;  Service: Cardiovascular   • CARDIAC SURGERY      CABGX5 (1989)   • CARDIOVERSION  04/15/2019    Dr. Miller Talbot   • CATARACT EXTRACTION Bilateral 1997   • COLON RESECTION N/A 2/3/2021    Procedure: LAPAROSCOPIC EXTENDED  RIGHT COLECTOMY WITH DAVINCI ROBOT;  Surgeon: Xavi Napoles MD;  Location: [facility] MAIN OR;  Service: DaVinci;  Laterality: N/A;   • COLONOSCOPY N/A 1/31/2021    Procedure: COLONOSCOPY TO CECUM  WITH ORISE INJECTION, BIOPSIES, COLD BIOPSY POLYPECTOMY, COLD AND HOT SNARE POLYPECTOMIES, TATTOO, AND CLIP X3;  Surgeon: Jey Barrios MD;  Location: [facility] ENDOSCOPY;  Service: Gastroenterology;  Laterality: N/A;  PRE- IRON DEFICIENCY ANEMIA  POST- COLON MASS, COLON POLYPS, DIVERTICULOSIS, HEMORRHOIDS   • CORONARY ARTERY BYPASS GRAFT     • CYST REMOVAL      FROM BACK   • ENDOSCOPY N/A 1/31/2021    Procedure: ESOPHAGOGASTRODUODENOSCOPY WITH BIOPSIES;  Surgeon: Jey Barrios MD;  Location: [facility] ENDOSCOPY;  Service: Gastroenterology;  Laterality: N/A;  PRE- IRON DEFICIENCY ANEMIA  POST- NORMAL   • GALLBLADDER SURGERY  1989   • HERNIA REPAIR Left     inquinal times 3  last one in 2003   • KNEE CARTILAGE SURGERY Left 1970's   • LUMBAR DISCECTOMY FUSION INSTRUMENTATION N/A 4/11/2016     Procedure: lumbar laminectomy L4-5 and fusion with instrumentation;  Surgeon: Ran Kline MD;  Location: [facility] OR;  Service:    • LUMBAR DISCECTOMY FUSION INSTRUMENTATION N/A 1/15/2018    Procedure: L2-3, L3-4 laminectomy and fusion with instrumentation and removal of implants L4 5.;  Surgeon: Ran Kline MD;  Location: [facility] MAIN OR;  Service:    • MA TOTAL KNEE ARTHROPLASTY Left 11/14/2016    Procedure: TOTAL KNEE ARTHROPLASTY;  Surgeon: Dontrell Arellano MD;  Location: [facility] OR;  Service: Orthopedics     General Information     Row Name 02/10/21 1519          Physical Therapy Time and Intention    Document Type  therapy note (daily note)  -     Mode of Treatment  physical therapy  -     Row Name 02/10/21 1519          General Information    Existing Precautions/Restrictions  fall  -       User Key  (r) = Recorded By, (t) = Taken By, (c) = Cosigned By    Initials Name Provider Type    EJ Debra Crabtree, PT Physical Therapist        Mobility     Row Name 02/10/21 1520          Bed Mobility    Supine-Sit [place] (Bed Mobility)  verbal cues;minimum assist (75% patient effort);2 person assist  -EJ     Sit-Supine [place] (Bed Mobility)  not tested  -EJ     Assistive Device (Bed Mobility)  bed rails;head of bed elevated  -     Row Name 02/10/21 1520          Bed-Chair Transfer    Bed-Chair [place] (Transfers)  verbal cues;nonverbal cues (demo/gesture);minimum assist (75% patient effort);2 person assist  -EJ     Row Name 02/10/21 1520          Sit-Stand Transfer    Sit-Stand [place] (Transfers)  verbal cues;nonverbal cues (demo/gesture);minimum assist (75% patient effort);2 person assist  -EJ     Assistive Device (Sit-Stand Transfers)  walker, 4-wheeled  -EJ     Row Name 02/10/21 1520          Gait/Stairs (Locomotion)    [place] Level (Gait)  verbal cues;nonverbal cues (demo/gesture);minimum assist (75% patient effort);2 person assist  -EJ     Assistive Device  (Gait)  walker, 4-wheeled  -EJ     Distance in Feet (Gait)  4  -EJ     Deviations/Abnormal Patterns (Gait)  oseas decreased;weight shifting decreased;stride length decreased  -EJ     Bilateral Gait Deviations  forward flexed posture;heel strike decreased;weight shift ability decreased  -EJ     Comment (Gait/Stairs)  cues for upright posture  -EJ       User Key  (r) = Recorded By, (t) = Taken By, (c) = Cosigned By    Initials Name Provider Type    Debra Chirinos, PT Physical Therapist        Obj/Interventions    No documentation.       Goals/Plan    No documentation.       Clinical Impression     Row Name 02/10/21 1523          Pain Scale: Numbers Pre/Post-Treatment    Pretreatment Pain Rating  0/10 - no pain  -EJ     Posttreatment Pain Rating  0/10 - no pain  -EJ     Row Name 02/10/21 1523          Plan of Care Review    Plan of Care Reviewed With  patient  -EJ     Progress  improving  -EJ     Outcome Summary  Pt seen for PT this afternoon. He is agreeable to get up into the chair, but declines further ambulation. Pt seems to be moving with greater ease today, requiring min A with bed mobility and transfers. He did take several steps in room to assist with transfer. Will continue to progress as tolerated.  -EJ     Row Name 02/10/21 1523          Positioning and Restraints    Post Treatment Position  chair  -EJ     In Chair  notified nsg;reclined;call light within reach;encouraged to call for assist;exit alarm on  -EJ       User Key  (r) = Recorded By, (t) = Taken By, (c) = Cosigned By    Initials Name Provider Type    Debra Chirinos, PT Physical Therapist        Outcome Measures     Row Name 02/10/21 1530          How much help from another person do you currently need...    Turning from your back to your side while in flat bed without using bedrails?  3  -EJ     Moving from lying on back to sitting on the side of a flat bed without bedrails?  3  -EJ     Moving to and from a bed to a chair (including  a wheelchair)?  3  -EJ     Standing up from a chair using your arms (e.g., wheelchair, bedside chair)?  3  -EJ     Climbing 3-5 steps with a railing?  1  -EJ     To walk in hospital room?  2  -EJ     AM-PAC 6 Clicks Score (PT)  15  -EJ       User Key  (r) = Recorded By, (t) = Taken By, (c) = Cosigned By    Initials Name Provider Type    Debra Chirinos, PT Physical Therapist        Physical Therapy Education                 Title: PT OT SLP Therapies (In Progress)     Topic: Physical Therapy (Done)     Point: Mobility training (Done)     Learning Progress Summary           Patient Acceptance, E,TB,D, VU,NR by SCOTT at 2/10/2021 1531    Acceptance, E,TB,D, VU,NR by EJ at 2/9/2021 1242    Acceptance, E,TB,D, VU,NR by SV at 2/8/2021 1516    Acceptance, E,TB,D, VU,NR by SV at 2/8/2021 1515    Acceptance, E, VU by AP at 2/7/2021 1226    Acceptance, E, VU,NR by AP at 2/6/2021 1048    Acceptance, E, VU,NR by KH at 2/5/2021 1037    Acceptance, E,TB, VU,DU by LB at 1/31/2021 1338    Acceptance, E,TB,D, VU,DU by LB at 1/30/2021 0955                   Point: Home exercise program (Done)     Learning Progress Summary           Patient Acceptance, E,TB,D, VU,NR by SCOTT at 2/9/2021 1242    Acceptance, E,TB,D, VU,NR by SV at 2/8/2021 1516    Acceptance, E,TB,D, VU,NR by SV at 2/8/2021 1515    Acceptance, E, VU by AP at 2/7/2021 1226    Acceptance, E, VU,NR by AP at 2/6/2021 1048    Acceptance, E, VU,NR by KH at 2/5/2021 1037    Acceptance, E,TB, VU,DU by LB at 1/31/2021 1338    Acceptance, E,TB,D, VU,DU by LB at 1/30/2021 0955                   Point: Body mechanics (Done)     Learning Progress Summary           Patient Acceptance, E,TB,D, VU,NR by EJ at 2/9/2021 1242    Acceptance, E,TB,D, VU,NR by SV at 2/8/2021 1516    Acceptance, E,TB,D, VU,NR by SV at 2/8/2021 1515    Acceptance, E, VU by AP at 2/7/2021 1226    Acceptance, E, VU,NR by AP at 2/6/2021 1048    Acceptance, E, VU,NR by KH at 2/5/2021 1037    Acceptance, E,TB,  VU,DU by LB at 1/31/2021 1338    Acceptance, E,TB,D, VU,DU by LB at 1/30/2021 0955                   Point: Precautions (Done)     Learning Progress Summary           Patient Acceptance, E,TB,D, VU,NR by EJ at 2/9/2021 1242    Acceptance, E,TB,D, VU,NR by SV at 2/8/2021 1516    Acceptance, E,TB,D, VU,NR by SV at 2/8/2021 1515    Acceptance, E, VU by AP at 2/7/2021 1226    Acceptance, E, VU,NR by AP at 2/6/2021 1048    Acceptance, E, VU,NR by KH at 2/5/2021 1037    Acceptance, E,TB, VU,DU by LB at 1/31/2021 1338    Acceptance, E,TB,D, VU,DU by LB at 1/30/2021 0955                               User Key     Initials Effective Dates Name Provider Type Discipline     02/05/19 -  Kaya Orosco, PT Physical Therapist PT    EJ 04/03/18 -  Debra Crabtree, PT Physical Therapist PT    SV 04/03/18 -  Sabina Middleton, PT Physical Therapist PT    LB 08/09/20 -  Aditi Chaudhary, PT Physical Therapist PT    AP 09/24/20 -  Karli Montgomery, PT Physical Therapist PT              PT Recommendation and Plan     Plan of Care Reviewed With: patient  Progress: improving  Outcome Summary: Pt seen for PT this afternoon. He is agreeable to get up into the chair, but declines further ambulation. Pt seems to be moving with greater ease today, requiring min A with bed mobility and transfers. He did take several steps in room to assist with transfer. Will continue to progress as tolerated.     Time Calculation:   PT Charges     Row Name 02/10/21 1532             Time Calculation    Start Time  1436  -EJ      Stop Time  1453  -EJ      Time Calculation (min)  17 min  -EJ      PT Received On  02/10/21  -EJ      PT - Next Appointment  02/11/21  -EJ        User Key  (r) = Recorded By, (t) = Taken By, (c) = Cosigned By    Initials Name Provider Type    EJ Debra Crabtree, PT Physical Therapist        Therapy Charges for Today     Code Description Service Date Service Provider Modifiers Qty    71344747757 HC PT THER PROC EA 15 MIN  2/9/2021 Debra Crabtree, PT GP 1    05217625895 HC PT THER SUPP EA 15 MIN 2/9/2021 Debra Crabtree, PT GP 1    57940607890 HC PT THER PROC EA 15 MIN 2/10/2021 Debra Crabtree, PT GP 1    86697040281 HC PT THER SUPP EA 15 MIN 2/10/2021 Debra Crabtree, PT GP 1          PT G-Codes  Outcome Measure Options: AM-PAC 6 Clicks Daily Activity (OT)  AM-PAC 6 Clicks Score (PT): 15  AM-PAC 6 Clicks Score (OT): 14    Debra Crabtree, PT  2/10/2021     [place] Counseling:  I discussed with the patient the risks of imiquimod including but not limited to erythema, scaling, itching, weeping, crusting, and pain.  Patient understands that the inflammatory response to imiquimod is variable from person to person and was educated regarded proper titration schedule.  If flu-like symptoms develop, patient knows to discontinue the medication and contact us.

## 2021-02-10 NOTE — PLAN OF CARE
Problem: Adult Inpatient Plan of Care  Goal: Plan of Care Review  Outcome: Ongoing, Progressing  Flowsheets (Taken 2/10/2021 8658)  Progress: improving  Plan of Care Reviewed With: patient  Outcome Summary: VSS. Got up to chair with PT. Attempted to wean O2 throughout day but frequently had to put back on 1L due to O2 dropping. Lasix given. Possible d/c to [facility].

## 2021-02-10 NOTE — PLAN OF CARE
Goal Outcome Evaluation:  Plan of Care Reviewed With: patient  Progress: improving  Outcome Summary: Pt seen for PT this afternoon. He is agreeable to get up into the chair, but declines further ambulation. Pt seems to be moving with greater ease today, requiring min A with bed mobility and transfers. He did take several steps in room to assist with transfer. Will continue to progress as tolerated.  Patient was intermittently wearing a face mask during this therapy encounter. Therapist used appropriate personal protective equipment including eye protection, mask, and gloves.  Mask used was standard procedure mask. Appropriate PPE was worn during the entire therapy session. Hand hygiene was completed before and after therapy session. Patient is not in enhanced droplet precautions.

## 2021-02-10 NOTE — PLAN OF CARE
Goal Outcome Evaluation:      Patient's vitals WNL, patient has rested throughout the night with no complaints of pain or nausea, patient's lap sites are CDI,no other concerns at this time,RN and staff will continue to monitor care.

## 2021-02-10 NOTE — PROGRESS NOTES
Name: Osvaldo Lopez ADMIT: 2021   : 1935  PCP: Caio Rodríguez Jr., MD    MRN: [id number] LOS: 10 days   AGE/SEX: 85 y.o. male  ROOM: E4/1     Subjective   Subjective      Says he does not feel quite as well as yesterday, but is unable to be specific, and overall feels okay.  Denies any shortness of breath, but remains on 2 L.  Removed oxygen while he was in the room, and he got down to around 89%.  He endorses a cough, but no fever sweats chills.       Objective   Objective   Vital Signs  Temp:  [98.1 °F (36.7 °C)-99.3 °F (37.4 °C)] 98.7 °F (37.1 °C)  Heart Rate:  [86-93] 90  Resp:  [18-20] 18  BP: (149-157)/(70-89) 149/79  SpO2:  [92 %-96 %] 92 %  on  Flow (L/min):  [1-2] 1;   Device (Oxygen Therapy): room air  Body mass index is 30.79 kg/m².  Physical Exam  Constitutional:       Appearance: Normal appearance.   HENT:      Head: Normocephalic and atraumatic.   Neck:      Comments: No jvd  Cardiovascular:      Rate and Rhythm: Normal rate and regular rhythm.      Heart sounds: Normal heart sounds.   Pulmonary:      Effort: Pulmonary effort is normal. No respiratory distress.      Breath sounds: Rales present.      Comments: Crackles in right lower lung base predominantly  Abdominal:      General: Bowel sounds are normal.      Palpations: Abdomen is soft.      Comments: Laparoscopic incision sites noted to be well-healing.  Hypoactive bowel sounds   Musculoskeletal:         General: No swelling or tenderness.   Skin:     General: Skin is warm and dry.   Neurological:      Mental Status: He is alert and oriented to person, place, and time.   Psychiatric:         Mood and Affect: Mood normal.         Behavior: Behavior normal.         Results Review     I reviewed the patient's new clinical results.  Results from last 7 days   Lab Units 02/10/21  0528 21  0437 21  0547 21  1116   WBC 10*3/mm3 6.93 6.80 6.43 6.93   HEMOGLOBIN g/dL 9.2* 9.4* 9.7* 9.3*   PLATELETS 10*3/mm3 138* 130*  140 142     Results from last 7 days   Lab Units 02/10/21  0528 02/09/21  0437 02/08/21  0547 02/07/21  1116   SODIUM mmol/L 141 141 142 141   POTASSIUM mmol/L 3.6 3.3* 3.7 4.0   CHLORIDE mmol/L 102 102 106 107   CO2 mmol/L 31.8* 29.3* 26.1 20.6*   BUN mg/dL 22 27* 30* 34*   CREATININE mg/dL 0.97 1.05 1.16 1.33*   GLUCOSE mg/dL 156* 170* 157* 121*   Estimated Creatinine Clearance: 70.9 mL/min (by C-G formula based on SCr of 0.97 mg/dL).  Results from last 7 days   Lab Units 02/05/21  0356   ALBUMIN g/dL 3.30*   BILIRUBIN mg/dL 0.5   ALK PHOS U/L 70   AST (SGOT) U/L 18   ALT (SGPT) U/L 10     Results from last 7 days   Lab Units 02/10/21  0528 02/09/21  0437 02/08/21  0547 02/07/21  1116  02/05/21  0356  02/04/21  0512   CALCIUM mg/dL 8.4* 8.7 8.5* 8.4*   < > 8.0*   < > 7.7*   ALBUMIN g/dL  --   --   --   --   --  3.30*  --   --    MAGNESIUM mg/dL  --  1.7  --   --   --   --   --  2.2    < > = values in this interval not displayed.       COVID19   Date Value Ref Range Status   02/02/2021 Not Detected Not Detected - Ref. Range Final   01/29/2021 Not Detected Not Detected - Ref. Range Final     Glucose   Date/Time Value Ref Range Status   02/10/2021 0555 154 (H) 70 - 130 mg/dL Final   02/09/2021 2021 150 (H) 70 - 130 mg/dL Final   02/09/2021 1609 163 (H) 70 - 130 mg/dL Final   02/09/2021 1109 170 (H) 70 - 130 mg/dL Final   02/09/2021 0614 207 (H) 70 - 130 mg/dL Final   02/08/2021 2034 155 (H) 70 - 130 mg/dL Final   02/08/2021 1654 162 (H) 70 - 130 mg/dL Final       Adult Transthoracic Echo Complete W/ Cont if Necessary Per Protocol  · Left ventricular wall thickness is consistent with mild concentric   hypertrophy.  · Estimated left ventricular EF = 68% Left ventricular systolic function   is normal.  · Left ventricular diastolic function was normal.  · Estimated right ventricular systolic pressure from tricuspid   regurgitation is normal (<35 mmHg).  · Mild dilation of the aortic root is present.       Scheduled  Medications  acetaminophen, 650 mg, Oral, Q6H  amiodarone, 200 mg, Oral, Daily  famotidine, 20 mg, Intravenous, Q12H  furosemide, 40 mg, Intravenous, Q12H  insulin lispro, 0-9 Units, Subcutaneous, TID AC  lisinopril, 20 mg, Oral, Q24H  metoprolol succinate XL, 25 mg, Oral, Q24H  rivaroxaban, 20 mg, Oral, Daily  tamsulosin, 0.8 mg, Oral, Daily    Infusions   Diet  Diet Regular       Assessment/Plan     Active Hospital Problems    Diagnosis  POA   • **Symptomatic anemia [D64.9]  Yes   • Iron deficiency anemia [D50.9]  Unknown   • Chronic anticoagulation [Z79.01]  Not Applicable   • CKD (chronic kidney disease) [N18.9]  Unknown   • CRISTOBAL (acute kidney injury) (CMS/HCC) [N17.9]  Unknown   • Colonic mass [K63.89]  Unknown   • 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       85 y.o. male admitted with Symptomatic anemia.    Acute diastolic heart failure with hypoxic respiratory failure  Not sure why he is not off oxygen yet.  Still has crackles in his right lower lobe.  His chest x-ray a few days ago was not impressive, but his BNP was elevated and he had some JVD which is now gone.  Oxygen requirements been coming down with diuretics until yesterday.  I am going to recheck a BNP today and get a little bit more aggressive with the IV diuretics since his kidney function continues to improve.  If possible would like to get him off oxygen before is able to go home.  Strict I's and O's, daily weights.    Adenocarcinoma of the colon  Status post laparoscopic hemicolectomy on February 3, was in the ICU postoperatively for difficulty weaning off the ventilator. This is resolved.     Acute renal failure  Improving with diuretics    Postoperative hypotension/essential hypertension  This  appears resolved.  On a half dose of his home lisinopril.  Restart home hydralazine to assist with diuresis.    Paroxysmal atrial fibrillation  Currently on metoprolol XL as well as amiodarone and Xarelto for stroke prophylaxis    BPH  Tamsulosin    DM  Metformin held      · SCDs for DVT prophylaxis.  · Full code.  · Discussed with patient.  · Anticipate discharge anticipate d/c to SNF hopefully tomorrow.      Clark Silva MD  [facility]  02/10/21  10:51 EST    Patient was wearing facemask when I entered the room and throughout our encounter.  I wore protective equipment throughout this patient encounter including a face mask, gloves and protective eyewear.  Hand hygiene was performed before donning protective equipment and after removal when leaving the room.

## 2021-02-11 LAB
ANION GAP SERPL CALCULATED.3IONS-SCNC: 9.5 MMOL/L (ref 5–15)
BUN SERPL-MCNC: 22 MG/DL (ref 8–23)
BUN/CREAT SERPL: 21.4 (ref 7–25)
CALCIUM SPEC-SCNC: 8.9 MG/DL (ref 8.6–10.5)
CHLORIDE SERPL-SCNC: 97 MMOL/L (ref 98–107)
CO2 SERPL-SCNC: 35.5 MMOL/L (ref 22–29)
CREAT SERPL-MCNC: 1.03 MG/DL (ref 0.76–1.27)
DEPRECATED RDW RBC AUTO: 52.8 FL (ref 37–54)
ERYTHROCYTE [DISTWIDTH] IN BLOOD BY AUTOMATED COUNT: 19.4 % (ref 12.3–15.4)
GFR SERPL CREATININE-BSD FRML MDRD: 69 ML/MIN/1.73
GLUCOSE BLDC GLUCOMTR-MCNC: 151 MG/DL (ref 70–130)
GLUCOSE BLDC GLUCOMTR-MCNC: 156 MG/DL (ref 70–130)
GLUCOSE BLDC GLUCOMTR-MCNC: 169 MG/DL (ref 70–130)
GLUCOSE BLDC GLUCOMTR-MCNC: 222 MG/DL (ref 70–130)
GLUCOSE SERPL-MCNC: 159 MG/DL (ref 65–99)
HCT VFR BLD AUTO: 32 % (ref 37.5–51)
HGB BLD-MCNC: 9.7 G/DL (ref 13–17.7)
LAB AP CASE REPORT: NORMAL
LAB AP DIAGNOSIS COMMENT: NORMAL
LAB AP SYNOPTIC CHECKLIST: NORMAL
Lab: NORMAL
Lab: NORMAL
MCH RBC QN AUTO: 23.8 PG (ref 26.6–33)
MCHC RBC AUTO-ENTMCNC: 30.3 G/DL (ref 31.5–35.7)
MCV RBC AUTO: 78.4 FL (ref 79–97)
PATH REPORT.ADDENDUM SPEC: NORMAL
PATH REPORT.FINAL DX SPEC: NORMAL
PATH REPORT.GROSS SPEC: NORMAL
PLATELET # BLD AUTO: 177 10*3/MM3 (ref 140–450)
PMV BLD AUTO: 9.2 FL (ref 6–12)
POTASSIUM SERPL-SCNC: 3.4 MMOL/L (ref 3.5–5.2)
POTASSIUM SERPL-SCNC: 3.9 MMOL/L (ref 3.5–5.2)
RBC # BLD AUTO: 4.08 10*6/MM3 (ref 4.14–5.8)
SODIUM SERPL-SCNC: 142 MMOL/L (ref 136–145)
WBC # BLD AUTO: 8.74 10*3/MM3 (ref 3.4–10.8)

## 2021-02-11 PROCEDURE — 63710000001 INSULIN LISPRO (HUMAN) PER 5 UNITS: Performed by: COLON & RECTAL SURGERY

## 2021-02-11 PROCEDURE — 80048 BASIC METABOLIC PNL TOTAL CA: CPT | Performed by: STUDENT IN AN ORGANIZED HEALTH CARE EDUCATION/TRAINING PROGRAM

## 2021-02-11 PROCEDURE — 82962 GLUCOSE BLOOD TEST: CPT

## 2021-02-11 PROCEDURE — 99024 POSTOP FOLLOW-UP VISIT: CPT | Performed by: SURGERY

## 2021-02-11 PROCEDURE — 97110 THERAPEUTIC EXERCISES: CPT

## 2021-02-11 PROCEDURE — 84132 ASSAY OF SERUM POTASSIUM: CPT | Performed by: STUDENT IN AN ORGANIZED HEALTH CARE EDUCATION/TRAINING PROGRAM

## 2021-02-11 PROCEDURE — 85027 COMPLETE CBC AUTOMATED: CPT | Performed by: STUDENT IN AN ORGANIZED HEALTH CARE EDUCATION/TRAINING PROGRAM

## 2021-02-11 PROCEDURE — 97535 SELF CARE MNGMENT TRAINING: CPT

## 2021-02-11 RX ORDER — AMOXICILLIN AND CLAVULANATE POTASSIUM 875; 125 MG/1; MG/1
1 TABLET, FILM COATED ORAL EVERY 12 HOURS SCHEDULED
Status: DISCONTINUED | OUTPATIENT
Start: 2021-02-11 | End: 2021-02-12 | Stop reason: HOSPADM

## 2021-02-11 RX ADMIN — ACETAMINOPHEN 650 MG: 325 TABLET, FILM COATED ORAL at 12:20

## 2021-02-11 RX ADMIN — FAMOTIDINE 20 MG: 10 INJECTION INTRAVENOUS at 09:06

## 2021-02-11 RX ADMIN — FAMOTIDINE 20 MG: 10 INJECTION INTRAVENOUS at 21:48

## 2021-02-11 RX ADMIN — HYDRALAZINE HYDROCHLORIDE 25 MG: 25 TABLET, FILM COATED ORAL at 15:34

## 2021-02-11 RX ADMIN — LISINOPRIL 20 MG: 20 TABLET ORAL at 09:06

## 2021-02-11 RX ADMIN — TAMSULOSIN HYDROCHLORIDE 0.8 MG: 0.4 CAPSULE ORAL at 09:06

## 2021-02-11 RX ADMIN — AMIODARONE HYDROCHLORIDE 200 MG: 200 TABLET ORAL at 09:05

## 2021-02-11 RX ADMIN — POTASSIUM CHLORIDE 40 MEQ: 750 TABLET, EXTENDED RELEASE ORAL at 09:09

## 2021-02-11 RX ADMIN — RIVAROXABAN 20 MG: 20 TABLET, FILM COATED ORAL at 09:06

## 2021-02-11 RX ADMIN — HYDRALAZINE HYDROCHLORIDE 25 MG: 25 TABLET, FILM COATED ORAL at 21:48

## 2021-02-11 RX ADMIN — ACETAMINOPHEN 650 MG: 325 TABLET, FILM COATED ORAL at 17:05

## 2021-02-11 RX ADMIN — INSULIN LISPRO 4 UNITS: 100 INJECTION, SOLUTION INTRAVENOUS; SUBCUTANEOUS at 12:20

## 2021-02-11 RX ADMIN — ACETAMINOPHEN 650 MG: 325 TABLET, FILM COATED ORAL at 00:38

## 2021-02-11 RX ADMIN — METOPROLOL SUCCINATE 25 MG: 25 TABLET, EXTENDED RELEASE ORAL at 09:06

## 2021-02-11 RX ADMIN — AMOXICILLIN AND CLAVULANATE POTASSIUM 1 TABLET: 875; 125 TABLET, FILM COATED ORAL at 12:20

## 2021-02-11 RX ADMIN — HYDRALAZINE HYDROCHLORIDE 25 MG: 25 TABLET, FILM COATED ORAL at 09:06

## 2021-02-11 RX ADMIN — POTASSIUM CHLORIDE 40 MEQ: 750 TABLET, EXTENDED RELEASE ORAL at 17:05

## 2021-02-11 RX ADMIN — AMOXICILLIN AND CLAVULANATE POTASSIUM 1 TABLET: 875; 125 TABLET, FILM COATED ORAL at 21:48

## 2021-02-11 RX ADMIN — INSULIN LISPRO 2 UNITS: 100 INJECTION, SOLUTION INTRAVENOUS; SUBCUTANEOUS at 17:06

## 2021-02-11 RX ADMIN — ACETAMINOPHEN 650 MG: 325 TABLET, FILM COATED ORAL at 06:04

## 2021-02-11 RX ADMIN — INSULIN LISPRO 2 UNITS: 100 INJECTION, SOLUTION INTRAVENOUS; SUBCUTANEOUS at 09:05

## 2021-02-11 NOTE — PROGRESS NOTES
Continued Stay Note  [facility]     Patient Name: Osvaldo Lopez  MRN: [id number]  Today's Date: 2/11/2021    Admit Date: 1/29/2021    Discharge Plan     Row Name 02/11/21 1120       Plan    Plan  [facility] once medically ready and bed available.    Patient/Family in Agreement with Plan  yes    Plan Comments  Incision with drainage. Surgery opened incision.  Now getting dressing changes to wound TID. Called [facility] and notified pt would not be ready for D/C today but possibly tomorrow. Called daughter, Sisi Sanchez, and updated on D/C plan.  Byron Stockton RN-BC        Discharge Codes    No documentation.       Expected Discharge Date and Time     Expected Discharge Date Expected Discharge Time    Feb 4, 2021             Byron Stockton RN

## 2021-02-11 NOTE — PLAN OF CARE
Goal Outcome Evaluation:  Plan of Care Reviewed With: patient  Progress: improving  Outcome Summary: Pt doing well this afternoon and agreeable to PT. Pt demonstrating significant improvement with mobility today. He was able to ambulate approx 60 ft with his rollator and CGA/min A. No LOB noted. Pt with no complaints and demonstrating good progress with mobility.  Patient was intermittently wearing a face mask during this therapy encounter. Therapist used appropriate personal protective equipment including eye protection, mask, and gloves.  Mask used was standard procedure mask. Appropriate PPE was worn during the entire therapy session. Hand hygiene was completed before and after therapy session. Patient is not in enhanced droplet precautions.

## 2021-02-11 NOTE — THERAPY TREATMENT NOTE
Acute Care - Occupational Therapy Treatment Note  [facility]     Patient Name: Osvaldo Lopez  : 1935  MRN: [id number]  Today's Date: 2021             Admit Date: 2021       ICD-10-CM ICD-9-CM   1. Symptomatic anemia  D64.9 285.9   2. Other fatigue  R53.83 780.79   3. Elevated troponin  R77.8 790.6   4. Colonic mass  K63.89 569.89     Patient Active Problem List   Diagnosis   • Complete rotator cuff tear of left shoulder   • Abnormal finding on thyroid function test   • Abdominal aortic aneurysm (CMS/HCC)   • Benign prostatic hyperplasia   • Essential hypertension   • Hyperlipidemia   • Shoulder pain   • Lumbar radiculopathy   • DM (diabetes mellitus), type 2 with complications (CMS/HCC)   • OA (osteoarthritis) of knee   • Tear of right rotator cuff   • Spinal stenosis of lumbar region with neurogenic claudication   • Eyebrow ptosis   • Pseudophakia   • Nonrheumatic aortic valve stenosis   • Atrial flutter with rapid ventricular response (CMS/HCC)   • Right arm pain   • Leg weakness, bilateral   • Typical atrial flutter (CMS/HCC)   • Diabetic peripheral neuropathy (CMS/HCC)   • Muscle weakness (generalized)   • Pressure injury of left buttock, stage 1   • Chronic low back pain with sciatica   • 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CMS/HCC)   • Pressure injury of buttock, stage 1   • Anemia   • Symptomatic anemia   • Iron deficiency anemia   • Chronic anticoagulation   • CKD (chronic kidney disease)   • CRISTOBAL (acute kidney injury) (CMS/HCC)   • Colonic mass     Past Medical History:   Diagnosis Date   • Abnormal thyroid blood test    • Aneurysm of abdominal aorta (CMS/HCC)    • Arthritis    • Bleeding disorder (CMS/HCC)    • BPH (benign prostatic hyperplasia)    • Bruises easily    • Bulging of thoracic intervertebral disc    • Chronic edema    • Coronary artery disease    • Diverticular disease    • Enlarged prostate without lower urinary tract symptoms (luts)    • Essential hypertension    • Heart  attack (CMS/HCC)    • HL (hearing loss)    • Hyperactivity of bladder    • Hyperlipidemia    • Left shoulder pain    • Lumbar radiculopathy 2016    wears back brace at times following back surgery   • Muscle weakness (generalized)    • Nonrheumatic aortic valve stenosis     mild 1/2018    • Osteoarthritis    • PAF (paroxysmal atrial fibrillation) (CMS/HCC)    • Polyuria    • Recurrent falls    • Screening      stop bang scale 5   • Syncope    • Torn rotator cuff     left   • Type 2 diabetes mellitus (CMS/HCC)    • Urinary frequency      Past Surgical History:   Procedure Laterality Date   • CARDIAC ELECTROPHYSIOLOGY PROCEDURE N/A 6/6/2019    Procedure: Ablation atrial flutter;  Surgeon: Miller Talbot MD;  Location: [facility] CATH INVASIVE LOCATION;  Service: Cardiovascular   • CARDIAC SURGERY      CABGX5 (1989)   • CARDIOVERSION  04/15/2019    Dr. Miller Talbot   • CATARACT EXTRACTION Bilateral 1997   • COLON RESECTION N/A 2/3/2021    Procedure: LAPAROSCOPIC EXTENDED  RIGHT COLECTOMY WITH DAVINCI ROBOT;  Surgeon: Xavi Napoles MD;  Location: [facility] MAIN OR;  Service: DaVinci;  Laterality: N/A;   • COLONOSCOPY N/A 1/31/2021    Procedure: COLONOSCOPY TO CECUM  WITH ORISE INJECTION, BIOPSIES, COLD BIOPSY POLYPECTOMY, COLD AND HOT SNARE POLYPECTOMIES, TATTOO, AND CLIP X3;  Surgeon: Jey Barrios MD;  Location: [facility] ENDOSCOPY;  Service: Gastroenterology;  Laterality: N/A;  PRE- IRON DEFICIENCY ANEMIA  POST- COLON MASS, COLON POLYPS, DIVERTICULOSIS, HEMORRHOIDS   • CORONARY ARTERY BYPASS GRAFT     • CYST REMOVAL      FROM BACK   • ENDOSCOPY N/A 1/31/2021    Procedure: ESOPHAGOGASTRODUODENOSCOPY WITH BIOPSIES;  Surgeon: Jey Barrios MD;  Location: [facility] ENDOSCOPY;  Service: Gastroenterology;  Laterality: N/A;  PRE- IRON DEFICIENCY ANEMIA  POST- NORMAL   • GALLBLADDER SURGERY  1989   • HERNIA REPAIR Left     inquinal times 3  last one in 2003   • KNEE CARTILAGE SURGERY Left 1970's   • LUMBAR DISCECTOMY  FUSION INSTRUMENTATION N/A 4/11/2016    Procedure: lumbar laminectomy L4-5 and fusion with instrumentation;  Surgeon: Ran Kline MD;  Location: [facility] OR;  Service:    • LUMBAR DISCECTOMY FUSION INSTRUMENTATION N/A 1/15/2018    Procedure: L2-3, L3-4 laminectomy and fusion with instrumentation and removal of implants L4 5.;  Surgeon: Ran Kline MD;  Location: [facility] OR;  Service:    • ME TOTAL KNEE ARTHROPLASTY Left 11/14/2016    Procedure: TOTAL KNEE ARTHROPLASTY;  Surgeon: Dontrell Arellano MD;  Location: [facility];  Service: Orthopedics            OT ASSESSMENT FLOWSHEET (last 12 hours)      OT Evaluation and Treatment     Row Name 02/11/21 1100                   OT Time and Intention    Subjective Information  no complaints  -MR        Document Type  therapy note (daily note)  -MR        Mode of Treatment  occupational therapy  -MR        Patient Effort  good  -MR           General Information    Patient Profile Reviewed  yes  -MR        Patient/Family/Caregiver Comments/Observations  pt supine in bed, no acute distress  -MR        Existing Precautions/Restrictions  fall  -MR           Cognition    Affect/Mental Status (Cognitive)  WFL  -MR        Follows Commands (Cognition)  follows one-step commands;verbal cues/prompting required  -MR           Pain Scale: Numbers Pre/Post-Treatment    Pretreatment Pain Rating  0/10 - no pain  -MR        Posttreatment Pain Rating  0/10 - no pain  -MR           Bed Mobility    Bed Mobility  supine-sit  -MR        Supine-Sit [place] (Bed Mobility)  verbal cues;minimum assist (75% patient effort)  -MR        Assistive Device (Bed Mobility)  bed rails;head of bed elevated  -MR           Functional Mobility    Functional Mobility- Ind. Level  minimum assist (75% patient effort);2 person assist required;verbal cues required  -MR        Functional Mobility- Device  rollator  -MR        Functional Mobility- Comment  pt takes 4-5 steps from bed to chair  -MR            Transfer Assessment/Treatment    Transfers  sit-stand transfer;bed-chair transfer  -MR           Transfers    Bed-Chair [place] (Transfers)  minimum assist (75% patient effort);2 person assist;verbal cues  -MR        Assistive Device (Bed-Chair Transfers)  walker, 4-wheeled  -MR        Sit-Stand [place] (Transfers)  minimum assist (75% patient effort);2 person assist;verbal cues  -MR           Sit-Stand Transfer    Assistive Device (Sit-Stand Transfers)  walker, 4-wheeled  -MR           Safety Issues, Functional Mobility    Safety Issues Affecting Function (Mobility)  insight into deficits/self-awareness  -MR        Impairments Affecting Function (Mobility)  balance;strength;endurance/activity tolerance  -MR           Motor Skills    Motor Skills  therapeutic exercise  -MR        Therapeutic Exercise  shoulder;elbow/forearm;hand  -MR           Shoulder (Therapeutic Exercise)    Shoulder (Therapeutic Exercise)  AROM (active range of motion)  -MR        Shoulder AROM (Therapeutic Exercise)  bilateral;flexion;horizontal aBduction/aDduction;scapular protraction;scapular retraction;sitting  -MR           Elbow/Forearm (Therapeutic Exercise)    Elbow/Forearm (Therapeutic Exercise)  AROM (active range of motion)  -MR        Elbow/Forearm AROM (Therapeutic Exercise)  bilateral;flexion;extension;supination;pronation;sitting  -MR           Hand (Therapeutic Exercise)    Hand (Therapeutic Exercise)  AROM (active range of motion)  -MR        Hand AROM/AAROM (Therapeutic Exercise)  bilateral;AROM (active range of motion);finger flexion;finger extension  -MR           Balance    Balance Assessment  sitting static balance  -MR        Static Sitting Balance  WFL;sitting, edge of bed  -MR        Static Standing Balance  mild impairment;supported;standing  -MR           Activities of Daily Living    BADL Assessment/Intervention  lower body dressing;grooming  -MR           Lower Body Dressing Assessment/Training     [place] Level (Lower Body Dressing)  lower body dressing skills;don;socks;moderate assist (50% patient effort);verbal cues  -MR        Position (Lower Body Dressing)  edge of bed sitting  -MR           Grooming Assessment/Training    [place] Level (Grooming)  grooming skills;set up;supervision;verbal cues  -MR        Position (Grooming)  supported sitting  -MR           BADL Safety/Performance    Impairments, BADL Safety/Performance  balance;endurance/activity tolerance;strength  -MR           Wound 02/03/21 1641 abdomen Incision    Wound - Properties Group Placement Date: 02/03/21  - Placement Time: 1641 -AH Location: abdomen  -AH Primary Wound Type: Incision  -AH Additional Comments: Lap  sites x  -AH    Retired Wound - Properties Group Date first assessed: 02/03/21  - Time first assessed: 1641 -AH Location: abdomen  -AH Primary Wound Type: Incision  -AH Additional Comments: Lap  sites x  -AH       Wound 02/04/21 gluteal MASD (Moisture associated skin damage)    Wound - Properties Group Placement Date: 02/04/21  - Location: gluteal  -AGUS Primary Wound Type: MASD  -AGUS    Retired Wound - Properties Group Date first assessed: 02/04/21  - Location: gluteal  -AGUS Primary Wound Type: MASD  -AGUS       Plan of Care Review    Plan of Care Reviewed With  patient  -MR        Outcome Summary  Pt demos good participation with OT this date. Pt is able to transfer from bed to chair with min assist of 2. Continue OT to increase safety and independence with performance of ADLs.  -MR           Vital Signs    O2 Delivery Pre Treatment  supplemental O2  -MR        O2 Delivery Intra Treatment  supplemental O2  -MR        Post SpO2 (%)  95  -MR        O2 Delivery Post Treatment  supplemental O2  -MR        Post Patient Position  Sitting  -MR           Positioning and Restraints    Pre-Treatment Position  in bed  -MR        Post Treatment Position  chair  -MR        In Chair  reclined;notified nsg;call light within  reach;encouraged to call for assist;exit alarm on  -MR          User Key  (r) = Recorded By, (t) = Taken By, (c) = Cosigned By    Initials Name Effective Dates    [facility] Pitt RN 06/16/16 -     Lilian Bolaños RN 12/07/17 -     Julia Vincent, OT 06/22/16 -          Occupational Therapy Education                 Title: PT OT SLP Therapies (In Progress)     Topic: Occupational Therapy (In Progress)     Point: ADL training (Done)     Description:   Instruct learner(s) on proper safety adaptation and remediation techniques during self care or transfers.   Instruct in proper use of assistive devices.              Learning Progress Summary           Patient Acceptance, E,TB, VU by MR at 2/9/2021 6596    Comment: Educated on role of OT; OT POC.                   Point: Home exercise program (Not Started)     Description:   Instruct learner(s) on appropriate technique for monitoring, assisting and/or progressing therapeutic exercises/activities.              Learner Progress:  Not documented in this visit.          Point: Precautions (Not Started)     Description:   Instruct learner(s) on prescribed precautions during self-care and functional transfers.              Learner Progress:  Not documented in this visit.          Point: Body mechanics (Not Started)     Description:   Instruct learner(s) on proper positioning and spine alignment during self-care, functional mobility activities and/or exercises.              Learner Progress:  Not documented in this visit.                      User Key     Initials Effective Dates Name Provider Type Discipline    MR 06/22/16 -  Julia Coto, OT Occupational Therapist OT                  OT Recommendation and Plan  Planned Therapy Interventions (OT): activity tolerance training, BADL retraining, functional balance retraining, occupation/activity based interventions, patient/caregiver education/training, ROM/therapeutic exercise, strengthening exercise,  transfer/mobility retraining  Therapy Frequency (OT): 5 times/wk  Plan of Care Review  Plan of Care Reviewed With: patient  Outcome Summary: Pt demos good participation with OT this date. Pt is able to transfer from bed to chair with min assist of 2. Continue OT to increase safety and independence with performance of ADLs.  Plan of Care Reviewed With: patient  Outcome Summary: Pt demos good participation with OT this date. Pt is able to transfer from bed to chair with min assist of 2. Continue OT to increase safety and independence with performance of ADLs.    Outcome Measures     Row Name 02/11/21 1200             How much help from another is currently needed...    Putting on and taking off regular lower body clothing?  2  -MR      Bathing (including washing, rinsing, and drying)  2  -MR      Toileting (which includes using toilet bed pan or urinal)  2  -MR      Putting on and taking off regular upper body clothing  3  -MR      Taking care of personal grooming (such as brushing teeth)  3  -MR      Eating meals  3  -MR      AM-PAC 6 Clicks Score (OT)  15  -MR        User Key  (r) = Recorded By, (t) = Taken By, (c) = Cosigned By    Initials Name Provider Type    Julia Vincent, OT Occupational Therapist          Time Calculation:   Time Calculation- OT     Row Name 02/11/21 1214             Time Calculation- OT    OT Start Time  1047  -MR      OT Stop Time  1115  -MR      OT Time Calculation (min)  28 min  -MR      Total Timed Code Minutes- OT  28 minute(s)  -MR      OT Received On  02/11/21  -MR      OT - Next Appointment  02/12/21  -MR        User Key  (r) = Recorded By, (t) = Taken By, (c) = Cosigned By    Initials Name Provider Type    Julia Vincent Julia OT Occupational Therapist        Therapy Charges for Today     Code Description Service Date Service Provider Modifiers Qty    [id number]  OT THER PROC EA 15 MIN 2/11/2021 Julia CotoMei, OT GO 1    [id number]  OT SELF CARE/MGMT/TRAIN EA 15  MIN 2/11/2021 Nnamdi, Julia Dumont, OT GO 1               Julia Coto, OT  2/11/2021

## 2021-02-11 NOTE — PLAN OF CARE
Goal Outcome Evaluation:  Plan of Care Reviewed With: patient     Outcome Summary: Pt demos good participation with OT this date. Pt is able to transfer from bed to chair with min assist of 2. Continue OT to increase safety and independence with performance of ADLs.  Therapist used appropriate personal protective equipment including mask, gloves, and eye protection.  Mask used was standard procedure mask. Appropriate PPE was worn during the entire therapy session. Hand hygiene was completed before and after therapy session. Patient is not in enhanced droplet precautions.

## 2021-02-11 NOTE — PROGRESS NOTES
Continued Stay Note  [facility]     Patient Name: Osvaldo Lopez  MRN: [id number]  Today's Date: 2/11/2021    Admit Date: 1/29/2021    Discharge Plan     Row Name 02/11/21 1606       Plan    Plan  [facility] when cleared by surgery. Bed available tomorrow 2/12.    Patient/Family in Agreement with Plan  yes    Plan Comments  Received call from [facility] and they have a bed for pt available tomorrow if able to D/C. Will follow up in the AM after surgery sees. Byron Stockton RN-BC        Discharge Codes    No documentation.       Expected Discharge Date and Time     Expected Discharge Date Expected Discharge Time    Feb 4, 2021             Byron Stockton RN

## 2021-02-11 NOTE — THERAPY TREATMENT NOTE
Patient Name: Osvaldo Lopez  : 1935    MRN: [id number]                              Today's Date: 2021       Admit Date: 2021    Visit Dx:     ICD-10-CM ICD-9-CM   1. Symptomatic anemia  D64.9 285.9   2. Other fatigue  R53.83 780.79   3. Elevated troponin  R77.8 790.6   4. Colonic mass  K63.89 569.89     Patient Active Problem List   Diagnosis   • Complete rotator cuff tear of left shoulder   • Abnormal finding on thyroid function test   • Abdominal aortic aneurysm (CMS/HCC)   • Benign prostatic hyperplasia   • Essential hypertension   • Hyperlipidemia   • Shoulder pain   • Lumbar radiculopathy   • DM (diabetes mellitus), type 2 with complications (CMS/HCC)   • OA (osteoarthritis) of knee   • Tear of right rotator cuff   • Spinal stenosis of lumbar region with neurogenic claudication   • Eyebrow ptosis   • Pseudophakia   • Nonrheumatic aortic valve stenosis   • Atrial flutter with rapid ventricular response (CMS/HCC)   • Right arm pain   • Leg weakness, bilateral   • Typical atrial flutter (CMS/HCC)   • Diabetic peripheral neuropathy (CMS/HCC)   • Muscle weakness (generalized)   • Pressure injury of left buttock, stage 1   • Chronic low back pain with sciatica   • 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CMS/HCC)   • Pressure injury of buttock, stage 1   • Anemia   • Symptomatic anemia   • Iron deficiency anemia   • Chronic anticoagulation   • CKD (chronic kidney disease)   • CRISTOBAL (acute kidney injury) (CMS/HCC)   • Colonic mass     Past Medical History:   Diagnosis Date   • Abnormal thyroid blood test    • Aneurysm of abdominal aorta (CMS/HCC)    • Arthritis    • Bleeding disorder (CMS/HCC)    • BPH (benign prostatic hyperplasia)    • Bruises easily    • Bulging of thoracic intervertebral disc    • Chronic edema    • Coronary artery disease    • Diverticular disease    • Enlarged prostate without lower urinary tract symptoms (luts)    • Essential hypertension    • Heart attack (CMS/HCC)    • HL (hearing  loss)    • Hyperactivity of bladder    • Hyperlipidemia    • Left shoulder pain    • Lumbar radiculopathy 2016    wears back brace at times following back surgery   • Muscle weakness (generalized)    • Nonrheumatic aortic valve stenosis     mild 1/2018    • Osteoarthritis    • PAF (paroxysmal atrial fibrillation) (CMS/HCC)    • Polyuria    • Recurrent falls    • Screening      stop bang scale 5   • Syncope    • Torn rotator cuff     left   • Type 2 diabetes mellitus (CMS/HCC)    • Urinary frequency      Past Surgical History:   Procedure Laterality Date   • CARDIAC ELECTROPHYSIOLOGY PROCEDURE N/A 6/6/2019    Procedure: Ablation atrial flutter;  Surgeon: Miller Talbot MD;  Location: [facility] CATH INVASIVE LOCATION;  Service: Cardiovascular   • CARDIAC SURGERY      CABGX5 (1989)   • CARDIOVERSION  04/15/2019    Dr. Miller Talbot   • CATARACT EXTRACTION Bilateral 1997   • COLON RESECTION N/A 2/3/2021    Procedure: LAPAROSCOPIC EXTENDED  RIGHT COLECTOMY WITH DAVINCI ROBOT;  Surgeon: Xavi Napoles MD;  Location: [facility] MAIN OR;  Service: DaVinci;  Laterality: N/A;   • COLONOSCOPY N/A 1/31/2021    Procedure: COLONOSCOPY TO CECUM  WITH ORISE INJECTION, BIOPSIES, COLD BIOPSY POLYPECTOMY, COLD AND HOT SNARE POLYPECTOMIES, TATTOO, AND CLIP X3;  Surgeon: Jey Barrios MD;  Location: [facility] ENDOSCOPY;  Service: Gastroenterology;  Laterality: N/A;  PRE- IRON DEFICIENCY ANEMIA  POST- COLON MASS, COLON POLYPS, DIVERTICULOSIS, HEMORRHOIDS   • CORONARY ARTERY BYPASS GRAFT     • CYST REMOVAL      FROM BACK   • ENDOSCOPY N/A 1/31/2021    Procedure: ESOPHAGOGASTRODUODENOSCOPY WITH BIOPSIES;  Surgeon: Jey Barrios MD;  Location: [facility] ENDOSCOPY;  Service: Gastroenterology;  Laterality: N/A;  PRE- IRON DEFICIENCY ANEMIA  POST- NORMAL   • GALLBLADDER SURGERY  1989   • HERNIA REPAIR Left     inquinal times 3  last one in 2003   • KNEE CARTILAGE SURGERY Left 1970's   • LUMBAR DISCECTOMY FUSION INSTRUMENTATION N/A 4/11/2016     Procedure: lumbar laminectomy L4-5 and fusion with instrumentation;  Surgeon: Ran Kline MD;  Location: [facility] OR;  Service:    • LUMBAR DISCECTOMY FUSION INSTRUMENTATION N/A 1/15/2018    Procedure: L2-3, L3-4 laminectomy and fusion with instrumentation and removal of implants L4 5.;  Surgeon: Ran Kline MD;  Location: [facility] OR;  Service:    • MT TOTAL KNEE ARTHROPLASTY Left 11/14/2016    Procedure: TOTAL KNEE ARTHROPLASTY;  Surgeon: Dontrell Arellano MD;  Location: [facility] OR;  Service: Orthopedics     General Information     Row Name 02/11/21 1520          Physical Therapy Time and Intention    Document Type  therapy note (daily note)  -EJ     Mode of Treatment  physical therapy  -     Row Name 02/11/21 1520          General Information    Existing Precautions/Restrictions  fall  -EJ       User Key  (r) = Recorded By, (t) = Taken By, (c) = Cosigned By    Initials Name Provider Type    EJ Debra Crabtree, PT Physical Therapist        Mobility     Row Name 02/11/21 1520          Bed Mobility    Supine-Sit [place] (Bed Mobility)  verbal cues;minimum assist (75% patient effort)  -EJ     Sit-Supine [place] (Bed Mobility)  verbal cues;minimum assist (75% patient effort)  -EJ     Assistive Device (Bed Mobility)  bed rails;head of bed elevated  -EJ     Row Name 02/11/21 1520          Sit-Stand Transfer    Sit-Stand [place] (Transfers)  minimum assist (75% patient effort);2 person assist;verbal cues;moderate assist (50% patient effort)  -EJ     Assistive Device (Sit-Stand Transfers)  walker, 4-wheeled  -EJ     Row Name 02/11/21 1520          Gait/Stairs (Locomotion)    [place] Level (Gait)  verbal cues;nonverbal cues (demo/gesture);contact guard;minimum assist (75% patient effort);2 person assist  -EJ     Assistive Device (Gait)  walker, 4-wheeled  -EJ     Distance in Feet (Gait)  60  -EJ     Deviations/Abnormal Patterns (Gait)  oseas decreased;stride length decreased   -EJ     Bilateral Gait Deviations  forward flexed posture;heel strike decreased  -EJ       User Key  (r) = Recorded By, (t) = Taken By, (c) = Cosigned By    Initials Name Provider Type    Debra Chirinos, PT Physical Therapist        Obj/Interventions    No documentation.       Goals/Plan    No documentation.       Clinical Impression     Row Name 02/11/21 1521          Pain Scale: Numbers Pre/Post-Treatment    Pretreatment Pain Rating  0/10 - no pain  -EJ     Posttreatment Pain Rating  0/10 - no pain  -EJ     Row Name 02/11/21 1521          Plan of Care Review    Plan of Care Reviewed With  patient  -EJ     Progress  improving  -EJ     Outcome Summary  Pt doing well this afternoon and agreeable to PT. Pt demonstrating significant improvement with mobility today. He was able to ambulate approx 60 ft with his rollator and CGA/min A. No LOB noted. Pt with no complaints and demonstrating good progress with mobility.  -EJ     Row Name 02/11/21 1521          Positioning and Restraints    Pre-Treatment Position  in bed  -EJ     Post Treatment Position  bed  -EJ     In Bed  notified nsg;supine;call light within reach;encouraged to call for assist;exit alarm on  -EJ       User Key  (r) = Recorded By, (t) = Taken By, (c) = Cosigned By    Initials Name Provider Type    Debra Chirinos, PT Physical Therapist        Outcome Measures     Row Name 02/11/21 1522          How much help from another person do you currently need...    Turning from your back to your side while in flat bed without using bedrails?  3  -EJ     Moving from lying on back to sitting on the side of a flat bed without bedrails?  3  -EJ     Moving to and from a bed to a chair (including a wheelchair)?  3  -EJ     Standing up from a chair using your arms (e.g., wheelchair, bedside chair)?  3  -EJ     Climbing 3-5 steps with a railing?  2  -EJ     To walk in hospital room?  3  -EJ     AM-PAC 6 Clicks Score (PT)  17  -EJ       User Key  (r) = Recorded  By, (t) = Taken By, (c) = Cosigned By    Initials Name Provider Type    Debra Chirinos, PT Physical Therapist        Physical Therapy Education                 Title: PT OT SLP Therapies (In Progress)     Topic: Physical Therapy (Done)     Point: Mobility training (Done)     Learning Progress Summary           Patient Acceptance, E,TB,D, VU,NR by SCOTT at 2/11/2021 1523    Acceptance, E,TB,D, VU,NR by SCOTT at 2/10/2021 1531    Acceptance, E,TB,D, VU,NR by EJ at 2/9/2021 1242    Acceptance, E,TB,D, VU,NR by SV at 2/8/2021 1516    Acceptance, E,TB,D, VU,NR by SV at 2/8/2021 1515    Acceptance, E, VU by AP at 2/7/2021 1226    Acceptance, E, VU,NR by AP at 2/6/2021 1048    Acceptance, E, VU,NR by KH at 2/5/2021 1037    Acceptance, E,TB, VU,DU by LB at 1/31/2021 1338    Acceptance, E,TB,D, VU,DU by LB at 1/30/2021 0955                   Point: Home exercise program (Done)     Learning Progress Summary           Patient Acceptance, E,TB,D, VU,NR by SCOTT at 2/9/2021 1242    Acceptance, E,TB,D, VU,NR by SV at 2/8/2021 1516    Acceptance, E,TB,D, VU,NR by SV at 2/8/2021 1515    Acceptance, E, VU by AP at 2/7/2021 1226    Acceptance, E, VU,NR by AP at 2/6/2021 1048    Acceptance, E, VU,NR by KH at 2/5/2021 1037    Acceptance, E,TB, VU,DU by LB at 1/31/2021 1338    Acceptance, E,TB,D, VU,DU by LB at 1/30/2021 0955                   Point: Body mechanics (Done)     Learning Progress Summary           Patient Acceptance, E,TB,D, VU,NR by SCOTT at 2/9/2021 1242    Acceptance, E,TB,D, VU,NR by SV at 2/8/2021 1516    Acceptance, E,TB,D, VU,NR by SV at 2/8/2021 1515    Acceptance, E, VU by AP at 2/7/2021 1226    Acceptance, E, VU,NR by AP at 2/6/2021 1048    Acceptance, E, VU,NR by KH at 2/5/2021 1037    Acceptance, E,TB, VU,DU by LB at 1/31/2021 1338    Acceptance, E,TB,D, VU,DU by LB at 1/30/2021 0955                   Point: Precautions (Done)     Learning Progress Summary           Patient Acceptance, E,TB,D, VU,NR by EJ at  2/9/2021 1242    Acceptance, E,TB,D, VU,NR by SV at 2/8/2021 1516    Acceptance, E,TB,D, VU,NR by SV at 2/8/2021 1515    Acceptance, E, VU by AP at 2/7/2021 1226    Acceptance, E, VU,NR by AP at 2/6/2021 1048    Acceptance, E, VU,NR by KH at 2/5/2021 1037    Acceptance, E,TB, VU,DU by LB at 1/31/2021 1338    Acceptance, E,TB,D, VU,DU by LB at 1/30/2021 0955                               User Key     Initials Effective Dates Name Provider Type Discipline     02/05/19 -  Kaya Orosco, PT Physical Therapist PT    EJ 04/03/18 -  Debra Crabtree, PT Physical Therapist PT    SV 04/03/18 -  Sabina Middleton, PT Physical Therapist PT    LB 08/09/20 -  Aditi Chaudhary, PT Physical Therapist PT    AP 09/24/20 -  Karli Montgomery, PT Physical Therapist PT              PT Recommendation and Plan     Plan of Care Reviewed With: patient  Progress: improving  Outcome Summary: Pt doing well this afternoon and agreeable to PT. Pt demonstrating significant improvement with mobility today. He was able to ambulate approx 60 ft with his rollator and CGA/min A. No LOB noted. Pt with no complaints and demonstrating good progress with mobility.     Time Calculation:   PT Charges     Row Name 02/11/21 1523             Time Calculation    Start Time  1505  -EJ      Stop Time  1520  -EJ      Time Calculation (min)  15 min  -EJ      PT Received On  02/11/21  -EJ      PT - Next Appointment  02/12/21  -EJ        User Key  (r) = Recorded By, (t) = Taken By, (c) = Cosigned By    Initials Name Provider Type    EJ Debra Crabtree, PT Physical Therapist        Therapy Charges for Today     Code Description Service Date Service Provider Modifiers Qty    47372039429 HC PT THER PROC EA 15 MIN 2/10/2021 Debra Crabtree, PT GP 1    99009520146 HC PT THER SUPP EA 15 MIN 2/10/2021 Debra Crabtree, PT GP 1    12740790946 HC PT THER PROC EA 15 MIN 2/11/2021 Debra Crabtree, PT GP 1    51821449939 HC PT THER SUPP EA 15 MIN 2/11/2021  Debra Crabtree, PT GP 1          PT G-Codes  Outcome Measure Options: AM-PAC 6 Clicks Daily Activity (OT)  AM-PAC 6 Clicks Score (PT): 17  AM-PAC 6 Clicks Score (OT): 15    Debra Crabtree, PT  2/11/2021

## 2021-02-11 NOTE — PROGRESS NOTES
Name: Osvaldo Lopez ADMIT: 2021   : 1935  PCP: Caio Rodríguez Jr., MD    MRN: [id number] LOS: 11 days   AGE/SEX: 85 y.o. male  ROOM: [facility]     Subjective   Subjective      Feels okay today but appears to have developed a skin infection at his trocar site. Off oxygen while I was in the room. Tachycardic this morning, but hadn't yet received his metoprolol. Resolved now.       Objective   Objective   Vital Signs  Temp:  [98.1 °F (36.7 °C)-99.2 °F (37.3 °C)] 98.1 °F (36.7 °C)  Heart Rate:  [] 79  Resp:  [16] 16  BP: (105-137)/(64-92) 105/70  SpO2:  [93 %-94 %] 94 %  on  Flow (L/min):  [2] 2;   Device (Oxygen Therapy): nasal cannula  Body mass index is 30.79 kg/m².  Physical Exam  Constitutional:       Appearance: Normal appearance.   HENT:      Head: Normocephalic and atraumatic.   Neck:      Comments: No jvd  Cardiovascular:      Rate and Rhythm: Normal rate and regular rhythm.      Heart sounds: Normal heart sounds.   Pulmonary:      Effort: Pulmonary effort is normal. No respiratory distress.      Breath sounds: Rales present.      Comments: Crackles in right lower lung base predominantly  Abdominal:      General: Bowel sounds are normal.      Palpations: Abdomen is soft.      Comments: Laparoscopic incision sites with the left most with surrounding erythema, packed with gauze    Musculoskeletal:         General: No swelling or tenderness.   Skin:     General: Skin is warm and dry.   Neurological:      Mental Status: He is alert and oriented to person, place, and time.   Psychiatric:         Mood and Affect: Mood normal.         Behavior: Behavior normal.         Results Review     I reviewed the patient's new clinical results.  Results from last 7 days   Lab Units 21  0706 02/10/21  0528 21  0437 21  0547   WBC 10*3/mm3 8.74 6.93 6.80 6.43   HEMOGLOBIN g/dL 9.7* 9.2* 9.4* 9.7*   PLATELETS 10*3/mm3 177 138* 130* 140     Results from last 7 days   Lab Units 21  0706  02/10/21  0528 02/09/21  0437 02/08/21  0547   SODIUM mmol/L 142 141 141 142   POTASSIUM mmol/L 3.4* 3.6 3.3* 3.7   CHLORIDE mmol/L 97* 102 102 106   CO2 mmol/L 35.5* 31.8* 29.3* 26.1   BUN mg/dL 22 22 27* 30*   CREATININE mg/dL 1.03 0.97 1.05 1.16   GLUCOSE mg/dL 159* 156* 170* 157*   Estimated Creatinine Clearance: 66.8 mL/min (by C-G formula based on SCr of 1.03 mg/dL).  Results from last 7 days   Lab Units 02/05/21  0356   ALBUMIN g/dL 3.30*   BILIRUBIN mg/dL 0.5   ALK PHOS U/L 70   AST (SGOT) U/L 18   ALT (SGPT) U/L 10     Results from last 7 days   Lab Units 02/11/21  0706 02/10/21  0528 02/09/21  0437 02/08/21  0547  02/05/21  0356   CALCIUM mg/dL 8.9 8.4* 8.7 8.5*   < > 8.0*   ALBUMIN g/dL  --   --   --   --   --  3.30*   MAGNESIUM mg/dL  --   --  1.7  --   --   --     < > = values in this interval not displayed.       COVID19   Date Value Ref Range Status   02/02/2021 Not Detected Not Detected - Ref. Range Final   01/29/2021 Not Detected Not Detected - Ref. Range Final     Glucose   Date/Time Value Ref Range Status   02/11/2021 1142 222 (H) 70 - 130 mg/dL Final   02/11/2021 0541 169 (H) 70 - 130 mg/dL Final   02/10/2021 2110 153 (H) 70 - 130 mg/dL Final   02/10/2021 1624 189 (H) 70 - 130 mg/dL Final   02/10/2021 1055 172 (H) 70 - 130 mg/dL Final   02/10/2021 0555 154 (H) 70 - 130 mg/dL Final   02/09/2021 2021 150 (H) 70 - 130 mg/dL Final       Adult Transthoracic Echo Complete W/ Cont if Necessary Per Protocol  · Left ventricular wall thickness is consistent with mild concentric   hypertrophy.  · Estimated left ventricular EF = 68% Left ventricular systolic function   is normal.  · Left ventricular diastolic function was normal.  · Estimated right ventricular systolic pressure from tricuspid   regurgitation is normal (<35 mmHg).  · Mild dilation of the aortic root is present.       Scheduled Medications  acetaminophen, 650 mg, Oral, Q6H  amiodarone, 200 mg, Oral, Daily  amoxicillin-clavulanate, 1 tablet,  Oral, Q12H  famotidine, 20 mg, Intravenous, Q12H  hydrALAZINE, 25 mg, Oral, TID  insulin lispro, 0-9 Units, Subcutaneous, TID AC  lisinopril, 20 mg, Oral, Q24H  metoprolol succinate XL, 25 mg, Oral, Q24H  rivaroxaban, 20 mg, Oral, Daily  tamsulosin, 0.8 mg, Oral, Daily    Infusions   Diet  Diet Regular       Assessment/Plan     Active Hospital Problems    Diagnosis  POA   • **Symptomatic anemia [D64.9]  Yes   • Iron deficiency anemia [D50.9]  Unknown   • Chronic anticoagulation [Z79.01]  Not Applicable   • CKD (chronic kidney disease) [N18.9]  Unknown   • CRISTOBAL (acute kidney injury) (CMS/HCC) [N17.9]  Unknown   • Colonic mass [K63.89]  Unknown   • 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       85 y.o. male admitted with Symptomatic anemia.    SSTI of trocar site  Packed by Dr. Gaitan. Wet to dry dressing changes. Start augmentin.    Acute diastolic heart failure with hypoxic respiratory failure  I think this is resolved, but he probably would benefit from walking oxymetry should be leave tomorrow.    Adenocarcinoma of the colon  Status post laparoscopic hemicolectomy on February 3, was in the ICU postoperatively for difficulty weaning off the ventilator. This is resolved.     Acute renal failure  Improved with diuretics    Postoperative hypotension/essential hypertension  This appears resolved.  On a half dose of his home lisinopril.  Restart home hydralazine to assist with diuresis.    Paroxysmal atrial fibrillation  Currently on metoprolol XL as well as amiodarone and Xarelto for stroke prophylaxis    BPH  Tamsulosin    DM  Metformin held    Hypokalemia  replaced      · SCDs for DVT prophylaxis.  · Full code.  · Discussed with patient.  · Anticipate discharge when cleared  by surgery will go to rehab      Clark Silva MD  [facility]  02/11/21  14:11 EST    Patient was wearing facemask when I entered the room and throughout our encounter.  I wore protective equipment throughout this patient encounter including a face mask, gloves and protective eyewear.  Hand hygiene was performed before donning protective equipment and after removal when leaving the room.

## 2021-02-11 NOTE — PROGRESS NOTES
Postoperative day 8 s/p robotic assisted laparoscopic extended right hemicolectomy lysis of adhesions    S: Patient complaining of abdominal bloating this morning.  Has been passing gas and having bowel movements.  Started having drainage from his left lower abdominal wound.    O:   Vitals:    02/10/21 2340 02/11/21 0700 02/11/21 0905 02/11/21 1300   BP: 137/92 127/69 124/71 105/70   BP Location: Left arm Left arm  Left arm   Patient Position: Lying Lying  Lying   Pulse: 109 (!) 133 101 79   Resp: 16 16  16   Temp: 99.2 °F (37.3 °C) 98.7 °F (37.1 °C)  98.1 °F (36.7 °C)   TempSrc: Oral Oral  Oral   SpO2: 94% 94% 93% 94%   Weight:       Height:         Alert, in no distress, chronically ill-appearing  Breathing comfortable  Slightly tachycardic  Abdomen soft, tender over the left mid abdominal incision.  There is redness.  Drainage.  The wound was open bedside and purulent drainage was completely evacuated.  The fascia was intact.  The wound was packed.  All incisions clean dry and intact    White blood cell count 9.7, hemoglobin 9.7  Potassium 3.4, otherwise stable labs    Assessment and plan    Postoperative day 8 status post robotic assisted laparoscopic extended right hemicolectomy lysis of adhesions.  Patient with wound infection that was treated by opening the wound and packing.  Patient will need to have at least 5 days of p.o. antibiotics covering gram-positive and gram-negative bacteria.  He will need to start performing wet-to-dry dressing changes with saline and gauze at least twice a day.  I think he to stay 1 more in the hospital for dressing changes.  We will reassess his wound tomorrow and if looking good then he will be able to go to rehab.

## 2021-02-11 NOTE — PLAN OF CARE
Problem: Adult Inpatient Plan of Care  Goal: Patient-Specific Goal (Individualized)  Outcome: Ongoing, Progressing     Problem: Adult Inpatient Plan of Care  Goal: Plan of Care Review  Outcome: Ongoing, Progressing  Flowsheets (Taken 2/11/2021 3245)  Progress: improving  Plan of Care Reviewed With: patient  Outcome Summary: Pt vitals stable. Up to chair and ambulating with PT. PO abx ordered today for incision infection. Drsg changes TID w/d normal saline. Will continue to monitor   Goal Outcome Evaluation:  Plan of Care Reviewed With: patient  Progress: improving  Outcome Summary: Pt vitals stable. Up to chair and ambulating with PT. PO abx ordered today for incision infection. Drsg changes TID w/d normal saline. Will continue to monitor

## 2021-02-11 NOTE — PLAN OF CARE
Goal Outcome Evaluation:      Patient's vitals WNL, patient has been incontinent this evening but has voided several times, patient has rested with minimal complaints of pain, no nausea reported, patient has had a intermittent productive cough, RN and staff will continue to monitor patient.

## 2021-02-12 VITALS
WEIGHT: 224.87 LBS | BODY MASS INDEX: 30.46 KG/M2 | TEMPERATURE: 98 F | SYSTOLIC BLOOD PRESSURE: 130 MMHG | HEIGHT: 72 IN | OXYGEN SATURATION: 90 % | RESPIRATION RATE: 16 BRPM | DIASTOLIC BLOOD PRESSURE: 63 MMHG | HEART RATE: 87 BPM

## 2021-02-12 LAB
ANION GAP SERPL CALCULATED.3IONS-SCNC: 5 MMOL/L (ref 5–15)
BUN SERPL-MCNC: 23 MG/DL (ref 8–23)
BUN/CREAT SERPL: 21.5 (ref 7–25)
CALCIUM SPEC-SCNC: 8.6 MG/DL (ref 8.6–10.5)
CHLORIDE SERPL-SCNC: 100 MMOL/L (ref 98–107)
CO2 SERPL-SCNC: 37 MMOL/L (ref 22–29)
CREAT SERPL-MCNC: 1.07 MG/DL (ref 0.76–1.27)
DEPRECATED RDW RBC AUTO: 55.7 FL (ref 37–54)
ERYTHROCYTE [DISTWIDTH] IN BLOOD BY AUTOMATED COUNT: 19.6 % (ref 12.3–15.4)
GFR SERPL CREATININE-BSD FRML MDRD: 66 ML/MIN/1.73
GLUCOSE BLDC GLUCOMTR-MCNC: 168 MG/DL (ref 70–130)
GLUCOSE BLDC GLUCOMTR-MCNC: 185 MG/DL (ref 70–130)
GLUCOSE BLDC GLUCOMTR-MCNC: 185 MG/DL (ref 70–130)
GLUCOSE SERPL-MCNC: 160 MG/DL (ref 65–99)
HCT VFR BLD AUTO: 32.2 % (ref 37.5–51)
HGB BLD-MCNC: 9.5 G/DL (ref 13–17.7)
MCH RBC QN AUTO: 23.7 PG (ref 26.6–33)
MCHC RBC AUTO-ENTMCNC: 29.5 G/DL (ref 31.5–35.7)
MCV RBC AUTO: 80.3 FL (ref 79–97)
PLATELET # BLD AUTO: 209 10*3/MM3 (ref 140–450)
PMV BLD AUTO: 9.7 FL (ref 6–12)
POTASSIUM SERPL-SCNC: 4 MMOL/L (ref 3.5–5.2)
RBC # BLD AUTO: 4.01 10*6/MM3 (ref 4.14–5.8)
SODIUM SERPL-SCNC: 142 MMOL/L (ref 136–145)
WBC # BLD AUTO: 9.15 10*3/MM3 (ref 3.4–10.8)

## 2021-02-12 PROCEDURE — 85027 COMPLETE CBC AUTOMATED: CPT | Performed by: STUDENT IN AN ORGANIZED HEALTH CARE EDUCATION/TRAINING PROGRAM

## 2021-02-12 PROCEDURE — 99024 POSTOP FOLLOW-UP VISIT: CPT | Performed by: SURGERY

## 2021-02-12 PROCEDURE — 82962 GLUCOSE BLOOD TEST: CPT

## 2021-02-12 PROCEDURE — 0JD80ZZ EXTRACTION OF ABDOMEN SUBCUTANEOUS TISSUE AND FASCIA, OPEN APPROACH: ICD-10-PCS | Performed by: SURGERY

## 2021-02-12 PROCEDURE — 63710000001 INSULIN LISPRO (HUMAN) PER 5 UNITS: Performed by: COLON & RECTAL SURGERY

## 2021-02-12 PROCEDURE — 80048 BASIC METABOLIC PNL TOTAL CA: CPT | Performed by: STUDENT IN AN ORGANIZED HEALTH CARE EDUCATION/TRAINING PROGRAM

## 2021-02-12 RX ORDER — LISINOPRIL 20 MG/1
20 TABLET ORAL
Start: 2021-02-13 | End: 2021-06-22 | Stop reason: SDUPTHER

## 2021-02-12 RX ORDER — AMOXICILLIN AND CLAVULANATE POTASSIUM 875; 125 MG/1; MG/1
1 TABLET, FILM COATED ORAL EVERY 12 HOURS SCHEDULED
Qty: 7 TABLET | Refills: 0 | Status: SHIPPED | OUTPATIENT
Start: 2021-02-12 | End: 2021-02-16

## 2021-02-12 RX ADMIN — METOPROLOL SUCCINATE 25 MG: 25 TABLET, EXTENDED RELEASE ORAL at 08:40

## 2021-02-12 RX ADMIN — ACETAMINOPHEN 650 MG: 325 TABLET, FILM COATED ORAL at 12:54

## 2021-02-12 RX ADMIN — INSULIN LISPRO 2 UNITS: 100 INJECTION, SOLUTION INTRAVENOUS; SUBCUTANEOUS at 08:41

## 2021-02-12 RX ADMIN — HYDRALAZINE HYDROCHLORIDE 25 MG: 25 TABLET, FILM COATED ORAL at 17:35

## 2021-02-12 RX ADMIN — ACETAMINOPHEN 650 MG: 325 TABLET, FILM COATED ORAL at 00:41

## 2021-02-12 RX ADMIN — HYDRALAZINE HYDROCHLORIDE 25 MG: 25 TABLET, FILM COATED ORAL at 08:41

## 2021-02-12 RX ADMIN — FAMOTIDINE 20 MG: 10 INJECTION INTRAVENOUS at 08:53

## 2021-02-12 RX ADMIN — INSULIN LISPRO 2 UNITS: 100 INJECTION, SOLUTION INTRAVENOUS; SUBCUTANEOUS at 12:54

## 2021-02-12 RX ADMIN — TAMSULOSIN HYDROCHLORIDE 0.8 MG: 0.4 CAPSULE ORAL at 08:40

## 2021-02-12 RX ADMIN — LISINOPRIL 20 MG: 20 TABLET ORAL at 08:41

## 2021-02-12 RX ADMIN — AMOXICILLIN AND CLAVULANATE POTASSIUM 1 TABLET: 875; 125 TABLET, FILM COATED ORAL at 08:41

## 2021-02-12 RX ADMIN — INSULIN LISPRO 2 UNITS: 100 INJECTION, SOLUTION INTRAVENOUS; SUBCUTANEOUS at 17:35

## 2021-02-12 RX ADMIN — AMIODARONE HYDROCHLORIDE 200 MG: 200 TABLET ORAL at 08:40

## 2021-02-12 RX ADMIN — RIVAROXABAN 20 MG: 20 TABLET, FILM COATED ORAL at 08:41

## 2021-02-12 NOTE — DISCHARGE SUMMARY
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        Hospital Course     Mr. Lopez is a 85 y.o. male with a history of a fibrillation (xarelto), anemia, hypertension and CKD who presented to [facility] on 2021 with increasing fatigue and weakness over the past couple of months.  Primary care provider sent him to the emergency room after hemoglobin came back at 6.8.  Patient has been on chronic anticoagulant therapy with Xarelto for underlying atrial fibrillation.  Gastroenterology saw in consultation and performed a colonoscopy which revealed diverticulosis, polyps, and a colonic mass.  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  He had a difficult time coming off the ventilator after the procedure.  He was taken to the ICU.  After being extubated, he was hypoxic with some volume overload. Cardiology was consulted and tailored diuretics. He has slowly weaned off oxygen.  An echocardiogram was performed that was normal. He developed a postoperative wound infection in which was opened at the bedside, drained  and packed per surgery. They recommend wet to dry dressing changes with saline and gauze 3 times a day. He will also be sent on Augmentin to complete a 5 day course for soft skin and tissue infection at the trocar site. Labs and vitals have remained stable and he will be discharged to [facility] later today. He should follow up with his PCP in 1-2 weeks and Dr. Napoles in one week for wound check.      Day of Discharge     Subjective:  no new complaints or events overnight. having bowel movements, tolerating diet.     Review of Systems   Constitutional: Negative for chills and fever.   HENT: Negative for congestion and sore throat.    Eyes: Negative for discharge and itching.   Respiratory: Negative for chest tightness and shortness of breath.    Cardiovascular: Negative for chest pain and palpitations.   Gastrointestinal: Negative for abdominal distention, abdominal pain and nausea.   Endocrine: Negative for cold intolerance and heat intolerance.   Genitourinary: Negative for difficulty urinating and dysuria.   Musculoskeletal: Negative for back pain and gait problem.   Skin: Positive for wound. Negative for color change.   Allergic/Immunologic: Negative for environmental allergies and food allergies.   Neurological: Negative for dizziness and headaches.   Hematological: Negative for adenopathy. Does not bruise/bleed easily.   Psychiatric/Behavioral: Negative for agitation and behavioral problems.       Physical Exam:  Temp:  [97.5 °F (36.4 °C)-98.3 °F (36.8 °C)] 98 °F (36.7 °C)  Heart Rate:  [79-87] 87  Resp:  [16-20] 16  BP: (123-147)/(58-71) 130/63  Body mass index is 30.79 kg/m².  Physical Exam  Vitals signs and nursing note reviewed.   Constitutional:       Appearance: He is ill-appearing (chronically).      Comments: elderly, frail   HENT:      Head: Normocephalic and atraumatic.   Eyes:      Extraocular Movements: Extraocular movements intact.      Conjunctiva/sclera: Conjunctivae normal.   Neck:       Musculoskeletal: Normal range of motion and neck supple.   Cardiovascular:      Rate and Rhythm: Normal rate and regular rhythm.   Pulmonary:      Effort: Pulmonary effort is normal. No respiratory distress.      Breath sounds: Normal breath sounds.   Abdominal:      General: There is no distension.      Tenderness: There is abdominal tenderness (mild).      Comments: hypoactive bowel sounds   Musculoskeletal: Normal range of motion.         General: No swelling.   Skin:     General: Skin is warm and dry.      Comments: LLQ abdominal surgical wound - dressing in place   Neurological:      Mental Status: He is alert and oriented to person, place, and time. Mental status is at baseline.   Psychiatric:         Mood and Affect: Mood normal.         Behavior: Behavior normal.         Consultants     Consult Orders (all) (From admission, onward)     Start     Ordered    02/09/21 0946  Inpatient Rehab Admission Consult  Once     Provider:  (Not yet assigned)    02/09/21 0946    02/06/21 1251  Inpatient Case Management  Consult  Once     Provider:  (Not yet assigned)    02/06/21 1250    02/03/21 2143  Inpatient Pulmonology Consult  Once     Specialty:  Pulmonary Disease  Provider:  Lino Choi MD    02/03/21 2151    02/03/21 1436  Inpatient Anesthesiology Consult  Once,   Status:  Canceled     Comments: Peripheral Nerve Block   Specialty:  Anesthesiology  Provider:  (Not yet assigned)    02/03/21 1435    02/01/21 0000  Inpatient Colorectal Surgery Consult  Once     Specialty:  Colon and Rectal Surgery  Provider:  Xavi Napoles MD    01/31/21 1301    01/29/21 1955  Inpatient Case Management  Consult  Once     Provider:  (Not yet assigned)    01/29/21 1954 01/29/21 1955  Inpatient Spiritual Care Consult  Once     Provider:  (Not yet assigned)    01/29/21 1954 01/29/21 1932  Inpatient Cardiology Consult  Once,   Status:  Canceled     Specialty:  Cardiology  Provider:  Miller Talbot  MD Meir    01/29/21 1931 01/29/21 1932  Inpatient Gastroenterology Consult  Once     Specialty:  Gastroenterology  Provider:  Aditi Cerrato MD    01/29/21 1931 01/29/21 1714  [facility] (on-call MD unless specified) Details  Once     Specialty:  Hospitalist  Provider:  (Not yet assigned)    01/29/21 1713              Procedures     Imaging Results (All)     Procedure Component Value Units Date/Time    XR Chest 1 View [[id number]] Collected: 02/06/21 1602     Updated: 02/06/21 1606    Narrative:      XR CHEST 1 VW-  2/6/2021     HISTORY: Hypoxia. Evaluate for pulmonary edema.     Heart size is mildly enlarged. Lungs are underinflated with no focal  infiltrates. Moderate amount of air is seen in the stomach. Sternotomy  wires are seen.     No pneumothorax is seen.     There is no evidence of pulmonary edema.       Impression:      Mild cardiomegaly.  2. Underinflation of the lungs.  3. No evidence of acute infiltrate or pulmonary edema  4. Moderate gaseous distention of the stomach.     This report was finalized on 2/6/2021 4:03 PM by Dr. Kingsley Galindo M.D.       CT Abdomen Pelvis With Contrast [[id number]] Collected: 02/02/21 1026     Updated: 02/02/21 1038    Narrative:      CT ABDOMEN AND PELVIS WITH IV CONTRAST     HISTORY: Colon mass in the hepatic flexure, anemia, abdominal pain     TECHNIQUE: Radiation dose reduction techniques were utilized, including  automated exposure control and exposure modulation based on body size.   3 mm images were obtained through the abdomen and pelvis after the  administration of IV contrast.      COMPARISON: 04/14/2019     FINDINGS:      ABDOMEN:  Mild fibrotic changes in the lung bases. Stable dilation of the thoracic  aorta. The liver is unremarkable. Cholecystectomy. Spleen is  unremarkable. Bilateral renal cysts. Adrenal glands are unremarkable.  Stable multiple pancreatic cysts/cystic lesions with the largest located  in the region of the pancreatic head measuring  1.9 cm. Stable mildly  dilated infrarenal abdominal aorta. No ascites or lymphadenopathy.     PELVIS:  Prostatomegaly. The bladder is unremarkable. No free fluid. Sigmoid  diverticulosis without evidence of diverticulitis. There is an area of  narrowing in the hepatic flexure of the colon best seen on coronal  images 39 through 48 may reflect the mass found on colonoscopy. Bone  windows show postsurgical changes of the lumbar spine.       Impression:      Area of narrowing within the hepatic flexure of the colon may reflect  the mass found on colonoscopy. There is no convincing evidence of  metastatic disease. Additional findings as described.     Radiation dose reduction techniques were utilized, including automated  exposure control and exposure modulation based on body size.     This report was finalized on 2/2/2021 10:35 AM by Dr. Harvinder Kate M.D.       XR Chest 1 View [[id number]] Collected: 01/29/21 1607     Updated: 01/29/21 1613    Narrative:      XR CHEST 1 VW-     HISTORY: Male who is 85 years-old,  short of breath     TECHNIQUE: Frontal view of the chest     COMPARISON: 04/14/2019     FINDINGS: The heart size is borderline. Aorta is tortuous, calcified.  Pulmonary vasculature is unremarkable. Sternotomy wires are seen. No  focal pulmonary consolidation, pleural effusion, or pneumothorax. No  acute osseous process.       Impression:      No focal pulmonary consolidation. Borderline heart size.  Tortuous aorta. Follow-up as clinically indicated.     This report was finalized on 1/29/2021 4:10 PM by Dr. Vimal Mayorga M.D.             Pertinent Labs     Results from last 7 days   Lab Units 02/12/21  0542 02/11/21  0706 02/10/21  0528 02/09/21  0437   WBC 10*3/mm3 9.15 8.74 6.93 6.80   HEMOGLOBIN g/dL 9.5* 9.7* 9.2* 9.4*   PLATELETS 10*3/mm3 209 177 138* 130*     Results from last 7 days   Lab Units 02/12/21  0542 02/11/21 2111 02/11/21  0706 02/10/21  0528 02/09/21  0437   SODIUM mmol/L 142  --   142 141 141   POTASSIUM mmol/L 4.0 3.9 3.4* 3.6 3.3*   CHLORIDE mmol/L 100  --  97* 102 102   CO2 mmol/L 37.0*  --  35.5* 31.8* 29.3*   BUN mg/dL 23  --  22 22 27*   CREATININE mg/dL 1.07  --  1.03 0.97 1.05   GLUCOSE mg/dL 160*  --  159* 156* 170*   Estimated Creatinine Clearance: 64.3 mL/min (by C-G formula based on SCr of 1.07 mg/dL).    Results from last 7 days   Lab Units 02/12/21  0542 02/11/21  0706 02/10/21  0528 02/09/21  0437   CALCIUM mg/dL 8.6 8.9 8.4* 8.7   MAGNESIUM mg/dL  --   --   --  1.7       Results from last 7 days   Lab Units 02/10/21  0528 02/06/21  0659   PROBNP pg/mL 3,345.0* 2,483.0*           Invalid input(s): LDLCALC        Test Results Pending at Discharge       Discharge Details        Discharge Medications      New Medications      Instructions Start Date   amoxicillin-clavulanate 875-125 MG per tablet  Commonly known as: AUGMENTIN   1 tablet, Oral, Every 12 Hours Scheduled         Changes to Medications      Instructions Start Date   amiodarone 200 MG tablet  Commonly known as: PACERONE  What changed:   · how much to take  · how to take this  · when to take this  · additional instructions   TAKE 1 TABLET DAILY      hydrALAZINE 25 MG tablet  Commonly known as: APRESOLINE  What changed: additional instructions   25 mg, Oral, 3 Times Daily      lisinopril 20 MG tablet  Commonly known as: PRINIVILZESTRIL  What changed:   · medication strength  · how much to take  · when to take this   20 mg, Oral, Every 24 Hours Scheduled   Start Date: February 13, 2021     metFORMIN 850 MG tablet  Commonly known as: GLUCOPHAGE  What changed:   · how much to take  · how to take this  · when to take this  · additional instructions   TAKE 1 TABLET TWICE DAILY  WITH MEALS      metoprolol succinate XL 25 MG 24 hr tablet  Commonly known as: TOPROL-XL  What changed:   · how much to take  · how to take this  · when to take this  · additional instructions   TAKE 1 TABLET DAILY      silver sulfadiazine 1 %  cream  Commonly known as: Silvadene  What changed:   · how much to take  · additional instructions   Topical, 2 Times Daily      tamsulosin 0.4 MG capsule 24 hr capsule  Commonly known as: FLOMAX  What changed:   · how much to take  · how to take this  · when to take this  · additional instructions   TAKE 2 CAPSULES DAILY      Xarelto 20 MG tablet  Generic drug: rivaroxaban  What changed:   · how much to take  · how to take this  · when to take this  · additional instructions   TAKE 1 TABLET DAILY         Continue These Medications      Instructions Start Date   acetaminophen 500 MG tablet  Commonly known as: TYLENOL   500 mg, Oral, Every 6 Hours PRN      Alpha-Lipoic Acid 600 MG tablet   600 mg, Oral, Daily, For neuropathy      Arlyn Microlet Lancets lancets   USE TWICE A DAY      clotrimazole-betamethasone 1-0.05 % cream  Commonly known as: Lotrisone   Topical, 2 Times Daily      Contour Next Test test strip  Generic drug: glucose blood   1 each, Other, Daily, test blood sugar      multivitamin tablet tablet  Commonly known as: THERAGRAN   1 tablet, Oral, Every Morning      mupirocin 2 % ointment  Commonly known as: BACTROBAN   APPLY TWO TIMES PER DAY TO THE BIOPSY SITES.      vitamin B-12 1000 MCG tablet  Commonly known as: CYANOCOBALAMIN   1,000 mcg, Oral, Daily         Stop These Medications    traMADol 50 MG tablet  Commonly known as: ULTRAM            Allergies   Allergen Reactions   • Amiodarone Other (See Comments)     Muscle problems in legs   • Percocet [Oxycodone-Acetaminophen] Mental Status Change     Causes confusion/         Discharge Disposition:  Skilled Nursing Facility (DC - External)    Discharge Diet:  Diet Order   Procedures   • Diet Regular       Discharge Activity:   Activity Instructions     Activity as Tolerated            CODE STATUS:    Code Status and Medical Interventions:   Ordered at: 01/29/21 7243     Code Status:    CPR     Medical Interventions (Level of Support Prior to Arrest):     Full       Future Appointments   Date Time Provider Department Center   2/22/2021 10:30 AM Xavi Napoles MD [facility]  [facility] None   3/30/2021  1:30 PM Caio Rodríguez Jr., MD [facility]   8/5/2021  1:30 PM Miller Talbot MD [facility] None   10/8/2021  2:00 PM Angelo Driscoll MD [facility] None     Additional Instructions for the Follow-ups that You Need to Schedule     Discharge Follow-up with PCP   As directed       Currently Documented PCP:    Caio Rodríguez Jr., MD    PCP Phone Number:    [phone]     Follow Up Details: 1-2 weeks         Discharge Follow-up with Specialty: Dr. Napoles; 1 Week   As directed      Specialty: Dr. Napoles    Follow Up: 1 Week         Discharge Follow-up with Specified Provider: Dr. Gaitan   As directed      To: Dr. Gaitan    Follow Up Details: call to schedule            Contact information for follow-up providers     Caio Rodríguez Jr., MD .    Specialty: Family Medicine  Why: 1-2 weeks  Contact information:  [address] [address]  [phone]                   Contact information for after-discharge care     Destination     [facility] .    Service: Inpatient Rehabilitation  Contact information:  [address] [facility] [address] [phone]                             Additional Instructions for the Follow-ups that You Need to Schedule     Discharge Follow-up with PCP   As directed       Currently Documented PCP:    Caio Rodríguez Jr., MD    PCP Phone Number:    [phone]     Follow Up Details: 1-2 weeks         Discharge Follow-up with Specialty: Dr. Napoles; 1 Week   As directed      Specialty: Dr. Napoles    Follow Up: 1 Week         Discharge Follow-up with Specified Provider: Dr. Gaitan   As directed      To: Dr. Gaitan    Follow Up Details: call to schedule           Time Spent on Discharge:  Greater than 30 minutes      LOU Andrew  [facility]  02/12/21  12:14  EST

## 2021-02-12 NOTE — PROGRESS NOTES
Case Management Discharge Note      Final Note: [facility] via [facility] w/c van 1700    Provided Post Acute Provider List?: Refused  Refused Provider List Comment: Declines HH    Selected Continued Care - Admitted Since 1/29/2021     Destination Coordination complete    Service Provider Selected Services Address Phone Fax Patient Preferred    [facility] [place]  Inpatient Rehabilitation [address] [facility] [id number] [phone] -- --          Durable Medical Equipment    No services have been selected for the patient.              Dialysis/Infusion    No services have been selected for the patient.              Home Medical Care    No services have been selected for the patient.              Therapy    No services have been selected for the patient.              Community Resources    No services have been selected for the patient.                  Transportation Services  W/C Van: [facility]    Final Discharge Disposition Code: 62 - inpatient rehab facility

## 2021-02-12 NOTE — PLAN OF CARE
Goal Outcome Evaluation:   Patient awaiting dc to [facility] pending transportation to arrive at approx 1700.

## 2021-02-12 NOTE — PROGRESS NOTES
Continued Stay Note  [facility]     Patient Name: Osvaldo Lopez  MRN: [id number]  Today's Date: 2/12/2021    Admit Date: 1/29/2021    Discharge Plan     Row Name 02/12/21 0953       Plan    Plan  [facility] via [facility] w/c van 5:00 PM    Patient/Family in Agreement with Plan  yes    Plan Comments  Per E[facility], [facility] can accept pt today no earlier than 5:30 PM (RM: 1002; Report: [phone]; Fax: [phone]). [facility] updated pt and, per his request, his daughter (Sisi Sanchez, [phone]) via telephone who are both agreeable. Pt stil requiring supplemental O2. CCP scheduled [facility] w/c van with O2 at 5:00 PM (ID: [id number], latest available time, Ewa agreeable). Packet on pt chart. Erinn Santos LCSW        Discharge Codes    No documentation.       Expected Discharge Date and Time     Expected Discharge Date Expected Discharge Time    Feb 12, 2021             Katie Santos LCSW

## 2021-02-12 NOTE — PROGRESS NOTES
Postoperative day 9 s/p robotic assisted laparoscopic extended right hemicolectomy lysis of adhesions    S: No events overnight.  Having bowel movements and tolerating regular diet.  Wound has some fibrinous exudate at the base.    O:   Vitals:    02/11/21 1534 02/11/21 1955 02/11/21 2323 02/12/21 0735   BP: 133/62 123/58 129/71 147/68   BP Location:  Left arm Left arm Left arm   Patient Position:  Lying Lying Lying   Pulse: 79 84 80 82   Resp:  18 20 18   Temp:  97.5 °F (36.4 °C) 97.8 °F (36.6 °C) 98.3 °F (36.8 °C)   TempSrc:  Oral Oral Oral   SpO2:  95% 95% 94%   Weight:       Height:         Alert, in no distress, chronically ill-appearing  Breathing comfortable  Regular rate rhythm  Abdomen soft, nontender, nondistended, there is open wound in the left lower quadrant with some fibrinous exudate at the base.  This was debrided sharply.  I replaced the dressing  All incisions clean dry and intact     White blood cell count 9.1  Hemoglobin 9.5  Platelets 219    Postoperative day 9 s/p robotic assisted laparoscopic extended right hemicolectomy lysis of adhesions complicated by wound infection that has been opened up drained and packed.    -I recommend wet-to-dry dressing changes with saline and gauze 3 times a day  -Continue antibiotics for 5 days  -Follow-up with Dr. Napoles in 1 week for wound check  -Okay to discharge from my standpoint

## 2021-02-12 NOTE — PLAN OF CARE
Goal Outcome Evaluation:      Patient's vitals WNL, patient has been resting this evening with no complaints of pain, RN did change patients dressing at 0300, patient refused morning tylenol, RN and staff will continue to monitor patient.

## 2021-02-18 ENCOUNTER — PATIENT OUTREACH (OUTPATIENT)
Dept: CASE MANAGEMENT | Facility: OTHER | Age: 86
End: 2021-02-18

## 2021-02-18 ENCOUNTER — EPISODE CHANGES (OUTPATIENT)
Dept: CASE MANAGEMENT | Facility: OTHER | Age: 86
End: 2021-02-18

## 2021-02-18 NOTE — OUTREACH NOTE
Rm 1002 Inpatient at [facility]    Brittany Orosco RN  Ambulatory     2/18/2021, 13:04 EST

## 2021-02-23 ENCOUNTER — PATIENT OUTREACH (OUTPATIENT)
Dept: CASE MANAGEMENT | Facility: OTHER | Age: 86
End: 2021-02-23

## 2021-02-23 NOTE — OUTREACH NOTE
Patient Outreach Note    [facility] inpatient, Rm 1002, [facility].    Brittany Orosco RN  Ambulatory     2/23/2021, 13:16 EST

## 2021-03-04 ENCOUNTER — PATIENT OUTREACH (OUTPATIENT)
Dept: CASE MANAGEMENT | Facility: OTHER | Age: 86
End: 2021-03-04

## 2021-03-04 NOTE — OUTREACH NOTE
Care Coordination Note    Remains inpatient in  1002, [facility].    Brittany Orosco RN  Ambulatory     3/4/2021, 16:22 EST

## 2021-03-10 ENCOUNTER — PATIENT OUTREACH (OUTPATIENT)
Dept: CASE MANAGEMENT | Facility: OTHER | Age: 86
End: 2021-03-10

## 2021-03-10 NOTE — OUTREACH NOTE
Care Coordination Note    Contacted [facility] and reports patient has been discharged but unable to state date of discharge.     Brittany Orosco RN  Ambulatory     3/10/2021, 11:34 EST

## 2021-03-24 ENCOUNTER — PATIENT OUTREACH (OUTPATIENT)
Dept: CASE MANAGEMENT | Facility: OTHER | Age: 86
End: 2021-03-24

## 2021-03-24 NOTE — OUTREACH NOTE
Care Coordination Note    In basket message related to unsuccessful attempts to contact patient.     Brittany Orosco RN  Ambulatory     3/24/2021, 10:34 EDT

## 2021-03-30 ENCOUNTER — TELEPHONE (OUTPATIENT)
Dept: CARDIOLOGY | Facility: CLINIC | Age: 86
End: 2021-03-30

## 2021-03-30 ENCOUNTER — TELEPHONE (OUTPATIENT)
Dept: SURGERY | Facility: CLINIC | Age: 86
End: 2021-03-30

## 2021-03-30 ENCOUNTER — TELEPHONE (OUTPATIENT)
Dept: INTERNAL MEDICINE | Facility: CLINIC | Age: 86
End: 2021-03-30

## 2021-03-30 NOTE — TELEPHONE ENCOUNTER
PATIENT'S DAUGHTER CALLED STATING THAT THE PATIENT JUST GOT OUT OF REHAB AND HAS SOME QUESTIONS REGARDING MEDICATION THAT HE HAS BEEN PUT ON.    metFORMIN (GLUCOPHAGE) 850 MG tablet    PLEASE ADVISE  [phone]

## 2021-03-30 NOTE — TELEPHONE ENCOUNTER
Patient's daughter called asking for a post-op appointment for her father he had surgery on 02/03/2021 and just finished rehab.  She would like for you to call her to see if you can make a sooner appt instead of me, my 1st available for post op is 05/07 and 1st available for follow up is 05/17, she states her father can't wait that long that he has something sticking out of where he had surgery.    Please call daughter Sisi Sanchez [phone].    Thank you.

## 2021-03-30 NOTE — TELEPHONE ENCOUNTER
Sisi, pt's daughter, left msg asking what MG of Xarelto her dad is supposed to be on?  He had been on Xarelto 20 MG daily.      Pt was in the hospital  from 01/29/21 - 02/12/21 with severe anemia and found to have colon cancer.    Medication list at discharge reflects pt was sent home on Xarelto 20 MG.  The daughter said pt just got out of rehab & somewhere along the line he was changed to Xarelto 15 MG.  Which should he be on?    Sisi # [phone]    Thanks,  Emily

## 2021-03-31 NOTE — TELEPHONE ENCOUNTER
In that case, prefer a 1-2 week follow up and yes, remian on it until seen again here. Please update med list

## 2021-03-31 NOTE — TELEPHONE ENCOUNTER
Reviewed up to date labs. Creatinine was 1.76. recalculated Creatinine clearance and now 45.7 so yes he should stay on Xarelto 15 for worsening renal function.     I recommend he follow-up in 6 to 8 weeks with TERRY with repeat renal function and reassessment.

## 2021-03-31 NOTE — TELEPHONE ENCOUNTER
I calculated his creatinine clearance using last renal function from February as well as a weight of 224 pounds.  Creatinine clearance was 75.17.  Providing that remains above 50 then the appropriate doses Xarelto 20 mg daily.  Please contact the facility to see why it was decreased as well.

## 2021-03-31 NOTE — TELEPHONE ENCOUNTER
Please see Karli's note and schedule a hospital f/u appt  in 1-2 wks with Xiomy.       Please call pt's daughter, Sisi, to make the appt.  Her number is [phone].    Thanks,  Emily    *I gave Sisi the msg about remaining on the lasix until appt here

## 2021-03-31 NOTE — TELEPHONE ENCOUNTER
I spoke with the daughter and she said the Xarelto 20 MG was decreased to 15 MG because of his Creatinine level.  I had [facility] fax the d/c summary and lab reports.  The last Creatinine level was on 03-06-21.  I have placed these in your box for review.    Thank you,  Emily

## 2021-04-02 ENCOUNTER — TELEPHONE (OUTPATIENT)
Dept: INTERNAL MEDICINE | Facility: CLINIC | Age: 86
End: 2021-04-02

## 2021-04-02 NOTE — TELEPHONE ENCOUNTER
LIZETH WITH [facility] CALLED IN REQUESTING ORDERS FOR PHYSICAL THERAPY 1 TIME A WEEK FOR 6 WEEKS.    BEST CALL BACK # [phone]

## 2021-04-02 NOTE — TELEPHONE ENCOUNTER
BERT WITH [facility] CALLED FOR OCCUPATIONAL THERAPY FOR 1 TIME A WEEK FOR 6 WEEKS    NEEDS VERBAL ORDERS.  CAN LEAVE A MESSAGE [phone]

## 2021-04-05 ENCOUNTER — OFFICE VISIT (OUTPATIENT)
Dept: INTERNAL MEDICINE | Facility: CLINIC | Age: 86
End: 2021-04-05

## 2021-04-05 ENCOUNTER — OFFICE VISIT (OUTPATIENT)
Dept: SURGERY | Facility: CLINIC | Age: 86
End: 2021-04-05

## 2021-04-05 VITALS
OXYGEN SATURATION: 94 % | HEART RATE: 84 BPM | BODY MASS INDEX: 31.55 KG/M2 | DIASTOLIC BLOOD PRESSURE: 56 MMHG | WEIGHT: 213 LBS | HEIGHT: 69 IN | SYSTOLIC BLOOD PRESSURE: 104 MMHG | TEMPERATURE: 97.7 F

## 2021-04-05 VITALS
SYSTOLIC BLOOD PRESSURE: 102 MMHG | TEMPERATURE: 97.6 F | HEART RATE: 94 BPM | WEIGHT: 213 LBS | HEIGHT: 70 IN | OXYGEN SATURATION: 97 % | DIASTOLIC BLOOD PRESSURE: 40 MMHG | BODY MASS INDEX: 30.49 KG/M2

## 2021-04-05 DIAGNOSIS — D50.0 BLOOD LOSS ANEMIA: Primary | ICD-10-CM

## 2021-04-05 DIAGNOSIS — R94.6 ABNORMAL FINDING ON THYROID FUNCTION TEST: ICD-10-CM

## 2021-04-05 DIAGNOSIS — E08.44 DIABETIC AMYOTROPHY ASSOCIATED WITH DIABETES MELLITUS DUE TO UNDERLYING CONDITION (HCC): ICD-10-CM

## 2021-04-05 DIAGNOSIS — C18.3 MALIGNANT NEOPLASM OF HEPATIC FLEXURE (HCC): Primary | ICD-10-CM

## 2021-04-05 DIAGNOSIS — M54.2 NECK PAIN: ICD-10-CM

## 2021-04-05 DIAGNOSIS — Z85.038 HISTORY OF COLON CANCER, STAGE I: ICD-10-CM

## 2021-04-05 DIAGNOSIS — I10 ESSENTIAL HYPERTENSION: ICD-10-CM

## 2021-04-05 DIAGNOSIS — E11.8 DM (DIABETES MELLITUS), TYPE 2 WITH COMPLICATIONS (HCC): ICD-10-CM

## 2021-04-05 PROCEDURE — 99024 POSTOP FOLLOW-UP VISIT: CPT | Performed by: COLON & RECTAL SURGERY

## 2021-04-05 PROCEDURE — 99214 OFFICE O/P EST MOD 30 MIN: CPT | Performed by: FAMILY MEDICINE

## 2021-04-05 RX ORDER — ASPIRIN 81 MG/1
81 TABLET ORAL
COMMUNITY
Start: 2021-03-09 | End: 2021-04-05

## 2021-04-05 RX ORDER — FUROSEMIDE 40 MG/1
40 TABLET ORAL DAILY
COMMUNITY
Start: 2021-03-30 | End: 2021-04-16

## 2021-04-05 RX ORDER — LINAGLIPTIN 5 MG/1
TABLET, FILM COATED ORAL
COMMUNITY
Start: 2021-03-30 | End: 2021-04-16 | Stop reason: SDUPTHER

## 2021-04-05 RX ORDER — DOCUSATE SODIUM 100 MG/1
100 CAPSULE, LIQUID FILLED ORAL 2 TIMES DAILY PRN
Qty: 60 CAPSULE | Refills: 5 | Status: SHIPPED | OUTPATIENT
Start: 2021-04-05 | End: 2022-06-07

## 2021-04-05 RX ORDER — LIDOCAINE 40 MG/G
CREAM TOPICAL
COMMUNITY
Start: 2021-03-04

## 2021-04-05 RX ORDER — INSULIN LISPRO 100 [IU]/ML
INJECTION, SOLUTION INTRAVENOUS; SUBCUTANEOUS
COMMUNITY
Start: 2021-03-08 | End: 2021-04-05

## 2021-04-05 NOTE — PROGRESS NOTES
"Osvaldo Lopez is a 85 y.o. male in for follow up of Malignant neoplasm of hepatic flexure (CMS/HCC)    Recently got out of skilled nursing   Had a fall at rehab and has set him back  Dressing changes were done and wound is healed up  Recently noticed a stitch poking out of incision    /40 (BP Location: Left arm, Patient Position: Sitting, Cuff Size: Small Adult)   Pulse 94   Temp 97.6 °F (36.4 °C)   Ht 177.8 cm (70\")   Wt 96.6 kg (213 lb)   SpO2 97%   BMI 30.56 kg/m²   Body mass index is 30.56 kg/m².      PE:  Physical Exam  Constitutional:       General: He is not in acute distress.     Appearance: He is well-developed.   HENT:      Head: Normocephalic and atraumatic.   Abdominal:      General: There is no distension.      Palpations: Abdomen is soft.      Comments: Left lower quadrant incision PDS suture at skin level.  Suture was clipped  Incisions are well-healed   Musculoskeletal:         General: Normal range of motion.   Neurological:      Mental Status: He is alert.   Psychiatric:         Thought Content: Thought content normal.         Assessment:   1. Malignant neoplasm of hepatic flexure (CMS/HCC)    Status post robotic right T1N0  Plan:  Patient to continue physical therapy for strength  Patient to eat what he feels like he wants.  Follow-up 2 months        "

## 2021-04-05 NOTE — PROGRESS NOTES
"Chief Complaint  Hospital Follow Up Visit    Subjective          Osvaldo Lopez presents to [facility]  Pleasant gentleman who was admitted to the hospital with symptomatic anemia found to have T1N0 colon cancer with extended right colectomy.  Subsequently went to [facility] rehab based on difficult mobility with history of neuropathy of both legs and also history of protracted weakness with spinal stenosis of both legs.  He was in [facility] and did fall striking the left knee with a contusion which extended his stay and subsequent went to [facility] for further rehab was discharged 6 days ago.  He is on chronic Xarelto 15 mg daily.  He does describe chronic neck pain with DJD of the cervical spine we will get him some topical compounding cream in the neck given that he cannot really take anything else is a sedating nature and no oral nonsteroidal anti-inflammatories.    Patient needs labs in regards to diabetes type 2 was taken off Metformin and started Tradjenta and labs will be drawn today.  He is on insulin previously is now on insulin at this time.      Objective   Vital Signs:   /56 (BP Location: Left arm, Patient Position: Sitting, Cuff Size: Adult)   Pulse 84   Temp 97.7 °F (36.5 °C) (Tympanic)   Ht 175.3 cm (69\")   Wt 96.6 kg (213 lb)   SpO2 94%   BMI 31.45 kg/m²     Physical Exam  Vitals reviewed.   Constitutional:       Appearance: He is well-developed.   HENT:      Head: Normocephalic and atraumatic.      Right Ear: Tympanic membrane and external ear normal.      Left Ear: Tympanic membrane and external ear normal.   Eyes:      Conjunctiva/sclera: Conjunctivae normal.      Pupils: Pupils are equal, round, and reactive to light.   Neck:      Thyroid: No thyromegaly.      Vascular: No JVD.   Cardiovascular:      Rate and Rhythm: Normal rate and regular rhythm.      Heart sounds: Normal heart sounds.   Pulmonary:      Effort: Pulmonary effort is " normal.      Breath sounds: Normal breath sounds.   Abdominal:      General: Bowel sounds are normal.      Palpations: Abdomen is soft.   Musculoskeletal:      Cervical back: Neck supple. Muscular tenderness present. Decreased range of motion.      Lumbar back: Decreased range of motion.   Lymphadenopathy:      Cervical: No cervical adenopathy.   Skin:     General: Skin is warm and dry.      Findings: No rash.   Neurological:      Mental Status: He is alert and oriented to person, place, and time.      Cranial Nerves: No cranial nerve deficit.      Motor: Weakness present.      Coordination: Coordination abnormal.      Gait: Gait abnormal.      Comments: Patient is able ambulate slowly with a rolling walker.   Psychiatric:         Behavior: Behavior normal.         Thought Content: Thought content normal.         Judgment: Judgment normal.        Result Review :                 Assessment and Plan    Diagnoses and all orders for this visit:    1. Blood loss anemia (Primary)  Comments:  May use prenatal vitamins or children's vitamin with iron  Orders:  -     Comprehensive Metabolic Panel  -     CBC & Differential  -     Lipid Panel With / Chol / HDL Ratio  -     Hemoglobin A1c  -     TSH  -     T4, Free  -     T3, Free  -     Urinalysis With Microscopic If Indicated (No Culture) - Urine, Clean Catch  -     Iron and TIBC  -     Ferritin    2. Essential hypertension  Comments:  Continue current meds  Orders:  -     Comprehensive Metabolic Panel  -     CBC & Differential  -     Lipid Panel With / Chol / HDL Ratio  -     Hemoglobin A1c  -     TSH  -     T4, Free  -     T3, Free  -     Urinalysis With Microscopic If Indicated (No Culture) - Urine, Clean Catch  -     Iron and TIBC  -     Ferritin    3. Abnormal finding on thyroid function test  Comments:  Thyroid function pending  Orders:  -     Comprehensive Metabolic Panel  -     CBC & Differential  -     Lipid Panel With / Chol / HDL Ratio  -     Hemoglobin A1c  -      TSH  -     T4, Free  -     T3, Free  -     Urinalysis With Microscopic If Indicated (No Culture) - Urine, Clean Catch  -     Iron and TIBC  -     Ferritin    4. DM (diabetes mellitus), type 2 with complications (CMS/[facility])  Comments:  A1c general labs also serum glucose random.  Consider restarting Metformin depending on kidney function.  Orders:  -     Comprehensive Metabolic Panel  -     CBC & Differential  -     Lipid Panel With / Chol / HDL Ratio  -     Hemoglobin A1c  -     TSH  -     T4, Free  -     T3, Free  -     Urinalysis With Microscopic If Indicated (No Culture) - Urine, Clean Catch  -     Iron and TIBC  -     Ferritin    5. 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  Orders:  -     Comprehensive Metabolic Panel  -     CBC & Differential  -     Lipid Panel With / Chol / HDL Ratio  -     Hemoglobin A1c  -     TSH  -     T4, Free  -     T3, Free  -     Urinalysis With Microscopic If Indicated (No Culture) - Urine, Clean Catch  -     Iron and TIBC  -     Ferritin    6. History of colon cancer, stage I  Comments:  T1N0 requiring no chemo or radiation    7. Neck pain  Comments:  Prescription for compounded cream to his pharmacy.    Other orders  -     docusate sodium (Colace) 100 MG capsule; Take 1 capsule by mouth 2 (Two) Times a Day As Needed for Constipation.  Dispense: 60 capsule; Refill: 5        Follow Up   Return in about 3 months (around 7/5/2021) for Recheck.  Patient was given instructions and counseling regarding his condition or for health maintenance advice. Please see specific information pulled into the AVS if appropriate.

## 2021-04-06 LAB
ALBUMIN SERPL-MCNC: 3.6 G/DL (ref 3.6–4.6)
ALBUMIN/GLOB SERPL: 1.4 {RATIO} (ref 1.2–2.2)
ALP SERPL-CCNC: 156 IU/L (ref 39–117)
ALT SERPL-CCNC: 18 IU/L (ref 0–44)
APPEARANCE UR: CLEAR
AST SERPL-CCNC: 25 IU/L (ref 0–40)
BACTERIA #/AREA URNS HPF: ABNORMAL /[HPF]
BASOPHILS # BLD AUTO: 0 X10E3/UL (ref 0–0.2)
BASOPHILS NFR BLD AUTO: 0 %
BILIRUB SERPL-MCNC: 0.4 MG/DL (ref 0–1.2)
BILIRUB UR QL STRIP: NEGATIVE
BUN SERPL-MCNC: 36 MG/DL (ref 8–27)
BUN/CREAT SERPL: 21 (ref 10–24)
CALCIUM SERPL-MCNC: 8.7 MG/DL (ref 8.6–10.2)
CASTS URNS MICRO: ABNORMAL
CASTS URNS QL MICRO: PRESENT /LPF
CHLORIDE SERPL-SCNC: 103 MMOL/L (ref 96–106)
CHOLEST SERPL-MCNC: 154 MG/DL (ref 100–199)
CHOLEST/HDLC SERPL: 3.3 RATIO (ref 0–5)
CO2 SERPL-SCNC: 24 MMOL/L (ref 20–29)
COLOR UR: YELLOW
CREAT SERPL-MCNC: 1.72 MG/DL (ref 0.76–1.27)
EOSINOPHIL # BLD AUTO: 0 X10E3/UL (ref 0–0.4)
EOSINOPHIL NFR BLD AUTO: 0 %
EPI CELLS #/AREA URNS HPF: ABNORMAL /HPF (ref 0–10)
ERYTHROCYTE [DISTWIDTH] IN BLOOD BY AUTOMATED COUNT: 21.5 % (ref 11.6–15.4)
FERRITIN SERPL-MCNC: 132 NG/ML (ref 30–400)
GLOBULIN SER CALC-MCNC: 2.6 G/DL (ref 1.5–4.5)
GLUCOSE SERPL-MCNC: 110 MG/DL (ref 65–99)
GLUCOSE UR QL: NEGATIVE
HBA1C MFR BLD: 6 % (ref 4.8–5.6)
HCT VFR BLD AUTO: 32.9 % (ref 37.5–51)
HDLC SERPL-MCNC: 46 MG/DL
HGB BLD-MCNC: 10.4 G/DL (ref 13–17.7)
HGB UR QL STRIP: NEGATIVE
IMM GRANULOCYTES # BLD AUTO: 0 X10E3/UL (ref 0–0.1)
IMM GRANULOCYTES NFR BLD AUTO: 0 %
IRON SATN MFR SERPL: 5 % (ref 15–55)
IRON SERPL-MCNC: 15 UG/DL (ref 38–169)
KETONES UR QL STRIP: NEGATIVE
LDLC SERPL CALC-MCNC: 79 MG/DL (ref 0–99)
LEUKOCYTE ESTERASE UR QL STRIP: ABNORMAL
LYMPHOCYTES # BLD AUTO: 2.4 X10E3/UL (ref 0.7–3.1)
LYMPHOCYTES NFR BLD AUTO: 22 %
MCH RBC QN AUTO: 27.9 PG (ref 26.6–33)
MCHC RBC AUTO-ENTMCNC: 31.6 G/DL (ref 31.5–35.7)
MCV RBC AUTO: 88 FL (ref 79–97)
MICRO URNS: ABNORMAL
MONOCYTES # BLD AUTO: 0.9 X10E3/UL (ref 0.1–0.9)
MONOCYTES NFR BLD AUTO: 8 %
MUCOUS THREADS URNS QL MICRO: PRESENT
NEUTROPHILS # BLD AUTO: 7.5 X10E3/UL (ref 1.4–7)
NEUTROPHILS NFR BLD AUTO: 70 %
NITRITE UR QL STRIP: POSITIVE
PH UR STRIP: 5.5 [PH] (ref 5–7.5)
PLATELET # BLD AUTO: 158 X10E3/UL (ref 150–450)
POTASSIUM SERPL-SCNC: 4.7 MMOL/L (ref 3.5–5.2)
PROT SERPL-MCNC: 6.2 G/DL (ref 6–8.5)
PROT UR QL STRIP: NEGATIVE
RBC # BLD AUTO: 3.73 X10E6/UL (ref 4.14–5.8)
RBC #/AREA URNS HPF: ABNORMAL /HPF (ref 0–2)
SODIUM SERPL-SCNC: 139 MMOL/L (ref 134–144)
SP GR UR: 1.01 (ref 1–1.03)
T3FREE SERPL-MCNC: 1.6 PG/ML (ref 2–4.4)
T4 FREE SERPL-MCNC: 0.93 NG/DL (ref 0.82–1.77)
TIBC SERPL-MCNC: 302 UG/DL (ref 250–450)
TRIGL SERPL-MCNC: 170 MG/DL (ref 0–149)
TSH SERPL DL<=0.005 MIU/L-ACNC: 4.64 UIU/ML (ref 0.45–4.5)
UIBC SERPL-MCNC: 287 UG/DL (ref 111–343)
UROBILINOGEN UR STRIP-MCNC: 0.2 MG/DL (ref 0.2–1)
VLDLC SERPL CALC-MCNC: 29 MG/DL (ref 5–40)
WBC # BLD AUTO: 10.9 X10E3/UL (ref 3.4–10.8)
WBC #/AREA URNS HPF: ABNORMAL /HPF (ref 0–5)

## 2021-04-07 ENCOUNTER — TELEPHONE (OUTPATIENT)
Dept: INTERNAL MEDICINE | Facility: CLINIC | Age: 86
End: 2021-04-07

## 2021-04-07 NOTE — TELEPHONE ENCOUNTER
Pt's Daughter would like you to review his labs and advise on his iron, creatinine and thyroid levels that were abnormal    Please review and advise

## 2021-04-14 NOTE — TELEPHONE ENCOUNTER
Caller: Osvaldo Lopez    Relationship: Self    Best call back number: [phone]    What is the best time to reach you:ASAP    Who are you requesting to speak with (clinical staff, provider,  specific staff member):       What was the call regarding: TEST RESULTS    Do you require a callback: YES

## 2021-04-14 NOTE — TELEPHONE ENCOUNTER
Caller: Osvaldo Lopez    Relationship: Self    Best call back number: [phone]    What test was performed: ROUTINE BLOOD WORK     When was the test performed: 4/5

## 2021-04-15 ENCOUNTER — TELEPHONE (OUTPATIENT)
Dept: INTERNAL MEDICINE | Facility: CLINIC | Age: 86
End: 2021-04-15

## 2021-04-15 NOTE — TELEPHONE ENCOUNTER
Message sent to Dr berrios to please advise patients lab results, this encounter is a duplicate request

## 2021-04-15 NOTE — TELEPHONE ENCOUNTER
PATIENT CALLED IN REGARDS TO LAB RESULTS  DONE ON 4/5/21  PLEASE BALTA [phone]    HE HAS CALLED SEVERAL TIMES ABOUT THIS.

## 2021-04-16 ENCOUNTER — OFFICE VISIT (OUTPATIENT)
Dept: CARDIOLOGY | Facility: CLINIC | Age: 86
End: 2021-04-16

## 2021-04-16 ENCOUNTER — TELEPHONE (OUTPATIENT)
Dept: INTERNAL MEDICINE | Facility: CLINIC | Age: 86
End: 2021-04-16

## 2021-04-16 VITALS
HEART RATE: 67 BPM | OXYGEN SATURATION: 98 % | HEIGHT: 70 IN | BODY MASS INDEX: 30.21 KG/M2 | DIASTOLIC BLOOD PRESSURE: 64 MMHG | WEIGHT: 211 LBS | SYSTOLIC BLOOD PRESSURE: 130 MMHG

## 2021-04-16 DIAGNOSIS — I48.0 AF (PAROXYSMAL ATRIAL FIBRILLATION) (HCC): ICD-10-CM

## 2021-04-16 DIAGNOSIS — E78.2 MIXED HYPERLIPIDEMIA: ICD-10-CM

## 2021-04-16 DIAGNOSIS — I35.0 NONRHEUMATIC AORTIC VALVE STENOSIS: ICD-10-CM

## 2021-04-16 DIAGNOSIS — R79.89 ELEVATED SERUM CREATININE: ICD-10-CM

## 2021-04-16 DIAGNOSIS — I10 ESSENTIAL HYPERTENSION: Primary | ICD-10-CM

## 2021-04-16 DIAGNOSIS — D50.0 BLOOD LOSS ANEMIA: Primary | ICD-10-CM

## 2021-04-16 PROCEDURE — 99214 OFFICE O/P EST MOD 30 MIN: CPT | Performed by: NURSE PRACTITIONER

## 2021-04-16 RX ORDER — LINAGLIPTIN 5 MG/1
5 TABLET, FILM COATED ORAL DAILY
Qty: 90 TABLET | Refills: 1 | Status: SHIPPED | OUTPATIENT
Start: 2021-04-16 | End: 2021-09-28

## 2021-04-16 RX ORDER — FUROSEMIDE 20 MG/1
20 TABLET ORAL DAILY
Qty: 90 TABLET | Refills: 3 | Status: SHIPPED | OUTPATIENT
Start: 2021-04-16 | End: 2021-04-30 | Stop reason: SDUPTHER

## 2021-04-16 RX ORDER — FERROUS SULFATE 325(65) MG
325 TABLET ORAL
COMMUNITY

## 2021-04-16 NOTE — PROGRESS NOTES
"    CARDIOLOGY        Patient Name: Osvaldo Lopez  :1935  Age: 85 y.o.  Primary Cardiologist: Angelo Driscoll MD and Miller Talbot MD  Encounter Provider:  LOU Camarena    Date of Service: 21          CHIEF COMPLAINT / REASON FOR OFFICE VISIT     Hypertension (1-2 wk f/u)      HISTORY OF PRESENT ILLNESS       HPI  Osvaldo Lopez is a 85 y.o. male who presents today for hospital reevaluation.     Pt has a  history significant for PAF, hypertension, aortic valve stenosis, history of AAA.    Review of medical records reveals patient hospitalized -2021 with symptomatic anemia. Patient was patient was found to be anemic with hemoglobin of 6.8. GI performed colonoscopy which revealed diverticulosis, polyps and a colon mass. Patient underwent hemicolectomy for invasive adenocarcinoma of the transverse colon. He had difficult time coming off ventilator after the procedure and was taken to the ICU. After being extubated he was hypoxic with volume overload. Cardiology services adjusted his diuretics. Echocardiogram performed was unchanged from prior.    The following portions of the patient's history were reviewed and updated as appropriate: allergies, current medications, past family history, past medical history, past social history, past surgical history and problem list.      VITAL SIGNS     Visit Vitals  /64 (BP Location: Left arm, Patient Position: Sitting, Cuff Size: Large Adult)   Pulse 67   Ht 177.8 cm (70\")   Wt 95.7 kg (211 lb)   SpO2 98%   BMI 30.28 kg/m²         Wt Readings from Last 3 Encounters:   21 95.7 kg (211 lb)   21 96.6 kg (213 lb)   21 96.6 kg (213 lb)     Body mass index is 30.28 kg/m².      REVIEW OF SYSTEMS   Review of Systems   Constitutional: Negative for malaise/fatigue.   Cardiovascular: Negative for chest pain and leg swelling.   Respiratory: Negative for shortness of breath.    Neurological: Negative for light-headedness.   All " other systems reviewed and are negative.          PHYSICAL EXAMINATION     Constitutional:       Appearance: Normal appearance. Well-developed.   Eyes:      Conjunctiva/sclera: Conjunctivae normal.   Neck:      Vascular: No carotid bruit.   Pulmonary:      Effort: Pulmonary effort is normal.      Breath sounds: Normal breath sounds.   Cardiovascular:      Normal rate. Regular rhythm. Normal S1. Normal S2.      Murmurs: There is no murmur.      No gallop. No click. No rub.   Edema:     Ankle: bilateral trace edema of the ankle.     Feet: bilateral trace edema of the feet.  Musculoskeletal: Normal range of motion.      Comments: No pedal edema Skin:     General: Skin is warm and dry.   Neurological:      Mental Status: Alert and oriented to person, place, and time.      GCS: GCS eye subscore is 4. GCS verbal subscore is 5. GCS motor subscore is 6.   Psychiatric:         Speech: Speech normal.         Behavior: Behavior normal.         Thought Content: Thought content normal.         Judgment: Judgment normal.           REVIEWED DATA     Procedures    Cardiac Procedures:  1. Echocardiogram 1/18/2018. LVEF 62%. LV cavity is small. LV wall thickness consistent with borderline LVH. Calcification of aortic valve. Mild aortic valve stenosis. Mild mitral valve regurgitation.  2. Cardioversion 4/15/2019. Post cardioversion patient displayed sinus rhythm.  3. ZIO monitor 5/21/2019. Palpitations correlated with episodes of atrial fibrillation. Predominant rhythm was sinus. PVCs occurred rarely.  4. Atrial flutter ablation 6/6/2019. Successful atrial flutter ablation.  5. Echocardiogram 10/30/2020. LVEF 65%. Trace mitral valve regurgitation. Mild dilation of aortic valve root.  6. Echocardiogram 2/7/2021. LVEF 68%. Diastolic function is normal. Mild dilation of aortic root.    Lipid Panel    Lipid Panel 7/23/20 1/26/21 4/5/21   Total Cholesterol 179 145 154   Triglycerides 148 120 170 (A)   HDL Cholesterol 49 52 46   VLDL  Cholesterol 29.6 21 29   LDL Cholesterol  100 72 79   (A) Abnormal value       Comments are available for some flowsheets but are not being displayed.               ASSESSMENT & PLAN      Diagnosis Plan   1. Essential hypertension  Basic Metabolic Panel   2. AF (paroxysmal atrial fibrillation) (CMS/[facility])     3. Nonrheumatic aortic valve stenosis     4. Mixed hyperlipidemia           SUMMARY/DISCUSSION  1. Hypertension.  Blood pressure very well controlled today at 130/64.  Patient will continue hydralazine 25 mg twice per day, lisinopril 20 mg/day, metoprolol succinate 25 mg/day.  2. PAF.  Patient's rate is currently controlled with amiodarone 200 mg/day.  His Xarelto is now at renal dosing 15 mg/day.  I assured patient that this is the appropriate dose for him.  3. Aortic valve stenosis.  No stenosis was present on echocardiogram February 2021.  4. Hyperlipidemia.  LDL 79.  Continue statin therapy.  5. Patient was placed on diuretics during hospitalization for signs consistent with volume overload.  He reports that he is urinating very frequently and concerned that he is urinating too much.  He has some very mild lower extremity edema on exam today.  Recommended decreasing furosemide to 20 mg/day, from 40 mg/day.  He was notified that if he has increased swelling or shortness of breath to go back to the 40 mg/day dosing.  He will get an outpatient BMP in 1 to 2 weeks to reassess his renal function.  He also has questions about his diabetic regimen as it was adjusted during the hospital stay and I will defer these to his primary care physician.  He will follow-up with Dr. Driscoll in 2 months.  Sooner for problems or complications.        MEDICATIONS         Discharge Medications          Accurate as of April 16, 2021 10:54 AM. If you have any questions, ask your nurse or doctor.            Changes to Medications      Instructions Start Date   amiodarone 200 MG tablet  Commonly known as: PACERONE  What changed:    · how much to take  · how to take this  · when to take this  · additional instructions   TAKE 1 TABLET DAILY      furosemide 20 MG tablet  Commonly known as: LASIX  What changed:   · medication strength  · how much to take  Changed by: Aditi Martínez APRN   20 mg, Oral, Daily      hydrALAZINE 25 MG tablet  Commonly known as: APRESOLINE  What changed:   · when to take this  · additional instructions   25 mg, Oral, 3 Times Daily      metoprolol succinate XL 25 MG 24 hr tablet  Commonly known as: TOPROL-XL  What changed:   · how much to take  · how to take this  · when to take this  · additional instructions   TAKE 1 TABLET DAILY      tamsulosin 0.4 MG capsule 24 hr capsule  Commonly known as: FLOMAX  What changed:   · how much to take  · how to take this  · when to take this  · additional instructions   TAKE 2 CAPSULES DAILY         Continue These Medications      Instructions Start Date   acetaminophen 500 MG tablet  Commonly known as: TYLENOL   500 mg, Oral, Every 6 Hours PRN      Alpha-Lipoic Acid 600 MG tablet   600 mg, Oral, Daily, For neuropathy      Arlyn Microlet Lancets lancets   USE TWICE A DAY      Contour Next Test test strip  Generic drug: glucose blood   1 each, Other, Daily, test blood sugar      docusate sodium 100 MG capsule  Commonly known as: Colace   100 mg, Oral, 2 Times Daily PRN      ferrous sulfate 325 (65 FE) MG tablet   325 mg, Oral, 2 times daily      lidocaine 4 % cream  Commonly known as: LMX   No dose, route, or frequency recorded.      lisinopril 20 MG tablet  Commonly known as: PRINIVIL,ZESTRIL   20 mg, Oral, Every 24 Hours Scheduled      multivitamin tablet tablet  Commonly known as: THERAGRAN   1 tablet, Oral, Every Morning      mupirocin 2 % ointment  Commonly known as: BACTROBAN   APPLY TWO TIMES PER DAY TO THE BIOPSY SITES.      rivaroxaban 15 MG tablet  Commonly known as: XARELTO   15 mg, Oral, Daily      Tradjenta 5 MG tablet tablet  Generic drug: linagliptin   1 TAB ORAL  DAILY,INSTR START IF BGMS PERSISTENTLY   180 MG/DL      vitamin B-12 1000 MCG tablet  Commonly known as: CYANOCOBALAMIN   1,000 mcg, Oral, Daily                 **Dragon Disclaimer:   Much of this encounter note is an electronic transcription/translation of spoken language to printed text. The electronic translation of spoken language may permit erroneous, or at times, nonsensical words or phrases to be inadvertently transcribed. Although I have reviewed the note for such errors, some may still exist.

## 2021-04-20 ENCOUNTER — TELEPHONE (OUTPATIENT)
Dept: INTERNAL MEDICINE | Facility: CLINIC | Age: 86
End: 2021-04-20

## 2021-04-22 LAB
BASOPHILS # BLD AUTO: 0.03 10*3/MM3 (ref 0–0.2)
BASOPHILS NFR BLD AUTO: 0.5 % (ref 0–1.5)
BUN SERPL-MCNC: 21 MG/DL (ref 8–23)
BUN/CREAT SERPL: 13.7 (ref 7–25)
CALCIUM SERPL-MCNC: 9 MG/DL (ref 8.6–10.5)
CHLORIDE SERPL-SCNC: 102 MMOL/L (ref 98–107)
CO2 SERPL-SCNC: 29.3 MMOL/L (ref 22–29)
CREAT SERPL-MCNC: 1.53 MG/DL (ref 0.76–1.27)
EOSINOPHIL # BLD AUTO: 0.05 10*3/MM3 (ref 0–0.4)
EOSINOPHIL NFR BLD AUTO: 0.8 % (ref 0.3–6.2)
ERYTHROCYTE [DISTWIDTH] IN BLOOD BY AUTOMATED COUNT: 18.8 % (ref 12.3–15.4)
GLUCOSE SERPL-MCNC: 115 MG/DL (ref 65–99)
HCT VFR BLD AUTO: 33 % (ref 37.5–51)
HGB BLD-MCNC: 10.3 G/DL (ref 13–17.7)
IMM GRANULOCYTES # BLD AUTO: 0.03 10*3/MM3 (ref 0–0.05)
IMM GRANULOCYTES NFR BLD AUTO: 0.5 % (ref 0–0.5)
LYMPHOCYTES # BLD AUTO: 1.81 10*3/MM3 (ref 0.7–3.1)
LYMPHOCYTES NFR BLD AUTO: 28.1 % (ref 19.6–45.3)
MCH RBC QN AUTO: 28.5 PG (ref 26.6–33)
MCHC RBC AUTO-ENTMCNC: 31.2 G/DL (ref 31.5–35.7)
MCV RBC AUTO: 91.2 FL (ref 79–97)
MONOCYTES # BLD AUTO: 0.48 10*3/MM3 (ref 0.1–0.9)
MONOCYTES NFR BLD AUTO: 7.4 % (ref 5–12)
NEUTROPHILS # BLD AUTO: 4.05 10*3/MM3 (ref 1.7–7)
NEUTROPHILS NFR BLD AUTO: 62.7 % (ref 42.7–76)
NRBC BLD AUTO-RTO: 0 /100 WBC (ref 0–0.2)
PLATELET # BLD AUTO: 191 10*3/MM3 (ref 140–450)
POTASSIUM SERPL-SCNC: 4.2 MMOL/L (ref 3.5–5.2)
RBC # BLD AUTO: 3.62 10*6/MM3 (ref 4.14–5.8)
SODIUM SERPL-SCNC: 140 MMOL/L (ref 136–145)
WBC # BLD AUTO: 6.45 10*3/MM3 (ref 3.4–10.8)

## 2021-04-30 ENCOUNTER — TELEPHONE (OUTPATIENT)
Dept: INTERNAL MEDICINE | Facility: CLINIC | Age: 86
End: 2021-04-30

## 2021-04-30 DIAGNOSIS — I10 ESSENTIAL HYPERTENSION: Primary | ICD-10-CM

## 2021-04-30 NOTE — TELEPHONE ENCOUNTER
Caller: GABE CORONADO    Relationship: SELF    Best call back number: [phone]     Medication needed:   Requested Prescriptions     Pending Prescriptions Disp Refills   • furosemide (LASIX) 20 MG tablet 90 tablet 3     Sig: Take 1 tablet by mouth Daily.       When do you need the refill by: 05/06/21    What additional details did the patient provide       than a 3 day supply:  [] Yes  [x] No    What is the patient's preferred pharmacy: [facility] - [place], [place] - [address] AT [address]AL TO [facility] - [phone]  - [phone] FX

## 2021-05-03 RX ORDER — FUROSEMIDE 20 MG/1
20 TABLET ORAL DAILY
Qty: 90 TABLET | Refills: 3 | Status: SHIPPED | OUTPATIENT
Start: 2021-05-03 | End: 2021-09-15 | Stop reason: DRUGHIGH

## 2021-05-10 DIAGNOSIS — R79.89 ELEVATED SERUM CREATININE: Primary | ICD-10-CM

## 2021-05-17 ENCOUNTER — OFFICE VISIT (OUTPATIENT)
Dept: SURGERY | Facility: CLINIC | Age: 86
End: 2021-05-17

## 2021-05-17 DIAGNOSIS — C18.3 MALIGNANT NEOPLASM OF HEPATIC FLEXURE (HCC): Primary | ICD-10-CM

## 2021-05-17 PROCEDURE — 99441 PR PHYS/QHP TELEPHONE EVALUATION 5-10 MIN: CPT | Performed by: COLON & RECTAL SURGERY

## 2021-05-17 NOTE — PROGRESS NOTES
You have chosen to receive care through a telephone visit. Do you consent to use a telephone visit for your medical care today? Yes    Robotic right hemicolectomy for T1N0 hepatic flexure colon cancer energy getting slowly better.  Done with PT and skilled nurse  At [facility] 3x /wk = bike 30 mins , + weights   Fiber -none bm regular   to see Dr. Castillo cr 1.5 does not have an appointment yet.  But has number to call.  Patient states his medications have been changed around a good bit because of this.    Discussed with patient colonoscopy at 1 year.  Because a T1N0 does not require CAT scan or surveillance labs.  Time with patient 10 minutes

## 2021-06-10 DIAGNOSIS — N40.0 BENIGN PROSTATIC HYPERPLASIA, UNSPECIFIED WHETHER LOWER URINARY TRACT SYMPTOMS PRESENT: Primary | ICD-10-CM

## 2021-06-10 RX ORDER — TAMSULOSIN HYDROCHLORIDE 0.4 MG/1
CAPSULE ORAL
Qty: 180 CAPSULE | Refills: 1 | Status: SHIPPED | OUTPATIENT
Start: 2021-06-10 | End: 2021-10-12

## 2021-06-18 RX ORDER — AMIODARONE HYDROCHLORIDE 200 MG/1
200 TABLET ORAL DAILY
Qty: 90 TABLET | Refills: 3 | Status: SHIPPED | OUTPATIENT
Start: 2021-06-18 | End: 2022-08-30

## 2021-06-21 ENCOUNTER — OFFICE VISIT (OUTPATIENT)
Dept: CARDIOLOGY | Facility: CLINIC | Age: 86
End: 2021-06-21

## 2021-06-21 VITALS
BODY MASS INDEX: 30.49 KG/M2 | SYSTOLIC BLOOD PRESSURE: 130 MMHG | OXYGEN SATURATION: 98 % | DIASTOLIC BLOOD PRESSURE: 68 MMHG | HEIGHT: 70 IN | HEART RATE: 68 BPM | WEIGHT: 213 LBS

## 2021-06-21 DIAGNOSIS — I35.0 NONRHEUMATIC AORTIC VALVE STENOSIS: Primary | ICD-10-CM

## 2021-06-21 DIAGNOSIS — I48.3 TYPICAL ATRIAL FLUTTER (HCC): ICD-10-CM

## 2021-06-21 DIAGNOSIS — I10 ESSENTIAL HYPERTENSION: ICD-10-CM

## 2021-06-21 PROCEDURE — 99214 OFFICE O/P EST MOD 30 MIN: CPT | Performed by: INTERNAL MEDICINE

## 2021-06-22 NOTE — TELEPHONE ENCOUNTER
Caller: Osvaldo Lopez    Relationship: Self    Best call back number: [phone] (M)    Medication needed:   Requested Prescriptions     Pending Prescriptions Disp Refills   • lisinopril (PRINIVIL,ZESTRIL) 20 MG tablet       Sig: Take 1 tablet by mouth Daily.       When do you need the refill by: ASAP    What additional details did the patient provide when requesting the medication: PATIENT WAS IN THE HOSPITAL AND MEDICATION DOSAGE WAS CHANGED TO 20MG FROM 40MG AND NEEDS A NEW PRESCRIPTION SENT TO PHARMACY FOR A 90 DAY SUPPLY, PLEASE ADVISE PATIENT WHEN SENT TO PHARMACY    Does the patient have less than a 3 day supply:  [x] Yes  [] No    What is the patient's preferred pharmacy: [facility] [place], [place] - [address] AT [address]AL TO [facility] - [phone]  - [phone] FX

## 2021-06-24 RX ORDER — LISINOPRIL 20 MG/1
20 TABLET ORAL
Qty: 90 TABLET | Refills: 1 | Status: SHIPPED | OUTPATIENT
Start: 2021-06-24 | End: 2021-09-15

## 2021-06-24 NOTE — TELEPHONE ENCOUNTER
PATIENT CALLED IN REQUESTING THE STATUS ON THE RE-FILL FOR THIS MEDICATION.    PLEASE CALL BACK TO ADVISE    BEST CALL BACK # [phone]

## 2021-07-20 ENCOUNTER — OFFICE VISIT (OUTPATIENT)
Dept: INTERNAL MEDICINE | Facility: CLINIC | Age: 86
End: 2021-07-20

## 2021-07-20 VITALS
WEIGHT: 213 LBS | HEIGHT: 70 IN | HEART RATE: 70 BPM | OXYGEN SATURATION: 94 % | SYSTOLIC BLOOD PRESSURE: 132 MMHG | BODY MASS INDEX: 30.49 KG/M2 | TEMPERATURE: 99.3 F | DIASTOLIC BLOOD PRESSURE: 60 MMHG

## 2021-07-20 DIAGNOSIS — I10 ESSENTIAL HYPERTENSION: ICD-10-CM

## 2021-07-20 DIAGNOSIS — E78.2 MIXED HYPERLIPIDEMIA: ICD-10-CM

## 2021-07-20 DIAGNOSIS — G61.82 MULTIFOCAL MOTOR NEUROPATHY (HCC): ICD-10-CM

## 2021-07-20 DIAGNOSIS — N40.0 BENIGN PROSTATIC HYPERPLASIA, UNSPECIFIED WHETHER LOWER URINARY TRACT SYMPTOMS PRESENT: ICD-10-CM

## 2021-07-20 DIAGNOSIS — T14.8XXA SKIN WOUND FROM SURGICAL INCISION: ICD-10-CM

## 2021-07-20 DIAGNOSIS — I48.0 PAF (PAROXYSMAL ATRIAL FIBRILLATION) (HCC): ICD-10-CM

## 2021-07-20 DIAGNOSIS — E11.8 DM (DIABETES MELLITUS), TYPE 2 WITH COMPLICATIONS (HCC): ICD-10-CM

## 2021-07-20 DIAGNOSIS — R79.89 ELEVATED SERUM CREATININE: Primary | ICD-10-CM

## 2021-07-20 PROCEDURE — 99214 OFFICE O/P EST MOD 30 MIN: CPT | Performed by: FAMILY MEDICINE

## 2021-07-20 RX ORDER — FINASTERIDE 5 MG/1
5 TABLET, FILM COATED ORAL DAILY
Qty: 90 TABLET | Refills: 3 | Status: SHIPPED | OUTPATIENT
Start: 2021-07-20 | End: 2022-04-12

## 2021-07-20 NOTE — PROGRESS NOTES
"Chief Complaint  Hypertension (3 mo follow up ), Atrial Fibrillation, and Diabetes    Subjective          Osvaldo Lopez presents to [facility]  Complex series of problems in a patient status post colon cancer surgery earlier this year.  He has some incisional drainage has started over the past week or greater which does not have any surrounding erythema in the incision appears to be granulation tissue in relation to previous sutures subcutaneously.    Otherwise he has evidence of prostate enlargement on prior CT and increased frequency of urination.  Nephrology note from Dr. Castillo reviewed with various etiologies of possible rising creatinine.  He has prostate enlargement does not wish to see urology is on tamsulosin 0.4 at bedtime.  We will add Proscar 5 mg daily has been counseled on pros and cons of Proscar.    Otherwise recheck A1c in reference to diabetes treatment and creatinine and BUN we rechecked as well.    A culture is taken from the incision.      Objective   Vital Signs:   /60   Pulse 70   Temp 99.3 °F (37.4 °C)   Ht 177.8 cm (70\")   Wt 96.6 kg (213 lb)   SpO2 94%   BMI 30.56 kg/m²     Physical Exam  Vitals reviewed.   Constitutional:       Appearance: He is well-developed.   HENT:      Head: Normocephalic and atraumatic.      Right Ear: Tympanic membrane and external ear normal.      Left Ear: Tympanic membrane and external ear normal.      Nose: Nose normal.   Eyes:      Conjunctiva/sclera: Conjunctivae normal.      Pupils: Pupils are equal, round, and reactive to light.   Neck:      Thyroid: No thyromegaly.      Vascular: No JVD.   Cardiovascular:      Rate and Rhythm: Normal rate and regular rhythm.      Heart sounds: Normal heart sounds.   Pulmonary:      Effort: Pulmonary effort is normal.      Breath sounds: Normal breath sounds.   Abdominal:      General: Bowel sounds are normal.      Palpations: Abdomen is soft.       Musculoskeletal:         " General: Normal range of motion.      Cervical back: Normal range of motion and neck supple.   Lymphadenopathy:      Cervical: No cervical adenopathy.   Skin:     General: Skin is warm and dry.      Findings: No rash.   Neurological:      Mental Status: He is alert and oriented to person, place, and time.      Cranial Nerves: No cranial nerve deficit.      Motor: Weakness present.      Coordination: Coordination normal.      Gait: Gait abnormal.   Psychiatric:         Behavior: Behavior normal.         Thought Content: Thought content normal.         Judgment: Judgment normal.        Result Review :   The following data was reviewed by: Caio Rodríguez Jr., MD on 07/20/2021:  Common labs    Common Labsle 2/12/21 2/12/21 4/5/21 4/5/21 4/5/21 4/5/21 4/21/21 4/21/21    0542 0542 1554 1554 1554 1554 1404 1404   Glucose  160 (A)         Glucose   110 (A)    115 (A)    BUN  23 36 (A)    21    Creatinine  1.07 1.72 (A)    1.53 (A)    eGFR Non  Am  66 36 (A)    44 (A)    eGFR  Am   42 (A)    53 (A)    Sodium  142 139    140    Potassium  4.0 4.7    4.2    Chloride  100 103    102    Calcium  8.6 8.7    9.0    Total Protein   6.2        Albumin   3.6        Total Bilirubin   0.4        Alkaline Phosphatase   156 (A)        AST (SGOT)   25        ALT (SGPT)   18        WBC 9.15   10.9 (A)    6.45   Hemoglobin 9.5 (A)   10.4 (A)    10.3 (A)   Hematocrit 32.2 (A)   32.9 (A)    33.0 (A)   Platelets 209   158    191   Total Cholesterol     154      Triglycerides     170 (A)      HDL Cholesterol     46      LDL Cholesterol      79      Hemoglobin A1C      6.0 (A)     (A) Abnormal value       Comments are available for some flowsheets but are not being displayed.                     Assessment and Plan    Diagnoses and all orders for this visit:    1. Elevated serum creatinine (Primary)  Comments:  Labs today follow-up with Dr. Castillo next month  Orders:  -     CBC & Differential  -     Basic Metabolic Panel  -      Hemoglobin A1c    2. Benign prostatic hyperplasia, unspecified whether lower urinary tract symptoms present  Comments:  He does not wish to see urology yet.  Continue tamsulosin 0.4 at bedtime add Proscar/finasteride 5 mg daily.  Counseled on Proscar.  Orders:  -     CBC & Differential  -     Basic Metabolic Panel  -     Hemoglobin A1c    3. DM (diabetes mellitus), type 2 with complications (CMS/[facility])  Comments:  Tradjenta 5 mg daily  Orders:  -     CBC & Differential  -     Basic Metabolic Panel  -     Hemoglobin A1c    4. PAF (paroxysmal atrial fibrillation) (CMS/[facility])  Comments:  He is on Xarelto.  Rate is controlled.    5. Mixed hyperlipidemia  -     Lipid Panel With / Chol / HDL Ratio    6. Essential hypertension    7. Skin wound from surgical incision  Comments:  Bactroban ointment 3 times daily cultures been taken.  Referral to Dr. Napoles for wound check.  Orders:  -     Anaerobic & Aerobic Culture ([facility] Only) - Swab, Abdomen, Lower  -     Ambulatory Referral to Colorectal Surgery    8. 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CMS/[facility])  Comments:  Bilateral weakness no better    Other orders  -     finasteride (Proscar) 5 MG tablet; Take 1 tablet by mouth Daily. For prostate  Dispense: 90 tablet; Refill: 3  -     mupirocin (Bactroban) 2 % ointment; Apply  topically to the appropriate area as directed 3 (Three) Times a Day.  Dispense: 30 g; Refill: 1        Follow Up   Return in about 3 months (around 10/20/2021) for Recheck.  Patient was given instructions and counseling regarding his condition or for health maintenance advice. Please see specific information pulled into the AVS if appropriate.

## 2021-07-21 LAB
BASOPHILS # BLD AUTO: 0.03 10*3/MM3 (ref 0–0.2)
BASOPHILS NFR BLD AUTO: 0.4 % (ref 0–1.5)
BUN SERPL-MCNC: 29 MG/DL (ref 8–23)
BUN/CREAT SERPL: 16.1 (ref 7–25)
CALCIUM SERPL-MCNC: 9.1 MG/DL (ref 8.6–10.5)
CHLORIDE SERPL-SCNC: 103 MMOL/L (ref 98–107)
CHOLEST SERPL-MCNC: 170 MG/DL (ref 0–200)
CHOLEST/HDLC SERPL: 3.62 {RATIO}
CO2 SERPL-SCNC: 27.1 MMOL/L (ref 22–29)
CREAT SERPL-MCNC: 1.8 MG/DL (ref 0.76–1.27)
EOSINOPHIL # BLD AUTO: 0.05 10*3/MM3 (ref 0–0.4)
EOSINOPHIL NFR BLD AUTO: 0.7 % (ref 0.3–6.2)
ERYTHROCYTE [DISTWIDTH] IN BLOOD BY AUTOMATED COUNT: 13.3 % (ref 12.3–15.4)
GLUCOSE SERPL-MCNC: 98 MG/DL (ref 65–99)
HBA1C MFR BLD: 6.4 % (ref 4.8–5.6)
HCT VFR BLD AUTO: 40.6 % (ref 37.5–51)
HDLC SERPL-MCNC: 47 MG/DL (ref 40–60)
HGB BLD-MCNC: 13.1 G/DL (ref 13–17.7)
IMM GRANULOCYTES # BLD AUTO: 0.02 10*3/MM3 (ref 0–0.05)
IMM GRANULOCYTES NFR BLD AUTO: 0.3 % (ref 0–0.5)
LDLC SERPL CALC-MCNC: 96 MG/DL (ref 0–100)
LYMPHOCYTES # BLD AUTO: 1.59 10*3/MM3 (ref 0.7–3.1)
LYMPHOCYTES NFR BLD AUTO: 21.3 % (ref 19.6–45.3)
MCH RBC QN AUTO: 31.8 PG (ref 26.6–33)
MCHC RBC AUTO-ENTMCNC: 32.3 G/DL (ref 31.5–35.7)
MCV RBC AUTO: 98.5 FL (ref 79–97)
MONOCYTES # BLD AUTO: 0.46 10*3/MM3 (ref 0.1–0.9)
MONOCYTES NFR BLD AUTO: 6.2 % (ref 5–12)
NEUTROPHILS # BLD AUTO: 5.32 10*3/MM3 (ref 1.7–7)
NEUTROPHILS NFR BLD AUTO: 71.1 % (ref 42.7–76)
NRBC BLD AUTO-RTO: 0 /100 WBC (ref 0–0.2)
PLATELET # BLD AUTO: 147 10*3/MM3 (ref 140–450)
POTASSIUM SERPL-SCNC: 5.4 MMOL/L (ref 3.5–5.2)
RBC # BLD AUTO: 4.12 10*6/MM3 (ref 4.14–5.8)
SODIUM SERPL-SCNC: 138 MMOL/L (ref 136–145)
TRIGL SERPL-MCNC: 153 MG/DL (ref 0–150)
VLDLC SERPL CALC-MCNC: 27 MG/DL (ref 5–40)
WBC # BLD AUTO: 7.47 10*3/MM3 (ref 3.4–10.8)

## 2021-07-23 ENCOUNTER — OFFICE VISIT (OUTPATIENT)
Dept: SURGERY | Facility: CLINIC | Age: 86
End: 2021-07-23

## 2021-07-23 VITALS
OXYGEN SATURATION: 95 % | TEMPERATURE: 97.2 F | BODY MASS INDEX: 30.81 KG/M2 | HEIGHT: 70 IN | HEART RATE: 65 BPM | SYSTOLIC BLOOD PRESSURE: 140 MMHG | WEIGHT: 215.2 LBS | DIASTOLIC BLOOD PRESSURE: 58 MMHG

## 2021-07-23 DIAGNOSIS — L92.9 HYPERGRANULATION: Primary | ICD-10-CM

## 2021-07-23 PROCEDURE — 11042 DBRDMT SUBQ TIS 1ST 20SQCM/<: CPT | Performed by: COLON & RECTAL SURGERY

## 2021-07-23 PROCEDURE — 99212 OFFICE O/P EST SF 10 MIN: CPT | Performed by: COLON & RECTAL SURGERY

## 2021-07-23 NOTE — PROGRESS NOTES
"Osvaldo Lopez is a 85 y.o. male in for follow up of Hypergranulation  Area on abd bleeding.  No purulence  No pain  Not very active b/c legs tire easy  Good appetite      /58 (BP Location: Left arm, Patient Position: Sitting, Cuff Size: Adult)   Pulse 65   Temp 97.2 °F (36.2 °C) (Temporal)   Ht 177.8 cm (70\")   Wt 97.6 kg (215 lb 3.2 oz)   SpO2 95%   BMI 30.88 kg/m²   Body mass index is 30.88 kg/m².      PE:  Physical Exam  Constitutional:       Appearance: He is well-developed.   HENT:      Head: Normocephalic and atraumatic.   Eyes:      Pupils: Pupils are equal, round, and reactive to light.   Cardiovascular:      Rate and Rhythm: Regular rhythm.   Pulmonary:      Effort: Pulmonary effort is normal.   Abdominal:      General: There is no distension.      Palpations: Abdomen is soft.      Comments:   Hypergranulation tissue at incision where pds suture spit.    Musculoskeletal:         General: Normal range of motion.   Skin:     General: Skin is warm and dry.   Neurological:      Mental Status: He is alert and oriented to person, place, and time.   Psychiatric:         Thought Content: Thought content normal.         Judgment: Judgment normal.         Assessment:   1. Hypergranulation         Plan:  Open and debrided area  Pack daily  F/u 1 wk    Procedure: incision and debridement of hypergranulation tissue of abd incision      Patient gave informed consent verbally.  Patient was prepped with Betadine.  1% lidocaine with epinephrine was used as a local infiltration. I incised around the hypergranulation tissue 3 cm in length.  I debrided the granulation back to healthy tissue      Patient tolerated well.              "

## 2021-07-26 LAB
BACTERIA SPEC AEROBE CULT: ABNORMAL
BACTERIA SPEC ANAEROBE CULT: ABNORMAL
BACTERIA SPEC CULT: ABNORMAL
BACTERIA SPEC CULT: ABNORMAL
OTHER ANTIBIOTIC SUSC ISLT: ABNORMAL

## 2021-07-30 ENCOUNTER — OFFICE VISIT (OUTPATIENT)
Dept: SURGERY | Facility: CLINIC | Age: 86
End: 2021-07-30

## 2021-07-30 VITALS
SYSTOLIC BLOOD PRESSURE: 130 MMHG | DIASTOLIC BLOOD PRESSURE: 58 MMHG | TEMPERATURE: 97.2 F | HEART RATE: 67 BPM | OXYGEN SATURATION: 95 % | WEIGHT: 218.4 LBS | BODY MASS INDEX: 31.26 KG/M2 | HEIGHT: 70 IN

## 2021-07-30 DIAGNOSIS — L92.9 HYPERGRANULATION: Primary | ICD-10-CM

## 2021-07-30 PROCEDURE — 99212 OFFICE O/P EST SF 10 MIN: CPT | Performed by: COLON & RECTAL SURGERY

## 2021-07-30 NOTE — PROGRESS NOTES
"Osvaldo Lopez is a 85 y.o. male in for follow up of Hypergranulation    S/P Right hemicolectomy 02/03/2021  Pt packing and applying antibiotic ointment to wound.     /58 (BP Location: Left arm, Patient Position: Sitting, Cuff Size: Adult)   Pulse 67   Temp 97.2 °F (36.2 °C) (Temporal)   Ht 177.8 cm (70\")   Wt 99.1 kg (218 lb 6.4 oz)   SpO2 95%   BMI 31.34 kg/m²   Body mass index is 31.34 kg/m².      PE:  Physical Exam  Constitutional:       General: He is not in acute distress.     Appearance: He is well-developed.   HENT:      Head: Normocephalic and atraumatic.   Abdominal:      General: There is no distension.      Palpations: Abdomen is soft.      Comments: LLQ wound 2cm x 0.5cm   Musculoskeletal:         General: Normal range of motion.   Neurological:      Mental Status: He is alert.   Psychiatric:         Thought Content: Thought content normal.         Assessment:   1. Hypergranulation    - Improving    Plan:  - Silver nitrate applied to wound to promote healing  - Pt instructed to keep wound covered with bandage x1-2 weeks  - Follow up In 2 weeks for revaluation       Scribed for Xavi Napoles MD by Vivi Auguste PA-C 7/30/2021  09:17 EDT    This patient was evaluated by me, recommendations made, documentation reviewed, edited, and revised by me, Xavi Napoles MD        "

## 2021-08-05 ENCOUNTER — OFFICE VISIT (OUTPATIENT)
Dept: CARDIOLOGY | Facility: CLINIC | Age: 86
End: 2021-08-05

## 2021-08-05 VITALS
BODY MASS INDEX: 31.21 KG/M2 | HEIGHT: 70 IN | DIASTOLIC BLOOD PRESSURE: 74 MMHG | HEART RATE: 70 BPM | SYSTOLIC BLOOD PRESSURE: 130 MMHG | WEIGHT: 218 LBS

## 2021-08-05 DIAGNOSIS — I48.0 PAF (PAROXYSMAL ATRIAL FIBRILLATION) (HCC): ICD-10-CM

## 2021-08-05 DIAGNOSIS — I48.3 TYPICAL ATRIAL FLUTTER (HCC): ICD-10-CM

## 2021-08-05 DIAGNOSIS — I48.0 AF (PAROXYSMAL ATRIAL FIBRILLATION) (HCC): Primary | ICD-10-CM

## 2021-08-05 PROCEDURE — 93000 ELECTROCARDIOGRAM COMPLETE: CPT | Performed by: INTERNAL MEDICINE

## 2021-08-05 PROCEDURE — 99214 OFFICE O/P EST MOD 30 MIN: CPT | Performed by: INTERNAL MEDICINE

## 2021-08-05 NOTE — PROGRESS NOTES
Date of Office Visit: 2021  Encounter Provider: Miller Talbot MD  Place of Service: [facility] CARDIOLOGY  Patient Name: Osvaldo Lopez  :1935    Chief Complaint   Patient presents with   • Atrial Flutter     6 month f/u   :     HPI: Osvaldo Lopez is a 85 y.o. male who presents today for follow-up of atrial fibrillation.    Patient has had a difficult spring.  He had surgery for colon cancer, and is having a little bit of difficulty with wound healing although this seems to be improving.    He has not had any atrial fibrillation episodes.  He is on Xarelto.  He reports some nuisance bleeding.    He asked about his candidacy for watchman device.          Past Medical History:   Diagnosis Date   • Abdominal aortic aneurysm (AAA) without rupture (CMS/[facility])    • Abdominal aortic ectasia (CMS/HCC) 2015   • Abnormal EKG 10/25/2016    2019   • Abnormal thyroid blood test    • Actinic keratosis     FOLLOWED BY DR. MANPREET VILLARREAL   • Anemia 2021   • Arthritis    • Asthma     MILD, INTERMITTENT   • Atherosclerotic heart disease    • Atrial flutter with rapid ventricular response (CMS/HCC) 2019    ADMITTED TO [facility]   • Atrial premature depolarization    • Atrophy of muscle of lower leg    • BPH (benign prostatic hyperplasia)     FOLLOWED BY DR. TERRA JONES   • Bruises easily    • Bulging of thoracic intervertebral disc    • Carpal tunnel syndrome, right 2019    FOLLOWED BY DR. NEW SANCHEZ   • Cataract     BILATERAL, S/P EXTRACTION WITH IOL IMPLANTS   • Chronic anticoagulation 2021   • Chronic edema    • Chronic low back pain with sciatica 2021   • Chronic pain    • CKD (chronic kidney disease) 2021   • Closed head injury 2018    WITH LACERATION TO SCALP, D/T SYNCOPE   • Colon cancer (CMS/HCC) 2021    INVASIVE ADENOCARCINOMA FROM TUBULOVILLOUS ADENOMA IN TRANSVERSE, S/P COLON RESECTION, FOLLOWED BY DR. 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 TERRA JONES   • Epididymitis, right 03/2018   • Essential hypertension    • Eyebrow ptosis 11/9/2013   • Hallux valgus, acquired, bilateral 01/2019   • Heart attack (CMS/[facility]) 09/1989    S/P CABG, 5 VESSEL   • Heart murmur    • History of blood transfusion    • HL (hearing loss)    • Hyperactivity of bladder     FOLLOWED BY DR. TERRA JONES   • HyperCKemia 11/2017   • Hyperkalemia 08/2016   • Hyperlipidemia     MIXED   • Hyperreflexia 11/2017    BILATERAL   • Hyphema 05/2015   • IBS (irritable bowel syndrome)    • Impaired functional mobility, balance, gait, and endurance    • Impingement syndrome of left shoulder 10/2015   • Infection associated with cystostomy catheter (CMS/[facility]) 12/2016   • Ischemic colitis (CMS/[facility])    • Lumbar radiculopathy 2016    wears back brace at times following back surgery   • Lumbar spondylosis 04/2016   • Lumbosacral neuritis 05/2015   • Lumbosacral radiculitis 05/2015   • Macular puckering of retina, left 10/2013   • 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CMS/[facility]) 1/26/2021   • Muscle weakness (generalized)    • Myopathy 11/2017   • Nonrheumatic aortic valve stenosis     mild 1/2018    • Osteoarthritis     MULTIPLE SITES   • Other intervertebral disc disorders, lumbosacral region    • PAF (paroxysmal atrial fibrillation) (CMS/[facility])    • Plantar fascial fibromatosis 06/2018   • Post laminectomy syndrome    • Pressure injury of left buttock, stage 1 7/23/2020   • Prostatitis    • Protein S deficiency (CMS/[facility])     FOLLOWED BY DR. VIANEY GIORDANO   • Pseudophakia 11/9/2013   • RBBB (right bundle branch block)    • Recurrent falls    • Respiratory failure with hypoxia (CMS/[facility]) 01/2019   • SCC (squamous cell carcinoma) 10/16/2019    RIGHT FOREHEAD, LEFT TEMPLE, RIGHT SCALP, FOLLOWED BY DR. MANPREET GLASS  [facility]   • Scoliosis    • Spinal stenosis of lumbar region with neurogenic claudication 12/07/2017   • Syncope and collapse 07/16/2018    ADMITTED TO [facility]   • Torn rotator cuff 03/2017    BILATERAL, COMPLETE   • Type 2 diabetes mellitus (CMS/HCC)     IDDM   • Urinary frequency     FOLLOWED BY DR. TERRA JONES   • Vitamin D deficiency    • Vitreous hemorrhage of left eye (CMS/HCC)        Past Surgical History:   Procedure Laterality Date   • APPENDECTOMY N/A    • BROW LIFT Bilateral 11/12/2013    DR. OCTAVIA CEDILLO AT [facility]   • CARDIAC CATHETERIZATION Left 10/2008    LEFT CAROTID ARTERY STENOSIS 50-69%   • CARDIAC ELECTROPHYSIOLOGY PROCEDURE N/A 6/6/2019    Procedure: Ablation atrial flutter;  Surgeon: Matthew Talbot MD;  Location:  [facility];  Service: Cardiovascular   • CATARACT EXTRACTION Left 01/22/1998    DR. LAYLA BARNES AT [facility]   • CATARACT EXTRACTION Right 03/2001    DR. LAYLA BARNES   • CHOLECYSTECTOMY N/A 08/24/1989    DR. FAUZIA PELAEZ AT [facility]   • COLON RESECTION N/A 2/3/2021    Procedure: LAPAROSCOPIC EXTENDED  RIGHT COLECTOMY WITH DAVINCI ROBOT;  Surgeon: Xavi Napoles MD;  Location: [facility] MAIN OR;  Service: DaVinci;  Laterality: N/A;   • COLONOSCOPY N/A 1/31/2021    20-25 MM POLYP IN CECUM, PATH: TUBULAR ADENOMA, 2 TUBULAR ADENOMA POLYPS IN ASCENDING, A FUNGATING AND SUBMUCOSAL ONONOBSTRUCTING MEDIUM MASS IN PROXIMAL TRANSVERSE, PATH: INVASIVE ADENOCARCINOMA ARISING IN A TUBULOVILOOUS ADENOMA, AREA TATTOOED, SCATTERED DIVERTICULA IN SIGMOID AND DESCENDING, LARGE INTERNAL HEMORRHOIDS, DR. JACOB ALICEA AT [facility]    • COLONOSCOPY N/A 02/02/2010    DIVERTICULOSIS, DR. SAL SOLANO AT [facility]   • CORONARY ARTERY BYPASS GRAFT N/A 09/1989    5 VESSEL, DR. HANKS   • CYST REMOVAL      FROM BACK   • ENDOSCOPY N/A 1/31/2021    ENTIRE EGD WNL, PATH: MILD REACTIVE GASTROPATHY, DR. JACOB ALICEA AT [facility]   • INGUINAL HERNIA REPAIR Left 09/27/2007    DR. MATTHEW RUSH AT [facility]   •  INGUINAL HERNIA REPAIR Left 2007   • INGUINAL HERNIA REPAIR Left    • KNEE ARTHROSCOPY W/ PARTIAL MEDIAL MENISCECTOMY Left     DR. SINGH   • LUMBAR DISCECTOMY FUSION INSTRUMENTATION N/A 2016    Procedure: lumbar laminectomy L4-5 and fusion with instrumentation;  Surgeon: Ran Kline MD;  Location: [facility];  Service:    • LUMBAR DISCECTOMY FUSION INSTRUMENTATION N/A 1/15/2018    Procedure: L2-3, L3-4 laminectomy and fusion with instrumentation and removal of implants L4 5.;  Surgeon: Ran Kline MD;  Location: [facility];  Service:    • LUMBAR EPIDURAL INJECTION N/A 2015    DR. MATTHEW DOMINGUEZ AT [facility]   • LUMBAR EPIDURAL INJECTION N/A 10/07/2015    DR. ITZEL [facility] AT [facility]   • LUMBAR EPIDURAL INJECTION N/A 2015    DR. ITZEL [facility] AT [facility]   • NM TOTAL KNEE ARTHROPLASTY Left 2016    Procedure: TOTAL KNEE ARTHROPLASTY;  Surgeon: Dontrell Arellano MD;  Location: [facility];  Service: Orthopedics   • SKIN BIOPSY Bilateral 10/16/2019    RIGHT LATERAL FOREHEAD:SCC, LEFT TEMPLE:SCC, RIGHT PARIETAL SCALP:SCC, DR. MANPREET VILLARREAL       Social History     Socioeconomic History   • Marital status:      Spouse name: Not on file   • Number of children: Not on file   • Years of education: Not on file   • Highest education level: Not on file   Tobacco Use   • Smoking status: Former Smoker     Packs/day: 3.00     Years: 45.00     Pack years: 135.00     Types: Cigarettes     Quit date: 1989     Years since quittin.7   • Smokeless tobacco: Never Used   Vaping Use   • Vaping Use: Never used   Substance and Sexual Activity   • Alcohol use: Yes     Comment: 1 shot of whiskey in the morning and 1 shot of whiskey in the evening   • Drug use: No   • Sexual activity: Not Currently       Family History   Problem Relation Age of Onset   • Heart failure Mother    • Diabetes Mother    • Heart disease Mother    • Cancer Sister    • Diabetes Sister    • Cancer Brother     • Diabetes Brother    • Other Brother         INCLUSION BODY NYOSITIS   • Cancer Father        Review of Systems   Constitutional: Negative.   Cardiovascular: Negative.    Respiratory: Negative.    Gastrointestinal: Negative.        Allergies   Allergen Reactions   • Amiodarone Myalgia     Muscle problems in legs   • Percocet [Oxycodone-Acetaminophen] Mental Status Change and Confusion     Causes confusion/         Current Outpatient Medications:   •  acetaminophen (TYLENOL) 500 MG tablet, Take 500 mg by mouth Every 6 (Six) Hours As Needed for Mild Pain ., Disp: , Rfl:   •  Alpha-Lipoic Acid 600 MG tablet, Take 600 mg by mouth Daily. For neuropathy, Disp: 30 tablet, Rfl: 11  •  amiodarone (PACERONE) 200 MG tablet, Take 1 tablet by mouth Daily., Disp: 90 tablet, Rfl: 3  •  KYRA MICROLET LANCETS lancets, USE TWICE A DAY, Disp: 100 each, Rfl: 5  •  docusate sodium (Colace) 100 MG capsule, Take 1 capsule by mouth 2 (Two) Times a Day As Needed for Constipation., Disp: 60 capsule, Rfl: 5  •  ferrous sulfate 325 (65 FE) MG tablet, Take 325 mg by mouth 2 (two) times a day., Disp: , Rfl:   •  finasteride (Proscar) 5 MG tablet, Take 1 tablet by mouth Daily. For prostate, Disp: 90 tablet, Rfl: 3  •  furosemide (LASIX) 20 MG tablet, Take 1 tablet by mouth Daily., Disp: 90 tablet, Rfl: 3  •  glucose blood (Contour Next Test) test strip, 1 each by Other route Daily. test blood sugar, Disp: 50 each, Rfl: 5  •  hydrALAZINE (APRESOLINE) 25 MG tablet, Take 1 tablet by mouth 3 (Three) Times a Day., Disp: 180 tablet, Rfl: 3  •  lidocaine (LMX) 4 % cream, , Disp: , Rfl:   •  lisinopril (PRINIVIL,ZESTRIL) 20 MG tablet, Take 1 tablet by mouth Daily., Disp: 90 tablet, Rfl: 1  •  metoprolol succinate XL (TOPROL-XL) 25 MG 24 hr tablet, TAKE 1 TABLET DAILY (Patient taking differently: Take 25 mg by mouth Daily.), Disp: 90 tablet, Rfl: 3  •  Multiple Vitamin (MULTI VITAMIN PO), Take 1 tablet by mouth Every Morning., Disp: , Rfl:   •   "mupirocin (Bactroban) 2 % ointment, Apply  topically to the appropriate area as directed 3 (Three) Times a Day., Disp: 30 g, Rfl: 1  •  rivaroxaban (XARELTO) 15 MG tablet, Take 1 tablet by mouth Daily., Disp: 90 tablet, Rfl: 3  •  tamsulosin (FLOMAX) 0.4 MG capsule 24 hr capsule, TAKE 2 CAPSULES DAILY, Disp: 180 capsule, Rfl: 1  •  Tradjenta 5 MG tablet tablet, Take 1 tablet by mouth Daily., Disp: 90 tablet, Rfl: 1  •  vitamin B-12 (CYANOCOBALAMIN) 1000 MCG tablet, Take 1,000 mcg by mouth Daily., Disp: , Rfl:       Objective:     Vitals:    08/05/21 1316   BP: 130/74   Pulse: 70   Weight: 98.9 kg (218 lb)   Height: 177.8 cm (70\")     Body mass index is 31.28 kg/m².    PHYSICAL EXAM:    Vitals and nursing note reviewed.   Constitutional:       Appearance: Healthy appearance.   Cardiovascular:      Normal rate. Regular rhythm.   Edema:     Peripheral edema absent.   Neurological:      General: No focal deficit present.      Mental Status: Alert, oriented to person, place, and time and oriented to person, place and time.   Psychiatric:         Attention and Perception: Attention and perception normal.             ECG 12 Lead    Date/Time: 8/5/2021 6:56 PM  Performed by: Miller Talbot MD  Authorized by: Miller Tlabot MD   Comparison: compared with previous ECG   Rhythm: sinus rhythm  Conduction: right bundle branch block and 1st degree AV block              Assessment:       Diagnosis Plan   1. AF (paroxysmal atrial fibrillation) (CMS/HCC)     2. Typical atrial flutter (CMS/HCC)     3. PAF (paroxysmal atrial fibrillation) (CMS/HCC)            Plan:       No recurrence of symptomatic atrial flutter or atrial fibrillation.  He is on a rhythm control strategy with amiodarone.  He had a slightly elevated TSH, and low T3.  Recheck at next visit in 6 months.  Continue on xarelto.  No indication for watchman device.     As always, it has been a pleasure to participate in your patient's care.      Sincerely, "         Miller Talbot MD

## 2021-08-16 ENCOUNTER — OFFICE VISIT (OUTPATIENT)
Dept: SURGERY | Facility: CLINIC | Age: 86
End: 2021-08-16

## 2021-08-16 VITALS
HEART RATE: 68 BPM | SYSTOLIC BLOOD PRESSURE: 140 MMHG | HEIGHT: 70 IN | WEIGHT: 218.5 LBS | TEMPERATURE: 97.9 F | OXYGEN SATURATION: 96 % | BODY MASS INDEX: 31.28 KG/M2 | DIASTOLIC BLOOD PRESSURE: 82 MMHG

## 2021-08-16 DIAGNOSIS — C18.3 MALIGNANT NEOPLASM OF HEPATIC FLEXURE (HCC): Primary | ICD-10-CM

## 2021-08-16 PROCEDURE — 99212 OFFICE O/P EST SF 10 MIN: CPT | Performed by: COLON & RECTAL SURGERY

## 2021-08-16 NOTE — PROGRESS NOTES
"Osvaldo Lopez is a 85 y.o. male in for follow up of Malignant neoplasm of hepatic flexure (CMS/HCC)    S/p Robotic right hemicolectomy (02/03/2021) for T1N0 hepatic flexure colon cancer   Small amount of drainage from LLQ wound  Small blister on lateral edge of wound that swells up    /82 (BP Location: Left arm, Patient Position: Sitting, Cuff Size: Small Adult)   Pulse 68   Temp 97.9 °F (36.6 °C)   Ht 177.8 cm (70\")   Wt 99.1 kg (218 lb 8 oz)   SpO2 96%   BMI 31.35 kg/m²   Body mass index is 31.35 kg/m².      PE:  Physical Exam  Constitutional:       General: He is not in acute distress.     Appearance: He is well-developed.   HENT:      Head: Normocephalic and atraumatic.   Abdominal:      General: There is no distension.      Palpations: Abdomen is soft.      Comments: Scar noted to LLQ with small cavity noted on lateral edge. Skin edges opened. Silver nitrate and bandage applied.      Musculoskeletal:         General: Normal range of motion.   Neurological:      Mental Status: He is alert.   Psychiatric:         Thought Content: Thought content normal.       Assessment:   1. Malignant neoplasm of hepatic flexure (CMS/HCC)    -S/p Robotic right hemicolectomy (02/03/2021)    Plan:  - Follow up prn for any new or worsening symptoms       Scribed for Xavi Napoles MD by Vivi Auguste PA-C 8/16/2021  13:18 EDT   This patient was evaluated by me, recommendations made, documentation reviewed, edited, and revised by me, Xavi Napoles MD          "

## 2021-08-16 NOTE — PROGRESS NOTES
"Osvaldo Lopez is a 85 y.o. male in for follow up of Malignant neoplasm of hepatic flexure (CMS/HCC)    S/p Robotic right hemicolectomy (02/03/2021) for T1N0 hepatic flexure colon cancer   Small amount of drainage from LLQ wound  Small blister on lateral edge of wound that swells up    /82 (BP Location: Left arm, Patient Position: Sitting, Cuff Size: Small Adult)   Pulse 68   Temp 97.9 °F (36.6 °C)   Ht 177.8 cm (70\")   Wt 99.1 kg (218 lb 8 oz)   SpO2 96%   BMI 31.35 kg/m²   Body mass index is 31.35 kg/m².      PE:  Physical Exam  Constitutional:       General: He is not in acute distress.     Appearance: He is well-developed.   HENT:      Head: Normocephalic and atraumatic.   Abdominal:      General: There is no distension.      Palpations: Abdomen is soft.      Comments: Scar noted to LLQ with small cavity noted on lateral edge. Skin edges opened. Silver nitrate and bandage applied.      Musculoskeletal:         General: Normal range of motion.   Neurological:      Mental Status: He is alert.   Psychiatric:         Thought Content: Thought content normal.       Assessment:   1. Malignant neoplasm of hepatic flexure (CMS/HCC)    -S/p Robotic right hemicolectomy (02/03/2021)    Plan:  - Follow up prn for any new or worsening symptoms       Scribed for Xavi Napoles MD by Vivi Auguste PA-C 8/16/2021  13:18 EDT     "

## 2021-09-04 ENCOUNTER — APPOINTMENT (OUTPATIENT)
Dept: GENERAL RADIOLOGY | Facility: HOSPITAL | Age: 86
End: 2021-09-04

## 2021-09-04 ENCOUNTER — HOSPITAL ENCOUNTER (EMERGENCY)
Facility: HOSPITAL | Age: 86
Discharge: HOME | End: 2021-09-04
Attending: EMERGENCY MEDICINE | Admitting: EMERGENCY MEDICINE

## 2021-09-04 VITALS
OXYGEN SATURATION: 92 % | HEART RATE: 120 BPM | TEMPERATURE: 96.9 F | DIASTOLIC BLOOD PRESSURE: 86 MMHG | SYSTOLIC BLOOD PRESSURE: 120 MMHG | RESPIRATION RATE: 16 BRPM

## 2021-09-04 DIAGNOSIS — S93.401A SPRAIN OF RIGHT ANKLE, UNSPECIFIED LIGAMENT, INITIAL ENCOUNTER: Primary | ICD-10-CM

## 2021-09-04 PROCEDURE — 73610 X-RAY EXAM OF ANKLE: CPT

## 2021-09-04 PROCEDURE — 99282 EMERGENCY DEPT VISIT SF MDM: CPT

## 2021-09-04 NOTE — ED PROVIDER NOTES
EMERGENCY DEPARTMENT ENCOUNTER    Room Number:  09/09  Date of encounter:  9/4/2021  PCP: Caio Rodríguez Jr., MD  Historian: Patient and spouse      HPI:  Chief Complaint: Fall, right ankle pain  A complete HPI/ROS/PMH/PSH/SH/FH are unobtainable due to: N/A    Context: Osvaldo Lopez is a 85 y.o. male who presents to the ED c/o right ankle pain after a fall.  He states he was going down one step and fell.  His left lower extremity is his weak side, he states his left leg went out subsequently injuring his right ankle.  He thinks this was an inversion type injury.  He was able to make his way to a lift chair and stand up and take about 5 steps to recliner.  He does have pain with ambulation as well as associated swelling of the ankle.  No knee or hip tenderness.  He did strike his head but did not lose consciousness.  He states he has been in his usual state of health the past 2 days has not had any fevers, chills, cough, nausea, vomiting or diarrhea.      The patient was placed in a mask in triage, hand hygiene was performed before and after my interaction with the patient.  I wore a mask, safety glasses and gloves during my entire interaction with the patient.    PAST MEDICAL HISTORY  Active Ambulatory Problems     Diagnosis Date Noted   • Complete rotator cuff tear of left shoulder 03/17/2016   • Abnormal finding on thyroid function test 06/13/2016   • Abdominal aortic aneurysm (CMS/HCC) 06/13/2016   • Benign prostatic hyperplasia 06/13/2016   • Essential hypertension 06/13/2016   • Hyperlipidemia 06/13/2016   • Shoulder pain 06/13/2016   • Lumbar radiculopathy 06/13/2016   • DM (diabetes mellitus), type 2 with complications (CMS/[facility]) 06/13/2016   • OA (osteoarthritis) of knee 11/11/2016   • Tear of right rotator cuff 03/20/2017   • Spinal stenosis of lumbar region with neurogenic claudication 12/07/2017   • Eyebrow ptosis 11/09/2013   • Pseudophakia 11/09/2013   • Nonrheumatic aortic valve stenosis 05/18/2018    • Atrial flutter with rapid ventricular response (CMS/[facility]) 04/14/2019   • Right arm pain 04/14/2019   • Leg weakness, bilateral 06/04/2019   • Typical atrial flutter (CMS/[facility]) 06/04/2019   • Diabetic peripheral neuropathy (CMS/[facility]) 01/23/2020   • Muscle weakness (generalized) 07/23/2020   • Pressure injury of left buttock, stage 1 07/23/2020   • Chronic low back pain with sciatica 01/26/2021   • 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   • Pressure injury of buttock, stage 1 01/26/2021   • Anemia 01/28/2021   • Symptomatic anemia 01/29/2021   • Iron deficiency anemia 01/29/2021   • Chronic anticoagulation 01/29/2021   • CKD (chronic kidney disease) 01/29/2021   • CRISTOBAL (acute kidney injury) (CMS/[facility]) 01/29/2021   • Colonic mass 01/29/2021     Resolved Ambulatory Problems     Diagnosis Date Noted   • PAF (paroxysmal atrial fibrillation) (CMS/[facility]) 02/24/2016   • Enlarged prostate 06/13/2016   • Hypertonicity of bladder 06/13/2016   • Hyperglycemia 06/13/2016   • Increased frequency of urination 06/13/2016   • Toxic encephalopathy 11/17/2016   • Type 2 diabetes mellitus (CMS/[facility]) 11/17/2016   • Postoperative anemia due to acute blood loss 11/17/2016   • Cardiac murmur 11/17/2016   • Hyponatremia 01/18/2018   • Testicular swelling, right 04/03/2018   • Blepharoptosis 11/09/2013   • Syncope 07/16/2018     Past Medical History:   Diagnosis Date   • Abdominal aortic aneurysm (AAA) without rupture (CMS/[facility])    • Abdominal aortic ectasia (CMS/[facility]) 06/2015   • Abnormal EKG 10/25/2016   • Abnormal thyroid blood test    • Actinic keratosis    • Arthritis    • Asthma    • Atherosclerotic heart disease    • Atrial premature depolarization    • Atrophy of muscle of lower leg    • BPH (benign prostatic hyperplasia)    • Bruises easily    • Bulging of thoracic intervertebral disc    • Carpal tunnel syndrome, right 12/2019   • Cataract    • Chronic edema    • Chronic pain    • Closed head injury 07/2018   • Colon cancer  (CMS/[facility]) 01/31/2021   • Constipation due to opioid therapy    • Coronary artery disease    • DDD (degenerative disc disease), thoracic    • 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[facility])    • Diverticulosis    • Epididymitis, right 03/2018   • Hallux valgus, acquired, bilateral 01/2019   • Heart attack (CMS/[facility]) 09/1989   • Heart murmur    • History of blood transfusion    • HL (hearing loss)    • Hyperactivity of bladder    • HyperCKemia 11/2017   • Hyperkalemia 08/2016   • Hyperreflexia 11/2017   • Hyphema 05/2015   • IBS (irritable bowel syndrome)    • Impaired functional mobility, balance, gait, and endurance    • Impingement syndrome of left shoulder 10/2015   • Infection associated with cystostomy catheter (CMS/[facility]) 12/2016   • Ischemic colitis (CMS/[facility])    • Lumbar spondylosis 04/2016   • Lumbosacral neuritis 05/2015   • Lumbosacral radiculitis 05/2015   • Macular puckering of retina, left 10/2013   • Myopathy 11/2017   • Osteoarthritis    • Other intervertebral disc disorders, lumbosacral region    • Plantar fascial fibromatosis 06/2018   • Post laminectomy syndrome    • Prostatitis    • Protein S deficiency (CMS/[facility])    • RBBB (right bundle branch block)    • Recurrent falls    • Respiratory failure with hypoxia (CMS/[facility]) 01/2019   • SCC (squamous cell carcinoma) 10/16/2019   • Scoliosis    • Syncope and collapse 07/16/2018   • Torn rotator cuff 03/2017   • Urinary frequency    • Vitamin D deficiency    • Vitreous hemorrhage of left eye (CMS/[facility])          PAST SURGICAL HISTORY  Past Surgical History:   Procedure Laterality Date   • APPENDECTOMY N/A    • BROW LIFT Bilateral 11/12/2013    DR. OCTAVIA CEDILLO AT [place]   • CARDIAC CATHETERIZATION Left 10/2008    LEFT CAROTID ARTERY STENOSIS 50-69%   • CARDIAC ELECTROPHYSIOLOGY PROCEDURE N/A 6/6/2019    Procedure: Ablation atrial flutter;  Surgeon: Miller Talbot MD;  Location: [facility] INVASIVE LOCATION;  Service: Cardiovascular   • CATARACT EXTRACTION Left  01/22/1998    DR. LAYLA BARNES AT [place]   • CATARACT EXTRACTION Right 03/2001    DR. LAYLA BARNES   • CHOLECYSTECTOMY N/A 08/24/1989    DR. FAUZIA PELAEZ AT [place]   • COLON RESECTION N/A 2/3/2021    Procedure: LAPAROSCOPIC EXTENDED  RIGHT COLECTOMY WITH DAVINCI ROBOT;  Surgeon: Xavi Napoles MD;  Location: [facility] MAIN OR;  Service: [facility];  Laterality: N/A;   • COLONOSCOPY N/A 1/31/2021    20-25 MM POLYP IN CECUM, PATH: TUBULAR ADENOMA, 2 TUBULAR ADENOMA POLYPS IN ASCENDING, A FUNGATING AND SUBMUCOSAL ONONOBSTRUCTING MEDIUM MASS IN PROXIMAL TRANSVERSE, PATH: INVASIVE ADENOCARCINOMA ARISING IN A TUBULOVILOOUS ADENOMA, AREA TATTOOED, SCATTERED DIVERTICULA IN SIGMOID AND DESCENDING, LARGE INTERNAL HEMORRHOIDS, DR. JACOB ALICEA AT [facility]    • COLONOSCOPY N/A 02/02/2010    DIVERTICULOSIS, DR. SAL SOLANO AT [place]   • CORONARY ARTERY BYPASS GRAFT N/A 09/1989    5 VESSEL, DR. HANKS   • CYST REMOVAL      FROM BACK   • ENDOSCOPY N/A 1/31/2021    ENTIRE EGD WNL, PATH: MILD REACTIVE GASTROPATHY, DR. JACOB ALICEA AT [facility]   • INGUINAL HERNIA REPAIR Left 09/27/2007    DR. MATTHEW RUSH AT [place]   • INGUINAL HERNIA REPAIR Left 09/07/2007   • INGUINAL HERNIA REPAIR Left 2003   • KNEE ARTHROSCOPY W/ PARTIAL MEDIAL MENISCECTOMY Left 1962    DR. SINGH   • LUMBAR DISCECTOMY FUSION INSTRUMENTATION N/A 4/11/2016    Procedure: lumbar laminectomy L4-5 and fusion with instrumentation;  Surgeon: Ran Kline MD;  Location: [facility] MAIN OR;  Service:    • LUMBAR DISCECTOMY FUSION INSTRUMENTATION N/A 1/15/2018    Procedure: L2-3, L3-4 laminectomy and fusion with instrumentation and removal of implants L4 5.;  Surgeon: Ran Kline MD;  Location: [facility] MAIN OR;  Service:    • LUMBAR EPIDURAL INJECTION N/A 09/23/2015    DR. MATTHEW DOMINGUEZ AT [facility]   • LUMBAR EPIDURAL INJECTION N/A 10/07/2015    DR. ITZEL LUIS AT [facility]   • LUMBAR EPIDURAL INJECTION N/A 11/19/2015    DR. ITZLE LUIS AT [facility]   • ND TOTAL KNEE ARTHROPLASTY  Left 2016    Procedure: TOTAL KNEE ARTHROPLASTY;  Surgeon: Dontrell Arellano MD;  Location: [facility] OR;  Service: Orthopedics   • SKIN BIOPSY Bilateral 10/16/2019    RIGHT LATERAL FOREHEAD:SCC, LEFT TEMPLE:SCC, RIGHT PARIETAL SCALP:SCC, DR. MANPREET VILLARREAL         FAMILY HISTORY  Family History   Problem Relation Age of Onset   • Heart failure Mother    • Diabetes Mother    • Heart disease Mother    • Cancer Sister    • Diabetes Sister    • Cancer Brother    • Diabetes Brother    • Other Brother         INCLUSION BODY NYOSITIS   • Cancer Father          SOCIAL HISTORY  Social History     Socioeconomic History   • Marital status:      Spouse name: Not on file   • Number of children: Not on file   • Years of education: Not on file   • Highest education level: Not on file   Tobacco Use   • Smoking status: Former Smoker     Packs/day: 3.00     Years: 45.00     Pack years: 135.00     Types: Cigarettes     Quit date: 1989     Years since quittin.8   • Smokeless tobacco: Never Used   Vaping Use   • Vaping Use: Never used   Substance and Sexual Activity   • Alcohol use: Yes     Comment: 1 shot of whiskey in the morning and 1 shot of whiskey in the evening   • Drug use: No   • Sexual activity: Not Currently         ALLERGIES  Amiodarone and Percocet [oxycodone-acetaminophen]        REVIEW OF SYSTEMS  Review of Systems   Constitutional: Negative for chills and fever.   Respiratory: Negative for cough and shortness of breath.    Cardiovascular: Negative for chest pain.   Gastrointestinal: Negative for abdominal pain, diarrhea, nausea and vomiting.   Musculoskeletal: Positive for gait problem and joint swelling.        All systems reviewed and negative except for those discussed in HPI.       PHYSICAL EXAM    I have reviewed the triage vital signs and nursing notes.    ED Triage Vitals [21 1053]   Temp Heart Rate Resp BP SpO2   96.9 °F (36.1 °C) 77 16 -- 92 %      Temp src Heart Rate Source Patient  Position BP Location FiO2 (%)   Tympanic Monitor -- -- --       Physical Exam   Constitutional: Pt. is oriented to person, place, and time and well-developed, well-nourished, and in no distress.   HENT: Normocephalic and atraumatic. Oropharynx moist/nonerythematous.  Neck: Normal range of motion. Neck supple. No JVD present.  No midline cervical spine tenderness to palpation.  Cardiovascular: Normal rate, regular rhythm and normal heart sounds. Exam reveals no gallop and no friction rub.   No murmur heard.  Pulmonary/Chest: Effort normal and breath sounds normal. No stridor. No respiratory distress. No wheezes, no rales.   Musculoskeletal: Right lower extremity-there is swelling about the medial lateral malleoli.  No deformities felt.  There is no fibular head tenderness to palpation.  Dorsalis pedis and posterior tibial pulses are easily palpable.  The midfoot is stable.  Capillary refill is brisk.  There are no skin changes.  Remaining extremities exhibit normal range of motion and are without edema, tenderness or deformity.   Neurological: Pt. is alert and oriented to person, place, and time.  He has no focal neurologic deficits  Skin: Skin is warm and dry. No rash noted. Pt. is not diaphoretic. No erythema.   Psychiatric: Mood, affect and judgment normal.  He is pleasant and cooperative.  Nursing note and vitals reviewed.        LAB RESULTS  No results found for this or any previous visit (from the past 24 hour(s)).    Ordered the above labs and independently reviewed the results.        RADIOLOGY  XR Ankle 3+ View Right    Result Date: 9/4/2021  RIGHT ANKLE X-RAYS  CLINICAL HISTORY: Patient fell. Ankle pain  3 views of the right ankle demonstrate a tiny plantar calcaneal heel spur and mild soft tissue swelling surrounding the ankle joint and are otherwise unremarkable. There is no evidence of acute fracture or subluxation.  This report was finalized on 9/4/2021 11:35 AM by Dr. Bharat Albright M.D.        I  ordered the above noted radiological studies. Reviewed by me and discussed with radiologist.  See dictation for official radiology interpretation.      PROCEDURES    Procedures      MEDICATIONS GIVEN IN ER    Medications - No data to display      PROGRESS, DATA ANALYSIS, CONSULTS, AND MEDICAL DECISION MAKING    Any/all labs have been independently reviewed by me.  Any/all radiology studies have been reviewed by me and discussed with radiologist dictating the report.   EKG's independently viewed and interpreted by me.  Discussion below represents my analysis of pertinent findings related to patient's condition, differential diagnosis, treatment plan and final disposition.      ED Course as of Sep 04 1144   Sat Sep 04, 2021   1142 Ankle x-ray shows a heel spur but no fracture.  See dictated report for official interpretation.    [WC]      ED Course User Index  [WC] Saad Valerio MD       AS OF 11:44 EDT VITALS:    BP - 120/86  HR - 120  TEMP - 96.9 °F (36.1 °C) (Tympanic)  02 SATS - 92%        DIAGNOSIS  Final diagnoses:   Sprain of right ankle, unspecified ligament, initial encounter         DISPOSITION  Discharged           Saad Valerio MD  09/04/21 1144

## 2021-09-04 NOTE — ED TRIAGE NOTES
Tripped and fell this am.  C/o right ankle pain    Patient was placed in face mask during first look triage.  Patient was wearing a face mask throughout encounter.  I wore personal protective equipment throughout the encounter.  Hand hygiene was performed before and after patient encounter.

## 2021-09-08 RX ORDER — HYDRALAZINE HYDROCHLORIDE 25 MG/1
25 TABLET, FILM COATED ORAL 3 TIMES DAILY
Qty: 180 TABLET | Refills: 3 | Status: SHIPPED | OUTPATIENT
Start: 2021-09-08 | End: 2022-02-17 | Stop reason: ALTCHOICE

## 2021-09-08 NOTE — TELEPHONE ENCOUNTER
"PATIENT STATED THAT [facility] HAS BEEN CONTACTING THE OFFICE REGARDING THIS RX.     HE IS NEEDING REFILLS BUT ALSO NEEDING \"ADDITIONAL INFORMATION\" PER PHARMACY    PLEASE ADVISE      Osvaldo Lopez (Self) [phone] (H)       MEDICATION:     hydrALAZINE (APRESOLINE) 25 MG tablet    PHARMACY   [facility] - [place], [place] - [address] AT [address] [facility] - [phone] [facility] [phone]   [phone]  "

## 2021-09-09 RX ORDER — METOPROLOL SUCCINATE 25 MG/1
TABLET, EXTENDED RELEASE ORAL
Qty: 90 TABLET | Refills: 3 | Status: SHIPPED | OUTPATIENT
Start: 2021-09-09 | End: 2022-08-08

## 2021-09-10 DIAGNOSIS — G61.82 MULTIFOCAL MOTOR NEUROPATHY (HCC): ICD-10-CM

## 2021-09-10 DIAGNOSIS — M54.40 CHRONIC LOW BACK PAIN WITH SCIATICA, SCIATICA LATERALITY UNSPECIFIED, UNSPECIFIED BACK PAIN LATERALITY: ICD-10-CM

## 2021-09-10 DIAGNOSIS — G89.29 CHRONIC LOW BACK PAIN WITH SCIATICA, SCIATICA LATERALITY UNSPECIFIED, UNSPECIFIED BACK PAIN LATERALITY: ICD-10-CM

## 2021-09-10 RX ORDER — TRAMADOL HYDROCHLORIDE 50 MG/1
50 TABLET ORAL EVERY 6 HOURS PRN
Qty: 30 TABLET | Refills: 2 | Status: SHIPPED | OUTPATIENT
Start: 2021-09-10 | End: 2022-03-09

## 2021-09-13 ENCOUNTER — PATIENT MESSAGE (OUTPATIENT)
Dept: INTERNAL MEDICINE | Facility: CLINIC | Age: 86
End: 2021-09-13

## 2021-09-13 NOTE — TELEPHONE ENCOUNTER
From: Osvaldo Lopez  To: Caio Rodríguez Jr., MD  Sent: 9/13/2021 4:16 PM EDT  Subject: Non-Urgent Medical Question    Dr Rodríguez, dad was in the [facility] ED on the morning of 9/4 following a fall for evaluation of a swollen ankle. They did xrays and said it was not broken. He is still having a lot of pain on the lateral side of his ankle even when it is just sitting on the floor. He said when he first gets up to walk he is in a whole lot of pain that does ease a little after moving. His ankle is still really swollen. We are trying to prop it up as much as possible and applying ice intermittently. Is this normal or should we have another xray taken?

## 2021-09-15 ENCOUNTER — OFFICE VISIT (OUTPATIENT)
Dept: INTERNAL MEDICINE | Facility: CLINIC | Age: 86
End: 2021-09-15

## 2021-09-15 VITALS
SYSTOLIC BLOOD PRESSURE: 124 MMHG | DIASTOLIC BLOOD PRESSURE: 76 MMHG | BODY MASS INDEX: 32.07 KG/M2 | WEIGHT: 224 LBS | TEMPERATURE: 98.4 F | HEIGHT: 70 IN | HEART RATE: 73 BPM | OXYGEN SATURATION: 98 %

## 2021-09-15 DIAGNOSIS — S93.601D SPRAIN OF RIGHT FOOT, SUBSEQUENT ENCOUNTER: Primary | ICD-10-CM

## 2021-09-15 PROBLEM — G62.89 AXONAL NEUROPATHY: Status: ACTIVE | Noted: 2019-01-02

## 2021-09-15 PROBLEM — R26.89 IMPAIRMENT OF BALANCE: Status: ACTIVE | Noted: 2018-10-31

## 2021-09-15 PROCEDURE — 99213 OFFICE O/P EST LOW 20 MIN: CPT | Performed by: NURSE PRACTITIONER

## 2021-09-15 RX ORDER — FUROSEMIDE 40 MG/1
40 TABLET ORAL EVERY OTHER DAY
COMMUNITY
Start: 2021-08-20 | End: 2022-06-07 | Stop reason: DRUGHIGH

## 2021-09-15 RX ORDER — LISINOPRIL 20 MG/1
TABLET ORAL
Qty: 90 TABLET | Refills: 1 | Status: SHIPPED | OUTPATIENT
Start: 2021-09-15 | End: 2022-04-12

## 2021-09-15 NOTE — PROGRESS NOTES
"Chief Complaint  Lower Extremity Issue (ankle pain and swelling)    Subjective          Osvaldo Lopez presents to [facility]  History of Present Illness    Patient is a pleasant 85-year-old male who typically sees Dr. Rodríguez in the office.  Patient is new to me and is here due to a fall he had on September 4 of 2021 at that time he went to [facility] ER and was diagnosed with sprain of the right ankle.  Patient states he feels he is getting a tad better but still feels he should be much better than what he is.  Patient is taking tramadol and Tylenol routinely for his pain.  He is here today with his daughter who brought him and he is in a wheelchair at this time.  Patient gave me verbal consent to speak about his care in front of his daughter today.  He is wearing a ankle brace at this time.    Objective   Vital Signs:   /76 (BP Location: Left arm, Patient Position: Sitting, Cuff Size: Adult)   Pulse 73   Temp 98.4 °F (36.9 °C)   Ht 177.8 cm (70\")   Wt 102 kg (224 lb)   SpO2 98%   BMI 32.14 kg/m²     Physical Exam  Vitals and nursing note reviewed.   Constitutional:       Appearance: Normal appearance.   HENT:      Head: Normocephalic.      Nose: Nose normal.      Mouth/Throat:      Mouth: Mucous membranes are moist.   Eyes:      Pupils: Pupils are equal, round, and reactive to light.   Cardiovascular:      Rate and Rhythm: Normal rate and regular rhythm.      Pulses: Normal pulses.      Heart sounds: Normal heart sounds.   Pulmonary:      Effort: Pulmonary effort is normal. No respiratory distress.      Breath sounds: Normal breath sounds. No stridor. No wheezing, rhonchi or rales.   Chest:      Chest wall: No tenderness.   Musculoskeletal:         General: Swelling, tenderness and signs of injury present.      Cervical back: Normal range of motion.      Comments: Tender on the lateral and medial ankle aspects and top of foot. Toes are purple.    Skin:     Capillary Refill: " Capillary refill takes less than 2 seconds.      Findings: Bruising present.      Comments: Patient has bruising on right foot/toes due to an injury on September 4, 2021.   Neurological:      Mental Status: He is alert and oriented to person, place, and time.   Psychiatric:         Behavior: Behavior normal.        Result Review :            ED with Saad Valerio MD (09/04/2021)       Assessment and Plan    Diagnoses and all orders for this visit:    1. Sprain of right foot, subsequent encounter (Primary)  -     Ambulatory Referral to Orthopedic Surgery    An urgent referral was placed to orthopedic surgery regarding his right foot sprain and pain.  Patient is to go ahead and continue wearing his foot brace, rolling walker/wheelchair as needed, tramadol and Tylenol as needed for pain management.  If he has any change in signs and symptoms he is to contact the office immediately.    Follow Up   Return if symptoms worsen or fail to improve.  Patient was given instructions and counseling regarding his condition or for health maintenance advice. Please see specific information pulled into the AVS if appropriate.

## 2021-09-15 NOTE — PATIENT INSTRUCTIONS
Foot Sprain    A foot sprain is an injury to one of the ligaments in the feet. Ligaments are strong tissues that connect bones to each other. The ligament can be stretched too much. In some cases, it may tear. A tear can be either partial or complete. The severity of the sprain depends on how much of the ligament was damaged or torn.  What are the causes?  This condition is usually caused by suddenly twisting or pivoting your foot.  What increases the risk?  You are more likely to develop this condition if:  · You play a sport, such as basketball or football.  · You exercise or play a sport without first warming up your muscles.  · You start a new workout or sport.  · You suddenly increase how long or hard you exercise or play a sport.  · You have injured your foot or ankle before.  What are the signs or symptoms?  Symptoms of this condition start soon after an injury and include:  · Pain, especially in the arch of your foot.  · Bruising.  · Swelling.  · Being unable to walk or use your foot to support body weight.  How is this diagnosed?  This condition is diagnosed with a medical history and physical exam. You may also have imaging tests, such as:  · X-rays to check for broken bones (fractures).  · An MRI to see if the ligament is torn.  How is this treated?  Treatment for this condition depends on the severity of the sprain. Mild sprains and major sprains can be treated with:  · Rest, ice, pressure (compression), and elevation (RICE). Elevation means raising your injured foot.  · Keeping your foot in a fixed position (immobilization) for a period of time. This is done if your ligament is overstretched or partially torn. Your health care provider will apply a bandage, splint, or walking boot to keep your foot from moving until it heals.  · Using crutches or a scooter for a few weeks to avoid bearing weight on your foot while it is healing.  · Physical therapy exercises to improve movement and strength in your  foot.  Major sprains may also be treated with:  · Surgery. This is done if your ligament is fully torn and a procedure is needed to reconnect it to the bone.  · A cast or splint. This will be needed after surgery. A cast or splint will need to stay on your foot while it heals.  Follow these instructions at home:  If you have a bandage, splint, or boot:  · Wear it as told by your health care provider. Remove it only as told by your health care provider.  · Loosen it if your toes tingle, become numb, or turn cold and blue.  · Keep it clean and dry.  If you have a cast:  · Do not put pressure on any part of the cast until it is fully hardened. This may take several hours.  · Do not stick anything inside the cast to scratch your skin. Doing that increases your risk for infection.  · Check the skin around the cast every day. Tell your health care provider about any concerns.  · You may put lotion on dry skin around the edges of the cast. Do not put lotion on the skin underneath the cast.  · Keep it clean and dry.  Bathing  · Do not take baths, swim, or use a hot tub until your health care provider approves. Ask your health care provider if you may take showers. You may only be allowed to take sponge baths.  · If the bandage, splint, boot, or cast is not waterproof:  ? Do not let it get wet.  ? Cover it with a watertight covering when you take a bath or shower.  Managing pain, stiffness, and swelling    · If directed, put ice on the injured area. To do this:  ? If you have a removable bandage, splint, or boot, remove it as told by your health care provider.  ? Put ice in a plastic bag.  ? Place a towel between your skin and the bag, or between your cast and the bag.  ? Leave the ice on for 20 minutes, 2-3 times per day.  ? Remove the ice if your skin turns bright red. This is very important. If you cannot feel pain, heat, or cold, you have a greater risk of damage to the area.  · Move your toes often to reduce stiffness  and swelling.  · Elevate the injured area above the level of your heart while you are sitting or lying down.    Activity  · Do not use the injured foot to support your body weight until your health care provider says that you can. Use crutches or a scooter as told by your health care provider.  · Ask your health care provider what activities are safe for you. Do exercises as told by your health care provider.  · Gradually increase how much and how far you walk until your health care provider says it is safe to return to full activity.  Driving  · Ask your health care provider if the medicine prescribed to you requires you to avoid driving or using machinery.  · Ask your health care provider when it is safe to drive if you have a bandage, splint, boot, or cast on your foot.  General instructions  · Take over-the-counter and prescription medicines only as told by your health care provider.  · When you can walk without pain, wear supportive shoes that have stiff soles. Do not wear flip-flops. Do not walk barefoot.  · Keep all follow-up visits. This is important.  Contact a health care provider if:  · Medicine does not help your pain.  · Your bruising or swelling gets worse or does not get better with treatment.  · Your splint, boot, or cast is damaged.  Get help right away if:  · You develop severe numbness or tingling in your foot.  · Your foot turns blue, white, or gray, and it feels cold.  Summary  · A foot sprain is an injury to one of the ligaments in the feet. Ligaments are strong tissues that connect bones to each other.  · You may need a bandage, splint, boot, or cast to support your foot while it heals. Sometimes, surgery may be needed.  · You may need physical therapy exercises to improve movement and strength in your foot.  This information is not intended to replace advice given to you by your health care provider. Make sure you discuss any questions you have with your health care provider.  Document  Revised: 04/09/2021 Document Reviewed: 04/09/2021  Elsevier Patient Education © 2021 [employer]

## 2021-09-16 ENCOUNTER — OFFICE VISIT (OUTPATIENT)
Dept: ORTHOPEDIC SURGERY | Facility: CLINIC | Age: 86
End: 2021-09-16

## 2021-09-16 VITALS — HEIGHT: 70 IN | TEMPERATURE: 97.3 F | WEIGHT: 224 LBS | BODY MASS INDEX: 32.07 KG/M2

## 2021-09-16 DIAGNOSIS — R52 PAIN: Primary | ICD-10-CM

## 2021-09-16 DIAGNOSIS — S93.401A MODERATE RIGHT ANKLE SPRAIN, INITIAL ENCOUNTER: ICD-10-CM

## 2021-09-16 PROCEDURE — 73610 X-RAY EXAM OF ANKLE: CPT | Performed by: NURSE PRACTITIONER

## 2021-09-16 PROCEDURE — 73620 X-RAY EXAM OF FOOT: CPT | Performed by: NURSE PRACTITIONER

## 2021-09-16 PROCEDURE — 99213 OFFICE O/P EST LOW 20 MIN: CPT | Performed by: NURSE PRACTITIONER

## 2021-09-16 NOTE — PROGRESS NOTES
Patient Name: Osvaldo Lopez   YOB: 1935  Referring Primary Care Physician: Caio Rodríguez Jr., MD  BMI: Body mass index is 32.14 kg/m².    Chief Complaint:    Chief Complaint   Patient presents with   • Right Foot - Initial Evaluation        HPI: Fall at home 9-4-21 walking out to garage and fell and twisted his right ankle. He is on a blood thinner and lives alone. Daughter is with pt and staying at night with him. Denies head injury. Pt went to ER initially and is here for follow up. He has not been elevating and is in a boot walking on it.   Has followed with [facility] in the past.     Osvaldo Lopez is a 86 y.o. male who presents today for evaluation of   Chief Complaint   Patient presents with   • Right Foot - Initial Evaluation       This problem is new to this examiner.     Subjective   Medications:   Home Medications:  Current Outpatient Medications on File Prior to Visit   Medication Sig   • acetaminophen (TYLENOL) 500 MG tablet Take 500 mg by mouth Every 6 (Six) Hours As Needed for Mild Pain .   • Alpha-Lipoic Acid 600 MG tablet Take 600 mg by mouth Daily. For neuropathy   • amiodarone (PACERONE) 200 MG tablet Take 1 tablet by mouth Daily.   • KYRA MICROLET LANCETS lancets USE TWICE A DAY   • docusate sodium (Colace) 100 MG capsule Take 1 capsule by mouth 2 (Two) Times a Day As Needed for Constipation.   • ferrous sulfate 325 (65 FE) MG tablet Take 325 mg by mouth 2 (two) times a day.   • finasteride (Proscar) 5 MG tablet Take 1 tablet by mouth Daily. For prostate   • furosemide (LASIX) 40 MG tablet    • glucose blood (Contour Next Test) test strip 1 each by Other route Daily. test blood sugar   • hydrALAZINE (APRESOLINE) 25 MG tablet Take 1 tablet by mouth 3 (Three) Times a Day.   • lidocaine (LMX) 4 % cream    • lisinopril (PRINIVIL,ZESTRIL) 20 MG tablet TAKE 1 TABLET DAILY   • metoprolol succinate XL (TOPROL-XL) 25 MG 24 hr tablet TAKE 1 TABLET DAILY   • Multiple Vitamin (MULTI VITAMIN PO)  Take 1 tablet by mouth Every Morning.   • mupirocin (Bactroban) 2 % ointment Apply  topically to the appropriate area as directed 3 (Three) Times a Day.   • rivaroxaban (XARELTO) 15 MG tablet Take 1 tablet by mouth Daily.   • tamsulosin (FLOMAX) 0.4 MG capsule 24 hr capsule TAKE 2 CAPSULES DAILY   • Tradjenta 5 MG tablet tablet Take 1 tablet by mouth Daily.   • traMADol (ULTRAM) 50 MG tablet TAKE 1 TABLET BY MOUTH EVERY 6 (SIX) HOURS AS NEEDED FOR MODERATE PAIN OR SEVERE PAIN .   • vitamin B-12 (CYANOCOBALAMIN) 1000 MCG tablet Take 1,000 mcg by mouth Daily.     No current facility-administered medications on file prior to visit.     Current Medications:  Scheduled Meds:  Continuous Infusions:No current facility-administered medications for this visit.    PRN Meds:.    I have reviewed the patient's medical history in detail and updated the computerized patient record.  Review and summarization of old records includes:    Past Medical History:   Diagnosis Date   • Abdominal aortic aneurysm (AAA) without rupture (CMS/[facility])    • Abdominal aortic ectasia (CMS/HCC) 06/2015   • Abnormal EKG 10/25/2016    04/2019   • Abnormal thyroid blood test    • Actinic keratosis     FOLLOWED BY DR. MANPREET VILLARREAL   • Anemia 1/28/2021   • Arthritis    • Asthma     MILD, INTERMITTENT   • Atherosclerotic heart disease    • Atrial flutter with rapid ventricular response (CMS/HCC) 04/14/2019    ADMITTED TO [facility]   • Atrial premature depolarization    • Atrophy of muscle of lower leg    • BPH (benign prostatic hyperplasia)     FOLLOWED BY DR. TERRA JONES   • Bruises easily    • Bulging of thoracic intervertebral disc    • Carpal tunnel syndrome, right 12/2019    FOLLOWED BY DR. NEW SANCHEZ   • Cataract     BILATERAL, S/P EXTRACTION WITH IOL IMPLANTS   • Chronic anticoagulation 1/29/2021   • Chronic edema    • Chronic low back pain with sciatica 1/26/2021   • Chronic pain    • CKD (chronic kidney disease) 1/29/2021   • Closed head injury  07/2018    WITH LACERATION TO SCALP, D/T SYNCOPE   • Colon cancer (CMS/HCC) 01/31/2021    INVASIVE ADENOCARCINOMA FROM TUBULOVILLOUS ADENOMA IN TRANSVERSE, S/P COLON RESECTION, FOLLOWED BY DR. MARGARITA COX   • Constipation due to opioid therapy    • Coronary artery disease    • DDD (degenerative disc disease), thoracic    • 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HCC)    • Diverticulosis    • Enlarged prostate     FOLLOWED BY DR. TERRA JONES   • Epididymitis, right 03/2018   • Essential hypertension    • Eyebrow ptosis 11/9/2013   • Hallux valgus, acquired, bilateral 01/2019   • Heart attack (CMS/HCC) 09/1989    S/P CABG, 5 VESSEL   • Heart murmur    • History of blood transfusion    • HL (hearing loss)    • Hyperactivity of bladder     FOLLOWED BY DR. TERRA JONES   • HyperCKemia 11/2017   • Hyperkalemia 08/2016   • Hyperlipidemia     MIXED   • Hyperreflexia 11/2017    BILATERAL   • Hyphema 05/2015   • IBS (irritable bowel syndrome)    • Impaired functional mobility, balance, gait, and endurance    • Impingement syndrome of left shoulder 10/2015   • Infection associated with cystostomy catheter (CMS/HCC) 12/2016   • Ischemic colitis (CMS/[facility])    • Lumbar radiculopathy 2016    wears back brace at times following back surgery   • Lumbar spondylosis 04/2016   • Lumbosacral neuritis 05/2015   • Lumbosacral radiculitis 05/2015   • Macular puckering of retina, left 10/2013   • 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 VIANEY GIORDANO   • Pseudophakia 11/9/2013   • RBBB  (right bundle branch block)    • Recurrent falls    • Respiratory failure with hypoxia (CMS/HCC) 01/2019   • SCC (squamous cell carcinoma) 10/16/2019    RIGHT FOREHEAD, LEFT TEMPLE, RIGHT SCALP, FOLLOWED BY DR. MANPREET VILLARREAL   • Scoliosis    • Spinal stenosis of lumbar region with neurogenic claudication 12/07/2017   • Syncope and collapse 07/16/2018    ADMITTED TO [facility]   • Torn rotator cuff 03/2017    BILATERAL, COMPLETE   • Type 2 diabetes mellitus (CMS/HCC)     IDDM   • Urinary frequency     FOLLOWED BY DR. TERRA JONES   • Vitamin D deficiency    • Vitreous hemorrhage of left eye (CMS/HCC)         Past Surgical History:   Procedure Laterality Date   • APPENDECTOMY N/A    • BROW LIFT Bilateral 11/12/2013    DR. OCTAVIA CEDILLO AT [place]   • CARDIAC CATHETERIZATION Left 10/2008    LEFT CAROTID ARTERY STENOSIS 50-69%   • CARDIAC ELECTROPHYSIOLOGY PROCEDURE N/A 6/6/2019    Procedure: Ablation atrial flutter;  Surgeon: Miller Talbot MD;  Location: [facility] INVASIVE LOCATION;  Service: Cardiovascular   • CATARACT EXTRACTION Left 01/22/1998    DR. LAYLA BARNES AT [place]   • CATARACT EXTRACTION Right 03/2001    DR. LAYLA BARNES   • CHOLECYSTECTOMY N/A 08/24/1989    DR. FAUZIA PELAEZ AT [place]   • COLON RESECTION N/A 2/3/2021    Procedure: LAPAROSCOPIC EXTENDED  RIGHT COLECTOMY WITH DAVINCI ROBOT;  Surgeon: Xavi Napoles MD;  Location: [facility] OR;  Service: DaVinci;  Laterality: N/A;   • COLONOSCOPY N/A 1/31/2021    20-25 MM POLYP IN CECUM, PATH: TUBULAR ADENOMA, 2 TUBULAR ADENOMA POLYPS IN ASCENDING, A FUNGATING AND SUBMUCOSAL ONONOBSTRUCTING MEDIUM MASS IN PROXIMAL TRANSVERSE, PATH: INVASIVE ADENOCARCINOMA ARISING IN A TUBULOVILOOUS ADENOMA, AREA TATTOOED, SCATTERED DIVERTICULA IN SIGMOID AND DESCENDING, LARGE INTERNAL HEMORRHOIDS, DR. JACOB ALICEA AT [facility]    • COLONOSCOPY N/A 02/02/2010    DIVERTICULOSIS, DR. SAL SOLANO AT [place]   • CORONARY ARTERY BYPASS GRAFT N/A 09/1989    5 VESSEL,   [facility]   • CYST REMOVAL      FROM BACK   • ENDOSCOPY N/A 2021    ENTIRE EGD WNL, PATH: MILD REACTIVE GASTROPATHY, DR. JACOB ALICEA AT [facility]   • INGUINAL HERNIA REPAIR Left 2007    DR. MATTHEW RUSH AT [place]   • INGUINAL HERNIA REPAIR Left 2007   • INGUINAL HERNIA REPAIR Left    • KNEE ARTHROSCOPY W/ PARTIAL MEDIAL MENISCECTOMY Left     DR. SINGH   • LUMBAR DISCECTOMY FUSION INSTRUMENTATION N/A 2016    Procedure: lumbar laminectomy L4-5 and fusion with instrumentation;  Surgeon: Ran Kline MD;  Location: [facility];  Service:    • LUMBAR DISCECTOMY FUSION INSTRUMENTATION N/A 1/15/2018    Procedure: L2-3, L3-4 laminectomy and fusion with instrumentation and removal of implants L4 5.;  Surgeon: Ran Kline MD;  Location: [facility];  Service:    • LUMBAR EPIDURAL INJECTION N/A 2015    DR. MATTHEW DOMINGUEZ AT [facility]   • LUMBAR EPIDURAL INJECTION N/A 10/07/2015    DR. ITZEL LUIS AT [facility]   • LUMBAR EPIDURAL INJECTION N/A 2015    DR. ITZEL LUIS AT [facility]   • OH TOTAL KNEE ARTHROPLASTY Left 2016    Procedure: TOTAL KNEE ARTHROPLASTY;  Surgeon: Dontrell Arellano MD;  Location: [facility];  Service: Orthopedics   • SKIN BIOPSY Bilateral 10/16/2019    RIGHT LATERAL FOREHEAD:SCC, LEFT TEMPLE:SCC, RIGHT PARIETAL SCALP:SCC, DR. MANPREET VILLARREAL        Social History     Occupational History   • Not on file   Tobacco Use   • Smoking status: Former Smoker     Packs/day: 3.00     Years: 45.00     Pack years: 135.00     Types: Cigarettes     Quit date: 1989     Years since quittin.9   • Smokeless tobacco: Never Used   Vaping Use   • Vaping Use: Never used   Substance and Sexual Activity   • Alcohol use: Yes     Comment: 1 shot of whiskey in the morning and 1 shot of whiskey in the evening   • Drug use: No   • Sexual activity: Not Currently      Social History     Social History Narrative   • Not on file        Family History   Problem Relation Age of Onset  "  • Heart failure Mother    • Diabetes Mother    • Heart disease Mother    • Cancer Sister    • Diabetes Sister    • Cancer Brother    • Diabetes Brother    • Other Brother         INCLUSION BODY NYOSITIS   • Cancer Father        ROS: 14 point review of systems was performed and all other systems were reviewed and are negative except for documented findings in HPI and today's encounter.     Allergies:   Allergies   Allergen Reactions   • Amiodarone Myalgia     Muscle problems in legs   • Percocet [Oxycodone-Acetaminophen] Mental Status Change and Confusion     Causes confusion/     Constitutional:  Denies fever, shaking or chills   Eyes:  Denies change in visual acuity   HENT:  Denies nasal congestion or sore throat   Respiratory:  Denies cough or shortness of breath   Cardiovascular:  Denies chest pain or severe LE edema   GI:  Denies abdominal pain, nausea, vomiting, bloody stools or diarrhea   Musculoskeletal:  Numbness, tingling, pain, or loss of motor function only as noted above in history of present illness.  : Denies painful urination or hematuria  Integument:  Denies rash, lesion or ulceration   Neurologic:  Denies headache or focal weakness  Endocrine:  Denies lymphadenopathy  Psych:  Denies confusion or change in mental status   Hem:  Denies active bleeding    OBJECTIVE:  Physical Exam:   Temp 97.3 °F (36.3 °C) (Temporal)   Ht 177.8 cm (70\")   Wt 102 kg (224 lb)   BMI 32.14 kg/m²     General Appearance:    Alert, cooperative, in no acute distress                  Eyes: conjunctiva clear  ENT: external ears and nose atraumatic  CV: no peripheral edema  Resp: normal respiratory effort  Skin: no rashes or wounds; normal turgor  Psych: mood and affect appropriate  Lymph: no nodes appreciated  Neuro: gross sensation intact  Vascular:  Palpable peripheral pulse in noted extremity  Musculoskeletal Extremities: Right lower extremity-there is swelling about the medial lateral malleoli with ecchymosis. Base of " 5th MT nttp.  No deformities felt.  There is no fibular head tenderness to palpation.  Dorsalis pedis and posterior tibial pulses are easily palpable.  The midfoot is stable with 0.5cm blister to base of 5th metatarsal that blanches and has no drainage.  Capillary refill is brisk.  There are no skin changes.  Remaining extremities exhibit normal range of motion and are without edema, tenderness or deformity. Calf is soft and nttp.    Radiology:   Right ankle 3 views done for pain and no comparison films no acute fracture   Right foot 2 views done for pain no acute fracture    RIGHT ANKLE X-RAYS     CLINICAL HISTORY: Patient fell. Ankle pain     3 views of the right ankle demonstrate a tiny plantar calcaneal heel  spur and mild soft tissue swelling surrounding the ankle joint and are  otherwise unremarkable. There is no evidence of acute fracture or  subluxation.     This report was finalized on 9/4/2021 11:35 AM by Dr. Bharat Albright M.D.    Assessment:     ICD-10-CM ICD-9-CM   1. Pain  R52 780.96   2. Moderate right ankle sprain, initial encounter  S93.401A 845.00        Procedures   Tall cam walking boot - cleaned and dressed the area to foot avised strict elevation and RICE - pt has swelling and edema from xarelto and needs to elevate.      Plan: The diagnosis(es), natural history, pathophysiology and treatment for diagnosis(es) were discussed. Opportunity given and questions answered.  Biomechanics of pertinent body areas discussed.  When appropriate, the use of ambulatory aids discussed.  EXERCISES:  Advice on benefits of, and types of regular/moderate exercise pertaining to orthopedic diagnosis(es).  MEDICATIONS:  The risks, benefits, warnings,side effects and alternatives of medications discussed.  Inflammation/pain control; with cold, heat, elevation and/or liniments discussed as appropriate  MEDICAL RECORDS reviewed from other provider(s) for past and current medical history pertinent to this  complaint.      9/22/2021    Much of this encounter note is an electronic transcription/translation of spoken language to printed text. The electronic translation of spoken language may permit erroneous, or at times, nonsensical words or phrases to be inadvertently transcribed; Although I have reviewed the note for such errors, some may still exist

## 2021-09-24 ENCOUNTER — TELEPHONE (OUTPATIENT)
Dept: ORTHOPEDIC SURGERY | Facility: CLINIC | Age: 86
End: 2021-09-24

## 2021-09-24 NOTE — TELEPHONE ENCOUNTER
Patient's daughter states that patient is to have an xray done at sports medicine, would like to know exactly what to do, patient's daughter was tranferred from the hub. Could someone please call Ms Sanchez upon CIM's return to clarify.       Per hub

## 2021-09-24 NOTE — TELEPHONE ENCOUNTER
Provider: TOY SMITH   Caller: DAUGHTER   Relationship to Patient: DAUGHTER     Phone Number: [phone]  Reason for Call: QUESTION ABOUT REPEAT XRAY ORDERS - BLISTER ON FOOT IS RED AND HARD    HUB ATTEMPTED WARM TRANSFER TO CLINICAL - SPOKE WITH BERTHA @ [facility] TRANSFERRED CALL - NO ANSWER @ [facility]

## 2021-09-27 NOTE — TELEPHONE ENCOUNTER
Spoke with pt's daughter and she is not clear on what they should do next, the AVS says to follow up with sports medicine for an xray.  She called sports medicine but they would not schedule an appt because there was no referral. She also the wound is still present and is not any better.  I informed her that CIM is not in the office tomorrow and that I will call her back Wed morning and she voiced understanding.

## 2021-09-28 RX ORDER — LINAGLIPTIN 5 MG/1
TABLET, FILM COATED ORAL
Qty: 90 TABLET | Refills: 1 | Status: SHIPPED | OUTPATIENT
Start: 2021-09-28 | End: 2022-04-12

## 2021-09-30 ENCOUNTER — OFFICE VISIT (OUTPATIENT)
Dept: ORTHOPEDIC SURGERY | Facility: CLINIC | Age: 86
End: 2021-09-30

## 2021-09-30 VITALS — HEIGHT: 70 IN | BODY MASS INDEX: 31.5 KG/M2 | WEIGHT: 220 LBS

## 2021-09-30 DIAGNOSIS — E11.8 DM (DIABETES MELLITUS), TYPE 2 WITH COMPLICATIONS (HCC): ICD-10-CM

## 2021-09-30 DIAGNOSIS — L89.896 PRESSURE INJURY OF DEEP TISSUE OF RIGHT FOOT: Primary | ICD-10-CM

## 2021-09-30 DIAGNOSIS — M48.062 SPINAL STENOSIS OF LUMBAR REGION WITH NEUROGENIC CLAUDICATION: ICD-10-CM

## 2021-09-30 DIAGNOSIS — S93.491D SPRAIN OF ANTERIOR TALOFIBULAR LIGAMENT OF RIGHT ANKLE, SUBSEQUENT ENCOUNTER: ICD-10-CM

## 2021-09-30 DIAGNOSIS — R29.898 LEG WEAKNESS, BILATERAL: ICD-10-CM

## 2021-09-30 DIAGNOSIS — E11.42 DIABETIC PERIPHERAL NEUROPATHY (HCC): ICD-10-CM

## 2021-09-30 DIAGNOSIS — R26.89 IMPAIRMENT OF BALANCE: ICD-10-CM

## 2021-09-30 PROCEDURE — 99214 OFFICE O/P EST MOD 30 MIN: CPT | Performed by: NURSE PRACTITIONER

## 2021-09-30 NOTE — PROGRESS NOTES
Patient Name: Osvaldo Lopez   YOB: 1935  Referring Primary Care Physician: Caio Rodríguez Jr., MD  BMI: Body mass index is 31.57 kg/m².    Chief Complaint:    Chief Complaint   Patient presents with   • Right Ankle - Follow-up        HPI: 4 weeks out from initial fall and has been in the boot - pt  Has been using a walker. Swelling and pain is improved to ankle.  Upon initial exam he had a small area to his foot that has expanded and will have to follow-up with wound clinic he has a history of type 2 diabetes and had a pressure ulcer on his buttocks recently.  Patient has to ambulate with a walker exclusively and lives alone at home.  Daughter is here with him today.  He initially had a lot of swelling in the foot and ankle that is gone down from resting it and has been elevating it he is on Xarelto.    Osvaldo Lopez is a 86 y.o. male who presents today for evaluation of   Chief Complaint   Patient presents with   • Right Ankle - Follow-up         Subjective   Medications:   Home Medications:  Current Outpatient Medications on File Prior to Visit   Medication Sig   • acetaminophen (TYLENOL) 500 MG tablet Take 500 mg by mouth Every 6 (Six) Hours As Needed for Mild Pain .   • Alpha-Lipoic Acid 600 MG tablet Take 600 mg by mouth Daily. For neuropathy   • amiodarone (PACERONE) 200 MG tablet Take 1 tablet by mouth Daily.   • KYRA MICROLET LANCETS lancets USE TWICE A DAY   • docusate sodium (Colace) 100 MG capsule Take 1 capsule by mouth 2 (Two) Times a Day As Needed for Constipation.   • ferrous sulfate 325 (65 FE) MG tablet Take 325 mg by mouth 2 (two) times a day.   • finasteride (Proscar) 5 MG tablet Take 1 tablet by mouth Daily. For prostate   • furosemide (LASIX) 40 MG tablet    • glucose blood (Contour Next Test) test strip 1 each by Other route Daily. test blood sugar   • hydrALAZINE (APRESOLINE) 25 MG tablet Take 1 tablet by mouth 3 (Three) Times a Day.   • lidocaine (LMX) 4 % cream    •  lisinopril (PRINIVIL,ZESTRIL) 20 MG tablet TAKE 1 TABLET DAILY   • metoprolol succinate XL (TOPROL-XL) 25 MG 24 hr tablet TAKE 1 TABLET DAILY   • Multiple Vitamin (MULTI VITAMIN PO) Take 1 tablet by mouth Every Morning.   • mupirocin (Bactroban) 2 % ointment Apply  topically to the appropriate area as directed 3 (Three) Times a Day.   • rivaroxaban (XARELTO) 15 MG tablet Take 1 tablet by mouth Daily.   • tamsulosin (FLOMAX) 0.4 MG capsule 24 hr capsule TAKE 2 CAPSULES DAILY   • Tradjenta 5 MG tablet tablet TAKE 1 TABLET DAILY   • traMADol (ULTRAM) 50 MG tablet TAKE 1 TABLET BY MOUTH EVERY 6 (SIX) HOURS AS NEEDED FOR MODERATE PAIN OR SEVERE PAIN .   • vitamin B-12 (CYANOCOBALAMIN) 1000 MCG tablet Take 1,000 mcg by mouth Daily.     No current facility-administered medications on file prior to visit.     Current Medications:  Scheduled Meds:  Continuous Infusions:No current facility-administered medications for this visit.    PRN Meds:.    I have reviewed the patient's medical history in detail and updated the computerized patient record.  Review and summarization of old records includes:    Past Medical History:   Diagnosis Date   • Abdominal aortic aneurysm (AAA) without rupture (CMS/HCC)    • Abdominal aortic ectasia (CMS/HCC) 06/2015   • Abnormal EKG 10/25/2016    04/2019   • Abnormal thyroid blood test    • Actinic keratosis     FOLLOWED BY DR. MANPREET VILLARREAL   • Anemia 1/28/2021   • Arthritis    • Asthma     MILD, INTERMITTENT   • Atherosclerotic heart disease    • Atrial flutter with rapid ventricular response (CMS/HCC) 04/14/2019    ADMITTED TO [facility]   • Atrial premature depolarization    • Atrophy of muscle of lower leg    • BPH (benign prostatic hyperplasia)     FOLLOWED BY DR. TERRA JONES   • Bruises easily    • Bulging of thoracic intervertebral disc    • Carpal tunnel syndrome, right 12/2019    FOLLOWED BY DR. NEW SANCHEZ   • Cataract     BILATERAL, S/P EXTRACTION WITH IOL IMPLANTS   • Chronic  anticoagulation 1/29/2021   • Chronic edema    • Chronic low back pain with sciatica 1/26/2021   • Chronic pain    • CKD (chronic kidney disease) 1/29/2021   • Closed head injury 07/2018    WITH LACERATION TO SCALP, D/T SYNCOPE   • Colon cancer (CMS/HCC) 01/31/2021    INVASIVE ADENOCARCINOMA FROM TUBULOVILLOUS ADENOMA IN TRANSVERSE, S/P COLON RESECTION, FOLLOWED BY DR. MARGARITA COX   • Constipation due to opioid therapy    • Coronary artery disease    • DDD (degenerative disc disease), thoracic    • 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HCC)    • Diverticulosis    • Enlarged prostate     FOLLOWED BY DR. TERRA JONES   • Epididymitis, right 03/2018   • Essential hypertension    • Eyebrow ptosis 11/9/2013   • Hallux valgus, acquired, bilateral 01/2019   • Heart attack (CMS/HCC) 09/1989    S/P CABG, 5 VESSEL   • Heart murmur    • History of blood transfusion    • HL (hearing loss)    • Hyperactivity of bladder     FOLLOWED BY DR. TERRA JONES   • HyperCKemia 11/2017   • Hyperkalemia 08/2016   • Hyperlipidemia     MIXED   • Hyperreflexia 11/2017    BILATERAL   • Hyphema 05/2015   • IBS (irritable bowel syndrome)    • Impaired functional mobility, balance, gait, and endurance    • Impingement syndrome of left shoulder 10/2015   • Infection associated with cystostomy catheter (CMS/HCC) 12/2016   • Ischemic colitis (CMS/HCC)    • Lumbar radiculopathy 2016    wears back brace at times following back surgery   • Lumbar spondylosis 04/2016   • Lumbosacral neuritis 05/2015   • Lumbosacral radiculitis 05/2015   • Macular puckering of retina, left 10/2013   • 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 VIANEY GIORDANO   • Pseudophakia 11/9/2013   • RBBB (right bundle branch block)    • Recurrent falls    • Respiratory failure with hypoxia (CMS/HCC) 01/2019   • SCC (squamous cell carcinoma) 10/16/2019    RIGHT FOREHEAD, LEFT TEMPLE, RIGHT SCALP, FOLLOWED BY DR. MANPREET VILLARREAL   • Scoliosis    • Spinal stenosis of lumbar region with neurogenic claudication 12/07/2017   • Syncope and collapse 07/16/2018    ADMITTED TO [facility]   • Torn rotator cuff 03/2017    BILATERAL, COMPLETE   • Type 2 diabetes mellitus (CMS/[facility])     IDDM   • Urinary frequency     FOLLOWED BY DR. TERRA JONES   • Vitamin D deficiency    • Vitreous hemorrhage of left eye (CMS/[facility])         Past Surgical History:   Procedure Laterality Date   • APPENDECTOMY N/A    • BROW LIFT Bilateral 11/12/2013    DR. OCTAVIA CEDILLO AT [place]   • CARDIAC CATHETERIZATION Left 10/2008    LEFT CAROTID ARTERY STENOSIS 50-69%   • CARDIAC ELECTROPHYSIOLOGY PROCEDURE N/A 6/6/2019    Procedure: Ablation atrial flutter;  Surgeon: Miller Talbot MD;  Location: [facility] INVASIVE LOCATION;  Service: Cardiovascular   • CATARACT EXTRACTION Left 01/22/1998    DR. LAYLA BARNES AT [place]   • CATARACT EXTRACTION Right 03/2001    DR. LAYLA BARNES   • CHOLECYSTECTOMY N/A 08/24/1989    DR. FAUZIA PELAEZ AT [place]   • COLON RESECTION N/A 2/3/2021    Procedure: LAPAROSCOPIC EXTENDED  RIGHT COLECTOMY WITH DAVINCI ROBOT;  Surgeon: Xavi Napoles MD;  Location: [facility] OR;  Service: DaVinci;  Laterality: N/A;   • COLONOSCOPY N/A 1/31/2021    20-25 MM POLYP IN CECUM, PATH: TUBULAR ADENOMA, 2 TUBULAR ADENOMA POLYPS IN ASCENDING, A FUNGATING AND SUBMUCOSAL ONONOBSTRUCTING MEDIUM MASS IN PROXIMAL TRANSVERSE, PATH: INVASIVE ADENOCARCINOMA ARISING IN A TUBULOVILOOUS ADENOMA, AREA TATTOOED, SCATTERED DIVERTICULA IN SIGMOID AND DESCENDING, LARGE INTERNAL  HEMORRHOIDS, DR. JACOB ALICEA AT [facility]    • COLONOSCOPY N/A 2010    DIVERTICULOSIS, DR. SAL SOLANO AT [place]   • CORONARY ARTERY BYPASS GRAFT N/A 1989    5 VESSEL, DR. HANKS   • CYST REMOVAL      FROM BACK   • ENDOSCOPY N/A 2021    ENTIRE EGD WNL, PATH: MILD REACTIVE GASTROPATHY, DR. JACOB ALICEA AT [facility]   • INGUINAL HERNIA REPAIR Left 2007    DR. MATTHEW RUSH AT [place]   • INGUINAL HERNIA REPAIR Left 2007   • INGUINAL HERNIA REPAIR Left    • KNEE ARTHROSCOPY W/ PARTIAL MEDIAL MENISCECTOMY Left     DR. SINGH   • LUMBAR DISCECTOMY FUSION INSTRUMENTATION N/A 2016    Procedure: lumbar laminectomy L4-5 and fusion with instrumentation;  Surgeon: Ran Kline MD;  Location: [facility];  Service:    • LUMBAR DISCECTOMY FUSION INSTRUMENTATION N/A 1/15/2018    Procedure: L2-3, L3-4 laminectomy and fusion with instrumentation and removal of implants L4 5.;  Surgeon: Ran Kline MD;  Location: [facility];  Service:    • LUMBAR EPIDURAL INJECTION N/A 2015    DR. MATTHEW DOMINGUEZ AT [facility]   • LUMBAR EPIDURAL INJECTION N/A 10/07/2015    DR. ITZEL LUIS AT [facility]   • LUMBAR EPIDURAL INJECTION N/A 2015    DR. ITZEL LUIS AT [facility]   • DC TOTAL KNEE ARTHROPLASTY Left 2016    Procedure: TOTAL KNEE ARTHROPLASTY;  Surgeon: Dontrell Arellano MD;  Location: [facility];  Service: Orthopedics   • SKIN BIOPSY Bilateral 10/16/2019    RIGHT LATERAL FOREHEAD:SCC, LEFT TEMPLE:SCC, RIGHT PARIETAL SCALP:SCC, DR. MANPREET VILLARREAL        Social History     Occupational History   • Not on file   Tobacco Use   • Smoking status: Former Smoker     Packs/day: 3.00     Years: 45.00     Pack years: 135.00     Types: Cigarettes     Quit date: 1989     Years since quittin.9   • Smokeless tobacco: Never Used   Vaping Use   • Vaping Use: Never used   Substance and Sexual Activity   • Alcohol use: Yes     Comment: 1 shot of whiskey in the morning and 1 shot of whiskey in  "the evening   • Drug use: No   • Sexual activity: Not Currently      Social History     Social History Narrative   • Not on file        Family History   Problem Relation Age of Onset   • Heart failure Mother    • Diabetes Mother    • Heart disease Mother    • Cancer Sister    • Diabetes Sister    • Cancer Brother    • Diabetes Brother    • Other Brother         INCLUSION BODY NYOSITIS   • Cancer Father        ROS: 14 point review of systems was performed and all other systems were reviewed and are negative except for documented findings in HPI and today's encounter.     Allergies:   Allergies   Allergen Reactions   • Amiodarone Myalgia     Muscle problems in legs   • Percocet [Oxycodone-Acetaminophen] Mental Status Change and Confusion     Causes confusion/     Constitutional:  Denies fever, shaking or chills   Eyes:  Denies change in visual acuity   HENT:  Denies nasal congestion or sore throat   Respiratory:  Denies cough or shortness of breath   Cardiovascular:  Denies chest pain or severe LE edema   GI:  Denies abdominal pain, nausea, vomiting, bloody stools or diarrhea   Musculoskeletal:  Numbness, tingling, pain, or loss of motor function only as noted above in history of present illness.  : Denies painful urination or hematuria  Integument:  Denies rash, lesion or ulceration   Neurologic:  Denies headache or focal weakness  Endocrine:  Denies lymphadenopathy  Psych:  Denies confusion or change in mental status   Hem:  Denies active bleeding    OBJECTIVE:  Physical Exam: 86 y.o. male  Wt Readings from Last 3 Encounters:   09/30/21 99.8 kg (220 lb)   09/16/21 102 kg (224 lb)   09/15/21 102 kg (224 lb)     Ht Readings from Last 1 Encounters:   09/30/21 177.8 cm (70\")     Body mass index is 31.57 kg/m².  There were no vitals filed for this visit.  Vital signs reviewed.     General Appearance:    Alert, cooperative, in no acute distress                  Eyes: conjunctiva clear  ENT: external ears and nose " atraumatic  CV: no peripheral edema  Resp: normal respiratory effort  Skin: no rashes or wounds; normal turgor  Psych: mood and affect appropriate  Lymph: no nodes appreciated  Neuro: gross sensation intact  Vascular:  Palpable peripheral pulse in noted extremity  Musculoskeletal Extremities: Calf is soft skin is warm dry and intact he has a 0.5 cm area to the right base of fifth metatarsal area that has developed into a pressure ulcer it blanches there is no eschar there is no signs of infection ankle has overall improved swelling is gone down he has some residual ecchymosis to the toes overall weakness in the ankle    Radiology:   Reviewed from 9/16/2021 no acute fracture  Narrative & Impression   RIGHT ANKLE X-RAYS     CLINICAL HISTORY: Patient fell. Ankle pain     3 views of the right ankle demonstrate a tiny plantar calcaneal heel  spur and mild soft tissue swelling surrounding the ankle joint and are  otherwise unremarkable. There is no evidence of acute fracture or  subluxation.     This report was finalized on 9/4/2021 11:35 AM by Dr. Bharat Albright M.D.              Assessment:     ICD-10-CM ICD-9-CM   1. Pressure injury of deep tissue of right foot  L89.896 707.09     707.20   2. DM (diabetes mellitus), type 2 with complications (CMS/[facility])  E11.8 250.90   3. Leg weakness, bilateral  R29.898 729.89   4. Diabetic peripheral neuropathy (CMS/[facility])  E11.42 250.60     357.2   5. Sprain of anterior talofibular ligament of right ankle, subsequent encounter  S93.491D V58.89     845.09   6. Spinal stenosis of lumbar region with neurogenic claudication  M48.062 724.03   7. Impairment of balance  R26.89 781.2        MDM/Plan:   The diagnosis(es), natural history, pathophysiology and treatment for diagnosis(es) were discussed. Opportunity given and questions answered.  Biomechanics of pertinent body areas discussed.  When appropriate, the use of ambulatory aids discussed.    The diagnosis(es), natural history,  pathophysiology and treatment for diagnosis(es) were discussed. Opportunity given and questions answered.  Biomechanics of pertinent body areas discussed.  When appropriate, the use of ambulatory aids discussed.  EXERCISES:  Advice on benefits of, and types of regular/moderate exercise pertaining to orthopedic diagnosis(es).  DIABETES:  Diabetic counseling and how it affects the diagnosis and treatment  Inflammation/pain control; with cold, heat, elevation and/or liniments discussed as appropriate  PT referral.  HOME EXERCISE/PT program encouraged  SPECIALTY REFERRAL  MEDICAL RECORDS reviewed from other provider(s) for past and current medical history pertinent to this complaint.    Area to the foot was cleaned with Betadine and Hibiclens and a dressing was applied with the central area kept open with the circumferential padding and Kerlix and Ace wrap applied a postop shoe with padding was applied to give room for the ulcer to he will referral to wound management and referral to home health skilled nursing and physical therapy for some deconditioning and help at home  9/30/2021    Much of this encounter note is an electronic transcription/translation of spoken language to printed text. The electronic translation of spoken language may permit erroneous, or at times, nonsensical words or phrases to be inadvertently transcribed; Although I have reviewed the note for such errors, some may still exist

## 2021-09-30 NOTE — PATIENT INSTRUCTIONS
Preventing Pressure Injuries    A pressure injury, sometimes called a bedsore or a pressure ulcer, is an injury to the skin and underlying tissue caused by pressure. A pressure injury can happen when your skin presses against a surface, such as a mattress or wheelchair seat, for too long. The pressure on the blood vessels causes reduced blood flow to your skin. This can eventually cause the skin tissue to die and break down into a wound.  Pressure injuries usually develop:  · Over bony parts of the body, such as the tailbone, shoulders, elbows, hips, and heels.  · Under medical devices, such as respiratory equipment, stockings, tubes, and splints.  How can this condition affect me?  Pressure injuries are caused by a lack of blood supply to an area of skin. These injuries begin as a reddened area on the skin and can become an open sore. They can result from intense pressure over a short period of time or from less pressure over a long period of time.  Pressure injuries can vary in severity. They can cause pain, muscle damage, and infection.  What can increase my risk?  This condition is more likely to develop in people who:  · Are in the hospital or an extended care facility.  · Are bedridden or in a wheelchair.  · Have an injury or disease that keeps them from:  ? Moving normally.  ? Feeling pain or pressure.  ? Communicating if they feel pain or pressure.  · Have a condition that:  ? Makes them sleepy or less alert.  ? Causes poor blood flow.  · Need to wear a medical device.  · Have poor control of their bladder or bowel functions (incontinence).  · Have poor nutrition (malnutrition).  · Have had this condition before.  · Are of certain ethnicities. People of , , or  descent are at higher risk compared to other ethnic groups.  What actions can I take to prevent pressure injuries?  Reducing and redistributing pressure  · Do not lie or sit in one position for a long time. Move or change  position:  ? Every hour when out of bed in a chair.  ? Every two hours when in bed.  ? As often as told by your health care provider.  · Use pillows, wedges, or cushions to redistribute pressure. Ask your health care provider to recommend a mattress, cushions, or pads for you.  · Use medical devices that do not rub your skin. Tell your health care provider if one of your medical devices is causing pain or irritation.  Skin care  If you are in the hospital, your health care providers:  · Will inspect your skin, including areas under or around medical devices, at least twice a day.  · May recommend that you use certain types of bedding to help prevent pressure injuries. These may include a pad, mattress, or chair cushion that is filled with gel, air, water, or foam.  · Will evaluate your nutrition and consult a dietitian if needed.  · Will inspect and change any wound dressings regularly.  · May help you move into different positions every few hours.  · Will adjust any medical devices and braces as needed to limit pressure on your skin.  · Will keep your skin clean and dry.  · May use gentle cleansers and skin protectants if you are incontinent.  · Will moisturize any dry skin.  In general, at home:  · Keep your skin clean and dry. Gently pat your skin dry.  · Do not rub or massage bony areas of your skin.  · Moisturize dry skin.  · Use gentle cleansers and skin protectants routinely if you are incontinent.  · Check your skin at least once a day for any changes in color and for any new blisters or sores. Make sure to check under and around any medical devices and between skin folds. Have a caregiver do this for you if you are not able.    Lifestyle  · Be as active as you can every day. Ask your health care provider to suggest safe exercises or activities.  · Do not abuse drugs or alcohol.  · Do not use any products that contain nicotine or tobacco, such as cigarettes, e-cigarettes, and chewing tobacco. If you need  help quitting, ask your health care provider.  General instructions    · Take over-the-counter and prescription medicines only as told by your health care provider.  · Work with your health care provider to manage any chronic health conditions.  · Eat a healthy diet that includes protein, vitamins, and minerals. Ask your health care provider what types of food you should eat.  · Drink enough fluid to keep your urine pale yellow.  · Keep all follow-up visits as told by your health care provider. This is important.    Contact a health care provider if you:  · Feel or see any changes in your skin.  Summary  · A pressure injury, sometimes called a bedsore or a pressure ulcer, is an injury to the skin and underlying tissue caused by pressure.  · Do not lie or sit in one position for a long time.  · Check your skin at least once a day for any changes in color and for any new blisters or sores.  · Make sure to check under and around any medical devices and between skin folds. Have a caregiver do this for you if you are not able.  · Eat a healthy diet that includes protein, vitamins, and minerals. Ask your health care provider what types of food you should eat.  This information is not intended to replace advice given to you by your health care provider. Make sure you discuss any questions you have with your health care provider.  Document Revised: 04/10/2020 Document Reviewed: 09/10/2019  [employer] Patient Education © 2021 [employer]  Ankle Sprain    An ankle sprain is a stretch or tear in one of the tough tissues (ligaments) that connect the bones in your ankle. An ankle sprain can happen when the ankle rolls outward (inversion sprain) or inward (eversion sprain).  What are the causes?  This condition is caused by rolling or twisting the ankle.  What increases the risk?  You are more likely to develop this condition if you play sports.  What are the signs or symptoms?  Symptoms of this condition include:  · Pain in your  ankle.  · Swelling.  · Bruising. This may happen right after you sprain your ankle or 1-2 days later.  · Trouble standing or walking.  How is this diagnosed?  This condition is diagnosed with:  · A physical exam. During the exam, your doctor will press on certain parts of your foot and ankle and try to move them in certain ways.  · X-ray imaging. These may be taken to see how bad the sprain is and to check for broken bones.  How is this treated?  This condition may be treated with:  · A brace or splint. This is used to keep the ankle from moving until it heals.  · An elastic bandage. This is used to support the ankle.  · Crutches.  · Pain medicine.  · Surgery. This may be needed if the sprain is very bad.  · Physical therapy. This may help to improve movement in the ankle.  Follow these instructions at home:  If you have a brace or a splint:  · Wear the brace or splint as told by your doctor. Remove it only as told by your doctor.  · Loosen the brace or splint if your toes:  ? Tingle.  ? Lose feeling (become numb).  ? Turn cold and blue.  · Keep the brace or splint clean.  · If the brace or splint is not waterproof:  ? Do not let it get wet.  ? Cover it with a watertight covering when you take a bath or a shower.  If you have an elastic bandage (dressing):  · Remove it to shower or bathe.  · Try not to move your ankle much, but wiggle your toes from time to time. This helps to prevent swelling.  · Adjust the dressing if it feels too tight.  · Loosen the dressing if your foot:  ? Loses feeling.  ? Tingles.  ? Becomes cold and blue.  Managing pain, stiffness, and swelling    · Take over-the-counter and prescription medicines only as told by doctor.  · For 2-3 days, keep your ankle raised (elevated) above the level of your heart.  · If told, put ice on the injured area:  ? If you have a removable brace or splint, remove it as told by your doctor.  ? Put ice in a plastic bag.  ? Place a towel between your skin and the  bag.  ? Leave the ice on for 20 minutes, 2-3 times a day.    General instructions  · Rest your ankle.  · Do not use your injured leg to support your body weight until your doctor says that you can. Use crutches as told by your doctor.  · Do not use any products that contain nicotine or tobacco, such as cigarettes, e-cigarettes, and chewing tobacco. If you need help quitting, ask your doctor.  · Keep all follow-up visits as told by your doctor.  Contact a doctor if:  · Your bruises or swelling are quickly getting worse.  · Your pain does not get better after you take medicine.  Get help right away if:  · You cannot feel your toes or foot.  · Your foot or toes look blue.  · You have very bad pain that gets worse.  Summary  · An ankle sprain is a stretch or tear in one of the tough tissues (ligaments) that connect the bones in your ankle.  · This condition is caused by rolling or twisting the ankle.  · Symptoms include pain, swelling, bruising, and trouble walking.  · To help with pain and swelling, put ice on the injured ankle, raise your ankle above the level of your heart, and use an elastic bandage. Also, rest as told by your doctor.  · Keep all follow-up visits as told by your doctor. This is important.  This information is not intended to replace advice given to you by your health care provider. Make sure you discuss any questions you have with your health care provider.  Document Revised: 05/14/2019 Document Reviewed: 05/14/2019  [employer] Patient Education © 2021 [employer]

## 2021-10-01 ENCOUNTER — HOME HEALTH ADMISSION (OUTPATIENT)
Dept: HOME HEALTH SERVICES | Facility: HOME HEALTHCARE | Age: 86
End: 2021-10-01

## 2021-10-07 ENCOUNTER — TELEPHONE (OUTPATIENT)
Dept: ORTHOPEDIC SURGERY | Facility: CLINIC | Age: 86
End: 2021-10-07

## 2021-10-07 NOTE — TELEPHONE ENCOUNTER
Spoke with Sisi and she was given the contact info for [facility]/Dr Bañuelos and she will call to make the appt.  I also told her I would have someone call her regarding home health.

## 2021-10-07 NOTE — TELEPHONE ENCOUNTER
Caller: Sisi Sanchez    Relationship: Emergency Contact    Best call back number: [phone]    What is the best time to reach you: ANYTIME AFTER 1:30PM    Who are you requesting to speak with (clinical staff, provider,  specific staff member): CLINICAL STAFF OR PROVIDER    Do you know the name of the person who called: SISI    What was the call regarding: SHE IS CALLING TO CHECK ON THE PATIENTS HOME HEALTH ASSISTANCE I DO SHOW A REFERRAL WAS SENT BUT PER THE REFERRAL NOTES THE PATIENT WASN'T ACCEPTED DUE TO A HIGH VOLUME OF PATIENTS AT THAT FACILITY     Do you require a callback: YES

## 2021-10-07 NOTE — TELEPHONE ENCOUNTER
Have spoken with the patient's caregiver.  She has been doing some wound care to his Achilles tendon pressure ulcer.  They have an appointment to see wound care at [facility], [place] on October 20.  Unfortunately [facility] is unable accept the patient.  Will have to try other home health agencies.  I did discuss with the caregiver that I have some concerns that insurance may not cover just wound care.  Will let her know once I get everything scheduled

## 2021-10-12 DIAGNOSIS — N40.0 BENIGN PROSTATIC HYPERPLASIA, UNSPECIFIED WHETHER LOWER URINARY TRACT SYMPTOMS PRESENT: ICD-10-CM

## 2021-10-12 RX ORDER — TAMSULOSIN HYDROCHLORIDE 0.4 MG/1
CAPSULE ORAL
Qty: 180 CAPSULE | Refills: 1 | Status: SHIPPED | OUTPATIENT
Start: 2021-10-12 | End: 2022-04-12

## 2021-10-18 ENCOUNTER — OFFICE VISIT (OUTPATIENT)
Dept: ORTHOPEDIC SURGERY | Facility: CLINIC | Age: 86
End: 2021-10-18

## 2021-10-18 VITALS — BODY MASS INDEX: 31.21 KG/M2 | HEIGHT: 70 IN | TEMPERATURE: 96.2 F | WEIGHT: 218 LBS

## 2021-10-18 DIAGNOSIS — L89.891 PRESSURE INJURY OF RIGHT FOOT, STAGE 1: ICD-10-CM

## 2021-10-18 DIAGNOSIS — S93.491S SPRAIN OF ANTERIOR TALOFIBULAR LIGAMENT OF RIGHT ANKLE, SEQUELA: Primary | ICD-10-CM

## 2021-10-18 PROCEDURE — 99213 OFFICE O/P EST LOW 20 MIN: CPT | Performed by: NURSE PRACTITIONER

## 2021-10-18 NOTE — PROGRESS NOTES
Patient Name: Osvaldo Lopez   YOB: 1935  Referring Primary Care Physician: Caio Rodríguez Jr., MD  BMI: Body mass index is 31.28 kg/m².    Chief Complaint:    Chief Complaint   Patient presents with   • Right Ankle - Follow-up        HPI: 6 weeks out from initial injury here for follow up from initial injury on 9-4-21 for ankle sprain and developed a wound to base of fifth MT.  He has home health that is following his wound and is going to wound care tom. Pt denies pain or fever. He is in the cast shoe and has dressing intact. Daughter at bedside. Pt is ambulating with a walker at home and is keeping his foot elevated in a recliner.     Osvaldo Lopez is a 86 y.o. male who presents today for evaluation of   Chief Complaint   Patient presents with   • Right Ankle - Follow-up         Subjective   Medications:   Home Medications:  Current Outpatient Medications on File Prior to Visit   Medication Sig   • acetaminophen (TYLENOL) 500 MG tablet Take 500 mg by mouth Every 6 (Six) Hours As Needed for Mild Pain .   • Alpha-Lipoic Acid 600 MG tablet Take 600 mg by mouth Daily. For neuropathy   • amiodarone (PACERONE) 200 MG tablet Take 1 tablet by mouth Daily.   • KYRA MICROLET LANCETS lancets USE TWICE A DAY   • docusate sodium (Colace) 100 MG capsule Take 1 capsule by mouth 2 (Two) Times a Day As Needed for Constipation.   • ferrous sulfate 325 (65 FE) MG tablet Take 325 mg by mouth 2 (two) times a day.   • finasteride (Proscar) 5 MG tablet Take 1 tablet by mouth Daily. For prostate   • furosemide (LASIX) 40 MG tablet    • glucose blood (Contour Next Test) test strip 1 each by Other route Daily. test blood sugar   • hydrALAZINE (APRESOLINE) 25 MG tablet Take 1 tablet by mouth 3 (Three) Times a Day.   • lidocaine (LMX) 4 % cream    • lisinopril (PRINIVIL,ZESTRIL) 20 MG tablet TAKE 1 TABLET DAILY   • metoprolol succinate XL (TOPROL-XL) 25 MG 24 hr tablet TAKE 1 TABLET DAILY   • Multiple Vitamin (MULTI  VITAMIN PO) Take 1 tablet by mouth Every Morning.   • mupirocin (Bactroban) 2 % ointment Apply  topically to the appropriate area as directed 3 (Three) Times a Day.   • rivaroxaban (XARELTO) 15 MG tablet Take 1 tablet by mouth Daily.   • tamsulosin (FLOMAX) 0.4 MG capsule 24 hr capsule TAKE 2 CAPSULES DAILY   • Tradjenta 5 MG tablet tablet TAKE 1 TABLET DAILY   • traMADol (ULTRAM) 50 MG tablet TAKE 1 TABLET BY MOUTH EVERY 6 (SIX) HOURS AS NEEDED FOR MODERATE PAIN OR SEVERE PAIN .   • vitamin B-12 (CYANOCOBALAMIN) 1000 MCG tablet Take 1,000 mcg by mouth Daily.     No current facility-administered medications on file prior to visit.     Current Medications:  Scheduled Meds:  Continuous Infusions:No current facility-administered medications for this visit.    PRN Meds:.    I have reviewed the patient's medical history in detail and updated the computerized patient record.  Review and summarization of old records includes:    Past Medical History:   Diagnosis Date   • Abdominal aortic aneurysm (AAA) without rupture (HCC)    • Abdominal aortic ectasia (HCC) 06/2015   • Abnormal EKG 10/25/2016    04/2019   • Abnormal thyroid blood test    • Actinic keratosis     FOLLOWED BY DR. MANPREET VILLARREAL   • Anemia 1/28/2021   • Arthritis    • Asthma     MILD, INTERMITTENT   • Atherosclerotic heart disease    • Atrial flutter with rapid ventricular response (HCC) 04/14/2019    ADMITTED TO [facility]   • Atrial premature depolarization    • Atrophy of muscle of lower leg    • BPH (benign prostatic hyperplasia)     FOLLOWED BY DR. TERAR JONES   • Bruises easily    • Bulging of thoracic intervertebral disc    • Carpal tunnel syndrome, right 12/2019    FOLLOWED BY DR. NEW SANCHEZ   • Cataract     BILATERAL, S/P EXTRACTION WITH IOL IMPLANTS   • Chronic anticoagulation 1/29/2021   • Chronic edema    • Chronic low back pain with sciatica 1/26/2021   • Chronic pain    • CKD (chronic kidney disease) 1/29/2021   • Closed head injury 07/2018     WITH LACERATION TO SCALP, D/T SYNCOPE   • Colon cancer ([facility]) 01/31/2021    INVASIVE ADENOCARCINOMA FROM TUBULOVILLOUS ADENOMA IN TRANSVERSE, S/P COLON RESECTION, FOLLOWED BY DR. 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 TERRA JONES   • Epididymitis, right 03/2018   • Essential hypertension    • Eyebrow ptosis 11/9/2013   • Hallux valgus, acquired, bilateral 01/2019   • Heart attack ([facility]) 09/1989    S/P CABG, 5 VESSEL   • Heart murmur    • History of blood transfusion    • HL (hearing loss)    • Hyperactivity of bladder     FOLLOWED BY DR. TERRA JONES   • HyperCKemia 11/2017   • Hyperkalemia 08/2016   • Hyperlipidemia     MIXED   • Hyperreflexia 11/2017    BILATERAL   • Hyphema 05/2015   • IBS (irritable bowel syndrome)    • Impaired functional mobility, balance, gait, and endurance    • Impingement syndrome of left shoulder 10/2015   • Infection associated with cystostomy catheter ([facility]) 12/2016   • Ischemic colitis ([facility])    • Lumbar radiculopathy 2016    wears back brace at times following back surgery   • Lumbar spondylosis 04/2016   • Lumbosacral neuritis 05/2015   • Lumbosacral radiculitis 05/2015   • Macular puckering of retina, left 10/2013   • 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 VIANEY GIORDANO   • Pseudophakia 11/9/2013   • RBBB (right bundle branch block)    • Recurrent  falls    • Respiratory failure with hypoxia (HCC) 01/2019   • SCC (squamous cell carcinoma) 10/16/2019    RIGHT FOREHEAD, LEFT TEMPLE, RIGHT SCALP, FOLLOWED BY DR. MANPREET VILLARREAL   • Scoliosis    • Spinal stenosis of lumbar region with neurogenic claudication 12/07/2017   • Syncope and collapse 07/16/2018    ADMITTED TO [facility]   • Torn rotator cuff 03/2017    BILATERAL, COMPLETE   • Type 2 diabetes mellitus (HCC)     IDDM   • Urinary frequency     FOLLOWED BY DR. TERRA JONES   • Vitamin D deficiency    • Vitreous hemorrhage of left eye (HCC)         Past Surgical History:   Procedure Laterality Date   • APPENDECTOMY N/A    • BROW LIFT Bilateral 11/12/2013    DR. OCTAVIA CEDILLO AT [place]   • CARDIAC CATHETERIZATION Left 10/2008    LEFT CAROTID ARTERY STENOSIS 50-69%   • CARDIAC ELECTROPHYSIOLOGY PROCEDURE N/A 6/6/2019    Procedure: Ablation atrial flutter;  Surgeon: Miller Talbot MD;  Location: [facility] INVASIVE LOCATION;  Service: Cardiovascular   • CATARACT EXTRACTION Left 01/22/1998    DR. LAYLA BARNES AT [place]   • CATARACT EXTRACTION Right 03/2001    DR. LAYLA BARNES   • CHOLECYSTECTOMY N/A 08/24/1989    DR. FAUZIA PELAEZ AT [place]   • COLON RESECTION N/A 2/3/2021    Procedure: LAPAROSCOPIC EXTENDED  RIGHT COLECTOMY WITH DAVINCI ROBOT;  Surgeon: Xavi Napoles MD;  Location: [facility] OR;  Service: DaVinc;  Laterality: N/A;   • COLONOSCOPY N/A 1/31/2021    20-25 MM POLYP IN CECUM, PATH: TUBULAR ADENOMA, 2 TUBULAR ADENOMA POLYPS IN ASCENDING, A FUNGATING AND SUBMUCOSAL ONONOBSTRUCTING MEDIUM MASS IN PROXIMAL TRANSVERSE, PATH: INVASIVE ADENOCARCINOMA ARISING IN A TUBULOVILOOUS ADENOMA, AREA TATTOOED, SCATTERED DIVERTICULA IN SIGMOID AND DESCENDING, LARGE INTERNAL HEMORRHOIDS, DR. JACOB ALICEA AT [facility]    • COLONOSCOPY N/A 02/02/2010    DIVERTICULOSIS, DR. SAL SOLANO AT [place]   • CORONARY ARTERY BYPASS GRAFT N/A 09/1989    5 VESSEL, DR. HANKS   • CYST REMOVAL      FROM BACK   • ENDOSCOPY  N/A 2021    ENTIRE EGD WNL, PATH: MILD REACTIVE GASTROPATHY, DR. JACOB ALICEA AT [facility]   • INGUINAL HERNIA REPAIR Left 2007    DR. MATTHEW RUSH AT [place]   • INGUINAL HERNIA REPAIR Left 2007   • INGUINAL HERNIA REPAIR Left    • KNEE ARTHROSCOPY W/ PARTIAL MEDIAL MENISCECTOMY Left     DR. SINGH   • LUMBAR DISCECTOMY FUSION INSTRUMENTATION N/A 2016    Procedure: lumbar laminectomy L4-5 and fusion with instrumentation;  Surgeon: Ran Kline MD;  Location: [facility];  Service:    • LUMBAR DISCECTOMY FUSION INSTRUMENTATION N/A 1/15/2018    Procedure: L2-3, L3-4 laminectomy and fusion with instrumentation and removal of implants L4 5.;  Surgeon: Ran Kline MD;  Location: [facility];  Service:    • LUMBAR EPIDURAL INJECTION N/A 2015    DR. MATTHEW DOMINGUEZ AT [facility]   • LUMBAR EPIDURAL INJECTION N/A 10/07/2015    DR. ITZEL LUIS AT [facility]   • LUMBAR EPIDURAL INJECTION N/A 2015    DR. ITZEL LUIS AT [facility]   • IN TOTAL KNEE ARTHROPLASTY Left 2016    Procedure: TOTAL KNEE ARTHROPLASTY;  Surgeon: Dontrell Arellano MD;  Location: [facility];  Service: Orthopedics   • SKIN BIOPSY Bilateral 10/16/2019    RIGHT LATERAL FOREHEAD:SCC, LEFT TEMPLE:SCC, RIGHT PARIETAL SCALP:SCC, DR. MANPREET VILLARREAL        Social History     Occupational History   • Not on file   Tobacco Use   • Smoking status: Former Smoker     Packs/day: 3.00     Years: 45.00     Pack years: 135.00     Types: Cigarettes     Quit date: 1989     Years since quittin.9   • Smokeless tobacco: Never Used   Vaping Use   • Vaping Use: Never used   Substance and Sexual Activity   • Alcohol use: Yes     Comment: 1 shot of whiskey in the morning and 1 shot of whiskey in the evening   • Drug use: No   • Sexual activity: Not Currently      Social History     Social History Narrative   • Not on file        Family History   Problem Relation Age of Onset   • Heart failure Mother    • Diabetes Mother    •  "Heart disease Mother    • Cancer Sister    • Diabetes Sister    • Cancer Brother    • Diabetes Brother    • Other Brother         INCLUSION BODY NYOSITIS   • Cancer Father        ROS: 14 point review of systems was performed and all other systems were reviewed and are negative except for documented findings in HPI and today's encounter.     Allergies:   Allergies   Allergen Reactions   • Amiodarone Myalgia     Muscle problems in legs   • Percocet [Oxycodone-Acetaminophen] Mental Status Change and Confusion     Causes confusion/     Constitutional:  Denies fever, shaking or chills   Eyes:  Denies change in visual acuity   HENT:  Denies nasal congestion or sore throat   Respiratory:  Denies cough or shortness of breath   Cardiovascular:  Denies chest pain or severe LE edema   GI:  Denies abdominal pain, nausea, vomiting, bloody stools or diarrhea   Musculoskeletal:  Numbness, tingling, pain, or loss of motor function only as noted above in history of present illness.  : Denies painful urination or hematuria  Integument:  Denies rash, lesion or ulceration   Neurologic:  Denies headache or focal weakness  Endocrine:  Denies lymphadenopathy  Psych:  Denies confusion or change in mental status   Hem:  Denies active bleeding    OBJECTIVE:  Physical Exam: 86 y.o. male  Wt Readings from Last 3 Encounters:   10/18/21 98.9 kg (218 lb)   09/30/21 99.8 kg (220 lb)   09/16/21 102 kg (224 lb)     Ht Readings from Last 1 Encounters:   10/18/21 177.8 cm (70\")     Body mass index is 31.28 kg/m².  Vitals:    10/18/21 1603   Temp: 96.2 °F (35.7 °C)     Vital signs reviewed.     General Appearance:    Alert, cooperative, in no acute distress                  Eyes: conjunctiva clear  ENT: external ears and nose atraumatic  CV: no peripheral edema  Resp: normal respiratory effort  Skin: no rashes or wounds; normal turgor  Psych: mood and affect appropriate  Lymph: no nodes appreciated  Neuro: gross sensation intact  Vascular:  Palpable " peripheral pulse in noted extremity  Musculoskeletal Extremities: Calf is soft NTTP, skin is warm dry intact he is continues with a 0.5 cm pressure ulcer to the base of fifth metatarsal that has eschar there is no erythema there is no drainage tenderness has improved around the area blanches capillary refills intact to his toes ankle has stability neurovascular intact with good pulses movement and sensation    Radiology:   Not done today         Assessment:     ICD-10-CM ICD-9-CM   1. Sprain of anterior talofibular ligament of right ankle, sequela  S93.491S 905.7     845.09   2. Pressure injury of right foot, stage 1  L89.891 707.09     707.21        MDM/Plan:   The diagnosis(es), natural history, pathophysiology and treatment for diagnosis(es) were discussed. Opportunity given and questions answered.  Biomechanics of pertinent body areas discussed.  When appropriate, the use of ambulatory aids discussed.    The diagnosis(es), natural history, pathophysiology and treatment for diagnosis(es) were discussed. Opportunity given and questions answered.  Biomechanics of pertinent body areas discussed.  When appropriate, the use of ambulatory aids discussed.  EXERCISES:  Advice on benefits of, and types of regular/moderate exercise pertaining to orthopedic diagnosis(es).  MEDICATIONS:  The risks, benefits, warnings,side effects and alternatives of medications discussed.  Inflammation/pain control; with cold, heat, elevation and/or liniments discussed as appropriate  SPECIALTY REFERRAL  MEDICAL RECORDS reviewed from other provider(s) for past and current medical history pertinent to this complaint.        10/20/2021    Much of this encounter note is an electronic transcription/translation of spoken language to printed text. The electronic translation of spoken language may permit erroneous, or at times, nonsensical words or phrases to be inadvertently transcribed; Although I have reviewed the note for such errors, some may  still exist

## 2021-10-20 ENCOUNTER — OFFICE VISIT (OUTPATIENT)
Dept: WOUND CARE | Facility: HOSPITAL | Age: 86
End: 2021-10-20

## 2021-10-20 ENCOUNTER — TRANSCRIBE ORDERS (OUTPATIENT)
Dept: ADMINISTRATIVE | Facility: HOSPITAL | Age: 86
End: 2021-10-20

## 2021-10-20 DIAGNOSIS — E11.59 TYPE 2 DIABETES MELLITUS WITH OTHER CIRCULATORY COMPLICATIONS (HCC): Primary | ICD-10-CM

## 2021-10-20 PROCEDURE — G0463 HOSPITAL OUTPT CLINIC VISIT: HCPCS

## 2021-10-27 ENCOUNTER — LAB REQUISITION (OUTPATIENT)
Dept: LAB | Facility: HOSPITAL | Age: 86
End: 2021-10-27

## 2021-10-27 ENCOUNTER — TELEPHONE (OUTPATIENT)
Dept: INTERNAL MEDICINE | Facility: CLINIC | Age: 86
End: 2021-10-27

## 2021-10-27 ENCOUNTER — OFFICE VISIT (OUTPATIENT)
Dept: WOUND CARE | Facility: HOSPITAL | Age: 86
End: 2021-10-27

## 2021-10-27 DIAGNOSIS — L97.518 NON-PRESSURE CHRONIC ULCER OF OTHER PART OF RIGHT FOOT WITH OTHER SPECIFIED SEVERITY (HCC): ICD-10-CM

## 2021-10-27 PROCEDURE — G0463 HOSPITAL OUTPT CLINIC VISIT: HCPCS

## 2021-10-27 PROCEDURE — 87070 CULTURE OTHR SPECIMN AEROBIC: CPT | Performed by: NURSE PRACTITIONER

## 2021-10-27 PROCEDURE — 87075 CULTR BACTERIA EXCEPT BLOOD: CPT | Performed by: NURSE PRACTITIONER

## 2021-10-27 PROCEDURE — 87205 SMEAR GRAM STAIN: CPT | Performed by: NURSE PRACTITIONER

## 2021-10-27 NOTE — TELEPHONE ENCOUNTER
Argelia with [employer] called and they are requesting a Left AFO brace for a foot drop  it needs to be faxed to [facility] Fax# [phone]  Order signed and faxed

## 2021-10-28 ENCOUNTER — HOSPITAL ENCOUNTER (OUTPATIENT)
Dept: CARDIOLOGY | Facility: HOSPITAL | Age: 86
Discharge: HOME | End: 2021-10-28
Admitting: SURGERY

## 2021-10-28 DIAGNOSIS — E11.59 TYPE 2 DIABETES MELLITUS WITH OTHER CIRCULATORY COMPLICATIONS (HCC): ICD-10-CM

## 2021-10-28 LAB
BH CV LOWER ARTERIAL LEFT ABI RATIO: 1.07
BH CV LOWER ARTERIAL LEFT DORSALIS PEDIS SYS MAX: 130 MMHG
BH CV LOWER ARTERIAL LEFT GREAT TOE SYS MAX: 82 MMHG
BH CV LOWER ARTERIAL LEFT POST TIBIAL SYS MAX: 137 MMHG
BH CV LOWER ARTERIAL LEFT TBI RATIO: 0.64
BH CV LOWER ARTERIAL RIGHT ABI RATIO: 1.13
BH CV LOWER ARTERIAL RIGHT DORSALIS PEDIS SYS MAX: 144 MMHG
BH CV LOWER ARTERIAL RIGHT GREAT TOE SYS MAX: 92 MMHG
BH CV LOWER ARTERIAL RIGHT POST TIBIAL SYS MAX: 115 MMHG
BH CV LOWER ARTERIAL RIGHT TBI RATIO: 0.72
MAXIMAL PREDICTED HEART RATE: 136 BPM
STRESS TARGET HR: 116 BPM
UPPER ARTERIAL LEFT ARM BRACHIAL SYS MAX: 124 MMHG
UPPER ARTERIAL RIGHT ARM BRACHIAL SYS MAX: 128 MMHG

## 2021-10-28 PROCEDURE — 93922 UPR/L XTREMITY ART 2 LEVELS: CPT

## 2021-10-28 PROCEDURE — 93923 UPR/LXTR ART STDY 3+ LVLS: CPT

## 2021-10-30 LAB
BACTERIA SPEC AEROBE CULT: NORMAL
GRAM STN SPEC: NORMAL

## 2021-11-01 ENCOUNTER — TELEPHONE (OUTPATIENT)
Dept: INTERNAL MEDICINE | Facility: CLINIC | Age: 86
End: 2021-11-01

## 2021-11-01 LAB — BACTERIA SPEC ANAEROBE CULT: NORMAL

## 2021-11-01 NOTE — TELEPHONE ENCOUNTER
Caller: ELIGIO    Relationship: [facility]    Best call back number: [phone]    What was the call regarding: PER [facility], THE PATIENT NEEDS AN ORDER FOR A TALL ROLLATOR TO BE SENT TO [facility].    Do you require a callback: YES, TO CONFIRM THE ORDER.

## 2021-11-05 ENCOUNTER — TELEPHONE (OUTPATIENT)
Dept: INTERNAL MEDICINE | Facility: CLINIC | Age: 86
End: 2021-11-05

## 2021-11-10 ENCOUNTER — OFFICE VISIT (OUTPATIENT)
Dept: WOUND CARE | Facility: HOSPITAL | Age: 86
End: 2021-11-10

## 2021-11-22 ENCOUNTER — TELEPHONE (OUTPATIENT)
Dept: ORTHOPEDIC SURGERY | Facility: CLINIC | Age: 86
End: 2021-11-22

## 2021-11-24 ENCOUNTER — OFFICE VISIT (OUTPATIENT)
Dept: WOUND CARE | Facility: HOSPITAL | Age: 86
End: 2021-11-24

## 2021-11-24 ENCOUNTER — OFFICE VISIT (OUTPATIENT)
Dept: INTERNAL MEDICINE | Facility: CLINIC | Age: 86
End: 2021-11-24

## 2021-11-24 ENCOUNTER — LAB REQUISITION (OUTPATIENT)
Dept: LAB | Facility: HOSPITAL | Age: 86
End: 2021-11-24

## 2021-11-24 VITALS
HEIGHT: 70 IN | SYSTOLIC BLOOD PRESSURE: 134 MMHG | DIASTOLIC BLOOD PRESSURE: 62 MMHG | WEIGHT: 222 LBS | TEMPERATURE: 98.1 F | BODY MASS INDEX: 31.78 KG/M2

## 2021-11-24 DIAGNOSIS — I48.0 PAF (PAROXYSMAL ATRIAL FIBRILLATION) (HCC): Primary | ICD-10-CM

## 2021-11-24 DIAGNOSIS — E11.8 DM (DIABETES MELLITUS), TYPE 2 WITH COMPLICATIONS (HCC): ICD-10-CM

## 2021-11-24 DIAGNOSIS — L89.899 PRESSURE INJURY OF SKIN OF RIGHT FOOT, UNSPECIFIED INJURY STAGE: ICD-10-CM

## 2021-11-24 DIAGNOSIS — M96.1 POST LAMINECTOMY SYNDROME: ICD-10-CM

## 2021-11-24 DIAGNOSIS — E78.2 MIXED HYPERLIPIDEMIA: ICD-10-CM

## 2021-11-24 DIAGNOSIS — D50.0 IRON DEFICIENCY ANEMIA DUE TO CHRONIC BLOOD LOSS: ICD-10-CM

## 2021-11-24 DIAGNOSIS — L97.518 NON-PRESSURE CHRONIC ULCER OF OTHER PART OF RIGHT FOOT WITH OTHER SPECIFIED SEVERITY: ICD-10-CM

## 2021-11-24 DIAGNOSIS — Z85.038 HISTORY OF COLON CANCER: ICD-10-CM

## 2021-11-24 DIAGNOSIS — M54.16 LUMBAR RADICULOPATHY: ICD-10-CM

## 2021-11-24 PROCEDURE — 87186 SC STD MICRODIL/AGAR DIL: CPT | Performed by: SURGERY

## 2021-11-24 PROCEDURE — 87070 CULTURE OTHR SPECIMN AEROBIC: CPT | Performed by: SURGERY

## 2021-11-24 PROCEDURE — 99214 OFFICE O/P EST MOD 30 MIN: CPT | Performed by: FAMILY MEDICINE

## 2021-11-24 PROCEDURE — 87075 CULTR BACTERIA EXCEPT BLOOD: CPT | Performed by: SURGERY

## 2021-11-24 PROCEDURE — 87077 CULTURE AEROBIC IDENTIFY: CPT | Performed by: SURGERY

## 2021-11-24 PROCEDURE — 87205 SMEAR GRAM STAIN: CPT | Performed by: SURGERY

## 2021-11-24 NOTE — PROGRESS NOTES
"Chief Complaint  Diabetes (follow up), Hyperlipidemia, Hypertension, and Extremity Weakness (also feeling weaker)    Subjective          Osvaldo Lopez presents to [facility]  Patient has had chronic weakness with gait requiring a rolling walker and has tried his best to stay active with exercise for muscular strengthening.  He has post laminectomy syndrome and chronic weakness of both legs.  He has developed a pressure ulcer at the arch of the right foot which is being treated by wound care at this time.  He is accompanied by his daughter in the time of visit.    We reviewed prior colon resection for a large polyp with evidence of colon cancer inside the polyp.  He had a pretty uneventful recovery from the surgery but did have anemia.  He is currently taking iron.  We will get labs today and see if his anemia resolved he does not require the iron.  Otherwise he has chronic kidney disease which will be rechecked as well as diabetes type 2 and he is also on chronic Xarelto for history of atrial flutter and A. fib.  Active management of hypertension aortic valve stenosis hyperlipidemia reviewed as well as type 2 diabetes and chronic kidney disease.  Leg weakness is an ongoing problem as well as lumbar radiculopathy.      Objective   Vital Signs:   /62   Temp 98.1 °F (36.7 °C)   Ht 177.8 cm (70\")   Wt 101 kg (222 lb)   BMI 31.85 kg/m²     Physical Exam  Vitals reviewed.   Constitutional:       Appearance: He is well-developed.   HENT:      Head: Normocephalic and atraumatic.      Right Ear: Tympanic membrane and external ear normal.      Left Ear: Tympanic membrane and external ear normal.      Nose: Nose normal.   Eyes:      Conjunctiva/sclera: Conjunctivae normal.      Pupils: Pupils are equal, round, and reactive to light.   Neck:      Thyroid: No thyromegaly.      Vascular: No JVD.   Cardiovascular:      Rate and Rhythm: Normal rate and regular rhythm.      Heart sounds: " Murmur heard.    Systolic murmur is present with a grade of 2/6.      Pulmonary:      Effort: Pulmonary effort is normal.      Breath sounds: Normal breath sounds.   Abdominal:      General: Bowel sounds are normal.      Palpations: Abdomen is soft.   Musculoskeletal:         General: Normal range of motion.      Cervical back: Normal range of motion and neck supple.        Feet:    Lymphadenopathy:      Cervical: No cervical adenopathy.   Skin:     General: Skin is warm and dry.      Findings: No rash.   Neurological:      Mental Status: He is alert and oriented to person, place, and time.      Cranial Nerves: No cranial nerve deficit.      Motor: Weakness present.      Coordination: Coordination normal.      Comments: Chronic bilateral leg weakness   Psychiatric:         Behavior: Behavior normal.         Thought Content: Thought content normal.         Judgment: Judgment normal.        Result Review :                 Assessment and Plan    Diagnoses and all orders for this visit:    1. PAF (paroxysmal atrial fibrillation) (HCC) (Primary)  Comments:  Amiodarone 200 mg daily, metoprolol XL 25 mg daily, Xarelto 15 mg daily  Orders:  -     CBC & Differential  -     Comprehensive Metabolic Panel  -     Hemoglobin A1c  -     Folate  -     Vitamin B12  -     Lipid Panel With / Chol / HDL Ratio  -     TSH  -     T4, Free  -     Urinalysis With Microscopic If Indicated (No Culture) - Urine, Clean Catch  -     Iron and TIBC  -     Ferritin    2. Mixed hyperlipidemia  Comments:  Check lipid panel  Orders:  -     CBC & Differential  -     Comprehensive Metabolic Panel  -     Hemoglobin A1c  -     Folate  -     Vitamin B12  -     Lipid Panel With / Chol / HDL Ratio  -     TSH  -     T4, Free  -     Urinalysis With Microscopic If Indicated (No Culture) - Urine, Clean Catch  -     Iron and TIBC  -     Ferritin    3. Post laminectomy syndrome  Comments:  This is causing chronic leg weakness  Orders:  -     CBC & Differential  -      Comprehensive Metabolic Panel  -     Hemoglobin A1c  -     Folate  -     Vitamin B12  -     Lipid Panel With / Chol / HDL Ratio  -     TSH  -     T4, Free  -     Urinalysis With Microscopic If Indicated (No Culture) - Urine, Clean Catch  -     Iron and TIBC  -     Ferritin    4. History of colon cancer  Comments:  History reviewed with good recovery  Orders:  -     CBC & Differential  -     Comprehensive Metabolic Panel  -     Hemoglobin A1c  -     Folate  -     Vitamin B12  -     Lipid Panel With / Chol / HDL Ratio  -     TSH  -     T4, Free  -     Urinalysis With Microscopic If Indicated (No Culture) - Urine, Clean Catch  -     Iron and TIBC  -     Ferritin    5. Lumbar radiculopathy  -     CBC & Differential  -     Comprehensive Metabolic Panel  -     Hemoglobin A1c  -     Folate  -     Vitamin B12  -     Lipid Panel With / Chol / HDL Ratio  -     TSH  -     T4, Free  -     Urinalysis With Microscopic If Indicated (No Culture) - Urine, Clean Catch  -     Iron and TIBC  -     Ferritin    6. Pressure injury of skin of right foot, unspecified injury stage  Comments:  Follow-up with wound care  Orders:  -     CBC & Differential  -     Comprehensive Metabolic Panel  -     Hemoglobin A1c  -     Folate  -     Vitamin B12  -     Lipid Panel With / Chol / HDL Ratio  -     TSH  -     T4, Free  -     Urinalysis With Microscopic If Indicated (No Culture) - Urine, Clean Catch  -     Iron and TIBC  -     Ferritin    7. Iron deficiency anemia due to chronic blood loss  Comments:  Check CBC and iron studies  Orders:  -     CBC & Differential  -     Comprehensive Metabolic Panel  -     Hemoglobin A1c  -     Folate  -     Vitamin B12  -     Lipid Panel With / Chol / HDL Ratio  -     TSH  -     T4, Free  -     Urinalysis With Microscopic If Indicated (No Culture) - Urine, Clean Catch  -     Iron and TIBC  -     Ferritin    8. DM (diabetes mellitus), type 2 with complications (HCC)  Comments:  Check A1c/Tradjenta 5 mg  daily  Orders:  -     CBC & Differential  -     Comprehensive Metabolic Panel  -     Hemoglobin A1c  -     Folate  -     Vitamin B12  -     Lipid Panel With / Chol / HDL Ratio  -     TSH  -     T4, Free  -     Urinalysis With Microscopic If Indicated (No Culture) - Urine, Clean Catch  -     Iron and TIBC  -     Ferritin        Follow Up   Return in about 6 months (around 5/24/2022) for Medicare Wellness.  Patient was given instructions and counseling regarding his condition or for health maintenance advice. Please see specific information pulled into the AVS if appropriate.

## 2021-11-25 LAB
ALBUMIN SERPL-MCNC: 4.2 G/DL (ref 3.6–4.6)
ALBUMIN/GLOB SERPL: 1.6 {RATIO} (ref 1.2–2.2)
ALP SERPL-CCNC: 122 IU/L (ref 44–121)
ALT SERPL-CCNC: 35 IU/L (ref 0–44)
APPEARANCE UR: CLEAR
AST SERPL-CCNC: 27 IU/L (ref 0–40)
BACTERIA #/AREA URNS HPF: ABNORMAL /[HPF]
BASOPHILS # BLD AUTO: 0 X10E3/UL (ref 0–0.2)
BASOPHILS NFR BLD AUTO: 0 %
BILIRUB SERPL-MCNC: 0.3 MG/DL (ref 0–1.2)
BILIRUB UR QL STRIP: NEGATIVE
BUN SERPL-MCNC: 42 MG/DL (ref 8–27)
BUN/CREAT SERPL: 19 (ref 10–24)
CALCIUM SERPL-MCNC: 9 MG/DL (ref 8.6–10.2)
CASTS URNS QL MICRO: ABNORMAL /LPF
CHLORIDE SERPL-SCNC: 104 MMOL/L (ref 96–106)
CHOLEST SERPL-MCNC: 202 MG/DL (ref 100–199)
CHOLEST/HDLC SERPL: 4.8 RATIO (ref 0–5)
CO2 SERPL-SCNC: 20 MMOL/L (ref 20–29)
COLOR UR: YELLOW
CREAT SERPL-MCNC: 2.25 MG/DL (ref 0.76–1.27)
EOSINOPHIL # BLD AUTO: 0.1 X10E3/UL (ref 0–0.4)
EOSINOPHIL NFR BLD AUTO: 2 %
EPI CELLS #/AREA URNS HPF: ABNORMAL /HPF (ref 0–10)
ERYTHROCYTE [DISTWIDTH] IN BLOOD BY AUTOMATED COUNT: 13 % (ref 11.6–15.4)
FERRITIN SERPL-MCNC: 163 NG/ML (ref 30–400)
FOLATE SERPL-MCNC: >20 NG/ML
GLOBULIN SER CALC-MCNC: 2.6 G/DL (ref 1.5–4.5)
GLUCOSE SERPL-MCNC: 135 MG/DL (ref 65–99)
GLUCOSE UR QL: NEGATIVE
HBA1C MFR BLD: 6.5 % (ref 4.8–5.6)
HCT VFR BLD AUTO: 37.7 % (ref 37.5–51)
HDLC SERPL-MCNC: 42 MG/DL
HGB BLD-MCNC: 12.6 G/DL (ref 13–17.7)
HGB UR QL STRIP: NEGATIVE
IMM GRANULOCYTES # BLD AUTO: 0 X10E3/UL (ref 0–0.1)
IMM GRANULOCYTES NFR BLD AUTO: 0 %
IRON SATN MFR SERPL: 31 % (ref 15–55)
IRON SERPL-MCNC: 89 UG/DL (ref 38–169)
KETONES UR QL STRIP: NEGATIVE
LDLC SERPL CALC-MCNC: 102 MG/DL (ref 0–99)
LEUKOCYTE ESTERASE UR QL STRIP: ABNORMAL
LYMPHOCYTES # BLD AUTO: 1.3 X10E3/UL (ref 0.7–3.1)
LYMPHOCYTES NFR BLD AUTO: 25 %
MCH RBC QN AUTO: 33.5 PG (ref 26.6–33)
MCHC RBC AUTO-ENTMCNC: 33.4 G/DL (ref 31.5–35.7)
MCV RBC AUTO: 100 FL (ref 79–97)
MICRO URNS: ABNORMAL
MONOCYTES # BLD AUTO: 0.3 X10E3/UL (ref 0.1–0.9)
MONOCYTES NFR BLD AUTO: 7 %
NEUTROPHILS # BLD AUTO: 3.4 X10E3/UL (ref 1.4–7)
NEUTROPHILS NFR BLD AUTO: 66 %
NITRITE UR QL STRIP: POSITIVE
PH UR STRIP: 5.5 [PH] (ref 5–7.5)
PLATELET # BLD AUTO: 143 X10E3/UL (ref 150–450)
POTASSIUM SERPL-SCNC: 5.8 MMOL/L (ref 3.5–5.2)
PROT SERPL-MCNC: 6.8 G/DL (ref 6–8.5)
PROT UR QL STRIP: NEGATIVE
RBC # BLD AUTO: 3.76 X10E6/UL (ref 4.14–5.8)
RBC #/AREA URNS HPF: ABNORMAL /HPF (ref 0–2)
SODIUM SERPL-SCNC: 139 MMOL/L (ref 134–144)
SP GR UR: 1.01 (ref 1–1.03)
T4 FREE SERPL-MCNC: 0.74 NG/DL (ref 0.82–1.77)
TIBC SERPL-MCNC: 289 UG/DL (ref 250–450)
TRIGL SERPL-MCNC: 345 MG/DL (ref 0–149)
TSH SERPL DL<=0.005 MIU/L-ACNC: 11.2 UIU/ML (ref 0.45–4.5)
UIBC SERPL-MCNC: 200 UG/DL (ref 111–343)
UROBILINOGEN UR STRIP-MCNC: 0.2 MG/DL (ref 0.2–1)
VIT B12 SERPL-MCNC: 714 PG/ML (ref 232–1245)
VLDLC SERPL CALC-MCNC: 58 MG/DL (ref 5–40)
WBC # BLD AUTO: 5.1 X10E3/UL (ref 3.4–10.8)
WBC #/AREA URNS HPF: ABNORMAL /HPF (ref 0–5)

## 2021-11-27 LAB
BACTERIA SPEC AEROBE CULT: ABNORMAL
BACTERIA SPEC AEROBE CULT: ABNORMAL
GRAM STN SPEC: ABNORMAL

## 2021-11-29 LAB — BACTERIA SPEC ANAEROBE CULT: NORMAL

## 2021-12-01 ENCOUNTER — OFFICE VISIT (OUTPATIENT)
Dept: WOUND CARE | Facility: HOSPITAL | Age: 86
End: 2021-12-01

## 2021-12-01 PROCEDURE — G0463 HOSPITAL OUTPT CLINIC VISIT: HCPCS

## 2021-12-15 ENCOUNTER — OFFICE VISIT (OUTPATIENT)
Dept: WOUND CARE | Facility: HOSPITAL | Age: 86
End: 2021-12-15

## 2021-12-28 DIAGNOSIS — R79.89 ELEVATED SERUM CREATININE: Primary | ICD-10-CM

## 2021-12-29 ENCOUNTER — OFFICE VISIT (OUTPATIENT)
Dept: WOUND CARE | Facility: HOSPITAL | Age: 86
End: 2021-12-29

## 2021-12-29 PROCEDURE — G0463 HOSPITAL OUTPT CLINIC VISIT: HCPCS

## 2022-01-12 ENCOUNTER — OFFICE VISIT (OUTPATIENT)
Dept: WOUND CARE | Facility: HOSPITAL | Age: 87
End: 2022-01-12

## 2022-01-12 PROCEDURE — G0463 HOSPITAL OUTPT CLINIC VISIT: HCPCS

## 2022-01-20 NOTE — PLAN OF CARE
Goal Outcome Evaluation:  Plan of Care Reviewed With: patient  Progress: no change  Outcome Summary: Pt having more fatigue this morning than in previous therapy sessions, was agreeable to getting up and OOB and is motivated to do so, but upon 3rd standing attempt he became tachycardic in the low 140's breifly, for 2 small periods, reporting some fatigue and requesting to lie back down. Requiring same levels of assist for bed mobilty and transfers into standing as previous visit, will continue to benefit from skilled PT to further address defcits in mobility, balance, strength, activity tolerance, and transfer skills. Anticipate d/c to SNF.  Patient was intermittently wearing a face mask during this therapy encounter. Therapist used appropriate personal protective equipment including eye protection, mask, and gloves.  Mask used was standard procedure mask. Appropriate PPE was worn during the entire therapy session. Hand hygiene was completed before and after therapy session. Patient is not in enhanced droplet precautions.      Impression: Presbyopia: H52.4-Synergeyes +11.00 Fit notes Lens evaluated today was: Synergeyes A bc: 7.7, SC: 8.7, power 10.5 marta:14.5, Plan: +11.00 OD: movement on blink, centered, edge lift @ 9 o'clock.

+10.50 OD lens notes: edge lift to it, *Need to exchange +11.00 for a +10.50

## 2022-01-26 ENCOUNTER — OFFICE VISIT (OUTPATIENT)
Dept: WOUND CARE | Facility: HOSPITAL | Age: 87
End: 2022-01-26

## 2022-01-26 PROCEDURE — G0463 HOSPITAL OUTPT CLINIC VISIT: HCPCS

## 2022-02-16 ENCOUNTER — OFFICE VISIT (OUTPATIENT)
Dept: WOUND CARE | Facility: HOSPITAL | Age: 87
End: 2022-02-16

## 2022-02-16 PROCEDURE — G0463 HOSPITAL OUTPT CLINIC VISIT: HCPCS

## 2022-02-17 ENCOUNTER — OFFICE VISIT (OUTPATIENT)
Dept: CARDIOLOGY | Facility: CLINIC | Age: 87
End: 2022-02-17

## 2022-02-17 VITALS
BODY MASS INDEX: 33.36 KG/M2 | HEIGHT: 70 IN | HEART RATE: 73 BPM | WEIGHT: 233 LBS | SYSTOLIC BLOOD PRESSURE: 150 MMHG | DIASTOLIC BLOOD PRESSURE: 78 MMHG

## 2022-02-17 DIAGNOSIS — I25.10 CORONARY ARTERIOSCLEROSIS: ICD-10-CM

## 2022-02-17 DIAGNOSIS — I10 ESSENTIAL HYPERTENSION: ICD-10-CM

## 2022-02-17 DIAGNOSIS — I48.92 ATRIAL FLUTTER WITH RAPID VENTRICULAR RESPONSE: ICD-10-CM

## 2022-02-17 DIAGNOSIS — I48.0 AF (PAROXYSMAL ATRIAL FIBRILLATION): Primary | ICD-10-CM

## 2022-02-17 PROCEDURE — 99213 OFFICE O/P EST LOW 20 MIN: CPT | Performed by: INTERNAL MEDICINE

## 2022-02-17 PROCEDURE — 93000 ELECTROCARDIOGRAM COMPLETE: CPT | Performed by: INTERNAL MEDICINE

## 2022-02-28 ENCOUNTER — DOCUMENTATION (OUTPATIENT)
Dept: SURGERY | Facility: CLINIC | Age: 87
End: 2022-02-28

## 2022-02-28 PROBLEM — N18.30 STAGE 3 CHRONIC KIDNEY DISEASE: Status: ACTIVE | Noted: 2021-12-16

## 2022-02-28 PROBLEM — Z85.038 HISTORY OF MALIGNANT NEOPLASM OF COLON: Status: ACTIVE | Noted: 2021-12-16

## 2022-02-28 PROBLEM — N40.0 PROSTATISM: Status: ACTIVE | Noted: 2021-12-16

## 2022-02-28 PROBLEM — R60.9 EDEMA: Status: ACTIVE | Noted: 2021-12-16

## 2022-02-28 PROBLEM — I25.10 CORONARY ARTERIOSCLEROSIS: Status: ACTIVE | Noted: 2021-12-16

## 2022-02-28 NOTE — PROGRESS NOTES
1 yr cy per , last done 01/31/2021.    03/01/2022, colonoscopy recall letter mailed to patient. Thank you

## 2022-03-02 ENCOUNTER — OFFICE VISIT (OUTPATIENT)
Dept: WOUND CARE | Facility: HOSPITAL | Age: 87
End: 2022-03-02

## 2022-03-02 PROCEDURE — G0463 HOSPITAL OUTPT CLINIC VISIT: HCPCS

## 2022-03-06 DIAGNOSIS — G89.29 CHRONIC LOW BACK PAIN WITH SCIATICA, SCIATICA LATERALITY UNSPECIFIED, UNSPECIFIED BACK PAIN LATERALITY: ICD-10-CM

## 2022-03-06 DIAGNOSIS — M54.40 CHRONIC LOW BACK PAIN WITH SCIATICA, SCIATICA LATERALITY UNSPECIFIED, UNSPECIFIED BACK PAIN LATERALITY: ICD-10-CM

## 2022-03-06 DIAGNOSIS — G61.82 MULTIFOCAL MOTOR NEUROPATHY: ICD-10-CM

## 2022-03-09 RX ORDER — TRAMADOL HYDROCHLORIDE 50 MG/1
50 TABLET ORAL EVERY 6 HOURS PRN
Qty: 30 TABLET | Refills: 2 | Status: SHIPPED | OUTPATIENT
Start: 2022-03-09 | End: 2022-07-19

## 2022-03-21 ENCOUNTER — TRANSCRIBE ORDERS (OUTPATIENT)
Dept: ADMINISTRATIVE | Facility: HOSPITAL | Age: 87
End: 2022-03-21

## 2022-03-21 DIAGNOSIS — R09.89 CAROTID BRUIT, UNSPECIFIED LATERALITY: Primary | ICD-10-CM

## 2022-03-29 ENCOUNTER — APPOINTMENT (OUTPATIENT)
Dept: CARDIOLOGY | Facility: HOSPITAL | Age: 87
End: 2022-03-29

## 2022-04-05 ENCOUNTER — HOSPITAL ENCOUNTER (OUTPATIENT)
Dept: CARDIOLOGY | Facility: HOSPITAL | Age: 87
Discharge: HOME | End: 2022-04-05
Admitting: NURSE PRACTITIONER

## 2022-04-05 DIAGNOSIS — R09.89 CAROTID BRUIT, UNSPECIFIED LATERALITY: ICD-10-CM

## 2022-04-05 LAB
BH CV XLRA MEAS LEFT DIST CCA EDV: 14.1 CM/SEC
BH CV XLRA MEAS LEFT DIST CCA PSV: 92.6 CM/SEC
BH CV XLRA MEAS LEFT DIST ICA EDV: -21.1 CM/SEC
BH CV XLRA MEAS LEFT DIST ICA PSV: -93.8 CM/SEC
BH CV XLRA MEAS LEFT ICA/CCA RATIO: -1.57
BH CV XLRA MEAS LEFT MID ICA EDV: -17.7 CM/SEC
BH CV XLRA MEAS LEFT MID ICA PSV: -145 CM/SEC
BH CV XLRA MEAS LEFT PROX CCA EDV: 12.3 CM/SEC
BH CV XLRA MEAS LEFT PROX CCA PSV: 79.2 CM/SEC
BH CV XLRA MEAS LEFT PROX ECA EDV: -8.1 CM/SEC
BH CV XLRA MEAS LEFT PROX ECA PSV: -178 CM/SEC
BH CV XLRA MEAS LEFT PROX ICA EDV: 16.1 CM/SEC
BH CV XLRA MEAS LEFT PROX ICA PSV: 95.4 CM/SEC
BH CV XLRA MEAS LEFT PROX SCLA PSV: 300 CM/SEC
BH CV XLRA MEAS LEFT VERTEBRAL A EDV: 7.8 CM/SEC
BH CV XLRA MEAS LEFT VERTEBRAL A PSV: 42.6 CM/SEC
BH CV XLRA MEAS RIGHT DIST CCA EDV: 12.9 CM/SEC
BH CV XLRA MEAS RIGHT DIST CCA PSV: 83.8 CM/SEC
BH CV XLRA MEAS RIGHT DIST ICA EDV: -7.9 CM/SEC
BH CV XLRA MEAS RIGHT DIST ICA PSV: -39.7 CM/SEC
BH CV XLRA MEAS RIGHT ICA/CCA RATIO: 0.98
BH CV XLRA MEAS RIGHT MID ICA EDV: -15.2 CM/SEC
BH CV XLRA MEAS RIGHT MID ICA PSV: -82.4 CM/SEC
BH CV XLRA MEAS RIGHT PROX CCA EDV: -10.6 CM/SEC
BH CV XLRA MEAS RIGHT PROX CCA PSV: -83.3 CM/SEC
BH CV XLRA MEAS RIGHT PROX ECA EDV: 3.9 CM/SEC
BH CV XLRA MEAS RIGHT PROX ECA PSV: 151 CM/SEC
BH CV XLRA MEAS RIGHT PROX ICA EDV: -14.1 CM/SEC
BH CV XLRA MEAS RIGHT PROX ICA PSV: -78.6 CM/SEC
BH CV XLRA MEAS RIGHT PROX SCLA PSV: 103 CM/SEC
BH CV XLRA MEAS RIGHT VERTEBRAL A PSV: 43.1 CM/SEC
LEFT ARM BP: NORMAL MMHG
MAXIMAL PREDICTED HEART RATE: 136 BPM
RIGHT ARM BP: NORMAL MMHG
STRESS TARGET HR: 116 BPM

## 2022-04-05 PROCEDURE — 93880 EXTRACRANIAL BILAT STUDY: CPT

## 2022-04-12 DIAGNOSIS — I10 ESSENTIAL HYPERTENSION: Primary | ICD-10-CM

## 2022-04-12 DIAGNOSIS — E11.8 DM (DIABETES MELLITUS), TYPE 2 WITH COMPLICATIONS: ICD-10-CM

## 2022-04-12 DIAGNOSIS — N40.0 BENIGN PROSTATIC HYPERPLASIA, UNSPECIFIED WHETHER LOWER URINARY TRACT SYMPTOMS PRESENT: ICD-10-CM

## 2022-04-12 RX ORDER — TAMSULOSIN HYDROCHLORIDE 0.4 MG/1
CAPSULE ORAL
Qty: 180 CAPSULE | Refills: 3 | Status: SHIPPED | OUTPATIENT
Start: 2022-04-12 | End: 2022-12-31 | Stop reason: HOSPADM

## 2022-04-12 RX ORDER — FINASTERIDE 5 MG/1
TABLET, FILM COATED ORAL
Qty: 90 TABLET | Refills: 3 | Status: SHIPPED | OUTPATIENT
Start: 2022-04-12

## 2022-04-12 RX ORDER — LINAGLIPTIN 5 MG/1
TABLET, FILM COATED ORAL
Qty: 90 TABLET | Refills: 3 | Status: SHIPPED | OUTPATIENT
Start: 2022-04-12

## 2022-04-12 RX ORDER — LISINOPRIL 20 MG/1
TABLET ORAL
Qty: 90 TABLET | Refills: 3 | Status: SHIPPED | OUTPATIENT
Start: 2022-04-12 | End: 2022-12-31 | Stop reason: HOSPADM

## 2022-04-13 RX ORDER — RIVAROXABAN 15 MG/1
TABLET, FILM COATED ORAL
Qty: 90 TABLET | Refills: 3 | Status: SHIPPED | OUTPATIENT
Start: 2022-04-13

## 2022-05-05 ENCOUNTER — PREP FOR SURGERY (OUTPATIENT)
Dept: OTHER | Facility: HOSPITAL | Age: 87
End: 2022-05-05

## 2022-05-05 DIAGNOSIS — Z85.038 HISTORY OF COLON CANCER: Primary | ICD-10-CM

## 2022-06-07 ENCOUNTER — OFFICE VISIT (OUTPATIENT)
Dept: INTERNAL MEDICINE | Facility: CLINIC | Age: 87
End: 2022-06-07

## 2022-06-07 ENCOUNTER — HOSPITAL ENCOUNTER (OUTPATIENT)
Dept: CARDIOLOGY | Facility: HOSPITAL | Age: 87
Discharge: HOME | End: 2022-06-07
Admitting: FAMILY MEDICINE

## 2022-06-07 VITALS
HEART RATE: 78 BPM | SYSTOLIC BLOOD PRESSURE: 146 MMHG | BODY MASS INDEX: 34.29 KG/M2 | TEMPERATURE: 97.5 F | WEIGHT: 239 LBS | DIASTOLIC BLOOD PRESSURE: 72 MMHG | OXYGEN SATURATION: 93 %

## 2022-06-07 DIAGNOSIS — M79.89 RIGHT LEG SWELLING: ICD-10-CM

## 2022-06-07 DIAGNOSIS — R29.898 LEG WEAKNESS, BILATERAL: ICD-10-CM

## 2022-06-07 DIAGNOSIS — E11.42 DIABETIC PERIPHERAL NEUROPATHY: ICD-10-CM

## 2022-06-07 DIAGNOSIS — L84 PRE-ULCERATIVE CALLUSES: ICD-10-CM

## 2022-06-07 DIAGNOSIS — M96.1 POST LAMINECTOMY SYNDROME: ICD-10-CM

## 2022-06-07 DIAGNOSIS — Z00.00 MEDICARE ANNUAL WELLNESS VISIT, SUBSEQUENT: Primary | ICD-10-CM

## 2022-06-07 DIAGNOSIS — I35.0 NONRHEUMATIC AORTIC VALVE STENOSIS: ICD-10-CM

## 2022-06-07 DIAGNOSIS — N18.32 STAGE 3B CHRONIC KIDNEY DISEASE: ICD-10-CM

## 2022-06-07 DIAGNOSIS — E55.9 HYPOVITAMINOSIS D: ICD-10-CM

## 2022-06-07 DIAGNOSIS — D50.0 IRON DEFICIENCY ANEMIA DUE TO CHRONIC BLOOD LOSS: ICD-10-CM

## 2022-06-07 DIAGNOSIS — I10 ESSENTIAL HYPERTENSION: ICD-10-CM

## 2022-06-07 DIAGNOSIS — E11.8 DM (DIABETES MELLITUS), TYPE 2 WITH COMPLICATIONS: ICD-10-CM

## 2022-06-07 LAB
BH CV LOW VAS LEFT POPLITEAL SPONT: 1
BH CV LOW VAS RIGHT POPLITEAL SPONT: 1
BH CV LOWER VASCULAR LEFT COMMON FEMORAL AUGMENT: NORMAL
BH CV LOWER VASCULAR LEFT COMMON FEMORAL COMPETENT: NORMAL
BH CV LOWER VASCULAR LEFT COMMON FEMORAL COMPRESS: NORMAL
BH CV LOWER VASCULAR LEFT COMMON FEMORAL PHASIC: NORMAL
BH CV LOWER VASCULAR LEFT COMMON FEMORAL SPONT: NORMAL
BH CV LOWER VASCULAR LEFT DISTAL FEMORAL COMPRESS: NORMAL
BH CV LOWER VASCULAR LEFT GASTRONEMIUS COMPRESS: NORMAL
BH CV LOWER VASCULAR LEFT GREATER SAPH BK COMPRESS: NORMAL
BH CV LOWER VASCULAR LEFT LESSER SAPH COMPRESS: NORMAL
BH CV LOWER VASCULAR LEFT MID FEMORAL AUGMENT: NORMAL
BH CV LOWER VASCULAR LEFT MID FEMORAL COMPETENT: NORMAL
BH CV LOWER VASCULAR LEFT MID FEMORAL COMPRESS: NORMAL
BH CV LOWER VASCULAR LEFT MID FEMORAL PHASIC: NORMAL
BH CV LOWER VASCULAR LEFT MID FEMORAL SPONT: NORMAL
BH CV LOWER VASCULAR LEFT PERONEAL COMPRESS: NORMAL
BH CV LOWER VASCULAR LEFT POPLITEAL AUGMENT: NORMAL
BH CV LOWER VASCULAR LEFT POPLITEAL COMPETENT: NORMAL
BH CV LOWER VASCULAR LEFT POPLITEAL COMPRESS: NORMAL
BH CV LOWER VASCULAR LEFT POPLITEAL PHASIC: NORMAL
BH CV LOWER VASCULAR LEFT POPLITEAL SPONT: NORMAL
BH CV LOWER VASCULAR LEFT POSTERIOR TIBIAL COMPRESS: NORMAL
BH CV LOWER VASCULAR LEFT PROFUNDA FEMORAL COMPRESS: NORMAL
BH CV LOWER VASCULAR LEFT PROXIMAL FEMORAL COMPRESS: NORMAL
BH CV LOWER VASCULAR LEFT SAPHENOFEMORAL JUNCTION COMPRESS: NORMAL
BH CV LOWER VASCULAR RIGHT COMMON FEMORAL AUGMENT: NORMAL
BH CV LOWER VASCULAR RIGHT COMMON FEMORAL COMPETENT: NORMAL
BH CV LOWER VASCULAR RIGHT COMMON FEMORAL COMPRESS: NORMAL
BH CV LOWER VASCULAR RIGHT COMMON FEMORAL PHASIC: NORMAL
BH CV LOWER VASCULAR RIGHT COMMON FEMORAL SPONT: NORMAL
BH CV LOWER VASCULAR RIGHT DISTAL FEMORAL COMPRESS: NORMAL
BH CV LOWER VASCULAR RIGHT GASTRONEMIUS COMPRESS: NORMAL
BH CV LOWER VASCULAR RIGHT GREATER SAPH BK COMPRESS: NORMAL
BH CV LOWER VASCULAR RIGHT LESSER SAPH COMPRESS: NORMAL
BH CV LOWER VASCULAR RIGHT MID FEMORAL AUGMENT: NORMAL
BH CV LOWER VASCULAR RIGHT MID FEMORAL COMPETENT: NORMAL
BH CV LOWER VASCULAR RIGHT MID FEMORAL COMPRESS: NORMAL
BH CV LOWER VASCULAR RIGHT MID FEMORAL PHASIC: NORMAL
BH CV LOWER VASCULAR RIGHT MID FEMORAL SPONT: NORMAL
BH CV LOWER VASCULAR RIGHT PERONEAL COMPRESS: NORMAL
BH CV LOWER VASCULAR RIGHT POPLITEAL AUGMENT: NORMAL
BH CV LOWER VASCULAR RIGHT POPLITEAL COMPETENT: NORMAL
BH CV LOWER VASCULAR RIGHT POPLITEAL COMPRESS: NORMAL
BH CV LOWER VASCULAR RIGHT POPLITEAL PHASIC: NORMAL
BH CV LOWER VASCULAR RIGHT POPLITEAL SPONT: NORMAL
BH CV LOWER VASCULAR RIGHT POSTERIOR TIBIAL COMPRESS: NORMAL
BH CV LOWER VASCULAR RIGHT PROFUNDA FEMORAL COMPRESS: NORMAL
BH CV LOWER VASCULAR RIGHT PROXIMAL FEMORAL COMPRESS: NORMAL
BH CV LOWER VASCULAR RIGHT SAPHENOFEMORAL JUNCTION COMPRESS: NORMAL
MAXIMAL PREDICTED HEART RATE: 136 BPM
STRESS TARGET HR: 116 BPM

## 2022-06-07 PROCEDURE — 1170F FXNL STATUS ASSESSED: CPT | Performed by: FAMILY MEDICINE

## 2022-06-07 PROCEDURE — 1125F AMNT PAIN NOTED PAIN PRSNT: CPT | Performed by: FAMILY MEDICINE

## 2022-06-07 PROCEDURE — 1159F MED LIST DOCD IN RCRD: CPT | Performed by: FAMILY MEDICINE

## 2022-06-07 PROCEDURE — 93970 EXTREMITY STUDY: CPT

## 2022-06-07 PROCEDURE — G0439 PPPS, SUBSEQ VISIT: HCPCS | Performed by: FAMILY MEDICINE

## 2022-06-07 RX ORDER — FUROSEMIDE 20 MG/1
40 TABLET ORAL DAILY
COMMUNITY
Start: 2022-04-12 | End: 2022-08-30

## 2022-06-07 NOTE — PROGRESS NOTES
"Chief Complaint  Medicare Wellness-subsequent    Subjective        Osvaldo Lopez presents to [facility]  Patient is seen accompanied by his daughter.  He has multiple issues.  He has shortness of air on exertion and continued bilateral leg weakness relating to diabetic neuropathy and also post back surgical neuritis with lumbar surgery x2.  Uses a rolling walker and is able ambulate around his residence.  He has been to see cardiology in a couple weeks.  We will get labs in regards to shortness of air on exertion talked to Dr. Castillo nephrology and we can increase Lasix back up to 40 mg daily if need be for continued edema particularly in the right leg.  Stat deep venous duplex today did not show any evidence of DVT and he continues on anticoagulation.  He has chronic kidney disease as well.      Objective   Vital Signs:  /72 (BP Location: Left arm, Patient Position: Sitting, Cuff Size: Adult)   Pulse 78   Temp 97.5 °F (36.4 °C) (Tympanic)   Wt 108 kg (239 lb)   SpO2 93%   BMI 34.29 kg/m²   Estimated body mass index is 34.29 kg/m² as calculated from the following:    Height as of 2/17/22: 177.8 cm (70\").    Weight as of this encounter: 108 kg (239 lb).           Physical Exam  Vitals reviewed.   Constitutional:       Appearance: He is well-developed.   HENT:      Head: Normocephalic and atraumatic.      Right Ear: Tympanic membrane and external ear normal.      Left Ear: Tympanic membrane and external ear normal.   Eyes:      Conjunctiva/sclera: Conjunctivae normal.      Pupils: Pupils are equal, round, and reactive to light.   Neck:      Thyroid: No thyromegaly.      Vascular: No JVD.   Cardiovascular:      Rate and Rhythm: Normal rate and regular rhythm.      Heart sounds: Normal heart sounds.   Pulmonary:      Effort: Pulmonary effort is normal.      Breath sounds: Normal breath sounds.   Abdominal:      General: Bowel sounds are normal.      Palpations: Abdomen is " soft.   Musculoskeletal:      Cervical back: Normal range of motion and neck supple.      Right foot: Decreased range of motion.      Left foot: Decreased range of motion.        Legs:    Feet:      Right foot:      Skin integrity: Callus present.      Left foot:      Skin integrity: Callus present.   Lymphadenopathy:      Cervical: No cervical adenopathy.   Skin:     General: Skin is warm and dry.      Findings: No rash.   Neurological:      Mental Status: He is alert and oriented to person, place, and time.      Cranial Nerves: No cranial nerve deficit.      Motor: Weakness and abnormal muscle tone present.      Coordination: Coordination abnormal.      Gait: Gait abnormal.   Psychiatric:         Behavior: Behavior normal.         Thought Content: Thought content normal.         Judgment: Judgment normal.        Result Review :                Assessment and Plan   Diagnoses and all orders for this visit:    1. Medicare annual wellness visit, subsequent (Primary)    2. Right leg swelling  -     Cancel: Duplex Venous Lower Extremity - Bilateral CAR; Future  -     Duplex Venous Lower Extremity - Bilateral CAR; Future  -     Iron and TIBC  -     Ferritin  -     Urinalysis With Microscopic If Indicated (No Culture) - Urine, Clean Catch  -     TSH  -     T4, Free  -     T3, Free  -     Lipid Panel With / Chol / HDL Ratio  -     CBC & Differential  -     Comprehensive Metabolic Panel  -     Hemoglobin A1c  -     Vitamin B12  -     Folate  -     Vitamin D 25 Hydroxy    3. Essential hypertension  Assessment & Plan:  Hypertension is improving with treatment.  Continue current treatment regimen.  Blood pressure will be reassessed at the next regular appointment.  Lisinopril 20 mg daily metoprolol XL 25 mg daily    Orders:  -     Iron and TIBC  -     Ferritin  -     Urinalysis With Microscopic If Indicated (No Culture) - Urine, Clean Catch  -     TSH  -     T4, Free  -     T3, Free  -     Lipid Panel With / Chol / HDL  Ratio  -     CBC & Differential  -     Comprehensive Metabolic Panel  -     Hemoglobin A1c  -     Vitamin B12  -     Folate  -     Vitamin D 25 Hydroxy    4. Nonrheumatic aortic valve stenosis  Assessment & Plan:  No change on exam    Orders:  -     Iron and TIBC  -     Ferritin  -     Urinalysis With Microscopic If Indicated (No Culture) - Urine, Clean Catch  -     TSH  -     T4, Free  -     T3, Free  -     Lipid Panel With / Chol / HDL Ratio  -     CBC & Differential  -     Comprehensive Metabolic Panel  -     Hemoglobin A1c  -     Vitamin B12  -     Folate  -     Vitamin D 25 Hydroxy    5. Iron deficiency anemia due to chronic blood loss  Assessment & Plan:  Recheck CBC and iron studies    Orders:  -     Iron and TIBC  -     Ferritin  -     Urinalysis With Microscopic If Indicated (No Culture) - Urine, Clean Catch  -     TSH  -     T4, Free  -     T3, Free  -     Lipid Panel With / Chol / HDL Ratio  -     CBC & Differential  -     Comprehensive Metabolic Panel  -     Hemoglobin A1c  -     Vitamin B12  -     Folate  -     Vitamin D 25 Hydroxy    6. Leg weakness, bilateral  Assessment & Plan:  Ongoing problem    Orders:  -     Iron and TIBC  -     Ferritin  -     Urinalysis With Microscopic If Indicated (No Culture) - Urine, Clean Catch  -     TSH  -     T4, Free  -     T3, Free  -     Lipid Panel With / Chol / HDL Ratio  -     CBC & Differential  -     Comprehensive Metabolic Panel  -     Hemoglobin A1c  -     Vitamin B12  -     Folate  -     Vitamin D 25 Hydroxy    7. Post laminectomy syndrome  -     Iron and TIBC  -     Ferritin  -     Urinalysis With Microscopic If Indicated (No Culture) - Urine, Clean Catch  -     TSH  -     T4, Free  -     T3, Free  -     Lipid Panel With / Chol / HDL Ratio  -     CBC & Differential  -     Comprehensive Metabolic Panel  -     Hemoglobin A1c  -     Vitamin B12  -     Folate  -     Vitamin D 25 Hydroxy    8. Diabetic peripheral neuropathy (HCC)  -     Iron and TIBC  -      Ferritin  -     Urinalysis With Microscopic If Indicated (No Culture) - Urine, Clean Catch  -     TSH  -     T4, Free  -     T3, Free  -     Lipid Panel With / Chol / HDL Ratio  -     CBC & Differential  -     Comprehensive Metabolic Panel  -     Hemoglobin A1c  -     Vitamin B12  -     Folate  -     Vitamin D 25 Hydroxy    9. DM (diabetes mellitus), type 2 with complications (HCC)  -     Iron and TIBC  -     Ferritin  -     Urinalysis With Microscopic If Indicated (No Culture) - Urine, Clean Catch  -     TSH  -     T4, Free  -     T3, Free  -     Lipid Panel With / Chol / HDL Ratio  -     CBC & Differential  -     Comprehensive Metabolic Panel  -     Hemoglobin A1c  -     Vitamin B12  -     Folate  -     Vitamin D 25 Hydroxy    10. Hypovitaminosis D   -     Vitamin D 25 Hydroxy    11. Stage 3b chronic kidney disease (HCC)  Assessment & Plan:  Labs today and follow-up with nephrology.  I talked with Dr. Castillo.  Consider increasing furosemide to 40 mg daily for the next couple weeks with repeat BMP in couple weeks if need be.  He has a follow-up with cardiology as well.             Follow Up   Return in about 6 months (around 12/7/2022) for Recheck.  Patient was given instructions and counseling regarding his condition or for health maintenance advice. Please see specific information pulled into the AVS if appropriate.

## 2022-06-07 NOTE — ASSESSMENT & PLAN NOTE
Labs today and follow-up with nephrology.  I talked with Dr. Castillo.  Consider increasing furosemide to 40 mg daily for the next couple weeks with repeat BMP in couple weeks if need be.  He has a follow-up with cardiology as well.

## 2022-06-07 NOTE — ASSESSMENT & PLAN NOTE
Hypertension is improving with treatment.  Continue current treatment regimen.  Blood pressure will be reassessed at the next regular appointment.  Lisinopril 20 mg daily metoprolol XL 25 mg daily

## 2022-06-07 NOTE — PROGRESS NOTES
The ABCs of the Annual Wellness Visit  Subsequent Medicare Wellness Visit    Chief Complaint   Patient presents with   • Medicare Wellness-subsequent      Subjective    History of Present Illness:  Osvaldo Lopez is a 86 y.o. male who presents for a Subsequent Medicare Wellness Visit.    The following portions of the patient's history were reviewed and   updated as appropriate: allergies, current medications, past family history, past medical history, past social history, past surgical history and problem list.    Compared to one year ago, the patient feels his physical   health is worse.    Compared to one year ago, the patient feels his mental   health is the same.    Recent Hospitalizations:  He was not admitted to the hospital during the last year.       Current Medical Providers:  Patient Care Team:  Caio Rodríguez MD as PCP - General (Family Medicine)  Dontrell Arellano MD as Surgeon (Orthopedic Surgery)  Angelo Driscoll MD as Consulting Physician (Cardiology)  Darvin Alvarez MD as Consulting Physician (Urology)  Caio Rodríguez MD as Referring Physician (Family Medicine)    Outpatient Medications Prior to Visit   Medication Sig Dispense Refill   • acetaminophen (TYLENOL) 500 MG tablet Take 500 mg by mouth Every 6 (Six) Hours As Needed for Mild Pain .     • Alpha-Lipoic Acid 600 MG tablet Take 600 mg by mouth Daily. For neuropathy 30 tablet 11   • amiodarone (PACERONE) 200 MG tablet Take 1 tablet by mouth Daily. 90 tablet 3   • KYRA MICROLET LANCETS lancets USE TWICE A  each 5   • ferrous sulfate 325 (65 FE) MG tablet Take 325 mg by mouth Daily With Breakfast.     • finasteride (PROSCAR) 5 MG tablet TAKE 1 TABLET DAILY FOR    PROSTATE 90 tablet 3   • furosemide (LASIX) 20 MG tablet Daily.     • glucose blood (Contour Next Test) test strip 1 each by Other route Daily. test blood sugar 50 each 5   • lidocaine (LMX) 4 % cream      • lisinopril (PRINIVIL,ZESTRIL) 20 MG tablet TAKE 1 TABLET DAILY 90  tablet 3   • metoprolol succinate XL (TOPROL-XL) 25 MG 24 hr tablet TAKE 1 TABLET DAILY 90 tablet 3   • Multiple Vitamin (MULTI VITAMIN PO) Take 1 tablet by mouth Every Morning.     • mupirocin (Bactroban) 2 % ointment Apply  topically to the appropriate area as directed 3 (Three) Times a Day. 30 g 1   • tamsulosin (FLOMAX) 0.4 MG capsule 24 hr capsule TAKE 2 CAPSULES DAILY 180 capsule 3   • Tradjenta 5 MG tablet tablet TAKE 1 TABLET DAILY 90 tablet 3   • traMADol (ULTRAM) 50 MG tablet TAKE 1 TABLET BY MOUTH EVERY 6 (SIX) HOURS AS NEEDED FOR MODERATE PAIN OR SEVERE PAIN . 30 tablet 2   • vitamin B-12 (CYANOCOBALAMIN) 1000 MCG tablet Take 1,000 mcg by mouth Daily.     • Xarelto 15 MG tablet TAKE 1 TABLET DAILY 90 tablet 3   • docusate sodium (Colace) 100 MG capsule Take 1 capsule by mouth 2 (Two) Times a Day As Needed for Constipation. 60 capsule 5   • furosemide (LASIX) 40 MG tablet Take 40 mg by mouth Every Other Day.       No facility-administered medications prior to visit.       Opioid medication/s are on active medication list.  and I have evaluated his active treatment plan and pain score trends (see table).  Vitals:    06/07/22 1214   PainSc:   6     I have reviewed the chart for potential of high risk medication and harmful drug interactions in the elderly.            Aspirin is not on active medication list.  Aspirin use is contraindicated for this patient due to: current use of Xarelto.  .    Patient Active Problem List   Diagnosis   • Complete rotator cuff tear of left shoulder   • Abnormal finding on thyroid function test   • Abdominal aortic aneurysm (HCC)   • Benign prostatic hyperplasia   • Essential hypertension   • Hyperlipidemia   • Shoulder pain   • Lumbar radiculopathy   • DM (diabetes mellitus), type 2 with complications ([facility])   • OA (osteoarthritis) of knee   • Tear of right rotator cuff   • Spinal stenosis of lumbar region with neurogenic claudication   • Eyebrow ptosis   • Pseudophakia   •  "Nonrheumatic aortic valve stenosis   • Atrial flutter with rapid ventricular response (HCC)   • Right arm pain   • Leg weakness, bilateral   • Typical atrial flutter (HCC)   • Diabetic peripheral neuropathy (HCC)   • Muscle weakness (generalized)   • Pressure injury of left buttock, stage 1   • Chronic low back pain with sciatica   • 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HCC)   • Pressure injury of buttock, stage 1   • Anemia   • Symptomatic anemia   • Iron deficiency anemia   • Chronic anticoagulation   • CKD (chronic kidney disease)   • CRISTOBAL (acute kidney injury) (HCC)   • Colonic mass   • Axonal neuropathy   • Impairment of balance   • Post laminectomy syndrome   • Coronary arteriosclerosis   • Edema   • History of malignant neoplasm of colon   • Prostatism   • Stage 3 chronic kidney disease (HCC)     Advance Care Planning  Advance Directive is on file.  ACP discussion was held with the patient during this visit. Patient has an advance directive in EMR which is still valid.           Objective    Vitals:    22 1214   BP: 146/72   BP Location: Left arm   Patient Position: Sitting   Cuff Size: Adult   Pulse: 78   Temp: 97.5 °F (36.4 °C)   TempSrc: Tympanic   SpO2: 93%   Weight: 108 kg (239 lb)   PainSc:   6     Estimated body mass index is 34.29 kg/m² as calculated from the following:    Height as of 22: 177.8 cm (70\").    Weight as of this encounter: 108 kg (239 lb).    BMI is >= 30 and <35. (Class 1 Obesity). The following options were offered after discussion;: none (medical contraindication)      Does the patient have evidence of cognitive impairment? No    Physical Exam            HEALTH RISK ASSESSMENT    Smoking Status:  Social History     Tobacco Use   Smoking Status Former Smoker   • Packs/day: 3.00   • Years: 45.00   • Pack years: 135.00   • Types: Cigarettes   • Quit date: 1989   • Years since quittin.6   Smokeless Tobacco Never Used     Alcohol Consumption:  Social History     Substance and " Sexual Activity   Alcohol Use Yes    Comment: 1 shot of whiskey in the morning and 1 shot of whiskey in the evening     Fall Risk Screen:    JASON Fall Risk Assessment was completed, and patient is at HIGH risk for falls. Assessment completed on:6/7/2022    Depression Screening:  PHQ-2/PHQ-9 Depression Screening 6/7/2022   Retired Total Score -   Little Interest or Pleasure in Doing Things 0-->not at all   Feeling Down, Depressed or Hopeless 0-->not at all   PHQ-9: Brief Depression Severity Measure Score 0       Health Habits and Functional and Cognitive Screening:  Functional & Cognitive Status 6/7/2022   Do you have difficulty preparing food and eating? No   Do you have difficulty bathing yourself, getting dressed or grooming yourself? No   Do you have difficulty using the toilet? No   Do you have difficulty moving around from place to place? Yes   Do you have trouble with steps or getting out of a bed or a chair? Yes   Current Diet Well Balanced Diet   Dental Exam Up to date   Eye Exam Up to date   Exercise (times per week) 0 times per week   Current Exercises Include No Regular Exercise   Current Exercise Activities Include -   Do you need help using the phone?  No   Are you deaf or do you have serious difficulty hearing?  Yes   Do you need help with transportation? No   Do you need help shopping? No   Do you need help preparing meals?  No   Do you need help with housework?  No   Do you need help with laundry? No   Do you need help taking your medications? No   Do you need help managing money? No   Do you ever drive or ride in a car without wearing a seat belt? No   Have you felt unusual stress, anger or loneliness in the last month? No   Who do you live with? Alone   If you need help, do you have trouble finding someone available to you? No   Have you been bothered in the last four weeks by sexual problems? No   Do you have difficulty concentrating, remembering or making decisions? No       Age-appropriate  Screening Schedule:  Refer to the list below for future screening recommendations based on patient's age, sex and/or medical conditions. Orders for these recommended tests are listed in the plan section. The patient has been provided with a written plan.    Health Maintenance   Topic Date Due   • URINE MICROALBUMIN  Never done   • HEMOGLOBIN A1C  05/24/2022   • INFLUENZA VACCINE  08/01/2022   • DIABETIC EYE EXAM  08/31/2022   • LIPID PANEL  11/24/2022   • TDAP/TD VACCINES (2 - Td or Tdap) 07/16/2028   • ZOSTER VACCINE  Completed              Assessment & Plan   CMS Preventative Services Quick Reference  Risk Factors Identified During Encounter  Cardiovascular Disease  Fall Risk-High or Moderate  Obesity/Overweight   The above risks/problems have been discussed with the patient.  Follow up actions/plans if indicated are seen below in the Assessment/Plan Section.  Pertinent information has been shared with the patient in the After Visit Summary.    There are no diagnoses linked to this encounter.    Follow Up:   No follow-ups on file.     An After Visit Summary and PPPS were made available to the patient.

## 2022-06-08 LAB
25(OH)D3+25(OH)D2 SERPL-MCNC: 23.9 NG/ML (ref 30–100)
ALBUMIN SERPL-MCNC: 4.5 G/DL (ref 3.6–4.6)
ALBUMIN/GLOB SERPL: 1.8 {RATIO} (ref 1.2–2.2)
ALP SERPL-CCNC: 130 IU/L (ref 44–121)
ALT SERPL-CCNC: 26 IU/L (ref 0–44)
APPEARANCE UR: CLEAR
AST SERPL-CCNC: 27 IU/L (ref 0–40)
BACTERIA #/AREA URNS HPF: NORMAL /[HPF]
BASOPHILS # BLD AUTO: 0 X10E3/UL (ref 0–0.2)
BASOPHILS NFR BLD AUTO: 0 %
BILIRUB SERPL-MCNC: 0.4 MG/DL (ref 0–1.2)
BILIRUB UR QL STRIP: NEGATIVE
BUN SERPL-MCNC: 28 MG/DL (ref 8–27)
BUN/CREAT SERPL: 15 (ref 10–24)
CALCIUM SERPL-MCNC: 9.2 MG/DL (ref 8.6–10.2)
CASTS URNS QL MICRO: NORMAL /LPF
CHLORIDE SERPL-SCNC: 103 MMOL/L (ref 96–106)
CHOLEST SERPL-MCNC: 221 MG/DL (ref 100–199)
CHOLEST/HDLC SERPL: 4.7 RATIO (ref 0–5)
CO2 SERPL-SCNC: 21 MMOL/L (ref 20–29)
COLOR UR: YELLOW
CREAT SERPL-MCNC: 1.89 MG/DL (ref 0.76–1.27)
EGFRCR SERPLBLD CKD-EPI 2021: 35 ML/MIN/1.73
EOSINOPHIL # BLD AUTO: 0 X10E3/UL (ref 0–0.4)
EOSINOPHIL NFR BLD AUTO: 1 %
EPI CELLS #/AREA URNS HPF: NORMAL /HPF (ref 0–10)
ERYTHROCYTE [DISTWIDTH] IN BLOOD BY AUTOMATED COUNT: 14 % (ref 11.6–15.4)
FERRITIN SERPL-MCNC: 115 NG/ML (ref 30–400)
FOLATE SERPL-MCNC: >20 NG/ML
GLOBULIN SER CALC-MCNC: 2.5 G/DL (ref 1.5–4.5)
GLUCOSE SERPL-MCNC: 163 MG/DL (ref 65–99)
GLUCOSE UR QL STRIP: NEGATIVE
HBA1C MFR BLD: 7.3 % (ref 4.8–5.6)
HCT VFR BLD AUTO: 41.1 % (ref 37.5–51)
HDLC SERPL-MCNC: 47 MG/DL
HGB BLD-MCNC: 13.6 G/DL (ref 13–17.7)
HGB UR QL STRIP: NEGATIVE
IMM GRANULOCYTES # BLD AUTO: 0 X10E3/UL (ref 0–0.1)
IMM GRANULOCYTES NFR BLD AUTO: 0 %
IRON SATN MFR SERPL: 29 % (ref 15–55)
IRON SERPL-MCNC: 87 UG/DL (ref 38–169)
KETONES UR QL STRIP: ABNORMAL
LDLC SERPL CALC-MCNC: 123 MG/DL (ref 0–99)
LEUKOCYTE ESTERASE UR QL STRIP: NEGATIVE
LYMPHOCYTES # BLD AUTO: 1.2 X10E3/UL (ref 0.7–3.1)
LYMPHOCYTES NFR BLD AUTO: 21 %
MCH RBC QN AUTO: 34.8 PG (ref 26.6–33)
MCHC RBC AUTO-ENTMCNC: 33.1 G/DL (ref 31.5–35.7)
MCV RBC AUTO: 105 FL (ref 79–97)
MICRO URNS: ABNORMAL
MONOCYTES # BLD AUTO: 0.3 X10E3/UL (ref 0.1–0.9)
MONOCYTES NFR BLD AUTO: 5 %
NEUTROPHILS # BLD AUTO: 3.9 X10E3/UL (ref 1.4–7)
NEUTROPHILS NFR BLD AUTO: 73 %
NITRITE UR QL STRIP: NEGATIVE
PH UR STRIP: 5.5 [PH] (ref 5–7.5)
PLATELET # BLD AUTO: 130 X10E3/UL (ref 150–450)
POTASSIUM SERPL-SCNC: 5.4 MMOL/L (ref 3.5–5.2)
PROT SERPL-MCNC: 7 G/DL (ref 6–8.5)
PROT UR QL STRIP: ABNORMAL
RBC # BLD AUTO: 3.91 X10E6/UL (ref 4.14–5.8)
RBC #/AREA URNS HPF: NORMAL /HPF (ref 0–2)
SODIUM SERPL-SCNC: 140 MMOL/L (ref 134–144)
SP GR UR STRIP: 1.03 (ref 1–1.03)
T3FREE SERPL-MCNC: 3.2 PG/ML (ref 2–4.4)
T4 FREE SERPL-MCNC: 0.67 NG/DL (ref 0.82–1.77)
TIBC SERPL-MCNC: 300 UG/DL (ref 250–450)
TRIGL SERPL-MCNC: 291 MG/DL (ref 0–149)
TSH SERPL DL<=0.005 MIU/L-ACNC: 13.8 UIU/ML (ref 0.45–4.5)
UIBC SERPL-MCNC: 213 UG/DL (ref 111–343)
UROBILINOGEN UR STRIP-MCNC: 0.2 MG/DL (ref 0.2–1)
VIT B12 SERPL-MCNC: 935 PG/ML (ref 232–1245)
VLDLC SERPL CALC-MCNC: 51 MG/DL (ref 5–40)
WBC # BLD AUTO: 5.4 X10E3/UL (ref 3.4–10.8)
WBC #/AREA URNS HPF: NORMAL /HPF (ref 0–5)

## 2022-06-09 RX ORDER — LEVOTHYROXINE SODIUM 0.03 MG/1
25 TABLET ORAL DAILY
Qty: 30 TABLET | Refills: 1 | Status: SHIPPED | OUTPATIENT
Start: 2022-06-09 | End: 2022-08-02

## 2022-06-09 NOTE — PROGRESS NOTES
Thyroid function is off.  Possibly related to amiodarone.  I am sending a thyroid supplement to his pharmacy.  We will follow the thyroid test on next visit.  Otherwise Dr. Castillo nephrology would like for him to double his Lasix to 40 mg daily but he needs to get labs within the next 2 weeks after doing that.  That would be a BMP.

## 2022-06-22 ENCOUNTER — OFFICE VISIT (OUTPATIENT)
Dept: CARDIOLOGY | Facility: CLINIC | Age: 87
End: 2022-06-22

## 2022-06-22 VITALS
WEIGHT: 235 LBS | DIASTOLIC BLOOD PRESSURE: 80 MMHG | BODY MASS INDEX: 32.9 KG/M2 | HEIGHT: 71 IN | SYSTOLIC BLOOD PRESSURE: 136 MMHG | OXYGEN SATURATION: 95 % | HEART RATE: 75 BPM

## 2022-06-22 DIAGNOSIS — I10 ESSENTIAL HYPERTENSION: ICD-10-CM

## 2022-06-22 DIAGNOSIS — R06.09 DOE (DYSPNEA ON EXERTION): ICD-10-CM

## 2022-06-22 DIAGNOSIS — I48.0 AF (PAROXYSMAL ATRIAL FIBRILLATION): Primary | ICD-10-CM

## 2022-06-22 DIAGNOSIS — I25.10 CORONARY ARTERIOSCLEROSIS: ICD-10-CM

## 2022-06-22 DIAGNOSIS — E11.59 TYPE 2 DIABETES MELLITUS WITH OTHER CIRCULATORY COMPLICATIONS: ICD-10-CM

## 2022-06-22 PROCEDURE — 99214 OFFICE O/P EST MOD 30 MIN: CPT | Performed by: NURSE PRACTITIONER

## 2022-06-22 NOTE — PROGRESS NOTES
"    CARDIOLOGY        Patient Name: Osvaldo Lopez  :1935  Age: 86 y.o.  Primary Cardiologist: Angelo Driscoll MD and Miller Talbot MD  Encounter Provider:  LOU Camarena    Date of Service: 21          CHIEF COMPLAINT / REASON FOR OFFICE VISIT     Atrial Fibrillation (1 yr f/u)      HISTORY OF PRESENT ILLNESS       Atrial Fibrillation  Symptoms are negative for chest pain and shortness of breath. Past medical history includes atrial fibrillation.     Osvaldo Lopez is a 86 y.o. male who presents today for annual evaluation    Pt has a  history significant for PAF, hypertension, aortic valve stenosis, history of AAA, hypothyroidism.    Patient reports that for the past several months he has noted increased dyspnea with exertion.  He states that minimal exertional activities cause dyspnea.  He adds that when he is short of breath, his HR elevates quickly.  He is limited with exercise.  He adds that he has had frequent falls, he is ambulating with a walker.  He has noted some increased swelling to the extremities.  His PCP has done a dopper for DVT, which was negative.  His Lasix dose has been increased by PCP and right foot remains with swelling. He does note increased congestion in the head when he sleeps and he often moves to his recliner after a few hours of sleep.     The following portions of the patient's history were reviewed and updated as appropriate: allergies, current medications, past family history, past medical history, past social history, past surgical history and problem list.      VITAL SIGNS     Visit Vitals  /80 (BP Location: Left arm, Patient Position: Sitting, Cuff Size: Adult)   Pulse 75   Ht 180.3 cm (71\")   Wt 107 kg (235 lb)   SpO2 95%   BMI 32.78 kg/m²         Wt Readings from Last 3 Encounters:   22 107 kg (235 lb)   22 108 kg (239 lb)   22 106 kg (233 lb)     Body mass index is 32.78 kg/m².      REVIEW OF SYSTEMS   Review of Systems "   Constitutional: Positive for weight gain. Negative for malaise/fatigue.   Cardiovascular: Positive for leg swelling. Negative for chest pain.   Respiratory: Negative for shortness of breath.    Musculoskeletal: Positive for joint swelling and myalgias.   Gastrointestinal: Positive for diarrhea.   Neurological: Negative for light-headedness.   All other systems reviewed and are negative.          PHYSICAL EXAMINATION     Constitutional:       Appearance: Normal appearance. Well-developed.   Eyes:      Conjunctiva/sclera: Conjunctivae normal.   Neck:      Vascular: No carotid bruit.   Pulmonary:      Effort: Pulmonary effort is normal.      Breath sounds: Normal breath sounds.   Cardiovascular:      Normal rate. Regular rhythm. Normal S1. Normal S2.      Murmurs: There is no murmur.      No gallop. No click. No rub.   Edema:     Peripheral edema absent.   Musculoskeletal: Normal range of motion.      Comments: No pedal edema Skin:     General: Skin is warm and dry.   Neurological:      Mental Status: Alert and oriented to person, place, and time.      GCS: GCS eye subscore is 4. GCS verbal subscore is 5. GCS motor subscore is 6.   Psychiatric:         Speech: Speech normal.         Behavior: Behavior normal.         Thought Content: Thought content normal.         Judgment: Judgment normal.           REVIEWED DATA     Procedures    Cardiac Procedures:  1. Echocardiogram 1/18/2018. LVEF 62%. LV cavity is small. LV wall thickness consistent with borderline LVH. Calcification of aortic valve. Mild aortic valve stenosis. Mild mitral valve regurgitation.  2. Cardioversion 4/15/2019. Post cardioversion patient displayed sinus rhythm.  3. ZIO monitor 5/21/2019. Palpitations correlated with episodes of atrial fibrillation. Predominant rhythm was sinus. PVCs occurred rarely.  4. Atrial flutter ablation 6/6/2019. Successful atrial flutter ablation.  5. Echocardiogram 10/30/2020. LVEF 65%. Trace mitral valve regurgitation.  Mild dilation of aortic valve root.  6. Echocardiogram 2/7/2021. LVEF 68%. Diastolic function is normal. Mild dilation of aortic root.    Lipid Panel    Lipid Panel 7/20/21 11/24/21 6/7/22   Total Cholesterol 170 202 (A) 221 (A)   Triglycerides 153 (A) 345 (A) 291 (A)   HDL Cholesterol 47 42 47   VLDL Cholesterol 27 58 (A) 51 (A)   LDL Cholesterol  96 102 (A) 123 (A)   (A) Abnormal value       Comments are available for some flowsheets but are not being displayed.               ASSESSMENT & PLAN      Diagnosis Plan   1. AF (paroxysmal atrial fibrillation) ([facility])  Adult Transthoracic Echo Complete W/ Cont if Necessary Per Protocol    Holter Monitor - 24 Hour   2. Essential hypertension     3. Coronary arteriosclerosis     4. Type 2 diabetes mellitus with other circulatory complications ([facility])     5. GRIJALVA (dyspnea on exertion)  Adult Transthoracic Echo Complete W/ Cont if Necessary Per Protocol    Holter Monitor - 24 Hour         SUMMARY/DISCUSSION  1. PAF.  Patient with history of A. fib.  Heart rate currently controlled.  Reports that when he exerts himself he feels that his heart rate elevates.  He also gets short of breath with very minimal exertion.  I have recommended an echocardiogram and a 24-hour Holter to further assess.  Patient will also continue amiodarone 200 mg/day, metoprolol succinate 25 mg/day.  He is anticoagulated with Xarelto.  2. Hypertension.  Blood pressure adequately controlled in clinic at 136/80.  Continue furosemide 40 mg/day, lisinopril 20 mg/day, metoprolol succinate 25 mg/day.  3. Type 2 diabetes.  Per PCP.  4. GRIJALVA.  Patient reports shortness of breath and elevated heart rate with very minimal exertion.  He also has congestion in his head after laying flat for several hours and often moves to a recliner.  Patient also notes some lower extremity edema that has not been improved with increased diuretics.  His PCP has done a Doppler study which was negative for DVT.  I am concerned that  patient could have a potential for heart failure or cardiomyopathy.  We will get echocardiogram to further assess.  As noted above we will also get 24-hour monitor.  5. Follow-up to be arranged after above-mentioned testing.        MEDICATIONS         Discharge Medications          Accurate as of June 22, 2022  9:57 AM. If you have any questions, ask your nurse or doctor.            Continue These Medications      Instructions Start Date   acetaminophen 500 MG tablet  Commonly known as: TYLENOL   500 mg, Oral, Every 6 Hours PRN      Alpha-Lipoic Acid 600 MG tablet   600 mg, Oral, Daily, For neuropathy      amiodarone 200 MG tablet  Commonly known as: PACERONE   200 mg, Oral, Daily      Arlyn Microlet Lancets lancets   USE TWICE A DAY      Contour Next Test test strip  Generic drug: glucose blood   1 each, Other, Daily, test blood sugar      ferrous sulfate 325 (65 FE) MG tablet   325 mg, Oral, Daily With Breakfast      finasteride 5 MG tablet  Commonly known as: PROSCAR   TAKE 1 TABLET DAILY FOR    PROSTATE      furosemide 20 MG tablet  Commonly known as: LASIX   40 mg, Oral, Daily      levothyroxine 25 MCG tablet  Commonly known as: SYNTHROID, LEVOTHROID   25 mcg, Oral, Daily      lidocaine 4 % cream  Commonly known as: LMX   No dose, route, or frequency recorded.      lisinopril 20 MG tablet  Commonly known as: PRINIVIL,ZESTRIL   TAKE 1 TABLET DAILY      metoprolol succinate XL 25 MG 24 hr tablet  Commonly known as: TOPROL-XL   TAKE 1 TABLET DAILY      multivitamin tablet tablet  Commonly known as: THERAGRAN   1 tablet, Oral, Every Morning      mupirocin 2 % ointment  Commonly known as: Bactroban   Topical, 3 Times Daily      tamsulosin 0.4 MG capsule 24 hr capsule  Commonly known as: FLOMAX   TAKE 2 CAPSULES DAILY      Tradjenta 5 MG tablet tablet  Generic drug: linagliptin   TAKE 1 TABLET DAILY      traMADol 50 MG tablet  Commonly known as: ULTRAM   50 mg, Oral, Every 6 Hours PRN      vitamin B-12 1000 MCG  tablet  Commonly known as: CYANOCOBALAMIN   1,000 mcg, Oral, Daily      Xarelto 15 MG tablet  Generic drug: rivaroxaban   TAKE 1 TABLET DAILY                 **Dragon Disclaimer:   Much of this encounter note is an electronic transcription/translation of spoken language to printed text. The electronic translation of spoken language may permit erroneous, or at times, nonsensical words or phrases to be inadvertently transcribed. Although I have reviewed the note for such errors, some may still exist.

## 2022-07-14 ENCOUNTER — TELEPHONE (OUTPATIENT)
Dept: INTERNAL MEDICINE | Facility: CLINIC | Age: 87
End: 2022-07-14

## 2022-07-14 NOTE — TELEPHONE ENCOUNTER
Caller: Osvaldo Lopez    Relationship: Self    Best call back number: [phone]    What form or medical record are you requesting: APPROVAL FOR MEDICAL SHOES    Who is requesting this form or medical record from you: [facility] [address] [place] [address]    How would you like to receive the form or medical records (pick-up, mail, fax): FAX   If fax, what is the fax number:[phone]    Timeframe paperwork needed: ASAP    Additional notes: PATIENT STATES THE [facility] HAS REQUESTED APPROVAL FOR MEDICAL SHOES 3 TIMES SINCE MARCH 2022 AND HAVE NEVER HEARD BACK. PATIENT IS REQUESTING AN APPROVAL BE SENT TO THE CENTER SO HE CAN GET HIS MEDICAL SHOES.

## 2022-07-14 NOTE — TELEPHONE ENCOUNTER
I signed the form on March 13th from [facility] March 13th and faxed March 14th? What else do we need to do?  I talked to MR. Lopez.

## 2022-07-15 NOTE — TELEPHONE ENCOUNTER
Need to do an addendum to his last office note-notes must include DM Foot exam, DM foot health care instructions and diabetic shoe referral  (Fax is in your basket)

## 2022-07-19 DIAGNOSIS — G61.82 MULTIFOCAL MOTOR NEUROPATHY: ICD-10-CM

## 2022-07-19 DIAGNOSIS — G89.29 CHRONIC LOW BACK PAIN WITH SCIATICA, SCIATICA LATERALITY UNSPECIFIED, UNSPECIFIED BACK PAIN LATERALITY: ICD-10-CM

## 2022-07-19 DIAGNOSIS — M54.40 CHRONIC LOW BACK PAIN WITH SCIATICA, SCIATICA LATERALITY UNSPECIFIED, UNSPECIFIED BACK PAIN LATERALITY: ICD-10-CM

## 2022-07-19 RX ORDER — TRAMADOL HYDROCHLORIDE 50 MG/1
50 TABLET ORAL EVERY 6 HOURS PRN
Qty: 30 TABLET | Refills: 2 | Status: SHIPPED | OUTPATIENT
Start: 2022-07-19 | End: 2022-11-14

## 2022-07-25 ENCOUNTER — HOSPITAL ENCOUNTER (OUTPATIENT)
Dept: CARDIOLOGY | Facility: HOSPITAL | Age: 87
Discharge: HOME | End: 2022-07-25
Admitting: NURSE PRACTITIONER

## 2022-07-25 VITALS
BODY MASS INDEX: 32.9 KG/M2 | HEIGHT: 71 IN | DIASTOLIC BLOOD PRESSURE: 70 MMHG | SYSTOLIC BLOOD PRESSURE: 180 MMHG | HEART RATE: 55 BPM | WEIGHT: 235 LBS

## 2022-07-25 DIAGNOSIS — R06.09 DOE (DYSPNEA ON EXERTION): ICD-10-CM

## 2022-07-25 DIAGNOSIS — I48.0 AF (PAROXYSMAL ATRIAL FIBRILLATION): ICD-10-CM

## 2022-07-25 LAB
AORTIC ARCH: 2.8 CM
ASCENDING AORTA: 3.8 CM
BH CV ECHO MEAS - ACS: 1.56 CM
BH CV ECHO MEAS - AI P1/2T: 454.8 MSEC
BH CV ECHO MEAS - AO MAX PG: 19.8 MMHG
BH CV ECHO MEAS - AO MEAN PG: 10.2 MMHG
BH CV ECHO MEAS - AO ROOT DIAM: 4.1 CM
BH CV ECHO MEAS - AO V2 MAX: 222.7 CM/SEC
BH CV ECHO MEAS - AO V2 VTI: 53.9 CM
BH CV ECHO MEAS - AVA(I,D): 2.14 CM2
BH CV ECHO MEAS - EDV(CUBED): 39.6 ML
BH CV ECHO MEAS - EDV(MOD-SP2): 163 ML
BH CV ECHO MEAS - EDV(MOD-SP4): 154 ML
BH CV ECHO MEAS - EF(MOD-BP): 66.2 %
BH CV ECHO MEAS - EF(MOD-SP2): 65.6 %
BH CV ECHO MEAS - EF(MOD-SP4): 65.6 %
BH CV ECHO MEAS - ESV(CUBED): 9.9 ML
BH CV ECHO MEAS - ESV(MOD-SP2): 56 ML
BH CV ECHO MEAS - ESV(MOD-SP4): 53 ML
BH CV ECHO MEAS - FS: 37.1 %
BH CV ECHO MEAS - IVS/LVPW: 1.06 CM
BH CV ECHO MEAS - IVSD: 1.11 CM
BH CV ECHO MEAS - LAT PEAK E' VEL: 10.9 CM/SEC
BH CV ECHO MEAS - LV DIASTOLIC VOL/BSA (35-75): 68.2 CM2
BH CV ECHO MEAS - LV MASS(C)D: 111.6 GRAMS
BH CV ECHO MEAS - LV MAX PG: 8.1 MMHG
BH CV ECHO MEAS - LV MEAN PG: 4.3 MMHG
BH CV ECHO MEAS - LV SYSTOLIC VOL/BSA (12-30): 23.5 CM2
BH CV ECHO MEAS - LV V1 MAX: 142.2 CM/SEC
BH CV ECHO MEAS - LV V1 VTI: 38.2 CM
BH CV ECHO MEAS - LVIDD: 3.4 CM
BH CV ECHO MEAS - LVIDS: 2.14 CM
BH CV ECHO MEAS - LVOT AREA: 3 CM2
BH CV ECHO MEAS - LVOT DIAM: 1.96 CM
BH CV ECHO MEAS - LVPWD: 1.05 CM
BH CV ECHO MEAS - MED PEAK E' VEL: 8.7 CM/SEC
BH CV ECHO MEAS - MR MAX PG: 37 MMHG
BH CV ECHO MEAS - MR MAX VEL: 304.1 CM/SEC
BH CV ECHO MEAS - MV A DUR: 0.15 SEC
BH CV ECHO MEAS - MV A MAX VEL: 138.9 CM/SEC
BH CV ECHO MEAS - MV DEC SLOPE: 396.4 CM/SEC2
BH CV ECHO MEAS - MV DEC TIME: 0.27 MSEC
BH CV ECHO MEAS - MV E MAX VEL: 109 CM/SEC
BH CV ECHO MEAS - MV E/A: 0.78
BH CV ECHO MEAS - MV MAX PG: 6.2 MMHG
BH CV ECHO MEAS - MV MEAN PG: 3 MMHG
BH CV ECHO MEAS - MV P1/2T: 87.2 MSEC
BH CV ECHO MEAS - MV V2 VTI: 40.5 CM
BH CV ECHO MEAS - MVA(P1/2T): 2.5 CM2
BH CV ECHO MEAS - MVA(VTI): 2.8 CM2
BH CV ECHO MEAS - PA ACC TIME: 0.11 SEC
BH CV ECHO MEAS - PA PR(ACCEL): 27.9 MMHG
BH CV ECHO MEAS - PA V2 MAX: 127.6 CM/SEC
BH CV ECHO MEAS - PULM A REVS DUR: 0.15 SEC
BH CV ECHO MEAS - PULM A REVS VEL: 31.8 CM/SEC
BH CV ECHO MEAS - PULM DIAS VEL: 50.3 CM/SEC
BH CV ECHO MEAS - PULM S/D: 0.89
BH CV ECHO MEAS - PULM SYS VEL: 44.9 CM/SEC
BH CV ECHO MEAS - QP/QS: 1.14
BH CV ECHO MEAS - RAP SYSTOLE: 3 MMHG
BH CV ECHO MEAS - RV MAX PG: 4.6 MMHG
BH CV ECHO MEAS - RV V1 MAX: 106.7 CM/SEC
BH CV ECHO MEAS - RV V1 VTI: 22.2 CM
BH CV ECHO MEAS - RVOT DIAM: 2.7 CM
BH CV ECHO MEAS - RVSP: 22.1 MMHG
BH CV ECHO MEAS - SI(MOD-SP2): 47.4 ML/M2
BH CV ECHO MEAS - SI(MOD-SP4): 44.7 ML/M2
BH CV ECHO MEAS - SV(LVOT): 115.1 ML
BH CV ECHO MEAS - SV(MOD-SP2): 107 ML
BH CV ECHO MEAS - SV(MOD-SP4): 101 ML
BH CV ECHO MEAS - SV(RVOT): 130.7 ML
BH CV ECHO MEAS - TAPSE (>1.6): 1.29 CM
BH CV ECHO MEAS - TR MAX PG: 19.1 MMHG
BH CV ECHO MEAS - TR MAX VEL: 218.5 CM/SEC
BH CV ECHO MEASUREMENTS AVERAGE E/E' RATIO: 11.12
BH CV XLRA - RV BASE: 3.8 CM
BH CV XLRA - RV LENGTH: 8.1 CM
BH CV XLRA - RV MID: 3.1 CM
BH CV XLRA - TDI S': 13.1 CM/SEC
LEFT ATRIUM VOLUME INDEX: 29.5 ML/M2
MAXIMAL PREDICTED HEART RATE: 136 BPM
SINUS: 3.8 CM
STJ: 3.8 CM
STRESS TARGET HR: 116 BPM

## 2022-07-25 PROCEDURE — 93306 TTE W/DOPPLER COMPLETE: CPT

## 2022-07-25 PROCEDURE — 25010000002 PERFLUTREN (DEFINITY) 8.476 MG IN SODIUM CHLORIDE (PF) 0.9 % 10 ML INJECTION: Performed by: NURSE PRACTITIONER

## 2022-07-25 PROCEDURE — 93306 TTE W/DOPPLER COMPLETE: CPT | Performed by: INTERNAL MEDICINE

## 2022-07-25 RX ADMIN — PERFLUTREN 3 ML: 6.52 INJECTION, SUSPENSION INTRAVENOUS at 12:33

## 2022-08-02 RX ORDER — LEVOTHYROXINE SODIUM 0.03 MG/1
TABLET ORAL
Qty: 30 TABLET | Refills: 1 | Status: SHIPPED | OUTPATIENT
Start: 2022-08-02 | End: 2022-09-16 | Stop reason: SDUPTHER

## 2022-08-08 RX ORDER — METOPROLOL SUCCINATE 25 MG/1
TABLET, EXTENDED RELEASE ORAL
Qty: 90 TABLET | Refills: 3 | Status: SHIPPED | OUTPATIENT
Start: 2022-08-08 | End: 2022-12-31 | Stop reason: HOSPADM

## 2022-08-12 RX ORDER — HYDRALAZINE HYDROCHLORIDE 25 MG/1
TABLET, FILM COATED ORAL
Qty: 180 TABLET | Refills: 3 | OUTPATIENT
Start: 2022-08-12

## 2022-08-15 ENCOUNTER — ANESTHESIA EVENT (OUTPATIENT)
Dept: GASTROENTEROLOGY | Facility: HOSPITAL | Age: 87
End: 2022-08-15

## 2022-08-15 ENCOUNTER — ANESTHESIA (OUTPATIENT)
Dept: GASTROENTEROLOGY | Facility: HOSPITAL | Age: 87
End: 2022-08-15

## 2022-08-15 ENCOUNTER — HOSPITAL ENCOUNTER (OUTPATIENT)
Facility: HOSPITAL | Age: 87
Setting detail: HOSPITAL OUTPATIENT SURGERY
Discharge: HOME | End: 2022-08-15
Attending: COLON & RECTAL SURGERY | Admitting: COLON & RECTAL SURGERY

## 2022-08-15 VITALS
WEIGHT: 231 LBS | RESPIRATION RATE: 22 BRPM | SYSTOLIC BLOOD PRESSURE: 136 MMHG | TEMPERATURE: 97.8 F | HEIGHT: 70 IN | DIASTOLIC BLOOD PRESSURE: 71 MMHG | BODY MASS INDEX: 33.07 KG/M2 | OXYGEN SATURATION: 93 % | HEART RATE: 65 BPM

## 2022-08-15 DIAGNOSIS — Z85.038 HISTORY OF COLON CANCER: ICD-10-CM

## 2022-08-15 LAB — GLUCOSE BLDC GLUCOMTR-MCNC: 147 MG/DL (ref 70–130)

## 2022-08-15 PROCEDURE — 88305 TISSUE EXAM BY PATHOLOGIST: CPT | Performed by: COLON & RECTAL SURGERY

## 2022-08-15 PROCEDURE — 25010000002 PROPOFOL 10 MG/ML EMULSION: Performed by: ANESTHESIOLOGY

## 2022-08-15 PROCEDURE — 45380 COLONOSCOPY AND BIOPSY: CPT | Performed by: COLON & RECTAL SURGERY

## 2022-08-15 PROCEDURE — 82962 GLUCOSE BLOOD TEST: CPT

## 2022-08-15 RX ORDER — SODIUM CHLORIDE 0.9 % (FLUSH) 0.9 %
10 SYRINGE (ML) INJECTION AS NEEDED
Status: DISCONTINUED | OUTPATIENT
Start: 2022-08-15 | End: 2022-08-15 | Stop reason: HOSPADM

## 2022-08-15 RX ORDER — PROPOFOL 10 MG/ML
VIAL (ML) INTRAVENOUS CONTINUOUS PRN
Status: DISCONTINUED | OUTPATIENT
Start: 2022-08-15 | End: 2022-08-15 | Stop reason: SURG

## 2022-08-15 RX ORDER — SODIUM CHLORIDE 0.9 % (FLUSH) 0.9 %
10 SYRINGE (ML) INJECTION EVERY 12 HOURS SCHEDULED
Status: DISCONTINUED | OUTPATIENT
Start: 2022-08-15 | End: 2022-08-15 | Stop reason: HOSPADM

## 2022-08-15 RX ORDER — SODIUM CHLORIDE, SODIUM LACTATE, POTASSIUM CHLORIDE, CALCIUM CHLORIDE 600; 310; 30; 20 MG/100ML; MG/100ML; MG/100ML; MG/100ML
30 INJECTION, SOLUTION INTRAVENOUS CONTINUOUS PRN
Status: DISCONTINUED | OUTPATIENT
Start: 2022-08-15 | End: 2022-08-15 | Stop reason: HOSPADM

## 2022-08-15 RX ORDER — LIDOCAINE HYDROCHLORIDE 20 MG/ML
INJECTION, SOLUTION INFILTRATION; PERINEURAL AS NEEDED
Status: DISCONTINUED | OUTPATIENT
Start: 2022-08-15 | End: 2022-08-15 | Stop reason: SURG

## 2022-08-15 RX ADMIN — SODIUM CHLORIDE, POTASSIUM CHLORIDE, SODIUM LACTATE AND CALCIUM CHLORIDE 30 ML/HR: 600; 310; 30; 20 INJECTION, SOLUTION INTRAVENOUS at 10:44

## 2022-08-15 RX ADMIN — LIDOCAINE HYDROCHLORIDE 60 MG: 20 INJECTION, SOLUTION INFILTRATION; PERINEURAL at 10:59

## 2022-08-15 RX ADMIN — PROPOFOL 200 MCG/KG/MIN: 10 INJECTION, EMULSION INTRAVENOUS at 10:59

## 2022-08-15 NOTE — H&P
Osvaldo Lopez is a 86 y.o. male  who is referred by Margarita Napoles MD for a colonoscopy. He   has an indications: previous colon cancer.     He denies any change in bowel function, melena, or hematochezia.    Past Medical History:   Diagnosis Date   • Abdominal aortic aneurysm (AAA) without rupture ([facility])    • Abdominal aortic ectasia ([facility]) 06/2015   • Abnormal EKG 10/25/2016    04/2019   • Abnormal thyroid blood test    • Actinic keratosis     FOLLOWED BY DR. MANPREET VILLARREAL   • Anemia 1/28/2021   • Arthritis    • Asthma     MILD, INTERMITTENT   • Atherosclerotic heart disease    • Atrial flutter with rapid ventricular response ([facility]) 04/14/2019    ADMITTED TO [facility]   • Atrial premature depolarization    • Atrophy of muscle of lower leg    • BPH (benign prostatic hyperplasia)     FOLLOWED BY DR. TERRA JONES   • Bruises easily    • Bulging of thoracic intervertebral disc    • Carpal tunnel syndrome, right 12/2019    FOLLOWED BY DR. NEW SANCHEZ   • Cataract     BILATERAL, S/P EXTRACTION WITH IOL IMPLANTS   • Chronic anticoagulation 1/29/2021   • Chronic edema    • Chronic low back pain with sciatica 1/26/2021   • Chronic pain    • CKD (chronic kidney disease) 1/29/2021   • Closed head injury 07/2018    WITH LACERATION TO SCALP, D/T SYNCOPE   • Colon cancer ([facility]) 01/31/2021    INVASIVE ADENOCARCINOMA FROM TUBULOVILLOUS ADENOMA IN TRANSVERSE, S/P COLON RESECTION, FOLLOWED BY DR. 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 TERRA JONES   • Epididymitis, right 03/2018   • Essential hypertension    • Eyebrow ptosis 11/9/2013   • Hallux valgus, acquired, bilateral 01/2019   • Heart attack ([facility]) 09/1989    S/P CABG, 5 VESSEL   • Heart murmur    • History of blood transfusion    • HL (hearing loss)    • Hyperactivity of bladder     FOLLOWED  BY DR. TERRA JONES   • HyperCKemia 11/2017   • Hyperkalemia 08/2016   • Hyperlipidemia     MIXED   • Hyperreflexia 11/2017    BILATERAL   • Hyphema 05/2015   • IBS (irritable bowel syndrome)    • Impaired functional mobility, balance, gait, and endurance    • Impingement syndrome of left shoulder 10/2015   • Infection associated with cystostomy catheter ([facility]) 12/2016   • Ischemic colitis ([facility])    • Lumbar radiculopathy 2016    wears back brace at times following back surgery   • Lumbar spondylosis 04/2016   • Lumbosacral neuritis 05/2015   • Lumbosacral radiculitis 05/2015   • Macular puckering of retina, left 10/2013   • 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 VIANEY GIORDANO   • Pseudophakia 11/9/2013   • RBBB (right bundle branch block)    • Recurrent falls    • Respiratory failure with hypoxia ([facility]) 01/2019   • SCC (squamous cell carcinoma) 10/16/2019    RIGHT FOREHEAD, LEFT TEMPLE, RIGHT SCALP, FOLLOWED BY DR. MANPREET VILLARREAL   • Scoliosis    • Spinal stenosis of lumbar region with neurogenic claudication 12/07/2017   • Syncope and collapse 07/16/2018    ADMITTED TO [facility]   • Torn rotator cuff 03/2017    BILATERAL, COMPLETE   • Type 2 diabetes mellitus ([facility])     IDDM   • Urinary frequency     FOLLOWED BY DR. TERRA JONES   • Vitamin D deficiency    • Vitreous hemorrhage of left eye ([facility])        Past Surgical History:   Procedure Laterality Date   • APPENDECTOMY N/A    • BROW LIFT Bilateral 11/12/2013    DR. OCTAVIA CEDILLO AT [place]   • CARDIAC  CATHETERIZATION Left 10/2008    LEFT CAROTID ARTERY STENOSIS 50-69%   • CARDIAC ELECTROPHYSIOLOGY PROCEDURE N/A 6/6/2019    Procedure: Ablation atrial flutter;  Surgeon: Matthew Talbot MD;  Location: [facility] INVASIVE LOCATION;  Service: Cardiovascular   • CATARACT EXTRACTION Left 01/22/1998    DR. LAYLA BARNES AT [facility]   • CATARACT EXTRACTION Right 03/2001    DR. LAYLA BARNES   • CHOLECYSTECTOMY N/A 08/24/1989    DR. FAUZIA PELAEZ AT [facility]   • COLON RESECTION N/A 2/3/2021    Procedure: LAPAROSCOPIC EXTENDED  RIGHT COLECTOMY WITH DAVINCI ROBOT;  Surgeon: Xavi Napoles MD;  Location: [facility] OR;  Service: DaVinci;  Laterality: N/A;   • COLONOSCOPY N/A 1/31/2021    20-25 MM POLYP IN CECUM, PATH: TUBULAR ADENOMA, 2 TUBULAR ADENOMA POLYPS IN ASCENDING, A FUNGATING AND SUBMUCOSAL ONONOBSTRUCTING MEDIUM MASS IN PROXIMAL TRANSVERSE, PATH: INVASIVE ADENOCARCINOMA ARISING IN A TUBULOVILOOUS ADENOMA, AREA TATTOOED, SCATTERED DIVERTICULA IN SIGMOID AND DESCENDING, LARGE INTERNAL HEMORRHOIDS, DR. JACOB ALICEA AT [facility]    • COLONOSCOPY N/A 02/02/2010    DIVERTICULOSIS, DR. SAL SOLANO AT [facility]   • CORONARY ARTERY BYPASS GRAFT N/A 09/1989    5 VESSEL, DR. HANKS   • CYST REMOVAL      FROM BACK   • ENDOSCOPY N/A 1/31/2021    ENTIRE EGD WNL, PATH: MILD REACTIVE GASTROPATHY, DR. JACOB ALICEA AT [facility]   • INGUINAL HERNIA REPAIR Left 09/27/2007    DR. MATTHEW RUSH AT [facility]   • INGUINAL HERNIA REPAIR Left 09/07/2007   • INGUINAL HERNIA REPAIR Left 2003   • KNEE ARTHROSCOPY W/ PARTIAL MEDIAL MENISCECTOMY Left 1962    DR. SINGH   • LUMBAR DISCECTOMY FUSION INSTRUMENTATION N/A 4/11/2016    Procedure: lumbar laminectomy L4-5 and fusion with instrumentation;  Surgeon: Ran Kline MD;  Location: [facility] OR;  Service:    • LUMBAR DISCECTOMY FUSION INSTRUMENTATION N/A 1/15/2018    Procedure: L2-3, L3-4 laminectomy and fusion with instrumentation and removal of implants L4 5.;  Surgeon: Ran Kline MD;   Location: [facility] MAIN OR;  Service:    • LUMBAR EPIDURAL INJECTION N/A 09/23/2015    DR. MATTHEW DOMINGUEZ AT [facility]   • LUMBAR EPIDURAL INJECTION N/A 10/07/2015    DR. ITZEL LUIS AT [facility]   • LUMBAR EPIDURAL INJECTION N/A 11/19/2015    DR. ITZEL LUIS AT [facility]   • WI TOTAL KNEE ARTHROPLASTY Left 11/14/2016    Procedure: TOTAL KNEE ARTHROPLASTY;  Surgeon: Dontrell Arellano MD;  Location: [facility] MAIN OR;  Service: Orthopedics   • SKIN BIOPSY Bilateral 10/16/2019    RIGHT LATERAL FOREHEAD:SCC, LEFT TEMPLE:SCC, RIGHT PARIETAL SCALP:SCC, DR. MANPREET VILLARREAL       Medications Prior to Admission   Medication Sig Dispense Refill Last Dose   • acetaminophen (TYLENOL) 500 MG tablet Take 500 mg by mouth Every 6 (Six) Hours As Needed for Mild Pain .   Past Week at Unknown time   • amiodarone (PACERONE) 200 MG tablet Take 1 tablet by mouth Daily. 90 tablet 3 8/15/2022 at Unknown time   • ferrous sulfate 325 (65 FE) MG tablet Take 325 mg by mouth Daily With Breakfast.   8/14/2022 at Unknown time   • finasteride (PROSCAR) 5 MG tablet TAKE 1 TABLET DAILY FOR    PROSTATE 90 tablet 3 8/15/2022 at Unknown time   • furosemide (LASIX) 20 MG tablet Take 40 mg by mouth Daily.   8/15/2022 at Unknown time   • levothyroxine (SYNTHROID, LEVOTHROID) 25 MCG tablet TAKE 1 TABLET DAILY 30 tablet 1 8/15/2022 at Unknown time   • lisinopril (PRINIVIL,ZESTRIL) 20 MG tablet TAKE 1 TABLET DAILY 90 tablet 3 8/15/2022 at Unknown time   • metoprolol succinate XL (TOPROL-XL) 25 MG 24 hr tablet TAKE 1 TABLET DAILY 90 tablet 3 8/15/2022 at Unknown time   • Multiple Vitamin (MULTI VITAMIN PO) Take 1 tablet by mouth Every Morning.   8/14/2022 at Unknown time   • tamsulosin (FLOMAX) 0.4 MG capsule 24 hr capsule TAKE 2 CAPSULES DAILY 180 capsule 3 8/14/2022 at Unknown time   • Tradjenta 5 MG tablet tablet TAKE 1 TABLET DAILY 90 tablet 3 8/15/2022 at Unknown time   • traMADol (ULTRAM) 50 MG tablet TAKE 1 TABLET BY MOUTH EVERY 6 (SIX) HOURS AS NEEDED FOR MODERATE PAIN  OR SEVERE PAIN 30 tablet 2 Past Week at Unknown time   • vitamin B-12 (CYANOCOBALAMIN) 1000 MCG tablet Take 1,000 mcg by mouth Daily.   2022 at Unknown time   • Xarelto 15 MG tablet TAKE 1 TABLET DAILY 90 tablet 3 2022   • Alpha-Lipoic Acid 600 MG tablet Take 600 mg by mouth Daily. For neuropathy 30 tablet 11    • KYRA MICROLET LANCETS lancets USE TWICE A  each 5    • glucose blood (Contour Next Test) test strip 1 each by Other route Daily. test blood sugar 50 each 5    • lidocaine (LMX) 4 % cream       • mupirocin (Bactroban) 2 % ointment Apply  topically to the appropriate area as directed 3 (Three) Times a Day. 30 g 1        Allergies   Allergen Reactions   • Amiodarone Myalgia     Muscle problems in legs   • Percocet [Oxycodone-Acetaminophen] Mental Status Change and Confusion     Causes confusion/       Family History   Problem Relation Age of Onset   • Heart failure Mother    • Diabetes Mother    • Heart disease Mother    • Cancer Sister    • Diabetes Sister    • Cancer Brother    • Diabetes Brother    • Other Brother         INCLUSION BODY NYOSITIS   • Cancer Father        Social History     Socioeconomic History   • Marital status:    Tobacco Use   • Smoking status: Former Smoker     Packs/day: 3.00     Years: 45.00     Pack years: 135.00     Types: Cigarettes     Quit date: 1989     Years since quittin.8   • Smokeless tobacco: Never Used   Vaping Use   • Vaping Use: Never used   Substance and Sexual Activity   • Alcohol use: Yes     Comment: 1 shot of whiskey in the morning and 1 shot of whiskey in the evening   • Drug use: Never   • Sexual activity: Not Currently       Review of Systems   Gastrointestinal: Negative for abdominal pain, nausea and vomiting.   All other systems reviewed and are negative.      Vitals:    08/15/22 1122   BP: (!) 84/50   Pulse: 63   Resp: 21   SpO2: 96%         Physical Exam      Assessment & Plan      indications: previous colon cancer          I recommend colonoscopy.  I described risks, benefits of the procedure with the patient including but not limited to bleeding, infection, possibility of perforation and possible polypectomy. All of the patient's questions were answered and they would like to proceed with the above recommendations.

## 2022-08-15 NOTE — ANESTHESIA PREPROCEDURE EVALUATION
Anesthesia Evaluation     Patient summary reviewed and Nursing notes reviewed                Airway   Mallampati: I  TM distance: >3 FB  Neck ROM: full  No difficulty expected  Dental - normal exam     Pulmonary - normal exam   (+) asthma,  Cardiovascular - normal exam    (+) hypertension, valvular problems/murmurs AS, past MI , CAD, CABG, dysrhythmias Atrial Flutter, Paroxysmal Atrial Fib, hyperlipidemia,       Neuro/Psych  (+) syncope, numbness,    GI/Hepatic/Renal/Endo    (+)   renal disease, diabetes mellitus,     Musculoskeletal     Abdominal  - normal exam    Bowel sounds: normal.   Substance History - negative use     OB/GYN negative ob/gyn ROS         Other   arthritis,    history of cancer                    Anesthesia Plan    ASA 3     MAC       Anesthetic plan, risks, benefits, and alternatives have been provided, discussed and informed consent has been obtained with: patient.        CODE STATUS:

## 2022-08-16 LAB
LAB AP CASE REPORT: NORMAL
PATH REPORT.FINAL DX SPEC: NORMAL
PATH REPORT.GROSS SPEC: NORMAL

## 2022-08-30 RX ORDER — FUROSEMIDE 20 MG/1
TABLET ORAL
Qty: 90 TABLET | Refills: 3 | Status: SHIPPED | OUTPATIENT
Start: 2022-08-30 | End: 2022-10-06

## 2022-08-30 RX ORDER — AMIODARONE HYDROCHLORIDE 200 MG/1
TABLET ORAL
Qty: 90 TABLET | Refills: 1 | Status: SHIPPED | OUTPATIENT
Start: 2022-08-30

## 2022-09-01 ENCOUNTER — OFFICE VISIT (OUTPATIENT)
Dept: CARDIOLOGY | Facility: CLINIC | Age: 87
End: 2022-09-01

## 2022-09-01 ENCOUNTER — HOSPITAL ENCOUNTER (OUTPATIENT)
Dept: GENERAL RADIOLOGY | Facility: HOSPITAL | Age: 87
Discharge: HOME | End: 2022-09-01

## 2022-09-01 VITALS
WEIGHT: 239 LBS | HEART RATE: 88 BPM | BODY MASS INDEX: 34.22 KG/M2 | DIASTOLIC BLOOD PRESSURE: 82 MMHG | HEIGHT: 70 IN | SYSTOLIC BLOOD PRESSURE: 144 MMHG

## 2022-09-01 DIAGNOSIS — I10 ESSENTIAL HYPERTENSION: ICD-10-CM

## 2022-09-01 DIAGNOSIS — I35.0 NONRHEUMATIC AORTIC VALVE STENOSIS: ICD-10-CM

## 2022-09-01 DIAGNOSIS — I48.92 ATRIAL FLUTTER WITH RAPID VENTRICULAR RESPONSE: ICD-10-CM

## 2022-09-01 DIAGNOSIS — R06.02 SHORTNESS OF BREATH: ICD-10-CM

## 2022-09-01 DIAGNOSIS — I48.3 TYPICAL ATRIAL FLUTTER: Primary | ICD-10-CM

## 2022-09-01 PROCEDURE — 93000 ELECTROCARDIOGRAM COMPLETE: CPT

## 2022-09-01 PROCEDURE — 71046 X-RAY EXAM CHEST 2 VIEWS: CPT

## 2022-09-01 PROCEDURE — 99214 OFFICE O/P EST MOD 30 MIN: CPT

## 2022-09-01 NOTE — PROGRESS NOTES
Date of Office Visit: 2022  Encounter Provider: LOU Peñaloza  Place of Service: [facility] CARDIOLOGY  Patient Name: Osvaldo Lopez  :1935    Chief Complaint   Patient presents with   • paroxysmal AFIB     6 month f/u    • Atrial Flutter w/RVR          • Hypertension   :     HPI: Osvaldo Lopez is a 86 y.o. male who is followed by Dr. Driscoll and Dr. Talbot.  He has a history of aortic valve stenosis, CKD, AAA, hypothyroidism, and paroxsymal atrial fibrillation---- well controlled on amiodarone.     He last saw Dr. Talbot in February.  He was doing well at that time.  His liver function and thyroid function were within normal limits. He did not think he was having any episodes of AF. Advised to follow up in 6 months.     He saw Xiomy Martínez  and was having dyspnea with exertion.  Echo was ordered which was unremarkable, LVEF was 65-70%.  24 hour monitor showed no evidence of AF. 5.9% burden of PACs. He was advised to follow up in 6 months.                                 He presents today for follow up appt.     He says that he has been doing okay over the past 6 months.     Had carotid dopplers in  that showed mild bilateral stenosis.     From an AF standpoint he is doing well.  He thinks that he has occasional episodes.  He notices them more when he bends over.  Associated with shortness of breath.  He rests a few minutes and the palpitations get better.     He denies any chest pain, dizziness, or syncope    Nephrology decreased his lasix back in . His swelling got worse so he is now back on 40mg daily. 1+ edema bilaterally.     He is not very active. Uses a walker to get around.       Past Medical History:   Diagnosis Date   • Abdominal aortic aneurysm (AAA) without rupture (HCC)    • Abdominal aortic ectasia (HCC) 2015   • Abnormal EKG 10/25/2016    2019   • Abnormal thyroid blood test    • Actinic keratosis     FOLLOWED BY DR. MANPREET GLASS  PHIL   • Anemia 01/28/2021   • Arthritis    • Asthma     MILD, INTERMITTENT   • Atherosclerotic heart disease    • Atrial flutter with rapid ventricular response ([facility]) 04/14/2019    ADMITTED TO [facility]   • Atrial premature depolarization    • Atrophy of muscle of lower leg    • BPH (benign prostatic hyperplasia)     FOLLOWED BY DR. TERRA JONES   • Bruises easily    • Bulging of thoracic intervertebral disc    • Carpal tunnel syndrome, right 12/2019    FOLLOWED BY DR. NEW SANCHEZ   • Cataract     BILATERAL, S/P EXTRACTION WITH IOL IMPLANTS   • Chronic anticoagulation 01/29/2021   • Chronic edema    • Chronic low back pain with sciatica 01/26/2021   • Chronic pain    • CKD (chronic kidney disease) 01/29/2021   • Closed head injury 07/2018    WITH LACERATION TO SCALP, D/T SYNCOPE   • Colon cancer ([facility]) 01/31/2021    INVASIVE ADENOCARCINOMA FROM TUBULOVILLOUS ADENOMA IN TRANSVERSE, S/P COLON RESECTION, FOLLOWED BY DR. MARGARITA COX   • Colon polyps     FOLLOWED BY DR. 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 TERRA JONES   • Epididymitis, right 03/2018   • Essential hypertension    • Eyebrow ptosis 11/09/2013   • Hallux valgus, acquired, bilateral 01/2019   • Heart attack ([facility]) 09/1989    S/P CABG, 5 VESSEL   • Heart murmur    • History of blood transfusion    • HL (hearing loss)    • Hyperactivity of bladder     FOLLOWED BY DR. TERRA JONES   • HyperCKemia 11/2017   • Hyperkalemia 08/2016   • Hyperlipidemia     MIXED   • Hyperreflexia 11/2017    BILATERAL   • Hyphema 05/2015   • IBS (irritable bowel syndrome)    • Impaired functional mobility, balance, gait, and endurance    • Impingement syndrome of left shoulder 10/2015   • Infection associated with cystostomy catheter ([facility]) 12/2016   • Ischemic colitis ([facility])    • Lumbar  radiculopathy 2016    wears back brace at times following back surgery   • Lumbar spondylosis 04/2016   • Lumbosacral neuritis 05/2015   • Lumbosacral radiculitis 05/2015   • Macular puckering of retina, left 10/2013   • 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 VIANEY GIORDANO   • Pseudophakia 11/09/2013   • RBBB (right bundle branch block)    • Recurrent falls    • Respiratory failure with hypoxia ([facility]) 01/2019   • SCC (squamous cell carcinoma) 10/16/2019    RIGHT FOREHEAD, LEFT TEMPLE, RIGHT SCALP, FOLLOWED BY DR. MANPREET VILLARREAL   • Scoliosis    • Spinal stenosis of lumbar region with neurogenic claudication 12/07/2017   • Syncope and collapse 07/16/2018    ADMITTED TO [facility]   • Torn rotator cuff 03/2017    BILATERAL, COMPLETE   • Type 2 diabetes mellitus ([facility])     IDDM   • Urinary frequency     FOLLOWED BY DR. TERRA JONES   • Vitamin D deficiency    • Vitreous hemorrhage of left eye ([facility])        Past Surgical History:   Procedure Laterality Date   • APPENDECTOMY N/A    • BROW LIFT Bilateral 11/12/2013    DR. OCTAVIA CEDILLO AT [facility]   • CARDIAC CATHETERIZATION Left 10/2008    LEFT CAROTID ARTERY STENOSIS 50-69%   • CARDIAC ELECTROPHYSIOLOGY PROCEDURE N/A 06/06/2019    Procedure: Ablation atrial flutter;  Surgeon: Miller Talbot MD;  Location: [facility] INVASIVE LOCATION;  Service: Cardiovascular   • CATARACT EXTRACTION Left 01/22/1998    DR. LAYLA BARNES AT [facility]   • CATARACT EXTRACTION Right 03/2001    DR. LAYLA BARNES   • CHOLECYSTECTOMY N/A  08/24/1989    DR. FAUZIA PELAEZ AT [facility]   • COLON RESECTION N/A 02/03/2021    Procedure: LAPAROSCOPIC EXTENDED  RIGHT COLECTOMY WITH DAVINCI ROBOT;  Surgeon: Margarita Napoles MD;  Location: [facility] MAIN OR;  Service: DaVinci;  Laterality: N/A;   • COLONOSCOPY N/A 01/31/2021    20-25 MM POLYP IN CECUM, PATH: TUBULAR ADENOMA, 2 TUBULAR ADENOMA POLYPS IN ASCENDING, A FUNGATING AND SUBMUCOSAL ONONOBSTRUCTING MEDIUM MASS IN PROXIMAL TRANSVERSE, PATH: INVASIVE ADENOCARCINOMA ARISING IN A TUBULOVILOOUS ADENOMA, AREA TATTOOED, SCATTERED DIVERTICULA IN SIGMOID AND DESCENDING, LARGE INTERNAL HEMORRHOIDS, DR. JACOB ALICEA AT [facility]    • COLONOSCOPY N/A 02/02/2010    DIVERTICULOSIS, DR. SAL SLOANO AT [facility]   • COLONOSCOPY N/A 08/15/2022    5 MM TUBULAR ADENOMA POLYP IN SIGMOID, 2 TUBULAR ADENOMA POLYPS IN DESCENDING, MULTIPLE DIVERTICULA IN SIGMOID, RESCOPE IN 2 YRS, DR. MARGARITA NAPOLES AT [facility]   • CORONARY ARTERY BYPASS GRAFT N/A 09/1989    5 VESSEL, DR. HANKS   • CYST REMOVAL      FROM BACK   • ENDOSCOPY N/A 01/31/2021    ENTIRE EGD WNL, PATH: MILD REACTIVE GASTROPATHY, DR. JACOB ALICEA AT [facility]   • INGUINAL HERNIA REPAIR Left 09/27/2007    DR. MATTHEW RUSH AT [facility]   • INGUINAL HERNIA REPAIR Left 09/07/2007   • INGUINAL HERNIA REPAIR Left 2003   • KNEE ARTHROSCOPY W/ PARTIAL MEDIAL MENISCECTOMY Left 1962    DR. SINGH   • LUMBAR DISCECTOMY FUSION INSTRUMENTATION N/A 04/11/2016    Procedure: lumbar laminectomy L4-5 and fusion with instrumentation;  Surgeon: Ran Kline MD;  Location: [facility] OR;  Service:    • LUMBAR DISCECTOMY FUSION INSTRUMENTATION N/A 01/15/2018    Procedure: L2-3, L3-4 laminectomy and fusion with instrumentation and removal of implants L4 5.;  Surgeon: Ran Kline MD;  Location: [facility] MAIN OR;  Service:    • LUMBAR EPIDURAL INJECTION N/A 09/23/2015    DR. MATTHEW DOMINGUEZ AT [facility]   • LUMBAR EPIDURAL INJECTION N/A 10/07/2015    DR. ITZEL LUIS AT [facility]   • LUMBAR EPIDURAL INJECTION N/A  2015    DR. ITZEL LUIS AT [facility]   • MI TOTAL KNEE ARTHROPLASTY Left 2016    Procedure: TOTAL KNEE ARTHROPLASTY;  Surgeon: Dontrell Arellano MD;  Location: [facility];  Service: Orthopedics   • SKIN BIOPSY Bilateral 10/16/2019    RIGHT LATERAL FOREHEAD:SCC, LEFT TEMPLE:SCC, RIGHT PARIETAL SCALP:SCC, DR. MANPREET VILLARREAL       Social History     Socioeconomic History   • Marital status:    Tobacco Use   • Smoking status: Former Smoker     Packs/day: 3.00     Years: 45.00     Pack years: 135.00     Types: Cigarettes     Quit date: 1989     Years since quittin.8   • Smokeless tobacco: Never Used   Vaping Use   • Vaping Use: Never used   Substance and Sexual Activity   • Alcohol use: Yes     Comment: 1 shot of whiskey in the morning and 1 shot of whiskey in the evening   • Drug use: Never   • Sexual activity: Not Currently       Family History   Problem Relation Age of Onset   • Heart failure Mother    • Diabetes Mother    • Heart disease Mother    • Cancer Sister    • Diabetes Sister    • Cancer Brother    • Diabetes Brother    • Other Brother         INCLUSION BODY NYOSITIS   • Cancer Father        Review of Systems   Constitutional: Negative for chills, fever and malaise/fatigue.   Cardiovascular: Positive for irregular heartbeat and palpitations. Negative for chest pain, dyspnea on exertion, leg swelling, near-syncope, orthopnea, paroxysmal nocturnal dyspnea and syncope.   Respiratory: Negative for cough and shortness of breath.    Musculoskeletal: Negative for joint pain, joint swelling and myalgias.   Gastrointestinal: Negative for abdominal pain, diarrhea, melena, nausea and vomiting.   Genitourinary: Negative for frequency and hematuria.   Neurological: Negative for light-headedness, numbness, paresthesias and seizures.   Allergic/Immunologic: Negative.    All other systems reviewed and are negative.      Allergies   Allergen Reactions   • Amiodarone Myalgia     Muscle problems in  legs   • Percocet [Oxycodone-Acetaminophen] Mental Status Change and Confusion     Causes confusion/         Current Outpatient Medications:   •  acetaminophen (TYLENOL) 500 MG tablet, Take 500 mg by mouth Every 6 (Six) Hours As Needed for Mild Pain ., Disp: , Rfl:   •  Alpha-Lipoic Acid 600 MG tablet, Take 600 mg by mouth Daily. For neuropathy, Disp: 30 tablet, Rfl: 11  •  amiodarone (PACERONE) 200 MG tablet, TAKE 1 TABLET DAILY., Disp: 90 tablet, Rfl: 1  •  Repair Report MICROLET LANCETS lancets, USE TWICE A DAY, Disp: 100 each, Rfl: 5  •  ferrous sulfate 325 (65 FE) MG tablet, Take 325 mg by mouth Daily With Breakfast., Disp: , Rfl:   •  finasteride (PROSCAR) 5 MG tablet, TAKE 1 TABLET DAILY FOR    PROSTATE, Disp: 90 tablet, Rfl: 3  •  furosemide (LASIX) 20 MG tablet, TAKE 1 TABLET DAILY (Patient taking differently: Take 40 mg by mouth Daily.), Disp: 90 tablet, Rfl: 3  •  glucose blood (Contour Next Test) test strip, 1 each by Other route Daily. test blood sugar, Disp: 50 each, Rfl: 5  •  levothyroxine (SYNTHROID, LEVOTHROID) 25 MCG tablet, TAKE 1 TABLET DAILY, Disp: 30 tablet, Rfl: 1  •  lidocaine (LMX) 4 % cream, , Disp: , Rfl:   •  lisinopril (PRINIVIL,ZESTRIL) 20 MG tablet, TAKE 1 TABLET DAILY, Disp: 90 tablet, Rfl: 3  •  metoprolol succinate XL (TOPROL-XL) 25 MG 24 hr tablet, TAKE 1 TABLET DAILY, Disp: 90 tablet, Rfl: 3  •  Multiple Vitamin (MULTI VITAMIN PO), Take 1 tablet by mouth Every Morning., Disp: , Rfl:   •  mupirocin (Bactroban) 2 % ointment, Apply  topically to the appropriate area as directed 3 (Three) Times a Day., Disp: 30 g, Rfl: 1  •  tamsulosin (FLOMAX) 0.4 MG capsule 24 hr capsule, TAKE 2 CAPSULES DAILY, Disp: 180 capsule, Rfl: 3  •  Tradjenta 5 MG tablet tablet, TAKE 1 TABLET DAILY, Disp: 90 tablet, Rfl: 3  •  traMADol (ULTRAM) 50 MG tablet, TAKE 1 TABLET BY MOUTH EVERY 6 (SIX) HOURS AS NEEDED FOR MODERATE PAIN OR SEVERE PAIN, Disp: 30 tablet, Rfl: 2  •  vitamin B-12 (CYANOCOBALAMIN) 1000 MCG  "tablet, Take 1,000 mcg by mouth Daily., Disp: , Rfl:   •  Xarelto 15 MG tablet, TAKE 1 TABLET DAILY, Disp: 90 tablet, Rfl: 3      Objective:     Vitals:    09/01/22 1329   BP: 144/82   Pulse: 88   Weight: 108 kg (239 lb)   Height: 177.8 cm (70\")     Body mass index is 34.29 kg/m².    PHYSICAL EXAM:    Vitals Reviewed.   General Appearance: No acute distress, well developed and well nourished.   Eyes: Conjunctiva and lids: No erythema, swelling, or discharge. Sclera non-icteric.   HENT: Atraumatic, normocephalic. External eyes, ears, and nose normal.   Respiratory: No signs of respiratory distress. Respiration rhythm and depth normal.   Clear to auscultation. No rales, crackles, rhonchi, or wheezing auscultated.   Cardiovascular:  Heart Rate and Rhythm: Normal, Heart Sounds: Normal S1 and S2. No S3 or S4 noted.  Gastrointestinal:  Abdomen soft, non-distended, non-tender.   Musculoskeletal: Normal movement of extremities  Skin: Warm and dry.   Psychiatric: Patient alert and oriented to person, place, and time. Speech and behavior appropriate. Normal mood and affect.       ECG 12 Lead    Date/Time: 9/1/2022 2:03 PM  Performed by: Perez Harmon APRN  Authorized by: Perez Harmon APRN   Comparison: compared with previous ECG   Similar to previous ECG  Rhythm: sinus rhythm  BPM: 88                Assessment:       Diagnosis Plan   1. Typical atrial flutter (HCC)     2. Atrial flutter with rapid ventricular response (HCC)     3. Nonrheumatic aortic valve stenosis     4. Shortness of breath  XR Chest 2 View    TSH   5. Essential hypertension  Comprehensive Metabolic Panel    TSH          Plan:   1-2. Typical atrial flutter---well controlled on amiodarone-- he thinks that he has occasional episodes that he notices more with activity,  He rests and it gets better.  He is currently on amiodarone and apixaban.      I did offer a longer term monitor but he did not wish to do that today.     3. Aortic valve stenosis-- present " on echo last month, appears unchanged. Normal LV function and EF.  1+ BLE on exam. On lasix 40mg daily.    4. Shortness of breath-- has gotten better since June. Unclear etiology. Echo and monitor were normal.    5. HTN-- well controlled.       I asked him to have amiodarone surveillance today--- chest xray, CMP, TSH, and amio level.          He will follow up in 6 months or sooner pending labs.             As always, it has been a pleasure to participate in your patient's care.      Sincerely,         LOU Peñaloza

## 2022-09-07 ENCOUNTER — TELEPHONE (OUTPATIENT)
Dept: CARDIOLOGY | Facility: CLINIC | Age: 87
End: 2022-09-07

## 2022-09-07 NOTE — TELEPHONE ENCOUNTER
----- Message from LOU Bryant sent at 9/6/2022  6:23 PM EDT -----  Normal chest xray, continue amiodarone and keep follow up with KP  ----- Message -----  From: Dolores Rad Results [place] In  Sent: 9/1/2022   3:17 PM EDT  To: LOU Bryant

## 2022-09-09 PROBLEM — L84 PRE-ULCERATIVE CALLUSES: Status: ACTIVE | Noted: 2022-09-09

## 2022-09-16 RX ORDER — LEVOTHYROXINE SODIUM 0.03 MG/1
25 TABLET ORAL DAILY
Qty: 90 TABLET | Refills: 1 | Status: SHIPPED | OUTPATIENT
Start: 2022-09-16 | End: 2023-01-02

## 2022-09-16 NOTE — TELEPHONE ENCOUNTER
Caller: Osvaldo Lopez    Relationship: Self    Best call back number:     Requested Prescriptions:      levothyroxine (SYNTHROID, LEVOTHROID) 25 MCG tablet      Pharmacy where request should be sent: [facility] - [place], [place] - [address] AT PORTAL TO REGISTER[facility] - [phone]  - [phone] FX     Additional details provided by patient: *  Does the patient have less than a 3 day supply:  [] Yes  [x] No    Quang Ricardo Rep   09/16/22 11:41 EDT

## 2022-09-26 ENCOUNTER — HOSPITAL ENCOUNTER (OUTPATIENT)
Dept: GENERAL RADIOLOGY | Facility: HOSPITAL | Age: 87
Discharge: HOME | End: 2022-09-26
Admitting: FAMILY MEDICINE

## 2022-09-26 ENCOUNTER — TELEPHONE (OUTPATIENT)
Dept: INTERNAL MEDICINE | Facility: CLINIC | Age: 87
End: 2022-09-26

## 2022-09-26 DIAGNOSIS — M25.561 ACUTE PAIN OF RIGHT KNEE: ICD-10-CM

## 2022-09-26 DIAGNOSIS — M25.561 ACUTE PAIN OF RIGHT KNEE: Primary | ICD-10-CM

## 2022-09-26 PROCEDURE — 73560 X-RAY EXAM OF KNEE 1 OR 2: CPT

## 2022-09-26 NOTE — TELEPHONE ENCOUNTER
Caller: Sisi Sanchez    Relationship: Emergency Contact    Best call back number: [phone]    What was the call regarding: PATIENT HAS FALLEN TWICE IN THE LAST THREE WEEKS. THIS LAST FALL, HE HURT HIS GOOD KNEE (RIGHT KNEE). HE HAS TROUBLE GETTING UP AND IT IS PAINFUL. PLEASE ADVISE IF DR MOTT WOULD LIKE TO SEE HIM OR SEND HIM FOR MRI/XRAY    Do you require a callback: YES

## 2022-09-27 ENCOUNTER — OFFICE VISIT (OUTPATIENT)
Dept: INTERNAL MEDICINE | Facility: CLINIC | Age: 87
End: 2022-09-27

## 2022-09-27 VITALS
TEMPERATURE: 97.7 F | OXYGEN SATURATION: 93 % | BODY MASS INDEX: 34.01 KG/M2 | HEART RATE: 72 BPM | WEIGHT: 237 LBS | DIASTOLIC BLOOD PRESSURE: 72 MMHG | SYSTOLIC BLOOD PRESSURE: 154 MMHG

## 2022-09-27 DIAGNOSIS — E11.8 DM (DIABETES MELLITUS), TYPE 2 WITH COMPLICATIONS: ICD-10-CM

## 2022-09-27 DIAGNOSIS — M25.561 ACUTE PAIN OF RIGHT KNEE: Primary | ICD-10-CM

## 2022-09-27 DIAGNOSIS — M48.062 SPINAL STENOSIS OF LUMBAR REGION WITH NEUROGENIC CLAUDICATION: ICD-10-CM

## 2022-09-27 DIAGNOSIS — M17.11 PRIMARY OSTEOARTHRITIS OF RIGHT KNEE: ICD-10-CM

## 2022-09-27 DIAGNOSIS — Z23 NEED FOR INFLUENZA VACCINATION: ICD-10-CM

## 2022-09-27 DIAGNOSIS — M54.16 LUMBAR RADICULOPATHY: ICD-10-CM

## 2022-09-27 DIAGNOSIS — R29.898 BILATERAL LEG WEAKNESS: ICD-10-CM

## 2022-09-27 PROCEDURE — 90662 IIV NO PRSV INCREASED AG IM: CPT | Performed by: FAMILY MEDICINE

## 2022-09-27 PROCEDURE — G0008 ADMIN INFLUENZA VIRUS VAC: HCPCS | Performed by: FAMILY MEDICINE

## 2022-09-27 PROCEDURE — 99214 OFFICE O/P EST MOD 30 MIN: CPT | Performed by: FAMILY MEDICINE

## 2022-10-06 RX ORDER — FUROSEMIDE 40 MG/1
TABLET ORAL
Qty: 90 TABLET | Refills: 3 | Status: ON HOLD | OUTPATIENT
Start: 2022-10-06 | End: 2022-12-31 | Stop reason: SDUPTHER

## 2022-10-06 RX ORDER — HYDRALAZINE HYDROCHLORIDE 25 MG/1
TABLET, FILM COATED ORAL
Qty: 180 TABLET | Refills: 3 | OUTPATIENT
Start: 2022-10-06

## 2022-10-06 NOTE — TELEPHONE ENCOUNTER
Failed protocol    Next OV-03/02/23-KP  Last OV-09/01/22-CR, 06/22/22-TERRY    Please advise        Zurdo

## 2022-11-14 DIAGNOSIS — M54.40 CHRONIC LOW BACK PAIN WITH SCIATICA, SCIATICA LATERALITY UNSPECIFIED, UNSPECIFIED BACK PAIN LATERALITY: ICD-10-CM

## 2022-11-14 DIAGNOSIS — G89.29 CHRONIC LOW BACK PAIN WITH SCIATICA, SCIATICA LATERALITY UNSPECIFIED, UNSPECIFIED BACK PAIN LATERALITY: ICD-10-CM

## 2022-11-14 DIAGNOSIS — G61.82 MULTIFOCAL MOTOR NEUROPATHY: ICD-10-CM

## 2022-11-14 RX ORDER — TRAMADOL HYDROCHLORIDE 50 MG/1
50 TABLET ORAL EVERY 6 HOURS PRN
Qty: 30 TABLET | Refills: 1 | Status: ON HOLD | OUTPATIENT
Start: 2022-11-14 | End: 2022-12-31 | Stop reason: SDUPTHER

## 2022-12-08 ENCOUNTER — OFFICE VISIT (OUTPATIENT)
Dept: INTERNAL MEDICINE | Facility: CLINIC | Age: 87
End: 2022-12-08

## 2022-12-08 VITALS
WEIGHT: 240 LBS | DIASTOLIC BLOOD PRESSURE: 74 MMHG | TEMPERATURE: 98 F | BODY MASS INDEX: 34.44 KG/M2 | OXYGEN SATURATION: 91 % | HEART RATE: 78 BPM | SYSTOLIC BLOOD PRESSURE: 162 MMHG

## 2022-12-08 DIAGNOSIS — E78.2 MIXED HYPERLIPIDEMIA: ICD-10-CM

## 2022-12-08 DIAGNOSIS — E11.8 DM (DIABETES MELLITUS), TYPE 2 WITH COMPLICATIONS: ICD-10-CM

## 2022-12-08 DIAGNOSIS — M54.16 LUMBAR RADICULOPATHY: ICD-10-CM

## 2022-12-08 DIAGNOSIS — E11.42 DIABETIC PERIPHERAL NEUROPATHY: ICD-10-CM

## 2022-12-08 DIAGNOSIS — G61.82 MULTIFOCAL MOTOR NEUROPATHY: ICD-10-CM

## 2022-12-08 DIAGNOSIS — I48.0 PAROXYSMAL ATRIAL FIBRILLATION: ICD-10-CM

## 2022-12-08 DIAGNOSIS — S30.1XXA HEMATOMA OF LEFT FLANK, INITIAL ENCOUNTER: ICD-10-CM

## 2022-12-08 DIAGNOSIS — I10 ESSENTIAL HYPERTENSION: Primary | ICD-10-CM

## 2022-12-08 DIAGNOSIS — D50.0 IRON DEFICIENCY ANEMIA DUE TO CHRONIC BLOOD LOSS: ICD-10-CM

## 2022-12-08 DIAGNOSIS — N18.32 STAGE 3B CHRONIC KIDNEY DISEASE: ICD-10-CM

## 2022-12-08 DIAGNOSIS — L98.9 SKIN LESION OF FACE: ICD-10-CM

## 2022-12-08 PROCEDURE — 99214 OFFICE O/P EST MOD 30 MIN: CPT | Performed by: FAMILY MEDICINE

## 2022-12-08 NOTE — PROGRESS NOTES
"Chief Complaint  Hyperlipidemia, Hypertension, Diabetes, and Chronic Kidney Disease    Subjective        Osvaldo Lopez presents to [facility] PRIMARY [facility]ARE  History of Present Illness  Mr. Lopez had a fall Sunday before Thanksgiving with a large hematoma at the level 12th rib.  Left flank with ecchymosis in the lower back area.  Appears to be slowly healing.    Skin lesions are present with changes over the left axillary area of the face which looks like some evolving pigmented skin and also dark seborrheic keratosis at the upper back.    Leg weakness is continued with prior history of spinal stenosis and back surgeries but perhaps a little better.    Active management diabetes type 2 chronic kidney disease hypertension hyperlipidemia are reviewed with also pending labs.  Hyperlipidemia  This is a chronic problem. The current episode started more than 1 year ago. The problem is controlled.   Hypertension    Diabetes  He presents for his follow-up diabetic visit. He has type 2 diabetes mellitus. Associated symptoms include foot paresthesias. Symptoms are stable.   Chronic Kidney Disease  This is a chronic problem. The current episode started more than 1 year ago. The problem has been unchanged.       Objective   Vital Signs:  /74 (BP Location: Left arm, Patient Position: Sitting, Cuff Size: Adult)   Pulse 78   Temp 98 °F (36.7 °C) (Tympanic)   Wt 109 kg (240 lb)   SpO2 91%   BMI 34.44 kg/m²   Estimated body mass index is 34.44 kg/m² as calculated from the following:    Height as of 9/1/22: 177.8 cm (70\").    Weight as of this encounter: 109 kg (240 lb).          Physical Exam  Vitals reviewed.   Constitutional:       Appearance: He is well-developed.   HENT:      Head: Normocephalic and atraumatic.      Right Ear: Tympanic membrane and external ear normal.      Left Ear: Tympanic membrane and external ear normal.      Nose: Nose normal.   Eyes:      Conjunctiva/sclera: Conjunctivae " normal.      Pupils: Pupils are equal, round, and reactive to light.   Neck:      Thyroid: No thyromegaly.      Vascular: No JVD.   Cardiovascular:      Rate and Rhythm: Normal rate and regular rhythm.      Heart sounds: Murmur heard.    Systolic murmur is present with a grade of 2/6.  Pulmonary:      Effort: Pulmonary effort is normal.      Breath sounds: Normal breath sounds.   Abdominal:      General: Bowel sounds are normal.      Palpations: Abdomen is soft.   Musculoskeletal:      Cervical back: Normal range of motion and neck supple.      Lumbar back: Decreased range of motion.        Back:    Lymphadenopathy:      Cervical: No cervical adenopathy.   Skin:     General: Skin is warm and dry.      Findings: No rash.   Neurological:      Mental Status: He is alert and oriented to person, place, and time.      Cranial Nerves: No cranial nerve deficit.      Motor: Weakness present.      Coordination: Coordination abnormal.      Gait: Gait abnormal.      Comments: Continue weakness of both legs with some improvement   Psychiatric:         Behavior: Behavior normal.         Thought Content: Thought content normal.         Judgment: Judgment normal.        Result Review :  The following data was reviewed by: Caio Rodríguez MD on 12/08/2022:  Common labs    Common Labs 6/7/22 6/7/22 6/7/22 6/7/22 6/22/22    1311 1311 1311 1311    Glucose   163 (A)  171 (A)   BUN   28 (A)  36 (A)   Creatinine   1.89 (A)  2.21 (A)   Sodium   140  139   Potassium   5.4 (A)  5.0   Chloride   103  100   Calcium   9.2  9.1   Total Protein   7.0     Albumin   4.5     Total Bilirubin   0.4     Alkaline Phosphatase   130 (A)     AST (SGOT)   27     ALT (SGPT)   26     WBC  5.4      Hemoglobin  13.6      Hematocrit  41.1      Platelets  130 (A)      Total Cholesterol 221 (A)       Triglycerides 291 (A)       HDL Cholesterol 47       LDL Cholesterol  123 (A)       Hemoglobin A1C    7.3 (A)    (A) Abnormal value       Comments are available for some  flowsheets but are not being displayed.                     Assessment and Plan   Diagnoses and all orders for this visit:    1. Essential hypertension (Primary)  Comments:  Lisinopril 20 mg daily metoprolol XL 25 mg daily  Orders:  -     Urinalysis With Microscopic If Indicated (No Culture) - Urine, Clean Catch  -     TSH  -     T4, Free  -     T3, Free  -     Lipid Panel With / Chol / HDL Ratio  -     CBC & Differential  -     Comprehensive Metabolic Panel  -     Hemoglobin A1c  -     Vitamin B12  -     Folate  -     Phosphorus    2. Mixed hyperlipidemia  Comments:  Recheck lipid panel  Orders:  -     Urinalysis With Microscopic If Indicated (No Culture) - Urine, Clean Catch  -     TSH  -     T4, Free  -     T3, Free  -     Lipid Panel With / Chol / HDL Ratio  -     CBC & Differential  -     Comprehensive Metabolic Panel  -     Hemoglobin A1c  -     Vitamin B12  -     Folate  -     Phosphorus    3. Skin lesion of face  Comments:  Referral to dermatology  Orders:  -     Ambulatory Referral to Dermatology    4. Hematoma of left flank, initial encounter  Comments:  Appears to be improving without further treatment  Orders:  -     Urinalysis With Microscopic If Indicated (No Culture) - Urine, Clean Catch  -     TSH  -     T4, Free  -     T3, Free  -     Lipid Panel With / Chol / HDL Ratio  -     CBC & Differential  -     Comprehensive Metabolic Panel  -     Hemoglobin A1c  -     Vitamin B12  -     Folate  -     Phosphorus    5. Stage 3b chronic kidney disease (HCC)  Comments:  Labs today follow-up with nephrology avoid nonsteroidal anti-inflammatories  Orders:  -     Urinalysis With Microscopic If Indicated (No Culture) - Urine, Clean Catch  -     TSH  -     T4, Free  -     T3, Free  -     Lipid Panel With / Chol / HDL Ratio  -     CBC & Differential  -     Comprehensive Metabolic Panel  -     Hemoglobin A1c  -     Vitamin B12  -     Folate  -     Phosphorus  -     Microalbumin / Creatinine Urine Ratio - Urine, Clean  Catch    6. Iron deficiency anemia due to chronic blood loss  -     Urinalysis With Microscopic If Indicated (No Culture) - Urine, Clean Catch  -     TSH  -     T4, Free  -     T3, Free  -     Lipid Panel With / Chol / HDL Ratio  -     CBC & Differential  -     Comprehensive Metabolic Panel  -     Hemoglobin A1c  -     Vitamin B12  -     Folate  -     Phosphorus    7. Lumbar radiculopathy  -     Urinalysis With Microscopic If Indicated (No Culture) - Urine, Clean Catch  -     TSH  -     T4, Free  -     T3, Free  -     Lipid Panel With / Chol / HDL Ratio  -     CBC & Differential  -     Comprehensive Metabolic Panel  -     Hemoglobin A1c  -     Vitamin B12  -     Folate  -     Phosphorus    8. 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HCC)  Comments:  Use a rolling walker for ambulation tramadol 50 mg every 6 as needed pain  Orders:  -     Urinalysis With Microscopic If Indicated (No Culture) - Urine, Clean Catch  -     TSH  -     T4, Free  -     T3, Free  -     Lipid Panel With / Chol / HDL Ratio  -     CBC & Differential  -     Comprehensive Metabolic Panel  -     Hemoglobin A1c  -     Vitamin B12  -     Folate  -     Phosphorus    9. Diabetic peripheral neuropathy (HCC)  -     Urinalysis With Microscopic If Indicated (No Culture) - Urine, Clean Catch  -     TSH  -     T4, Free  -     T3, Free  -     Lipid Panel With / Chol / HDL Ratio  -     CBC & Differential  -     Comprehensive Metabolic Panel  -     Hemoglobin A1c  -     Vitamin B12  -     Folate  -     Phosphorus    10. DM (diabetes mellitus), type 2 with complications (HCC)  Comments:  Tradjenta 5 mg daily  Orders:  -     Microalbumin / Creatinine Urine Ratio - Urine, Clean Catch    11. Paroxysmal atrial fibrillation (HCC)  Comments:  Xarelto 15 mg daily amiodarone 200 mg daily             Follow Up   Return in about 6 months (around 6/8/2023) for Recheck.  Patient was given instructions and counseling regarding his condition or for health maintenance advice. Please see  specific information pulled into the AVS if appropriate.

## 2022-12-09 LAB
ALBUMIN SERPL-MCNC: 4.5 G/DL (ref 3.5–5.2)
ALBUMIN/CREAT UR: 9 MG/G CREAT (ref 0–29)
ALBUMIN/GLOB SERPL: 2.3 G/DL
ALP SERPL-CCNC: 142 U/L (ref 39–117)
ALT SERPL-CCNC: 30 U/L (ref 1–41)
APPEARANCE UR: CLEAR
AST SERPL-CCNC: 32 U/L (ref 1–40)
BASOPHILS # BLD AUTO: 0.04 10*3/MM3 (ref 0–0.2)
BASOPHILS NFR BLD AUTO: 0.6 % (ref 0–1.5)
BILIRUB SERPL-MCNC: 0.7 MG/DL (ref 0–1.2)
BILIRUB UR QL STRIP: NEGATIVE
BUN SERPL-MCNC: 30 MG/DL (ref 8–23)
BUN/CREAT SERPL: 15.3 (ref 7–25)
CALCIUM SERPL-MCNC: 9.3 MG/DL (ref 8.6–10.5)
CHLORIDE SERPL-SCNC: 100 MMOL/L (ref 98–107)
CHOLEST SERPL-MCNC: 221 MG/DL (ref 0–200)
CHOLEST/HDLC SERPL: 5.02 {RATIO}
CO2 SERPL-SCNC: 27 MMOL/L (ref 22–29)
COLOR UR: YELLOW
CREAT SERPL-MCNC: 1.96 MG/DL (ref 0.76–1.27)
CREAT UR-MCNC: 130.9 MG/DL
EGFRCR SERPLBLD CKD-EPI 2021: 32.9 ML/MIN/1.73
EOSINOPHIL # BLD AUTO: 0.07 10*3/MM3 (ref 0–0.4)
EOSINOPHIL NFR BLD AUTO: 1.1 % (ref 0.3–6.2)
ERYTHROCYTE [DISTWIDTH] IN BLOOD BY AUTOMATED COUNT: 13.6 % (ref 12.3–15.4)
FOLATE SERPL-MCNC: >20 NG/ML (ref 4.78–24.2)
GLOBULIN SER CALC-MCNC: 2 GM/DL
GLUCOSE SERPL-MCNC: 156 MG/DL (ref 65–99)
GLUCOSE UR QL STRIP: NEGATIVE
HBA1C MFR BLD: 7.2 % (ref 4.8–5.6)
HCT VFR BLD AUTO: 37.1 % (ref 37.5–51)
HDLC SERPL-MCNC: 44 MG/DL (ref 40–60)
HGB BLD-MCNC: 12.3 G/DL (ref 13–17.7)
HGB UR QL STRIP: NEGATIVE
IMM GRANULOCYTES # BLD AUTO: 0.03 10*3/MM3 (ref 0–0.05)
IMM GRANULOCYTES NFR BLD AUTO: 0.5 % (ref 0–0.5)
KETONES UR QL STRIP: NEGATIVE
LDLC SERPL CALC-MCNC: 122 MG/DL (ref 0–100)
LEUKOCYTE ESTERASE UR QL STRIP: NEGATIVE
LYMPHOCYTES # BLD AUTO: 1.39 10*3/MM3 (ref 0.7–3.1)
LYMPHOCYTES NFR BLD AUTO: 22.5 % (ref 19.6–45.3)
MCH RBC QN AUTO: 34.6 PG (ref 26.6–33)
MCHC RBC AUTO-ENTMCNC: 33.2 G/DL (ref 31.5–35.7)
MCV RBC AUTO: 104.2 FL (ref 79–97)
MICROALBUMIN UR-MCNC: 12.2 UG/ML
MONOCYTES # BLD AUTO: 0.35 10*3/MM3 (ref 0.1–0.9)
MONOCYTES NFR BLD AUTO: 5.7 % (ref 5–12)
NEUTROPHILS # BLD AUTO: 4.31 10*3/MM3 (ref 1.7–7)
NEUTROPHILS NFR BLD AUTO: 69.6 % (ref 42.7–76)
NITRITE UR QL STRIP: NEGATIVE
NRBC BLD AUTO-RTO: 0 /100 WBC (ref 0–0.2)
PH UR STRIP: 5.5 [PH] (ref 5–8)
PHOSPHATE SERPL-MCNC: 3.3 MG/DL (ref 2.5–4.5)
PLATELET # BLD AUTO: 146 10*3/MM3 (ref 140–450)
POTASSIUM SERPL-SCNC: 5.2 MMOL/L (ref 3.5–5.2)
PROT SERPL-MCNC: 6.5 G/DL (ref 6–8.5)
PROT UR QL STRIP: ABNORMAL
RBC # BLD AUTO: 3.56 10*6/MM3 (ref 4.14–5.8)
SODIUM SERPL-SCNC: 138 MMOL/L (ref 136–145)
SP GR UR STRIP: 1.02 (ref 1–1.03)
T3FREE SERPL-MCNC: 2.9 PG/ML (ref 2–4.4)
T4 FREE SERPL-MCNC: 0.94 NG/DL (ref 0.93–1.7)
TRIGL SERPL-MCNC: 310 MG/DL (ref 0–150)
TSH SERPL DL<=0.005 MIU/L-ACNC: 10.2 UIU/ML (ref 0.27–4.2)
UROBILINOGEN UR STRIP-MCNC: ABNORMAL MG/DL
VIT B12 SERPL-MCNC: 1502 PG/ML (ref 211–946)
VLDLC SERPL CALC-MCNC: 55 MG/DL (ref 5–40)
WBC # BLD AUTO: 6.19 10*3/MM3 (ref 3.4–10.8)

## 2022-12-27 ENCOUNTER — APPOINTMENT (OUTPATIENT)
Dept: CT IMAGING | Facility: HOSPITAL | Age: 87
DRG: 64 | End: 2022-12-27
Payer: MEDICARE

## 2022-12-27 ENCOUNTER — HOSPITAL ENCOUNTER (INPATIENT)
Facility: HOSPITAL | Age: 87
LOS: 4 days | Discharge: NURSING, REHAB OR LONG-TERM CARE | DRG: 64 | End: 2022-12-31
Attending: EMERGENCY MEDICINE | Admitting: HOSPITALIST
Payer: MEDICARE

## 2022-12-27 ENCOUNTER — APPOINTMENT (OUTPATIENT)
Dept: GENERAL RADIOLOGY | Facility: HOSPITAL | Age: 87
DRG: 64 | End: 2022-12-27
Payer: MEDICARE

## 2022-12-27 DIAGNOSIS — R29.6 RECURRENT FALLS: Primary | ICD-10-CM

## 2022-12-27 DIAGNOSIS — S89.92XA INJURY OF LEFT LOWER EXTREMITY, INITIAL ENCOUNTER: ICD-10-CM

## 2022-12-27 DIAGNOSIS — S50.312A ABRASION OF LEFT ELBOW, INITIAL ENCOUNTER: ICD-10-CM

## 2022-12-27 DIAGNOSIS — D68.9 COAGULOPATHY: ICD-10-CM

## 2022-12-27 DIAGNOSIS — M54.40 CHRONIC LOW BACK PAIN WITH SCIATICA, SCIATICA LATERALITY UNSPECIFIED, UNSPECIFIED BACK PAIN LATERALITY: ICD-10-CM

## 2022-12-27 DIAGNOSIS — Z09 FOLLOW-UP EXAM: ICD-10-CM

## 2022-12-27 DIAGNOSIS — E13.49 OTHER DIABETIC NEUROLOGICAL COMPLICATION ASSOCIATED WITH OTHER SPECIFIED DIABETES MELLITUS: ICD-10-CM

## 2022-12-27 DIAGNOSIS — G61.82 MULTIFOCAL MOTOR NEUROPATHY: ICD-10-CM

## 2022-12-27 DIAGNOSIS — S09.90XA INJURY OF HEAD, INITIAL ENCOUNTER: ICD-10-CM

## 2022-12-27 DIAGNOSIS — N18.9 CHRONIC RENAL FAILURE, UNSPECIFIED CKD STAGE: ICD-10-CM

## 2022-12-27 DIAGNOSIS — G89.29 CHRONIC LOW BACK PAIN WITH SCIATICA, SCIATICA LATERALITY UNSPECIFIED, UNSPECIFIED BACK PAIN LATERALITY: ICD-10-CM

## 2022-12-27 DIAGNOSIS — R77.8 TROPONIN LEVEL ELEVATED: ICD-10-CM

## 2022-12-27 PROBLEM — R79.89 ELEVATED TROPONIN: Status: ACTIVE | Noted: 2022-12-27

## 2022-12-27 PROBLEM — R53.1 GENERALIZED WEAKNESS: Status: ACTIVE | Noted: 2022-12-27

## 2022-12-27 PROBLEM — E66.9 OBESITY (BMI 30-39.9): Status: ACTIVE | Noted: 2022-12-27

## 2022-12-27 LAB
ALBUMIN SERPL-MCNC: 3.8 G/DL (ref 3.5–5.2)
ALBUMIN/GLOB SERPL: 1.5 G/DL
ALP SERPL-CCNC: 122 U/L (ref 39–117)
ALT SERPL W P-5'-P-CCNC: 41 U/L (ref 1–41)
ANION GAP SERPL CALCULATED.3IONS-SCNC: 8.7 MMOL/L (ref 5–15)
AST SERPL-CCNC: 35 U/L (ref 1–40)
BILIRUB SERPL-MCNC: 0.5 MG/DL (ref 0–1.2)
BILIRUB UR QL STRIP: NEGATIVE
BUN SERPL-MCNC: 41 MG/DL (ref 8–23)
BUN/CREAT SERPL: 19.9 (ref 7–25)
CALCIUM SPEC-SCNC: 9 MG/DL (ref 8.6–10.5)
CHLORIDE SERPL-SCNC: 103 MMOL/L (ref 98–107)
CK SERPL-CCNC: 218 U/L (ref 20–200)
CLARITY UR: CLEAR
CO2 SERPL-SCNC: 26.3 MMOL/L (ref 22–29)
COLOR UR: YELLOW
CREAT SERPL-MCNC: 2.06 MG/DL (ref 0.76–1.27)
DEPRECATED RDW RBC AUTO: 51.1 FL (ref 37–54)
EGFRCR SERPLBLD CKD-EPI 2021: 31 ML/MIN/1.73
ERYTHROCYTE [DISTWIDTH] IN BLOOD BY AUTOMATED COUNT: 13.2 % (ref 12.3–15.4)
GLOBULIN UR ELPH-MCNC: 2.6 GM/DL
GLUCOSE SERPL-MCNC: 146 MG/DL (ref 65–99)
GLUCOSE UR STRIP-MCNC: NEGATIVE MG/DL
HCT VFR BLD AUTO: 36.6 % (ref 37.5–51)
HGB BLD-MCNC: 12.3 G/DL (ref 13–17.7)
HGB UR QL STRIP.AUTO: NEGATIVE
INR PPP: 2 (ref 0.9–1.1)
KETONES UR QL STRIP: ABNORMAL
LEUKOCYTE ESTERASE UR QL STRIP.AUTO: NEGATIVE
LYMPHOCYTES # BLD MANUAL: 0.95 10*3/MM3 (ref 0.7–3.1)
LYMPHOCYTES NFR BLD MANUAL: 4 % (ref 5–12)
MAGNESIUM SERPL-MCNC: 2.4 MG/DL (ref 1.6–2.4)
MCH RBC QN AUTO: 35.3 PG (ref 26.6–33)
MCHC RBC AUTO-ENTMCNC: 33.6 G/DL (ref 31.5–35.7)
MCV RBC AUTO: 105.2 FL (ref 79–97)
MONOCYTES # BLD: 0.22 10*3/MM3 (ref 0.1–0.9)
NEUTROPHILS # BLD AUTO: 4.4 10*3/MM3 (ref 1.7–7)
NEUTROPHILS NFR BLD MANUAL: 79 % (ref 42.7–76)
NITRITE UR QL STRIP: NEGATIVE
PH UR STRIP.AUTO: <=5 [PH] (ref 5–8)
PLAT MORPH BLD: NORMAL
PLATELET # BLD AUTO: 89 10*3/MM3 (ref 140–450)
PMV BLD AUTO: 9.9 FL (ref 6–12)
POTASSIUM SERPL-SCNC: 4.8 MMOL/L (ref 3.5–5.2)
PROT SERPL-MCNC: 6.4 G/DL (ref 6–8.5)
PROT UR QL STRIP: ABNORMAL
PROTHROMBIN TIME: 23 SECONDS (ref 11.7–14.2)
RBC # BLD AUTO: 3.48 10*6/MM3 (ref 4.14–5.8)
RBC MORPH BLD: NORMAL
SODIUM SERPL-SCNC: 138 MMOL/L (ref 136–145)
SP GR UR STRIP: 1.02 (ref 1–1.03)
TROPONIN T SERPL-MCNC: 0.23 NG/ML (ref 0–0.03)
UROBILINOGEN UR QL STRIP: ABNORMAL
VARIANT LYMPHS NFR BLD MANUAL: 17 % (ref 19.6–45.3)
WBC MORPH BLD: NORMAL
WBC NRBC COR # BLD: 5.57 10*3/MM3 (ref 3.4–10.8)

## 2022-12-27 PROCEDURE — 25010000002 TETANUS-DIPHTH-ACELL PERTUSSIS 5-2.5-18.5 LF-MCG/0.5 SUSPENSION PREFILLED SYRINGE: Performed by: EMERGENCY MEDICINE

## 2022-12-27 PROCEDURE — 0202U NFCT DS 22 TRGT SARS-COV-2: CPT | Performed by: HOSPITALIST

## 2022-12-27 PROCEDURE — 70450 CT HEAD/BRAIN W/O DYE: CPT

## 2022-12-27 PROCEDURE — 85610 PROTHROMBIN TIME: CPT | Performed by: EMERGENCY MEDICINE

## 2022-12-27 PROCEDURE — 80053 COMPREHEN METABOLIC PANEL: CPT | Performed by: EMERGENCY MEDICINE

## 2022-12-27 PROCEDURE — 84484 ASSAY OF TROPONIN QUANT: CPT | Performed by: EMERGENCY MEDICINE

## 2022-12-27 PROCEDURE — 73552 X-RAY EXAM OF FEMUR 2/>: CPT

## 2022-12-27 PROCEDURE — 81003 URINALYSIS AUTO W/O SCOPE: CPT | Performed by: EMERGENCY MEDICINE

## 2022-12-27 PROCEDURE — 82550 ASSAY OF CK (CPK): CPT | Performed by: EMERGENCY MEDICINE

## 2022-12-27 PROCEDURE — 93005 ELECTROCARDIOGRAM TRACING: CPT | Performed by: EMERGENCY MEDICINE

## 2022-12-27 PROCEDURE — 74176 CT ABD & PELVIS W/O CONTRAST: CPT

## 2022-12-27 PROCEDURE — 84443 ASSAY THYROID STIM HORMONE: CPT | Performed by: HOSPITALIST

## 2022-12-27 PROCEDURE — 99285 EMERGENCY DEPT VISIT HI MDM: CPT

## 2022-12-27 PROCEDURE — 85007 BL SMEAR W/DIFF WBC COUNT: CPT | Performed by: EMERGENCY MEDICINE

## 2022-12-27 PROCEDURE — 90471 IMMUNIZATION ADMIN: CPT | Performed by: EMERGENCY MEDICINE

## 2022-12-27 PROCEDURE — 72125 CT NECK SPINE W/O DYE: CPT

## 2022-12-27 PROCEDURE — 85025 COMPLETE CBC W/AUTO DIFF WBC: CPT | Performed by: EMERGENCY MEDICINE

## 2022-12-27 PROCEDURE — 93010 ELECTROCARDIOGRAM REPORT: CPT | Performed by: STUDENT IN AN ORGANIZED HEALTH CARE EDUCATION/TRAINING PROGRAM

## 2022-12-27 PROCEDURE — 83735 ASSAY OF MAGNESIUM: CPT | Performed by: EMERGENCY MEDICINE

## 2022-12-27 PROCEDURE — 90715 TDAP VACCINE 7 YRS/> IM: CPT | Performed by: EMERGENCY MEDICINE

## 2022-12-27 RX ORDER — ONDANSETRON 4 MG/1
4 TABLET, FILM COATED ORAL EVERY 6 HOURS PRN
Status: DISCONTINUED | OUTPATIENT
Start: 2022-12-27 | End: 2022-12-31 | Stop reason: HOSPADM

## 2022-12-27 RX ORDER — INSULIN LISPRO 100 [IU]/ML
0-9 INJECTION, SOLUTION INTRAVENOUS; SUBCUTANEOUS
Status: DISCONTINUED | OUTPATIENT
Start: 2022-12-28 | End: 2022-12-31 | Stop reason: HOSPADM

## 2022-12-27 RX ORDER — METOPROLOL SUCCINATE 25 MG/1
25 TABLET, EXTENDED RELEASE ORAL DAILY
Status: CANCELLED | OUTPATIENT
Start: 2022-12-28

## 2022-12-27 RX ORDER — UREA 10 %
3 LOTION (ML) TOPICAL NIGHTLY PRN
Status: DISCONTINUED | OUTPATIENT
Start: 2022-12-27 | End: 2022-12-31 | Stop reason: HOSPADM

## 2022-12-27 RX ORDER — CHOLECALCIFEROL (VITAMIN D3) 125 MCG
1000 CAPSULE ORAL DAILY
Status: CANCELLED | OUTPATIENT
Start: 2022-12-28

## 2022-12-27 RX ORDER — LEVOTHYROXINE SODIUM 0.03 MG/1
25 TABLET ORAL DAILY
Status: CANCELLED | OUTPATIENT
Start: 2022-12-28

## 2022-12-27 RX ORDER — SODIUM CHLORIDE 0.9 % (FLUSH) 0.9 %
10 SYRINGE (ML) INJECTION AS NEEDED
Status: DISCONTINUED | OUTPATIENT
Start: 2022-12-27 | End: 2022-12-31 | Stop reason: HOSPADM

## 2022-12-27 RX ORDER — DEXTROSE MONOHYDRATE 25 G/50ML
25 INJECTION, SOLUTION INTRAVENOUS
Status: DISCONTINUED | OUTPATIENT
Start: 2022-12-27 | End: 2022-12-31 | Stop reason: HOSPADM

## 2022-12-27 RX ORDER — AMIODARONE HYDROCHLORIDE 200 MG/1
200 TABLET ORAL DAILY
Status: CANCELLED | OUTPATIENT
Start: 2022-12-28

## 2022-12-27 RX ORDER — FERROUS SULFATE 325(65) MG
325 TABLET ORAL
Status: CANCELLED | OUTPATIENT
Start: 2022-12-28

## 2022-12-27 RX ORDER — ALUMINA, MAGNESIA, AND SIMETHICONE 2400; 2400; 240 MG/30ML; MG/30ML; MG/30ML
15 SUSPENSION ORAL EVERY 6 HOURS PRN
Status: DISCONTINUED | OUTPATIENT
Start: 2022-12-27 | End: 2022-12-31 | Stop reason: HOSPADM

## 2022-12-27 RX ORDER — FINASTERIDE 5 MG/1
5 TABLET, FILM COATED ORAL DAILY
Status: CANCELLED | OUTPATIENT
Start: 2022-12-28

## 2022-12-27 RX ORDER — ACETAMINOPHEN 325 MG/1
650 TABLET ORAL EVERY 4 HOURS PRN
Status: DISCONTINUED | OUTPATIENT
Start: 2022-12-27 | End: 2022-12-31 | Stop reason: HOSPADM

## 2022-12-27 RX ORDER — NICOTINE POLACRILEX 4 MG
15 LOZENGE BUCCAL
Status: DISCONTINUED | OUTPATIENT
Start: 2022-12-27 | End: 2022-12-31 | Stop reason: HOSPADM

## 2022-12-27 RX ORDER — TAMSULOSIN HYDROCHLORIDE 0.4 MG/1
0.8 CAPSULE ORAL DAILY
Status: CANCELLED | OUTPATIENT
Start: 2022-12-28

## 2022-12-27 RX ORDER — ACETAMINOPHEN 500 MG
1000 TABLET ORAL ONCE
Status: COMPLETED | OUTPATIENT
Start: 2022-12-27 | End: 2022-12-27

## 2022-12-27 RX ORDER — ONDANSETRON 2 MG/ML
4 INJECTION INTRAMUSCULAR; INTRAVENOUS EVERY 6 HOURS PRN
Status: DISCONTINUED | OUTPATIENT
Start: 2022-12-27 | End: 2022-12-31 | Stop reason: HOSPADM

## 2022-12-27 RX ORDER — NITROGLYCERIN 0.4 MG/1
0.4 TABLET SUBLINGUAL
Status: DISCONTINUED | OUTPATIENT
Start: 2022-12-27 | End: 2022-12-31 | Stop reason: HOSPADM

## 2022-12-27 RX ADMIN — TETANUS TOXOID, REDUCED DIPHTHERIA TOXOID AND ACELLULAR PERTUSSIS VACCINE, ADSORBED 0.5 ML: 5; 2.5; 8; 8; 2.5 SUSPENSION INTRAMUSCULAR at 17:17

## 2022-12-27 RX ADMIN — ACETAMINOPHEN 1000 MG: 500 TABLET, FILM COATED ORAL at 18:25

## 2022-12-27 NOTE — ED NOTES
Patient from home via ems; patient had mechanical fall due to bilateral leg weakness, c/o right hip pain. No obvious deformity or shortening/rotation. Patient is unsure if he hit his head. Denies loc, patient is on blood thinners.

## 2022-12-27 NOTE — ED PROVIDER NOTES
EMERGENCY DEPARTMENT ENCOUNTER    Room Number:  S522/1  Date of encounter:  12/27/2022  PCP: Caio Rodríguez MD  Historian: Patient and daughter      HPI:  Chief Complaint: Falling and fall today  A complete HPI/ROS/PMH/PSH/SH/FH are unobtainable due to: Not applicable  Context: Osvaldo Lopez is a 87 y.o. male who presents to the ED c/o patient fell today at about noon.  Unable to get up and contacted his daughter.  Daughter then found him on the floor.  EMS was called and he was transported here.  Patient has had a history of recent falls over the past 2 months.  He has had several falls.  He feels as if his legs just go out.  He was with his Rollator and his legs just gave out and he went straight to the floor.  He thinks he might of hit his head but is uncertain.  Denies any headache.  Has some mild pain in his left thigh.  He believes that is from an injury that he had from the fall.  Denies any chest pain or shortness of breath abdominal pain or any new back pain.  He has a history of polyneuropathy and muscular weakness as well as sensory changes (please see medical history reviewed below).  He currently lives alone.  It was not a prolonged downtime before his daughter arrived as he contacted her soon after the fall.  He has been compliant with all his medicines and that includes Xarelto for atrial fibrillation        Previous Episodes: Yes with history of falling.  Current Symptoms: Only complains of some mild pain in his left leg.  Does complain of some generalized weakness that is more prominent in his lower extremities.  This is something that he is experienced before.    MEDICAL HISTORY REVIEWED  I reviewed notes from Dr. Tenorio evaluation December 2022.  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  He does have atrial fibrillation as well as diabetes  mellitus.  In looking at labs from June 7 as well as June 22 has chronic renal impairment.  His creatinine generally is in the upper 1 to low 2 region.      PAST MEDICAL HISTORY  Active Ambulatory Problems     Diagnosis Date Noted   • Complete rotator cuff tear of left shoulder 03/17/2016   • Abnormal finding on thyroid function test 06/13/2016   • Abdominal aortic aneurysm 06/13/2016   • Benign prostatic hyperplasia 06/13/2016   • Essential hypertension 06/13/2016   • Hyperlipidemia 06/13/2016   • Shoulder pain 06/13/2016   • Lumbar radiculopathy 06/13/2016   • Type 2 diabetes mellitus, without long-term current use of insulin (HCC) 06/13/2016   • OA (osteoarthritis) of knee 11/11/2016   • Tear of right rotator cuff 03/20/2017   • Spinal stenosis of lumbar region with neurogenic claudication 12/07/2017   • Eyebrow ptosis 11/09/2013   • Pseudophakia 11/09/2013   • Nonrheumatic aortic valve stenosis 05/18/2018   • Atrial flutter with rapid ventricular response (HCC) 04/14/2019   • Right arm pain 04/14/2019   • Leg weakness, bilateral 06/04/2019   • Typical atrial flutter (HCC) 06/04/2019   • Diabetic peripheral neuropathy (HCC) 01/23/2020   • Muscle weakness (generalized) 07/23/2020   • Pressure injury of left buttock, stage 1 07/23/2020   • Chronic low back pain with sciatica 01/26/2021   • 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HCC) 01/26/2021   • Pressure injury of buttock, stage 1 01/26/2021   • Anemia 01/28/2021   • Symptomatic anemia 01/29/2021   • Iron deficiency anemia 01/29/2021   • Chronic anticoagulation 01/29/2021   • CKD (chronic kidney disease) 01/29/2021   • CRISTOBAL (acute kidney injury) ([facility]) 01/29/2021   • Colonic mass 01/29/2021   • Axonal neuropathy 01/02/2019   • Impairment of balance 10/31/2018   • Post laminectomy syndrome    • Coronary arteriosclerosis 12/16/2021   • Edema 12/16/2021   • History of malignant neoplasm of colon 12/16/2021   • Prostatism 12/16/2021   • Stage 3b chronic kidney disease (HCC)  12/16/2021   • Pre-ulcerative calluses 09/09/2022     Resolved Ambulatory Problems     Diagnosis Date Noted   • PAF (paroxysmal atrial fibrillation) ([facility]) 02/24/2016   • Enlarged prostate 06/13/2016   • Hypertonicity of bladder 06/13/2016   • Hyperglycemia 06/13/2016   • Increased frequency of urination 06/13/2016   • Toxic encephalopathy 11/17/2016   • Type 2 diabetes mellitus ([facility]) 11/17/2016   • Postoperative anemia due to acute blood loss 11/17/2016   • Cardiac murmur 11/17/2016   • Hyponatremia 01/18/2018   • Testicular swelling, right 04/03/2018   • Blepharoptosis 11/09/2013   • Syncope 07/16/2018     Past Medical History:   Diagnosis Date   • Abdominal aortic aneurysm (AAA) without rupture    • Abdominal aortic ectasia ([facility]) 06/2015   • Abnormal EKG 10/25/2016   • Abnormal thyroid blood test    • Actinic keratosis    • Arthritis    • Asthma    • Atherosclerotic heart disease    • Atrial premature depolarization    • Atrophy of muscle of lower leg    • BPH (benign prostatic hyperplasia)    • Bruises easily    • Bulging of thoracic intervertebral disc    • Carpal tunnel syndrome, right 12/2019   • Cataract    • Chronic edema    • Chronic pain    • Closed head injury 07/2018   • Colon cancer ([facility]) 01/31/2021   • Colon polyps    • Constipation due to opioid therapy    • Coronary artery disease    • DDD (degenerative disc disease), thoracic    • 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[facility])    • Diverticulosis    • Epididymitis, right 03/2018   • Hallux valgus, acquired, bilateral 01/2019   • Heart attack ([facility]) 09/1989   • Heart murmur    • History of blood transfusion    • HL (hearing loss)    • Hyperactivity of bladder    • HyperCKemia 11/2017   • Hyperkalemia 08/2016   • Hyperreflexia 11/2017   • Hyphema 05/2015   • IBS (irritable bowel syndrome)    • Impaired functional mobility, balance, gait, and endurance    • Impingement syndrome of left shoulder 10/2015   • Infection associated with cystostomy catheter ([facility]) 12/2016   •  Ischemic colitis (HCC)    • Lumbar spondylosis 04/2016   • Lumbosacral neuritis 05/2015   • Lumbosacral radiculitis 05/2015   • Macular puckering of retina, left 10/2013   • Myopathy 11/2017   • Osteoarthritis    • Other intervertebral disc disorders, lumbosacral region    • Plantar fascial fibromatosis 06/2018   • Prostatitis    • Protein S deficiency (HCC)    • RBBB (right bundle branch block)    • Recurrent falls    • Respiratory failure with hypoxia (HCC) 01/2019   • SCC (squamous cell carcinoma) 10/16/2019   • Scoliosis    • Syncope and collapse 07/16/2018   • Torn rotator cuff 03/2017   • Urinary frequency    • Vitamin D deficiency    • Vitreous hemorrhage of left eye (HCC)          PAST SURGICAL HISTORY  Past Surgical History:   Procedure Laterality Date   • APPENDECTOMY N/A    • BROW LIFT Bilateral 11/12/2013    DR. OCTAVIA CEDILLO AT [place]   • CARDIAC CATHETERIZATION Left 10/2008    LEFT CAROTID ARTERY STENOSIS 50-69%   • CARDIAC ELECTROPHYSIOLOGY PROCEDURE N/A 06/06/2019    Procedure: Ablation atrial flutter;  Surgeon: Miller Talbot MD;  Location: [facility] INVASIVE LOCATION;  Service: Cardiovascular   • CATARACT EXTRACTION Left 01/22/1998    DR. LAYLA BARNES AT [place]   • CATARACT EXTRACTION Right 03/2001    DR. LAYLA BARNES   • CHOLECYSTECTOMY N/A 08/24/1989    DR. FAUZIA PELAEZ AT [place]   • COLON RESECTION N/A 02/03/2021    Procedure: LAPAROSCOPIC EXTENDED  RIGHT COLECTOMY WITH DAVINCI ROBOT;  Surgeon: Xavi Napoles MD;  Location: [facility] OR;  Service: DaVinci;  Laterality: N/A;   • COLONOSCOPY N/A 01/31/2021    20-25 MM POLYP IN CECUM, PATH: TUBULAR ADENOMA, 2 TUBULAR ADENOMA POLYPS IN ASCENDING, A FUNGATING AND SUBMUCOSAL ONONOBSTRUCTING MEDIUM MASS IN PROXIMAL TRANSVERSE, PATH: INVASIVE ADENOCARCINOMA ARISING IN A TUBULOVILOOUS ADENOMA, AREA TATTOOED, SCATTERED DIVERTICULA IN SIGMOID AND DESCENDING, LARGE INTERNAL HEMORRHOIDS, DR. JACOB ALICEA AT [facility]    • COLONOSCOPY N/A 02/02/2010     DIVERTICULOSIS, DR. SAL SOLANO AT [place]   • COLONOSCOPY N/A 08/15/2022    5 MM TUBULAR ADENOMA POLYP IN SIGMOID, 2 TUBULAR ADENOMA POLYPS IN DESCENDING, MULTIPLE DIVERTICULA IN SIGMOID, RESCOPE IN 2 YRS, DR. MARGARITA COX AT [facility]   • CORONARY ARTERY BYPASS GRAFT N/A 09/1989    5 VESSEL, DR. HANKS   • CYST REMOVAL      FROM BACK   • ENDOSCOPY N/A 01/31/2021    ENTIRE EGD WNL, PATH: MILD REACTIVE GASTROPATHY, DR. JACOB ALICEA AT [facility]   • INGUINAL HERNIA REPAIR Left 09/27/2007    DR. MATTHEW RUSH AT [place]   • INGUINAL HERNIA REPAIR Left 09/07/2007   • INGUINAL HERNIA REPAIR Left 2003   • KNEE ARTHROSCOPY W/ PARTIAL MEDIAL MENISCECTOMY Left 1962    DR. SINGH   • LUMBAR DISCECTOMY FUSION INSTRUMENTATION N/A 04/11/2016    Procedure: lumbar laminectomy L4-5 and fusion with instrumentation;  Surgeon: Ran Kline MD;  Location: [facility] OR;  Service:    • LUMBAR DISCECTOMY FUSION INSTRUMENTATION N/A 01/15/2018    Procedure: L2-3, L3-4 laminectomy and fusion with instrumentation and removal of implants L4 5.;  Surgeon: Ran Kline MD;  Location: [facility] OR;  Service:    • LUMBAR EPIDURAL INJECTION N/A 09/23/2015    DR. MATTHEW DOMINGUEZ AT [facility]   • LUMBAR EPIDURAL INJECTION N/A 10/07/2015    DR. ITZEL LUIS AT [facility]   • LUMBAR EPIDURAL INJECTION N/A 11/19/2015    DR. ITZEL LUIS AT [facility]   • TX TOTAL KNEE ARTHROPLASTY Left 11/14/2016    Procedure: TOTAL KNEE ARTHROPLASTY;  Surgeon: Dontrell Arellano MD;  Location: [facility] OR;  Service: Orthopedics   • SKIN BIOPSY Bilateral 10/16/2019    RIGHT LATERAL FOREHEAD:SCC, LEFT TEMPLE:SCC, RIGHT PARIETAL SCALP:SCC, DR. MANPREET VILLARREAL         FAMILY HISTORY  Family History   Problem Relation Age of Onset   • Heart failure Mother    • Diabetes Mother    • Heart disease Mother    • Cancer Sister    • Diabetes Sister    • Cancer Brother    • Diabetes Brother    • Other Brother         INCLUSION BODY NYOSITIS   • Cancer Father          SOCIAL HISTORY  Social  History     Socioeconomic History   • Marital status:    Tobacco Use   • Smoking status: Former     Packs/day: 3.00     Years: 45.00     Pack years: 135.00     Types: Cigarettes     Quit date: 1989     Years since quittin.1   • Smokeless tobacco: Never   Vaping Use   • Vaping Use: Never used   Substance and Sexual Activity   • Alcohol use: Yes     Comment: 1 shot of whiskey in the morning and 1 shot of whiskey in the evening   • Drug use: Never   • Sexual activity: Not Currently         ALLERGIES  Amiodarone and Percocet [oxycodone-acetaminophen]        REVIEW OF SYSTEMS  Review of Systems     All systems reviewed and negative except for those discussed in HPI.       PHYSICAL EXAM    I have reviewed the triage vital signs and nursing notes.    ED Triage Vitals   Temp Heart Rate Resp BP SpO2   22 1347 22 1347 22 1347 22 1347 22 1347   98.1 °F (36.7 °C) 76 18 130/63 95 %      Temp src Heart Rate Source Patient Position BP Location FiO2 (%)   -- 22 1603 22 1603 22 1603 --    Monitor Lying Right arm        GENERAL: Elderly male that is chronically ill and frail.  No acute distress.Vital signs on my initial evaluation unremarkable  HENT: nares patent  Head to his occipital scalp has a mild abrasion and contusion./neck/ face are symmetric without gross deformity, signs of trauma, or swelling  EYES: no scleral icterus, no conjunctival pallor.  NECK: Supple, no meningismus.  No signs of trauma.  No focal midline pain.  No pain with movement of his cervical spine.  CV: regular rhythm, regular rate with intact distal pulses.  RESPIRATORY: normal effort and no respiratory distress.  Normal inspection to his chest.  No pain with palpation to his chest.  No subcutaneous air.  ABDOMEN: soft and nontender.  Obese.  No pain with rock to his pelvis.  MUSCULOSKELETAL: Patient has an AFO to his left lower extremity.  He has chronic muscle wasting.  He has weakness in  both lower extremities.  He is able to raise his heel off the bed in both lower extremities.  He does not have any pain with external and internal rotation to hips bilaterally.  Does have some discomfort in the midportion of his femur on the left and mildly proximal portion of his femur.  No bony deformity.  NEURO: alert and appropriate, moves all extremities, follows commands.  Alert and oriented x3.  Has generalized weakness most prominent to his bilateral lower extremities.  SKIN: warm, dry    Vital signs and nursing notes reviewed.        LAB RESULTS  Recent Results (from the past 24 hour(s))   ECG 12 Lead Other; Falling    Collection Time: 12/27/22  4:58 PM   Result Value Ref Range    QT Interval 486 ms   Urinalysis With Microscopic If Indicated (No Culture) - Urine, Clean Catch    Collection Time: 12/27/22  5:11 PM    Specimen: Urine, Clean Catch   Result Value Ref Range    Color, UA Yellow Yellow, Straw    Appearance, UA Clear Clear    pH, UA <=5.0 5.0 - 8.0    Specific Gravity, UA 1.022 1.005 - 1.030    Glucose, UA Negative Negative    Ketones, UA Trace (A) Negative    Bilirubin, UA Negative Negative    Blood, UA Negative Negative    Protein, UA Trace (A) Negative    Leuk Esterase, UA Negative Negative    Nitrite, UA Negative Negative    Urobilinogen, UA 0.2 E.U./dL 0.2 - 1.0 E.U./dL   Comprehensive Metabolic Panel    Collection Time: 12/27/22  5:17 PM    Specimen: Blood   Result Value Ref Range    Glucose 146 (H) 65 - 99 mg/dL    BUN 41 (H) 8 - 23 mg/dL    Creatinine 2.06 (H) 0.76 - 1.27 mg/dL    Sodium 138 136 - 145 mmol/L    Potassium 4.8 3.5 - 5.2 mmol/L    Chloride 103 98 - 107 mmol/L    CO2 26.3 22.0 - 29.0 mmol/L    Calcium 9.0 8.6 - 10.5 mg/dL    Total Protein 6.4 6.0 - 8.5 g/dL    Albumin 3.8 3.5 - 5.2 g/dL    ALT (SGPT) 41 1 - 41 U/L    AST (SGOT) 35 1 - 40 U/L    Alkaline Phosphatase 122 (H) 39 - 117 U/L    Total Bilirubin 0.5 0.0 - 1.2 mg/dL    Globulin 2.6 gm/dL    A/G Ratio 1.5 g/dL     BUN/Creatinine Ratio 19.9 7.0 - 25.0    Anion Gap 8.7 5.0 - 15.0 mmol/L    eGFR 31.0 (L) >60.0 mL/min/1.73   Protime-INR    Collection Time: 12/27/22  5:17 PM    Specimen: Blood   Result Value Ref Range    Protime 23.0 (H) 11.7 - 14.2 Seconds    INR 2.00 (H) 0.90 - 1.10   Troponin    Collection Time: 12/27/22  5:17 PM    Specimen: Blood   Result Value Ref Range    Troponin T 0.229 (C) 0.000 - 0.030 ng/mL   Magnesium    Collection Time: 12/27/22  5:17 PM    Specimen: Blood   Result Value Ref Range    Magnesium 2.4 1.6 - 2.4 mg/dL   CK    Collection Time: 12/27/22  5:17 PM    Specimen: Blood   Result Value Ref Range    Creatine Kinase 218 (H) 20 - 200 U/L   CBC Auto Differential    Collection Time: 12/27/22  5:17 PM    Specimen: Blood   Result Value Ref Range    WBC 5.57 3.40 - 10.80 10*3/mm3    RBC 3.48 (L) 4.14 - 5.80 10*6/mm3    Hemoglobin 12.3 (L) 13.0 - 17.7 g/dL    Hematocrit 36.6 (L) 37.5 - 51.0 %    .2 (H) 79.0 - 97.0 fL    MCH 35.3 (H) 26.6 - 33.0 pg    MCHC 33.6 31.5 - 35.7 g/dL    RDW 13.2 12.3 - 15.4 %    RDW-SD 51.1 37.0 - 54.0 fl    MPV 9.9 6.0 - 12.0 fL    Platelets 89 (L) 140 - 450 10*3/mm3   Manual Differential    Collection Time: 12/27/22  5:17 PM    Specimen: Blood   Result Value Ref Range    Neutrophil % 79.0 (H) 42.7 - 76.0 %    Lymphocyte % 17.0 (L) 19.6 - 45.3 %    Monocyte % 4.0 (L) 5.0 - 12.0 %    Neutrophils Absolute 4.40 1.70 - 7.00 10*3/mm3    Lymphocytes Absolute 0.95 0.70 - 3.10 10*3/mm3    Monocytes Absolute 0.22 0.10 - 0.90 10*3/mm3    RBC Morphology Normal Normal    WBC Morphology Normal Normal    Platelet Morphology Normal Normal       Ordered the above labs and independently reviewed the results.        RADIOLOGY  CT Abdomen Pelvis Without Contrast    Result Date: 12/27/2022  CT ABDOMEN PELVIS WO CONTRAST-  INDICATIONS: Trauma  TECHNIQUE: Radiation dose reduction techniques were utilized, including automated exposure control and exposure modulation based on body size.  Unenhanced ABDOMEN AND PELVIS CT  COMPARISON: 02/02/2021  FINDINGS:  Assessment for solid organ injury is significantly limited without intravenous contrast material.  Right renal cysts are present. Calcifications at the renal tavo appear to be arterial in location. No hydronephrosis.  Gallbladder is surgically absent.  The pancreas is thinned and fatty infiltrated. A chronic stable cystic focus is seen at the junction of the pancreatic tail and body, 2.2 cm on axial image 48.  The spleen is enlarged, 14.7 cm, stable. Minimal perisplenic ascites is seen  Otherwise unremarkable appearance of the liver, spleen, adrenal glands, pancreas, kidneys, bladder.  No bowel obstruction or abnormal bowel thickening is identified. Right hemicolectomy change is noted. Numerous colonic diverticula are seen that do not appear inflamed.  No free intraperitoneal gas or free fluid.  Scattered small mesenteric and para-aortic lymph nodes are seen that are not significant by size criteria.  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 Left common iliac artery is borderline aneurysmal, 2.0 cm, axial image 121. Right common iliac artery is aneurysmal, 2.1 cm, same image.. Aortic and other arterial calcifications are present.  The lung bases show mild atelectasis. Ascending aorta is only partly included, but aneurysmal at the level imaged, 5.1 cm, stable. The distal descending thoracic aorta is aneurysmal, 4.4 cm on coronal image 113, stable.  Degenerative and surgical changes are seen in the spine. Motion artifact limits assessment of the bones, especially at the level of the lower pelvis, for example sagittal image 92, or sagittal image 31, or sagittal image 166; if there is clinical suspicion for fractures, pelvic CT with be suggested, with sedation if necessary.        1. Colonic diverticulosis. No acute inflammatory process of bowel is identified. 2. No hydronephrosis or ureteral stones.  This report was finalized on  12/27/2022 6:25 PM by Dr. Vimal Mayorga M.D.      XR Femur 2 View Left    Result Date: 12/27/2022  LEFT FEMUR  HISTORY: Pain.  FINDINGS: 4 views of the left femur show no evidence of fracture or dislocation. Severe vascular calcification and a left knee prosthesis is noted.  This report was finalized on 12/27/2022 8:11 PM by Dr. Kirk Matias M.D.      CT Head Without Contrast, CT Cervical Spine Without Contrast    Result Date: 12/27/2022  CT HEAD AND CERVICAL SPINE WITHOUT CONTRAST  HISTORY: Fall, hit back of head, headache, neck pain.  COMPARISON: CT head 07/16/2018  CT HEAD WITHOUT CONTRAST:  FINDINGS: The brain and ventricles are symmetrical. There is no evidence of fracture, intracranial hemorrhage, hydrocephalus or of abnormal extra-axial fluid. Mild-to-moderate vascular calcification is noted. No focal area of decreased attenuation to suggest acute infarction or contusion is appreciated.      There is no evidence of fracture or of intracranial hemorrhage.   CT EXAMINATION OF THE CERVICAL SPINE WITHOUT CONTRAST:  FINDINGS: There is mild reversal of the normal cervical lordosis. There is moderate loss of disc height at C5-6 and C6-7. There is a grade 1 anterolisthesis of C3 upon C4 and C4 upon C5. There is solid fusion of the facets to the left at C2-3. There is no evidence of fracture. Extensive vascular calcification involving the proximal aspects of the great vessels are noted.  C2-3: A small central disc bulge or protrusion is appreciated.  C3-4: Moderate facet degenerative disease is present on the right. There is moderate foraminal stenosis on the right secondary to facet and uncovertebral degenerative disease.  C4-5: Moderate-to-severe facet degenerative disease is present on the left. There is moderate-to-severe foraminal stenosis on the left secondary to facet hypertrophy and uncovertebral degenerative disease.  C5-6: A broad-based disc osteophyte complex is present which results in mild canal  stenosis. There is mild and moderate foraminal stenosis on the left and right respectively secondary to loss of disc height and uncovertebral degenerative disease.  C6-7: A mild central disc osteophyte complex is present with no evidence of herniation. Mild and moderate foraminal stenosis is present on the left and right respectively secondary to the loss of disc height, facet hypertrophy and uncovertebral degenerative disease.  C7-T1: Moderate-to-severe facet degenerative disease is present on the left.  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 Extensive vascular calcification involving the great vessels proximally are appreciated. Moderate-to-severe vascular calcification involving the carotid bifurcations are also appreciated bilaterally, more prominent on the left.      Radiation dose reduction techniques were utilized, including automated exposure control and exposure modulation based on body size.  This report was finalized on 12/27/2022 8:14 PM by Dr. Kirk aMtias M.D.        I ordered the above noted radiological studies. Reviewed by me and discussed with radiologist.  See dictation for official radiology interpretation.      PROCEDURES    Procedures      MEDICATIONS GIVEN IN ER    Medications   sodium chloride 0.9 % flush 10 mL (has no administration in time range)   sodium chloride 0.9 % flush 10 mL (has no administration in time range)   nitroglycerin (NITROSTAT) SL tablet 0.4 mg (has no administration in time range)   acetaminophen (TYLENOL) tablet 650 mg (has no administration in time range)   ondansetron (ZOFRAN) tablet 4 mg (has no administration in time range)     Or   ondansetron (ZOFRAN) injection 4 mg (has no administration in time range)   melatonin tablet 3 mg (has no administration in time range)   aluminum-magnesium hydroxide-simethicone (MAALOX MAX) 400-400-40 MG/5ML suspension 15 mL (has no  administration in time range)   dextrose (GLUTOSE) oral gel 15 g (has no administration in time range)   dextrose (D50W) (25 g/50 mL) IV injection 25 g (has no administration in time range)   glucagon (human recombinant) (GLUCAGEN DIAGNOSTIC) injection 1 mg (has no administration in time range)   insulin lispro (ADMELOG) injection 0-9 Units (has no administration in time range)   Tetanus-Diphth-Acell Pertussis (BOOSTRIX) injection 0.5 mL (0.5 mL Intramuscular Given 12/27/22 1717)   acetaminophen (TYLENOL) tablet 1,000 mg (1,000 mg Oral Given 12/27/22 1825)         PROGRESS, DATA ANALYSIS, CONSULTS, AND MEDICAL DECISION MAKING    This is a gentleman who has a history of falling over the past couple months.  Has a history of muscular and sensory neuropathy likely contributing factors to his falls.  He is on Xarelto for atrial fibrillation has been compliant with his Xarelto.  He has a contusion to his scalp.  We will get a CT his head and cervical spine.  We will get a check lab work and an x-ray of his left femur.  I will also check a CT scan of his abdomen pelvis without contrast to look for any other bony abnormality.  He has chronic back pain which she states he does not feel like his got any new injury there.  Has some pain in the proximal left femur and left hip.  But his exam is reassuring and likely consistent with a fracture.  He is on Xarelto again so I think a CT scan is a better test on this gentleman.  I informed him and the daughter that he will likely need to be admitted.  I think that this gentleman might benefit from placement in rehab to get some PT and possible OT to see if his strength can improve.  I explained this to the patient as well as the daughter.  All questions answered.  They agree with this plan.  I do not anticipate this gentleman has had any stroke.  This is something that he is experienced in the past.  He does not have any focal neurologic signs.  He is also on Xarelto.  He is not a  thrombolytic candidate.    We are currently under a pandemic from the COVID19 infection.  The patient presented to the emergency department by ambulance or personal vehicle. I followed the current protocols required by Infection Control at [facility] Thomas in my evaluation and treatment of the patient. The patient was wearing a face mask during my evaluation and throughout my encounter. During my whole encounter with this patient I used appropriate personal protective equipment.  This equipment consisted of eye protection, facemask, gown, and gloves.  I applied this equipment before entering the room.      All labs have been independently reviewed by me.  All radiology studies have been reviewed by me and discussed with radiologist dictating the report.   EKG's independently viewed and interpreted by me.  Discussion below represents my analysis of pertinent findings related to patient's condition, differential diagnosis, treatment plan and final disposition.      ED Course as of 12/27/22 2319 Tue Dec 27, 2022   1723 EKG reading  EKG was done at 4:58 PM  I reviewed the EKG at 5:02 PM  It is a rate of 73 it is sinus rhythm there is some intraventricular conduction delay with appearance of a right bundle branch block and patient has LVH.  There are some nonspecific ST and T wave changes.  I do not see any definitive acute injury pattern  I compared to the previous EKG that was done on February 6, 2021 and it looks fairly similar. [MM]   1850 Troponin T(!!): 0.229  Very well could be related to the chronic renal failure.  He has previous troponins that are elevated.  This is a little higher than his previous troponins.  I do not see any acute EKG changes.  Denies any chest pain or shortness of breath. [MM]   1850 Creatinine(!): 2.06  Chronic renal failure.  No significant change. [MM]   1851 INR(!): 2.00  Patient is anticoagulated with Xarelto. [MM]   1851 Hemoglobin(!): 12.3  Chronic anemia [MM]   1852 CT  scan of the abdomen pelvis shows no obvious bony fracture.  Has some colonic diverticulosis but no acute diverticulitis.  Essentially is unremarkable.  I reviewed the radiologist report.  Please see complete dictated report from radiologist [MM]   1852 CT scan of the head report was reviewed.  No evidence of acute fracture or intracranial hemorrhage.  CT scan of the cervical spine was reviewed.  Multilevel degenerative disc disease.  No fracture or dislocation..  Please see complete dictated report from radiologist. [MM]   1900 I looked at the x-ray of the left femur I also reviewed the radiologist report.  There is no acute fracture appreciated. [MM]   1905 I have reevaluated this patient.  Seem to be resting comfortably.  I talked with the patient as well as the daughter at length about the results of the test.  Informed her about the mild elevation of the troponin.  He has no chest pain or shortness of breath.  No new acute EKG changes.  We will continue to track the trick trend the troponin.  I will order for repeat troponin now.  They agree with possible placement for rehab.  This gentleman cannot go home and live alone he is at significant risk for falls.  Currently his pain is well controlled.  All questions answered. [MM]   1957 I discussed the case with Dr. Bertrand who is on for A.  He agrees to admit the patient to the hospital.  All questions answered. [MM]      ED Course User Index  [MM] Titi Carlisle MD       AS OF 23:19 EST VITALS:    BP - 160/80  HR - 77  TEMP - 98.4 °F (36.9 °C) (Oral)  02 SATS - 95%        DIAGNOSIS  Final diagnoses:   Recurrent falls   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HCC)   Injury of head, initial encounter   Injury of left lower extremity, initial encounter   Abrasion of left elbow, initial encounter   Troponin level elevated   Chronic renal failure, unspecified CKD stage   Coagulopathy (HCC): Xarelto induced   Other diabetic neurological complication associated with other  specified diabetes mellitus (HCC)         DISPOSITION  I have reviewed the test results with my patient and explained the current treatment plan.  I answered all of the patient's questions.  The patient will be admitted to monitor bed at this time.  The patient is not hypotensive and is tolerating their current disease condition well enough for a monitored bed at this time.  The patient's current condition does not require intensive care treatment at this time.            DICTATED UTILIZING DRAGON DICTATION    Note Disclaimer: At [facility], we believe that sharing information builds trust and better relationships. You are receiving this note because you recently visited [facility]. It is possible you will see health information before a provider has talked with you about it. This kind of information can be easy to misunderstand. To help you fully understand what it means for your health, we urge you to discuss this note with your provider.     Titi Carlisle MD  12/27/22 2575

## 2022-12-28 ENCOUNTER — APPOINTMENT (OUTPATIENT)
Dept: OTHER | Facility: HOSPITAL | Age: 87
DRG: 64 | End: 2022-12-28
Payer: MEDICARE

## 2022-12-28 ENCOUNTER — APPOINTMENT (OUTPATIENT)
Dept: CARDIOLOGY | Facility: HOSPITAL | Age: 87
DRG: 64 | End: 2022-12-28
Payer: MEDICARE

## 2022-12-28 ENCOUNTER — APPOINTMENT (OUTPATIENT)
Dept: MRI IMAGING | Facility: HOSPITAL | Age: 87
DRG: 64 | End: 2022-12-28
Payer: MEDICARE

## 2022-12-28 PROBLEM — D69.6 THROMBOCYTOPENIA, UNSPECIFIED (HCC): Status: ACTIVE | Noted: 2022-12-28

## 2022-12-28 PROBLEM — S06.5XAA SUBDURAL HEMATOMA, ACUTE (HCC): Status: ACTIVE | Noted: 2022-12-28

## 2022-12-28 PROBLEM — I63.9 ACUTE CVA (CEREBROVASCULAR ACCIDENT): Status: ACTIVE | Noted: 2022-12-28

## 2022-12-28 LAB
ALBUMIN SERPL-MCNC: 3.4 G/DL (ref 3.5–5.2)
ALBUMIN/GLOB SERPL: 1.4 G/DL
ALP SERPL-CCNC: 106 U/L (ref 39–117)
ALT SERPL W P-5'-P-CCNC: 32 U/L (ref 1–41)
ANION GAP SERPL CALCULATED.3IONS-SCNC: 9.6 MMOL/L (ref 5–15)
AST SERPL-CCNC: 27 U/L (ref 1–40)
B PARAPERT DNA SPEC QL NAA+PROBE: NOT DETECTED
B PERT DNA SPEC QL NAA+PROBE: NOT DETECTED
BH CV ECHO MEAS - EDV(MOD-SP2): 157 ML
BH CV ECHO MEAS - EDV(MOD-SP4): 142 ML
BH CV ECHO MEAS - EF(MOD-BP): 60 %
BH CV ECHO MEAS - EF(MOD-SP2): 55.4 %
BH CV ECHO MEAS - EF(MOD-SP4): 65.5 %
BH CV ECHO MEAS - ESV(MOD-SP2): 70 ML
BH CV ECHO MEAS - ESV(MOD-SP4): 49 ML
BH CV ECHO MEAS - LV DIASTOLIC VOL/BSA (35-75): 63 CM2
BH CV ECHO MEAS - LV SYSTOLIC VOL/BSA (12-30): 21.7 CM2
BH CV ECHO MEAS - SI(MOD-SP2): 38.6 ML/M2
BH CV ECHO MEAS - SI(MOD-SP4): 41.2 ML/M2
BH CV ECHO MEAS - SV(MOD-SP2): 87 ML
BH CV ECHO MEAS - SV(MOD-SP4): 93 ML
BILIRUB SERPL-MCNC: 0.4 MG/DL (ref 0–1.2)
BUN SERPL-MCNC: 36 MG/DL (ref 8–23)
BUN/CREAT SERPL: 19.5 (ref 7–25)
C PNEUM DNA NPH QL NAA+NON-PROBE: NOT DETECTED
CALCIUM SPEC-SCNC: 8.3 MG/DL (ref 8.6–10.5)
CHLORIDE SERPL-SCNC: 105 MMOL/L (ref 98–107)
CO2 SERPL-SCNC: 25.4 MMOL/L (ref 22–29)
CREAT SERPL-MCNC: 1.85 MG/DL (ref 0.76–1.27)
DEPRECATED RDW RBC AUTO: 49.7 FL (ref 37–54)
EGFRCR SERPLBLD CKD-EPI 2021: 35.3 ML/MIN/1.73
ERYTHROCYTE [DISTWIDTH] IN BLOOD BY AUTOMATED COUNT: 13 % (ref 12.3–15.4)
FLUAV SUBTYP SPEC NAA+PROBE: NOT DETECTED
FLUBV RNA ISLT QL NAA+PROBE: NOT DETECTED
FOLATE SERPL-MCNC: >20 NG/ML (ref 4.78–24.2)
GLOBULIN UR ELPH-MCNC: 2.5 GM/DL
GLUCOSE BLDC GLUCOMTR-MCNC: 134 MG/DL (ref 70–130)
GLUCOSE BLDC GLUCOMTR-MCNC: 142 MG/DL (ref 70–130)
GLUCOSE BLDC GLUCOMTR-MCNC: 168 MG/DL (ref 70–130)
GLUCOSE BLDC GLUCOMTR-MCNC: 220 MG/DL (ref 70–130)
GLUCOSE SERPL-MCNC: 145 MG/DL (ref 65–99)
HADV DNA SPEC NAA+PROBE: NOT DETECTED
HBA1C MFR BLD: 6.9 % (ref 4.8–5.6)
HCOV 229E RNA SPEC QL NAA+PROBE: NOT DETECTED
HCOV HKU1 RNA SPEC QL NAA+PROBE: NOT DETECTED
HCOV NL63 RNA SPEC QL NAA+PROBE: NOT DETECTED
HCOV OC43 RNA SPEC QL NAA+PROBE: NOT DETECTED
HCT VFR BLD AUTO: 34.3 % (ref 37.5–51)
HGB BLD-MCNC: 11.5 G/DL (ref 13–17.7)
HMPV RNA NPH QL NAA+NON-PROBE: NOT DETECTED
HPIV1 RNA ISLT QL NAA+PROBE: NOT DETECTED
HPIV2 RNA SPEC QL NAA+PROBE: NOT DETECTED
HPIV3 RNA NPH QL NAA+PROBE: NOT DETECTED
HPIV4 P GENE NPH QL NAA+PROBE: NOT DETECTED
INR PPP: 1.33 (ref 0.9–1.1)
M PNEUMO IGG SER IA-ACNC: NOT DETECTED
MAXIMAL PREDICTED HEART RATE: 135 BPM
MCH RBC QN AUTO: 35 PG (ref 26.6–33)
MCHC RBC AUTO-ENTMCNC: 33.5 G/DL (ref 31.5–35.7)
MCV RBC AUTO: 104.3 FL (ref 79–97)
PLATELET # BLD AUTO: 79 10*3/MM3 (ref 140–450)
PMV BLD AUTO: 9.5 FL (ref 6–12)
POTASSIUM SERPL-SCNC: 4.4 MMOL/L (ref 3.5–5.2)
PROT SERPL-MCNC: 5.9 G/DL (ref 6–8.5)
PROTHROMBIN TIME: 16.7 SECONDS (ref 11.7–14.2)
QT INTERVAL: 486 MS
RBC # BLD AUTO: 3.29 10*6/MM3 (ref 4.14–5.8)
RHINOVIRUS RNA SPEC NAA+PROBE: NOT DETECTED
RSV RNA NPH QL NAA+NON-PROBE: NOT DETECTED
SARS-COV-2 RNA NPH QL NAA+NON-PROBE: NOT DETECTED
SODIUM SERPL-SCNC: 140 MMOL/L (ref 136–145)
STRESS TARGET HR: 115 BPM
TROPONIN T SERPL-MCNC: 0.21 NG/ML (ref 0–0.03)
TSH SERPL DL<=0.05 MIU/L-ACNC: 4.46 UIU/ML (ref 0.27–4.2)
VIT B12 BLD-MCNC: 1020 PG/ML (ref 211–946)
WBC NRBC COR # BLD: 5.14 10*3/MM3 (ref 3.4–10.8)

## 2022-12-28 PROCEDURE — 25010000002 PERFLUTREN (DEFINITY) 8.476 MG IN SODIUM CHLORIDE (PF) 0.9 % 10 ML INJECTION: Performed by: INTERNAL MEDICINE

## 2022-12-28 PROCEDURE — 70551 MRI BRAIN STEM W/O DYE: CPT

## 2022-12-28 PROCEDURE — 83521 IG LIGHT CHAINS FREE EACH: CPT | Performed by: INTERNAL MEDICINE

## 2022-12-28 PROCEDURE — 99222 1ST HOSP IP/OBS MODERATE 55: CPT | Performed by: PSYCHIATRY & NEUROLOGY

## 2022-12-28 PROCEDURE — 82962 GLUCOSE BLOOD TEST: CPT

## 2022-12-28 PROCEDURE — 36415 COLL VENOUS BLD VENIPUNCTURE: CPT | Performed by: HOSPITALIST

## 2022-12-28 PROCEDURE — 72141 MRI NECK SPINE W/O DYE: CPT

## 2022-12-28 PROCEDURE — 85027 COMPLETE CBC AUTOMATED: CPT | Performed by: NURSE PRACTITIONER

## 2022-12-28 PROCEDURE — 85610 PROTHROMBIN TIME: CPT | Performed by: NURSE PRACTITIONER

## 2022-12-28 PROCEDURE — 83036 HEMOGLOBIN GLYCOSYLATED A1C: CPT | Performed by: NURSE PRACTITIONER

## 2022-12-28 PROCEDURE — 63710000001 INSULIN LISPRO (HUMAN) PER 5 UNITS: Performed by: NURSE PRACTITIONER

## 2022-12-28 PROCEDURE — 93308 TTE F-UP OR LMTD: CPT

## 2022-12-28 PROCEDURE — 82784 ASSAY IGA/IGD/IGG/IGM EACH: CPT | Performed by: INTERNAL MEDICINE

## 2022-12-28 PROCEDURE — 82746 ASSAY OF FOLIC ACID SERUM: CPT | Performed by: HOSPITALIST

## 2022-12-28 PROCEDURE — 99222 1ST HOSP IP/OBS MODERATE 55: CPT | Performed by: INTERNAL MEDICINE

## 2022-12-28 PROCEDURE — 99222 1ST HOSP IP/OBS MODERATE 55: CPT

## 2022-12-28 PROCEDURE — 93308 TTE F-UP OR LMTD: CPT | Performed by: INTERNAL MEDICINE

## 2022-12-28 PROCEDURE — 82607 VITAMIN B-12: CPT | Performed by: HOSPITALIST

## 2022-12-28 PROCEDURE — 97162 PT EVAL MOD COMPLEX 30 MIN: CPT

## 2022-12-28 PROCEDURE — 80053 COMPREHEN METABOLIC PANEL: CPT | Performed by: NURSE PRACTITIONER

## 2022-12-28 PROCEDURE — 97116 GAIT TRAINING THERAPY: CPT

## 2022-12-28 PROCEDURE — 72148 MRI LUMBAR SPINE W/O DYE: CPT

## 2022-12-28 PROCEDURE — 86334 IMMUNOFIX E-PHORESIS SERUM: CPT | Performed by: INTERNAL MEDICINE

## 2022-12-28 PROCEDURE — 84484 ASSAY OF TROPONIN QUANT: CPT | Performed by: NURSE PRACTITIONER

## 2022-12-28 RX ORDER — AMIODARONE HYDROCHLORIDE 200 MG/1
200 TABLET ORAL DAILY
Status: DISCONTINUED | OUTPATIENT
Start: 2022-12-28 | End: 2022-12-31 | Stop reason: HOSPADM

## 2022-12-28 RX ORDER — METOPROLOL SUCCINATE 25 MG/1
25 TABLET, EXTENDED RELEASE ORAL DAILY
Status: DISCONTINUED | OUTPATIENT
Start: 2022-12-28 | End: 2022-12-30

## 2022-12-28 RX ORDER — TRAMADOL HYDROCHLORIDE 50 MG/1
50 TABLET ORAL EVERY 6 HOURS PRN
Status: DISCONTINUED | OUTPATIENT
Start: 2022-12-28 | End: 2022-12-31 | Stop reason: HOSPADM

## 2022-12-28 RX ORDER — CHOLECALCIFEROL (VITAMIN D3) 125 MCG
1000 CAPSULE ORAL DAILY
Status: DISCONTINUED | OUTPATIENT
Start: 2022-12-28 | End: 2022-12-31 | Stop reason: HOSPADM

## 2022-12-28 RX ORDER — DIPHENOXYLATE HYDROCHLORIDE AND ATROPINE SULFATE 2.5; .025 MG/1; MG/1
1 TABLET ORAL EVERY MORNING
Status: DISCONTINUED | OUTPATIENT
Start: 2022-12-28 | End: 2022-12-31 | Stop reason: HOSPADM

## 2022-12-28 RX ORDER — FINASTERIDE 5 MG/1
5 TABLET, FILM COATED ORAL DAILY
Status: DISCONTINUED | OUTPATIENT
Start: 2022-12-28 | End: 2022-12-31 | Stop reason: HOSPADM

## 2022-12-28 RX ORDER — TAMSULOSIN HYDROCHLORIDE 0.4 MG/1
0.8 CAPSULE ORAL DAILY
Status: DISCONTINUED | OUTPATIENT
Start: 2022-12-28 | End: 2022-12-30

## 2022-12-28 RX ORDER — LEVOTHYROXINE SODIUM 0.03 MG/1
25 TABLET ORAL
Status: DISCONTINUED | OUTPATIENT
Start: 2022-12-28 | End: 2022-12-31 | Stop reason: HOSPADM

## 2022-12-28 RX ADMIN — ANORECTAL OINTMENT 1 APPLICATION: 15.7; .44; 24; 20.6 OINTMENT TOPICAL at 12:14

## 2022-12-28 RX ADMIN — INSULIN LISPRO 2 UNITS: 100 INJECTION, SOLUTION INTRAVENOUS; SUBCUTANEOUS at 12:14

## 2022-12-28 RX ADMIN — Medication 1000 MCG: at 09:59

## 2022-12-28 RX ADMIN — FINASTERIDE 5 MG: 5 TABLET, FILM COATED ORAL at 10:01

## 2022-12-28 RX ADMIN — TRAMADOL HYDROCHLORIDE 50 MG: 50 TABLET, COATED ORAL at 09:58

## 2022-12-28 RX ADMIN — PERFLUTREN 2 ML: 6.52 INJECTION, SUSPENSION INTRAVENOUS at 15:50

## 2022-12-28 RX ADMIN — Medication 1 TABLET: at 05:39

## 2022-12-28 RX ADMIN — AMIODARONE HYDROCHLORIDE 200 MG: 200 TABLET ORAL at 09:59

## 2022-12-28 RX ADMIN — METOPROLOL SUCCINATE 25 MG: 25 TABLET, EXTENDED RELEASE ORAL at 09:59

## 2022-12-28 RX ADMIN — LEVOTHYROXINE SODIUM 25 MCG: 0.03 TABLET ORAL at 05:39

## 2022-12-28 RX ADMIN — ANORECTAL OINTMENT 1 APPLICATION: 15.7; .44; 24; 20.6 OINTMENT TOPICAL at 20:10

## 2022-12-28 RX ADMIN — TRAMADOL HYDROCHLORIDE 50 MG: 50 TABLET, COATED ORAL at 16:59

## 2022-12-28 RX ADMIN — TAMSULOSIN HYDROCHLORIDE 0.8 MG: 0.4 CAPSULE ORAL at 10:00

## 2022-12-28 NOTE — CONSULTS
[facility] Consult Note      Patient Name: Osvaldo Lopez  : 1935  MRN: [id number]  Primary Care Physician:  Caio Rodríguez MD  Referring Physician: Kirk Bertrand MD  Date of admission: 2022    Subjective     Reason for Consult: Chronic kidney disease    HPI:   Osvaldo Lopez is a 87 y.o. male was admitted on 2022, after he sustained a fall and he had back and hip pain.  He has history of chronic kidney disease followed in our office by my partner Dr. 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    He denies chest pain or shortness of air, no orthopnea or PND, no nausea or vomiting,, no dysuria or gross hematuria but he has urinary incontinence, no lower extremity edema or lightheadedness      Review of Systems:   14 point review of systems is otherwise negative except for mentioned above on HPI    Personal History     Past Medical History:   Diagnosis Date   • Abdominal aortic aneurysm (AAA) without rupture    • Abdominal aortic ectasia (HCC) 2015   • Abnormal EKG 10/25/2016    2019   • Abnormal thyroid blood test    • Actinic keratosis     FOLLOWED BY DR. KIRK VILLARREAL   • Anemia 2021   • Arthritis    • Asthma     MILD, INTERMITTENT   • Atherosclerotic heart disease    • Atrial flutter with rapid ventricular response (HCC) 2019    ADMITTED TO [facility]   • Atrial premature depolarization    • Atrophy of muscle of lower leg    • BPH (benign prostatic hyperplasia)     FOLLOWED BY DR. TERRA JONES   • Bruises easily    • Bulging of thoracic intervertebral disc    • Carpal tunnel syndrome, right  12/2019    FOLLOWED BY DR. NEW SANCHEZ   • Cataract     BILATERAL, S/P EXTRACTION WITH IOL IMPLANTS   • Chronic anticoagulation 01/29/2021   • Chronic edema    • Chronic low back pain with sciatica 01/26/2021   • Chronic pain    • CKD (chronic kidney disease) 01/29/2021   • Closed head injury 07/2018    WITH LACERATION TO SCALP, D/T SYNCOPE   • Colon cancer (HCC) 01/31/2021    INVASIVE ADENOCARCINOMA FROM TUBULOVILLOUS ADENOMA IN TRANSVERSE, S/P COLON RESECTION, FOLLOWED BY DR. MARGARITA COX   • Colon polyps     FOLLOWED BY DR. 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 TERRA JONES   • Epididymitis, right 03/2018   • Essential hypertension    • Eyebrow ptosis 11/09/2013   • Hallux valgus, acquired, bilateral 01/2019   • Heart attack (HCC) 09/1989    S/P CABG, 5 VESSEL   • Heart murmur    • History of blood transfusion    • HL (hearing loss)    • Hyperactivity of bladder     FOLLOWED BY DR. TERRA JONES   • HyperCKemia 11/2017   • Hyperkalemia 08/2016   • Hyperlipidemia     MIXED   • Hyperreflexia 11/2017    BILATERAL   • Hyphema 05/2015   • IBS (irritable bowel syndrome)    • Impaired functional mobility, balance, gait, and endurance    • Impingement syndrome of left shoulder 10/2015   • Infection associated with cystostomy catheter ([facility]) 12/2016   • Ischemic colitis ([facility])    • Lumbar radiculopathy 2016    wears back brace at times following back surgery   • Lumbar spondylosis 04/2016   • Lumbosacral neuritis 05/2015   • Lumbosacral radiculitis 05/2015   • Macular puckering of retina, left 10/2013   • 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HCC) 01/26/2021   • Muscle weakness (generalized)    • Myopathy 11/2017   • Nonrheumatic aortic valve stenosis     mild 1/2018    • Osteoarthritis     MULTIPLE SITES   • Other intervertebral disc disorders,  lumbosacral region    • PAF (paroxysmal atrial fibrillation) ([facility])    • Plantar fascial fibromatosis 06/2018   • Post laminectomy syndrome    • Pressure injury of left buttock, stage 1 07/23/2020   • Prostatitis    • Protein S deficiency ([facility])     FOLLOWED BY DR. VIANEY GIORDANO   • Pseudophakia 11/09/2013   • RBBB (right bundle branch block)    • Recurrent falls    • Respiratory failure with hypoxia ([facility]) 01/2019   • SCC (squamous cell carcinoma) 10/16/2019    RIGHT FOREHEAD, LEFT TEMPLE, RIGHT SCALP, FOLLOWED BY DR. MANPREET VILLARREAL   • Scoliosis    • Spinal stenosis of lumbar region with neurogenic claudication 12/07/2017   • Syncope and collapse 07/16/2018    ADMITTED TO [facility]   • Torn rotator cuff 03/2017    BILATERAL, COMPLETE   • Type 2 diabetes mellitus ([facility])     IDDM   • Urinary frequency     FOLLOWED BY DR. TERRA JONES   • Vitamin D deficiency    • Vitreous hemorrhage of left eye ([facility])        Past Surgical History:   Procedure Laterality Date   • APPENDECTOMY N/A    • BROW LIFT Bilateral 11/12/2013    DR. OCTAVIA CEDILLO AT [place]   • CARDIAC CATHETERIZATION Left 10/2008    LEFT CAROTID ARTERY STENOSIS 50-69%   • CARDIAC ELECTROPHYSIOLOGY PROCEDURE N/A 06/06/2019    Procedure: Ablation atrial flutter;  Surgeon: Miller Talbot MD;  Location: [facility] CATH INVASIVE LOCATION;  Service: Cardiovascular   • CATARACT EXTRACTION Left 01/22/1998    DR. LAYLA BARNES AT [place]   • CATARACT EXTRACTION Right 03/2001    DR. LAYLA BARNES   • CHOLECYSTECTOMY N/A 08/24/1989    DR. FAUZIA PELAEZ AT [place]   • COLON RESECTION N/A 02/03/2021    Procedure: LAPAROSCOPIC EXTENDED  RIGHT COLECTOMY WITH DAVINCI ROBOT;  Surgeon: Xavi Napoles MD;  Location: [facility] MAIN OR;  Service: DaVinci;  Laterality: N/A;   • COLONOSCOPY N/A 01/31/2021    20-25 MM POLYP IN CECUM, PATH: TUBULAR ADENOMA, 2 TUBULAR ADENOMA POLYPS IN ASCENDING, A FUNGATING AND SUBMUCOSAL ONONOBSTRUCTING MEDIUM MASS IN PROXIMAL TRANSVERSE, PATH: INVASIVE  ADENOCARCINOMA ARISING IN A TUBULOVILOOUS ADENOMA, AREA TATTOOED, SCATTERED DIVERTICULA IN SIGMOID AND DESCENDING, LARGE INTERNAL HEMORRHOIDS, DR. JACOB ALICEA AT [facility]    • COLONOSCOPY N/A 02/02/2010    DIVERTICULOSIS, DR. SAL SOLANO AT [place]   • COLONOSCOPY N/A 08/15/2022    5 MM TUBULAR ADENOMA POLYP IN SIGMOID, 2 TUBULAR ADENOMA POLYPS IN DESCENDING, MULTIPLE DIVERTICULA IN SIGMOID, RESCOPE IN 2 YRS, DR. MARGARITA COX AT [facility]   • CORONARY ARTERY BYPASS GRAFT N/A 09/1989    5 VESSEL, DR. HANKS   • CYST REMOVAL      FROM BACK   • ENDOSCOPY N/A 01/31/2021    ENTIRE EGD WNL, PATH: MILD REACTIVE GASTROPATHY, DR. JACOB ALICEA AT [facility]   • INGUINAL HERNIA REPAIR Left 09/27/2007    DR. MATTHEW RUSH AT [place]   • INGUINAL HERNIA REPAIR Left 09/07/2007   • INGUINAL HERNIA REPAIR Left 2003   • KNEE ARTHROSCOPY W/ PARTIAL MEDIAL MENISCECTOMY Left 1962    DR. SINGH   • LUMBAR DISCECTOMY FUSION INSTRUMENTATION N/A 04/11/2016    Procedure: lumbar laminectomy L4-5 and fusion with instrumentation;  Surgeon: Ran Kline MD;  Location: [facility] OR;  Service:    • LUMBAR DISCECTOMY FUSION INSTRUMENTATION N/A 01/15/2018    Procedure: L2-3, L3-4 laminectomy and fusion with instrumentation and removal of implants L4 5.;  Surgeon: Ran Kline MD;  Location: [facility] OR;  Service:    • LUMBAR EPIDURAL INJECTION N/A 09/23/2015    DR. MATTHEW DOMINGUEZ AT [facility]   • LUMBAR EPIDURAL INJECTION N/A 10/07/2015    DR. ITZEL LUIS AT [facility]   • LUMBAR EPIDURAL INJECTION N/A 11/19/2015    DR. ITZEL LUIS AT [facility]   • UT TOTAL KNEE ARTHROPLASTY Left 11/14/2016    Procedure: TOTAL KNEE ARTHROPLASTY;  Surgeon: Dontrell Arellano MD;  Location: [facility] OR;  Service: Orthopedics   • SKIN BIOPSY Bilateral 10/16/2019    RIGHT LATERAL FOREHEAD:SCC, LEFT TEMPLE:SCC, RIGHT PARIETAL SCALP:SCC, DR. MANPREET VILLARREAL       Family History: family history includes Cancer in his brother, father, and sister; Diabetes in his brother, mother, and  sister; Heart disease in his mother; Heart failure in his mother; Other in his brother.    Social History:  reports that he quit smoking about 33 years ago. His smoking use included cigarettes. He has a 135.00 pack-year smoking history. He has never used smokeless tobacco. He reports current alcohol use. He reports that he does not use drugs.    Home Medications:  Prior to Admission medications    Medication Sig Start Date End Date Taking? Authorizing Provider   acetaminophen (TYLENOL) 500 MG tablet Take 500 mg by mouth Every 6 (Six) Hours As Needed for Mild Pain .   Yes Jessica Cruz MD   Alpha-Lipoic Acid 600 MG tablet Take 600 mg by mouth Daily. For neuropathy 9/23/19  Yes Caio Rodríguez MD   amiodarone (PACERONE) 200 MG tablet TAKE 1 TABLET DAILY. 8/30/22  Yes Miller Talbot MD   ferrous sulfate 325 (65 FE) MG tablet Take 325 mg by mouth Daily With Breakfast.   Yes Jessica Cruz MD   finasteride (PROSCAR) 5 MG tablet TAKE 1 TABLET DAILY FOR    PROSTATE 4/12/22  Yes Caio Rodríguez MD   furosemide (LASIX) 40 MG tablet TAKE 1 TABLET DAILY 10/6/22  Yes Aditi Martínez APRN   glucose blood (Contour Next Test) test strip 1 each by Other route Daily. test blood sugar 11/11/20  Yes Caio Rodríguez MD   levothyroxine (SYNTHROID, LEVOTHROID) 25 MCG tablet Take 1 tablet by mouth Daily. 9/16/22  Yes Caio Rodríguez MD   lisinopril (PRINIVIL,ZESTRIL) 20 MG tablet TAKE 1 TABLET DAILY 4/12/22  Yes Caio Rodríguez MD   metoprolol succinate XL (TOPROL-XL) 25 MG 24 hr tablet TAKE 1 TABLET DAILY 8/8/22  Yes Angeol Driscoll MD   Multiple Vitamin (MULTI VITAMIN PO) Take 1 tablet by mouth Every Morning.   Yes Jessica Cruz MD   tamsulosin (FLOMAX) 0.4 MG capsule 24 hr capsule TAKE 2 CAPSULES DAILY 4/12/22  Yes Caio Rodríguez MD   Tradjenta 5 MG tablet tablet TAKE 1 TABLET DAILY 4/12/22  Yes Caio Rodríguez MD   traMADol (ULTRAM) 50 MG tablet TAKE 1 TABLET BY MOUTH EVERY 6 (SIX) HOURS AS NEEDED FOR MODERATE  PAIN OR SEVERE PAIN 11/14/22  Yes Caio Rodríguez MD   vitamin B-12 (CYANOCOBALAMIN) 1000 MCG tablet Take 1,000 mcg by mouth Daily.   Yes Provider, MD Jessica   Xarelto 15 MG tablet TAKE 1 TABLET DAILY 4/13/22  Yes Angelo Driscoll MD   KYRA MICROLET LANCETS lancets USE TWICE A DAY 7/31/19   Caio Rodríguez MD   lidocaine (LMX) 4 % cream  3/4/21   Provider, MD Jessica       Allergies:  Allergies   Allergen Reactions   • Amiodarone Myalgia     Muscle problems in legs   • Percocet [Oxycodone-Acetaminophen] Mental Status Change and Confusion     Causes confusion/       Objective     Vitals:   Temp:  [98.1 °F (36.7 °C)-98.4 °F (36.9 °C)] 98.4 °F (36.9 °C)  Heart Rate:  [70-77] 77  Resp:  [16-18] 18  BP: (130-160)/(63-80) 160/80    Intake/Output Summary (Last 24 hours) at 12/28/2022 0915  Last data filed at 12/28/2022 0740  Gross per 24 hour   Intake --   Output 150 ml   Net -150 ml       Physical Exam:   Constitutional: Awake, alert, no acute distress.,  Morbidly obese.  HEENT: Sclera anicteric, no conjunctival injection  Neck: Supple, no thyromegaly, no lymphadenopathy, trachea at midline, no JVD  Respiratory: Clear to auscultation bilaterally, nonlabored respiration  Cardiovascular: RRR, no murmurs, no rubs or gallops, no carotid bruit  Gastrointestinal: Positive bowel sounds, abdomen is soft, nontender and nondistended  : Bladder appears to be palpable but he is obese difficult to ascertain with his body habitus  Musculoskeletal: No edema, no clubbing or cyanosis  Psychiatric: Appropriate affect, cooperative  Neurologic: Oriented x3, moving all extremities, normal speech and mental status  Skin: Warm and dry       Scheduled Meds:     amiodarone, 200 mg, Oral, Daily  finasteride, 5 mg, Oral, Daily  insulin lispro, 0-9 Units, Subcutaneous, TID AC  levothyroxine, 25 mcg, Oral, Q AM  metoprolol succinate XL, 25 mg, Oral, Daily  multivitamin, 1 tablet, Oral, QAM  tamsulosin, 0.8 mg, Oral, Daily  vitamin B-12,  1,000 mcg, Oral, Daily      IV Meds:        Results Reviewed:   I have personally reviewed the results from the time of this admission to 12/28/2022 09:15 EST     Lab Results   Component Value Date    GLUCOSE 145 (H) 12/28/2022    CALCIUM 8.3 (L) 12/28/2022     12/28/2022    K 4.4 12/28/2022    CO2 25.4 12/28/2022     12/28/2022    BUN 36 (H) 12/28/2022    CREATININE 1.85 (H) 12/28/2022    EGFRIFAFRI 30 (L) 11/24/2021    EGFRIFNONA 26 (L) 11/24/2021    BCR 19.5 12/28/2022    ANIONGAP 9.6 12/28/2022      Lab Results   Component Value Date    MG 2.4 12/27/2022    PHOS 3.6 02/03/2021    ALBUMIN 3.4 (L) 12/28/2022           Assessment / Plan     ASSESSMENT:  -Chronic kidney disease stage IIIb appears to be at baseline associated with diabetic and hypertensive glomerulosclerosis  -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  Please feel free to call with any questions.    Duglas Javier MD  12/28/22  09:15 EST    [facility]  [phone]      Please note that portions of this note were completed with a voice recognition program.

## 2022-12-28 NOTE — PLAN OF CARE
Goal Outcome Evaluation:  Plan of Care Reviewed With: patient, daughter           Outcome Evaluation: Pt is an 86 yo M admitted from home with generalized weakness and frequent falls. PMHx includes atrial flutter, type II DM, CKD, and HTN. Pt also with chronic back pain, hx of 2 previous back sx. 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 Pt reports L>R weakness - noted L AFO. Per chart, pt with B proximal muscle weakness possilbly contributing to falls. Pt's daughter present on eval and stating pt with multiple episodes of spontaneous knee buckling with collapse - pt denies any associated dizziness/lightheadedness, stating he gets no warning. Pt lives alone and reports he is normally independent with a rollator but has had 3-4 falls in the last 6 months and feels he needs rehab prior to returning home. Pt presents to PT with impaired strength, endurance, and pain limiting overall mobility. Pt transferred to EOB with CGA and assisted donning shoes. Pt stood with min A x2 and ambulated 80ft with rwx and CGA. Pt mildly unsteady with gait, cues to keep rwx close to him and for upright posture. Noted short, shuffled steps and pt fatiguing quickly limiting gait distance. Pt agreeable to sitting UIC following session, assisted with repositioning and left with needs met. Pt encouraged pt to call out for assist back to bed as well as encouraged getting up to bathroom with nsg as able. Pt's daughter concerned about kness buckling, may benefit from chair follow or assist x2 for safety, but no way to predict knee buckling to prevent falls. Encouraged use of rwx and gait belt. PT will continue to follow to progress mobility as tolerated, anticipate DC to SNF d/t multiple falls and pt living alone.

## 2022-12-28 NOTE — CASE MANAGEMENT/SOCIAL WORK
Continued Stay Note  [facility]     Patient Name: Osvaldo Lopez  MRN: [id number]  Today's Date: 12/28/2022    Admit Date: 12/27/2022    Plan: Acute vs subacute rehab   Discharge Plan     Row Name 12/28/22 1206       Plan    Plan Acute vs subacute rehab    Plan Comments MRI positive for stroke.  CCP spoke w/ pt's dtr to discuss acute vs subacute rehab.  [facility].  Referrals made to [facility] in case pt qualifies for acute rehab. .........Juliana GONZALEZ/ ABRAHAM               Discharge Codes    No documentation.                     Juliana Castaneda RN

## 2022-12-28 NOTE — THERAPY EVALUATION
Patient Name: Osvaldo Lopez  : 1935    MRN: [id number]                              Today's Date: 2022       Admit Date: 2022    Visit Dx:     ICD-10-CM ICD-9-CM   1. Recurrent falls  R29.6 V15.88   2. 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HCC)  G61.82 357.89   3. Injury of head, initial encounter  S09.90XA 959.01   4. Injury of left lower extremity, initial encounter  S89.92XA 959.7   5. Abrasion of left elbow, initial encounter  S50.312A 913.0   6. Troponin level elevated  R77.8 790.6   7. Chronic renal failure, unspecified CKD stage  N18.9 585.9   8. 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 Other diabetic neurological complication associated with other specified diabetes mellitus ([facility])  E13.49 249.60   10. Follow-up exam  Z09 V67.9     Patient Active Problem List   Diagnosis   • Complete rotator cuff tear of left shoulder   • Abnormal finding on thyroid function test   • Abdominal aortic aneurysm   • Benign prostatic hyperplasia   • Essential hypertension   • Hyperlipidemia   • Shoulder pain   • Lumbar radiculopathy   • Type 2 diabetes mellitus, without long-term current use of insulin ([facility])   • OA (osteoarthritis) of knee   • Tear of right rotator cuff   • Spinal stenosis of lumbar region with neurogenic claudication   • Eyebrow ptosis   • Pseudophakia   • Nonrheumatic aortic valve stenosis   • Atrial flutter with rapid ventricular response ([facility])   • Right arm pain   • Leg weakness, bilateral   • Typical atrial flutter ([facility])   • Diabetic peripheral neuropathy ([facility])   • Muscle weakness (generalized)   • Pressure injury of left buttock, stage 1   • Chronic low back pain with sciatica   • 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[facility])   • Pressure injury of buttock, stage 1   • Anemia   • Symptomatic anemia   • Iron deficiency anemia   • Chronic anticoagulation   • CKD (chronic kidney disease)   • CRISTOBAL (acute kidney injury) ([facility])   • Colonic mass   • Axonal neuropathy   • Impairment of balance   • Post  laminectomy syndrome   • Coronary arteriosclerosis   • Edema   • History of malignant neoplasm of colon   • Prostatism   • Stage 3b chronic kidney disease (HCC)   • Pre-ulcerative calluses   • Elevated troponin   • Recurrent falls   • Generalized weakness   • Obesity (BMI 30-39.9)   • Thrombocytopenia, unspecified (HCC)   • Acute CVA (cerebrovascular accident) ([facility])   • Subdural hematoma, acute     Past Medical History:   Diagnosis Date   • Abdominal aortic aneurysm (AAA) without rupture    • Abdominal aortic ectasia ([facility]) 06/2015   • Abnormal EKG 10/25/2016    04/2019   • Abnormal thyroid blood test    • Actinic keratosis     FOLLOWED BY DR. MANPREET VILLARREAL   • Anemia 01/28/2021   • Arthritis    • Asthma     MILD, INTERMITTENT   • Atherosclerotic heart disease    • Atrial flutter with rapid ventricular response ([facility]) 04/14/2019    ADMITTED TO [facility]   • Atrial premature depolarization    • Atrophy of muscle of lower leg    • BPH (benign prostatic hyperplasia)     FOLLOWED BY DR. TERRA JONES   • Bruises easily    • Bulging of thoracic intervertebral disc    • Carpal tunnel syndrome, right 12/2019    FOLLOWED BY DR. NEW SANCHEZ   • Cataract     BILATERAL, S/P EXTRACTION WITH IOL IMPLANTS   • Chronic anticoagulation 01/29/2021   • Chronic edema    • Chronic low back pain with sciatica 01/26/2021   • Chronic pain    • CKD (chronic kidney disease) 01/29/2021   • Closed head injury 07/2018    WITH LACERATION TO SCALP, D/T SYNCOPE   • Colon cancer ([facility]) 01/31/2021    INVASIVE ADENOCARCINOMA FROM TUBULOVILLOUS ADENOMA IN TRANSVERSE, S/P COLON RESECTION, FOLLOWED BY DR. MARGARITA COX   • Colon polyps     FOLLOWED BY DR. 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 TERRA JONES   • Epididymitis, right 03/2018   • Essential hypertension    •  Eyebrow ptosis 11/09/2013   • Hallux valgus, acquired, bilateral 01/2019   • Heart attack ([facility]) 09/1989    S/P CABG, 5 VESSEL   • Heart murmur    • History of blood transfusion    • HL (hearing loss)    • Hyperactivity of bladder     FOLLOWED BY DR. TERRA JONES   • HyperCKemia 11/2017   • Hyperkalemia 08/2016   • Hyperlipidemia     MIXED   • Hyperreflexia 11/2017    BILATERAL   • Hyphema 05/2015   • IBS (irritable bowel syndrome)    • Impaired functional mobility, balance, gait, and endurance    • Impingement syndrome of left shoulder 10/2015   • Infection associated with cystostomy catheter ([facility]) 12/2016   • Ischemic colitis ([facility])    • Lumbar radiculopathy 2016    wears back brace at times following back surgery   • Lumbar spondylosis 04/2016   • Lumbosacral neuritis 05/2015   • Lumbosacral radiculitis 05/2015   • Macular puckering of retina, left 10/2013   • 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 VIANEY GIORDANO   • Pseudophakia 11/09/2013   • RBBB (right bundle branch block)    • Recurrent falls    • Respiratory failure with hypoxia ([facility]) 01/2019   • SCC (squamous cell carcinoma) 10/16/2019    RIGHT FOREHEAD, LEFT TEMPLE, RIGHT SCALP, FOLLOWED BY DR. MANPREET VILLARREAL   • Scoliosis    • Spinal stenosis of lumbar region with neurogenic claudication 12/07/2017   • Syncope and collapse 07/16/2018    ADMITTED TO [facility]   • Torn rotator cuff 03/2017    BILATERAL, COMPLETE   • Type 2 diabetes mellitus ([facility])     IDDM   • Urinary frequency     FOLLOWED BY DR. ELLISON  ROBERT   • Vitamin D deficiency    • Vitreous hemorrhage of left eye (HCC)      Past Surgical History:   Procedure Laterality Date   • APPENDECTOMY N/A    • BROW LIFT Bilateral 11/12/2013    DR. OCTAVIA CEDILLO AT [place]   • CARDIAC CATHETERIZATION Left 10/2008    LEFT CAROTID ARTERY STENOSIS 50-69%   • CARDIAC ELECTROPHYSIOLOGY PROCEDURE N/A 06/06/2019    Procedure: Ablation atrial flutter;  Surgeon: Matthew Talbot MD;  Location:  [facility];  Service: Cardiovascular   • CATARACT EXTRACTION Left 01/22/1998    DR. LAYLA BARNES AT [place]   • CATARACT EXTRACTION Right 03/2001    DR. LAYLA BARNES   • CHOLECYSTECTOMY N/A 08/24/1989    DR. FAUZIA PELAEZ AT [facility]   • COLON RESECTION N/A 02/03/2021    Procedure: LAPAROSCOPIC EXTENDED  RIGHT COLECTOMY WITH DAVINCI ROBOT;  Surgeon: Margarita Napoles MD;  Location: [facility] OR;  Service: DaVinci;  Laterality: N/A;   • COLONOSCOPY N/A 01/31/2021    20-25 MM POLYP IN CECUM, PATH: TUBULAR ADENOMA, 2 TUBULAR ADENOMA POLYPS IN ASCENDING, A FUNGATING AND SUBMUCOSAL ONONOBSTRUCTING MEDIUM MASS IN PROXIMAL TRANSVERSE, PATH: INVASIVE ADENOCARCINOMA ARISING IN A TUBULOVILOOUS ADENOMA, AREA TATTOOED, SCATTERED DIVERTICULA IN SIGMOID AND DESCENDING, LARGE INTERNAL HEMORRHOIDS, DR. JACOB ALICEA AT [facility]    • COLONOSCOPY N/A 02/02/2010    DIVERTICULOSIS, DR. SAL SOLANO AT [facility]   • COLONOSCOPY N/A 08/15/2022    5 MM TUBULAR ADENOMA POLYP IN SIGMOID, 2 TUBULAR ADENOMA POLYPS IN DESCENDING, MULTIPLE DIVERTICULA IN SIGMOID, RESCOPE IN 2 YRS, DR. MARGARITA NAPOLES AT [facility]   • CORONARY ARTERY BYPASS GRAFT N/A 09/1989    5 VESSEL, DR. HANKS   • CYST REMOVAL      FROM BACK   • ENDOSCOPY N/A 01/31/2021    ENTIRE EGD WNL, PATH: MILD REACTIVE GASTROPATHY, DR. JACOB ALICEA AT [facility]   • INGUINAL HERNIA REPAIR Left 09/27/2007    DR. MATTHEW RUSH AT [facility]   • INGUINAL HERNIA REPAIR Left 09/07/2007   • INGUINAL HERNIA REPAIR Left 2003   • KNEE ARTHROSCOPY W/ PARTIAL MEDIAL  MENISCECTOMY Left 1962    DR. SINGH   • LUMBAR DISCECTOMY FUSION INSTRUMENTATION N/A 04/11/2016    Procedure: lumbar laminectomy L4-5 and fusion with instrumentation;  Surgeon: Ran Kline MD;  Location: [facility];  Service:    • LUMBAR DISCECTOMY FUSION INSTRUMENTATION N/A 01/15/2018    Procedure: L2-3, L3-4 laminectomy and fusion with instrumentation and removal of implants L4 5.;  Surgeon: Ran Kline MD;  Location: [facility];  Service:    • LUMBAR EPIDURAL INJECTION N/A 09/23/2015    DR. MATTHEW DOMINGUEZ AT [facility]   • LUMBAR EPIDURAL INJECTION N/A 10/07/2015    DR. ITZEL [facility] AT [facility]   • LUMBAR EPIDURAL INJECTION N/A 11/19/2015    DR. ITZEL [facility] AT [facility]   • LA TOTAL KNEE ARTHROPLASTY Left 11/14/2016    Procedure: TOTAL KNEE ARTHROPLASTY;  Surgeon: Dontrell Arellano MD;  Location: [facility];  Service: Orthopedics   • SKIN BIOPSY Bilateral 10/16/2019    RIGHT LATERAL FOREHEAD:SCC, LEFT TEMPLE:SCC, RIGHT PARIETAL SCALP:SCC, DR. MANPREET VILLARREAL      General Information     [facility] Name 12/28/22 1712          Physical Therapy Time and Intention    Document Type evaluation  -     Mode of Treatment individual therapy;physical therapy  -[facility] Name 12/28/22 1712          General Information    Patient Profile Reviewed yes  -     Prior Level of Function independent:;gait;transfer;bed mobility  -     Existing Precautions/Restrictions fall  -     Barriers to Rehab none identified  -[facility] Name 12/28/22 1712          Living Environment    People in Home alone  -[facility] Name 12/28/22 1712          Cognition    Orientation Status (Cognition) oriented x 4  -[facility] Name 12/28/22 1712          Safety Issues, Functional Mobility    Impairments Affecting Function (Mobility) balance;endurance/activity tolerance;strength;pain;postural/trunk control;range of motion (ROM)  -           User Key  (r) = Recorded By, (t) = Taken By, (c) = Cosigned By    Initials Name Provider Type     García  Vivi CONRAD PT Physical Therapist               Mobility     [facility] Name 12/28/22 1712          Bed Mobility    Bed Mobility supine-sit  -     Supine-Sit [place] (Bed Mobility) contact guard;verbal cues  -     Assistive Device (Bed Mobility) bed rails;head of bed elevated  -[facility] Name 12/28/22 1712          Sit-Stand Transfer    Sit-Stand [place] (Transfers) minimum assist (75% patient effort);2 person assist;verbal cues  -     Assistive Device (Sit-Stand Transfers) walker, front-wheeled  -[facility] Name 12/28/22 1712          Gait/Stairs (Locomotion)    [place] Level (Gait) contact guard;verbal cues  -     Assistive Device (Gait) walker, front-wheeled  -     Distance in Feet (Gait) 80ft  -     Deviations/Abnormal Patterns (Gait) antalgic;oseas decreased;gait speed decreased;stride length decreased;weight shifting decreased  -     Bilateral Gait Deviations forward flexed posture;heel strike decreased  -     Comment, (Gait/Stairs) Impaired B knee extension, forward flexed posture, fatigues quickly  -           User Key  (r) = Recorded By, (t) = Taken By, (c) = Cosigned By    Initials Name Provider Type     Vivi Mariano PT Physical Therapist               Obj/Interventions     [facility] Name 12/28/22 1714          Range of Motion Comprehensive    General Range of Motion lower extremity range of motion deficits identified  -     Comment, General Range of Motion Impaired B knee extension, B ankle ROM WFL  -[facility] Name 12/28/22 1714          Strength Comprehensive (MMT)    General Manual Muscle Testing (MMT) Assessment lower extremity strength deficits identified  -     Comment, General Manual Muscle Testing (MMT) Assessment Generalized weakness, B hips and knees 3+/5, R ankles 3/5, L ankle 2/5  -[facility] Name 12/28/22 1714          Balance    Balance Assessment sitting static balance;sitting dynamic balance;standing static balance;standing dynamic balance  -     Static  Sitting Balance standby assist;verbal cues  -     Dynamic Sitting Balance contact guard;verbal cues  -     Position, Sitting Balance unsupported;sitting edge of bed  -     Static Standing Balance standby assist;verbal cues  -     Dynamic Standing Balance contact guard;verbal cues  -     Position/Device Used, Standing Balance supported;walker, front-wheeled  -     Balance Interventions sitting;standing;sit to stand;supported;static;dynamic  -[facility] Name 12/28/22 1202          Sensory Assessment (Somatosensory)    Sensory Assessment (Somatosensory) other (see comments)  C/o LLE numbness, worse below the knee  -           User Key  (r) = Recorded By, (t) = Taken By, (c) = Cosigned By    Initials Name Provider Type     Vivi Mariano, PT Physical Therapist               Goals/Plan     Row Name 12/28/22 1730          Bed Mobility Goal 1 (PT)    Activity/Assistive Device (Bed Mobility Goal 1, PT) bed mobility activities, all  -     [place] Level/Cues Needed (Bed Mobility Goal 1, PT) standby assist  -     Time Frame (Bed Mobility Goal 1, PT) 1 week  -[facility] Name 12/28/22 1730          Transfer Goal 1 (PT)    Activity/Assistive Device (Transfer Goal 1, PT) transfers, all  -     [place] Level/Cues Needed (Transfer Goal 1, PT) standby assist  -     Time Frame (Transfer Goal 1, PT) 1 week  -[facility] Name 12/28/22 2530          Gait Training Goal 1 (PT)    Activity/Assistive Device (Gait Training Goal 1, PT) gait (walking locomotion)  -     [place] Level (Gait Training Goal 1, PT) standby assist  -     Distance (Gait Training Goal 1, PT) 150ft  -     Time Frame (Gait Training Goal 1, PT) 1 week  -[facility] Name 12/28/22 8784          Therapy Assessment/Plan (PT)    Planned Therapy Interventions (PT) balance training;bed mobility training;gait training;home exercise program;patient/family education;strengthening;transfer training  -           User Key  (r) = Recorded  By, (t) = Taken By, (c) = Cosigned By    Initials Name Provider Type     Vivi Mariano, PT Physical Therapist               Clinical Impression     Row Name 12/28/22 5821          Pain    Pre/Posttreatment Pain Comment C/o chronic back pain, R knee and ankle pain  -     Pain Intervention(s) Repositioned;Ambulation/increased activity;Rest  -     Row Name 12/28/22 4234          Plan of Care Review    Plan of Care Reviewed With patient;daughter  -     Outcome Evaluation Pt is an 86 yo M admitted from home with generalized weakness and frequent falls. PMHx includes atrial flutter, type II DM, CKD, and HTN. Pt also with chronic back pain, hx of 2 previous back sx. 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 Pt reports L>R weakness - noted L AFO. Per chart, pt with B proximal muscle weakness possilbly contributing to falls. Pt's daughter present on eval and stating pt with multiple episodes of spontaneous knee buckling with collapse - pt denies any associated dizziness/lightheadedness, stating he gets no warning. Pt lives alone and reports he is normally independent with a rollator but has had 3-4 falls in the last 6 months and feels he needs rehab prior to returning home. Pt presents to PT with impaired strength, endurance, and pain limiting overall mobility. Pt transferred to EOB with CGA and assisted donning shoes. Pt stood with min A x2 and ambulated 80ft with rwx and CGA. Pt mildly unsteady with gait, cues to keep rwx close to him and for upright posture. Noted short, shuffled steps and pt fatiguing quickly limiting gait distance. Pt agreeable to sitting UIC following session, assisted with repositioning and left with needs met. Pt encouraged pt to call out for assist back to bed as well as encouraged getting up to bathroom with nsg as able. Pt's daughter concerned about kness buckling, may benefit from chair follow or assist x2 for safety, but no way to predict knee buckling to  prevent falls. Encouraged use of rwx and gait belt. PT will continue to follow to progress mobility as tolerated, anticipate DC to SNF d/t multiple falls and pt living alone.  -     Row Name 12/28/22 1716          Therapy Assessment/Plan (PT)    Patient/Family Therapy Goals Statement (PT) Return to PLOF  -     Rehab Potential (PT) good, to achieve stated therapy goals  -     Criteria for Skilled Interventions Met (PT) yes  -     Therapy Frequency (PT) 5 times/wk  -     Row Name 12/28/22 1716          Vital Signs    O2 Delivery Pre Treatment room air  -     O2 Delivery Intra Treatment room air  -     O2 Delivery Post Treatment room air  -[facility] Name 12/28/22 1716          Positioning and Restraints    Pre-Treatment Position in bed  -     Post Treatment Position chair  -     In Chair sitting;call light within reach;encouraged to call for assist;exit alarm on;with family/caregiver  -           User Key  (r) = Recorded By, (t) = Taken By, (c) = Cosigned By    Initials Name Provider Type     Vivi Mariano, PT Physical Therapist               Outcome Measures     Row Name 12/28/22 1731          How much help from another person do you currently need...    Turning from your back to your side while in flat bed without using bedrails? 3  -BH     Moving from lying on back to sitting on the side of a flat bed without bedrails? 3  -BH     Moving to and from a bed to a chair (including a wheelchair)? 3  -BH     Standing up from a chair using your arms (e.g., wheelchair, bedside chair)? 3  -BH     Climbing 3-5 steps with a railing? 2  -     To walk in hospital room? 3  -     AM-PAC 6 Clicks Score (PT) 17  -     Highest level of mobility 5 --> Static standing  -     Row Name 12/28/22 1731          Functional Assessment    Outcome Measure Options AM-PAC 6 Clicks Basic Mobility (PT)  -           User Key  (r) = Recorded By, (t) = Taken By, (c) = Cosigned By    Initials Name Provider Type      Vivi Mariano, PT Physical Therapist                             Physical Therapy Education     Title: PT OT SLP Therapies (Done)     Topic: Physical Therapy (Done)     Point: Mobility training (Done)     Learning Progress Summary           Patient Acceptance, E,TB,D, VU,NR by  at 12/28/2022 1731                   Point: Home exercise program (Done)     Learning Progress Summary           Patient Acceptance, E,TB,D, VU,NR by  at 12/28/2022 1731                   Point: Body mechanics (Done)     Learning Progress Summary           Patient Acceptance, E,TB,D, VU,NR by  at 12/28/2022 1731                   Point: Precautions (Done)     Learning Progress Summary           Patient Acceptance, E,TB,D, VU,NR by  at 12/28/2022 1731                               User Key     Initials Effective Dates Name Provider Type Discipline     04/08/22 -  Vivi Mariano PT Physical Therapist PT              PT Recommendation and Plan  Planned Therapy Interventions (PT): balance training, bed mobility training, gait training, home exercise program, patient/family education, strengthening, transfer training  Plan of Care Reviewed With: patient, daughter  Outcome Evaluation: Pt is an 86 yo M admitted from home with generalized weakness and frequent falls. PMHx includes atrial flutter, type II DM, CKD, and HTN. Pt also with chronic back pain, hx of 2 previous back sx. 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 Pt reports L>R weakness - noted L AFO. Per chart, pt with B proximal muscle weakness possilbly contributing to falls. Pt's daughter present on eval and stating pt with multiple episodes of spontaneous knee buckling with collapse - pt denies any associated dizziness/lightheadedness, stating he gets no warning. Pt lives alone and reports he is normally independent with a rollator but has had 3-4 falls in the last 6 months and feels he needs rehab prior to returning home. Pt presents to PT with  impaired strength, endurance, and pain limiting overall mobility. Pt transferred to EOB with CGA and assisted donning shoes. Pt stood with min A x2 and ambulated 80ft with rwx and CGA. Pt mildly unsteady with gait, cues to keep rwx close to him and for upright posture. Noted short, shuffled steps and pt fatiguing quickly limiting gait distance. Pt agreeable to sitting UIC following session, assisted with repositioning and left with needs met. Pt encouraged pt to call out for assist back to bed as well as encouraged getting up to bathroom with nsg as able. Pt's daughter concerned about kness buckling, may benefit from chair follow or assist x2 for safety, but no way to predict knee buckling to prevent falls. Encouraged use of rwx and gait belt. PT will continue to follow to progress mobility as tolerated, anticipate DC to SNF d/t multiple falls and pt living alone.     Time Calculation:    PT Charges     Row Name 12/28/22 1731             Time Calculation    Start Time 1531  -      Stop Time 1547  -      Time Calculation (min) 16 min  -      PT Received On 12/28/22  -      PT - Next Appointment 12/29/22  -      PT Goal Re-Cert Due Date 01/04/23  -         Time Calculation- PT    Total Timed Code Minutes- PT 12 minute(s)  -         Timed Charges    25609 - Gait Training Minutes  8  -      41594 - PT Therapeutic Activity Minutes 4  -         Untimed Charges    PT Eval/Re-eval Minutes 5  -         Total Minutes    Timed Charges Total Minutes 12  -      Untimed Charges Total Minutes 5  -       Total Minutes 17  -            User Key  (r) = Recorded By, (t) = Taken By, (c) = Cosigned By    Initials Name Provider Type     Vivi Mariano PT Physical Therapist              Therapy Charges for Today     Code Description Service Date Service Provider Modifiers Qty    88276315687 HC GAIT TRAINING EA 15 MIN 12/28/2022 Vivi Mariano, PT GP 1    07299123354 HC PT EVAL MOD COMPLEXITY 3 12/28/2022  Vivi Mariano, PT GP 1    13863909946 HC PT THER SUPP EA 15 MIN 12/28/2022 Vivi Mariano, PT GP 1          PT G-Codes  Outcome Measure Options: AM-PAC 6 Clicks Basic Mobility (PT)  AM-PAC 6 Clicks Score (PT): 17  PT Discharge Summary  Anticipated Discharge Disposition (PT): skilled nursing facility    Vivi Mariano PT  12/28/2022

## 2022-12-28 NOTE — ED NOTES
".Nursing report ED to floor  Osvaldo Lopez  87 y.o.  male    HPI :   Chief Complaint   Patient presents with    Fall    Hip Pain    Back Pain       Admitting doctor:   Kirk Bertrand MD    Admitting diagnosis:   The primary encounter diagnosis was Recurrent falls. 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    Code status:   Current Code Status       Date Active Code Status Order ID Comments User Context       Prior            Allergies:   Amiodarone and Percocet [oxycodone-acetaminophen]    Isolation:   No active isolations    Intake and Output  No intake or output data in the 24 hours ending 12/27/22 2043    Weight:       12/27/22  1603   Weight: 109 kg (240 lb)       Most recent vitals:   Vitals:    12/27/22 1347 12/27/22 1603 12/27/22 1825   BP: 130/63 134/66 141/75   BP Location:  Right arm Right arm   Patient Position:  Lying Lying   Pulse: 76 74 70   Resp: 18 18 16   Temp: 98.1 °F (36.7 °C)     SpO2: 95% 95% 95%   Weight:  109 kg (240 lb)    Height:  177.8 cm (70\")        Active LDAs/IV Access:   Lines, Drains & Airways       Active LDAs       Name Placement date Placement time Site Days    Peripheral IV 12/27/22 1721 Right Antecubital 12/27/22  1721  Antecubital  less than 1                    Labs (abnormal labs have a star):   Labs Reviewed   COMPREHENSIVE METABOLIC PANEL - Abnormal; Notable for the following components:       Result Value    Glucose 146 (*)     BUN 41 (*)     Creatinine 2.06 (*)     Alkaline Phosphatase 122 (*)     eGFR 31.0 (*)     All other components within normal limits    Narrative:     GFR Normal " >60  Chronic Kidney Disease <60  Kidney Failure <15    The GFR formula is only valid for adults with stable renal function between ages 18 and 70.   PROTIME-INR - Abnormal; Notable for the following components:    Protime 23.0 (*)     INR 2.00 (*)     All other components within normal limits   URINALYSIS W/ MICROSCOPIC IF INDICATED (NO CULTURE) - Abnormal; Notable for the following components:    Ketones, UA Trace (*)     Protein, UA Trace (*)     All other components within normal limits    Narrative:     Urine microscopic not indicated.   TROPONIN (IN-HOUSE) - Abnormal; Notable for the following components:    Troponin T 0.229 (*)     All other components within normal limits    Narrative:     Troponin T Reference Range:  <= 0.03 ng/mL-   Negative for AMI  >0.03 ng/mL-     Abnormal for myocardial necrosis.  Clinicians would have to utilize clinical acumen, EKG, Troponin and serial changes to determine if it is an Acute Myocardial Infarction or myocardial injury due to an underlying chronic condition.       Results may be falsely decreased if patient taking Biotin.     CK - Abnormal; Notable for the following components:    Creatine Kinase 218 (*)     All other components within normal limits   CBC WITH AUTO DIFFERENTIAL - Abnormal; Notable for the following components:    RBC 3.48 (*)     Hemoglobin 12.3 (*)     Hematocrit 36.6 (*)     .2 (*)     MCH 35.3 (*)     Platelets 89 (*)     All other components within normal limits   MANUAL DIFFERENTIAL - Abnormal; Notable for the following components:    Neutrophil % 79.0 (*)     Lymphocyte % 17.0 (*)     Monocyte % 4.0 (*)     All other components within normal limits   MAGNESIUM - Normal   CBC AND DIFFERENTIAL    Narrative:     The following orders were created for panel order CBC & Differential.  Procedure                               Abnormality         Status                     ---------                               -----------         ------                      CBC Auto Differential[[id number]]        Abnormal            Final result                 Please view results for these tests on the individual orders.       EKG:   ECG 12 Lead Other; Falling   Preliminary Result   HEART RATE= 73  bpm   RR Interval= 822  ms   MA Interval= 204  ms   P Horizontal Axis= 13  deg   P Front Axis= 0  deg   QRSD Interval= 171  ms   QT Interval= 486  ms   QRS Axis= -31  deg   T Wave Axis= 36  deg   - ABNORMAL ECG -   Sinus rhythm   Atrial premature complexes   Right bundle branch block   Left ventricular hypertrophy   Electronically Signed By:    Date and Time of Study: 2022-12-27 16:58:42          Meds given in ED:   Medications   sodium chloride 0.9 % flush 10 mL (has no administration in time range)   Tetanus-Diphth-Acell Pertussis (BOOSTRIX) injection 0.5 mL (0.5 mL Intramuscular Given 12/27/22 1717)   acetaminophen (TYLENOL) tablet 1,000 mg (1,000 mg Oral Given 12/27/22 1825)       Imaging results:  CT Abdomen Pelvis Without Contrast    Result Date: 12/27/2022    1. Colonic diverticulosis. No acute inflammatory process of bowel is identified. 2. No hydronephrosis or ureteral stones.  This report was finalized on 12/27/2022 6:25 PM by Dr. Vimal Mayorga M.D.      CT Head Without Contrast    Result Date: 12/27/2022  There is no evidence of fracture or of intracranial hemorrhage.   CT EXAMINATION OF THE CERVICAL SPINE WITHOUT CONTRAST:  FINDINGS: There is mild reversal of the normal cervical lordosis. There is moderate loss of disc height at C5-6 and C6-7. There is a grade 1 anterolisthesis of C3 upon C4 and C4 upon C5. There is solid fusion of the facets to the left at C2-3. There is no evidence of fracture. Extensive vascular calcification involving the proximal aspects of the great vessels are noted.  C2-3: A small central disc bulge or protrusion is appreciated.  C3-4: Moderate facet degenerative disease is present on the right. There is moderate foraminal stenosis on the  right secondary to facet and uncovertebral degenerative disease.  C4-5: Moderate-to-severe facet degenerative disease is present on the left. There is moderate-to-severe foraminal stenosis on the left secondary to facet hypertrophy and uncovertebral degenerative disease.  C5-6: A broad-based disc osteophyte complex is present which results in mild canal stenosis. There is mild and moderate foraminal stenosis on the left and right respectively secondary to loss of disc height and uncovertebral degenerative disease.  C6-7: A mild central disc osteophyte complex is present with no evidence of herniation. Mild and moderate foraminal stenosis is present on the left and right respectively secondary to the loss of disc height, facet hypertrophy and uncovertebral degenerative disease.  C7-T1: Moderate-to-severe facet degenerative disease is present on the left.  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 Extensive vascular calcification involving the great vessels proximally are appreciated. Moderate-to-severe vascular calcification involving the carotid bifurcations are also appreciated bilaterally, more prominent on the left.      Radiation dose reduction techniques were utilized, including automated exposure control and exposure modulation based on body size.  This report was finalized on 12/27/2022 8:14 PM by Dr. Kirk Matias M.D.      CT Cervical Spine Without Contrast    Result Date: 12/27/2022  There is no evidence of fracture or of intracranial hemorrhage.   CT EXAMINATION OF THE CERVICAL SPINE WITHOUT CONTRAST:  FINDINGS: There is mild reversal of the normal cervical lordosis. There is moderate loss of disc height at C5-6 and C6-7. There is a grade 1 anterolisthesis of C3 upon C4 and C4 upon C5. There is solid fusion of the facets to the left at C2-3. There is no evidence of fracture. Extensive vascular calcification involving  the proximal aspects of the great vessels are noted.  C2-3: A small central disc bulge or protrusion is appreciated.  C3-4: Moderate facet degenerative disease is present on the right. There is moderate foraminal stenosis on the right secondary to facet and uncovertebral degenerative disease.  C4-5: Moderate-to-severe facet degenerative disease is present on the left. There is moderate-to-severe foraminal stenosis on the left secondary to facet hypertrophy and uncovertebral degenerative disease.  C5-6: A broad-based disc osteophyte complex is present which results in mild canal stenosis. There is mild and moderate foraminal stenosis on the left and right respectively secondary to loss of disc height and uncovertebral degenerative disease.  C6-7: A mild central disc osteophyte complex is present with no evidence of herniation. Mild and moderate foraminal stenosis is present on the left and right respectively secondary to the loss of disc height, facet hypertrophy and uncovertebral degenerative disease.  C7-T1: Moderate-to-severe facet degenerative disease is present on the left.  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 Extensive vascular calcification involving the great vessels proximally are appreciated. Moderate-to-severe vascular calcification involving the carotid bifurcations are also appreciated bilaterally, more prominent on the left.      Radiation dose reduction techniques were utilized, including automated exposure control and exposure modulation based on body size.  This report was finalized on 12/27/2022 8:14 PM by Dr. Kirk Matias M.D.       Ambulatory status:   Up with assistance    Social issues:   Social History     Socioeconomic History    Marital status:    Tobacco Use    Smoking status: Former     Packs/day: 3.00     Years: 45.00     Pack years: 135.00     Types: Cigarettes     Quit date: 11/1/1989      Years since quittin.1    Smokeless tobacco: Never   Vaping Use    Vaping Use: Never used   Substance and Sexual Activity    Alcohol use: Yes     Comment: 1 shot of whiskey in the morning and 1 shot of whiskey in the evening    Drug use: Never    Sexual activity: Not Currently       NIH Stroke Scale:         Steffany Waddell RN  22 20:43 EST

## 2022-12-28 NOTE — CASE MANAGEMENT/SOCIAL WORK
Discharge Planning Assessment  [facility]     Patient Name: Osvaldo Lopez  MRN: [id number]  Today's Date: 12/28/2022    Admit Date: 12/27/2022    Plan: SNF - referral to [facility] pending.   Discharge Needs Assessment     Row Name 12/28/22 0824       Living Environment    People in Home alone    Current Living Arrangements home    Primary Care Provided by self;child(nabila)    Provides Primary Care For no one, unable/limited ability to care for self    Family Caregiver if Needed child(nabila), adult    Quality of Family Relationships helpful;involved;supportive       Transition Planning    Patient/Family Anticipates Transition to inpatient rehabilitation facility    Patient/Family Anticipated Services at Transition skilled nursing    Transportation Anticipated family or friend will provide       Discharge Needs Assessment    Readmission Within the Last 30 Days no previous admission in last 30 days    Equipment Currently Used at Home commode;shower chair;rollator    Concerns to be Addressed discharge planning    Anticipated Changes Related to Illness inability to care for self    Provided Post Acute Provider List? Yes    Post Acute Provider List Nursing Home    Provided Post Acute Provider Quality & Resource List? Yes    Post Acute Provider Quality and Resource List Nursing Home    Delivered To Support Person    Method of Delivery In person    Current Discharge Risk lives alone;physical impairment               Discharge Plan     Row Name 12/28/22 0825       Plan    Plan SNF - referral to [facility] pending.    Plan Comments S/W pt's dtr Sisi at bedside.  Pt is off the floor for testing.  Facesheet info confirmed.  Pt lives in a 2 story patio home.  His special needs dtr lives there on the weekends and goes to [facility] during the week.  His dtr Sisi facetimes w/ pt daily and visits several times a week to assist w/ meals and bathing. Pt gets meals delivered by [facility].   Pt drives occasionally, and Sisi can assist w/ transport as needed. Pt has used HH in the past and has been to rehab at [facility] and [facility].  Sisi feels pt will likely need rehab and requests referral to [facility].  She has been talking with them about an assisted living bed.  Referral called to [facility] no pending. ............Juliana GONZALEZ/ ABRAHAM              Continued Care and Services - Admitted Since 12/27/2022     Destination     Service Provider Request Status Selected Services Address Phone Fax Patient Preferred    [facility] Pending - Request Sent N/A [address] [address] [phone] --                 Demographic Summary     Row Name 12/28/22 0823       General Information    Admission Type observation    Arrived From home    Required Notices Provided Observation Status Notice    Referral Source admission list    Reason for Consult discharge planning    Preferred Language English               Functional Status     Row Name 12/28/22 0823       Functional Status    Usual Activity Tolerance moderate    Current Activity Tolerance fair       Assessment of Health Literacy    How often do you have someone help you read hospital materials? Often       Functional Status, IADL    Medications independent    Meal Preparation assistive person    Housekeeping assistive person    Laundry assistive person    Shopping assistive person       Mental Status    General Appearance WDL WDL       Employment/    Employment Status retired                         Juliana Castaneda RN

## 2022-12-28 NOTE — CONSULTS
"  Patient Identification:  NAME:  Osvaldo Lopez  Age:  87 y.o.   Sex:  male   :  1935   MRN:  [id number]       Chief complaint: My legs are weak, reason for consult falls and weakness    History of present illness: Patient is an 87-year-old right-handed white male with a history of intermittent A. fib status post EP ablation type therapy for atrial flutter with rapid ventricular response in the past some history of hypertension and diabetes, chronic kidney disease.  He does have back pain and has had 2 back surgeries.  He comes in after a fall.  He states that he \"gets that feeling\", meaning some sort of lightheaded feeling, lasting more than a few seconds and he knows his legs are going to collapse and a collapse underneath him without syncope or other associated symptoms.  No bowel bladder incontinence.  No fever chills rash.  He does have a little bit of numbness in his toes but not much at all for the most part he definitely has proximal leg weakness and has to have a walker in order to pull himself up or to stand while he is not looking in the mirror he must push up to get off the toilet.  He notes this is been a longstanding phenomena.  Associated symptoms and problems.  He does have an acute right hemisphere caudate nucleus stroke.  Context patient has been taking Xarelto.  We are holding it but his INR yesterday was 2.0 which apparently may be associated with taking Xarelto versus some other etiology.  When I talked him about the new area of stroke in the right brain.  He cannot tell that he has any new stroke.  The MRI also showed what may be a very thin subdural over the left parieto-occipital region secondary to hitting his head/concussion      Past medical history:  Past Medical History:   Diagnosis Date   • Abdominal aortic aneurysm (AAA) without rupture    • Abdominal aortic ectasia (HCC) 2015   • Abnormal EKG 10/25/2016    2019   • Abnormal thyroid blood test    • Actinic keratosis  "    FOLLOWED BY DR. MANPREET VILLRAREAL   • Anemia 01/28/2021   • Arthritis    • Asthma     MILD, INTERMITTENT   • Atherosclerotic heart disease    • Atrial flutter with rapid ventricular response ([facility]) 04/14/2019    ADMITTED TO [facility]   • Atrial premature depolarization    • Atrophy of muscle of lower leg    • BPH (benign prostatic hyperplasia)     FOLLOWED BY DR. TERRA JONES   • Bruises easily    • Bulging of thoracic intervertebral disc    • Carpal tunnel syndrome, right 12/2019    FOLLOWED BY DR. NEW SANCHEZ   • Cataract     BILATERAL, S/P EXTRACTION WITH IOL IMPLANTS   • Chronic anticoagulation 01/29/2021   • Chronic edema    • Chronic low back pain with sciatica 01/26/2021   • Chronic pain    • CKD (chronic kidney disease) 01/29/2021   • Closed head injury 07/2018    WITH LACERATION TO SCALP, D/T SYNCOPE   • Colon cancer ([facility]) 01/31/2021    INVASIVE ADENOCARCINOMA FROM TUBULOVILLOUS ADENOMA IN TRANSVERSE, S/P COLON RESECTION, FOLLOWED BY DR. MARGARITA COX   • Colon polyps     FOLLOWED BY DR. 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 TERRA JONES   • Epididymitis, right 03/2018   • Essential hypertension    • Eyebrow ptosis 11/09/2013   • Hallux valgus, acquired, bilateral 01/2019   • Heart attack ([facility]) 09/1989    S/P CABG, 5 VESSEL   • Heart murmur    • History of blood transfusion    • HL (hearing loss)    • Hyperactivity of bladder     FOLLOWED BY DR. TERRA JONES   • HyperCKemia 11/2017   • Hyperkalemia 08/2016   • Hyperlipidemia     MIXED   • Hyperreflexia 11/2017    BILATERAL   • Hyphema 05/2015   • IBS (irritable bowel syndrome)    • Impaired functional mobility, balance, gait, and endurance    • Impingement syndrome of left shoulder 10/2015   • Infection associated with cystostomy catheter ([facility]) 12/2016   • Ischemic colitis  ([facility])    • Lumbar radiculopathy 2016    wears back brace at times following back surgery   • Lumbar spondylosis 04/2016   • Lumbosacral neuritis 05/2015   • Lumbosacral radiculitis 05/2015   • Macular puckering of retina, left 10/2013   • 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 VIANEY GIORDANO   • Pseudophakia 11/09/2013   • RBBB (right bundle branch block)    • Recurrent falls    • Respiratory failure with hypoxia ([facility]) 01/2019   • SCC (squamous cell carcinoma) 10/16/2019    RIGHT FOREHEAD, LEFT TEMPLE, RIGHT SCALP, FOLLOWED BY DR. MANPREET VILLARREAL   • Scoliosis    • Spinal stenosis of lumbar region with neurogenic claudication 12/07/2017   • Syncope and collapse 07/16/2018    ADMITTED TO [facility]   • Torn rotator cuff 03/2017    BILATERAL, COMPLETE   • Type 2 diabetes mellitus ([facility])     IDDM   • Urinary frequency     FOLLOWED BY DR. TERRA JONES   • Vitamin D deficiency    • Vitreous hemorrhage of left eye ([facility])        Past surgical history:  Past Surgical History:   Procedure Laterality Date   • APPENDECTOMY N/A    • BROW LIFT Bilateral 11/12/2013    DR. OCTAVIA CEDILLO AT [place]   • CARDIAC CATHETERIZATION Left 10/2008    LEFT CAROTID ARTERY STENOSIS 50-69%   • CARDIAC ELECTROPHYSIOLOGY PROCEDURE N/A 06/06/2019    Procedure: Ablation atrial flutter;  Surgeon: Miller Talbot MD;  Location: [facility] INVASIVE LOCATION;  Service: Cardiovascular   • CATARACT EXTRACTION Left 01/22/1998    DR. LAYLA BARNES AT [place]   • CATARACT EXTRACTION Right 03/2001      LAYLA BARNES   • CHOLECYSTECTOMY N/A 08/24/1989    DR. FAUZIA PELAEZ AT [place]   • COLON RESECTION N/A 02/03/2021    Procedure: LAPAROSCOPIC EXTENDED  RIGHT COLECTOMY WITH DAVINCI ROBOT;  Surgeon: Margarita Napoles MD;  [facility] MAIN OR;  [facility];  Laterality: N/A;   • COLONOSCOPY N/A 01/31/2021    20-25 MM POLYP IN CECUM, PATH: TUBULAR ADENOMA, 2 TUBULAR ADENOMA POLYPS IN ASCENDING, A FUNGATING AND SUBMUCOSAL ONONOBSTRUCTING MEDIUM MASS IN PROXIMAL TRANSVERSE, PATH: INVASIVE ADENOCARCINOMA ARISING IN A TUBULOVILOOUS ADENOMA, AREA TATTOOED, SCATTERED DIVERTICULA IN SIGMOID AND DESCENDING, LARGE INTERNAL HEMORRHOIDS, DR. JACOB ALICEA AT [facility]    • COLONOSCOPY N/A 02/02/2010    DIVERTICULOSIS, DR. SAL SOLANO AT [place]   • COLONOSCOPY N/A 08/15/2022    5 MM TUBULAR ADENOMA POLYP IN SIGMOID, 2 TUBULAR ADENOMA POLYPS IN DESCENDING, MULTIPLE DIVERTICULA IN SIGMOID, RESCOPE IN 2 YRS, DR. MRAGARITA NAPOLES AT [facility]   • CORONARY ARTERY BYPASS GRAFT N/A 09/1989    5 VESSEL, DR. HANKS   • CYST REMOVAL      FROM BACK   • ENDOSCOPY N/A 01/31/2021    ENTIRE EGD WNL, PATH: MILD REACTIVE GASTROPATHY, DR. JACOB ALICEA AT [facility]   • INGUINAL HERNIA REPAIR Left 09/27/2007    DR. MATTHEW RUSH AT [place]   • INGUINAL HERNIA REPAIR Left 09/07/2007   • INGUINAL HERNIA REPAIR Left 2003   • KNEE ARTHROSCOPY W/ PARTIAL MEDIAL MENISCECTOMY Left 1962    DR. SINGH   • LUMBAR DISCECTOMY FUSION INSTRUMENTATION N/A 04/11/2016    Procedure: lumbar laminectomy L4-5 and fusion with instrumentation;  Surgeon: Ran Kline MD;  Location: [facility] OR;  Service:    • LUMBAR DISCECTOMY FUSION INSTRUMENTATION N/A 01/15/2018    Procedure: L2-3, L3-4 laminectomy and fusion with instrumentation and removal of implants L4 5.;  Surgeon: Ran Kline MD;  Location: [facility] MAIN OR;  Service:    • LUMBAR EPIDURAL INJECTION N/A 09/23/2015    DR. MATTHEW DOMINGUEZ AT [facility]   • LUMBAR EPIDURAL INJECTION N/A 10/07/2015    DR. ITZEL LUIS AT [facility]    • LUMBAR EPIDURAL INJECTION N/A 11/19/2015    DR. ITZEL LUIS AT [facility]   • MD TOTAL KNEE ARTHROPLASTY Left 11/14/2016    Procedure: TOTAL KNEE ARTHROPLASTY;  Surgeon: Dontrell Arellano MD;  Location: [facility] OR;  Service: Orthopedics   • SKIN BIOPSY Bilateral 10/16/2019    RIGHT LATERAL FOREHEAD:SCC, LEFT TEMPLE:SCC, RIGHT PARIETAL SCALP:SCC, DR. MANPREET VILLARREAL       Allergies:  Amiodarone and Percocet [oxycodone-acetaminophen]    Home medications:  Medications Prior to Admission   Medication Sig Dispense Refill Last Dose   • acetaminophen (TYLENOL) 500 MG tablet Take 500 mg by mouth Every 6 (Six) Hours As Needed for Mild Pain .   12/27/2022   • Alpha-Lipoic Acid 600 MG tablet Take 600 mg by mouth Daily. For neuropathy 30 tablet 11 12/27/2022   • amiodarone (PACERONE) 200 MG tablet TAKE 1 TABLET DAILY. 90 tablet 1 12/27/2022   • ferrous sulfate 325 (65 FE) MG tablet Take 325 mg by mouth Daily With Breakfast.   12/27/2022   • finasteride (PROSCAR) 5 MG tablet TAKE 1 TABLET DAILY FOR    PROSTATE 90 tablet 3 12/27/2022   • furosemide (LASIX) 40 MG tablet TAKE 1 TABLET DAILY 90 tablet 3 12/26/2022   • glucose blood (Contour Next Test) test strip 1 each by Other route Daily. test blood sugar 50 each 5    • levothyroxine (SYNTHROID, LEVOTHROID) 25 MCG tablet Take 1 tablet by mouth Daily. 90 tablet 1 12/27/2022   • lisinopril (PRINIVIL,ZESTRIL) 20 MG tablet TAKE 1 TABLET DAILY 90 tablet 3 12/27/2022   • metoprolol succinate XL (TOPROL-XL) 25 MG 24 hr tablet TAKE 1 TABLET DAILY 90 tablet 3 12/27/2022   • Multiple Vitamin (MULTI VITAMIN PO) Take 1 tablet by mouth Every Morning.   12/27/2022   • tamsulosin (FLOMAX) 0.4 MG capsule 24 hr capsule TAKE 2 CAPSULES DAILY 180 capsule 3 12/27/2022   • Tradjenta 5 MG tablet tablet TAKE 1 TABLET DAILY 90 tablet 3 12/27/2022   • traMADol (ULTRAM) 50 MG tablet TAKE 1 TABLET BY MOUTH EVERY 6 (SIX) HOURS AS NEEDED FOR MODERATE PAIN OR SEVERE PAIN 30 tablet 1 12/27/2022   • vitamin B-12  (CYANOCOBALAMIN) 1000 MCG tablet Take 1,000 mcg by mouth Daily.   2022   • Xarelto 15 MG tablet TAKE 1 TABLET DAILY 90 tablet 3 2022   • KYRA MICROLET LANCETS lancets USE TWICE A  each 5 Unknown   • lidocaine (LMX) 4 % cream    Unknown        Hospital medications:  amiodarone, 200 mg, Oral, Daily  finasteride, 5 mg, Oral, Daily  insulin lispro, 0-9 Units, Subcutaneous, TID AC  levothyroxine, 25 mcg, Oral, Q AM  Menthol-Zinc Oxide, 1 application, Topical, TID  metoprolol succinate XL, 25 mg, Oral, Daily  multivitamin, 1 tablet, Oral, QAM  tamsulosin, 0.8 mg, Oral, Daily  vitamin B-12, 1,000 mcg, Oral, Daily         •  acetaminophen  •  aluminum-magnesium hydroxide-simethicone  •  dextrose  •  dextrose  •  glucagon (human recombinant)  •  melatonin  •  nitroglycerin  •  ondansetron **OR** ondansetron  •  [COMPLETED] Insert Peripheral IV **AND** sodium chloride  •  sodium chloride  •  traMADol    Family history:  Family History   Problem Relation Age of Onset   • Heart failure Mother    • Diabetes Mother    • Heart disease Mother    • Cancer Sister    • Diabetes Sister    • Cancer Brother    • Diabetes Brother    • Other Brother         INCLUSION BODY NYOSITIS   • Cancer Father        Social history:  Social History     Tobacco Use   • Smoking status: Former     Packs/day: 3.00     Years: 45.00     Pack years: 135.00     Types: Cigarettes     Quit date: 1989     Years since quittin.1   • Smokeless tobacco: Never   Vaping Use   • Vaping Use: Never used   Substance Use Topics   • Alcohol use: Yes     Comment: 1 shot of whiskey in the morning and 1 shot of whiskey in the evening   • Drug use: Never       Review of systems:    Not much but a little numbness in the feet definite proximal muscle weakness.  No bowel bladder incontinence.  No hair eyes ears nose throat skin joint  lymphatic hematologic oncologic complaints no neck pain chest pain.  He has had longstanding back pain and a couple  back surgeries previously.  No fever chills rash no new focal paresthesias or paralysis to suggest a stroke.  He has noted the left hand may have been weaker previously when he was trying to open the car windows in his car and that is for the last 6 months or so.    Objective:  Vitals Ranges:   Temp:  [98.1 °F (36.7 °C)-98.4 °F (36.9 °C)] 98.2 °F (36.8 °C)  Heart Rate:  [70-89] 89  Resp:  [16-18] 18  BP: (114-160)/(63-80) 114/63      Physical Exam:  Patient is awake alert oriented x3 fund of knowledge good attention span concentration good recent remote memory good language function normal elderly appearing in no distress pupils 3 constricting to 2-1/2 extraocular movements full without nystagmus nasolabial folds palate tongue symmetrical normal hearing facial sensation head turning shoulder shrug motor decreased  on the left side and a left pronator drift.  I believe he has atrophy of both quadriceps muscles.  He has definite proximal lower extremity weakness iliopsoas muscles, 3 out of 5, quadriceps 4 out of 5 but distal strength.  I believe is is pretty good and reflexes are absent toes are downgoing bilaterally normal light touch face arms and legs coordination normal in the upper extremities Station and gait was not tested for fear he would fall heart is regular without murmur neck supple without bruits extremities no clubbing cyanosis there is some peripheral edema visual acuity normal at 3 feet    Results review:   I reviewed the patient's new clinical results.    Data review:  Lab Results (last 24 hours)     Procedure Component Value Units Date/Time    POC Glucose Once [[id number]]  (Abnormal) Collected: 12/28/22 1124    Specimen: Blood Updated: 12/28/22 1126     Glucose 168 mg/dL      Comment: Meter: [id number] : [id number] Baylee Martinezyl RIGO       Troponin [[id number]]  (Abnormal) Collected: 12/28/22 0702    Specimen: Blood Updated: 12/28/22 0827     Troponin T 0.207 ng/mL     Narrative:       Troponin T Reference Range:  <= 0.03 ng/mL-   Negative for AMI  >0.03 ng/mL-     Abnormal for myocardial necrosis.  Clinicians would have to utilize clinical acumen, EKG, Troponin and serial changes to determine if it is an Acute Myocardial Infarction or myocardial injury due to an underlying chronic condition.       Results may be falsely decreased if patient taking Biotin.      Folate [[id number]]  (Normal) Collected: 12/28/22 0702    Specimen: Blood Updated: 12/28/22 0759     Folate >20.00 ng/mL     Narrative:      Results may be falsely increased if patient taking Biotin.      Vitamin B12 [[id number]]  (Abnormal) Collected: 12/28/22 0702    Specimen: Blood Updated: 12/28/22 0759     Vitamin B-12 1,020 pg/mL     Narrative:      Results may be falsely increased if patient taking Biotin.      Comprehensive Metabolic Panel [[id number]]  (Abnormal) Collected: 12/28/22 0702    Specimen: Blood Updated: 12/28/22 0757     Glucose 145 mg/dL      BUN 36 mg/dL      Creatinine 1.85 mg/dL      Sodium 140 mmol/L      Potassium 4.4 mmol/L      Chloride 105 mmol/L      CO2 25.4 mmol/L      Calcium 8.3 mg/dL      Total Protein 5.9 g/dL      Albumin 3.4 g/dL      ALT (SGPT) 32 U/L      AST (SGOT) 27 U/L      Alkaline Phosphatase 106 U/L      Total Bilirubin 0.4 mg/dL      Globulin 2.5 gm/dL      A/G Ratio 1.4 g/dL      BUN/Creatinine Ratio 19.5     Anion Gap 9.6 mmol/L      eGFR 35.3 mL/min/1.73      Comment: National Kidney Foundation and American Society of Nephrology (ASN) Task Force recommended calculation based on the Chronic Kidney Disease Epidemiology Collaboration (CKD-EPI) equation refit without adjustment for race.       Narrative:      GFR Normal >60  Chronic Kidney Disease <60  Kidney Failure <15    The GFR formula is only valid for adults with stable renal function between ages 18 and 70.    CBC (No Diff) [[id number]]  (Abnormal) Collected: 12/28/22 0702    Specimen: Blood Updated: 12/28/22 0751     WBC 5.14 10*3/mm3       RBC 3.29 10*6/mm3      Hemoglobin 11.5 g/dL      Hematocrit 34.3 %      .3 fL      MCH 35.0 pg      MCHC 33.5 g/dL      RDW 13.0 %      RDW-SD 49.7 fl      MPV 9.5 fL      Platelets 79 10*3/mm3     Protime-INR [[id number]]  (Abnormal) Collected: 12/28/22 0702    Specimen: Blood Updated: 12/28/22 0739     Protime 16.7 Seconds      INR 1.33    Hemoglobin A1c [[id number]]  (Abnormal) Collected: 12/28/22 0702    Specimen: Blood Updated: 12/28/22 0739     Hemoglobin A1C 6.90 %     Narrative:      Hemoglobin A1C Ranges:    Increased Risk for Diabetes  5.7% to 6.4%  Diabetes                     >= 6.5%  Diabetic Goal                < 7.0%    POC Glucose Once [[id number]]  (Abnormal) Collected: 12/28/22 0537    Specimen: Blood Updated: 12/28/22 0627     Glucose 142 mg/dL      Comment: Meter: [id number] : [id number] Mandie SIERRA       TSH [[id number]]  (Abnormal) Collected: 12/27/22 1717    Specimen: Blood Updated: 12/28/22 0158     TSH 4.460 uIU/mL     Respiratory Panel PCR w/COVID-19(SARS-CoV-2) LUIS/JO ANN/LOGAN/PAD/COR/MAD/SUJIT In-House, NP Swab in UTM/VTM, 3-4 HR TAT - Swab, Nasopharynx [[id number]]  (Normal) Collected: 12/27/22 2320    Specimen: Swab from Nasopharynx Updated: 12/28/22 0016     ADENOVIRUS, PCR Not Detected     Coronavirus 229E Not Detected     Coronavirus HKU1 Not Detected     Coronavirus NL63 Not Detected     Coronavirus OC43 Not Detected     COVID19 Not Detected     Human Metapneumovirus Not Detected     Human Rhinovirus/Enterovirus Not Detected     Influenza A PCR Not Detected     Influenza B PCR Not Detected     Parainfluenza Virus 1 Not Detected     Parainfluenza Virus 2 Not Detected     Parainfluenza Virus 3 Not Detected     Parainfluenza Virus 4 Not Detected     RSV, PCR Not Detected     Bordetella pertussis pcr Not Detected     Bordetella parapertussis PCR Not Detected     Chlamydophila pneumoniae PCR Not Detected     Mycoplasma pneumo by PCR Not Detected    Narrative:      In the setting  of a positive respiratory panel with a viral infection PLUS a negative procalcitonin without other underlying concern for bacterial infection, consider observing off antibiotics or discontinuation of antibiotics and continue supportive care. If the respiratory panel is positive for atypical bacterial infection (Bordetella pertussis, Chlamydophila pneumoniae, or Mycoplasma pneumoniae), consider antibiotic de-escalation to target atypical bacterial infection.    Troponin [[id number]]  (Abnormal) Collected: 12/27/22 1717    Specimen: Blood Updated: 12/27/22 1823     Troponin T 0.229 ng/mL     Narrative:      Troponin T Reference Range:  <= 0.03 ng/mL-   Negative for AMI  >0.03 ng/mL-     Abnormal for myocardial necrosis.  Clinicians would have to utilize clinical acumen, EKG, Troponin and serial changes to determine if it is an Acute Myocardial Infarction or myocardial injury due to an underlying chronic condition.       Results may be falsely decreased if patient taking Biotin.      Manual Differential [[id number]]  (Abnormal) Collected: 12/27/22 1717    Specimen: Blood Updated: 12/27/22 1823     Neutrophil % 79.0 %      Lymphocyte % 17.0 %      Monocyte % 4.0 %      Neutrophils Absolute 4.40 10*3/mm3      Lymphocytes Absolute 0.95 10*3/mm3      Monocytes Absolute 0.22 10*3/mm3      RBC Morphology Normal     WBC Morphology Normal     Platelet Morphology Normal    CK [[id number]]  (Abnormal) Collected: 12/27/22 1717    Specimen: Blood Updated: 12/27/22 1815     Creatine Kinase 218 U/L     Comprehensive Metabolic Panel [[id number]]  (Abnormal) Collected: 12/27/22 1717    Specimen: Blood Updated: 12/27/22 1815     Glucose 146 mg/dL      BUN 41 mg/dL      Creatinine 2.06 mg/dL      Sodium 138 mmol/L      Potassium 4.8 mmol/L      Chloride 103 mmol/L      CO2 26.3 mmol/L      Calcium 9.0 mg/dL      Total Protein 6.4 g/dL      Albumin 3.8 g/dL      ALT (SGPT) 41 U/L      AST (SGOT) 35 U/L      Alkaline Phosphatase 122 U/L       Total Bilirubin 0.5 mg/dL      Globulin 2.6 gm/dL      A/G Ratio 1.5 g/dL      BUN/Creatinine Ratio 19.9     Anion Gap 8.7 mmol/L      eGFR 31.0 mL/min/1.73      Comment: National Kidney Foundation and American Society of Nephrology (ASN) Task Force recommended calculation based on the Chronic Kidney Disease Epidemiology Collaboration (CKD-EPI) equation refit without adjustment for race.       Narrative:      GFR Normal >60  Chronic Kidney Disease <60  Kidney Failure <15    The GFR formula is only valid for adults with stable renal function between ages 18 and 70.    Magnesium [[id number]]  (Normal) Collected: 12/27/22 1717    Specimen: Blood Updated: 12/27/22 1815     Magnesium 2.4 mg/dL     Protime-INR [[id number]]  (Abnormal) Collected: 12/27/22 1717    Specimen: Blood Updated: 12/27/22 1756     Protime 23.0 Seconds      INR 2.00    CBC & Differential [[id number]]  (Abnormal) Collected: 12/27/22 1717    Specimen: Blood Updated: 12/27/22 1755    Narrative:      The following orders were created for panel order CBC & Differential.  Procedure                               Abnormality         Status                     ---------                               -----------         ------                     CBC Auto Differential[[id number]]        Abnormal            Final result                 Please view results for these tests on the individual orders.    CBC Auto Differential [[id number]]  (Abnormal) Collected: 12/27/22 1717    Specimen: Blood Updated: 12/27/22 1755     WBC 5.57 10*3/mm3      RBC 3.48 10*6/mm3      Hemoglobin 12.3 g/dL      Hematocrit 36.6 %      .2 fL      MCH 35.3 pg      MCHC 33.6 g/dL      RDW 13.2 %      RDW-SD 51.1 fl      MPV 9.9 fL      Platelets 89 10*3/mm3     Urinalysis With Microscopic If Indicated (No Culture) - Urine, Clean Catch [[id number]]  (Abnormal) Collected: 12/27/22 1711    Specimen: Urine, Clean Catch Updated: 12/27/22 1736     Color, UA Yellow     Appearance, UA Clear      pH, UA <=5.0     Specific Gravity, UA 1.022     Glucose, UA Negative     Ketones, UA Trace     Bilirubin, UA Negative     Blood, UA Negative     Protein, UA Trace     Leuk Esterase, UA Negative     Nitrite, UA Negative     Urobilinogen, UA 0.2 E.U./dL    Narrative:      Urine microscopic not indicated.           Imaging:  Imaging Results (Last 24 Hours)     Procedure Component Value Units Date/Time    MRI Brain Without Contrast [[id number]] Collected: 12/28/22 1014     Updated: 12/28/22 1024    Narrative:      MRI EXAMINATION OF THE BRAIN WITHOUT CONTRAST AND MRI EXAMINATION OF THE  LUMBAR SPINE WITHOUT CONTRAST     HISTORY: Falls, legs give out, possible head trauma, left leg pain.  Colon cancer.     COMPARISON: CT head 12/27/2022.     MRI EXAMINATION OF THE BRAIN WITHOUT CONTRAST:     TECHNIQUE: An MRI examination of the brain was performed utilizing  sagittal T1, axial diffusion, T1, T2 and T2 FLAIR imaging.     FINDINGS: The diffusion sequence demonstrates an area of increased  signal intensity involving of the body of the caudate nucleus on the  right suspicious for an acute infarct measuring 2.2 mm in size.     Moderate small vessel ischemic disease is noted. Scattered lacunar  infarcts involving the corona radiata are noted. There is no evidence of  hydrocephalus.     The axial T2 FLAIR sequence demonstrates increased signal intensity  likely representing a subdural fluid collection (less likely focal dural  thickening) overlying the left parietal and occipital lobes posteriorly.  This is mildly T1 hyperintense and measures approximately 2.5 mm in  thickness. This was not evident on the CT examination of 12/27/2022.  There is expected flow-void in the basilar artery and in the distal  aspect of the internal carotid arteries bilaterally.       Impression:      1.  There is a 2.2 mm area of diffusion weighted hyperintensity  involving the body of the caudate nucleus on the right suspicious for an  acute  infarct.  2.  A 2.5 mm thick plane of T2 FLAIR hyperintensity is appreciated  overlying the left parietal and occipital lobes posteriorly suspicious  for a thin subdural.  3.  Moderate small vessel ischemic disease and scattered lacunar  infarcts involving the corona radiata are noted.  4.  No obvious metastatic disease is appreciated on this noncontrasted  MRI examination of the brain. The sensitivity of this study for  evaluation of metastatic disease is reduced given the lack of contrast.        MRI EXAMINATION OF THE LUMBAR SPINE WITHOUT CONTRAST:     The patient has undergone fusion from L2 to L4 with bilateral  transpedicular screws and posterior rods. A decompressive laminectomy at  L3 and L4 is noted. There is a grade 1 anterolisthesis of L4 upon L5  which appears similar to the MRI examination of 05/27/2015.     There is mild to moderate prominence of the posterior aspect of the  discs and endplates at T11-T12. This appears to be slightly more  prominent as compared to the prior examination. There is flattening and  deformity of the thoracic cord. Further evaluation could be performed  with a dedicated MRI examination of the thoracic spine.     L1-L2: A mild broad-based disc osteophyte complex is present with no  evidence of herniation.     L2-L3: A grade 1 retrolisthesis of L2 upon L3 is appreciated estimated  to be approximately 1-2 mm. A disc osteophyte complex is present  extending into the neural foramen on the left slightly less prominent as  compared to the preoperative examination of 05/27/2015. There is no  evidence of central canal stenosis.     L3-L4: A decompressive laminectomy is noted with no evidence of central  canal stenosis. Mild foraminal stenosis is present bilaterally secondary  to extension of a small disc osteophyte complex into the neural foramen,  similar in appearance as compared to the prior examination.     L4-L5: Moderate to severe facet degenerative disease is  present  bilaterally. A mild central disc bulge is present with no evidence of  herniation. Mild foraminal stenosis is present bilaterally secondary to  anterolisthesis of L4 upon L5 and extension of disc material into the  neural foramen.     L5-S1: Moderate to severe facet degenerative disease is present  bilaterally. A broad-based disc osteophyte complex is present resulting  in mild flattening of the ventral surface of the thecal sac. Moderate  foraminal stenosis is present on the right secondary to loss of disc  height and extension of a disc osteophyte complex into the neural  foramen, similar in appearance as compared to the prior examination.     The axial T2 sequence demonstrates fusiform enlargement of the  infrarenal abdominal aorta which measures approximately 3.5 cm in the  maximum transverse dimension.     IMPRESSION: The patient is status post fusion from L2 to L4 and  multilevel decompressive laminectomies as described above. See above.  There is no evidence of a focal disc herniation. Multilevel degenerative  disease is noted including moderate to severe facet degenerative disease  at L4-L5 and L5-S1 and moderate foraminal stenosis to the right at  L5-S1. Foraminal stenosis to the right at L5-S1 appears similar to the  MRI examination of the lumbar spine performed on 05/27/2015. No obvious  metastatic disease is appreciated on this noncontrasted MRI examination  of the lumbar spine.     The above information was called to the patient's nurse at the time of  the dictation. The patient's nurse is to immediately relay the  information to the clinical service.             MRI Lumbar Spine Without Contrast [[id number]] Collected: 12/28/22 1014     Updated: 12/28/22 1024    Narrative:      MRI EXAMINATION OF THE BRAIN WITHOUT CONTRAST AND MRI EXAMINATION OF THE  LUMBAR SPINE WITHOUT CONTRAST     HISTORY: Falls, legs give out, possible head trauma, left leg pain.  Colon cancer.     COMPARISON: CT head  12/27/2022.     MRI EXAMINATION OF THE BRAIN WITHOUT CONTRAST:     TECHNIQUE: An MRI examination of the brain was performed utilizing  sagittal T1, axial diffusion, T1, T2 and T2 FLAIR imaging.     FINDINGS: The diffusion sequence demonstrates an area of increased  signal intensity involving of the body of the caudate nucleus on the  right suspicious for an acute infarct measuring 2.2 mm in size.     Moderate small vessel ischemic disease is noted. Scattered lacunar  infarcts involving the corona radiata are noted. There is no evidence of  hydrocephalus.     The axial T2 FLAIR sequence demonstrates increased signal intensity  likely representing a subdural fluid collection (less likely focal dural  thickening) overlying the left parietal and occipital lobes posteriorly.  This is mildly T1 hyperintense and measures approximately 2.5 mm in  thickness. This was not evident on the CT examination of 12/27/2022.  There is expected flow-void in the basilar artery and in the distal  aspect of the internal carotid arteries bilaterally.       Impression:      1.  There is a 2.2 mm area of diffusion weighted hyperintensity  involving the body of the caudate nucleus on the right suspicious for an  acute infarct.  2.  A 2.5 mm thick plane of T2 FLAIR hyperintensity is appreciated  overlying the left parietal and occipital lobes posteriorly suspicious  for a thin subdural.  3.  Moderate small vessel ischemic disease and scattered lacunar  infarcts involving the corona radiata are noted.  4.  No obvious metastatic disease is appreciated on this noncontrasted  MRI examination of the brain. The sensitivity of this study for  evaluation of metastatic disease is reduced given the lack of contrast.        MRI EXAMINATION OF THE LUMBAR SPINE WITHOUT CONTRAST:     The patient has undergone fusion from L2 to L4 with bilateral  transpedicular screws and posterior rods. A decompressive laminectomy at  L3 and L4 is noted. There is a grade 1  anterolisthesis of L4 upon L5  which appears similar to the MRI examination of 05/27/2015.     There is mild to moderate prominence of the posterior aspect of the  discs and endplates at T11-T12. This appears to be slightly more  prominent as compared to the prior examination. There is flattening and  deformity of the thoracic cord. Further evaluation could be performed  with a dedicated MRI examination of the thoracic spine.     L1-L2: A mild broad-based disc osteophyte complex is present with no  evidence of herniation.     L2-L3: A grade 1 retrolisthesis of L2 upon L3 is appreciated estimated  to be approximately 1-2 mm. A disc osteophyte complex is present  extending into the neural foramen on the left slightly less prominent as  compared to the preoperative examination of 05/27/2015. There is no  evidence of central canal stenosis.     L3-L4: A decompressive laminectomy is noted with no evidence of central  canal stenosis. Mild foraminal stenosis is present bilaterally secondary  to extension of a small disc osteophyte complex into the neural foramen,  similar in appearance as compared to the prior examination.     L4-L5: Moderate to severe facet degenerative disease is present  bilaterally. A mild central disc bulge is present with no evidence of  herniation. Mild foraminal stenosis is present bilaterally secondary to  anterolisthesis of L4 upon L5 and extension of disc material into the  neural foramen.     L5-S1: Moderate to severe facet degenerative disease is present  bilaterally. A broad-based disc osteophyte complex is present resulting  in mild flattening of the ventral surface of the thecal sac. Moderate  foraminal stenosis is present on the right secondary to loss of disc  height and extension of a disc osteophyte complex into the neural  foramen, similar in appearance as compared to the prior examination.     The axial T2 sequence demonstrates fusiform enlargement of the  infrarenal abdominal aorta  which measures approximately 3.5 cm in the  maximum transverse dimension.     IMPRESSION: The patient is status post fusion from L2 to L4 and  multilevel decompressive laminectomies as described above. See above.  There is no evidence of a focal disc herniation. Multilevel degenerative  disease is noted including moderate to severe facet degenerative disease  at L4-L5 and L5-S1 and moderate foraminal stenosis to the right at  L5-S1. Foraminal stenosis to the right at L5-S1 appears similar to the  MRI examination of the lumbar spine performed on 05/27/2015. No obvious  metastatic disease is appreciated on this noncontrasted MRI examination  of the lumbar spine.     The above information was called to the patient's nurse at the time of  the dictation. The patient's nurse is to immediately relay the  information to the clinical service.             CT Head Without Contrast [[id number]] Collected: 12/27/22 1819     Updated: 12/27/22 2018    Narrative:      CT HEAD AND CERVICAL SPINE WITHOUT CONTRAST     HISTORY: Fall, hit back of head, headache, neck pain.     COMPARISON: CT head 07/16/2018     CT HEAD WITHOUT CONTRAST:     FINDINGS: The brain and ventricles are symmetrical. There is no evidence  of fracture, intracranial hemorrhage, hydrocephalus or of abnormal  extra-axial fluid. Mild-to-moderate vascular calcification is noted. No  focal area of decreased attenuation to suggest acute infarction or  contusion is appreciated.       Impression:      There is no evidence of fracture or of intracranial  hemorrhage.        CT EXAMINATION OF THE CERVICAL SPINE WITHOUT CONTRAST:     FINDINGS: There is mild reversal of the normal cervical lordosis. There  is moderate loss of disc height at C5-6 and C6-7. There is a grade 1  anterolisthesis of C3 upon C4 and C4 upon C5. There is solid fusion of  the facets to the left at C2-3. There is no evidence of fracture.  Extensive vascular calcification involving the proximal aspects of  the  great vessels are noted.     C2-3: A small central disc bulge or protrusion is appreciated.     C3-4: Moderate facet degenerative disease is present on the right. There  is moderate foraminal stenosis on the right secondary to facet and  uncovertebral degenerative disease.     C4-5: Moderate-to-severe facet degenerative disease is present on the  left. There is moderate-to-severe foraminal stenosis on the left  secondary to facet hypertrophy and uncovertebral degenerative disease.     C5-6: A broad-based disc osteophyte complex is present which results in  mild canal stenosis. There is mild and moderate foraminal stenosis on  the left and right respectively secondary to loss of disc height and  uncovertebral degenerative disease.     C6-7: A mild central disc osteophyte complex is present with no evidence  of herniation. Mild and moderate foraminal stenosis is present on the  left and right respectively secondary to the loss of disc height, facet  hypertrophy and uncovertebral degenerative disease.     C7-T1: Moderate-to-severe facet degenerative disease is present on the  left.     IMPRESSION: Multilevel degenerative disease involving the cervical spine  is noted as described above with no evidence of fracture. Extensive  vascular calcification involving the great vessels proximally are  appreciated. Moderate-to-severe vascular calcification involving the  carotid bifurcations are also appreciated bilaterally, more prominent on  the left.                 Radiation dose reduction techniques were utilized, including automated  exposure control and exposure modulation based on body size.     This report was finalized on 12/27/2022 8:14 PM by Dr. Kirk Matias M.D.       CT Cervical Spine Without Contrast [[id number]] Collected: 12/27/22 1819     Updated: 12/27/22 2018    Narrative:      CT HEAD AND CERVICAL SPINE WITHOUT CONTRAST     HISTORY: Fall, hit back of head, headache, neck pain.     COMPARISON: CT head  07/16/2018     CT HEAD WITHOUT CONTRAST:     FINDINGS: The brain and ventricles are symmetrical. There is no evidence  of fracture, intracranial hemorrhage, hydrocephalus or of abnormal  extra-axial fluid. Mild-to-moderate vascular calcification is noted. No  focal area of decreased attenuation to suggest acute infarction or  contusion is appreciated.       Impression:      There is no evidence of fracture or of intracranial  hemorrhage.        CT EXAMINATION OF THE CERVICAL SPINE WITHOUT CONTRAST:     FINDINGS: There is mild reversal of the normal cervical lordosis. There  is moderate loss of disc height at C5-6 and C6-7. There is a grade 1  anterolisthesis of C3 upon C4 and C4 upon C5. There is solid fusion of  the facets to the left at C2-3. There is no evidence of fracture.  Extensive vascular calcification involving the proximal aspects of the  great vessels are noted.     C2-3: A small central disc bulge or protrusion is appreciated.     C3-4: Moderate facet degenerative disease is present on the right. There  is moderate foraminal stenosis on the right secondary to facet and  uncovertebral degenerative disease.     C4-5: Moderate-to-severe facet degenerative disease is present on the  left. There is moderate-to-severe foraminal stenosis on the left  secondary to facet hypertrophy and uncovertebral degenerative disease.     C5-6: A broad-based disc osteophyte complex is present which results in  mild canal stenosis. There is mild and moderate foraminal stenosis on  the left and right respectively secondary to loss of disc height and  uncovertebral degenerative disease.     C6-7: A mild central disc osteophyte complex is present with no evidence  of herniation. Mild and moderate foraminal stenosis is present on the  left and right respectively secondary to the loss of disc height, facet  hypertrophy and uncovertebral degenerative disease.     C7-T1: Moderate-to-severe facet degenerative disease is present on  the  left.     IMPRESSION: Multilevel degenerative disease involving the cervical spine  is noted as described above with no evidence of fracture. Extensive  vascular calcification involving the great vessels proximally are  appreciated. Moderate-to-severe vascular calcification involving the  carotid bifurcations are also appreciated bilaterally, more prominent on  the left.                 Radiation dose reduction techniques were utilized, including automated  exposure control and exposure modulation based on body size.     This report was finalized on 12/27/2022 8:14 PM by Dr. Kirk Matias M.D.       XR Femur 2 View Left [[id number]] Collected: 12/27/22 1820     Updated: 12/27/22 2014    Narrative:      LEFT FEMUR     HISTORY: Pain.     FINDINGS: 4 views of the left femur show no evidence of fracture or  dislocation. Severe vascular calcification and a left knee prosthesis is  noted.     This report was finalized on 12/27/2022 8:11 PM by Dr. Kirk Matias M.D.       CT Abdomen Pelvis Without Contrast [[id number]] Collected: 12/27/22 1808     Updated: 12/27/22 1829    Narrative:      CT ABDOMEN PELVIS WO CONTRAST-     INDICATIONS: Trauma     TECHNIQUE: Radiation dose reduction techniques were utilized, including  automated exposure control and exposure modulation based on body size.  Unenhanced ABDOMEN AND PELVIS CT     COMPARISON: 02/02/2021     FINDINGS:     Assessment for solid organ injury is significantly limited without  intravenous contrast material.     Right renal cysts are present. Calcifications at the renal tavo appear  to be arterial in location. No hydronephrosis.     Gallbladder is surgically absent.     The pancreas is thinned and fatty infiltrated. A chronic stable cystic  focus is seen at the junction of the pancreatic tail and body, 2.2 cm on  axial image 48.     The spleen is enlarged, 14.7 cm, stable. Minimal perisplenic ascites is  seen     Otherwise unremarkable appearance of the liver,  spleen, adrenal glands,  pancreas, kidneys, bladder.     No bowel obstruction or abnormal bowel thickening is identified. Right  hemicolectomy change is noted. Numerous colonic diverticula are seen  that do not appear inflamed.     No free intraperitoneal gas or free fluid.     Scattered small mesenteric and para-aortic lymph nodes are seen that are  not significant by size criteria.     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 Left common iliac  artery is borderline aneurysmal, 2.0 cm, axial image 121. 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 Aortic and other  arterial calcifications are present.     The lung bases show mild atelectasis. Ascending aorta is only partly  included, but aneurysmal at the level imaged, 5.1 cm, stable. The distal  descending thoracic aorta is aneurysmal, 4.4 cm on coronal image 113,  stable.     Degenerative and surgical changes are seen in the spine. Motion artifact  limits assessment of the bones, especially at the level of the lower  pelvis, for example sagittal image 92, or sagittal image 31, or sagittal  image 166; if there is clinical suspicion for fractures, pelvic CT with  be suggested, with sedation if necessary.       Impression:            1. Colonic diverticulosis. No acute inflammatory process of bowel is  identified.  2. No hydronephrosis or ureteral stones.     This report was finalized on 12/27/2022 6:25 PM by Dr. Vimal Mayorga M.D.              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  Even though his peripheral neuropathy appears to be very mild.  He also has some  "superimposed orthostasis.  I am certain and he notes \"when I get that feeling\".  He is going to collapse and his legs collapse.  I am confident that he at that time he has vertebrobasilar insufficiency and his legs collapse and he ends up on the floor without a loss of consciousness.  It is a combination of the orthostasis and the bilateral proximal muscle weakness that causes this.  Note he does not look like a myelopathy.  He does not really have bowel bladder incontinence and his toes are downgoing.  Secondly, the patient appears to have an acute right hemisphere caudate nucleus stroke which I believe is evident in left upper extremity weakness and pronator drift.  Note he has been on Xarelto at home and has a history of paroxysmal A. fib although he has had a EP procedure previously for this.  The patient states he cannot tell if he is in A. fib or not in A. fib \"even when it was going on\"..  The appearance on the MRI scan shows a very thin extraparenchymal area that could be a thin subdural hematoma or hygroma but the CAT scan of the head does not show any intracranial hemorrhage.  He is at risk of having a subdural hematoma.  Based upon his falls and some history of head trauma with concussion  Lastly, I am confident he has cervical thoracic and lumbosacral spondylosis but does not look like a myelopathy.  He has had several back surgeries before and I am not convinced this is going to be a radicular syndrome.    So finally the question comes down to would we continue him on the Xarelto or not.  It is being held for the time being and I note that his INR is 2.0 which possibly could be an effect of the Xarelto.     Hany Leon MD  12/28/22  12:22 EST  "

## 2022-12-28 NOTE — PLAN OF CARE
Goal Outcome Evaluation:  Pt admitted after a fall at home and c/o Lt thigh pain. Consulted Neuro, neuro surgery, nephrology and cardiology. MRI of lumbar spine and brain ordered. Pt passed bedside swallow eval, diet ordered. SR. MIGUEL. Bed alarm on. Pt rested throughout the night with no issues or complaints.

## 2022-12-28 NOTE — NURSING NOTE
Wound/Ostomy: Consult received regarding skin issue on Buttocks. Patient is alert, oriented, able to turn with assistance, upon assessment is observed localized red discoloration to Coccyx site, persistent  blanchable, possible relate to moisture or friction, not to pressure injury. Calmoseptine is ordered.   Education about the importance of repositioning and minimize  exposure the skin with any source of moisture such as urine or stool to prevent further problems is provided, family and RN at bedside. Please re-consult for any additional needs.

## 2022-12-28 NOTE — CONSULTS
"[facility] CONSULT [facility]    Patient name: Osvaldo Lopez  Referring Provider: Dr. Bertrand  Reason for Consultation: back pain and leg weakness    Patient Care Team:  Caio Rodríguez MD as PCP - General (Family Medicine)  Dontrell Arellano MD as Surgeon (Orthopedic Surgery)  Angelo Driscoll MD as Consulting Physician (Cardiology)  Darvin Alvarez MD as Consulting Physician (Urology)  Caio Rodríguez MD as Referring Physician (Family Medicine)    Chief complaint: back pain and leg weakness    Subjective .     History of present illness:    Patient is a 87 y.o.male who presents to hospital with generalized weakness and frequent falls.  States he feels as though he is generally weak but is more weak on the left side.  He has most weakness left lower extremity but has noticed some weakness in his left upper extremity as well.  States this has been going on for approximately 6 to 8 months.  He has had surgeries on his low back in the past with the most recent being done by Dr. Kline.  He also admits to some mid and low back pain.  Denies neck pain.  Denies incontinence of bowel/bladder and saddle numbness.  States he feels as though his \" whole left leg is numb\" but it is more numb past the knee.  States this numbness in his left leg is chronic from when he has had surgeries in the past.  States all the symptoms have been progressively worsening over the last 6 to 8 months.  History is obtained from the patient and by review of the medical records.    Review of Systems  Review of Systems   Musculoskeletal: Positive for back pain. Negative for neck pain and neck stiffness.   Neurological: Positive for weakness and numbness.   All other systems reviewed and are negative.      History  PAST MEDICAL HISTORY  Past Medical History:   Diagnosis Date   • Abdominal aortic aneurysm (AAA) without rupture    • Abdominal aortic ectasia (HCC) 06/2015   • Abnormal EKG 10/25/2016    04/2019   • Abnormal thyroid blood test  "   • Actinic keratosis     FOLLOWED BY DR. MANPREET VILLARREAL   • Anemia 01/28/2021   • Arthritis    • Asthma     MILD, INTERMITTENT   • Atherosclerotic heart disease    • Atrial flutter with rapid ventricular response ([facility]) 04/14/2019    ADMITTED TO [facility]   • Atrial premature depolarization    • Atrophy of muscle of lower leg    • BPH (benign prostatic hyperplasia)     FOLLOWED BY DR. TERRA JONES   • Bruises easily    • Bulging of thoracic intervertebral disc    • Carpal tunnel syndrome, right 12/2019    FOLLOWED BY DR. NEW SANCHEZ   • Cataract     BILATERAL, S/P EXTRACTION WITH IOL IMPLANTS   • Chronic anticoagulation 01/29/2021   • Chronic edema    • Chronic low back pain with sciatica 01/26/2021   • Chronic pain    • CKD (chronic kidney disease) 01/29/2021   • Closed head injury 07/2018    WITH LACERATION TO SCALP, D/T SYNCOPE   • Colon cancer ([facility]) 01/31/2021    INVASIVE ADENOCARCINOMA FROM TUBULOVILLOUS ADENOMA IN TRANSVERSE, S/P COLON RESECTION, FOLLOWED BY DR. MARGARITA COX   • Colon polyps     FOLLOWED BY DR. 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 TERRA JONES   • Epididymitis, right 03/2018   • Essential hypertension    • Eyebrow ptosis 11/09/2013   • Hallux valgus, acquired, bilateral 01/2019   • Heart attack ([facility]) 09/1989    S/P CABG, 5 VESSEL   • Heart murmur    • History of blood transfusion    • HL (hearing loss)    • Hyperactivity of bladder     FOLLOWED BY DR. TERRA JONES   • HyperCKemia 11/2017   • Hyperkalemia 08/2016   • Hyperlipidemia     MIXED   • Hyperreflexia 11/2017    BILATERAL   • Hyphema 05/2015   • IBS (irritable bowel syndrome)    • Impaired functional mobility, balance, gait, and endurance    • Impingement syndrome of left shoulder 10/2015   • Infection associated with cystostomy catheter ([facility])  12/2016   • Ischemic colitis ([facility])    • Lumbar radiculopathy 2016    wears back brace at times following back surgery   • Lumbar spondylosis 04/2016   • Lumbosacral neuritis 05/2015   • Lumbosacral radiculitis 05/2015   • Macular puckering of retina, left 10/2013   • 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 VIANEY GIORDANO   • Pseudophakia 11/09/2013   • RBBB (right bundle branch block)    • Recurrent falls    • Respiratory failure with hypoxia ([facility]) 01/2019   • SCC (squamous cell carcinoma) 10/16/2019    RIGHT FOREHEAD, LEFT TEMPLE, RIGHT SCALP, FOLLOWED BY DR. MANPREET VILLARREAL   • Scoliosis    • Spinal stenosis of lumbar region with neurogenic claudication 12/07/2017   • Syncope and collapse 07/16/2018    ADMITTED TO [facility]   • Torn rotator cuff 03/2017    BILATERAL, COMPLETE   • Type 2 diabetes mellitus ([facility])     IDDM   • Urinary frequency     FOLLOWED BY DR. TERRA JONES   • Vitamin D deficiency    • Vitreous hemorrhage of left eye ([facility])        PAST SURGICAL HISTORY  Past Surgical History:   Procedure Laterality Date   • APPENDECTOMY N/A    • BROW LIFT Bilateral 11/12/2013    DR. OCTAVIA CEDILLO AT [place]   • CARDIAC CATHETERIZATION Left 10/2008    LEFT CAROTID ARTERY STENOSIS 50-69%   • CARDIAC ELECTROPHYSIOLOGY PROCEDURE N/A 06/06/2019    Procedure: Ablation atrial flutter;  Surgeon: Miller Talbot MD;  Location: [facility] INVASIVE LOCATION;  Service: Cardiovascular   • CATARACT EXTRACTION Left 01/22/1998    DR. LAYLA BARNES AT [facility]   • CATARACT  EXTRACTION Right 03/2001    DR. LAYLA BARNES   • CHOLECYSTECTOMY N/A 08/24/1989    DR. FAUZIA PELAEZ AT [place]   • COLON RESECTION N/A 02/03/2021    Procedure: LAPAROSCOPIC EXTENDED  RIGHT COLECTOMY WITH DAVINCI ROBOT;  Surgeon: Margarita Napoles MD;  Location: [facility] MAIN OR;  Service: DaVinci;  Laterality: N/A;   • COLONOSCOPY N/A 01/31/2021    20-25 MM POLYP IN CECUM, PATH: TUBULAR ADENOMA, 2 TUBULAR ADENOMA POLYPS IN ASCENDING, A FUNGATING AND SUBMUCOSAL ONONOBSTRUCTING MEDIUM MASS IN PROXIMAL TRANSVERSE, PATH: INVASIVE ADENOCARCINOMA ARISING IN A TUBULOVILOOUS ADENOMA, AREA TATTOOED, SCATTERED DIVERTICULA IN SIGMOID AND DESCENDING, LARGE INTERNAL HEMORRHOIDS, DR. JACOB ALICEA AT [facility]    • COLONOSCOPY N/A 02/02/2010    DIVERTICULOSIS, DR. SAL SOLANO AT [place]   • COLONOSCOPY N/A 08/15/2022    5 MM TUBULAR ADENOMA POLYP IN SIGMOID, 2 TUBULAR ADENOMA POLYPS IN DESCENDING, MULTIPLE DIVERTICULA IN SIGMOID, RESCOPE IN 2 YRS, DR. MARGARITA NAPOLES AT [facility]   • CORONARY ARTERY BYPASS GRAFT N/A 09/1989    5 VESSEL, DR. HANKS   • CYST REMOVAL      FROM BACK   • ENDOSCOPY N/A 01/31/2021    ENTIRE EGD WNL, PATH: MILD REACTIVE GASTROPATHY, DR. JACOB ALICEA AT [facility]   • INGUINAL HERNIA REPAIR Left 09/27/2007    DR. MATTHEW RUSH AT [place]   • INGUINAL HERNIA REPAIR Left 09/07/2007   • INGUINAL HERNIA REPAIR Left 2003   • KNEE ARTHROSCOPY W/ PARTIAL MEDIAL MENISCECTOMY Left 1962    DR. SINGH   • LUMBAR DISCECTOMY FUSION INSTRUMENTATION N/A 04/11/2016    Procedure: lumbar laminectomy L4-5 and fusion with instrumentation;  Surgeon: Ran Kline MD;  Location: [facility] MAIN OR;  Service:    • LUMBAR DISCECTOMY FUSION INSTRUMENTATION N/A 01/15/2018    Procedure: L2-3, L3-4 laminectomy and fusion with instrumentation and removal of implants L4 5.;  Surgeon: Ran Kline MD;  Location: [facility] MAIN OR;  Service:    • LUMBAR EPIDURAL INJECTION N/A 09/23/2015    DR. MATTHEW DOMINGUEZ AT [facility]   • LUMBAR EPIDURAL INJECTION N/A  10/07/2015    DR. ITZEL LUIS AT [facility]   • LUMBAR EPIDURAL INJECTION N/A 2015    DR. ITZEL LUIS AT [facility]   • IA TOTAL KNEE ARTHROPLASTY Left 2016    Procedure: TOTAL KNEE ARTHROPLASTY;  Surgeon: Dontrell Arellano MD;  Location: [facility] MAIN OR;  Service: Orthopedics   • SKIN BIOPSY Bilateral 10/16/2019    RIGHT LATERAL FOREHEAD:SCC, LEFT TEMPLE:SCC, RIGHT PARIETAL SCALP:SCC, DR. MANPREET VILLARREAL       FAMILY HISTORY  Family History   Problem Relation Age of Onset   • Heart failure Mother    • Diabetes Mother    • Heart disease Mother    • Cancer Sister    • Diabetes Sister    • Cancer Brother    • Diabetes Brother    • Other Brother         INCLUSION BODY NYOSITIS   • Cancer Father        SOCIAL HISTORY  Social History     Tobacco Use   • Smoking status: Former     Packs/day: 3.00     Years: 45.00     Pack years: 135.00     Types: Cigarettes     Quit date: 1989     Years since quittin.1   • Smokeless tobacco: Never   Vaping Use   • Vaping Use: Never used   Substance Use Topics   • Alcohol use: Yes     Comment: 1 shot of whiskey in the morning and 1 shot of whiskey in the evening   • Drug use: Never       retired      Allergies:  Amiodarone and Percocet [oxycodone-acetaminophen]    MEDICATIONS:  Medications Prior to Admission   Medication Sig Dispense Refill Last Dose   • acetaminophen (TYLENOL) 500 MG tablet Take 500 mg by mouth Every 6 (Six) Hours As Needed for Mild Pain .   2022   • Alpha-Lipoic Acid 600 MG tablet Take 600 mg by mouth Daily. For neuropathy 30 tablet 11 2022   • amiodarone (PACERONE) 200 MG tablet TAKE 1 TABLET DAILY. 90 tablet 1 2022   • ferrous sulfate 325 (65 FE) MG tablet Take 325 mg by mouth Daily With Breakfast.   2022   • finasteride (PROSCAR) 5 MG tablet TAKE 1 TABLET DAILY FOR    PROSTATE 90 tablet 3 2022   • furosemide (LASIX) 40 MG tablet TAKE 1 TABLET DAILY 90 tablet 3 2022   • glucose blood (Contour Next Test) test strip 1  each by Other route Daily. test blood sugar 50 each 5    • levothyroxine (SYNTHROID, LEVOTHROID) 25 MCG tablet Take 1 tablet by mouth Daily. 90 tablet 1 12/27/2022   • lisinopril (PRINIVIL,ZESTRIL) 20 MG tablet TAKE 1 TABLET DAILY 90 tablet 3 12/27/2022   • metoprolol succinate XL (TOPROL-XL) 25 MG 24 hr tablet TAKE 1 TABLET DAILY 90 tablet 3 12/27/2022   • Multiple Vitamin (MULTI VITAMIN PO) Take 1 tablet by mouth Every Morning.   12/27/2022   • tamsulosin (FLOMAX) 0.4 MG capsule 24 hr capsule TAKE 2 CAPSULES DAILY 180 capsule 3 12/27/2022   • Tradjenta 5 MG tablet tablet TAKE 1 TABLET DAILY 90 tablet 3 12/27/2022   • traMADol (ULTRAM) 50 MG tablet TAKE 1 TABLET BY MOUTH EVERY 6 (SIX) HOURS AS NEEDED FOR MODERATE PAIN OR SEVERE PAIN 30 tablet 1 12/27/2022   • vitamin B-12 (CYANOCOBALAMIN) 1000 MCG tablet Take 1,000 mcg by mouth Daily.   12/27/2022   • Xarelto 15 MG tablet TAKE 1 TABLET DAILY 90 tablet 3 12/26/2022   • KYRA MICROLET LANCETS lancets USE TWICE A  each 5 Unknown   • lidocaine (LMX) 4 % cream    Unknown       Objective     Results Review:  LABS:    Admission on 12/27/2022   Component Date Value Ref Range Status   • Glucose 12/27/2022 146 (H)  65 - 99 mg/dL Final   • BUN 12/27/2022 41 (H)  8 - 23 mg/dL Final   • Creatinine 12/27/2022 2.06 (H)  0.76 - 1.27 mg/dL Final   • Sodium 12/27/2022 138  136 - 145 mmol/L Final   • Potassium 12/27/2022 4.8  3.5 - 5.2 mmol/L Final   • Chloride 12/27/2022 103  98 - 107 mmol/L Final   • CO2 12/27/2022 26.3  22.0 - 29.0 mmol/L Final   • Calcium 12/27/2022 9.0  8.6 - 10.5 mg/dL Final   • Total Protein 12/27/2022 6.4  6.0 - 8.5 g/dL Final   • Albumin 12/27/2022 3.8  3.5 - 5.2 g/dL Final   • ALT (SGPT) 12/27/2022 41  1 - 41 U/L Final   • AST (SGOT) 12/27/2022 35  1 - 40 U/L Final   • Alkaline Phosphatase 12/27/2022 122 (H)  39 - 117 U/L Final   • Total Bilirubin 12/27/2022 0.5  0.0 - 1.2 mg/dL Final   • Globulin 12/27/2022 2.6  gm/dL Final   • A/G Ratio 12/27/2022  1.5  g/dL Final   • BUN/Creatinine Ratio 12/27/2022 19.9  7.0 - 25.0 Final   • Anion Gap 12/27/2022 8.7  5.0 - 15.0 mmol/L Final   • eGFR 12/27/2022 31.0 (L)  >60.0 mL/min/1.73 Final    National Kidney Foundation and American Society of Nephrology (ASN) Task Force recommended calculation based on the Chronic Kidney Disease Epidemiology Collaboration (CKD-EPI) equation refit without adjustment for race.   • Protime 12/27/2022 23.0 (H)  11.7 - 14.2 Seconds Final   • INR 12/27/2022 2.00 (H)  0.90 - 1.10 Final   • Color, UA 12/27/2022 Yellow  Yellow, Straw Final   • Appearance, UA 12/27/2022 Clear  Clear Final   • pH, UA 12/27/2022 <=5.0  5.0 - 8.0 Final   • Specific Gravity, UA 12/27/2022 1.022  1.005 - 1.030 Final   • Glucose, UA 12/27/2022 Negative  Negative Final   • Ketones, UA 12/27/2022 Trace (A)  Negative Final   • Bilirubin, UA 12/27/2022 Negative  Negative Final   • Blood, UA 12/27/2022 Negative  Negative Final   • Protein, UA 12/27/2022 Trace (A)  Negative Final   • Leuk Esterase, UA 12/27/2022 Negative  Negative Final   • Nitrite, UA 12/27/2022 Negative  Negative Final   • Urobilinogen, UA 12/27/2022 0.2 E.U./dL  0.2 - 1.0 E.U./dL Final   • Troponin T 12/27/2022 0.229 (C)  0.000 - 0.030 ng/mL Final   • Magnesium 12/27/2022 2.4  1.6 - 2.4 mg/dL Final   • Creatine Kinase 12/27/2022 218 (H)  20 - 200 U/L Final   • QT Interval 12/27/2022 486  ms Preliminary   • WBC 12/27/2022 5.57  3.40 - 10.80 10*3/mm3 Final   • RBC 12/27/2022 3.48 (L)  4.14 - 5.80 10*6/mm3 Final   • Hemoglobin 12/27/2022 12.3 (L)  13.0 - 17.7 g/dL Final   • Hematocrit 12/27/2022 36.6 (L)  37.5 - 51.0 % Final   • MCV 12/27/2022 105.2 (H)  79.0 - 97.0 fL Final   • MCH 12/27/2022 35.3 (H)  26.6 - 33.0 pg Final   • MCHC 12/27/2022 33.6  31.5 - 35.7 g/dL Final   • RDW 12/27/2022 13.2  12.3 - 15.4 % Final   • RDW-SD 12/27/2022 51.1  37.0 - 54.0 fl Final   • MPV 12/27/2022 9.9  6.0 - 12.0 fL Final   • Platelets 12/27/2022 89 (L)  140 - 450 10*3/mm3  Final   • Neutrophil % 12/27/2022 79.0 (H)  42.7 - 76.0 % Final   • Lymphocyte % 12/27/2022 17.0 (L)  19.6 - 45.3 % Final   • Monocyte % 12/27/2022 4.0 (L)  5.0 - 12.0 % Final   • Neutrophils Absolute 12/27/2022 4.40  1.70 - 7.00 10*3/mm3 Final   • Lymphocytes Absolute 12/27/2022 0.95  0.70 - 3.10 10*3/mm3 Final   • Monocytes Absolute 12/27/2022 0.22  0.10 - 0.90 10*3/mm3 Final   • RBC Morphology 12/27/2022 Normal  Normal Final   • WBC Morphology 12/27/2022 Normal  Normal Final   • Platelet Morphology 12/27/2022 Normal  Normal Final   • Vitamin B-12 12/28/2022 1,020 (H)  211 - 946 pg/mL Final   • Folate 12/28/2022 >20.00  4.78 - 24.20 ng/mL Final   • TSH 12/27/2022 4.460 (H)  0.270 - 4.200 uIU/mL Final   • ADENOVIRUS, PCR 12/27/2022 Not Detected  Not Detected Final   • Coronavirus 229E 12/27/2022 Not Detected  Not Detected Final   • Coronavirus HKU1 12/27/2022 Not Detected  Not Detected Final   • Coronavirus NL63 12/27/2022 Not Detected  Not Detected Final   • Coronavirus OC43 12/27/2022 Not Detected  Not Detected Final   • COVID19 12/27/2022 Not Detected  Not Detected - Ref. Range Final   • Human Metapneumovirus 12/27/2022 Not Detected  Not Detected Final   • Human Rhinovirus/Enterovirus 12/27/2022 Not Detected  Not Detected Final   • Influenza A PCR 12/27/2022 Not Detected  Not Detected Final   • Influenza B PCR 12/27/2022 Not Detected  Not Detected Final   • Parainfluenza Virus 1 12/27/2022 Not Detected  Not Detected Final   • Parainfluenza Virus 2 12/27/2022 Not Detected  Not Detected Final   • Parainfluenza Virus 3 12/27/2022 Not Detected  Not Detected Final   • Parainfluenza Virus 4 12/27/2022 Not Detected  Not Detected Final   • RSV, PCR 12/27/2022 Not Detected  Not Detected Final   • Bordetella pertussis pcr 12/27/2022 Not Detected  Not Detected Final   • Bordetella parapertussis PCR 12/27/2022 Not Detected  Not Detected Final   • Chlamydophila pneumoniae PCR 12/27/2022 Not Detected  Not Detected Final   •  Mycoplasma pneumo by PCR 12/27/2022 Not Detected  Not Detected Final   • Troponin T 12/28/2022 0.207 (C)  0.000 - 0.030 ng/mL Final   • Hemoglobin A1C 12/28/2022 6.90 (H)  4.80 - 5.60 % Final   • Glucose 12/28/2022 145 (H)  65 - 99 mg/dL Final   • BUN 12/28/2022 36 (H)  8 - 23 mg/dL Final   • Creatinine 12/28/2022 1.85 (H)  0.76 - 1.27 mg/dL Final   • Sodium 12/28/2022 140  136 - 145 mmol/L Final   • Potassium 12/28/2022 4.4  3.5 - 5.2 mmol/L Final   • Chloride 12/28/2022 105  98 - 107 mmol/L Final   • CO2 12/28/2022 25.4  22.0 - 29.0 mmol/L Final   • Calcium 12/28/2022 8.3 (L)  8.6 - 10.5 mg/dL Final   • Total Protein 12/28/2022 5.9 (L)  6.0 - 8.5 g/dL Final   • Albumin 12/28/2022 3.4 (L)  3.5 - 5.2 g/dL Final   • ALT (SGPT) 12/28/2022 32  1 - 41 U/L Final   • AST (SGOT) 12/28/2022 27  1 - 40 U/L Final   • Alkaline Phosphatase 12/28/2022 106  39 - 117 U/L Final   • Total Bilirubin 12/28/2022 0.4  0.0 - 1.2 mg/dL Final   • Globulin 12/28/2022 2.5  gm/dL Final   • A/G Ratio 12/28/2022 1.4  g/dL Final   • BUN/Creatinine Ratio 12/28/2022 19.5  7.0 - 25.0 Final   • Anion Gap 12/28/2022 9.6  5.0 - 15.0 mmol/L Final   • eGFR 12/28/2022 35.3 (L)  >60.0 mL/min/1.73 Final    National Kidney Foundation and American Society of Nephrology (ASN) Task Force recommended calculation based on the Chronic Kidney Disease Epidemiology Collaboration (CKD-EPI) equation refit without adjustment for race.   • WBC 12/28/2022 5.14  3.40 - 10.80 10*3/mm3 Final   • RBC 12/28/2022 3.29 (L)  4.14 - 5.80 10*6/mm3 Final   • Hemoglobin 12/28/2022 11.5 (L)  13.0 - 17.7 g/dL Final   • Hematocrit 12/28/2022 34.3 (L)  37.5 - 51.0 % Final   • MCV 12/28/2022 104.3 (H)  79.0 - 97.0 fL Final   • MCH 12/28/2022 35.0 (H)  26.6 - 33.0 pg Final   • MCHC 12/28/2022 33.5  31.5 - 35.7 g/dL Final   • RDW 12/28/2022 13.0  12.3 - 15.4 % Final   • RDW-SD 12/28/2022 49.7  37.0 - 54.0 fl Final   • MPV 12/28/2022 9.5  6.0 - 12.0 fL Final   • Platelets 12/28/2022 79 (L)   140 - 450 10*3/mm3 Final   • Protime 12/28/2022 16.7 (H)  11.7 - 14.2 Seconds Final   • INR 12/28/2022 1.33 (H)  0.90 - 1.10 Final   • Glucose 12/28/2022 142 (H)  70 - 130 mg/dL Final    Meter: [id number] : [id number] Mandie Dumont RIGO       DIAGNOSTICS:  CT Head Without Contrast    Result Date: 12/27/2022  There is no evidence of fracture or of intracranial hemorrhage.   CT EXAMINATION OF THE CERVICAL SPINE WITHOUT CONTRAST:  FINDINGS: There is mild reversal of the normal cervical lordosis. There is moderate loss of disc height at C5-6 and C6-7. There is a grade 1 anterolisthesis of C3 upon C4 and C4 upon C5. There is solid fusion of the facets to the left at C2-3. There is no evidence of fracture. Extensive vascular calcification involving the proximal aspects of the great vessels are noted.  C2-3: A small central disc bulge or protrusion is appreciated.  C3-4: Moderate facet degenerative disease is present on the right. There is moderate foraminal stenosis on the right secondary to facet and uncovertebral degenerative disease.  C4-5: Moderate-to-severe facet degenerative disease is present on the left. There is moderate-to-severe foraminal stenosis on the left secondary to facet hypertrophy and uncovertebral degenerative disease.  C5-6: A broad-based disc osteophyte complex is present which results in mild canal stenosis. There is mild and moderate foraminal stenosis on the left and right respectively secondary to loss of disc height and uncovertebral degenerative disease.  C6-7: A mild central disc osteophyte complex is present with no evidence of herniation. Mild and moderate foraminal stenosis is present on the left and right respectively secondary to the loss of disc height, facet hypertrophy and uncovertebral degenerative disease.  C7-T1: Moderate-to-severe facet degenerative disease is present on the left.  IMPRESSION: Multilevel degenerative disease involving the cervical spine is noted as described  above with no evidence of fracture. Extensive vascular calcification involving the great vessels proximally are appreciated. Moderate-to-severe vascular calcification involving the carotid bifurcations are also appreciated bilaterally, more prominent on the left.      Radiation dose reduction techniques were utilized, including automated exposure control and exposure modulation based on body size.  This report was finalized on 12/27/2022 8:14 PM by Dr. Kirk Matias M.D.      CT Cervical Spine Without Contrast    Result Date: 12/27/2022  There is no evidence of fracture or of intracranial hemorrhage.   CT EXAMINATION OF THE CERVICAL SPINE WITHOUT CONTRAST:  FINDINGS: There is mild reversal of the normal cervical lordosis. There is moderate loss of disc height at C5-6 and C6-7. There is a grade 1 anterolisthesis of C3 upon C4 and C4 upon C5. There is solid fusion of the facets to the left at C2-3. There is no evidence of fracture. Extensive vascular calcification involving the proximal aspects of the great vessels are noted.  C2-3: A small central disc bulge or protrusion is appreciated.  C3-4: Moderate facet degenerative disease is present on the right. There is moderate foraminal stenosis on the right secondary to facet and uncovertebral degenerative disease.  C4-5: Moderate-to-severe facet degenerative disease is present on the left. There is moderate-to-severe foraminal stenosis on the left secondary to facet hypertrophy and uncovertebral degenerative disease.  C5-6: A broad-based disc osteophyte complex is present which results in mild canal stenosis. There is mild and moderate foraminal stenosis on the left and right respectively secondary to loss of disc height and uncovertebral degenerative disease.  C6-7: A mild central disc osteophyte complex is present with no evidence of herniation. Mild and moderate foraminal stenosis is present on the left and right respectively secondary to the loss of disc height,  facet hypertrophy and uncovertebral degenerative disease.  C7-T1: Moderate-to-severe facet degenerative disease is present on the left.  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 Extensive vascular calcification involving the great vessels proximally are appreciated. Moderate-to-severe vascular calcification involving the carotid bifurcations are also appreciated bilaterally, more prominent on the left.      Radiation dose reduction techniques were utilized, including automated exposure control and exposure modulation based on body size.  This report was finalized on 12/27/2022 8:14 PM by Dr. Kirk Matias M.D.      MRI Brain Without Contrast    Result Date: 12/28/2022  1.  There is a 2.2 mm area of diffusion weighted hyperintensity involving the body of the caudate nucleus on the right suspicious for an acute infarct. 2.  A 2.5 mm thick plane of T2 FLAIR hyperintensity is appreciated overlying the left parietal and occipital lobes posteriorly suspicious for a thin subdural. 3.  Moderate small vessel ischemic disease and scattered lacunar infarcts involving the corona radiata are noted. 4.  No obvious metastatic disease is appreciated on this noncontrasted MRI examination of the brain. The sensitivity of this study for evaluation of metastatic disease is reduced given the lack of contrast.   MRI EXAMINATION OF THE LUMBAR SPINE WITHOUT CONTRAST:  The patient has undergone fusion from L2 to L4 with bilateral transpedicular screws and posterior rods. A decompressive laminectomy at L3 and L4 is noted. There is a grade 1 anterolisthesis of L4 upon L5 which appears similar to the MRI examination of 05/27/2015.  There is mild to moderate prominence of the posterior aspect of the discs and endplates at T11-T12. This appears to be slightly more prominent as compared to the prior examination. There is flattening and deformity of the  thoracic cord. Further evaluation could be performed with a dedicated MRI examination of the thoracic spine.  L1-L2: A mild broad-based disc osteophyte complex is present with no evidence of herniation.  L2-L3: A grade 1 retrolisthesis of L2 upon L3 is appreciated estimated to be approximately 1-2 mm. A disc osteophyte complex is present extending into the neural foramen on the left slightly less prominent as compared to the preoperative examination of 05/27/2015. There is no evidence of central canal stenosis.  L3-L4: A decompressive laminectomy is noted with no evidence of central canal stenosis. Mild foraminal stenosis is present bilaterally secondary to extension of a small disc osteophyte complex into the neural foramen, similar in appearance as compared to the prior examination.  L4-L5: Moderate to severe facet degenerative disease is present bilaterally. A mild central disc bulge is present with no evidence of herniation. Mild foraminal stenosis is present bilaterally secondary to anterolisthesis of L4 upon L5 and extension of disc material into the neural foramen.  L5-S1: Moderate to severe facet degenerative disease is present bilaterally. A broad-based disc osteophyte complex is present resulting in mild flattening of the ventral surface of the thecal sac. Moderate foraminal stenosis is present on the right secondary to loss of disc height and extension of a disc osteophyte complex into the neural foramen, similar in appearance as compared to the prior examination.  The axial T2 sequence demonstrates fusiform enlargement of the infrarenal abdominal aorta which measures approximately 3.5 cm in the maximum transverse dimension.  IMPRESSION: The patient is status post fusion from L2 to L4 and multilevel decompressive laminectomies as described above. See above. There is no evidence of a focal disc herniation. Multilevel degenerative disease is noted including moderate to severe facet degenerative disease at  L4-L5 and L5-S1 and moderate foraminal stenosis to the right at L5-S1. Foraminal stenosis to the right at L5-S1 appears similar to the MRI examination of the lumbar spine performed on 05/27/2015. No obvious metastatic disease is appreciated on this noncontrasted MRI examination of the lumbar spine.  The above information was called to the patient's nurse at the time of the dictation. The patient's nurse is to immediately relay the information to the clinical service.        MRI Lumbar Spine Without Contrast    Result Date: 12/28/2022  1.  There is a 2.2 mm area of diffusion weighted hyperintensity involving the body of the caudate nucleus on the right suspicious for an acute infarct. 2.  A 2.5 mm thick plane of T2 FLAIR hyperintensity is appreciated overlying the left parietal and occipital lobes posteriorly suspicious for a thin subdural. 3.  Moderate small vessel ischemic disease and scattered lacunar infarcts involving the corona radiata are noted. 4.  No obvious metastatic disease is appreciated on this noncontrasted MRI examination of the brain. The sensitivity of this study for evaluation of metastatic disease is reduced given the lack of contrast.   MRI EXAMINATION OF THE LUMBAR SPINE WITHOUT CONTRAST:  The patient has undergone fusion from L2 to L4 with bilateral transpedicular screws and posterior rods. A decompressive laminectomy at L3 and L4 is noted. There is a grade 1 anterolisthesis of L4 upon L5 which appears similar to the MRI examination of 05/27/2015.  There is mild to moderate prominence of the posterior aspect of the discs and endplates at T11-T12. This appears to be slightly more prominent as compared to the prior examination. There is flattening and deformity of the thoracic cord. Further evaluation could be performed with a dedicated MRI examination of the thoracic spine.  L1-L2: A mild broad-based disc osteophyte complex is present with no evidence of herniation.  L2-L3: A grade 1  retrolisthesis of L2 upon L3 is appreciated estimated to be approximately 1-2 mm. A disc osteophyte complex is present extending into the neural foramen on the left slightly less prominent as compared to the preoperative examination of 05/27/2015. There is no evidence of central canal stenosis.  L3-L4: A decompressive laminectomy is noted with no evidence of central canal stenosis. Mild foraminal stenosis is present bilaterally secondary to extension of a small disc osteophyte complex into the neural foramen, similar in appearance as compared to the prior examination.  L4-L5: Moderate to severe facet degenerative disease is present bilaterally. A mild central disc bulge is present with no evidence of herniation. Mild foraminal stenosis is present bilaterally secondary to anterolisthesis of L4 upon L5 and extension of disc material into the neural foramen.  L5-S1: Moderate to severe facet degenerative disease is present bilaterally. A broad-based disc osteophyte complex is present resulting in mild flattening of the ventral surface of the thecal sac. Moderate foraminal stenosis is present on the right secondary to loss of disc height and extension of a disc osteophyte complex into the neural foramen, similar in appearance as compared to the prior examination.  The axial T2 sequence demonstrates fusiform enlargement of the infrarenal abdominal aorta which measures approximately 3.5 cm in the maximum transverse dimension.  IMPRESSION: The patient is status post fusion from L2 to L4 and multilevel decompressive laminectomies as described above. See above. There is no evidence of a focal disc herniation. Multilevel degenerative disease is noted including moderate to severe facet degenerative disease at L4-L5 and L5-S1 and moderate foraminal stenosis to the right at L5-S1. Foraminal stenosis to the right at L5-S1 appears similar to the MRI examination of the lumbar spine performed on 05/27/2015. No obvious metastatic  disease is appreciated on this noncontrasted MRI examination of the lumbar spine.  The above information was called to the patient's nurse at the time of the dictation. The patient's nurse is to immediately relay the information to the clinical service.            Results Review:   I reviewed the patient's new clinical results.  I personally viewed and interpreted the patient's MRI lumbar spine, and agree with radiology.    Vital Signs   Temp:  [98.1 °F (36.7 °C)-98.4 °F (36.9 °C)] 98.2 °F (36.8 °C)  Heart Rate:  [70-89] 89  Resp:  [16-18] 18  BP: (130-160)/(63-80) 160/80    Physical Exam:  Physical Exam  Vitals reviewed.   Constitutional:       General: He is not in acute distress.     Appearance: Normal appearance. He is normal weight. He is not ill-appearing, toxic-appearing or diaphoretic.   HENT:      Head: Normocephalic and atraumatic.   Pulmonary:      Effort: Pulmonary effort is normal. No respiratory distress.   Neurological:      Mental Status: He is alert and oriented to person, place, and time.      Deep Tendon Reflexes:      Reflex Scores:       Tricep reflexes are 1+ on the right side and 1+ on the left side.       Bicep reflexes are 1+ on the right side and 1+ on the left side.       Brachioradialis reflexes are 1+ on the right side and 1+ on the left side.       Patellar reflexes are 1+ on the right side and 1+ on the left side.       Achilles reflexes are 1+ on the right side and 1+ on the left side.  Psychiatric:         Mood and Affect: Mood normal.         Speech: Speech normal.         Behavior: Behavior normal.         Thought Content: Thought content normal.         Judgment: Judgment normal.       Neurologic Exam     Mental Status   Oriented to person, place, and time.   Attention: normal. Concentration: normal.   Speech: speech is normal   Level of consciousness: alert  Knowledge: good.   Normal comprehension.     Motor Exam   Muscle bulk: normal  Overall muscle tone: normal  Right arm tone:  normal  Left arm tone: normal  Right leg tone: normal  Left leg tone: normal    Strength   Right biceps: 5/5  Left biceps: 4/5  Right triceps: 5/5  Left triceps: 4/5  Right interossei: 5/5  Left interossei: 4/5  Right iliopsoas: 5/5  Left iliopsoas: 4/5  Right quadriceps: 5/5  Left quadriceps: 4/5  Right hamstrin/5  Left hamstrin/5  Right glutei: 5/5  Left glutei: 4/5  Right anterior tibial: 5/5  Left anterior tibial: 4/5  Right gastroc: 5/5  Left gastroc: 4/5    Sensory Exam   Right arm light touch: normal  Left arm light touch: normal  Right leg light touch: normal  Left leg light touch: normal    Gait, Coordination, and Reflexes     Reflexes   Right brachioradialis: 1+  Left brachioradialis: 1+  Right biceps: 1+  Left biceps: 1+  Right triceps: 1+  Left triceps: 1+  Right patellar: 1+  Left patellar: 1+  Right achilles: 1+  Left achilles: 1+  Right Russo: absent  Left Russo: absent  Right ankle clonus: absent  Left ankle clonus: absent      Assessment & Plan       Generalized weakness    Essential hypertension    Hyperlipidemia    Type 2 diabetes mellitus, without long-term current use of insulin (HCC)    Typical atrial flutter (HCC)    Iron deficiency anemia    Chronic anticoagulation    Coronary arteriosclerosis    Stage 3b chronic kidney disease (HCC)    Elevated troponin    Recurrent falls    Obesity (BMI 30-39.9)    87-year-old male who presents the hospital with frequent falls and generalized weakness, but he is more so weak on the left side.  MRI of his brain revealed a possible acute infarct in the right caudate nucleus.  He also had an MRI of his lumbar spine which revealed a disc herniation at T11-12.  Given his frequent falls and generalized weakness, we will proceed with a dedicated thoracic spine MRI as well as an MRI of his cervical spine.  Following completion of these MRIs, we will provide further recommendations.    PLAN:   Cervical, thoracic MRIs  Further recommendations to  "follow    I discussed the patient's findings and my recommendations with patient, family, nursing staff and Dr. Jenkins.    Steffany Singh PA-C  12/28/22  09:18 EST    \"Dictated utilizing Dragon dictation\".      Addendum: Patient also noted to have thin 2.5 mm acute SDH on MRI brain. Anticoagulated on Xarelto. At this time, we will hold the Xarelto and repeat a CT head in the morning. Q4 neurochecks added    "

## 2022-12-28 NOTE — DISCHARGE PLACEMENT REQUEST
"Gabe Lopez (87 y.o. Male)     Date of Birth   1935    Social Security Number       Address   [address] [place], [place] [address]    Home Phone   [phone]    MRN   [id number]       Worship   [employer]    Marital Status                               Admission Date   12/27/22    Admission Type   Emergency    Admitting Provider   Kirk Bertrand MD    Attending Provider   Sherwin Singh MD    Department, Room/Bed   [address], S522/1       Discharge Date       Discharge Disposition       Discharge Destination                               Attending Provider: Sherwin Singh MD    Allergies: Amiodarone, Percocet [Oxycodone-acetaminophen]    Isolation: None   Infection: COVID (rule out) (12/27/22)   Code Status: CPR    Ht: 177.8 cm (70\")   Wt: 109 kg (240 lb)    Admission Cmt: None   Principal Problem: Generalized weakness [R53.1]                 Active Insurance as of 12/27/2022     Primary Coverage     Payor Plan Insurance Group Employer/Plan Group    MEDICARE MEDICARE A & B      Payor Plan Address Payor Plan Phone Number Payor Plan Fax Number Effective Dates    [address] [phone]  9/1/2002 - None Entered    [id number]       Subscriber Name Subscriber Birth Date Member ID       GABE LOPEZ 1935 [id number]           Secondary Coverage     Payor Plan Insurance Group Employer/Plan Group    [facility] SUPP [id number]     Payor Plan Address Payor Plan Phone Number Payor Plan Fax Number Effective Dates    [address]   12/1/2016 - None Entered    [facility] [id number]       Subscriber Name Subscriber Birth Date Member ID       GABE LOPEZ 1935 [id number]                 Emergency Contacts      (Rel.) Home Phone Work Phone Mobile Phone    Sisi Sanchez (Daughter) [phone]              "

## 2022-12-28 NOTE — PROGRESS NOTES
"Nutrition Services    Patient Name:  Osvaldo Lopez  YOB: 1935  MRN: [id number]  Admit Date:  12/27/2022    Assessment Date:  12/28/22    CLINICAL NUTRITION ASSESSMENT     Encounter Information         Reason for Encounter Triggered by chart skin report    Admitting Diagnosis Weakness, s/p frequent falls, hip and back pain    Pertinent Medical History DM2, CKD3, LICO, HTN, CAD, HLD, AAA, anemia, arthritis, asthma, colon cancer, DDD, diverticulosis, MI, CABG, colitis, OA, spinal stenosis, squamous cell carcinoma    Current Issues Weakness     Current Nutrition Orders & Evaluation of Intake       Oral Nutrition     Current PO Diet Diet: Cardiac Diets; Healthy Heart (2-3 Na+); Texture: Regular Texture (IDDSI 7); Fluid Consistency: Thin (IDDSI 0)   Supplement n/a   PO Evaluation     Trending % PO Intake No PO intake available   --  Anthropometrics          Height    Weight Height: 177.8 cm (70\")  Weight: 109 kg (240 lb) (12/27/22 1603)    BMI kg/m2 Body mass index is 34.44 kg/m².    Weight Trend/Change Gain    Weight History  Wt Readings from Last 15 Encounters:   12/27/22 1603 109 kg (240 lb)   12/08/22 1045 109 kg (240 lb)   09/27/22 0940 108 kg (237 lb)   09/01/22 1329 108 kg (239 lb)   08/15/22 1024 105 kg (231 lb)   07/25/22 1232 107 kg (235 lb)   06/22/22 0952 107 kg (235 lb)   06/07/22 1214 108 kg (239 lb)   02/17/22 1331 106 kg (233 lb)   11/24/21 1040 101 kg (222 lb)   10/18/21 1603 98.9 kg (218 lb)   09/30/21 1416 99.8 kg (220 lb)   09/16/21 1606 102 kg (224 lb)   09/15/21 0935 102 kg (224 lb)   08/16/21 1258 99.1 kg (218 lb 8 oz)      --  Estimated/Assessed Needs       Energy Requirements    Height for Calculation  Height: 177.8 cm (70\")   Weight for Calculation 166 lb (75.5 kg)   Method for Estimation  30 kcal/kg   EST Needs (kcal/day) 2265       Protein Requirements    Weight for Calculation 240 lb (109 kg)   EST Protein Needs (g/kg) 0.8 gm/kg   EST Daily Needs (g/day) 87       Fluid " Requirements     Method for Estimation 1 mL/kcal    Estimated Needs (mL/day) 2300     Labs        Pertinent Labs Reviewed, listed below     Results from last 7 days   Lab Units 12/28/22  0702 12/27/22  1717   SODIUM mmol/L 140 138   POTASSIUM mmol/L 4.4 4.8   CHLORIDE mmol/L 105 103   CO2 mmol/L 25.4 26.3   BUN mg/dL 36* 41*   CREATININE mg/dL 1.85* 2.06*   CALCIUM mg/dL 8.3* 9.0   BILIRUBIN mg/dL 0.4 0.5   ALK PHOS U/L 106 122*   ALT (SGPT) U/L 32 41   AST (SGOT) U/L 27 35   GLUCOSE mg/dL 145* 146*     Results from last 7 days   Lab Units 12/28/22  0702 12/27/22  1717   MAGNESIUM mg/dL  --  2.4   HEMOGLOBIN g/dL 11.5* 12.3*   HEMATOCRIT % 34.3* 36.6*   WBC 10*3/mm3 5.14 5.57   ALBUMIN g/dL 3.4* 3.8     Results from last 7 days   Lab Units 12/28/22  0702 12/27/22  1717   INR  1.33* 2.00*   PLATELETS 10*3/mm3 79* 89*     COVID19   Date Value Ref Range Status   12/27/2022 Not Detected Not Detected - Ref. Range Final     Lab Results   Component Value Date    HGBA1C 6.90 (H) 12/28/2022          Medications            Scheduled Medications amiodarone, 200 mg, Oral, Daily  finasteride, 5 mg, Oral, Daily  insulin lispro, 0-9 Units, Subcutaneous, TID AC  levothyroxine, 25 mcg, Oral, Q AM  Menthol-Zinc Oxide, 1 application, Topical, TID  metoprolol succinate XL, 25 mg, Oral, Daily  multivitamin, 1 tablet, Oral, QAM  tamsulosin, 0.8 mg, Oral, Daily  vitamin B-12, 1,000 mcg, Oral, Daily        Infusions      PRN Medications •  acetaminophen  •  aluminum-magnesium hydroxide-simethicone  •  dextrose  •  dextrose  •  glucagon (human recombinant)  •  melatonin  •  nitroglycerin  •  ondansetron **OR** ondansetron  •  [COMPLETED] Insert Peripheral IV **AND** sodium chloride  •  sodium chloride  •  traMADol     Physical Findings         Skin, GI, Oral, LDA Room air; trace edema; BM 12/28; B=18, buttock st II, L elbow skin tear    NFPE Not applicable at this time   --  PES STATEMENT / NUTRITION DIAGNOSIS      Nutrition Dx Problem   Problem: Increased Nutrient Needs  Etiology: Medical Diagnosis (pressure injury)  Signs/Symptoms: Report/Observation    Comment:      NUTRITION INTERVENTION / PLAN OF CARE  Intervention Goal        Intervention Goal(s) Maintain nutrition status, Meet estimated needs, Disease management/therapy, Establish PO intake, Tolerate PO  and Appropriate weight loss     Nutrition Intervention        RD Action Follow Tx Progress, Care plan reviewed and Recommend/ordered: ONS     Nutrition Prescription          Diet      Supplement/Snack Boost GC daily   EN/PN      Prescription Ordered Yes     Monitor/Evaluation        Monitor Per protocol   Discharge Needs Pending clinical course   Education Will instruct as appropriate     RD to follow up per protocol.    Electronically signed by:  Ritu Haywood RD  12/28/22 14:48 EST

## 2022-12-28 NOTE — PROGRESS NOTES
[facility] Acute [facility]  Stroke screening now full referral per request of CCP. No therapies noted yet. Will follow progress with therapy and see pt tomorrow to discuss dc plan as he lives alone and has had multiple falls.     Shaina Mata RN  Acute Rehab Admission Nurse

## 2022-12-28 NOTE — CONSULTS
"Date of Consultation: 22    Referral Provider: Kirk Bertrand MD     Reason for Consultation: Elevated troponin    Encounter Provider: Andrea Solis MD    Group of Service: [facility]     Patient Name: Osvaldo Lopez    :1935    Chief complaint:      History of Present Illness:  Mr Lopez is a 87 year old male patient of Dr. Driscoll's with history of hypertension, hyperlipidemia, hypothyroidism, chronic kidney disease, AAA, and paroxymal typical atrial flutter followed by Dr. Mcfarland.  He has had a CABG in .    The patient presented with increasing weakness and falling.  Over the last several days, he feels that he has not had the strength to stand even on his Rollator.  He described it as feeling like his legs were just \"too weak\" to support himself.  He did tell me that he has had a diagnosis of neuropathy and has back issues in the past.  His left side is weaker than the right.  An MRI of the brain revealed an acute infarct in the right caudate nucleus.  He also had a likely thin subdural hematoma over the left parieto-occipital region.  His Xarelto has been held on admission because of this.  His troponin was elevated 0.229 initially, although his renal function was worse than baseline with a creatinine of 2.06.  His troponin then trended downwards to 0.206.  He has not had any chest discomfort or angina.  His EKG did not show any ischemic changes.    Previous Cardiac Testing:    Echocardiogram 22  • Left ventricular ejection fraction appears to be 66 - 70%. Normal left ventricular cavity size and wall thickness noted. All left ventricular wall segments contract normally. Left ventricular diastolic function was normal. No evidence of left ventricular thrombus or mass present.  • There is moderate calcification of the aortic valve. Mild aortic valve regurgitation is present. Mild aortic valve stenosis is present.    Holter 22  • An abnormal monitor " study.  • 5617 PACs in the hours analyzed. Representing 5.9% of all beats.  • There is no signs of atrial fibrillation.      Past Medical History:   Diagnosis Date   • Abdominal aortic aneurysm (AAA) without rupture    • Abdominal aortic ectasia ([facility]) 06/2015   • Abnormal EKG 10/25/2016    04/2019   • Abnormal thyroid blood test    • Actinic keratosis     FOLLOWED BY DR. MANPREET VILLARREAL   • Anemia 01/28/2021   • Arthritis    • Asthma     MILD, INTERMITTENT   • Atherosclerotic heart disease    • Atrial flutter with rapid ventricular response ([facility]) 04/14/2019    ADMITTED TO [facility]   • Atrial premature depolarization    • Atrophy of muscle of lower leg    • BPH (benign prostatic hyperplasia)     FOLLOWED BY DR. TERRA JONES   • Bruises easily    • Bulging of thoracic intervertebral disc    • Carpal tunnel syndrome, right 12/2019    FOLLOWED BY DR. NEW SANCHEZ   • Cataract     BILATERAL, S/P EXTRACTION WITH IOL IMPLANTS   • Chronic anticoagulation 01/29/2021   • Chronic edema    • Chronic low back pain with sciatica 01/26/2021   • Chronic pain    • CKD (chronic kidney disease) 01/29/2021   • Closed head injury 07/2018    WITH LACERATION TO SCALP, D/T SYNCOPE   • Colon cancer ([facility]) 01/31/2021    INVASIVE ADENOCARCINOMA FROM TUBULOVILLOUS ADENOMA IN TRANSVERSE, S/P COLON RESECTION, FOLLOWED BY DR. MARGARITA COX   • Colon polyps     FOLLOWED BY DR. 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 TERRA JONES   • Epididymitis, right 03/2018   • Essential hypertension    • Eyebrow ptosis 11/09/2013   • Hallux valgus, acquired, bilateral 01/2019   • Heart attack ([facility]) 09/1989    S/P CABG, 5 VESSEL   • Heart murmur    • History of blood transfusion    • HL (hearing loss)    • Hyperactivity of bladder     FOLLOWED BY DR. TERRA JONES   • HyperCKemia  11/2017   • Hyperkalemia 08/2016   • Hyperlipidemia     MIXED   • Hyperreflexia 11/2017    BILATERAL   • Hyphema 05/2015   • IBS (irritable bowel syndrome)    • Impaired functional mobility, balance, gait, and endurance    • Impingement syndrome of left shoulder 10/2015   • Infection associated with cystostomy catheter ([facility]) 12/2016   • Ischemic colitis ([facility])    • Lumbar radiculopathy 2016    wears back brace at times following back surgery   • Lumbar spondylosis 04/2016   • Lumbosacral neuritis 05/2015   • Lumbosacral radiculitis 05/2015   • Macular puckering of retina, left 10/2013   • 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 VIANEY GIORDANO   • Pseudophakia 11/09/2013   • RBBB (right bundle branch block)    • Recurrent falls    • Respiratory failure with hypoxia ([facility]) 01/2019   • SCC (squamous cell carcinoma) 10/16/2019    RIGHT FOREHEAD, LEFT TEMPLE, RIGHT SCALP, FOLLOWED BY DR. MANPREET VILLARREAL   • Scoliosis    • Spinal stenosis of lumbar region with neurogenic claudication 12/07/2017   • Syncope and collapse 07/16/2018    AD[facility]ITT[facility]   • Torn rotator cuff 03/2017    BILATERAL, COMPLETE   • Type 2 diabetes mellitus ([facility])     IDDM   • Urinary frequency     FOLLOWED BY DR. TERRA JONES   • Vitamin D deficiency    • Vitreous hemorrhage of left eye ([facility])          Past Surgical History:   Procedure Laterality Date   • APPENDECTOMY N/A    • BROW LIFT Bilateral 11/12/2013    DR. OCTAVIA CEDILLO AT [place]   • CARDIAC CATHETERIZATION Left 10/2008    LEFT CAROTID  ARTERY STENOSIS 50-69%   • CARDIAC ELECTROPHYSIOLOGY PROCEDURE N/A 06/06/2019    Procedure: Ablation atrial flutter;  Surgeon: Matthew Talbot MD;  Location:  INVASIVE LOCATION;  Service: Cardiovascular   • CATARACT EXTRACTION Left 01/22/1998    DR. LAYLA BARNES AT [place]   • CATARACT EXTRACTION Right 03/2001    DR. LAYLA BARNES   • CHOLECYSTECTOMY N/A 08/24/1989    DR. FAUZIA PELAEZ AT [place]   • COLON RESECTION N/A 02/03/2021    Procedure: LAPAROSCOPIC EXTENDED  RIGHT COLECTOMY WITH DAVINCI ROBOT;  Surgeon: Margarita Napoles MD;  Location: [facility] OR;  Service: DaVinci;  Laterality: N/A;   • COLONOSCOPY N/A 01/31/2021    20-25 MM POLYP IN CECUM, PATH: TUBULAR ADENOMA, 2 TUBULAR ADENOMA POLYPS IN ASCENDING, A FUNGATING AND SUBMUCOSAL ONONOBSTRUCTING MEDIUM MASS IN PROXIMAL TRANSVERSE, PATH: INVASIVE ADENOCARCINOMA ARISING IN A TUBULOVILOOUS ADENOMA, AREA TATTOOED, SCATTERED DIVERTICULA IN SIGMOID AND DESCENDING, LARGE INTERNAL HEMORRHOIDS, DR. JACOB ALICEA AT [facility]    • COLONOSCOPY N/A 02/02/2010    DIVERTICULOSIS, DR. SAL SOLANO AT [place]   • COLONOSCOPY N/A 08/15/2022    5 MM TUBULAR ADENOMA POLYP IN SIGMOID, 2 TUBULAR ADENOMA POLYPS IN DESCENDING, MULTIPLE DIVERTICULA IN SIGMOID, RESCOPE IN 2 YRS, DR. MARGARITA NAPOLES AT [facility]   • CORONARY ARTERY BYPASS GRAFT N/A 09/1989    5 VESSEL, DR. HANKS   • CYST REMOVAL      FROM BACK   • ENDOSCOPY N/A 01/31/2021    ENTIRE EGD WNL, PATH: MILD REACTIVE GASTROPATHY, DR. JACOB ALICEA AT [facility]   • INGUINAL HERNIA REPAIR Left 09/27/2007    DR. MATTHEW RUSH AT [place]   • INGUINAL HERNIA REPAIR Left 09/07/2007   • INGUINAL HERNIA REPAIR Left 2003   • KNEE ARTHROSCOPY W/ PARTIAL MEDIAL MENISCECTOMY Left 1962    DR. SINGH   • LUMBAR DISCECTOMY FUSION INSTRUMENTATION N/A 04/11/2016    Procedure: lumbar laminectomy L4-5 and fusion with instrumentation;  Surgeon: Ran Kline MD;  Location: [facility] OR;  Service:    • LUMBAR DISCECTOMY FUSION  INSTRUMENTATION N/A 01/15/2018    Procedure: L2-3, L3-4 laminectomy and fusion with instrumentation and removal of implants L4 5.;  Surgeon: Ran Kline MD;  Location: [facility] OR;  Service:    • LUMBAR EPIDURAL INJECTION N/A 09/23/2015    DR. MATTHEW DOMINGUEZ AT [facility]   • LUMBAR EPIDURAL INJECTION N/A 10/07/2015    DR. ITZEL LUIS AT [facility]   • LUMBAR EPIDURAL INJECTION N/A 11/19/2015    DR. ITZEL LUIS AT [facility]   • CO TOTAL KNEE ARTHROPLASTY Left 11/14/2016    Procedure: TOTAL KNEE ARTHROPLASTY;  Surgeon: Dontrell Arellano MD;  Location: [facility];  Service: Orthopedics   • SKIN BIOPSY Bilateral 10/16/2019    RIGHT LATERAL FOREHEAD:SCC, LEFT TEMPLE:SCC, RIGHT PARIETAL SCALP:SCC, DR. MANPREET VILLARREAL         Allergies   Allergen Reactions   • Amiodarone Myalgia     Muscle problems in legs   • Percocet [Oxycodone-Acetaminophen] Mental Status Change and Confusion     Causes confusion/         No current facility-administered medications on file prior to encounter.     Current Outpatient Medications on File Prior to Encounter   Medication Sig Dispense Refill   • acetaminophen (TYLENOL) 500 MG tablet Take 500 mg by mouth Every 6 (Six) Hours As Needed for Mild Pain .     • Alpha-Lipoic Acid 600 MG tablet Take 600 mg by mouth Daily. For neuropathy 30 tablet 11   • amiodarone (PACERONE) 200 MG tablet TAKE 1 TABLET DAILY. 90 tablet 1   • ferrous sulfate 325 (65 FE) MG tablet Take 325 mg by mouth Daily With Breakfast.     • finasteride (PROSCAR) 5 MG tablet TAKE 1 TABLET DAILY FOR    PROSTATE 90 tablet 3   • furosemide (LASIX) 40 MG tablet TAKE 1 TABLET DAILY 90 tablet 3   • glucose blood (Contour Next Test) test strip 1 each by Other route Daily. test blood sugar 50 each 5   • levothyroxine (SYNTHROID, LEVOTHROID) 25 MCG tablet Take 1 tablet by mouth Daily. 90 tablet 1   • lisinopril (PRINIVIL,ZESTRIL) 20 MG tablet TAKE 1 TABLET DAILY 90 tablet 3   • metoprolol succinate XL (TOPROL-XL) 25 MG 24 hr tablet TAKE 1  "TABLET DAILY 90 tablet 3   • Multiple Vitamin (MULTI VITAMIN PO) Take 1 tablet by mouth Every Morning.     • tamsulosin (FLOMAX) 0.4 MG capsule 24 hr capsule TAKE 2 CAPSULES DAILY 180 capsule 3   • Tradjenta 5 MG tablet tablet TAKE 1 TABLET DAILY 90 tablet 3   • traMADol (ULTRAM) 50 MG tablet TAKE 1 TABLET BY MOUTH EVERY 6 (SIX) HOURS AS NEEDED FOR MODERATE PAIN OR SEVERE PAIN 30 tablet 1   • vitamin B-12 (CYANOCOBALAMIN) 1000 MCG tablet Take 1,000 mcg by mouth Daily.     • Xarelto 15 MG tablet TAKE 1 TABLET DAILY 90 tablet 3   • KYRA MICROLET LANCETS lancets USE TWICE A  each 5   • lidocaine (LMX) 4 % cream            Social History     Socioeconomic History   • Marital status:    Tobacco Use   • Smoking status: Former     Packs/day: 3.00     Years: 45.00     Pack years: 135.00     Types: Cigarettes     Quit date: 1989     Years since quittin.1   • Smokeless tobacco: Never   Vaping Use   • Vaping Use: Never used   Substance and Sexual Activity   • Alcohol use: Yes     Comment: 1 shot of whiskey in the morning and 1 shot of whiskey in the evening   • Drug use: Never   • Sexual activity: Not Currently         Family History   Problem Relation Age of Onset   • Heart failure Mother    • Diabetes Mother    • Heart disease Mother    • Cancer Sister    • Diabetes Sister    • Cancer Brother    • Diabetes Brother    • Other Brother         INCLUSION BODY NYOSITIS   • Cancer Father        REVIEW OF SYSTEMS:   Pertinent positives were noted in the HPI above.  Otherwise, all other systems were reviewed, and are negative.     Objective:     Vitals:    22 1425 22 1429 22 1435 22 1550   BP: 124/58 139/60 138/80 138/80   BP Location: Left arm Left arm Left arm    Patient Position: Lying Sitting Standing    Pulse: 73      Resp:       Temp: 98.2 °F (36.8 °C)      TempSrc: Oral      SpO2: 93%      Weight:    109 kg (240 lb)   Height:    177.8 cm (70\")     Body mass index is 34.44 " "kg/m².  Flowsheet Rows    Flowsheet Row First Filed Value   Admission Height 177.8 cm (70\") Documented at 12/27/2022 1603   Admission Weight 109 kg (240 lb) Documented at 12/27/2022 1603           General:    No acute distress, alert and oriented x4, pleasant                   Head:    Normocephalic, atraumatic.   Eyes:          Conjunctivae and sclerae normal, no icterus, PERRLA   Throat:   No oral lesions, no thrush, oral mucosa moist.    Neck:   Supple, trachea midline.   Lungs:     Clear to auscultation bilaterally     Heart:    Regular rhythm and normal rate. II/VI SM RUSB.   Abdomen:     Soft, non-tender, non-distended, positive bowel sounds.    Extremities:   No clubbing, cyanosis, or edema.     Pulses:   Pulses palpable and equal bilaterally.    Skin:   No bleeding or rash.   Neuro:  Left leg weakness   Psychiatric:   Normal mood and affect.           Lab Review:                Results from last 7 days   Lab Units 12/28/22  0702   SODIUM mmol/L 140   POTASSIUM mmol/L 4.4   CHLORIDE mmol/L 105   CO2 mmol/L 25.4   BUN mg/dL 36*   CREATININE mg/dL 1.85*   GLUCOSE mg/dL 145*   CALCIUM mg/dL 8.3*     Results from last 7 days   Lab Units 12/28/22  0702 12/27/22  1717   CK TOTAL U/L  --  218*   TROPONIN T ng/mL 0.207* 0.229*     Results from last 7 days   Lab Units 12/28/22  0702   WBC 10*3/mm3 5.14   HEMOGLOBIN g/dL 11.5*   HEMATOCRIT % 34.3*   PLATELETS 10*3/mm3 79*     Results from last 7 days   Lab Units 12/28/22  0702 12/27/22  1717   INR  1.33* 2.00*         Results from last 7 days   Lab Units 12/27/22  1717   MAGNESIUM mg/dL 2.4           EKG (reviewed by me personally):    EKG 12/27/22      Previous EKG 9/1/22          Assessment:   1.  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  Small subdural hematoma over the left parieto-occipital area (secondary to mechanical fall)  3.  Stage IIIb chronic kidney disease  4.  Coronary artery disease, status post 5 vessel CABG in 1999  5.  Multiple recent " mechanical falls, partially secondary to #1  6.  Anemia of chronic disease  7.  Type 2 diabetes with peripheral neuropathy  8.  Back pain with degenerative disc disease  9.  Chronic thrombocytopenia  10.  Elevated troponin, likely combination of demand event and from kidney dysfunction  11.  Paroxysmal typical atrial flutter, on amiodarone  12.  Mild aortic stenosis    Plan:       I saw him earlier this morning.  After I saw him, the diagnosis of the stroke was made.  His troponin normally runs at about 0.05 chronically.  His initial troponin was 0.22, and is now 0.206.  I suspect that this is a demand event secondary to the stroke, in conjunction with his chronic kidney disease.  He is having no anginal symptoms, and he does not have ischemic EKG changes.    After I saw him, I checked a limited echocardiogram for left ventricular function.  This confirmed an ejection fraction of 60 to 65% without wall motion abnormalities.  His Xarelto is being held because of the small subdural hematoma, which likely occurred after one of his falls.  He obviously will need to be back on anticoagulation when it is safe from a neurological standpoint.  He is in sinus rhythm currently on the amiodarone.  I would continue this, as well as the Toprol-XL at 25 mg/day.  I do not suspect that the troponin elevation is a true type I NSTEMI from significant coronary ischemia.    We will continue to follow given his multiple cardiac issues.    Thank you very much for this consult.    Gus Solis MD

## 2022-12-28 NOTE — PROGRESS NOTES
Name: Osvaldo Lopez ADMIT: 2022   : 1935  PCP: Caio Rodríguez MD    MRN: [id number] LOS: 1 days   AGE/SEX: 87 y.o. male  ROOM: [facility]     Subjective   Subjective   Complaining of left-sided weakness and back pain    Review of Systems   Constitutional: Negative for fever.   Respiratory: Negative for cough and shortness of breath.    Cardiovascular: Negative for chest pain.   Gastrointestinal: Negative for abdominal pain, diarrhea, nausea and vomiting.   Genitourinary: Negative for dysuria.   Musculoskeletal: Positive for back pain.   Neurological: Positive for weakness. Negative for headaches.      Objective   Objective   Vital Signs  Temp:  [98.1 °F (36.7 °C)-98.4 °F (36.9 °C)] 98.2 °F (36.8 °C)  Heart Rate:  [70-89] 73  Resp:  [16-18] 18  BP: (114-160)/(58-80) 138/80  SpO2:  [90 %-95 %] 93 %  on   ;   Device (Oxygen Therapy): room air  Body mass index is 34.44 kg/m².    Physical Exam  Constitutional:       General: He is not in acute distress.     Appearance: Normal appearance. He is not toxic-appearing.   HENT:      Head: Normocephalic and atraumatic.   Cardiovascular:      Rate and Rhythm: Normal rate and regular rhythm.   Pulmonary:      Effort: Pulmonary effort is normal. No respiratory distress.      Breath sounds: Normal breath sounds. No wheezing, rhonchi or rales.   Abdominal:      General: Bowel sounds are normal.      Palpations: Abdomen is soft.      Tenderness: There is no abdominal tenderness. There is no guarding or rebound.   Musculoskeletal:         General: No swelling.      Cervical back: Normal range of motion.      Right lower leg: No edema.      Left lower leg: No edema.   Skin:     General: Skin is warm and dry.   Neurological:      Mental Status: He is alert and oriented to person, place, and time.   Psychiatric:         Mood and Affect: Mood normal.         Behavior: Behavior normal.         Thought Content: Thought content normal.       Results Review  I reviewed the  patient's new clinical results.  Results from last 7 days   Lab Units 12/28/22  0702 12/27/22  1717   WBC 10*3/mm3 5.14 5.57   HEMOGLOBIN g/dL 11.5* 12.3*   PLATELETS 10*3/mm3 79* 89*     Results from last 7 days   Lab Units 12/28/22  0702 12/27/22  1717   SODIUM mmol/L 140 138   POTASSIUM mmol/L 4.4 4.8   CHLORIDE mmol/L 105 103   CO2 mmol/L 25.4 26.3   BUN mg/dL 36* 41*   CREATININE mg/dL 1.85* 2.06*   GLUCOSE mg/dL 145* 146*     Lab Results   Component Value Date    ANIONGAP 9.6 12/28/2022     Estimated Creatinine Clearance: 36.1 mL/min (A) (by C-G formula based on SCr of 1.85 mg/dL (H)).    Results from last 7 days   Lab Units 12/28/22  0702 12/27/22  1717   ALBUMIN g/dL 3.4* 3.8   BILIRUBIN mg/dL 0.4 0.5   ALK PHOS U/L 106 122*   AST (SGOT) U/L 27 35   ALT (SGPT) U/L 32 41     Results from last 7 days   Lab Units 12/28/22  0702 12/27/22  1717   CALCIUM mg/dL 8.3* 9.0   ALBUMIN g/dL 3.4* 3.8   MAGNESIUM mg/dL  --  2.4       Hemoglobin A1C   Date/Time Value Ref Range Status   12/28/2022 0702 6.90 (H) 4.80 - 5.60 % Final     Glucose   Date/Time Value Ref Range Status   12/28/2022 1124 168 (H) 70 - 130 mg/dL Final     Comment:     Meter: [id number] : [id number] Baylee SIERRA   12/28/2022 0537 142 (H) 70 - 130 mg/dL Final     Comment:     Meter: [id number] : [id number] Mandie SIERRA       CT Abdomen Pelvis Without Contrast    Result Date: 12/27/2022    1. Colonic diverticulosis. No acute inflammatory process of bowel is identified. 2. No hydronephrosis or ureteral stones.  This report was finalized on 12/27/2022 6:25 PM by Dr. Vimal Mayorga M.D.      CT Head Without Contrast    Result Date: 12/27/2022  There is no evidence of fracture or of intracranial hemorrhage.   CT EXAMINATION OF THE CERVICAL SPINE WITHOUT CONTRAST:  FINDINGS: There is mild reversal of the normal cervical lordosis. There is moderate loss of disc height at C5-6 and C6-7. There is a grade 1 anterolisthesis of C3 upon C4  and C4 upon C5. There is solid fusion of the facets to the left at C2-3. There is no evidence of fracture. Extensive vascular calcification involving the proximal aspects of the great vessels are noted.  C2-3: A small central disc bulge or protrusion is appreciated.  C3-4: Moderate facet degenerative disease is present on the right. There is moderate foraminal stenosis on the right secondary to facet and uncovertebral degenerative disease.  C4-5: Moderate-to-severe facet degenerative disease is present on the left. There is moderate-to-severe foraminal stenosis on the left secondary to facet hypertrophy and uncovertebral degenerative disease.  C5-6: A broad-based disc osteophyte complex is present which results in mild canal stenosis. There is mild and moderate foraminal stenosis on the left and right respectively secondary to loss of disc height and uncovertebral degenerative disease.  C6-7: A mild central disc osteophyte complex is present with no evidence of herniation. Mild and moderate foraminal stenosis is present on the left and right respectively secondary to the loss of disc height, facet hypertrophy and uncovertebral degenerative disease.  C7-T1: Moderate-to-severe facet degenerative disease is present on the left.  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 Extensive vascular calcification involving the great vessels proximally are appreciated. Moderate-to-severe vascular calcification involving the carotid bifurcations are also appreciated bilaterally, more prominent on the left.      Radiation dose reduction techniques were utilized, including automated exposure control and exposure modulation based on body size.  This report was finalized on 12/27/2022 8:14 PM by Dr. Kirk Matias M.D.      CT Cervical Spine Without Contrast    Result Date: 12/27/2022  There is no evidence of fracture or of intracranial hemorrhage.    CT EXAMINATION OF THE CERVICAL SPINE WITHOUT CONTRAST:  FINDINGS: There is mild reversal of the normal cervical lordosis. There is moderate loss of disc height at C5-6 and C6-7. There is a grade 1 anterolisthesis of C3 upon C4 and C4 upon C5. There is solid fusion of the facets to the left at C2-3. There is no evidence of fracture. Extensive vascular calcification involving the proximal aspects of the great vessels are noted.  C2-3: A small central disc bulge or protrusion is appreciated.  C3-4: Moderate facet degenerative disease is present on the right. There is moderate foraminal stenosis on the right secondary to facet and uncovertebral degenerative disease.  C4-5: Moderate-to-severe facet degenerative disease is present on the left. There is moderate-to-severe foraminal stenosis on the left secondary to facet hypertrophy and uncovertebral degenerative disease.  C5-6: A broad-based disc osteophyte complex is present which results in mild canal stenosis. There is mild and moderate foraminal stenosis on the left and right respectively secondary to loss of disc height and uncovertebral degenerative disease.  C6-7: A mild central disc osteophyte complex is present with no evidence of herniation. Mild and moderate foraminal stenosis is present on the left and right respectively secondary to the loss of disc height, facet hypertrophy and uncovertebral degenerative disease.  C7-T1: Moderate-to-severe facet degenerative disease is present on the left.  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 Extensive vascular calcification involving the great vessels proximally are appreciated. Moderate-to-severe vascular calcification involving the carotid bifurcations are also appreciated bilaterally, more prominent on the left.      Radiation dose reduction techniques were utilized, including automated exposure control and exposure  modulation based on body size.  This report was finalized on 12/27/2022 8:14 PM by Dr. Kirk Matias M.D.      MRI Brain Without Contrast    Result Date: 12/28/2022  1.  There is a 2.2 mm area of diffusion weighted hyperintensity involving the body of the caudate nucleus on the right suspicious for an acute infarct. 2.  A 2.5 mm thick plane of T2 FLAIR hyperintensity is appreciated overlying the left parietal and occipital lobes posteriorly suspicious for a thin subdural. 3.  Moderate small vessel ischemic disease and scattered lacunar infarcts involving the corona radiata are noted. 4.  No obvious metastatic disease is appreciated on this noncontrasted MRI examination of the brain. The sensitivity of this study for evaluation of metastatic disease is reduced given the lack of contrast.   MRI EXAMINATION OF THE LUMBAR SPINE WITHOUT CONTRAST:  The patient has undergone fusion from L2 to L4 with bilateral transpedicular screws and posterior rods. A decompressive laminectomy at L3 and L4 is noted. There is a grade 1 anterolisthesis of L4 upon L5 which appears similar to the MRI examination of 05/27/2015.  There is mild to moderate prominence of the posterior aspect of the discs and endplates at T11-T12. This appears to be slightly more prominent as compared to the prior examination. There is flattening and deformity of the thoracic cord. Further evaluation could be performed with a dedicated MRI examination of the thoracic spine.  L1-L2: A mild broad-based disc osteophyte complex is present with no evidence of herniation.  L2-L3: A grade 1 retrolisthesis of L2 upon L3 is appreciated estimated to be approximately 1-2 mm. A disc osteophyte complex is present extending into the neural foramen on the left slightly less prominent as compared to the preoperative examination of 05/27/2015. There is no evidence of central canal stenosis.  L3-L4: A decompressive laminectomy is noted with no evidence of central canal stenosis.  Mild foraminal stenosis is present bilaterally secondary to extension of a small disc osteophyte complex into the neural foramen, similar in appearance as compared to the prior examination.  L4-L5: Moderate to severe facet degenerative disease is present bilaterally. A mild central disc bulge is present with no evidence of herniation. Mild foraminal stenosis is present bilaterally secondary to anterolisthesis of L4 upon L5 and extension of disc material into the neural foramen.  L5-S1: Moderate to severe facet degenerative disease is present bilaterally. A broad-based disc osteophyte complex is present resulting in mild flattening of the ventral surface of the thecal sac. Moderate foraminal stenosis is present on the right secondary to loss of disc height and extension of a disc osteophyte complex into the neural foramen, similar in appearance as compared to the prior examination.  The axial T2 sequence demonstrates fusiform enlargement of the infrarenal abdominal aorta which measures approximately 3.5 cm in the maximum transverse dimension.  IMPRESSION: The patient is status post fusion from L2 to L4 and multilevel decompressive laminectomies as described above. See above. There is no evidence of a focal disc herniation. Multilevel degenerative disease is noted including moderate to severe facet degenerative disease at L4-L5 and L5-S1 and moderate foraminal stenosis to the right at L5-S1. Foraminal stenosis to the right at L5-S1 appears similar to the MRI examination of the lumbar spine performed on 05/27/2015. No obvious metastatic disease is appreciated on this noncontrasted MRI examination of the lumbar spine.  The above information was called to the patient's nurse at the time of the dictation. The patient's nurse is to immediately relay the information to the clinical service.        MRI Lumbar Spine Without Contrast    Result Date: 12/28/2022  1.  There is a 2.2 mm area of diffusion weighted hyperintensity  involving the body of the caudate nucleus on the right suspicious for an acute infarct. 2.  A 2.5 mm thick plane of T2 FLAIR hyperintensity is appreciated overlying the left parietal and occipital lobes posteriorly suspicious for a thin subdural. 3.  Moderate small vessel ischemic disease and scattered lacunar infarcts involving the corona radiata are noted. 4.  No obvious metastatic disease is appreciated on this noncontrasted MRI examination of the brain. The sensitivity of this study for evaluation of metastatic disease is reduced given the lack of contrast.   MRI EXAMINATION OF THE LUMBAR SPINE WITHOUT CONTRAST:  The patient has undergone fusion from L2 to L4 with bilateral transpedicular screws and posterior rods. A decompressive laminectomy at L3 and L4 is noted. There is a grade 1 anterolisthesis of L4 upon L5 which appears similar to the MRI examination of 05/27/2015.  There is mild to moderate prominence of the posterior aspect of the discs and endplates at T11-T12. This appears to be slightly more prominent as compared to the prior examination. There is flattening and deformity of the thoracic cord. Further evaluation could be performed with a dedicated MRI examination of the thoracic spine.  L1-L2: A mild broad-based disc osteophyte complex is present with no evidence of herniation.  L2-L3: A grade 1 retrolisthesis of L2 upon L3 is appreciated estimated to be approximately 1-2 mm. A disc osteophyte complex is present extending into the neural foramen on the left slightly less prominent as compared to the preoperative examination of 05/27/2015. There is no evidence of central canal stenosis.  L3-L4: A decompressive laminectomy is noted with no evidence of central canal stenosis. Mild foraminal stenosis is present bilaterally secondary to extension of a small disc osteophyte complex into the neural foramen, similar in appearance as compared to the prior examination.  L4-L5: Moderate to severe facet  degenerative disease is present bilaterally. A mild central disc bulge is present with no evidence of herniation. Mild foraminal stenosis is present bilaterally secondary to anterolisthesis of L4 upon L5 and extension of disc material into the neural foramen.  L5-S1: Moderate to severe facet degenerative disease is present bilaterally. A broad-based disc osteophyte complex is present resulting in mild flattening of the ventral surface of the thecal sac. Moderate foraminal stenosis is present on the right secondary to loss of disc height and extension of a disc osteophyte complex into the neural foramen, similar in appearance as compared to the prior examination.  The axial T2 sequence demonstrates fusiform enlargement of the infrarenal abdominal aorta which measures approximately 3.5 cm in the maximum transverse dimension.  IMPRESSION: The patient is status post fusion from L2 to L4 and multilevel decompressive laminectomies as described above. See above. There is no evidence of a focal disc herniation. Multilevel degenerative disease is noted including moderate to severe facet degenerative disease at L4-L5 and L5-S1 and moderate foraminal stenosis to the right at L5-S1. Foraminal stenosis to the right at L5-S1 appears similar to the MRI examination of the lumbar spine performed on 05/27/2015. No obvious metastatic disease is appreciated on this noncontrasted MRI examination of the lumbar spine.  The above information was called to the patient's nurse at the time of the dictation. The patient's nurse is to immediately relay the information to the clinical service.          Scheduled Meds  amiodarone, 200 mg, Oral, Daily  finasteride, 5 mg, Oral, Daily  insulin lispro, 0-9 Units, Subcutaneous, TID AC  levothyroxine, 25 mcg, Oral, Q AM  Menthol-Zinc Oxide, 1 application, Topical, TID  metoprolol succinate XL, 25 mg, Oral, Daily  multivitamin, 1 tablet, Oral, QAM  tamsulosin, 0.8 mg, Oral, Daily  vitamin B-12, 1,000  mcg, Oral, Daily    Continuous Infusions   PRN Meds  •  acetaminophen  •  aluminum-magnesium hydroxide-simethicone  •  dextrose  •  dextrose  •  glucagon (human recombinant)  •  melatonin  •  nitroglycerin  •  ondansetron **OR** ondansetron  •  [COMPLETED] Insert Peripheral IV **AND** sodium chloride  •  sodium chloride  •  traMADol     Diet  Diet: Cardiac Diets; Healthy Heart (2-3 Na+); Texture: Regular Texture (IDDSI 7); Fluid Consistency: Thin (IDDSI 0)    I have personally reviewed:  [x]  Laboratory   [x]  Microbiology   [x]  Radiology   [x]  EKG/Telemetry   []  Cardiology/Vascular   []  Pathology   []  Records     Assessment/Plan     Active Hospital Problems    Diagnosis  POA   • **Generalized weakness [R53.1]  Yes   • Thrombocytopenia, unspecified (HCC) [D69.6]  Unknown   • Acute CVA (cerebrovascular accident) (HCC) [I63.9]  Unknown   • Subdural hematoma, acute [S06.5XAA]  Unknown   • Elevated troponin [R77.8]  Yes   • Recurrent falls [R29.6]  Not Applicable   • Obesity (BMI 30-39.9) [E66.9]  Yes   • Stage 3b chronic kidney disease (HCC) [N18.32]  Yes   • Coronary arteriosclerosis [I25.10]  Yes   • Chronic anticoagulation [Z79.01]  Not Applicable   • Anemia [D64.9]  Yes   • Typical atrial flutter (HCC) [I48.3]  Yes   • Essential hypertension [I10]  Yes   • Type 2 diabetes mellitus, without long-term current use of insulin (HCC) [E11.9]  Yes      Resolved Hospital Problems   No resolved problems to display.     87 y.o. 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 contributing to falls    Back pain  -Lumbar spine MRI shows T8 11-12 disc herniation thoracic MRI ordered    Subdural hematoma  -Patient reports a recent fall he is not sure if he hit his head  -Hold Xarelto repeat head CT tomorrow    CAD, CABG 5  vessel, atrial flutter, dyspnea on exertion  -No chest pain  -Troponin elevations in the setting of CKD  -Anticoagulation on hold  -Echo pending    CKD3  -Nephrology following  -Bladder scan for retention    DM2 with neuropathy  -A1c 6.90%    Abnormal TSH 4.460  -Actually better than earlier this month and June of this year  -Continue levothyroxine follow-up TSH outpatient    Thrombocytopenia, anemia  -Thrombocytopenia not a new finding but lower than usual  -Continue to monitor  -B12, folate not low    SCDs for DVT prophylaxis    Discussed with patient, family, nursing staff, CCP and care team on multidisciplinary rounds    Discharge: SYLVIA Singh MD  [facility]  12/28/22

## 2022-12-29 ENCOUNTER — APPOINTMENT (OUTPATIENT)
Dept: CT IMAGING | Facility: HOSPITAL | Age: 87
DRG: 64 | End: 2022-12-29
Payer: MEDICARE

## 2022-12-29 ENCOUNTER — APPOINTMENT (OUTPATIENT)
Dept: MRI IMAGING | Facility: HOSPITAL | Age: 87
DRG: 64 | End: 2022-12-29
Payer: MEDICARE

## 2022-12-29 LAB
ALBUMIN SERPL-MCNC: 3.4 G/DL (ref 3.5–5.2)
ANION GAP SERPL CALCULATED.3IONS-SCNC: 5.5 MMOL/L (ref 5–15)
BASOPHILS # BLD AUTO: 0.02 10*3/MM3 (ref 0–0.2)
BASOPHILS NFR BLD AUTO: 0.5 % (ref 0–1.5)
BUN SERPL-MCNC: 35 MG/DL (ref 8–23)
BUN/CREAT SERPL: 20 (ref 7–25)
CALCIUM SPEC-SCNC: 8.6 MG/DL (ref 8.6–10.5)
CHLORIDE SERPL-SCNC: 106 MMOL/L (ref 98–107)
CHLORIDE UR-SCNC: 42 MMOL/L
CO2 SERPL-SCNC: 27.5 MMOL/L (ref 22–29)
CREAT SERPL-MCNC: 1.75 MG/DL (ref 0.76–1.27)
CREAT UR-MCNC: 145.9 MG/DL
DEPRECATED RDW RBC AUTO: 50.6 FL (ref 37–54)
EGFRCR SERPLBLD CKD-EPI 2021: 37.7 ML/MIN/1.73
EOSINOPHIL # BLD AUTO: 0.09 10*3/MM3 (ref 0–0.4)
EOSINOPHIL NFR BLD AUTO: 2.1 % (ref 0.3–6.2)
ERYTHROCYTE [DISTWIDTH] IN BLOOD BY AUTOMATED COUNT: 13.1 % (ref 12.3–15.4)
FERRITIN SERPL-MCNC: 230 NG/ML (ref 30–400)
GLUCOSE BLDC GLUCOMTR-MCNC: 140 MG/DL (ref 70–130)
GLUCOSE BLDC GLUCOMTR-MCNC: 187 MG/DL (ref 70–130)
GLUCOSE BLDC GLUCOMTR-MCNC: 188 MG/DL (ref 70–130)
GLUCOSE BLDC GLUCOMTR-MCNC: 207 MG/DL (ref 70–130)
GLUCOSE SERPL-MCNC: 129 MG/DL (ref 65–99)
HCT VFR BLD AUTO: 34.1 % (ref 37.5–51)
HGB BLD-MCNC: 11.4 G/DL (ref 13–17.7)
IGA SERPL-MCNC: 149 MG/DL (ref 61–437)
IGG SERPL-MCNC: 814 MG/DL (ref 603–1613)
IGM SERPL-MCNC: 80 MG/DL (ref 15–143)
IMM GRANULOCYTES # BLD AUTO: 0.01 10*3/MM3 (ref 0–0.05)
IMM GRANULOCYTES NFR BLD AUTO: 0.2 % (ref 0–0.5)
INR PPP: 1.05 (ref 0.9–1.1)
IRON 24H UR-MRATE: 43 MCG/DL (ref 59–158)
IRON SATN MFR SERPL: 14 % (ref 20–50)
KAPPA LC FREE SER-MCNC: 30.9 MG/L (ref 3.3–19.4)
KAPPA LC FREE/LAMBDA FREE SER: 1.39 {RATIO} (ref 0.26–1.65)
LAMBDA LC FREE SERPL-MCNC: 22.3 MG/L (ref 5.7–26.3)
LYMPHOCYTES # BLD AUTO: 1.37 10*3/MM3 (ref 0.7–3.1)
LYMPHOCYTES NFR BLD AUTO: 32.1 % (ref 19.6–45.3)
MAGNESIUM SERPL-MCNC: 2.2 MG/DL (ref 1.6–2.4)
MCH RBC QN AUTO: 35.1 PG (ref 26.6–33)
MCHC RBC AUTO-ENTMCNC: 33.4 G/DL (ref 31.5–35.7)
MCV RBC AUTO: 104.9 FL (ref 79–97)
MONOCYTES # BLD AUTO: 0.42 10*3/MM3 (ref 0.1–0.9)
MONOCYTES NFR BLD AUTO: 9.8 % (ref 5–12)
NEUTROPHILS NFR BLD AUTO: 2.36 10*3/MM3 (ref 1.7–7)
NEUTROPHILS NFR BLD AUTO: 55.3 % (ref 42.7–76)
NRBC BLD AUTO-RTO: 0 /100 WBC (ref 0–0.2)
PHOSPHATE SERPL-MCNC: 3.6 MG/DL (ref 2.5–4.5)
PLATELET # BLD AUTO: 90 10*3/MM3 (ref 140–450)
PMV BLD AUTO: 9.9 FL (ref 6–12)
POTASSIUM SERPL-SCNC: 4.2 MMOL/L (ref 3.5–5.2)
PROT ?TM UR-MCNC: 23.2 MG/DL
PROT PATTERN SERPL IFE-IMP: NORMAL
PROT/CREAT UR: 159 MG/G CREA (ref 0–200)
PROTHROMBIN TIME: 13.8 SECONDS (ref 11.7–14.2)
RBC # BLD AUTO: 3.25 10*6/MM3 (ref 4.14–5.8)
SODIUM SERPL-SCNC: 139 MMOL/L (ref 136–145)
SODIUM UR-SCNC: 46 MMOL/L
TIBC SERPL-MCNC: 301 MCG/DL (ref 298–536)
TRANSFERRIN SERPL-MCNC: 202 MG/DL (ref 200–360)
TROPONIN T SERPL-MCNC: 0.15 NG/ML (ref 0–0.03)
URATE SERPL-MCNC: 9.6 MG/DL (ref 3.4–7)
WBC NRBC COR # BLD: 4.27 10*3/MM3 (ref 3.4–10.8)

## 2022-12-29 PROCEDURE — 82728 ASSAY OF FERRITIN: CPT | Performed by: INTERNAL MEDICINE

## 2022-12-29 PROCEDURE — 83735 ASSAY OF MAGNESIUM: CPT | Performed by: INTERNAL MEDICINE

## 2022-12-29 PROCEDURE — 80069 RENAL FUNCTION PANEL: CPT | Performed by: INTERNAL MEDICINE

## 2022-12-29 PROCEDURE — 84484 ASSAY OF TROPONIN QUANT: CPT | Performed by: INTERNAL MEDICINE

## 2022-12-29 PROCEDURE — 84156 ASSAY OF PROTEIN URINE: CPT | Performed by: INTERNAL MEDICINE

## 2022-12-29 PROCEDURE — 85610 PROTHROMBIN TIME: CPT | Performed by: NURSE PRACTITIONER

## 2022-12-29 PROCEDURE — 82436 ASSAY OF URINE CHLORIDE: CPT | Performed by: INTERNAL MEDICINE

## 2022-12-29 PROCEDURE — 97530 THERAPEUTIC ACTIVITIES: CPT

## 2022-12-29 PROCEDURE — 84550 ASSAY OF BLOOD/URIC ACID: CPT | Performed by: INTERNAL MEDICINE

## 2022-12-29 PROCEDURE — 82570 ASSAY OF URINE CREATININE: CPT | Performed by: INTERNAL MEDICINE

## 2022-12-29 PROCEDURE — 97166 OT EVAL MOD COMPLEX 45 MIN: CPT

## 2022-12-29 PROCEDURE — 97535 SELF CARE MNGMENT TRAINING: CPT

## 2022-12-29 PROCEDURE — 84466 ASSAY OF TRANSFERRIN: CPT | Performed by: INTERNAL MEDICINE

## 2022-12-29 PROCEDURE — 82962 GLUCOSE BLOOD TEST: CPT

## 2022-12-29 PROCEDURE — 25010000002 HYDROMORPHONE PER 4 MG: Performed by: HOSPITALIST

## 2022-12-29 PROCEDURE — 83540 ASSAY OF IRON: CPT | Performed by: INTERNAL MEDICINE

## 2022-12-29 PROCEDURE — 99232 SBSQ HOSP IP/OBS MODERATE 35: CPT | Performed by: NURSE PRACTITIONER

## 2022-12-29 PROCEDURE — 70450 CT HEAD/BRAIN W/O DYE: CPT

## 2022-12-29 PROCEDURE — 84300 ASSAY OF URINE SODIUM: CPT | Performed by: INTERNAL MEDICINE

## 2022-12-29 PROCEDURE — 99231 SBSQ HOSP IP/OBS SF/LOW 25: CPT | Performed by: PSYCHIATRY & NEUROLOGY

## 2022-12-29 PROCEDURE — 85025 COMPLETE CBC W/AUTO DIFF WBC: CPT | Performed by: INTERNAL MEDICINE

## 2022-12-29 PROCEDURE — 72146 MRI CHEST SPINE W/O DYE: CPT

## 2022-12-29 RX ORDER — ATORVASTATIN CALCIUM 20 MG/1
40 TABLET, FILM COATED ORAL DAILY
Status: CANCELLED | OUTPATIENT
Start: 2022-12-29

## 2022-12-29 RX ORDER — ATORVASTATIN CALCIUM 20 MG/1
40 TABLET, FILM COATED ORAL DAILY
Status: DISCONTINUED | OUTPATIENT
Start: 2022-12-29 | End: 2022-12-31 | Stop reason: HOSPADM

## 2022-12-29 RX ORDER — SODIUM CHLORIDE 9 MG/ML
100 INJECTION, SOLUTION INTRAVENOUS CONTINUOUS
Status: ACTIVE | OUTPATIENT
Start: 2022-12-29 | End: 2022-12-29

## 2022-12-29 RX ORDER — HYDROMORPHONE HYDROCHLORIDE 1 MG/ML
0.25 INJECTION, SOLUTION INTRAMUSCULAR; INTRAVENOUS; SUBCUTANEOUS ONCE AS NEEDED
Status: COMPLETED | OUTPATIENT
Start: 2022-12-29 | End: 2022-12-29

## 2022-12-29 RX ORDER — LIDOCAINE 50 MG/G
1 PATCH TOPICAL
Status: DISCONTINUED | OUTPATIENT
Start: 2022-12-29 | End: 2022-12-31 | Stop reason: HOSPADM

## 2022-12-29 RX ADMIN — FINASTERIDE 5 MG: 5 TABLET, FILM COATED ORAL at 08:31

## 2022-12-29 RX ADMIN — ANORECTAL OINTMENT 1 APPLICATION: 15.7; .44; 24; 20.6 OINTMENT TOPICAL at 21:26

## 2022-12-29 RX ADMIN — ANORECTAL OINTMENT 1 APPLICATION: 15.7; .44; 24; 20.6 OINTMENT TOPICAL at 08:37

## 2022-12-29 RX ADMIN — SODIUM CHLORIDE 100 ML/HR: 9 INJECTION, SOLUTION INTRAVENOUS at 08:38

## 2022-12-29 RX ADMIN — TAMSULOSIN HYDROCHLORIDE 0.8 MG: 0.4 CAPSULE ORAL at 08:31

## 2022-12-29 RX ADMIN — TRAMADOL HYDROCHLORIDE 50 MG: 50 TABLET, COATED ORAL at 09:28

## 2022-12-29 RX ADMIN — LIDOCAINE 1 PATCH: 50 PATCH TOPICAL at 10:00

## 2022-12-29 RX ADMIN — METOPROLOL SUCCINATE 25 MG: 25 TABLET, EXTENDED RELEASE ORAL at 08:32

## 2022-12-29 RX ADMIN — LEVOTHYROXINE SODIUM 25 MCG: 0.03 TABLET ORAL at 05:40

## 2022-12-29 RX ADMIN — Medication 1000 MCG: at 08:31

## 2022-12-29 RX ADMIN — HYDROMORPHONE HYDROCHLORIDE 0.25 MG: 1 INJECTION, SOLUTION INTRAMUSCULAR; INTRAVENOUS; SUBCUTANEOUS at 16:14

## 2022-12-29 RX ADMIN — Medication 1 TABLET: at 08:31

## 2022-12-29 RX ADMIN — ANORECTAL OINTMENT 1 APPLICATION: 15.7; .44; 24; 20.6 OINTMENT TOPICAL at 15:09

## 2022-12-29 RX ADMIN — ATORVASTATIN CALCIUM 40 MG: 20 TABLET, FILM COATED ORAL at 15:43

## 2022-12-29 RX ADMIN — AMIODARONE HYDROCHLORIDE 200 MG: 200 TABLET ORAL at 08:32

## 2022-12-29 NOTE — THERAPY TREATMENT NOTE
Patient Name: Osvaldo Lopez  : 1935    MRN: [id number]                              Today's Date: 2022       Admit Date: 2022    Visit Dx:     ICD-10-CM ICD-9-CM   1. Recurrent falls  R29.6 V15.88   2. 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HCC)  G61.82 357.89   3. Injury of head, initial encounter  S09.90XA 959.01   4. Injury of left lower extremity, initial encounter  S89.92XA 959.7   5. Abrasion of left elbow, initial encounter  S50.312A 913.0   6. Troponin level elevated  R77.8 790.6   7. Chronic renal failure, unspecified CKD stage  N18.9 585.9   8. 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 Other diabetic neurological complication associated with other specified diabetes mellitus ([facility])  E13.49 249.60   10. Follow-up exam  Z09 V67.9     Patient Active Problem List   Diagnosis   • Complete rotator cuff tear of left shoulder   • Abnormal finding on thyroid function test   • Abdominal aortic aneurysm   • Benign prostatic hyperplasia   • Essential hypertension   • Hyperlipidemia   • Shoulder pain   • Lumbar radiculopathy   • Type 2 diabetes mellitus, without long-term current use of insulin ([facility])   • OA (osteoarthritis) of knee   • Tear of right rotator cuff   • Spinal stenosis of lumbar region with neurogenic claudication   • Eyebrow ptosis   • Pseudophakia   • Nonrheumatic aortic valve stenosis   • Atrial flutter with rapid ventricular response ([facility])   • Right arm pain   • Leg weakness, bilateral   • Typical atrial flutter ([facility])   • Diabetic peripheral neuropathy ([facility])   • Muscle weakness (generalized)   • Pressure injury of left buttock, stage 1   • Chronic low back pain with sciatica   • 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[facility])   • Pressure injury of buttock, stage 1   • Anemia   • Symptomatic anemia   • Iron deficiency anemia   • Chronic anticoagulation   • CKD (chronic kidney disease)   • CRISTOBAL (acute kidney injury) ([facility])   • Colonic mass   • Axonal neuropathy   • Impairment of balance   • Post  laminectomy syndrome   • Coronary arteriosclerosis   • Edema   • History of malignant neoplasm of colon   • Prostatism   • Stage 3b chronic kidney disease (HCC)   • Pre-ulcerative calluses   • Elevated troponin   • Recurrent falls   • Generalized weakness   • Obesity (BMI 30-39.9)   • Thrombocytopenia, unspecified (HCC)   • Acute CVA (cerebrovascular accident) ([facility])   • Subdural hematoma, acute     Past Medical History:   Diagnosis Date   • Abdominal aortic aneurysm (AAA) without rupture    • Abdominal aortic ectasia ([facility]) 06/2015   • Abnormal EKG 10/25/2016    04/2019   • Abnormal thyroid blood test    • Actinic keratosis     FOLLOWED BY DR. MANPREET VILLARREAL   • Anemia 01/28/2021   • Arthritis    • Asthma     MILD, INTERMITTENT   • Atherosclerotic heart disease    • Atrial flutter with rapid ventricular response ([facility]) 04/14/2019    [facility]   • Atrial premature depolarization    • Atrophy of muscle of lower leg    • BPH (benign prostatic hyperplasia)     FOLLOWED BY DR. TERRA JONES   • Bruises easily    • Bulging of thoracic intervertebral disc    • Carpal tunnel syndrome, right 12/2019    FOLLOWED BY DR. NEW SANCHEZ   • Cataract     BILATERAL, S/P EXTRACTION WITH IOL IMPLANTS   • Chronic anticoagulation 01/29/2021   • Chronic edema    • Chronic low back pain with sciatica 01/26/2021   • Chronic pain    • CKD (chronic kidney disease) 01/29/2021   • Closed head injury 07/2018    WITH LACERATION TO SCALP, D/T SYNCOPE   • Colon cancer ([facility]) 01/31/2021    INVASIVE ADENOCARCINOMA FROM TUBULOVILLOUS ADENOMA IN TRANSVERSE, S/P COLON RESECTION, FOLLOWED BY DR. MARGARITA COX   • Colon polyps     FOLLOWED BY DR. 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 TERRA JONES   • Epididymitis, right 03/2018   • Essential hypertension    •  Eyebrow ptosis 11/09/2013   • Hallux valgus, acquired, bilateral 01/2019   • Heart attack ([facility]) 09/1989    S/P CABG, 5 VESSEL   • Heart murmur    • History of blood transfusion    • HL (hearing loss)    • Hyperactivity of bladder     FOLLOWED BY DR. TERRA JONES   • HyperCKemia 11/2017   • Hyperkalemia 08/2016   • Hyperlipidemia     MIXED   • Hyperreflexia 11/2017    BILATERAL   • Hyphema 05/2015   • IBS (irritable bowel syndrome)    • Impaired functional mobility, balance, gait, and endurance    • Impingement syndrome of left shoulder 10/2015   • Infection associated with cystostomy catheter ([facility]) 12/2016   • Ischemic colitis ([facility])    • Lumbar radiculopathy 2016    wears back brace at times following back surgery   • Lumbar spondylosis 04/2016   • Lumbosacral neuritis 05/2015   • Lumbosacral radiculitis 05/2015   • Macular puckering of retina, left 10/2013   • 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[facility]) 01/26/2021   • Muscle weakness (generalized)    • Myopathy 11/2017   • Nonrheumatic aortic valve stenosis     mild 1/2018    • Osteoarthritis     MULTIPLE SITES   • Other intervertebral disc disorders, lumbosacral region    • PAF (paroxysmal atrial fibrillation) ([facility])    • Plantar fascial fibromatosis 06/2018   • Post laminectomy syndrome    • Pressure injury of left buttock, stage 1 07/23/2020   • Prostatitis    • Protein S deficiency ([facility])     FOLLOWED BY DR. VIANEY GIORDANO   • Pseudophakia 11/09/2013   • RBBB (right bundle branch block)    • Recurrent falls    • Respiratory failure with hypoxia ([facility]) 01/2019   • SCC (squamous cell carcinoma) 10/16/2019    RIGHT FOREHEAD, LEFT TEMPLE, RIGHT SCALP, FOLLOWED BY DR. MANPREET VILLARREAL   • Scoliosis    • Spinal stenosis of lumbar region with neurogenic claudication 12/07/2017   • Syncope and collapse 07/16/2018    ADMITTED TO [facility]   • Torn rotator cuff 03/2017    BILATERAL, COMPLETE   • Type 2 diabetes mellitus ([facility])     IDDM   • Urinary frequency     FOLLOWED BY DR. ELLISON  ROBERT   • Vitamin D deficiency    • Vitreous hemorrhage of left eye (HCC)      Past Surgical History:   Procedure Laterality Date   • APPENDECTOMY N/A    • BROW LIFT Bilateral 11/12/2013    DR. OCTAVIA CEDILLO AT [place]   • CARDIAC CATHETERIZATION Left 10/2008    LEFT CAROTID ARTERY STENOSIS 50-69%   • CARDIAC ELECTROPHYSIOLOGY PROCEDURE N/A 06/06/2019    Procedure: Ablation atrial flutter;  Surgeon: Matthew Talbot MD;  Location:  [facility];  Service: Cardiovascular   • CATARACT EXTRACTION Left 01/22/1998    DR. LAYLA BARNES AT [place]   • CATARACT EXTRACTION Right 03/2001    DR. LAYLA BARNES   • CHOLECYSTECTOMY N/A 08/24/1989    DR. FAUZIA PELAEZ AT [place]   • COLON RESECTION N/A 02/03/2021    Procedure: LAPAROSCOPIC EXTENDED  RIGHT COLECTOMY WITH DAVINCI ROBOT;  Surgeon: Margarita Napoles MD;  Location: [facility] OR;  Service: DaVinci;  Laterality: N/A;   • COLONOSCOPY N/A 01/31/2021    20-25 MM POLYP IN CECUM, PATH: TUBULAR ADENOMA, 2 TUBULAR ADENOMA POLYPS IN ASCENDING, A FUNGATING AND SUBMUCOSAL ONONOBSTRUCTING MEDIUM MASS IN PROXIMAL TRANSVERSE, PATH: INVASIVE ADENOCARCINOMA ARISING IN A TUBULOVILOOUS ADENOMA, AREA TATTOOED, SCATTERED DIVERTICULA IN SIGMOID AND DESCENDING, LARGE INTERNAL HEMORRHOIDS, DR. JACOB ALICEA AT [facility]    • COLONOSCOPY N/A 02/02/2010    DIVERTICULOSIS, DR. SAL SOLANO AT [place]   • COLONOSCOPY N/A 08/15/2022    5 MM TUBULAR ADENOMA POLYP IN SIGMOID, 2 TUBULAR ADENOMA POLYPS IN DESCENDING, MULTIPLE DIVERTICULA IN SIGMOID, RESCOPE IN 2 YRS, DR. MARGARITA NAPOLES AT [facility]   • CORONARY ARTERY BYPASS GRAFT N/A 09/1989    5 VESSEL, DR. HANKS   • CYST REMOVAL      FROM BACK   • ENDOSCOPY N/A 01/31/2021    ENTIRE EGD WNL, PATH: MILD REACTIVE GASTROPATHY, DR. JACOB ALICEA AT [facility]   • INGUINAL HERNIA REPAIR Left 09/27/2007    DR. MATTHEW RUSH AT [place]   • INGUINAL HERNIA REPAIR Left 09/07/2007   • INGUINAL HERNIA REPAIR Left 2003   • KNEE ARTHROSCOPY W/ PARTIAL MEDIAL  MENISCECTOMY Left 1962    DR. SINGH   • LUMBAR DISCECTOMY FUSION INSTRUMENTATION N/A 04/11/2016    Procedure: lumbar laminectomy L4-5 and fusion with instrumentation;  Surgeon: Ran Kline MD;  Location: [facility];  Service:    • LUMBAR DISCECTOMY FUSION INSTRUMENTATION N/A 01/15/2018    Procedure: L2-3, L3-4 laminectomy and fusion with instrumentation and removal of implants L4 5.;  Surgeon: Ran Kline MD;  Location: [facility];  Service:    • LUMBAR EPIDURAL INJECTION N/A 09/23/2015    DR. MATTHEW DOMINGUEZ AT [facility]   • LUMBAR EPIDURAL INJECTION N/A 10/07/2015    DR. ITZEL [facility] AT [facility]   • LUMBAR EPIDURAL INJECTION N/A 11/19/2015    DR. ITZEL [facility] AT [facility]   • SC TOTAL KNEE ARTHROPLASTY Left 11/14/2016    Procedure: TOTAL KNEE ARTHROPLASTY;  Surgeon: Dontrell Arellano MD;  Location: [facility];  Service: Orthopedics   • SKIN BIOPSY Bilateral 10/16/2019    RIGHT LATERAL FOREHEAD:SCC, LEFT TEMPLE:SCC, RIGHT PARIETAL SCALP:SCC, DR. MANPREET VILLARREAL      General Information     Row Name 12/29/22 1156          Physical Therapy Time and Intention    Document Type therapy note (daily note)  -EM     Mode of Treatment individual therapy;physical therapy  -     Row Name 12/29/22 1156          General Information    Existing Precautions/Restrictions fall  -EM           User Key  (r) = Recorded By, (t) = Taken By, (c) = Cosigned By    Initials Name Provider Type    EM Cynthia Dodd PT Physical Therapist               Mobility     Row Name 12/29/22 1156          Bed Mobility    Comment, (Bed Mobility) not tested, up with OT when entered room  -EM     Row Name 12/29/22 1156          Sit-Stand Transfer    Sit-Stand [place] (Transfers) minimum assist (75% patient effort)  -EM     Assistive Device (Sit-Stand Transfers) walker, front-wheeled  -EM     Row Name 12/29/22 1156          Gait/Stairs (Locomotion)    [place] Level (Gait) contact guard;1 person to manage equipment  -EM      Assistive Device (Gait) walker, front-wheeled  -EM     Distance in Feet (Gait) 100  -EM     Deviations/Abnormal Patterns (Gait) gait speed decreased  -EM     Bilateral Gait Deviations forward flexed posture  -EM     Comment, (Gait/Stairs) flexed posture, AFO on L  -EM           User Key  (r) = Recorded By, (t) = Taken By, (c) = Cosigned By    Initials Name Provider Type    Cynthia Loredo, GIOVANNA Physical Therapist               Obj/Interventions     Row Name 12/29/22 1157          Motor Skills    Therapeutic Exercise other (see comments)  LAQ, marching in sitting x 10 reps  -EM           User Key  (r) = Recorded By, (t) = Taken By, (c) = Cosigned By    Initials Name Provider Type    Cynthia Loredo PT Physical Therapist               Goals/Plan    No documentation.                Clinical Impression     Row Name 12/29/22 1158          Pain    Pretreatment Pain Rating 5/10  -EM     Pain Location - back  -EM     Pre/Posttreatment Pain Comment states his back pain flared up with attempt at laying flat for CT this morning  -EM     Pain Intervention(s) Repositioned;Medication (See MAR)  -EM     Row Name 12/29/22 1158          Plan of Care Review    Plan of Care Reviewed With patient  -EM     Outcome Evaluation Pt up with OT when entered room, AFO on L and knee sleeves on ashley knees. Patient performed sit to stand with Anjana, ambulated with rwx and CGA, no episodes of knee buckling or LOB today, performed LE exercises while sitting up in chair. Patient states his d/c plan is SNU.  -EM     Row Name 12/29/22 1158          Positioning and Restraints    Pre-Treatment Position standing in room  -EM     Post Treatment Position chair  -EM     In Chair sitting;call light within reach;exit alarm on;with family/caregiver  -EM           User Key  (r) = Recorded By, (t) = Taken By, (c) = Cosigned By    Initials Name Provider Type    Cynthia Loredo, PT Physical Therapist               Outcome Measures     Row Name  12/29/22 1202          How much help from another person do you currently need...    Turning from your back to your side while in flat bed without using bedrails? 3  -EM     Moving from lying on back to sitting on the side of a flat bed without bedrails? 3  -EM     Moving to and from a bed to a chair (including a wheelchair)? 3  -EM     Standing up from a chair using your arms (e.g., wheelchair, bedside chair)? 3  -EM     Climbing 3-5 steps with a railing? 3  -EM     To walk in hospital room? 3  -EM     AM-PAC 6 Clicks Score (PT) 18  -EM     Highest level of mobility 6 --> Walked 10 steps or more  -           User Key  (r) = Recorded By, (t) = Taken By, (c) = Cosigned By    Initials Name Provider Type     Cynthia Dodd, PT Physical Therapist                             Physical Therapy Education     Title: PT OT SLP Therapies (Done)     Topic: Physical Therapy (Done)     Point: Mobility training (Done)     Learning Progress Summary           Patient Acceptance, E, VU by  at 12/29/2022 1202    Acceptance, E,TB,D, VU,NR by  at 12/28/2022 1731                   Point: Home exercise program (Done)     Learning Progress Summary           Patient Acceptance, E,TB,D, VU,NR by  at 12/28/2022 1731                   Point: Body mechanics (Done)     Learning Progress Summary           Patient Acceptance, E,TB,D, VU,NR by  at 12/28/2022 1731                   Point: Precautions (Done)     Learning Progress Summary           Patient Acceptance, E,TB,D, VU,NR by  at 12/28/2022 1731                               User Key     Initials Effective Dates Name Provider Type Discipline     06/16/21 -  Cynthia Dodd, PT Physical Therapist PT     04/08/22 -  Vivi Mariano PT Physical Therapist PT              PT Recommendation and Plan     Plan of Care Reviewed With: patient  Outcome Evaluation: Pt up with OT when entered room, AFO on L and knee sleeves on ashley knees. Patient performed sit to stand with  Anjana, ambulated with rwx and CGA, no episodes of knee buckling or LOB today, performed LE exercises while sitting up in chair. Patient states his d/c plan is SNU.     Time Calculation:    PT Charges     Row Name 12/29/22 1202             Time Calculation    Start Time 1040  -EM      Stop Time 1052  -EM      Time Calculation (min) 12 min  -EM      PT Received On 12/29/22  -EM      PT - Next Appointment 12/30/22  -EM         Time Calculation- PT    Total Timed Code Minutes- PT 12 minute(s)  -EM         Timed Charges    23193 - PT Therapeutic Exercise Minutes 4  -EM      54661 - PT Therapeutic Activity Minutes 8  -EM         Total Minutes    Timed Charges Total Minutes 12  -EM       Total Minutes 12  -EM            User Key  (r) = Recorded By, (t) = Taken By, (c) = Cosigned By    Initials Name Provider Type    EM Cynthia Dodd PT Physical Therapist              Therapy Charges for Today     Code Description Service Date Service Provider Modifiers Qty    82366983341 HC PT THERAPEUTIC ACT EA 15 MIN 12/29/2022 Cynthia Dodd PT GP 1          PT G-Codes  Outcome Measure Options: AM-PAC 6 Clicks Basic Mobility (PT)  AM-PAC 6 Clicks Score (PT): 18       Cynthia Dodd PT  12/29/2022

## 2022-12-29 NOTE — PLAN OF CARE
Goal Outcome Evaluation:  Plan of Care Reviewed With: patient           Outcome Evaluation: Pt up with OT when entered room, AFO on L and knee sleeves on ashley knees. Patient performed sit to stand with Anjana, ambulated with rwx and CGA, no episodes of knee buckling or LOB today, performed LE exercises while sitting up in chair. Patient states his d/c plan is SNU.

## 2022-12-29 NOTE — PROGRESS NOTES
NEUROSURGERY PROGRESS NOTE     LOS: 2 days   Patient Care Team:  Caio Rodríguez MD as PCP - General (Family Medicine)  Dontrell Arellano MD as Surgeon (Orthopedic Surgery)  Angelo Driscoll MD as Consulting Physician (Cardiology)  Darvin Alvarez MD as Consulting Physician (Urology)  Caio Rodríguez MD as Referring Physician (Family Medicine)    Chief Complaint: leg weakness, back pain    Subjective       Interval History: Has increased back pain with lying down. Prefers to have HOB at 60-90 degrees. Has chronic leg weakness Denies leg pain. History of prior lumbar fusion surgery. Has AFO on L due to chronic foot drop. C/O primarily of back pain in addition to persistent weakness/immobility.     History taken from: patient chart family    Objective        Vital Signs  Temp:  [97.7 °F (36.5 °C)-98.2 °F (36.8 °C)] 98.2 °F (36.8 °C)  Heart Rate:  [64-92] 64  Resp:  [18-20] 20  BP: (100-156)/(48-78) 135/53     Physical Exam:    AA&O x 3. Conversation appropriate.   Follows commands x 4 extremities.   L iliopsoas 4/5; L tib/anterior 4-/5   Normal sensation arms and legs  Neg Russo's; negative clonus.        Results Review:     I reviewed the patient's new clinical results.  I reviewed the patient's new imaging results and agree with the interpretation.  Discussed with Dr. Jenkins.      MRI CERVICAL AND THORACIC SPINE     Multilevel degenerative changes in the cervical spine with moderate to severe left and mild to moderate right foraminal narrowing C4-5 and grade 1 degenerative anterior spondylolisthesis C4 on C5 by 2 mm.  Degenerative changes quite significant at this level with facet joint arthropathy.  Osteophyte complex and significant degenerative changes at C5-6 with severe right, mild left foraminal narrowing and advanced facet arthropathy.  Osteophyte complex C6-7 with moderate to severe right foraminal narrowing and advanced degenerative changes.  No evidence of abnormal cord signal.    Thoracic MRI cannot be  completed due to patient's discomfort with lying flat.      CT HEAD WO CONTRAST 12/29/2022    No significant change compared to yesterday's brain MRI.  Thin, 2 mm left parietal subdural hematoma present but also better demonstrated on MRI yesterday.    Assessment & Plan       Generalized weakness    Essential hypertension    Type 2 diabetes mellitus, without long-term current use of insulin (HCC)    Typical atrial flutter (HCC)    Anemia    Chronic anticoagulation    Coronary arteriosclerosis    Stage 3b chronic kidney disease (HCC)    Elevated troponin    Recurrent falls    Obesity (BMI 30-39.9)    Thrombocytopenia, unspecified (HCC)    Acute CVA (cerebrovascular accident) (HCC)    Subdural hematoma, acute    Thoracic MRI incomplete due to inability to lie flat.  No abnormal cord signal noted in the cervical spine.  Patient is very deconditioned and will need higher level of care at time of discharge.  We will discuss need for follow-up and current imaging studies further with Dr. Gregory Trujillo, APRN  12/29/22  18:35 EST

## 2022-12-29 NOTE — PROGRESS NOTES
[facility] Progress Note      Patient Name: Osvaldo Lopez  : 1935  MRN: [id number]  Primary Care Physician:  Caio Rodríguez MD  Date of admission: 2022    Subjective     Interval History:   Follow-up chronic kidney disease    Patient had mildly positive orthostatic hypotension.  He denies chest pain or shortness of air, no orthopnea or PND, no nausea or vomiting, no abdominal pain, no dysuria or gross hematuria.    Review of Systems:   As noted above    Objective     Vitals:   Temp:  [97.7 °F (36.5 °C)-98.2 °F (36.8 °C)] 97.7 °F (36.5 °C)  Heart Rate:  [70-92] 92  Resp:  [18-20] 20  BP: (100-156)/(48-80) 108/68    Intake/Output Summary (Last 24 hours) at 2022 0817  Last data filed at 2022 0607  Gross per 24 hour   Intake --   Output 300 ml   Net -300 ml       Physical Exam:    General Appearance: Awake, alert, no acute distress, chronically ill and obese  Skin: warm and dry  HEENT: oral mucosa normal, nonicteric sclera  Neck: supple, no JVD  Lungs: CTA, unlabored breathing effort  Heart: RRR, normal S1 and S2, no S3, no rub  Abdomen: soft, nontender, nondistended, normoactive bowel  : no palpable bladder  Extremities: no edema, cyanosis or clubbing  Neuro: normal speech and mental status     Scheduled Meds:     amiodarone, 200 mg, Oral, Daily  finasteride, 5 mg, Oral, Daily  insulin lispro, 0-9 Units, Subcutaneous, TID AC  levothyroxine, 25 mcg, Oral, Q AM  Menthol-Zinc Oxide, 1 application, Topical, TID  metoprolol succinate XL, 25 mg, Oral, Daily  multivitamin, 1 tablet, Oral, QAM  tamsulosin, 0.8 mg, Oral, Daily  vitamin B-12, 1,000 mcg, Oral, Daily      IV Meds:        Results Reviewed:   I have personally reviewed the results from the time of this admission to 2022 08:17 EST     Results from last 7 days   Lab Units 22  0545 22  0702 12/27/22  1717   SODIUM mmol/L 139 140 138   POTASSIUM mmol/L 4.2 4.4 4.8   CHLORIDE mmol/L 106 105 103    CO2 mmol/L 27.5 25.4 26.3   BUN mg/dL 35* 36* 41*   CREATININE mg/dL 1.75* 1.85* 2.06*   CALCIUM mg/dL 8.6 8.3* 9.0   BILIRUBIN mg/dL  --  0.4 0.5   ALK PHOS U/L  --  106 122*   ALT (SGPT) U/L  --  32 41   AST (SGOT) U/L  --  27 35   GLUCOSE mg/dL 129* 145* 146*       Estimated Creatinine Clearance: 38.2 mL/min (A) (by C-G formula based on SCr of 1.75 mg/dL (H)).    Results from last 7 days   Lab Units 12/29/22  0545 12/27/22  1717   MAGNESIUM mg/dL 2.2 2.4   PHOSPHORUS mg/dL 3.6  --        Results from last 7 days   Lab Units 12/29/22  0545   URIC ACID mg/dL 9.6*       Results from last 7 days   Lab Units 12/29/22  0544 12/28/22  0702 12/27/22  1717   WBC 10*3/mm3 4.27 5.14 5.57   HEMOGLOBIN g/dL 11.4* 11.5* 12.3*   PLATELETS 10*3/mm3 90* 79* 89*       Results from last 7 days   Lab Units 12/29/22  0544 12/28/22  0702 12/27/22  1717   INR  1.05 1.33* 2.00*       Assessment / Plan     ASSESSMENT:  -Chronic kidney disease stage IIIb appears to be at baseline associated with diabetic and hypertensive glomerulosclerosis Baseline creatinine has been 1.7-2 range today creatinine 1.75, electrolyte within acceptable range.  Uric acid slightly elevated up to 9.6.  -Diabetes mellitus type 2 with renal complication  -Diabetic retinopathy  -Coronary artery disease prior 5 vessel CABG in the 1990s  -Urinary incontinence with history of BPH concern about urinary retention  -Anemia associate with chronic kidney disease hemoglobin 11.4 and .9 iron saturation 14% and ferritin 230.  -Thrombocytopenia, platelet 90,000  -Mechanical fall    PLAN:  -We will give the patient 1 L of normal saline today over 10 hours  -Continue the same treatment  -Continue to monitor orthostatic blood pressure  -Surveillance labs    Thank you for involving us in the care of Osvaldo COUGHLIN John.  Please feel free to call with any questions.    Duglas Javier MD  12/29/22  08:17 EST    [facility]  [phone]    Please  note that portions of this note were completed with a voice recognition program.

## 2022-12-29 NOTE — THERAPY EVALUATION
Patient Name: Osvaldo Lopez  : 1935    MRN: [id number]                              Today's Date: 2022       Admit Date: 2022    Visit Dx:     ICD-10-CM ICD-9-CM   1. Recurrent falls  R29.6 V15.88   2. 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HCC)  G61.82 357.89   3. Injury of head, initial encounter  S09.90XA 959.01   4. Injury of left lower extremity, initial encounter  S89.92XA 959.7   5. Abrasion of left elbow, initial encounter  S50.312A 913.0   6. Troponin level elevated  R77.8 790.6   7. Chronic renal failure, unspecified CKD stage  N18.9 585.9   8. 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 Other diabetic neurological complication associated with other specified diabetes mellitus ([facility])  E13.49 249.60   10. Follow-up exam  Z09 V67.9     Patient Active Problem List   Diagnosis   • Complete rotator cuff tear of left shoulder   • Abnormal finding on thyroid function test   • Abdominal aortic aneurysm   • Benign prostatic hyperplasia   • Essential hypertension   • Hyperlipidemia   • Shoulder pain   • Lumbar radiculopathy   • Type 2 diabetes mellitus, without long-term current use of insulin ([facility])   • OA (osteoarthritis) of knee   • Tear of right rotator cuff   • Spinal stenosis of lumbar region with neurogenic claudication   • Eyebrow ptosis   • Pseudophakia   • Nonrheumatic aortic valve stenosis   • Atrial flutter with rapid ventricular response ([facility])   • Right arm pain   • Leg weakness, bilateral   • Typical atrial flutter ([facility])   • Diabetic peripheral neuropathy ([facility])   • Muscle weakness (generalized)   • Pressure injury of left buttock, stage 1   • Chronic low back pain with sciatica   • 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[facility])   • Pressure injury of buttock, stage 1   • Anemia   • Symptomatic anemia   • Iron deficiency anemia   • Chronic anticoagulation   • CKD (chronic kidney disease)   • CRISTOBAL (acute kidney injury) ([facility])   • Colonic mass   • Axonal neuropathy   • Impairment of balance   • Post  laminectomy syndrome   • Coronary arteriosclerosis   • Edema   • History of malignant neoplasm of colon   • Prostatism   • Stage 3b chronic kidney disease (HCC)   • Pre-ulcerative calluses   • Elevated troponin   • Recurrent falls   • Generalized weakness   • Obesity (BMI 30-39.9)   • Thrombocytopenia, unspecified (HCC)   • Acute CVA (cerebrovascular accident) ([facility])   • Subdural hematoma, acute     Past Medical History:   Diagnosis Date   • Abdominal aortic aneurysm (AAA) without rupture    • Abdominal aortic ectasia ([facility]) 06/2015   • Abnormal EKG 10/25/2016    04/2019   • Abnormal thyroid blood test    • Actinic keratosis     FOLLOWED BY DR. MANPREET VILLARREAL   • Anemia 01/28/2021   • Arthritis    • Asthma     MILD, INTERMITTENT   • Atherosclerotic heart disease    • Atrial flutter with rapid ventricular response ([facility]) 04/14/2019    ADMITTED TO [facility]   • Atrial premature depolarization    • Atrophy of muscle of lower leg    • BPH (benign prostatic hyperplasia)     FOLLOWED BY DR. TERRA JONES   • Bruises easily    • Bulging of thoracic intervertebral disc    • Carpal tunnel syndrome, right 12/2019    FOLLOWED BY DR. NEW SANCHEZ   • Cataract     BILATERAL, S/P EXTRACTION WITH IOL IMPLANTS   • Chronic anticoagulation 01/29/2021   • Chronic edema    • Chronic low back pain with sciatica 01/26/2021   • Chronic pain    • CKD (chronic kidney disease) 01/29/2021   • Closed head injury 07/2018    WITH LACERATION TO SCALP, D/T SYNCOPE   • Colon cancer ([facility]) 01/31/2021    INVASIVE ADENOCARCINOMA FROM TUBULOVILLOUS ADENOMA IN TRANSVERSE, S/P COLON RESECTION, FOLLOWED BY DR. MARGARITA COX   • Colon polyps     FOLLOWED BY DR. 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 TERRA JONES   • Epididymitis, right 03/2018   • Essential hypertension    •  Eyebrow ptosis 11/09/2013   • Hallux valgus, acquired, bilateral 01/2019   • Heart attack ([facility]) 09/1989    S/P CABG, 5 VESSEL   • Heart murmur    • History of blood transfusion    • HL (hearing loss)    • Hyperactivity of bladder     FOLLOWED BY DR. TERRA JONES   • HyperCKemia 11/2017   • Hyperkalemia 08/2016   • Hyperlipidemia     MIXED   • Hyperreflexia 11/2017    BILATERAL   • Hyphema 05/2015   • IBS (irritable bowel syndrome)    • Impaired functional mobility, balance, gait, and endurance    • Impingement syndrome of left shoulder 10/2015   • Infection associated with cystostomy catheter ([facility]) 12/2016   • Ischemic colitis ([facility])    • Lumbar radiculopathy 2016    wears back brace at times following back surgery   • Lumbar spondylosis 04/2016   • Lumbosacral neuritis 05/2015   • Lumbosacral radiculitis 05/2015   • Macular puckering of retina, left 10/2013   • 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 VIANEY GIORDANO   • Pseudophakia 11/09/2013   • RBBB (right bundle branch block)    • Recurrent falls    • Respiratory failure with hypoxia ([facility]) 01/2019   • SCC (squamous cell carcinoma) 10/16/2019    RIGHT FOREHEAD, LEFT TEMPLE, RIGHT SCALP, FOLLOWED BY DR. MANPREET VILLARREAL   • Scoliosis    • Spinal stenosis of lumbar region with neurogenic claudication 12/07/2017   • Syncope and collapse 07/16/2018    ADMITTED TO [facility]   • Torn rotator cuff 03/2017    BILATERAL, COMPLETE   • Type 2 diabetes mellitus ([facility])     IDDM   • Urinary frequency     FOLLOWED BY DR. ELLISON  ROBERT   • Vitamin D deficiency    • Vitreous hemorrhage of left eye (HCC)      Past Surgical History:   Procedure Laterality Date   • APPENDECTOMY N/A    • BROW LIFT Bilateral 11/12/2013    DR. OCTAVIA CEDILLO AT [place]   • CARDIAC CATHETERIZATION Left 10/2008    LEFT CAROTID ARTERY STENOSIS 50-69%   • CARDIAC ELECTROPHYSIOLOGY PROCEDURE N/A 06/06/2019    Procedure: Ablation atrial flutter;  Surgeon: Matthew Talbot MD;  Location:  [facility];  Service: Cardiovascular   • CATARACT EXTRACTION Left 01/22/1998    DR. LAYLA BARNES AT [place]   • CATARACT EXTRACTION Right 03/2001    DR. LAYLA BARNES   • CHOLECYSTECTOMY N/A 08/24/1989    DR. FAUZIA PELAEZ AT [place]   • COLON RESECTION N/A 02/03/2021    Procedure: LAPAROSCOPIC EXTENDED  RIGHT COLECTOMY WITH DAVINCI ROBOT;  Surgeon: Margarita Napoles MD;  Location: [facility] OR;  Service: DaVinci;  Laterality: N/A;   • COLONOSCOPY N/A 01/31/2021    20-25 MM POLYP IN CECUM, PATH: TUBULAR ADENOMA, 2 TUBULAR ADENOMA POLYPS IN ASCENDING, A FUNGATING AND SUBMUCOSAL ONONOBSTRUCTING MEDIUM MASS IN PROXIMAL TRANSVERSE, PATH: INVASIVE ADENOCARCINOMA ARISING IN A TUBULOVILOOUS ADENOMA, AREA TATTOOED, SCATTERED DIVERTICULA IN SIGMOID AND DESCENDING, LARGE INTERNAL HEMORRHOIDS, DR. JACOB ALICEA AT [facility]    • COLONOSCOPY N/A 02/02/2010    DIVERTICULOSIS, DR. SAL SOLANO AT [place]   • COLONOSCOPY N/A 08/15/2022    5 MM TUBULAR ADENOMA POLYP IN SIGMOID, 2 TUBULAR ADENOMA POLYPS IN DESCENDING, MULTIPLE DIVERTICULA IN SIGMOID, RESCOPE IN 2 YRS, DR. MARGARITA NAPOLES AT [facility]   • CORONARY ARTERY BYPASS GRAFT N/A 09/1989    5 VESSEL, DR. HANKS   • CYST REMOVAL      FROM BACK   • ENDOSCOPY N/A 01/31/2021    ENTIRE EGD WNL, PATH: MILD REACTIVE GASTROPATHY, DR. JACOB ALICEA AT [facility]   • INGUINAL HERNIA REPAIR Left 09/27/2007    DR. MATTHEW RUSH AT [place]   • INGUINAL HERNIA REPAIR Left 09/07/2007   • INGUINAL HERNIA REPAIR Left 2003   • KNEE ARTHROSCOPY W/ PARTIAL MEDIAL  MENISCECTOMY Left 1962    DR. SINGH   • LUMBAR DISCECTOMY FUSION INSTRUMENTATION N/A 04/11/2016    Procedure: lumbar laminectomy L4-5 and fusion with instrumentation;  Surgeon: Ran Kline MD;  Location: [facility];  Service:    • LUMBAR DISCECTOMY FUSION INSTRUMENTATION N/A 01/15/2018    Procedure: L2-3, L3-4 laminectomy and fusion with instrumentation and removal of implants L4 5.;  Surgeon: Ran Kline MD;  Location: [facility];  Service:    • LUMBAR EPIDURAL INJECTION N/A 09/23/2015    DR. MATTHEW DOMINGUEZ AT [facility]   • LUMBAR EPIDURAL INJECTION N/A 10/07/2015    DR. ITZEL [facility] AT [facility]   • LUMBAR EPIDURAL INJECTION N/A 11/19/2015    DR. ITZEL [facility] AT [facility]   • GA TOTAL KNEE ARTHROPLASTY Left 11/14/2016    Procedure: TOTAL KNEE ARTHROPLASTY;  Surgeon: Dontrell Arellano MD;  Location: [facility];  Service: Orthopedics   • SKIN BIOPSY Bilateral 10/16/2019    RIGHT LATERAL FOREHEAD:SCC, LEFT TEMPLE:SCC, RIGHT PARIETAL SCALP:SCC, DR. MANPREET VILLARREAL      General Information     Row Name 12/29/22 1213          OT Time and Intention    Document Type evaluation  -SM     Mode of Treatment occupational therapy;individual therapy  -     Row Name 12/29/22 1213          General Information    Patient Profile Reviewed yes  -SM     Prior Level of Function independent:;all household mobility  daughter comes over twice per week to assist with bath. daughter assisting with donning his socks following bath then pt keeps them on.  -     Existing Precautions/Restrictions fall  -     Row Name 12/29/22 1213          Occupational Profile    Environmental Supports and Barriers (Occupational Profile) Home DME/AE includes: shower chair, grab bars, reacher, rwx, shoe horn.  -     Row Name 12/29/22 1213          Living Environment    People in Home alone  -     Row Name 12/29/22 1213          Cognition    Orientation Status (Cognition) oriented x 4  -     Row Name 12/29/22 1213          Safety Issues,  "Functional Mobility    Impairments Affecting Function (Mobility) balance;endurance/activity tolerance;strength;pain;range of motion (ROM)  -           User Key  (r) = Recorded By, (t) = Taken By, (c) = Cosigned By    Initials Name Provider Type     Chantal Diego OT Occupational Therapist                 Mobility/ADL's     Row Name 12/29/22 [facility]          Bed Mobility    Supine-Sit [place] (Bed Mobility) contact guard  -     Assistive Device (Bed Mobility) bed rails;head of bed elevated  -     Row Name 12/29/22 [facility]          Sit-Stand Transfer    Sit-Stand [place] (Transfers) minimum assist (75% patient effort)  -     Assistive Device (Sit-Stand Transfers) walker, front-wheeled  [facility]     Row Name 12/29/22 [facility]          Functional Mobility    Functional Mobility- Ind. Level contact guard assist  -     Functional Mobility- Device walker, front-wheeled  [facility]     Functional Mobility-Distance (Feet) --  20  -     Functional Mobility- Comment to bathroom, generally unsteady but no LOB.  -     Row Name 12/29/22 [facility]          Activities of Daily Living    BADL Assessment/Intervention lower body dressing;grooming;toileting  -[facility] Name 12/29/22 [facility]          Lower Body Dressing Assessment/Training    [place] Level (Lower Body Dressing) lower body dressing skills;don;socks;minimum assist (75% patient effort)  -     Comment, (Lower Body Dressing) poor hip flexibility, diffiuclty reaching to feet  -     Row Name 12/29/22 [facility]          Grooming Assessment/Training    [place] Level (Grooming) grooming skills;contact guard assist  -     Position (Grooming) sink side;supported standing  -     Comment, (Grooming) chair placed behind pt for safety as pt reports hx of knees \"spontaneously buckling\"  -     Row Name 12/29/22 [facility]          Toileting Assessment/Training    [place] Level (Toileting) toileting skills;contact guard assist  -     Assistive Devices (Toileting) " "urinal;grab bar/safety frame  -     Position (Toileting) supported standing  -           User Key  (r) = Recorded By, (t) = Taken By, (c) = Cosigned By    Initials Name Provider Type     Chantal Diego OT Occupational Therapist               Obj/Interventions     Row Name 12/29/22 1218          Sensory Assessment (Somatosensory)    Sensory Assessment (Somatosensory) UE sensation intact  -[facility] Name 12/29/22 1218          Vision Assessment/Intervention    Visual Impairment/Limitations WFL  -[facility] Name 12/29/22 1218          Range of Motion Comprehensive    Comment, General Range of Motion bilateral shoulder AROM ~90 degrees flexion, bilateral elbow WFL. R hand AROM WFL, L hand impaired full digit flexion/extension ~25%. Pt reports this has been going on \"for a few months\". Joints appear very stiff  -[facility] Name 12/29/22 1218          Strength Comprehensive (MMT)    Comment, General Manual Muscle Testing (MMT) Assessment generalized weakness BUE  -[facility] Name 12/29/22 1218          Motor Skills    Motor Skills functional endurance  -     Functional Endurance fair -, rest break required during session  -[facility] Name 12/29/22 1218          Balance    Static Sitting Balance standby assist  -     Position, Sitting Balance sitting edge of bed  -     Static Standing Balance contact guard  -     Dynamic Standing Balance contact guard  -     Position/Device Used, Standing Balance supported;walker, rolling  -     Balance Interventions standing;occupation based/functional task  -           User Key  (r) = Recorded By, (t) = Taken By, (c) = Cosigned By    Initials Name Provider Type     Chantal Diego OT Occupational Therapist               Goals/Plan     Row Name 12/29/22 1227          Transfer Goal 1 (OT)    Activity/Assistive Device (Transfer Goal 1, OT) transfers, all;toilet;sit-to-stand/stand-to-sit  -     [place] Level/Cues Needed (Transfer Goal 1, OT) supervision " required  -SM     Time Frame (Transfer Goal 1, OT) short term goal (STG);2 weeks  -SM     Progress/Outcome (Transfer Goal 1, OT) goal ongoing  -SM     Row Name 12/29/22 1227          Dressing Goal 1 (OT)    Activity/Device (Dressing Goal 1, OT) dressing skills, all;lower body dressing;reacher  -SM     [place]/Cues Needed (Dressing Goal 1, OT) supervision required  -SM     Time Frame (Dressing Goal 1, OT) short term goal (STG);2 weeks  -SM     Progress/Outcome (Dressing Goal 1, OT) goal ongoing  -SM     Row Name 12/29/22 1227          Toileting Goal 1 (OT)    Activity/Device (Toileting Goal 1, OT) toileting skills, all  -SM     [place] Level/Cues Needed (Toileting Goal 1, OT) supervision required  -SM     Time Frame (Toileting Goal 1, OT) short term goal (STG);2 weeks  -SM     Progress/Outcome (Toileting Goal 1, OT) goal ongoing  -SM     Row Name 12/29/22 1227          Problem Specific Goal 1 (OT)    Problem Specific Goal 1 (OT) Pt to be independent with L hand [facility] HEP  -SM     Time Frame (Problem Specific Goal 1, OT) short term goal (STG);2 weeks  -SM     Progress/Outcome (Problem Specific Goal 1, OT) goal ongoing  -     Row Name 12/29/22 1227          Therapy Assessment/Plan (OT)    Planned Therapy Interventions (OT) activity tolerance training;adaptive equipment training;BADL retraining;functional balance retraining;occupation/activity based interventions;patient/caregiver education/training;transfer/mobility retraining;strengthening exercise  -SM           User Key  (r) = Recorded By, (t) = Taken By, (c) = Cosigned By    Initials Name Provider Type    SM Chantal Diego OT Occupational Therapist               Clinical Impression     Row Name 12/29/22 1222          Pain Assessment    Pre/Posttreatment Pain Comment pt reports back pain from lying flat during testing  -SM     Pain Intervention(s) Ambulation/increased activity  -SM     Additional Documentation Pain Scale: FACES Pre/Post-Treatment  "(Group)  -     Row Name 12/29/22 1222          Pain Scale: FACES Pre/Post-Treatment    Pain: FACES Scale, Pretreatment 2-->hurts little bit  -     Posttreatment Pain Rating 2-->hurts little bit  -     Row Name 12/29/22 1222          Plan of Care Review    Plan of Care Reviewed With patient  -     Outcome Evaluation Pt is a 87 y.o male admitted from home with BLE weakness, falls at home. He is being following by [facility] and neurology and found to have acute R caudate stroke and thin L partio occipital region subdural 2/2 head strike/concussion. Pt with hx of multiple back sugeries, A fib, HTN, DM, CKD. He lives at home alone. He does have some assistance for bathing/dressing socks from his daughter who comes a few times a week and also has a special needs daughter who stays with him on the weekends. Pt reports he has had 3-4 episodes of knee buckling where they give out on him without warning over the past few months as well as he reports some diffiuculty using his L hand \"to open car door, etc.\" the last few months. Pt today presents with generalized weakness, decreased endurance, decreased mobility/ADL independence. He can transfer min AX1 and CGA for mobility to sink and bathroom for ADLs using rwx. No knee buckling noted today. Pt would benefit from rehab at [facility] to increase his independence and safety with ADLs.  -     Row Name 12/29/22 1222          Therapy Assessment/Plan (OT)    Rehab Potential (OT) good, to achieve stated therapy goals  -     Criteria for Skilled Therapeutic Interventions Met (OT) yes;skilled treatment is necessary  -     Therapy Frequency (OT) 5 times/wk  -     Row Name 12/29/22 1222          Therapy Plan Review/Discharge Plan (OT)    Anticipated Discharge Disposition (OT) skilled nursing facility  -     Row Name 12/29/22 1222          Positioning and Restraints    Pre-Treatment Position in bed  -     Post Treatment Position other  -     Other Position with PT  -           " User Key  (r) = Recorded By, (t) = Taken By, (c) = Cosigned By    Initials Name Provider Type    SM Chantal Diego, OT Occupational Therapist               Outcome Measures     Row Name 12/29/22 1228          How much help from another is currently needed...    Putting on and taking off regular lower body clothing? 2  -SM     Bathing (including washing, rinsing, and drying) 3  -SM     Toileting (which includes using toilet bed pan or urinal) 3  -SM     Putting on and taking off regular upper body clothing 3  -SM     Taking care of personal grooming (such as brushing teeth) 3  -SM     Eating meals 3  -SM     AM-PAC 6 Clicks Score (OT) 17  -SM     Row Name 12/29/22 1202          How much help from another person do you currently need...    Turning from your back to your side while in flat bed without using bedrails? 3  -EM     Moving from lying on back to sitting on the side of a flat bed without bedrails? 3  -EM     Moving to and from a bed to a chair (including a wheelchair)? 3  -EM     Standing up from a chair using your arms (e.g., wheelchair, bedside chair)? 3  -EM     Climbing 3-5 steps with a railing? 3  -EM     To walk in hospital room? 3  -EM     AM-PAC 6 Clicks Score (PT) 18  -EM     Highest level of mobility 6 --> Walked 10 steps or more  -     Row Name 12/29/22 1230 12/29/22 1228       Modified [place]imeon Scale    Modified [place] Scale 4 - Moderately severe disability.  Unable to walk without assistance, and unable to attend to own bodily needs without assistance.  - 4 - Moderately severe disability.  Unable to walk without assistance, and unable to attend to own bodily needs without assistance.  -    Row Name 12/29/22 1230 12/29/22 1228       Functional Assessment    Outcome Measure Options Modified [place]  -SM AM-PAC 6 Clicks Daily Activity (OT);Modified [place]  -          User Key  (r) = Recorded By, (t) = Taken By, (c) = Cosigned By    Initials Name Provider Type    EM Cynthia oDdd, PT  Physical Therapist    Chantal Corbett OT Occupational Therapist                Occupational Therapy Education     Title: PT OT SLP Therapies (In Progress)     Topic: Occupational Therapy (In Progress)     Point: ADL training (Done)     Description:   Instruct learner(s) on proper safety adaptation and remediation techniques during self care or transfers.   Instruct in proper use of assistive devices.              Learning Progress Summary           Patient Acceptance, E, VU by  at 12/29/2022 5905    Comment: POC/OT goals, safety with transfers                   Point: Home exercise program (Not Started)     Description:   Instruct learner(s) on appropriate technique for monitoring, assisting and/or progressing therapeutic exercises/activities.              Learner Progress:  Not documented in this visit.          Point: Precautions (Not Started)     Description:   Instruct learner(s) on prescribed precautions during self-care and functional transfers.              Learner Progress:  Not documented in this visit.          Point: Body mechanics (Not Started)     Description:   Instruct learner(s) on proper positioning and spine alignment during self-care, functional mobility activities and/or exercises.              Learner Progress:  Not documented in this visit.                      User Key     Initials Effective Dates Name Provider Type Discipline     04/02/20 -  Chantal Diego OT Occupational Therapist OT              OT Recommendation and Plan  Planned Therapy Interventions (OT): activity tolerance training, adaptive equipment training, BADL retraining, functional balance retraining, occupation/activity based interventions, patient/caregiver education/training, transfer/mobility retraining, strengthening exercise  Therapy Frequency (OT): 5 times/wk  Plan of Care Review  Plan of Care Reviewed With: patient  Outcome Evaluation: Pt is a 87 y.o male admitted from home with BLE weakness, falls at home.  "He is being following by [facility] and neurology and found to have acute R caudate stroke and thin L partio occipital region subdural 2/2 head strike/concussion. Pt with hx of multiple back sugeries, A fib, HTN, DM, CKD. He lives at home alone. He does have some assistance for bathing/dressing socks from his daughter who comes a few times a week and also has a special needs daughter who stays with him on the weekends. Pt reports he has had 3-4 episodes of knee buckling where they give out on him without warning over the past few months as well as he reports some diffiuculty using his L hand \"to open car door, etc.\" the last few months. Pt today presents with generalized weakness, decreased endurance, decreased mobility/ADL independence. He can transfer min AX1 and CGA for mobility to sink and bathroom for ADLs using rwx. No knee buckling noted today. Pt would benefit from rehab at [place] to increase his independence and safety with ADLs.     Time Calculation:    Time Calculation- OT     Row Name 12/29/22 1231             Time Calculation- OT    OT Start Time 1027  -      OT Stop Time 1044  -      OT Time Calculation (min) 17 min  -      Total Timed Code Minutes- OT 12 minute(s)  -      OT Received On 12/29/22  -      OT - Next Appointment 12/30/22  -      OT Goal Re-Cert Due Date 01/12/23  -         Timed Charges    34786 - OT Self Care/Mgmt Minutes 12  -SM         Untimed Charges    OT Eval/Re-eval Minutes 5  -SM         Total Minutes    Timed Charges Total Minutes 12  -SM      Untimed Charges Total Minutes 5  -SM       Total Minutes 17  -SM            User Key  (r) = Recorded By, (t) = Taken By, (c) = Cosigned By    Initials Name Provider Type     Chantal Diego OT Occupational Therapist              Therapy Charges for Today     Code Description Service Date Service Provider Modifiers Qty    [id number]  OT SELF CARE/MGMT/TRAIN EA 15 MIN 12/29/2022 Chantal Diego OT GO 1    [id number]  OT EVAL " MOD COMPLEXITY 2 12/29/2022 Chantal Diego, OT GO 1               Chantal Diego OT  12/29/2022

## 2022-12-29 NOTE — PLAN OF CARE
Goal Outcome Evaluation:  VSS. Orthostatic BP/HR done, see charting. Q shift NIH. Q 4 neuro checks. Pt returned from MRI not long after shift change, stated he was only able to complete half of it due to the pain of lying on the stretcher. Pt rested throughout the night with no other issues or complaints.

## 2022-12-29 NOTE — PLAN OF CARE
Problem: Fall Injury Risk  Goal: Absence of Fall and Fall-Related Injury  Outcome: Ongoing, Progressing  Intervention: Identify and Manage Contributors  Recent Flowsheet Documentation  Taken 12/29/2022 1400 by Eron Decker RN  Medication Review/Management: medications reviewed  Taken 12/29/2022 1200 by Eron Decker RN  Medication Review/Management: medications reviewed  Taken 12/29/2022 1000 by Eron Decker RN  Medication Review/Management: medications reviewed  Taken 12/29/2022 0800 by Eron Decker RN  Medication Review/Management: medications reviewed  Intervention: Promote Injury-Free Environment  Recent Flowsheet Documentation  Taken 12/29/2022 1400 by Eron Decker RN  Safety Promotion/Fall Prevention: safety round/check completed  Taken 12/29/2022 1200 by Eron Decker RN  Safety Promotion/Fall Prevention: safety round/check completed  Taken 12/29/2022 1000 by Eron Decker RN  Safety Promotion/Fall Prevention: safety round/check completed  Taken 12/29/2022 0800 by Eron Decker RN  Safety Promotion/Fall Prevention: safety round/check completed     Problem: Skin Injury Risk Increased  Goal: Skin Health and Integrity  Outcome: Ongoing, Progressing  Intervention: Optimize Skin Protection  Recent Flowsheet Documentation  Taken 12/29/2022 1400 by Eron Decker RN  Pressure Reduction Techniques:   frequent weight shift encouraged   weight shift assistance provided   pressure points protected  Head of Bed (HOB) Positioning: HOB at 30-45 degrees  Pressure Reduction Devices: pressure-redistributing mattress utilized  Skin Protection:   adhesive use limited   transparent dressing maintained  Taken 12/29/2022 1200 by Eron Decker RN  Head of Bed (HOB) Positioning: HOB at 30-45 degrees  Taken 12/29/2022 1000 by Eron Decker RN  Head of Bed (HOB) Positioning: HOB at 30 degrees  Taken 12/29/2022 0800 by Eron Decker RN  Pressure Reduction Techniques:   frequent weight shift encouraged    weight shift assistance provided   positioned off wounds   pressure points protected  Head of Bed (HOB) Positioning: HOB at 30-45 degrees  Pressure Reduction Devices: pressure-redistributing mattress utilized  Skin Protection:   adhesive use limited   transparent dressing maintained   Goal Outcome Evaluation:   Pt cooperative, pleasant alert and oriented x 4 today. No distress/pain noted at this time, lidocaine patch ordered and prn dilaudid for procedure.  NIH 0, neuro negative,Room air, nsr, stage II on bottom assessed and dressed per order.  UA collected, ct repeated.

## 2022-12-29 NOTE — PLAN OF CARE
"Goal Outcome Evaluation:  Plan of Care Reviewed With: patient           Outcome Evaluation: Pt is a 87 y.o male admitted from home with BLE weakness, falls at home. He is being following by DAV[facility] and neurology and found to have acute R caudate stroke and thin L partio occipital region subdural 2/2 head strike/concussion. Pt with hx of multiple back sugeries, A fib, HTN, DM, CKD. He lives at home alone. He does have some assistance for bathing/dressing socks from his daughter who comes a few times a week and also has a special needs daughter who stays with him on the weekends. Pt reports he has had 3-4 episodes of knee buckling where they give out on him without warning over the past few months as well as he reports some diffiuculty using his L hand \"to open car door, etc.\" the last few months. Pt today presents with generalized weakness, decreased endurance, decreased mobility/ADL independence. He can transfer min AX1 and CGA for mobility to sink and bathroom for ADLs using rwx. No knee buckling noted today. Pt would benefit from rehab at [facility] to increase his independence and safety with ADLs.    OT wore appropriate PPE and completed hand hygiene.   "

## 2022-12-29 NOTE — PROGRESS NOTES
Name: Osvaldo Lopez ADMIT: 2022   : 1935  PCP: Caio Rodríguez MD    MRN: [id number] LOS: 2 days   AGE/SEX: 87 y.o. male  ROOM: [facility]     Subjective   Subjective   was not able to complete thoracic MRI due to back pain. sitting up in chair. family at bedside.    Review of Systems   Constitutional: Negative for fever.   Respiratory: Negative for cough and shortness of breath.    Cardiovascular: Negative for chest pain.   Gastrointestinal: Negative for abdominal pain, diarrhea, nausea and vomiting.   Genitourinary: Negative for dysuria.   Musculoskeletal: Positive for back pain.   Neurological: Positive for weakness. Negative for headaches.      Objective   Objective   Vital Signs  Temp:  [97.7 °F (36.5 °C)-97.9 °F (36.6 °C)] 97.7 °F (36.5 °C)  Heart Rate:  [70-92] 79  Resp:  [18-20] 20  BP: (100-156)/(48-80) 129/51  SpO2:  [92 %-99 %] 95 %  on   ;   Device (Oxygen Therapy): room air  Body mass index is 34.44 kg/m².    Physical Exam  Constitutional:       General: He is not in acute distress.     Appearance: Normal appearance. He is not toxic-appearing.   HENT:      Head: Normocephalic and atraumatic.   Cardiovascular:      Rate and Rhythm: Normal rate and regular rhythm.   Pulmonary:      Effort: Pulmonary effort is normal. No respiratory distress.      Breath sounds: Normal breath sounds. No wheezing, rhonchi or rales.   Abdominal:      General: Bowel sounds are normal.      Palpations: Abdomen is soft.      Tenderness: There is no abdominal tenderness. There is no guarding or rebound.   Musculoskeletal:         General: No swelling.      Cervical back: Normal range of motion.      Right lower leg: No edema.      Left lower leg: No edema.   Skin:     General: Skin is warm and dry.   Neurological:      Mental Status: He is alert and oriented to person, place, and time.   Psychiatric:         Mood and Affect: Mood normal.         Behavior: Behavior normal.         Thought Content: Thought content  normal.       Results Review  I reviewed the patient's new clinical results.  Results from last 7 days   Lab Units 12/29/22  0544 12/28/22  0702 12/27/22  1717   WBC 10*3/mm3 4.27 5.14 5.57   HEMOGLOBIN g/dL 11.4* 11.5* 12.3*   PLATELETS 10*3/mm3 90* 79* 89*     Results from last 7 days   Lab Units 12/29/22  0545 12/28/22  0702 12/27/22  1717   SODIUM mmol/L 139 140 138   POTASSIUM mmol/L 4.2 4.4 4.8   CHLORIDE mmol/L 106 105 103   CO2 mmol/L 27.5 25.4 26.3   BUN mg/dL 35* 36* 41*   CREATININE mg/dL 1.75* 1.85* 2.06*   GLUCOSE mg/dL 129* 145* 146*     Lab Results   Component Value Date    ANIONGAP 5.5 12/29/2022     Estimated Creatinine Clearance: 38.2 mL/min (A) (by C-G formula based on SCr of 1.75 mg/dL (H)).    Results from last 7 days   Lab Units 12/29/22  0545 12/28/22  0702 12/27/22  1717   ALBUMIN g/dL 3.4* 3.4* 3.8   BILIRUBIN mg/dL  --  0.4 0.5   ALK PHOS U/L  --  106 122*   AST (SGOT) U/L  --  27 35   ALT (SGPT) U/L  --  32 41     Results from last 7 days   Lab Units 12/29/22  0545 12/28/22  0702 12/27/22  1717   CALCIUM mg/dL 8.6 8.3* 9.0   ALBUMIN g/dL 3.4* 3.4* 3.8   MAGNESIUM mg/dL 2.2  --  2.4   PHOSPHORUS mg/dL 3.6  --   --        Hemoglobin A1C   Date/Time Value Ref Range Status   12/28/2022 0702 6.90 (H) 4.80 - 5.60 % Final     Glucose   Date/Time Value Ref Range Status   12/29/2022 1142 188 (H) 70 - 130 mg/dL Final     Comment:     Meter: [id number] : [id number] Baylee Justice RIGO   12/29/2022 0606 140 (H) 70 - 130 mg/dL Final     Comment:     Meter: [id number] : [id number] Mandie Dumont NA   12/28/2022 2054 220 (H) 70 - 130 mg/dL Final     Comment:     Meter: [id number] : [id number] Mandie Dumont NA   12/28/2022 1702 134 (H) 70 - 130 mg/dL Final     Comment:     Meter: [id number] : [id number] Baylee Reshma NA   12/28/2022 1124 168 (H) 70 - 130 mg/dL Final     Comment:     Meter: [id number] : [id number] Gelaciotejames Reshma NA   12/28/2022 0537 142 (H) 70 - 130 mg/dL  Final     Comment:     Meter: [id number] : [id number] Mandie SIERRA     CT Abdomen Pelvis Without Contrast    Result Date: 12/27/2022    1. Colonic diverticulosis. No acute inflammatory process of bowel is identified. 2. No hydronephrosis or ureteral stones.  This report was finalized on 12/27/2022 6:25 PM by Dr. Vimal Mayorga M.D.      CT Head Without Contrast    Result Date: 12/29/2022  1. No significant change when compared yesterday's MRI of the brain 12/28/2022 at 7:53 AM. 2. There is mild-to-moderate small vessel disease in cerebral white matter. 3. The 4 x 3 mm acute lacunar infarct in the body of the right caudate nucleus seen on yesterday's MRI is too small to see on the current noncontrast head CT. 4. There is an extremely subtle band of minimal hyperdensity projecting over the subdural space over the posterior inferior tip of the right parietal lobe that measures 2 mm in thickness and is very focal, corresponds to this very thin focal subdural seen on yesterday's MRI and is much better demonstrated on yesterday's MRI of the brain and exerts no mass effect on the posterior left parietal lobe. Followup to resolution suggested. The remainder of the head CT is normal.  Radiation dose reduction techniques were utilized, including automated exposure control and exposure modulation based on body size.       CT Head Without Contrast    Result Date: 12/27/2022  There is no evidence of fracture or of intracranial hemorrhage.   CT EXAMINATION OF THE CERVICAL SPINE WITHOUT CONTRAST:  FINDINGS: There is mild reversal of the normal cervical lordosis. There is moderate loss of disc height at C5-6 and C6-7. There is a grade 1 anterolisthesis of C3 upon C4 and C4 upon C5. There is solid fusion of the facets to the left at C2-3. There is no evidence of fracture. Extensive vascular calcification involving the proximal aspects of the great vessels are noted.  C2-3: A small central disc bulge or protrusion is  appreciated.  C3-4: Moderate facet degenerative disease is present on the right. There is moderate foraminal stenosis on the right secondary to facet and uncovertebral degenerative disease.  C4-5: Moderate-to-severe facet degenerative disease is present on the left. There is moderate-to-severe foraminal stenosis on the left secondary to facet hypertrophy and uncovertebral degenerative disease.  C5-6: A broad-based disc osteophyte complex is present which results in mild canal stenosis. There is mild and moderate foraminal stenosis on the left and right respectively secondary to loss of disc height and uncovertebral degenerative disease.  C6-7: A mild central disc osteophyte complex is present with no evidence of herniation. Mild and moderate foraminal stenosis is present on the left and right respectively secondary to the loss of disc height, facet hypertrophy and uncovertebral degenerative disease.  C7-T1: Moderate-to-severe facet degenerative disease is present on the left.  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 Extensive vascular calcification involving the great vessels proximally are appreciated. Moderate-to-severe vascular calcification involving the carotid bifurcations are also appreciated bilaterally, more prominent on the left.      Radiation dose reduction techniques were utilized, including automated exposure control and exposure modulation based on body size.  This report was finalized on 12/27/2022 8:14 PM by Dr. Kirk Matias M.D.      CT Cervical Spine Without Contrast    Result Date: 12/27/2022  There is no evidence of fracture or of intracranial hemorrhage.   CT EXAMINATION OF THE CERVICAL SPINE WITHOUT CONTRAST:  FINDINGS: There is mild reversal of the normal cervical lordosis. There is moderate loss of disc height at C5-6 and C6-7. There is a grade 1 anterolisthesis of C3 upon C4 and C4 upon C5. There is  solid fusion of the facets to the left at C2-3. There is no evidence of fracture. Extensive vascular calcification involving the proximal aspects of the great vessels are noted.  C2-3: A small central disc bulge or protrusion is appreciated.  C3-4: Moderate facet degenerative disease is present on the right. There is moderate foraminal stenosis on the right secondary to facet and uncovertebral degenerative disease.  C4-5: Moderate-to-severe facet degenerative disease is present on the left. There is moderate-to-severe foraminal stenosis on the left secondary to facet hypertrophy and uncovertebral degenerative disease.  C5-6: A broad-based disc osteophyte complex is present which results in mild canal stenosis. There is mild and moderate foraminal stenosis on the left and right respectively secondary to loss of disc height and uncovertebral degenerative disease.  C6-7: A mild central disc osteophyte complex is present with no evidence of herniation. Mild and moderate foraminal stenosis is present on the left and right respectively secondary to the loss of disc height, facet hypertrophy and uncovertebral degenerative disease.  C7-T1: Moderate-to-severe facet degenerative disease is present on the left.  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 Extensive vascular calcification involving the great vessels proximally are appreciated. Moderate-to-severe vascular calcification involving the carotid bifurcations are also appreciated bilaterally, more prominent on the left.      Radiation dose reduction techniques were utilized, including automated exposure control and exposure modulation based on body size.  This report was finalized on 12/27/2022 8:14 PM by Dr. Kirk Matias M.D.      MRI Brain Without Contrast    Result Date: 12/28/2022  1.  There is a 2.2 mm area of diffusion weighted hyperintensity involving the body of the  caudate nucleus on the right suspicious for an acute infarct. 2.  A 2.5 mm thick plane of T2 FLAIR hyperintensity is appreciated overlying the left parietal and occipital lobes posteriorly suspicious for a thin subdural. 3.  Moderate small vessel ischemic disease and scattered lacunar infarcts involving the corona radiata are noted. 4.  No obvious metastatic disease is appreciated on this noncontrasted MRI examination of the brain. The sensitivity of this study for evaluation of metastatic disease is reduced given the lack of contrast.   MRI EXAMINATION OF THE LUMBAR SPINE WITHOUT CONTRAST:  The patient has undergone fusion from L2 to L4 with bilateral transpedicular screws and posterior rods. A decompressive laminectomy at L3 and L4 is noted. There is a grade 1 anterolisthesis of L4 upon L5 which appears similar to the MRI examination of 05/27/2015.  There is mild to moderate prominence of the posterior aspect of the discs and endplates at T11-T12. This appears to be slightly more prominent as compared to the prior examination. There is flattening and deformity of the thoracic cord. Further evaluation could be performed with a dedicated MRI examination of the thoracic spine.  L1-L2: A mild broad-based disc osteophyte complex is present with no evidence of herniation.  L2-L3: A grade 1 retrolisthesis of L2 upon L3 is appreciated estimated to be approximately 1-2 mm. A disc osteophyte complex is present extending into the neural foramen on the left slightly less prominent as compared to the preoperative examination of 05/27/2015. There is no evidence of central canal stenosis.  L3-L4: A decompressive laminectomy is noted with no evidence of central canal stenosis. Mild foraminal stenosis is present bilaterally secondary to extension of a small disc osteophyte complex into the neural foramen, similar in appearance as compared to the prior examination.  L4-L5: Moderate to severe facet degenerative disease is present  bilaterally. A mild central disc bulge is present with no evidence of herniation. Mild foraminal stenosis is present bilaterally secondary to anterolisthesis of L4 upon L5 and extension of disc material into the neural foramen.  L5-S1: Moderate to severe facet degenerative disease is present bilaterally. A broad-based disc osteophyte complex is present resulting in mild flattening of the ventral surface of the thecal sac. Moderate foraminal stenosis is present on the right secondary to loss of disc height and extension of a disc osteophyte complex into the neural foramen, similar in appearance as compared to the prior examination.  The axial T2 sequence demonstrates fusiform enlargement of the infrarenal abdominal aorta which measures approximately 3.5 cm in the maximum transverse dimension.  IMPRESSION: The patient is status post fusion from L2 to L4 and multilevel decompressive laminectomies as described above. See above. There is no evidence of a focal disc herniation. Multilevel degenerative disease is noted including moderate to severe facet degenerative disease at L4-L5 and L5-S1 and moderate foraminal stenosis to the right at L5-S1. Foraminal stenosis to the right at L5-S1 appears similar to the MRI examination of the lumbar spine performed on 05/27/2015. No obvious metastatic disease is appreciated on this noncontrasted MRI examination of the lumbar spine.  The above information was called to the patient's nurse at the time of the dictation. The patient's nurse is to immediately relay the information to the clinical service.  This report was finalized on 12/28/2022 4:46 PM by Dr. Kirk Matias M.D.      MRI Cervical Spine Without Contrast    Result Date: 12/28/2022   Multilevel relatively mild canal narrowing is appreciated within the cervical spine as has been discussed in detail above. Multilevel foraminal narrowing is additionally noted and includes moderate right C3-4, mild-to-moderate right C4-5,  moderate to severe left C4-5, severe right C5-6, mild left C5-6, mild-to-moderate left C6-7, and moderate severe right C6-7 foraminal stenosis.  There is no convincing evidence to suggest an acute traumatic abnormality to the cervical spine. There is subtle T1 and T2 hyperintense signal within the anterior aspect of the C4-5 disc space which I strongly suspect is due to degenerative phenomena rather than representing an acute injury of the disc space as there is no prevertebral edema at this site. Nonetheless, correlation with clinical data is suggested.  This report was finalized on 12/28/2022 9:34 PM by Dr. Jose David Augustin M.D.      MRI Lumbar Spine Without Contrast    Result Date: 12/28/2022  1.  There is a 2.2 mm area of diffusion weighted hyperintensity involving the body of the caudate nucleus on the right suspicious for an acute infarct. 2.  A 2.5 mm thick plane of T2 FLAIR hyperintensity is appreciated overlying the left parietal and occipital lobes posteriorly suspicious for a thin subdural. 3.  Moderate small vessel ischemic disease and scattered lacunar infarcts involving the corona radiata are noted. 4.  No obvious metastatic disease is appreciated on this noncontrasted MRI examination of the brain. The sensitivity of this study for evaluation of metastatic disease is reduced given the lack of contrast.   MRI EXAMINATION OF THE LUMBAR SPINE WITHOUT CONTRAST:  The patient has undergone fusion from L2 to L4 with bilateral transpedicular screws and posterior rods. A decompressive laminectomy at L3 and L4 is noted. There is a grade 1 anterolisthesis of L4 upon L5 which appears similar to the MRI examination of 05/27/2015.  There is mild to moderate prominence of the posterior aspect of the discs and endplates at T11-T12. This appears to be slightly more prominent as compared to the prior examination. There is flattening and deformity of the thoracic cord. Further evaluation could be performed with a dedicated  MRI examination of the thoracic spine.  L1-L2: A mild broad-based disc osteophyte complex is present with no evidence of herniation.  L2-L3: A grade 1 retrolisthesis of L2 upon L3 is appreciated estimated to be approximately 1-2 mm. A disc osteophyte complex is present extending into the neural foramen on the left slightly less prominent as compared to the preoperative examination of 05/27/2015. There is no evidence of central canal stenosis.  L3-L4: A decompressive laminectomy is noted with no evidence of central canal stenosis. Mild foraminal stenosis is present bilaterally secondary to extension of a small disc osteophyte complex into the neural foramen, similar in appearance as compared to the prior examination.  L4-L5: Moderate to severe facet degenerative disease is present bilaterally. A mild central disc bulge is present with no evidence of herniation. Mild foraminal stenosis is present bilaterally secondary to anterolisthesis of L4 upon L5 and extension of disc material into the neural foramen.  L5-S1: Moderate to severe facet degenerative disease is present bilaterally. A broad-based disc osteophyte complex is present resulting in mild flattening of the ventral surface of the thecal sac. Moderate foraminal stenosis is present on the right secondary to loss of disc height and extension of a disc osteophyte complex into the neural foramen, similar in appearance as compared to the prior examination.  The axial T2 sequence demonstrates fusiform enlargement of the infrarenal abdominal aorta which measures approximately 3.5 cm in the maximum transverse dimension.  IMPRESSION: The patient is status post fusion from L2 to L4 and multilevel decompressive laminectomies as described above. See above. There is no evidence of a focal disc herniation. Multilevel degenerative disease is noted including moderate to severe facet degenerative disease at L4-L5 and L5-S1 and moderate foraminal stenosis to the right at L5-S1.  Foraminal stenosis to the right at L5-S1 appears similar to the MRI examination of the lumbar spine performed on 05/27/2015. No obvious metastatic disease is appreciated on this noncontrasted MRI examination of the lumbar spine.  The above information was called to the patient's nurse at the time of the dictation. The patient's nurse is to immediately relay the information to the clinical service.  This report was finalized on 12/28/2022 4:46 PM by Dr. Kirk Matias M.D.      Scheduled Meds  amiodarone, 200 mg, Oral, Daily  finasteride, 5 mg, Oral, Daily  insulin lispro, 0-9 Units, Subcutaneous, TID AC  levothyroxine, 25 mcg, Oral, Q AM  lidocaine, 1 patch, Transdermal, Q24H  Menthol-Zinc Oxide, 1 application, Topical, TID  metoprolol succinate XL, 25 mg, Oral, Daily  multivitamin, 1 tablet, Oral, QAM  tamsulosin, 0.8 mg, Oral, Daily  vitamin B-12, 1,000 mcg, Oral, Daily    Continuous Infusions  sodium chloride, 100 mL/hr, Last Rate: 100 mL/hr (12/29/22 0838)    PRN Meds  •  acetaminophen  •  aluminum-magnesium hydroxide-simethicone  •  dextrose  •  dextrose  •  glucagon (human recombinant)  •  HYDROmorphone  •  melatonin  •  nitroglycerin  •  ondansetron **OR** ondansetron  •  [COMPLETED] Insert Peripheral IV **AND** sodium chloride  •  sodium chloride  •  traMADol    sodium chloride, 100 mL/hr, Last Rate: 100 mL/hr (12/29/22 0838)    Diet  Diet: Cardiac Diets; Healthy Heart (2-3 Na+); Texture: Regular Texture (IDDSI 7); Fluid Consistency: Thin (IDDSI 0)    I have personally reviewed:  [x]  Laboratory   [x]  Microbiology   [x]  Radiology   [x]  EKG/Telemetry   []  Cardiology/Vascular   []  Pathology   []  Records     Results for orders placed during the hospital encounter of 12/27/22    Adult Transthoracic Echo Limited W/ Cont if Necessary Per Protocol    Interpretation Summary  •  Limited echocardiogram for left ventricular function.  •  Left ventricular systolic function is normal. Left ventricular ejection  fraction appears to be 61 - 65%.  •  There is no evidence of pericardial effusion        Assessment/Plan     Active Hospital Problems    Diagnosis  POA   • **Generalized weakness [R53.1]  Yes   • Thrombocytopenia, unspecified (HCC) [D69.6]  Unknown   • Acute CVA (cerebrovascular accident) (HCC) [I63.9]  Unknown   • Subdural hematoma, acute [S06.5XAA]  Unknown   • Elevated troponin [R77.8]  Yes   • Recurrent falls [R29.6]  Not Applicable   • Obesity (BMI 30-39.9) [E66.9]  Yes   • Stage 3b chronic kidney disease (HCC) [N18.32]  Yes   • Coronary arteriosclerosis [I25.10]  Yes   • Chronic anticoagulation [Z79.01]  Not Applicable   • Anemia [D64.9]  Yes   • Typical atrial flutter (HCC) [I48.3]  Yes   • Essential hypertension [I10]  Yes   • Type 2 diabetes mellitus, without long-term current use of insulin (HCC) [E11.9]  Yes      Resolved Hospital Problems   No resolved problems to display.     87 y.o. 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 contributing to falls    Back pain  -Lumbar spine MRI shows T8 11-12 disc herniation thoracic MRI ordered, not able to tolerate will add one time IV pain med re attempt today    Subdural hematoma  -Patient reports a recent fall he is not sure if he hit his head  -Holding Xarelto until if/when OK with DAVID  -repeat head CT today no change    CAD, CABG 5 vessel, atrial flutter, dyspnea on exertion  -No chest pain  -Troponin elevations in the setting of CKD  -Anticoagulation on hold  -Echo above  -GRIJALVA baseline    CKD3  -Nephrology following  -1 L NS today    DM2 with neuropathy  -A1c 6.90%    Abnormal TSH 4.460  -Actually better than earlier this month and June of this year  -Continue levothyroxine follow-up TSH outpatient    Thrombocytopenia, anemia  -Thrombocytopenia not a new finding  -Continue  to monitor  -B12, folate not low    SCDs for DVT prophylaxis. Resume AC when OK with DAVID    Discussed with patient, family, nursing staff, CCP and care team on multidisciplinary rounds    Discharge: Probably will need SNF. Therapies following    Sherwin Singh MD  [facility]  12/29/22

## 2022-12-29 NOTE — PROGRESS NOTES
Patient Identification:  NAME:  Osvaldo Lpoez  Age:  87 y.o.   Sex:  male   :  1935   MRN:  [id number]       Chief complaint: Leg weakness diabetic peripheral neuropathy diabetic proximal amyotrophy, concussion, left hemisphere small subdural hematoma, acute right hemisphere stroke.  He also has diabetic autonomic neuropathy and dysautonomia with orthostatic hypotension    History of present illness: He looks good today no change from yesterday.  Still has some weakness in the left arm but he notes he has had weakness in the left arm.  Even prior to this apparent new stroke.  The repeat CT shows a tiny little area of left posterior hemispheric subdural fluid collection for which neurosurgery is following him.  Note he has been on Xarelto before and we are holding that because of his risk for falls      Past medical history:  Past Medical History:   Diagnosis Date   • Abdominal aortic aneurysm (AAA) without rupture    • Abdominal aortic ectasia (HCC) 2015   • Abnormal EKG 10/25/2016    2019   • Abnormal thyroid blood test    • Actinic keratosis     FOLLOWED BY DR. MANPREET VILLARREAL   • Anemia 2021   • Arthritis    • Asthma     MILD, INTERMITTENT   • Atherosclerotic heart disease    • Atrial flutter with rapid ventricular response ([facility]) 2019    ADMITTED TO Located within [facility]   • Atrial premature depolarization    • Atrophy of muscle of lower leg    • BPH (benign prostatic hyperplasia)     FOLLOWED BY DR. TERRA JONES   • Bruises easily    • Bulging of thoracic intervertebral disc    • Carpal tunnel syndrome, right 2019    FOLLOWED BY DR. NEW SANCHEZ   • Cataract     BILATERAL, S/P EXTRACTION WITH IOL IMPLANTS   • Chronic anticoagulation 2021   • Chronic edema    • Chronic low back pain with sciatica 2021   • Chronic pain    • CKD (chronic kidney disease) 2021   • Closed head injury 2018    WITH LACERATION TO SCALP, D/T SYNCOPE   • Colon cancer ([facility]) 2021    INVASIVE  ADENOCARCINOMA FROM TUBULOVILLOUS ADENOMA IN TRANSVERSE, S/P COLON RESECTION, FOLLOWED BY DR. MARGARITA COX   • Colon polyps     FOLLOWED BY DR. 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 TERRA JONES   • Epididymitis, right 03/2018   • Essential hypertension    • Eyebrow ptosis 11/09/2013   • Hallux valgus, acquired, bilateral 01/2019   • Heart attack ([facility]) 09/1989    S/P CABG, 5 VESSEL   • Heart murmur    • History of blood transfusion    • HL (hearing loss)    • Hyperactivity of bladder     FOLLOWED BY DR. TERRA JONES   • HyperCKemia 11/2017   • Hyperkalemia 08/2016   • Hyperlipidemia     MIXED   • Hyperreflexia 11/2017    BILATERAL   • Hyphema 05/2015   • IBS (irritable bowel syndrome)    • Impaired functional mobility, balance, gait, and endurance    • Impingement syndrome of left shoulder 10/2015   • Infection associated with cystostomy catheter ([facility]) 12/2016   • Ischemic colitis ([facility])    • Lumbar radiculopathy 2016    wears back brace at times following back surgery   • Lumbar spondylosis 04/2016   • Lumbosacral neuritis 05/2015   • Lumbosacral radiculitis 05/2015   • Macular puckering of retina, left 10/2013   • 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 VIANEY GIORDANO   • Pseudophakia 11/09/2013   • RBBB (right bundle branch block)    • Recurrent falls    • Respiratory  failure with hypoxia ([facility]) 01/2019   • SCC (squamous cell carcinoma) 10/16/2019    RIGHT FOREHEAD, LEFT TEMPLE, RIGHT SCALP, FOLLOWED BY DR. MANPREET VILLARREAL   • Scoliosis    • Spinal stenosis of lumbar region with neurogenic claudication 12/07/2017   • Syncope and collapse 07/16/2018    ADMITTED TO [facility]   • Torn rotator cuff 03/2017    BILATERAL, COMPLETE   • Type 2 diabetes mellitus (HCC)     IDDM   • Urinary frequency     FOLLOWED BY DR. TERRA JONES   • Vitamin D deficiency    • Vitreous hemorrhage of left eye ([facility])        Allergies:  Amiodarone and Percocet [oxycodone-acetaminophen]    Home medications:  Medications Prior to Admission   Medication Sig Dispense Refill Last Dose   • acetaminophen (TYLENOL) 500 MG tablet Take 500 mg by mouth Every 6 (Six) Hours As Needed for Mild Pain .   12/27/2022   • Alpha-Lipoic Acid 600 MG tablet Take 600 mg by mouth Daily. For neuropathy 30 tablet 11 12/27/2022   • amiodarone (PACERONE) 200 MG tablet TAKE 1 TABLET DAILY. 90 tablet 1 12/27/2022   • ferrous sulfate 325 (65 FE) MG tablet Take 325 mg by mouth Daily With Breakfast.   12/27/2022   • finasteride (PROSCAR) 5 MG tablet TAKE 1 TABLET DAILY FOR    PROSTATE 90 tablet 3 12/27/2022   • furosemide (LASIX) 40 MG tablet TAKE 1 TABLET DAILY 90 tablet 3 12/26/2022   • glucose blood (Contour Next Test) test strip 1 each by Other route Daily. test blood sugar 50 each 5    • levothyroxine (SYNTHROID, LEVOTHROID) 25 MCG tablet Take 1 tablet by mouth Daily. 90 tablet 1 12/27/2022   • lisinopril (PRINIVIL,ZESTRIL) 20 MG tablet TAKE 1 TABLET DAILY 90 tablet 3 12/27/2022   • metoprolol succinate XL (TOPROL-XL) 25 MG 24 hr tablet TAKE 1 TABLET DAILY 90 tablet 3 12/27/2022   • Multiple Vitamin (MULTI VITAMIN PO) Take 1 tablet by mouth Every Morning.   12/27/2022   • tamsulosin (FLOMAX) 0.4 MG capsule 24 hr capsule TAKE 2 CAPSULES DAILY 180 capsule 3 12/27/2022   • Tradjenta 5 MG tablet tablet TAKE 1 TABLET DAILY 90 tablet 3  12/27/2022   • traMADol (ULTRAM) 50 MG tablet TAKE 1 TABLET BY MOUTH EVERY 6 (SIX) HOURS AS NEEDED FOR MODERATE PAIN OR SEVERE PAIN 30 tablet 1 12/27/2022   • vitamin B-12 (CYANOCOBALAMIN) 1000 MCG tablet Take 1,000 mcg by mouth Daily.   12/27/2022   • Xarelto 15 MG tablet TAKE 1 TABLET DAILY 90 tablet 3 12/26/2022   • KYRA MICROLET LANCETS lancets USE TWICE A  each 5 Unknown   • lidocaine (LMX) 4 % cream    Unknown        Hospital medications:  amiodarone, 200 mg, Oral, Daily  atorvastatin, 40 mg, Oral, Daily  finasteride, 5 mg, Oral, Daily  insulin lispro, 0-9 Units, Subcutaneous, TID AC  levothyroxine, 25 mcg, Oral, Q AM  lidocaine, 1 patch, Transdermal, Q24H  Menthol-Zinc Oxide, 1 application, Topical, TID  metoprolol succinate XL, 25 mg, Oral, Daily  multivitamin, 1 tablet, Oral, QAM  tamsulosin, 0.8 mg, Oral, Daily  vitamin B-12, 1,000 mcg, Oral, Daily      sodium chloride, 100 mL/hr, Last Rate: 100 mL/hr (12/29/22 0838)      •  acetaminophen  •  aluminum-magnesium hydroxide-simethicone  •  dextrose  •  dextrose  •  glucagon (human recombinant)  •  HYDROmorphone  •  melatonin  •  nitroglycerin  •  ondansetron **OR** ondansetron  •  [COMPLETED] Insert Peripheral IV **AND** sodium chloride  •  sodium chloride  •  traMADol      Objective:  Vitals Ranges:   Temp:  [97.7 °F (36.5 °C)-98.2 °F (36.8 °C)] 98.2 °F (36.8 °C)  Heart Rate:  [64-92] 64  Resp:  [18-20] 20  BP: (100-156)/(48-78) 135/53      Physical Exam:  Awake alert oriented x3 normal cranial nerves II through VII tongue is midline weakness in the left arm and left .  Able to lift both legs off the bed.  Reflexes trace throughout symmetrical toes downgoing    Results review:   I reviewed the patient's new clinical results.    Data review:  Lab Results (last 24 hours)     Procedure Component Value Units Date/Time    Immunofixation, Serum [[id number]] Collected: 12/28/22 1527    Specimen: Blood Updated: 12/29/22 1309     Immunofixation Result,  Serum Comment     Comment: No monoclonality detected.        IgG 814 mg/dL      IgA 149 mg/dL      IgM 80 mg/dL     Narrative:      Performed at:  [address] [place] [address]  [id number]  : Jero Ayala PhD, Phone:  [phone]    POC Glucose Once [[id number]]  (Abnormal) Collected: 12/29/22 1142    Specimen: Blood Updated: 12/29/22 1147     Glucose 188 mg/dL      Comment: Meter: [id number] : [id number] Baylee SIERRA       Sodium, Urine, Random - Urine, Clean Catch [[id number]] Collected: 12/29/22 0842    Specimen: Urine, Clean Catch Updated: 12/29/22 1055     Sodium, Urine 46 mmol/L     Narrative:      Reference intervals for random urine have not been established.  Clinical usage is dependent upon physician's interpretation in combination with other laboratory tests.       Chloride, Urine, Random - Urine, Clean Catch [[id number]] Collected: 12/29/22 0842    Specimen: Urine, Clean Catch Updated: 12/29/22 1055     Chloride, Urine 42 mmol/L     Narrative:      Reference intervals for random urine have not been established.  Clinical usage is dependent upon physician's interpretation in combination with other laboratory tests.       Protein / Creatinine Ratio, Urine - Urine, Clean Catch [[id number]] Collected: 12/29/22 0842    Specimen: Urine, Clean Catch Updated: 12/29/22 1053     Protein/Creatinine Ratio, Urine 159.0 mg/G Crea      Creatinine, Urine 145.9 mg/dL      Total Protein, Urine 23.2 mg/dL     Protime-INR [[id number]]  (Normal) Collected: 12/29/22 0544    Specimen: Blood Updated: 12/29/22 0644     Protime 13.8 Seconds      INR 1.05    Troponin [[id number]]  (Abnormal) Collected: 12/29/22 0545    Specimen: Blood Updated: 12/29/22 0634     Troponin T 0.151 ng/mL     Narrative:      Troponin T Reference Range:  <= 0.03 ng/mL-   Negative for AMI  >0.03 ng/mL-     Abnormal for myocardial necrosis.  Clinicians would have to utilize clinical acumen, EKG, Troponin and serial  changes to determine if it is an Acute Myocardial Infarction or myocardial injury due to an underlying chronic condition.       Results may be falsely decreased if patient taking Biotin.      Ferritin [[id number]]  (Normal) Collected: 12/29/22 0545    Specimen: Blood Updated: 12/29/22 0631     Ferritin 230.00 ng/mL     Narrative:      Results may be falsely decreased if patient taking Biotin.      Iron Profile [[id number]]  (Abnormal) Collected: 12/29/22 0545    Specimen: Blood Updated: 12/29/22 0628     Iron 43 mcg/dL      Iron Saturation 14 %      Transferrin 202 mg/dL      TIBC 301 mcg/dL     Renal Function Panel [[id number]]  (Abnormal) Collected: 12/29/22 0545    Specimen: Blood Updated: 12/29/22 0628     Glucose 129 mg/dL      BUN 35 mg/dL      Creatinine 1.75 mg/dL      Sodium 139 mmol/L      Potassium 4.2 mmol/L      Chloride 106 mmol/L      CO2 27.5 mmol/L      Calcium 8.6 mg/dL      Albumin 3.4 g/dL      Phosphorus 3.6 mg/dL      Anion Gap 5.5 mmol/L      BUN/Creatinine Ratio 20.0     eGFR 37.7 mL/min/1.73      Comment: National Kidney Foundation and American Society of Nephrology (ASN) Task Force recommended calculation based on the Chronic Kidney Disease Epidemiology Collaboration (CKD-EPI) equation refit without adjustment for race.       Narrative:      GFR Normal >60  Chronic Kidney Disease <60  Kidney Failure <15    The GFR formula is only valid for adults with stable renal function between ages 18 and 70.    Magnesium [[id number]]  (Normal) Collected: 12/29/22 0545    Specimen: Blood Updated: 12/29/22 0628     Magnesium 2.2 mg/dL     Uric Acid [[id number]]  (Abnormal) Collected: 12/29/22 0545    Specimen: Blood Updated: 12/29/22 0628     Uric Acid 9.6 mg/dL     CBC & Differential [[id number]]  (Abnormal) Collected: 12/29/22 0544    Specimen: Blood Updated: 12/29/22 0607    Narrative:      The following orders were created for panel order CBC & Differential.  Procedure                                Abnormality         Status                     ---------                               -----------         ------                     CBC Auto Differential[[id number]]        Abnormal            Final result                 Please view results for these tests on the individual orders.    CBC Auto Differential [[id number]]  (Abnormal) Collected: 12/29/22 0544    Specimen: Blood Updated: 12/29/22 0607     WBC 4.27 10*3/mm3      RBC 3.25 10*6/mm3      Hemoglobin 11.4 g/dL      Hematocrit 34.1 %      .9 fL      MCH 35.1 pg      MCHC 33.4 g/dL      RDW 13.1 %      RDW-SD 50.6 fl      MPV 9.9 fL      Platelets 90 10*3/mm3      Neutrophil % 55.3 %      Lymphocyte % 32.1 %      Monocyte % 9.8 %      Eosinophil % 2.1 %      Basophil % 0.5 %      Immature Grans % 0.2 %      Neutrophils, Absolute 2.36 10*3/mm3      Lymphocytes, Absolute 1.37 10*3/mm3      Monocytes, Absolute 0.42 10*3/mm3      Eosinophils, Absolute 0.09 10*3/mm3      Basophils, Absolute 0.02 10*3/mm3      Immature Grans, Absolute 0.01 10*3/mm3      nRBC 0.0 /100 WBC     POC Glucose Once [[id number]]  (Abnormal) Collected: 12/29/22 0606    Specimen: Blood Updated: 12/29/22 0606     Glucose 140 mg/dL      Comment: Meter: [id number] : [id number] Mandie Jonesine RIGO       POC Glucose Once [[id number]]  (Abnormal) Collected: 12/28/22 2054    Specimen: Blood Updated: 12/28/22 2056     Glucose 220 mg/dL      Comment: Meter: [id number] : [id number] Mandie Hernandezannine NA       POC Glucose Once [[id number]]  (Abnormal) Collected: 12/28/22 1702    Specimen: Blood Updated: 12/28/22 1703     Glucose 134 mg/dL      Comment: Meter: [id number] : [id number] Baylee SIERRA              Imaging:  Imaging Results (Last 24 Hours)     Procedure Component Value Units Date/Time    CT Head Without Contrast [[id number]] Collected: 12/29/22 0919     Updated: 12/29/22 1519    Narrative:      NONCONTRAST HEAD CT ON 12/29/2022     CLINICAL HISTORY: Follow-up subdural  seen on yesterday's MRI of the  brain 12/28/2022 at 7:53 AM     TECHNIQUE: Spiral CT images were obtained from the base of the skull to  the vertex without intravenous contrast and images were reformatted and  are submitted in 3 mm thick axial, sagittal and coronal CT sections with  brain algorithm     COMPARISON: This is correlated to a prior MRI of the brain from [facility] yesterday on 12/28/2022 at 7:53 AM and noncontrast  head CT 2 days ago, 12/27/2022 at 5:36 PM.     FINDINGS: There is some mild patchy low-density extending from the  periventricular into the subcortical white matter of the cerebral  hemispheres consistent with mild-to-moderate small vessel disease.  Patient has a small 4 x 3 mm acute infarct in the body of the right  caudate nucleus seen on yesterday's MRI, difficult to appreciate on this  head CT due to its small size. The remainder of the brain parenchyma is  normal in attenuation. The ventricles are normal in size. I see no mass  effect and no midline shift. Projecting over the posterior inferior left  parietal lobe is a very subtle band of minimal hyperdensity projecting  over the extra-axial subdural space, it is very focal in nature and  measures 2 mm in thickness from inner table skull to posterior inferior  medial tip of the left parietal lobe and measures 10 mm in craniocaudal  dimension and is best seen on sagittal image 37. It is extremely subtle.  It is much better demonstrated on yesterday's MRI of the brain. No  additional intracranial hemorrhage is seen. The paranasal sinuses and  mastoid air cells and middle ear cavities are clear.       Impression:      1. No significant change when compared yesterday's MRI of the brain  12/28/2022 at 7:53 AM.  2. There is mild-to-moderate small vessel disease in cerebral white  matter.  3. The 4 x 3 mm acute lacunar infarct in the body of the right caudate  nucleus seen on yesterday's MRI is too small to see on the  current  noncontrast head CT.  4. There is an extremely subtle focal very thin band of minimal  hyperdensity projecting over the subdural space over the posterior  inferior tip of the left parietal lobe that measures 2 mm in thickness  and is very focal, corresponds to this very thin focal subdural seen on  yesterday's MRI and is much better demonstrated on yesterday's MRI of  the brain and exerts no mass effect on the posterior left parietal lobe.  Followup to resolution suggested. The remainder of the head CT is  normal.      Radiation dose reduction techniques were utilized, including automated  exposure control and exposure modulation based on body size.     This report was finalized on 12/29/2022 3:16 PM by Dr. Rohan Wells M.D.       MRI Cervical Spine Without Contrast [[id number]] Collected: 12/28/22 2048     Updated: 12/28/22 2137    Narrative:      MRI OF THE CERVICAL SPINE WITHOUT CONTRAST     CLINICAL HISTORY: Frequent falls, back pain, and leg weakness.     TECHNIQUE: MRI of the cervical spine was obtained with sagittal T1,  gradient echo, and T2-weighted images. Additionally, there are axial  gradient echo and oblique sagittal T2-weighted images through the  cervical spine.     FINDINGS:     There is loss of the usual lordotic curvature of the cervical spine.     At C2-3, there is a small central disc protrusion which mildly indents  the ventral subarachnoid space. There is no significant degree of  foraminal stenosis.     At C3-4, there is moderate right foraminal stenosis secondary to  uncovertebral joint hypertrophy and facet arthrosis. There is no  significant degree of left foraminal narrowing. There is a tiny central  protrusion minimally indenting the ventral arachnoid space.     At C4-5, there is a small central disc protrusion mildly indenting the  ventral subarachnoid space. There is a mild-to-moderate degree of right  and a moderate-to-severe degree of left foraminal narrowing secondary to  a  combination of uncovertebral joint hypertrophy and facet arthrosis.  There is grade 1 degenerative anterior spondylolisthesis of C4 on C5 by  approximately 2 mm. There is subtle T1 and T2 hyperintensity within the  C4-5 disc space which I favor is degenerative in nature rather than  representing an acute injury to the disc as there is no prevertebral  edema identified. Nonetheless, correlation with clinical data is  suggested.     At C5-6, there is a disc osteophyte complex eccentric to the right which  mildly indents the ventral subarachnoid space. There are prominent  degenerative disc changes seen at the level of C5-6 disc space. There is  severe right and mild left foraminal narrowing secondary to a  combination of uncovertebral joint hypertrophy and facet arthrosis.  Advanced degenerative disc changes are seen at C5-6.     At C6-7, a disc osteophyte complex results in a mild degree of central  canal stenosis. There is mild-to-moderate left and moderate-to-severe  right foraminal narrowing secondary to uncovertebral joint hypertrophy  and facet arthrosis. Advanced degenerative disc changes are noted at  C6-7.     At C7-T1, there is no significant degree of canal or foraminal stenosis.     The cervical spinal cord signal is not optimally evaluated although  there is no convincing cord signal abnormality. The visualized contents  of the cranial vault are unremarkable.       Impression:         Multilevel relatively mild canal narrowing is appreciated within the  cervical spine as has been discussed in detail above. Multilevel  foraminal narrowing is additionally noted and includes moderate right  C3-4, mild-to-moderate right C4-5, moderate to severe left C4-5, severe  right C5-6, mild left C5-6, mild-to-moderate left C6-7, and moderate  severe right C6-7 foraminal stenosis.     There is no convincing evidence to suggest an acute traumatic  abnormality to the cervical spine. There is subtle T1 and T2  hyperintense  signal within the anterior aspect of the C4-5 disc space  which I strongly suspect is due to degenerative phenomena rather than  representing an acute injury of the disc space as there is no  prevertebral edema at this site. Nonetheless, correlation with clinical  data is suggested.     This report was finalized on 12/28/2022 9:34 PM by Dr. Jose David Augustin M.D.       MRI Thoracic Spine Without Contrast [[id number]] Resulted: 12/28/22 1816     Updated: 12/28/22 1854    MRI Brain Without Contrast [[id number]] Collected: 12/28/22 1014     Updated: 12/28/22 1649    Narrative:      MRI EXAMINATION OF THE BRAIN WITHOUT CONTRAST AND MRI EXAMINATION OF THE  LUMBAR SPINE WITHOUT CONTRAST     HISTORY: Falls, legs give out, possible head trauma, left leg pain.  Colon cancer.     COMPARISON: CT head 12/27/2022.     MRI EXAMINATION OF THE BRAIN WITHOUT CONTRAST:     TECHNIQUE: An MRI examination of the brain was performed utilizing  sagittal T1, axial diffusion, T1, T2 and T2 FLAIR imaging.     FINDINGS: The diffusion sequence demonstrates an area of increased  signal intensity involving of the body of the caudate nucleus on the  right suspicious for an acute infarct measuring 2.2 mm in size.     Moderate small vessel ischemic disease is noted. Scattered lacunar  infarcts involving the corona radiata are noted. There is no evidence of  hydrocephalus.     The axial T2 FLAIR sequence demonstrates increased signal intensity  likely representing a subdural fluid collection (less likely focal dural  thickening) overlying the left parietal and occipital lobes posteriorly.  This is mildly T1 hyperintense and measures approximately 2.5 mm in  thickness. This was not evident on the CT examination of 12/27/2022.  There is expected flow-void in the basilar artery and in the distal  aspect of the internal carotid arteries bilaterally.       Impression:      1.  There is a 2.2 mm area of diffusion weighted hyperintensity  involving the body of  the caudate nucleus on the right suspicious for an  acute infarct.  2.  A 2.5 mm thick plane of T2 FLAIR hyperintensity is appreciated  overlying the left parietal and occipital lobes posteriorly suspicious  for a thin subdural.  3.  Moderate small vessel ischemic disease and scattered lacunar  infarcts involving the corona radiata are noted.  4.  No obvious metastatic disease is appreciated on this noncontrasted  MRI examination of the brain. The sensitivity of this study for  evaluation of metastatic disease is reduced given the lack of contrast.        MRI EXAMINATION OF THE LUMBAR SPINE WITHOUT CONTRAST:     The patient has undergone fusion from L2 to L4 with bilateral  transpedicular screws and posterior rods. A decompressive laminectomy at  L3 and L4 is noted. There is a grade 1 anterolisthesis of L4 upon L5  which appears similar to the MRI examination of 05/27/2015.     There is mild to moderate prominence of the posterior aspect of the  discs and endplates at T11-T12. This appears to be slightly more  prominent as compared to the prior examination. There is flattening and  deformity of the thoracic cord. Further evaluation could be performed  with a dedicated MRI examination of the thoracic spine.     L1-L2: A mild broad-based disc osteophyte complex is present with no  evidence of herniation.     L2-L3: A grade 1 retrolisthesis of L2 upon L3 is appreciated estimated  to be approximately 1-2 mm. A disc osteophyte complex is present  extending into the neural foramen on the left slightly less prominent as  compared to the preoperative examination of 05/27/2015. There is no  evidence of central canal stenosis.     L3-L4: A decompressive laminectomy is noted with no evidence of central  canal stenosis. Mild foraminal stenosis is present bilaterally secondary  to extension of a small disc osteophyte complex into the neural foramen,  similar in appearance as compared to the prior examination.     L4-L5: Moderate  to severe facet degenerative disease is present  bilaterally. A mild central disc bulge is present with no evidence of  herniation. Mild foraminal stenosis is present bilaterally secondary to  anterolisthesis of L4 upon L5 and extension of disc material into the  neural foramen.     L5-S1: Moderate to severe facet degenerative disease is present  bilaterally. A broad-based disc osteophyte complex is present resulting  in mild flattening of the ventral surface of the thecal sac. Moderate  foraminal stenosis is present on the right secondary to loss of disc  height and extension of a disc osteophyte complex into the neural  foramen, similar in appearance as compared to the prior examination.     The axial T2 sequence demonstrates fusiform enlargement of the  infrarenal abdominal aorta which measures approximately 3.5 cm in the  maximum transverse dimension.     IMPRESSION: The patient is status post fusion from L2 to L4 and  multilevel decompressive laminectomies as described above. See above.  There is no evidence of a focal disc herniation. Multilevel degenerative  disease is noted including moderate to severe facet degenerative disease  at L4-L5 and L5-S1 and moderate foraminal stenosis to the right at  L5-S1. Foraminal stenosis to the right at L5-S1 appears similar to the  MRI examination of the lumbar spine performed on 05/27/2015. No obvious  metastatic disease is appreciated on this noncontrasted MRI examination  of the lumbar spine.     The above information was called to the patient's nurse at the time of  the dictation. The patient's nurse is to immediately relay the  information to the clinical service.     This report was finalized on 12/28/2022 4:46 PM by Dr. Kirk Matias M.D.       MRI Lumbar Spine Without Contrast [[id number]] Collected: 12/28/22 1014     Updated: 12/28/22 1649    Narrative:      MRI EXAMINATION OF THE BRAIN WITHOUT CONTRAST AND MRI EXAMINATION OF THE  LUMBAR SPINE WITHOUT CONTRAST      HISTORY: Falls, legs give out, possible head trauma, left leg pain.  Colon cancer.     COMPARISON: CT head 12/27/2022.     MRI EXAMINATION OF THE BRAIN WITHOUT CONTRAST:     TECHNIQUE: An MRI examination of the brain was performed utilizing  sagittal T1, axial diffusion, T1, T2 and T2 FLAIR imaging.     FINDINGS: The diffusion sequence demonstrates an area of increased  signal intensity involving of the body of the caudate nucleus on the  right suspicious for an acute infarct measuring 2.2 mm in size.     Moderate small vessel ischemic disease is noted. Scattered lacunar  infarcts involving the corona radiata are noted. There is no evidence of  hydrocephalus.     The axial T2 FLAIR sequence demonstrates increased signal intensity  likely representing a subdural fluid collection (less likely focal dural  thickening) overlying the left parietal and occipital lobes posteriorly.  This is mildly T1 hyperintense and measures approximately 2.5 mm in  thickness. This was not evident on the CT examination of 12/27/2022.  There is expected flow-void in the basilar artery and in the distal  aspect of the internal carotid arteries bilaterally.       Impression:      1.  There is a 2.2 mm area of diffusion weighted hyperintensity  involving the body of the caudate nucleus on the right suspicious for an  acute infarct.  2.  A 2.5 mm thick plane of T2 FLAIR hyperintensity is appreciated  overlying the left parietal and occipital lobes posteriorly suspicious  for a thin subdural.  3.  Moderate small vessel ischemic disease and scattered lacunar  infarcts involving the corona radiata are noted.  4.  No obvious metastatic disease is appreciated on this noncontrasted  MRI examination of the brain. The sensitivity of this study for  evaluation of metastatic disease is reduced given the lack of contrast.        MRI EXAMINATION OF THE LUMBAR SPINE WITHOUT CONTRAST:     The patient has undergone fusion from L2 to L4 with  bilateral  transpedicular screws and posterior rods. A decompressive laminectomy at  L3 and L4 is noted. There is a grade 1 anterolisthesis of L4 upon L5  which appears similar to the MRI examination of 05/27/2015.     There is mild to moderate prominence of the posterior aspect of the  discs and endplates at T11-T12. This appears to be slightly more  prominent as compared to the prior examination. There is flattening and  deformity of the thoracic cord. Further evaluation could be performed  with a dedicated MRI examination of the thoracic spine.     L1-L2: A mild broad-based disc osteophyte complex is present with no  evidence of herniation.     L2-L3: A grade 1 retrolisthesis of L2 upon L3 is appreciated estimated  to be approximately 1-2 mm. A disc osteophyte complex is present  extending into the neural foramen on the left slightly less prominent as  compared to the preoperative examination of 05/27/2015. There is no  evidence of central canal stenosis.     L3-L4: A decompressive laminectomy is noted with no evidence of central  canal stenosis. Mild foraminal stenosis is present bilaterally secondary  to extension of a small disc osteophyte complex into the neural foramen,  similar in appearance as compared to the prior examination.     L4-L5: Moderate to severe facet degenerative disease is present  bilaterally. A mild central disc bulge is present with no evidence of  herniation. Mild foraminal stenosis is present bilaterally secondary to  anterolisthesis of L4 upon L5 and extension of disc material into the  neural foramen.     L5-S1: Moderate to severe facet degenerative disease is present  bilaterally. A broad-based disc osteophyte complex is present resulting  in mild flattening of the ventral surface of the thecal sac. Moderate  foraminal stenosis is present on the right secondary to loss of disc  height and extension of a disc osteophyte complex into the neural  foramen, similar in appearance as  compared to the prior examination.     The axial T2 sequence demonstrates fusiform enlargement of the  infrarenal abdominal aorta which measures approximately 3.5 cm in the  maximum transverse dimension.     IMPRESSION: The patient is status post fusion from L2 to L4 and  multilevel decompressive laminectomies as described above. See above.  There is no evidence of a focal disc herniation. Multilevel degenerative  disease is noted including moderate to severe facet degenerative disease  at L4-L5 and L5-S1 and moderate foraminal stenosis to the right at  L5-S1. Foraminal stenosis to the right at L5-S1 appears similar to the  MRI examination of the lumbar spine performed on 05/27/2015. No obvious  metastatic disease is appreciated on this noncontrasted MRI examination  of the lumbar spine.     The above information was called to the patient's nurse at the time of  the dictation. The patient's nurse is to immediately relay the  information to the clinical service.     This report was finalized on 12/28/2022 4:46 PM by Dr. Kirk Matias M.D.            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  This is why his legs are weak.  He also has orthostatic hypotension secondary to diabetic autonomic neuropathy  Also he has suffered a concussion with a tiny left posterior hemispheric subdural hematoma being followed by neurosurgery  Next he does have an acute right hemisphere stroke subcortically and I am going to check carotid Dopplers which will tell us how the flow is in both carotids as well as the vertebrobasilar system.  He has elevated creatinine and I am not going to order a CTA.  Note this patient has had falls and has been on Xarelto but at this point it is not  clear if he will be able to remain on the Xarelto.  If the risks outweigh the benefits.  This patient needs to be at least on Plavix 1 p.o. daily.      Hany Leon MD  12/29/22  15:53 EST

## 2022-12-29 NOTE — PROGRESS NOTES
"    Patient Name: Osvaldo Lopez  :1935  87 y.o.      Patient Care Team:  Caio Rodríguez MD as PCP - General (Family Medicine)  Dontrell Arellano MD as Surgeon (Orthopedic Surgery)  Angelo Driscoll MD as Consulting Physician (Cardiology)  Darvin Alvarez MD as Consulting Physician (Urology)  Caio Rodríguez MD as Referring Physician (Family Medicine)    Chief Complaint: follow up elevated troponin.     Interval History: he is sitting up in the chair. He walked down the alcantar yesterday and this morning with physical therapy. His breathing was labored when he got back to his room. He did not have any chest pain. Breathing is pretty close to baseline per his report.     Orthostatics this morning positive. Lying 143/69  78       Sitting 125/77   80             Standing 108/68  92     Objective   Vital Signs  Temp:  [97.7 °F (36.5 °C)-98.2 °F (36.8 °C)] 97.7 °F (36.5 °C)  Heart Rate:  [70-92] 92  Resp:  [18-20] 20  BP: (100-156)/(48-80) 108/68    Intake/Output Summary (Last 24 hours) at 2022 1117  Last data filed at 2022 0838  Gross per 24 hour   Intake 150 ml   Output 350 ml   Net -200 ml     Flowsheet Rows    Flowsheet Row First Filed Value   Admission Height 177.8 cm (70\") Documented at 2022 1603   Admission Weight 109 kg (240 lb) Documented at 2022 1603          Physical Exam:   General Appearance:    Alert, cooperative, in no acute distress   Lungs:     Clear to auscultation.  Normal respiratory effort and rate.      Heart:    Regular rhythm and normal rate, normal S1 and S2, no murmurs, gallops or rubs.     Chest Wall:    No abnormalities observed   Abdomen:     Soft, nontender, positive bowel sounds.     Extremities:   no cyanosis, clubbing or edema.  No marked joint deformities.  Adequate musculoskeletal strength.       Results Review:    Results from last 7 days   Lab Units 22  0545   SODIUM mmol/L 139   POTASSIUM mmol/L 4.2   CHLORIDE mmol/L 106   CO2 mmol/L 27.5   BUN " mg/dL 35*   CREATININE mg/dL 1.75*   GLUCOSE mg/dL 129*   CALCIUM mg/dL 8.6     Results from last 7 days   Lab Units 12/29/22  0545 12/28/22  0702 12/27/22  1717   CK TOTAL U/L  --   --  218*   TROPONIN T ng/mL 0.151* 0.207* 0.229*     Results from last 7 days   Lab Units 12/29/22  0544   WBC 10*3/mm3 4.27   HEMOGLOBIN g/dL 11.4*   HEMATOCRIT % 34.1*   PLATELETS 10*3/mm3 90*     Results from last 7 days   Lab Units 12/29/22  0544 12/28/22  0702 12/27/22  1717   INR  1.05 1.33* 2.00*     Results from last 7 days   Lab Units 12/29/22  0545   MAGNESIUM mg/dL 2.2                   Medication Review:   amiodarone, 200 mg, Oral, Daily  finasteride, 5 mg, Oral, Daily  insulin lispro, 0-9 Units, Subcutaneous, TID AC  levothyroxine, 25 mcg, Oral, Q AM  lidocaine, 1 patch, Transdermal, Q24H  Menthol-Zinc Oxide, 1 application, Topical, TID  metoprolol succinate XL, 25 mg, Oral, Daily  multivitamin, 1 tablet, Oral, QAM  tamsulosin, 0.8 mg, Oral, Daily  vitamin B-12, 1,000 mcg, Oral, Daily         sodium chloride, 100 mL/hr, Last Rate: 100 mL/hr (12/29/22 0838)        Assessment & Plan   1. 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  2. Small subdural hematoma over the left parieto-occipital area (traumatic secondary to mechanical fall)  3. Acute on chronic kidney disease, volume deplete and getting IVF today per nephrology.  4. Coronary artery disease status post CABG x 5 1999  5. Elevated troponin, likely combination of demand from stroke and acute kidney injury. It is down trending. Echocardiogram showed ejection fraction of 60-65% without regional wall motional abnormalities. There is a chronic component to this as well.   6. Multiple recent mechanical falls , multifactorial . Neurology evaluation is ongoing.  7. 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 Chronic thrombocytopenia  9. Paroxysmal typical atrial flutter, on amiodarone. Has see Dr. Talbot for this.    10. Mild aortic stenosis  12. Right bundle branch block     - Will need to resume anticoagulation when safe from a neurologic standpoint.   - IVF per nephrology.    - He has dyspnea on exertion that is near baseline per his report.    - Continue low dose beta blocker.    LOU Wesley  [facility]  12/29/22  11:17 EST

## 2022-12-29 NOTE — PROGRESS NOTES
[facility] Acute [facility]  Call received from [facility]. Pt's dgt now prefers [facility] for subacute Rehab and does not want Acute Rehab. Will sign off    Shaina Mata RN  Acute Rehab Admission Nurse

## 2022-12-29 NOTE — PLAN OF CARE
Goal Outcome Evaluation:   Vital signs stable.  Patient evaluated by physical therapy, please see their note. Patient up with 1 strong assist, walker, gait belt, safety maintained. Small wounds on L and R inner buttocks seen by wound care nurse, orders received. On room air throughout shift.  Orthostatics taken, negative result.  Post-void bladder scan taken, 0 ml noted. New MRI's ordered for cervical and thoracic spine.

## 2022-12-30 ENCOUNTER — APPOINTMENT (OUTPATIENT)
Dept: CARDIOLOGY | Facility: HOSPITAL | Age: 87
DRG: 64 | End: 2022-12-30
Payer: MEDICARE

## 2022-12-30 LAB
ALBUMIN SERPL-MCNC: 3.6 G/DL (ref 3.5–5.2)
ANION GAP SERPL CALCULATED.3IONS-SCNC: 8 MMOL/L (ref 5–15)
BASOPHILS # BLD AUTO: 0.02 10*3/MM3 (ref 0–0.2)
BASOPHILS NFR BLD AUTO: 0.5 % (ref 0–1.5)
BH CV XLRA MEAS LEFT DIST CCA EDV: -9.4 CM/SEC
BH CV XLRA MEAS LEFT DIST CCA PSV: -78.6 CM/SEC
BH CV XLRA MEAS LEFT DIST ICA EDV: -10.7 CM/SEC
BH CV XLRA MEAS LEFT DIST ICA PSV: -71.8 CM/SEC
BH CV XLRA MEAS LEFT ICA/CCA RATIO: -2.6
BH CV XLRA MEAS LEFT MID ICA EDV: -19.6 CM/SEC
BH CV XLRA MEAS LEFT MID ICA PSV: -104 CM/SEC
BH CV XLRA MEAS LEFT PROX CCA EDV: 14.7 CM/SEC
BH CV XLRA MEAS LEFT PROX CCA PSV: 90.9 CM/SEC
BH CV XLRA MEAS LEFT PROX ECA EDV: -11.7 CM/SEC
BH CV XLRA MEAS LEFT PROX ECA PSV: -199 CM/SEC
BH CV XLRA MEAS LEFT PROX ICA EDV: 32.7 CM/SEC
BH CV XLRA MEAS LEFT PROX ICA PSV: 208 CM/SEC
BH CV XLRA MEAS LEFT PROX SCLA PSV: 232 CM/SEC
BH CV XLRA MEAS LEFT VERTEBRAL A EDV: 17.1 CM/SEC
BH CV XLRA MEAS LEFT VERTEBRAL A PSV: 65.4 CM/SEC
BH CV XLRA MEAS RIGHT DIST CCA EDV: 14.1 CM/SEC
BH CV XLRA MEAS RIGHT DIST CCA PSV: 94.4 CM/SEC
BH CV XLRA MEAS RIGHT DIST ICA EDV: -10.6 CM/SEC
BH CV XLRA MEAS RIGHT DIST ICA PSV: -69.2 CM/SEC
BH CV XLRA MEAS RIGHT ICA/CCA RATIO: -1.09
BH CV XLRA MEAS RIGHT MID ICA EDV: -18.8 CM/SEC
BH CV XLRA MEAS RIGHT MID ICA PSV: -93.8 CM/SEC
BH CV XLRA MEAS RIGHT PROX CCA EDV: -12.9 CM/SEC
BH CV XLRA MEAS RIGHT PROX CCA PSV: -97.9 CM/SEC
BH CV XLRA MEAS RIGHT PROX ECA EDV: -28.8 CM/SEC
BH CV XLRA MEAS RIGHT PROX ECA PSV: -170 CM/SEC
BH CV XLRA MEAS RIGHT PROX ICA EDV: -25.8 CM/SEC
BH CV XLRA MEAS RIGHT PROX ICA PSV: -103 CM/SEC
BH CV XLRA MEAS RIGHT PROX SCLA PSV: 155 CM/SEC
BH CV XLRA MEAS RIGHT VERTEBRAL A EDV: 9.8 CM/SEC
BH CV XLRA MEAS RIGHT VERTEBRAL A PSV: 51.5 CM/SEC
BUN SERPL-MCNC: 34 MG/DL (ref 8–23)
BUN/CREAT SERPL: 19.4 (ref 7–25)
CALCIUM SPEC-SCNC: 8.5 MG/DL (ref 8.6–10.5)
CHLORIDE SERPL-SCNC: 103 MMOL/L (ref 98–107)
CO2 SERPL-SCNC: 28 MMOL/L (ref 22–29)
CREAT SERPL-MCNC: 1.75 MG/DL (ref 0.76–1.27)
DEPRECATED RDW RBC AUTO: 50 FL (ref 37–54)
EGFRCR SERPLBLD CKD-EPI 2021: 37.7 ML/MIN/1.73
EOSINOPHIL # BLD AUTO: 0.11 10*3/MM3 (ref 0–0.4)
EOSINOPHIL NFR BLD AUTO: 2.8 % (ref 0.3–6.2)
ERYTHROCYTE [DISTWIDTH] IN BLOOD BY AUTOMATED COUNT: 12.9 % (ref 12.3–15.4)
GLUCOSE BLDC GLUCOMTR-MCNC: 127 MG/DL (ref 70–130)
GLUCOSE BLDC GLUCOMTR-MCNC: 164 MG/DL (ref 70–130)
GLUCOSE BLDC GLUCOMTR-MCNC: 177 MG/DL (ref 70–130)
GLUCOSE BLDC GLUCOMTR-MCNC: 185 MG/DL (ref 70–130)
GLUCOSE SERPL-MCNC: 136 MG/DL (ref 65–99)
HCT VFR BLD AUTO: 34.5 % (ref 37.5–51)
HGB BLD-MCNC: 11.1 G/DL (ref 13–17.7)
INR PPP: 1.03 (ref 0.9–1.1)
LYMPHOCYTES # BLD AUTO: 1.26 10*3/MM3 (ref 0.7–3.1)
LYMPHOCYTES NFR BLD AUTO: 32.4 % (ref 19.6–45.3)
MAGNESIUM SERPL-MCNC: 2.3 MG/DL (ref 1.6–2.4)
MAXIMAL PREDICTED HEART RATE: 135 BPM
MCH RBC QN AUTO: 34.3 PG (ref 26.6–33)
MCHC RBC AUTO-ENTMCNC: 32.2 G/DL (ref 31.5–35.7)
MCV RBC AUTO: 106.5 FL (ref 79–97)
MONOCYTES # BLD AUTO: 0.34 10*3/MM3 (ref 0.1–0.9)
MONOCYTES NFR BLD AUTO: 8.7 % (ref 5–12)
NEUTROPHILS NFR BLD AUTO: 2.15 10*3/MM3 (ref 1.7–7)
NEUTROPHILS NFR BLD AUTO: 55.3 % (ref 42.7–76)
PHOSPHATE SERPL-MCNC: 3.5 MG/DL (ref 2.5–4.5)
PLATELET # BLD AUTO: 93 10*3/MM3 (ref 140–450)
PMV BLD AUTO: 9.6 FL (ref 6–12)
POTASSIUM SERPL-SCNC: 4.2 MMOL/L (ref 3.5–5.2)
PROTHROMBIN TIME: 13.6 SECONDS (ref 11.7–14.2)
RBC # BLD AUTO: 3.24 10*6/MM3 (ref 4.14–5.8)
SARS-COV-2 RNA RESP QL NAA+PROBE: NOT DETECTED
SODIUM SERPL-SCNC: 139 MMOL/L (ref 136–145)
STRESS TARGET HR: 115 BPM
URATE SERPL-MCNC: 9.8 MG/DL (ref 3.4–7)
WBC NRBC COR # BLD: 3.89 10*3/MM3 (ref 3.4–10.8)

## 2022-12-30 PROCEDURE — U0003 INFECTIOUS AGENT DETECTION BY NUCLEIC ACID (DNA OR RNA); SEVERE ACUTE RESPIRATORY SYNDROME CORONAVIRUS 2 (SARS-COV-2) (CORONAVIRUS DISEASE [COVID-19]), AMPLIFIED PROBE TECHNIQUE, MAKING USE OF HIGH THROUGHPUT TECHNOLOGIES AS DESCRIBED BY CMS-2020-01-R: HCPCS | Performed by: HOSPITALIST

## 2022-12-30 PROCEDURE — 97530 THERAPEUTIC ACTIVITIES: CPT

## 2022-12-30 PROCEDURE — 83735 ASSAY OF MAGNESIUM: CPT | Performed by: INTERNAL MEDICINE

## 2022-12-30 PROCEDURE — 85025 COMPLETE CBC W/AUTO DIFF WBC: CPT | Performed by: INTERNAL MEDICINE

## 2022-12-30 PROCEDURE — 85610 PROTHROMBIN TIME: CPT | Performed by: NURSE PRACTITIONER

## 2022-12-30 PROCEDURE — 84550 ASSAY OF BLOOD/URIC ACID: CPT | Performed by: INTERNAL MEDICINE

## 2022-12-30 PROCEDURE — 82962 GLUCOSE BLOOD TEST: CPT

## 2022-12-30 PROCEDURE — 99231 SBSQ HOSP IP/OBS SF/LOW 25: CPT

## 2022-12-30 PROCEDURE — 99232 SBSQ HOSP IP/OBS MODERATE 35: CPT | Performed by: NURSE PRACTITIONER

## 2022-12-30 PROCEDURE — U0005 INFEC AGEN DETEC AMPLI PROBE: HCPCS | Performed by: HOSPITALIST

## 2022-12-30 PROCEDURE — 93880 EXTRACRANIAL BILAT STUDY: CPT

## 2022-12-30 PROCEDURE — 99231 SBSQ HOSP IP/OBS SF/LOW 25: CPT | Performed by: PSYCHIATRY & NEUROLOGY

## 2022-12-30 PROCEDURE — 80069 RENAL FUNCTION PANEL: CPT | Performed by: INTERNAL MEDICINE

## 2022-12-30 RX ORDER — TAMSULOSIN HYDROCHLORIDE 0.4 MG/1
0.4 CAPSULE ORAL DAILY
Status: DISCONTINUED | OUTPATIENT
Start: 2022-12-31 | End: 2022-12-31 | Stop reason: HOSPADM

## 2022-12-30 RX ORDER — METOPROLOL SUCCINATE 25 MG/1
12.5 TABLET, EXTENDED RELEASE ORAL DAILY
Status: DISCONTINUED | OUTPATIENT
Start: 2022-12-31 | End: 2022-12-31 | Stop reason: HOSPADM

## 2022-12-30 RX ADMIN — LEVOTHYROXINE SODIUM 25 MCG: 0.03 TABLET ORAL at 06:21

## 2022-12-30 RX ADMIN — ANORECTAL OINTMENT 1 APPLICATION: 15.7; .44; 24; 20.6 OINTMENT TOPICAL at 20:32

## 2022-12-30 RX ADMIN — AMIODARONE HYDROCHLORIDE 200 MG: 200 TABLET ORAL at 08:22

## 2022-12-30 RX ADMIN — TRAMADOL HYDROCHLORIDE 50 MG: 50 TABLET, COATED ORAL at 23:10

## 2022-12-30 RX ADMIN — METOPROLOL SUCCINATE 25 MG: 25 TABLET, EXTENDED RELEASE ORAL at 08:22

## 2022-12-30 RX ADMIN — ANORECTAL OINTMENT 1 APPLICATION: 15.7; .44; 24; 20.6 OINTMENT TOPICAL at 08:23

## 2022-12-30 RX ADMIN — TAMSULOSIN HYDROCHLORIDE 0.8 MG: 0.4 CAPSULE ORAL at 08:22

## 2022-12-30 RX ADMIN — Medication 10 ML: at 08:23

## 2022-12-30 RX ADMIN — ATORVASTATIN CALCIUM 40 MG: 20 TABLET, FILM COATED ORAL at 08:25

## 2022-12-30 RX ADMIN — Medication 1 TABLET: at 06:21

## 2022-12-30 RX ADMIN — Medication 1000 MCG: at 08:22

## 2022-12-30 RX ADMIN — FINASTERIDE 5 MG: 5 TABLET, FILM COATED ORAL at 08:22

## 2022-12-30 RX ADMIN — ANORECTAL OINTMENT 1 APPLICATION: 15.7; .44; 24; 20.6 OINTMENT TOPICAL at 17:18

## 2022-12-30 RX ADMIN — LIDOCAINE 1 PATCH: 50 PATCH TOPICAL at 08:21

## 2022-12-30 NOTE — PROGRESS NOTES
[facility] Progress Note      Patient Name: Osvaldo Lopez  : 1935  MRN: [id number]  Primary Care Physician:  Caio Rodríguez MD  Date of admission: 2022    Subjective     Interval History:   F/u CRISTOBAL CKD3     Review of Systems:   1L IVF given yest for orthostasis.  Denies dizziness.  No dyspnea    Objective     Vitals:   Temp:  [97.9 °F (36.6 °C)-98.4 °F (36.9 °C)] 97.9 °F (36.6 °C)  Heart Rate:  [64-84] 84  Resp:  [18-20] 18  BP: (122-152)/(51-69) 152/69    Intake/Output Summary (Last 24 hours) at 2022 0825  Last data filed at 2022 2100  Gross per 24 hour   Intake --   Output 270 ml   Net -270 ml       Physical Exam:    General Appearance: alert, comfortable  Neck: supple, no JVD  Lungs: CTA bilat no rales  Heart: RRR, normal S1 and S2  Abdomen: soft, nontender, nondistended  : no palpable bladder  Extremities: trace ankle edema, no cyanosis or clubbing  Neuro: normal speech and mental status     Scheduled Meds:     amiodarone, 200 mg, Oral, Daily  atorvastatin, 40 mg, Oral, Daily  finasteride, 5 mg, Oral, Daily  insulin lispro, 0-9 Units, Subcutaneous, TID AC  levothyroxine, 25 mcg, Oral, Q AM  lidocaine, 1 patch, Transdermal, Q24H  Menthol-Zinc Oxide, 1 application, Topical, TID  metoprolol succinate XL, 25 mg, Oral, Daily  multivitamin, 1 tablet, Oral, QAM  tamsulosin, 0.8 mg, Oral, Daily  vitamin B-12, 1,000 mcg, Oral, Daily      IV Meds:        Results Reviewed:   I have personally reviewed the results from the time of this admission to 2022 08:25 EST     Results from last 7 days   Lab Units 22  0604 22  0545 22  0702 22  1717   SODIUM mmol/L 139 139 140 138   POTASSIUM mmol/L 4.2 4.2 4.4 4.8   CHLORIDE mmol/L 103 106 105 103   CO2 mmol/L 28.0 27.5 25.4 26.3   BUN mg/dL 34* 35* 36* 41*   CREATININE mg/dL 1.75* 1.75* 1.85* 2.06*   CALCIUM mg/dL 8.5* 8.6 8.3* 9.0   BILIRUBIN mg/dL  --   --  0.4 0.5   ALK PHOS U/L  --   --   106 122*   ALT (SGPT) U/L  --   --  32 41   AST (SGOT) U/L  --   --  27 35   GLUCOSE mg/dL 136* 129* 145* 146*     Estimated Creatinine Clearance: 38.2 mL/min (A) (by C-G formula based on SCr of 1.75 mg/dL (H)).  Results from last 7 days   Lab Units 12/30/22  0604 12/29/22  0545 12/27/22  1717   MAGNESIUM mg/dL 2.3 2.2 2.4   PHOSPHORUS mg/dL 3.5 3.6  --      Results from last 7 days   Lab Units 12/30/22  0604 12/29/22  0545   URIC ACID mg/dL 9.8* 9.6*     Results from last 7 days   Lab Units 12/30/22  0604 12/29/22  0544 12/28/22  0702 12/27/22  1717   WBC 10*3/mm3 3.89 4.27 5.14 5.57   HEMOGLOBIN g/dL 11.1* 11.4* 11.5* 12.3*   PLATELETS 10*3/mm3 93* 90* 79* 89*     Results from last 7 days   Lab Units 12/30/22  0604 12/29/22  0544 12/28/22  0702 12/27/22  1717   INR  1.03 1.05 1.33* 2.00*       Assessment / Plan     ASSESSMENT:  -Chronic kidney disease stage IIIb appears to be at baseline associated with diabetic and hypertensive glomerulosclerosis.  Cr stable 1.7, c/w BL 1.7 - 2 range.   Orthostatic yesterday prompting IVF.  Has mild periph edema and lasix been on hold (takes 40mg daily @ home).  Lytes stable    -Diabetes mellitus type 2 + retinopathy  -HTN - BP stable on toprol XL, don't need/want tight control given age/frailty and orthostasis  -Coronary artery disease prior 5 vessel CABG in the 1990s  -Urinary incontinence + BPH, on flomax/finasteride   -Anemia associate with chronic kidney disease hemoglobin 11.4 and .9 iron saturation 14% and ferritin 230.  -Thrombocytopenia, platelet 93k  -s/p fall  -normal LVSF on echo 12/2822    PLAN:  Recheck orthostatics, if normal, we could restart lasix at lower dose upon discharge ie 20mg  Note plan for rehab placement   D/W RN    Addendum: D/W RN, SBP dropped 140 to 100 with orthostatics.  Asymptomatic.  I don't think additional IVF would help.  Will dec flomax 0.8 to 0.4 mg and hold diuretic still.  Dec toprol XL 12.5 mg daily.        Gordon Cee,  MD  12/30/22  08:25 EST    [facility]  [phone]

## 2022-12-30 NOTE — PROGRESS NOTES
[facility] NEUROSURGERY PROGRESS NOTE    PATIENT IDENTIFICATION:   Name:  Osvaldo Lopez      MRN:  [id number]     87 y.o.  male               CC:SDH, mid-low back pain with left sided weakness      Subjective     Interval History:  Reports doing well. Continues to endorse L weakness. Denies HA. On Xarelto at home for A fib, has been held.    ROS:  Constitutional: No fever, chills  HEENT: No headache  GI: No nausea, vomiting  Neuro: No new numbness, tingling, or weakness    Objective     Vital signs in last 24 hours:  Temp:  [97.9 °F (36.6 °C)-98.4 °F (36.9 °C)] 98 °F (36.7 °C)  Heart Rate:  [64-95] 80  Resp:  [16-20] 17  BP: (122-152)/(51-84) 146/84      Intake/Output this shift:  I/O this shift:  In: 170 [P.O.:170]  Out: 200 [Urine:200]    Intake/Output last 3 shifts:  I/O last 3 completed shifts:  In: 150 [P.O.:150]  Out: 470 [Urine:470]    LABS:  Results from last 7 days   Lab Units 12/30/22  0604 12/29/22  0544 12/28/22  0702 12/27/22  1717   WBC 10*3/mm3 3.89 4.27 5.14 5.57   HEMOGLOBIN g/dL 11.1* 11.4* 11.5* 12.3*   HEMATOCRIT % 34.5* 34.1* 34.3* 36.6*   PLATELETS 10*3/mm3 93* 90* 79* 89*   MONOCYTES % %  --   --   --  4.0*     Results from last 7 days   Lab Units 12/30/22  0604 12/29/22  0545 12/28/22  0702 12/27/22  1717   SODIUM mmol/L 139 139 140 138   POTASSIUM mmol/L 4.2 4.2 4.4 4.8   CHLORIDE mmol/L 103 106 105 103   CO2 mmol/L 28.0 27.5 25.4 26.3   BUN mg/dL 34* 35* 36* 41*   CREATININE mg/dL 1.75* 1.75* 1.85* 2.06*   CALCIUM mg/dL 8.5* 8.6 8.3* 9.0   BILIRUBIN mg/dL  --   --  0.4 0.5   ALK PHOS U/L  --   --  106 122*   ALT (SGPT) U/L  --   --  32 41   AST (SGOT) U/L  --   --  27 35   GLUCOSE mg/dL 136* 129* 145* 146*         IMAGING STUDIES:  CT Head Without Contrast    Result Date: 12/29/2022  1. No significant change when compared yesterday's MRI of the brain 12/28/2022 at 7:53 AM. 2. There is mild-to-moderate small vessel disease in cerebral white matter. 3. The 4 x 3 mm acute lacunar infarct in  the body of the right caudate nucleus seen on yesterday's MRI is too small to see on the current noncontrast head CT. 4. There is an extremely subtle focal very thin band of minimal hyperdensity projecting over the subdural space over the posterior inferior tip of the left parietal lobe that measures 2 mm in thickness and is very focal, corresponds to this very thin focal subdural seen on yesterday's MRI and is much better demonstrated on yesterday's MRI of the brain and exerts no mass effect on the posterior left parietal lobe. Followup to resolution suggested. The remainder of the head CT is normal.  Radiation dose reduction techniques were utilized, including automated exposure control and exposure modulation based on body size.  This report was finalized on 12/29/2022 3:16 PM by Dr. Rohan Wells M.D.      MRI Cervical Spine Without Contrast    Result Date: 12/28/2022   Multilevel relatively mild canal narrowing is appreciated within the cervical spine as has been discussed in detail above. Multilevel foraminal narrowing is additionally noted and includes moderate right C3-4, mild-to-moderate right C4-5, moderate to severe left C4-5, severe right C5-6, mild left C5-6, mild-to-moderate left C6-7, and moderate severe right C6-7 foraminal stenosis.  There is no convincing evidence to suggest an acute traumatic abnormality to the cervical spine. There is subtle T1 and T2 hyperintense signal within the anterior aspect of the C4-5 disc space which I strongly suspect is due to degenerative phenomena rather than representing an acute injury of the disc space as there is no prevertebral edema at this site. Nonetheless, correlation with clinical data is suggested.  This report was finalized on 12/28/2022 9:34 PM by Dr. Jose David Augustin M.D.      MRI Thoracic Spine Without Contrast    Result Date: 12/29/2022   There is no evidence for acute fracture or bony malalignment involving the thoracic spine. However, there are acute to  subacute fractures involving the left posterior medial 9th and 10th ribs.  Degenerative phenomena within the thoracic spine result in up to a mild-to-moderate degree of canal stenosis at the T8-9 level secondary to a left central disc protrusion. The remaining degenerative phenomena within the thoracic spine are as discussed in detail above.  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 A similar diameter was appreciated on the prior CT scan of the chest dated 04/14/2019.  This report was finalized on 12/29/2022 7:30 PM by Dr. Jose David Augustin M.D.        I personally viewed and interpreted the patient's MRI thoracic spine, agree with rad.    Meds reviewed/changed: Yes      Physical Exam:    General:   Awake, alert, oriented x3. Speech clear with no aphasia  HEENT:    NC; AT    CN II:    Visual acuity intact, visual threat intact  CN III IV VI:   PERRL  CN V:    Normal facial sensation  CN VII:    Facial movements are symmetric without weakness  CN VIII:   Hearing intact  CN X, XI:   Palate rise symmetric  CN XII:    Tongue without deviation    Motor:    5/5 bilateral DF/PF and iliopsoas strength.   Sensation:   Normal to light touch bilateral LE and UE  Station and Gait:  Deferred to PT d/t recent surgery    Assessment & Plan     ASSESSMENT:      Generalized weakness    Essential hypertension    Type 2 diabetes mellitus, without long-term current use of insulin (HCC)    Typical atrial flutter (HCC)    Anemia    Chronic anticoagulation    Coronary arteriosclerosis    Stage 3b chronic kidney disease (HCC)    Elevated troponin    Recurrent falls    Obesity (BMI 30-39.9)    Thrombocytopenia, unspecified (HCC)    Acute CVA (cerebrovascular accident) (HCC)    Subdural hematoma, acute    Patient with mid to low back pain as well as a small subdural hemorrhage following a fall.  As for his subdural hemorrhage, it was stable on repeat imaging yesterday.  His Xarelto has  "been held.  He remains neurologically intact.  For his mid to low back pain, he states the pain is more so in his low back where his surgery was initially.  MRIs of his spine do not explain his symptoms. No neurosurgical intervention for his back pain/weakness.  For his subdural hematoma, he is okay to restart his Xarelto tomorrow and we will follow him up in the clinic in 2 weeks with a repeat CT head.    PLAN:   No neurosurgical intervention for his back pain  Follow up in clinic in 2 weeks with repeat CT head  OK to restart Xarelto tomorrow    I discussed the patient's findings and my recommendations with patient, family and Dr. Jenkins.       LOS: 3 days       Steffany Singh PA-C  12/30/2022  11:04 EST    \"Dictated utilizing Dragon dictation\".      "

## 2022-12-30 NOTE — PLAN OF CARE
Problem: Fall Injury Risk  Goal: Absence of Fall and Fall-Related Injury  Intervention: Promote Injury-Free Environment  Recent Flowsheet Documentation  Taken 12/30/2022 0600 by Laura Whittaker RN  Safety Promotion/Fall Prevention:   activity supervised   assistive device/personal items within reach   clutter free environment maintained   fall prevention program maintained   gait belt   lighting adjusted   mobility aid in reach   nonskid shoes/slippers when out of bed   room organization consistent   safety round/check completed  Taken 12/30/2022 0400 by Laura Whittaker RN  Safety Promotion/Fall Prevention:   activity supervised   assistive device/personal items within reach   clutter free environment maintained   fall prevention program maintained   gait belt   lighting adjusted   mobility aid in reach   nonskid shoes/slippers when out of bed   safety round/check completed   room organization consistent  Taken 12/30/2022 0240 by Laura Whittaker RN  Safety Promotion/Fall Prevention:   activity supervised   assistive device/personal items within reach   clutter free environment maintained   fall prevention program maintained   gait belt   lighting adjusted   mobility aid in reach   nonskid shoes/slippers when out of bed   room organization consistent   safety round/check completed  Taken 12/29/2022 2125 by Laura Whittaker, RN  Safety Promotion/Fall Prevention:   activity supervised   assistive device/personal items within reach   clutter free environment maintained   fall prevention program maintained   lighting adjusted   mobility aid in reach   nonskid shoes/slippers when out of bed   room organization consistent   safety round/check completed   Goal Outcome Evaluation:   Pt remains free from falls this shift

## 2022-12-30 NOTE — PLAN OF CARE
Problem: Skin Injury Risk Increased  Goal: Skin Health and Integrity  Outcome: Ongoing, Progressing  Intervention: Optimize Skin Protection  Recent Flowsheet Documentation  Taken 12/30/2022 1400 by Eron Decker RN  Pressure Reduction Techniques:   frequent weight shift encouraged   weight shift assistance provided   positioned off wounds  Head of Bed (HOB) Positioning: HOB at 30-45 degrees  Pressure Reduction Devices: pressure-redistributing mattress utilized  Skin Protection:   adhesive use limited   transparent dressing maintained  Taken 12/30/2022 1200 by Eron Decker RN  Head of Bed (HOB) Positioning: HOB at 30-45 degrees  Taken 12/30/2022 1000 by Eron Decker RN  Head of Bed (HOB) Positioning: HOB at 30-45 degrees  Taken 12/30/2022 0800 by Eron Decker RN  Pressure Reduction Techniques:   frequent weight shift encouraged   weight shift assistance provided   pressure points protected  Head of Bed (HOB) Positioning: HOB at 45 degrees  Pressure Reduction Devices: pressure-redistributing mattress utilized  Skin Protection:   adhesive use limited   transparent dressing maintained     Problem: Fall Injury Risk  Goal: Absence of Fall and Fall-Related Injury  Outcome: Ongoing, Progressing  Intervention: Identify and Manage Contributors  Recent Flowsheet Documentation  Taken 12/30/2022 1400 by Eron Decker RN  Medication Review/Management: medications reviewed  Taken 12/30/2022 1200 by Eron Decker RN  Medication Review/Management: medications reviewed  Taken 12/30/2022 1000 by Eron Decker RN  Medication Review/Management: medications reviewed  Taken 12/30/2022 0800 by Eron Decker RN  Medication Review/Management: medications reviewed  Intervention: Promote Injury-Free Environment  Recent Flowsheet Documentation  Taken 12/30/2022 1400 by Eron Decker RN  Safety Promotion/Fall Prevention: safety round/check completed  Taken 12/30/2022 1200 by Eron Decker RN  Safety Promotion/Fall  Prevention: safety round/check completed  Taken 12/30/2022 1000 by Eron Decker, RN  Safety Promotion/Fall Prevention: safety round/check completed  Taken 12/30/2022 0800 by Eron Decker, RN  Safety Promotion/Fall Prevention: safety round/check completed   Goal Outcome Evaluation:      Pt vss, alert and oriented x 4, pleasant/cooperative w/ care.  Back pain cont to be somewhat of an issue, lidocaine patch applied.  NIH 0, neuro (-).  Had a carotid doppler ultrasound today.  Covid swabbed (-).  Ivf started at n.s. 100ml/h.

## 2022-12-30 NOTE — PROGRESS NOTES
Patient Identification:  NAME:  Osvaldo Lopez  Age:  87 y.o.   Sex:  male   :  1935   MRN:  [id number]       Chief complaint: Acute right hemisphere subcortical stroke, embolic stroke, thin left hemisphere subdural hematoma    History of present illness: His carotid arteries are both open and the vertebral arteries are antegrade bilaterally.  Neurosurgery has cleared him to go back on the Xarelto      Past medical history:  Past Medical History:   Diagnosis Date   • Abdominal aortic aneurysm (AAA) without rupture    • Abdominal aortic ectasia (HCC) 2015   • Abnormal EKG 10/25/2016    2019   • Abnormal thyroid blood test    • Actinic keratosis     FOLLOWED BY DR. MANPREET VILLARREAL   • Anemia 2021   • Arthritis    • Asthma     MILD, INTERMITTENT   • Atherosclerotic heart disease    • Atrial flutter with rapid ventricular response ([facility]) 2019    ADMITTED TO [facility]   • Atrial premature depolarization    • Atrophy of muscle of lower leg    • BPH (benign prostatic hyperplasia)     FOLLOWED BY DR. TERRA JONES   • Bruises easily    • Bulging of thoracic intervertebral disc    • Carpal tunnel syndrome, right 2019    FOLLOWED BY DR. NEW SANCHEZ   • Cataract     BILATERAL, S/P EXTRACTION WITH IOL IMPLANTS   • Chronic anticoagulation 2021   • Chronic edema    • Chronic low back pain with sciatica 2021   • Chronic pain    • CKD (chronic kidney disease) 2021   • Closed head injury 2018    WITH LACERATION TO SCALP, D/T SYNCOPE   • Colon cancer ([facility]) 2021    INVASIVE ADENOCARCINOMA FROM TUBULOVILLOUS ADENOMA IN TRANSVERSE, S/P COLON RESECTION, FOLLOWED BY DR. MARGARITA COX   • Colon polyps     FOLLOWED BY DR. MARGARITA COX   • Constipation due to opioid therapy    • Coronary artery disease    • DDD (degenerative disc disease), thoracic    • 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HCC)    • Diverticulosis    • Enlarged prostate     FOLLOWED BY DR. TERRA JONES   • Epididymitis, right 2018    • Essential hypertension    • Eyebrow ptosis 11/09/2013   • Hallux valgus, acquired, bilateral 01/2019   • Heart attack ([facility]) 09/1989    S/P CABG, 5 VESSEL   • Heart murmur    • History of blood transfusion    • HL (hearing loss)    • Hyperactivity of bladder     FOLLOWED BY DR. TERRA JONES   • HyperCKemia 11/2017   • Hyperkalemia 08/2016   • Hyperlipidemia     MIXED   • Hyperreflexia 11/2017    BILATERAL   • Hyphema 05/2015   • IBS (irritable bowel syndrome)    • Impaired functional mobility, balance, gait, and endurance    • Impingement syndrome of left shoulder 10/2015   • Infection associated with cystostomy catheter ([facility]) 12/2016   • Ischemic colitis ([facility])    • Lumbar radiculopathy 2016    wears back brace at times following back surgery   • Lumbar spondylosis 04/2016   • Lumbosacral neuritis 05/2015   • Lumbosacral radiculitis 05/2015   • Macular puckering of retina, left 10/2013   • 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 VIANEY GIORDANO   • Pseudophakia 11/09/2013   • RBBB (right bundle branch block)    • Recurrent falls    • Respiratory failure with hypoxia ([facility]) 01/2019   • SCC (squamous cell carcinoma) 10/16/2019    RIGHT FOREHEAD, LEFT TEMPLE, RIGHT SCALP, FOLLOWED BY DR. MANPREET VILLARREAL   • Scoliosis    • Spinal stenosis of lumbar region with neurogenic claudication 12/07/2017   • Syncope and collapse 07/16/2018    ADMITTED TO [facility]   • Torn rotator cuff 03/2017    BILATERAL, COMPLETE   • Type 2 diabetes mellitus ([facility])     IDDM   • Urinary  frequency     FOLLOWED BY DR. TERRA JONES   • Vitamin D deficiency    • Vitreous hemorrhage of left eye (HCC)        Allergies:  Amiodarone and Percocet [oxycodone-acetaminophen]    Home medications:  Medications Prior to Admission   Medication Sig Dispense Refill Last Dose   • acetaminophen (TYLENOL) 500 MG tablet Take 500 mg by mouth Every 6 (Six) Hours As Needed for Mild Pain .   12/27/2022   • Alpha-Lipoic Acid 600 MG tablet Take 600 mg by mouth Daily. For neuropathy 30 tablet 11 12/27/2022   • amiodarone (PACERONE) 200 MG tablet TAKE 1 TABLET DAILY. 90 tablet 1 12/27/2022   • ferrous sulfate 325 (65 FE) MG tablet Take 325 mg by mouth Daily With Breakfast.   12/27/2022   • finasteride (PROSCAR) 5 MG tablet TAKE 1 TABLET DAILY FOR    PROSTATE 90 tablet 3 12/27/2022   • furosemide (LASIX) 40 MG tablet TAKE 1 TABLET DAILY 90 tablet 3 12/26/2022   • glucose blood (Contour Next Test) test strip 1 each by Other route Daily. test blood sugar 50 each 5    • levothyroxine (SYNTHROID, LEVOTHROID) 25 MCG tablet Take 1 tablet by mouth Daily. 90 tablet 1 12/27/2022   • lisinopril (PRINIVIL,ZESTRIL) 20 MG tablet TAKE 1 TABLET DAILY 90 tablet 3 12/27/2022   • metoprolol succinate XL (TOPROL-XL) 25 MG 24 hr tablet TAKE 1 TABLET DAILY 90 tablet 3 12/27/2022   • Multiple Vitamin (MULTI VITAMIN PO) Take 1 tablet by mouth Every Morning.   12/27/2022   • tamsulosin (FLOMAX) 0.4 MG capsule 24 hr capsule TAKE 2 CAPSULES DAILY 180 capsule 3 12/27/2022   • Tradjenta 5 MG tablet tablet TAKE 1 TABLET DAILY 90 tablet 3 12/27/2022   • traMADol (ULTRAM) 50 MG tablet TAKE 1 TABLET BY MOUTH EVERY 6 (SIX) HOURS AS NEEDED FOR MODERATE PAIN OR SEVERE PAIN 30 tablet 1 12/27/2022   • vitamin B-12 (CYANOCOBALAMIN) 1000 MCG tablet Take 1,000 mcg by mouth Daily.   12/27/2022   • Xarelto 15 MG tablet TAKE 1 TABLET DAILY 90 tablet 3 12/26/2022   • KYRA MICROLET LANCETS lancets USE TWICE A  each 5 Unknown   • lidocaine (LMX) 4 % cream     Unknown        Hospital medications:  amiodarone, 200 mg, Oral, Daily  atorvastatin, 40 mg, Oral, Daily  finasteride, 5 mg, Oral, Daily  insulin lispro, 0-9 Units, Subcutaneous, TID AC  levothyroxine, 25 mcg, Oral, Q AM  lidocaine, 1 patch, Transdermal, Q24H  Menthol-Zinc Oxide, 1 application, Topical, TID  [START ON 12/31/2022] metoprolol succinate XL, 12.5 mg, Oral, Daily  multivitamin, 1 tablet, Oral, QAM  [START ON 12/31/2022] tamsulosin, 0.4 mg, Oral, Daily  vitamin B-12, 1,000 mcg, Oral, Daily         •  acetaminophen  •  aluminum-magnesium hydroxide-simethicone  •  dextrose  •  dextrose  •  glucagon (human recombinant)  •  melatonin  •  nitroglycerin  •  ondansetron **OR** ondansetron  •  [COMPLETED] Insert Peripheral IV **AND** sodium chloride  •  sodium chloride  •  traMADol      Objective:  Vitals Ranges:   Temp:  [97.9 °F (36.6 °C)-98.4 °F (36.9 °C)] 98.3 °F (36.8 °C)  Heart Rate:  [64-95] 72  Resp:  [16-20] 17  BP: (122-152)/(53-84) 140/65      Physical Exam:  She is awake alert and well-oriented.  Normal cranial nerves II through VII tongue is midline good  strength and toe wiggle reflexes trace toes downgoing    Results review:   I reviewed the patient's new clinical results.    Data review:  Lab Results (last 24 hours)     Procedure Component Value Units Date/Time    POC Glucose Once [[id number]]  (Abnormal) Collected: 12/30/22 1127    Specimen: Blood Updated: 12/30/22 1134     Glucose 177 mg/dL      Comment: Meter: [id number] : [id number] Tom SIERRA       COVID PRE-OP / PRE-PROCEDURE SCREENING ORDER (NO ISOLATION) - Swab, Nasopharynx [[id number]]  (Normal) Collected: 12/30/22 1011    Specimen: Swab from Nasopharynx Updated: 12/30/22 1056    Narrative:      The following orders were created for panel order COVID PRE-OP / PRE-PROCEDURE SCREENING ORDER (NO ISOLATION) - Swab, Nasopharynx.  Procedure                               Abnormality         Status                     ---------                                -----------         ------                     COVID-19, [facility] IN-HOUSE...[[id number]]  Normal              Final result                 Please view results for these tests on the individual orders.    COVID-19,[facility] IN-HOUSE CEPHEID/GRACIE NP SWAB IN TRANSPORT MEDIA 8-12 HR TAT - Swab, Nasopharynx [[id number]]  (Normal) Collected: 12/30/22 1011    Specimen: Swab from Nasopharynx Updated: 12/30/22 1056     COVID19 Not Detected    Narrative:      Fact sheet for providers: https://www.fda.gov/media/436198/download     Fact sheet for patients: https://www.fda.gov/media/615478/download    CBC & Differential [[id number]]  (Abnormal) Collected: 12/30/22 0604    Specimen: Blood Updated: 12/30/22 0739    Narrative:      The following orders were created for panel order CBC & Differential.  Procedure                               Abnormality         Status                     ---------                               -----------         ------                     CBC Auto Differential[[id number]]        Abnormal            Final result                 Please view results for these tests on the individual orders.    CBC Auto Differential [[id number]]  (Abnormal) Collected: 12/30/22 0604    Specimen: Blood Updated: 12/30/22 0739     WBC 3.89 10*3/mm3      RBC 3.24 10*6/mm3      Hemoglobin 11.1 g/dL      Hematocrit 34.5 %      .5 fL      MCH 34.3 pg      MCHC 32.2 g/dL      RDW 12.9 %      RDW-SD 50.0 fl      MPV 9.6 fL      Platelets 93 10*3/mm3      Neutrophil % 55.3 %      Lymphocyte % 32.4 %      Monocyte % 8.7 %      Eosinophil % 2.8 %      Basophil % 0.5 %      Neutrophils, Absolute 2.15 10*3/mm3      Lymphocytes, Absolute 1.26 10*3/mm3      Monocytes, Absolute 0.34 10*3/mm3      Eosinophils, Absolute 0.11 10*3/mm3      Basophils, Absolute 0.02 10*3/mm3     Renal Function Panel [[id number]]  (Abnormal) Collected: 12/30/22 0604    Specimen: Blood Updated: 12/30/22 0652     Glucose 136 mg/dL      BUN  34 mg/dL      Creatinine 1.75 mg/dL      Sodium 139 mmol/L      Potassium 4.2 mmol/L      Chloride 103 mmol/L      CO2 28.0 mmol/L      Calcium 8.5 mg/dL      Albumin 3.6 g/dL      Phosphorus 3.5 mg/dL      Anion Gap 8.0 mmol/L      BUN/Creatinine Ratio 19.4     eGFR 37.7 mL/min/1.73      Comment: National Kidney Foundation and American Society of Nephrology (ASN) Task Force recommended calculation based on the Chronic Kidney Disease Epidemiology Collaboration (CKD-EPI) equation refit without adjustment for race.       Narrative:      GFR Normal >60  Chronic Kidney Disease <60  Kidney Failure <15    The GFR formula is only valid for adults with stable renal function between ages 18 and 70.    Uric Acid [[id number]]  (Abnormal) Collected: 12/30/22 0604    Specimen: Blood Updated: 12/30/22 0652     Uric Acid 9.8 mg/dL     Magnesium [[id number]]  (Normal) Collected: 12/30/22 0604    Specimen: Blood Updated: 12/30/22 0652     Magnesium 2.3 mg/dL     Protime-INR [[id number]]  (Normal) Collected: 12/30/22 0604    Specimen: Blood Updated: 12/30/22 0644     Protime 13.6 Seconds      INR 1.03    POC Glucose Once [[id number]]  (Normal) Collected: 12/30/22 0612    Specimen: Blood Updated: 12/30/22 0614     Glucose 127 mg/dL      Comment: Meter: [id number] : [id number] Mill33me Sibite NA       POC Glucose Once [[id number]]  (Abnormal) Collected: 12/29/22 2126    Specimen: Blood Updated: 12/29/22 2127     Glucose 207 mg/dL      Comment: Meter: [id number] : [id number] Kpintchame Sibite NA       POC Glucose Once [[id number]]  (Abnormal) Collected: 12/29/22 1735    Specimen: Blood Updated: 12/29/22 1736     Glucose 187 mg/dL      Comment: Meter: [id number] : [id number] Baylee SIERRA       Immunoglobulin Free LT Chains Blood [[id number]]  (Abnormal) Collected: 12/28/22 1527    Specimen: Blood Updated: 12/29/22 1612     Free Light Chain, Kappa 30.9 mg/L      Free Lambda Light Chains 22.3 mg/L      Kappa/Lambda Ratio  1.39    Narrative:      Performed at:  [address]  [id number]  : Jero Ayala PhD, Phone:  [phone]           Imaging:  Imaging Results (Last 24 Hours)     Procedure Component Value Units Date/Time    MRI Thoracic Spine Without Contrast [[id number]] Collected: 12/29/22 1800     Updated: 12/29/22 1933    Narrative:      MRI OF THE THORACIC SPINE WITHOUT CONTRAST     CLINICAL HISTORY: Frequent falls, weakness, and mid back pain.     TECHNIQUE: MRI of the thoracic spine was obtained with sagittal T1,  proton-density, and T2-weighted images. Additionally, there are axial T1  and T2-weighted images through the thoracic spine.     FINDINGS:     There is no evidence to suggest acute fracture or bony malalignment  involving the thoracic spine. There are acute to subacute fractures  involving the left posterior medial 9th and 10th ribs.     At T7-8, there is a small left central disc protrusion mildly indenting  the ventral subarachnoid space.  At T8-9, there is a small-to-moderate  size left central disc protrusion which results in a mild-to-moderate  degree of canal stenosis.  At T10-11, there is a small left central disc  protrusion mildly indenting the ventral subarachnoid space.  At T11-12,  there is a disc bulge and a central disc protrusion which result in a  mild-to-moderate degree of canal stenosis in conjunction with  hypertrophy of the posterior elements at T11-12.     The study is compromised by motion artifact compromising the assessment  of thoracic spinal cord signal.     There is aneurysmal dilatation of the descending thoracic aorta  measuring up to 4.6 cm in diameter. Mural thrombus is appreciated along  the left lateral aspect of the aneurysmally dilated thoracic aorta. A  similar aortic diameter was seen on the prior chest CT dated 04/14/2019.       Impression:         There is no evidence for acute fracture or bony malalignment involving  the  thoracic spine. However, there are acute to subacute fractures  involving the left posterior medial 9th and 10th ribs.     Degenerative phenomena within the thoracic spine result in up to a  mild-to-moderate degree of canal stenosis at the T8-9 level secondary to  a left central disc protrusion. The remaining degenerative phenomena  within the thoracic spine are as discussed in detail above.     Incidental note is made of a descending thoracic aortic aneurysm  measuring up to 4.6 cm in diameter. A similar diameter was appreciated  on the prior CT scan of the chest dated 04/14/2019.     This report was finalized on 12/29/2022 7:30 PM by Dr. Jose David Augustin M.D.       CT Head Without Contrast [[id number]] Collected: 12/29/22 0919     Updated: 12/29/22 1519    Narrative:      NONCONTRAST HEAD CT ON 12/29/2022     CLINICAL HISTORY: Follow-up subdural seen on yesterday's MRI of the  brain 12/28/2022 at 7:53 AM     TECHNIQUE: Spiral CT images were obtained from the base of the skull to  the vertex without intravenous contrast and images were reformatted and  are submitted in 3 mm thick axial, sagittal and coronal CT sections with  brain algorithm     COMPARISON: This is correlated to a prior MRI of the brain from [facility] yesterday on 12/28/2022 at 7:53 AM and noncontrast  head CT 2 days ago, 12/27/2022 at 5:36 PM.     FINDINGS: There is some mild patchy low-density extending from the  periventricular into the subcortical white matter of the cerebral  hemispheres consistent with mild-to-moderate small vessel disease.  Patient has a small 4 x 3 mm acute infarct in the body of the right  caudate nucleus seen on yesterday's MRI, difficult to appreciate on this  head CT due to its small size. The remainder of the brain parenchyma is  normal in attenuation. The ventricles are normal in size. I see no mass  effect and no midline shift. Projecting over the posterior inferior left  parietal lobe is a very subtle  band of minimal hyperdensity projecting  over the extra-axial subdural space, it is very focal in nature and  measures 2 mm in thickness from inner table skull to posterior inferior  medial tip of the left parietal lobe and measures 10 mm in craniocaudal  dimension and is best seen on sagittal image 37. It is extremely subtle.  It is much better demonstrated on yesterday's MRI of the brain. No  additional intracranial hemorrhage is seen. The paranasal sinuses and  mastoid air cells and middle ear cavities are clear.       Impression:      1. No significant change when compared yesterday's MRI of the brain  12/28/2022 at 7:53 AM.  2. There is mild-to-moderate small vessel disease in cerebral white  matter.  3. The 4 x 3 mm acute lacunar infarct in the body of the right caudate  nucleus seen on yesterday's MRI is too small to see on the current  noncontrast head CT.  4. There is an extremely subtle focal very thin band of minimal  hyperdensity projecting over the subdural space over the posterior  inferior tip of the left parietal lobe that measures 2 mm in thickness  and is very focal, corresponds to this very thin focal subdural seen on  yesterday's MRI and is much better demonstrated on yesterday's MRI of  the brain and exerts no mass effect on the posterior left parietal lobe.  Followup to resolution suggested. The remainder of the head CT is  normal.      Radiation dose reduction techniques were utilized, including automated  exposure control and exposure modulation based on body size.     This report was finalized on 12/29/2022 3:16 PM by Dr. Rohan Wells M.D.              PPE worn at all times washed up before washed up afterwards was not within 6 feet of them for more than few minutes during my exam no aerosols used at any point  Assessment and Plan:     He looks great today.  He is awake alert moving all extremities.  He has suffered a tiny right caudate nucleus acute stroke but it is not readily apparent on  his exam he has a tiny subdural hygroma left parietal occipital region and neurosurgery is following that they think he is okay to go back on the Xarelto.  Note I got carotid Dopplers and it shows both carotids and both vertebral arteries are open and antegrade.  Therefore the etiology of the stroke still is most likely related to a cardiac source of embolism, therefore I agree with restarting the Xarelto.  Neurosurgery will be following up the subdural hematoma and neurology will sign off at this point.  Thanks      Hany Leon MD  12/30/22  15:03 EST

## 2022-12-30 NOTE — PROGRESS NOTES
"    Patient Name: Osvaldo Lopez  :1935  87 y.o.      Patient Care Team:  Caio Rodríguez MD as PCP - General (Family Medicine)  Dontrell Arellano MD as Surgeon (Orthopedic Surgery)  Angelo Driscoll MD as Consulting Physician (Cardiology)  Darvin Alvarez MD as Consulting Physician (Urology)  Caio Rodríguez MD as Referring Physician (Family Medicine)    Chief Complaint: follow up elevated troponin.     Interval History: He feels the same. Daughter is at bedside.     Objective   Vital Signs  Temp:  [97.9 °F (36.6 °C)-98.4 °F (36.9 °C)] 98.3 °F (36.8 °C)  Heart Rate:  [64-95] 72  Resp:  [16-20] 17  BP: (122-152)/(53-84) 140/65    Intake/Output Summary (Last 24 hours) at 2022 1250  Last data filed at 2022 1000  Gross per 24 hour   Intake 170 ml   Output 320 ml   Net -150 ml     Flowsheet Rows    Flowsheet Row First Filed Value   Admission Height 177.8 cm (70\") Documented at 2022 1603   Admission Weight 109 kg (240 lb) Documented at 2022 1603          Physical Exam:   General Appearance:    Alert, cooperative, in no acute distress   Lungs:     Clear to auscultation.  Normal respiratory effort and rate.      Heart:    Regular rhythm and normal rate, normal S1 and S2, no murmurs, gallops or rubs.     Chest Wall:    No abnormalities observed   Abdomen:     Soft, nontender, positive bowel sounds.     Extremities:   no cyanosis, clubbing or edema.  No marked joint deformities.  Adequate musculoskeletal strength.       Results Review:    Results from last 7 days   Lab Units 22  0604   SODIUM mmol/L 139   POTASSIUM mmol/L 4.2   CHLORIDE mmol/L 103   CO2 mmol/L 28.0   BUN mg/dL 34*   CREATININE mg/dL 1.75*   GLUCOSE mg/dL 136*   CALCIUM mg/dL 8.5*     Results from last 7 days   Lab Units 22  0545 22  0702 22  1717   CK TOTAL U/L  --   --  218*   TROPONIN T ng/mL 0.151* 0.207* 0.229*     Results from last 7 days   Lab Units 22  0604   WBC 10*3/mm3 3.89 "   HEMOGLOBIN g/dL 11.1*   HEMATOCRIT % 34.5*   PLATELETS 10*3/mm3 93*     Results from last 7 days   Lab Units 12/30/22  0604 12/29/22  0544 12/28/22  0702   INR  1.03 1.05 1.33*     Results from last 7 days   Lab Units 12/30/22  0604   MAGNESIUM mg/dL 2.3                   Medication Review:   amiodarone, 200 mg, Oral, Daily  atorvastatin, 40 mg, Oral, Daily  finasteride, 5 mg, Oral, Daily  insulin lispro, 0-9 Units, Subcutaneous, TID AC  levothyroxine, 25 mcg, Oral, Q AM  lidocaine, 1 patch, Transdermal, Q24H  Menthol-Zinc Oxide, 1 application, Topical, TID  [START ON 12/31/2022] metoprolol succinate XL, 12.5 mg, Oral, Daily  multivitamin, 1 tablet, Oral, QAM  [START ON 12/31/2022] tamsulosin, 0.4 mg, Oral, Daily  vitamin B-12, 1,000 mcg, Oral, Daily              Assessment & Plan   1. 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  2. Small subdural hematoma over the left parieto-occipital area (traumatic secondary to mechanical fall). Cleared by DAVID to restart rivaroxaban tomorrow.   3. Acute on chronic kidney disease, volume deplete status post IVF yesterday.  4. Coronary artery disease status post CABG x 5 1999  5. Elevated troponin, likely combination of demand from stroke and acute kidney injury. It is down trending. Echocardiogram showed ejection fraction of 60-65% without regional wall motional abnormalities. There is a chronic component to this as well.   6. Multiple recent mechanical falls , multifactorial   7. 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 Chronic thrombocytopenia  9. Paroxysmal typical atrial flutter, on amiodarone. Has seen Dr. Talbot for this.   10. Mild aortic stenosis  12. Right bundle branch block     - cleared to resume rivaroxaban tomorrow by neurosurgery.    - still orthostatic and beta blocker was reduced to 12.5 mg daily by nephrology.    - He has dyspnea on exertion that is near baseline per his report.     Cindy ROJO  LOU Gomez  [facility]  12/30/22  12:50 EST

## 2022-12-30 NOTE — PROGRESS NOTES
Discharge Planning Assessment  [facility]     Patient Name: Osvaldo Lopez  MRN: [id number]  Today's Date: 12/30/2022    Admit Date: 12/27/2022    Plan: [facility] skilled rehab has accepted and will have a bed tomorrow 12/31/2022   Discharge Needs Assessment    No documentation.                Discharge Plan     Row Name 12/30/22 1729       Plan    [facility] tomorrow 12/31/2022    Plan Comments Patient and daughter at bedside [facility] and daughter will transport. Packet in rosangela. Vinicius FERGUSON              Continued Care and Services - Admitted Since 12/27/2022     Destination     Service Provider Request Status Selected Services Address Phone Fax Patient Preferred    [facility] [address] [address] [phone] --    [facility] [employer] Pending - Request Sent N/A [address] [id number] [phone] --    [facility] Pending - No Request Sent N/A [address] [facility] [id number] [phone] -- --              Expected Discharge Date and Time     Expected Discharge Date Expected Discharge Time    Dec 30, 2022          Demographic Summary    No documentation.                Functional Status    No documentation.                Psychosocial    No documentation.                Abuse/Neglect    No documentation.                Legal    No documentation.                Substance Abuse    No documentation.                Patient Forms    No documentation.                   Symone Canseco, RN

## 2022-12-30 NOTE — PLAN OF CARE
Goal Outcome Evaluation:  Plan of Care Reviewed With: patient           Outcome Evaluation: Pt supine in bed, agreeable to therapy. Patient up to EOB with CGA, donned AFO and shoes, sit to stand with CGA and rwx with bed surface elevated, ambulated in hallway with rwx and CGA. Patient fatigued and slightly SOA at end of ambulation.

## 2022-12-30 NOTE — THERAPY TREATMENT NOTE
Patient Name: Osvaldo Lopez  : 1935    MRN: [id number]                              Today's Date: 2022       Admit Date: 2022    Visit Dx:     ICD-10-CM ICD-9-CM   1. Recurrent falls  R29.6 V15.88   2. 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HCC)  G61.82 357.89   3. Injury of head, initial encounter  S09.90XA 959.01   4. Injury of left lower extremity, initial encounter  S89.92XA 959.7   5. Abrasion of left elbow, initial encounter  S50.312A 913.0   6. Troponin level elevated  R77.8 790.6   7. Chronic renal failure, unspecified CKD stage  N18.9 585.9   8. 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 Other diabetic neurological complication associated with other specified diabetes mellitus ([facility])  E13.49 249.60   10. Follow-up exam  Z09 V67.9     Patient Active Problem List   Diagnosis   • Complete rotator cuff tear of left shoulder   • Abnormal finding on thyroid function test   • Abdominal aortic aneurysm   • Benign prostatic hyperplasia   • Essential hypertension   • Hyperlipidemia   • Shoulder pain   • Lumbar radiculopathy   • Type 2 diabetes mellitus, without long-term current use of insulin ([facility])   • OA (osteoarthritis) of knee   • Tear of right rotator cuff   • Spinal stenosis of lumbar region with neurogenic claudication   • Eyebrow ptosis   • Pseudophakia   • Nonrheumatic aortic valve stenosis   • Atrial flutter with rapid ventricular response ([facility])   • Right arm pain   • Leg weakness, bilateral   • Typical atrial flutter ([facility])   • Diabetic peripheral neuropathy ([facility])   • Muscle weakness (generalized)   • Pressure injury of left buttock, stage 1   • Chronic low back pain with sciatica   • 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[facility])   • Pressure injury of buttock, stage 1   • Anemia   • Symptomatic anemia   • Iron deficiency anemia   • Chronic anticoagulation   • CKD (chronic kidney disease)   • CRISTOBAL (acute kidney injury) ([facility])   • Colonic mass   • Axonal neuropathy   • Impairment of balance   • Post  laminectomy syndrome   • Coronary arteriosclerosis   • Edema   • History of malignant neoplasm of colon   • Prostatism   • Stage 3b chronic kidney disease (HCC)   • Pre-ulcerative calluses   • Elevated troponin   • Recurrent falls   • Generalized weakness   • Obesity (BMI 30-39.9)   • Thrombocytopenia, unspecified (HCC)   • Acute CVA (cerebrovascular accident) ([facility])   • Subdural hematoma, acute     Past Medical History:   Diagnosis Date   • Abdominal aortic aneurysm (AAA) without rupture    • Abdominal aortic ectasia ([facility]) 06/2015   • Abnormal EKG 10/25/2016    04/2019   • Abnormal thyroid blood test    • Actinic keratosis     FOLLOWED BY DR. MANPREET VILLARREAL   • Anemia 01/28/2021   • Arthritis    • Asthma     MILD, INTERMITTENT   • Atherosclerotic heart disease    • Atrial flutter with rapid ventricular response ([facility]) 04/14/2019    ADMITTED TO [facility]   • Atrial premature depolarization    • Atrophy of muscle of lower leg    • BPH (benign prostatic hyperplasia)     FOLLOWED BY DR. TERRA JONES   • Bruises easily    • Bulging of thoracic intervertebral disc    • Carpal tunnel syndrome, right 12/2019    FOLLOWED BY DR. NEW SANCHEZ   • Cataract     BILATERAL, S/P EXTRACTION WITH IOL IMPLANTS   • Chronic anticoagulation 01/29/2021   • Chronic edema    • Chronic low back pain with sciatica 01/26/2021   • Chronic pain    • CKD (chronic kidney disease) 01/29/2021   • Closed head injury 07/2018    WITH LACERATION TO SCALP, D/T SYNCOPE   • Colon cancer ([facility]) 01/31/2021    INVASIVE ADENOCARCINOMA FROM TUBULOVILLOUS ADENOMA IN TRANSVERSE, S/P COLON RESECTION, FOLLOWED BY DR. MARGARITA COX   • Colon polyps     FOLLOWED BY DR. 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 TERRA JONES   • Epididymitis, right 03/2018   • Essential hypertension    •  Eyebrow ptosis 11/09/2013   • Hallux valgus, acquired, bilateral 01/2019   • Heart attack ([facility]) 09/1989    S/P CABG, 5 VESSEL   • Heart murmur    • History of blood transfusion    • HL (hearing loss)    • Hyperactivity of bladder     FOLLOWED BY DR. TERRA JONES   • HyperCKemia 11/2017   • Hyperkalemia 08/2016   • Hyperlipidemia     MIXED   • Hyperreflexia 11/2017    BILATERAL   • Hyphema 05/2015   • IBS (irritable bowel syndrome)    • Impaired functional mobility, balance, gait, and endurance    • Impingement syndrome of left shoulder 10/2015   • Infection associated with cystostomy catheter ([facility]) 12/2016   • Ischemic colitis ([facility])    • Lumbar radiculopathy 2016    wears back brace at times following back surgery   • Lumbar spondylosis 04/2016   • Lumbosacral neuritis 05/2015   • Lumbosacral radiculitis 05/2015   • Macular puckering of retina, left 10/2013   • 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[facility]) 01/26/2021   • Muscle weakness (generalized)    • Myopathy 11/2017   • Nonrheumatic aortic valve stenosis     mild 1/2018    • Osteoarthritis     MULTIPLE SITES   • Other intervertebral disc disorders, lumbosacral region    • PAF (paroxysmal atrial fibrillation) ([facility])    • Plantar fascial fibromatosis 06/2018   • Post laminectomy syndrome    • Pressure injury of left buttock, stage 1 07/23/2020   • Prostatitis    • Protein S deficiency ([facility])     FOLLOWED BY DR. VIANEY GIORDANO   • Pseudophakia 11/09/2013   • RBBB (right bundle branch block)    • Recurrent falls    • Respiratory failure with hypoxia ([facility]) 01/2019   • SCC (squamous cell carcinoma) 10/16/2019    RIGHT FOREHEAD, LEFT TEMPLE, RIGHT SCALP, FOLLOWED BY DR. MANPREET VILLARREAL   • Scoliosis    • Spinal stenosis of lumbar region with neurogenic claudication 12/07/2017   • Syncope and collapse 07/16/2018    ADMITTED TO [facility]   • Torn rotator cuff 03/2017    BILATERAL, COMPLETE   • Type 2 diabetes mellitus ([facility])     IDDM   • Urinary frequency     FOLLOWED BY DR. ELLISON  ROBERT   • Vitamin D deficiency    • Vitreous hemorrhage of left eye (HCC)      Past Surgical History:   Procedure Laterality Date   • APPENDECTOMY N/A    • BROW LIFT Bilateral 11/12/2013    DR. OCTAVIA CEDILLO AT [place]   • CARDIAC CATHETERIZATION Left 10/2008    LEFT CAROTID ARTERY STENOSIS 50-69%   • CARDIAC ELECTROPHYSIOLOGY PROCEDURE N/A 06/06/2019    Procedure: Ablation atrial flutter;  Surgeon: Matthew Talbot MD;  Location:  [facility];  Service: Cardiovascular   • CATARACT EXTRACTION Left 01/22/1998    DR. LAYLA BARNES AT [place]   • CATARACT EXTRACTION Right 03/2001    DR. LAYLA BARNES   • CHOLECYSTECTOMY N/A 08/24/1989    DR. FAUZIA PELAEZ AT [place]   • COLON RESECTION N/A 02/03/2021    Procedure: LAPAROSCOPIC EXTENDED  RIGHT COLECTOMY WITH DAVINCI ROBOT;  Surgeon: Margarita Napoles MD;  Location: [facility] OR;  Service: DaVinci;  Laterality: N/A;   • COLONOSCOPY N/A 01/31/2021    20-25 MM POLYP IN CECUM, PATH: TUBULAR ADENOMA, 2 TUBULAR ADENOMA POLYPS IN ASCENDING, A FUNGATING AND SUBMUCOSAL ONONOBSTRUCTING MEDIUM MASS IN PROXIMAL TRANSVERSE, PATH: INVASIVE ADENOCARCINOMA ARISING IN A TUBULOVILOOUS ADENOMA, AREA TATTOOED, SCATTERED DIVERTICULA IN SIGMOID AND DESCENDING, LARGE INTERNAL HEMORRHOIDS, DR. JACOB ALICEA AT [facility]    • COLONOSCOPY N/A 02/02/2010    DIVERTICULOSIS, DR. SAL SOLANO AT [place]   • COLONOSCOPY N/A 08/15/2022    5 MM TUBULAR ADENOMA POLYP IN SIGMOID, 2 TUBULAR ADENOMA POLYPS IN DESCENDING, MULTIPLE DIVERTICULA IN SIGMOID, RESCOPE IN 2 YRS, DR. MARGARITA NAPOLES AT [facility]   • CORONARY ARTERY BYPASS GRAFT N/A 09/1989    5 VESSEL, DR. HANKS   • CYST REMOVAL      FROM BACK   • ENDOSCOPY N/A 01/31/2021    ENTIRE EGD WNL, PATH: MILD REACTIVE GASTROPATHY, DR. JACOB ALICEA AT [facility]   • INGUINAL HERNIA REPAIR Left 09/27/2007    DR. MATTHEW RUSH AT [place]   • INGUINAL HERNIA REPAIR Left 09/07/2007   • INGUINAL HERNIA REPAIR Left 2003   • KNEE ARTHROSCOPY W/ PARTIAL MEDIAL  MENISCECTOMY Left 1962    DR. SINGH   • LUMBAR DISCECTOMY FUSION INSTRUMENTATION N/A 04/11/2016    Procedure: lumbar laminectomy L4-5 and fusion with instrumentation;  Surgeon: Ran Kline MD;  Location: [facility];  Service:    • LUMBAR DISCECTOMY FUSION INSTRUMENTATION N/A 01/15/2018    Procedure: L2-3, L3-4 laminectomy and fusion with instrumentation and removal of implants L4 5.;  Surgeon: Ran Kline MD;  Location: [facility];  Service:    • LUMBAR EPIDURAL INJECTION N/A 09/23/2015    DR. MATTHEW DOMINGUEZ AT [facility]   • LUMBAR EPIDURAL INJECTION N/A 10/07/2015    DR. ITZEL [facility]   • LUMBAR EPIDURAL INJECTION N/A 11/19/2015    DR. ITZEL [facility] AT [facility]   • OK TOTAL KNEE ARTHROPLASTY Left 11/14/2016    Procedure: TOTAL KNEE ARTHROPLASTY;  Surgeon: Dontrell Arellano MD;  Location: [facility];  Service: Orthopedics   • SKIN BIOPSY Bilateral 10/16/2019    RIGHT LATERAL FOREHEAD:SCC, LEFT TEMPLE:SCC, RIGHT PARIETAL SCALP:SCC, DR. MANPREET VILLARREAL      General Information     Row Name 12/30/22 1555          Physical Therapy Time and Intention    Document Type therapy note (daily note)  -EM     Mode of Treatment individual therapy;physical therapy  -EM     Row Name 12/30/22 1559          General Information    Existing Precautions/Restrictions fall  -EM           User Key  (r) = Recorded By, (t) = Taken By, (c) = Cosigned By    Initials Name Provider Type    EM Cynthia Dodd PT Physical Therapist               Mobility     Row Name 12/30/22 1559          Bed Mobility    Supine-Sit Johnson (Bed Mobility) contact guard  -EM     Assistive Device (Bed Mobility) head of bed elevated  -EM     Row Name 12/30/22 1550          Sit-Stand Transfer    Sit-Stand Johnson (Transfers) contact guard  -EM     Assistive Device (Sit-Stand Transfers) walker, front-wheeled  -EM     Comment, (Sit-Stand Transfer) bed surface elevated, L AFO and shoes donned prior to ambulation  -EM     Row Name  12/30/22 1555          Gait/Stairs (Locomotion)    Sibley Level (Gait) contact guard  -EM     Assistive Device (Gait) walker, front-wheeled  -EM     Distance in Feet (Gait) 100  -EM     Deviations/Abnormal Patterns (Gait) gait speed decreased;stride length decreased  -EM     Bilateral Gait Deviations forward flexed posture  -EM           User Key  (r) = Recorded By, (t) = Taken By, (c) = Cosigned By    Initials Name Provider Type    EM Cynthia Dodd, PT Physical Therapist               Obj/Interventions    No documentation.                Goals/Plan    No documentation.                Clinical Impression     Row Name 12/30/22 1600          Pain    Pretreatment Pain Rating 3/10  -EM     Pain Location - back  -EM     Pre/Posttreatment Pain Comment states his back was starting to hurt at end of ambulation  -EM     Pain Intervention(s) Repositioned  -EM     Row Name 12/30/22 1600          Plan of Care Review    Plan of Care Reviewed With patient  -EM     Outcome Evaluation Pt supine in bed, agreeable to therapy. Patient up to EOB with CGA, donned AFO and shoes, sit to stand with CGA and rwx with bed surface elevated, ambulated in hallway with rwx and CGA. Patient fatigued and slightly SOA at end of ambulation.  -EM     Row Name 12/30/22 1600          Positioning and Restraints    Pre-Treatment Position in bed  -EM     Post Treatment Position chair  -EM     In Chair reclined;call light within reach;exit alarm on;with family/caregiver  -EM           User Key  (r) = Recorded By, (t) = Taken By, (c) = Cosigned By    Initials Name Provider Type    EM Cynthia Dodd, PT Physical Therapist               Outcome Measures     Row Name 12/30/22 1604          How much help from another person do you currently need...    Turning from your back to your side while in flat bed without using bedrails? 3  -EM     Moving from lying on back to sitting on the side of a flat bed without bedrails? 3  -EM     Moving to and  from a bed to a chair (including a wheelchair)? 3  -EM     Standing up from a chair using your arms (e.g., wheelchair, bedside chair)? 3  -EM     Climbing 3-5 steps with a railing? 3  -EM     To walk in hospital room? 3  -EM     AM-PAC 6 Clicks Score (PT) 18  -EM     Highest level of mobility 6 --> Walked 10 steps or more  -           User Key  (r) = Recorded By, (t) = Taken By, (c) = Cosigned By    Initials Name Provider Type    EM Cynthia Dodd, PT Physical Therapist                             Physical Therapy Education     Title: PT OT SLP Therapies (Done)     Topic: Physical Therapy (Done)     Point: Mobility training (Done)     Learning Progress Summary           Patient Acceptance, E, VU by  at 12/30/2022 1606    Eager, E, VU by  at 12/30/2022 0929    Acceptance, E, VU by  at 12/29/2022 1202    Acceptance, E,TB,D, VU,NR by  at 12/28/2022 1731                   Point: Home exercise program (Done)     Learning Progress Summary           Patient Eager, E, VU by  at 12/30/2022 0929    Acceptance, E,TB,D, VU,NR by  at 12/28/2022 1731                   Point: Body mechanics (Done)     Learning Progress Summary           Patient Eager, E, VU by  at 12/30/2022 0929    Acceptance, E,TB,D, VU,NR by  at 12/28/2022 1731                   Point: Precautions (Done)     Learning Progress Summary           Patient Eager, E, VU by  at 12/30/2022 0929    Acceptance, E,TB,D, VU,NR by  at 12/28/2022 1731                               User Key     Initials Effective Dates Name Provider Type Discipline     06/16/21 -  Cynthia Dodd, PT Physical Therapist PT     04/08/22 -  Vivi Mariano PT Physical Therapist PT     10/11/22 -  Eron Decker, RN Registered Nurse Nurse              PT Recommendation and Plan     Plan of Care Reviewed With: patient  Outcome Evaluation: Pt supine in bed, agreeable to therapy. Patient up to EOB with CGA, donned AFO and shoes, sit to stand with CGA and rwx  with bed surface elevated, ambulated in hallway with rwx and CGA. Patient fatigued and slightly SOA at end of ambulation.     Time Calculation:    PT Charges     Row Name 12/30/22 1608             Time Calculation    Start Time 1444  -EM      Stop Time 1459  -EM      Time Calculation (min) 15 min  -EM      PT Received On 12/30/22  -EM      PT - Next Appointment 01/01/23  -EM         Time Calculation- PT    Total Timed Code Minutes- PT 15 minute(s)  -EM         Timed Charges    64028 - PT Therapeutic Activity Minutes 15  -EM         Total Minutes    Timed Charges Total Minutes 15  -EM       Total Minutes 15  -EM            User Key  (r) = Recorded By, (t) = Taken By, (c) = Cosigned By    Initials Name Provider Type    EM Cynthia Dodd, PT Physical Therapist              Therapy Charges for Today     Code Description Service Date Service Provider Modifiers Qty    26223131472  PT THERAPEUTIC ACT EA 15 MIN 12/29/2022 Cynthia Dodd, PT GP 1    [id number] HC PT THERAPEUTIC ACT EA 15 MIN 12/30/2022 Cynthia Dodd, PT GP 1          PT G-Codes  Outcome Measure Options: Modified Parke  AM-PAC 6 Clicks Score (PT): 18  AM-PAC 6 Clicks Score (OT): 17  Modified Parke Scale: 4 - Moderately severe disability.  Unable to walk without assistance, and unable to attend to own bodily needs without assistance.  PT Discharge Summary  Anticipated Discharge Disposition (PT): skilled nursing facility    Cynthia oDdd PT  12/30/2022

## 2022-12-30 NOTE — PROGRESS NOTES
Name: Osvaldo Lopez ADMIT: 2022   : 1935  PCP: Caio Rodríguez MD    MRN: [id number] LOS: 3 days   AGE/SEX: 87 y.o. male  ROOM: [facility]     Subjective   Subjective   Denies any new complaint. Denies chest pain, shortness of breath.    Review of Systems   Constitutional: Negative for fever.   Respiratory: Negative for cough and shortness of breath.    Cardiovascular: Negative for chest pain.   Gastrointestinal: Negative for abdominal pain, diarrhea, nausea and vomiting.   Genitourinary: Negative for dysuria.   Neurological: Negative for headaches.      Objective   Objective   Vital Signs  Temp:  [97.9 °F (36.6 °C)-98.4 °F (36.9 °C)] 98.3 °F (36.8 °C)  Heart Rate:  [72-95] 75  Resp:  [16-19] 18  BP: (122-152)/(65-84) 141/67  SpO2:  [90 %-95 %] 90 %  on   ;   Device (Oxygen Therapy): room air  Body mass index is 34.44 kg/m².    Physical Exam  Constitutional:       General: He is not in acute distress.     Appearance: Normal appearance. He is not toxic-appearing.   HENT:      Head: Normocephalic and atraumatic.   Cardiovascular:      Rate and Rhythm: Normal rate and regular rhythm.   Pulmonary:      Effort: Pulmonary effort is normal. No respiratory distress.      Breath sounds: Normal breath sounds. No wheezing, rhonchi or rales.   Abdominal:      General: Bowel sounds are normal.      Palpations: Abdomen is soft.      Tenderness: There is no abdominal tenderness. There is no guarding or rebound.   Musculoskeletal:         General: No swelling.      Cervical back: Normal range of motion.      Right lower leg: No edema.      Left lower leg: No edema.   Skin:     General: Skin is warm and dry.   Neurological:      Mental Status: He is alert and oriented to person, place, and time.   Psychiatric:         Mood and Affect: Mood normal.         Behavior: Behavior normal.         Thought Content: Thought content normal.       Results Review  I reviewed the patient's new clinical results.  Results from last 7  days   Lab Units 12/30/22  0604 12/29/22  0544 12/28/22  0702 12/27/22  1717   WBC 10*3/mm3 3.89 4.27 5.14 5.57   HEMOGLOBIN g/dL 11.1* 11.4* 11.5* 12.3*   PLATELETS 10*3/mm3 93* 90* 79* 89*     Results from last 7 days   Lab Units 12/30/22  0604 12/29/22  0545 12/28/22  0702 12/27/22  1717   SODIUM mmol/L 139 139 140 138   POTASSIUM mmol/L 4.2 4.2 4.4 4.8   CHLORIDE mmol/L 103 106 105 103   CO2 mmol/L 28.0 27.5 25.4 26.3   BUN mg/dL 34* 35* 36* 41*   CREATININE mg/dL 1.75* 1.75* 1.85* 2.06*   GLUCOSE mg/dL 136* 129* 145* 146*     Lab Results   Component Value Date    ANIONGAP 8.0 12/30/2022     Estimated Creatinine Clearance: 38.2 mL/min (A) (by C-G formula based on SCr of 1.75 mg/dL (H)).    Results from last 7 days   Lab Units 12/30/22  0604 12/29/22  0545 12/28/22  0702 12/27/22  1717   ALBUMIN g/dL 3.6 3.4* 3.4* 3.8   BILIRUBIN mg/dL  --   --  0.4 0.5   ALK PHOS U/L  --   --  106 122*   AST (SGOT) U/L  --   --  27 35   ALT (SGPT) U/L  --   --  32 41     Results from last 7 days   Lab Units 12/30/22  0604 12/29/22  0545 12/28/22  0702 12/27/22  1717   CALCIUM mg/dL 8.5* 8.6 8.3* 9.0   ALBUMIN g/dL 3.6 3.4* 3.4* 3.8   MAGNESIUM mg/dL 2.3 2.2  --  2.4   PHOSPHORUS mg/dL 3.5 3.6  --   --        Hemoglobin A1C   Date/Time Value Ref Range Status   12/28/2022 0702 6.90 (H) 4.80 - 5.60 % Final     Glucose   Date/Time Value Ref Range Status   12/30/2022 1610 185 (H) 70 - 130 mg/dL Final     Comment:     Meter: [id number] : [id number] Tom SIERRA   12/30/2022 1127 177 (H) 70 - 130 mg/dL Final     Comment:     Meter: [id number] : [id number] Tom SIERRA   12/30/2022 0612 127 70 - 130 mg/dL Final     Comment:     Meter: [id number] : [id number] Moodyareli Pineda    12/29/2022 2126 207 (H) 70 - 130 mg/dL Final     Comment:     Meter: [id number] : [id number] Moodyme Sibite NA   12/29/2022 1735 187 (H) 70 - 130 mg/dL Final     Comment:     Meter: [id number] : [id number] Baylee Justice  NA   12/29/2022 1142 188 (H) 70 - 130 mg/dL Final     Comment:     Meter: [id number] : [id number] Baylee Justice NA   12/29/2022 0606 140 (H) 70 - 130 mg/dL Final     Comment:     Meter: [id number] : [id number] Mandie SIERRA     CT Head Without Contrast    Result Date: 12/29/2022  1. No significant change when compared yesterday's MRI of the brain 12/28/2022 at 7:53 AM. 2. There is mild-to-moderate small vessel disease in cerebral white matter. 3. The 4 x 3 mm acute lacunar infarct in the body of the right caudate nucleus seen on yesterday's MRI is too small to see on the current noncontrast head CT. 4. There is an extremely subtle focal very thin band of minimal hyperdensity projecting over the subdural space over the posterior inferior tip of the left parietal lobe that measures 2 mm in thickness and is very focal, corresponds to this very thin focal subdural seen on yesterday's MRI and is much better demonstrated on yesterday's MRI of the brain and exerts no mass effect on the posterior left parietal lobe. Followup to resolution suggested. The remainder of the head CT is normal.  Radiation dose reduction techniques were utilized, including automated exposure control and exposure modulation based on body size.  This report was finalized on 12/29/2022 3:16 PM by Dr. Rohan Wells M.D.      MRI Cervical Spine Without Contrast    Result Date: 12/28/2022   Multilevel relatively mild canal narrowing is appreciated within the cervical spine as has been discussed in detail above. Multilevel foraminal narrowing is additionally noted and includes moderate right C3-4, mild-to-moderate right C4-5, moderate to severe left C4-5, severe right C5-6, mild left C5-6, mild-to-moderate left C6-7, and moderate severe right C6-7 foraminal stenosis.  There is no convincing evidence to suggest an acute traumatic abnormality to the cervical spine. There is subtle T1 and T2 hyperintense signal within the anterior aspect  of the C4-5 disc space which I strongly suspect is due to degenerative phenomena rather than representing an acute injury of the disc space as there is no prevertebral edema at this site. Nonetheless, correlation with clinical data is suggested.  This report was finalized on 12/28/2022 9:34 PM by Dr. Jose David Augustin M.D.      MRI Thoracic Spine Without Contrast    Result Date: 12/29/2022   There is no evidence for acute fracture or bony malalignment involving the thoracic spine. However, there are acute to subacute fractures involving the left posterior medial 9th and 10th ribs.  Degenerative phenomena within the thoracic spine result in up to a mild-to-moderate degree of canal stenosis at the T8-9 level secondary to a left central disc protrusion. The remaining degenerative phenomena within the thoracic spine are as discussed in detail above.  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 A similar diameter was appreciated on the prior CT scan of the chest dated 04/14/2019.  This report was finalized on 12/29/2022 7:30 PM by Dr. Jose David Augustin M.D.      Scheduled Meds  amiodarone, 200 mg, Oral, Daily  atorvastatin, 40 mg, Oral, Daily  finasteride, 5 mg, Oral, Daily  insulin lispro, 0-9 Units, Subcutaneous, TID AC  levothyroxine, 25 mcg, Oral, Q AM  lidocaine, 1 patch, Transdermal, Q24H  Menthol-Zinc Oxide, 1 application, Topical, TID  [START ON 12/31/2022] metoprolol succinate XL, 12.5 mg, Oral, Daily  multivitamin, 1 tablet, Oral, QAM  [START ON 12/31/2022] tamsulosin, 0.4 mg, Oral, Daily  vitamin B-12, 1,000 mcg, Oral, Daily    Continuous Infusions   PRN Meds  •  acetaminophen  •  aluminum-magnesium hydroxide-simethicone  •  dextrose  •  dextrose  •  glucagon (human recombinant)  •  melatonin  •  nitroglycerin  •  ondansetron **OR** ondansetron  •  [COMPLETED] Insert Peripheral IV **AND** sodium chloride  •  sodium chloride  •  traMADol     Diet  Diet: Cardiac  Diets; Healthy Heart (2-3 Na+); Texture: Regular Texture (IDDSI 7); Fluid Consistency: Thin (IDDSI 0)    I have personally reviewed:  [x]  Laboratory   []  Microbiology   [x]  Radiology   []  EKG/Telemetry    []  Cardiology/Vascular   []  Pathology   []  Records     Results for orders placed during the hospital encounter of 12/27/22    Adult Transthoracic Echo Limited W/ Cont if Necessary Per Protocol    Interpretation Summary  •  Limited echocardiogram for left ventricular function.  •  Left ventricular systolic function is normal. Left ventricular ejection fraction appears to be 61 - 65%.  •  There is no evidence of pericardial effusion        Assessment/Plan     Active Hospital Problems    Diagnosis  POA   • **Generalized weakness [R53.1]  Yes   • Thrombocytopenia, unspecified (HCC) [D69.6]  Unknown   • Acute CVA (cerebrovascular accident) (HCC) [I63.9]  Unknown   • Subdural hematoma, acute [S06.5XAA]  Unknown   • Elevated troponin [R77.8]  Yes   • Recurrent falls [R29.6]  Not Applicable   • Obesity (BMI 30-39.9) [E66.9]  Yes   • Stage 3b chronic kidney disease (HCC) [N18.32]  Yes   • Coronary arteriosclerosis [I25.10]  Yes   • Chronic anticoagulation [Z79.01]  Not Applicable   • Anemia [D64.9]  Yes   • Typical atrial flutter (HCC) [I48.3]  Yes   • Essential hypertension [I10]  Yes   • Type 2 diabetes mellitus, without long-term current use of insulin (HCC) [E11.9]  Yes      Resolved Hospital Problems   No resolved problems to display.     87 y.o. 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 They feel stroke from cardiac source    Back pain  -Thoracic MRI  above  -Noted to have acute/subacute rib fractures he does report a fall a couple months ago but currently not having any rib pain on the left    Subdural hematoma  -Patient reports a recent fall he is not sure if he hit his head  -Per neurosurgery okay to resume Xarelto tomorrow  -repeat head CT no change  -Follow-up in clinic with head CT in 2 weeks    CAD, CABG 5 vessel, atrial flutter, dyspnea on exertion  -No chest pain  -Troponin elevations in the setting of CKD  -Anticoagulation on hold  -Echo above  -GRIJALVA baseline    4.6 cm descending thoracic aortic aneurysm  -Similar compared to April 14, 2019 CT    CKD3  - nephrology reduced beta-blocker due to orthostasis    DM2 with neuropathy  -A1c 6.90%  -Control is acceptable    Abnormal TSH 4.460  -Actually better than earlier this month and June of this year  -Continue levothyroxine follow-up TSH outpatient    Thrombocytopenia, anemia  -Thrombocytopenia not a new finding  -Continue to monitor  -B12, folate not low    SCDs for DVT prophylaxis. Resume Xarelto tomorrow    Discussed with patient, nursing staff, CCP and care team on multidisciplinary rounds    Discharge: SNF may be tomorrow    Sherwin Singh MD  [facility]  12/30/22

## 2022-12-31 VITALS
HEIGHT: 70 IN | SYSTOLIC BLOOD PRESSURE: 140 MMHG | TEMPERATURE: 97.8 F | DIASTOLIC BLOOD PRESSURE: 63 MMHG | BODY MASS INDEX: 34.36 KG/M2 | OXYGEN SATURATION: 90 % | WEIGHT: 240 LBS | RESPIRATION RATE: 18 BRPM | HEART RATE: 78 BPM

## 2022-12-31 LAB
ALBUMIN SERPL-MCNC: 3.4 G/DL (ref 3.5–5.2)
ANION GAP SERPL CALCULATED.3IONS-SCNC: 7.2 MMOL/L (ref 5–15)
BASOPHILS # BLD AUTO: 0.02 10*3/MM3 (ref 0–0.2)
BASOPHILS NFR BLD AUTO: 0.5 % (ref 0–1.5)
BUN SERPL-MCNC: 34 MG/DL (ref 8–23)
BUN/CREAT SERPL: 20.7 (ref 7–25)
CALCIUM SPEC-SCNC: 8.6 MG/DL (ref 8.6–10.5)
CHLORIDE SERPL-SCNC: 109 MMOL/L (ref 98–107)
CO2 SERPL-SCNC: 24.8 MMOL/L (ref 22–29)
CREAT SERPL-MCNC: 1.64 MG/DL (ref 0.76–1.27)
DEPRECATED RDW RBC AUTO: 47.6 FL (ref 37–54)
EGFRCR SERPLBLD CKD-EPI 2021: 40.7 ML/MIN/1.73
EOSINOPHIL # BLD AUTO: 0.13 10*3/MM3 (ref 0–0.4)
EOSINOPHIL NFR BLD AUTO: 3.2 % (ref 0.3–6.2)
ERYTHROCYTE [DISTWIDTH] IN BLOOD BY AUTOMATED COUNT: 12.6 % (ref 12.3–15.4)
GLUCOSE BLDC GLUCOMTR-MCNC: 136 MG/DL (ref 70–130)
GLUCOSE BLDC GLUCOMTR-MCNC: 174 MG/DL (ref 70–130)
GLUCOSE SERPL-MCNC: 153 MG/DL (ref 65–99)
HCT VFR BLD AUTO: 32.8 % (ref 37.5–51)
HGB BLD-MCNC: 11.3 G/DL (ref 13–17.7)
IMM GRANULOCYTES # BLD AUTO: 0.03 10*3/MM3 (ref 0–0.05)
IMM GRANULOCYTES NFR BLD AUTO: 0.7 % (ref 0–0.5)
INR PPP: 1 (ref 0.9–1.1)
LYMPHOCYTES # BLD AUTO: 1.08 10*3/MM3 (ref 0.7–3.1)
LYMPHOCYTES NFR BLD AUTO: 26.5 % (ref 19.6–45.3)
MCH RBC QN AUTO: 34.9 PG (ref 26.6–33)
MCHC RBC AUTO-ENTMCNC: 34.5 G/DL (ref 31.5–35.7)
MCV RBC AUTO: 101.2 FL (ref 79–97)
MONOCYTES # BLD AUTO: 0.28 10*3/MM3 (ref 0.1–0.9)
MONOCYTES NFR BLD AUTO: 6.9 % (ref 5–12)
NEUTROPHILS NFR BLD AUTO: 2.53 10*3/MM3 (ref 1.7–7)
NEUTROPHILS NFR BLD AUTO: 62.2 % (ref 42.7–76)
NRBC BLD AUTO-RTO: 0 /100 WBC (ref 0–0.2)
PHOSPHATE SERPL-MCNC: 3.2 MG/DL (ref 2.5–4.5)
PLATELET # BLD AUTO: 108 10*3/MM3 (ref 140–450)
PMV BLD AUTO: 9.8 FL (ref 6–12)
POTASSIUM SERPL-SCNC: 4.6 MMOL/L (ref 3.5–5.2)
PROTHROMBIN TIME: 13.3 SECONDS (ref 11.7–14.2)
RBC # BLD AUTO: 3.24 10*6/MM3 (ref 4.14–5.8)
SODIUM SERPL-SCNC: 141 MMOL/L (ref 136–145)
WBC NRBC COR # BLD: 4.07 10*3/MM3 (ref 3.4–10.8)

## 2022-12-31 PROCEDURE — 85610 PROTHROMBIN TIME: CPT | Performed by: NURSE PRACTITIONER

## 2022-12-31 PROCEDURE — 80069 RENAL FUNCTION PANEL: CPT | Performed by: INTERNAL MEDICINE

## 2022-12-31 PROCEDURE — 85025 COMPLETE CBC W/AUTO DIFF WBC: CPT | Performed by: INTERNAL MEDICINE

## 2022-12-31 PROCEDURE — 82962 GLUCOSE BLOOD TEST: CPT

## 2022-12-31 PROCEDURE — 63710000001 INSULIN LISPRO (HUMAN) PER 5 UNITS: Performed by: NURSE PRACTITIONER

## 2022-12-31 RX ORDER — TRAMADOL HYDROCHLORIDE 50 MG/1
50 TABLET ORAL EVERY 6 HOURS PRN
Qty: 12 TABLET | Refills: 0 | Status: SHIPPED | OUTPATIENT
Start: 2022-12-31

## 2022-12-31 RX ORDER — ATORVASTATIN CALCIUM 40 MG/1
40 TABLET, FILM COATED ORAL DAILY
Qty: 90 TABLET
Start: 2023-01-01

## 2022-12-31 RX ORDER — TAMSULOSIN HYDROCHLORIDE 0.4 MG/1
0.4 CAPSULE ORAL DAILY
Qty: 30 CAPSULE
Start: 2023-01-01

## 2022-12-31 RX ORDER — FUROSEMIDE 40 MG/1
20 TABLET ORAL DAILY
Qty: 90 TABLET | Refills: 3
Start: 2022-12-31

## 2022-12-31 RX ORDER — METOPROLOL SUCCINATE 25 MG/1
12.5 TABLET, EXTENDED RELEASE ORAL DAILY
Start: 2023-01-01

## 2022-12-31 RX ADMIN — Medication 10 ML: at 08:38

## 2022-12-31 RX ADMIN — TAMSULOSIN HYDROCHLORIDE 0.4 MG: 0.4 CAPSULE ORAL at 08:35

## 2022-12-31 RX ADMIN — Medication 1000 MCG: at 08:35

## 2022-12-31 RX ADMIN — ANORECTAL OINTMENT 1 APPLICATION: 15.7; .44; 24; 20.6 OINTMENT TOPICAL at 08:38

## 2022-12-31 RX ADMIN — AMIODARONE HYDROCHLORIDE 200 MG: 200 TABLET ORAL at 08:35

## 2022-12-31 RX ADMIN — ATORVASTATIN CALCIUM 40 MG: 20 TABLET, FILM COATED ORAL at 08:35

## 2022-12-31 RX ADMIN — LIDOCAINE 1 PATCH: 50 PATCH TOPICAL at 08:46

## 2022-12-31 RX ADMIN — METOPROLOL SUCCINATE 12.5 MG: 25 TABLET, EXTENDED RELEASE ORAL at 08:35

## 2022-12-31 RX ADMIN — Medication 1 TABLET: at 06:30

## 2022-12-31 RX ADMIN — FINASTERIDE 5 MG: 5 TABLET, FILM COATED ORAL at 08:35

## 2022-12-31 RX ADMIN — LEVOTHYROXINE SODIUM 25 MCG: 0.03 TABLET ORAL at 06:30

## 2022-12-31 RX ADMIN — INSULIN LISPRO 2 UNITS: 100 INJECTION, SOLUTION INTRAVENOUS; SUBCUTANEOUS at 08:47

## 2022-12-31 RX ADMIN — INSULIN LISPRO 2 UNITS: 100 INJECTION, SOLUTION INTRAVENOUS; SUBCUTANEOUS at 12:34

## 2022-12-31 NOTE — PLAN OF CARE
Goal Outcome Evaluation:  Problem: Fall Injury Risk  Goal: Absence of Fall and Fall-Related Injury  Outcome: Ongoing, Progressing  Intervention: Identify and Manage Contributors  Recent Flowsheet Documentation  Taken 12/31/2022 0200 by Xochitl Jane RN  Medication Review/Management: medications reviewed  Taken 12/31/2022 0000 by Xochitl Jane RN  Medication Review/Management: medications reviewed  Taken 12/30/2022 2200 by Xochitl Jane RN  Medication Review/Management: medications reviewed  Taken 12/30/2022 2000 by Xochitl Jane RN  Medication Review/Management: medications reviewed  Intervention: Promote Injury-Free Environment  Recent Flowsheet Documentation  Taken 12/31/2022 0200 by Xochitl Jane RN  Safety Promotion/Fall Prevention:   safety round/check completed   nonskid shoes/slippers when out of bed   clutter free environment maintained   activity supervised   toileting scheduled  Taken 12/31/2022 0000 by Xochitl Jane RN  Safety Promotion/Fall Prevention:   safety round/check completed   toileting scheduled   nonskid shoes/slippers when out of bed   fall prevention program maintained   activity supervised  Taken 12/30/2022 2200 by Xochitl Jane RN  Safety Promotion/Fall Prevention:   safety round/check completed   nonskid shoes/slippers when out of bed   lighting adjusted   fall prevention program maintained   clutter free environment maintained   activity supervised  Taken 12/30/2022 2000 by Xochitl Jane RN  Safety Promotion/Fall Prevention:   safety round/check completed   nonskid shoes/slippers when out of bed   lighting adjusted   clutter free environment maintained   activity supervised     Problem: Fall Injury Risk  Goal: Absence of Fall and Fall-Related Injury  Intervention: Promote Injury-Free Environment  Recent Flowsheet Documentation  Taken 12/31/2022 0200 by Xochitl Jane RN  Safety Promotion/Fall Prevention:   safety round/check completed   nonskid  shoes/slippers when out of bed   clutter free environment maintained   activity supervised   toileting scheduled  Taken 12/31/2022 0000 by Xochitl Jane RN  Safety Promotion/Fall Prevention:   safety round/check completed   toileting scheduled   nonskid shoes/slippers when out of bed   fall prevention program maintained   activity supervised  Taken 12/30/2022 2200 by Xochitl Jane RN  Safety Promotion/Fall Prevention:   safety round/check completed   nonskid shoes/slippers when out of bed   lighting adjusted   fall prevention program maintained   clutter free environment maintained   activity supervised  Taken 12/30/2022 2000 by Xochitl Jane RN  Safety Promotion/Fall Prevention:   safety round/check completed   nonskid shoes/slippers when out of bed   lighting adjusted   clutter free environment maintained   activity supervised   Patient AOX4, c/o headache gave prn Tramadol, denies chest pain will continue to monitor

## 2022-12-31 NOTE — DISCHARGE SUMMARY
[facility]    Date of Discharge:  12/31/2022    PCP: Caio Rodríguez MD    Discharge Diagnosis:   Active Hospital Problems    Diagnosis  POA   • **Generalized weakness [R53.1]  Yes   • Thrombocytopenia, unspecified (HCC) [D69.6]  Unknown   • Acute CVA (cerebrovascular accident) (HCC) [I63.9]  Unknown   • Subdural hematoma, acute [S06.5XAA]  Unknown   • Elevated troponin [R77.8]  Yes   • Recurrent falls [R29.6]  Not Applicable   • Obesity (BMI 30-39.9) [E66.9]  Yes   • Stage 3b chronic kidney disease (HCC) [N18.32]  Yes   • Coronary arteriosclerosis [I25.10]  Yes   • Chronic anticoagulation [Z79.01]  Not Applicable   • Anemia [D64.9]  Yes   • Typical atrial flutter (HCC) [I48.3]  Yes   • Essential hypertension [I10]  Yes   • Type 2 diabetes mellitus, without long-term current use of insulin (HCC) [E11.9]  Yes      Resolved Hospital Problems   No resolved problems to display.          Consults     Date and Time Order Name Status Description    12/27/2022 10:47 PM Inpatient Cardiology Consult Completed     12/27/2022 10:23 PM Inpatient Neurosurgery Consult Completed     12/27/2022 10:23 PM Inpatient Nephrology Consult Completed     12/27/2022 10:09 PM Inpatient Neurology Consult General Completed     12/27/2022  7:00 PM [facility] (on-call MD unless specified) Details          Hospital Course  87 y.o. male with history of atrial fibrillation on Xarelto, DM2, CKD, CAD presented with weakness and falls and back pain. Imaging showed acute right caudate stroke as well as a subdural hematoma.    He was seen in consultation by neurology as well as neurosurgery. Neurology felt the stroke was from a cardiac source. They also felt he had neuropathy and vertebrobasilar insufficiency contributing to falls. He was also orthostatic and some of his medications were adjusted. Nephrology thought he could restart furosemide at a lower dose today. Repeat head CT showed no change and  neurosurgery would like for him to have a follow-up head CT in 2 weeks in clinic. He also had MRI of his spine that did not show cause of his pain and they did not recommend any intervention from their standpoint. He was noted to have acute/subacute rib fractures on the left from a fall a couple months ago. Currently however he is not having any left-sided rib pain. He had elevated troponins but no chest pain likely due to renal insufficiency/CKD. Imaging also showed a 4.6 cm descending thoracic aortic aneurysm that was similar in size to a CT from April 2019. He also had an abnormal TSH 4.460 which is actually improved compared to earlier this month in June of this year. Recommend follow-up TSH in a few weeks as an outpatient.    I discussed the patient's findings and my recommendations with patient and nursing staff.    Condition on Discharge: Stable for SNF. He would like to go to SNF today.    Temp:  [97.5 °F (36.4 °C)-98.3 °F (36.8 °C)] 97.8 °F (36.6 °C)  Heart Rate:  [64-78] 78  Resp:  [17-18] 18  BP: ()/(31-90) 140/63  Body mass index is 34.44 kg/m².    Physical Exam  Constitutional:       General: He is not in acute distress.     Appearance: Normal appearance. He is not toxic-appearing.   HENT:      Head: Normocephalic.   Cardiovascular:      Rate and Rhythm: Normal rate and regular rhythm.   Pulmonary:      Effort: Pulmonary effort is normal. No respiratory distress.      Breath sounds: Normal breath sounds. No wheezing, rhonchi or rales.   Abdominal:      General: Bowel sounds are normal.      Palpations: Abdomen is soft.      Tenderness: There is no abdominal tenderness. There is no guarding or rebound.   Musculoskeletal:         General: No swelling.      Right lower leg: Edema present.      Left lower leg: Edema present.      Comments: Edema in ankles   Skin:     General: Skin is warm and dry.   Neurological:      General: No focal deficit present.      Mental Status: He is alert.   Psychiatric:          Mood and Affect: Mood normal.         Behavior: Behavior normal.         Thought Content: Thought content normal.       Disposition: Skilled Nursing Facility (DC - External)       Discharge Medications      New Medications      Instructions Start Date   atorvastatin 40 MG tablet  Commonly known as: LIPITOR   40 mg, Oral, Daily   Start Date: January 1, 2023     Menthol-Zinc Oxide 0.44-20.6 % ointment   1 application, Topical, 3 Times Daily         Changes to Medications      Instructions Start Date   furosemide 40 MG tablet  Commonly known as: LASIX  What changed: how much to take   20 mg, Oral, Daily      metoprolol succinate XL 25 MG 24 hr tablet  Commonly known as: TOPROL-XL  What changed: how much to take   12.5 mg, Oral, Daily   Start Date: January 1, 2023     tamsulosin 0.4 MG capsule 24 hr capsule  Commonly known as: FLOMAX  What changed: how much to take   0.4 mg, Oral, Daily   Start Date: January 1, 2023        Continue These Medications      Instructions Start Date   acetaminophen 500 MG tablet  Commonly known as: TYLENOL   500 mg, Oral, Every 6 Hours PRN      amiodarone 200 MG tablet  Commonly known as: PACERONE   TAKE 1 TABLET DAILY.      12[employer] Lancets lancets   USE TWICE A DAY      Contour Next Test test strip  Generic drug: glucose blood   1 each, Other, Daily, test blood sugar      ferrous sulfate 325 (65 FE) MG tablet   325 mg, Oral, Daily With Breakfast      finasteride 5 MG tablet  Commonly known as: PROSCAR   TAKE 1 TABLET DAILY FOR    PROSTATE      levothyroxine 25 MCG tablet  Commonly known as: SYNTHROID, LEVOTHROID   25 mcg, Oral, Daily      lidocaine 4 % cream  Commonly known as: LMX   No dose, route, or frequency recorded.      multivitamin tablet tablet  Commonly known as: THERAGRAN   1 tablet, Oral, Every Morning      Tradjenta 5 MG tablet tablet  Generic drug: linagliptin   TAKE 1 TABLET DAILY      traMADol 50 MG tablet  Commonly known as: ULTRAM   50 mg, Oral, Every 6 Hours  PRN      vitamin B-12 1000 MCG tablet  Commonly known as: CYANOCOBALAMIN   1,000 mcg, Oral, Daily      Xarelto 15 MG tablet  Generic drug: rivaroxaban   TAKE 1 TABLET DAILY         Stop These Medications    Alpha-Lipoic Acid 600 MG tablet     lisinopril 20 MG tablet  Commonly known as: PRINIVIL,ZESTRIL           Diet Instructions     Diet: Regular, Cardiac, Consistent Carbohydrate      Discharge Diet:  Regular  Cardiac  Consistent Carbohydrate            Activity Instructions     Activity as Tolerated           Additional Instructions for the Follow-ups that You Need to Schedule     Call MD for problems / concerns.   As directed         Contact information for follow-up providers     Andrea Solis MD Follow up.    Specialty: Cardiology  Contact information:  [address] [facility] [address]  [place] [address]  [phone]             Gordon Cee MD .    Specialty: Nephrology  Contact information:  [address]  [facility] [address]  [place] [address]  [phone]             Nick Jenkins MD .    Specialty: Neurosurgery  Contact information:  [address] [facility] [address]  [place] [address]  [phone]             Hany Leon MD .    Specialties: Neurology, Hospitalist  Contact information:  [address] [facility] [address]  [place] [address]  [phone]             Caio Rodríguez MD Follow up.    Specialty: Family Medicine  Why: post hospital follow up  Contact information:  [address] [facility] [address]  [place] [address]  [phone]                   Contact information for after-discharge care     Destination     [facility] .    Service: Skilled Nursing  Contact information:  [address] [place] [address]  [phone]                               Sherwin Singh MD  [facility]  12/31/22    Discharge time spent greater than 30 minutes.

## 2023-01-02 RX ORDER — LEVOTHYROXINE SODIUM 0.05 MG/1
50 TABLET ORAL DAILY
Qty: 90 TABLET | Refills: 1 | Status: SHIPPED | OUTPATIENT
Start: 2023-01-02

## 2023-01-03 ENCOUNTER — PREP FOR SURGERY (OUTPATIENT)
Dept: OTHER | Facility: HOSPITAL | Age: 88
End: 2023-01-03
Payer: MEDICARE

## 2023-01-03 ENCOUNTER — TELEPHONE (OUTPATIENT)
Dept: NEUROSURGERY | Facility: CLINIC | Age: 88
End: 2023-01-03
Payer: MEDICARE

## 2023-01-03 ENCOUNTER — TELEPHONE (OUTPATIENT)
Dept: CARDIOLOGY | Facility: CLINIC | Age: 88
End: 2023-01-03

## 2023-01-03 DIAGNOSIS — S06.5XAA SUBDURAL HEMATOMA, ACUTE: Primary | ICD-10-CM

## 2023-01-03 NOTE — TELEPHONE ENCOUNTER
I called and spoke with Mr. Reid daughter Sisi and advised her of scheduled appointments. She voiced understanding.

## 2023-01-03 NOTE — TELEPHONE ENCOUNTER
Caller: YANN    Relationship: NURSE MANAGER    Best call back number: [phone] YOU CAN LEAVE A MESSAGE WITH WHOEVER ANSWERS IF YOU DO NOT TALK TO YANN    What is the best time to reach you: BUSINESS HOURS    Who are you requesting to speak with (clinical staff, provider,  specific staff member):CLINICAL        What was the call regarding: PT WAS IN HOSPITAL 12/27/2022. DISCHARGE SAID TO FOLLOW UP WITH CARDIOLOGIST, BUT DID NOT GIVE TIME FRAME.  HE HAS APPT 03/02/2023. DOES HE NEED TO BE SEEN SOONER? PLEASE CALL YANN BACK    Do you require a callback: YES

## 2023-01-04 NOTE — TELEPHONE ENCOUNTER
Stefani, please call patient to get scheduled with Xiomy or Dr. Driscoll for hospital f/u.    His appt on 3/2/23 with EP will stay the same.    Thank you,  Heide

## 2023-01-13 ENCOUNTER — TELEPHONE (OUTPATIENT)
Dept: NEUROSURGERY | Facility: CLINIC | Age: 88
End: 2023-01-13
Payer: MEDICARE

## 2023-01-13 ENCOUNTER — TELEPHONE (OUTPATIENT)
Dept: NEUROSURGERY | Facility: CLINIC | Age: 88
End: 2023-01-13

## 2023-01-13 NOTE — TELEPHONE ENCOUNTER
Caller: Sisi Sanchez    Relationship to patient: Emergency Contact    Best call back number: [phone]    Chief complaint: HAS COVID    Type of visit: F/U WITH CT SCAN    Requested date: AFTER CT SCAN, DAUGHTER TO CALL TODAY TO GET CT SCAN RESCHEDULED.     If rescheduling, when is the original appointment: 1-19-23 (CT 1-17-23)    Additional notes:PLEASE CALL PT TO RESCHEDULE F/U AFTER CT SCAN HAS BEEN RESCHEDULED IN CHART.     THANK YOU,

## 2023-01-13 NOTE — TELEPHONE ENCOUNTER
Caller: Sisi Sanchez    Relationship to patient: Emergency Contact    Best call back number: [phone]    Patient is needing:     DAUGHTER CALLED TO RESCHEDULE APPT WITH MANDY PIERRE, PATIENT HAD COVID AND HAD TO RESCHEDULE MRI TO 1/20.      SISI STATES WE COULD RESCHEDULE THE NEXT WEEK, BUT NOT ON 1/23 AND IF ON 1/24 IT WOULD HAVE TO BE AFTER 11 AM.    PLEASE CALL TO ADVISE

## 2023-01-13 NOTE — TELEPHONE ENCOUNTER
I called and spoke with the patients daughter pal and rescheduled appointment. She voiced understanding.

## 2023-01-23 ENCOUNTER — HOSPITAL ENCOUNTER (OUTPATIENT)
Dept: CT IMAGING | Facility: HOSPITAL | Age: 88
Discharge: HOME | End: 2023-01-23
Payer: MEDICARE

## 2023-01-23 DIAGNOSIS — S06.5XAA SUBDURAL HEMATOMA, ACUTE: ICD-10-CM

## 2023-01-23 PROCEDURE — 70450 CT HEAD/BRAIN W/O DYE: CPT

## 2023-01-26 ENCOUNTER — TELEPHONE (OUTPATIENT)
Dept: NEUROSURGERY | Facility: CLINIC | Age: 88
End: 2023-01-26
Payer: MEDICARE

## 2023-01-26 NOTE — TELEPHONE ENCOUNTER
Patient 's daughter called and would like to change visit to telephone visit. She staed had to go to Appointment with patient on Monday and it was too much for him and her. If she could change it to a phone visit she would appreciate it . You may leave a message on either phone numbers (C) [phone] or her office number [phone].

## 2023-01-27 ENCOUNTER — OFFICE VISIT (OUTPATIENT)
Dept: NEUROSURGERY | Facility: CLINIC | Age: 88
End: 2023-01-27
Payer: MEDICARE

## 2023-01-27 ENCOUNTER — TELEPHONE (OUTPATIENT)
Dept: NEUROSURGERY | Facility: CLINIC | Age: 88
End: 2023-01-27

## 2023-01-27 DIAGNOSIS — S06.5XAA SUBDURAL HEMATOMA: Primary | ICD-10-CM

## 2023-01-27 PROCEDURE — 99441 PR PHYS/QHP TELEPHONE EVALUATION 5-10 MIN: CPT

## 2023-01-27 NOTE — TELEPHONE ENCOUNTER
Caller: Sisi Sanchez    Relationship to patient: Emergency Contact    Best call back number: [phone]    Patient is needing: PATIENT HAS TELEVISIT SCHEDULED FOR 10 AM TODAY 01/27/23. SISI CALLED, STATED PATIENT HAS NOT RECEIVED A CALL FOR HIS APPT YET. SISI STATED PATIENT HAS PT SCHEDULED TODAY AS WELL AND NEEDS THE PHONE CALL ASAP. PLEASE CALL THE PATIENT FOR APPT.

## 2023-01-27 NOTE — PROGRESS NOTES
Subjective   Patient ID: Osvaldo Lopez is a 87 y.o. male is here today for follow-up via telephone call. Phone call lasted 7 minutes. Patient was at rehab in his room with his daughter Sisi present.    History of Present Illness    Reports doing well.  Denies any new neurological symptoms.  He specifically denies headache, vision changes, dizziness/lightheadedness, weakness, further falls.  States his gait problem has improved significantly.  Admits to some residual balance/coordination difficulties but states this is improving significantly rehab.  He has resumed taking his Xarelto.  Patient's daughter complains of some soreness and redness on his heel.  States she has not actually seen the area but he has had some leg swelling as well as soreness in that heel.  The area is red per the patient and it hurts when he is laying on it putting excessive pressure on it.  Patient's daughter was concerned this may be neurological.    Review of Systems   Eyes: Negative for visual disturbance.   Musculoskeletal: Negative for gait problem.   Neurological: Negative for dizziness, syncope, weakness, light-headedness, numbness and headaches.     Osvaldo Lopez  reports that he quit smoking about 33 years ago. His smoking use included cigarettes. He has a 135.00 pack-year smoking history. He has never used smokeless tobacco.    Objective     There were no vitals filed for this visit.  There is no height or weight on file to calculate BMI.    Tobacco Use: Medium Risk   • Smoking Tobacco Use: Former   • Smokeless Tobacco Use: Never   • Passive Exposure: Not on file          Physical Exam  Neurologic Exam      Telephone visit, no PE performed.        Assessment & Plan   Independent Review of Radiographic Studies:      I personally reviewed the images from the following studies. Agree with radiology.      CT Head Without Contrast    Result Date: 1/24/2023  1. Since prior MRI of the brain on 12/28/2022, there has been resolution  of thin 2-3 mm thick subacute subdural hematoma over the posterior left parietal lobe with no residual subdural hematoma seen. 2. Since prior MRI of the brain on 2022, there has been evolution of an acute lacunar infarct to a 4 mm old lacunar infarct in the body of the right caudate nucleus. 3. Since prior MRI of the brain on 2022, there has been development of partial opacification of the maxillary and anterior ethmoid sinuses bilaterally with fluid and mucosal thickening, some mucosal thickening inferior medial right frontal sinus and frontal recess. 4. The remainder of the head CT is unchanged. There is mild-to-moderate small vessel disease in cerebral white matter and 8 x 5 mm old lacunar infarct in the mid left corona radiata region. The remainder of the head CT is within normal limits. Specifically, I see no extra-axial hemorrhage.    Radiation dose reduction techniques were utilized, including automated exposure control and exposure modulation based on body size.  This report was finalized on 2023 7:13 AM by Dr. Rohan Wells M.D.          Medical Decision Makin-year-old male who presented for a telephone encounter for a hospital follow-up regarding subdural hematoma.  While he was admitted to the hospital approximately 1 month ago, he was found to have a thin 2 to 3 mm subacute subdural hematoma.  On CT head 23, that had resolved.  During my telephone encounter, he did not complain of any neurological symptoms.  States his gait difficulties are improving with rehab.  He did complain of a small red area on his heel, I told him that to me that sounded like a pressure injury and he should make sure the providers at his rehab facility are addressing this.  Him and his daughter stated they are provided him with a pressure boot.  He restarted taking his Xarelto.  Given that his subdural hematoma has resolved and he is not experiencing any symptoms from this, we can have him follow-up  as needed at this time.  I discussed this with the patient and his daughter and they were agreeable to the plan.  Diagnoses and all orders for this visit:    1. Subdural hematoma (Primary)      Return if symptoms worsen or fail to improve.

## 2023-02-04 ENCOUNTER — READMISSION MANAGEMENT (OUTPATIENT)
Dept: CALL CENTER | Facility: HOSPITAL | Age: 88
End: 2023-02-04
Payer: MEDICARE

## 2023-02-04 NOTE — OUTREACH NOTE
Prep Survey    Flowsheet Row Responses   [facility] patient discharged from? Non-[facility]   Is LACE score < 7 ? Non- Discharge   Eligibility [facility]   Date of Discharge 02/03/23   Discharge diagnosis Cerebral infarction,   Does the patient have one of the following disease processes/diagnoses(primary or secondary)? Stroke   Prep survey completed? Yes          MIQUEL RODRIGUEZ - Registered Nurse

## 2023-02-06 ENCOUNTER — TRANSITIONAL CARE MANAGEMENT TELEPHONE ENCOUNTER (OUTPATIENT)
Dept: CALL CENTER | Facility: HOSPITAL | Age: 88
End: 2023-02-06
Payer: MEDICARE

## 2023-02-06 NOTE — OUTREACH NOTE
Call Center TCM Note    Flowsheet Row Responses   [facility] patient discharged from? Non-   Does the patient have one of the following disease processes/diagnoses(primary or secondary)? Stroke   TCM attempt successful? Yes   Call start time 0935   Call end time 0938   Discharge diagnosis Cerebral infarction,   Meds reviewed with patient/caregiver? Yes   Is the patient having any side effects they believe may be caused by any medication additions or changes? No   Does the patient have all medications ordered at discharge? Yes   Is the patient taking all medications as directed (includes completed medication regime)? Yes   Comments Patient reports he has moved to Assisted Living from [facility]. He will have a doctor that sees him at the facility.    Does the patient have an appointment with their PCP within 7 days of discharge? No   Nursing Interventions Patient declined scheduling/rescheduling appointment at this time   Psychosocial issues? No   Did the patient receive a copy of their discharge instructions? Yes   Nursing interventions Reviewed instructions with patient   What is the patient's perception of their health status since discharge? Improving   Is the patient/caregiver able to teach back signs and symptoms related to disease process for when to call PCP? Yes   Is the patient/caregiver able to teach back signs and symptoms related to disease process for when to call 911? Yes   Is the patient/caregiver able to teach back the hierarchy of who to call/visit for symptoms/problems? PCP, Specialist, Home health nurse, Urgent Care, ED, 911 Yes   If the patient is a current smoker, are they able to teach back resources for cessation? Not a smoker   TCM call completed? Yes   Call end time 0938   Would this patient benefit from a Referral to Amb Social Work? No   Is the patient interested in additional calls from an ambulatory ?  NOTE:  applies to high risk patients requiring  additional follow-up. No          Ricci Dodson RN    2/6/2023, 09:39 EST

## 2023-02-07 NOTE — PROGRESS NOTES
2/7/2023         RE: Christine Dubose  [address] [facility] [address]        Dear Colleague,    Thank you for referring your patient, Christine Dubose, to the [facility]. Please see a copy of my visit note below.    Foot & Ankle Surgery  February 7, 2023    S:  Patient in today sp left peroneal tendon repair on 12/28/22.  Pain levels low.  She was seen 1 week ago with concerns of an infection, and was placed on Augment 875 BID x 7 days.      LMP 03/17/2014       ROS - positive for CC.  Patient denies current nausea, vomiting, chills, fevers, belly pain, calf pain, chest pain or SOB.  Complete remainder of ROS is otherwise neg.    PE -the incision is now fully healed.  The cellulitis has resolved.  She does have mild discomfort along the peroneal tendons posterior to the fibula with resisted eversion and with resisted first ray plantarflexion.  No subluxation or acute pathology is otherwise seen.  Skin shows no trophic, color or temperature changes otherwise.  No calf redness, swelling or pain noted otherwise.      A/P - 54 year old yo patient approx 6 weeks sp above procedure  -The patient is cleared to start protected weightbearing in the boot with crutches.  Over the following 4 weeks, she will gradually increase weight on the foot to tolerance with care to avoid exceeding pain threshold at the surgical site with either the amount of time or weight on the foot as this can have a negative impact on healing  -[facility] referral to start musculoskeletal rehab    Follow up  -4 weeks or sooner with acute issues    Plan for [id number] - once healed sufficiently to resume job duties       Nick Beebe DPM FACFAS FACFAOM  Podiatric Foot & Ankle Surgeon  [facility]  [phone]    Disclaimer: This note consists of symbols derived from keyboarding, dictation and/or voice recognition software. As a result, there may be errors in the script that have  "      CARDIOLOGY    Angelo Driscoll MD    ENCOUNTER DATE:  06/21/2021    Osvaldo Lopez / 85 y.o. / male        CHIEF COMPLAINT / REASON FOR OFFICE VISIT     Essential hypertension (04/16/2021  Follow up)  Atrial fibrillation  Aortic valve stenosis    HISTORY OF PRESENT ILLNESS       HPI  Osvaldo Lopez is a 85 y.o. male who presents today for reevaluation.  Clinically he is actually doing relatively well.  Patient was recently hospitalized several months ago for a GI bleed of undetermined source.  Patient is doing some better.  He is back to working out at the [facility].  He was in [facility] rehab after his abdominal surgery has been a slow but progressive improvement.  He is getting concerned he may be back to the best he is going to be fine encouraged him to keep exercising.      The following portions of the patient's history were reviewed and updated as appropriate: allergies, current medications, past family history, past medical history, past social history, past surgical history and problem list.      VITAL SIGNS     Visit Vitals  /68 (BP Location: Left arm)   Pulse 68   Ht 177.8 cm (70\")   Wt 96.6 kg (213 lb)   SpO2 98%   BMI 30.56 kg/m²         Wt Readings from Last 3 Encounters:   06/21/21 96.6 kg (213 lb)   04/16/21 95.7 kg (211 lb)   04/05/21 96.6 kg (213 lb)     Body mass index is 30.56 kg/m².      REVIEW OF SYSTEMS   ROS        PHYSICAL EXAMINATION     Constitutional:       Appearance: Healthy appearance.   Pulmonary:      Effort: Pulmonary effort is normal.      Breath sounds: Normal breath sounds.   Cardiovascular:      Normal rate. Regular rhythm. Normal S1. Normal S2.      Murmurs: There is no murmur.      No gallop. No click. No rub.   Pulses:     Intact distal pulses.   Edema:     Peripheral edema absent.   Neurological:      Mental Status: Alert and oriented to person, place and time.           REVIEWED DATA     Procedures    Cardiac Procedures:  1.     Lipid Panel    Lipid Panel 7/23/20 " 1/26/21 4/5/21   Total Cholesterol 179 145 154   Triglycerides 148 120 170 (A)   HDL Cholesterol 49 52 46   VLDL Cholesterol 29.6 21 29   LDL Cholesterol  100 72 79   (A) Abnormal value       Comments are available for some flowsheets but are not being displayed.               ASSESSMENT & PLAN      Diagnosis Plan   1. Nonrheumatic aortic valve stenosis     2. Typical atrial flutter (CMS/HCC)     3. Essential hypertension           SUMMARY/DISCUSSION  1. Hypertension blood pressures good  2. Paroxysmal atrial fibrillation.  Anticoagulation has been complicated by GI bleeding.  Patient was on a renal dose of Xarelto but despite being on that he still had bleeding.  Patient is back on Xarelto currently remaining stable with no issues.  3. History of aortic valve stenosis clinically stable  4. Encouraged him to continue to build his strength would see him back in 1 year sooner course if he has issues.        MEDICATIONS         Discharge Medications          Accurate as of June 21, 2021 11:04 AM. If you have any questions, ask your nurse or doctor.            Changes to Medications      Instructions Start Date   hydrALAZINE 25 MG tablet  Commonly known as: APRESOLINE  What changed:   · when to take this  · additional instructions   25 mg, Oral, 3 Times Daily      metoprolol succinate XL 25 MG 24 hr tablet  Commonly known as: TOPROL-XL  What changed:   · how much to take  · how to take this  · when to take this  · additional instructions   TAKE 1 TABLET DAILY         Continue These Medications      Instructions Start Date   acetaminophen 500 MG tablet  Commonly known as: TYLENOL   500 mg, Oral, Every 6 Hours PRN      Alpha-Lipoic Acid 600 MG tablet   600 mg, Oral, Daily, For neuropathy      amiodarone 200 MG tablet  Commonly known as: PACERONE   200 mg, Oral, Daily      Arlyn Microlet Lancets lancets   USE TWICE A DAY      Contour Next Test test strip  Generic drug: glucose blood   1 each, Other, Daily, test blood  gone undetected. Please consider this when interpreting information found in this chart.          Again, thank you for allowing me to participate in the care of your patient.        Sincerely,        Nick Beebe DPM, CHRISTINE     sugar      docusate sodium 100 MG capsule  Commonly known as: Colace   100 mg, Oral, 2 Times Daily PRN      ferrous sulfate 325 (65 FE) MG tablet   325 mg, Oral, 2 times daily      furosemide 20 MG tablet  Commonly known as: LASIX   20 mg, Oral, Daily      lidocaine 4 % cream  Commonly known as: LMX   No dose, route, or frequency recorded.      lisinopril 20 MG tablet  Commonly known as: PRINIVIL,ZESTRIL   20 mg, Oral, Every 24 Hours Scheduled      multivitamin tablet tablet  Commonly known as: THERAGRAN   1 tablet, Oral, Every Morning      mupirocin 2 % ointment  Commonly known as: BACTROBAN   APPLY TWO TIMES PER DAY TO THE BIOPSY SITES.      rivaroxaban 15 MG tablet  Commonly known as: XARELTO   15 mg, Oral, Daily      tamsulosin 0.4 MG capsule 24 hr capsule  Commonly known as: FLOMAX   TAKE 2 CAPSULES DAILY      Tradjenta 5 MG tablet tablet  Generic drug: linagliptin   5 mg, Oral, Daily      vitamin B-12 1000 MCG tablet  Commonly known as: CYANOCOBALAMIN   1,000 mcg, Oral, Daily                 **Dragon Disclaimer:   Much of this encounter note is an electronic transcription/translation of spoken language to printed text. The electronic translation of spoken language may permit erroneous, or at times, nonsensical words or phrases to be inadvertently transcribed. Although I have reviewed the note for such errors, some may still exist.

## 2023-04-21 ENCOUNTER — APPOINTMENT (OUTPATIENT)
Dept: CT IMAGING | Facility: HOSPITAL | Age: 88
DRG: 438 | End: 2023-04-21
Payer: MEDICARE

## 2023-04-21 ENCOUNTER — HOSPITAL ENCOUNTER (INPATIENT)
Facility: HOSPITAL | Age: 88
LOS: 5 days | Discharge: HOSPICE | DRG: 438 | End: 2023-04-27
Attending: EMERGENCY MEDICINE | Admitting: STUDENT IN AN ORGANIZED HEALTH CARE EDUCATION/TRAINING PROGRAM
Payer: MEDICARE

## 2023-04-21 DIAGNOSIS — Z74.09 DECREASED MOBILITY AND ENDURANCE: ICD-10-CM

## 2023-04-21 DIAGNOSIS — C80.1 BILIARY OBSTRUCTION DUE TO CANCER: ICD-10-CM

## 2023-04-21 DIAGNOSIS — K83.1 BILIARY OBSTRUCTION DUE TO CANCER: ICD-10-CM

## 2023-04-21 DIAGNOSIS — K86.89 PANCREATIC MASS: Primary | ICD-10-CM

## 2023-04-21 DIAGNOSIS — N18.9 CHRONIC KIDNEY DISEASE, UNSPECIFIED CKD STAGE: ICD-10-CM

## 2023-04-21 LAB
ALBUMIN SERPL-MCNC: 3.1 G/DL (ref 3.5–5.2)
ALBUMIN/GLOB SERPL: 1 G/DL
ALP SERPL-CCNC: 1605 U/L (ref 39–117)
ALT SERPL W P-5'-P-CCNC: 525 U/L (ref 1–41)
ANION GAP SERPL CALCULATED.3IONS-SCNC: 12.5 MMOL/L (ref 5–15)
AST SERPL-CCNC: 257 U/L (ref 1–40)
BASOPHILS # BLD AUTO: 0.01 10*3/MM3 (ref 0–0.2)
BASOPHILS NFR BLD AUTO: 0.1 % (ref 0–1.5)
BILIRUB SERPL-MCNC: 7.1 MG/DL (ref 0–1.2)
BILIRUB UR QL STRIP: ABNORMAL
BUN SERPL-MCNC: 58 MG/DL (ref 8–23)
BUN/CREAT SERPL: 27.9 (ref 7–25)
CALCIUM SPEC-SCNC: 9.2 MG/DL (ref 8.6–10.5)
CHLORIDE SERPL-SCNC: 92 MMOL/L (ref 98–107)
CLARITY UR: CLEAR
CO2 SERPL-SCNC: 29.5 MMOL/L (ref 22–29)
COLOR UR: ABNORMAL
CREAT SERPL-MCNC: 2.08 MG/DL (ref 0.76–1.27)
DEPRECATED RDW RBC AUTO: 52.8 FL (ref 37–54)
EGFRCR SERPLBLD CKD-EPI 2021: 30.6 ML/MIN/1.73
EOSINOPHIL # BLD AUTO: 0.01 10*3/MM3 (ref 0–0.4)
EOSINOPHIL NFR BLD AUTO: 0.1 % (ref 0.3–6.2)
ERYTHROCYTE [DISTWIDTH] IN BLOOD BY AUTOMATED COUNT: 14.8 % (ref 12.3–15.4)
GLOBULIN UR ELPH-MCNC: 3.2 GM/DL
GLUCOSE SERPL-MCNC: 70 MG/DL (ref 65–99)
GLUCOSE UR STRIP-MCNC: NEGATIVE MG/DL
HCT VFR BLD AUTO: 43.7 % (ref 37.5–51)
HGB BLD-MCNC: 14.1 G/DL (ref 13–17.7)
HGB UR QL STRIP.AUTO: NEGATIVE
IMM GRANULOCYTES # BLD AUTO: 0.09 10*3/MM3 (ref 0–0.05)
IMM GRANULOCYTES NFR BLD AUTO: 0.7 % (ref 0–0.5)
KETONES UR QL STRIP: NEGATIVE
LEUKOCYTE ESTERASE UR QL STRIP.AUTO: NEGATIVE
LIPASE SERPL-CCNC: 58 U/L (ref 13–60)
LYMPHOCYTES # BLD AUTO: 1.12 10*3/MM3 (ref 0.7–3.1)
LYMPHOCYTES NFR BLD AUTO: 8.2 % (ref 19.6–45.3)
MCH RBC QN AUTO: 30.7 PG (ref 26.6–33)
MCHC RBC AUTO-ENTMCNC: 32.3 G/DL (ref 31.5–35.7)
MCV RBC AUTO: 95 FL (ref 79–97)
MONOCYTES # BLD AUTO: 0.9 10*3/MM3 (ref 0.1–0.9)
MONOCYTES NFR BLD AUTO: 6.6 % (ref 5–12)
NEUTROPHILS NFR BLD AUTO: 11.6 10*3/MM3 (ref 1.7–7)
NEUTROPHILS NFR BLD AUTO: 84.3 % (ref 42.7–76)
NITRITE UR QL STRIP: NEGATIVE
NRBC BLD AUTO-RTO: 0 /100 WBC (ref 0–0.2)
PH UR STRIP.AUTO: 5.5 [PH] (ref 5–8)
PLATELET # BLD AUTO: 214 10*3/MM3 (ref 140–450)
PMV BLD AUTO: 9.4 FL (ref 6–12)
POTASSIUM SERPL-SCNC: 3.4 MMOL/L (ref 3.5–5.2)
PROT SERPL-MCNC: 6.3 G/DL (ref 6–8.5)
PROT UR QL STRIP: NEGATIVE
RBC # BLD AUTO: 4.6 10*6/MM3 (ref 4.14–5.8)
SODIUM SERPL-SCNC: 134 MMOL/L (ref 136–145)
SP GR UR STRIP: 1.01 (ref 1–1.03)
UROBILINOGEN UR QL STRIP: ABNORMAL
WBC NRBC COR # BLD: 13.73 10*3/MM3 (ref 3.4–10.8)

## 2023-04-21 PROCEDURE — 99285 EMERGENCY DEPT VISIT HI MDM: CPT

## 2023-04-21 PROCEDURE — 25010000002 HYDROMORPHONE PER 4 MG: Performed by: EMERGENCY MEDICINE

## 2023-04-21 PROCEDURE — 81003 URINALYSIS AUTO W/O SCOPE: CPT | Performed by: EMERGENCY MEDICINE

## 2023-04-21 PROCEDURE — 36415 COLL VENOUS BLD VENIPUNCTURE: CPT

## 2023-04-21 PROCEDURE — 87899 AGENT NOS ASSAY W/OPTIC: CPT | Performed by: STUDENT IN AN ORGANIZED HEALTH CARE EDUCATION/TRAINING PROGRAM

## 2023-04-21 PROCEDURE — 96374 THER/PROPH/DIAG INJ IV PUSH: CPT

## 2023-04-21 PROCEDURE — 80053 COMPREHEN METABOLIC PANEL: CPT | Performed by: EMERGENCY MEDICINE

## 2023-04-21 PROCEDURE — 25010000002 ONDANSETRON PER 1 MG: Performed by: EMERGENCY MEDICINE

## 2023-04-21 PROCEDURE — 74176 CT ABD & PELVIS W/O CONTRAST: CPT

## 2023-04-21 PROCEDURE — 83690 ASSAY OF LIPASE: CPT | Performed by: EMERGENCY MEDICINE

## 2023-04-21 PROCEDURE — 96375 TX/PRO/DX INJ NEW DRUG ADDON: CPT

## 2023-04-21 PROCEDURE — 96376 TX/PRO/DX INJ SAME DRUG ADON: CPT

## 2023-04-21 PROCEDURE — 87449 NOS EACH ORGANISM AG IA: CPT | Performed by: STUDENT IN AN ORGANIZED HEALTH CARE EDUCATION/TRAINING PROGRAM

## 2023-04-21 PROCEDURE — 85025 COMPLETE CBC W/AUTO DIFF WBC: CPT | Performed by: EMERGENCY MEDICINE

## 2023-04-21 RX ORDER — ONDANSETRON 2 MG/ML
4 INJECTION INTRAMUSCULAR; INTRAVENOUS ONCE
Status: COMPLETED | OUTPATIENT
Start: 2023-04-21 | End: 2023-04-21

## 2023-04-21 RX ORDER — HYDROMORPHONE HYDROCHLORIDE 1 MG/ML
0.5 INJECTION, SOLUTION INTRAMUSCULAR; INTRAVENOUS; SUBCUTANEOUS ONCE
Status: COMPLETED | OUTPATIENT
Start: 2023-04-21 | End: 2023-04-21

## 2023-04-21 RX ADMIN — ONDANSETRON 4 MG: 2 INJECTION INTRAMUSCULAR; INTRAVENOUS at 20:46

## 2023-04-21 RX ADMIN — HYDROMORPHONE HYDROCHLORIDE 0.5 MG: 1 INJECTION, SOLUTION INTRAMUSCULAR; INTRAVENOUS; SUBCUTANEOUS at 20:48

## 2023-04-21 RX ADMIN — HYDROMORPHONE HYDROCHLORIDE 0.5 MG: 1 INJECTION, SOLUTION INTRAMUSCULAR; INTRAVENOUS; SUBCUTANEOUS at 23:25

## 2023-04-21 RX ADMIN — SODIUM CHLORIDE 500 ML: 9 INJECTION, SOLUTION INTRAVENOUS at 23:10

## 2023-04-21 NOTE — ED TRIAGE NOTES
Pt arrived via ems from [facility].  Pt reports back pain and generalized abd pain that started this week. Pt reports nausea. Denies diarrhea. Facility reports abnormal labs.   Patient was wearing a face mask throughout our encounter.  This RN wore appropriate PPE throughout the encounter.  Hand hygiene was performed before and after patient encounter.

## 2023-04-22 PROBLEM — J18.9 PNEUMONIA: Status: ACTIVE | Noted: 2023-04-22

## 2023-04-22 PROBLEM — Z86.79 HISTORY OF ATRIAL FIBRILLATION: Status: ACTIVE | Noted: 2023-04-22

## 2023-04-22 PROBLEM — K86.89 PANCREATIC MASS: Status: ACTIVE | Noted: 2023-04-22

## 2023-04-22 PROBLEM — Z79.4 TYPE 2 DIABETES MELLITUS WITH CHRONIC KIDNEY DISEASE, WITH LONG-TERM CURRENT USE OF INSULIN: Status: ACTIVE | Noted: 2023-04-22

## 2023-04-22 PROBLEM — Z86.73 HISTORY OF CVA (CEREBROVASCULAR ACCIDENT): Status: ACTIVE | Noted: 2023-04-22

## 2023-04-22 PROBLEM — R09.02 HYPOXIA: Status: ACTIVE | Noted: 2023-04-22

## 2023-04-22 PROBLEM — R74.01 TRANSAMINITIS: Status: ACTIVE | Noted: 2023-04-22

## 2023-04-22 PROBLEM — J96.01 ACUTE RESPIRATORY FAILURE WITH HYPOXIA: Status: ACTIVE | Noted: 2023-04-22

## 2023-04-22 PROBLEM — E03.9 HYPOTHYROIDISM: Status: ACTIVE | Noted: 2023-04-22

## 2023-04-22 PROBLEM — E11.22 TYPE 2 DIABETES MELLITUS WITH CHRONIC KIDNEY DISEASE, WITH LONG-TERM CURRENT USE OF INSULIN: Status: ACTIVE | Noted: 2023-04-22

## 2023-04-22 PROBLEM — E87.6 HYPOKALEMIA: Status: ACTIVE | Noted: 2023-04-22

## 2023-04-22 LAB
ALBUMIN SERPL-MCNC: 3 G/DL (ref 3.5–5.2)
ALBUMIN/GLOB SERPL: 1 G/DL
ALP SERPL-CCNC: 1818 U/L (ref 39–117)
ALT SERPL W P-5'-P-CCNC: 491 U/L (ref 1–41)
ANION GAP SERPL CALCULATED.3IONS-SCNC: 12 MMOL/L (ref 5–15)
ANION GAP SERPL CALCULATED.3IONS-SCNC: 14 MMOL/L (ref 5–15)
ARTERIAL PATENCY WRIST A: POSITIVE
AST SERPL-CCNC: 291 U/L (ref 1–40)
ATMOSPHERIC PRESS: 745.5 MMHG
B PARAPERT DNA SPEC QL NAA+PROBE: NOT DETECTED
B PERT DNA SPEC QL NAA+PROBE: NOT DETECTED
BASE EXCESS BLDA CALC-SCNC: 5.1 MMOL/L (ref 0–2)
BDY SITE: ABNORMAL
BILIRUB SERPL-MCNC: 6.9 MG/DL (ref 0–1.2)
BUN SERPL-MCNC: 56 MG/DL (ref 8–23)
BUN SERPL-MCNC: 57 MG/DL (ref 8–23)
BUN/CREAT SERPL: 29.2 (ref 7–25)
BUN/CREAT SERPL: 30.5 (ref 7–25)
C PNEUM DNA NPH QL NAA+NON-PROBE: NOT DETECTED
CALCIUM SPEC-SCNC: 8.5 MG/DL (ref 8.6–10.5)
CALCIUM SPEC-SCNC: 9.2 MG/DL (ref 8.6–10.5)
CHLORIDE SERPL-SCNC: 93 MMOL/L (ref 98–107)
CHLORIDE SERPL-SCNC: 94 MMOL/L (ref 98–107)
CO2 SERPL-SCNC: 30 MMOL/L (ref 22–29)
CO2 SERPL-SCNC: 30 MMOL/L (ref 22–29)
CREAT SERPL-MCNC: 1.87 MG/DL (ref 0.76–1.27)
CREAT SERPL-MCNC: 1.92 MG/DL (ref 0.76–1.27)
D-LACTATE SERPL-SCNC: 0.9 MMOL/L (ref 0.5–2)
DEPRECATED RDW RBC AUTO: 50.7 FL (ref 37–54)
EGFRCR SERPLBLD CKD-EPI 2021: 33.7 ML/MIN/1.73
EGFRCR SERPLBLD CKD-EPI 2021: 34.8 ML/MIN/1.73
ERYTHROCYTE [DISTWIDTH] IN BLOOD BY AUTOMATED COUNT: 14.7 % (ref 12.3–15.4)
FLUAV SUBTYP SPEC NAA+PROBE: NOT DETECTED
FLUBV RNA ISLT QL NAA+PROBE: NOT DETECTED
GAS FLOW AIRWAY: 2 LPM
GLOBULIN UR ELPH-MCNC: 3.1 GM/DL
GLUCOSE BLDC GLUCOMTR-MCNC: 80 MG/DL (ref 70–130)
GLUCOSE BLDC GLUCOMTR-MCNC: 84 MG/DL (ref 70–130)
GLUCOSE BLDC GLUCOMTR-MCNC: 93 MG/DL (ref 70–130)
GLUCOSE BLDC GLUCOMTR-MCNC: 97 MG/DL (ref 70–130)
GLUCOSE SERPL-MCNC: 74 MG/DL (ref 65–99)
GLUCOSE SERPL-MCNC: 74 MG/DL (ref 65–99)
HADV DNA SPEC NAA+PROBE: NOT DETECTED
HCO3 BLDA-SCNC: 30.1 MMOL/L (ref 22–28)
HCOV 229E RNA SPEC QL NAA+PROBE: NOT DETECTED
HCOV HKU1 RNA SPEC QL NAA+PROBE: NOT DETECTED
HCOV NL63 RNA SPEC QL NAA+PROBE: NOT DETECTED
HCOV OC43 RNA SPEC QL NAA+PROBE: NOT DETECTED
HCT VFR BLD AUTO: 40.7 % (ref 37.5–51)
HGB BLD-MCNC: 13.6 G/DL (ref 13–17.7)
HMPV RNA NPH QL NAA+NON-PROBE: NOT DETECTED
HPIV1 RNA ISLT QL NAA+PROBE: NOT DETECTED
HPIV2 RNA SPEC QL NAA+PROBE: NOT DETECTED
HPIV3 RNA NPH QL NAA+PROBE: NOT DETECTED
HPIV4 P GENE NPH QL NAA+PROBE: NOT DETECTED
L PNEUMO1 AG UR QL IA: NEGATIVE
M PNEUMO IGG SER IA-ACNC: NOT DETECTED
MCH RBC QN AUTO: 31.1 PG (ref 26.6–33)
MCHC RBC AUTO-ENTMCNC: 33.4 G/DL (ref 31.5–35.7)
MCV RBC AUTO: 92.9 FL (ref 79–97)
MODALITY: ABNORMAL
MRSA DNA SPEC QL NAA+PROBE: NORMAL
PCO2 BLDA: 44.2 MM HG (ref 35–45)
PH BLDA: 7.44 PH UNITS (ref 7.35–7.45)
PLATELET # BLD AUTO: 160 10*3/MM3 (ref 140–450)
PMV BLD AUTO: 9.1 FL (ref 6–12)
PO2 BLDA: 88.1 MM HG (ref 80–100)
POTASSIUM SERPL-SCNC: 3.1 MMOL/L (ref 3.5–5.2)
POTASSIUM SERPL-SCNC: 3.1 MMOL/L (ref 3.5–5.2)
PROCALCITONIN SERPL-MCNC: 1.38 NG/ML (ref 0–0.25)
PROT SERPL-MCNC: 6.1 G/DL (ref 6–8.5)
RBC # BLD AUTO: 4.38 10*6/MM3 (ref 4.14–5.8)
RHINOVIRUS RNA SPEC NAA+PROBE: NOT DETECTED
RSV RNA NPH QL NAA+NON-PROBE: NOT DETECTED
S PNEUM AG SPEC QL LA: NEGATIVE
SAO2 % BLDCOA: 97 % (ref 92–99)
SARS-COV-2 RNA NPH QL NAA+NON-PROBE: NOT DETECTED
SODIUM SERPL-SCNC: 136 MMOL/L (ref 136–145)
SODIUM SERPL-SCNC: 137 MMOL/L (ref 136–145)
TOTAL RATE: 16 BREATHS/MINUTE
WBC NRBC COR # BLD: 12.79 10*3/MM3 (ref 3.4–10.8)

## 2023-04-22 PROCEDURE — 94799 UNLISTED PULMONARY SVC/PX: CPT

## 2023-04-22 PROCEDURE — 97530 THERAPEUTIC ACTIVITIES: CPT

## 2023-04-22 PROCEDURE — 25010000002 PIPERACILLIN SOD-TAZOBACTAM PER 1 G: Performed by: STUDENT IN AN ORGANIZED HEALTH CARE EDUCATION/TRAINING PROGRAM

## 2023-04-22 PROCEDURE — 25010000002 VANCOMYCIN 10 G RECONSTITUTED SOLUTION: Performed by: STUDENT IN AN ORGANIZED HEALTH CARE EDUCATION/TRAINING PROGRAM

## 2023-04-22 PROCEDURE — 99222 1ST HOSP IP/OBS MODERATE 55: CPT | Performed by: INTERNAL MEDICINE

## 2023-04-22 PROCEDURE — 94640 AIRWAY INHALATION TREATMENT: CPT

## 2023-04-22 PROCEDURE — 83605 ASSAY OF LACTIC ACID: CPT | Performed by: NURSE PRACTITIONER

## 2023-04-22 PROCEDURE — 93005 ELECTROCARDIOGRAM TRACING: CPT | Performed by: STUDENT IN AN ORGANIZED HEALTH CARE EDUCATION/TRAINING PROGRAM

## 2023-04-22 PROCEDURE — 87641 MR-STAPH DNA AMP PROBE: CPT | Performed by: STUDENT IN AN ORGANIZED HEALTH CARE EDUCATION/TRAINING PROGRAM

## 2023-04-22 PROCEDURE — 36600 WITHDRAWAL OF ARTERIAL BLOOD: CPT

## 2023-04-22 PROCEDURE — 82803 BLOOD GASES ANY COMBINATION: CPT

## 2023-04-22 PROCEDURE — 97116 GAIT TRAINING THERAPY: CPT

## 2023-04-22 PROCEDURE — 97161 PT EVAL LOW COMPLEX 20 MIN: CPT

## 2023-04-22 PROCEDURE — 0202U NFCT DS 22 TRGT SARS-COV-2: CPT | Performed by: STUDENT IN AN ORGANIZED HEALTH CARE EDUCATION/TRAINING PROGRAM

## 2023-04-22 PROCEDURE — 93010 ELECTROCARDIOGRAM REPORT: CPT | Performed by: INTERNAL MEDICINE

## 2023-04-22 PROCEDURE — 82962 GLUCOSE BLOOD TEST: CPT

## 2023-04-22 PROCEDURE — 84145 PROCALCITONIN (PCT): CPT | Performed by: NURSE PRACTITIONER

## 2023-04-22 PROCEDURE — 80053 COMPREHEN METABOLIC PANEL: CPT | Performed by: NURSE PRACTITIONER

## 2023-04-22 PROCEDURE — 25010000002 HYDROMORPHONE PER 4 MG: Performed by: NURSE PRACTITIONER

## 2023-04-22 PROCEDURE — 85027 COMPLETE CBC AUTOMATED: CPT | Performed by: NURSE PRACTITIONER

## 2023-04-22 RX ORDER — POTASSIUM CHLORIDE 7.45 MG/ML
10 INJECTION INTRAVENOUS
Status: DISCONTINUED | OUTPATIENT
Start: 2023-04-22 | End: 2023-04-27 | Stop reason: HOSPADM

## 2023-04-22 RX ORDER — POLYETHYLENE GLYCOL 3350 17 G/17G
17 POWDER, FOR SOLUTION ORAL DAILY
COMMUNITY

## 2023-04-22 RX ORDER — POTASSIUM CHLORIDE 750 MG/1
40 TABLET, FILM COATED, EXTENDED RELEASE ORAL AS NEEDED
Status: DISCONTINUED | OUTPATIENT
Start: 2023-04-22 | End: 2023-04-27 | Stop reason: HOSPADM

## 2023-04-22 RX ORDER — FLUTICASONE PROPIONATE AND SALMETEROL 250; 50 UG/1; UG/1
POWDER RESPIRATORY (INHALATION)
COMMUNITY

## 2023-04-22 RX ORDER — SENNA PLUS 8.6 MG/1
1 TABLET ORAL 2 TIMES DAILY PRN
COMMUNITY

## 2023-04-22 RX ORDER — HYDROMORPHONE HYDROCHLORIDE 1 MG/ML
0.5 INJECTION, SOLUTION INTRAMUSCULAR; INTRAVENOUS; SUBCUTANEOUS
Status: DISCONTINUED | OUTPATIENT
Start: 2023-04-22 | End: 2023-04-27 | Stop reason: HOSPADM

## 2023-04-22 RX ORDER — MAGNESIUM SULFATE HEPTAHYDRATE 40 MG/ML
2 INJECTION, SOLUTION INTRAVENOUS AS NEEDED
Status: DISCONTINUED | OUTPATIENT
Start: 2023-04-22 | End: 2023-04-27 | Stop reason: HOSPADM

## 2023-04-22 RX ORDER — SODIUM CHLORIDE 9 MG/ML
100 INJECTION, SOLUTION INTRAVENOUS CONTINUOUS
Status: DISCONTINUED | OUTPATIENT
Start: 2023-04-22 | End: 2023-04-22

## 2023-04-22 RX ORDER — SODIUM CHLORIDE 9 MG/ML
40 INJECTION, SOLUTION INTRAVENOUS AS NEEDED
Status: DISCONTINUED | OUTPATIENT
Start: 2023-04-22 | End: 2023-04-27 | Stop reason: HOSPADM

## 2023-04-22 RX ORDER — ALBUTEROL SULFATE 2.5 MG/.5ML
2.5 SOLUTION RESPIRATORY (INHALATION) 3 TIMES DAILY
Status: DISCONTINUED | OUTPATIENT
Start: 2023-04-22 | End: 2023-04-22

## 2023-04-22 RX ORDER — IBUPROFEN 600 MG/1
1 TABLET ORAL
Status: DISCONTINUED | OUTPATIENT
Start: 2023-04-22 | End: 2023-04-25 | Stop reason: SDUPTHER

## 2023-04-22 RX ORDER — DEXTROSE MONOHYDRATE 25 G/50ML
25 INJECTION, SOLUTION INTRAVENOUS
Status: DISCONTINUED | OUTPATIENT
Start: 2023-04-22 | End: 2023-04-27 | Stop reason: HOSPADM

## 2023-04-22 RX ORDER — VANCOMYCIN 2 GRAM/500 ML IN 0.9 % SODIUM CHLORIDE INTRAVENOUS
20 ONCE
Status: COMPLETED | OUTPATIENT
Start: 2023-04-22 | End: 2023-04-22

## 2023-04-22 RX ORDER — MAGNESIUM SULFATE HEPTAHYDRATE 40 MG/ML
4 INJECTION, SOLUTION INTRAVENOUS AS NEEDED
Status: DISCONTINUED | OUTPATIENT
Start: 2023-04-22 | End: 2023-04-27 | Stop reason: HOSPADM

## 2023-04-22 RX ORDER — POTASSIUM CHLORIDE 1.5 G/1.77G
40 POWDER, FOR SOLUTION ORAL AS NEEDED
Status: DISCONTINUED | OUTPATIENT
Start: 2023-04-22 | End: 2023-04-27 | Stop reason: HOSPADM

## 2023-04-22 RX ORDER — BISACODYL 10 MG
10 SUPPOSITORY, RECTAL RECTAL DAILY PRN
COMMUNITY

## 2023-04-22 RX ORDER — ALBUTEROL SULFATE 2.5 MG/3ML
2.5 SOLUTION RESPIRATORY (INHALATION) 3 TIMES DAILY
Status: DISCONTINUED | OUTPATIENT
Start: 2023-04-22 | End: 2023-04-27 | Stop reason: HOSPADM

## 2023-04-22 RX ORDER — FAMOTIDINE 10 MG/ML
20 INJECTION, SOLUTION INTRAVENOUS
Status: DISCONTINUED | OUTPATIENT
Start: 2023-04-22 | End: 2023-04-27 | Stop reason: HOSPADM

## 2023-04-22 RX ORDER — LEVOTHYROXINE SODIUM 0.05 MG/1
50 TABLET ORAL DAILY
Status: DISCONTINUED | OUTPATIENT
Start: 2023-04-22 | End: 2023-04-27 | Stop reason: HOSPADM

## 2023-04-22 RX ORDER — NITROGLYCERIN 0.4 MG/1
0.4 TABLET SUBLINGUAL
Status: DISCONTINUED | OUTPATIENT
Start: 2023-04-22 | End: 2023-04-27 | Stop reason: HOSPADM

## 2023-04-22 RX ORDER — ASCORBIC ACID 500 MG
500 TABLET ORAL DAILY
COMMUNITY

## 2023-04-22 RX ORDER — ALBUTEROL SULFATE 2.5 MG/.5ML
2.5 SOLUTION RESPIRATORY (INHALATION) 3 TIMES DAILY
COMMUNITY

## 2023-04-22 RX ORDER — CALCIUM CARBONATE 200(500)MG
2 TABLET,CHEWABLE ORAL 2 TIMES DAILY PRN
Status: DISCONTINUED | OUTPATIENT
Start: 2023-04-22 | End: 2023-04-27 | Stop reason: HOSPADM

## 2023-04-22 RX ORDER — SENNA PLUS 8.6 MG/1
1 TABLET ORAL 2 TIMES DAILY PRN
Status: DISCONTINUED | OUTPATIENT
Start: 2023-04-22 | End: 2023-04-27 | Stop reason: HOSPADM

## 2023-04-22 RX ORDER — SODIUM CHLORIDE, SODIUM LACTATE, POTASSIUM CHLORIDE, CALCIUM CHLORIDE 600; 310; 30; 20 MG/100ML; MG/100ML; MG/100ML; MG/100ML
100 INJECTION, SOLUTION INTRAVENOUS CONTINUOUS
Status: DISCONTINUED | OUTPATIENT
Start: 2023-04-22 | End: 2023-04-27 | Stop reason: HOSPADM

## 2023-04-22 RX ORDER — ONDANSETRON 2 MG/ML
4 INJECTION INTRAMUSCULAR; INTRAVENOUS EVERY 6 HOURS PRN
Status: DISCONTINUED | OUTPATIENT
Start: 2023-04-22 | End: 2023-04-27 | Stop reason: HOSPADM

## 2023-04-22 RX ORDER — POLYETHYLENE GLYCOL 3350 17 G/17G
17 POWDER, FOR SOLUTION ORAL DAILY
Status: DISCONTINUED | OUTPATIENT
Start: 2023-04-22 | End: 2023-04-23

## 2023-04-22 RX ORDER — L. ACIDOPHILUS/L.BULGARICUS 1MM CELL
1 TABLET ORAL 2 TIMES DAILY
COMMUNITY

## 2023-04-22 RX ORDER — SODIUM CHLORIDE 0.9 % (FLUSH) 0.9 %
10 SYRINGE (ML) INJECTION EVERY 12 HOURS SCHEDULED
Status: DISCONTINUED | OUTPATIENT
Start: 2023-04-22 | End: 2023-04-27 | Stop reason: HOSPADM

## 2023-04-22 RX ORDER — BUDESONIDE AND FORMOTEROL FUMARATE DIHYDRATE 160; 4.5 UG/1; UG/1
1 AEROSOL RESPIRATORY (INHALATION)
Status: DISCONTINUED | OUTPATIENT
Start: 2023-04-22 | End: 2023-04-27 | Stop reason: HOSPADM

## 2023-04-22 RX ORDER — TAMSULOSIN HYDROCHLORIDE 0.4 MG/1
0.4 CAPSULE ORAL DAILY
Status: DISCONTINUED | OUTPATIENT
Start: 2023-04-22 | End: 2023-04-27 | Stop reason: HOSPADM

## 2023-04-22 RX ORDER — ONDANSETRON 4 MG/1
4 TABLET, FILM COATED ORAL EVERY 6 HOURS PRN
Status: DISCONTINUED | OUTPATIENT
Start: 2023-04-22 | End: 2023-04-27 | Stop reason: HOSPADM

## 2023-04-22 RX ORDER — MULTIPLE VITAMINS W/ MINERALS TAB 9MG-400MCG
1 TAB ORAL DAILY
COMMUNITY

## 2023-04-22 RX ORDER — METOPROLOL SUCCINATE 25 MG/1
12.5 TABLET, EXTENDED RELEASE ORAL DAILY
Status: DISCONTINUED | OUTPATIENT
Start: 2023-04-22 | End: 2023-04-27 | Stop reason: HOSPADM

## 2023-04-22 RX ORDER — FINASTERIDE 5 MG/1
5 TABLET, FILM COATED ORAL DAILY
Status: DISCONTINUED | OUTPATIENT
Start: 2023-04-22 | End: 2023-04-27 | Stop reason: HOSPADM

## 2023-04-22 RX ORDER — SODIUM CHLORIDE 0.9 % (FLUSH) 0.9 %
10 SYRINGE (ML) INJECTION AS NEEDED
Status: DISCONTINUED | OUTPATIENT
Start: 2023-04-22 | End: 2023-04-27 | Stop reason: HOSPADM

## 2023-04-22 RX ORDER — NICOTINE POLACRILEX 4 MG
15 LOZENGE BUCCAL
Status: DISCONTINUED | OUTPATIENT
Start: 2023-04-22 | End: 2023-04-25 | Stop reason: SDUPTHER

## 2023-04-22 RX ORDER — INSULIN LISPRO 100 [IU]/ML
2-9 INJECTION, SOLUTION INTRAVENOUS; SUBCUTANEOUS
Status: DISCONTINUED | OUTPATIENT
Start: 2023-04-22 | End: 2023-04-25

## 2023-04-22 RX ADMIN — TAMSULOSIN HYDROCHLORIDE 0.4 MG: 0.4 CAPSULE ORAL at 15:20

## 2023-04-22 RX ADMIN — FAMOTIDINE 20 MG: 10 INJECTION INTRAVENOUS at 12:04

## 2023-04-22 RX ADMIN — TAZOBACTAM SODIUM AND PIPERACILLIN SODIUM 3.38 G: 375; 3 INJECTION, SOLUTION INTRAVENOUS at 23:25

## 2023-04-22 RX ADMIN — ALBUTEROL SULFATE 2.5 MG: 2.5 SOLUTION RESPIRATORY (INHALATION) at 20:46

## 2023-04-22 RX ADMIN — ALBUTEROL SULFATE 2.5 MG: 2.5 SOLUTION RESPIRATORY (INHALATION) at 15:27

## 2023-04-22 RX ADMIN — LEVOTHYROXINE SODIUM 50 MCG: 0.05 TABLET ORAL at 15:20

## 2023-04-22 RX ADMIN — POTASSIUM CHLORIDE 40 MEQ: 1.5 POWDER, FOR SOLUTION ORAL at 20:42

## 2023-04-22 RX ADMIN — POTASSIUM CHLORIDE 40 MEQ: 1.5 POWDER, FOR SOLUTION ORAL at 15:22

## 2023-04-22 RX ADMIN — TAZOBACTAM SODIUM AND PIPERACILLIN SODIUM 3.38 G: 375; 3 INJECTION, SOLUTION INTRAVENOUS at 12:04

## 2023-04-22 RX ADMIN — HYDROMORPHONE HYDROCHLORIDE 0.5 MG: 1 INJECTION, SOLUTION INTRAMUSCULAR; INTRAVENOUS; SUBCUTANEOUS at 13:31

## 2023-04-22 RX ADMIN — Medication 10 ML: at 20:41

## 2023-04-22 RX ADMIN — VANCOMYCIN HYDROCHLORIDE 2000 MG: 10 INJECTION, POWDER, LYOPHILIZED, FOR SOLUTION INTRAVENOUS at 13:31

## 2023-04-22 RX ADMIN — SODIUM CHLORIDE, POTASSIUM CHLORIDE, SODIUM LACTATE AND CALCIUM CHLORIDE 100 ML/HR: 600; 310; 30; 20 INJECTION, SOLUTION INTRAVENOUS at 13:31

## 2023-04-22 RX ADMIN — SODIUM CHLORIDE 100 ML/HR: 9 INJECTION, SOLUTION INTRAVENOUS at 03:44

## 2023-04-22 RX ADMIN — HYDROMORPHONE HYDROCHLORIDE 0.5 MG: 1 INJECTION, SOLUTION INTRAMUSCULAR; INTRAVENOUS; SUBCUTANEOUS at 18:26

## 2023-04-22 RX ADMIN — FINASTERIDE 5 MG: 5 TABLET, FILM COATED ORAL at 17:19

## 2023-04-22 RX ADMIN — HYDROMORPHONE HYDROCHLORIDE 0.5 MG: 1 INJECTION, SOLUTION INTRAMUSCULAR; INTRAVENOUS; SUBCUTANEOUS at 08:49

## 2023-04-22 RX ADMIN — HYDROMORPHONE HYDROCHLORIDE 0.5 MG: 1 INJECTION, SOLUTION INTRAMUSCULAR; INTRAVENOUS; SUBCUTANEOUS at 23:25

## 2023-04-22 RX ADMIN — ALBUTEROL SULFATE 2.5 MG: 2.5 SOLUTION RESPIRATORY (INHALATION) at 07:50

## 2023-04-22 RX ADMIN — Medication 10 ML: at 08:50

## 2023-04-22 RX ADMIN — SODIUM CHLORIDE 100 ML/HR: 9 INJECTION, SOLUTION INTRAVENOUS at 12:05

## 2023-04-22 RX ADMIN — METOPROLOL SUCCINATE 12.5 MG: 25 TABLET, EXTENDED RELEASE ORAL at 15:20

## 2023-04-22 RX ADMIN — TAZOBACTAM SODIUM AND PIPERACILLIN SODIUM 3.38 G: 375; 3 INJECTION, SOLUTION INTRAVENOUS at 16:57

## 2023-04-22 NOTE — PROGRESS NOTES
[facility] Clinical Pharmacy Services: Piperacillin-Tazobactam Consult    Pt Name: Osvaldo Lopez   : 1935    Indication: Pneumonia    Relevant clinical data and objective history reviewed:    Past Medical History:   Diagnosis Date    Abdominal aortic aneurysm (AAA) without rupture     Abdominal aortic ectasia 2015    Abnormal EKG 10/25/2016    2019    Abnormal thyroid blood test     Actinic keratosis     FOLLOWED BY DR. MANPREET VILLARREAL    Anemia 2021    Arthritis     Asthma     MILD, INTERMITTENT    Atherosclerotic heart disease     Atrial flutter with rapid ventricular response 2019    ADMITTED TO [facility]    Atrial premature depolarization     Atrophy of muscle of lower leg     BPH (benign prostatic hyperplasia)     FOLLOWED BY DR. TERRA JONES    Bruises easily     Bulging of thoracic intervertebral disc     Carpal tunnel syndrome, right 2019    FOLLOWED BY DR. NEW SANCHEZ    Cataract     BILATERAL, S/P EXTRACTION WITH IOL IMPLANTS    Chronic anticoagulation 2021    Chronic edema     Chronic low back pain with sciatica 2021    Chronic pain     CKD (chronic kidney disease) 2021    Closed head injury 2018    WITH LACERATION TO SCALP, D/T SYNCOPE    Colon cancer 2021    INVASIVE ADENOCARCINOMA FROM TUBULOVILLOUS ADENOMA IN TRANSVERSE, S/P COLON RESECTION, FOLLOWED BY DR. MARGARITA COX    Colon polyps     FOLLOWED BY DR. MARGARITA COX    Constipation due to opioid therapy     Coronary artery disease     DDD (degenerative disc disease), thoracic     Diabetic amyotrophy     Diverticulosis     Enlarged prostate     FOLLOWED BY DR. TERRA JONES    Epididymitis, right 2018    Essential hypertension     Eyebrow ptosis 2013    Hallux valgus, acquired, bilateral 2019    Heart attack 1989    S/P CABG, 5 VESSEL    Heart murmur     History of blood transfusion     HL (hearing loss)     Hyperactivity of bladder     FOLLOWED BY DR. 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 VIANEY GIORDANO    Pseudophakia 11/09/2013    RBBB (right bundle branch block)     Recurrent falls     Respiratory failure with hypoxia 01/2019    SCC (squamous cell carcinoma) 10/16/2019    RIGHT FOREHEAD, LEFT TEMPLE, RIGHT SCALP, FOLLOWED BY DR. 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 TERRA JONES    Vitamin D deficiency     Vitreous hemorrhage of left eye      Creatinine   Date Value Ref Range Status   04/22/2023 1.92 (H) 0.76 - 1.27 mg/dL Final   04/22/2023 1.87 (H) 0.76 - 1.27 mg/dL Final   04/21/2023 2.08 (H) 0.76 - 1.27 mg/dL Final     BUN   Date Value Ref Range Status   04/22/2023 56 (H) 8 - 23 mg/dL Final   04/22/2023 57 (H) 8  - 23 mg/dL Final     Estimated Creatinine Clearance: 34.4 mL/min (A) (by C-G formula based on SCr of 1.92 mg/dL (H)).    Lab Results   Component Value Date    WBC 12.79 (H) 04/22/2023     Temp Readings from Last 3 Encounters:   04/22/23 97.9 °F (36.6 °C) (Oral)   12/31/22 97.8 °F (36.6 °C) (Oral)   12/08/22 98 °F (36.7 °C) (Tympanic)      Assessment/Plan  Estimated CrCl >20 mL/min at this time; BMI 31.67 kg/m2  Will start piperacillin-tazobactam 3.375 g IV every 8 hours     Pharmacy will continue to follow daily while on piperacillin-tazobactam and adjust as needed. Thank you for this consult.    Marilu Gorman, PharmD  Clinical Pharmacist
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
## 2023-04-22 NOTE — H&P
"    Patient Name:  Osvaldo Lopez  YOB: 1935  MRN:  [id number]  Admit Date:  4/21/2023  Patient Care Team:  Provider, No Known as PCP - General  Dontrell Arellano MD as Surgeon (Orthopedic Surgery)  Angelo Driscoll MD as Consulting Physician (Cardiology)  Darvin Alvarez MD as Consulting Physician (Urology)  Caio Rodríguez MD as Referring Physician (Family Medicine)      Subjective   History Present Illness     Chief Complaint   Patient presents with   • Back Pain   • Abdominal Pain   • Abnormal Lab       Mr. Lopez is a 87 y.o. former smoker with a history of CVA (12/2022), paroxysmal typical atrial flutter on long-term anticoagulation, CKD stage IIIb, type 2 diabetes mellitus with long-term current use of insulin, hypertension, hyperlipidemia, hypothyroidism that presents to [facility] complaining of worsening abdominal pain.  Patient does describes the pain as general in nature, but most significant in the left upper quadrant, rated 8/10 in severity, intermittent, he states that it has \"been going on for a while\", however, has progressively worsened over the past 4 to 5 days prior to hospital presentation.  Denies recollection of possible inciting factors.  Denies noticeable aggravating or alleviating factors.  Endorsing associated abdominal distention, nausea, decreased oral intake, decreased appetite, weight loss and dyspnea.       Personal History     Past Medical History:   Diagnosis Date   • Abdominal aortic aneurysm (AAA) without rupture    • Abdominal aortic ectasia 06/2015   • Abnormal EKG 10/25/2016    04/2019   • Abnormal thyroid blood test    • Actinic keratosis     FOLLOWED BY DR. MANPREET VILLARREAL   • Anemia 01/28/2021   • Arthritis    • Asthma     MILD, INTERMITTENT   • Atherosclerotic heart disease    • Atrial flutter with rapid ventricular response 04/14/2019    ADMITTED TO [facility]   • Atrial premature depolarization    • Atrophy of muscle of lower leg    • BPH " (benign prostatic hyperplasia)     FOLLOWED BY DR. TERRA JONES   • Bruises easily    • Bulging of thoracic intervertebral disc    • Carpal tunnel syndrome, right 12/2019    FOLLOWED BY DR. NEW SANCHEZ   • Cataract     BILATERAL, S/P EXTRACTION WITH IOL IMPLANTS   • Chronic anticoagulation 01/29/2021   • Chronic edema    • Chronic low back pain with sciatica 01/26/2021   • Chronic pain    • CKD (chronic kidney disease) 01/29/2021   • Closed head injury 07/2018    WITH LACERATION TO SCALP, D/T SYNCOPE   • Colon cancer 01/31/2021    INVASIVE ADENOCARCINOMA FROM TUBULOVILLOUS ADENOMA IN TRANSVERSE, S/P COLON RESECTION, FOLLOWED BY DR. MARGARITA COX   • Colon polyps     FOLLOWED BY DR. MARGARITA COX   • Constipation due to opioid therapy    • Coronary artery disease    • DDD (degenerative disc disease), thoracic    • Diabetic amyotrophy    • Diverticulosis    • Enlarged prostate     FOLLOWED BY DR. TERRA JONES   • Epididymitis, right 03/2018   • Essential hypertension    • Eyebrow ptosis 11/09/2013   • Hallux valgus, acquired, bilateral 01/2019   • Heart attack 09/1989    S/P CABG, 5 VESSEL   • Heart murmur    • History of blood transfusion    • HL (hearing loss)    • Hyperactivity of bladder     FOLLOWED BY DR. TERRA JONES   • HyperCKemia 11/2017   • Hyperkalemia 08/2016   • Hyperlipidemia     MIXED   • Hyperreflexia 11/2017    BILATERAL   • Hyphema 05/2015   • IBS (irritable bowel syndrome)    • Impaired functional mobility, balance, gait, and endurance    • Impingement syndrome of left shoulder 10/2015   • Infection associated with cystostomy catheter 12/2016   • Ischemic colitis    • Lumbar radiculopathy 2016    wears back brace at times following back surgery   • Lumbar spondylosis 04/2016   • Lumbosacral neuritis 05/2015   • Lumbosacral radiculitis 05/2015   • Macular puckering of retina, left 10/2013   • 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 VIANEY GIORDANO   • Pseudophakia 11/09/2013   • RBBB (right bundle branch block)    • Recurrent falls    • Respiratory failure with hypoxia 01/2019   • SCC (squamous cell carcinoma) 10/16/2019    RIGHT FOREHEAD, LEFT TEMPLE, RIGHT SCALP, FOLLOWED BY DR. MANPREET VILLARREAL   • Scoliosis    • Spinal stenosis of lumbar region with neurogenic claudication 12/07/2017   • Syncope and collapse 07/16/2018    AD[facility]ITT[facility] TO [facility]   • Torn rotator cuff 03/2017    BILATERAL, COMPLETE   • Type 2 diabetes mellitus     IDDM   • Urinary frequency     FOLLOWED BY DR. TERRA JONES   • Vitamin D deficiency    • Vitreous hemorrhage of left eye      Past Surgical History:   Procedure Laterality Date   • APPENDECTOMY N/A    • BROW LIFT Bilateral 11/12/2013    DR. OCTAVIA CEDILLO AT [place]   • CARDIAC CATHETERIZATION Left 10/2008    LEFT CAROTID ARTERY STENOSIS 50-69%   • CARDIAC ELECTROPHYSIOLOGY PROCEDURE N/A 06/06/2019    Procedure: Ablation atrial flutter;  Surgeon: Miller Talbot MD;  Location: [facility] INVASIVE LOCATION;  Service: Cardiovascular   • CATARACT EXTRACTION Left 01/22/1998    DR. LAYLA BARNES AT [place]   • CATARACT EXTRACTION Right 03/2001    DR. LAYLA BARNES   • CHOLECYSTECTOMY N/A 08/24/1989    DR. FAUZIA PELAEZ AT [place]   • COLON RESECTION N/A 02/03/2021    Procedure: LAPAROSCOPIC EXTENDED  RIGHT COLECTOMY WITH DAVINCI ROBOT;  Surgeon: Xavi Napoles MD;  Location: [facility] OR;  Service: DaVinci;  Laterality: N/A;   • COLONOSCOPY N/A 01/31/2021    20-25 MM POLYP IN CECUM, PATH: TUBULAR ADENOMA, 2 TUBULAR ADENOMA POLYPS IN ASCENDING,  A FUNGATING AND SUBMUCOSAL ONONOBSTRUCTING MEDIUM MASS IN PROXIMAL TRANSVERSE, PATH: INVASIVE ADENOCARCINOMA ARISING IN A TUBULOVILOOUS ADENOMA, AREA TATTOOED, SCATTERED DIVERTICULA IN SIGMOID AND DESCENDING, LARGE INTERNAL HEMORRHOIDS, DR. JACOB ALICEA AT [facility]    • COLONOSCOPY N/A 02/02/2010    DIVERTICULOSIS, DR. SAL SOLANO AT [place]   • COLONOSCOPY N/A 08/15/2022    5 MM TUBULAR ADENOMA POLYP IN SIGMOID, 2 TUBULAR ADENOMA POLYPS IN DESCENDING, MULTIPLE DIVERTICULA IN SIGMOID, RESCOPE IN 2 YRS, DR. MARGARITA COX AT [facility]   • CORONARY ARTERY BYPASS GRAFT N/A 09/1989    5 VESSEL, DR. HANKS   • CYST REMOVAL      FROM BACK   • ENDOSCOPY N/A 01/31/2021    ENTIRE EGD WNL, PATH: MILD REACTIVE GASTROPATHY, DR. JACOB ALICEA AT [facility]   • INGUINAL HERNIA REPAIR Left 09/27/2007    DR. MATTHEW RUSH AT [place]   • INGUINAL HERNIA REPAIR Left 09/07/2007   • INGUINAL HERNIA REPAIR Left 2003   • KNEE ARTHROSCOPY W/ PARTIAL MEDIAL MENISCECTOMY Left 1962    DR. SINGH   • LUMBAR DISCECTOMY FUSION INSTRUMENTATION N/A 04/11/2016    Procedure: lumbar laminectomy L4-5 and fusion with instrumentation;  Surgeon: Ran Kline MD;  Location: [facility] OR;  Service:    • LUMBAR DISCECTOMY FUSION INSTRUMENTATION N/A 01/15/2018    Procedure: L2-3, L3-4 laminectomy and fusion with instrumentation and removal of implants L4 5.;  Surgeon: Ran Kline MD;  Location: [facility] OR;  Service:    • LUMBAR EPIDURAL INJECTION N/A 09/23/2015    DR. MATTHEW DOMINGUEZ AT [facility]   • LUMBAR EPIDURAL INJECTION N/A 10/07/2015    DR. ITZEL LUIS AT [facility]   • LUMBAR EPIDURAL INJECTION N/A 11/19/2015    DR. ITZEL LUIS AT [facility]   • ND ARTHRP KNE CONDYLE&PLATU MEDIAL&LAT COMPARTMENTS Left 11/14/2016    Procedure: TOTAL KNEE ARTHROPLASTY;  Surgeon: Dontrell Arellano MD;  Location: [facility] OR;  Service: Orthopedics   • SKIN BIOPSY Bilateral 10/16/2019    RIGHT LATERAL FOREHEAD:SCC, LEFT TEMPLE:SCC, RIGHT PARIETAL SCALP:SCC, DR. MANPREET VILLARREAL      Family History   Problem Relation Age of Onset   • Heart failure Mother    • Diabetes Mother    • Heart disease Mother    • Cancer Sister    • Diabetes Sister    • Cancer Brother    • Diabetes Brother    • Other Brother         INCLUSION BODY NYOSITIS   • Cancer Father      Social History     Tobacco Use   • Smoking status: Former     Packs/day: 3.00     Years: 45.00     Pack years: 135.00     Types: Cigarettes     Quit date: 1989     Years since quittin.4   • Smokeless tobacco: Never   Vaping Use   • Vaping Use: Never used   Substance Use Topics   • Alcohol use: Yes     Comment: 1 shot of whiskey in the morning and 1 shot of whiskey in the evening   • Drug use: Never     No current facility-administered medications on file prior to encounter.     Current Outpatient Medications on File Prior to Encounter   Medication Sig Dispense Refill   • acetaminophen (TYLENOL) 500 MG tablet Take 1 tablet by mouth Every 6 (Six) Hours As Needed for Mild Pain.     • acidophilus (FLORANEX) tablet tablet Take 1 tablet by mouth 2 (Two) Times a Day.     • albuterol (PROVENTIL) 2.5 MG/0.5ML nebulizer solution Take 2.5 mg by nebulization 3 (Three) Times a Day.     • amiodarone (PACERONE) 200 MG tablet TAKE 1 TABLET DAILY. 90 tablet 1   • ascorbic acid (VITAMIN C) 500 MG tablet Take 1 tablet by mouth Daily.     • atorvastatin (LIPITOR) 40 MG tablet Take 1 tablet by mouth Daily. 90 tablet    • KYRA MICROLET LANCETS lancets USE TWICE A  each 5   • bisacodyl (DULCOLAX) 10 MG suppository Insert 1 suppository into the rectum Daily As Needed for Constipation.     • ferrous sulfate 325 (65 FE) MG tablet Take 1 tablet by mouth Daily With Breakfast.     • finasteride (PROSCAR) 5 MG tablet TAKE 1 TABLET DAILY FOR    PROSTATE 90 tablet 3   • Fluticasone-Salmeterol (ADVAIR/WIXELA) 250-50 MCG/ACT DISKUS Inhale 2 (Two) Times a Day.     • furosemide (LASIX) 40 MG tablet Take 0.5 tablets by mouth Daily. 90 tablet 3   • glucose blood  (Contour Next Test) test strip 1 each by Other route Daily. test blood sugar 50 each 5   • insulin aspart (novoLOG FLEXPEN) 100 UNIT/ML solution pen-injector sc pen Inject 10 Units under the skin into the appropriate area as directed 3 (Three) Times a Day With Meals.     • insulin detemir (LEVEMIR) 100 UNIT/ML injection Inject 60 Units under the skin into the appropriate area as directed Daily.     • levothyroxine (Synthroid) 50 MCG tablet Take 1 tablet by mouth Daily. 90 tablet 1   • lidocaine (LMX) 4 % cream      • Menthol-Zinc Oxide 0.44-20.6 % ointment Apply 1 application topically to the appropriate area as directed 3 (Three) Times a Day.     • metoprolol succinate XL (TOPROL-XL) 25 MG 24 hr tablet Take 0.5 tablets by mouth Daily.     • Multiple Vitamin (MULTI VITAMIN PO) Take 1 tablet by mouth Every Morning.     • multivitamin with minerals tablet tablet Take 1 tablet by mouth Daily.     • polyethylene glycol (MIRALAX) 17 g packet Take 17 g by mouth Daily.     • senna (SENOKOT) 8.6 MG tablet Take 1 tablet by mouth 2 (Two) Times a Day As Needed for Constipation.     • tamsulosin (FLOMAX) 0.4 MG capsule 24 hr capsule Take 1 capsule by mouth Daily. 30 capsule    • Tradjenta 5 MG tablet tablet TAKE 1 TABLET DAILY 90 tablet 3   • traMADol (ULTRAM) 50 MG tablet Take 1 tablet by mouth Every 6 (Six) Hours As Needed for Moderate Pain or Severe Pain. 12 tablet 0   • vitamin B-12 (CYANOCOBALAMIN) 1000 MCG tablet Take 1 tablet by mouth Daily.     • Xarelto 15 MG tablet TAKE 1 TABLET DAILY 90 tablet 3     Allergies   Allergen Reactions   • Amiodarone Myalgia     Muscle problems in legs   • Percocet [Oxycodone-Acetaminophen] Mental Status Change and Confusion     Causes confusion/       Objective    Objective     Vital Signs  Temp:  [97.5 °F (36.4 °C)-99.3 °F (37.4 °C)] 97.9 °F (36.6 °C)  Heart Rate:  [82-90] 88  Resp:  [16-18] 18  BP: (112-151)/(55-75) 144/66  SpO2:  [89 %-98 %] 98 %  on  Flow (L/min):  [2] 2;   Device  (Oxygen Therapy): nasal cannula  Body mass index is 31.67 kg/m².    Physical Exam  Vitals and nursing note reviewed.   Constitutional:       General: He is awake. He is not in acute distress.     Appearance: He is obese. He is ill-appearing.   Cardiovascular:      Rate and Rhythm: Normal rate and regular rhythm.      Pulses: Normal pulses.      Heart sounds: Normal heart sounds.   Pulmonary:      Effort: Pulmonary effort is normal. No respiratory distress.      Breath sounds: Decreased breath sounds present.      Comments: On supplemental O2  Abdominal:      General: Bowel sounds are normal. There is no distension.      Palpations: Abdomen is soft.      Tenderness: There is no abdominal tenderness.   Musculoskeletal:      Right lower leg: Edema present.      Left lower leg: Edema present.   Skin:     General: Skin is warm and dry.      Coloration: Skin is jaundiced.      Findings: Bruising present.   Neurological:      General: No focal deficit present.      Mental Status: He is alert and oriented to person, place, and time.   Psychiatric:         Behavior: Behavior is cooperative.         Results Review:  I reviewed the patient's new clinical results.  I reviewed the patient's new imaging results and agree with the interpretation.  I reviewed the patient's other test results and agree with the interpretation  I personally viewed and interpreted the patient's EKG/Telemetry data  Discussed with ED provider.    Lab Results (last 24 hours)     Procedure Component Value Units Date/Time    CBC & Differential [[id number]]  (Abnormal) Collected: 04/21/23 2049    Specimen: Blood Updated: 04/21/23 2138    Narrative:      The following orders were created for panel order CBC & Differential.  Procedure                               Abnormality         Status                     ---------                               -----------         ------                     CBC Auto Differential[[id number]]        Abnormal            Final  result                 Please view results for these tests on the individual orders.    Comprehensive Metabolic Panel [[id number]]  (Abnormal) Collected: 04/21/23 2049    Specimen: Blood Updated: 04/21/23 2229     Glucose 70 mg/dL      BUN 58 mg/dL      Creatinine 2.08 mg/dL      Sodium 134 mmol/L      Potassium 3.4 mmol/L      Chloride 92 mmol/L      CO2 29.5 mmol/L      Calcium 9.2 mg/dL      Total Protein 6.3 g/dL      Albumin 3.1 g/dL      ALT (SGPT) 525 U/L      AST (SGOT) 257 U/L      Alkaline Phosphatase 1,605 U/L      Total Bilirubin 7.1 mg/dL      Globulin 3.2 gm/dL      A/G Ratio 1.0 g/dL      BUN/Creatinine Ratio 27.9     Anion Gap 12.5 mmol/L      eGFR 30.6 mL/min/1.73     Narrative:      GFR Normal >60  Chronic Kidney Disease <60  Kidney Failure <15    The GFR formula is only valid for adults with stable renal function between ages 18 and 70.    Lipase [[id number]]  (Normal) Collected: 04/21/23 2049    Specimen: Blood Updated: 04/21/23 2158     Lipase 58 U/L     CBC Auto Differential [[id number]]  (Abnormal) Collected: 04/21/23 2049    Specimen: Blood Updated: 04/21/23 2138     WBC 13.73 10*3/mm3      RBC 4.60 10*6/mm3      Hemoglobin 14.1 g/dL      Hematocrit 43.7 %      MCV 95.0 fL      MCH 30.7 pg      MCHC 32.3 g/dL      RDW 14.8 %      RDW-SD 52.8 fl      MPV 9.4 fL      Platelets 214 10*3/mm3      Neutrophil % 84.3 %      Lymphocyte % 8.2 %      Monocyte % 6.6 %      Eosinophil % 0.1 %      Basophil % 0.1 %      Immature Grans % 0.7 %      Neutrophils, Absolute 11.60 10*3/mm3      Lymphocytes, Absolute 1.12 10*3/mm3      Monocytes, Absolute 0.90 10*3/mm3      Eosinophils, Absolute 0.01 10*3/mm3      Basophils, Absolute 0.01 10*3/mm3      Immature Grans, Absolute 0.09 10*3/mm3      nRBC 0.0 /100 WBC     Urinalysis With Microscopic If Indicated (No Culture) - Urine, Clean Catch [[id number]]  (Abnormal) Collected: 04/21/23 2148    Specimen: Urine, Clean Catch Updated: 04/21/23 2203     Color, UA Dark  Yellow     Appearance, UA Clear     pH, UA 5.5     Specific Gravity, UA 1.013     Glucose, UA Negative     Ketones, UA Negative     Bilirubin, UA Small (1+)     Blood, UA Negative     Protein, UA Negative     Leuk Esterase, UA Negative     Nitrite, UA Negative     Urobilinogen, UA 1.0 E.U./dL    Narrative:      Urine microscopic not indicated.    Basic Metabolic Panel [[id number]]  (Abnormal) Collected: 04/22/23 0536    Specimen: Blood Updated: 04/22/23 0632     Glucose 74 mg/dL      BUN 56 mg/dL      Creatinine 1.92 mg/dL      Sodium 137 mmol/L      Potassium 3.1 mmol/L      Chloride 93 mmol/L      CO2 30.0 mmol/L      Calcium 9.2 mg/dL      BUN/Creatinine Ratio 29.2     Anion Gap 14.0 mmol/L      eGFR 33.7 mL/min/1.73     Narrative:      GFR Normal >60  Chronic Kidney Disease <60  Kidney Failure <15    The GFR formula is only valid for adults with stable renal function between ages 18 and 70.    CBC (No Diff) [[id number]]  (Abnormal) Collected: 04/22/23 0536    Specimen: Blood Updated: 04/22/23 0611     WBC 12.79 10*3/mm3      RBC 4.38 10*6/mm3      Hemoglobin 13.6 g/dL      Hematocrit 40.7 %      MCV 92.9 fL      MCH 31.1 pg      MCHC 33.4 g/dL      RDW 14.7 %      RDW-SD 50.7 fl      MPV 9.1 fL      Platelets 160 10*3/mm3     Procalcitonin [[id number]]  (Abnormal) Collected: 04/22/23 0536    Specimen: Blood Updated: 04/22/23 0639     Procalcitonin 1.38 ng/mL     Narrative:      As a Marker for Sepsis (Non-Neonates):    1. <0.5 ng/mL represents a low risk of severe sepsis and/or septic shock.  2. >2 ng/mL represents a high risk of severe sepsis and/or septic shock.    As a Marker for Lower Respiratory Tract Infections that require antibiotic therapy:    PCT on Admission    Antibiotic Therapy       6-12 Hrs later    >0.5                Strongly Recommended  >0.25 - <0.5        Recommended   0.1 - 0.25          Discouraged              Remeasure/reassess PCT  <0.1                Strongly Discouraged      "Remeasure/reassess PCT    As 28 day mortality risk marker: \"Change in Procalcitonin Result\" (>80% or <=80%) if Day 0 (or Day 1) and Day 4 values are available. Refer to http://www.[facility]-pct-calculator.com    Change in PCT <=80%  A decrease of PCT levels below or equal to 80% defines a positive change in PCT test result representing a higher risk for 28-day all-cause mortality of patients diagnosed with severe sepsis for septic shock.    Change in PCT >80%  A decrease of PCT levels of more than 80% defines a negative change in PCT result representing a lower risk for 28-day all-cause mortality of patients diagnosed with severe sepsis or septic shock.       Lactic Acid, Plasma [[id number]]  (Normal) Collected: 04/22/23 0536    Specimen: Blood Updated: 04/22/23 0634     Lactate 0.9 mmol/L     Comprehensive Metabolic Panel [[id number]] Collected: 04/22/23 0536    Specimen: Blood Updated: 04/22/23 0701    POC Glucose Once [[id number]]  (Normal) Collected: 04/22/23 0620    Specimen: Blood Updated: 04/22/23 0622     Glucose 80 mg/dL      Comment: Meter: [id number] : [id number] Mitchell Logan CNA             Imaging Results (Last 24 Hours)     Procedure Component Value Units Date/Time    CT Abdomen Pelvis Without Contrast [[id number]] Collected: 04/21/23 2336     Updated: 04/21/23 2336    Narrative:      CT ABDOMEN AND PELVIS WITHOUT IV CONTRAST     HISTORY: 87-year-old male with abnormal LFTs and generalized abdominal  pain. Colon cancer history. Cholecystectomy in the past.     TECHNIQUE: Radiation dose reduction techniques were utilized, including  automated exposure control and exposure modulation based on body size.   3 mm images were obtained through the abdomen and pelvis without the  administration of IV contrast. Compared with previous CT 12/27/2022.     FINDINGS: There has been a dramatic change in the appearance of the  pancreas. There has been development of an approximately 5.5 x 4.5 cm  solid appearing mass " at the pancreatic head and the mass obstructs the  common bile duct which measures 1.8 cm in diameter. There is also  significant intrahepatic biliary dilatation. There are multiple cystic  lesions scattered throughout the pancreatic body and there is haziness  surrounding the entire pancreas. There are multiple nodular densities  surrounding the pancreatic head and parts of the pancreatic body. Other  than the intrahepatic biliary dilatation, noncontrasted liver appears  unremarkable. Splenic size is normal. Adrenals appear unremarkable.  There are several renal cysts. There are no renal stones or  hydronephrosis. There is no acute bowel abnormality. There is moderate  sigmoid diverticulosis without evidence for diverticulitis. Right  hemicolectomy changes are noted. There are extensive abdominal aortic  atherosclerotic changes and there is a stable 3.6 cm infrarenal  abdominal aortic aneurysm. Both common iliac arteries are mildly  ectatic. 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 The diameter of  the thoracic aorta at the chronic dissection measures 4.1 cm which is  stable. At the visualized lower lung fields, there is pulmonary vascular  congestion and there are airspace opacities at the dependent aspect of  both lower lobes.       Impression:      1. There has been development of an approximately 5.5 cm pancreatic head  mass obstructing the common bile duct likely represents a pancreatic  malignancy. The multiple nodular densities adjacent to the pancreas are  suspected to be metastatic. The cystic lesions at the pancreatic body  may represent ectatic sidebranches or intraductal involvement with the  malignancy.. There are also changes of acute pancreatitis involving the  entire pancreas.  2. The airspace opacities at the dependent aspect of both lower lobes  are suspicious for pneumonia, particularly at the right lung base. There  is  also pulmonary vascular congestion.             Results for orders placed during the hospital encounter of 12/27/22    Adult Transthoracic Echo Limited W/ Cont if Necessary Per Protocol    Interpretation Summary  •  Limited echocardiogram for left ventricular function.  •  Left ventricular systolic function is normal. Left ventricular ejection fraction appears to be 61 - 65%.  •  There is no evidence of pericardial effusion      No orders to display        Assessment/Plan     Active Hospital Problems    Diagnosis  POA   • **Pancreatic mass [K86.89]  Yes   • Hypothyroidism [E03.9]  Yes   • Type 2 diabetes mellitus with chronic kidney disease, with long-term current use of insulin [E11.22, Z79.4]  Not Applicable   • History of CVA (cerebrovascular accident) [Z86.73]  Not Applicable   • Hypokalemia [E87.6]  Yes   • Transaminitis [R74.01]  Yes   • Pneumonia [J18.9]  Yes   • History of atrial flutter [Z86.79]  Not Applicable   • Hypoxia [R09.02]  Yes   • Obesity (BMI 30-39.9) [E66.9]  Yes   • Stage 3b chronic kidney disease [N18.32]  Yes   • Coronary arteriosclerosis [I25.10]  Yes   • Chronic anticoagulation [Z79.01]  Not Applicable   • Hyperlipidemia [E78.5]  Yes   • Essential hypertension [I10]  Yes      Resolved Hospital Problems   No resolved problems to display.       Mr. Lopez is a 87 y.o. former smoker with a history of CVA (12/2022), paroxysmal typical atrial flutter on long-term anticoagulation, CKD stage IIIb, type 2 diabetes mellitus with long-term current use of insulin, hypertension, hyperlipidemia, hypothyroidism that presents to [facility] complaining of worsening abdominal pain.     Pancreatic mass  Abdominal pain complicated by nausea and vomiting  Transaminitis  - CT AP obtained on admission reviewed, reports development of an approximately 5.5 cm pancreatic head mass obstructing the common bile duct felt to likely represent a pancreatic   Malignancy, coupled with multiple nodular  densities adjacent to the pancreas which are suspected to be metastatic.  - LFT elevated on admission, cholestatic predominant.  - Consult GI for further evaluation. Will follow their plans/recommendations. Greatly appreciate their help.  - Hold home Amiodarone and Statin on admission in setting of significant transaminitis. Can resume if/when appropriate.  - Order repeat CMP in AM for reassessment.    Acute respiratory failure with hypoxia  Pneumonia  -Patient meets criteria for diagnosis of acute respiratory failure with hypoxia with: Complaints of dyspnea, documented O2 saturation of 89% on room air and new oxygen requirement.  - Imaging obtained and reviewed, CT showing airspace opacities at the dependent aspect of both lower lobes are suspicious for pneumonia, particularly at the right lung base.  - Procalcitonin elevated on admission, however, likely skewed in setting of chronic renal failure. Also, WBC elevated. Discussed with patient and daughter, he states he was recently treated for a pneumonia 1-2 weeks ago with antibiotics as an outpatient.  - Start empiric antibiotic therapy for pneumonia, given patient's risk factors, will start broad spectrum.  - Order further infectious workup with blood cultures, RVP, strep, legionella, MRSA nares.  - Continue bronchodilators as prescribed, supplemental O2 PRN to maintain 02 sat >90%, telemetry, pulse oximetry, aspiration precautions, elevate HOB.    Chronic kidney disease stage 3B  - On most recent labs, patient's creatinine found to be stable and near apparent baseline (~2 on average), per review of previous lab values and outside records.  - No indications to warrant acute changes and/or intervention at this time.  - Order repeat CMP in AM for reassessment of renal function.   - Will continue to monitor and trend creatinine to guide ongoing management decisions. Avoid nephrotoxic medications, IVF, strict I/O, daily weights.    Type 2 diabetes mellitus with long  term current use of insulin without hyperglycemia complicated by neuropathy and CKD  - Most recent A1c: 6.90% (12/28/22).  - Home medication regimen: Levemir 60 units daily, aspart 10 units TID with meals, Tradjenta.  - Discontinue home medications. Start patient on treatment with [facility].  - Will monitor 24 hour insulin requirements and adjust as needed to achieve target inpatient range of 140-180.  - Diabetic diet    Paroxysmal typical atrial flutter on long term anticoagulation  - [id number] score: 4. Prior notes reviewed, has followed with Dr. Talbot.  - Vitals, telemetry reviewed, patient appears to be stable, rate controlled at present. On Metoprolol and Amiodarone as outpatient. On Xarelto for AC.  - Continue Metoprolol.  - Hold Amiodarone 2/2 transaminitis, can resume if/when appropriate.  - Hold Xarelto on admission in anticipation of further intervention by consulting services. Can resume when okay with them.  - Continue current treatment as prescribed. Continue telemetry monitoring.    Coronary artery disease  - S/P CABG x5 (1999)  - Patient appears stable from this perspective at this time, denies any chest pain, palpitations, no signs/symptoms to suggest ACS.  - Order EKG for baseline.  - Continue current treatment as prescribed. Will continue to monitor.  - Continue telemetry monitoring.    Hypertension  - BP acceptable acutely. Appears stable. No indications to warrant acute changes/intervention at this time.  - Continue current medications as prescribed. Trend BP to guide ongoing management decisions.    Hyperlipidemia  - Stable. Holding statin on admission 2/2 transaminitis. Can resume if/when appropriate.    Hypothyroidism  - Stable. Continue Levothyroxine as prescribed.    History of CVA and subdural hematoma  - Noted during prior admission in December 2022.    Obesity  - BMI 31.67 kg/m2. Complicating all medical problems.    · I discussed the patient's findings and my recommendations  with patient, family, nursing staff and ED provider.    VTE Prophylaxis - SCDs.  Code Status - Full code.       Mick Do DO  [facility]  04/22/23  07:08 EDT

## 2023-04-22 NOTE — PLAN OF CARE
Goal Outcome Evaluation:  Plan of Care Reviewed With: patient, daughter        Progress: improving  Outcome Evaluation: Patient was able to stand at bedside x 2 reps and ambulate along the side of the bed.  He was lethargic as he had pain medication administered prior to PT.  PT will continue to follow and progress his activity as he can tolerate.

## 2023-04-22 NOTE — THERAPY EVALUATION
Acute Care - Physical Therapy Initial Evaluation  [facility]     Patient Name: Osvaldo Lopez  : 1935  MRN: [id number]  Today's Date: 2023   Onset of Illness/Injury or Date of Surgery: 23  Visit Dx:     ICD-10-CM ICD-9-CM   1. Pancreatic mass  K86.89 577.8   2. 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 Chronic kidney disease, unspecified CKD stage  N18.9 585.9   4. Decreased mobility and endurance  Z74.09 780.99     Patient Active Problem List   Diagnosis   • Complete rotator cuff tear of left shoulder   • Abnormal finding on thyroid function test   • Abdominal aortic aneurysm   • Benign prostatic hyperplasia   • Essential hypertension   • Hyperlipidemia   • Shoulder pain   • Lumbar radiculopathy   • Type 2 diabetes mellitus, without long-term current use of insulin (HCC)   • OA (osteoarthritis) of knee   • Tear of right rotator cuff   • Spinal stenosis of lumbar region with neurogenic claudication   • Eyebrow ptosis   • Pseudophakia   • Nonrheumatic aortic valve stenosis   • Atrial flutter with rapid ventricular response   • Right arm pain   • Leg weakness, bilateral   • Typical atrial flutter (HCC)   • Diabetic peripheral neuropathy   • Muscle weakness (generalized)   • Pressure injury of left buttock, stage 1   • Chronic low back pain with sciatica   • Multifocal motor neuropathy   • Pressure injury of buttock, stage 1   • Anemia   • Symptomatic anemia   • Iron deficiency anemia   • Chronic anticoagulation   • CKD (chronic kidney disease)   • CRISTOBAL (acute kidney injury)   • Colonic mass   • Axonal neuropathy   • Impairment of balance   • Post laminectomy syndrome   • Coronary arteriosclerosis   • Edema   • History of malignant neoplasm of colon   • Prostatism   • Stage 3b chronic kidney disease   • Pre-ulcerative calluses   • Elevated troponin   • Recurrent falls   • Generalized weakness   • Obesity (BMI 30-39.9)   • Thrombocytopenia, unspecified   • Acute CVA  (cerebrovascular accident)   • Subdural hematoma, acute   • Pancreatic mass   • Hypothyroidism   • Type 2 diabetes mellitus with chronic kidney disease, with long-term current use of insulin   • History of atrial flutter   • History of CVA (cerebrovascular accident)   • Hypokalemia   • Transaminitis   • Pneumonia   • Hypoxia     Past Medical History:   Diagnosis Date   • Abdominal aortic aneurysm (AAA) without rupture    • Abdominal aortic ectasia 06/2015   • Abnormal EKG 10/25/2016    04/2019   • Abnormal thyroid blood test    • Actinic keratosis     FOLLOWED BY DR. MANPREET VILLARREAL   • Anemia 01/28/2021   • Arthritis    • Asthma     MILD, INTERMITTENT   • Atherosclerotic heart disease    • Atrial flutter with rapid ventricular response 04/14/2019    ADMITTED TO [facility]   • Atrial premature depolarization    • Atrophy of muscle of lower leg    • BPH (benign prostatic hyperplasia)     FOLLOWED BY DR. TERRA JONES   • Bruises easily    • Bulging of thoracic intervertebral disc    • Carpal tunnel syndrome, right 12/2019    FOLLOWED BY DR. NEW SANCHEZ   • Cataract     BILATERAL, S/P EXTRACTION WITH IOL IMPLANTS   • Chronic anticoagulation 01/29/2021   • Chronic edema    • Chronic low back pain with sciatica 01/26/2021   • Chronic pain    • CKD (chronic kidney disease) 01/29/2021   • Closed head injury 07/2018    WITH LACERATION TO SCALP, D/T SYNCOPE   • Colon cancer 01/31/2021    INVASIVE ADENOCARCINOMA FROM TUBULOVILLOUS ADENOMA IN TRANSVERSE, S/P COLON RESECTION, FOLLOWED BY DR. MARGARITA COX   • Colon polyps     FOLLOWED BY DR. MARGARITA COX   • Constipation due to opioid therapy    • Coronary artery disease    • DDD (degenerative disc disease), thoracic    • Diabetic amyotrophy    • Diverticulosis    • Enlarged prostate     FOLLOWED BY DR. TERRA JONES   • Epididymitis, right 03/2018   • Essential hypertension    • Eyebrow ptosis 11/09/2013   • Hallux valgus, acquired, bilateral 01/2019   • Heart attack 09/1989     S/P CABG, 5 VESSEL   • Heart murmur    • History of blood transfusion    • HL (hearing loss)    • Hyperactivity of bladder     FOLLOWED BY DR. TERRA JONES   • HyperCKemia 11/2017   • Hyperkalemia 08/2016   • Hyperlipidemia     MIXED   • Hyperreflexia 11/2017    BILATERAL   • Hyphema 05/2015   • IBS (irritable bowel syndrome)    • Impaired functional mobility, balance, gait, and endurance    • Impingement syndrome of left shoulder 10/2015   • Infection associated with cystostomy catheter 12/2016   • Ischemic colitis    • Lumbar radiculopathy 2016    wears back brace at times following back surgery   • Lumbar spondylosis 04/2016   • Lumbosacral neuritis 05/2015   • Lumbosacral radiculitis 05/2015   • Macular puckering of retina, left 10/2013   • 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 VIANEY GIORDANO   • Pseudophakia 11/09/2013   • RBBB (right bundle branch block)    • Recurrent falls    • Respiratory failure with hypoxia 01/2019   • SCC (squamous cell carcinoma) 10/16/2019    RIGHT FOREHEAD, LEFT TEMPLE, RIGHT SCALP, FOLLOWED BY DR. MANPREET VILLARREAL   • Scoliosis    • Spinal stenosis of lumbar region with neurogenic claudication 12/07/2017   • Syncope and collapse 07/16/2018    ADMITTED TO [facility]   • Torn rotator cuff 03/2017    BILATERAL, COMPLETE   • Type 2 diabetes mellitus     IDDM   • Urinary frequency     FOLLOWED BY DR. TERRA JONES   • Vitamin D deficiency    • Vitreous hemorrhage of left eye      Past Surgical History:   Procedure Laterality Date   • APPENDECTOMY N/A     • BROW LIFT Bilateral 11/12/2013    DR. OCTAVIA CEDILLO AT [place]   • CARDIAC CATHETERIZATION Left 10/2008    LEFT CAROTID ARTERY STENOSIS 50-69%   • CARDIAC ELECTROPHYSIOLOGY PROCEDURE N/A 06/06/2019    Procedure: Ablation atrial flutter;  Surgeon: Matthew Talbot MD;  Location: [facility] CATH INVASIVE LOCATION;  Service: Cardiovascular   • CATARACT EXTRACTION Left 01/22/1998    DR. LAYLA BARNES AT [place]   • CATARACT EXTRACTION Right 03/2001    DR. LAYLA BARNES   • CHOLECYSTECTOMY N/A 08/24/1989    DR. FAUZIA PELAEZ AT [place]   • COLON RESECTION N/A 02/03/2021    Procedure: LAPAROSCOPIC EXTENDED  RIGHT COLECTOMY WITH DAVINCI ROBOT;  Surgeon: Margarita Napoles MD;  Location: [facility] OR;  Service: DaVinci;  Laterality: N/A;   • COLONOSCOPY N/A 01/31/2021    20-25 MM POLYP IN CECUM, PATH: TUBULAR ADENOMA, 2 TUBULAR ADENOMA POLYPS IN ASCENDING, A FUNGATING AND SUBMUCOSAL ONONOBSTRUCTING MEDIUM MASS IN PROXIMAL TRANSVERSE, PATH: INVASIVE ADENOCARCINOMA ARISING IN A TUBULOVILOOUS ADENOMA, AREA TATTOOED, SCATTERED DIVERTICULA IN SIGMOID AND DESCENDING, LARGE INTERNAL HEMORRHOIDS, DR[facility]    • COLONOSCOPY N/A 02/02/2010    DIVERTICULOSIS, DR. SAL SOLANO AT [place]   • COLONOSCOPY N/A 08/15/2022    5 MM TUBULAR ADENOMA POLYP IN SIGMOID, 2 TUBULAR ADENOMA POLYPS IN DESCENDING, MULTIPLE DIVERTICULA IN SIGMOID, RESCOPE IN 2 YRS, DR. MARGARITA NAPOLES [facility]   • CORONARY ARTERY BYPASS GRAFT N/A 09/1989    5 VESSEL, DR. HANKS   • CYST REMOVAL      FROM BACK   • ENDOSCOPY N/A 01/31/2021    ENTIRE EGD WNL, PATH: MILD REACTIVE GASTROPATHY, DR. JACOB ALICEA [facility]   • INGUINAL HERNIA REPAIR Left 09/27/2007    DR. MATTHEW RUSH AT [place]   • INGUINAL HERNIA REPAIR Left 09/07/2007   • INGUINAL HERNIA REPAIR Left 2003   • KNEE ARTHROSCOPY W/ PARTIAL MEDIAL MENISCECTOMY Left 1962    DR. SINGH   • LUMBAR DISCECTOMY FUSION INSTRUMENTATION N/A 04/11/2016    Procedure: lumbar laminectomy L4-5 and fusion with  instrumentation;  Surgeon: Ran Kline MD;  Location: [facility];  Service:    • LUMBAR DISCECTOMY FUSION INSTRUMENTATION N/A 01/15/2018    Procedure: L2-3, L3-4 laminectomy and fusion with instrumentation and removal of implants L4 5.;  Surgeon: Ran Kline MD;  Location: [facility];  Service:    • LUMBAR EPIDURAL INJECTION N/A 09/23/2015    DR. MATTHEW DOMINGUEZ AT [facility]   • LUMBAR EPIDURAL INJECTION N/A 10/07/2015    DR. ITZEL LUIS AT [facility]   • LUMBAR EPIDURAL INJECTION N/A 11/19/2015    DR. ITZEL LUIS AT [facility]   • CT ARTHRP KNE CONDYLE&PLATU MEDIAL&LAT COMPARTMENTS Left 11/14/2016    Procedure: TOTAL KNEE ARTHROPLASTY;  Surgeon: Dontrell Arellano MD;  Location: [facility];  Service: Orthopedics   • SKIN BIOPSY Bilateral 10/16/2019    RIGHT LATERAL FOREHEAD:SCC, LEFT TEMPLE:SCC, RIGHT PARIETAL SCALP:SCC, DR. MANPREET VILLARREAL     PT Assessment (last 12 hours)     PT Evaluation and Treatment     Row Name 04/22/23 0943          Physical Therapy Time and Intention    Subjective Information complains of;weakness;fatigue;pain  -JR     Document Type evaluation  -JR     Mode of Treatment physical therapy  -JR     Patient Effort adequate  -     Row Name 04/22/23 0943          General Information    Patient Profile Reviewed yes  -JR     Onset of Illness/Injury or Date of Surgery 04/21/23  -JR     Referring Physician Hi  -     Patient Observations cooperative;agree to therapy;lethargic  administered Dilaudid prior to treatment  -JR     Patient/Family/Caregiver Comments/Observations daughter present  -JR     General Observations of Patient resting in bed.  -JR     Prior Level of Function min assist:;all household mobility;gait;transfer;bed mobility  use rollator at assisted living  -JR     Equipment Currently Used at Home rollator  -JR     Pertinent History of Current Functional Problem recent dx of pancreatic mass, stenosis - lumbar spine, DM, CKD  -JR     Existing Precautions/Restrictions --  has  knee braces that he normally dons when ambulating.  -     Risks Reviewed patient and family:;nausea/vomiting;dizziness;increased discomfort;LOB;change in vital signs;increased drainage;lines disloged  -     Benefits Reviewed patient and family:;improve function;increase independence;increase strength;increase balance;decrease pain;decrease risk of DVT;improve skin integrity;increase knowledge  -     Row Name 04/22/23 0943          Living Environment    Current Living Arrangements assisted living facility  -     People in Home alone  -     Row Name 04/22/23 0943          Pain    Pretreatment Pain Rating 2/10  -     Posttreatment Pain Rating 2/10  -     Pain Location - Side/Orientation Left  -     Pain Location anterior  -     Pain Location - flank  -JR     Pain Intervention(s) Medication (See MAR);Repositioned;Ambulation/increased activity  -     Row Name 04/22/23 0943          Cognition    Orientation Status (Cognition) oriented to;person;situation  -JR     Follows Commands (Cognition) follows two-step commands;WFL  Very lethargic but follows simple commands.  -     Row Name 04/22/23 0943          Range of Motion Comprehensive    General Range of Motion bilateral lower extremity ROM WFL;bilateral upper extremity ROM WFL  -     Row Name 04/22/23 0943          Strength Comprehensive (MMT)    General Manual Muscle Testing (MMT) Assessment upper extremity strength deficits identified;lower extremity strength deficits identified  -     Comment, General Manual Muscle Testing (MMT) Assessment 3/5 throughout all extremities.  -     Row Name 04/22/23 0943          Bed Mobility    Bed Mobility scooting/bridging;supine-sit;sit-supine  -     Scooting/Bridging [place] (Bed Mobility) minimum assist (75% patient effort)  -     Supine-Sit [place] (Bed Mobility) minimum assist (75% patient effort);moderate assist (50% patient effort)  -     Row Name 04/22/23 0943          Transfers     Transfers stand-sit transfer;sit-stand transfer  -     Row Name 04/22/23 0943          Sit-Stand Transfer    Sit-Stand [place] (Transfers) moderate assist (50% patient effort);1 person assist  -JR     Assistive Device (Sit-Stand Transfers) walker, front-wheeled  -     Row Name 04/22/23 0943          Stand-Sit Transfer    Stand-Sit [place] (Transfers) moderate assist (50% patient effort)  -     Assistive Device (Stand-Sit Transfers) walker, front-wheeled  -     Comment, (Stand-Sit Transfer) Bed elevated in order to have patient attain upright standing.  Uses chair lift at home.  -     Row Name 04/22/23 0943          Gait/Stairs (Locomotion)    Comment, (Gait/Stairs) Unable to ambulate away from bed, but took side steps along edge of bed.  -     Row Name 04/22/23 0943          Safety Issues, Functional Mobility    Impairments Affecting Function (Mobility) balance;cognition;endurance/activity tolerance;pain;strength  -     Row Name 04/22/23 0943          Balance    Balance Assessment sitting dynamic balance;sit to stand dynamic balance;standing static balance;standing dynamic balance  -     Dynamic Sitting Balance contact guard  -     Position, Sitting Balance sitting edge of bed  -     Sit to Stand Dynamic Balance moderate assist;1-person assist  -JR     Static Standing Balance minimal assist;1-person assist  -JR     Dynamic Standing Balance minimal assist;1-person assist  -JR     Position/Device Used, Standing Balance walker, front-wheeled  -     Balance Interventions standing;sit to stand;supported;static;dynamic  -     Comment, Balance Stood x 2 reps.  Took about 10-15 steps along edge of bed.  -     Row Name             [REMOVED] Wound 02/03/21 1641 abdomen Incision    Wound - Properties Group Placement Date: 02/03/21  -AH Placement Time: 1641 -AH Location: abdomen  -AH Primary Wound Type: Incision  -AH Additional Comments: Lap  sites x  -AH Removal Date: 04/22/23  -AG Removal  Time: 0220  -AG    Retired Wound - Properties Group Placement Date: 02/03/21  -AH Placement Time: 1641 -AH Location: abdomen  -AH Primary Wound Type: Incision  -AH Additional Comments: Lap  sites x  -AH Removal Date: 04/22/23  -AG Removal Time: 0220  -AG    Retired Wound - Properties Group Date first assessed: 02/03/21  -AH Time first assessed: 1641 -AH Location: abdomen  -AH Primary Wound Type: Incision  -AH Additional Comments: Lap  sites x  -AH Resolution Date: 04/22/23  -AG Resolution Time: 0220  -AG    Row Name             [REMOVED] Wound 02/04/21 gluteal MASD (Moisture associated skin damage)    Wound - Properties Group Placement Date: 02/04/21  -AGUS Location: gluteal  -AGUS Primary Wound Type: MASD  -AGUS Removal Date: 04/22/23  -AG Removal Time: 0220  -AG    Retired Wound - Properties Group Placement Date: 02/04/21  -AGUS Location: gluteal  -AGUS Primary Wound Type: MASD  -AGUS Removal Date: 04/22/23  -AG Removal Time: 0220  -AG    Retired Wound - Properties Group Date first assessed: 02/04/21  -AGUS Location: gluteal  -AGUS Primary Wound Type: MASD  -AGUS Resolution Date: 04/22/23  -AG Resolution Time: 0220  -AG    Row Name             [REMOVED] Wound 12/27/22 2355 Left posterior elbow Skin Tear    Wound - Properties Group Placement Date: 12/27/22  -AB Placement Time: 2355  -AB Present on Hospital Admission: Y  -AB Side: Left  -AB Orientation: posterior  -AB Location: elbow  -AB Primary Wound Type: Skin tear  -AB Removal Date: 04/22/23  -AG Removal Time: 0220 -AG    Retired Wound - Properties Group Placement Date: 12/27/22  -AB Placement Time: 2355  -AB Present on Hospital Admission: Y  -AB Side: Left  -AB Orientation: posterior  -AB Location: elbow  -AB Primary Wound Type: Skin tear  -AB Removal Date: 04/22/23  -AG Removal Time: 0220  -AG    Retired Wound - Properties Group Date first assessed: 12/27/22  -AB Time first assessed: 2355  -AB Present on Hospital Admission: Y  -AB Side: Left  -AB Location: elbow  -AB Primary  Wound Type: Skin tear  -AB Resolution Date: 04/22/23  -AG Resolution Time: 0220  -AG    Row Name             [REMOVED] Wound 12/27/22 2358 Right medial  Pressure Injury    Wound - Properties Group Placement Date: 12/27/22  -AB Placement Time: 2358  -AB Present on Hospital Admission: Y  -AB Side: Right  -AB Orientation: medial  -AB Location: --  -AB, rt/lt inner buttock  Primary Wound Type: Pressure inj  -AB Removal Date: 04/22/23  -AG Removal Time: 0220  -AG    Retired Wound - Properties Group Placement Date: 12/27/22  -AB Placement Time: 2358  -AB Present on Hospital Admission: Y  -AB Side: Right  -AB Orientation: medial  -AB Location: --  -AB, rt/lt inner buttock  Primary Wound Type: Pressure inj  -AB Removal Date: 04/22/23  -AG Removal Time: 0220  -AG    Retired Wound - Properties Group Date first assessed: 12/27/22  -AB Time first assessed: 2358  -AB Present on Hospital Admission: Y  -AB Side: Right  -AB Location: --  -AB, rt/lt inner buttock  Primary Wound Type: Pressure inj  -AB Resolution Date: 04/22/23  -AG Resolution Time: 0220  -AG    Row Name             Wound 04/22/23 0220 Bilateral coccyx Pressure Injury    Wound - Properties Group Placement Date: 04/22/23  -AG Placement Time: 0220  -AG Present on Hospital Admission: Y  -AG Side: Bilateral  -AG Location: coccyx  -AG Primary Wound Type: Pressure inj  -AG    Retired Wound - Properties Group Placement Date: 04/22/23  -AG Placement Time: 0220  -AG Present on Hospital Admission: Y  -AG Side: Bilateral  -AG Location: coccyx  -AG Primary Wound Type: Pressure inj  -AG    Retired Wound - Properties Group Date first assessed: 04/22/23  -AG Time first assessed: 0220  -AG Present on Hospital Admission: Y  -AG Side: Bilateral  -AG Location: coccyx  -AG Primary Wound Type: Pressure inj  -AG    Row Name 04/22/23 0943          Plan of Care Review    Plan of Care Reviewed With patient  -JR     Progress improving  -JR     Row Name 04/22/23 0943          Vital Signs    O2  Delivery Pre Treatment supplemental O2  -JR     O2 Delivery Intra Treatment supplemental O2  -JR     O2 Delivery Post Treatment supplemental O2  -JR     Row Name 04/22/23 0943          Positioning and Restraints    Pre-Treatment Position in bed  -JR     Post Treatment Position bed  -JR     In Bed notified nsg;supine;call light within reach;encouraged to call for assist;exit alarm on;patient within staff view;with family/caregiver;RUE elevated;legs elevated;SCD pump applied  -JR     Row Name 04/22/23 0943          Therapy Assessment/Plan (PT)    Patient/Family Therapy Goals Statement (PT) Go home  -JR     Functional Level at Time of Evaluation (PT) min-mod assist  -JR     PT Diagnosis (PT) decreased mobility and endurance  -JR     Rehab Potential (PT) good, to achieve stated therapy goals  -JR     Criteria for Skilled Interventions Met (PT) yes  -JR     Therapy Frequency (PT) daily  -JR     Predicted Duration of Therapy Intervention (PT) 2-3 months  -JR     Problem List (PT) balance;mobility;strength;pain  -JR     Row Name 04/22/23 0943          PT Evaluation Complexity    History, PT Evaluation Complexity 3 or more personal factors and/or comorbidities  -JR     Examination of Body Systems (PT Eval Complexity) total of 3 or more elements  -JR     Clinical Presentation (PT Evaluation Complexity) stable  -JR     Clinical Decision Making (PT Evaluation Complexity) moderate complexity  -JR     Overall Complexity (PT Evaluation Complexity) low complexity  -JR     Row Name 04/22/23 0943          Therapy Plan Review/Discharge Plan (PT)    Therapy Plan Review (PT) evaluation/treatment results reviewed;care plan/treatment goals reviewed;risks/benefits reviewed;current/potential barriers reviewed;participants voiced agreement with care plan;participants included;patient;daughter  -JR     Row Name 04/22/23 0943          Physical Therapy Goals    Bed Mobility Goal Selection (PT) bed mobility, PT goal 1  -JR     Transfer Goal  Selection (PT) transfer, PT goal 1  -JR     Gait Training Goal Selection (PT) gait training, PT goal 1  -JR     Row Name 04/22/23 0943          Bed Mobility Goal 1 (PT)    Activity/Assistive Device (Bed Mobility Goal 1, PT) bed mobility activities, all  -JR     [place] Level/Cues Needed (Bed Mobility Goal 1, PT) contact guard required  -JR     Time Frame (Bed Mobility Goal 1, PT) 10 days  -JR     Row Name 04/22/23 0943          Transfer Goal 1 (PT)    Activity/Assistive Device (Transfer Goal 1, PT) transfers, all;walker, rolling  -JR     [place] Level/Cues Needed (Transfer Goal 1, PT) tactile cues required;verbal cues required;contact guard required  -JR     Time Frame (Transfer Goal 1, PT) 2 weeks  -JR     Row Name 04/22/23 0943          Gait Training Goal 1 (PT)    Activity/Assistive Device (Gait Training Goal 1, PT) gait (walking locomotion);assistive device use;forward stepping;improve balance and speed;increase endurance/gait distance;increase energy conservation  -JR     [place] Level (Gait Training Goal 1, PT) contact guard required  -JR     Distance (Gait Training Goal 1, PT) 50  -JR     Time Frame (Gait Training Goal 1, PT) 2 weeks  -JR           User Key  (r) = Recorded By, (t) = Taken By, (c) = Cosigned By    Initials Name Provider Type    Ewa Pitt RN Registered Nurse    Xiomy Monreal RN Registered Nurse    Xiomy Monreal RN Registered Nurse    Lilian Bolaños RN Registered Nurse    Hany Urbina, PT Physical Therapist    Felicia Ferrara RN Registered Nurse                Physical Therapy Education     Title: PT OT SLP Therapies (In Progress)     Topic: Physical Therapy (Not Started)     Point: Mobility training (Not Started)     Learner Progress:  Not documented in this visit.          Point: Home exercise program (Not Started)     Learner Progress:  Not documented in this visit.          Point: Body mechanics (Not Started)     Learner Progress:  Not  documented in this visit.          Point: Precautions (Not Started)     Learner Progress:  Not documented in this visit.                          PT Recommendation and Plan  Anticipated Discharge Disposition (PT): assisted living, extended care facility  Planned Therapy Interventions (PT): bed mobility training, balance training, gait training, strengthening, transfer training  Therapy Frequency (PT): daily  Plan of Care Reviewed With: patient, daughter  Progress: improving  Outcome Evaluation: Patient was able to stand at bedside x 2 reps and ambulate along the side of the bed.  He was lethargic as he had pain medication administered prior to PT.  PT will continue to follow and progress his activity as he can tolerate.   Outcome Measures     Row Name 04/22/23 0958             How much help from another person do you currently need...    Turning from your back to your side while in flat bed without using bedrails? 3  -JR      Moving from lying on back to sitting on the side of a flat bed without bedrails? 3  -JR      Moving to and from a bed to a chair (including a wheelchair)? 2  -JR      Standing up from a chair using your arms (e.g., wheelchair, bedside chair)? 2  -JR      Climbing 3-5 steps with a railing? 1  -JR      To walk in hospital room? 2  -JR      AM-PAC 6 Clicks Score (PT) 13  -JR         Functional Assessment    Outcome Measure Options AM-PAC 6 Clicks Basic Mobility (PT)  -JR            User Key  (r) = Recorded By, (t) = Taken By, (c) = Cosigned By    Initials Name Provider Type    Hany Urbina, PT Physical Therapist                 Time Calculation:    PT Charges     Row Name 04/22/23 0959             Time Calculation    Start Time 0920  -JR      Stop Time 0950  -JR      Time Calculation (min) 30 min  -JR      PT Received On 04/22/23  -JR      PT - Next Appointment 04/23/23  -JR      PT Goal Re-Cert Due Date 04/29/23  -JR            User Key  (r) = Recorded By, (t) = Taken By, (c) = Cosigned By     Initials Name Provider Type    JR Hany Vogt, PT Physical Therapist              Therapy Charges for Today     Code Description Service Date Service Provider Modifiers Qty    84179969174 HC PT EVAL LOW COMPLEXITY 2 4/22/2023 Hany Vogt, PT GP 1    07137274032 HC PT THERAPEUTIC ACT EA 15 MIN 4/22/2023 Hany Vogt, PT GP 1    01735798513 HC GAIT TRAINING EA 15 MIN 4/22/2023 Hany Vogt, PT GP 1          PT G-Codes  Outcome Measure Options: AM-PAC 6 Clicks Basic Mobility (PT)  AM-PAC 6 Clicks Score (PT): 13    Hany Vogt, PT  4/22/2023

## 2023-04-22 NOTE — ED PROVIDER NOTES
EMERGENCY DEPARTMENT ENCOUNTER    Room Number:  03/03  Date seen:  4/22/2023  PCP: Provider, No Known  Discussed/ obtained information from independent historians: patient and daughter at the bedside      HPI:  Chief Complaint: abdominal pain, abnormal liver enzymes  A complete HPI/ROS/PMH/PSH/SH/FH are unobtainable due to: none  Context: Osvaldo Lopez is a 87 y.o. male who presents to the ED c/o generalized abdominal pain that started 3 days ago and is constant, waxes and wanes, stomach feels bloated.  He has had nausea without vomiting, denies any diarrhea, has had some bowel movements.  No fevers chills or upper respiratory symptoms, denies any chest pain or shortness of breath.  He has been having some back pain as well.  X-rays at the nursing home of his back reportedly only showed arthritis, abdominal x-rays were unrevealing, daughter states he had labs today and she was called and told that his liver enzymes were very high.  He has previously had a cholecystectomy as well and has a hemicolectomy due to colon cancer.      External (non-ED) record review: Patient admitted for generalized weakness and December 2022, Presented with weakness falls and back pain, imaging showed an acute right caudate stroke as well as a subdural hematoma      PAST MEDICAL HISTORY  Active Ambulatory Problems     Diagnosis Date Noted   • Complete rotator cuff tear of left shoulder 03/17/2016   • Abnormal finding on thyroid function test 06/13/2016   • Abdominal aortic aneurysm 06/13/2016   • Benign prostatic hyperplasia 06/13/2016   • Essential hypertension 06/13/2016   • Hyperlipidemia 06/13/2016   • Shoulder pain 06/13/2016   • Lumbar radiculopathy 06/13/2016   • Type 2 diabetes mellitus, without long-term current use of insulin (HCC) 06/13/2016   • OA (osteoarthritis) of knee 11/11/2016   • Tear of right rotator cuff 03/20/2017   • Spinal stenosis of lumbar region with neurogenic claudication 12/07/2017   • Eyebrow ptosis  11/09/2013   • Pseudophakia 11/09/2013   • Nonrheumatic aortic valve stenosis 05/18/2018   • Atrial flutter with rapid ventricular response 04/14/2019   • Right arm pain 04/14/2019   • Leg weakness, bilateral 06/04/2019   • Typical atrial flutter (HCC) 06/04/2019   • Diabetic peripheral neuropathy 01/23/2020   • Muscle weakness (generalized) 07/23/2020   • Pressure injury of left buttock, stage 1 07/23/2020   • Chronic low back pain with sciatica 01/26/2021   • 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   • Pressure injury of buttock, stage 1 01/26/2021   • Anemia 01/28/2021   • Symptomatic anemia 01/29/2021   • Iron deficiency anemia 01/29/2021   • Chronic anticoagulation 01/29/2021   • CKD (chronic kidney disease) 01/29/2021   • CRISTOBAL (acute kidney injury) 01/29/2021   • Colonic mass 01/29/2021   • Axonal neuropathy 01/02/2019   • Impairment of balance 10/31/2018   • Post laminectomy syndrome    • Coronary arteriosclerosis 12/16/2021   • Edema 12/16/2021   • History of malignant neoplasm of colon 12/16/2021   • Prostatism 12/16/2021   • Stage 3b chronic kidney disease 12/16/2021   • Pre-ulcerative calluses 09/09/2022   • Elevated troponin 12/27/2022   • Recurrent falls 12/27/2022   • Generalized weakness 12/27/2022   • Obesity (BMI 30-39.9) 12/27/2022   • Thrombocytopenia, unspecified 12/28/2022   • Acute CVA (cerebrovascular accident) 12/28/2022   • Subdural hematoma, acute 12/28/2022     Resolved Ambulatory Problems     Diagnosis Date Noted   • PAF (paroxysmal atrial fibrillation) 02/24/2016   • Enlarged prostate 06/13/2016   • Hypertonicity of bladder 06/13/2016   • Hyperglycemia 06/13/2016   • Increased frequency of urination 06/13/2016   • Toxic encephalopathy 11/17/2016   • Type 2 diabetes mellitus 11/17/2016   • Postoperative anemia due to acute blood loss 11/17/2016   • Cardiac murmur 11/17/2016   • Hyponatremia 01/18/2018   • Testicular swelling, right 04/03/2018   • Blepharoptosis 11/09/2013   • Syncope  07/16/2018     Past Medical History:   Diagnosis Date   • Abdominal aortic aneurysm (AAA) without rupture    • Abdominal aortic ectasia 06/2015   • Abnormal EKG 10/25/2016   • Abnormal thyroid blood test    • Actinic keratosis    • Arthritis    • Asthma    • Atherosclerotic heart disease    • Atrial premature depolarization    • Atrophy of muscle of lower leg    • BPH (benign prostatic hyperplasia)    • Bruises easily    • Bulging of thoracic intervertebral disc    • Carpal tunnel syndrome, right 12/2019   • Cataract    • Chronic edema    • Chronic pain    • Closed head injury 07/2018   • Colon cancer 01/31/2021   • Colon polyps    • Constipation due to opioid therapy    • Coronary artery disease    • DDD (degenerative disc disease), thoracic    • Diabetic amyotrophy    • Diverticulosis    • Epididymitis, right 03/2018   • Hallux valgus, acquired, bilateral 01/2019   • Heart attack 09/1989   • Heart murmur    • History of blood transfusion    • HL (hearing loss)    • Hyperactivity of bladder    • HyperCKemia 11/2017   • Hyperkalemia 08/2016   • Hyperreflexia 11/2017   • Hyphema 05/2015   • IBS (irritable bowel syndrome)    • Impaired functional mobility, balance, gait, and endurance    • Impingement syndrome of left shoulder 10/2015   • Infection associated with cystostomy catheter 12/2016   • Ischemic colitis    • Lumbar spondylosis 04/2016   • Lumbosacral neuritis 05/2015   • Lumbosacral radiculitis 05/2015   • Macular puckering of retina, left 10/2013   • Myopathy 11/2017   • Osteoarthritis    • Other intervertebral disc disorders, lumbosacral region    • Plantar fascial fibromatosis 06/2018   • Prostatitis    • Protein S deficiency    • RBBB (right bundle branch block)    • Respiratory failure with hypoxia 01/2019   • SCC (squamous cell carcinoma) 10/16/2019   • Scoliosis    • Syncope and collapse 07/16/2018   • Torn rotator cuff 03/2017   • Urinary frequency    • Vitamin D deficiency    • Vitreous hemorrhage of  left eye          PAST SURGICAL HISTORY  Past Surgical History:   Procedure Laterality Date   • APPENDECTOMY N/A    • BROW LIFT Bilateral 11/12/2013    DR. OCTAVIA CEDILLO AT [place]   • CARDIAC CATHETERIZATION Left 10/2008    LEFT CAROTID ARTERY STENOSIS 50-69%   • CARDIAC ELECTROPHYSIOLOGY PROCEDURE N/A 06/06/2019    Procedure: Ablation atrial flutter;  Surgeon: Matthew Talbot MD;  Location: [facility] INVASIVE LOCATION;  Service: Cardiovascular   • CATARACT EXTRACTION Left 01/22/1998    DR. LAYLA BARNES AT [place]   • CATARACT EXTRACTION Right 03/2001    DR. LAYLA BARNES   • CHOLECYSTECTOMY N/A 08/24/1989    DR. FAUZIA PELAEZ AT [place]   • COLON RESECTION N/A 02/03/2021    Procedure: LAPAROSCOPIC EXTENDED  RIGHT COLECTOMY WITH DAVINCI ROBOT;  Surgeon: Margarita Napoles MD;  Location: [facility] OR;  Service: DaVinci;  Laterality: N/A;   • COLONOSCOPY N/A 01/31/2021    20-25 MM POLYP IN CECUM, PATH: TUBULAR ADENOMA, 2 TUBULAR ADENOMA POLYPS IN ASCENDING, A FUNGATING AND SUBMUCOSAL ONONOBSTRUCTING MEDIUM MASS IN PROXIMAL TRANSVERSE, PATH: INVASIVE ADENOCARCINOMA ARISING IN A TUBULOVILOOUS ADENOMA, AREA TATTOOED, SCATTERED DIVERTICULA IN SIGMOID AND DESCENDING, LARGE INTERNAL HEMORRHOIDS, DR. JACOB ALICEA AT [facility]    • COLONOSCOPY N/A 02/02/2010    DIVERTICULOSIS, DR. SAL SOLANO AT [place]   • COLONOSCOPY N/A 08/15/2022    5 MM TUBULAR ADENOMA POLYP IN SIGMOID, 2 TUBULAR ADENOMA POLYPS IN DESCENDING, MULTIPLE DIVERTICULA IN SIGMOID, RESCOPE IN 2 YRS, DR. MARGARITA NAPOLES AT [facility]   • CORONARY ARTERY BYPASS GRAFT N/A 09/1989    5 VESSEL, DR. HANKS   • CYST REMOVAL      FROM BACK   • ENDOSCOPY N/A 01/31/2021    ENTIRE EGD WNL, PATH: MILD REACTIVE GASTROPATHY, DR. JACOB ALICEA AT [facility]   • INGUINAL HERNIA REPAIR Left 09/27/2007    DR. MATTHEW RUSH AT [place]   • INGUINAL HERNIA REPAIR Left 09/07/2007   • INGUINAL HERNIA REPAIR Left 2003   • KNEE ARTHROSCOPY W/ PARTIAL MEDIAL MENISCECTOMY Left 1962    DR. SINGH   •  LUMBAR DISCECTOMY FUSION INSTRUMENTATION N/A 2016    Procedure: lumbar laminectomy L4-5 and fusion with instrumentation;  Surgeon: Ran Kline MD;  Location: [facility] OR;  Service:    • LUMBAR DISCECTOMY FUSION INSTRUMENTATION N/A 01/15/2018    Procedure: L2-3, L3-4 laminectomy and fusion with instrumentation and removal of implants L4 5.;  Surgeon: Ran Kline MD;  Location: [facility] OR;  Service:    • LUMBAR EPIDURAL INJECTION N/A 2015    DR. MATTHEW DOMINGUEZ AT [facility]   • LUMBAR EPIDURAL INJECTION N/A 10/07/2015    DR. ITZEL LUIS AT [facility]   • LUMBAR EPIDURAL INJECTION N/A 2015    DR. ITZEL LUIS AT [facility]   • IN ARTHRP KNE CONDYLE&PLATU MEDIAL&LAT COMPARTMENTS Left 2016    Procedure: TOTAL KNEE ARTHROPLASTY;  Surgeon: Dontrell Arellano MD;  Location: [facility] OR;  Service: Orthopedics   • SKIN BIOPSY Bilateral 10/16/2019    RIGHT LATERAL FOREHEAD:SCC, LEFT TEMPLE:SCC, RIGHT PARIETAL SCALP:SCC, DR. MANPREET VILLARREAL         FAMILY HISTORY  Family History   Problem Relation Age of Onset   • Heart failure Mother    • Diabetes Mother    • Heart disease Mother    • Cancer Sister    • Diabetes Sister    • Cancer Brother    • Diabetes Brother    • Other Brother         INCLUSION BODY NYOSITIS   • Cancer Father          SOCIAL HISTORY  Social History     Socioeconomic History   • Marital status:    Tobacco Use   • Smoking status: Former     Packs/day: 3.00     Years: 45.00     Pack years: 135.00     Types: Cigarettes     Quit date: 1989     Years since quittin.4   • Smokeless tobacco: Never   Vaping Use   • Vaping Use: Never used   Substance and Sexual Activity   • Alcohol use: Yes     Comment: 1 shot of whiskey in the morning and 1 shot of whiskey in the evening   • Drug use: Never   • Sexual activity: Not Currently         ALLERGIES  Amiodarone and Percocet [oxycodone-acetaminophen]        REVIEW OF SYSTEMS  Review of Systems         PHYSICAL EXAM  ED Triage Vitals    Temp Heart Rate Resp BP SpO2   04/21/23 1942 04/21/23 1942 04/21/23 1943 04/21/23 1942 04/21/23 1942   99.3 °F (37.4 °C) 90 16 112/55 95 %      Temp src Heart Rate Source Patient Position BP Location FiO2 (%)   -- -- -- -- --              Physical Exam      GENERAL: no acute distress  HENT: normocephalic, atraumatic  EYES: Faint scleral icterus present  CV: regular rhythm, normal rate  RESPIRATORY: normal effort, CTA B  ABDOMEN: nondistended mild diffuse tenderness, no guarding or rigidity, bowel sounds present  MUSCULOSKELETAL: no deformity  NEURO: alert, moves all extremities, follows commands  PSYCH:  calm, cooperative  SKIN: warm, dry    Vital signs and nursing notes reviewed.          LAB RESULTS  Recent Results (from the past 24 hour(s))   Comprehensive Metabolic Panel    Collection Time: 04/21/23  8:49 PM    Specimen: Blood   Result Value Ref Range    Glucose 70 65 - 99 mg/dL    BUN 58 (H) 8 - 23 mg/dL    Creatinine 2.08 (H) 0.76 - 1.27 mg/dL    Sodium 134 (L) 136 - 145 mmol/L    Potassium 3.4 (L) 3.5 - 5.2 mmol/L    Chloride 92 (L) 98 - 107 mmol/L    CO2 29.5 (H) 22.0 - 29.0 mmol/L    Calcium 9.2 8.6 - 10.5 mg/dL    Total Protein 6.3 6.0 - 8.5 g/dL    Albumin 3.1 (L) 3.5 - 5.2 g/dL    ALT (SGPT) 525 (H) 1 - 41 U/L    AST (SGOT) 257 (H) 1 - 40 U/L    Alkaline Phosphatase 1,605 (H) 39 - 117 U/L    Total Bilirubin 7.1 (H) 0.0 - 1.2 mg/dL    Globulin 3.2 gm/dL    A/G Ratio 1.0 g/dL    BUN/Creatinine Ratio 27.9 (H) 7.0 - 25.0    Anion Gap 12.5 5.0 - 15.0 mmol/L    eGFR 30.6 (L) >60.0 mL/min/1.73   Lipase    Collection Time: 04/21/23  8:49 PM    Specimen: Blood   Result Value Ref Range    Lipase 58 13 - 60 U/L   CBC Auto Differential    Collection Time: 04/21/23  8:49 PM    Specimen: Blood   Result Value Ref Range    WBC 13.73 (H) 3.40 - 10.80 10*3/mm3    RBC 4.60 4.14 - 5.80 10*6/mm3    Hemoglobin 14.1 13.0 - 17.7 g/dL    Hematocrit 43.7 37.5 - 51.0 %    MCV 95.0 79.0 - 97.0 fL    MCH 30.7 26.6 - 33.0 pg     MCHC 32.3 31.5 - 35.7 g/dL    RDW 14.8 12.3 - 15.4 %    RDW-SD 52.8 37.0 - 54.0 fl    MPV 9.4 6.0 - 12.0 fL    Platelets 214 140 - 450 10*3/mm3    Neutrophil % 84.3 (H) 42.7 - 76.0 %    Lymphocyte % 8.2 (L) 19.6 - 45.3 %    Monocyte % 6.6 5.0 - 12.0 %    Eosinophil % 0.1 (L) 0.3 - 6.2 %    Basophil % 0.1 0.0 - 1.5 %    Immature Grans % 0.7 (H) 0.0 - 0.5 %    Neutrophils, Absolute 11.60 (H) 1.70 - 7.00 10*3/mm3    Lymphocytes, Absolute 1.12 0.70 - 3.10 10*3/mm3    Monocytes, Absolute 0.90 0.10 - 0.90 10*3/mm3    Eosinophils, Absolute 0.01 0.00 - 0.40 10*3/mm3    Basophils, Absolute 0.01 0.00 - 0.20 10*3/mm3    Immature Grans, Absolute 0.09 (H) 0.00 - 0.05 10*3/mm3    nRBC 0.0 0.0 - 0.2 /100 WBC   Urinalysis With Microscopic If Indicated (No Culture) - Urine, Clean Catch    Collection Time: 04/21/23  9:48 PM    Specimen: Urine, Clean Catch   Result Value Ref Range    Color, UA Dark Yellow (A) Yellow, Straw    Appearance, UA Clear Clear    pH, UA 5.5 5.0 - 8.0    Specific Gravity, UA 1.013 1.005 - 1.030    Glucose, UA Negative Negative    Ketones, UA Negative Negative    Bilirubin, UA Small (1+) (A) Negative    Blood, UA Negative Negative    Protein, UA Negative Negative    Leuk Esterase, UA Negative Negative    Nitrite, UA Negative Negative    Urobilinogen, UA 1.0 E.U./dL 0.2 - 1.0 E.U./dL       Ordered the above labs and reviewed the results.        RADIOLOGY  CT Abdomen Pelvis Without Contrast    Result Date: 4/21/2023  CT ABDOMEN AND PELVIS WITHOUT IV CONTRAST  HISTORY: 87-year-old male with abnormal LFTs and generalized abdominal pain. Colon cancer history. Cholecystectomy in the past.  TECHNIQUE: Radiation dose reduction techniques were utilized, including automated exposure control and exposure modulation based on body size. 3 mm images were obtained through the abdomen and pelvis without the administration of IV contrast. Compared with previous CT 12/27/2022.  FINDINGS: There has been a dramatic change in the  appearance of the pancreas. There has been development of an approximately 5.5 x 4.5 cm solid appearing mass at the pancreatic head and the mass obstructs the common bile duct which measures 1.8 cm in diameter. There is also significant intrahepatic biliary dilatation. There are multiple cystic lesions scattered throughout the pancreatic body and there is haziness surrounding the entire pancreas. There are multiple nodular densities surrounding the pancreatic head and parts of the pancreatic body. Other than the intrahepatic biliary dilatation, noncontrasted liver appears unremarkable. Splenic size is normal. Adrenals appear unremarkable. There are several renal cysts. There are no renal stones or hydronephrosis. There is no acute bowel abnormality. There is moderate sigmoid diverticulosis without evidence for diverticulitis. Right hemicolectomy changes are noted. There are extensive abdominal aortic atherosclerotic changes and there is a stable 3.6 cm infrarenal abdominal aortic aneurysm. Both common iliac arteries are mildly ectatic. There is a chronic appearing dissection along the left and posterior wall of the ectatic descending thoracic aorta. The diameter of the thoracic aorta at the chronic dissection measures 4.1 cm which is stable. At the visualized lower lung fields, there is pulmonary vascular congestion and there are airspace opacities at the dependent aspect of both lower lobes.      1. There has been development of an approximately 5.5 cm pancreatic head mass obstructing the common bile duct likely represents a pancreatic malignancy. The multiple nodular densities adjacent to the pancreas are suspected to be metastatic. The cystic lesions at the pancreatic body may represent ectatic sidebranches or intraductal involvement with the malignancy.. There are also changes of acute pancreatitis involving the entire pancreas. 2. The airspace opacities at the dependent aspect of both lower lobes are suspicious  for pneumonia, particularly at the right lung base. There is also pulmonary vascular congestion.        Ordered the above noted radiological studies. Reviewed by me in PACS.            PROCEDURES  Procedures              MEDICATIONS GIVEN IN ER  Medications   ondansetron (ZOFRAN) injection 4 mg (4 mg Intravenous Given 4/21/23 2046)   HYDROmorphone (DILAUDID) injection 0.5 mg (0.5 mg Intravenous Given 4/21/23 2048)   sodium chloride 0.9 % bolus 500 mL (500 mL Intravenous New Bag 4/21/23 2310)   HYDROmorphone (DILAUDID) injection 0.5 mg (0.5 mg Intravenous Given 4/21/23 2325)                   MEDICAL DECISION MAKING, PROGRESS, and CONSULTS    All labs have been independently reviewed by me.  All radiology studies have been reviewed by me and I have also reviewed the radiology report.   EKG's independently viewed and interpreted by me.  Discussion below represents my analysis of pertinent findings related to patient's condition, differential diagnosis, treatment plan and final disposition.      Additional sources:    - Chronic or social conditions impacting care: Nursing home resident        Orders placed during this visit:  Orders Placed This Encounter   Procedures   • CT Abdomen Pelvis Without Contrast   • Comprehensive Metabolic Panel   • Lipase   • Urinalysis With Microscopic If Indicated (No Culture) - Urine, Clean Catch   • CBC Auto Differential   • L[facility] (on-call MD unless specified) Details   • CBC & Differential           Differential diagnosis:  Differential diagnosis includes but is not limited to:  - hepatobiliary pathology such as cholecystitis, cholangitis, and symptomatic cholelithiasis  - Pancreatitis  - Dyspepsia  - Small bowel obstruction  - Appendicitis  - Diverticulitis  - UTI including pyelonephritis  - Ureteral stone  - Zoster  - Colitis, including infectious and ischemic  - Atypical ACS        Independent interpretation of labs, radiology studies, and discussions with consultants:  ED Course as of  04/22/23 0046   Fri Apr 21, 2023 2229 Alkaline Phosphatase(!): 1,605 [KA]   2230 AST (SGOT)(!): 257 [KA]   2230 ALT (SGPT)(!): 525 [KA]   2230 Lipase: 58 [KA]   2230 WBC(!): 13.73 [KA]   2230 Hemoglobin: 14.1 [KA]   2230 Creatinine(!): 2.08  Chronic, stable [KA]   Sat Apr 22, 2023   0008 CT Abdomen Pelvis Without Contrast  Radiology report reviewed, patient has a pancreatic mass causing obstruction [DC]   0037 I discussed the patient with LOU Soto for [facility].  We discussed patient's history presentation labs and imaging she agrees to admit on behalf of Dr. Gibson. [KA]      ED Course User Index  [DC] Theo Johnson MD  [KA] Jeanie Akins PA Dr. Culy discussed the patient's results with him and family.  They are agreeable with the plan for admission.      Patient was wearing a face mask when I entered the room and they continued to wear a mask throughout their stay in the ED.  I wore PPE, including  gloves, face mask with shield or face mask with goggles whenever I was in the room with patient.     DIAGNOSIS  Final diagnoses:   Pancreatic mass   Biliary obstruction due to cancer   Chronic kidney disease, unspecified CKD stage         Latest Documented Vital Signs:  As of 00:46 EDT  BP- 112/55 HR- 90 Temp- 99.3 °F (37.4 °C) O2 sat- 95%              --    Please note that portions of this were completed with a voice recognition program.       Note Disclaimer: At [facility], we believe that sharing information builds trust and better relationships. You are receiving this note because you are receiving care at [facility] or recently visited. It is possible you will see health information before a provider has talked with you about it. This kind of information can be easy to misunderstand. To help you fully understand what it means for your health, we urge you to discuss this note with your provider.           Jeanie Akins PA  04/22/23 0046       Jeanie Akins PA  04/22/23 0046

## 2023-04-22 NOTE — ED PROVIDER NOTES
Pt presents to the ED c/o  several days of increasing abdominal pains, nausea, bloating, and jaundice.  Had labs drawn earlier today at facility which showed elevated LFTs and hyperbilirubinemia.  Denies any fevers, does have a history of colon cancer status post resection.     On exam,   General: Ill-appearing, jaundiced, nondiaphoretic  HEENT: Mucous membranes moist, atraumatic, EOMI, bilateral scleral icterus  Neck: Full ROM  Pulm: Symmetric chest rise, nonlabored, lungs CTAB  Cardiovascular: Regular rate and rhythm, intact distal pulses  GI: Soft, mild distention, no rebound, no guarding, bowel sounds diminished  MSK: Full ROM, no deformity  Skin: Warm, dry  Neuro: Awake, alert, oriented x 4, GCS 15, moving all extremities, no focal deficits  Psych: Calm, cooperative        Plan:   ED Course as of 04/22/23 0119   Fri Apr 21, 2023   2229 Alkaline Phosphatase(!): 1,605 [KA]   2230 AST (SGOT)(!): 257 [KA]   2230 ALT (SGPT)(!): 525 [KA]   2230 Lipase: 58 [KA]   2230 WBC(!): 13.73 [KA]   2230 Hemoglobin: 14.1 [KA]   2230 Creatinine(!): 2.08  Chronic, stable [KA]   Sat Apr 22, 2023   0008 CT Abdomen Pelvis Without Contrast  Radiology report reviewed, patient has a pancreatic mass causing obstruction [DC]   0037 I discussed the patient with LOU Soto for [facility].  We discussed patient's history presentation labs and imaging she agrees to admit on behalf of Dr. Gibson. [KA]      ED Course User Index  [DC] Theo Johnson MD  [KA] Jeanie Akins PA     Patient and daughter at bedside been updated on the findings today of the pancreatic mass and elevated LFTs.  Plan for hospitalization.  Discussed poor prognosis but that further discussions will be had upstairs with specialist.  All questions and concerns addressed.     Attestation:  The TIMMY and I have discussed this patient's history, physical exam, and treatment plan.  I have reviewed the documentation and personally had a face to face interaction with the  patient. I affirm the documentation and agree with the treatment and plan.  The attached note describes my personal findings.          Theo Johnson MD  04/22/23 0119

## 2023-04-22 NOTE — PLAN OF CARE
Goal Outcome Evaluation:  Plan of Care Reviewed With: patient        Progress: no change  Outcome Evaluation: pt admitted from ER admission completed. GI consult called. consult for wound placed pressure injury on coccyx. purewick in place. IVF continued. pt rested well. 2L NC. labs in am. will continue to monitor.

## 2023-04-22 NOTE — PROGRESS NOTES
"[facility] Clinical Pharmacy Services: Vancomycin Pharmacokinetic Initial Consult Note    Osvaldo Lopez is a 87 y.o. male who is on day 1 of pharmacy to dose vancomycin.    Indication: Pneumonia  Consulting Provider: Dr Do  Planned Duration of Therapy: 5 days  Loading Dose Ordered or Given: 4/22 ordered  MRSA PCR performed: ordered 4/22 not drawn yet  Target: Dose by Levels  Other Antimicrobials: zosyn    Vitals/Labs  Ht: 180.3 cm (71\"); Wt: 103 kg (227 lb 1.2 oz)  Temp Readings from Last 1 Encounters:   04/22/23 97.9 °F (36.6 °C) (Oral)    Estimated Creatinine Clearance: 34.4 mL/min (A) (by C-G formula based on SCr of 1.92 mg/dL (H)).        Results from last 7 days   Lab Units 04/22/23  0536 04/21/23  2049   CREATININE mg/dL 1.87*  1.92* 2.08*   WBC 10*3/mm3 12.79* 13.73*     Assessment/Plan:    Will load with 20mg/kg X one this am and plan to check a 12h level this evening to eval for further dosing.  Will dose based on levels given elevated Cr.     Pharmacy will follow patient's kidney function and will adjust doses and obtain levels as necessary. Thank you for involving pharmacy in this patient's care. Please contact pharmacy with any questions or concerns.                           Marilu Gorman, PharmD  Clinical Pharmacist    "

## 2023-04-22 NOTE — ED NOTES
"Nursing report ED to floor  Osvaldo Lopez  87 y.o.  male    HPI :   Chief Complaint   Patient presents with    Back Pain    Abdominal Pain    Abnormal Lab       Admitting doctor:   Josiah Gibson MD    Admitting diagnosis:   The primary encounter diagnosis was Pancreatic mass. Diagnoses of Biliary obstruction due to cancer and Chronic kidney disease, unspecified CKD stage were also pertinent to this visit.    Code status:   Current Code Status       Date Active Code Status Order ID Comments User Context       Prior            Allergies:   Amiodarone and Percocet [oxycodone-acetaminophen]    Isolation:   No active isolations    Intake and Output    Intake/Output Summary (Last 24 hours) at 4/22/2023 0142  Last data filed at 4/22/2023 0100  Gross per 24 hour   Intake 500 ml   Output --   Net 500 ml       Weight:       04/21/23 1942   Weight: 90.7 kg (200 lb)       Most recent vitals:   Vitals:    04/21/23 1942 04/21/23 1943   BP: 112/55    Pulse: 90    Resp:  16   Temp: 99.3 °F (37.4 °C)    SpO2: 95%    Weight: 90.7 kg (200 lb)    Height: 180.3 cm (71\")        Active LDAs/IV Access:   Lines, Drains & Airways       Active LDAs       Name Placement date Placement time Site Days    Peripheral IV 04/21/23 1950 Left Antecubital 04/21/23 1950  Antecubital  less than 1                    Labs (abnormal labs have a star):   Labs Reviewed   COMPREHENSIVE METABOLIC PANEL - Abnormal; Notable for the following components:       Result Value    BUN 58 (*)     Creatinine 2.08 (*)     Sodium 134 (*)     Potassium 3.4 (*)     Chloride 92 (*)     CO2 29.5 (*)     Albumin 3.1 (*)     ALT (SGPT) 525 (*)     AST (SGOT) 257 (*)     Alkaline Phosphatase 1,605 (*)     Total Bilirubin 7.1 (*)     BUN/Creatinine Ratio 27.9 (*)     eGFR 30.6 (*)     All other components within normal limits    Narrative:     GFR Normal >60  Chronic Kidney Disease <60  Kidney Failure <15    The GFR formula is only valid for adults with stable renal " function between ages 18 and 70.   URINALYSIS W/ MICROSCOPIC IF INDICATED (NO CULTURE) - Abnormal; Notable for the following components:    Color, UA Dark Yellow (*)     Bilirubin, UA Small (1+) (*)     All other components within normal limits    Narrative:     Urine microscopic not indicated.   CBC WITH AUTO DIFFERENTIAL - Abnormal; Notable for the following components:    WBC 13.73 (*)     Neutrophil % 84.3 (*)     Lymphocyte % 8.2 (*)     Eosinophil % 0.1 (*)     Immature Grans % 0.7 (*)     Neutrophils, Absolute 11.60 (*)     Immature Grans, Absolute 0.09 (*)     All other components within normal limits   LIPASE - Normal   CBC AND DIFFERENTIAL    Narrative:     The following orders were created for panel order CBC & Differential.  Procedure                               Abnormality         Status                     ---------                               -----------         ------                     CBC Auto Differential[[id number]]        Abnormal            Final result                 Please view results for these tests on the individual orders.       EKG:   No orders to display       Meds given in ED:   Medications   ondansetron (ZOFRAN) injection 4 mg (4 mg Intravenous Given 4/21/23 2046)   HYDROmorphone (DILAUDID) injection 0.5 mg (0.5 mg Intravenous Given 4/21/23 2048)   sodium chloride 0.9 % bolus 500 mL (0 mL Intravenous Stopped 4/22/23 0100)   HYDROmorphone (DILAUDID) injection 0.5 mg (0.5 mg Intravenous Given 4/21/23 2325)       Imaging results:  CT Abdomen Pelvis Without Contrast    Result Date: 4/21/2023  1. There has been development of an approximately 5.5 cm pancreatic head mass obstructing the common bile duct likely represents a pancreatic malignancy. The multiple nodular densities adjacent to the pancreas are suspected to be metastatic. The cystic lesions at the pancreatic body may represent ectatic sidebranches or intraductal involvement with the malignancy.. There are also changes of  acute pancreatitis involving the entire pancreas. 2. The airspace opacities at the dependent aspect of both lower lobes are suspicious for pneumonia, particularly at the right lung base. There is also pulmonary vascular congestion.       Ambulatory status:   - Walker    Social issues:   Social History     Socioeconomic History    Marital status:    Tobacco Use    Smoking status: Former     Packs/day: 3.00     Years: 45.00     Pack years: 135.00     Types: Cigarettes     Quit date: 1989     Years since quittin.4    Smokeless tobacco: Never   Vaping Use    Vaping Use: Never used   Substance and Sexual Activity    Alcohol use: Yes     Comment: 1 shot of whiskey in the morning and 1 shot of whiskey in the evening    Drug use: Never    Sexual activity: Not Currently       NIH Stroke Scale:         Leni Desai RN  23 01:42 EDT

## 2023-04-23 PROBLEM — K83.1 BILIARY OBSTRUCTION DUE TO CANCER: Status: ACTIVE | Noted: 2023-04-21

## 2023-04-23 PROBLEM — C80.1 BILIARY OBSTRUCTION DUE TO CANCER: Status: ACTIVE | Noted: 2023-04-21

## 2023-04-23 LAB
ALBUMIN SERPL-MCNC: 2.8 G/DL (ref 3.5–5.2)
ALBUMIN/GLOB SERPL: 1 G/DL
ALP SERPL-CCNC: 2215 U/L (ref 39–117)
ALT SERPL W P-5'-P-CCNC: 425 U/L (ref 1–41)
ANION GAP SERPL CALCULATED.3IONS-SCNC: 14 MMOL/L (ref 5–15)
AST SERPL-CCNC: 289 U/L (ref 1–40)
BASOPHILS # BLD AUTO: 0.01 10*3/MM3 (ref 0–0.2)
BASOPHILS NFR BLD AUTO: 0.1 % (ref 0–1.5)
BILIRUB SERPL-MCNC: 8.5 MG/DL (ref 0–1.2)
BUN SERPL-MCNC: 55 MG/DL (ref 8–23)
BUN/CREAT SERPL: 27.6 (ref 7–25)
CALCIUM SPEC-SCNC: 8.5 MG/DL (ref 8.6–10.5)
CHLORIDE SERPL-SCNC: 95 MMOL/L (ref 98–107)
CO2 SERPL-SCNC: 23 MMOL/L (ref 22–29)
CREAT SERPL-MCNC: 1.99 MG/DL (ref 0.76–1.27)
DEPRECATED RDW RBC AUTO: 49.2 FL (ref 37–54)
EGFRCR SERPLBLD CKD-EPI 2021: 32.3 ML/MIN/1.73
EOSINOPHIL # BLD AUTO: 0 10*3/MM3 (ref 0–0.4)
EOSINOPHIL NFR BLD AUTO: 0 % (ref 0.3–6.2)
ERYTHROCYTE [DISTWIDTH] IN BLOOD BY AUTOMATED COUNT: 14.4 % (ref 12.3–15.4)
GLOBULIN UR ELPH-MCNC: 2.9 GM/DL
GLUCOSE BLDC GLUCOMTR-MCNC: 174 MG/DL (ref 70–130)
GLUCOSE BLDC GLUCOMTR-MCNC: 189 MG/DL (ref 70–130)
GLUCOSE BLDC GLUCOMTR-MCNC: 236 MG/DL (ref 70–130)
GLUCOSE SERPL-MCNC: 135 MG/DL (ref 65–99)
HCT VFR BLD AUTO: 40.4 % (ref 37.5–51)
HGB BLD-MCNC: 13.6 G/DL (ref 13–17.7)
IMM GRANULOCYTES # BLD AUTO: 0.09 10*3/MM3 (ref 0–0.05)
IMM GRANULOCYTES NFR BLD AUTO: 0.6 % (ref 0–0.5)
LYMPHOCYTES # BLD AUTO: 0.69 10*3/MM3 (ref 0.7–3.1)
LYMPHOCYTES NFR BLD AUTO: 5 % (ref 19.6–45.3)
MAGNESIUM SERPL-MCNC: 2.2 MG/DL (ref 1.6–2.4)
MCH RBC QN AUTO: 31.7 PG (ref 26.6–33)
MCHC RBC AUTO-ENTMCNC: 33.7 G/DL (ref 31.5–35.7)
MCV RBC AUTO: 94.2 FL (ref 79–97)
MONOCYTES # BLD AUTO: 0.89 10*3/MM3 (ref 0.1–0.9)
MONOCYTES NFR BLD AUTO: 6.4 % (ref 5–12)
NEUTROPHILS NFR BLD AUTO: 12.19 10*3/MM3 (ref 1.7–7)
NEUTROPHILS NFR BLD AUTO: 87.9 % (ref 42.7–76)
NRBC BLD AUTO-RTO: 0 /100 WBC (ref 0–0.2)
PLATELET # BLD AUTO: 161 10*3/MM3 (ref 140–450)
PMV BLD AUTO: 9.6 FL (ref 6–12)
POTASSIUM SERPL-SCNC: 5.1 MMOL/L (ref 3.5–5.2)
PROT SERPL-MCNC: 5.7 G/DL (ref 6–8.5)
QT INTERVAL: 455 MS
RBC # BLD AUTO: 4.29 10*6/MM3 (ref 4.14–5.8)
SODIUM SERPL-SCNC: 132 MMOL/L (ref 136–145)
WBC NRBC COR # BLD: 13.87 10*3/MM3 (ref 3.4–10.8)

## 2023-04-23 PROCEDURE — 94664 DEMO&/EVAL PT USE INHALER: CPT

## 2023-04-23 PROCEDURE — 99232 SBSQ HOSP IP/OBS MODERATE 35: CPT | Performed by: INTERNAL MEDICINE

## 2023-04-23 PROCEDURE — 80053 COMPREHEN METABOLIC PANEL: CPT | Performed by: STUDENT IN AN ORGANIZED HEALTH CARE EDUCATION/TRAINING PROGRAM

## 2023-04-23 PROCEDURE — 83735 ASSAY OF MAGNESIUM: CPT | Performed by: STUDENT IN AN ORGANIZED HEALTH CARE EDUCATION/TRAINING PROGRAM

## 2023-04-23 PROCEDURE — 25010000002 PIPERACILLIN SOD-TAZOBACTAM PER 1 G: Performed by: STUDENT IN AN ORGANIZED HEALTH CARE EDUCATION/TRAINING PROGRAM

## 2023-04-23 PROCEDURE — 63710000001 INSULIN LISPRO (HUMAN) PER 5 UNITS: Performed by: NURSE PRACTITIONER

## 2023-04-23 PROCEDURE — 94799 UNLISTED PULMONARY SVC/PX: CPT

## 2023-04-23 PROCEDURE — 87040 BLOOD CULTURE FOR BACTERIA: CPT | Performed by: STUDENT IN AN ORGANIZED HEALTH CARE EDUCATION/TRAINING PROGRAM

## 2023-04-23 PROCEDURE — 85025 COMPLETE CBC W/AUTO DIFF WBC: CPT | Performed by: STUDENT IN AN ORGANIZED HEALTH CARE EDUCATION/TRAINING PROGRAM

## 2023-04-23 PROCEDURE — 25010000002 HYDROMORPHONE PER 4 MG: Performed by: NURSE PRACTITIONER

## 2023-04-23 PROCEDURE — 82962 GLUCOSE BLOOD TEST: CPT

## 2023-04-23 PROCEDURE — 94761 N-INVAS EAR/PLS OXIMETRY MLT: CPT

## 2023-04-23 RX ORDER — AMOXICILLIN 250 MG
2 CAPSULE ORAL NIGHTLY
Status: DISCONTINUED | OUTPATIENT
Start: 2023-04-23 | End: 2023-04-27 | Stop reason: HOSPADM

## 2023-04-23 RX ORDER — POLYETHYLENE GLYCOL 3350 17 G/17G
17 POWDER, FOR SOLUTION ORAL 2 TIMES DAILY
Status: DISCONTINUED | OUTPATIENT
Start: 2023-04-23 | End: 2023-04-27 | Stop reason: HOSPADM

## 2023-04-23 RX ORDER — BISACODYL 10 MG
10 SUPPOSITORY, RECTAL RECTAL ONCE
Status: COMPLETED | OUTPATIENT
Start: 2023-04-23 | End: 2023-04-23

## 2023-04-23 RX ADMIN — HYDROMORPHONE HYDROCHLORIDE 0.5 MG: 1 INJECTION, SOLUTION INTRAMUSCULAR; INTRAVENOUS; SUBCUTANEOUS at 19:49

## 2023-04-23 RX ADMIN — POTASSIUM CHLORIDE 40 MEQ: 1.5 POWDER, FOR SOLUTION ORAL at 02:50

## 2023-04-23 RX ADMIN — SODIUM CHLORIDE, POTASSIUM CHLORIDE, SODIUM LACTATE AND CALCIUM CHLORIDE 100 ML/HR: 600; 310; 30; 20 INJECTION, SOLUTION INTRAVENOUS at 05:18

## 2023-04-23 RX ADMIN — TAZOBACTAM SODIUM AND PIPERACILLIN SODIUM 3.38 G: 375; 3 INJECTION, SOLUTION INTRAVENOUS at 07:27

## 2023-04-23 RX ADMIN — METOPROLOL SUCCINATE 12.5 MG: 25 TABLET, EXTENDED RELEASE ORAL at 08:03

## 2023-04-23 RX ADMIN — HYDROMORPHONE HYDROCHLORIDE 0.5 MG: 1 INJECTION, SOLUTION INTRAMUSCULAR; INTRAVENOUS; SUBCUTANEOUS at 07:32

## 2023-04-23 RX ADMIN — DOCUSATE SODIUM 50MG AND SENNOSIDES 8.6MG 2 TABLET: 8.6; 5 TABLET, FILM COATED ORAL at 19:49

## 2023-04-23 RX ADMIN — POLYETHYLENE GLYCOL 3350 17 G: 17 POWDER, FOR SOLUTION ORAL at 08:03

## 2023-04-23 RX ADMIN — INSULIN LISPRO 2 UNITS: 100 INJECTION, SOLUTION INTRAVENOUS; SUBCUTANEOUS at 11:44

## 2023-04-23 RX ADMIN — BISACODYL 10 MG: 10 SUPPOSITORY RECTAL at 11:44

## 2023-04-23 RX ADMIN — SODIUM CHLORIDE, POTASSIUM CHLORIDE, SODIUM LACTATE AND CALCIUM CHLORIDE 100 ML/HR: 600; 310; 30; 20 INJECTION, SOLUTION INTRAVENOUS at 14:55

## 2023-04-23 RX ADMIN — HYDROMORPHONE HYDROCHLORIDE 0.5 MG: 1 INJECTION, SOLUTION INTRAMUSCULAR; INTRAVENOUS; SUBCUTANEOUS at 04:39

## 2023-04-23 RX ADMIN — FINASTERIDE 5 MG: 5 TABLET, FILM COATED ORAL at 08:04

## 2023-04-23 RX ADMIN — TAMSULOSIN HYDROCHLORIDE 0.4 MG: 0.4 CAPSULE ORAL at 08:03

## 2023-04-23 RX ADMIN — Medication 10 ML: at 19:53

## 2023-04-23 RX ADMIN — BUDESONIDE AND FORMOTEROL FUMARATE DIHYDRATE 1 PUFF: 160; 4.5 AEROSOL RESPIRATORY (INHALATION) at 19:20

## 2023-04-23 RX ADMIN — TAZOBACTAM SODIUM AND PIPERACILLIN SODIUM 3.38 G: 375; 3 INJECTION, SOLUTION INTRAVENOUS at 14:28

## 2023-04-23 RX ADMIN — LEVOTHYROXINE SODIUM 50 MCG: 0.05 TABLET ORAL at 08:03

## 2023-04-23 RX ADMIN — TAZOBACTAM SODIUM AND PIPERACILLIN SODIUM 3.38 G: 375; 3 INJECTION, SOLUTION INTRAVENOUS at 23:18

## 2023-04-23 RX ADMIN — HYDROMORPHONE HYDROCHLORIDE 0.5 MG: 1 INJECTION, SOLUTION INTRAMUSCULAR; INTRAVENOUS; SUBCUTANEOUS at 14:55

## 2023-04-23 RX ADMIN — INSULIN LISPRO 2 UNITS: 100 INJECTION, SOLUTION INTRAVENOUS; SUBCUTANEOUS at 16:52

## 2023-04-23 RX ADMIN — BUDESONIDE AND FORMOTEROL FUMARATE DIHYDRATE 1 PUFF: 160; 4.5 AEROSOL RESPIRATORY (INHALATION) at 07:23

## 2023-04-23 RX ADMIN — FAMOTIDINE 20 MG: 10 INJECTION INTRAVENOUS at 08:03

## 2023-04-23 RX ADMIN — POLYETHYLENE GLYCOL 3350 17 G: 17 POWDER, FOR SOLUTION ORAL at 19:54

## 2023-04-23 NOTE — PROGRESS NOTES
Name: Osvaldo Lopez ADMIT: 2023   : 1935  PCP: Provider, No Known    MRN: [id number] LOS: 1 days   AGE/SEX: 87 y.o. male  ROOM: E5/     Subjective   Subjective   Patient seen and examined this morning. Hospital day 2.  At time of my examination, patient is awake, alert, resting in bed.  He continues to have symptoms of dyspnea and abdominal discomfort, pain improved with current medication regimen.  On supplemental oxygen at this time.  GI following.           Objective   Objective   Vital Signs  Temp:  [97.4 °F (36.3 °C)-98.8 °F (37.1 °C)] 98 °F (36.7 °C)  Heart Rate:  [87-96] 88  Resp:  [16-18] 18  BP: (127-155)/(60-78) 127/60  SpO2:  [93 %-95 %] 95 %  on  Flow (L/min):  [1.5-3] 1.5;   Device (Oxygen Therapy): nasal cannula  Body mass index is 31.67 kg/m².  Physical Exam  Vitals and nursing note reviewed.   Constitutional:       General: He is awake. He is not in acute distress.     Appearance: He is ill-appearing.   Cardiovascular:      Rate and Rhythm: Normal rate and regular rhythm.      Pulses: Normal pulses.      Heart sounds: Normal heart sounds.   Pulmonary:      Effort: Pulmonary effort is normal. No respiratory distress.      Breath sounds: Decreased breath sounds present.      Comments: On supplemental O2  Abdominal:      General: Bowel sounds are normal.      Palpations: Abdomen is soft.      Tenderness: There is abdominal tenderness (mild to palpation).   Musculoskeletal:      Right lower leg: Edema present.      Left lower leg: Edema present.   Skin:     General: Skin is warm and dry.      Coloration: Skin is jaundiced.      Findings: Bruising (scattered) present.   Neurological:      Mental Status: He is alert and oriented to person, place, and time. Mental status is at baseline.   Psychiatric:         Behavior: Behavior is cooperative.       Results Review     I reviewed the patient's new clinical results.  Results from last 7 days   Lab Units 23  0649 23  0536  04/21/23 2049   WBC 10*3/mm3 13.87* 12.79* 13.73*   HEMOGLOBIN g/dL 13.6 13.6 14.1   PLATELETS 10*3/mm3 161 160 214     Results from last 7 days   Lab Units 04/23/23  0649 04/22/23  0536 04/21/23 2049   SODIUM mmol/L 132* 136  137 134*   POTASSIUM mmol/L 5.1 3.1*  3.1* 3.4*   CHLORIDE mmol/L 95* 94*  93* 92*   CO2 mmol/L 23.0 30.0*  30.0* 29.5*   BUN mg/dL 55* 57*  56* 58*   CREATININE mg/dL 1.99* 1.87*  1.92* 2.08*   GLUCOSE mg/dL 135* 74  74 70   EGFR mL/min/1.73 32.3* 34.8*  33.7* 30.6*     Results from last 7 days   Lab Units 04/23/23  0649 04/22/23  0536 04/21/23 2049   ALBUMIN g/dL 2.8* 3.0* 3.1*   BILIRUBIN mg/dL 8.5* 6.9* 7.1*   ALK PHOS U/L 2,215* 1,818* 1,605*   AST (SGOT) U/L 289* 291* 257*   ALT (SGPT) U/L 425* 491* 525*     Results from last 7 days   Lab Units 04/23/23  0649 04/22/23  0536 04/21/23 2049   CALCIUM mg/dL 8.5* 8.5*  9.2 9.2   ALBUMIN g/dL 2.8* 3.0* 3.1*   MAGNESIUM mg/dL 2.2  --   --      Results from last 7 days   Lab Units 04/22/23  0536   PROCALCITONIN ng/mL 1.38*   LACTATE mmol/L 0.9     Glucose   Date/Time Value Ref Range Status   04/22/2023 2024 93 70 - 130 mg/dL Final     Comment:     Meter: [id number] : [id number] Des GELLER   04/22/2023 1652 97 70 - 130 mg/dL Final     Comment:     Meter: [id number] : [id number] Caty Denny RN   04/22/2023 1114 84 70 - 130 mg/dL Final     Comment:     Meter: [id number] : [id number] Cathy Betancur RIGO   04/22/2023 0620 80 70 - 130 mg/dL Final     Comment:     Meter: [id number] : [id number] Mitchell Logan CNA       CT Abdomen Pelvis Without Contrast    Result Date: 4/21/2023  1. There has been development of an approximately 5.5 cm pancreatic head mass obstructing the common bile duct likely represents a pancreatic malignancy. The multiple nodular densities adjacent to the pancreas are suspected to be metastatic. The cystic lesions at the pancreatic body may represent ectatic sidebranches or intraductal  involvement with the malignancy.. There are also changes of acute pancreatitis involving the entire pancreas. 2. The airspace opacities at the dependent aspect of both lower lobes are suspicious for pneumonia, particularly at the right lung base. There is also pulmonary vascular congestion.      I have personally reviewed all medications:  Scheduled Medications  albuterol, 2.5 mg, Nebulization, TID  budesonide-formoterol, 1 puff, Inhalation, BID - RT  famotidine, 20 mg, Intravenous, Q24H  finasteride, 5 mg, Oral, Daily  insulin lispro, 2-9 Units, Subcutaneous, TID With Meals  levothyroxine, 50 mcg, Oral, Daily  metoprolol succinate XL, 12.5 mg, Oral, Daily  piperacillin-tazobactam, 3.375 g, Intravenous, Q8H  polyethylene glycol, 17 g, Oral, Daily  sodium chloride, 10 mL, Intravenous, Q12H  tamsulosin, 0.4 mg, Oral, Daily    Infusions  lactated ringers, 100 mL/hr, Last Rate: 100 mL/hr (04/23/23 0518)    Diet  Diet: Liquid Diets; Full Liquid; Texture: Regular Texture (IDDSI 7); Fluid Consistency: Thin (IDDSI 0)    I have personally reviewed:  [x]  Laboratory   [x]  Microbiology   [x]  Radiology   [x]  EKG/Telemetry  [x]  Cardiology/Vascular         Assessment/Plan     Active Hospital Problems    Diagnosis  POA   • **Pancreatic mass [K86.89]  Yes   • Hypothyroidism [E03.9]  Yes   • Type 2 diabetes mellitus with chronic kidney disease, with long-term current use of insulin [E11.22, Z79.4]  Not Applicable   • History of CVA (cerebrovascular accident) [Z86.73]  Not Applicable   • Hypokalemia [E87.6]  Yes   • Transaminitis [R74.01]  Yes   • Pneumonia [J18.9]  Yes   • History of atrial flutter [Z86.79]  Not Applicable   • Hypoxia [R09.02]  Yes   • Obesity (BMI 30-39.9) [E66.9]  Yes   • Stage 3b chronic kidney disease [N18.32]  Yes   • Coronary arteriosclerosis [I25.10]  Yes   • Chronic anticoagulation [Z79.01]  Not Applicable   • Hyperlipidemia [E78.5]  Yes   • Essential hypertension [I10]  Yes      Resolved Hospital  Problems   No resolved problems to display.       Mr. Lopez is a 87 y.o. former smoker with a history of CVA (12/2022), paroxysmal typical atrial flutter on long-term anticoagulation, CKD stage IIIb, type 2 diabetes mellitus with long-term current use of insulin, hypertension, hyperlipidemia, hypothyroidism that presents to [facility] complaining of worsening abdominal pain, admitted with Pancreatic mass.    Pancreatic mass  Abdominal pain complicated by nausea and vomiting  Transaminitis  - CT AP obtained on admission reviewed, reports development of an approximately 5.5 cm pancreatic head mass obstructing the common bile duct felt to likely represent a pancreatic   Malignancy, coupled with multiple nodular densities adjacent to the pancreas which are suspected to be metastatic.  - LFT elevated on admission, cholestatic predominant.  Alkaline phosphatase continues to rise and worsen on most recent lab draws, however, AST and ALT are relatively stable when compared to prior.  - Consulted GI for further evaluation. They are following at present.  Per their most recent note, okay with full liquid diet from their standpoint.  They discussed imaging findings, symptoms with the patient and daughter at bedside and discussed concern for malignancy, discussed risk/benefits of EUS +/- ERCP, patient initially reported that he would like to consider whether or not he wanted to pursue further work-up, as he does not think he would want any treatment even if he were a candidate.  However, was interested in potential biliary stent to help with symptoms, but is going to discuss further with his daughter.  GI initially stated they would consider EUS/ERCP when Xarelto has been cleared in 3 days as per his GFR.  - Update (04/23), discussed with Dr. Arellano today, patient has decided that he is not interested in further treatment at this time.  They recommended a palliative care consult, and are tentatively planning for  biliary stent placement tomorrow (04/24).  Appreciate their help.  - Per GI recommendations, consult palliative care at this time.  - Will continue to follow their plans/recommendations. Greatly appreciate their help.  - Held home Amiodarone and Statin on admission in setting of significant transaminitis, will continue to hold at present. Can resume if/when appropriate.  - Order repeat CMP in AM for reassessment.     Acute respiratory failure with hypoxia  Pneumonia  - Patient meets criteria for diagnosis of acute respiratory failure with hypoxia with: Complaints of dyspnea, documented O2 saturation of 89% on room air and new oxygen requirement.  - Imaging obtained and reviewed, CT showing airspace opacities at the dependent aspect of both lower lobes are suspicious for pneumonia, particularly at the right lung base.  - Procalcitonin elevated on admission, however, likely skewed in setting of chronic renal failure. Also, WBC elevated. Discussed with patient and daughter, he states he was recently treated for a pneumonia 1-2 weeks ago with antibiotics as an outpatient.  - Started empiric antibiotic therapy for pneumonia, and given patient's risk factors, started broad spectrum with vancomycin and Zosyn while remaining infectious work-up was completed.  - Respiratory viral panel, strep, Legionella and MRSA PCR all negative.  - Stop Vancomycin.  - Continue Zosyn as prescribed for possible aspiration pneumonia, given location of findings on imaging.  - Follow up blood cultures.  - Continue bronchodilators as prescribed, supplemental O2 PRN to maintain 02 sat >90%, telemetry, pulse oximetry, aspiration precautions, elevate HOB.     Chronic kidney disease stage 3B  - On most recent labs, patient's creatinine found to be stable and near apparent baseline (~2 on average), per review of previous lab values and outside records.  - No indications to warrant acute changes and/or intervention at this time.  - Order repeat CMP in  AM for reassessment of renal function.   - Will continue to monitor and trend creatinine to guide ongoing management decisions. Avoid nephrotoxic medications, IVF, strict I/O, daily weights.     Type 2 diabetes mellitus with long term current use of insulin without hyperglycemia complicated by neuropathy and CKD  - Most recent A1c: 6.90% (12/28/22).  - Home medication regimen: Levemir 60 units daily, aspart 10 units TID with meals, Tradjenta.  - Discontinued home medications. Started patient on treatment with correctional SSI.  - Per review of POC glucose checks, blood glucose appears controlled within target inpatient range at this time, and does not warrant changes to insulin regimen. He is not requiring any additional SSI at present.  - Continue current treatment as prescribed with SSI to be given as needed.  - Will monitor 24 hour insulin requirements and adjust as needed to achieve target inpatient range of 140-180.  - Diabetic diet     Paroxysmal typical atrial flutter on long term anticoagulation  - [id number] score: 4. Prior notes reviewed, has followed with Dr. Talbot.  - Vitals, telemetry reviewed, patient appears to be stable, rate controlled at present. On Metoprolol and Amiodarone as outpatient. On Xarelto for AC.  - Held Amiodarone 2/2 transaminitis, can resume if/when appropriate.  - Continue Metoprolol.  - Held Xarelto on admission in anticipation of further intervention by GI. Can resume when okay with them.  - Continue current treatment as prescribed. Continue telemetry monitoring.     Coronary artery disease  - S/P CABG x5 (1999)  - Patient appears stable from this perspective at this time, denies any chest pain, palpitations, no signs/symptoms to suggest ACS.  - Order EKG for baseline.  - Continue current treatment as prescribed. Will continue to monitor.  - Continue telemetry monitoring.     Hypertension  - BP acceptable acutely. Appears stable. No indications to warrant acute  changes/intervention at this time.  - Continue current medications as prescribed. Trend BP to guide ongoing management decisions.     Hyperlipidemia  - Stable. Holding statin on admission 2/2 transaminitis. Can resume if/when appropriate.     Hypothyroidism  - Stable. Continue Levothyroxine as prescribed.     History of CVA and subdural hematoma  - Noted during prior admission in December 2022.     Obesity  - BMI 31.67 kg/m2. Complicating all medical problems.    · SCDs for DVT prophylaxis.  · Limited code (no CPR, no intubation).  · Discussed with patient, family, nursing staff and consulting provider.  · Anticipate discharge TBD timing yet to be determined.      Mikc Do DO  [facility]  04/23/23  09:53 EDT

## 2023-04-23 NOTE — PLAN OF CARE
Problem: Adult Inpatient Plan of Care  Goal: Plan of Care Review  Outcome: Ongoing, Progressing  Flowsheets (Taken 4/23/2023 1440)  Progress: no change  Plan of Care Reviewed With: patient   Goal Outcome Evaluation:  Plan of Care Reviewed With: patient        Progress: no change     Pt has been resting comfortably in bed today. Pain controlled with PRN pain medications. Pt tolerating full liquid diet. Pt will be NPO at midnight for EGD and ERCP. Low air loss mattress ordered for pt stage 2 on his coccyx, dressing in place at this time with accumax. Pt remains on 1.5L nasal cannula. Consult for palliative care. VSS

## 2023-04-23 NOTE — PLAN OF CARE
Goal Outcome Evaluation:  Plan of Care Reviewed With: patient        Progress: improving  Outcome Evaluation: VSS, NSR on monitor, Pain controlled with PRN medication, IV Zosyn per order. LR@100cc/hr. Full Liquid diet. Replaced Potassium w/last 2 doses. Will continue to monitor.

## 2023-04-23 NOTE — PROGRESS NOTES
[facility]  Inpatient Progress Note    Reason for Followup:  Obstructive jaundice    Subjective:  No acute events overnight.  Patient reports feeling some better today.  No n/v.  Some abdominal fullness. Reports some back pain.     Current Facility-Administered Medications:   •  albuterol (PROVENTIL) nebulizer solution 0.083% 2.5 mg/3mL, 2.5 mg, Nebulization, TID, Mick Do, DO, 2.5 mg at 04/22/23 2046  •  budesonide-formoterol (SYMBICORT) 160-4.5 MCG/ACT inhaler 1 puff, 1 puff, Inhalation, BID - RT, Mick Do, , 1 puff at 04/23/23 0723  •  calcium carbonate (TUMS) chewable tablet 500 mg (200 mg elemental), 2 tablet, Oral, BID PRN, Ewa Muniz APRN  •  dextrose (D50W) (25 g/50 mL) IV injection 25 g, 25 g, Intravenous, Q15 Min PRN, Ewa Muniz APRN  •  dextrose (GLUTOSE) oral gel 15 g, 15 g, Oral, Q15 Min PRN, Ewa Muniz APRN  •  famotidine (PEPCID) injection 20 mg, 20 mg, Intravenous, Q24H, Ewa Muniz APRN, 20 mg at 04/23/23 0803  •  finasteride (PROSCAR) tablet 5 mg, 5 mg, Oral, Daily, Mick Do, , 5 mg at 04/23/23 0804  •  glucagon (GLUCAGEN) injection 1 mg, 1 mg, Intramuscular, Q15 Min PRN, Ewa Muniz APRN  •  HYDROmorphone (DILAUDID) injection 0.5 mg, 0.5 mg, Intravenous, Q2H PRN, Ewa Muniz APRN, 0.5 mg at 04/23/23 0732  •  insulin lispro (ADMELOG) injection 2-9 Units, 2-9 Units, Subcutaneous, TID With Meals, Ewa Muniz APRN  •  lactated ringers infusion, 100 mL/hr, Intravenous, Continuous, Hany Arellano MD, Last Rate: 100 mL/hr at 04/23/23 0518, 100 mL/hr at 04/23/23 0518  •  levothyroxine (SYNTHROID, LEVOTHROID) tablet 50 mcg, 50 mcg, Oral, Daily, Mick Do DO, 50 mcg at 04/23/23 0803  •  Magnesium Sulfate - Total Dose 10 grams - Magnesium 1 or Less, 2 g, Intravenous, PRN **OR** Magnesium Sulfate - Total Dose 6 grams - Magnesium 1.1 - 1.5, 2 g, Intravenous, PRN **OR** Magnesium Sulfate - Total  Dose 4 grams - Magnesium 1.6 - 1.9, 4 g, Intravenous, PRN, Mick Do, DO  •  metoprolol succinate XL (TOPROL-XL) 24 hr tablet 12.5 mg, 12.5 mg, Oral, Daily, Hi, Mick, DO, 12.5 mg at 04/23/23 0803  •  nitroglycerin (NITROSTAT) SL tablet 0.4 mg, 0.4 mg, Sublingual, Q5 Min PRN, Ewa Muniz, APRN  •  ondansetron (ZOFRAN) tablet 4 mg, 4 mg, Oral, Q6H PRN **OR** ondansetron (ZOFRAN) injection 4 mg, 4 mg, Intravenous, Q6H PRN, Ewa Muniz, LOU  •  piperacillin-tazobactam (ZOSYN) 3.375 g in iso-osmotic dextrose 50 ml (premix), 3.375 g, Intravenous, Q8H, Mick Do, DO, 3.375 g at 04/23/23 0727  •  polyethylene glycol (MIRALAX) packet 17 g, 17 g, Oral, Daily, Hi, Mick, DO, 17 g at 04/23/23 0803  •  potassium & sodium phosphates (PHOS-NAK) 280-160-250 MG packet - for Phosphorus less than 1.25 mg/dL, 2 packet, Oral, Q6H PRN **OR** potassium & sodium phosphates (PHOS-NAK) 280-160-250 MG packet - for Phosphorus 1.25 - 2.5 mg/dL, 2 packet, Oral, Q6H PRN, Mick Do, DO  •  potassium chloride (K-DUR,KLOR-CON) ER tablet 40 mEq, 40 mEq, Oral, PRN **OR** potassium chloride (KLOR-CON) packet 40 mEq, 40 mEq, Oral, PRN, 40 mEq at 04/23/23 0250 **OR** potassium chloride 10 mEq in 100 mL IVPB, 10 mEq, Intravenous, Q1H PRN, Hi, Mick, DO  •  senna (SENOKOT) tablet 1 tablet, 1 tablet, Oral, BID PRN, Mick Do, DO  •  sodium chloride 0.9 % flush 10 mL, 10 mL, Intravenous, Q12H, Ewa Muniz APRN, 10 mL at 04/22/23 2041  •  sodium chloride 0.9 % flush 10 mL, 10 mL, Intravenous, PRN, Ewa Muniz APRN  •  sodium chloride 0.9 % infusion 40 mL, 40 mL, Intravenous, PRN, Ewa Muniz APRN  •  tamsulosin (FLOMAX) 24 hr capsule 0.4 mg, 0.4 mg, Oral, Daily, Mick Do, DO, 0.4 mg at 04/23/23 0803  Review of Systems:   Gen: No fever or chills  GI: No nausea, vomiting      Objective     Vital Signs  Temp:  [97.4 °F (36.3 °C)-98.8 °F (37.1 °C)] 98 °F (36.7 °C)  Heart Rate:  [87-96]  88  Resp:  [16-18] 18  BP: (127-155)/(60-78) 127/60  Body mass index is 31.67 kg/m².    Intake/Output Summary (Last 24 hours) at 4/23/2023 0846  Last data filed at 4/23/2023 0650  Gross per 24 hour   Intake 1818.33 ml   Output 300 ml   Net 1518.33 ml     No intake/output data recorded.     Physical Exam:   General: patient awake, chronically ill appearing   Eyes: Normal lids and lashes   Neck: supple, normal ROM   Skin: warm and dry,   Cardiovascular: regular rate, well-perfused extremities   Pulm: Equal expansion bilaterally, no increased WOB   Abdomen: soft, nondistended;    Extremities: no rash or edema              Neuro: A&O, interactive   Psychiatric: Normal mood and behavior;       Results Review:     I reviewed the patient's new clinical results.    Results from last 7 days   Lab Units 04/23/23  0649 04/22/23  0536 04/21/23 2049   WBC 10*3/mm3 13.87* 12.79* 13.73*   HEMOGLOBIN g/dL 13.6 13.6 14.1   HEMATOCRIT % 40.4 40.7 43.7   PLATELETS 10*3/mm3 161 160 214     Results from last 7 days   Lab Units 04/23/23  0649 04/22/23  0536 04/21/23 2049   SODIUM mmol/L 132* 136  137 134*   POTASSIUM mmol/L 5.1 3.1*  3.1* 3.4*   CHLORIDE mmol/L 95* 94*  93* 92*   CO2 mmol/L 23.0 30.0*  30.0* 29.5*   BUN mg/dL 55* 57*  56* 58*   CREATININE mg/dL 1.99* 1.87*  1.92* 2.08*   CALCIUM mg/dL 8.5* 8.5*  9.2 9.2   BILIRUBIN mg/dL 8.5* 6.9* 7.1*   ALK PHOS U/L 2,215* 1,818* 1,605*   ALT (SGPT) U/L 425* 491* 525*   AST (SGOT) U/L 289* 291* 257*   GLUCOSE mg/dL 135* 74  74 70         Lab Results   Lab Value Date/Time    LIPASE 58 04/21/2023 2049    LIPASE 8 (L) 04/14/2019 0840       Radiology:  [unfilled]      Assessment & Plan   Assessment:   Acute Hypoxic Resp Failure with Possible PNA  Elevated Liver Enzymes  Elevated Bilirubin  Pancreatic Head Mass with Peripancreatic Nodular Densities Concerning for Metastastis  H/o Pancreatic Cysts  Abdominal Pain  Inflammatory Changes of the Pancreas with normal lipase  Colon  Cancer s/p Colon Resection  Aflutter on Xarelto  DM      Plan:   - Hold Xarelto  - IV LR  - Continue to monitor LFTs, no fever or chills, have lower suspicion for cholangitis at this time  - Okay with full liquid diet from GI standpoint  - On Zosyn for possible PNA  - I discussed image findings and constellation of signs/symptoms with patient and daughter at bedside with concern for malignancy.  I discussed risks/benefits of nonurgent/nonemergent EUS +/- ERCP.  Patient reports he does not want any treatment for potential pancreatic malginancy even if he were a candidate.  He is  interested in potential biliary stent to help with symptoms and reports he is tired and wants to live as long as he can comfortably.  Patient and daughter would like to meet with palliative team to explore their services.    - NPO at midnight, tomorrow will be day 3 off Xarelto, will plan for ERCP and biliary stenting, as above EUS was offered but patient is not interested in treatment even if confirmed cancer    I discussed the patient's findings and my recommendations with patient, family and primary care team.

## 2023-04-24 ENCOUNTER — APPOINTMENT (OUTPATIENT)
Dept: GENERAL RADIOLOGY | Facility: HOSPITAL | Age: 88
DRG: 438 | End: 2023-04-24
Payer: MEDICARE

## 2023-04-24 ENCOUNTER — ANESTHESIA (OUTPATIENT)
Dept: GASTROENTEROLOGY | Facility: HOSPITAL | Age: 88
DRG: 438 | End: 2023-04-24
Payer: MEDICARE

## 2023-04-24 ENCOUNTER — ANESTHESIA EVENT (OUTPATIENT)
Dept: GASTROENTEROLOGY | Facility: HOSPITAL | Age: 88
DRG: 438 | End: 2023-04-24
Payer: MEDICARE

## 2023-04-24 LAB
ALBUMIN SERPL-MCNC: 2.1 G/DL (ref 3.5–5.2)
ALBUMIN/GLOB SERPL: 0.8 G/DL
ALP SERPL-CCNC: 2201 U/L (ref 39–117)
ALT SERPL W P-5'-P-CCNC: 354 U/L (ref 1–41)
ANION GAP SERPL CALCULATED.3IONS-SCNC: 8.1 MMOL/L (ref 5–15)
AST SERPL-CCNC: 215 U/L (ref 1–40)
BASOPHILS # BLD AUTO: 0.01 10*3/MM3 (ref 0–0.2)
BASOPHILS NFR BLD AUTO: 0.1 % (ref 0–1.5)
BILIRUB SERPL-MCNC: 11 MG/DL (ref 0–1.2)
BUN SERPL-MCNC: 59 MG/DL (ref 8–23)
BUN/CREAT SERPL: 29.6 (ref 7–25)
CALCIUM SPEC-SCNC: 8.6 MG/DL (ref 8.6–10.5)
CHLORIDE SERPL-SCNC: 93 MMOL/L (ref 98–107)
CO2 SERPL-SCNC: 24.9 MMOL/L (ref 22–29)
CREAT SERPL-MCNC: 1.99 MG/DL (ref 0.76–1.27)
DEPRECATED RDW RBC AUTO: 51 FL (ref 37–54)
EGFRCR SERPLBLD CKD-EPI 2021: 32.3 ML/MIN/1.73
EOSINOPHIL # BLD AUTO: 0.01 10*3/MM3 (ref 0–0.4)
EOSINOPHIL NFR BLD AUTO: 0.1 % (ref 0.3–6.2)
ERYTHROCYTE [DISTWIDTH] IN BLOOD BY AUTOMATED COUNT: 14.4 % (ref 12.3–15.4)
GLOBULIN UR ELPH-MCNC: 2.8 GM/DL
GLUCOSE BLDC GLUCOMTR-MCNC: 217 MG/DL (ref 70–130)
GLUCOSE BLDC GLUCOMTR-MCNC: 242 MG/DL (ref 70–130)
GLUCOSE SERPL-MCNC: 255 MG/DL (ref 65–99)
HCT VFR BLD AUTO: 39.3 % (ref 37.5–51)
HGB BLD-MCNC: 12.8 G/DL (ref 13–17.7)
IMM GRANULOCYTES # BLD AUTO: 0.06 10*3/MM3 (ref 0–0.05)
IMM GRANULOCYTES NFR BLD AUTO: 0.5 % (ref 0–0.5)
LYMPHOCYTES # BLD AUTO: 0.56 10*3/MM3 (ref 0.7–3.1)
LYMPHOCYTES NFR BLD AUTO: 4.9 % (ref 19.6–45.3)
MAGNESIUM SERPL-MCNC: 2.2 MG/DL (ref 1.6–2.4)
MCH RBC QN AUTO: 31.4 PG (ref 26.6–33)
MCHC RBC AUTO-ENTMCNC: 32.6 G/DL (ref 31.5–35.7)
MCV RBC AUTO: 96.6 FL (ref 79–97)
MONOCYTES # BLD AUTO: 0.82 10*3/MM3 (ref 0.1–0.9)
MONOCYTES NFR BLD AUTO: 7.2 % (ref 5–12)
NEUTROPHILS NFR BLD AUTO: 10 10*3/MM3 (ref 1.7–7)
NEUTROPHILS NFR BLD AUTO: 87.2 % (ref 42.7–76)
NRBC BLD AUTO-RTO: 0 /100 WBC (ref 0–0.2)
PLATELET # BLD AUTO: 128 10*3/MM3 (ref 140–450)
PMV BLD AUTO: 9.6 FL (ref 6–12)
POTASSIUM SERPL-SCNC: 4.8 MMOL/L (ref 3.5–5.2)
PROT SERPL-MCNC: 4.9 G/DL (ref 6–8.5)
RBC # BLD AUTO: 4.07 10*6/MM3 (ref 4.14–5.8)
SODIUM SERPL-SCNC: 126 MMOL/L (ref 136–145)
WBC NRBC COR # BLD: 11.46 10*3/MM3 (ref 3.4–10.8)

## 2023-04-24 PROCEDURE — 0DB98ZX EXCISION OF DUODENUM, VIA NATURAL OR ARTIFICIAL OPENING ENDOSCOPIC, DIAGNOSTIC: ICD-10-PCS | Performed by: INTERNAL MEDICINE

## 2023-04-24 PROCEDURE — 94761 N-INVAS EAR/PLS OXIMETRY MLT: CPT

## 2023-04-24 PROCEDURE — 25010000002 PIPERACILLIN SOD-TAZOBACTAM PER 1 G: Performed by: STUDENT IN AN ORGANIZED HEALTH CARE EDUCATION/TRAINING PROGRAM

## 2023-04-24 PROCEDURE — 88112 CYTOPATH CELL ENHANCE TECH: CPT | Performed by: INTERNAL MEDICINE

## 2023-04-24 PROCEDURE — 94799 UNLISTED PULMONARY SVC/PX: CPT

## 2023-04-24 PROCEDURE — 88342 IMHCHEM/IMCYTCHM 1ST ANTB: CPT | Performed by: INTERNAL MEDICINE

## 2023-04-24 PROCEDURE — 80053 COMPREHEN METABOLIC PANEL: CPT | Performed by: STUDENT IN AN ORGANIZED HEALTH CARE EDUCATION/TRAINING PROGRAM

## 2023-04-24 PROCEDURE — 25510000001 IOPAMIDOL 61 % SOLUTION: Performed by: INTERNAL MEDICINE

## 2023-04-24 PROCEDURE — 25010000002 PROPOFOL 10 MG/ML EMULSION: Performed by: ANESTHESIOLOGY

## 2023-04-24 PROCEDURE — 94664 DEMO&/EVAL PT USE INHALER: CPT

## 2023-04-24 PROCEDURE — 25010000002 HYDROMORPHONE PER 4 MG: Performed by: INTERNAL MEDICINE

## 2023-04-24 PROCEDURE — 25010000002 PHENYLEPHRINE 10 MG/ML SOLUTION: Performed by: ANESTHESIOLOGY

## 2023-04-24 PROCEDURE — 43239 EGD BIOPSY SINGLE/MULTIPLE: CPT | Performed by: INTERNAL MEDICINE

## 2023-04-24 PROCEDURE — 43273 ENDOSCOPIC PANCREATOSCOPY: CPT | Performed by: INTERNAL MEDICINE

## 2023-04-24 PROCEDURE — C1726 CATH, BAL DIL, NON-VASCULAR: HCPCS | Performed by: INTERNAL MEDICINE

## 2023-04-24 PROCEDURE — 25010000002 SUGAMMADEX 200 MG/2ML SOLUTION: Performed by: ANESTHESIOLOGY

## 2023-04-24 PROCEDURE — 99222 1ST HOSP IP/OBS MODERATE 55: CPT | Performed by: NURSE PRACTITIONER

## 2023-04-24 PROCEDURE — 25010000002 FENTANYL CITRATE (PF) 50 MCG/ML SOLUTION: Performed by: ANESTHESIOLOGY

## 2023-04-24 PROCEDURE — C1769 GUIDE WIRE: HCPCS | Performed by: INTERNAL MEDICINE

## 2023-04-24 PROCEDURE — 74328 X-RAY BILE DUCT ENDOSCOPY: CPT

## 2023-04-24 PROCEDURE — 43274 ERCP DUCT STENT PLACEMENT: CPT | Performed by: INTERNAL MEDICINE

## 2023-04-24 PROCEDURE — 0F7D8DZ DILATION OF PANCREATIC DUCT WITH INTRALUMINAL DEVICE, VIA NATURAL OR ARTIFICIAL OPENING ENDOSCOPIC: ICD-10-PCS | Performed by: INTERNAL MEDICINE

## 2023-04-24 PROCEDURE — 85025 COMPLETE CBC W/AUTO DIFF WBC: CPT | Performed by: STUDENT IN AN ORGANIZED HEALTH CARE EDUCATION/TRAINING PROGRAM

## 2023-04-24 PROCEDURE — 83735 ASSAY OF MAGNESIUM: CPT | Performed by: STUDENT IN AN ORGANIZED HEALTH CARE EDUCATION/TRAINING PROGRAM

## 2023-04-24 PROCEDURE — 25010000002 PIPERACILLIN SOD-TAZOBACTAM PER 1 G: Performed by: INTERNAL MEDICINE

## 2023-04-24 PROCEDURE — C1874 STENT, COATED/COV W/DEL SYS: HCPCS | Performed by: INTERNAL MEDICINE

## 2023-04-24 PROCEDURE — 0FD98ZX EXTRACTION OF COMMON BILE DUCT, VIA NATURAL OR ARTIFICIAL OPENING ENDOSCOPIC, DIAGNOSTIC: ICD-10-PCS | Performed by: INTERNAL MEDICINE

## 2023-04-24 PROCEDURE — 25010000002 ONDANSETRON PER 1 MG: Performed by: ANESTHESIOLOGY

## 2023-04-24 PROCEDURE — 88305 TISSUE EXAM BY PATHOLOGIST: CPT | Performed by: INTERNAL MEDICINE

## 2023-04-24 PROCEDURE — 82962 GLUCOSE BLOOD TEST: CPT

## 2023-04-24 PROCEDURE — 25010000002 HYDROMORPHONE PER 4 MG: Performed by: NURSE PRACTITIONER

## 2023-04-24 DEVICE — STENT SYSTEM RMV
Type: IMPLANTABLE DEVICE | Site: BILE DUCT | Status: FUNCTIONAL
Brand: WALLFLEX BILIARY

## 2023-04-24 RX ORDER — ROCURONIUM BROMIDE 10 MG/ML
INJECTION, SOLUTION INTRAVENOUS AS NEEDED
Status: DISCONTINUED | OUTPATIENT
Start: 2023-04-24 | End: 2023-04-24 | Stop reason: SURG

## 2023-04-24 RX ORDER — ONDANSETRON 2 MG/ML
4 INJECTION INTRAMUSCULAR; INTRAVENOUS ONCE AS NEEDED
Status: DISCONTINUED | OUTPATIENT
Start: 2023-04-24 | End: 2023-04-24

## 2023-04-24 RX ORDER — PROMETHAZINE HYDROCHLORIDE 25 MG/1
25 SUPPOSITORY RECTAL ONCE AS NEEDED
Status: DISCONTINUED | OUTPATIENT
Start: 2023-04-24 | End: 2023-04-24

## 2023-04-24 RX ORDER — HYDRALAZINE HYDROCHLORIDE 20 MG/ML
5 INJECTION INTRAMUSCULAR; INTRAVENOUS
Status: DISCONTINUED | OUTPATIENT
Start: 2023-04-24 | End: 2023-04-24

## 2023-04-24 RX ORDER — PROPOFOL 10 MG/ML
VIAL (ML) INTRAVENOUS AS NEEDED
Status: DISCONTINUED | OUTPATIENT
Start: 2023-04-24 | End: 2023-04-24 | Stop reason: SURG

## 2023-04-24 RX ORDER — LABETALOL HYDROCHLORIDE 5 MG/ML
5 INJECTION, SOLUTION INTRAVENOUS
Status: DISCONTINUED | OUTPATIENT
Start: 2023-04-24 | End: 2023-04-24

## 2023-04-24 RX ORDER — LIDOCAINE HYDROCHLORIDE 20 MG/ML
INJECTION, SOLUTION INFILTRATION; PERINEURAL AS NEEDED
Status: DISCONTINUED | OUTPATIENT
Start: 2023-04-24 | End: 2023-04-24 | Stop reason: SURG

## 2023-04-24 RX ORDER — HYDROCODONE BITARTRATE AND ACETAMINOPHEN 7.5; 325 MG/1; MG/1
1 TABLET ORAL EVERY 4 HOURS PRN
Status: DISCONTINUED | OUTPATIENT
Start: 2023-04-24 | End: 2023-04-24

## 2023-04-24 RX ORDER — PHENYLEPHRINE HYDROCHLORIDE 10 MG/ML
INJECTION INTRAVENOUS AS NEEDED
Status: DISCONTINUED | OUTPATIENT
Start: 2023-04-24 | End: 2023-04-24 | Stop reason: SURG

## 2023-04-24 RX ORDER — LEVOFLOXACIN 5 MG/ML
500 INJECTION, SOLUTION INTRAVENOUS ONCE
Status: DISCONTINUED | OUTPATIENT
Start: 2023-04-24 | End: 2023-04-24

## 2023-04-24 RX ORDER — FLUMAZENIL 0.1 MG/ML
0.2 INJECTION INTRAVENOUS AS NEEDED
Status: DISCONTINUED | OUTPATIENT
Start: 2023-04-24 | End: 2023-04-24

## 2023-04-24 RX ORDER — HYDROMORPHONE HCL 110MG/55ML
0.25 PATIENT CONTROLLED ANALGESIA SYRINGE INTRAVENOUS
Status: DISCONTINUED | OUTPATIENT
Start: 2023-04-24 | End: 2023-04-24

## 2023-04-24 RX ORDER — INDOMETHACIN 100 MG
100 SUPPOSITORY, RECTAL RECTAL ONCE
Status: COMPLETED | OUTPATIENT
Start: 2023-04-24 | End: 2023-04-24

## 2023-04-24 RX ORDER — FENTANYL CITRATE 50 UG/ML
25 INJECTION, SOLUTION INTRAMUSCULAR; INTRAVENOUS
Status: DISCONTINUED | OUTPATIENT
Start: 2023-04-24 | End: 2023-04-24

## 2023-04-24 RX ORDER — SODIUM CHLORIDE 9 MG/ML
30 INJECTION, SOLUTION INTRAVENOUS CONTINUOUS PRN
Status: DISCONTINUED | OUTPATIENT
Start: 2023-04-24 | End: 2023-04-27 | Stop reason: HOSPADM

## 2023-04-24 RX ORDER — PANTOPRAZOLE SODIUM 40 MG/1
40 TABLET, DELAYED RELEASE ORAL
Status: DISCONTINUED | OUTPATIENT
Start: 2023-04-25 | End: 2023-04-27 | Stop reason: HOSPADM

## 2023-04-24 RX ORDER — ONDANSETRON 2 MG/ML
INJECTION INTRAMUSCULAR; INTRAVENOUS AS NEEDED
Status: DISCONTINUED | OUTPATIENT
Start: 2023-04-24 | End: 2023-04-24 | Stop reason: SURG

## 2023-04-24 RX ORDER — DIPHENHYDRAMINE HYDROCHLORIDE 50 MG/ML
12.5 INJECTION INTRAMUSCULAR; INTRAVENOUS
Status: DISCONTINUED | OUTPATIENT
Start: 2023-04-24 | End: 2023-04-24

## 2023-04-24 RX ORDER — LIDOCAINE HYDROCHLORIDE 40 MG/ML
SOLUTION TOPICAL AS NEEDED
Status: DISCONTINUED | OUTPATIENT
Start: 2023-04-24 | End: 2023-04-24 | Stop reason: SURG

## 2023-04-24 RX ORDER — NALOXONE HCL 0.4 MG/ML
0.2 VIAL (ML) INJECTION AS NEEDED
Status: DISCONTINUED | OUTPATIENT
Start: 2023-04-24 | End: 2023-04-24

## 2023-04-24 RX ORDER — DROPERIDOL 2.5 MG/ML
0.62 INJECTION, SOLUTION INTRAMUSCULAR; INTRAVENOUS
Status: DISCONTINUED | OUTPATIENT
Start: 2023-04-24 | End: 2023-04-24

## 2023-04-24 RX ORDER — IPRATROPIUM BROMIDE AND ALBUTEROL SULFATE 2.5; .5 MG/3ML; MG/3ML
3 SOLUTION RESPIRATORY (INHALATION) ONCE AS NEEDED
Status: DISCONTINUED | OUTPATIENT
Start: 2023-04-24 | End: 2023-04-24

## 2023-04-24 RX ORDER — HYDROCODONE BITARTRATE AND ACETAMINOPHEN 5; 325 MG/1; MG/1
1 TABLET ORAL ONCE AS NEEDED
Status: DISCONTINUED | OUTPATIENT
Start: 2023-04-24 | End: 2023-04-24

## 2023-04-24 RX ORDER — PROMETHAZINE HYDROCHLORIDE 25 MG/1
25 TABLET ORAL ONCE AS NEEDED
Status: DISCONTINUED | OUTPATIENT
Start: 2023-04-24 | End: 2023-04-24

## 2023-04-24 RX ORDER — EPHEDRINE SULFATE 50 MG/ML
5 INJECTION, SOLUTION INTRAVENOUS ONCE AS NEEDED
Status: DISCONTINUED | OUTPATIENT
Start: 2023-04-24 | End: 2023-04-24

## 2023-04-24 RX ADMIN — SODIUM CHLORIDE 30 ML/HR: 9 INJECTION, SOLUTION INTRAVENOUS at 15:22

## 2023-04-24 RX ADMIN — SUGAMMADEX 400 MG: 100 INJECTION, SOLUTION INTRAVENOUS at 20:29

## 2023-04-24 RX ADMIN — Medication 10 ML: at 08:30

## 2023-04-24 RX ADMIN — LEVOTHYROXINE SODIUM 50 MCG: 0.05 TABLET ORAL at 08:26

## 2023-04-24 RX ADMIN — SODIUM CHLORIDE, POTASSIUM CHLORIDE, SODIUM LACTATE AND CALCIUM CHLORIDE 100 ML/HR: 600; 310; 30; 20 INJECTION, SOLUTION INTRAVENOUS at 23:03

## 2023-04-24 RX ADMIN — BUDESONIDE AND FORMOTEROL FUMARATE DIHYDRATE 1 PUFF: 160; 4.5 AEROSOL RESPIRATORY (INHALATION) at 06:27

## 2023-04-24 RX ADMIN — HYDROMORPHONE HYDROCHLORIDE 0.5 MG: 1 INJECTION, SOLUTION INTRAMUSCULAR; INTRAVENOUS; SUBCUTANEOUS at 01:10

## 2023-04-24 RX ADMIN — SODIUM CHLORIDE, POTASSIUM CHLORIDE, SODIUM LACTATE AND CALCIUM CHLORIDE 100 ML/HR: 600; 310; 30; 20 INJECTION, SOLUTION INTRAVENOUS at 01:10

## 2023-04-24 RX ADMIN — HYDROMORPHONE HYDROCHLORIDE 0.5 MG: 1 INJECTION, SOLUTION INTRAMUSCULAR; INTRAVENOUS; SUBCUTANEOUS at 05:54

## 2023-04-24 RX ADMIN — PROPOFOL 150 MG: 10 INJECTION, EMULSION INTRAVENOUS at 19:20

## 2023-04-24 RX ADMIN — FENTANYL CITRATE 25 MCG: 50 INJECTION, SOLUTION INTRAMUSCULAR; INTRAVENOUS at 21:11

## 2023-04-24 RX ADMIN — SODIUM CHLORIDE, POTASSIUM CHLORIDE, SODIUM LACTATE AND CALCIUM CHLORIDE 100 ML/HR: 600; 310; 30; 20 INJECTION, SOLUTION INTRAVENOUS at 11:20

## 2023-04-24 RX ADMIN — HYDROMORPHONE HYDROCHLORIDE 0.5 MG: 1 INJECTION, SOLUTION INTRAMUSCULAR; INTRAVENOUS; SUBCUTANEOUS at 23:03

## 2023-04-24 RX ADMIN — LIDOCAINE HYDROCHLORIDE 80 MG: 20 INJECTION, SOLUTION INFILTRATION; PERINEURAL at 19:20

## 2023-04-24 RX ADMIN — FAMOTIDINE 20 MG: 10 INJECTION INTRAVENOUS at 08:28

## 2023-04-24 RX ADMIN — PHENYLEPHRINE HYDROCHLORIDE 100 MCG: 10 INJECTION INTRAVENOUS at 19:28

## 2023-04-24 RX ADMIN — LIDOCAINE HYDROCHLORIDE 1 EACH: 40 SOLUTION TOPICAL at 19:23

## 2023-04-24 RX ADMIN — TAMSULOSIN HYDROCHLORIDE 0.4 MG: 0.4 CAPSULE ORAL at 08:26

## 2023-04-24 RX ADMIN — FENTANYL CITRATE 25 MCG: 50 INJECTION, SOLUTION INTRAMUSCULAR; INTRAVENOUS at 21:06

## 2023-04-24 RX ADMIN — METOPROLOL SUCCINATE 12.5 MG: 25 TABLET, EXTENDED RELEASE ORAL at 08:26

## 2023-04-24 RX ADMIN — HYDROMORPHONE HYDROCHLORIDE 0.5 MG: 1 INJECTION, SOLUTION INTRAMUSCULAR; INTRAVENOUS; SUBCUTANEOUS at 20:50

## 2023-04-24 RX ADMIN — TAZOBACTAM SODIUM AND PIPERACILLIN SODIUM 3.38 G: 375; 3 INJECTION, SOLUTION INTRAVENOUS at 23:37

## 2023-04-24 RX ADMIN — HYDROMORPHONE HYDROCHLORIDE 0.5 MG: 1 INJECTION, SOLUTION INTRAMUSCULAR; INTRAVENOUS; SUBCUTANEOUS at 10:37

## 2023-04-24 RX ADMIN — ROCURONIUM BROMIDE 50 MG: 50 INJECTION INTRAVENOUS at 19:20

## 2023-04-24 RX ADMIN — HYDROMORPHONE HYDROCHLORIDE 0.5 MG: 1 INJECTION, SOLUTION INTRAMUSCULAR; INTRAVENOUS; SUBCUTANEOUS at 03:32

## 2023-04-24 RX ADMIN — TAZOBACTAM SODIUM AND PIPERACILLIN SODIUM 3.38 G: 375; 3 INJECTION, SOLUTION INTRAVENOUS at 06:48

## 2023-04-24 RX ADMIN — Medication 10 ML: at 23:42

## 2023-04-24 RX ADMIN — FINASTERIDE 5 MG: 5 TABLET, FILM COATED ORAL at 08:26

## 2023-04-24 RX ADMIN — TAZOBACTAM SODIUM AND PIPERACILLIN SODIUM 3.38 G: 375; 3 INJECTION, SOLUTION INTRAVENOUS at 19:20

## 2023-04-24 RX ADMIN — ONDANSETRON 4 MG: 2 INJECTION INTRAMUSCULAR; INTRAVENOUS at 20:22

## 2023-04-24 NOTE — PLAN OF CARE
Goal Outcome Evaluation:  Plan of Care Reviewed With: patient        Progress: no change  Outcome Evaluation: VSS, 2L O2, NSR, pain controlled with PRn medication, IV Zosyn Q8H,NPO at midnight for ERCP and EGD. Purewick in place. Will continue to monitor.

## 2023-04-24 NOTE — CONSULTS
.              [facility] Palliative Care Services    Palliative Care Initial Consult   Attending Physician: Yrn Hutchins MD  Referring Provider: Dr Do    Reason for Referral: assistance with clarification of goals of care and hospice referral or discussion  Family/Support: daughterSisi     Code Status and Medical Interventions:   Ordered at: 04/22/23 0841     Medical Intervention Limits:    NO intubation (DNI)     Level Of Support Discussed With:    Patient    Health Care Surrogate     Code Status (Patient has no pulse and is not breathing):    No CPR (Do Not Attempt to Resuscitate)     Medical Interventions (Patient has pulse or is breathing):    Limited Support     Comments:    discussed with daughter who is POA     Release to patient:    Routine Release     Goals of Care: TBD.    HPI:   87 y.o. male with history of history of CVA (12/2022), paroxysmal typical atrial flutter on long-term anticoagulation, CKD stage IIIb, type 2 diabetes mellitus with long-term current use of insulin, hypertension, hyperlipidemia, hypothyroidism  He resided at [facility] prior to admission.   Patient presented to [facility] on 4/21/2023 related to worsening of abdominal pain. In ER, CT abd/pelvis revealed an approximately 5.5 cm pancreatic head mass obstructing the common bile duct likely represents a pancreatic malignancy; the multiple nodular densities adjacent to the pancreas are suspected to be metastatic; the cystic lesions at the pancreatic body may represent ectatic sidebranches or intraductal involvement with the malignancy; there are also changes of acute pancreatitis involving the entire pancreas. In addition, airspace opacities at the dependent aspect of both lower lobes  are suspicious for pneumonia, particularly at the right lung base;   is also pulmonary vascular congestion.  Labs in ER, revealed , , alk phosphatase 1,605, total bilirubin  7.1, albumin 3.12, CO2 29.5, creatinine 2.08,WBC 13.73, normal lipase, proBNP 927.0, urinalysis with bilirubin noted. Consults were placed for GI, which he was evaluated. He has requested ERCP with biliary stent placement. The palliative care team was consulted for support with goals of care conversation.   Palliative Care Spoke With: patient and family  Quality of life; fair  Functional Status: walker with assistance per family   Due to the Palliative Care Topics Discussed: palliative care, goals of care, care options, resuscitation status, [facility] and discharge options we will establish an advance care plan.   Advance Care Planning   Advance Care Planning Discussion: see below       Review of Systems   Constitutional: Positive for decreased appetite and malaise/fatigue.   Cardiovascular: Positive for leg swelling.   Respiratory: Positive for shortness of breath.    Musculoskeletal: Positive for back pain (chronic).   Gastrointestinal: Positive for abdominal pain and nausea.   Genitourinary: Positive for flank pain (left).   Neurological: Positive for weakness.   Psychiatric/Behavioral: Negative for depression. The patient is nervous/anxious.        1- Pain Assessment  Pain Location: back, flank  Pain Description: constant    Past Medical History:   Diagnosis Date   • Abdominal aortic aneurysm (AAA) without rupture    • Abdominal aortic ectasia 06/2015   • Abnormal EKG 10/25/2016    04/2019   • Abnormal thyroid blood test    • Actinic keratosis     FOLLOWED BY DR. MANPREET VILLARREAL   • Anemia 01/28/2021   • Arthritis    • Asthma     MILD, INTERMITTENT   • Atherosclerotic heart disease    • Atrial flutter with rapid ventricular response 04/14/2019    ADMITTED TO [facility]   • Atrial premature depolarization    • Atrophy of muscle of lower leg    • BPH (benign prostatic hyperplasia)     FOLLOWED BY DR. TERRA JONES   • Bruises easily    • Bulging of thoracic intervertebral disc    • Carpal tunnel syndrome, right  12/2019    FOLLOWED BY DR. NEW SANCHEZ   • Cataract     BILATERAL, S/P EXTRACTION WITH IOL IMPLANTS   • Chronic anticoagulation 01/29/2021   • Chronic edema    • Chronic low back pain with sciatica 01/26/2021   • Chronic pain    • CKD (chronic kidney disease) 01/29/2021   • Closed head injury 07/2018    WITH LACERATION TO SCALP, D/T SYNCOPE   • Colon cancer 01/31/2021    INVASIVE ADENOCARCINOMA FROM TUBULOVILLOUS ADENOMA IN TRANSVERSE, S/P COLON RESECTION, FOLLOWED BY DR. MARGARITA COX   • Colon polyps     FOLLOWED BY DR. MARGARITA COX   • Constipation due to opioid therapy    • Coronary artery disease    • DDD (degenerative disc disease), thoracic    • Diabetic amyotrophy    • Diverticulosis    • Enlarged prostate     FOLLOWED BY DR. TERRA JONES   • Epididymitis, right 03/2018   • Essential hypertension    • Eyebrow ptosis 11/09/2013   • Hallux valgus, acquired, bilateral 01/2019   • Heart attack 09/1989    S/P CABG, 5 VESSEL   • Heart murmur    • History of blood transfusion    • HL (hearing loss)    • Hyperactivity of bladder     FOLLOWED BY DR. TERRA JONES   • HyperCKemia 11/2017   • Hyperkalemia 08/2016   • Hyperlipidemia     MIXED   • Hyperreflexia 11/2017    BILATERAL   • Hyphema 05/2015   • IBS (irritable bowel syndrome)    • Impaired functional mobility, balance, gait, and endurance    • Impingement syndrome of left shoulder 10/2015   • Infection associated with cystostomy catheter 12/2016   • Ischemic colitis    • Lumbar radiculopathy 2016    wears back brace at times following back surgery   • Lumbar spondylosis 04/2016   • Lumbosacral neuritis 05/2015   • Lumbosacral radiculitis 05/2015   • Macular puckering of retina, left 10/2013   • 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 IVANEY GIORDANO   • Pseudophakia 11/09/2013   • RBBB (right bundle branch block)    • Recurrent falls    • Respiratory failure with hypoxia 01/2019   • SCC (squamous cell carcinoma) 10/16/2019    RIGHT FOREHEAD, LEFT TEMPLE, RIGHT SCALP, FOLLOWED BY DR. MANPREET VILLARREAL   • Scoliosis    • Spinal stenosis of lumbar region with neurogenic claudication 12/07/2017   • Syncope and collapse 07/16/2018    ADMITTED TO [facility]   • Torn rotator cuff 03/2017    BILATERAL, COMPLETE   • Type 2 diabetes mellitus     IDDM   • Urinary frequency     FOLLOWED BY DR. TERRA JONES   • Vitamin D deficiency    • Vitreous hemorrhage of left eye      Past Surgical History:   Procedure Laterality Date   • APPENDECTOMY N/A    • BROW LIFT Bilateral 11/12/2013    DR. OCTAVIA CEDILLO AT [place]   • CARDIAC CATHETERIZATION Left 10/2008    LEFT CAROTID ARTERY STENOSIS 50-69%   • CARDIAC ELECTROPHYSIOLOGY PROCEDURE N/A 06/06/2019    Procedure: Ablation atrial flutter;  Surgeon: Miller aTlbot MD;  Location: [facility] INVASIVE LOCATION;  Service: Cardiovascular   • CATARACT EXTRACTION Left 01/22/1998    DR. LAYLA BARNES AT [facility]   • CATARACT EXTRACTION Right 03/2001    DR. LAYLA BARNES   • CHOLECYSTECTOMY N/A 08/24/1989    DR. FAUZIA PELAEZ AT [facility]   • COLON RESECTION N/A 02/03/2021    Procedure: LAPAROSCOPIC EXTENDED  RIGHT COLECTOMY WITH DAVINCI ROBOT;  Surgeon: Xavi Napoles MD;  Location: [facility] OR;  Service: DaVinci;  Laterality: N/A;   • COLONOSCOPY N/A 01/31/2021    20-25 MM POLYP IN CECUM, PATH: TUBULAR ADENOMA, 2 TUBULAR ADENOMA POLYPS IN ASCENDING, A FUNGATING AND SUBMUCOSAL ONONOBSTRUCTING MEDIUM MASS IN PROXIMAL TRANSVERSE, PATH: INVASIVE ADENOCARCINOMA ARISING IN A TUBULOVILOOUS ADENOMA, AREA  TATTOOED, SCATTERED DIVERTICULA IN SIGMOID AND DESCENDING, LARGE INTERNAL HEMORRHOIDS, DR. JACOB ALICEA AT [facility]    • COLONOSCOPY N/A 02/02/2010    DIVERTICULOSIS, DR. SAL SOLANO AT [place]   • COLONOSCOPY N/A 08/15/2022    5 MM TUBULAR ADENOMA POLYP IN SIGMOID, 2 TUBULAR ADENOMA POLYPS IN DESCENDING, MULTIPLE DIVERTICULA IN SIGMOID, RESCOPE IN 2 YRS, DR. MARGARITA COX AT [facility]   • CORONARY ARTERY BYPASS GRAFT N/A 09/1989    5 VESSEL, DR. HANKS   • CYST REMOVAL      FROM BACK   • ENDOSCOPY N/A 01/31/2021    ENTIRE EGD WNL, PATH: MILD REACTIVE GASTROPATHY, DR. JACOB ALICEA AT [facility]   • INGUINAL HERNIA REPAIR Left 09/27/2007    DR. MATTHEW RUSH AT [place]   • INGUINAL HERNIA REPAIR Left 09/07/2007   • INGUINAL HERNIA REPAIR Left 2003   • KNEE ARTHROSCOPY W/ PARTIAL MEDIAL MENISCECTOMY Left 1962    DR. SINGH   • LUMBAR DISCECTOMY FUSION INSTRUMENTATION N/A 04/11/2016    Procedure: lumbar laminectomy L4-5 and fusion with instrumentation;  Surgeon: Ran Kline MD;  Location: [facility] OR;  Service:    • LUMBAR DISCECTOMY FUSION INSTRUMENTATION N/A 01/15/2018    Procedure: L2-3, L3-4 laminectomy and fusion with instrumentation and removal of implants L4 5.;  Surgeon: Ran Kline MD;  Location: [facility] OR;  Service:    • LUMBAR EPIDURAL INJECTION N/A 09/23/2015    DR. MATTHEW DOMINGUEZ AT [facility]   • LUMBAR EPIDURAL INJECTION N/A 10/07/2015    DR. ITZEL LUIS AT [facility]   • LUMBAR EPIDURAL INJECTION N/A 11/19/2015    DR. ITZEL LUIS AT [facility]   • NE ARTHRP KNE CONDYLE&PLATU MEDIAL&LAT COMPARTMENTS Left 11/14/2016    Procedure: TOTAL KNEE ARTHROPLASTY;  Surgeon: Dontrell Arellano MD;  Location: [facility] OR;  Service: Orthopedics   • SKIN BIOPSY Bilateral 10/16/2019    RIGHT LATERAL FOREHEAD:SCC, LEFT TEMPLE:SCC, RIGHT PARIETAL SCALP:SCC, DR. MANPREET VILLARREAL     Social History     Socioeconomic History   • Marital status:    Tobacco Use   • Smoking status: Former     Packs/day: 3.00     Years: 45.00      Pack years: 135.00     Types: Cigarettes     Quit date: 1989     Years since quittin.4   • Smokeless tobacco: Never   Vaping Use   • Vaping Use: Never used   Substance and Sexual Activity   • Alcohol use: Yes     Comment: 1 shot of whiskey in the morning and 1 shot of whiskey in the evening   • Drug use: Never   • Sexual activity: Not Currently       Current Facility-Administered Medications   Medication Dose Route Frequency Provider Last Rate Last Admin   • albuterol (PROVENTIL) nebulizer solution 0.083% 2.5 mg/3mL  2.5 mg Nebulization TID Mick Do DO   2.5 mg at 23   • budesonide-formoterol (SYMBICORT) 160-4.5 MCG/ACT inhaler 1 puff  1 puff Inhalation BID - RT Mick Do DO   1 puff at 23 0627   • calcium carbonate (TUMS) chewable tablet 500 mg (200 mg elemental)  2 tablet Oral BID PRN Ewa Muniz APRN       • dextrose (D50W) (25 g/50 mL) IV injection 25 g  25 g Intravenous Q15 Min PRN Ewa Muniz APRN       • dextrose (GLUTOSE) oral gel 15 g  15 g Oral Q15 Min PRN Ewa Muniz APRN       • famotidine (PEPCID) injection 20 mg  20 mg Intravenous Q24H Ewa Muniz APRN   20 mg at 23 0828   • finasteride (PROSCAR) tablet 5 mg  5 mg Oral Daily Mick Do DO   5 mg at 23 0826   • glucagon (GLUCAGEN) injection 1 mg  1 mg Intramuscular Q15 Min PRN Ewa Muniz APRN       • HYDROmorphone (DILAUDID) injection 0.5 mg  0.5 mg Intravenous Q2H PRN Ewa Muniz APRN   0.5 mg at 23 0554   • insulin lispro (ADMELOG) injection 2-9 Units  2-9 Units Subcutaneous TID With Meals Ewa Muniz APRN   2 Units at 23 1652   • lactated ringers infusion  100 mL/hr Intravenous Continuous Hany Arellano  mL/hr at 23 0110 100 mL/hr at 23 0110   • levothyroxine (SYNTHROID, LEVOTHROID) tablet 50 mcg  50 mcg Oral Daily Mick Do DO   50 mcg at 23 0826   • Magnesium Sulfate - Total Dose 10 grams  - Magnesium 1 or Less  2 g Intravenous PRN Hi, Mick, DO        Or   • Magnesium Sulfate - Total Dose 6 grams - Magnesium 1.1 - 1.5  2 g Intravenous PRN Orleans, Mick, DO        Or   • Magnesium Sulfate - Total Dose 4 grams - Magnesium 1.6 - 1.9  4 g Intravenous PRN Hi, Mick, DO       • metoprolol succinate XL (TOPROL-XL) 24 hr tablet 12.5 mg  12.5 mg Oral Daily Alex Domy, DO   12.5 mg at 04/24/23 0826   • nitroglycerin (NITROSTAT) SL tablet 0.4 mg  0.4 mg Sublingual Q5 Min PRN Ewa Muniz APRN       • ondansetron (ZOFRAN) tablet 4 mg  4 mg Oral Q6H PRN Ewa Muniz APRN        Or   • ondansetron (ZOFRAN) injection 4 mg  4 mg Intravenous Q6H PRN Ewa uMniz APRN       • piperacillin-tazobactam (ZOSYN) 3.375 g in iso-osmotic dextrose 50 ml (premix)  3.375 g Intravenous Q8H Mick Do, DO   3.375 g at 04/24/23 0648   • polyethylene glycol (MIRALAX) packet 17 g  17 g Oral BID Hany Arellano MD   17 g at 04/23/23 1954   • potassium & sodium phosphates (PHOS-NAK) 280-160-250 MG packet - for Phosphorus less than 1.25 mg/dL  2 packet Oral Q6H PRN Mick Do, DO        Or   • potassium & sodium phosphates (PHOS-NAK) 280-160-250 MG packet - for Phosphorus 1.25 - 2.5 mg/dL  2 packet Oral Q6H PRN Hi, Mick, DO       • potassium chloride (K-DUR,KLOR-CON) ER tablet 40 mEq  40 mEq Oral PRN Orleans, Mick, DO        Or   • potassium chloride (KLOR-CON) packet 40 mEq  40 mEq Oral PRN Orleans, Mick, DO   40 mEq at 04/23/23 0250    Or   • potassium chloride 10 mEq in 100 mL IVPB  10 mEq Intravenous Q1H PRN Hi, Mick, DO       • senna (SENOKOT) tablet 1 tablet  1 tablet Oral BID PRN Mick Do DO       • sennosides-docusate (PERICOLACE) 8.6-50 MG per tablet 2 tablet  2 tablet Oral Nightly Hany Arellano MD   2 tablet at 04/23/23 1949   • sodium chloride 0.9 % flush 10 mL  10 mL Intravenous Q12H Ewa Muniz APRN   10 mL at 04/24/23 0830   • sodium chloride 0.9 % flush 10 mL  10  "mL Intravenous PRN Ewa Muniz APRN       • sodium chloride 0.9 % infusion 40 mL  40 mL Intravenous PRN Ewa Muniz APRN       • tamsulosin (FLOMAX) 24 hr capsule 0.4 mg  0.4 mg Oral Daily Mick Do DO   0.4 mg at 04/24/23 0826     lactated ringers, 100 mL/hr, Last Rate: 100 mL/hr (04/24/23 0110)      •  calcium carbonate  •  dextrose  •  dextrose  •  glucagon (human recombinant)  •  HYDROmorphone  •  magnesium sulfate **OR** magnesium sulfate **OR** magnesium sulfate  •  nitroglycerin  •  ondansetron **OR** ondansetron  •  potassium & sodium phosphates **OR** potassium & sodium phosphates  •  potassium chloride **OR** potassium chloride **OR** potassium chloride  •  senna  •  sodium chloride  •  sodium chloride    Allergies   Allergen Reactions   • Amiodarone Myalgia     Muscle problems in legs   • Percocet [Oxycodone-Acetaminophen] Mental Status Change and Confusion     Causes confusion/     Attest that current medications reviewed  including but not limited to prescriptions, over-the counter, herbals and vitamin/mineral/dietary (nutritional) supplements for name, route of administration, type, dose and frequency and are current using all immediate resources available at time of dictation.      Intake/Output Summary (Last 24 hours) at 4/24/2023 0938  Last data filed at 4/24/2023 0500  Gross per 24 hour   Intake 2516.67 ml   Output 100 ml   Net 2416.67 ml       Physical Exam:    Diagnostics: Reviewed  /68   Pulse 90   Temp 98.2 °F (36.8 °C) (Oral)   Resp 16   Ht 180.3 cm (71\")   Wt 107 kg (235 lb 10.8 oz)   SpO2 93%   BMI 32.87 kg/m²     Constitutional:       Appearance: Not in distress. Chronically ill-appearing and acutely ill-appearing.      Interventions: Nasal cannula in place.      Comments: NC 1L    Eyes:      General: Scleral icterus.   HENT:      Ears:      Comments: [place]  Pulmonary:      Effort: Pulmonary effort is normal.      Breath sounds: Normal breath sounds. "   Cardiovascular:      PMI at left midclavicular line. Normal rate. Regular rhythm.      Murmurs: There is no murmur.   Edema:     Peripheral edema present.     Pretibial: bilateral trace edema of the pretibial area.  Abdominal:      General: Bowel sounds are decreased. There is distension.      Palpations: Abdomen is soft.      Tenderness: There is abdominal tenderness in the left upper quadrant.   Skin:     Coloration: Skin is jaundiced.   Genitourinary:     Comments: purewick with tea colored urine   Neurological:      Mental Status: Oriented to person, place, and time. Lethargic.   Psychiatric:         Mood and Affect: Mood and affect normal.         Speech: Speech normal.         Behavior: Behavior normal.         Thought Content: Thought content normal.         Cognition and Memory: Cognition normal.         Judgment: Judgment normal.       Patient status: Disease state: Controlled with current treatments.  Functional status: Palliative Performance Scale Score: Performance 40% based on the following measures: Ambulation: Mainly in bed, Activity and Evidence of Disease: Unable to do any work, extensive evidence of disease, Self-Care: Mainly assistance required,  Intake: Normal or reduced, LOC: Full, drowsy or confusion   ECOG Status(2) Ambulatory and capable of self care, unable to carry out work activity, up and about > 50% or waking hours.  Nutritional status: Albumin 2.1 today Body mass index is 32.87 kg/m².    Family support: The patient receives support from his daughter..  Advance Directives: Advance Directive Status: Patient has advance directive, copy in chart   POA/Healthcare surrogate-daughterSisi .        Impression/Problem List:    1. Pancreatic mass  2. Acute respiratory failure with hypoxia  3. Pneumonia   4. Chronic kidney disease stage III  5. Paroxysmal atrial flutter  6. History of CVA  7. Diabetes mellitus type II  8. Coronary artery disease   9. Hypothyrodism        Recommendations/Plan:  1. Provide support with goals of care  2.  [facility] consult      2.  Palliative care encounter  I spoke with patient and his daughter, Sisi regarding goals of care. Of note, patient was drowsy during examination due to recent pain medication.  The patient does have an updated living will, which I have requested a copy for viewing purposes. I have reviewed the copy on file, which list his spouse who is . The patient has resided at [facility] since 2022, prior too he resided at home alone independent with ADLs. He had fall in 2022, went to rehab then transitioned to personal care at [facility]. Since December he has progressively gotten worst. They have a very good understanding of his acute conditions and the patient wishes not to have biopsy of pancreatic mass. He would like to focus on symptom management. He understands concerns for malignancy. We discussed palliative care, [facility] and [facility] services. His daughter would like to receive information regarding [facility] and a consult was placed. At this time, they are uncertain if he would want to continue with rehab/restorative treatment. They plan to proceed with biliary stent placement later this afternoon, with hopes of improvement with pain/nausea.  No spiritual concerns identified. I will plan to follow up tomorrow for continued support. I didn't address CODE status as this had already been addressed.  I spoke with MARTY Judge.        Thank you for this consult and allowing us to participate in patient's plan of care. Palliative Care Team will continue to follow patient.     Time spent: 60 minutes spent reviewing medical and medication records, assessing and examining patient, discussing with family, answering questions, providing some guidance about a plan and documentation of care, and coordinating care with other healthcare members, with > 50% time spent face to face.       Leny Mancilla,  APRN  4/24/2023  09:38 EDT

## 2023-04-24 NOTE — ANESTHESIA PREPROCEDURE EVALUATION
Anesthesia Evaluation     Patient summary reviewed and Nursing notes reviewed   NPO Solid Status: > 8 hours  NPO Liquid Status: > 2 hours           Airway   Mallampati: II  TM distance: >3 FB  Neck ROM: full  No difficulty expected  Dental      Pulmonary - normal exam    breath sounds clear to auscultation  (+) pneumonia , a smoker Former, asthma,  Cardiovascular - normal exam    ECG reviewed  Rhythm: regular  Rate: normal    (+) hypertension 2 medications or greater, valvular problems/murmurs AS, past MI  >12 months, CAD, CABG >6 Months, dysrhythmias Atrial Flutter, Paroxysmal Atrial Fib, hyperlipidemia,     ROS comment: Hx MI and CABG X5 in 1980s/EF 65% by ECHO 12/22/hx AAA, nonruptured    Neuro/Psych  (+) CVA, syncope, numbness,      ROS Comment: Diabetic peripheral neuropathy  GI/Hepatic/Renal/Endo    (+) obesity,   renal disease CRI, diabetes mellitus type 2 using insulin, thyroid problem hypothyroidism    Musculoskeletal         ROS comment: Lumbar spinal stenosis  Abdominal   (+) obese,    Substance History - negative use     OB/GYN negative ob/gyn ROS         Other   arthritis,    history of cancer      Other Comment: Hx colon CA                  Anesthesia Plan    ASA 3     general     (GETA)  intravenous induction     Anesthetic plan, risks, benefits, and alternatives have been provided, discussed and informed consent has been obtained with: patient.        CODE STATUS:

## 2023-04-24 NOTE — ANESTHESIA PROCEDURE NOTES
Airway  Urgency: elective    Date/Time: 4/24/2023 7:23 PM  Airway not difficult    General Information and Staff    Patient location during procedure: OR  Anesthesiologist: Maurice Beach MD    Indications and Patient Condition  Indications for airway management: airway protection    Preoxygenated: yes  MILS maintained throughout  Mask difficulty assessment: 1 - vent by mask    Final Airway Details  Final airway type: endotracheal airway      Successful airway: ETT  Cuffed: yes   Successful intubation technique: direct laryngoscopy  Endotracheal tube insertion site: oral  Blade: Ezio  Blade size: 3  ETT size (mm): 8.5  Cormack-Lehane Classification: grade I - full view of glottis  Placement verified by: chest auscultation and capnometry   Measured from: lips  ETT/EBT  to lips (cm): 24  Number of attempts at approach: 2  Assessment: lips, teeth, and gum same as pre-op and atraumatic intubation    Additional Comments  8.0 ETT had leak, switched to 8.5

## 2023-04-24 NOTE — NURSING NOTE
Cwon consult received. Patient with gluteal wounds that were present on admission. I reviewed wound photos and etiology most likely r/t a combination of moisture, friction and pressure.  I will recommend Calmoseptine ointment for moisture and barrier protection.  Please see orders in Epic.  I have also added prevention measure orders for PI prevention.  Please don't hesitate to call with any questions.

## 2023-04-24 NOTE — SIGNIFICANT NOTE
04/24/23 1436   OTHER   Discipline physical therapy assistant   Rehab Time/Intention   Session Not Performed other (see comments)  (pt's RN stated that pt was about to leave floor to go to [facility], though had asked pt if he would participate w/ PT today and he said no; PT will f/u tomorrow)   Recommendation   PT - Next Appointment 04/25/23

## 2023-04-24 NOTE — PROGRESS NOTES
Name: Osvaldo Lopez ADMIT: 2023   : 1935  PCP: Provider, No Known    MRN: [id number] LOS: 2 days   AGE/SEX: 87 y.o. male  ROOM: ENDO/ENDO     Subjective   Subjective   Patient resting in bed.  States that his pain comes and goes but is overall controlled at this time.    Objective   Objective   Vital Signs  Temp:  [97.4 °F (36.3 °C)-99.1 °F (37.3 °C)] 97.4 °F (36.3 °C)  Heart Rate:  [86-92] 89  Resp:  [16-18] 16  BP: (123-144)/(59-70) 139/67  SpO2:  [92 %-94 %] 93 %  on  Flow (L/min):  [1-2] 2;   Device (Oxygen Therapy): nasal cannula  Body mass index is 32.87 kg/m².  Physical Exam  Constitutional:       Appearance: He is ill-appearing.   Cardiovascular:      Rate and Rhythm: Normal rate and regular rhythm.      Pulses: Normal pulses.   Pulmonary:      Effort: Pulmonary effort is normal.      Breath sounds: Normal breath sounds.   Abdominal:      General: Abdomen is flat.      Palpations: Abdomen is soft.   Skin:     Coloration: Skin is jaundiced.      Findings: No bruising.   Neurological:      General: No focal deficit present.      Mental Status: He is alert and oriented to person, place, and time.         Results Review     I reviewed the patient's new clinical results.  Results from last 7 days   Lab Units 23  0708 23  0649 23  0536 23   WBC 10*3/mm3 11.46* 13.87* 12.79* 13.73*   HEMOGLOBIN g/dL 12.8* 13.6 13.6 14.1   PLATELETS 10*3/mm3 128* 161 160 214     Results from last 7 days   Lab Units 23  0708 23  0649 23  0536 23   SODIUM mmol/L 126* 132* 136  137 134*   POTASSIUM mmol/L 4.8 5.1 3.1*  3.1* 3.4*   CHLORIDE mmol/L 93* 95* 94*  93* 92*   CO2 mmol/L 24.9 23.0 30.0*  30.0* 29.5*   BUN mg/dL 59* 55* 57*  56* 58*   CREATININE mg/dL 1.99* 1.99* 1.87*  1.92* 2.08*   GLUCOSE mg/dL 255* 135* 74  74 70   EGFR mL/min/1.73 32.3* 32.3* 34.8*  33.7* 30.6*     Results from last 7 days   Lab Units 23  0708 23  0649  04/22/23  0536 04/21/23  2049   ALBUMIN g/dL 2.1* 2.8* 3.0* 3.1*   BILIRUBIN mg/dL 11.0* 8.5* 6.9* 7.1*   ALK PHOS U/L 2,201* 2,215* 1,818* 1,605*   AST (SGOT) U/L 215* 289* 291* 257*   ALT (SGPT) U/L 354* 425* 491* 525*     Results from last 7 days   Lab Units 04/24/23  0708 04/23/23  0649 04/22/23  0536 04/21/23  2049   CALCIUM mg/dL 8.6 8.5* 8.5*  9.2 9.2   ALBUMIN g/dL 2.1* 2.8* 3.0* 3.1*   MAGNESIUM mg/dL 2.2 2.2  --   --      Results from last 7 days   Lab Units 04/22/23  0536   PROCALCITONIN ng/mL 1.38*   LACTATE mmol/L 0.9     Glucose   Date/Time Value Ref Range Status   04/24/2023 1126 217 (H) 70 - 130 mg/dL Final     Comment:     Meter: [id number] : [id number] Deondre Larsen NA   04/24/2023 0819 242 (H) 70 - 130 mg/dL Final     Comment:     Meter: [id number] : [id number] Deondre Larsen NA   04/23/2023 2113 236 (H) 70 - 130 mg/dL Final     Comment:     Meter: [id number] : [id number] Demarco Savage NA   04/23/2023 1641 189 (H) 70 - 130 mg/dL Final     Comment:     Meter: [id number] : [id number] Caroline Castroia NA   04/23/2023 1137 174 (H) 70 - 130 mg/dL Final     Comment:     Meter: [id number] : [id number] Cecilio Tolentino NA   04/22/2023 2024 93 70 - 130 mg/dL Final     Comment:     Meter: [id number] : [id number] Des GELLER   04/22/2023 1652 97 70 - 130 mg/dL Final     Comment:     Meter: [id number] : [id number] [place] Denisha RN       No radiology results for the last day  Scheduled Medications  albuterol, 2.5 mg, Nebulization, TID  budesonide-formoterol, 1 puff, Inhalation, BID - RT  famotidine, 20 mg, Intravenous, Q24H  finasteride, 5 mg, Oral, Daily  insulin lispro, 2-9 Units, Subcutaneous, TID With Meals  levothyroxine, 50 mcg, Oral, Daily  Menthol-Zinc Oxide, 1 application, Topical, BID  metoprolol succinate XL, 12.5 mg, Oral, Daily  piperacillin-tazobactam, 3.375 g, Intravenous, Q8H  polyethylene glycol, 17 g, Oral, BID  senna-docusate sodium, 2 tablet, Oral,  Nightly  sodium chloride, 10 mL, Intravenous, Q12H  tamsulosin, 0.4 mg, Oral, Daily    Infusions  lactated ringers, 100 mL/hr, Last Rate: 100 mL/hr (04/24/23 1120)  sodium chloride, 30 mL/hr, Last Rate: 30 mL/hr (04/24/23 1522)    Diet  NPO Diet NPO Type: Strict NPO       Assessment/Plan     Active Hospital Problems    Diagnosis  POA   • **Pancreatic mass [K86.89]  Yes   • Hypothyroidism [E03.9]  Yes   • Type 2 diabetes mellitus with chronic kidney disease, with long-term current use of insulin [E11.22, Z79.4]  Not Applicable   • History of CVA (cerebrovascular accident) [Z86.73]  Not Applicable   • Hypokalemia [E87.6]  Yes   • Transaminitis [R74.01]  Yes   • Pneumonia [J18.9]  Yes   • History of atrial flutter [Z86.79]  Not Applicable   • Hypoxia [R09.02]  Yes   • 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       87 y.o. male admitted with Pancreatic mass.      04/24/23  Plan for palliative biliary stent placement today.  Follow-up palliative consult    Pancreatic mass  Abdominal pain complicated by nausea and vomiting  Transaminitis  - CT AP- approximately 5.5 cm pancreatic head mass obstructing the CBD likely represents pancreatic malignancy, coupled with multiple nodular densities adjacent to the pancreas   -Per patient not interested in any work-up or treatment for his malignancy  -GI following  -plan for palliative biliary stent placement 4/24/23     Hypoxemia  Pneumonia  - Continue Zosyn as prescribed for possible aspiration pneumonia, given location of findings on imaging.  - Follow up blood cultures.     Chronic kidney disease stage 3B  -at b/l     Type 2 diabetes mellitus  with long term current use of insulin without hyperglycemia complicated by neuropathy and CKD  - Most recent A1c: 6.90% (12/28/22).  - Home medication regimen: Levemir 60 units daily, aspart 10 units TID with meals, Tradjenta.  - Discontinued home medications. Started patient on treatment with correctional SSI     Paroxysmal typical atrial flutter on long term anticoagulation  -RC: Metoprolol; hold amiodarone given LFT abnormalities  -AC: Xarelto, ON HOLD     Coronary artery disease  - S/P CABG x5 (1999)      Hypothyroidism  -Synthroid 50 mcg     History of CVA and subdural hematoma  - Noted during prior admission in December 2022.    · SCDs for DVT prophylaxis.  · DNR.  · Discussed with patient.  · Anticipate discharge home TBD      Yrn Hutchins MD  [facility]  04/24/23  15:38 EDT

## 2023-04-24 NOTE — PLAN OF CARE
Goal Outcome Evaluation:  Plan of Care Reviewed With: patient        Progress: no change  Outcome Evaluation: Pt alert, orient x2 to self and place. PRN dilaudid given x1 for c/o pain. Low air loss mattress in use, pt turned q2hr. IV fluids continued. Pt currently off the floor for EGD and ERCP.

## 2023-04-25 LAB
ALBUMIN SERPL-MCNC: 1.8 G/DL (ref 3.5–5.2)
ALBUMIN/GLOB SERPL: 0.6 G/DL
ALP SERPL-CCNC: 2297 U/L (ref 39–117)
ALT SERPL W P-5'-P-CCNC: 307 U/L (ref 1–41)
ANION GAP SERPL CALCULATED.3IONS-SCNC: 12.1 MMOL/L (ref 5–15)
AST SERPL-CCNC: 165 U/L (ref 1–40)
BASOPHILS # BLD AUTO: 0.01 10*3/MM3 (ref 0–0.2)
BASOPHILS NFR BLD AUTO: 0.1 % (ref 0–1.5)
BILIRUB SERPL-MCNC: 13 MG/DL (ref 0–1.2)
BUN SERPL-MCNC: 72 MG/DL (ref 8–23)
BUN/CREAT SERPL: 29.8 (ref 7–25)
CALCIUM SPEC-SCNC: 8.5 MG/DL (ref 8.6–10.5)
CHLORIDE SERPL-SCNC: 98 MMOL/L (ref 98–107)
CO2 SERPL-SCNC: 21.9 MMOL/L (ref 22–29)
CREAT SERPL-MCNC: 2.42 MG/DL (ref 0.76–1.27)
DEPRECATED RDW RBC AUTO: 49.2 FL (ref 37–54)
EGFRCR SERPLBLD CKD-EPI 2021: 25.5 ML/MIN/1.73
EOSINOPHIL # BLD AUTO: 0 10*3/MM3 (ref 0–0.4)
EOSINOPHIL NFR BLD AUTO: 0 % (ref 0.3–6.2)
ERYTHROCYTE [DISTWIDTH] IN BLOOD BY AUTOMATED COUNT: 14 % (ref 12.3–15.4)
GLOBULIN UR ELPH-MCNC: 3.1 GM/DL
GLUCOSE BLDC GLUCOMTR-MCNC: 192 MG/DL (ref 70–130)
GLUCOSE BLDC GLUCOMTR-MCNC: 198 MG/DL (ref 70–130)
GLUCOSE BLDC GLUCOMTR-MCNC: 199 MG/DL (ref 70–130)
GLUCOSE BLDC GLUCOMTR-MCNC: 246 MG/DL (ref 70–130)
GLUCOSE SERPL-MCNC: 224 MG/DL (ref 65–99)
HCT VFR BLD AUTO: 37.7 % (ref 37.5–51)
HGB BLD-MCNC: 12.2 G/DL (ref 13–17.7)
IMM GRANULOCYTES # BLD AUTO: 0.14 10*3/MM3 (ref 0–0.05)
IMM GRANULOCYTES NFR BLD AUTO: 1.2 % (ref 0–0.5)
LYMPHOCYTES # BLD AUTO: 0.37 10*3/MM3 (ref 0.7–3.1)
LYMPHOCYTES NFR BLD AUTO: 3.1 % (ref 19.6–45.3)
MAGNESIUM SERPL-MCNC: 2.2 MG/DL (ref 1.6–2.4)
MCH RBC QN AUTO: 30.8 PG (ref 26.6–33)
MCHC RBC AUTO-ENTMCNC: 32.4 G/DL (ref 31.5–35.7)
MCV RBC AUTO: 95.2 FL (ref 79–97)
MONOCYTES # BLD AUTO: 0.58 10*3/MM3 (ref 0.1–0.9)
MONOCYTES NFR BLD AUTO: 4.8 % (ref 5–12)
NEUTROPHILS NFR BLD AUTO: 11 10*3/MM3 (ref 1.7–7)
NEUTROPHILS NFR BLD AUTO: 90.8 % (ref 42.7–76)
NRBC BLD AUTO-RTO: 0 /100 WBC (ref 0–0.2)
PLATELET # BLD AUTO: 128 10*3/MM3 (ref 140–450)
PMV BLD AUTO: 9.5 FL (ref 6–12)
POTASSIUM SERPL-SCNC: 4.9 MMOL/L (ref 3.5–5.2)
PROT SERPL-MCNC: 4.9 G/DL (ref 6–8.5)
RBC # BLD AUTO: 3.96 10*6/MM3 (ref 4.14–5.8)
SODIUM SERPL-SCNC: 132 MMOL/L (ref 136–145)
WBC NRBC COR # BLD: 12.1 10*3/MM3 (ref 3.4–10.8)

## 2023-04-25 PROCEDURE — 99232 SBSQ HOSP IP/OBS MODERATE 35: CPT | Performed by: NURSE PRACTITIONER

## 2023-04-25 PROCEDURE — 97530 THERAPEUTIC ACTIVITIES: CPT

## 2023-04-25 PROCEDURE — 80053 COMPREHEN METABOLIC PANEL: CPT | Performed by: INTERNAL MEDICINE

## 2023-04-25 PROCEDURE — 94799 UNLISTED PULMONARY SVC/PX: CPT

## 2023-04-25 PROCEDURE — 25010000002 HYDROMORPHONE PER 4 MG: Performed by: INTERNAL MEDICINE

## 2023-04-25 PROCEDURE — 97166 OT EVAL MOD COMPLEX 45 MIN: CPT

## 2023-04-25 PROCEDURE — 63710000001 INSULIN LISPRO (HUMAN) PER 5 UNITS: Performed by: STUDENT IN AN ORGANIZED HEALTH CARE EDUCATION/TRAINING PROGRAM

## 2023-04-25 PROCEDURE — 94664 DEMO&/EVAL PT USE INHALER: CPT

## 2023-04-25 PROCEDURE — 99232 SBSQ HOSP IP/OBS MODERATE 35: CPT | Performed by: INTERNAL MEDICINE

## 2023-04-25 PROCEDURE — 94760 N-INVAS EAR/PLS OXIMETRY 1: CPT

## 2023-04-25 PROCEDURE — 94761 N-INVAS EAR/PLS OXIMETRY MLT: CPT

## 2023-04-25 PROCEDURE — 83735 ASSAY OF MAGNESIUM: CPT | Performed by: INTERNAL MEDICINE

## 2023-04-25 PROCEDURE — 25010000002 PIPERACILLIN SOD-TAZOBACTAM PER 1 G: Performed by: INTERNAL MEDICINE

## 2023-04-25 PROCEDURE — 63710000001 INSULIN LISPRO (HUMAN) PER 5 UNITS: Performed by: INTERNAL MEDICINE

## 2023-04-25 PROCEDURE — 97110 THERAPEUTIC EXERCISES: CPT

## 2023-04-25 PROCEDURE — 82962 GLUCOSE BLOOD TEST: CPT

## 2023-04-25 PROCEDURE — 85025 COMPLETE CBC W/AUTO DIFF WBC: CPT | Performed by: STUDENT IN AN ORGANIZED HEALTH CARE EDUCATION/TRAINING PROGRAM

## 2023-04-25 PROCEDURE — 25010000002 FUROSEMIDE PER 20 MG: Performed by: STUDENT IN AN ORGANIZED HEALTH CARE EDUCATION/TRAINING PROGRAM

## 2023-04-25 RX ORDER — NICOTINE POLACRILEX 4 MG
15 LOZENGE BUCCAL
Status: DISCONTINUED | OUTPATIENT
Start: 2023-04-25 | End: 2023-04-27 | Stop reason: HOSPADM

## 2023-04-25 RX ORDER — FUROSEMIDE 10 MG/ML
20 INJECTION INTRAMUSCULAR; INTRAVENOUS ONCE
Status: COMPLETED | OUTPATIENT
Start: 2023-04-25 | End: 2023-04-25

## 2023-04-25 RX ORDER — SIMETHICONE 80 MG
80 TABLET,CHEWABLE ORAL 4 TIMES DAILY PRN
Status: DISCONTINUED | OUTPATIENT
Start: 2023-04-25 | End: 2023-04-27 | Stop reason: HOSPADM

## 2023-04-25 RX ORDER — INSULIN LISPRO 100 [IU]/ML
1-200 INJECTION, SOLUTION INTRAVENOUS; SUBCUTANEOUS
Status: DISCONTINUED | OUTPATIENT
Start: 2023-04-25 | End: 2023-04-27 | Stop reason: HOSPADM

## 2023-04-25 RX ORDER — OXYCODONE HYDROCHLORIDE AND ACETAMINOPHEN 5; 325 MG/1; MG/1
1 TABLET ORAL EVERY 4 HOURS PRN
Status: DISCONTINUED | OUTPATIENT
Start: 2023-04-25 | End: 2023-04-27 | Stop reason: HOSPADM

## 2023-04-25 RX ORDER — INSULIN LISPRO 100 [IU]/ML
1-200 INJECTION, SOLUTION INTRAVENOUS; SUBCUTANEOUS AS NEEDED
Status: DISCONTINUED | OUTPATIENT
Start: 2023-04-25 | End: 2023-04-27 | Stop reason: HOSPADM

## 2023-04-25 RX ORDER — DEXTROSE MONOHYDRATE 25 G/50ML
10-50 INJECTION, SOLUTION INTRAVENOUS
Status: DISCONTINUED | OUTPATIENT
Start: 2023-04-25 | End: 2023-04-27 | Stop reason: HOSPADM

## 2023-04-25 RX ORDER — ACETAMINOPHEN 325 MG/1
650 TABLET ORAL EVERY 6 HOURS PRN
Status: DISCONTINUED | OUTPATIENT
Start: 2023-04-25 | End: 2023-04-27 | Stop reason: HOSPADM

## 2023-04-25 RX ORDER — IBUPROFEN 600 MG/1
1 TABLET ORAL
Status: DISCONTINUED | OUTPATIENT
Start: 2023-04-25 | End: 2023-04-27 | Stop reason: HOSPADM

## 2023-04-25 RX ADMIN — METOPROLOL SUCCINATE 12.5 MG: 25 TABLET, EXTENDED RELEASE ORAL at 08:39

## 2023-04-25 RX ADMIN — SODIUM CHLORIDE, POTASSIUM CHLORIDE, SODIUM LACTATE AND CALCIUM CHLORIDE 100 ML/HR: 600; 310; 30; 20 INJECTION, SOLUTION INTRAVENOUS at 10:03

## 2023-04-25 RX ADMIN — FINASTERIDE 5 MG: 5 TABLET, FILM COATED ORAL at 08:38

## 2023-04-25 RX ADMIN — INSULIN GLARGINE-YFGN 15 UNITS: 100 INJECTION, SOLUTION SUBCUTANEOUS at 21:35

## 2023-04-25 RX ADMIN — INSULIN LISPRO 1 UNITS: 100 INJECTION, SOLUTION INTRAVENOUS; SUBCUTANEOUS at 21:36

## 2023-04-25 RX ADMIN — Medication 10 ML: at 20:30

## 2023-04-25 RX ADMIN — OXYCODONE AND ACETAMINOPHEN 1 TABLET: 5; 325 TABLET ORAL at 22:09

## 2023-04-25 RX ADMIN — INSULIN LISPRO 4 UNITS: 100 INJECTION, SOLUTION INTRAVENOUS; SUBCUTANEOUS at 08:39

## 2023-04-25 RX ADMIN — POLYETHYLENE GLYCOL 3350 17 G: 17 POWDER, FOR SOLUTION ORAL at 20:30

## 2023-04-25 RX ADMIN — BUDESONIDE AND FORMOTEROL FUMARATE DIHYDRATE 1 PUFF: 160; 4.5 AEROSOL RESPIRATORY (INHALATION) at 19:39

## 2023-04-25 RX ADMIN — ANORECTAL OINTMENT 1 APPLICATION: 15.7; .44; 24; 20.6 OINTMENT TOPICAL at 08:39

## 2023-04-25 RX ADMIN — TAZOBACTAM SODIUM AND PIPERACILLIN SODIUM 3.38 G: 375; 3 INJECTION, SOLUTION INTRAVENOUS at 06:18

## 2023-04-25 RX ADMIN — SODIUM CHLORIDE, POTASSIUM CHLORIDE, SODIUM LACTATE AND CALCIUM CHLORIDE 100 ML/HR: 600; 310; 30; 20 INJECTION, SOLUTION INTRAVENOUS at 20:30

## 2023-04-25 RX ADMIN — POLYETHYLENE GLYCOL 3350 17 G: 17 POWDER, FOR SOLUTION ORAL at 08:39

## 2023-04-25 RX ADMIN — INSULIN LISPRO 4 UNITS: 100 INJECTION, SOLUTION INTRAVENOUS; SUBCUTANEOUS at 12:44

## 2023-04-25 RX ADMIN — PANTOPRAZOLE SODIUM 40 MG: 40 TABLET, DELAYED RELEASE ORAL at 06:18

## 2023-04-25 RX ADMIN — SIMETHICONE 80 MG: 80 TABLET, CHEWABLE ORAL at 18:07

## 2023-04-25 RX ADMIN — DOCUSATE SODIUM 50MG AND SENNOSIDES 8.6MG 2 TABLET: 8.6; 5 TABLET, FILM COATED ORAL at 20:30

## 2023-04-25 RX ADMIN — ANORECTAL OINTMENT 1 APPLICATION: 15.7; .44; 24; 20.6 OINTMENT TOPICAL at 20:30

## 2023-04-25 RX ADMIN — INSULIN LISPRO 1 UNITS: 100 INJECTION, SOLUTION INTRAVENOUS; SUBCUTANEOUS at 18:13

## 2023-04-25 RX ADMIN — TAZOBACTAM SODIUM AND PIPERACILLIN SODIUM 3.38 G: 375; 3 INJECTION, SOLUTION INTRAVENOUS at 23:41

## 2023-04-25 RX ADMIN — BUDESONIDE AND FORMOTEROL FUMARATE DIHYDRATE 1 PUFF: 160; 4.5 AEROSOL RESPIRATORY (INHALATION) at 06:57

## 2023-04-25 RX ADMIN — FUROSEMIDE 20 MG: 10 INJECTION, SOLUTION INTRAMUSCULAR; INTRAVENOUS at 18:07

## 2023-04-25 RX ADMIN — ALBUTEROL SULFATE 2.5 MG: 2.5 SOLUTION RESPIRATORY (INHALATION) at 15:03

## 2023-04-25 RX ADMIN — TAZOBACTAM SODIUM AND PIPERACILLIN SODIUM 3.38 G: 375; 3 INJECTION, SOLUTION INTRAVENOUS at 16:29

## 2023-04-25 RX ADMIN — HYDROMORPHONE HYDROCHLORIDE 0.5 MG: 1 INJECTION, SOLUTION INTRAMUSCULAR; INTRAVENOUS; SUBCUTANEOUS at 23:41

## 2023-04-25 RX ADMIN — LEVOTHYROXINE SODIUM 50 MCG: 0.05 TABLET ORAL at 08:39

## 2023-04-25 RX ADMIN — ACETAMINOPHEN 650 MG: 325 TABLET ORAL at 18:07

## 2023-04-25 RX ADMIN — FAMOTIDINE 20 MG: 10 INJECTION INTRAVENOUS at 08:39

## 2023-04-25 RX ADMIN — TAMSULOSIN HYDROCHLORIDE 0.4 MG: 0.4 CAPSULE ORAL at 08:39

## 2023-04-25 NOTE — PROGRESS NOTES
Name: Osvaldo Lopez ADMIT: 2023   : 1935  PCP: Provider, No Known    MRN: [id number] LOS: 3 days   AGE/SEX: 87 y.o. male  ROOM: [facility]     Subjective   Subjective   Sleeping in bed this morning, has no complaints upon awakening.    Objective   Objective   Vital Signs  Temp:  [97.4 °F (36.3 °C)-98.9 °F (37.2 °C)] 97.6 °F (36.4 °C)  Heart Rate:  [82-91] 82  Resp:  [14-18] 16  BP: (123-155)/(59-75) 155/70  SpO2:  [89 %-97 %] 97 %  on  Flow (L/min):  [1-4] 2;   Device (Oxygen Therapy): nasal cannula  Body mass index is 30.38 kg/m².  Physical Exam  Constitutional:       Appearance: He is ill-appearing.   Cardiovascular:      Rate and Rhythm: Normal rate and regular rhythm.      Pulses: Normal pulses.   Pulmonary:      Effort: Pulmonary effort is normal.      Breath sounds: Normal breath sounds.   Abdominal:      General: Abdomen is flat.      Palpations: Abdomen is soft.   Skin:     Coloration: Skin is jaundiced.      Findings: No bruising.   Neurological:      General: No focal deficit present.      Mental Status: He is alert and oriented to person, place, and time.         Results Review     I reviewed the patient's new clinical results.  Results from last 7 days   Lab Units 23  0530 23  0708 23  0649 23  0536   WBC 10*3/mm3 12.10* 11.46* 13.87* 12.79*   HEMOGLOBIN g/dL 12.2* 12.8* 13.6 13.6   PLATELETS 10*3/mm3 128* 128* 161 160     Results from last 7 days   Lab Units 23  0530 23  0708 23  0649 23  0536   SODIUM mmol/L 132* 126* 132* 136  137   POTASSIUM mmol/L 4.9 4.8 5.1 3.1*  3.1*   CHLORIDE mmol/L 98 93* 95* 94*  93*   CO2 mmol/L 21.9* 24.9 23.0 30.0*  30.0*   BUN mg/dL 72* 59* 55* 57*  56*   CREATININE mg/dL 2.42* 1.99* 1.99* 1.87*  1.92*   GLUCOSE mg/dL 224* 255* 135* 74  74   EGFR mL/min/1.73 25.5* 32.3* 32.3* 34.8*  33.7*     Results from last 7 days   Lab Units 23  0530 23  0708 23  0649 23  0536   ALBUMIN  g/dL 1.8* 2.1* 2.8* 3.0*   BILIRUBIN mg/dL 13.0* 11.0* 8.5* 6.9*   ALK PHOS U/L 2,297* 2,201* 2,215* 1,818*   AST (SGOT) U/L 165* 215* 289* 291*   ALT (SGPT) U/L 307* 354* 425* 491*     Results from last 7 days   Lab Units 04/25/23  0530 04/24/23  0708 04/23/23  0649 04/22/23  0536   CALCIUM mg/dL 8.5* 8.6 8.5* 8.5*  9.2   ALBUMIN g/dL 1.8* 2.1* 2.8* 3.0*   MAGNESIUM mg/dL 2.2 2.2 2.2  --      Results from last 7 days   Lab Units 04/22/23  0536   PROCALCITONIN ng/mL 1.38*   LACTATE mmol/L 0.9     Glucose   Date/Time Value Ref Range Status   04/25/2023 0610 246 (H) 70 - 130 mg/dL Final     Comment:     Meter: [id number] : [id number] Bishop Abdul NA   04/24/2023 1126 217 (H) 70 - 130 mg/dL Final     Comment:     Meter: [id number] : [id number] Deondre Larsen NA   04/24/2023 0819 242 (H) 70 - 130 mg/dL Final     Comment:     Meter: [id number] : [id number] Deondre Larsen NA   04/23/2023 2113 236 (H) 70 - 130 mg/dL Final     Comment:     Meter: [id number] : [id number] Demarco Savage NA   04/23/2023 1641 189 (H) 70 - 130 mg/dL Final     Comment:     Meter: [id number] : [id number] Caroline Antoine NA   04/23/2023 1137 174 (H) 70 - 130 mg/dL Final     Comment:     Meter: [id number] : [id number] Hernandesfer Tolentino NA   04/22/2023 2024 93 70 - 130 mg/dL Final     Comment:     Meter: [id number] : [id number] Des GELLER       No radiology results for the last day  Scheduled Medications  albuterol, 2.5 mg, Nebulization, TID  budesonide-formoterol, 1 puff, Inhalation, BID - RT  famotidine, 20 mg, Intravenous, Q24H  finasteride, 5 mg, Oral, Daily  insulin lispro, 2-9 Units, Subcutaneous, TID With Meals  levothyroxine, 50 mcg, Oral, Daily  Menthol-Zinc Oxide, 1 application, Topical, BID  metoprolol succinate XL, 12.5 mg, Oral, Daily  pantoprazole, 40 mg, Oral, Q AM  piperacillin-tazobactam, 3.375 g, Intravenous, Q8H  polyethylene glycol, 17 g, Oral, BID  senna-docusate sodium, 2 tablet, Oral,  Nightly  sodium chloride, 10 mL, Intravenous, Q12H  tamsulosin, 0.4 mg, Oral, Daily    Infusions  lactated ringers, 100 mL/hr, Last Rate: 100 mL/hr (04/24/23 2303)  sodium chloride, 30 mL/hr, Last Rate: 30 mL/hr (04/24/23 1913)    Diet  Diet: Liquid Diets; Clear Liquid; Texture: Regular Texture (IDDSI 7); Fluid Consistency: Thin (IDDSI 0)       Assessment/Plan     Active Hospital Problems    Diagnosis  POA   • **Pancreatic mass [K86.89]  Yes   • Hypothyroidism [E03.9]  Yes   • Type 2 diabetes mellitus with chronic kidney disease, with long-term current use of insulin [E11.22, Z79.4]  Not Applicable   • History of CVA (cerebrovascular accident) [Z86.73]  Not Applicable   • Hypokalemia [E87.6]  Yes   • Transaminitis [R74.01]  Yes   • Pneumonia [J18.9]  Yes   • History of atrial flutter [Z86.79]  Not Applicable   • Hypoxia [R09.02]  Yes   • 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       87 y.o. male admitted with Pancreatic mass.      04/25/23  [facility] consult pending.    Pancreatic mass  Abdominal pain complicated by nausea and vomiting  Transaminitis  - CT AP- approximately 5.5 cm pancreatic head mass obstructing the CBD likely represents pancreatic malignancy, coupled with multiple nodular densities adjacent to the pancreas   -Per patient not interested in any work-up or treatment for his malignancy  -GI following   -EGD (4/24/23): gastritis  -ERCP (4/24/23): malignant appearing stricture in lower third of main bile duct; biliary sphincterotomy performed; 10 mm x 6 cm wall flex covered metal stent placed in CBD  -daily PPI  -diet as tolerated  -Given palliative nature of stent  placement, repeat ERCP for exchange unlikely to be needed    Cancer associated pain  -Dilaudid 0.5 mg every 2 hours as needed bowel regimen,     Hypoxemia  Pneumonia  - Continue Zosyn as prescribed for possible aspiration pneumonia, given location of findings on imaging.  - Follow up blood cultures.     Chronic kidney disease stage 3B  -at b/l     Type 2 diabetes mellitus with long term current use of insulin without hyperglycemia complicated by neuropathy and CKD  - Most recent A1c: 6.90% (12/28/22).  - Home medication regimen: Levemir 60 units daily, aspart 10 units TID with meals, Tradjenta.  -Glucomander protocol, monitor BG     Paroxysmal typical atrial flutter on long term anticoagulation  -RC: Metoprolol; hold amiodarone given LFT abnormalities  -AC: Xarelto, ON HOLD     Coronary artery disease  - S/P CABG x5 (1999)      Hypothyroidism  -Synthroid 50 mcg     History of CVA and subdural hematoma  - Noted during prior admission in December 2022.    · SCDs for DVT prophylaxis.  · DNR.  · Discussed with patient.  · Anticipate discharge home following [facility] discussion      Yrn Hutchins MD  [facility]  04/25/23  07:03 EDT

## 2023-04-25 NOTE — PLAN OF CARE
Goal Outcome Evaluation:  Plan of Care Reviewed With: patient        Progress: no change  Outcome Evaluation: Pt alert and orient during shift today, forgetful. C/o abdominal fullness, MD notified, see new orders. Minimal urine output, MD notified, lasix ordered. Pt turned q2hr. Heels elevated off bed. Glucomander started, BG monitored and treated as ordered.

## 2023-04-25 NOTE — PROGRESS NOTES
[facility]  Inpatient Progress Note    Reason for Followup: obstructive jaundice    Subjective:  ERCP last night. Sleeping this morning but awakes.     Current Facility-Administered Medications:   •  albuterol (PROVENTIL) nebulizer solution 0.083% 2.5 mg/3mL, 2.5 mg, Nebulization, TID, Hany Arellano MD, 2.5 mg at 04/22/23 2046  •  budesonide-formoterol (SYMBICORT) 160-4.5 MCG/ACT inhaler 1 puff, 1 puff, Inhalation, BID - RT, Hany Arellano MD, 1 puff at 04/25/23 0657  •  calcium carbonate (TUMS) chewable tablet 500 mg (200 mg elemental), 2 tablet, Oral, BID PRN, Hany Arellano MD  •  dextrose (D50W) (25 g/50 mL) IV injection 25 g, 25 g, Intravenous, Q15 Min PRN, Hany Arellano MD  •  dextrose (GLUTOSE) oral gel 15 g, 15 g, Oral, Q15 Min PRN, Hany Arellano MD  •  famotidine (PEPCID) injection 20 mg, 20 mg, Intravenous, Q24H, Hany Arellano MD, 20 mg at 04/25/23 0839  •  finasteride (PROSCAR) tablet 5 mg, 5 mg, Oral, Daily, Hany Arellano MD, 5 mg at 04/25/23 0838  •  glucagon (GLUCAGEN) injection 1 mg, 1 mg, Intramuscular, Q15 Min PRN, Hany Arellano MD  •  HYDROmorphone (DILAUDID) injection 0.5 mg, 0.5 mg, Intravenous, Q2H PRN, Hany Arellano MD, 0.5 mg at 04/24/23 2303  •  insulin lispro (ADMELOG) injection 2-9 Units, 2-9 Units, Subcutaneous, TID With Meals, Hany Arellano MD, 4 Units at 04/25/23 0839  •  lactated ringers infusion, 100 mL/hr, Intravenous, Continuous, Hany Arellano MD, Last Rate: 100 mL/hr at 04/24/23 2303, 100 mL/hr at 04/24/23 2303  •  levothyroxine (SYNTHROID, LEVOTHROID) tablet 50 mcg, 50 mcg, Oral, Daily, Hany Arellano MD, 50 mcg at 04/25/23 0839  •  Magnesium Sulfate - Total Dose 10 grams - Magnesium 1 or Less, 2 g, Intravenous, PRN **OR** Magnesium Sulfate - Total Dose 6 grams - Magnesium 1.1 - 1.5, 2 g, Intravenous, PRN **OR** Magnesium Sulfate - Total Dose 4 grams - Magnesium 1.6 - 1.9, 4 g, Intravenous, PRN, Hany Arellano MD  •  Menthol-Zinc Oxide 1  application, 1 application, Topical, BID, Hany Arellano MD, 1 application at 04/25/23 0839  •  metoprolol succinate XL (TOPROL-XL) 24 hr tablet 12.5 mg, 12.5 mg, Oral, Daily, Hany Arellano MD, 12.5 mg at 04/25/23 0839  •  nitroglycerin (NITROSTAT) SL tablet 0.4 mg, 0.4 mg, Sublingual, Q5 Min PRN, Hany Arellano MD  •  ondansetron (ZOFRAN) tablet 4 mg, 4 mg, Oral, Q6H PRN **OR** ondansetron (ZOFRAN) injection 4 mg, 4 mg, Intravenous, Q6H PRN, Hany Arellano MD  •  pantoprazole (PROTONIX) EC tablet 40 mg, 40 mg, Oral, Q AM, Hany Arellano MD, 40 mg at 04/25/23 0618  •  piperacillin-tazobactam (ZOSYN) 3.375 g in iso-osmotic dextrose 50 ml (premix), 3.375 g, Intravenous, Q8H, Hany Arellano MD, 3.375 g at 04/25/23 0618  •  polyethylene glycol (MIRALAX) packet 17 g, 17 g, Oral, BID, Hany Arellano MD, 17 g at 04/25/23 0839  •  potassium & sodium phosphates (PHOS-NAK) 280-160-250 MG packet - for Phosphorus less than 1.25 mg/dL, 2 packet, Oral, Q6H PRN **OR** potassium & sodium phosphates (PHOS-NAK) 280-160-250 MG packet - for Phosphorus 1.25 - 2.5 mg/dL, 2 packet, Oral, Q6H PRN, Hany Arellano MD  •  potassium chloride (K-DUR,KLOR-CON) ER tablet 40 mEq, 40 mEq, Oral, PRN **OR** potassium chloride (KLOR-CON) packet 40 mEq, 40 mEq, Oral, PRN, 40 mEq at 04/23/23 0250 **OR** potassium chloride 10 mEq in 100 mL IVPB, 10 mEq, Intravenous, Q1H PRN, Hany Arellano MD  •  senna (SENOKOT) tablet 1 tablet, 1 tablet, Oral, BID PRN, Hany Arellano MD  •  sennosides-docusate (PERICOLACE) 8.6-50 MG per tablet 2 tablet, 2 tablet, Oral, Nightly, Hany Arellano MD, 2 tablet at 04/23/23 1949  •  sodium chloride 0.9 % flush 10 mL, 10 mL, Intravenous, Q12H, Hany Arellano MD, 10 mL at 04/24/23 2342  •  sodium chloride 0.9 % flush 10 mL, 10 mL, Intravenous, PRN, Hany Arellano MD  •  sodium chloride 0.9 % infusion 40 mL, 40 mL, Intravenous, PRN, Hany Arellano MD  •  sodium chloride 0.9 % infusion, 30 mL/hr, Intravenous, Continuous PRN,  Hany Arellano MD, Last Rate: 30 mL/hr at 04/24/23 1913, Currently Infusing at 04/24/23 1913  •  tamsulosin (FLOMAX) 24 hr capsule 0.4 mg, 0.4 mg, Oral, Daily, Hany Arellano MD, 0.4 mg at 04/25/23 0839  Review of Systems:   Gen: No fever or chills  GI: +abdominal pain, no emesis      Objective     Vital Signs  Temp:  [97.4 °F (36.3 °C)-98.9 °F (37.2 °C)] 97.8 °F (36.6 °C)  Heart Rate:  [82-91] 83  Resp:  [14-18] 16  BP: (123-155)/(58-75) 128/58  Body mass index is 30.38 kg/m².    Intake/Output Summary (Last 24 hours) at 4/25/2023 0852  Last data filed at 4/25/2023 0838  Gross per 24 hour   Intake 1496.67 ml   Output 150 ml   Net 1346.67 ml     I/O this shift:  In: 480 [P.O.:480]  Out: -      Physical Exam:   General: patient awake, elderly and chronically ill appearing   Cardiovascular: regular rate,  well-perfused extremities   Pulm: Equal expansion bilaterally, no increased WOB   Abdomen: soft, nontender, nondistended;               Neuro: Awakens to voice, oriented to name, year, location but has moments of confusion throughout conversation   Psychiatric: Normal mood and behavior;        Results Review:     I reviewed the patient's new clinical results.    Results from last 7 days   Lab Units 04/25/23  0530 04/24/23  0708 04/23/23  0649   WBC 10*3/mm3 12.10* 11.46* 13.87*   HEMOGLOBIN g/dL 12.2* 12.8* 13.6   HEMATOCRIT % 37.7 39.3 40.4   PLATELETS 10*3/mm3 128* 128* 161     Results from last 7 days   Lab Units 04/25/23  0530 04/24/23  0708 04/23/23  0649   SODIUM mmol/L 132* 126* 132*   POTASSIUM mmol/L 4.9 4.8 5.1   CHLORIDE mmol/L 98 93* 95*   CO2 mmol/L 21.9* 24.9 23.0   BUN mg/dL 72* 59* 55*   CREATININE mg/dL 2.42* 1.99* 1.99*   CALCIUM mg/dL 8.5* 8.6 8.5*   BILIRUBIN mg/dL 13.0* 11.0* 8.5*   ALK PHOS U/L 2,297* 2,201* 2,215*   ALT (SGPT) U/L 307* 354* 425*   AST (SGOT) U/L 165* 215* 289*   GLUCOSE mg/dL 224* 255* 135*         Lab Results   Lab Value Date/Time    LIPASE 58 04/21/2023 2049    LIPASE 8 (L)  04/14/2019 0840       Radiology:  [unfilled]      Assessment & Plan   Assessment:   Acute Hypoxic Resp Failure with Possible PNA  Elevated Liver Enzymes  Elevated Bilirubin  Pancreatic Head Mass with Peripancreatic Nodular Densities Concerning for Metastastis  H/o Pancreatic Cysts  Abdominal Pain  Inflammatory Changes of the Pancreas with normal lipase  Colon Cancer s/p Colon Resection  Aflutter on Xarelto  DM      Plan:   - On Zosyn for possible PNA  - I discussed image findings and constellation of signs/symptoms with patient and daughter at bedside with concern for malignancy.  I discussed risks/benefits of nonurgent/nonemergent EUS +/- ERCP.  Patient reports he does not want any treatment for potential pancreatic malginancy even if he were a candidate. After discussion patient elected for ERCP and bilary stenting only but requests biliary brushings during procedure.    - 4/24 EGD with gastritis biopsied, started PPI  - 4/24 ERCP with sphincterotomy, malignant appearing stricture in lower third of bile duct brushed for cytology, metal biliary stent placed and dilated to 8 mm (will further expand on its own) with good drainage of bile and contrast.  Of note, it appears that not all fluoroscopic images obtained during ERCP were transmitted   - Continue to monitor LFTs, expect them to improved over days/weeks  - Typically would exchange stent every 6 months and this could be entertained however given palliative nature with no plan for treatment of likely underlying pancreatic malignancy, I think it could be exchanged as needed if needed  - Getting IV LR  - CLD, may advance as tolerated     I discussed the patient's findings and my recommendations with patient.

## 2023-04-25 NOTE — ANESTHESIA POSTPROCEDURE EVALUATION
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        Post Anesthesia Care and Evaluation    Patient location during evaluation: bedside  Patient participation: complete - patient participated  Level of consciousness: sleepy but conscious  Pain score: 0  Pain management: adequate    Airway patency: patent  Anesthetic complications: No anesthetic complications    Cardiovascular status: acceptable  Respiratory status: acceptable  Hydration status: acceptable    Comments: /62 (BP Location: Right arm, Patient Position: Lying)   Pulse 86   Temp 37.2 °C (98.9 °F) (Oral)   Resp 14   Ht 180.3 cm (71\")   Wt 107 kg (235 lb 10.8 oz)   SpO2 94%   BMI 32.87 kg/m²         " Satisfactory

## 2023-04-25 NOTE — PLAN OF CARE
Goal Outcome Evaluation:  Plan of Care Reviewed With: patient        Progress: improving  Outcome Evaluation: Pt tolerated treatment fair this date. Required min-mod A for bed mobility, then completed a few seated LE exercises on EOB. Required between SBA-min A for sitting balance, then attempted to stand a few times. Unsuccessful attempts to clear bottom off bed d/t weakness. Encouraged pt to attempt a few bed exercises on own during the day.

## 2023-04-25 NOTE — CONSULTS
Narrative Summary - Certification/Recertification    Patient Name: Osvaldo Lopez                                                           : 1935    Terminal Diagnosis: Pancreatic Mass             Patient Active Problem List   Diagnosis   • Complete rotator cuff tear of left shoulder   • Abnormal finding on thyroid function test   • Abdominal aortic aneurysm   • Benign prostatic hyperplasia   • Essential hypertension   • Hyperlipidemia   • Shoulder pain   • Lumbar radiculopathy   • Type 2 diabetes mellitus, without long-term current use of insulin (HCC)   • OA (osteoarthritis) of knee   • Tear of right rotator cuff   • Spinal stenosis of lumbar region with neurogenic claudication   • Eyebrow ptosis   • Pseudophakia   • Nonrheumatic aortic valve stenosis   • Atrial flutter with rapid ventricular response   • Right arm pain   • Leg weakness, bilateral   • Typical atrial flutter (HCC)   • Diabetic peripheral neuropathy   • Muscle weakness (generalized)   • Pressure injury of left buttock, stage 1   • Chronic low back pain with sciatica   • Multifocal motor neuropathy   • Pressure injury of buttock, stage 1   • Anemia   • Symptomatic anemia   • Iron deficiency anemia   • Chronic anticoagulation   • CKD (chronic kidney disease)   • CRISTOBAL (acute kidney injury)   • Colonic mass   • Axonal neuropathy   • Impairment of balance   • Post laminectomy syndrome   • Coronary arteriosclerosis   • Edema   • History of malignant neoplasm of colon   • Prostatism   • Stage 3b chronic kidney disease   • Pre-ulcerative calluses   • Elevated troponin   • Recurrent falls   • Generalized weakness   • Obesity (BMI 30-39.9)   • Thrombocytopenia, unspecified   • Acute CVA (cerebrovascular accident)   • Subdural hematoma, acute   • Pancreatic mass   • Hypothyroidism   • Type 2 diabetes mellitus with chronic kidney disease, with long-term current use of insulin   • History of atrial flutter   • History of CVA (cerebrovascular accident)    • Hypokalemia   • Transaminitis   • Pneumonia   • Hypoxia   • Biliary obstruction due to cancer       GABE CORONADO IS A 87 YEAR OLD MALE WITH A PRIMARY DIAGNOSIS OF PANCREATIC CANCER RELATED HYPERBILIRUBINEMIA, TRANSAMINITIS, AHRF, AND PNA.    COMORBIDS OF CAD, CVA DEC OF 2022, AND CKD3.     OTHER DIAGNOSIS INCLUDE HTN, HYPOTHYROID, HLD, DM2, AND AFLUTTER.     PT HAS HISTORY OF COLON CANCER S/P RESECTION, CABGX3, AND RECENT CVA DECEMBER OF 2022 IN WHICH HE BECAME A RESIDENT AT [facility] FOR RECOVERY. PT IS NOW READMITTED TO [facility] WITH ABDOMINAL PAIN, JAUNDICE, BLOAT, NAUSEA, AND BACK PAIN. CT REVEALED 5.5 CM OBSTRUCTING PANCREATIC HEAD MASS. LABS NOTED ARE , , ALK PHOS 1605, T BILI 7.1, ALB 3.1, CO2 29.5, CR 2.08WBC 13.73, PROBNP 327. PT DECLINED BIOPSY OF PANCREAS, UNDERWENT EGD WITH BIOPSY FOR GASTRITIS BELIEVED TO BE POSSIBLE METS. PT NOW S/P ERCP WITH BILI STENTS AND SPHINCTERECTOMY, ON IV ABX FOR BILATERAL LOWER LOBE PNA.     PT IS ALERT X 3 WITH CONFUSION AND LETHARGY, REQUIRES EXTENSIVE ASSIST FOR ADLS, MAINLY BEDBOUND POST PROCEDURE, CONTINENT OF BOWEL AND BLADDER, ON CLEAR LIQUID DIET, 71”, 200LBS, 17 LB LOSS IN 4 MONTHS, O2 2LPM VIA NC CONTINUOUS IN PLACE.     PT/FAMILY WISHES TO HAVE COMFORT FOCUSED CARE AND QUALITY OF LIFE WITH NO AGGRESSIVE TREATMENTS. PT IS DNR, PPS 30%. VERIFIED ELIGIBILITY WITH/VERBAL CERT GIVEN BY DR. JONA RAYA.    VISIT SUMMARY:  COVID SCREEN NEGATIVE. RAC PROVIDED DETAILED EOS/DISEASE PROGRESSION, ALL QUESTIONS ANSWERED. PT IS LETHARGIC, JAUNDICED, WEAK, CONFIRMS HE IS SEEKING COMFORT FOCUSED CARE NO AGGRESSIVE MEASURES. PT HAS HAD 6 DOSES OF HYDROMORPHONE PRE-PROCEDURE WITH PLANS TO RESUME HOME DOSE OF [place] OR OTHER PO FORM OF SYMPTOM MGMT., AND RETURN TO [facility] WITH HOSPICE UNDER HIS MEDICARE AND LTC POLICY.     PT’S DAUGHTER KVNG AND PALLIATIVE APRN SERVANDO LANDEROS ARE ALSO AT BEDSIDE. FAMILY STATES SHE WILL BE IN  CONTACT WITH LTC POLICY TO ALERT THEM OF PT’S NEW COVERAGE NEEDS, CM AND APRN NOTIFIED OF MEETING OUTCOME. EOS PAMPHLETS LEFT WITH FAMILY.    [facility] will continue to follow remotely for d/c plans and act accordingly to meet family goals.    Gita Davila RN  Referral and Admissions Coordinator  Please call [phone] with questions/concerns

## 2023-04-25 NOTE — PROGRESS NOTES
Nutrition Services    Patient Name:  Osvaldo Lopez  YOB: 1935  MRN: [id number]  Admit Date:  4/21/2023    Assessment Date:  04/25/23    Comment: Pt admitted for obstructive jaundice, N/V, related to pancreatic mass. Now S/P ERCP w/ stent placement yesterday. Tolerating clear liquids today and ok to adv as tolerates per GI. Labs, meds skin reviewed. He does have a pressure injury to his gluteal area, WORN consulted. Palliative care following as well. A bit confused today- Doesn't know if his stomach is bothering him but eating his lunch.     Will add some Yomi BID to support his pressure injury and follow clinical course, nutrition needs.    CLINICAL NUTRITION ASSESSMENT      Reason for Assessment Pressure Injury and/or Non-Healing Wound     Diagnosis/Problem   Pancreatic Mass, N/V, PNA, CKD, DM, CAD   Medical/Surgical History Past Medical History:   Diagnosis Date   • Abdominal aortic aneurysm (AAA) without rupture    • Abdominal aortic ectasia 06/2015   • Abnormal EKG 10/25/2016    04/2019   • Abnormal thyroid blood test    • Actinic keratosis     FOLLOWED BY DR. MANPREET VILLARREAL   • Anemia 01/28/2021   • Arthritis    • Asthma     MILD, INTERMITTENT   • Atherosclerotic heart disease    • Atrial flutter with rapid ventricular response 04/14/2019    ADMITTED TO [facility]   • Atrial premature depolarization    • Atrophy of muscle of lower leg    • BPH (benign prostatic hyperplasia)     FOLLOWED BY DR. TERRA JONES   • Bruises easily    • Bulging of thoracic intervertebral disc    • Carpal tunnel syndrome, right 12/2019    FOLLOWED BY DR. NEW SANCHEZ   • Cataract     BILATERAL, S/P EXTRACTION WITH IOL IMPLANTS   • Chronic anticoagulation 01/29/2021   • Chronic edema    • Chronic low back pain with sciatica 01/26/2021   • Chronic pain    • CKD (chronic kidney disease) 01/29/2021   • Closed head injury 07/2018    WITH LACERATION TO SCALP, D/T SYNCOPE   • Colon cancer 01/31/2021    INVASIVE ADENOCARCINOMA  FROM TUBULOVILLOUS ADENOMA IN TRANSVERSE, S/P COLON RESECTION, FOLLOWED BY DR. MARGARITA COX   • Colon polyps     FOLLOWED BY DR. MARGARITA COX   • Constipation due to opioid therapy    • Coronary artery disease    • DDD (degenerative disc disease), thoracic    • Diabetic amyotrophy    • Diverticulosis    • Enlarged prostate     FOLLOWED BY DR. TERRA JONES   • Epididymitis, right 03/2018   • Essential hypertension    • Eyebrow ptosis 11/09/2013   • Hallux valgus, acquired, bilateral 01/2019   • Heart attack 09/1989    S/P CABG, 5 VESSEL   • Heart murmur    • History of blood transfusion    • HL (hearing loss)    • Hyperactivity of bladder     FOLLOWED BY DR. TERRA JONES   • HyperCKemia 11/2017   • Hyperkalemia 08/2016   • Hyperlipidemia     MIXED   • Hyperreflexia 11/2017    BILATERAL   • Hyphema 05/2015   • IBS (irritable bowel syndrome)    • Impaired functional mobility, balance, gait, and endurance    • Impingement syndrome of left shoulder 10/2015   • Infection associated with cystostomy catheter 12/2016   • Ischemic colitis    • Lumbar radiculopathy 2016    wears back brace at times following back surgery   • Lumbar spondylosis 04/2016   • Lumbosacral neuritis 05/2015   • Lumbosacral radiculitis 05/2015   • Macular puckering of retina, left 10/2013   • 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 VIANEY GIORDANO   • Pseudophakia 11/09/2013   • RBBB (right bundle branch block)    • Recurrent falls    • Respiratory failure with hypoxia 01/2019   • SCC (squamous cell carcinoma)  10/16/2019    RIGHT FOREHEAD, LEFT TEMPLE, RIGHT SCALP, FOLLOWED BY DR. MANPREET VILLARREAL   • Scoliosis    • Spinal stenosis of lumbar region with neurogenic claudication 12/07/2017   • Syncope and collapse 07/16/2018    ADMITTED TO [facility]   • Torn rotator cuff 03/2017    BILATERAL, COMPLETE   • Type 2 diabetes mellitus     IDDM   • Urinary frequency     FOLLOWED BY DR. TERRA JONES   • Vitamin D deficiency    • Vitreous hemorrhage of left eye        Past Surgical History:   Procedure Laterality Date   • APPENDECTOMY N/A    • BROW LIFT Bilateral 11/12/2013    DR. OCTAVIA CEDILLO AT [place]   • CARDIAC CATHETERIZATION Left 10/2008    LEFT CAROTID ARTERY STENOSIS 50-69%   • CARDIAC ELECTROPHYSIOLOGY PROCEDURE N/A 06/06/2019    Procedure: Ablation atrial flutter;  Surgeon: Miller Talbot MD;  Location:  [facility];  Service: Cardiovascular   • CATARACT EXTRACTION Left 01/22/1998    DR. LAYLA BARNES AT [place]   • CATARACT EXTRACTION Right 03/2001    DR. LAYLA BARNES   • CHOLECYSTECTOMY N/A 08/24/1989    DR. FAUZIA PELAEZ AT [place]   • COLON RESECTION N/A 02/03/2021    Procedure: LAPAROSCOPIC EXTENDED  RIGHT COLECTOMY WITH DAVINCI ROBOT;  Surgeon: Margarita Napoles MD;  Location: [facility] MAIN OR;  Service: DaVinci;  Laterality: N/A;   • COLONOSCOPY N/A 01/31/2021    20-25 MM POLYP IN CECUM, PATH: TUBULAR ADENOMA, 2 TUBULAR ADENOMA POLYPS IN ASCENDING, A FUNGATING AND SUBMUCOSAL ONONOBSTRUCTING MEDIUM MASS IN PROXIMAL TRANSVERSE, PATH: INVASIVE ADENOCARCINOMA ARISING IN A TUBULOVILOOUS ADENOMA, AREA TATTOOED, SCATTERED DIVERTICULA IN SIGMOID AND DESCENDING, LARGE INTERNAL HEMORRHOIDS, DR. JACOB ALICEA AT [facility]    • COLONOSCOPY N/A 02/02/2010    DIVERTICULOSIS, DR. SAL SOLANO AT [place]   • COLONOSCOPY N/A 08/15/2022    5 MM TUBULAR ADENOMA POLYP IN SIGMOID, 2 TUBULAR ADENOMA POLYPS IN DESCENDING, MULTIPLE DIVERTICULA IN SIGMOID, RESCOPE IN 2 YRS, DR. MARGARITA NAPOLES AT [facility]   • CORONARY ARTERY BYPASS  GRAFT N/A 09/1989    5 VESSEL, DR. HANKS   • CYST REMOVAL      FROM BACK   • ENDOSCOPY N/A 01/31/2021    ENTIRE EGD WNL, PATH: MILD REACTIVE GASTROPATHY, DR. JACOB ALICEA AT [facility]   • ENDOSCOPY  4/24/2023    Procedure: ESOPHAGOGASTRODUODENOSCOPY WITH BIOPSIES;  Surgeon: Hany Arellano MD;  Location: [facility] ENDOSCOPY;  Service: Gastroenterology;;  PRE: OBSTRUCTIVE JAUNDICE  POST: GASTRITIS   • ERCP N/A 4/24/2023    Procedure: ENDOSCOPIC RETROGRADE CHOLANGIOPANCREATOGRAPHY WITH SPHINCTEROTOMY, BALLOON SWEEP,  BRUSHINGS CBD, PLACEMENT OF WALLFLEX BILIARY STENT 10mm X 60mm;  Surgeon: Hany Arellano MD;  Location: [facility] ENDOSCOPY;  Service: Gastroenterology;  Laterality: N/A;  PRE - OBSTRUCTIVE JAUNDICE  POST - SAME   • INGUINAL HERNIA REPAIR Left 09/27/2007    DR. MATTHEW RUSH AT [place]   • INGUINAL HERNIA REPAIR Left 09/07/2007   • INGUINAL HERNIA REPAIR Left 2003   • KNEE ARTHROSCOPY W/ PARTIAL MEDIAL MENISCECTOMY Left 1962    DR. SINGH   • LUMBAR DISCECTOMY FUSION INSTRUMENTATION N/A 04/11/2016    Procedure: lumbar laminectomy L4-5 and fusion with instrumentation;  Surgeon: Ran Kline MD;  Location: [facility] OR;  Service:    • LUMBAR DISCECTOMY FUSION INSTRUMENTATION N/A 01/15/2018    Procedure: L2-3, L3-4 laminectomy and fusion with instrumentation and removal of implants L4 5.;  Surgeon: Ran Kline MD;  Location: [facility] OR;  Service:    • LUMBAR EPIDURAL INJECTION N/A 09/23/2015    DR. MATTHEW DOMINGUEZ AT [facility]   • LUMBAR EPIDURAL INJECTION N/A 10/07/2015    DR. ITZEL [facility] AT [facility]   • LUMBAR EPIDURAL INJECTION N/A 11/19/2015    DR. ITZEL [facility] AT [facility]   • RI ARTHRP KNE CONDYLE&PLATU MEDIAL&LAT COMPARTMENTS Left 11/14/2016    Procedure: TOTAL KNEE ARTHROPLASTY;  Surgeon: Dontrell Arellano MD;  Location: [facility] OR;  Service: Orthopedics   • SKIN BIOPSY Bilateral 10/16/2019    RIGHT LATERAL FOREHEAD:SCC, LEFT TEMPLE:SCC, RIGHT PARIETAL SCALP:SCC, DR. MANPREET VILLARREAL        Encounter  "Information        Nutrition History:     Food Preferences:    Supplements:    Factors Affecting Intake: altered GI function, nausea, vomiting     Anthropometrics        Current Height  Current Weight  BMI kg/m2 Height: 180.3 cm (71\")  Weight: 98.8 kg (217 lb 12.8 oz) (04/25/23 0628)  Body mass index is 30.38 kg/m².   Adjusted BMI (if applicable)        Admission Weight 90.7kg       Ideal Body Weight (IBW) 75.3kg   Adjusted IBW (if applicable)        Usual Body Weight (UBW) 240lb   Weight Change/Trend Loss x 4 months       Weight History Wt Readings from Last 30 Encounters:   04/25/23 0628 98.8 kg (217 lb 12.8 oz)   04/24/23 0521 107 kg (235 lb 10.8 oz)   04/23/23 0500 103 kg (227 lb 1.6 oz)   04/22/23 0220 103 kg (227 lb 1.2 oz)   04/21/23 1942 90.7 kg (200 lb)   12/28/22 1550 109 kg (240 lb)   12/27/22 1603 109 kg (240 lb)   12/08/22 1045 109 kg (240 lb)   09/27/22 0940 108 kg (237 lb)   09/01/22 1329 108 kg (239 lb)   08/15/22 1024 105 kg (231 lb)   07/25/22 1232 107 kg (235 lb)   06/22/22 0952 107 kg (235 lb)   06/07/22 1214 108 kg (239 lb)   02/17/22 1331 106 kg (233 lb)   11/24/21 1040 101 kg (222 lb)   10/18/21 1603 98.9 kg (218 lb)   09/30/21 1416 99.8 kg (220 lb)   09/16/21 1606 102 kg (224 lb)   09/15/21 0935 102 kg (224 lb)   08/16/21 1258 99.1 kg (218 lb 8 oz)   08/05/21 1316 98.9 kg (218 lb)   07/30/21 0904 99.1 kg (218 lb 6.4 oz)   07/23/21 0838 97.6 kg (215 lb 3.2 oz)   07/20/21 1331 96.6 kg (213 lb)   06/21/21 1039 96.6 kg (213 lb)   04/16/21 1028 95.7 kg (211 lb)   04/05/21 1435 96.6 kg (213 lb)   04/05/21 1312 96.6 kg (213 lb)   02/07/21 1730 102 kg (224 lb 13.9 oz)   02/05/21 0352 102 kg (225 lb 15.5 oz)   02/04/21 0100 102 kg (225 lb 5 oz)   01/29/21 1934 103 kg (226 lb 6.4 oz)   01/29/21 1626 100 kg (220 lb 7.4 oz)   01/28/21 1213 102 kg (224 lb)   01/26/21 1331 103 kg (228 lb)   10/30/20 1351 101 kg (222 lb)   10/06/20 1252 101 kg (222 lb)   07/23/20 1346 98.9 kg (218 lb)         "   --  Tests/Procedures        Tests/Procedures CT scan, Other: ERCP 4/24     Labs       Pertinent Labs    Results from last 7 days   Lab Units 04/25/23  0530 04/24/23  0708 04/23/23  0649   SODIUM mmol/L 132* 126* 132*   POTASSIUM mmol/L 4.9 4.8 5.1   CHLORIDE mmol/L 98 93* 95*   CO2 mmol/L 21.9* 24.9 23.0   BUN mg/dL 72* 59* 55*   CREATININE mg/dL 2.42* 1.99* 1.99*   CALCIUM mg/dL 8.5* 8.6 8.5*   BILIRUBIN mg/dL 13.0* 11.0* 8.5*   ALK PHOS U/L 2,297* 2,201* 2,215*   ALT (SGPT) U/L 307* 354* 425*   AST (SGOT) U/L 165* 215* 289*   GLUCOSE mg/dL 224* 255* 135*     Results from last 7 days   Lab Units 04/25/23  0530 04/24/23  0708 04/23/23  0649   MAGNESIUM mg/dL 2.2 2.2 2.2   HEMOGLOBIN g/dL 12.2* 12.8* 13.6   HEMATOCRIT % 37.7 39.3 40.4   WBC 10*3/mm3 12.10* 11.46* 13.87*   ALBUMIN g/dL 1.8* 2.1* 2.8*     Results from last 7 days   Lab Units 04/25/23  0530 04/24/23  0708 04/23/23  0649 04/22/23  0536 04/21/23  2049   PLATELETS 10*3/mm3 128* 128* 161 160 214     COVID19   Date Value Ref Range Status   04/22/2023 Not Detected Not Detected - Ref. Range Final     Lab Results   Component Value Date    HGBA1C 6.90 (H) 12/28/2022          Medications           Scheduled Medications albuterol, 2.5 mg, Nebulization, TID  budesonide-formoterol, 1 puff, Inhalation, BID - RT  famotidine, 20 mg, Intravenous, Q24H  finasteride, 5 mg, Oral, Daily  insulin glargine, 1-200 Units, Subcutaneous, Nightly - Glucommander  insulin lispro, 1-200 Units, Subcutaneous, 4x Daily With Meals & Nightly  levothyroxine, 50 mcg, Oral, Daily  Menthol-Zinc Oxide, 1 application, Topical, BID  metoprolol succinate XL, 12.5 mg, Oral, Daily  pantoprazole, 40 mg, Oral, Q AM  piperacillin-tazobactam, 3.375 g, Intravenous, Q8H  polyethylene glycol, 17 g, Oral, BID  senna-docusate sodium, 2 tablet, Oral, Nightly  sodium chloride, 10 mL, Intravenous, Q12H  tamsulosin, 0.4 mg, Oral, Daily       Infusions lactated ringers, 100 mL/hr, Last Rate: 100 mL/hr  (04/25/23 1003)  sodium chloride, 30 mL/hr, Last Rate: 30 mL/hr (04/24/23 1913)       PRN Medications •  calcium carbonate  •  dextrose  •  dextrose  •  dextrose  •  glucagon (human recombinant)  •  HYDROmorphone  •  insulin lispro  •  magnesium sulfate **OR** magnesium sulfate **OR** magnesium sulfate  •  nitroglycerin  •  ondansetron **OR** ondansetron  •  potassium & sodium phosphates **OR** potassium & sodium phosphates  •  potassium chloride **OR** potassium chloride **OR** potassium chloride  •  senna  •  sodium chloride  •  sodium chloride  •  sodium chloride     Physical Findings          Physical Appearance alert, obese, oriented (? A little confused today)   Oral/Mouth Cavity dentures   Edema  2+ (mild)   Gastrointestinal last bowel movement: 4/21   Skin  jaundice, pressure injury, skin tear   Tubes/Drains none   NFPE No clinical signs of muscle wasting or fat loss   --  Current Nutrition Orders & Evaluation of Intake       Oral Nutrition     Food Allergies NKFA   Current PO Diet Diet: Liquid Diets, Diabetic Diets; Full Liquid; Consistent Carbohydrate; Texture: Regular Texture (IDDSI 7); Fluid Consistency: Thin (IDDSI 0)   Supplement n/a   PO Evaluation     % PO Intake 100% x 1 meal    # of Days Evaluated    --  PES STATEMENT / NUTRITION DIAGNOSIS      Nutrition Dx Problem  Problem: Altered GI Function  Etiology: Medical Diagnosis pancreatic mass  Signs/Symptoms: NPO, Clear Liquid Diet and Report of Minimal PO Intake, N/V    Comment:    --  NUTRITION INTERVENTION / PLAN OF CARE      Intervention Goal(s) Maintain nutrition status, Reduce/improve symptoms, Meet estimated needs, Disease management/therapy, Tolerate PO , Advance diet and No significant weight loss         RD Intervention/Action Encourage intake, Follow Tx Progress and Care plan reviewed         Prescription/Orders:       PO Diet       Supplements deena bid      Snacks       Enteral Nutrition       Parenteral Nutrition    New Prescription  Ordered? Yes   --      Monitor/Evaluation Per protocol, I&O, PO intake, Pertinent labs, Weight, Skin status, GI status, Symptoms, POC/GOC, Swallow function, Hemodynamic stability   Discharge Plan/Needs Pending clinical course   Education Will instruct as appropriate   --    RD to follow per protocol.      Electronically signed by:  Erin Falk RD  04/25/23 11:57 EDT

## 2023-04-25 NOTE — PLAN OF CARE
Goal Outcome Evaluation:  Plan of Care Reviewed With: patient, family        Progress: improving  Outcome Evaluation: Pt is an 87 year old male admitted d/t worsening abdominal pain and now s/p ERCP and biliary stent placement. Pt from personal care unit where he was requiring approx. mod A w/ ADLs at PLOF. Pt presents to OT eval w/ impaired activity tolerance, impaired balance, significant generalized weakness, and overall decreased indep/safety w/ ADLs and transfers. Pt requries appox. mod/max A x1 for bed mobility to sit up at EOB. Pt requires total A for LB dressing (wasn't able to complete at PLOF), and requires max A x2 for STS transfer and was unable to clear buttocks from EOB w/ multiple attempts. Pt may benefit from skilled OT services to address defictis and maximize indep/safety w/ ADLs and fxl transfers. Recommend SNF at discharge pending family wishes as noted palliative discussions are currently ongoing.

## 2023-04-25 NOTE — CASE MANAGEMENT/SOCIAL WORK
Continued Stay Note  [facility]     Patient Name: Osvaldo Lopez  MRN: [id number]  Today's Date: 4/25/2023    Admit Date: 4/21/2023    Plan: Return to [place] with [facility] vs palliative   Discharge Plan     Row Name 04/25/23 1612       Plan    Plan Return to [place] with [facility] vs palliative    Patient/Family in Agreement with Plan yes    Plan Comments Spoke with Suman who states plan is likely for pt to return to [place] with [facility].  Spoke with Ewa who states [place] plans to move pt from his PC bed to a skilled bed in rehab.  [facility] continues to follow.  [facility] continues to follow.  МАРИНА Vazquez RN               Discharge Codes    No documentation.               Expected Discharge Date and Time     Expected Discharge Date Expected Discharge Time    Apr 28, 2023             Inna Vazquez, RN

## 2023-04-25 NOTE — THERAPY TREATMENT NOTE
Patient Name: Osvaldo Lopez  : 1935    MRN: [id number]                              Today's Date: 2023       Admit Date: 2023    Visit Dx:     ICD-10-CM ICD-9-CM   1. Pancreatic mass  K86.89 577.8   2. 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 Chronic kidney disease, unspecified CKD stage  N18.9 585.9   4. Decreased mobility and endurance  Z74.09 780.99     Patient Active Problem List   Diagnosis   • Complete rotator cuff tear of left shoulder   • Abnormal finding on thyroid function test   • Abdominal aortic aneurysm   • Benign prostatic hyperplasia   • Essential hypertension   • Hyperlipidemia   • Shoulder pain   • Lumbar radiculopathy   • Type 2 diabetes mellitus, without long-term current use of insulin (HCC)   • OA (osteoarthritis) of knee   • Tear of right rotator cuff   • Spinal stenosis of lumbar region with neurogenic claudication   • Eyebrow ptosis   • Pseudophakia   • Nonrheumatic aortic valve stenosis   • Atrial flutter with rapid ventricular response   • Right arm pain   • Leg weakness, bilateral   • Typical atrial flutter (HCC)   • Diabetic peripheral neuropathy   • Muscle weakness (generalized)   • Pressure injury of left buttock, stage 1   • Chronic low back pain with sciatica   • Multifocal motor neuropathy   • Pressure injury of buttock, stage 1   • Anemia   • Symptomatic anemia   • Iron deficiency anemia   • Chronic anticoagulation   • CKD (chronic kidney disease)   • CRISTOBAL (acute kidney injury)   • Colonic mass   • Axonal neuropathy   • Impairment of balance   • Post laminectomy syndrome   • Coronary arteriosclerosis   • Edema   • History of malignant neoplasm of colon   • Prostatism   • Stage 3b chronic kidney disease   • Pre-ulcerative calluses   • Elevated troponin   • Recurrent falls   • Generalized weakness   • Obesity (BMI 30-39.9)   • Thrombocytopenia, unspecified   • Acute CVA (cerebrovascular accident)   • Subdural hematoma, acute   • Pancreatic  mass   • Hypothyroidism   • Type 2 diabetes mellitus with chronic kidney disease, with long-term current use of insulin   • History of atrial flutter   • History of CVA (cerebrovascular accident)   • Hypokalemia   • Transaminitis   • Pneumonia   • Hypoxia   • 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 MANPREET VILLARREAL   • Anemia 01/28/2021   • Arthritis    • Asthma     MILD, INTERMITTENT   • Atherosclerotic heart disease    • Atrial flutter with rapid ventricular response 04/14/2019    ADMITTED TO [facility]   • Atrial premature depolarization    • Atrophy of muscle of lower leg    • BPH (benign prostatic hyperplasia)     FOLLOWED BY DR. TERRA JONES   • Bruises easily    • Bulging of thoracic intervertebral disc    • Carpal tunnel syndrome, right 12/2019    FOLLOWED BY DR. NEW SANCHEZ   • Cataract     BILATERAL, S/P EXTRACTION WITH IOL IMPLANTS   • Chronic anticoagulation 01/29/2021   • Chronic edema    • Chronic low back pain with sciatica 01/26/2021   • Chronic pain    • CKD (chronic kidney disease) 01/29/2021   • Closed head injury 07/2018    WITH LACERATION TO SCALP, D/T SYNCOPE   • Colon cancer 01/31/2021    INVASIVE ADENOCARCINOMA FROM TUBULOVILLOUS ADENOMA IN TRANSVERSE, S/P COLON RESECTION, FOLLOWED BY DR. MARGARITA COX   • Colon polyps     FOLLOWED BY DR. MARGARITA COX   • Constipation due to opioid therapy    • Coronary artery disease    • DDD (degenerative disc disease), thoracic    • Diabetic amyotrophy    • Diverticulosis    • Enlarged prostate     FOLLOWED BY DR. TERRA JONES   • Epididymitis, right 03/2018   • Essential hypertension    • Eyebrow ptosis 11/09/2013   • Hallux valgus, acquired, bilateral 01/2019   • Heart attack 09/1989    S/P CABG, 5 VESSEL   • Heart  murmur    • History of blood transfusion    • HL (hearing loss)    • Hyperactivity of bladder     FOLLOWED BY DR. TERRA JONES   • HyperCKemia 11/2017   • Hyperkalemia 08/2016   • Hyperlipidemia     MIXED   • Hyperreflexia 11/2017    BILATERAL   • Hyphema 05/2015   • IBS (irritable bowel syndrome)    • Impaired functional mobility, balance, gait, and endurance    • Impingement syndrome of left shoulder 10/2015   • Infection associated with cystostomy catheter 12/2016   • Ischemic colitis    • Lumbar radiculopathy 2016    wears back brace at times following back surgery   • Lumbar spondylosis 04/2016   • Lumbosacral neuritis 05/2015   • Lumbosacral radiculitis 05/2015   • Macular puckering of retina, left 10/2013   • 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 VIANEY GIORDANO   • Pseudophakia 11/09/2013   • RBBB (right bundle branch block)    • Recurrent falls    • Respiratory failure with hypoxia 01/2019   • SCC (squamous cell carcinoma) 10/16/2019    RIGHT FOREHEAD, LEFT TEMPLE, RIGHT SCALP, FOLLOWED BY DR. MANPREET VILLARREAL   • Scoliosis    • Spinal stenosis of lumbar region with neurogenic claudication 12/07/2017   • Syncope and collapse 07/16/2018    ADMITTED TO [facility]   • Torn rotator cuff 03/2017    BILATERAL, COMPLETE   • Type 2 diabetes mellitus     IDDM   • Urinary frequency     FOLLOWED BY DR. TERRA JONES   • Vitamin D deficiency    • Vitreous hemorrhage of left eye      Past Surgical History:   Procedure Laterality Date   • APPENDECTOMY N/A    • BROW LIFT Bilateral  11/12/2013    DR. OCTAVIA CEDILLO AT [place]   • CARDIAC CATHETERIZATION Left 10/2008    LEFT CAROTID ARTERY STENOSIS 50-69%   • CARDIAC ELECTROPHYSIOLOGY PROCEDURE N/A 06/06/2019    Procedure: Ablation atrial flutter;  Surgeon: Miller Talbot MD;  Location: [facility] CATH INVASIVE LOCATION;  Service: Cardiovascular   • CATARACT EXTRACTION Left 01/22/1998    DR. LAYLA BARNES AT [place]   • CATARACT EXTRACTION Right 03/2001    DR. LAYLA BARNES   • CHOLECYSTECTOMY N/A 08/24/1989    DR. FAUZIA PELAEZ AT [place]   • COLON RESECTION N/A 02/03/2021    Procedure: LAPAROSCOPIC EXTENDED  RIGHT COLECTOMY WITH DAVINCI ROBOT;  Surgeon: Margarita Napoles MD;  Location: [facility] OR;  Service: DaVinci;  Laterality: N/A;   • COLONOSCOPY N/A 01/31/2021    20-25 MM POLYP IN CECUM, PATH: TUBULAR ADENOMA, 2 TUBULAR ADENOMA POLYPS IN ASCENDING, A FUNGATING AND SUBMUCOSAL ONONOBSTRUCTING MEDIUM MASS IN PROXIMAL TRANSVERSE, PATH: INVASIVE ADENOCARCINOMA ARISING IN A TUBULOVILOOUS ADENOMA, AREA TATTOOED, SCATTERED DIVERTICULA IN SIGMOID AND DESCENDING, LARGE INTERNAL HEMORRHOIDS, DR. JACOB ALICEA AT [facility]    • COLONOSCOPY N/A 02/02/2010    DIVERTICULOSIS, DR. SAL SOLANO AT [place]   • COLONOSCOPY N/A 08/15/2022    5 MM TUBULAR ADENOMA POLYP IN SIGMOID, 2 TUBULAR ADENOMA POLYPS IN DESCENDING, MULTIPLE DIVERTICULA IN SIGMOID, RESCOPE IN 2 YRS, DR. MARGARITA NAPOLES AT [facility]   • CORONARY ARTERY BYPASS GRAFT N/A 09/1989    5 VESSEL, DR. HANKS   • CYST REMOVAL      FROM BACK   • ENDOSCOPY N/A 01/31/2021    ENTIRE EGD WNL, PATH: MILD REACTIVE GASTROPATHY, DR. JACOB ALICEA AT [facility]   • ENDOSCOPY  4/24/2023    Procedure: ESOPHAGOGASTRODUODENOSCOPY WITH BIOPSIES;  Surgeon: Hany Arellano MD;  Location: [facility] ENDOSCOPY;  Service: Gastroenterology;;  PRE: OBSTRUCTIVE JAUNDICE  POST: GASTRITIS   • ERCP N/A 4/24/2023    Procedure: ENDOSCOPIC RETROGRADE CHOLANGIOPANCREATOGRAPHY WITH SPHINCTEROTOMY, BALLOON SWEEP,  BRUSHINGS CBD, PLACEMENT OF WALLFLEX  BILIARY STENT 10mm X 60mm;  Surgeon: Hany Arellano MD;  Location: [facility] ENDOSCOPY;  Service: Gastroenterology;  Laterality: N/A;  PRE - OBSTRUCTIVE JAUNDICE  POST - SAME   • INGUINAL HERNIA REPAIR Left 09/27/2007    DR. MATTHEW RUSH AT [place]   • INGUINAL HERNIA REPAIR Left 09/07/2007   • INGUINAL HERNIA REPAIR Left 2003   • KNEE ARTHROSCOPY W/ PARTIAL MEDIAL MENISCECTOMY Left 1962    DR. SINGH   • LUMBAR DISCECTOMY FUSION INSTRUMENTATION N/A 04/11/2016    Procedure: lumbar laminectomy L4-5 and fusion with instrumentation;  Surgeon: Ran Kline MD;  Location: [facility];  Service:    • LUMBAR DISCECTOMY FUSION INSTRUMENTATION N/A 01/15/2018    Procedure: L2-3, L3-4 laminectomy and fusion with instrumentation and removal of implants L4 5.;  Surgeon: Ran Kline MD;  Location: [facility] OR;  Service:    • LUMBAR EPIDURAL INJECTION N/A 09/23/2015    DR. MATTHEW DOMINGUEZ AT [facility]   • LUMBAR EPIDURAL INJECTION N/A 10/07/2015    DR. ITZEL LUIS AT [facility]   • LUMBAR EPIDURAL INJECTION N/A 11/19/2015    DR. ITZEL LUIS AT [facility]   • AZ ARTHRP KNE CONDYLE&PLATU MEDIAL&LAT COMPARTMENTS Left 11/14/2016    Procedure: TOTAL KNEE ARTHROPLASTY;  Surgeon: Dontrell Arellano MD;  Location: [facility];  Service: Orthopedics   • SKIN BIOPSY Bilateral 10/16/2019    RIGHT LATERAL FOREHEAD:SCC, LEFT TEMPLE:SCC, RIGHT PARIETAL SCALP:SCC, DR. MANPREET VILLARREAL      General Information     Row Name 04/25/23 1536          Physical Therapy Time and Intention    Document Type therapy note (daily note)  -     Mode of Treatment physical therapy  -     Row Name 04/25/23 1536          General Information    Existing Precautions/Restrictions fall;oxygen therapy device and L/min  -     Row Name 04/25/23 1536          Cognition    Orientation Status (Cognition) oriented x 3  -SM           User Key  (r) = Recorded By, (t) = Taken By, (c) = Cosigned By    Initials Name Provider Type    SM Chantal Mendosa, PTA Physical Therapist  Assistant               Mobility     Row Name 04/25/23 1540          Bed Mobility    Bed Mobility supine-sit;sit-supine  -SM     Supine-Sit [place] (Bed Mobility) minimum assist (75% patient effort);moderate assist (50% patient effort)  -SM     Sit-Supine [place] (Bed Mobility) moderate assist (50% patient effort);2 person assist  -SM     Assistive Device (Bed Mobility) bed rails;head of bed elevated  -     Row Name 04/25/23 1540          Sit-Stand Transfer    Comment, (Sit-Stand Transfer) attempted 3x though not successful at clearing bottom off bed  -           User Key  (r) = Recorded By, (t) = Taken By, (c) = Cosigned By    Initials Name Provider Type    Chantal Rojas PTA Physical Therapist Assistant               Obj/Interventions     Row Name 04/25/23 1545          Motor Skills    Therapeutic Exercise --  seated AP, LAQ, marches x5-10 reps  -           User Key  (r) = Recorded By, (t) = Taken By, (c) = Cosigned By    Initials Name Provider Type    Chantal Rojas PTA Physical Therapist Assistant               Goals/Plan    No documentation.                Clinical Impression     Row Name 04/25/23 1545          Pain    Pretreatment Pain Rating 0/10 - no pain  -SM     Posttreatment Pain Rating 0/10 - no pain  -SM     Row Name 04/25/23 1545          Positioning and Restraints    Pre-Treatment Position in bed  -SM     Post Treatment Position bed  -SM     In Bed supine;call light within reach;encouraged to call for assist;exit alarm on;with family/caregiver  -           User Key  (r) = Recorded By, (t) = Taken By, (c) = Cosigned By    Initials Name Provider Type    Chantal Rojas PTA Physical Therapist Assistant               Outcome Measures     Row Name 04/25/23 1547          How much help from another person do you currently need...    Turning from your back to your side while in flat bed without using bedrails? 2  -SM     Moving from lying on back to sitting on the  side of a flat bed without bedrails? 2  -SM     Moving to and from a bed to a chair (including a wheelchair)? 1  -SM     Standing up from a chair using your arms (e.g., wheelchair, bedside chair)? 1  -SM     Climbing 3-5 steps with a railing? 1  -SM     To walk in hospital room? 1  -SM     AM-PAC 6 Clicks Score (PT) 8  -SM     Highest level of mobility 3 --> Sat at edge of bed  -     Row Name 04/25/23 1547 04/25/23 1455       Functional Assessment    Outcome Measure Options AM-PAC 6 Clicks Basic Mobility (PT)  - AM-PAC 6 Clicks Daily Activity (OT)  -          User Key  (r) = Recorded By, (t) = Taken By, (c) = Cosigned By    Initials Name Provider Type     Chantal Mendosa PTA Physical Therapist Assistant    Adrianna Patel, OT Occupational Therapist                             Physical Therapy Education     Title: PT OT SLP Therapies (In Progress)     Topic: Physical Therapy (Done)     Point: Mobility training (Done)     Learning Progress Summary           Patient Acceptance, E,TB,D, VU,NR by  at 4/25/2023 1548                   Point: Home exercise program (Done)     Learning Progress Summary           Patient Acceptance, E,TB,D, VU,NR by  at 4/25/2023 1548                   Point: Body mechanics (Done)     Learning Progress Summary           Patient Acceptance, E,TB,D, VU,NR by  at 4/25/2023 1548                   Point: Precautions (Done)     Learning Progress Summary           Patient Acceptance, E,TB,D, VU,NR by  at 4/25/2023 1548                               User Key     Initials Effective Dates Name Provider Type [facility] 03/07/18 -  Chantal Mendosa PTA Physical Therapist Assistant PT              PT Recommendation and Plan     Plan of Care Reviewed With: patient  Progress: improving  Outcome Evaluation: Pt tolerated treatment fair this date. Required min-mod A for bed mobility, then completed a few seated LE exercises on EOB. Required between SBA-min A for sitting  balance, then attempted to stand a few times. Unsuccessful attempts to clear bottom off bed d/t weakness. Encouraged pt to attempt a few bed exercises on own during the day.     Time Calculation:    PT Charges     Row Name 04/25/23 1553             Time Calculation    Start Time 1316  -      Stop Time 1340  -      Time Calculation (min) 24 min  -      PT Received On 04/25/23  -      PT - Next Appointment 04/26/23  -            User Key  (r) = Recorded By, (t) = Taken By, (c) = Cosigned By    Initials Name Provider Type     Chantal Mendosa PTA Physical Therapist Assistant              Therapy Charges for Today     Code Description Service Date Service Provider Modifiers Qty    10649050952 HC PT THER PROC EA 15 MIN 4/25/2023 Chantal Mendosa, OWEN GP 1    81284935437 HC PT THERAPEUTIC ACT EA 15 MIN 4/25/2023 Chantal Mendosa PTA GP 1    63128925193 HC PT THER SUPP EA 15 MIN 4/25/2023 Chantal Mendosa, OWEN GP 2          PT G-Codes  Outcome Measure Options: AM-PAC 6 Clicks Basic Mobility (PT)  AM-PAC 6 Clicks Score (PT): 8  AM-PAC 6 Clicks Score (OT): 12  PT Discharge Summary  Anticipated Discharge Disposition (PT): skilled nursing facility    Chantal Mendosa PTA  4/25/2023

## 2023-04-25 NOTE — PROGRESS NOTES
.              [facility] Palliative Care Services    Palliative Care Daily Progress Note   Chief complaint-follow up goals of care/advanced care planning and support for patient/family    Code Status:   Code Status and Medical Interventions:   Ordered at: 04/22/23 0841     Medical Intervention Limits:    NO intubation (DNI)     Level Of Support Discussed With:    Patient    Health Care Surrogate     Code Status (Patient has no pulse and is not breathing):    No CPR (Do Not Attempt to Resuscitate)     Medical Interventions (Patient has pulse or is breathing):    Limited Support     Comments:    discussed with daughter who is POA     Release to patient:    Routine Release      Advanced Directives: Advance Directive Status: Patient has advance directive, copy in chart (requested updated paperwork from daughter)   Goals of Care: Ongoing.     S: Medical record reviewed. Events noted.  Patient resting in bed, somnolent. Appears comfortable Family and [facility] RN at bedside. Reviewed labs, MAR, medical notes, therapy notes and ERCP procedure. VSS. Spoke with RN.        Review of Systems   Unable to perform ROS: mental status change     Pain Assessment  Preferred Pain Scale: number (Numeric Rating Pain Scale)  Nonverbal Indicators of Pain: grimace  Pain Location: back  Pain Description: constant, aching  Pain Onset: recent (weeks)  Pain Duration: weeks  Factors That Aggravate Pain: movement, palpation, positioning  Factors That Relieve Pain: rest, medication timing  Lifestyle Changes/Adaptations in Response to Pain: decreased activity level, inability to concentrate    O:     Intake/Output Summary (Last 24 hours) at 4/25/2023 0949  Last data filed at 4/25/2023 0838  Gross per 24 hour   Intake 1496.67 ml   Output 150 ml   Net 1346.67 ml       Diagnostics and current medications: Reviewed.    Current Facility-Administered Medications   Medication Dose Route Frequency Provider Last Rate Last Admin   • albuterol  (PROVENTIL) nebulizer solution 0.083% 2.5 mg/3mL  2.5 mg Nebulization TID Hany Arellano MD   2.5 mg at 04/22/23 2046   • budesonide-formoterol (SYMBICORT) 160-4.5 MCG/ACT inhaler 1 puff  1 puff Inhalation BID - RT Hany Arellano MD   1 puff at 04/25/23 0657   • calcium carbonate (TUMS) chewable tablet 500 mg (200 mg elemental)  2 tablet Oral BID PRN Hany Arellano MD       • dextrose (D50W) (25 g/50 mL) IV injection 25 g  25 g Intravenous Q15 Min PRN Hany Arellano MD       • dextrose (GLUTOSE) oral gel 15 g  15 g Oral Q15 Min PRN Hany Arellano MD       • famotidine (PEPCID) injection 20 mg  20 mg Intravenous Q24H Hany Arellano MD   20 mg at 04/25/23 0839   • finasteride (PROSCAR) tablet 5 mg  5 mg Oral Daily Hany Arellano MD   5 mg at 04/25/23 0838   • glucagon (GLUCAGEN) injection 1 mg  1 mg Intramuscular Q15 Min PRN Hany Arellano MD       • HYDROmorphone (DILAUDID) injection 0.5 mg  0.5 mg Intravenous Q2H PRN Hany Arellano MD   0.5 mg at 04/24/23 2303   • insulin lispro (ADMELOG) injection 2-9 Units  2-9 Units Subcutaneous TID With Meals Hany Arellano MD   4 Units at 04/25/23 0839   • lactated ringers infusion  100 mL/hr Intravenous Continuous Hany Arellano  mL/hr at 04/24/23 2303 100 mL/hr at 04/24/23 2303   • levothyroxine (SYNTHROID, LEVOTHROID) tablet 50 mcg  50 mcg Oral Daily Hany Arellano MD   50 mcg at 04/25/23 0839   • Magnesium Sulfate - Total Dose 10 grams - Magnesium 1 or Less  2 g Intravenous PRN Hany Arellano MD        Or   • Magnesium Sulfate - Total Dose 6 grams - Magnesium 1.1 - 1.5  2 g Intravenous PRN Hany Arellano MD        Or   • Magnesium Sulfate - Total Dose 4 grams - Magnesium 1.6 - 1.9  4 g Intravenous PRN Hany Arellano MD       • Menthol-Zinc Oxide 1 application  1 application Topical BID Hany Arellano MD   1 application at 04/25/23 0839   • metoprolol succinate XL (TOPROL-XL) 24 hr tablet 12.5 mg  12.5 mg Oral Daily Hany Arellano MD   12.5 mg at 04/25/23 0839   •  nitroglycerin (NITROSTAT) SL tablet 0.4 mg  0.4 mg Sublingual Q5 Min PRN Hany Arellano MD       • ondansetron (ZOFRAN) tablet 4 mg  4 mg Oral Q6H PRN Hany Arellano MD        Or   • ondansetron (ZOFRAN) injection 4 mg  4 mg Intravenous Q6H PRN Hany Arellano MD       • pantoprazole (PROTONIX) EC tablet 40 mg  40 mg Oral Q AM Hany Arellano MD   40 mg at 04/25/23 0618   • piperacillin-tazobactam (ZOSYN) 3.375 g in iso-osmotic dextrose 50 ml (premix)  3.375 g Intravenous Q8H Hany Arellano MD   3.375 g at 04/25/23 0618   • polyethylene glycol (MIRALAX) packet 17 g  17 g Oral BID Hany Arellano MD   17 g at 04/25/23 0839   • potassium & sodium phosphates (PHOS-NAK) 280-160-250 MG packet - for Phosphorus less than 1.25 mg/dL  2 packet Oral Q6H PRN Hany Arellano MD        Or   • potassium & sodium phosphates (PHOS-NAK) 280-160-250 MG packet - for Phosphorus 1.25 - 2.5 mg/dL  2 packet Oral Q6H PRN Hany Arellano MD       • potassium chloride (K-DUR,KLOR-CON) ER tablet 40 mEq  40 mEq Oral PRN Hany Arellano MD        Or   • potassium chloride (KLOR-CON) packet 40 mEq  40 mEq Oral PRN Hany Arellano MD   40 mEq at 04/23/23 0250    Or   • potassium chloride 10 mEq in 100 mL IVPB  10 mEq Intravenous Q1H PRN Hany Arellano MD       • senna (SENOKOT) tablet 1 tablet  1 tablet Oral BID PRN Hany Arellano MD       • sennosides-docusate (PERICOLACE) 8.6-50 MG per tablet 2 tablet  2 tablet Oral Nightly Hany Arellano MD   2 tablet at 04/23/23 1949   • sodium chloride 0.9 % flush 10 mL  10 mL Intravenous Q12H Hany Arellano MD   10 mL at 04/24/23 2342   • sodium chloride 0.9 % flush 10 mL  10 mL Intravenous PRN Hany Arellano MD       • sodium chloride 0.9 % infusion 40 mL  40 mL Intravenous PRN Hany Arellano MD       • sodium chloride 0.9 % infusion  30 mL/hr Intravenous Continuous PRN Hany Arellano MD 30 mL/hr at 04/24/23 1913 Currently Infusing at 04/24/23 1913   • tamsulosin (FLOMAX) 24 hr capsule 0.4 mg  0.4 mg Oral Daily  "Hany Arellano MD   0.4 mg at 04/25/23 0839     lactated ringers, 100 mL/hr, Last Rate: 100 mL/hr (04/24/23 2303)  sodium chloride, 30 mL/hr, Last Rate: 30 mL/hr (04/24/23 1913)      •  calcium carbonate  •  dextrose  •  dextrose  •  glucagon (human recombinant)  •  HYDROmorphone  •  magnesium sulfate **OR** magnesium sulfate **OR** magnesium sulfate  •  nitroglycerin  •  ondansetron **OR** ondansetron  •  potassium & sodium phosphates **OR** potassium & sodium phosphates  •  potassium chloride **OR** potassium chloride **OR** potassium chloride  •  senna  •  sodium chloride  •  sodium chloride  •  sodium chloride  Attest that current medications reviewed including but not limited to prescriptions, over-the counter, herbals and vitamin/mineral/dietary (nutritional) supplements for name, route of administration, type, dose and frequency and are current using all immediate resources available at time of dictation.    A:    /58 (BP Location: Right arm, Patient Position: Lying)   Pulse 83   Temp 97.8 °F (36.6 °C) (Oral)   Resp 16   Ht 180.3 cm (71\")   Wt 98.8 kg (217 lb 12.8 oz)   SpO2 96%   BMI 30.38 kg/m²     Constitutional:       Appearance: Ill-appearing and chronically ill-appearing.      Interventions: Nasal cannula in place.      Comments: NC 2L    Eyes:      General: Scleral icterus.   Pulmonary:      Effort: Pulmonary effort is normal.      Breath sounds: Normal breath sounds.   Cardiovascular:      PMI at left midclavicular line. Normal rate. Regular rhythm.      Murmurs: There is no murmur.   Edema:     Peripheral edema absent.   Abdominal:      General: Bowel sounds are decreased.      Palpations: Abdomen is soft.      Tenderness: There is no abdominal tenderness.   Genitourinary:     Comments: purewick with tea colored urine   Neurological:      Mental Status: Alert and oriented to person, place, and time.   Psychiatric:         Mood and Affect: Affect is flat.         Speech: Speech normal.    "      Behavior: Behavior is cooperative.         Cognition and Memory: Cognition is impaired.      Comments: Somnolent          Patient status: Disease state: Controlled with current treatments.  Functional status: Palliative Performance Scale Score: Performance 40% based on the following measures: Ambulation: Mainly in bed, Activity and Evidence of Disease: Unable to do any work, extensive evidence of disease, Self-Care: Mainly assistance required,  Intake: Normal or reduced, LOC: Full, drowsy or confusion   ECOG Status(2) Ambulatory and capable of self care, unable to carry out work activity, up and about > 50% or waking hours.  Nutritional status: Albumin 2.1 today Body mass index is 32.87 kg/m².    Family support: The patient receives support from his daughter..  Advance Directives: Advance Directive Status: Patient has advance directive, copy in chart   POA/Healthcare surrogate-daughterSisi .      Impression/Problem List:     1. Pancreatic mass  2. Acute respiratory failure with hypoxia  3. Pneumonia   4. Chronic kidney disease stage III  5. Paroxysmal atrial flutter  6. History of CVA  7. Diabetes mellitus type II  8. Coronary artery disease   9. Hypothyrodism         Recommendations/Plan:  1. Provide support with goals of care          2.  Palliative care encounter  2023- I spoke with patient and his daughter, Sisi regarding goals of care. Of note, patient was drowsy during examination due to recent pain medication.  The patient does have an updated living will, which I have requested a copy for viewing purposes. I have reviewed the copy on file, which list his spouse who is . The patient has resided at [facility] since 2022, prior too he resided at home alone independent with ADLs. He had fall in 2022, went to rehab then transitioned to personal care at [facility]. Since December he has progressively gotten worst. They have a very good understanding of his acute conditions  and the patient wishes not to have biopsy of pancreatic mass. He would like to focus on symptom management. He understands concerns for malignancy. We discussed palliative care, [facility] and [facility] services. His daughter would like to receive information regarding [facility] and a consult was placed. At this time, they are uncertain if he would want to continue with rehab/restorative treatment. They plan to proceed with biliary stent placement later this afternoon, with hopes of improvement with pain/nausea.  No spiritual concerns identified. I will plan to follow up tomorrow for continued support. I didn't address CODE status as this had already been addressed.  I spoke with MARTY Judge.      4/25/2023- The patient was somnolent during our follow up conversation regarding goals. However, his daughter was present.  Of note, [facility] RN was present upon my arrival and had explained services to them as well.   The patient has voiced that the wishes not to come back to the hospital after discharge, he wants therapy (PT/OT)services as well.  The plan will be to evaluate how patient does over the next 24-48 hours, and await pathology findings from ERCP to help support decision making. His daughter is aware if any significant decline or desire to change structure to comfort care she can request transfer to inpatient palliative care unit as well. The patient daughter was emotional at the end of our conversation. I did offer spiritual consult and it was declined.  I spoke with MARTY Judge and MARTY Lott      Thank you for this consult and allowing us to participate in patient's plan of care. Palliative Care Team will continue to follow patient.       Time spent: 45 minutes spent reviewing medical and medication records, assessing and examining patient, discussing with family, answering questions, providing some guidance about a plan and documentation of care, and coordinating care with other healthcare members, with > 50%  time spent face to face.        Leny Mancilla, APRN  4/25/2023  09:49 EDT

## 2023-04-25 NOTE — PLAN OF CARE
Goal Outcome Evaluation:      VSS. 3L O2 NC. Ax self, place. Clear liq diet. TERRY mattress. LR @ 100 mL/hr. IV antibx Zosyn. L arm skin tear w/ vaseline guaze, kerlex wrap. Coccyx PI w/ zinc cream & meplex. Male purewick placed. PRN dilaudid x1. Q2 turn.

## 2023-04-26 LAB
ALBUMIN SERPL-MCNC: 2.2 G/DL (ref 3.5–5.2)
ALBUMIN/GLOB SERPL: 1 G/DL
ALP SERPL-CCNC: 1834 U/L (ref 39–117)
ALT SERPL W P-5'-P-CCNC: 239 U/L (ref 1–41)
ANION GAP SERPL CALCULATED.3IONS-SCNC: 12.6 MMOL/L (ref 5–15)
AST SERPL-CCNC: 85 U/L (ref 1–40)
BILIRUB SERPL-MCNC: 6.7 MG/DL (ref 0–1.2)
BUN SERPL-MCNC: 80 MG/DL (ref 8–23)
BUN/CREAT SERPL: 27.6 (ref 7–25)
CALCIUM SPEC-SCNC: 8.1 MG/DL (ref 8.6–10.5)
CHLORIDE SERPL-SCNC: 95 MMOL/L (ref 98–107)
CO2 SERPL-SCNC: 23.4 MMOL/L (ref 22–29)
CREAT SERPL-MCNC: 2.9 MG/DL (ref 0.76–1.27)
CYTO UR: NORMAL
DEPRECATED RDW RBC AUTO: 46.7 FL (ref 37–54)
EGFRCR SERPLBLD CKD-EPI 2021: 20.6 ML/MIN/1.73
ERYTHROCYTE [DISTWIDTH] IN BLOOD BY AUTOMATED COUNT: 13.9 % (ref 12.3–15.4)
GLOBULIN UR ELPH-MCNC: 2.3 GM/DL
GLUCOSE BLDC GLUCOMTR-MCNC: 127 MG/DL (ref 70–130)
GLUCOSE BLDC GLUCOMTR-MCNC: 164 MG/DL (ref 70–130)
GLUCOSE BLDC GLUCOMTR-MCNC: 200 MG/DL (ref 70–130)
GLUCOSE BLDC GLUCOMTR-MCNC: 201 MG/DL (ref 70–130)
GLUCOSE BLDC GLUCOMTR-MCNC: 207 MG/DL (ref 70–130)
GLUCOSE SERPL-MCNC: 195 MG/DL (ref 65–99)
HCT VFR BLD AUTO: 34.3 % (ref 37.5–51)
HGB BLD-MCNC: 11.3 G/DL (ref 13–17.7)
LAB AP CASE REPORT: NORMAL
LYMPHOCYTES # BLD MANUAL: 1.03 10*3/MM3 (ref 0.7–3.1)
LYMPHOCYTES NFR BLD MANUAL: 5 % (ref 5–12)
MAGNESIUM SERPL-MCNC: 2.2 MG/DL (ref 1.6–2.4)
MCH RBC QN AUTO: 30.5 PG (ref 26.6–33)
MCHC RBC AUTO-ENTMCNC: 32.9 G/DL (ref 31.5–35.7)
MCV RBC AUTO: 92.5 FL (ref 79–97)
MONOCYTES # BLD: 0.4 10*3/MM3 (ref 0.1–0.9)
NEUTROPHILS # BLD AUTO: 6.49 10*3/MM3 (ref 1.7–7)
NEUTROPHILS NFR BLD MANUAL: 82 % (ref 42.7–76)
NRBC BLD AUTO-RTO: 0 /100 WBC (ref 0–0.2)
PATH REPORT.FINAL DX SPEC: NORMAL
PATH REPORT.GROSS SPEC: NORMAL
PLAT MORPH BLD: NORMAL
PLATELET # BLD AUTO: 117 10*3/MM3 (ref 140–450)
PMV BLD AUTO: 9.6 FL (ref 6–12)
POTASSIUM SERPL-SCNC: 4.7 MMOL/L (ref 3.5–5.2)
PROT SERPL-MCNC: 4.5 G/DL (ref 6–8.5)
RBC # BLD AUTO: 3.71 10*6/MM3 (ref 4.14–5.8)
RBC MORPH BLD: NORMAL
SODIUM SERPL-SCNC: 131 MMOL/L (ref 136–145)
VARIANT LYMPHS NFR BLD MANUAL: 13 % (ref 19.6–45.3)
WBC MORPH BLD: NORMAL
WBC NRBC COR # BLD: 7.91 10*3/MM3 (ref 3.4–10.8)

## 2023-04-26 PROCEDURE — 94799 UNLISTED PULMONARY SVC/PX: CPT

## 2023-04-26 PROCEDURE — 85007 BL SMEAR W/DIFF WBC COUNT: CPT | Performed by: INTERNAL MEDICINE

## 2023-04-26 PROCEDURE — 97110 THERAPEUTIC EXERCISES: CPT

## 2023-04-26 PROCEDURE — 25010000002 FUROSEMIDE PER 20 MG: Performed by: STUDENT IN AN ORGANIZED HEALTH CARE EDUCATION/TRAINING PROGRAM

## 2023-04-26 PROCEDURE — 63710000001 INSULIN LISPRO (HUMAN) PER 5 UNITS: Performed by: STUDENT IN AN ORGANIZED HEALTH CARE EDUCATION/TRAINING PROGRAM

## 2023-04-26 PROCEDURE — 25010000002 HYDROMORPHONE PER 4 MG: Performed by: INTERNAL MEDICINE

## 2023-04-26 PROCEDURE — 93005 ELECTROCARDIOGRAM TRACING: CPT | Performed by: STUDENT IN AN ORGANIZED HEALTH CARE EDUCATION/TRAINING PROGRAM

## 2023-04-26 PROCEDURE — 94664 DEMO&/EVAL PT USE INHALER: CPT

## 2023-04-26 PROCEDURE — 83735 ASSAY OF MAGNESIUM: CPT | Performed by: INTERNAL MEDICINE

## 2023-04-26 PROCEDURE — 97530 THERAPEUTIC ACTIVITIES: CPT

## 2023-04-26 PROCEDURE — 99232 SBSQ HOSP IP/OBS MODERATE 35: CPT | Performed by: INTERNAL MEDICINE

## 2023-04-26 PROCEDURE — 25010000002 PIPERACILLIN SOD-TAZOBACTAM PER 1 G: Performed by: INTERNAL MEDICINE

## 2023-04-26 PROCEDURE — 80053 COMPREHEN METABOLIC PANEL: CPT | Performed by: INTERNAL MEDICINE

## 2023-04-26 PROCEDURE — 94760 N-INVAS EAR/PLS OXIMETRY 1: CPT

## 2023-04-26 PROCEDURE — 93010 ELECTROCARDIOGRAM REPORT: CPT | Performed by: INTERNAL MEDICINE

## 2023-04-26 PROCEDURE — 82962 GLUCOSE BLOOD TEST: CPT

## 2023-04-26 PROCEDURE — 94761 N-INVAS EAR/PLS OXIMETRY MLT: CPT

## 2023-04-26 PROCEDURE — 85025 COMPLETE CBC W/AUTO DIFF WBC: CPT | Performed by: INTERNAL MEDICINE

## 2023-04-26 RX ORDER — LIDOCAINE 50 MG/G
2 PATCH TOPICAL
Status: DISCONTINUED | OUTPATIENT
Start: 2023-04-26 | End: 2023-04-27 | Stop reason: HOSPADM

## 2023-04-26 RX ORDER — FUROSEMIDE 10 MG/ML
80 INJECTION INTRAMUSCULAR; INTRAVENOUS ONCE
Status: COMPLETED | OUTPATIENT
Start: 2023-04-26 | End: 2023-04-26

## 2023-04-26 RX ADMIN — FAMOTIDINE 20 MG: 10 INJECTION INTRAVENOUS at 08:39

## 2023-04-26 RX ADMIN — FINASTERIDE 5 MG: 5 TABLET, FILM COATED ORAL at 08:38

## 2023-04-26 RX ADMIN — POLYETHYLENE GLYCOL 3350 17 G: 17 POWDER, FOR SOLUTION ORAL at 21:39

## 2023-04-26 RX ADMIN — BUDESONIDE AND FORMOTEROL FUMARATE DIHYDRATE 1 PUFF: 160; 4.5 AEROSOL RESPIRATORY (INHALATION) at 19:48

## 2023-04-26 RX ADMIN — TAZOBACTAM SODIUM AND PIPERACILLIN SODIUM 3.38 G: 375; 3 INJECTION, SOLUTION INTRAVENOUS at 23:57

## 2023-04-26 RX ADMIN — TAZOBACTAM SODIUM AND PIPERACILLIN SODIUM 3.38 G: 375; 3 INJECTION, SOLUTION INTRAVENOUS at 08:38

## 2023-04-26 RX ADMIN — ANORECTAL OINTMENT 1 APPLICATION: 15.7; .44; 24; 20.6 OINTMENT TOPICAL at 08:40

## 2023-04-26 RX ADMIN — TAMSULOSIN HYDROCHLORIDE 0.4 MG: 0.4 CAPSULE ORAL at 08:38

## 2023-04-26 RX ADMIN — INSULIN LISPRO 1 UNITS: 100 INJECTION, SOLUTION INTRAVENOUS; SUBCUTANEOUS at 21:58

## 2023-04-26 RX ADMIN — HYDROMORPHONE HYDROCHLORIDE 0.5 MG: 1 INJECTION, SOLUTION INTRAMUSCULAR; INTRAVENOUS; SUBCUTANEOUS at 01:50

## 2023-04-26 RX ADMIN — LEVOTHYROXINE SODIUM 50 MCG: 0.05 TABLET ORAL at 08:38

## 2023-04-26 RX ADMIN — POLYETHYLENE GLYCOL 3350 17 G: 17 POWDER, FOR SOLUTION ORAL at 08:39

## 2023-04-26 RX ADMIN — OXYCODONE AND ACETAMINOPHEN 1 TABLET: 5; 325 TABLET ORAL at 17:20

## 2023-04-26 RX ADMIN — SODIUM CHLORIDE, POTASSIUM CHLORIDE, SODIUM LACTATE AND CALCIUM CHLORIDE 100 ML/HR: 600; 310; 30; 20 INJECTION, SOLUTION INTRAVENOUS at 06:22

## 2023-04-26 RX ADMIN — Medication 10 ML: at 21:39

## 2023-04-26 RX ADMIN — OXYCODONE AND ACETAMINOPHEN 1 TABLET: 5; 325 TABLET ORAL at 12:37

## 2023-04-26 RX ADMIN — DOCUSATE SODIUM 50MG AND SENNOSIDES 8.6MG 2 TABLET: 8.6; 5 TABLET, FILM COATED ORAL at 21:39

## 2023-04-26 RX ADMIN — METOPROLOL SUCCINATE 12.5 MG: 25 TABLET, EXTENDED RELEASE ORAL at 08:38

## 2023-04-26 RX ADMIN — ALBUTEROL SULFATE 2.5 MG: 2.5 SOLUTION RESPIRATORY (INHALATION) at 16:06

## 2023-04-26 RX ADMIN — PANTOPRAZOLE SODIUM 40 MG: 40 TABLET, DELAYED RELEASE ORAL at 08:45

## 2023-04-26 RX ADMIN — INSULIN GLARGINE-YFGN 17 UNITS: 100 INJECTION, SOLUTION SUBCUTANEOUS at 21:41

## 2023-04-26 RX ADMIN — ANORECTAL OINTMENT 1 APPLICATION: 15.7; .44; 24; 20.6 OINTMENT TOPICAL at 21:53

## 2023-04-26 RX ADMIN — FUROSEMIDE 80 MG: 10 INJECTION, SOLUTION INTRAMUSCULAR; INTRAVENOUS at 12:37

## 2023-04-26 RX ADMIN — BUDESONIDE AND FORMOTEROL FUMARATE DIHYDRATE 1 PUFF: 160; 4.5 AEROSOL RESPIRATORY (INHALATION) at 07:25

## 2023-04-26 RX ADMIN — TAZOBACTAM SODIUM AND PIPERACILLIN SODIUM 3.38 G: 375; 3 INJECTION, SOLUTION INTRAVENOUS at 15:49

## 2023-04-26 RX ADMIN — INSULIN LISPRO 2 UNITS: 100 INJECTION, SOLUTION INTRAVENOUS; SUBCUTANEOUS at 13:14

## 2023-04-26 RX ADMIN — Medication 10 ML: at 08:40

## 2023-04-26 RX ADMIN — INSULIN LISPRO 2 UNITS: 100 INJECTION, SOLUTION INTRAVENOUS; SUBCUTANEOUS at 08:39

## 2023-04-26 NOTE — CASE MANAGEMENT/SOCIAL WORK
Continued Stay Note  [facility]     Patient Name: Osvaldo Lopez  MRN: [id number]  Today's Date: 4/26/2023    Admit Date: 4/21/2023    Plan: Return to [facility] with [facility]   Discharge Plan     Row Name 04/26/23 1458       Plan    Plan Return to [facility] with [facility]    Patient/Family in Agreement with Plan yes    Plan Comments Anticipate discharge tomorrow back to [facility].  Bed available.  Ambulance arranged for 3pm tomorrow. Daughter aware and agreeable.  Need to notify [facility] of plan.  МАРИНА Vazquez RN               Discharge Codes    No documentation.               Expected Discharge Date and Time     Expected Discharge Date Expected Discharge Time    Apr 27, 2023             Inna Vazquez, RN

## 2023-04-26 NOTE — PLAN OF CARE
Goal Outcome Evaluation:  Plan of Care Reviewed With: patient        Progress: improving  Outcome Evaluation: Pt tolerated treatment fair this date. Required mod A for supine to sit, then sat on EOB w/ SBA-min A to maintain. Pt completed a few seated LE exercises, then attempted 3 stands. Pt was successul at clearing bottom off bed on last trial, w/ max A x2 to accomplish. Encouraged pt to attempt a few supine exercises on own during the day.

## 2023-04-26 NOTE — PROGRESS NOTES
Spoke to patient and granddgtr at bedside briefly for follow up and support. Patient noted that he had some lower back pain but stated that positioning and medication helped relieve his pain. Patient processed having a difficult time trying to stand today when working with PT and acknowledging that he has been getting confused at times. Offered emotional support to patient as he shared about his experiences. Validated that palliative team will continue to be available to support patient and family pending his needs. Encouraged family to reach out to palliative team if they need any further assistance/support.

## 2023-04-26 NOTE — PROGRESS NOTES
Name: Osvaldo Lopez ADMIT: 2023   : 1935  PCP: Provider, No Known    MRN: [id number] LOS: 4 days   AGE/SEX: 87 y.o. male  ROOM: [facility]     Subjective   Subjective   Pain in abdomen is better today, however he reports some low back pain.  Feels a bit better overall though.    Objective   Objective   Vital Signs  Temp:  [97.5 °F (36.4 °C)-97.9 °F (36.6 °C)] 97.8 °F (36.6 °C)  Heart Rate:  [71-82] 72  Resp:  [14-18] 18  BP: ()/(56-62) 124/57  SpO2:  [93 %-96 %] 94 %  on  Flow (L/min):  [1-2] 1;   Device (Oxygen Therapy): nasal cannula  Body mass index is 33.47 kg/m².  Physical Exam  Constitutional:       Appearance: He is ill-appearing.   Cardiovascular:      Rate and Rhythm: Normal rate and regular rhythm.      Pulses: Normal pulses.   Pulmonary:      Effort: Pulmonary effort is normal.      Breath sounds: Normal breath sounds.   Abdominal:      General: Abdomen is flat.      Palpations: Abdomen is soft.   Skin:     Findings: No bruising.   Neurological:      General: No focal deficit present.      Mental Status: He is alert and oriented to person, place, and time.         Results Review     I reviewed the patient's new clinical results.  Results from last 7 days   Lab Units 23  0543 23  0530 23  0708 23  0649   WBC 10*3/mm3 7.91 12.10* 11.46* 13.87*   HEMOGLOBIN g/dL 11.3* 12.2* 12.8* 13.6   PLATELETS 10*3/mm3 117* 128* 128* 161     Results from last 7 days   Lab Units 23  0543 23  0530 23  0708 23  0649   SODIUM mmol/L 131* 132* 126* 132*   POTASSIUM mmol/L 4.7 4.9 4.8 5.1   CHLORIDE mmol/L 95* 98 93* 95*   CO2 mmol/L 23.4 21.9* 24.9 23.0   BUN mg/dL 80* 72* 59* 55*   CREATININE mg/dL 2.90* 2.42* 1.99* 1.99*   GLUCOSE mg/dL 195* 224* 255* 135*   EGFR mL/min/1.73 20.6* 25.5* 32.3* 32.3*     Results from last 7 days   Lab Units 23  0543 23  0530 23  0708 23  0649   ALBUMIN g/dL 2.2* 1.8* 2.1* 2.8*   BILIRUBIN mg/dL 6.7*  13.0* 11.0* 8.5*   ALK PHOS U/L 1,834* 2,297* 2,201* 2,215*   AST (SGOT) U/L 85* 165* 215* 289*   ALT (SGPT) U/L 239* 307* 354* 425*     Results from last 7 days   Lab Units 04/26/23  0543 04/25/23  0530 04/24/23  0708 04/23/23  0649   CALCIUM mg/dL 8.1* 8.5* 8.6 8.5*   ALBUMIN g/dL 2.2* 1.8* 2.1* 2.8*   MAGNESIUM mg/dL 2.2 2.2 2.2 2.2     Results from last 7 days   Lab Units 04/22/23  0536   PROCALCITONIN ng/mL 1.38*   LACTATE mmol/L 0.9     Glucose   Date/Time Value Ref Range Status   04/26/2023 0751 207 (H) 70 - 130 mg/dL Final     Comment:     Meter: [id number] : [id number] Lazarojoelcarlos Natalie NA   04/26/2023 0602 200 (H) 70 - 130 mg/dL Final     Comment:     Meter: [id number] : [id number] Alberta Kapadia NA   04/25/2023 2050 198 (H) 70 - 130 mg/dL Final     Comment:     Meter: [id number] : [id number] Darryn Yoder NA   04/25/2023 1744 192 (H) 70 - 130 mg/dL Final     Comment:     Meter: [id number] : [id number] Cecilio Tolentino NA   04/25/2023 1226 199 (H) 70 - 130 mg/dL Final     Comment:     Meter: [id number] : [id number] Hernandesfer Tolentino NA   04/25/2023 0610 246 (H) 70 - 130 mg/dL Final     Comment:     Meter: [id number] : [id number]  Chantell NA   04/24/2023 1126 217 (H) 70 - 130 mg/dL Final     Comment:     Meter: [id number] : [id number] Deondre SIERRA       No radiology results for the last day  Scheduled Medications  albuterol, 2.5 mg, Nebulization, TID  budesonide-formoterol, 1 puff, Inhalation, BID - RT  famotidine, 20 mg, Intravenous, Q24H  finasteride, 5 mg, Oral, Daily  furosemide, 80 mg, Intravenous, Once  insulin glargine, 1-200 Units, Subcutaneous, Nightly - Glucommander  insulin lispro, 1-200 Units, Subcutaneous, 4x Daily With Meals & Nightly  levothyroxine, 50 mcg, Oral, Daily  lidocaine, 2 patch, Transdermal, Q24H  Menthol-Zinc Oxide, 1 application, Topical, BID  metoprolol succinate XL, 12.5 mg, Oral, Daily  pantoprazole, 40 mg, Oral, Q AM  piperacillin-tazobactam, 3.375 g,  Intravenous, Q8H  polyethylene glycol, 17 g, Oral, BID  senna-docusate sodium, 2 tablet, Oral, Nightly  sodium chloride, 10 mL, Intravenous, Q12H  tamsulosin, 0.4 mg, Oral, Daily    Infusions  lactated ringers, 100 mL/hr, Last Rate: 100 mL/hr (04/26/23 0622)  sodium chloride, 30 mL/hr, Last Rate: 30 mL/hr (04/24/23 1913)    Diet  Diet: Gastrointestinal Diets, Diabetic Diets; Consistent Carbohydrate; Fiber-Restricted, Low Irritant; Texture: Regular Texture (IDDSI 7); Fluid Consistency: Thin (IDDSI 0)       Assessment/Plan     Active Hospital Problems    Diagnosis  POA   • **Pancreatic mass [K86.89]  Yes   • Hypothyroidism [E03.9]  Yes   • Type 2 diabetes mellitus with chronic kidney disease, with long-term current use of insulin [E11.22, Z79.4]  Not Applicable   • History of CVA (cerebrovascular accident) [Z86.73]  Not Applicable   • Hypokalemia [E87.6]  Yes   • Transaminitis [R74.01]  Yes   • Pneumonia [J18.9]  Yes   • History of atrial flutter [Z86.79]  Not Applicable   • Hypoxia [R09.02]  Yes   • 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       87 y.o. male admitted with Pancreatic mass.      04/26/23  Will give dose of Lasix to attempt to increase UOP. Pt would not want dialysis if it were to come to that.     Pancreatic mass  Abdominal pain complicated by nausea and vomiting  Transaminitis  - CT AP- approximately 5.5 cm pancreatic head mass obstructing the CBD likely represents pancreatic malignancy, coupled with multiple nodular densities adjacent to the pancreas   -Per patient not interested in any work-up or treatment for his malignancy  -GI following   -EGD (4/24/23):  gastritis  -ERCP (4/24/23): malignant appearing stricture in lower third of main bile duct; biliary sphincterotomy performed; 10 mm x 6 cm wall flex covered metal stent placed in CBD  -daily PPI  -diet as tolerated  -Given palliative nature of stent placement, repeat ERCP for exchange unlikely to be needed    Cancer associated pain  -Dilaudid 0.5 mg every 2 hours as needed bowel regimen; lidocaine patch PRN    Hypoxemia  Pneumonia  - Continue Zosyn as prescribed for possible aspiration pneumonia, given location of findings on imaging.  -blood cx NGTD     Chronic kidney disease stage 3B  -at b/l     Type 2 diabetes mellitus with long term current use of insulin without hyperglycemia complicated by neuropathy and CKD  - Most recent A1c: 6.90% (12/28/22).  - Home medication regimen: Levemir 60 units daily, aspart 10 units TID with meals, Tradjenta.  -Glucomander protocol, monitor BG     Paroxysmal typical atrial flutter on long term anticoagulation  -RC: Metoprolol; hold amiodarone given LFT abnormalities  -AC: Xarelto, ON HOLD     Coronary artery disease  - S/P CABG x5 (1999)      Hypothyroidism  -Synthroid 50 mcg     History of CVA and subdural hematoma  - Noted during prior admission in December 2022.    · SCDs for DVT prophylaxis.  · DNR.  · Discussed with patient.  · Anticipate discharge return to facility once arrangements have been made      Yrn Hutchins MD  [facility]  04/26/23  10:54 EDT

## 2023-04-26 NOTE — THERAPY TREATMENT NOTE
Patient Name: Osvaldo Lopez  : 1935    MRN: [id number]                              Today's Date: 2023       Admit Date: 2023    Visit Dx:     ICD-10-CM ICD-9-CM   1. Pancreatic mass  K86.89 577.8   2. 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 Chronic kidney disease, unspecified CKD stage  N18.9 585.9   4. Decreased mobility and endurance  Z74.09 780.99     Patient Active Problem List   Diagnosis   • Complete rotator cuff tear of left shoulder   • Abnormal finding on thyroid function test   • Abdominal aortic aneurysm   • Benign prostatic hyperplasia   • Essential hypertension   • Hyperlipidemia   • Shoulder pain   • Lumbar radiculopathy   • Type 2 diabetes mellitus, without long-term current use of insulin (HCC)   • OA (osteoarthritis) of knee   • Tear of right rotator cuff   • Spinal stenosis of lumbar region with neurogenic claudication   • Eyebrow ptosis   • Pseudophakia   • Nonrheumatic aortic valve stenosis   • Atrial flutter with rapid ventricular response   • Right arm pain   • Leg weakness, bilateral   • Typical atrial flutter (HCC)   • Diabetic peripheral neuropathy   • Muscle weakness (generalized)   • Pressure injury of left buttock, stage 1   • Chronic low back pain with sciatica   • Multifocal motor neuropathy   • Pressure injury of buttock, stage 1   • Anemia   • Symptomatic anemia   • Iron deficiency anemia   • Chronic anticoagulation   • CKD (chronic kidney disease)   • CRISTOBAL (acute kidney injury)   • Colonic mass   • Axonal neuropathy   • Impairment of balance   • Post laminectomy syndrome   • Coronary arteriosclerosis   • Edema   • History of malignant neoplasm of colon   • Prostatism   • Stage 3b chronic kidney disease   • Pre-ulcerative calluses   • Elevated troponin   • Recurrent falls   • Generalized weakness   • Obesity (BMI 30-39.9)   • Thrombocytopenia, unspecified   • Acute CVA (cerebrovascular accident)   • Subdural hematoma, acute   • Pancreatic  mass   • Hypothyroidism   • Type 2 diabetes mellitus with chronic kidney disease, with long-term current use of insulin   • History of atrial flutter   • History of CVA (cerebrovascular accident)   • Hypokalemia   • Transaminitis   • Pneumonia   • Hypoxia   • 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 MANPREET VILLARREAL   • Anemia 01/28/2021   • Arthritis    • Asthma     MILD, INTERMITTENT   • Atherosclerotic heart disease    • Atrial flutter with rapid ventricular response 04/14/2019    ADMITTED TO [facility]   • Atrial premature depolarization    • Atrophy of muscle of lower leg    • BPH (benign prostatic hyperplasia)     FOLLOWED BY DR. TERRA JONES   • Bruises easily    • Bulging of thoracic intervertebral disc    • Carpal tunnel syndrome, right 12/2019    FOLLOWED BY DR. NEW SANCHEZ   • Cataract     BILATERAL, S/P EXTRACTION WITH IOL IMPLANTS   • Chronic anticoagulation 01/29/2021   • Chronic edema    • Chronic low back pain with sciatica 01/26/2021   • Chronic pain    • CKD (chronic kidney disease) 01/29/2021   • Closed head injury 07/2018    WITH LACERATION TO SCALP, D/T SYNCOPE   • Colon cancer 01/31/2021    INVASIVE ADENOCARCINOMA FROM TUBULOVILLOUS ADENOMA IN TRANSVERSE, S/P COLON RESECTION, FOLLOWED BY DR. MARGARITA COX   • Colon polyps     FOLLOWED BY DR. MARGARITA COX   • Constipation due to opioid therapy    • Coronary artery disease    • DDD (degenerative disc disease), thoracic    • Diabetic amyotrophy    • Diverticulosis    • Enlarged prostate     FOLLOWED BY DR. TERRA JONES   • Epididymitis, right 03/2018   • Essential hypertension    • Eyebrow ptosis 11/09/2013   • Hallux valgus, acquired, bilateral 01/2019   • Heart attack 09/1989    S/P CABG, 5 VESSEL   • Heart  murmur    • History of blood transfusion    • HL (hearing loss)    • Hyperactivity of bladder     FOLLOWED BY DR. TERRA JONES   • HyperCKemia 11/2017   • Hyperkalemia 08/2016   • Hyperlipidemia     MIXED   • Hyperreflexia 11/2017    BILATERAL   • Hyphema 05/2015   • IBS (irritable bowel syndrome)    • Impaired functional mobility, balance, gait, and endurance    • Impingement syndrome of left shoulder 10/2015   • Infection associated with cystostomy catheter 12/2016   • Ischemic colitis    • Lumbar radiculopathy 2016    wears back brace at times following back surgery   • Lumbar spondylosis 04/2016   • Lumbosacral neuritis 05/2015   • Lumbosacral radiculitis 05/2015   • Macular puckering of retina, left 10/2013   • 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 VIANEY GIORDANO   • Pseudophakia 11/09/2013   • RBBB (right bundle branch block)    • Recurrent falls    • Respiratory failure with hypoxia 01/2019   • SCC (squamous cell carcinoma) 10/16/2019    RIGHT FOREHEAD, LEFT TEMPLE, RIGHT SCALP, FOLLOWED BY DR. MANPREET VILLARREAL   • Scoliosis    • Spinal stenosis of lumbar region with neurogenic claudication 12/07/2017   • Syncope and collapse 07/16/2018    ADMITTED TO [facility]   • Torn rotator cuff 03/2017    BILATERAL, COMPLETE   • Type 2 diabetes mellitus     IDDM   • Urinary frequency     FOLLOWED BY DR. TERRA JONES   • Vitamin D deficiency    • Vitreous hemorrhage of left eye      Past Surgical History:   Procedure Laterality Date   • APPENDECTOMY N/A    • BROW LIFT Bilateral  11/12/2013    DR. OCTAVIA CEDILLO AT [facility]   • CARDIAC CATHETERIZATION Left 10/2008    LEFT CAROTID ARTERY STENOSIS 50-69%   • CARDIAC ELECTROPHYSIOLOGY PROCEDURE N/A 06/06/2019    Procedure: Ablation atrial flutter;  Surgeon: Miller Talbot MD;  Location: [facility] CATH INVASIVE LOCATION;  Service: Cardiovascular   • CATARACT EXTRACTION Left 01/22/1998    DR. LAYLA BARNES AT [facility]   • CATARACT EXTRACTION Right 03/2001    DR. LAYLA BARNES   • CHOLECYSTECTOMY N/A 08/24/1989    DR. FAUZIA PELAEZ AT [facility]   • COLON RESECTION N/A 02/03/2021    Procedure: LAPAROSCOPIC EXTENDED  RIGHT COLECTOMY WITH DAVINCI ROBOT;  Surgeon: Margarita Napoles MD;  Location: [facility] OR;  Service: DaVinci;  Laterality: N/A;   • COLONOSCOPY N/A 01/31/2021    20-25 MM POLYP IN CECUM, PATH: TUBULAR ADENOMA, 2 TUBULAR ADENOMA POLYPS IN ASCENDING, A FUNGATING AND SUBMUCOSAL ONONOBSTRUCTING MEDIUM MASS IN PROXIMAL TRANSVERSE, PATH: INVASIVE ADENOCARCINOMA ARISING IN A TUBULOVILOOUS ADENOMA, AREA TATTOOED, SCATTERED DIVERTICULA IN SIGMOID AND DESCENDING, LARGE INTERNAL HEMORRHOIDS, DR. JACOB ALICEA AT [facility]    • COLONOSCOPY N/A 02/02/2010    DIVERTICULOSIS, DR. SAL SOLANO AT [facility]   • COLONOSCOPY N/A 08/15/2022    5 MM TUBULAR ADENOMA POLYP IN SIGMOID, 2 TUBULAR ADENOMA POLYPS IN DESCENDING, MULTIPLE DIVERTICULA IN SIGMOID, RESCOPE IN 2 YRS, DR. MARGARITA NAPOLES AT [facility]   • CORONARY ARTERY BYPASS GRAFT N/A 09/1989    5 VESSEL, DR. HANKS   • CYST REMOVAL      FROM BACK   • ENDOSCOPY N/A 01/31/2021    ENTIRE EGD WNL, PATH: MILD REACTIVE GASTROPATHY, DR. JACOB ALICEA AT [facility]   • ENDOSCOPY  4/24/2023    Procedure: ESOPHAGOGASTRODUODENOSCOPY WITH BIOPSIES;  Surgeon: Hany Arellano MD;  Location: [facility] ENDOSCOPY;  Service: Gastroenterology;;  PRE: OBSTRUCTIVE JAUNDICE  POST: GASTRITIS   • ERCP N/A 4/24/2023    Procedure: ENDOSCOPIC RETROGRADE CHOLANGIOPANCREATOGRAPHY WITH SPHINCTEROTOMY, BALLOON SWEEP,  BRUSHINGS CBD, PLACEMENT OF WALLFLEX  BILIARY STENT 10mm X 60mm;  Surgeon: Hany Arellano MD;  Location: [facility] ENDOSCOPY;  Service: Gastroenterology;  Laterality: N/A;  PRE - OBSTRUCTIVE JAUNDICE  POST - SAME   • INGUINAL HERNIA REPAIR Left 09/27/2007    DR. MATTHEW RUSH AT [facility]   • INGUINAL HERNIA REPAIR Left 09/07/2007   • INGUINAL HERNIA REPAIR Left 2003   • KNEE ARTHROSCOPY W/ PARTIAL MEDIAL MENISCECTOMY Left 1962    DR. SINGH   • LUMBAR DISCECTOMY FUSION INSTRUMENTATION N/A 04/11/2016    Procedure: lumbar laminectomy L4-5 and fusion with instrumentation;  Surgeon: Ran Kline MD;  Location: [facility];  Service:    • LUMBAR DISCECTOMY FUSION INSTRUMENTATION N/A 01/15/2018    Procedure: L2-3, L3-4 laminectomy and fusion with instrumentation and removal of implants L4 5.;  Surgeon: Ran Kline MD;  Location[facility] OR;  Service:    • LUMBAR EPIDURAL INJECTION N/A 09/23/2015    DR. MATTHEW DOMINGUEZ AT [facility]   • LUMBAR EPIDURAL INJECTION N/A 10/07/2015    DR. ITZEL LUIS AT [facility]   • LUMBAR EPIDURAL INJECTION N/A 11/19/2015    DR. ITZEL LUIS AT [facility]   • CT ARTHRP KNE CONDYLE&PLATU MEDIAL&LAT COMPARTMENTS Left 11/14/2016    Procedure: TOTAL KNEE ARTHROPLASTY;  Surgeon: Dontrell Arellano MD;  Location: [facility];  Service: Orthopedics   • SKIN BIOPSY Bilateral 10/16/2019    RIGHT LATERAL FOREHEAD:SCC, LEFT TEMPLE:SCC, RIGHT PARIETAL SCALP:SCC, DR. MANPREET VILLARREAL      General Information     Row Name 04/26/23 1411          Physical Therapy Time and Intention    Document Type therapy note (daily note)  -     Mode of Treatment physical therapy  -     Row Name 04/26/23 1411          General Information    Existing Precautions/Restrictions fall;oxygen therapy device and L/min  -     Row Name 04/26/23 1411          Cognition    Orientation Status (Cognition) oriented x 3  -SM           User Key  (r) = Recorded By, (t) = Taken By, (c) = Cosigned By    Initials Name Provider Type    SM Chantal Mendosa, PTA Physical Therapist  Assistant               Mobility     Row Name 04/26/23 1411          Bed Mobility    Bed Mobility supine-sit;sit-supine  -     Supine-Sit Dewey (Bed Mobility) moderate assist (50% patient effort)  -     Sit-Supine [place]wey (Bed Mobility) maximum assist (25% patient effort);2 person assist  -     Assistive Device (Bed Mobility) bed rails;head of bed elevated  -     Row Name 04/26/23 1411          Sit-Stand Transfer    Sit-Stand Dewey (Transfers) maximum assist (25% patient effort);2 person assist  -     Comment, (Sit-Stand Transfer) 3 attempts, successful on last attempt to clear bottom off bed  -           User Key  (r) = Recorded By, (t) = Taken By, (c) = Cosigned By    Initials Name Provider Type    Chantal Rojas PTA Physical Therapist Assistant               Obj/Interventions     Row Name 04/26/23 1415          Motor Skills    Therapeutic Exercise --  seated AP, LAQ, marches, hip abd/add x10 reps  -           User Key  (r) = Recorded By, (t) = Taken By, (c) = Cosigned By    Initials Name Provider Type    Chantal Rojas PTA Physical Therapist Assistant               Goals/Plan    No documentation.                Clinical Impression     [facility] Name 04/26/23 1416          Pain    Pretreatment Pain Rating 0/10 - no pain  -     Posttreatment Pain Rating 0/10 - no pain  -     Pain Intervention(s) Repositioned;Ambulation/increased activity;Rest  -[facility] Name 04/26/23 1343          Therapy Assessment/Plan (PT)    Therapy Frequency (PT) 5 times/wk  -BH     Row Name 04/26/23 1416          Positioning and Restraints    Pre-Treatment Position in bed  -     Post Treatment Position bed  -SM     In Bed supine;call light within reach;encouraged to call for assist;exit alarm on;with family/caregiver  -           User Key  (r) = Recorded By, (t) = Taken By, (c) = Cosigned By    Initials Name Provider Type    Chantal Rojas PTA Physical Therapist Assistant      Vivi Mariano, PT Physical Therapist               Outcome Measures     Row Name 04/26/23 1418          How much help from another person do you currently need...    Turning from your back to your side while in flat bed without using bedrails? 2  -SM     Moving from lying on back to sitting on the side of a flat bed without bedrails? 2  -SM     Moving to and from a bed to a chair (including a wheelchair)? 1  -SM     Standing up from a chair using your arms (e.g., wheelchair, bedside chair)? 2  -SM     Climbing 3-5 steps with a railing? 1  -SM     To walk in hospital room? 1  -SM     AM-PAC 6 Clicks Score (PT) 9  -SM     Highest level of mobility 3 --> Sat at edge of bed  -     Row Name 04/26/23 1418          Functional Assessment    Outcome Measure Options AM-PAC 6 Clicks Basic Mobility (PT)  -           User Key  (r) = Recorded By, (t) = Taken By, (c) = Cosigned By    Initials Name Provider Type    Chantal Rojas, PTA Physical Therapist Assistant                             Physical Therapy Education     Title: PT OT SLP Therapies (In Progress)     Topic: Physical Therapy (Done)     Point: Mobility training (Done)     Learning Progress Summary           Patient Acceptance, E,TB,D, VU,NR by  at 4/26/2023 1418    Acceptance, E,TB,D, VU,NR by  at 4/25/2023 1548                   Point: Home exercise program (Done)     Learning Progress Summary           Patient Acceptance, E,TB,D, VU,NR by  at 4/26/2023 1418    Acceptance, E,TB,D, VU,NR by  at 4/25/2023 1548                   Point: Body mechanics (Done)     Learning Progress Summary           Patient Acceptance, E,TB,D, VU,NR by  at 4/26/2023 1418    Acceptance, E,TB,D, VU,NR by  at 4/25/2023 1548                   Point: Precautions (Done)     Learning Progress Summary           Patient Acceptance, E,TB,D, VU,NR by  at 4/26/2023 1418    Acceptance, E,TB,D, VU,NR by  at 4/25/2023 1548                               User Key     Initials  Effective Dates Name Provider Type Discipline     03/07/18 -  Chantal Mendosa OWEN Jaeger Physical Therapist Assistant PT              PT Recommendation and Plan     Plan of Care Reviewed With: patient  Progress: improving  Outcome Evaluation: Pt tolerated treatment fair this date. Required mod A for supine to sit, then sat on EOB w/ SBA-min A to maintain. Pt completed a few seated LE exercises, then attempted 3 stands. Pt was successul at clearing bottom off bed on last trial, w/ max A x2 to accomplish. Encouraged pt to attempt a few supine exercises on own during the day.     Time Calculation:    PT Charges     Row Name 04/26/23 1420             Time Calculation    Start Time 1024  -      Stop Time 1048  -      Time Calculation (min) 24 min  -      PT Received On 04/26/23  -      PT - Next Appointment 04/27/23  -            User Key  (r) = Recorded By, (t) = Taken By, (c) = Cosigned By    Initials Name Provider Type     Chantal Mendosa OWEN Jaeger Physical Therapist Assistant              Therapy Charges for Today     Code Description Service Date Service Provider Modifiers Qty    92431579588 HC PT THER PROC EA 15 MIN 4/25/2023 Chantal Mendosa, PTA GP 1    35351777817 HC PT THERAPEUTIC ACT EA 15 MIN 4/25/2023 Chantal Mendosa, PTA GP 1    25113173677 HC PT THER SUPP EA 15 MIN 4/25/2023 Chantal Mendosa, PTA GP 2    17417100849 HC PT THER PROC EA 15 MIN 4/26/2023 Chantal Mendosa, PTA GP 1    24209606413 HC PT THERAPEUTIC ACT EA 15 MIN 4/26/2023 Chantal Mendosa, PTA GP 1    81423794405 HC PT THER SUPP EA 15 MIN 4/26/2023 Chantal Mendosa, OWEN GP 2          PT G-Codes  Outcome Measure Options: AM-PAC 6 Clicks Basic Mobility (PT)  AM-PAC 6 Clicks Score (PT): 9  AM-PAC 6 Clicks Score (OT): 12  PT Discharge Summary  Anticipated Discharge Disposition (PT): skilled nursing facility    Chantal Jaeger OWEN Mendosa  4/26/2023

## 2023-04-26 NOTE — PROGRESS NOTES
[facility]  Inpatient Progress Note    Reason for Followup:  Pancreatic mass    Subjective     Interval History:   No new issues    Current Facility-Administered Medications:   •  acetaminophen (TYLENOL) tablet 650 mg, 650 mg, Oral, Q6H PRN, Yrn Hutchins MD, 650 mg at 04/25/23 1807  •  albuterol (PROVENTIL) nebulizer solution 0.083% 2.5 mg/3mL, 2.5 mg, Nebulization, TID, Hany Arellano MD, 2.5 mg at 04/25/23 1503  •  budesonide-formoterol (SYMBICORT) 160-4.5 MCG/ACT inhaler 1 puff, 1 puff, Inhalation, BID - RT, Hany Arellano MD, 1 puff at 04/26/23 0725  •  calcium carbonate (TUMS) chewable tablet 500 mg (200 mg elemental), 2 tablet, Oral, BID PRN, Hany Arellano MD  •  dextrose (D50W) (25 g/50 mL) IV injection 10-50 mL, 10-50 mL, Intravenous, Q15 Min PRN, Yrn Hutchins MD  •  dextrose (D50W) (25 g/50 mL) IV injection 25 g, 25 g, Intravenous, Q15 Min PRN, Hany Arellano MD  •  dextrose (GLUTOSE) oral gel 15 g, 15 g, Oral, Q15 Min PRN, Yrn Hutchins MD  •  famotidine (PEPCID) injection 20 mg, 20 mg, Intravenous, Q24H, Hany Arellano MD, 20 mg at 04/25/23 0839  •  finasteride (PROSCAR) tablet 5 mg, 5 mg, Oral, Daily, Hany Arellano MD, 5 mg at 04/25/23 0838  •  Glucagon (GLUCAGEN) injection 1 mg, 1 mg, Intramuscular, Q15 Min PRN, Yrn Hutchins MD  •  HYDROmorphone (DILAUDID) injection 0.5 mg, 0.5 mg, Intravenous, Q2H PRN, Hany Arellano MD, 0.5 mg at 04/26/23 0150  •  insulin glargine (LANTUS, SEMGLEE) injection 1-200 Units, 1-200 Units, Subcutaneous, Nightly - Liset Craig Ryan Richard, MD, 15 Units at 04/25/23 2135  •  insulin lispro (HUMALOG/ADMELOG) injection 1-200 Units, 1-200 Units, Subcutaneous, 4x Daily With Meals & Nightly, Yrn Hutchins MD, 1 Units at 04/25/23 2136  •  insulin lispro (HUMALOG/ADMELOG) injection 1-200 Units, 1-200 Units, Subcutaneous, PRN, Yrn Hutchins MD  •  lactated ringers infusion, 100 mL/hr,  Intravenous, Continuous, Hany Arellano MD, Last Rate: 100 mL/hr at 04/26/23 0622, 100 mL/hr at 04/26/23 0622  •  levothyroxine (SYNTHROID, LEVOTHROID) tablet 50 mcg, 50 mcg, Oral, Daily, Hany Arellano MD, 50 mcg at 04/25/23 0839  •  Magnesium Sulfate - Total Dose 10 grams - Magnesium 1 or Less, 2 g, Intravenous, PRN **OR** Magnesium Sulfate - Total Dose 6 grams - Magnesium 1.1 - 1.5, 2 g, Intravenous, PRN **OR** Magnesium Sulfate - Total Dose 4 grams - Magnesium 1.6 - 1.9, 4 g, Intravenous, PRN, Hany Arellano MD  •  Menthol-Zinc Oxide 1 application, 1 application, Topical, BID, Hany Arellano MD, 1 application at 04/25/23 2030  •  metoprolol succinate XL (TOPROL-XL) 24 hr tablet 12.5 mg, 12.5 mg, Oral, Daily, Hany Arellano MD, 12.5 mg at 04/25/23 0839  •  nitroglycerin (NITROSTAT) SL tablet 0.4 mg, 0.4 mg, Sublingual, Q5 Min PRN, Hany Arellano MD  •  ondansetron (ZOFRAN) tablet 4 mg, 4 mg, Oral, Q6H PRN **OR** ondansetron (ZOFRAN) injection 4 mg, 4 mg, Intravenous, Q6H PRN, Hany Arellano MD  •  oxyCODONE-acetaminophen (PERCOCET) 5-325 MG per tablet 1 tablet, 1 tablet, Oral, Q4H PRN, Yrn Hutchins MD, 1 tablet at 04/25/23 2209  •  pantoprazole (PROTONIX) EC tablet 40 mg, 40 mg, Oral, Q AM, Hany Arellano MD, 40 mg at 04/25/23 0618  •  piperacillin-tazobactam (ZOSYN) 3.375 g in iso-osmotic dextrose 50 ml (premix), 3.375 g, Intravenous, Q8H, Hany Arellano MD, 3.375 g at 04/25/23 2341  •  polyethylene glycol (MIRALAX) packet 17 g, 17 g, Oral, BID, Hany Arellano MD, 17 g at 04/25/23 2030  •  potassium & sodium phosphates (PHOS-NAK) 280-160-250 MG packet - for Phosphorus less than 1.25 mg/dL, 2 packet, Oral, Q6H PRN **OR** potassium & sodium phosphates (PHOS-NAK) 280-160-250 MG packet - for Phosphorus 1.25 - 2.5 mg/dL, 2 packet, Oral, Q6H PRN, Hany Arellano MD  •  potassium chloride (K-DUR,KLOR-CON) ER tablet 40 mEq, 40 mEq, Oral, PRN **OR** potassium chloride (KLOR-CON) packet 40 mEq, 40 mEq, Oral, PRN,  40 mEq at 04/23/23 0250 **OR** potassium chloride 10 mEq in 100 mL IVPB, 10 mEq, Intravenous, Q1H PRN, Hany Arellano MD  •  senna (SENOKOT) tablet 1 tablet, 1 tablet, Oral, BID PRN, Hany Arellano MD  •  sennosides-docusate (PERICOLACE) 8.6-50 MG per tablet 2 tablet, 2 tablet, Oral, Nightly, Hany Arellano MD, 2 tablet at 04/25/23 2030  •  simethicone (MYLICON) chewable tablet 80 mg, 80 mg, Oral, 4x Daily PRN, Yrn Hutchins MD, 80 mg at 04/25/23 1807  •  sodium chloride 0.9 % flush 10 mL, 10 mL, Intravenous, Q12H, Hany Arellano MD, 10 mL at 04/25/23 2030  •  sodium chloride 0.9 % flush 10 mL, 10 mL, Intravenous, PRN, Hany Arellano MD  •  sodium chloride 0.9 % infusion 40 mL, 40 mL, Intravenous, PRN, Hany Arellano MD  •  sodium chloride 0.9 % infusion, 30 mL/hr, Intravenous, Continuous PRN, Hany Arellano MD, Last Rate: 30 mL/hr at 04/24/23 1913, Currently Infusing at 04/24/23 1913  •  tamsulosin (FLOMAX) 24 hr capsule 0.4 mg, 0.4 mg, Oral, Daily, Hany Arellano MD, 0.4 mg at 04/25/23 0839    Objective     Vital Signs  Temp:  [97.5 °F (36.4 °C)-97.9 °F (36.6 °C)] 97.8 °F (36.6 °C)  Heart Rate:  [71-82] 72  Resp:  [14-18] 18  BP: ()/(56-62) 124/57  Body mass index is 33.47 kg/m².    Intake/Output Summary (Last 24 hours) at 4/26/2023 0812  Last data filed at 4/26/2023 0622  Gross per 24 hour   Intake 5723.67 ml   Output 400 ml   Net 5323.67 ml     No intake/output data recorded.     Physical Exam:   General: patient awake, alert and cooperative   Abdomen: soft, nontender, nondistended; normal bowel sounds     Results Review:     I reviewed the patient's new clinical results.  Reviewed ERPC report from 4/24    Results from last 7 days   Lab Units 04/26/23  0543 04/25/23  0530 04/24/23  0708   WBC 10*3/mm3 7.91 12.10* 11.46*   HEMOGLOBIN g/dL 11.3* 12.2* 12.8*   HEMATOCRIT % 34.3* 37.7 39.3   PLATELETS 10*3/mm3 117* 128* 128*     Results from last 7 days   Lab Units 04/26/23  0543 04/25/23  0530  04/24/23  0708   SODIUM mmol/L 131* 132* 126*   POTASSIUM mmol/L 4.7 4.9 4.8   CHLORIDE mmol/L 95* 98 93*   CO2 mmol/L 23.4 21.9* 24.9   BUN mg/dL 80* 72* 59*   CREATININE mg/dL 2.90* 2.42* 1.99*   CALCIUM mg/dL 8.1* 8.5* 8.6   BILIRUBIN mg/dL 6.7* 13.0* 11.0*   ALK PHOS U/L 1,834* 2,297* 2,201*   ALT (SGPT) U/L 239* 307* 354*   AST (SGOT) U/L 85* 165* 215*   GLUCOSE mg/dL 195* 224* 255*         Lab Results   Lab Value Date/Time    LIPASE 58 04/21/2023 2049    LIPASE 8 (L) 04/14/2019 0840       Radiology:  [unfilled]      Assessment & Plan   Assessment:   1.  Pancreatic head mass s/p ERCP with metal stent  2.  Elevated LFTs    All problems new to me today    Plan:   Await cytology from bilary brushing  LFTs improving  Recommend stent replacement as needed    I discussed the patient's findings and my recommendations with patient and family.          Jey Diehl M.D.  [facility]  [address] [place] [address]  Office: [phone]

## 2023-04-26 NOTE — CASE MANAGEMENT/SOCIAL WORK
"Physicians Statement of Medical Necessity for  Ambulance Transportation    GENERAL INFORMATION     Name: Osvaldo Lopez  YOB: 1935  Medicare #: [id number]  Transport Date: 4/27/2023(Valid for round trips this date, or for scheduled repetitive trips for 60 days from the date signed below.)  Origin: [facility]  Destination: [facility]  Is the Patient's stay covered under Medicare Part A (PPS/DRG?)Yes  Closest appropriate facility? Yes  If this a hosp-hosp transfer? No  Is this a hospice patient? No    MEDICAL NECESSITY QUESTIONAIRE    Ambulance Transportation is medically necessary only if other means of transportation are contraindicated or would be potentially harmful to the patient.  To meet this requirement, the patient must be either \"bed confined\" or suffer from a condition such that transport by means other than an ambulance is contraindicated by the patient's condition.  The following questions must be answered by the healthcare professional signing below for this form to be valid:     1) Describe the MEDICAL CONDITION (physical and/or mental) of this patient AT THE TIME OF AMBULANCE TRANSPORT that requires the patient to be transported in an ambulance, and why transport by other means is contraindicated by the patient's condition:   Past Medical History:   Diagnosis Date   • Abdominal aortic aneurysm (AAA) without rupture    • Abdominal aortic ectasia 06/2015   • Abnormal EKG 10/25/2016    04/2019   • Abnormal thyroid blood test    • Actinic keratosis     FOLLOWED BY DR. MANPREET VILLARREAL   • Anemia 01/28/2021   • Arthritis    • Asthma     MILD, INTERMITTENT   • Atherosclerotic heart disease    • Atrial flutter with rapid ventricular response 04/14/2019    ADMITTED TO [facility]   • Atrial premature depolarization    • Atrophy of muscle of lower leg    • BPH (benign prostatic hyperplasia)     FOLLOWED BY DR. TERRA JONES   • Bruises easily    • Bulging of thoracic " intervertebral disc    • Carpal tunnel syndrome, right 12/2019    FOLLOWED BY DR. NEW SANCHEZ   • Cataract     BILATERAL, S/P EXTRACTION WITH IOL IMPLANTS   • Chronic anticoagulation 01/29/2021   • Chronic edema    • Chronic low back pain with sciatica 01/26/2021   • Chronic pain    • CKD (chronic kidney disease) 01/29/2021   • Closed head injury 07/2018    WITH LACERATION TO SCALP, D/T SYNCOPE   • Colon cancer 01/31/2021    INVASIVE ADENOCARCINOMA FROM TUBULOVILLOUS ADENOMA IN TRANSVERSE, S/P COLON RESECTION, FOLLOWED BY DR. MARGARITA COX   • Colon polyps     FOLLOWED BY DR. MARGARITA COX   • Constipation due to opioid therapy    • Coronary artery disease    • DDD (degenerative disc disease), thoracic    • Diabetic amyotrophy    • Diverticulosis    • Enlarged prostate     FOLLOWED BY DR. TERRA JONES   • Epididymitis, right 03/2018   • Essential hypertension    • Eyebrow ptosis 11/09/2013   • Hallux valgus, acquired, bilateral 01/2019   • Heart attack 09/1989    S/P CABG, 5 VESSEL   • Heart murmur    • History of blood transfusion    • HL (hearing loss)    • Hyperactivity of bladder     FOLLOWED BY DR. TERRA JONES   • HyperCKemia 11/2017   • Hyperkalemia 08/2016   • Hyperlipidemia     MIXED   • Hyperreflexia 11/2017    BILATERAL   • Hyphema 05/2015   • IBS (irritable bowel syndrome)    • Impaired functional mobility, balance, gait, and endurance    • Impingement syndrome of left shoulder 10/2015   • Infection associated with cystostomy catheter 12/2016   • Ischemic colitis    • Lumbar radiculopathy 2016    wears back brace at times following back surgery   • Lumbar spondylosis 04/2016   • Lumbosacral neuritis 05/2015   • Lumbosacral radiculitis 05/2015   • Macular puckering of retina, left 10/2013   • 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 VIANEY GIORDANO   • Pseudophakia 11/09/2013   • RBBB (right bundle branch block)    • Recurrent falls    • Respiratory failure with hypoxia 01/2019   • SCC (squamous cell carcinoma) 10/16/2019    RIGHT FOREHEAD, LEFT TEMPLE, RIGHT SCALP, FOLLOWED BY DR. MANPREET VILLARREAL   • Scoliosis    • Spinal stenosis of lumbar region with neurogenic claudication 12/07/2017   • Syncope and collapse 07/16/2018    ADMITTED TO [facility]   • Torn rotator cuff 03/2017    BILATERAL, COMPLETE   • Type 2 diabetes mellitus     IDDM   • Urinary frequency     FOLLOWED BY DR. TERRA JONES   • Vitamin D deficiency    • Vitreous hemorrhage of left eye       Past Surgical History:   Procedure Laterality Date   • APPENDECTOMY N/A    • BROW LIFT Bilateral 11/12/2013    DR. OCTAVIA CEDILLO AT [place]   • CARDIAC CATHETERIZATION Left 10/2008    LEFT CAROTID ARTERY STENOSIS 50-69%   • CARDIAC ELECTROPHYSIOLOGY PROCEDURE N/A 06/06/2019    Procedure: Ablation atrial flutter;  Surgeon: Miller Talbot MD;  Location: [facility] INVASIVE LOCATION;  Service: Cardiovascular   • CATARACT EXTRACTION Left 01/22/1998    DR. LAYLA BARNES AT [place]   • CATARACT EXTRACTION Right 03/2001    DR. LAYLA BARNES   • CHOLECYSTECTOMY N/A 08/24/1989    DR. FAUZIA PELAEZ AT [place]   • COLON RESECTION N/A 02/03/2021    Procedure: LAPAROSCOPIC EXTENDED  RIGHT COLECTOMY WITH DAVINCI ROBOT;  Surgeon: Xavi Napoles MD;  Location: [facility] OR;  Service: DaVinci;  Laterality: N/A;   • COLONOSCOPY N/A 01/31/2021    20-25 MM POLYP IN CECUM, PATH: TUBULAR ADENOMA, 2 TUBULAR ADENOMA POLYPS IN ASCENDING, A FUNGATING AND SUBMUCOSAL ONONOBSTRUCTING MEDIUM MASS IN PROXIMAL TRANSVERSE, PATH: INVASIVE  ADENOCARCINOMA ARISING IN A TUBULOVILOOUS ADENOMA, AREA TATTOOED, SCATTERED DIVERTICULA IN SIGMOID AND DESCENDING, LARGE INTERNAL HEMORRHOIDS, DR. JACOB ALICEA AT [facility]    • COLONOSCOPY N/A 02/02/2010    DIVERTICULOSIS, DR. SAL SOLANO AT [place]   • COLONOSCOPY N/A 08/15/2022    5 MM TUBULAR ADENOMA POLYP IN SIGMOID, 2 TUBULAR ADENOMA POLYPS IN DESCENDING, MULTIPLE DIVERTICULA IN SIGMOID, RESCOPE IN 2 YRS, DR. MARGARITA COX AT [facility]   • CORONARY ARTERY BYPASS GRAFT N/A 09/1989    5 VESSEL, DR. HANKS   • CYST REMOVAL      FROM BACK   • ENDOSCOPY N/A 01/31/2021    ENTIRE EGD WNL, PATH: MILD REACTIVE GASTROPATHY, DR. JACOB ALICEA AT [facility]   • ENDOSCOPY  4/24/2023    Procedure: ESOPHAGOGASTRODUODENOSCOPY WITH BIOPSIES;  Surgeon: Hany Arellano MD;  Location: [facility] ENDOSCOPY;  Service: Gastroenterology;;  PRE: OBSTRUCTIVE JAUNDICE  POST: GASTRITIS   • ERCP N/A 4/24/2023    Procedure: ENDOSCOPIC RETROGRADE CHOLANGIOPANCREATOGRAPHY WITH SPHINCTEROTOMY, BALLOON SWEEP,  BRUSHINGS CBD, PLACEMENT OF WALLFLEX BILIARY STENT 10mm X 60mm;  Surgeon: Hany Arellano MD;  Location: [facility] ENDOSCOPY;  Service: Gastroenterology;  Laterality: N/A;  PRE - OBSTRUCTIVE JAUNDICE  POST - SAME   • INGUINAL HERNIA REPAIR Left 09/27/2007    DR. MATTHEW RUSH AT [place]   • INGUINAL HERNIA REPAIR Left 09/07/2007   • INGUINAL HERNIA REPAIR Left 2003   • KNEE ARTHROSCOPY W/ PARTIAL MEDIAL MENISCECTOMY Left 1962    DR. SINGH   • LUMBAR DISCECTOMY FUSION INSTRUMENTATION N/A 04/11/2016    Procedure: lumbar laminectomy L4-5 and fusion with instrumentation;  Surgeon: Ran Kline MD;  Location: [facility] MAIN OR;  Service:    • LUMBAR DISCECTOMY FUSION INSTRUMENTATION N/A 01/15/2018    Procedure: L2-3, L3-4 laminectomy and fusion with instrumentation and removal of implants L4 5.;  Surgeon: Ran Kline MD;  Location: [facility] MAIN OR;  Service:    • LUMBAR EPIDURAL INJECTION N/A 09/23/2015    DR. MATTHEW DOMINGUEZ AT [facility]   • LUMBAR EPIDURAL INJECTION  "N/A 10/07/2015    DR. ITZEL LUIS AT [facility]   • LUMBAR EPIDURAL INJECTION N/A 11/19/2015    DR. ITZEL LUIS AT [facility]   • KS ARTHRP KNE CONDYLE&PLATU MEDIAL&LAT COMPARTMENTS Left 11/14/2016    Procedure: TOTAL KNEE ARTHROPLASTY;  Surgeon: Dontrell Arellano MD;  Location: [facility];  Service: Orthopedics   • SKIN BIOPSY Bilateral 10/16/2019    RIGHT LATERAL FOREHEAD:[facility], LEFT TEMPLE:[facility], RIGHT PARIETAL SCALP:[facility], DR. MANPREET VILLARREAL      2) Is this patient \"bed confined\" as defined below?Yes   To be \"bed confined\" the patient must satisfy all three of the following criteria:  (1) unable to get up from bed without assistance; AND (2) unable to ambulate;  AND (3) unable to sit in a chair or wheelchair.  3) Can this patient safely be transported by car or wheelchair van (I.e., may safely sit during transport, without an attendant or monitoring?)No   4. In addition to completing questions 1-3 above, please check any of the following conditions that apply*:          *Note: supporting documentation for any boxes checked must be maintained in the patient's medical records Patient is confused and Unable to tolerate seated position for time needed to transport      SIGNATURE OF PHYSICIAN OR OTHER AUTHORIZED HEALTHCARE PROFESSIONAL    I certify that the above information is true and correct based on my evaluation of this patient, and represent that the patient requires transport by ambulance and that other forms of transport are contraindicated.  I understand that this information will be used by the Centers for Medicare and Medicaid Services (CMS) to support the determiniation of medical necessity for ambulance services, and I represent that I have personal knowledge of the patient's condition at the time of transport.    X   If this box is checked, I also certify that the patient is physically or mentally incapable of signing the ambulance service's claim form and that the institution with which I am affiliated has " furnished care, services or assistance to the patient.  My signature below is made on behalf of the patient pursuant to 42 .36(b)(4). In accordance with 42 .37, the specific reason(s) that the patient is physically or mentally incapable of signing the claim for is as follows:     Signature of Physician or Healthcare Professional  Date/Time:        (For Scheduled repetitive transport, this form is not valid for transports performed more than 60 days after this date).                                                                                                                                            --------------------------------------------------------------------------------------------  Printed Name and Credentials of Physician or Authorized Healthcare Professional     *Form must be signed by patient's attending physician for scheduled, repetitive transports,.  For non-repetitive ambulance transports, if unable to obtain the signature of the attending physician, any of the following may sign (please select below):     Physician  Clinical Nurse Specialist  Registered Nurse     Physician Assistant  Discharge Planner  Licensed Practical Nurse     Nurse Practitioner

## 2023-04-27 VITALS
HEART RATE: 70 BPM | SYSTOLIC BLOOD PRESSURE: 119 MMHG | WEIGHT: 233.4 LBS | HEIGHT: 71 IN | BODY MASS INDEX: 32.68 KG/M2 | OXYGEN SATURATION: 94 % | TEMPERATURE: 97.5 F | RESPIRATION RATE: 18 BRPM | DIASTOLIC BLOOD PRESSURE: 51 MMHG

## 2023-04-27 PROBLEM — E87.6 HYPOKALEMIA: Status: RESOLVED | Noted: 2023-04-22 | Resolved: 2023-04-27

## 2023-04-27 LAB
ALBUMIN SERPL-MCNC: 1.8 G/DL (ref 3.5–5.2)
ALBUMIN/GLOB SERPL: 0.6 G/DL
ALP SERPL-CCNC: 1561 U/L (ref 39–117)
ALT SERPL W P-5'-P-CCNC: 190 U/L (ref 1–41)
ANION GAP SERPL CALCULATED.3IONS-SCNC: 10.6 MMOL/L (ref 5–15)
AST SERPL-CCNC: 62 U/L (ref 1–40)
BASOPHILS # BLD AUTO: 0.01 10*3/MM3 (ref 0–0.2)
BASOPHILS NFR BLD AUTO: 0.1 % (ref 0–1.5)
BILIRUB SERPL-MCNC: 4.7 MG/DL (ref 0–1.2)
BUN SERPL-MCNC: 88 MG/DL (ref 8–23)
BUN/CREAT SERPL: 27.4 (ref 7–25)
CALCIUM SPEC-SCNC: 8.2 MG/DL (ref 8.6–10.5)
CHLORIDE SERPL-SCNC: 95 MMOL/L (ref 98–107)
CO2 SERPL-SCNC: 21.4 MMOL/L (ref 22–29)
CREAT SERPL-MCNC: 3.21 MG/DL (ref 0.76–1.27)
DEPRECATED RDW RBC AUTO: 46.4 FL (ref 37–54)
EGFRCR SERPLBLD CKD-EPI 2021: 18.2 ML/MIN/1.73
EOSINOPHIL # BLD AUTO: 0.09 10*3/MM3 (ref 0–0.4)
EOSINOPHIL NFR BLD AUTO: 1.3 % (ref 0.3–6.2)
ERYTHROCYTE [DISTWIDTH] IN BLOOD BY AUTOMATED COUNT: 14 % (ref 12.3–15.4)
GLOBULIN UR ELPH-MCNC: 2.9 GM/DL
GLUCOSE BLDC GLUCOMTR-MCNC: 126 MG/DL (ref 70–130)
GLUCOSE BLDC GLUCOMTR-MCNC: 167 MG/DL (ref 70–130)
GLUCOSE SERPL-MCNC: 151 MG/DL (ref 65–99)
HCT VFR BLD AUTO: 32.3 % (ref 37.5–51)
HGB BLD-MCNC: 10.8 G/DL (ref 13–17.7)
LAB AP CASE REPORT: NORMAL
LAB AP DIAGNOSIS COMMENT: NORMAL
LYMPHOCYTES # BLD AUTO: 0.79 10*3/MM3 (ref 0.7–3.1)
LYMPHOCYTES NFR BLD AUTO: 11.3 % (ref 19.6–45.3)
MAGNESIUM SERPL-MCNC: 2.3 MG/DL (ref 1.6–2.4)
MCH RBC QN AUTO: 30.3 PG (ref 26.6–33)
MCHC RBC AUTO-ENTMCNC: 33.4 G/DL (ref 31.5–35.7)
MCV RBC AUTO: 90.7 FL (ref 79–97)
MONOCYTES # BLD AUTO: 0.57 10*3/MM3 (ref 0.1–0.9)
MONOCYTES NFR BLD AUTO: 8.1 % (ref 5–12)
NEUTROPHILS NFR BLD AUTO: 5.42 10*3/MM3 (ref 1.7–7)
NEUTROPHILS NFR BLD AUTO: 77.3 % (ref 42.7–76)
PATH REPORT.ADDENDUM SPEC: NORMAL
PATH REPORT.FINAL DX SPEC: NORMAL
PATH REPORT.GROSS SPEC: NORMAL
PLATELET # BLD AUTO: 118 10*3/MM3 (ref 140–450)
PMV BLD AUTO: 9.3 FL (ref 6–12)
POTASSIUM SERPL-SCNC: 4.7 MMOL/L (ref 3.5–5.2)
PROT SERPL-MCNC: 4.7 G/DL (ref 6–8.5)
QT INTERVAL: 601 MS
RBC # BLD AUTO: 3.56 10*6/MM3 (ref 4.14–5.8)
SARS-COV-2 RNA RESP QL NAA+PROBE: NOT DETECTED
SODIUM SERPL-SCNC: 127 MMOL/L (ref 136–145)
WBC NRBC COR # BLD: 7.01 10*3/MM3 (ref 3.4–10.8)

## 2023-04-27 PROCEDURE — 85025 COMPLETE CBC W/AUTO DIFF WBC: CPT | Performed by: INTERNAL MEDICINE

## 2023-04-27 PROCEDURE — U0005 INFEC AGEN DETEC AMPLI PROBE: HCPCS | Performed by: STUDENT IN AN ORGANIZED HEALTH CARE EDUCATION/TRAINING PROGRAM

## 2023-04-27 PROCEDURE — 82948 REAGENT STRIP/BLOOD GLUCOSE: CPT

## 2023-04-27 PROCEDURE — 82962 GLUCOSE BLOOD TEST: CPT

## 2023-04-27 PROCEDURE — 94799 UNLISTED PULMONARY SVC/PX: CPT

## 2023-04-27 PROCEDURE — 25010000002 PIPERACILLIN SOD-TAZOBACTAM PER 1 G: Performed by: INTERNAL MEDICINE

## 2023-04-27 PROCEDURE — 99232 SBSQ HOSP IP/OBS MODERATE 35: CPT | Performed by: INTERNAL MEDICINE

## 2023-04-27 PROCEDURE — 99231 SBSQ HOSP IP/OBS SF/LOW 25: CPT | Performed by: NURSE PRACTITIONER

## 2023-04-27 PROCEDURE — 94761 N-INVAS EAR/PLS OXIMETRY MLT: CPT

## 2023-04-27 PROCEDURE — U0003 INFECTIOUS AGENT DETECTION BY NUCLEIC ACID (DNA OR RNA); SEVERE ACUTE RESPIRATORY SYNDROME CORONAVIRUS 2 (SARS-COV-2) (CORONAVIRUS DISEASE [COVID-19]), AMPLIFIED PROBE TECHNIQUE, MAKING USE OF HIGH THROUGHPUT TECHNOLOGIES AS DESCRIBED BY CMS-2020-01-R: HCPCS | Performed by: STUDENT IN AN ORGANIZED HEALTH CARE EDUCATION/TRAINING PROGRAM

## 2023-04-27 PROCEDURE — 80053 COMPREHEN METABOLIC PANEL: CPT | Performed by: INTERNAL MEDICINE

## 2023-04-27 PROCEDURE — 63710000001 INSULIN LISPRO (HUMAN) PER 5 UNITS: Performed by: STUDENT IN AN ORGANIZED HEALTH CARE EDUCATION/TRAINING PROGRAM

## 2023-04-27 PROCEDURE — 94760 N-INVAS EAR/PLS OXIMETRY 1: CPT

## 2023-04-27 PROCEDURE — 94664 DEMO&/EVAL PT USE INHALER: CPT

## 2023-04-27 PROCEDURE — 83735 ASSAY OF MAGNESIUM: CPT | Performed by: INTERNAL MEDICINE

## 2023-04-27 RX ORDER — SIMETHICONE 80 MG
80 TABLET,CHEWABLE ORAL 4 TIMES DAILY PRN
Qty: 30 TABLET | Refills: 0 | Status: SHIPPED | OUTPATIENT
Start: 2023-04-27

## 2023-04-27 RX ORDER — PANTOPRAZOLE SODIUM 40 MG/1
40 TABLET, DELAYED RELEASE ORAL
Qty: 30 TABLET | Refills: 0 | Status: SHIPPED | OUTPATIENT
Start: 2023-04-28

## 2023-04-27 RX ORDER — OXYCODONE HYDROCHLORIDE AND ACETAMINOPHEN 5; 325 MG/1; MG/1
1 TABLET ORAL EVERY 4 HOURS PRN
Qty: 30 TABLET | Refills: 0 | Status: SHIPPED | OUTPATIENT
Start: 2023-04-27 | End: 2023-05-02

## 2023-04-27 RX ADMIN — SODIUM CHLORIDE, POTASSIUM CHLORIDE, SODIUM LACTATE AND CALCIUM CHLORIDE 100 ML/HR: 600; 310; 30; 20 INJECTION, SOLUTION INTRAVENOUS at 04:36

## 2023-04-27 RX ADMIN — POLYETHYLENE GLYCOL 3350 17 G: 17 POWDER, FOR SOLUTION ORAL at 08:40

## 2023-04-27 RX ADMIN — PANTOPRAZOLE SODIUM 40 MG: 40 TABLET, DELAYED RELEASE ORAL at 05:48

## 2023-04-27 RX ADMIN — Medication 10 ML: at 08:42

## 2023-04-27 RX ADMIN — BUDESONIDE AND FORMOTEROL FUMARATE DIHYDRATE 1 PUFF: 160; 4.5 AEROSOL RESPIRATORY (INHALATION) at 07:31

## 2023-04-27 RX ADMIN — LEVOTHYROXINE SODIUM 50 MCG: 0.05 TABLET ORAL at 08:40

## 2023-04-27 RX ADMIN — OXYCODONE AND ACETAMINOPHEN 1 TABLET: 5; 325 TABLET ORAL at 08:41

## 2023-04-27 RX ADMIN — INSULIN LISPRO 1 UNITS: 100 INJECTION, SOLUTION INTRAVENOUS; SUBCUTANEOUS at 08:32

## 2023-04-27 RX ADMIN — INSULIN LISPRO 2 UNITS: 100 INJECTION, SOLUTION INTRAVENOUS; SUBCUTANEOUS at 12:32

## 2023-04-27 RX ADMIN — OXYCODONE AND ACETAMINOPHEN 1 TABLET: 5; 325 TABLET ORAL at 12:33

## 2023-04-27 RX ADMIN — METOPROLOL SUCCINATE 12.5 MG: 25 TABLET, EXTENDED RELEASE ORAL at 08:40

## 2023-04-27 RX ADMIN — TAMSULOSIN HYDROCHLORIDE 0.4 MG: 0.4 CAPSULE ORAL at 08:41

## 2023-04-27 RX ADMIN — LIDOCAINE 2 PATCH: 140 PATCH CUTANEOUS at 08:42

## 2023-04-27 RX ADMIN — ANORECTAL OINTMENT 1 APPLICATION: 15.7; .44; 24; 20.6 OINTMENT TOPICAL at 08:42

## 2023-04-27 RX ADMIN — FINASTERIDE 5 MG: 5 TABLET, FILM COATED ORAL at 08:41

## 2023-04-27 RX ADMIN — FAMOTIDINE 20 MG: 10 INJECTION INTRAVENOUS at 08:41

## 2023-04-27 RX ADMIN — TAZOBACTAM SODIUM AND PIPERACILLIN SODIUM 3.38 G: 375; 3 INJECTION, SOLUTION INTRAVENOUS at 08:40

## 2023-04-27 NOTE — CASE MANAGEMENT/SOCIAL WORK
Case Management Discharge Note      Final Note: Skilled bed at [facility] via  EMS. Arin MALIN         Selected Continued Care - Admitted Since 4/21/2023     Destination Coordination complete.    Service Provider Selected Services Address Phone Fax Patient Preferred    [facility] [address] [place] [address] [phone] --          Durable Medical Equipment    No services have been selected for the patient.              Dialysis/Infusion    No services have been selected for the patient.              Home Medical Care    No services have been selected for the patient.              Therapy    No services have been selected for the patient.              Community Resources    No services have been selected for the patient.              Community & DME    No services have been selected for the patient.                       Final Discharge Disposition Code: 03 - skilled nursing facility (SNF)

## 2023-04-27 NOTE — PROGRESS NOTES
.              [facility]    Palliative Care Daily Progress Note   Chief complaint-follow up goals of care/advanced care planning and support for patient/family    Code Status:   Code Status and Medical Interventions:   Ordered at: 04/22/23 0841     Medical Intervention Limits:    NO intubation (DNI)     Level Of Support Discussed With:    Patient    Health Care Surrogate     Code Status (Patient has no pulse and is not breathing):    No CPR (Do Not Attempt to Resuscitate)     Medical Interventions (Patient has pulse or is breathing):    Limited Support     Comments:    discussed with daughter who is POA     Release to patient:    Routine Release      Advanced Directives: Advance Directive Status: Patient has advance directive, copy in chart (requested updated paperwork from daughter)   Goals of Care: Ongoing.     S: Medical record reviewed. Events noted.  Patient resting in bed, easily aroused. Has complaints of chronic pain, recently medicated.  No family present. Reviewed medical notes, therapy notes, labs, cytology. I spoke with RN.          Review of Systems   Constitutional: Positive for decreased appetite and malaise/fatigue.   Cardiovascular: Negative for chest pain.   Respiratory: Negative for shortness of breath.    Musculoskeletal: Positive for back pain.   Gastrointestinal: Positive for abdominal pain. Negative for nausea.   Neurological: Positive for weakness.   Psychiatric/Behavioral: Negative for depression. The patient is not nervous/anxious.      Pain Assessment  Preferred Pain Scale: number (Numeric Rating Pain Scale)  Nonverbal Indicators of Pain: grimace  Pain Location: abdomen, back  Pain Description: aching  Pain Onset: recent (weeks)  Pain Duration: weeks  Factors That Aggravate Pain: movement, palpation, positioning  Factors That Relieve Pain: rest, medication timing  Lifestyle Changes/Adaptations in Response to Pain: decreased activity level, inability  to concentrate    O:     Intake/Output Summary (Last 24 hours) at 4/27/2023 1033  Last data filed at 4/27/2023 0900  Gross per 24 hour   Intake 740 ml   Output 300 ml   Net 440 ml       Diagnostics and current medications: Reviewed.    Current Facility-Administered Medications   Medication Dose Route Frequency Provider Last Rate Last Admin   • acetaminophen (TYLENOL) tablet 650 mg  650 mg Oral Q6H PRN Yrn Hutchins MD   650 mg at 04/25/23 1807   • albuterol (PROVENTIL) nebulizer solution 0.083% 2.5 mg/3mL  2.5 mg Nebulization TID Hany Arellano MD   2.5 mg at 04/26/23 1606   • budesonide-formoterol (SYMBICORT) 160-4.5 MCG/ACT inhaler 1 puff  1 puff Inhalation BID - RT Hany Arellano MD   1 puff at 04/27/23 0731   • calcium carbonate (TUMS) chewable tablet 500 mg (200 mg elemental)  2 tablet Oral BID PRN Hany Arellano MD       • dextrose (D50W) (25 g/50 mL) IV injection 10-50 mL  10-50 mL Intravenous Q15 Min PRN Yrn Hutchins MD       • dextrose (D50W) (25 g/50 mL) IV injection 25 g  25 g Intravenous Q15 Min PRN Hany Arellano MD       • dextrose (GLUTOSE) oral gel 15 g  15 g Oral Q15 Min PRN Yrn Hutchins MD       • famotidine (PEPCID) injection 20 mg  20 mg Intravenous Q24H Hany Arellano MD   20 mg at 04/27/23 0841   • finasteride (PROSCAR) tablet 5 mg  5 mg Oral Daily Hany Arellano MD   5 mg at 04/27/23 0841   • Glucagon (GLUCAGEN) injection 1 mg  1 mg Intramuscular Q15 Min PRN Yrn Hutchins MD       • HYDROmorphone (DILAUDID) injection 0.5 mg  0.5 mg Intravenous Q2H PRN Hany Arellano MD   0.5 mg at 04/26/23 0150   • insulin glargine (LANTUS, SEMGLEE) injection 1-200 Units  1-200 Units Subcutaneous Nightly - Glucommander Yrn Hutchins MD   17 Units at 04/26/23 2141   • insulin lispro (HUMALOG/ADMELOG) injection 1-200 Units  1-200 Units Subcutaneous 4x Daily With Meals & Nightly [place], Yrn Osuna MD   1 Units at 04/27/23 0832   • insulin lispro (HUMALOG/ADMELOG)  injection 1-200 Units  1-200 Units Subcutaneous PRN Yrn Hutchins MD       • lactated ringers infusion  100 mL/hr Intravenous Continuous Hany Arellano  mL/hr at 04/27/23 0436 100 mL/hr at 04/27/23 0436   • levothyroxine (SYNTHROID, LEVOTHROID) tablet 50 mcg  50 mcg Oral Daily Hany Arellano MD   50 mcg at 04/27/23 0840   • lidocaine (LIDODERM) 5 % 2 patch  2 patch Transdermal Q24H Yrn Hutchins MD   2 patch at 04/27/23 0842   • Magnesium Sulfate - Total Dose 10 grams - Magnesium 1 or Less  2 g Intravenous PRN Hany Arellano MD        Or   • Magnesium Sulfate - Total Dose 6 grams - Magnesium 1.1 - 1.5  2 g Intravenous PRN Hany Arellano MD        Or   • Magnesium Sulfate - Total Dose 4 grams - Magnesium 1.6 - 1.9  4 g Intravenous PRN Hany Arellano MD       • Menthol-Zinc Oxide 1 application  1 application Topical BID Hany Arellano MD   1 application at 04/27/23 0842   • metoprolol succinate XL (TOPROL-XL) 24 hr tablet 12.5 mg  12.5 mg Oral Daily Hany Arellano MD   12.5 mg at 04/27/23 0840   • nitroglycerin (NITROSTAT) SL tablet 0.4 mg  0.4 mg Sublingual Q5 Min PRN Hany Arellano MD       • ondansetron (ZOFRAN) tablet 4 mg  4 mg Oral Q6H PRN Hany Arellano MD        Or   • ondansetron (ZOFRAN) injection 4 mg  4 mg Intravenous Q6H PRN Hany Arellano MD       • oxyCODONE-acetaminophen (PERCOCET) 5-325 MG per tablet 1 tablet  1 tablet Oral Q4H PRN Yrn Hutchins MD   1 tablet at 04/27/23 0841   • pantoprazole (PROTONIX) EC tablet 40 mg  40 mg Oral Q AM Hany Arellano MD   40 mg at 04/27/23 0548   • piperacillin-tazobactam (ZOSYN) 3.375 g in iso-osmotic dextrose 50 ml (premix)  3.375 g Intravenous Q8H Hany Arellano MD   3.375 g at 04/27/23 0840   • polyethylene glycol (MIRALAX) packet 17 g  17 g Oral BID Hany Arellano MD   17 g at 04/27/23 0840   • potassium & sodium phosphates (PHOS-NAK) 280-160-250 MG packet - for Phosphorus less than 1.25 mg/dL  2 packet Oral Q6H PRN aHny Arellano,  MD        Or   • potassium & sodium phosphates (PHOS-NAK) 280-160-250 MG packet - for Phosphorus 1.25 - 2.5 mg/dL  2 packet Oral Q6H PRN Hany Arellano MD       • potassium chloride (K-DUR,KLOR-CON) ER tablet 40 mEq  40 mEq Oral PRN Hany Arellano MD        Or   • potassium chloride (KLOR-CON) packet 40 mEq  40 mEq Oral PRN Hany Arellano MD   40 mEq at 04/23/23 0250    Or   • potassium chloride 10 mEq in 100 mL IVPB  10 mEq Intravenous Q1H PRN Hany Arellano MD       • senna (SENOKOT) tablet 1 tablet  1 tablet Oral BID PRN Hany Arellano MD       • sennosides-docusate (PERICOLACE) 8.6-50 MG per tablet 2 tablet  2 tablet Oral Nightly Hany Arellano MD   2 tablet at 04/26/23 2139   • simethicone (MYLICON) chewable tablet 80 mg  80 mg Oral 4x Daily PRN Yrn Hutchins MD   80 mg at 04/25/23 1807   • sodium chloride 0.9 % flush 10 mL  10 mL Intravenous Q12H Hany Arellano MD   10 mL at 04/27/23 0842   • sodium chloride 0.9 % flush 10 mL  10 mL Intravenous PRN Hany Arellano MD       • sodium chloride 0.9 % infusion 40 mL  40 mL Intravenous PRN Hany Arellano MD       • sodium chloride 0.9 % infusion  30 mL/hr Intravenous Continuous PRN Hany Arellano MD 30 mL/hr at 04/24/23 1913 Currently Infusing at 04/24/23 1913   • tamsulosin (FLOMAX) 24 hr capsule 0.4 mg  0.4 mg Oral Daily Hany Arellano MD   0.4 mg at 04/27/23 0841     lactated ringers, 100 mL/hr, Last Rate: 100 mL/hr (04/27/23 0436)  sodium chloride, 30 mL/hr, Last Rate: 30 mL/hr (04/24/23 1913)      •  acetaminophen  •  calcium carbonate  •  dextrose  •  dextrose  •  dextrose  •  glucagon (human recombinant)  •  HYDROmorphone  •  insulin lispro  •  magnesium sulfate **OR** magnesium sulfate **OR** magnesium sulfate  •  nitroglycerin  •  ondansetron **OR** ondansetron  •  oxyCODONE-acetaminophen  •  potassium & sodium phosphates **OR** potassium & sodium phosphates  •  potassium chloride **OR** potassium chloride **OR** potassium chloride  •  senna  •   "simethicone  •  sodium chloride  •  sodium chloride  •  sodium chloride  Attest that current medications reviewed including but not limited to prescriptions, over-the counter, herbals and vitamin/mineral/dietary (nutritional) supplements for name, route of administration, type, dose and frequency and are current using all immediate resources available at time of dictation.    A:    /63 (BP Location: Right arm, Patient Position: Lying)   Pulse 69   Temp 97.7 °F (36.5 °C) (Oral)   Resp 18   Ht 180.3 cm (71\")   Wt 106 kg (233 lb 6.4 oz)   SpO2 94%   BMI 32.55 kg/m²     Constitutional:       General: Sleeping.      Appearance: Not in distress. Chronically ill-appearing.      Interventions: Nasal cannula in place.   Eyes:      General: Scleral icterus.   Pulmonary:      Effort: Pulmonary effort is normal.      Breath sounds: Normal breath sounds.   Cardiovascular:      PMI at left midclavicular line. Normal rate. Regular rhythm.   Edema:     Peripheral edema absent.   Abdominal:      Palpations: Abdomen is soft.      Tenderness: There is generalized abdominal tenderness.   Skin:     Coloration: Skin is jaundiced.   Genitourinary:     Comments: purewick with tea colored urine noted   Neurological:      Mental Status: Oriented to person, place, and time and easily aroused.   Psychiatric:         Mood and Affect: Mood and affect normal.         Speech: Speech normal.         Behavior: Behavior is cooperative.         Patient status: Disease state: Controlled with current treatments.  Functional status: Palliative Performance Scale Score: Performance 40% based on the following measures: Ambulation: Mainly in bed, Activity and Evidence of Disease: Unable to do any work, extensive evidence of disease, Self-Care: Mainly assistance required,  Intake: Normal or reduced, LOC: Full, drowsy or confusion   ECOG Status(2) Ambulatory and capable of self care, unable to carry out work activity, up and about > 50% or waking " hours.  Nutritional status: Albumin 2.1 today Body mass index is 32.87 kg/m².    Family support: The patient receives support from his daughter..  Advance Directives: Advance Directive Status: Patient has advance directive, copy in chart   POA/Healthcare surrogate-daughterSisi .      Impression/Problem List:     1. Pancreatic mass  2. Acute respiratory failure with hypoxia  3. Pneumonia   4. Chronic kidney disease stage III  5. Paroxysmal atrial flutter  6. History of CVA  7. Diabetes mellitus type II  8. Coronary artery disease   9. Hypothyrodism         Recommendations/Plan:  1. Provide support with goals of care           2.  Palliative care encounter  2023- I spoke with patient and his daughter, Sisi regarding goals of care. Of note, patient was drowsy during examination due to recent pain medication.  The patient does have an updated living will, which I have requested a copy for viewing purposes. I have reviewed the copy on file, which list his spouse who is . The patient has resided at [facility] since 2022, prior too he resided at home alone independent with ADLs. He had fall in 2022, went to rehab then transitioned to personal care at [facility]. Since December he has progressively gotten worst. They have a very good understanding of his acute conditions and the patient wishes not to have biopsy of pancreatic mass. He would like to focus on symptom management. He understands concerns for malignancy. We discussed palliative care, [facility] and [facility] services. His daughter would like to receive information regarding [facility] and a consult was placed. At this time, they are uncertain if he would want to continue with rehab/restorative treatment. They plan to proceed with biliary stent placement later this afternoon, with hopes of improvement with pain/nausea.  No spiritual concerns identified. I will plan to follow up tomorrow for continued support. I didn't address CODE  status as this had already been addressed.  I spoke with MARTY Judge.       4/25/2023- The patient was somnolent during our follow up conversation regarding goals. However, his daughter was present.  Of note, susan RN was present upon my arrival and had explained services to them as well.   The patient has voiced that the wishes not to come back to the hospital after discharge, he wants therapy (PT/OT)services as well.  The plan will be to evaluate how patient does over the next 24-48 hours, and await pathology findings from ERCP to help support decision making. His daughter is aware if any significant decline or desire to change structure to comfort care she can request transfer to inpatient palliative care unit as well. The patient daughter was emotional at the end of our conversation. I did offer spiritual consult and it was declined.  I spoke with MARTY Judge and MARTY Lott    4/27/2023- I had brief follow up conversation with patient regarding goals of care. He has plan to return to [facility] at discharge, which is later today at 3 pm per CCP. He plans to participate with therapy, but expressed to me how difficult is for him. We discussed that at any time they contact Susan to support with goals of comfort based on how his wishes.  I did also contact his daughter, Sisi to verify the above mentioned information. She voiced no questions or concerns at this time. I spoke with MARTY Lilly and ABRAHAM Wilson.         Thank you for this consult and allowing us to participate in patient's plan of care. Palliative Care Team will sign off and see VIVIANA.     LOU Trevino  4/27/2023  10:33 EDT

## 2023-04-27 NOTE — PROGRESS NOTES
[facility]  Inpatient Progress Note    Reason for Follow Up: Pancreas mass    Subjective     Interval History:   No new complaints today, no overnight events    Current Facility-Administered Medications:   •  acetaminophen (TYLENOL) tablet 650 mg, 650 mg, Oral, Q6H PRN, Yrn Hutchins MD, 650 mg at 04/25/23 1807  •  albuterol (PROVENTIL) nebulizer solution 0.083% 2.5 mg/3mL, 2.5 mg, Nebulization, TID, Hany Arellano MD, 2.5 mg at 04/26/23 1606  •  budesonide-formoterol (SYMBICORT) 160-4.5 MCG/ACT inhaler 1 puff, 1 puff, Inhalation, BID - RT, Hany Arellano MD, 1 puff at 04/27/23 0731  •  calcium carbonate (TUMS) chewable tablet 500 mg (200 mg elemental), 2 tablet, Oral, BID PRN, Hany Arellano MD  •  dextrose (D50W) (25 g/50 mL) IV injection 10-50 mL, 10-50 mL, Intravenous, Q15 Min PRN, Yrn Hutchins MD  •  dextrose (D50W) (25 g/50 mL) IV injection 25 g, 25 g, Intravenous, Q15 Min PRN, Hany Arellano MD  •  dextrose (GLUTOSE) oral gel 15 g, 15 g, Oral, Q15 Min PRN, Yrn Hutchins MD  •  famotidine (PEPCID) injection 20 mg, 20 mg, Intravenous, Q24H, Hany Arellano MD, 20 mg at 04/27/23 0841  •  finasteride (PROSCAR) tablet 5 mg, 5 mg, Oral, Daily, Hany Arellano MD, 5 mg at 04/27/23 0841  •  Glucagon (GLUCAGEN) injection 1 mg, 1 mg, Intramuscular, Q15 Min PRN, Yrn Hutchins MD  •  HYDROmorphone (DILAUDID) injection 0.5 mg, 0.5 mg, Intravenous, Q2H PRN, Hany Arellano MD, 0.5 mg at 04/26/23 0150  •  insulin glargine (LANTUS, SEMGLEE) injection 1-200 Units, 1-200 Units, Subcutaneous, Nightly - GlucomLiset marcelino Ryan Richard, MD, 17 Units at 04/26/23 2141  •  insulin lispro (HUMALOG/ADMELOG) injection 1-200 Units, 1-200 Units, Subcutaneous, 4x Daily With Meals & Nightly, Yrn Hutchins MD, 2 Units at 04/27/23 1232  •  insulin lispro (HUMALOG/ADMELOG) injection 1-200 Units, 1-200 Units, Subcutaneous, PRN, Yrn Hutchins MD  •  lactated  ringers infusion, 100 mL/hr, Intravenous, Continuous, Hany Arellano MD, Last Rate: 100 mL/hr at 04/27/23 0900, 100 mL/hr at 04/27/23 0900  •  levothyroxine (SYNTHROID, LEVOTHROID) tablet 50 mcg, 50 mcg, Oral, Daily, Hany Arellano MD, 50 mcg at 04/27/23 0840  •  lidocaine (LIDODERM) 5 % 2 patch, 2 patch, Transdermal, Q24H, Yrn Hutchins MD, 2 patch at 04/27/23 0842  •  Magnesium Sulfate - Total Dose 10 grams - Magnesium 1 or Less, 2 g, Intravenous, PRN **OR** Magnesium Sulfate - Total Dose 6 grams - Magnesium 1.1 - 1.5, 2 g, Intravenous, PRN **OR** Magnesium Sulfate - Total Dose 4 grams - Magnesium 1.6 - 1.9, 4 g, Intravenous, PRN, Hany Arellano MD  •  Menthol-Zinc Oxide 1 application, 1 application, Topical, BID, Hany Arellano MD, 1 application at 04/27/23 0842  •  metoprolol succinate XL (TOPROL-XL) 24 hr tablet 12.5 mg, 12.5 mg, Oral, Daily, Hany Arellano MD, 12.5 mg at 04/27/23 0840  •  nitroglycerin (NITROSTAT) SL tablet 0.4 mg, 0.4 mg, Sublingual, Q5 Min PRN, Hany Arellano MD  •  ondansetron (ZOFRAN) tablet 4 mg, 4 mg, Oral, Q6H PRN **OR** ondansetron (ZOFRAN) injection 4 mg, 4 mg, Intravenous, Q6H PRN, Hany Arellano MD  •  oxyCODONE-acetaminophen (PERCOCET) 5-325 MG per tablet 1 tablet, 1 tablet, Oral, Q4H PRN, Yrn Hutchins MD, 1 tablet at 04/27/23 1233  •  pantoprazole (PROTONIX) EC tablet 40 mg, 40 mg, Oral, Q AM, Hany Arellano MD, 40 mg at 04/27/23 0548  •  polyethylene glycol (MIRALAX) packet 17 g, 17 g, Oral, BID, Hany Arellano MD, 17 g at 04/27/23 0840  •  potassium & sodium phosphates (PHOS-NAK) 280-160-250 MG packet - for Phosphorus less than 1.25 mg/dL, 2 packet, Oral, Q6H PRN **OR** potassium & sodium phosphates (PHOS-NAK) 280-160-250 MG packet - for Phosphorus 1.25 - 2.5 mg/dL, 2 packet, Oral, Q6H PRN, Hany Arellano MD  •  potassium chloride (K-DUR,KLOR-CON) ER tablet 40 mEq, 40 mEq, Oral, PRN **OR** potassium chloride (KLOR-CON) packet 40 mEq, 40 mEq, Oral, PRN, 40 mEq  at 04/23/23 0250 **OR** potassium chloride 10 mEq in 100 mL IVPB, 10 mEq, Intravenous, Q1H PRN, Hany Arellano MD  •  senna (SENOKOT) tablet 1 tablet, 1 tablet, Oral, BID PRN, Hany Arellano MD  •  sennosides-docusate (PERICOLACE) 8.6-50 MG per tablet 2 tablet, 2 tablet, Oral, Nightly, Hany Arellano MD, 2 tablet at 04/26/23 2139  •  simethicone (MYLICON) chewable tablet 80 mg, 80 mg, Oral, 4x Daily PRN, Yrn Hutchins MD, 80 mg at 04/25/23 1807  •  sodium chloride 0.9 % flush 10 mL, 10 mL, Intravenous, Q12H, Hany Arellano MD, 10 mL at 04/27/23 0842  •  sodium chloride 0.9 % flush 10 mL, 10 mL, Intravenous, PRN, Hany Arellano MD  •  sodium chloride 0.9 % infusion 40 mL, 40 mL, Intravenous, PRN, Hany Arellano MD  •  sodium chloride 0.9 % infusion, 30 mL/hr, Intravenous, Continuous PRN, Hany Arellano MD, Last Rate: 30 mL/hr at 04/24/23 1913, Currently Infusing at 04/24/23 1913  •  tamsulosin (FLOMAX) 24 hr capsule 0.4 mg, 0.4 mg, Oral, Daily, Hany Arellano MD, 0.4 mg at 04/27/23 0841  Review of Systems:    There is weakness fatigue all other systems reviewed and negative    Objective     Vital Signs  Temp:  [97.5 °F (36.4 °C)-98 °F (36.7 °C)] 97.5 °F (36.4 °C)  Heart Rate:  [69-75] 70  Resp:  [16-20] 18  BP: (116-133)/(51-63) 119/51  Body mass index is 32.55 kg/m².    Intake/Output Summary (Last 24 hours) at 4/27/2023 1449  Last data filed at 4/27/2023 1348  Gross per 24 hour   Intake 880 ml   Output 300 ml   Net 580 ml     I/O this shift:  In: 730 [P.O.:680; IV Piggyback:50]  Out: -      Physical Exam:   General: patient awake, alert and cooperative   Eyes: Normal lids and lashes, no scleral icterus   Neck: supple, normal ROM   Skin: warm and dry, not jaundiced   Cardiovascular: regular rhythm and rate, no murmurs auscultated   Pulm: clear to auscultation bilaterally, regular and unlabored   Abdomen: soft, nontender, nondistended; normal bowel sounds   Extremities: no rash or edema   Psychiatric: Normal  mood and behavior; memory intact     Results Review:     I reviewed the patient's new clinical results.    Results from last 7 days   Lab Units 04/27/23  0527 04/26/23  0543 04/25/23  0530   WBC 10*3/mm3 7.01 7.91 12.10*   HEMOGLOBIN g/dL 10.8* 11.3* 12.2*   HEMATOCRIT % 32.3* 34.3* 37.7   PLATELETS 10*3/mm3 118* 117* 128*     Results from last 7 days   Lab Units 04/27/23  0527 04/26/23  0543 04/25/23  0530   SODIUM mmol/L 127* 131* 132*   POTASSIUM mmol/L 4.7 4.7 4.9   CHLORIDE mmol/L 95* 95* 98   CO2 mmol/L 21.4* 23.4 21.9*   BUN mg/dL 88* 80* 72*   CREATININE mg/dL 3.21* 2.90* 2.42*   CALCIUM mg/dL 8.2* 8.1* 8.5*   BILIRUBIN mg/dL 4.7* 6.7* 13.0*   ALK PHOS U/L 1,561* 1,834* 2,297*   ALT (SGPT) U/L 190* 239* 307*   AST (SGOT) U/L 62* 85* 165*   GLUCOSE mg/dL 151* 195* 224*         Lab Results   Lab Value Date/Time    LIPASE 58 04/21/2023 2049    LIPASE 8 (L) 04/14/2019 0840       Radiology:  FL ercp biliary duct only   Final Result      CT Abdomen Pelvis Without Contrast   Final Result   1. There has been development of an approximately 5.5 cm pancreatic head   mass obstructing the common bile duct likely represents a pancreatic   malignancy. The multiple nodular densities adjacent to the pancreas are   suspected to be metastatic. The cystic lesions at the pancreatic body   may represent ectatic sidebranches or intraductal involvement with the   malignancy. There are also changes of acute pancreatitis involving the   entire pancreas.   2. The airspace opacities at the dependent aspect of both lower lobes   are suspicious for pneumonia, particularly at the right lung base. There   is also pulmonary vascular congestion.       This report was finalized on 4/24/2023 7:16 AM by Dr. Ele Sifuentes M.D.              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  Pancreatic head mass s/p ERCP with metal stent  2.  Elevated LFTs     All problems new to me today     Plan:   Otology negative for malignancy  LFTs improving  Recommend stent replacement as needed  Appears to be transferring to hospice       I discussed the patients findings and my recommendations with patient and nursing staff.    Jey Barrios MD

## 2023-04-27 NOTE — CASE MANAGEMENT/SOCIAL WORK
Continued Stay Note  [facility]     Patient Name: Osvaldo Lopez  MRN: [id number]  Today's Date: 4/27/2023    Admit Date: 4/21/2023    Plan: Skilled bed at [facility]; EMS for 3:00 P.M   Discharge Plan     Row Name 04/27/23 1130       Plan    Plan Skilled bed at [facility]; EMS for 3:00 P.M    Plan Comments [facility] confirmed with [facility]; bed available today and is aware of transport time. CCP spoke with patient's daughter and she is agreeable to d/c plan and states she will follow up with hospitals in a few days. Patient's daughter has their contact information. RN aware. Arin MALIN               Discharge Codes    No documentation.               Expected Discharge Date and Time     Expected Discharge Date Expected Discharge Time    Apr 27, 2023             DEA Saeed

## 2023-04-27 NOTE — PLAN OF CARE
Goal Outcome Evaluation:         Pt confused, 1LNC, LR@100, IV ABX administered, purewick in place with minimal urine output, Q2 turn, anticipating discharge today @ 1500 via EMS back to [facility], will continue to monitor

## 2023-04-27 NOTE — PLAN OF CARE
Goal Outcome Evaluation:      Alert and oriented to person and place. Disoriented to time and situation. Turned every 2 hours. Total care with all ADLS. On 1 liter oxygen per nasal cannula. Tolerating consistent carbohydrate diet well. Patient is a total feed. Blood sugar monitoring done AC/HS. A fib cardiac rhythm. Pressure injury x 3 noted to coccyx and right gluteal. Barrier cream and opticell dressing applied. On low air loss bed. Male external catheter patent with lesly colored urine noted. Discharged back to [facility] skilled care facility. Ambulance scheduled for 1500  time. Prn oral hydrocodone given every 4 hours as needed for complaint of low back pain. Will continue to monitor.

## 2023-04-27 NOTE — DISCHARGE SUMMARY
"    Patient Name: Osvaldo Lopez  : 1935  MRN: [id number]    Date of Admission: 2023  Date of Discharge:  2023  Primary Care Physician: Provider, No Known      Chief Complaint:   Back Pain, Abdominal Pain, and Abnormal Lab      Discharge Diagnoses     Active Hospital Problems    Diagnosis  POA   • **Pancreatic mass [K86.89]  Yes   • Hypothyroidism [E03.9]  Yes   • Type 2 diabetes mellitus with chronic kidney disease, with long-term current use of insulin [E11.22, Z79.4]  Not Applicable   • History of CVA (cerebrovascular accident) [Z86.73]  Not Applicable   • Transaminitis [R74.01]  Yes   • Pneumonia [J18.9]  Yes   • History of atrial flutter [Z86.79]  Not Applicable   • Hypoxia [R09.02]  Yes   • 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 Lopez is a 87 y.o. former smoker with a history of CVA (2022), paroxysmal typical atrial flutter on long-term anticoagulation, CKD stage IIIb, type 2 diabetes mellitus with long-term current use of insulin, hypertension, hyperlipidemia, hypothyroidism that presents to [facility] complaining of worsening abdominal pain.  Patient does describes the pain as general in nature, but most significant in the left upper quadrant, rated 8/10 in severity, intermittent, he states that it has \"been going on for a while\", however, has progressively worsened over the past 4 to 5 days prior to hospital presentation.  Denies recollection of possible inciting factors.  Denies noticeable aggravating or alleviating factors.  Endorsing associated abdominal " distention, nausea, decreased oral intake, decreased appetite, weight loss and dyspnea.    Hospital Course     Pt admitted for back pain, imaging on admission showing obstructive pancreatic mass.  He was seen in consultation with GI and extensive conversation was had and patient noted that he did not want any biopsy for suspected malignancy, and was not interested in any aggressive treatments.  Decision was made to undergo ERCP with biliary stent for palliative reasons, which she underwent on 4/24/2023.  In addition palliative care was consulted as well as the [facility] team and decision was made to transition to SNF facility.  Patient and family would like to try therapies as tolerated but understand that this may not be possible.  His abdominal pain was vastly improved following the procedure, however he continued to have worsening kidney function despite aggressive IV fluid resuscitation as well as diuretic challenge to increase urine output.  Again this was discussed with patient and family and they reiterated they did not want any aggressive treatments including dialysis.  In addition he was treated for possible aspiration pneumonia with a course of Zosyn.  At this point he is stable for discharge at this time.  Hopefully he will have some recovery, however if transition needs to be made to full [facility] services, they will be following to ease this transition.    Discharge Plan     Pancreatic mass  Abdominal pain complicated by nausea and vomiting  Transaminitis  - CT AP- approximately 5.5 cm pancreatic head mass obstructing the CBD likely represents pancreatic malignancy, coupled with multiple nodular densities adjacent to the pancreas   -Per patient not interested in any work-up or treatment for his malignancy  -GI following   -EGD (4/24/23): gastritis  -ERCP (4/24/23): malignant appearing stricture in lower third of main bile duct; biliary sphincterotomy performed; 10 mm x 6 cm wall flex covered metal stent  placed in CBD  -daily PPI  -diet as tolerated  -Given palliative nature of stent placement, repeat ERCP for exchange unlikely to be needed     Cancer associated pain  -We will discharge with Percocet; [facility] to follow-up for additional pain needs given malignancy     Hypoxemia  Pneumonia  -Completed course of Zosyn  -blood cx NGTD     Chronic kidney disease stage 3B  -at b/l     Coronary artery disease  - S/P CABG x5 (1999)      Hypothyroidism  -Synthroid 50 mcg     History of CVA and subdural hematoma  - Noted during prior admission in December 2022.    Day of Discharge     Subjective:  Appears lethargic.  Ready for transition to facility today.    Physical Exam:  Temp:  [97.5 °F (36.4 °C)-98 °F (36.7 °C)] 97.5 °F (36.4 °C)  Heart Rate:  [69-75] 70  Resp:  [16-20] 18  BP: (116-133)/(51-63) 119/51  Body mass index is 32.55 kg/m².  Physical Exam  Constitutional:       Appearance: He is ill-appearing.   Cardiovascular:      Rate and Rhythm: Normal rate and regular rhythm.      Pulses: Normal pulses.   Pulmonary:      Effort: Pulmonary effort is normal.      Breath sounds: Normal breath sounds.   Abdominal:      General: Abdomen is flat.      Palpations: Abdomen is soft.   Skin:     Findings: No bruising.   Neurological:      General: No focal deficit present.      Mental Status: He is alert and oriented to person, place, and time.     Consultants     Consult Orders (all) (From admission, onward)     Start     Ordered    04/24/23 1020  Inpatient Hospice / [facility] Consult  Once        Comments: Call patient daughterSisi to arrange meeting   Specialty:  Hospice and Palliative Medicine  Provider:  (Not yet assigned)    04/24/23 1020    04/23/23 1121  Inpatient Palliative Care Team Consult  Once        Provider:  (Not yet assigned)    04/23/23 1121    04/22/23 0301  Inpatient Gastroenterology Consult  Once        Specialty:  Gastroenterology  Provider:  Jey Diehl MD    04/22/23 0302    04/22/23 0006  [facility]  (on-call MD unless specified) Details  Once        Specialty:  Hospitalist  Provider:  (Not yet assigned)    04/22/23 0005              Procedures     ENDOSCOPIC RETROGRADE CHOLANGIOPANCREATOGRAPHY WITH SPHINCTEROTOMY, BALLOON SWEEP,  BRUSHINGS CBD, PLACEMENT OF WALLFLEX BILIARY STENT 10mm X 60mm, ESOPHAGOGASTRODUODENOSCOPY WITH BIOPSIES      Imaging Results (All)     Procedure Component Value Units Date/Time    FL ercp biliary duct only [[id number]] Collected: 04/24/23 2131     Updated: 04/25/23 1623    Narrative:      ERCP INTERPRETATION ONLY 04/24/2023     HISTORY: ERCP.     FINDINGS: The common duct is not well opacified. There is a small amount  of contrast in the intrahepatic ducts. Radiopaque balloon is seen along  the expected course of the common duct. No unexpected findings are  noted.     Fluoroscopy time 5 minutes 26 seconds, 3 images     This report was finalized on 4/25/2023 4:20 PM by Dr. Kingsley Galindo M.D.       CT Abdomen Pelvis Without Contrast [[id number]] Collected: 04/21/23 2336     Updated: 04/24/23 0719    Narrative:      CT ABDOMEN AND PELVIS WITHOUT IV CONTRAST     HISTORY: 87-year-old male with abnormal LFTs and generalized abdominal  pain. Colon cancer history. Cholecystectomy in the past.     TECHNIQUE: Radiation dose reduction techniques were utilized, including  automated exposure control and exposure modulation based on body size.   3 mm images were obtained through the abdomen and pelvis without the  administration of IV contrast. Compared with previous CT 12/27/2022.     FINDINGS: There has been a dramatic change in the appearance of the  pancreas. There has been development of an approximately 5.5 x 4.5 cm  solid appearing mass at the pancreatic head and the mass obstructs the  common bile duct which measures 1.8 cm in diameter. There is also  significant intrahepatic biliary dilatation. There are multiple cystic  lesions scattered throughout the pancreatic body and there is  haziness  surrounding the entire pancreas. There are multiple nodular densities  surrounding the pancreatic head and parts of the pancreatic body. Other  than the intrahepatic biliary dilatation, noncontrasted liver appears  unremarkable. Splenic size is normal. Adrenals appear unremarkable.  There are several renal cysts. There are no renal stones or  hydronephrosis. There is no acute bowel abnormality. There is moderate  sigmoid diverticulosis without evidence for diverticulitis. Right  hemicolectomy changes are noted. There are extensive abdominal aortic  atherosclerotic changes and there is a stable 3.6 cm infrarenal  abdominal aortic aneurysm. Both common iliac arteries are mildly  ectatic. 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 The diameter of  the thoracic aorta at the chronic dissection measures 4.1 cm which is  stable. At the visualized lower lung fields, there is pulmonary vascular  congestion and there are airspace opacities at the dependent aspect of  both lower lobes.       Impression:      1. There has been development of an approximately 5.5 cm pancreatic head  mass obstructing the common bile duct likely represents a pancreatic  malignancy. The multiple nodular densities adjacent to the pancreas are  suspected to be metastatic. The cystic lesions at the pancreatic body  may represent ectatic sidebranches or intraductal involvement with the  malignancy. There are also changes of acute pancreatitis involving the  entire pancreas.  2. The airspace opacities at the dependent aspect of both lower lobes  are suspicious for pneumonia, particularly at the right lung base. There  is also pulmonary vascular congestion.     This report was finalized on 4/24/2023 7:16 AM by Dr. Ele Sifuentes M.D.           Results for orders placed during the hospital encounter of 12/27/22    Duplex Carotid Ultrasound CAR    Interpretation Summary  •   Right internal carotid artery mild stenosis.  •  Left internal carotid artery moderate stenosis.    Results for orders placed during the hospital encounter of 12/27/22    Adult Transthoracic Echo Limited W/ Cont if Necessary Per Protocol    Interpretation Summary  •  Limited echocardiogram for left ventricular function.  •  Left ventricular systolic function is normal. Left ventricular ejection fraction appears to be 61 - 65%.  •  There is no evidence of pericardial effusion    Pertinent Labs     Results from last 7 days   Lab Units 04/27/23 0527 04/26/23  0543 04/25/23  0530 04/24/23  0708   WBC 10*3/mm3 7.01 7.91 12.10* 11.46*   HEMOGLOBIN g/dL 10.8* 11.3* 12.2* 12.8*   PLATELETS 10*3/mm3 118* 117* 128* 128*     Results from last 7 days   Lab Units 04/27/23 0527 04/26/23  0543 04/25/23  0530 04/24/23  0708   SODIUM mmol/L 127* 131* 132* 126*   POTASSIUM mmol/L 4.7 4.7 4.9 4.8   CHLORIDE mmol/L 95* 95* 98 93*   CO2 mmol/L 21.4* 23.4 21.9* 24.9   BUN mg/dL 88* 80* 72* 59*   CREATININE mg/dL 3.21* 2.90* 2.42* 1.99*   GLUCOSE mg/dL 151* 195* 224* 255*   EGFR mL/min/1.73 18.2* 20.6* 25.5* 32.3*     Results from last 7 days   Lab Units 04/27/23 0527 04/26/23 0543 04/25/23 0530 04/24/23  0708   ALBUMIN g/dL 1.8* 2.2* 1.8* 2.1*   BILIRUBIN mg/dL 4.7* 6.7* 13.0* 11.0*   ALK PHOS U/L 1,561* 1,834* 2,297* 2,201*   AST (SGOT) U/L 62* 85* 165* 215*   ALT (SGPT) U/L 190* 239* 307* 354*     Results from last 7 days   Lab Units 04/27/23 0527 04/26/23 0543 04/25/23 0530 04/24/23  0708   CALCIUM mg/dL 8.2* 8.1* 8.5* 8.6   ALBUMIN g/dL 1.8* 2.2* 1.8* 2.1*   MAGNESIUM mg/dL 2.3 2.2 2.2 2.2     Results from last 7 days   Lab Units 04/21/23  2049   LIPASE U/L 58             Invalid input(s): LDLCALC  Results from last 7 days   Lab Units 04/23/23  1139 04/23/23  1132 04/22/23  0902   BLOODCX  No growth at 4 days No growth at 4 days  --    MRSAPCR   --   --  No MRSA Detected     Results from last 7 days   Lab Units 04/22/23  0826    COVID19  Not Detected       Test Results Pending at Discharge     Pending Labs     Order Current Status    Blood Culture - Blood, Arm, Right Preliminary result    Blood Culture - Blood, Hand, Right Preliminary result          Discharge Details        Discharge Medications      New Medications      Instructions Start Date   oxyCODONE-acetaminophen 5-325 MG per tablet  Commonly known as: PERCOCET   1 tablet, Oral, Every 4 Hours PRN      pantoprazole 40 MG EC tablet  Commonly known as: PROTONIX   40 mg, Oral, Every Early Morning   Start Date: April 28, 2023     simethicone 80 MG chewable tablet  Commonly known as: MYLICON   80 mg, Oral, 4 Times Daily PRN         Continue These Medications      Instructions Start Date   acetaminophen 500 MG tablet  Commonly known as: TYLENOL   500 mg, Oral, Every 6 Hours PRN      acidophilus tablet tablet   1 tablet, Oral, 2 Times Daily      albuterol 2.5 MG/0.5ML nebulizer solution  Commonly known as: PROVENTIL   2.5 mg, Nebulization, 3 Times Daily      amiodarone 200 MG tablet  Commonly known as: PACERONE   TAKE 1 TABLET DAILY.      ascorbic acid 500 MG tablet  Commonly known as: VITAMIN C   500 mg, Oral, Daily      atorvastatin 40 MG tablet  Commonly known as: LIPITOR   40 mg, Oral, Daily      Easy Voyage Microlet Lancets lancets   USE TWICE A DAY      bisacodyl 10 MG suppository  Commonly known as: DULCOLAX   10 mg, Rectal, Daily PRN      Contour Next Test test strip  Generic drug: glucose blood   1 each, Other, Daily, test blood sugar      ferrous sulfate 325 (65 FE) MG tablet   325 mg, Oral, Daily With Breakfast      finasteride 5 MG tablet  Commonly known as: PROSCAR   TAKE 1 TABLET DAILY FOR    PROSTATE      Fluticasone-Salmeterol 250-50 MCG/ACT DISKUS  Commonly known as: ADVAIR/WIXELA   Inhalation, 2 Times Daily - RT      furosemide 40 MG tablet  Commonly known as: LASIX   20 mg, Oral, Daily      insulin aspart 100 UNIT/ML solution pen-injector sc pen  Commonly known as: novoLOG  FLEXPEN   10 Units, Subcutaneous, 3 Times Daily With Meals      levothyroxine 50 MCG tablet  Commonly known as: Synthroid   50 mcg, Oral, Daily      lidocaine 4 % cream  Commonly known as: LMX   No dose, route, or frequency recorded.      Menthol-Zinc Oxide 0.44-20.6 % ointment   1 application, Topical, 3 Times Daily      metoprolol succinate XL 25 MG 24 hr tablet  Commonly known as: TOPROL-XL   12.5 mg, Oral, Daily      multivitamin tablet tablet  Commonly known as: THERAGRAN   1 tablet, Oral, Every Morning      multivitamin with minerals tablet tablet   1 tablet, Oral, Daily      polyethylene glycol 17 g packet  Commonly known as: MIRALAX   17 g, Oral, Daily      senna 8.6 MG tablet  Commonly known as: SENOKOT   1 tablet, Oral, 2 Times Daily PRN      tamsulosin 0.4 MG capsule 24 hr capsule  Commonly known as: FLOMAX   0.4 mg, Oral, Daily      Tradjenta 5 MG tablet tablet  Generic drug: linagliptin   TAKE 1 TABLET DAILY      traMADol 50 MG tablet  Commonly known as: ULTRAM   50 mg, Oral, Every 6 Hours PRN      vitamin B-12 1000 MCG tablet  Commonly known as: CYANOCOBALAMIN   1,000 mcg, Oral, Daily      Xarelto 15 MG tablet  Generic drug: rivaroxaban   TAKE 1 TABLET DAILY         Stop These Medications    insulin detemir 100 UNIT/ML injection  Commonly known as: LEVEMIR            Allergies   Allergen Reactions   • Amiodarone Myalgia     Muscle problems in legs   • Percocet [Oxycodone-Acetaminophen] Mental Status Change and Confusion     Causes confusion/       Discharge Disposition:  Hospice/Medical Facility (DC - External)      Discharge Diet:  Diet Order   Procedures   • Diet: Gastrointestinal Diets, Diabetic Diets; Consistent Carbohydrate; Fiber-Restricted, Low Irritant; Texture: Regular Texture (IDDSI 7); Fluid Consistency: Thin (IDDSI 0)       Discharge Activity:   Activity Instructions     Activity as Tolerated            CODE STATUS:    Code Status and Medical Interventions:   Ordered at: 04/22/23 0841      Medical Intervention Limits:    NO intubation (DNI)     Level Of Support Discussed With:    Patient    Health Care Surrogate     Code Status (Patient has no pulse and is not breathing):    No CPR (Do Not Attempt to Resuscitate)     Medical Interventions (Patient has pulse or is breathing):    Limited Support     Comments:    discussed with daughter who is POA     Release to patient:    Routine Release       Future Appointments   Date Time Provider Department Center   6/13/2023  1:00 PM Caio Rodríguez MD [facility]      Contact information for follow-up providers     Provider, No Known .    Contact information:  [facility] [address] [phone]                   Contact information for after-discharge care     Destination     [facility] .    Service: Skilled Nursing  Contact information:  [address] [address]  [phone]                             Time Spent on Discharge:  Greater than 30 minutes spent on discharge management including final examination, discussion of hospital stay and patient education, preparation of records, medication reconciliation, follow up planning      Yrn Hutchins MD  [facility]  04/27/23  10:05 EDT

## 2023-04-28 LAB
BACTERIA SPEC AEROBE CULT: NORMAL
BACTERIA SPEC AEROBE CULT: NORMAL

## 2023-04-28 NOTE — PROGRESS NOTES
Pathology results discussed with patient and daughter while patient in the hospital.     Biliary stricture brushings showed atypical cells which are non-diagnostic of malignancy meaning this sample does not rule it out or in.  However given presenting symptoms and image findings, pancreatic malignancy is highly suspected and after discussion patient is not interested in treatment even if he were candidate for treatment and defers endoscopic ultrasound sampling considering this with plan to focus on symptom management.    Biopsies from stomach show inflammation with no evidence of H pylori.  I would continue protonix to help with this as this could be contributing to his symptoms.  Patient evaluated and established with [facility] team to help with goals of care.

## 2023-05-01 NOTE — TELEPHONE ENCOUNTER
----- Message from Steffany Singh PA-C sent at 12/30/2022 12:09 PM EST -----  Please schedule this patient for a 2-week follow-up with head CT with an APC.  Thank you.     no

## 2024-04-09 ENCOUNTER — TELEPHONE (OUTPATIENT)
Dept: SURGERY | Facility: CLINIC | Age: 89
End: 2024-04-09

## (undated) DEVICE — SUT VIC 1 OS8 27IN J699H

## (undated) DEVICE — SUT MNCRYL PLS ANTIB UD 4/0 PS2 18IN

## (undated) DEVICE — ADAPT CLN BIOGUARD AIR/H2O DISP

## (undated) DEVICE — DEV INFL CRE STERIFLATE 60CC DISP

## (undated) DEVICE — THE SINGLE USE ETRAP – POLYP TRAP IS USED FOR SUCTION RETRIEVAL OF ENDOSCOPICALLY REMOVED POLYPS.: Brand: ETRAP

## (undated) DEVICE — SENSR O2 OXIMAX FNGR A/ 18IN NONSTR

## (undated) DEVICE — WIREGUIDED CYTOLOGY BRUSH: Brand: RX CYTOLOGY BRUSH

## (undated) DEVICE — SPNG LAP 18X18IN LF STRL PK/5

## (undated) DEVICE — THE CARR-LOCKE INJECTION NEEDLE IS A SINGLE USE, DISPOSABLE, FLEXIBLE SHEATH INJECTION NEEDLE USED FOR THE INJECTION OF VARIOUS TYPES OF MEDIA THROUGH FLEXIBLE ENDOSCOPES.

## (undated) DEVICE — TIP COVER ACCESSORY

## (undated) DEVICE — SUT PDS 1 CT1 36IN Z347H

## (undated) DEVICE — PK ATS CUST W CARDIOTOMY RESEVOIR

## (undated) DEVICE — KT ORCA ORCAPOD DISP STRL

## (undated) DEVICE — APPL CHLORAPREP HI/LITE 26ML ORNG

## (undated) DEVICE — DIFFUSER: Brand: CORE, MAESTRO

## (undated) DEVICE — PAD,ABDOMINAL,8"X10",ST,LF: Brand: MEDLINE

## (undated) DEVICE — GLV SURG SENSICARE PI LF PF 7.5 GRN STRL

## (undated) DEVICE — IRRIGATOR BULB ASEPTO 60CC STRL

## (undated) DEVICE — 3M™ IOBAN™ 2 ANTIMICROBIAL INCISE DRAPE 6648EZ: Brand: IOBAN™ 2

## (undated) DEVICE — PINNACLE INTRODUCER SHEATH: Brand: PINNACLE

## (undated) DEVICE — DISPOSABLE BIPOLAR CABLE 12FT. (3.6M): Brand: KIRWAN

## (undated) DEVICE — REDUCER: Brand: ENDOWRIST

## (undated) DEVICE — ADHS SKIN DERMABOND

## (undated) DEVICE — ADHS SKIN DERMABOND TOP ADVANCED

## (undated) DEVICE — THE TORRENT IRRIGATION SCOPE CONNECTOR IS USED WITH THE TORRENT IRRIGATION TUBING TO PROVIDE IRRIGATION FLUIDS SUCH AS STERILE WATER DURING GASTROINTESTINAL ENDOSCOPIC PROCEDURES WHEN USED IN CONJUNCTION WITH AN IRRIGATION PUMP (OR ELECTROSURGICAL UNIT).: Brand: TORRENT

## (undated) DEVICE — COLUMN DRAPE

## (undated) DEVICE — TUBING, SUCTION, 1/4" X 10', STRAIGHT: Brand: MEDLINE

## (undated) DEVICE — VIOLET BRAIDED (POLYGLACTIN 910), SYNTHETIC ABSORBABLE SUTURE: Brand: COATED VICRYL

## (undated) DEVICE — PATIENT RETURN ELECTRODE, SINGLE-USE, CONTACT QUALITY MONITORING, ADULT, WITH 9FT CORD, FOR PATIENTS WEIGING OVER 33LBS. (15KG): Brand: MEGADYNE

## (undated) DEVICE — ANTIBACTERIAL UNDYED BRAIDED (POLYGLACTIN 910), SYNTHETIC ABSORBABLE SUTURE: Brand: COATED VICRYL

## (undated) DEVICE — BONE MARROW ASPIRATION NEEDLES ASPIRATOR KIT 3-HOLE 4 INCHES NDLE

## (undated) DEVICE — BITEBLOCK OMNI BLOC

## (undated) DEVICE — OCCLUSIVE GAUZE STRIP,3% BISMUTH TRIBROMOPHENATE IN PETROLATUM BLEND: Brand: XEROFORM

## (undated) DEVICE — SINGLE-USE BIOPSY FORCEPS: Brand: RADIAL JAW 4

## (undated) DEVICE — COUNT NDL MAG FM/BLCK 40COUNT

## (undated) DEVICE — DEV COND GAS LAP INSUFLOW W/LUER CONN

## (undated) DEVICE — FRCP BX RADJAW4 NDL 2.8 240CM LG OG BX40

## (undated) DEVICE — ARM DRAPE

## (undated) DEVICE — ELECTRD ECG CARBON/SNP RL FM A/ 5PK

## (undated) DEVICE — Device: Brand: ISMUS

## (undated) DEVICE — Device: Brand: THERMOCOOL SMARTTOUCH

## (undated) DEVICE — TOTAL TRAY, 16FR 10ML SIL FOLEY, URN: Brand: MEDLINE

## (undated) DEVICE — ENCORE® LATEX ORTHO SIZE 8, STERILE LATEX POWDER-FREE SURGICAL GLOVE: Brand: ENCORE

## (undated) DEVICE — VESSEL SEALER EXTEND: Brand: ENDOWRIST

## (undated) DEVICE — SUT VIC 0 TIES 18IN J912G

## (undated) DEVICE — Device

## (undated) DEVICE — GOWN SURG AERO CHROME XL

## (undated) DEVICE — DRAPE,UTILITY,TAPE,15X26,STERILE: Brand: MEDLINE

## (undated) DEVICE — 3M™ BAIR HUGGER® UNDERBODY BLANKET, ADULT, 10 PER CASE 54500: Brand: BAIR HUGGER™

## (undated) DEVICE — ENDOPATH XCEL BLADELESS TROCARS WITH STABILITY SLEEVES: Brand: ENDOPATH XCEL

## (undated) DEVICE — LN SMPL CO2 SHTRM SD STREAM W/M LUER

## (undated) DEVICE — SPNG GZ WOVN 4X4IN 12PLY 10/BX STRL

## (undated) DEVICE — TTL1LYR 16FR10ML 100%SIL TMPST TR: Brand: MEDLINE

## (undated) DEVICE — APPL HEMOS FIBRIN DUPLOTIP W/SNPLK 5MM 32CM

## (undated) DEVICE — DISPOSABLE GRASPER CARTRIDGE: Brand: DIRECT DRIVE REPOSABLE GRASPERS

## (undated) DEVICE — ENSEAL TRIO TEMPERATURE CONTOLLED TISSUE SEALING TECHNOLOGY DISPOSABLE TISSUE SEALING DEVICE TAPTRONIC TRIGGER ACTIVATED POWER 3MM CURVED JAW: Brand: ENSEAL

## (undated) DEVICE — HANDPIECE SET WITH COAXIAL HIGH FLOW TIP AND SUCTION TUBE: Brand: INTERPULSE

## (undated) DEVICE — Device: Brand: SMARTABLATE

## (undated) DEVICE — GOWN,ECLIPSE,FABRIC-REINFORCED,X-LARGE: Brand: MEDLINE

## (undated) DEVICE — 6.0MM PRECISION ROUND

## (undated) DEVICE — SINGLE USE DISTAL COVER MAJ-2315: Brand: SINGLE USE DISTAL COVER

## (undated) DEVICE — LOU GENERAL ROBOT: Brand: MEDLINE INDUSTRIES, INC.

## (undated) DEVICE — DEV LK WIREGUIDE FUSN OLYMP SCP

## (undated) DEVICE — STAPLER 60: Brand: SUREFORM

## (undated) DEVICE — INTENDED FOR TISSUE SEPARATION, AND OTHER PROCEDURES THAT REQUIRE A SHARP SURGICAL BLADE TO PUNCTURE OR CUT.: Brand: BARD-PARKER ® CARBON RIB-BACK BLADES

## (undated) DEVICE — PK NEURO SPINE 40

## (undated) DEVICE — TP UMB COTN 1/8X36 U12T

## (undated) DEVICE — STERILE LATEX POWDER-FREE SURGICAL GLOVESWITH NITRILE COATING: Brand: PROTEXIS

## (undated) DEVICE — BALLOON DILATATION CATHETER: Brand: HURRICANE™ RX

## (undated) DEVICE — RETRIEVAL BALLOON CATHETER: Brand: EXTRACTOR™ PRO RX

## (undated) DEVICE — SOL NACL 0.9PCT 1000ML

## (undated) DEVICE — Device: Brand: WEBSTER CS

## (undated) DEVICE — MEDI-VAC YANKAUER SUCTION HANDLE W/BULBOUS TIP: Brand: CARDINAL HEALTH

## (undated) DEVICE — PROTECT HEEL HEELMEDIX STD

## (undated) DEVICE — GLV SURG BIOGEL LTX PF 7 1/2

## (undated) DEVICE — TB ET HI LO CUFF MURPHY 7.5MM

## (undated) DEVICE — Device: Brand: SPOT EX ENDOSCOPIC TATTOO

## (undated) DEVICE — SNAR POLYP SENSATION STDOVL 27 240 BX40

## (undated) DEVICE — CODMAN® SURGICAL PATTIES 1/2" X 3" (1.27CM X 7.62CM): Brand: CODMAN®

## (undated) DEVICE — MSK ENDO PORT O2 POM ELITE CURAPLEX A/

## (undated) DEVICE — CANNULA SEAL

## (undated) DEVICE — CANN O2 ETCO2 FITS ALL CONN CO2 SMPL A/ 7IN DISP LF

## (undated) DEVICE — ERBE NESSY®PLATE 170 SPLIT; 168CM²; CABLE 3M: Brand: ERBE

## (undated) DEVICE — LOU EP: Brand: MEDLINE INDUSTRIES, INC.

## (undated) DEVICE — SPONGE,LAP,12"X12",XR,ST,5/PK,40PK/CS: Brand: MEDLINE

## (undated) DEVICE — APPL DURAPREP IODOPHOR APL 26ML

## (undated) DEVICE — Device: Brand: SOUNDSTAR

## (undated) DEVICE — SEAL

## (undated) DEVICE — OIL CARTRIDGE: Brand: CORE, MAESTRO

## (undated) DEVICE — SUT ETHLN 2/0 30IN 628H

## (undated) DEVICE — SOL ANTISTICK CAUTRY ELECTROLUBE LF

## (undated) DEVICE — BLADELESS OBTURATOR: Brand: WECK VISTA

## (undated) DEVICE — Device: Brand: REFERENCE PATCH CARTO 3

## (undated) DEVICE — GLV SURG BIOGEL LTX PF 7

## (undated) DEVICE — SPHINCTEROTOME: Brand: HYDRATOME RX 44

## (undated) DEVICE — TUBING, SUCTION, 1/4" X 20', STRAIGHT: Brand: MEDLINE INDUSTRIES, INC.